# Patient Record
Sex: FEMALE | Race: WHITE | NOT HISPANIC OR LATINO | Employment: OTHER | URBAN - METROPOLITAN AREA
[De-identification: names, ages, dates, MRNs, and addresses within clinical notes are randomized per-mention and may not be internally consistent; named-entity substitution may affect disease eponyms.]

---

## 2017-05-04 ENCOUNTER — HOSPITAL ENCOUNTER (OUTPATIENT)
Dept: MAMMOGRAPHY | Facility: HOSPITAL | Age: 72
Discharge: HOME | End: 2017-05-04

## 2017-05-04 DIAGNOSIS — Z12.31 VISIT FOR SCREENING MAMMOGRAM: ICD-10-CM

## 2018-09-08 ENCOUNTER — HOSPITAL ENCOUNTER (OUTPATIENT)
Dept: MAMMOGRAPHY | Facility: CLINIC | Age: 73
Discharge: HOME | End: 2018-09-08

## 2018-09-08 DIAGNOSIS — Z12.31 VISIT FOR SCREENING MAMMOGRAM: ICD-10-CM

## 2020-02-28 ENCOUNTER — RECORDS - HEALTHEAST (OUTPATIENT)
Dept: ADMINISTRATIVE | Facility: OTHER | Age: 75
End: 2020-02-28

## 2020-02-28 LAB
LAB AP CHARGES (HE HISTORICAL CONVERSION): NORMAL
PATH REPORT.COMMENTS IMP SPEC: NORMAL
PATH REPORT.COMMENTS IMP SPEC: NORMAL
PATH REPORT.FINAL DX SPEC: NORMAL
PATH REPORT.GROSS SPEC: NORMAL
PATH REPORT.MICROSCOPIC SPEC OTHER STN: NORMAL
PATH REPORT.RELEVANT HX SPEC: NORMAL
RESULT FLAG (HE HISTORICAL CONVERSION): NORMAL

## 2021-05-24 ENCOUNTER — RECORDS - HEALTHEAST (OUTPATIENT)
Dept: ADMINISTRATIVE | Facility: CLINIC | Age: 76
End: 2021-05-24

## 2021-05-25 ENCOUNTER — RECORDS - HEALTHEAST (OUTPATIENT)
Dept: ADMINISTRATIVE | Facility: CLINIC | Age: 76
End: 2021-05-25

## 2021-05-27 ENCOUNTER — RECORDS - HEALTHEAST (OUTPATIENT)
Dept: ADMINISTRATIVE | Facility: CLINIC | Age: 76
End: 2021-05-27

## 2021-07-13 ENCOUNTER — RECORDS - HEALTHEAST (OUTPATIENT)
Dept: ADMINISTRATIVE | Facility: CLINIC | Age: 76
End: 2021-07-13

## 2021-07-21 ENCOUNTER — RECORDS - HEALTHEAST (OUTPATIENT)
Dept: ADMINISTRATIVE | Facility: CLINIC | Age: 76
End: 2021-07-21

## 2022-03-23 ENCOUNTER — TELEPHONE (OUTPATIENT)
Dept: OTOLARYNGOLOGY | Facility: CLINIC | Age: 77
End: 2022-03-23
Payer: COMMERCIAL

## 2022-03-23 NOTE — TELEPHONE ENCOUNTER
Left vm message for patient in regards to scheduling appointment. Writers call back number provided on vm.

## 2022-03-25 ENCOUNTER — TELEPHONE (OUTPATIENT)
Dept: OTOLARYNGOLOGY | Facility: CLINIC | Age: 77
End: 2022-03-25
Payer: COMMERCIAL

## 2022-03-25 NOTE — TELEPHONE ENCOUNTER
2nd attempt at calling patient. Left vm message in regards to scheduling appointment. Writers call back number provided on vm.

## 2022-03-30 ENCOUNTER — TELEPHONE (OUTPATIENT)
Dept: OTOLARYNGOLOGY | Facility: CLINIC | Age: 77
End: 2022-03-30
Payer: COMMERCIAL

## 2022-03-30 NOTE — TELEPHONE ENCOUNTER
3rd attempt at calling patient to schedule appt. Writers call back number and call center number were provided on vm for pt to call and schedule.

## 2022-04-04 NOTE — TELEPHONE ENCOUNTER
FUTURE VISIT INFORMATION      FUTURE VISIT INFORMATION:    Date:   5/9/22    Time:   4PM    Location: [facility] [place]  REFERRAL INFORMATION:    Referring provider: Rush Patricia MD      Referring providers clinic:  ENT  [facility]     Reason for visit/diagnosis  ref:.dx:dysphonia,leukoplakia,squamous papilloma    RECORDS REQUESTED FROM:       Clinic name Comments Records Status Imaging Status   ENT  [facility]    3/18/22 note from Rush Patricia MD      8/30/21 Micro Left Direct laryngoscopy with KTP laser    1/13/21 Direct microlaryngoscopy with stripping of vocal cord Direct microlaryngoscopy with microflap excision    11/9/2020 Microlaryngoscopy with exision of vocal cord lesion with KTP laser    9/29/15 DIRECT MICRO LARYNGOSCOPY WITH BIOPSY Care everywhere    [facility] - [address], [place] [address]  8/30/22 Mass, Right posterior glottis    (Case: [id number]) Care everywhere    [facility]  3/22/22 XR CHEST   5/13/21 XR Chest   11/8/16 MR Head Brain IAC  10/6/16 US Head Neck   10/14/14 MR Head Brain  Care everywhere req 4/4/22 - PACS   [facility]  7/12/21 note from Eliceo Franco MD  Care everywhere                  4/4/22 10:20AM sent a fax to [facility] for images - [facility]   4/11/22 9:50AM images received in PACS - [facility]

## 2022-05-06 ENCOUNTER — TELEPHONE (OUTPATIENT)
Dept: OTOLARYNGOLOGY | Facility: CLINIC | Age: 77
End: 2022-05-06
Payer: COMMERCIAL

## 2022-05-06 NOTE — TELEPHONE ENCOUNTER
Writer attempted to contact patient to confirm appointment with Dr. Davis 5/9/22 1:00 PM. Writer left a detailed voicemail for the patient.    Beranrd Kennedy, EMT

## 2022-05-09 ENCOUNTER — VIRTUAL VISIT (OUTPATIENT)
Dept: OTOLARYNGOLOGY | Facility: CLINIC | Age: 77
End: 2022-05-09
Payer: COMMERCIAL

## 2022-05-09 ENCOUNTER — OFFICE VISIT (OUTPATIENT)
Dept: OTOLARYNGOLOGY | Facility: CLINIC | Age: 77
End: 2022-05-09

## 2022-05-09 VITALS
HEIGHT: 66 IN | WEIGHT: 122 LBS | OXYGEN SATURATION: 99 % | HEART RATE: 64 BPM | BODY MASS INDEX: 19.61 KG/M2 | TEMPERATURE: 98 F | DIASTOLIC BLOOD PRESSURE: 78 MMHG | SYSTOLIC BLOOD PRESSURE: 121 MMHG

## 2022-05-09 DIAGNOSIS — R06.02 SHORTNESS OF BREATH: ICD-10-CM

## 2022-05-09 DIAGNOSIS — R49.0 DYSPHONIA: ICD-10-CM

## 2022-05-09 DIAGNOSIS — R49.0 DYSPHONIA: Primary | ICD-10-CM

## 2022-05-09 DIAGNOSIS — B49 FUNGAL INFECTION: ICD-10-CM

## 2022-05-09 DIAGNOSIS — J38.3 VOCAL CORD LEUKOPLAKIA: ICD-10-CM

## 2022-05-09 DIAGNOSIS — J44.9 CHRONIC OBSTRUCTIVE PULMONARY DISEASE, UNSPECIFIED COPD TYPE (H): ICD-10-CM

## 2022-05-09 DIAGNOSIS — J38.7 LARYNGEAL MASS: Primary | ICD-10-CM

## 2022-05-09 PROCEDURE — 31622 DX BRONCHOSCOPE/WASH: CPT | Performed by: OTOLARYNGOLOGY

## 2022-05-09 PROCEDURE — 99205 OFFICE O/P NEW HI 60 MIN: CPT | Mod: 25 | Performed by: OTOLARYNGOLOGY

## 2022-05-09 PROCEDURE — 92524 BEHAVRAL QUALIT ANALYS VOICE: CPT | Mod: GN | Performed by: SPEECH-LANGUAGE PATHOLOGIST

## 2022-05-09 PROCEDURE — 31579 LARYNGOSCOPY TELESCOPIC: CPT | Mod: 51 | Performed by: OTOLARYNGOLOGY

## 2022-05-09 RX ORDER — ALBUTEROL SULFATE 0.83 MG/ML
2.5 SOLUTION RESPIRATORY (INHALATION) EVERY 4 HOURS PRN
COMMUNITY
Start: 2021-04-12 | End: 2024-01-24

## 2022-05-09 RX ORDER — FUROSEMIDE 20 MG
20 TABLET ORAL
Status: ON HOLD | COMMUNITY
Start: 2020-12-17 | End: 2022-05-26

## 2022-05-09 RX ORDER — ALBUTEROL SULFATE 90 UG/1
2 AEROSOL, METERED RESPIRATORY (INHALATION) PRN
COMMUNITY
Start: 2021-07-12

## 2022-05-09 RX ORDER — BUDESONIDE AND FORMOTEROL FUMARATE DIHYDRATE 160; 4.5 UG/1; UG/1
2 AEROSOL RESPIRATORY (INHALATION)
COMMUNITY
Start: 2021-07-12

## 2022-05-09 RX ORDER — ATORVASTATIN CALCIUM 20 MG/1
20 TABLET, FILM COATED ORAL EVERY EVENING
COMMUNITY
Start: 2021-04-21

## 2022-05-09 RX ORDER — LEVOTHYROXINE SODIUM 88 UG/1
88 TABLET ORAL DAILY
COMMUNITY
Start: 2022-04-29

## 2022-05-09 RX ORDER — FEXOFENADINE HCL 180 MG/1
180 TABLET ORAL DAILY
COMMUNITY
Start: 2021-03-17 | End: 2023-09-20

## 2022-05-09 RX ORDER — MONTELUKAST SODIUM 10 MG/1
10 TABLET ORAL DAILY
COMMUNITY
Start: 2021-07-12 | End: 2023-09-20

## 2022-05-09 RX ORDER — SERTRALINE HYDROCHLORIDE 100 MG/1
100 TABLET, FILM COATED ORAL DAILY
COMMUNITY
Start: 2022-04-29

## 2022-05-09 RX ORDER — DILTIAZEM HYDROCHLORIDE 360 MG/1
360 CAPSULE, EXTENDED RELEASE ORAL DAILY
Status: ON HOLD | COMMUNITY
Start: 2021-10-21 | End: 2023-04-04

## 2022-05-09 RX ORDER — OLOPATADINE HYDROCHLORIDE 1 MG/ML
1 SOLUTION/ DROPS OPHTHALMIC
Status: ON HOLD | COMMUNITY
Start: 2021-01-29 | End: 2022-05-26

## 2022-05-09 RX ORDER — NYSTATIN 100000/ML
500000 SUSPENSION, ORAL (FINAL DOSE FORM) ORAL 4 TIMES DAILY
Qty: 140 ML | Refills: 0 | Status: SHIPPED | OUTPATIENT
Start: 2022-05-09 | End: 2022-05-16

## 2022-05-09 RX ORDER — POTASSIUM CHLORIDE 750 MG/1
CAPSULE, EXTENDED RELEASE ORAL
Status: ON HOLD | COMMUNITY
Start: 2020-12-17 | End: 2022-05-26

## 2022-05-09 ASSESSMENT — PAIN SCALES - GENERAL: PAINLEVEL: NO PAIN (0)

## 2022-05-09 NOTE — Clinical Note
"5/9/2022       RE: Asya Coffman  [address]  [facility] [address]     Dear Colleague,    Thank you for referring your patient, Asya Coffman, to the [facility] EAR NOSE AND THROAT [facility]INIC [place] at [facility]. Please see a copy of my visit note below.    Dear Dr. Murray ref. provider found:    I had the pleasure of meeting Asya Coffman in consultation at the [facility] Voice [facility] of the [facility] Otolaryngology Clinic {REFERRAL:[id number]}, for evaluation of {RFV:[id number]}. The note from our visit follows. Speech recognition software may have been used in the documentation below; input is reviewed before signature to the best of my ability. I appreciate the opportunity to participate in the care of this pleasant*** patient.    Please feel free to contact me with any questions.    Sincerely yours,    Nikole Davis M.D., M.P.H.  , Laryngology  Director, [facility]  Otolaryngology- Head & Neck Surgery  [phone]          =====    HISTORY OF PRESENT ILLNESS: ***  Asya Coffman is a pleasant 77 year old female with PMH including CREST syndrome*** who presents with a *** history of {throatsx:541754::\"throat concerns\"}. Symptoms include {[facility] ENT VOICESX MISONO:036161}.    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  Gets things down the wrong tube a few times per month; no recent pneumonia    She {DOES/DOES NOT:878308::\"does not\"} experience increased swallowing effort with {TEXTURES:[id number]}.    Cough/Throat-clearing  ***    Breathing  Some difficulty breathing in  She associates this with her afib  Also has asthma    Throat discomfort  ***    Reflux-type symptoms  She experiences heartburn/indigestion { ENT FREQUENCY:[id number]}. She {is/isnot/issometimes:270477::\"is\"} taking reflux medications.  ***priorGIeval      ***  Prior Epic/ outside*** records were reviewed for this visit, including:  Dr Patricia 3/22/22      MEDICATIONS:     Current Outpatient Medications   Medication Sig Dispense Refill     albuterol (PROAIR HFA/PROVENTIL HFA/VENTOLIN HFA) 108 (90 Base) MCG/ACT inhaler Inhale 2 puffs into the lungs       albuterol (PROVENTIL) (2.5 MG/3ML) 0.083% neb solution Inhale 2.5 mg into the lungs       apixaban ANTICOAGULANT (ELIQUIS) 5 MG tablet Take 5 mg by mouth       atorvastatin (LIPITOR) 20 MG tablet Take 20 mg by mouth       budesonide-formoterol (SYMBICORT) 160-4.5 MCG/ACT Inhaler Inhale 2 puffs into the lungs       diltiazem ER (TIAZAC) 360 MG 24 hr ER beaded capsule Take 360 mg by mouth       fexofenadine (ALLEGRA) 180 MG tablet Take 180 mg by mouth       levothyroxine (SYNTHROID/LEVOTHROID) 88 MCG tablet Take 88 mcg by mouth       montelukast (SINGULAIR) 10 MG tablet Take 10 mg by mouth daily       " sertraline (ZOLOFT) 100 MG tablet Take 100 mg by mouth       furosemide (LASIX) 20 MG tablet Take 20 mg by mouth (Patient not taking: Reported on 5/9/2022)       olopatadine (PATANOL) 0.1 % ophthalmic solution Apply 1 drop to eye (Patient not taking: Reported on 5/9/2022)       potassium chloride ER (MICRO-K) 10 MEQ CR capsule Take 1 capsule when you take a furosemide pill for weight gain of 3 lbs in a day OR 5 lbs in a week (Patient not taking: Reported on 5/9/2022)         ALLERGIES:    Allergies   Allergen Reactions     1-Methyl 2-Pyrrolidone Anaphylaxis     Escitalopram Other (See Comments)     Asthma -a time of exacerbation and may not have been cause may retry     Mirtazapine Other (See Comments)     Asthma a time of exacerbation and may not have been cause may retry     Venlafaxine Other (See Comments)     Adhesive Tape Rash     With holter monitor. Ok with bandaids.       PAST MEDICAL HISTORY: No past medical history on file.   Past Medical History:   . Date     Allergic rhinitis due to pollen     Asthma   mild persistent     Cervical radiculopathy at C5 2/27/2013   ? facette joint?? . C4-5 facette hypertrophy causing mod-severe foraminal stenosis     Chronic GERD     CKD (chronic kidney disease) stage 3, GFR 30-59 ml/min (HC)     COPD (chronic obstructive pulmonary disease) (HC)     COPD exacerbation (HC)     CREST syndrome (HC)     Depression   Major depressive disorder, recurrent episode, in full remission     Diplopia 5/21/2009 1997. White matter changes suspicious for Multiple sclerosis. CSF neg oligoclonal bands. 1.23 No other neuro changes by 5/24/2010     Diplopia 1995   single episode     Essential tremor   sees neurologist-watching     Hoarseness of voice     Hypothyroid     Incomplete tear of right rotator cuff     Labial fusion     Lesion of vocal cord 2020     Major depressive disorder, recurrent episode, in full remission (HC)     New onset atrial fibrillation (HC) 2020     Osteoporosis      Papillary carcinoma of thyroid (HC) 5/21/2009   Thyroidectomy 1965; LN x 4 surgeries all neg [place] told not need further testing after several negative.     Post-surgical hypothyroidism for papillary thyroid cancer     Raynaud's syndrome     Rhinitis     Thyroid cancer (HC)         PAST SURGICAL HISTORY: No past surgical history on file.   Past Surgical History:   . Laterality Date     (IA) IR CHOLANGIOGRAM TRANSHEPATIC 1972     (IA) [place] THYROIDECTOMY [facility] NECK SURG 1966,1967   nodes removed from lt side of the neck     APPENDECTOMY 1972   Appendectomy     CERVICAL POLYPECTOMY     CHOLECYSTECTOMY 1975     COLONOSCOPY     Direct microlaryngoscopy with stripping of vocal cord Direct microlaryngoscopy with microflap excision 01/13/2021   Dr Patricia     ECHOCARDIOGRAM 1996     Micro direct laryngoscopy with biopsy 10/13/2020     Micro Left Direct laryngoscopy with KTP laser Left 08/30/2021   Dr. Patricia     Microlaryngoscopy with excision of vocal cord lesion 11/09/2020   Dr Patricia     THYROIDECTOMY 1965   thyroid ca     VAGINAL REPAIR, LABIAPLASTY, PERINEALPLASTY 1/29/2015           HABITS/SOCIAL HISTORY:    Social History     Tobacco Use     Smoking status: Not on file     Smokeless tobacco: Not on file   Substance Use Topics     Alcohol use: Not on file         FAMILY HISTORY:    Family History   Problem Relation Age of Onset     Breast Cancer Mother 75.00     Hereditary Breast and Ovarian Cancer Syndrome No family hx of      Cancer No family hx of      Colon Cancer No family hx of      Endometrial Cancer No family hx of      Ovarian Cancer No family hx of     ***Noncontributory.    REVIEW OF SYSTEMS:  Comprehensive 11 point review of systems was reviewed. Positives are as noted below; pertinent findings are as noted in the HPI.     Patient Supplied Answers to Review of Systems            PHYSICAL EXAMINATION:  ***  Intake scores  Total Score for Last Patient-Answered VHI Questionnaire  No flowsheet data found.  Total  Score for Last Patient-Answered EAT Questionnaire  No flowsheet data found.  Total Score for Last Patient-Answered CSI Questionnaire  No flowsheet data found.          PROCEDURE: ***                      IMAGING/RESULTS reviewed, with pertinent report excerpts below:  ***      IMPRESSION AND PLAN: ***  Asya BAÑUELOS Augustus presents with ***         -quite dysphonic  -difficult to tell whether the mass is adherent to the right vocal fold, so difficult to tell whether this is related to prior resections or the mass itself    -discussed first resecting as much as possible of the mass, under general anesthesia  -[_] pulmonology note to assess whether jet ventilation is an option Dr Salvador Hunt    -pt statse she has previously been allowed to hold anticoagulation for surgery, so we would want to do this again    -then could incorporate procedures under local anesthesia      -periop speech therapy      -fungal pharyngitis  nystatin (MYCOSTATIN) 100,000 unit/mL suspension    Indications: Thrush Swish and spit 5 mL (500,000 units) by mouth 4 times daily for 7 days.   Tolerated in March          I asked the patient to return *** or sooner as needed. ***if symptoms persist despite the steps above.   I appreciate the opportunity to participate in the care of this patient.       ***Today's visit required additional screening time, PPE, and cleaning measures to allow for a safe in-person visit, due to the public health emergency.  I spent a total of *** minutes on ***5/9/2022 in chart review, review of tests, patient visit, documentation, care coordination, and/or discussion with other providers about the issues documented above, separate from any documented procedure(s).    Research Note: patient is ***not open to being contacted for any applicable research through this clinic.      Dear Dr. Patricia:    I had the pleasure of meeting Asya Coffman in consultation at the [facility] Voice Clinic of the [facility]  Otolaryngology Clinic at your request, for evaluation of dysphonia and laryngeal lesions. The note from our visit follows. Speech recognition software may have been used in the documentation below; input is reviewed before signature to the best of my ability. I appreciate the opportunity to participate in the care of this pleasant patient.    Please feel free to contact me with any questions.    Sincerely yours,    Nikole Davis M.D., M.P.H.  , Laryngology  Director, 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     Voice  Voice trouble began in 2015, gradual onset, no obvious inciting event.  Was found to have a left true vocal fold lesion which was biopsied and found to be benign. (9/29/15 DIRECT MICRO LARYNGOSCOPY WITH BIOPSY)    Summer 2020 again developed gradual onset dysphonia.  Again had true vocal fold abnormalities, biopsy suggestive of verrucous carcinoma (10/13/2020 MicroDirect laryngoscopy and biopsy).  Then had staged bilateral stripping of the vocal folds (1/13/21 Direct microlaryngoscopy with stripping of vocal cord Direct microlaryngoscopy with microflap excision, 11/9/2020 Microlaryngoscopy with exision of vocal cord lesion with KTP laser).  Further resection was read as high grade dysplasia.  Voice recovered to about 90% after surgery.  No voice therapy.    July 2021 was seen again with now a papillomatous lesion in post cricoid area .  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  Underwent  Micro Left Direct laryngoscopy with KTP laser 8/30/21.    Jan 2022 had some worsening of hoarseness. When seen  in March 2022 was noted to have return of squamous papilloma, and was referred here. Voice is squeaky and effortful. Has improved slightly recently as her cough has been improving.    Has a remote history of thyroidectomy at age 20 for papillary thyroid carcinoma, had done fine vocally until more recently.      Swallowing  No concerns.  Gets things down the wrong tube a few times per month; no recent pneumonia.  Wonders if CREST esophageal changes are contributing.    Cough/Throat-clearing  Since Jan 2022 has had a tickle in her throat that leads to coughing; this has been gradually improving.    Breathing  Some difficulty breathing in.  She associates this with her afib.  States she also has asthma. Chart indicates she also has COPD.    Throat discomfort  As above.    Reflux-type symptoms  She experiences heartburn/indigestion rarely. She is not taking reflux medications.      Prior Epic/ outside records were reviewed for this visit, including:  Dr Patricia 3/22/22  Dr Franco March 2022      MEDICATIONS:     Current Outpatient Medications   Medication Sig Dispense Refill     albuterol (PROAIR HFA/PROVENTIL HFA/VENTOLIN HFA) 108 (90 Base) MCG/ACT inhaler Inhale 2 puffs into the lungs       albuterol (PROVENTIL) (2.5 MG/3ML) 0.083% neb solution Inhale 2.5 mg into the lungs       apixaban ANTICOAGULANT (ELIQUIS) 5 MG tablet Take 5 mg by mouth       atorvastatin (LIPITOR) 20 MG tablet Take 20 mg by mouth       budesonide-formoterol (SYMBICORT) 160-4.5 MCG/ACT Inhaler Inhale 2 puffs into the lungs       diltiazem ER (TIAZAC) 360 MG 24 hr ER beaded capsule Take 360 mg by mouth       fexofenadine (ALLEGRA) 180 MG tablet Take 180 mg by mouth       levothyroxine (SYNTHROID/LEVOTHROID) 88 MCG tablet Take 88 mcg by mouth       montelukast (SINGULAIR) 10 MG tablet Take 10 mg by mouth daily       sertraline (ZOLOFT) 100 MG tablet Take 100 mg by mouth       furosemide (LASIX) 20 MG tablet Take 20 mg by mouth (Patient not taking:  Reported on 5/9/2022)       olopatadine (PATANOL) 0.1 % ophthalmic solution Apply 1 drop to eye (Patient not taking: Reported on 5/9/2022)       potassium chloride ER (MICRO-K) 10 MEQ CR capsule Take 1 capsule when you take a furosemide pill for weight gain of 3 lbs in a day OR 5 lbs in a week (Patient not taking: Reported on 5/9/2022)         ALLERGIES:    Allergies   Allergen Reactions     1-Methyl 2-Pyrrolidone Anaphylaxis     Escitalopram Other (See Comments)     Asthma -a time of exacerbation and may not have been cause may retry     Mirtazapine Other (See Comments)     Asthma a time of exacerbation and may not have been cause may retry     Venlafaxine Other (See Comments)     Adhesive Tape Rash     With holter monitor. Ok with bandaids.       PAST MEDICAL HISTORY: No past medical history on file.   Past Medical History:   . Date     Allergic rhinitis due to pollen     Asthma   mild persistent     Cervical radiculopathy at C5 2/27/2013   ? facette joint?? . C4-5 facette hypertrophy causing mod-severe foraminal stenosis     Chronic GERD     CKD (chronic kidney disease) stage 3, GFR 30-59 ml/min (HC)     COPD (chronic obstructive pulmonary disease) (HC)     COPD exacerbation (HC)     CREST syndrome (HC)     Depression   Major depressive disorder, recurrent episode, in full remission     Diplopia 5/21/2009 1997. White matter changes suspicious for Multiple sclerosis. CSF neg oligoclonal bands. 1.23 No other neuro changes by 5/24/2010     Diplopia 1995   single episode     Essential tremor   sees neurologist-watching     Hoarseness of voice     Hypothyroid     Incomplete tear of right rotator cuff     Labial fusion     Lesion of vocal cord 2020     Major depressive disorder, recurrent episode, in full remission (HC)     New onset atrial fibrillation (HC) 2020     Osteoporosis     Papillary carcinoma of thyroid (HC) 5/21/2009   Thyroidectomy 1965; LN x 4 surgeries all neg [place] told not need further testing after  several negative.     Post-surgical hypothyroidism for papillary thyroid cancer     Raynaud's syndrome     Rhinitis     Thyroid cancer (HC)         PAST SURGICAL HISTORY: No past surgical history on file.   Past Surgical History:   . Laterality Date     (IA) IR CHOLANGIOGRAM TRANSHEPATIC 1972     (IA) [place] THYROIDECTOMY [facility] NECK SURG 1966,1967   nodes removed from lt side of the neck     APPENDECTOMY 1972   Appendectomy     CERVICAL POLYPECTOMY     CHOLECYSTECTOMY 1975     COLONOSCOPY     Direct microlaryngoscopy with stripping of vocal cord Direct microlaryngoscopy with microflap excision 01/13/2021   Dr Patricia     ECHOCARDIOGRAM 1996     Micro direct laryngoscopy with biopsy 10/13/2020     Micro Left Direct laryngoscopy with KTP laser Left 08/30/2021   Dr. Patricia     Microlaryngoscopy with excision of vocal cord lesion 11/09/2020   Dr Patricia     THYROIDECTOMY 1965   thyroid ca     VAGINAL REPAIR, LABIAPLASTY, PERINEALPLASTY 1/29/2015       HABITS/SOCIAL HISTORY:    Social History     Tobacco Use     Smoking status: Not on file     Smokeless tobacco: Not on file   Substance Use Topics     Alcohol use: Not on file       FAMILY HISTORY:    Family History   Problem Relation Age of Onset     Breast Cancer Mother 75.00     Hereditary Breast and Ovarian Cancer Syndrome No family hx of      Cancer No family hx of      Colon Cancer No family hx of      Endometrial Cancer No family hx of      Ovarian Cancer No family hx of        REVIEW OF SYSTEMS:  Comprehensive 11 point review of systems was reviewed. Positives are as noted below; pertinent findings are as noted in the HPI.     Patient Supplied Answers to Review of Systems         PHYSICAL EXAMINATION:  General: The patient was alert and conversant, and in no acute distress.    Eyes: PERRL, conjunctiva and lids normal, sclera nonicteric.  Nose: Anterior rhinoscopy: no gross abnormalities. no  bleeding; no  mucopurulence; septum grossly normal, mild mucoid drainage and/or  crusting.  Oral cavity/oropharynx: No masses or lesions. Dentition in fair condition. Floor of mouth and oral tongue soft to palpation. Tongue mobility and palate elevation intact and symmetric.  Ears: Normal auricles, external auditory canals bilaterally. Visualized portions of tympanic membranes normal bilaterally with some monomeric portions bilaterally.   Neck: No palpable cervical lymphadenopathy. There was mild tenderness to palpation of the thyrohyoid space, which was narrow. Well-healed thyroidectomy incision. Landmarks palpable.  Resp: Breathing comfortably, no stridor or stertor. Faintly audible noise on rapid inhalation.  Neuro: Symmetric facial function. Other cranial nerves as documented above.  Psych: Normal affect, pleasant and cooperative.  Voice/speech: Severe dysphonia characterized by breathiness, roughness, strain and high Fo.  Extremities: No cyanosis, clubbing, or edema of the upper extremities.    Intake scores  Total Score for Last Patient-Answered VHI Questionnaire  No flowsheet data found.  Total Score for Last Patient-Answered EAT Questionnaire  No flowsheet data found.  Total Score for Last Patient-Answered CSI Questionnaire  No flowsheet data found.      PROCEDURE:     Flexible fiberoptic laryngoscopy and laryngovideostroboscopy  Indications: This procedure was warranted to evaluate the patient's laryngeal anatomy and function. Risks, benefits, and alternatives were discussed.  Description: After written informed consent was obtained, a time-out was performed to confirm patient identity, procedure, and procedure site. Topical 3% lidocaine with 0.25% phenylephrine was applied to the nasal cavities. I performed the endoscopy and no complications were apparent. Continuous and stroboscopic light were utilized to assess routine phonation and variable frequency phonation.  SLP: Oren Presley, PhD, CCC-SLP   Findings: Normal nasopharynx. Normal base of tongue, valleculae, and epiglottis.  The right true vocal fold demonstrated normal mobility. The left true vocal fold demonstrated normal mobility. The medial edges of the vocal folds appeared smooth and straight on the left. Visualization of the right was partially prevented by the presence of a large posterior laryngeal mass; anterior portion of the right true vocal fold appeared mildly leukoplakic.    Glissade produced appropriate elongation. There was moderate supraglottic recruitment with connected speech. Mucosa of the false vocal folds, aryepiglottic folds, piriform sinuses, and posterior glottis notable for large multilobed exophytic mass along posterior larynx, with component that appears papillomatous. Difficult to definitively assess attachment point(s). Fungal laryngopharyngitis. Airway was patent. Similar findings on NBI.    Stroboscopy was added to facilitate evaluation of the mucosal wave. This proved to be difficult to assess bilaterally due to the presence of the mass as well as supraglottic recruitment. Fleeting glimpses appeared to suggest some possible vibratory function, but degree of stiffness was difficult to assess. Closure plane: at glottic level. Phase distribution: appeared open-phase predominant.                  Flexible Bronchoscopy (05672)  Indications: This procedure was warranted to evaluate the patient's airway anatomy. Risks, benefits, and alternatives were discussed.  Description: After informed consent was obtained, a time-out was performed to confirm patient identity, procedure, and procedure site. Topical 3% lidocaine with 0.25% phenylephrine was applied to the nasal cavities. I performed the endoscopy and no complications were apparent.  Performed by: Nikole Davis MD MPH  Findings: Glottis patent. Subglottis patent. Trachea clear. Dorita sharp. Unremarkable takeoffs of mainstem bronchi.          IMAGING/RESULTS reports reviewed, with pertinent report excerpts below:  XR CHEST 2 VIEWS PA AND LATERAL    DATE/TIME: 3/22/2022   IMPRESSION: Hyperinflation with flattening the hemidiaphragms and increased AP chest diameter. Lungs appear clear. Heart size and pulmonary vascularity normal. No change.   CONCLUSION: Hyperinflation. No new infiltrate.      IMPRESSION AND PLAN:   Asya Coffman presents with a complex PMH including CREST syndrome, COPD, CKD, atrial fibrillation and a complex laryngeal history including a prior benign lesion, severe dysplasia, and new papillomatous lesion.     Today she is quite dysphonic and presents with a large posterior laryngeal lesion that appears at least partially papillomatous. It is difficult to tell whether the mass is adherent to the right true vocal fold in addition to the posterior larynx. It is also difficult to tell whether the dysphonia is accounted for by the lesion being adherent to the true vocal fold vs interfering with vocal fold closure vs potential fibrosis from multiple prior disease processes and resections.    Although she was referred for consideration of adjuvant treatments for papilloma, given her current appearance it would be helpful to first resect as much as possible of the mass under general anesthesia. This would provide updated pathology results and also potentially reduce the degree of future treatment needed under local anesthesia. We discussed that depending on the extent of the attachment of the mass, as well as its pathology results, multiple procedures may be needed.     With respect to airway management, we discussed consideration of jet ventilation, as a routine intubation may be challenging given the location and size of the mass. I will reach out to her pulmonologist Dr Franco for his opinion regarding her ability to tolerate jet ventilation. It may also be possible to place a small endotracheal tube if the mass is partially mobile. We did discuss that in an emergent situation, tracheostomy is also an option given that her lower airway is  patent, but of course we would prefer to avoid this if possible.    Other risks of surgery, including injury to lips/ teeth/ tongue/ jaw/ throat/ airway, risks of general anesthesia, and temporary/permanent hoarseness were discussed. This included temporary or permanent dysgeusia, hypesthesia, or motion abnormalities of the tongue. We also discussed a potential need for additional procedures in the future, as well as the risk of equivocal results from any pathology specimens that would be sent. Voice/speech therapy will be an appropriate part of post-surgical rehabilitation.      Summary of plan:  -MicroDL with biopsies, possible ablation, possible excision of laryngeal lesions, possible CO2 laser. Anticipate outpatient procedure.  -The patient states she has previously been allowed to hold anticoagulation for surgery; she will confirm whether this is again acceptable.   -Speech pathology for voice rehabilitation  -Incidentally noted fungal laryngopharyngitis--will treat with nystatin which she has previously received. If this persists when seen in the OR, will consider fluconazole.    I appreciate the opportunity to participate in the care of this patient.     Today's visit required additional screening time, PPE, and cleaning measures to allow for a safe in-person visit, due to the public health emergency.  I spent a total of 73 minutes on 5/9/2022 in chart review, review of tests, patient visit, documentation, care coordination, and/or discussion with other providers about the issues documented above, separate from any documented procedure(s).        Relevant Diagnosis: surgery  Teaching Topic: pre-op teaching  Person(s) involved in teaching:  Patient     Teaching Concerns Addressed:  Pre op teaching included the need for an H&P, NPO status pre op, hospital routines, expected recovery, activity  restrictions, antimicrobial scrub, s/s of infection, pain control methods and the importance of follow up appointments.   The patient voiced an understanding of all instructions and will call with questions.     Motivation Level:  Asks Questions:   Yes  Eager to Learn:   Yes  Cooperative:   Yes  Receptive (willing/able to accept information):   Yes     Patient  demonstrates understanding of the following:  Reason for the appointment, diagnosis and treatment plan:   Yes  Knowledge of proper use of medications and conditions for which they are ordered (with special attention to potential side effects or drug interactions):   Yes  Which situations necessitate calling provider and whom to contact:   Yes        Proper use and care of  (medical equip, care aids, etc.):   NA  Nutritional needs and diet plan:   Yes  Pain management techniques:   Yes  Patient instructed on hand hygiene:  Yes  How and/when to access community resources:   NA     Infection Prevention:  Patient   demonstrates understanding of the following:  Surgical procedure site care taught   Signs and symptoms of infection taught Yes  Wound care taught Yes     Instructional Materials Used/Given: AVS instructions, surgery packet, soap        Again, thank you for allowing me to participate in the care of your patient.      Sincerely,    Nikole Davis MD

## 2022-05-09 NOTE — PATIENT INSTRUCTIONS
1.  You were seen in the ENT Clinic today by . If you have any questions or concerns after your appointment, please call [phone]. Press option #1 for scheduling related needs. Press option #3 for Nurse advice.    2.   has recommended the following:   - surgery    3.  Our surgery scheduler, Alyssa, will reach out to your regarding a surgery date. Once you confirm a surgery date, then you should schedule your pre-op appointment. It will likely be Thursday, May 19th or Thursday, May 26th. WE WILL CALL YOU TO CONFIRM    We require a negative covid test within 4 days of surgery. A member of our covid team will reach out to you about 1-2 weeks before your procedure to help schedule a covid test at one of our [facility] testing locations in [facility]. If you are OK with a covid test at our testing locations - you only need to wait for the phone call to set up this appointment.    Blanca Hyman LPN  [phone]  [facility] Otolaryngology         Surgery Teaching Surgery Teaching      1.You must have a physical exam (called  history and physical ) within 30 days of surgery. You can do this at the PAC clinic or your family clinic. Bring the Pre-Op Surgery Form (found in the surgery packet that you received in the mail) with you to your primary doctor if you chose to have them complete this. If your doctor is in the [facility], they do not need this form.     2. Ask your doctor what medicines are safe before surgery.          * If you are on any blood-thinners, we will need to make a plan with your INR clinic or prescribing doctor of when you need to stop these medications before surgery    3. NO MOTRIN, IBUPROFEN, ASPIRIN, ALEVE, GARLIC SUPPLEMENTS or FISH OIL x 7 days prior to surgery ( to prevent excess bleeding and bruising at time of surgery).    4. For same-day surgery, you must arrange for an adult to take you home from the Center. An adult must stay with you for the first 24 hours  after surgery. You cannot drive for 24 hours, or longer if you are still taking narcotic medications.      5. Stop drinking alcohol at least 24 hours before surgery.      6. Stop or at least cut down on smoking 24 hours before surgery.     7. Take a bath or shower the night before and the morning of surgery (refer to surgery packet for extra information). You should use the soap that we mailed to you or gave in clinic (2 small bottles). Use the entire bottle of soap for each shower, washing from the neck down, then rinsing off. Sleep in clean clothing and use clean bedding sheets. If your doctor does not give you special soap, buy Hibiclens or Radha-Stat at the drug store or ask the pharmacist to suggest a brand. Do not put on lotion, powder, perfume, deodorant or make-up after bathing.     8. You can eat a normal meal the night before surgery. Do not eat any solid foods or drink any milk products for 8 hours before surgery. A pre-op nurse will call you the week before surgery to confirm your eating cut-off time, but please listen to this 8-hour rule if you miss their call.     9. You may drink clear liquids until 2 hours before surgery. Clear liquids include water, Gatorade, apple juice, or other liquids you can read through.

## 2022-05-09 NOTE — PROGRESS NOTES
Dear Dr. Patricia:    I had the pleasure of meeting Asya Coffman in consultation at the [facility] of the [facility] Otolaryngology Clinic at your request, for evaluation of dysphonia and laryngeal lesions. The note from our visit follows. Speech recognition software may have been used in the documentation below; input is reviewed before signature to the best of my ability. I appreciate the opportunity to participate in the care of this pleasant patient.    Please feel free to contact me with any questions.    Sincerely yours,    Nikole Davis M.D., M.P.H.  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     Voice  Voice trouble began in 2015, gradual onset, no obvious inciting event.  Was found to have a left true vocal fold lesion which was biopsied and found to be benign. (9/29/15 DIRECT MICRO LARYNGOSCOPY WITH BIOPSY)    Summer 2020 again developed gradual onset dysphonia.  Again had true vocal fold abnormalities, biopsy suggestive of verrucous carcinoma (10/13/2020 MicroDirect laryngoscopy and biopsy).  Then had staged bilateral stripping of the vocal folds (1/13/21 Direct microlaryngoscopy with stripping of vocal cord Direct microlaryngoscopy with microflap excision, 11/9/2020 Microlaryngoscopy with exision of vocal cord lesion with KTP laser).  Further resection was read as high grade dysplasia.  Voice recovered to about 90% after surgery.  No voice therapy.    July 2021 was seen again with now a papillomatous lesion in post cricoid area .  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  Underwent  Micro Left Direct laryngoscopy with KTP laser 8/30/21.    Jan 2022 had some worsening of hoarseness. When seen in March 2022 was noted to have return of squamous papilloma, and was referred here. Voice is squeaky and effortful. Has improved slightly recently as her cough has been improving.    Has a remote history of thyroidectomy at age 20 for papillary thyroid carcinoma, had done fine vocally until more recently.      Swallowing  No concerns.  Gets things down the wrong tube a few times per month; no recent pneumonia.  Wonders if CREST esophageal changes are contributing.    Cough/Throat-clearing  Since Jan 2022 has had a tickle in her throat that leads to coughing; this has been gradually improving.    Breathing  Some difficulty breathing in.  She associates this with her afib.  States she also has asthma. Chart indicates she also has COPD.    Throat discomfort  As above.    Reflux-type symptoms  She experiences heartburn/indigestion rarely. She is not taking reflux medications.      Prior Epic/ outside records were reviewed for this visit, including:  Dr Patricia 3/22/22  Dr Franco March 2022      MEDICATIONS:     Current Outpatient Medications   Medication Sig Dispense Refill     albuterol (PROAIR HFA/PROVENTIL HFA/VENTOLIN HFA) 108 (90 Base) MCG/ACT inhaler Inhale 2 puffs into the lungs       albuterol (PROVENTIL) (2.5 MG/3ML) 0.083% neb solution Inhale 2.5 mg into the lungs       apixaban ANTICOAGULANT (ELIQUIS) 5 MG tablet Take 5 mg by mouth       atorvastatin (LIPITOR) 20 MG tablet Take 20 mg by mouth       budesonide-formoterol (SYMBICORT) 160-4.5 MCG/ACT Inhaler Inhale 2 puffs into the lungs       diltiazem ER (TIAZAC) 360 MG 24 hr ER beaded capsule Take 360 mg by mouth       fexofenadine (ALLEGRA) 180 MG tablet Take 180 mg by mouth       levothyroxine (SYNTHROID/LEVOTHROID) 88 MCG tablet Take 88 mcg by mouth       montelukast (SINGULAIR) 10 MG tablet Take 10 mg by mouth daily        sertraline (ZOLOFT) 100 MG tablet Take 100 mg by mouth       furosemide (LASIX) 20 MG tablet Take 20 mg by mouth (Patient not taking: Reported on 5/9/2022)       olopatadine (PATANOL) 0.1 % ophthalmic solution Apply 1 drop to eye (Patient not taking: Reported on 5/9/2022)       potassium chloride ER (MICRO-K) 10 MEQ CR capsule Take 1 capsule when you take a furosemide pill for weight gain of 3 lbs in a day OR 5 lbs in a week (Patient not taking: Reported on 5/9/2022)         ALLERGIES:    Allergies   Allergen Reactions     1-Methyl 2-Pyrrolidone Anaphylaxis     Escitalopram Other (See Comments)     Asthma -a time of exacerbation and may not have been cause may retry     Mirtazapine Other (See Comments)     Asthma a time of exacerbation and may not have been cause may retry     Venlafaxine Other (See Comments)     Adhesive Tape Rash     With holter monitor. Ok with bandaids.       PAST MEDICAL HISTORY: No past medical history on file.   Past Medical History:   . Date     Allergic rhinitis due to pollen     Asthma   mild persistent     Cervical radiculopathy at C5 2/27/2013   ? facette joint?? . C4-5 facette hypertrophy causing mod-severe foraminal stenosis     Chronic GERD     CKD (chronic kidney disease) stage 3, GFR 30-59 ml/min (HC)     COPD (chronic obstructive pulmonary disease) (HC)     COPD exacerbation (HC)     CREST syndrome (HC)     Depression   Major depressive disorder, recurrent episode, in full remission     Diplopia 5/21/2009 1997. White matter changes suspicious for Multiple sclerosis. CSF neg oligoclonal bands. 1.23 No other neuro changes by 5/24/2010     Diplopia 1995   single episode     Essential tremor   sees neurologist-watching     Hoarseness of voice     Hypothyroid     Incomplete tear of right rotator cuff     Labial fusion     Lesion of vocal cord 2020     Major depressive disorder, recurrent episode, in full remission (HC)     New onset atrial fibrillation (HC) 2020     Osteoporosis      Papillary carcinoma of thyroid (HC) 5/21/2009   Thyroidectomy 1965; LN x 4 surgeries all neg Christensen told not need further testing after several negative.     Post-surgical hypothyroidism for papillary thyroid cancer     Raynaud's syndrome     Rhinitis     Thyroid cancer (HC)         PAST SURGICAL HISTORY: No past surgical history on file.   Past Surgical History:   . Laterality Date     (IA) IR CHOLANGIOGRAM TRANSHEPATIC 1972     (IA) RI THYROIDECTOMY MALIG LTD NECK SURG 1966,1967   nodes removed from lt side of the neck     APPENDECTOMY 1972   Appendectomy     CERVICAL POLYPECTOMY     CHOLECYSTECTOMY 1975     COLONOSCOPY     Direct microlaryngoscopy with stripping of vocal cord Direct microlaryngoscopy with microflap excision 01/13/2021   Dr Patricia     ECHOCARDIOGRAM 1996     Micro direct laryngoscopy with biopsy 10/13/2020     Micro Left Direct laryngoscopy with KTP laser Left 08/30/2021   Dr. Patricia     Microlaryngoscopy with excision of vocal cord lesion 11/09/2020   Dr Patricia     THYROIDECTOMY 1965   thyroid ca     VAGINAL REPAIR, LABIAPLASTY, PERINEALPLASTY 1/29/2015       HABITS/SOCIAL HISTORY:    Social History     Tobacco Use     Smoking status: Not on file     Smokeless tobacco: Not on file   Substance Use Topics     Alcohol use: Not on file       FAMILY HISTORY:    Family History   Problem Relation Age of Onset     Breast Cancer Mother 75.00     Hereditary Breast and Ovarian Cancer Syndrome No family hx of      Cancer No family hx of      Colon Cancer No family hx of      Endometrial Cancer No family hx of      Ovarian Cancer No family hx of        REVIEW OF SYSTEMS:  Comprehensive 11 point review of systems was reviewed. Positives are as noted below; pertinent findings are as noted in the HPI.     Patient Supplied Answers to Review of Systems         PHYSICAL EXAMINATION:  General: The patient was alert and conversant, and in no acute distress.    Eyes: PERRL, conjunctiva and lids normal, sclera  nonicteric.  Nose: Anterior rhinoscopy: no gross abnormalities. no  bleeding; no  mucopurulence; septum grossly normal, mild mucoid drainage and/or crusting.  Oral cavity/oropharynx: No masses or lesions. Dentition in fair condition. Floor of mouth and oral tongue soft to palpation. Tongue mobility and palate elevation intact and symmetric.  Ears: Normal auricles, external auditory canals bilaterally. Visualized portions of tympanic membranes normal bilaterally with some monomeric portions bilaterally.   Neck: No palpable cervical lymphadenopathy. There was mild tenderness to palpation of the thyrohyoid space, which was narrow. Well-healed thyroidectomy incision. Landmarks palpable.  Resp: Breathing comfortably, no stridor or stertor. Faintly audible noise on rapid inhalation.  Neuro: Symmetric facial function. Other cranial nerves as documented above.  Psych: Normal affect, pleasant and cooperative.  Voice/speech: Severe dysphonia characterized by breathiness, roughness, strain and high Fo.  Extremities: No cyanosis, clubbing, or edema of the upper extremities.    Intake scores  Total Score for Last Patient-Answered VHI Questionnaire  No flowsheet data found.  Total Score for Last Patient-Answered EAT Questionnaire  No flowsheet data found.  Total Score for Last Patient-Answered CSI Questionnaire  No flowsheet data found.      PROCEDURE:     Flexible fiberoptic laryngoscopy and laryngovideostroboscopy  Indications: This procedure was warranted to evaluate the patient's laryngeal anatomy and function. Risks, benefits, and alternatives were discussed.  Description: After written informed consent was obtained, a time-out was performed to confirm patient identity, procedure, and procedure site. Topical 3% lidocaine with 0.25% phenylephrine was applied to the nasal cavities. I performed the endoscopy and no complications were apparent. Continuous and stroboscopic light were utilized to assess routine phonation and  variable frequency phonation.  SLP: Oren Presley, PhD, CCC-SLP   Findings: Normal nasopharynx. Normal base of tongue, valleculae, and epiglottis. The right true vocal fold demonstrated normal mobility. The left true vocal fold demonstrated normal mobility. The medial edges of the vocal folds appeared smooth and straight on the left. Visualization of the right was partially prevented by the presence of a large posterior laryngeal mass; anterior portion of the right true vocal fold appeared mildly leukoplakic.    Glissade produced appropriate elongation. There was moderate supraglottic recruitment with connected speech. Mucosa of the false vocal folds, aryepiglottic folds, piriform sinuses, and posterior glottis notable for large multilobed exophytic mass along posterior larynx, with component that appears papillomatous. Difficult to definitively assess attachment point(s). Fungal laryngopharyngitis. Airway was patent. Similar findings on NBI.    Stroboscopy was added to facilitate evaluation of the mucosal wave. This proved to be difficult to assess bilaterally due to the presence of the mass as well as supraglottic recruitment. Fleeting glimpses appeared to suggest some possible vibratory function, but degree of stiffness was difficult to assess. Closure plane: at glottic level. Phase distribution: appeared open-phase predominant.                  Flexible Bronchoscopy (36464)  Indications: This procedure was warranted to evaluate the patient's airway anatomy. Risks, benefits, and alternatives were discussed.  Description: After informed consent was obtained, a time-out was performed to confirm patient identity, procedure, and procedure site. Topical 3% lidocaine with 0.25% phenylephrine was applied to the nasal cavities. I performed the endoscopy and no complications were apparent.  Performed by: Nikole Davis MD MPH  Findings: Glottis patent. Subglottis patent. Trachea clear. Dorita sharp. Unremarkable  takeoffs of mainstem bronchi.          IMAGING/RESULTS reports reviewed, with pertinent report excerpts below:  XR CHEST 2 VIEWS PA AND LATERAL   DATE/TIME: 3/22/2022   IMPRESSION: Hyperinflation with flattening the hemidiaphragms and increased AP chest diameter. Lungs appear clear. Heart size and pulmonary vascularity normal. No change.   CONCLUSION: Hyperinflation. No new infiltrate.      IMPRESSION AND PLAN:   Asya Coffman presents with a complex PMH including CREST syndrome, COPD, CKD, atrial fibrillation and a complex laryngeal history including a prior benign lesion, severe dysplasia, and new papillomatous lesion.     Today she is quite dysphonic and presents with a large posterior laryngeal lesion that appears at least partially papillomatous. It is difficult to tell whether the mass is adherent to the right true vocal fold in addition to the posterior larynx. It is also difficult to tell whether the dysphonia is accounted for by the lesion being adherent to the true vocal fold vs interfering with vocal fold closure vs potential fibrosis from multiple prior disease processes and resections.    Although she was referred for consideration of adjuvant treatments for papilloma, given her current appearance it would be helpful to first resect as much as possible of the mass under general anesthesia. This would provide updated pathology results and also potentially reduce the degree of future treatment needed under local anesthesia. We discussed that depending on the extent of the attachment of the mass, as well as its pathology results, multiple procedures may be needed.     With respect to airway management, we discussed consideration of jet ventilation, as a routine intubation may be challenging given the location and size of the mass. I will reach out to her pulmonologist Dr Franco for his opinion regarding her ability to tolerate jet ventilation. It may also be possible to place a small endotracheal tube  if the mass is partially mobile. We did discuss that in an emergent situation, tracheostomy is also an option given that her lower airway is patent, but of course we would prefer to avoid this if possible.    Other risks of surgery, including injury to lips/ teeth/ tongue/ jaw/ throat/ airway, risks of general anesthesia, and temporary/permanent hoarseness were discussed. This included temporary or permanent dysgeusia, hypesthesia, or motion abnormalities of the tongue. We also discussed a potential need for additional procedures in the future, as well as the risk of equivocal results from any pathology specimens that would be sent. Voice/speech therapy will be an appropriate part of post-surgical rehabilitation.      Summary of plan:  -MicroDL with biopsies, possible ablation, possible excision of laryngeal lesions, possible CO2 laser. Anticipate outpatient procedure.  -The patient states she has previously been allowed to hold anticoagulation for surgery; she will confirm whether this is again acceptable.   -Speech pathology for voice rehabilitation  -Incidentally noted fungal laryngopharyngitis--will treat with nystatin which she has previously received. If this persists when seen in the OR, will consider fluconazole.    I appreciate the opportunity to participate in the care of this patient.     Today's visit required additional screening time, PPE, and cleaning measures to allow for a safe in-person visit, due to the public health emergency.  I spent a total of 73 minutes on 5/9/2022 in chart review, review of tests, patient visit, documentation, care coordination, and/or discussion with other providers about the issues documented above, separate from any documented procedure(s).

## 2022-05-09 NOTE — LETTER
"5/9/2022       RE: Asya Coffman  [address]  [place] [facility] [place] [address]     Dear Colleague,    Thank you for referring your patient, Asya Coffman, to the [facility] VOICE [facility]IN[facility] [place] at [facility]. Please see a copy of my visit note below.    [facility]  CLINICAL EVALUATION REPORT    Patient: Asya Coffman  Date of Service: 5/9/2022  Seen in conjunction with: Dr. Nikole Davis  Referring physician: Dr. Patricia    HISTORY  PATIENT INFORMATION  Hannah Coffman is a 77 year old female presenting today for initial evaluation of voice and resonance. Salient details of her symptom history are as follows:    Patient has a long history of  dysphonia, for which she has been followed at outside otolaryngology clinic for several years.  In 2015, she had a 1 month history of dysphonia that began gradually without an obvious inciting events, leading to referral to otolaryngology.  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  A left-sided true vocal fold lesion was observed and biopsied, with benign pathology reported.  Voice quality was essentially normal after this, until summer 2020, when she again experienced gradual onset of dysphonia.    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    She subsequently underwent staged bilateral vocal cord \"stripping\" on 1/13/21 to address lesions.  She reports that her voice quality improved to about 90% of normal after surgery.     At follow-up in July 2021, a papillomatous lesion of the posterior larynx was observed, and the patient returned to surgery on 8/30/21 for excision and biopsy.  This showed squamous papilloma and low-grade dysplasia.  At follow-up in March 2022, the patient's voice quality has continued to decline, and further pathology was observed on laryngeal exam.  She was referred to the [facility] " clinic at the [facility] for further management.    Of note, she did not have any perioperative voice therapy around any of her original surgeries.    The patient denies swallowing difficulties.  She reports 1-2 times per month having symptoms of aspiration with thin liquids, but has had no pneumonias in the past 6 months, and has had no difficulties gaining or maintaining weight.    She denies symptoms of reflux.     She does experience breathing difficulties, particularly with inhalation, which she feels may be related to atrial fibrillation.  She also has asthma, which she feels is relatively well managed.  She denies inspiratory stridor.    In addition to the above symptoms, the patient has a frequent cough.  Precursor sensation is a tickle in her throat, which often leads to coughing fits.  This began in January 2022, and has improved somewhat over the last few months.    OBJECTIVE FINDINGS  PATIENT REPORTED MEASURES  Patient Supplied Answers To [facility] Intake Voice Questionnaire  No flowsheet data found.     Patient Supplied Answers To VHI Questionnaire  No flowsheet data found.     Patient Supplied Answers To CSI Questionnaire  No flowsheet data found.     Patient Supplied Answers To EAT Questionnaire  No flowsheet data found.        Self-Ratings as discussed with patient:  No flowsheet data found.     PERCEPTUAL EVALUATION (35681)  VOICE/ SPEECH/ NON-COMMUNICATIVE LARYNGEAL BEHAVIORS EVALUATION    Palpation of the laryngeal area shows:    firm musculature    Breathing pattern:     inspirations are inadequate in volume and frequency    Cough/ Throat clear:    cough is primary and non productive     Hannah states today is a typical voice day, with clinician observing voice quality characterized by:    Roughness: Severe Consistent    Breathiness: Moderate Consistent    Strain: Severe Consistent    Habitual pitch is perceptually severely elevated for age and gender    Loudness is severely reduced for  the setting    GRADE, ROUGHNESS, BREATHINESS, ASTHENIA, STRAIN  (GRBAS) scale:  G(3)R(3)B(3)A(0)S(3)    LARYNGEAL EXAMINATION  Procedure: Flexible endoscopy with chip-tip technology with stroboscopy, right nostril; topical anesthesia with 3% Lidocaine and 0.25% phenylephrine was applied.   Performed by: Dr. Nikole Davis  Verbal consent was obtained and witnessed prior to this procedure.   A time-out was performed, verifying patient, procedure, and site.   This exam shows:    Velar Function: Not assessed    Secretions: Within normal limits    Laryngeal Mucosa: Large, bilobular round mass in the posterior commissure, appearing to extend from the right posterior commissure versus right vocal process.  Right-sided aspect of mass appears to be more red/pink in color than left-sided aspect of mass    Vocal fold mucosa:    o RTVF -posterior half is obscured by mass.  Visible portion is smooth and straight, with a raised, whitish area on the superior surface  o LTVF -vibratory margins are smooth and straight with diffuse hypervascularity    Vocal fold function:   o Adduction and abduction are present bilaterally, however range of motion is not fully visualizable due to presence of mass    Narrow Band Imaging (NBI) demonstrated: Emphasized color differences in lateral versus medial aspects of posterior commissure mass    Airway is somewhat limited by posterior commissure mass    Elongation of the vocal folds for pitch increase is present, but could not be fully assessed due to posterior commissure mass  o The addition of stroboscopy provided the following information: Vibratory information could not be obtained due to presence of posterior commissure mass and compensatory supraglottic hyperfunction     ASSESSMENT / PLAN  IMPRESSIONS: Hannah Coffman is a 77 year old female with severe Dysphonia (R49.0) characterized by roughness, breathiness, and strain in the setting of 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  Laryngeal exam demonstrates a large posterior commissure mass, as well as suspected severe vocal fold mucosal stiffness due to multiple previous surgical resections.  Dr. Davis has recommended that they proceed to the operating room to remove as much of the mass as possible and to further understand the process of this mass    A course of speech therapy is recommended to optimize surgical results.    She demonstrates a Guarded prognosis for improvement given adherence to therapeutic recommendations.     Positive indicators: high level of comittment good awareness of patterns of use    Negative indicators: None    DURATION / FREQUENCY: 1 preoperative therapy session, 1 to 2 weeks before surgery, and to postoperative therapy sessions, to be scheduled following postsurgical follow-up.    Goals:  Patient goal:    To understand the problem and fix it as much as possible     Short-term goal(s): Within the first 4 sessions, Hannah will:  -- utilize vocal hygiene strategies in order to minimize laryngeal irritation and facilitate improved therapeutic outcomes with 90% accy.  -- accurately self-identify target vs. habitual voice quality in >80% of utterances, with demonstrated ability to volitionally alter voice quality with 90% accy when prompted.  -- coordinate appropriate air flow levels with forward resonance during phonation in order to minimize laryngeal compensation and effort with 90% accy.    Long-term goal(s): In 3 months, Hannah will:  -- report a week of typical vocal activities, in which dysphonia does not exceed a level of  3 out of 10, 90% of the time.    This treatment plan was developed with the patient who agreed with the recommendations.    TOTAL SERVICE TIME: 90 minutes  EVALUATION OF VOICE AND RESONANCE (89321)  NO CHARGE FACILITY FEE (16014)    Speech recognition software may have been used in this documentation; input is reviewed before signature to the best of my ability.     Oren  Sakina, Ph.D., [facility] [place]-SLP  Speech-Language Pathologist-[facility]  [phone]  he/him/his

## 2022-05-09 NOTE — PROGRESS NOTES
"[facility] VOICE CLINIC  CLINICAL EVALUATION REPORT    Patient: Asya Coffman  Date of Service: 5/9/2022  Seen in conjunction with: Dr. Nikole Davis  Referring physician: Dr. Patricia    HISTORY  PATIENT INFORMATION  Hannah Coffman is a 77 year old female presenting today for initial evaluation of voice and resonance. Salient details of her symptom history are as follows:    Patient has a long history of  dysphonia, for which she has been followed at outside otolaryngology clinic for several years.  In 2015, she had a 1 month history of dysphonia that began gradually without an obvious inciting events, leading to referral to otolaryngology.  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  A left-sided true vocal fold lesion was observed and biopsied, with benign pathology reported.  Voice quality was essentially normal after this, until summer 2020, when she again experienced gradual onset of dysphonia.    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    She subsequently underwent staged bilateral vocal cord \"stripping\" on 1/13/21 to address lesions.  She reports that her voice quality improved to about 90% of normal after surgery.     At follow-up in July 2021, a papillomatous lesion of the posterior larynx was observed, and the patient returned to surgery on 8/30/21 for excision and biopsy.  This showed squamous papilloma and low-grade dysplasia.  At follow-up in March 2022, the patient's voice quality has continued to decline, and further pathology was observed on laryngeal exam.  She was referred to the voice clinic at the [facility] for further management.    Of note, she did not have any perioperative voice therapy around any of her original surgeries.    The patient denies swallowing difficulties.  She reports 1-2 times per month having symptoms of aspiration with thin liquids, but has had no pneumonias in " the past 6 months, and has had no difficulties gaining or maintaining weight.    She denies symptoms of reflux.     She does experience breathing difficulties, particularly with inhalation, which she feels may be related to atrial fibrillation.  She also has asthma, which she feels is relatively well managed.  She denies inspiratory stridor.    In addition to the above symptoms, the patient has a frequent cough.  Precursor sensation is a tickle in her throat, which often leads to coughing fits.  This began in January 2022, and has improved somewhat over the last few months.    OBJECTIVE FINDINGS  PATIENT REPORTED MEASURES  Patient Supplied Answers To [facility] Intake Voice Questionnaire  No flowsheet data found.     Patient Supplied Answers To VHI Questionnaire  No flowsheet data found.     Patient Supplied Answers To CSI Questionnaire  No flowsheet data found.     Patient Supplied Answers To EAT Questionnaire  No flowsheet data found.        Self-Ratings as discussed with patient:  No flowsheet data found.     PERCEPTUAL EVALUATION (07790)  VOICE/ SPEECH/ NON-COMMUNICATIVE LARYNGEAL BEHAVIORS EVALUATION    Palpation of the laryngeal area shows:    firm musculature    Breathing pattern:     inspirations are inadequate in volume and frequency    Cough/ Throat clear:    cough is primary and non productive     Hannah states today is a typical voice day, with clinician observing voice quality characterized by:    Roughness: Severe Consistent    Breathiness: Moderate Consistent    Strain: Severe Consistent    Habitual pitch is perceptually severely elevated for age and gender    Loudness is severely reduced for the setting    GRADE, ROUGHNESS, BREATHINESS, ASTHENIA, STRAIN  (GRBAS) scale:  G(3)R(3)B(3)A(0)S(3)    LARYNGEAL EXAMINATION  Procedure: Flexible endoscopy with chip-tip technology with stroboscopy, right nostril; topical anesthesia with 3% Lidocaine and 0.25% phenylephrine was applied.   Performed by: Dr. Agustin  Ryan  Verbal consent was obtained and witnessed prior to this procedure.   A time-out was performed, verifying patient, procedure, and site.   This exam shows:    Velar Function: Not assessed    Secretions: Within normal limits    Laryngeal Mucosa: Large, bilobular round mass in the posterior commissure, appearing to extend from the right posterior commissure versus right vocal process.  Right-sided aspect of mass appears to be more red/pink in color than left-sided aspect of mass    Vocal fold mucosa:    o RTVF -posterior half is obscured by mass.  Visible portion is smooth and straight, with a raised, whitish area on the superior surface  o LTVF -vibratory margins are smooth and straight with diffuse hypervascularity    Vocal fold function:   o Adduction and abduction are present bilaterally, however range of motion is not fully visualizable due to presence of mass    Narrow Band Imaging (NBI) demonstrated: Emphasized color differences in lateral versus medial aspects of posterior commissure mass    Airway is somewhat limited by posterior commissure mass    Elongation of the vocal folds for pitch increase is present, but could not be fully assessed due to posterior commissure mass  o The addition of stroboscopy provided the following information: Vibratory information could not be obtained due to presence of posterior commissure mass and compensatory supraglottic hyperfunction     ASSESSMENT / PLAN  IMPRESSIONS: Hannah Coffman is a 77 year old female with severe Dysphonia (R49.0) characterized by roughness, breathiness, and strain in the setting of 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  Laryngeal exam demonstrates a large posterior commissure mass, as well as suspected severe vocal fold mucosal stiffness due to multiple previous surgical resections.  Dr. Davis has recommended that they proceed to the operating room to remove as much of the mass as possible and to further understand the  process of this mass    A course of speech therapy is recommended to optimize surgical results.    She demonstrates a Guarded prognosis for improvement given adherence to therapeutic recommendations.     Positive indicators: high level of comittment good awareness of patterns of use    Negative indicators: None    DURATION / FREQUENCY: 1 preoperative therapy session, 1 to 2 weeks before surgery, and to postoperative therapy sessions, to be scheduled following postsurgical follow-up.    Goals:  Patient goal:    To understand the problem and fix it as much as possible     Short-term goal(s): Within the first 4 sessions, Hannah will:  -- utilize vocal hygiene strategies in order to minimize laryngeal irritation and facilitate improved therapeutic outcomes with 90% accy.  -- accurately self-identify target vs. habitual voice quality in >80% of utterances, with demonstrated ability to volitionally alter voice quality with 90% accy when prompted.  -- coordinate appropriate air flow levels with forward resonance during phonation in order to minimize laryngeal compensation and effort with 90% accy.    Long-term goal(s): In 3 months, Hannah will:  -- report a week of typical vocal activities, in which dysphonia does not exceed a level of  3 out of 10, 90% of the time.    This treatment plan was developed with the patient who agreed with the recommendations.    TOTAL SERVICE TIME: 90 minutes  EVALUATION OF VOICE AND RESONANCE (72608)  NO CHARGE FACILITY FEE (11435)    Speech recognition software may have been used in this documentation; input is reviewed before signature to the best of my ability.     Oren Presley, Ph.D., [facility]-SLP  Speech-Language Pathologist-[facility]  [phone]  he/him/his

## 2022-05-09 NOTE — LETTER
5/9/2022      RE: Asya Coffman  [address]  [facility] [address]       Dear Dr. Patricia:    I had the pleasure of meeting Asya Coffman in consultation at the [facility] of the [facility] at your request, for evaluation of dysphonia and laryngeal lesions. The note from our visit follows. Speech recognition software may have been used in the documentation below; input is reviewed before signature to the best of my ability. I appreciate the opportunity to participate in the care of this pleasant patient.    Please feel free to contact me with any questions.    Sincerely yours,    Nikole Davis M.D., M.P.H.  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     Voice  Voice trouble began in 2015, gradual onset, no obvious inciting event.  Was found to have a left true vocal fold lesion which was biopsied and found to be benign. (9/29/15 DIRECT MICRO LARYNGOSCOPY WITH BIOPSY)    Summer 2020 again developed gradual onset dysphonia.  Again had true vocal fold abnormalities, biopsy suggestive of verrucous carcinoma (10/13/2020 MicroDirect laryngoscopy and biopsy).  Then had staged bilateral stripping of the vocal folds (1/13/21 Direct microlaryngoscopy with stripping of vocal cord Direct microlaryngoscopy with microflap excision, 11/9/2020 Microlaryngoscopy with exision of vocal cord lesion with KTP laser).  Further resection was read as high grade dysplasia.  Voice recovered to about 90% after surgery.  No voice therapy.    July 2021 was seen again with now a  papillomatous lesion in post cricoid area .  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  Underwent  Micro Left Direct laryngoscopy with KTP laser 8/30/21.    Jan 2022 had some worsening of hoarseness. When seen in March 2022 was noted to have return of squamous papilloma, and was referred here. Voice is squeaky and effortful. Has improved slightly recently as her cough has been improving.    Has a remote history of thyroidectomy at age 20 for papillary thyroid carcinoma, had done fine vocally until more recently.      Swallowing  No concerns.  Gets things down the wrong tube a few times per month; no recent pneumonia.  Wonders if CREST esophageal changes are contributing.    Cough/Throat-clearing  Since Jan 2022 has had a tickle in her throat that leads to coughing; this has been gradually improving.    Breathing  Some difficulty breathing in.  She associates this with her afib.  States she also has asthma. Chart indicates she also has COPD.    Throat discomfort  As above.    Reflux-type symptoms  She experiences heartburn/indigestion rarely. She is not taking reflux medications.      Prior Epic/ outside records were reviewed for this visit, including:  Dr Patricia 3/22/22  Dr Franco March 2022      MEDICATIONS:     Current Outpatient Medications   Medication Sig Dispense Refill     albuterol (PROAIR HFA/PROVENTIL HFA/VENTOLIN HFA) 108 (90 Base) MCG/ACT inhaler Inhale 2 puffs into the lungs       albuterol (PROVENTIL) (2.5 MG/3ML) 0.083% neb solution Inhale 2.5 mg into the lungs       apixaban ANTICOAGULANT (ELIQUIS) 5 MG tablet Take 5 mg by mouth       atorvastatin (LIPITOR) 20 MG tablet Take 20 mg by mouth       budesonide-formoterol (SYMBICORT) 160-4.5 MCG/ACT Inhaler Inhale 2 puffs into the lungs       diltiazem ER (TIAZAC) 360 MG 24 hr ER beaded capsule Take 360 mg by mouth       fexofenadine (ALLEGRA) 180 MG tablet Take 180 mg by mouth       levothyroxine (SYNTHROID/LEVOTHROID) 88 MCG tablet  Take 88 mcg by mouth       montelukast (SINGULAIR) 10 MG tablet Take 10 mg by mouth daily       sertraline (ZOLOFT) 100 MG tablet Take 100 mg by mouth       furosemide (LASIX) 20 MG tablet Take 20 mg by mouth (Patient not taking: Reported on 5/9/2022)       olopatadine (PATANOL) 0.1 % ophthalmic solution Apply 1 drop to eye (Patient not taking: Reported on 5/9/2022)       potassium chloride ER (MICRO-K) 10 MEQ CR capsule Take 1 capsule when you take a furosemide pill for weight gain of 3 lbs in a day OR 5 lbs in a week (Patient not taking: Reported on 5/9/2022)         ALLERGIES:    Allergies   Allergen Reactions     1-Methyl 2-Pyrrolidone Anaphylaxis     Escitalopram Other (See Comments)     Asthma -a time of exacerbation and may not have been cause may retry     Mirtazapine Other (See Comments)     Asthma a time of exacerbation and may not have been cause may retry     Venlafaxine Other (See Comments)     Adhesive Tape Rash     With holter monitor. Ok with bandaids.       PAST MEDICAL HISTORY: No past medical history on file.   Past Medical History:   . Date     Allergic rhinitis due to pollen     Asthma   mild persistent     Cervical radiculopathy at C5 2/27/2013   ? facette joint?? . C4-5 facette hypertrophy causing mod-severe foraminal stenosis     Chronic GERD     CKD (chronic kidney disease) stage 3, GFR 30-59 ml/min (HC)     COPD (chronic obstructive pulmonary disease) (HC)     COPD exacerbation (HC)     CREST syndrome (HC)     Depression   Major depressive disorder, recurrent episode, in full remission     Diplopia 5/21/2009 1997. White matter changes suspicious for Multiple sclerosis. CSF neg oligoclonal bands. 1.23 No other neuro changes by 5/24/2010     Diplopia 1995   single episode     Essential tremor   sees neurologist-watching     Hoarseness of voice     Hypothyroid     Incomplete tear of right rotator cuff     Labial fusion     Lesion of vocal cord 2020     Major depressive disorder, recurrent  episode, in full remission (HC)     New onset atrial fibrillation (HC) 2020     Osteoporosis     Papillary carcinoma of thyroid (HC) 5/21/2009   Thyroidectomy 1965; LN x 4 surgeries all neg Christensen told not need further testing after several negative.     Post-surgical hypothyroidism for papillary thyroid cancer     Raynaud's syndrome     Rhinitis     Thyroid cancer (HC)         PAST SURGICAL HISTORY: No past surgical history on file.   Past Surgical History:   . Laterality Date     (IA) IR CHOLANGIOGRAM TRANSHEPATIC 1972     (IA) [place] THYROIDECTOMY MALIG [facility] NECK SURG 1966,1967   nodes removed from lt side of the neck     APPENDECTOMY 1972   Appendectomy     CERVICAL POLYPECTOMY     CHOLECYSTECTOMY 1975     COLONOSCOPY     Direct microlaryngoscopy with stripping of vocal cord Direct microlaryngoscopy with microflap excision 01/13/2021   Dr Patricia     ECHOCARDIOGRAM 1996     Micro direct laryngoscopy with biopsy 10/13/2020     Micro Left Direct laryngoscopy with KTP laser Left 08/30/2021   Dr. Patricia     Microlaryngoscopy with excision of vocal cord lesion 11/09/2020   Dr Patricia     THYROIDECTOMY 1965   thyroid ca     VAGINAL REPAIR, LABIAPLASTY, PERINEALPLASTY 1/29/2015       HABITS/SOCIAL HISTORY:    Social History     Tobacco Use     Smoking status: Not on file     Smokeless tobacco: Not on file   Substance Use Topics     Alcohol use: Not on file       FAMILY HISTORY:    Family History   Problem Relation Age of Onset     Breast Cancer Mother 75.00     Hereditary Breast and Ovarian Cancer Syndrome No family hx of      Cancer No family hx of      Colon Cancer No family hx of      Endometrial Cancer No family hx of      Ovarian Cancer No family hx of        REVIEW OF SYSTEMS:  Comprehensive 11 point review of systems was reviewed. Positives are as noted below; pertinent findings are as noted in the HPI.     Patient Supplied Answers to Review of Systems         PHYSICAL EXAMINATION:  General: The patient was alert and  conversant, and in no acute distress.    Eyes: PERRL, conjunctiva and lids normal, sclera nonicteric.  Nose: Anterior rhinoscopy: no gross abnormalities. no  bleeding; no  mucopurulence; septum grossly normal, mild mucoid drainage and/or crusting.  Oral cavity/oropharynx: No masses or lesions. Dentition in fair condition. Floor of mouth and oral tongue soft to palpation. Tongue mobility and palate elevation intact and symmetric.  Ears: Normal auricles, external auditory canals bilaterally. Visualized portions of tympanic membranes normal bilaterally with some monomeric portions bilaterally.   Neck: No palpable cervical lymphadenopathy. There was mild tenderness to palpation of the thyrohyoid space, which was narrow. Well-healed thyroidectomy incision. Landmarks palpable.  Resp: Breathing comfortably, no stridor or stertor. Faintly audible noise on rapid inhalation.  Neuro: Symmetric facial function. Other cranial nerves as documented above.  Psych: Normal affect, pleasant and cooperative.  Voice/speech: Severe dysphonia characterized by breathiness, roughness, strain and high Fo.  Extremities: No cyanosis, clubbing, or edema of the upper extremities.    Intake scores  Total Score for Last Patient-Answered VHI Questionnaire  No flowsheet data found.  Total Score for Last Patient-Answered EAT Questionnaire  No flowsheet data found.  Total Score for Last Patient-Answered CSI Questionnaire  No flowsheet data found.      PROCEDURE:     Flexible fiberoptic laryngoscopy and laryngovideostroboscopy  Indications: This procedure was warranted to evaluate the patient's laryngeal anatomy and function. Risks, benefits, and alternatives were discussed.  Description: After written informed consent was obtained, a time-out was performed to confirm patient identity, procedure, and procedure site. Topical 3% lidocaine with 0.25% phenylephrine was applied to the nasal cavities. I performed the endoscopy and no complications were  apparent. Continuous and stroboscopic light were utilized to assess routine phonation and variable frequency phonation.  SLP: Oren Presley, PhD, CCC-SLP   Findings: Normal nasopharynx. Normal base of tongue, valleculae, and epiglottis. The right true vocal fold demonstrated normal mobility. The left true vocal fold demonstrated normal mobility. The medial edges of the vocal folds appeared smooth and straight on the left. Visualization of the right was partially prevented by the presence of a large posterior laryngeal mass; anterior portion of the right true vocal fold appeared mildly leukoplakic.    Glissade produced appropriate elongation. There was moderate supraglottic recruitment with connected speech. Mucosa of the false vocal folds, aryepiglottic folds, piriform sinuses, and posterior glottis notable for large multilobed exophytic mass along posterior larynx, with component that appears papillomatous. Difficult to definitively assess attachment point(s). Fungal laryngopharyngitis. Airway was patent. Similar findings on NBI.    Stroboscopy was added to facilitate evaluation of the mucosal wave. This proved to be difficult to assess bilaterally due to the presence of the mass as well as supraglottic recruitment. Fleeting glimpses appeared to suggest some possible vibratory function, but degree of stiffness was difficult to assess. Closure plane: at glottic level. Phase distribution: appeared open-phase predominant.                  Flexible Bronchoscopy (26601)  Indications: This procedure was warranted to evaluate the patient's airway anatomy. Risks, benefits, and alternatives were discussed.  Description: After informed consent was obtained, a time-out was performed to confirm patient identity, procedure, and procedure site. Topical 3% lidocaine with 0.25% phenylephrine was applied to the nasal cavities. I performed the endoscopy and no complications were apparent.  Performed by: Nikole Davis MD  MPH  Findings: Glottis patent. Subglottis patent. Trachea clear. Dorita sharp. Unremarkable takeoffs of mainstem bronchi.          IMAGING/RESULTS reports reviewed, with pertinent report excerpts below:  XR CHEST 2 VIEWS PA AND LATERAL   DATE/TIME: 3/22/2022   IMPRESSION: Hyperinflation with flattening the hemidiaphragms and increased AP chest diameter. Lungs appear clear. Heart size and pulmonary vascularity normal. No change.   CONCLUSION: Hyperinflation. No new infiltrate.      IMPRESSION AND PLAN:   Asya Coffman presents with a complex PMH including CREST syndrome, COPD, CKD, atrial fibrillation and a complex laryngeal history including a prior benign lesion, severe dysplasia, and new papillomatous lesion.     Today she is quite dysphonic and presents with a large posterior laryngeal lesion that appears at least partially papillomatous. It is difficult to tell whether the mass is adherent to the right true vocal fold in addition to the posterior larynx. It is also difficult to tell whether the dysphonia is accounted for by the lesion being adherent to the true vocal fold vs interfering with vocal fold closure vs potential fibrosis from multiple prior disease processes and resections.    Although she was referred for consideration of adjuvant treatments for papilloma, given her current appearance it would be helpful to first resect as much as possible of the mass under general anesthesia. This would provide updated pathology results and also potentially reduce the degree of future treatment needed under local anesthesia. We discussed that depending on the extent of the attachment of the mass, as well as its pathology results, multiple procedures may be needed.     With respect to airway management, we discussed consideration of jet ventilation, as a routine intubation may be challenging given the location and size of the mass. I will reach out to her pulmonologist Dr Franco for his opinion regarding her  ability to tolerate jet ventilation. It may also be possible to place a small endotracheal tube if the mass is partially mobile. We did discuss that in an emergent situation, tracheostomy is also an option given that her lower airway is patent, but of course we would prefer to avoid this if possible.    Other risks of surgery, including injury to lips/ teeth/ tongue/ jaw/ throat/ airway, risks of general anesthesia, and temporary/permanent hoarseness were discussed. This included temporary or permanent dysgeusia, hypesthesia, or motion abnormalities of the tongue. We also discussed a potential need for additional procedures in the future, as well as the risk of equivocal results from any pathology specimens that would be sent. Voice/speech therapy will be an appropriate part of post-surgical rehabilitation.      Summary of plan:  -MicroDL with biopsies, possible ablation, possible excision of laryngeal lesions, possible CO2 laser. Anticipate outpatient procedure.  -The patient states she has previously been allowed to hold anticoagulation for surgery; she will confirm whether this is again acceptable.   -Speech pathology for voice rehabilitation  -Incidentally noted fungal laryngopharyngitis--will treat with nystatin which she has previously received. If this persists when seen in the OR, will consider fluconazole.    I appreciate the opportunity to participate in the care of this patient.     Today's visit required additional screening time, PPE, and cleaning measures to allow for a safe in-person visit, due to the public health emergency.  I spent a total of 73 minutes on 5/9/2022 in chart review, review of tests, patient visit, documentation, care coordination, and/or discussion with other providers about the issues documented above, separate from any documented procedure(s).        Relevant Diagnosis: surgery  Teaching Topic: pre-op teaching  Person(s) involved in teaching:  Patient     Teaching Concerns  Addressed:  Pre op teaching included the need for an H&P, NPO status pre op, hospital routines, expected recovery, activity  restrictions, antimicrobial scrub, s/s of infection, pain control methods and the importance of follow up appointments.  The patient voiced an understanding of all instructions and will call with questions.     Motivation Level:  Asks Questions:   Yes  Eager to Learn:   Yes  Cooperative:   Yes  Receptive (willing/able to accept information):   Yes     Patient  demonstrates understanding of the following:  Reason for the appointment, diagnosis and treatment plan:   Yes  Knowledge of proper use of medications and conditions for which they are ordered (with special attention to potential side effects or drug interactions):   Yes  Which situations necessitate calling provider and whom to contact:   Yes        Proper use and care of  (medical equip, care aids, etc.):   NA  Nutritional needs and diet plan:   Yes  Pain management techniques:   Yes  Patient instructed on hand hygiene:  Yes  How and/when to access community resources:   NA     Infection Prevention:  Patient   demonstrates understanding of the following:  Surgical procedure site care taught   Signs and symptoms of infection taught Yes  Wound care taught Yes     Instructional Materials Used/Given: AVS instructions, surgery packet, soap

## 2022-05-09 NOTE — PROGRESS NOTES
Medicare Certification  Physician Attestation for Therapy   I agree with the information in this note.    Nikole Davis MD

## 2022-05-09 NOTE — PROGRESS NOTES
Relevant Diagnosis: surgery  Teaching Topic: pre-op teaching  Person(s) involved in teaching:  Patient     Teaching Concerns Addressed:  Pre op teaching included the need for an H&P, NPO status pre op, hospital routines, expected recovery, activity  restrictions, antimicrobial scrub, s/s of infection, pain control methods and the importance of follow up appointments.  The patient voiced an understanding of all instructions and will call with questions.     Motivation Level:  Asks Questions:   Yes  Eager to Learn:   Yes  Cooperative:   Yes  Receptive (willing/able to accept information):   Yes     Patient  demonstrates understanding of the following:  Reason for the appointment, diagnosis and treatment plan:   Yes  Knowledge of proper use of medications and conditions for which they are ordered (with special attention to potential side effects or drug interactions):   Yes  Which situations necessitate calling provider and whom to contact:   Yes        Proper use and care of  (medical equip, care aids, etc.):   NA  Nutritional needs and diet plan:   Yes  Pain management techniques:   Yes  Patient instructed on hand hygiene:  Yes  How and/when to access community resources:   NA     Infection Prevention:  Patient   demonstrates understanding of the following:  Surgical procedure site care taught   Signs and symptoms of infection taught Yes  Wound care taught Yes     Instructional Materials Used/Given: AVS instructions, surgery packet, soap

## 2022-05-11 DIAGNOSIS — J38.3 VOCAL CORD LEUKOPLAKIA: ICD-10-CM

## 2022-05-11 DIAGNOSIS — R06.02 SHORTNESS OF BREATH: ICD-10-CM

## 2022-05-11 DIAGNOSIS — J44.9 CHRONIC OBSTRUCTIVE PULMONARY DISEASE, UNSPECIFIED COPD TYPE (H): ICD-10-CM

## 2022-05-11 DIAGNOSIS — J38.7 LARYNGEAL MASS: Primary | ICD-10-CM

## 2022-05-11 DIAGNOSIS — R49.0 DYSPHONIA: ICD-10-CM

## 2022-05-11 NOTE — PROGRESS NOTES
Spoke with Dr Walters (colleague of Dr Franco) re: airway management plan; no specific contraindication to jet ventilation.

## 2022-05-12 ENCOUNTER — PATIENT OUTREACH (OUTPATIENT)
Dept: OTOLARYNGOLOGY | Facility: CLINIC | Age: 77
End: 2022-05-12
Payer: COMMERCIAL

## 2022-05-12 DIAGNOSIS — Z11.59 ENCOUNTER FOR SCREENING FOR OTHER VIRAL DISEASES: Primary | ICD-10-CM

## 2022-05-12 NOTE — TELEPHONE ENCOUNTER
LM for pt that Dr. Davis checked with pulmonary team, and they are OK proceeding with surgery. Instructed pt that if she develops any new changes or worsening to her breathing, she needs to go to the emergency department. Gave surgery date of 5/26 at hospital, and RN will call a few days before to let her know the arrival time. Requested callback to confirm she knows her surgery date. CB number provided.

## 2022-05-25 ENCOUNTER — PATIENT OUTREACH (OUTPATIENT)
Dept: OTOLARYNGOLOGY | Facility: CLINIC | Age: 77
End: 2022-05-25
Payer: COMMERCIAL

## 2022-05-25 ENCOUNTER — TELEPHONE (OUTPATIENT)
Dept: OTOLARYNGOLOGY | Facility: CLINIC | Age: 77
End: 2022-05-25
Payer: COMMERCIAL

## 2022-05-25 ENCOUNTER — ANESTHESIA EVENT (OUTPATIENT)
Dept: SURGERY | Facility: CLINIC | Age: 77
End: 2022-05-25
Payer: COMMERCIAL

## 2022-05-25 ASSESSMENT — ENCOUNTER SYMPTOMS: DYSRHYTHMIAS: 1

## 2022-05-25 NOTE — ANESTHESIA PREPROCEDURE EVALUATION
Anesthesia Pre-Procedure Evaluation    Patient: Asya Coffman   MRN: [id number] : 1945        Procedure : Procedure(s):  Micro direct laryngoscopy with excision/ablation of laryngeal lesions, possible biopsies, possible CO2 laser          No past medical history on file.   No past surgical history on file.   Allergies   Allergen Reactions     1-Methyl 2-Pyrrolidone Anaphylaxis     Escitalopram Other (See Comments)     Asthma -a time of exacerbation and may not have been cause may retry     Mirtazapine Other (See Comments)     Asthma a time of exacerbation and may not have been cause may retry     Venlafaxine Other (See Comments)     Adhesive Tape Rash     With holter monitor. Ok with bandaids.      Social History     Tobacco Use     Smoking status: Not on file     Smokeless tobacco: Not on file   Substance Use Topics     Alcohol use: Not on file      Wt Readings from Last 1 Encounters:   22 55.3 kg (122 lb)        Anesthesia Evaluation   Pt has had prior anesthetic. Type: General.    No history of anesthetic complications       ROS/MED HX  ENT/Pulmonary: Comment: CT Neck Soft Tissue 2022    Mass within the posterior aspect of the larynx which is adherent to the right mastoid cartilage which measures 1.3 x 1.3 cm. Correlate with direct visualization.   2.  No evidence of cervical lymphadenopathy by size or morphologic criteria      (+) asthma     Neurologic: Comment: Essential tremor      Cardiovascular:     (+) -----Taking blood thinners dysrhythmias, a-fib,     METS/Exercise Tolerance: 1 - Eating, dressing    Hematologic:       Musculoskeletal:       GI/Hepatic:     (+) GERD, Asymptomatic on medication,     Renal/Genitourinary:       Endo:     (+) thyroid problem, hypothyroidism,     Psychiatric/Substance Use:     (+) psychiatric history depression     Infectious Disease: Comment: COVID + 22      Malignancy:   (+) Malignancy, History of Other.Other CA Thyroid status post.    Other: Comment:  CREST syndrome           Physical Exam    Airway        Mallampati: I   TM distance: > 3 FB   Neck ROM: full   Mouth opening: > 3 cm    Respiratory Devices and Support         Dental  no notable dental history         Cardiovascular             Pulmonary                   OUTSIDE LABS:  CBC: No results found for: WBC, HGB, HCT, PLT  BMP: No results found for: NA, POTASSIUM, CHLORIDE, CO2, BUN, CR, GLC  COAGS: No results found for: PTT, INR, FIBR  POC: No results found for: BGM, HCG, HCGS  HEPATIC: No results found for: ALBUMIN, PROTTOTAL, ALT, AST, GGT, ALKPHOS, BILITOTAL, BILIDIRECT, REE  OTHER: No results found for: PH, LACT, A1C, ESSIE, PHOS, MAG, LIPASE, AMYLASE, TSH, T4, T3, CRP, SED    Anesthesia Plan    ASA Status:  3      Anesthesia Type: General.     - Airway: ETT   Induction: Intravenous.   Maintenance: TIVA.   Techniques and Equipment:     - Airway: Jet ventilation (ETT, Jet, monsoon available)     - Lines/Monitors: 2nd IV     - Drips/Meds: Remifentanil     Consents    Anesthesia Plan(s) and associated risks, benefits, and realistic alternatives discussed. Questions answered and patient/representative(s) expressed understanding.    - Discussed:     - Discussed with:  Patient      - Extended Intubation/Ventilatory Support Discussed: Yes.         Postoperative Care    Pain management: IV analgesics.   PONV prophylaxis: Ondansetron (or other 5HT-3), Dexamethasone or Solumedrol     Comments:    Other Comments: Patient reports worsening FELIZ and SOB with significant dysphonia worsening over the last several months. She has seen a pulmonologist who obtained a CT Chest which revealed some ground glass opacities but no discrete pneumonia. Her pre-operative COVID test was positive however she does not report any symptoms such as cough or runny nose since January. We discussed that her FELIZ and SOB are concerning and it is difficult to be certain of the cause of these symptoms given her COPD, recent positive COVID  test, and expanding laryngeal mass. I strongly suspect the expanding laryngeal mass is causing these symptoms given her difficulty phonating and stridor. Therefore, delaying the procedure for further workup puts the patient at high risk of acute airway obstruction. We discussed the risks of extended intubation and ventilatory support and the patient voiced understanding of these risks and is agreeable to proceed with planned anesthetic.            Kristian Lebron MD

## 2022-05-25 NOTE — TELEPHONE ENCOUNTER
5/25/22  Unable to view any records for further evaluation/testing after preop visit with Dr Calderón 5/18/22    Re concern for GA s/p COVID dx 5/12/22      12:35 pm - left message for PCP Dr Calderón, requested flag for high priority for callback given timeline  Discussion on callback:  -concerns including new tachycardia/a-flutter which responded to medical management, presumed COPD exacerbation which was treated with abx steroids but did not help her return to breathing baseline. Difficult to know if this is due to COPD, COVID, or mass, or some combination thereof.  -patient has been more depressed than confused, generally, but it is unclear whether she had followed up on recent recs for further workup/evaluation  -if pulmonology is comfortable, Dr Calderón would support proceeding given airway impingement  -cardiology input is less acutely needed since HR improved to 60's with medical management, but follow up will be needed for monitoring in case of recurrent issues  -no additional recs re post-op management  -plan post-hospital visit Wed 3pm, I will ask our RN to make sure patient is aware of this appt as well.    ===  12:45 pm - left message requesting callback from Dr Franco, [facility]    Discussion on callback:  -discussed risks/benefits given recent COVID in the context of comorbidities, as well as airway mass  -we agreed it is a difficult situation  -we discussed that specifically it is difficult to know to what extent her recent COVID as well as the airway mass and her underlying chest pathology might be contributing to breathing difficulty  -discussed whether we could obtain any other workup to help assess pulm status preoperatively; PFTs may be difficult to interpret given airway mass, but Dr Franco noted that non-contrast chest CT may be reassuring if favorable. He is able to help expedite this for her, and we plan to talk again after this is completed.  Addendum: also requested  to add neck CT for potential additional info re: airway.  ===      4:20 pm  Dr Franco kindly contacted me back  -Neck CT shows posterior laryngeal mass, Chest CT is mostly clear with minor focus of minimal  ground glass, ~ 1 cm, left upper lobe, suggesting no significant acute pulmonary compromise  -tania-op recs: dexamethasone, nebulizers, O2 supplementation as needed  -plan RTC with Dr Franco July 2022.    We agreed that given these findings, it is likely a reasonable risk-benefit balance to proceed with procedure tomorrow. Will notify patient and [facility] Anesthesiology team as well.

## 2022-05-25 NOTE — TELEPHONE ENCOUNTER
Updated patient. Reviewed potential risks/benefits of proceeding tomorrow given recent COVID dx as well as presence of laryngeal mass. Discussed that it is difficult to predict for sure, but given that her lungs look good on the scan and she has an obvious laryngeal mass with obvious stridor, the risk benefit ratio may be more favorable for surgical intervention than it would be for postponing the procedure since she is symptomatic with her breathing. She was reassured to know that Dr. Franco has also weighed in and seen her scans, and is comfortable proceeding tomorrow.    I let her know that I will plan a flexible laryngoscopy in Preop tomorrow to assess for any exam changes, and she was comfortable with that rationale and plan as well.

## 2022-05-25 NOTE — TELEPHONE ENCOUNTER
Records Requested  05/25/22    Facility  [facility]    [address] [place] [address]    [id number]  MRN: [id number]   Outcome 5/25/22 CT Neck report in care everywhere, called for images. Images will be in PACS   *CT Chest report pending at this time

## 2022-05-25 NOTE — TELEPHONE ENCOUNTER
Spoke with pt about her breathing sx. Pt unable to provide details how if/when her airway was worsening. She states her oxygen level was 95-96% yesterday, but has not checked it today. She states she is noticing difficulty/increased symptoms when doing light activities such as laundry or going up the stairs. Pt states she is concerned that her cardiologist and pulmonologist have not given their input on her surgery tomorrow.     Writer instructed pt to avoid strenuous activities the rest of the day, and to check her oxygen level when she gets to her daughters [place] (where her pulse ox is located and will check within the next hours). Pt states she will callback RN with this oxygen value. Writer review that if she is having an increased WOB or 92% or below she should call 911, pt states understanding.     Plan made for writer to talk to Dr. Davis about plan, and will check in with pulm and mainor NGUYEN per Dr. Davis's direction. CB number provided.

## 2022-05-25 NOTE — TELEPHONE ENCOUNTER
Left a message with call center staff with urgent request for MD or RN to callback and clarify pt's H&P. Current note states that pt should hold off on surgery due to covid, however Dr. Davis is requesting further clarification on this. CB number provided.

## 2022-05-26 ENCOUNTER — ANESTHESIA (OUTPATIENT)
Dept: SURGERY | Facility: CLINIC | Age: 77
End: 2022-05-26
Payer: COMMERCIAL

## 2022-05-26 ENCOUNTER — HOSPITAL ENCOUNTER (OUTPATIENT)
Facility: CLINIC | Age: 77
Discharge: HOME | End: 2022-05-26
Attending: OTOLARYNGOLOGY | Admitting: OTOLARYNGOLOGY
Payer: COMMERCIAL

## 2022-05-26 VITALS
OXYGEN SATURATION: 96 % | TEMPERATURE: 98.2 F | RESPIRATION RATE: 20 BRPM | SYSTOLIC BLOOD PRESSURE: 120 MMHG | HEIGHT: 66 IN | HEART RATE: 76 BPM | DIASTOLIC BLOOD PRESSURE: 71 MMHG | WEIGHT: 123.24 LBS | BODY MASS INDEX: 19.81 KG/M2

## 2022-05-26 DIAGNOSIS — M34.1 CREST SYNDROME (H): ICD-10-CM

## 2022-05-26 DIAGNOSIS — J44.9 CHRONIC OBSTRUCTIVE PULMONARY DISEASE, UNSPECIFIED COPD TYPE (H): ICD-10-CM

## 2022-05-26 DIAGNOSIS — R06.02 SHORTNESS OF BREATH: ICD-10-CM

## 2022-05-26 DIAGNOSIS — R49.0 DYSPHONIA: ICD-10-CM

## 2022-05-26 DIAGNOSIS — Z86.16 HISTORY OF COVID-19: ICD-10-CM

## 2022-05-26 DIAGNOSIS — J38.7 LARYNGEAL MASS: Primary | ICD-10-CM

## 2022-05-26 LAB
GLUCOSE BLDC GLUCOMTR-MCNC: 124 MG/DL (ref 70–99)
LAB DIRECTOR COMMENTS: NORMAL
LAB DIRECTOR DISCLAIMER: NORMAL
LAB DIRECTOR INTERPRETATION: NORMAL
LAB DIRECTOR METHODOLOGY: NORMAL
LAB DIRECTOR RESULTS: NORMAL
SPECIMEN DESCRIPTION: NORMAL

## 2022-05-26 PROCEDURE — 88305 TISSUE EXAM BY PATHOLOGIST: CPT | Mod: 26 | Performed by: PATHOLOGY

## 2022-05-26 PROCEDURE — 82962 GLUCOSE BLOOD TEST: CPT

## 2022-05-26 PROCEDURE — 250N000013 HC RX MED GY IP 250 OP 250 PS 637: Performed by: STUDENT IN AN ORGANIZED HEALTH CARE EDUCATION/TRAINING PROGRAM

## 2022-05-26 PROCEDURE — 360N000077 HC SURGERY LEVEL 4, PER MIN: Performed by: OTOLARYNGOLOGY

## 2022-05-26 PROCEDURE — 710N000012 HC RECOVERY PHASE 2, PER MINUTE: Performed by: OTOLARYNGOLOGY

## 2022-05-26 PROCEDURE — 250N000009 HC RX 250: Performed by: ANESTHESIOLOGY

## 2022-05-26 PROCEDURE — 250N000009 HC RX 250: Performed by: STUDENT IN AN ORGANIZED HEALTH CARE EDUCATION/TRAINING PROGRAM

## 2022-05-26 PROCEDURE — 258N000003 HC RX IP 258 OP 636: Performed by: STUDENT IN AN ORGANIZED HEALTH CARE EDUCATION/TRAINING PROGRAM

## 2022-05-26 PROCEDURE — 88305 TISSUE EXAM BY PATHOLOGIST: CPT | Mod: TC | Performed by: OTOLARYNGOLOGY

## 2022-05-26 PROCEDURE — G0452 MOLECULAR PATHOLOGY INTERPR: HCPCS | Mod: 26 | Performed by: STUDENT IN AN ORGANIZED HEALTH CARE EDUCATION/TRAINING PROGRAM

## 2022-05-26 PROCEDURE — 272N000001 HC OR GENERAL SUPPLY STERILE: Performed by: OTOLARYNGOLOGY

## 2022-05-26 PROCEDURE — 31541 LARYNSCOP W/TUMR EXC + SCOPE: CPT | Performed by: OTOLARYNGOLOGY

## 2022-05-26 PROCEDURE — 272N000002 HC OR SUPPLY OTHER OPNP: Performed by: OTOLARYNGOLOGY

## 2022-05-26 PROCEDURE — 250N000011 HC RX IP 250 OP 636: Performed by: OTOLARYNGOLOGY

## 2022-05-26 PROCEDURE — 370N000017 HC ANESTHESIA TECHNICAL FEE, PER MIN: Performed by: OTOLARYNGOLOGY

## 2022-05-26 PROCEDURE — 710N000010 HC RECOVERY PHASE 1, LEVEL 2, PER MIN: Performed by: OTOLARYNGOLOGY

## 2022-05-26 PROCEDURE — 250N000025 HC SEVOFLURANE, PER MIN: Performed by: OTOLARYNGOLOGY

## 2022-05-26 PROCEDURE — 87624 HPV HI-RISK TYP POOLED RSLT: CPT | Performed by: OTOLARYNGOLOGY

## 2022-05-26 PROCEDURE — 999N000141 HC STATISTIC PRE-PROCEDURE NURSING ASSESSMENT: Performed by: OTOLARYNGOLOGY

## 2022-05-26 PROCEDURE — 250N000011 HC RX IP 250 OP 636: Performed by: STUDENT IN AN ORGANIZED HEALTH CARE EDUCATION/TRAINING PROGRAM

## 2022-05-26 RX ORDER — PROPOFOL 10 MG/ML
INJECTION, EMULSION INTRAVENOUS PRN
Status: DISCONTINUED | OUTPATIENT
Start: 2022-05-26 | End: 2022-05-26

## 2022-05-26 RX ORDER — ALBUTEROL SULFATE 90 UG/1
AEROSOL, METERED RESPIRATORY (INHALATION) PRN
Status: DISCONTINUED | OUTPATIENT
Start: 2022-05-26 | End: 2022-05-26

## 2022-05-26 RX ORDER — SODIUM CHLORIDE, SODIUM LACTATE, POTASSIUM CHLORIDE, CALCIUM CHLORIDE 600; 310; 30; 20 MG/100ML; MG/100ML; MG/100ML; MG/100ML
INJECTION, SOLUTION INTRAVENOUS CONTINUOUS PRN
Status: DISCONTINUED | OUTPATIENT
Start: 2022-05-26 | End: 2022-05-26

## 2022-05-26 RX ORDER — AMPICILLIN AND SULBACTAM 1; .5 G/1; G/1
1.5 INJECTION, POWDER, FOR SOLUTION INTRAMUSCULAR; INTRAVENOUS SEE ADMIN INSTRUCTIONS
Status: DISCONTINUED | OUTPATIENT
Start: 2022-05-26 | End: 2022-05-26 | Stop reason: HOSPADM

## 2022-05-26 RX ORDER — FENTANYL CITRATE 50 UG/ML
INJECTION, SOLUTION INTRAMUSCULAR; INTRAVENOUS PRN
Status: DISCONTINUED | OUTPATIENT
Start: 2022-05-26 | End: 2022-05-26

## 2022-05-26 RX ORDER — DEXAMETHASONE SODIUM PHOSPHATE 10 MG/ML
10 INJECTION, SOLUTION INTRAMUSCULAR; INTRAVENOUS ONCE
Status: DISCONTINUED | OUTPATIENT
Start: 2022-05-26 | End: 2022-05-26 | Stop reason: HOSPADM

## 2022-05-26 RX ORDER — EPINEPHRINE IN SOD CHLOR,ISO 1 MG/10 ML
SYRINGE (ML) INTRAVENOUS PRN
Status: DISCONTINUED | OUTPATIENT
Start: 2022-05-26 | End: 2022-05-26 | Stop reason: HOSPADM

## 2022-05-26 RX ORDER — LIDOCAINE 40 MG/G
CREAM TOPICAL
Status: DISCONTINUED | OUTPATIENT
Start: 2022-05-26 | End: 2022-05-26 | Stop reason: HOSPADM

## 2022-05-26 RX ORDER — IPRATROPIUM BROMIDE AND ALBUTEROL SULFATE 2.5; .5 MG/3ML; MG/3ML
3 SOLUTION RESPIRATORY (INHALATION)
Status: DISCONTINUED | OUTPATIENT
Start: 2022-05-26 | End: 2022-05-26 | Stop reason: HOSPADM

## 2022-05-26 RX ORDER — ESMOLOL HYDROCHLORIDE 10 MG/ML
INJECTION INTRAVENOUS PRN
Status: DISCONTINUED | OUTPATIENT
Start: 2022-05-26 | End: 2022-05-26

## 2022-05-26 RX ORDER — ONDANSETRON 2 MG/ML
4 INJECTION INTRAMUSCULAR; INTRAVENOUS EVERY 30 MIN PRN
Status: DISCONTINUED | OUTPATIENT
Start: 2022-05-26 | End: 2022-05-26 | Stop reason: HOSPADM

## 2022-05-26 RX ORDER — ONDANSETRON 4 MG/1
4 TABLET, ORALLY DISINTEGRATING ORAL EVERY 30 MIN PRN
Status: DISCONTINUED | OUTPATIENT
Start: 2022-05-26 | End: 2022-05-26 | Stop reason: HOSPADM

## 2022-05-26 RX ORDER — FLUOROURACIL 50 MG/G
CREAM TOPICAL
COMMUNITY
Start: 2021-09-15 | End: 2023-09-20

## 2022-05-26 RX ORDER — LIDOCAINE HYDROCHLORIDE 40 MG/ML
SOLUTION TOPICAL PRN
Status: DISCONTINUED | OUTPATIENT
Start: 2022-05-26 | End: 2022-05-26 | Stop reason: HOSPADM

## 2022-05-26 RX ORDER — HYDROMORPHONE HCL IN WATER/PF 6 MG/30 ML
0.2 PATIENT CONTROLLED ANALGESIA SYRINGE INTRAVENOUS EVERY 5 MIN PRN
Status: DISCONTINUED | OUTPATIENT
Start: 2022-05-26 | End: 2022-05-26 | Stop reason: HOSPADM

## 2022-05-26 RX ORDER — SODIUM CHLORIDE, SODIUM LACTATE, POTASSIUM CHLORIDE, CALCIUM CHLORIDE 600; 310; 30; 20 MG/100ML; MG/100ML; MG/100ML; MG/100ML
INJECTION, SOLUTION INTRAVENOUS CONTINUOUS
Status: DISCONTINUED | OUTPATIENT
Start: 2022-05-26 | End: 2022-05-26 | Stop reason: HOSPADM

## 2022-05-26 RX ORDER — OXYCODONE HYDROCHLORIDE 5 MG/1
5 TABLET ORAL EVERY 4 HOURS PRN
Status: DISCONTINUED | OUTPATIENT
Start: 2022-05-26 | End: 2022-05-26 | Stop reason: HOSPADM

## 2022-05-26 RX ORDER — DEXAMETHASONE SODIUM PHOSPHATE 4 MG/ML
INJECTION, SOLUTION INTRA-ARTICULAR; INTRALESIONAL; INTRAMUSCULAR; INTRAVENOUS; SOFT TISSUE PRN
Status: DISCONTINUED | OUTPATIENT
Start: 2022-05-26 | End: 2022-05-26

## 2022-05-26 RX ORDER — ONDANSETRON 2 MG/ML
INJECTION INTRAMUSCULAR; INTRAVENOUS PRN
Status: DISCONTINUED | OUTPATIENT
Start: 2022-05-26 | End: 2022-05-26

## 2022-05-26 RX ORDER — PROPOFOL 10 MG/ML
INJECTION, EMULSION INTRAVENOUS CONTINUOUS PRN
Status: DISCONTINUED | OUTPATIENT
Start: 2022-05-26 | End: 2022-05-26

## 2022-05-26 RX ORDER — FENTANYL CITRATE 50 UG/ML
25 INJECTION, SOLUTION INTRAMUSCULAR; INTRAVENOUS EVERY 5 MIN PRN
Status: DISCONTINUED | OUTPATIENT
Start: 2022-05-26 | End: 2022-05-26 | Stop reason: HOSPADM

## 2022-05-26 RX ORDER — AMPICILLIN AND SULBACTAM 2; 1 G/1; G/1
3 INJECTION, POWDER, FOR SOLUTION INTRAMUSCULAR; INTRAVENOUS
Status: COMPLETED | OUTPATIENT
Start: 2022-05-26 | End: 2022-05-26

## 2022-05-26 RX ADMIN — Medication 50 MG: at 08:21

## 2022-05-26 RX ADMIN — FENTANYL CITRATE 100 MCG: 50 INJECTION, SOLUTION INTRAMUSCULAR; INTRAVENOUS at 08:23

## 2022-05-26 RX ADMIN — ALBUTEROL SULFATE 2 PUFF: 108 INHALANT RESPIRATORY (INHALATION) at 07:20

## 2022-05-26 RX ADMIN — REMIFENTANIL HYDROCHLORIDE 0.12 MCG/KG/MIN: 1 INJECTION, POWDER, LYOPHILIZED, FOR SOLUTION INTRAVENOUS at 08:23

## 2022-05-26 RX ADMIN — AMPICILLIN SODIUM AND SULBACTAM SODIUM 3 G: 2; 1 INJECTION, POWDER, FOR SOLUTION INTRAMUSCULAR; INTRAVENOUS at 08:15

## 2022-05-26 RX ADMIN — SUCCINYLCHOLINE CHLORIDE 100 MG: 20 INJECTION, SOLUTION INTRAMUSCULAR; INTRAVENOUS; PARENTERAL at 08:11

## 2022-05-26 RX ADMIN — SUGAMMADEX 200 MG: 100 INJECTION, SOLUTION INTRAVENOUS at 10:10

## 2022-05-26 RX ADMIN — Medication 20 MG: at 09:32

## 2022-05-26 RX ADMIN — PROPOFOL 125 MCG/KG/MIN: 10 INJECTION, EMULSION INTRAVENOUS at 08:15

## 2022-05-26 RX ADMIN — IPRATROPIUM BROMIDE AND ALBUTEROL SULFATE 3 ML: .5; 3 SOLUTION RESPIRATORY (INHALATION) at 12:29

## 2022-05-26 RX ADMIN — SODIUM CHLORIDE, POTASSIUM CHLORIDE, SODIUM LACTATE AND CALCIUM CHLORIDE: 600; 310; 30; 20 INJECTION, SOLUTION INTRAVENOUS at 07:58

## 2022-05-26 RX ADMIN — DEXAMETHASONE SODIUM PHOSPHATE 10 MG: 4 INJECTION, SOLUTION INTRA-ARTICULAR; INTRALESIONAL; INTRAMUSCULAR; INTRAVENOUS; SOFT TISSUE at 09:50

## 2022-05-26 RX ADMIN — ESMOLOL HYDROCHLORIDE 20 MG: 10 INJECTION, SOLUTION INTRAVENOUS at 08:20

## 2022-05-26 RX ADMIN — Medication 10 MG: at 09:12

## 2022-05-26 RX ADMIN — REMIFENTANIL HYDROCHLORIDE 0.12 MCG/KG/MIN: 1 INJECTION, POWDER, LYOPHILIZED, FOR SOLUTION INTRAVENOUS at 08:15

## 2022-05-26 RX ADMIN — PROPOFOL 50 MG: 10 INJECTION, EMULSION INTRAVENOUS at 08:10

## 2022-05-26 RX ADMIN — ONDANSETRON 4 MG: 2 INJECTION INTRAMUSCULAR; INTRAVENOUS at 09:50

## 2022-05-26 NOTE — OP NOTE
PROCEDURE(S):  Micro direct laryngoscopy with excision/ablation of laryngeal lesions,  biopsies, CO2 laser    PRE-OPERATIVE DIAGNOSIS:   Laryngeal mass [J38.7]  Shortness of breath [R06.02]  Dysphonia [R49.0]  Chronic obstructive pulmonary disease, unspecified   History of COVID-19 positive test    POST-OPERATIVE DIAGNOSIS:   As above    SURGEON: Nikole Davis MD MPH    ANAESTHESIA: High frequency jet    INDICATIONS FOR PROCEDURE:   Asya Coffman is a 77 year old year old female with a history of dysphonia, and dyspnea as well as history of laryngeal papilloma who was noted to have a large mass in the posterior larynx that was partially obstructing her airway. Pre-operative considerations were complicated by a recent COVID-19 positive test result as well as her medical comorbidities. The patient wished to proceed with surgery and presented for the procedure(s) listed above. We reviewed perioperative risks, including bleeding/infection/pain, injury to surrounding structures, potential changes to tongue/voice/swallow function, risk of needing additional procedures (e.g., due to persistence or recurrence), and risks of anesthesia (including heart attack, stroke, death). She elected to proceed and written informed consent was performed.    DESCRIPTION OF PROCEDURE:   The patient was brought to the operating room and placed supine. A time-out was called to verify patient identity, operative site, and planned procedure. The operative site was prepped in the usual clean fashion. Moist gauze eye pads were placed and secured. A head wrap was placed, and custom molded thermoplastic tooth guards were placed. Direct laryngoscopy was performed with a Dedo laryngoscope, which was placed in suspension. The laser jet catheter was then placed through the laryngoscope and jet ventilation was successfully established.    The majority of the mass was debulked with cup biopsy forceps under telescopic visualization, with care  taken to avoid any demucosalization of the true vocal folds. These specimens were submitted to pathology. Cottonoids soaked in 1:10,000 epinephrine were sparingly used to maintain hemostasis throughout the case.     Next the jet catheter was removed and jet ventilation was continued through the side port of the laryngoscope for supraglottic ventilation.     Laser safety precautions were utilized at all times, including eye protection for the patient and staff, use of wet towels, and appropriately minimized FiO2. As needed, moistened cottonoids or laser-safe backstops were used to protect the endotracheal tube from the laser. The baseclick CO2 Accublade laser was attached to the microscope and used at a depth setting of 1 and 8 Cardoza. Jet ventilation was held during laser use. The laser was used in a [place] setting under microscopic visualization to ablate the aspects of the mass that involved the medial portion of the bilateral true vocal folds; there was greater disease burden on the right but both true vocal folds had heavy involvement along the middle and posterior aspects. A small strip of papilloma along the center of the posterior larynx and infraglottis was left untreated to reduce the risk of posterior laryngeal stenosis. The caliber of the airway was much improved.    As needed during the procedure and at the conclusion of the procedure, the operating microscope was moved out of position and the 0 and 70 degree rigid endoscopes were again used to examine the vocal folds and confirm completion of the stated objectives of the procedure. After cottonoid and sponge counts were confirmed correct, 2 cc of 4% lidocaine were applied transglottically for laryngotracheal anesthesia. A 5-O endotracheal tube was placed through the laryngoscope, which was subsequently removed followed by the tooth guards. The oropharynx was suctioned clean. The lips, teeth, and tongue were examined and no injuries were noted.  Care of the  patient was returned to Anesthesia for extubation and recovery.    FINDINGS:   Large papillomatous posterior laryngeal mass involving both mid-posterior true vocal folds and extending infraglottically.    SPECIMEN(S):   Posterior laryngeal mass    DRAINS: None    ESTIMATED BLOOD LOSS: Minimal    COMPLICATIONS: None    DISPOSITION: Stable to PACU    ATTENDING PRESENCE STATEMENT:  I was present and participating for the entire procedure from beginning to completion.

## 2022-05-26 NOTE — OR NURSING
Blood sugar was never done this a.m. I informed anesthesia when I discovered that fact. Anesthesia stated they would check it in the OR.

## 2022-05-26 NOTE — BRIEF OP NOTE
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  Debulked and ablated, leaving small strip of untreated papilloma along the midline to reduce the risk of posterior laryngeal stenosis.

## 2022-05-26 NOTE — ANESTHESIA PROCEDURE NOTES
Airway       Patient location during procedure: OR       Procedure Start/Stop Times: 5/26/2022 8:20 AM  Staff -        Anesthesiologist:  Rebecca Perez MD       Resident/Fellow: Kristian Lebron MD       Performed By: other anesthesia staff  Consent for Airway        Urgency: elective  Indications and Patient Condition       Indications for airway management: tania-procedural         Mask difficulty assessment: 1 - vent by mask    Final Airway Details       Final airway type: endotracheal airway       Successful airway: ETT - single  Endotracheal Airway Details        ETT size (mm): 5.0       Cuffed: yes       Intubation method: DL by ENT.       Adjucts: stylet       Position: Left       Measured from: lips       Secured at (cm): 22       Bite block used: Soft    Post intubation assessment        Number of attempts at approach: 1       Secured with: pink tape       Ease of procedure: easy       Dentition: Intact and Unchanged    Medication(s) Administered   Medication Administration Time: 5/26/2022 8:20 AM    Additional Comments       5.0 ETT placed by ENT surgeon Dr. Davis under direct rigid larygngoscopy following tumor debulking

## 2022-05-26 NOTE — DISCHARGE INSTRUCTIONS
[facility] // Same-Day Surgery // Adult Discharge Orders & Instructions     Take it easy when you get home.  Remember, same day surgery DOES NOT MEAN SAME DAY RECOVERY! Healing is a gradual process.   You will need some time to recover- you may be more tired than you realize at first.  Rest and relax for at least the first 24 hours at home.  You'll feel better and heal faster if you take good care of yourself.     ANESTHESIA RECOVERY -   For 24 hours after surgery    Get plenty of rest.  A responsible adult must stay with you for at least 24 hours after you leave the hospital.   Do not drive or use heavy equipment.  If you have weakness or tingling, don't drive or use heavy equipment until this feeling goes away.  Do not drink alcohol.  Avoid strenuous or risky activities.  Ask for help when climbing stairs.   You may feel lightheaded.  IF so, sit for a few minutes before standing.  Have someone help you get up.   If you have nausea (feel sick to your stomach): Drink only clear liquids such as apple juice, ginger ale, broth or 7-Up.  Rest may also help.  Be sure to drink enough fluids.  Move to a regular diet as you feel able.  You may have a slight fever. Call the doctor if your fever is over 100 F (37.7 C) (taken under the tongue) or lasts longer than 24 hours.  You may have a dry mouth, a sore throat, muscle aches or trouble sleeping.  These should go away after 24 hours.  Do not make important or legal decisions.     Call your doctor for any of the followin.  Signs of infection (fever, growing tenderness at the surgery site, a large amount of drainage or bleeding, severe pain, foul-smelling drainage, redness, swelling).  2. It has been over 8 to 10 hours since surgery and you are still not able to urinate (pass water).  3.  Headache for over 24 hours.    To contact a doctor, call:  Dr Davis's office at [phone] and ask for the resident on call for  Ear, Nose, Throat or ENT (answered 24 hours a day)  Emergency Department: [facility]: [phone] (TTY for hearing impaired: [phone])

## 2022-05-26 NOTE — OR NURSING
"Pt is wearing a yellow metal ring that does not come off.  aware. The ring is taped in place.    Pt's ride was verified by phone with \"Jonnathan\". He stated that he and his girlfriend (pt's grandaughter) are about 20 minutes away,and are aware that pt needs transportation and overnight care.  "

## 2022-05-26 NOTE — ANESTHESIA CARE TRANSFER NOTE
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    Anesthesia Type:   General     Note:    Oropharynx: oropharynx clear of all foreign objects  Level of Consciousness: awake  Oxygen Supplementation: face mask  Level of Supplemental Oxygen (L/min / FiO2): 4  Independent Airway: airway patency satisfactory and stable  Dentition: dentition unchanged  Vital Signs Stable: post-procedure vital signs reviewed and stable  Report to RN Given: handoff report given  Patient transferred to: PACU    Handoff Report: Identifed the Patient, Identified the Reponsible Provider, Reviewed the pertinent medical history, Discussed the surgical course, Reviewed Intra-OP anesthesia mangement and issues during anesthesia, Set expectations for post-procedure period and Allowed opportunity for questions and acknowledgement of understanding      Vitals:  Vitals Value Taken Time   /99 05/26/22 1019   Temp     Pulse 80 05/26/22 1028   Resp 28 05/26/22 1028   SpO2 89 % 05/26/22 1028   Vitals shown include unvalidated device data.    Electronically Signed By: Kristian Lebron MD  May 26, 2022  10:30 AM

## 2022-05-26 NOTE — PROGRESS NOTES
On arrival to  pt's O2 sat 89-94% on RA. Dr Perez notified and ordered a duo-neb. Post Duo-neb pt's O2 sat 94-97% on RA. Per Dr Perez ok send pt home with IS and have pt check O2 sats periodically. Pt and daughter in agreement with plan.

## 2022-05-26 NOTE — ANESTHESIA POSTPROCEDURE EVALUATION
Patient: Asya Coffman    Procedure: Procedure(s):  Micro direct laryngoscopy with excision/ablation of laryngeal lesions, possible biopsies, possible CO2 laser       Anesthesia Type:  General    Note:  Disposition: Outpatient   Postop Pain Control: Uneventful            Sign Out: Well controlled pain   PONV: No   Neuro/Psych: Uneventful            Sign Out: Acceptable/Baseline neuro status   Airway/Respiratory: Uneventful            Sign Out: Acceptable/Baseline resp. status   CV/Hemodynamics: Uneventful            Sign Out: Acceptable CV status; No obvious hypovolemia; No obvious fluid overload   Other NRE: NONE   DID A NON-ROUTINE EVENT OCCUR? No           Last vitals:  Vitals Value Taken Time   /68 05/26/22 1115   Temp 37  C (98.6  F) 05/26/22 1019   Pulse 69 05/26/22 1117   Resp 19 05/26/22 1117   SpO2 91 % 05/26/22 1117   Vitals shown include unvalidated device data.    Electronically Signed By: Tolu De La Fuente MD  May 26, 2022  11:19 AM

## 2022-05-31 LAB
PATH REPORT.COMMENTS IMP SPEC: NORMAL
PATH REPORT.FINAL DX SPEC: NORMAL
PATH REPORT.GROSS SPEC: NORMAL
PATH REPORT.MICROSCOPIC SPEC OTHER STN: NORMAL
PATH REPORT.RELEVANT HX SPEC: NORMAL
PHOTO IMAGE: NORMAL

## 2022-06-13 ENCOUNTER — OFFICE VISIT (OUTPATIENT)
Dept: OTOLARYNGOLOGY | Facility: CLINIC | Age: 77
End: 2022-06-13
Payer: COMMERCIAL

## 2022-06-13 ENCOUNTER — PRE VISIT (OUTPATIENT)
Dept: OTOLARYNGOLOGY | Facility: CLINIC | Age: 77
End: 2022-06-13

## 2022-06-13 ENCOUNTER — OFFICE VISIT (OUTPATIENT)
Dept: OTOLARYNGOLOGY | Facility: CLINIC | Age: 77
End: 2022-06-13

## 2022-06-13 VITALS
HEIGHT: 66 IN | DIASTOLIC BLOOD PRESSURE: 67 MMHG | HEART RATE: 52 BPM | TEMPERATURE: 98.2 F | BODY MASS INDEX: 19.77 KG/M2 | SYSTOLIC BLOOD PRESSURE: 122 MMHG | WEIGHT: 123 LBS | OXYGEN SATURATION: 95 %

## 2022-06-13 DIAGNOSIS — Z78.9 RECURRENT RESPIRATORY PAPILLOMATOSIS: Primary | ICD-10-CM

## 2022-06-13 DIAGNOSIS — R49.0 DYSPHONIA: Primary | ICD-10-CM

## 2022-06-13 DIAGNOSIS — R49.0 DYSPHONIA: ICD-10-CM

## 2022-06-13 DIAGNOSIS — J38.7 LARYNGEAL MASS: ICD-10-CM

## 2022-06-13 DIAGNOSIS — B49 FUNGAL INFECTION: ICD-10-CM

## 2022-06-13 PROCEDURE — 99215 OFFICE O/P EST HI 40 MIN: CPT | Mod: 25 | Performed by: OTOLARYNGOLOGY

## 2022-06-13 PROCEDURE — 99207 PR NO CHARGE LOS: CPT | Performed by: SPEECH-LANGUAGE PATHOLOGIST

## 2022-06-13 PROCEDURE — 31579 LARYNGOSCOPY TELESCOPIC: CPT | Performed by: OTOLARYNGOLOGY

## 2022-06-13 RX ORDER — FLUCONAZOLE 100 MG/1
TABLET ORAL
Qty: 15 TABLET | Refills: 0 | Status: SHIPPED | OUTPATIENT
Start: 2022-06-13 | End: 2022-10-27

## 2022-06-13 ASSESSMENT — PAIN SCALES - GENERAL: PAINLEVEL: NO PAIN (0)

## 2022-06-13 NOTE — Clinical Note
6/13/2022       RE: Asya Coffman  [address]  [place] [address]     Dear Colleague,    Thank you for referring your patient, Asya Coffman, to the [facility] EAR NOSE AND THROAT CLINIC [place] at [facility]. Please see a copy of my visit note below.    Dear Colleague:  Asya Coffamn recently returned for follow-up at the [facility] Voice [facility]. My clinic note from our visit is enclosed below.  Speech recognition software may have been used in the documentation below; input is reviewed before signature to the best of my ability.     I appreciate the ongoing opportunity to participate in this patient's care.    Please feel free to contact me with any questions.      Sincerely yours,  Nikole Davis M.D., M.P.H.  , Laryngology  Director, [facility]  Otolaryngology- Head & Neck Surgery  [phone]            =====  HISTORY OF PRESENT ILLNESS:  Asya Coffman is a pleasant 77 year old female with *** who returns in follow up today. She reports that her symptoms are {VOICE SYMPTOMS:[id number]}. ***      Compared to 12 months ago, her voice problem is: {BETTER/WORSE/SAME:246812:o::1}.          MEDICATIONS:     Current Outpatient Medications   Medication Sig Dispense Refill     albuterol (PROAIR HFA/PROVENTIL HFA/VENTOLIN HFA) 108 (90 Base) MCG/ACT inhaler Inhale 2 puffs into the lungs       albuterol (PROVENTIL) (2.5 MG/3ML) 0.083% neb solution Inhale 2.5 mg into the lungs       apixaban ANTICOAGULANT (ELIQUIS) 5 MG tablet Take 5 mg by mouth       atorvastatin (LIPITOR) 20 MG tablet Take 20 mg by mouth       budesonide-formoterol (SYMBICORT) 160-4.5 MCG/ACT Inhaler Inhale 2 puffs into the lungs       diltiazem ER (TIAZAC) 360 MG 24 hr ER beaded capsule Take 360 mg by mouth       fexofenadine (ALLEGRA) 180 MG tablet Take 180 mg by mouth       fluorouracil (EFUDEX) 5 % external cream APPLY  TOPICALLY TO LEFT LATERAL EYEBROW TWICE DAILY FOR 4 WEEKS       levothyroxine (SYNTHROID/LEVOTHROID) 88 MCG tablet Take 88 mcg by mouth       montelukast (SINGULAIR) 10 MG tablet Take 10 mg by mouth daily       Respiratory Therapy Supplies (NEBULIZER) JUWAN Portable nebulizer, disposable neb kit x 4, reuseable neb kit x 1, mask x 1, filters x 1.   Frequency of use: daily;  Medication: albuterol  Length of need: 99 months       sertraline (ZOLOFT) 100 MG tablet Take 100 mg by mouth         ALLERGIES:    Allergies   Allergen Reactions     1-Methyl 2-Pyrrolidone Anaphylaxis     Escitalopram Other (See Comments)     Asthma -a time of exacerbation and may not have been cause may retry     Mirtazapine Other (See Comments)     Asthma a time of exacerbation and may not have been cause may retry     Venlafaxine Other (See Comments)     Adhesive Tape Rash     With holter monitor. Ok with bandaids.       NEW PMH/PSH: None***    REVIEW OF SYSTEMS:  The patient completed a comprehensive 11 point review of systems (below), which was reviewed. Positives are as noted below.  Patient Supplied Answers to Review of Systems            PHYSICAL EXAM:  ***    Intake scores  Last 2 Scores for Patient-Answered VHI Questionnaire  No flowsheet data found.   Last 2 Scores for Patient-Answered EAT Questionnaire  No flowsheet data found.     Last 2 Scores for Patient-Answered CSI Questionnaire  No flowsheet data found.        Procedure:   ***    IMPRESSION AND PLAN:   Asya Coffman returns with ***      Fungal laryngitis - fluconazole empirically; then consider also drinking water to reduce any deposition of symbicort        ***Today's visit required additional screening time, PPE, and cleaning measures to allow for a safe in-person visit, due to the public health emergency. I spent a total of *** minutes on ***6/13/2022 in chart review, review of tests, patient visit, documentation, care coordination, and/or discussion with other providers  about the issues documented above, separate from any documented procedure(s).        Again, thank you for allowing me to participate in the care of your patient.      Sincerely,    Nikole Davis MD

## 2022-06-13 NOTE — LETTER
6/13/2022       RE: Asya Coffman  [address]  [place] [address]     Dear Colleague,    Thank you for referring your patient, Asya Coffman, to the [facility] VOICE CLINIC [place] at [facility]. Please see a copy of my visit note below.    [facility] VOICE [facility]  VOICE EVALUATION/LARYNGEAL EXAMINATION REPORT    Patient: Asya Coffman  Date of Service: 6/13/2022    HISTORY  PATIENT INFORMATION  Asya Coffman was seen for brief consultation in conjunction with a visit to Dr. Davis today.  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  She states that her voice returned to normal almost immediately after surgery, and she is back to her baseline level of breathing.  She denies swallowing difficulties.  No further voice SLP intervention is required at this time.    No charge for today s session  NO CHARGE FACILITY FEE (77644)    PRIMARY ICD-10 code:  R49.0 (Dysphonia)     Oren Presley, Ph.D., [facility]-SLP  Speech-Language Pathologist-[facility]  [phone]  he/him/his

## 2022-06-13 NOTE — PROGRESS NOTES
[facility] VOICE [facility]  VOICE EVALUATION/LARYNGEAL EXAMINATION REPORT    Patient: Asya Coffman  Date of Service: 6/13/2022    HISTORY  PATIENT INFORMATION  Asya Coffman was seen for brief consultation in conjunction with a visit to Dr. Davis today.  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  She states that her voice returned to normal almost immediately after surgery, and she is back to her baseline level of breathing.  She denies swallowing difficulties.  No further voice SLP intervention is required at this time.    No charge for today s session  NO CHARGE FACILITY FEE (11092)    PRIMARY ICD-10 code:  R49.0 (Dysphonia)     Oren Presley, Ph.D., [facility]-SLP  Speech-Language Pathologist-[facility]  [phone]  he/him/his

## 2022-06-13 NOTE — NURSING NOTE
"Chief Complaint   Patient presents with     RECHECK     2 weeks post op follow up    Blood pressure 122/67, pulse 52, temperature 98.2  F (36.8  C), temperature source Temporal, height 1.676 m (5' 6\"), weight 55.8 kg (123 lb), SpO2 95 %. Cristina Cunningham, EMT  "

## 2022-06-13 NOTE — LETTER
6/13/2022      RE: Asya Coffman  [address]  [facility] [address]       Dear Colleague:  Asya Coffman recently returned for follow-up at the [facility]. My clinic note from our visit is enclosed below.  Speech recognition software may have been used in the documentation below; input is reviewed before signature to the best of my ability.     I appreciate the ongoing opportunity to participate in this patient's care.    Please feel free to contact me with any questions.      Sincerely yours,  Nikole Davis M.D., M.P.H.  , Laryngology  Director, [facility]  Otolaryngology- Head & Neck Surgery  [phone]            =====  HISTORY OF PRESENT ILLNESS:  Asya Coffman is a pleasant 77 year old female with PMH including CREST syndrome, COPD, CKD, atrial fibrillation and a complex laryngeal history including a prior benign lesion, severe dysplasia, and laryngeal papilloma.    She is now s/p MDL with debulking and CO2 laser treatment of laryngeal papilloma on 5/26/22. She is very happy with her improved breathing, which feels unrestricted now, and feels her voice is back to her more recent baseline. No swallow concerns. Her pathology showed papilloma; HPV typing was negative for virus.      MEDICATIONS:     Current Outpatient Medications   Medication Sig Dispense Refill     albuterol (PROAIR HFA/PROVENTIL HFA/VENTOLIN HFA) 108 (90 Base) MCG/ACT inhaler Inhale 2 puffs into the lungs       albuterol (PROVENTIL) (2.5 MG/3ML) 0.083% neb solution Inhale 2.5 mg into the lungs       apixaban ANTICOAGULANT (ELIQUIS) 5 MG tablet Take 5 mg by mouth       atorvastatin (LIPITOR) 20 MG tablet Take 20 mg by mouth       budesonide-formoterol (SYMBICORT) 160-4.5 MCG/ACT Inhaler Inhale 2 puffs into the lungs       diltiazem ER (TIAZAC) 360 MG 24 hr ER beaded capsule Take 360 mg by mouth       fexofenadine (ALLEGRA) 180 MG tablet Take 180 mg by mouth        fluorouracil (EFUDEX) 5 % external cream APPLY TOPICALLY TO LEFT LATERAL EYEBROW TWICE DAILY FOR 4 WEEKS       levothyroxine (SYNTHROID/LEVOTHROID) 88 MCG tablet Take 88 mcg by mouth       montelukast (SINGULAIR) 10 MG tablet Take 10 mg by mouth daily       Respiratory Therapy Supplies (NEBULIZER) JUWAN Portable nebulizer, disposable neb kit x 4, reuseable neb kit x 1, mask x 1, filters x 1.   Frequency of use: daily;  Medication: albuterol  Length of need: 99 months       sertraline (ZOLOFT) 100 MG tablet Take 100 mg by mouth         ALLERGIES:    Allergies   Allergen Reactions     1-Methyl 2-Pyrrolidone Anaphylaxis     Escitalopram Other (See Comments)     Asthma -a time of exacerbation and may not have been cause may retry     Mirtazapine Other (See Comments)     Asthma a time of exacerbation and may not have been cause may retry     Venlafaxine Other (See Comments)     Adhesive Tape Rash     With holter monitor. Ok with bandaids.       NEW PMH/PSH: None    REVIEW OF SYSTEMS:  The patient completed a comprehensive 11 point review of systems (below), which was reviewed. Positives are as noted below.  Patient Supplied Answers to Review of Systems   Noncontributory       PHYSICAL EXAM:  General: The patient was alert and conversant, and in no acute distress.    Neck: No palpable cervical lymphadenopathy, no significant tenderness to palpation of the thyrohyoid space, which was narrow. No obvious thyroid abnormality.  Resp: Breathing comfortably, no stridor or stertor.  Neuro: Symmetric facial function. Other cranial nerve function as documented above.  Psych: Normal affect, pleasant and cooperative.  Voice/speech: Mild to moderate dysphonia characterized by breathiness, roughness and strain.      Intake scores  Last 2 Scores for Patient-Answered VHI Questionnaire  No flowsheet data found.   Last 2 Scores for Patient-Answered EAT Questionnaire  No flowsheet data found.   Last 2 Scores for Patient-Answered CSI  Questionnaire  No flowsheet data found.      Procedure:   Flexible fiberoptic laryngoscopy and laryngovideostroboscopy  Indications: This procedure was warranted to evaluate the patient's laryngeal anatomy and function. Risks, benefits, and alternatives were discussed.  Description: After written informed consent was obtained, a time-out was performed to confirm patient identity, procedure, and procedure site. Topical 3% lidocaine with 0.25% phenylephrine was applied to the nasal cavities. I performed the endoscopy and no complications were apparent. Continuous and stroboscopic light were utilized to assess routine phonation and variable frequency phonation.  Performed by: Nikole Davis MD MPH  SLP: Oren Presley, PhD, CCC-SLP   Findings: Normal nasopharynx. Normal base of tongue, valleculae, and epiglottis. Vocal fold mobility: right: normal; left: normal. Medial edges of the vocal folds: smooth and straight overall, some fibrotic changes bilaterally.   Glissade produced appropriate elongation. There was mild to moderate supraglottic recruitment with connected speech. Mucosa of false vocal folds, aryepiglottic folds, piriform sinuses, and posterior glottis unremarkable overall; small amount of remnant papilloma and post-surgical changes visualized along posterior glottis; fungal laryngitis noted over arytenoids. Airway was patent. Similar findings on NBI; mild right true vocal fold leukoplakia more readily visualized.    The addition of stroboscopy allowed evaluation of the mucosal wave.   Amplitude: right: moderately decreased; left: moderately decreased. Symmetry: intermittent symmetry. Closure pattern: complete and slightly irregular. Closure plane: at glottic level. Phase distribution: normal.              IMPRESSION AND PLAN:   Asya Coffman returns with persistent fungal laryngitis and expected post-operative changes. Her breathing and voice have improved substantially and she is very happy to  have completed the procedure.    Plan:  1) Regarding fungal laryngitis, likely secondary to Symbicort - discussed fluconazole empirically as well as drinking water to reduce any deposition, since she is already gargling regularly. She is comfortable with fluconazole and a prescription was sent to her preferred pharmacy.  2) We also discussed that there is now some evidence in the literature supporting the utility of HPV vaccination in adults who already have RRP. The data are preliminary and the included number of patients is small, but the vaccine appears to be well tolerated and may be associated with longer intervals between procedures, and occasionally with not needing procedures at all during the follow-up period. She will check with her insurance as to whether a letter of medical support would be useful given that she is out of the typical age range for HPV vaccination. She will also check with her primary care provider as to whether her office might be able to help facilitate access. Ideally she would receive the 9-valent vaccine if possible.  3) Regarding the [facility] RRP vaccine trial (Michele Hampton and colleagues), I noted that the trial is completely separate from her clinical care here, but she is welcome to consider participation in that as well and their team would be a source of more information about that.  4) We discussed consideration of awake treatment to reduce the likelihood of needing to return to the OR under general anesthesia. She is very interested in this. Plan awake procedure with KTP laser and possible biopsy in 6-8 weeks. I placed a case request today. We discussed that sometimes we are able to get an HPV strain upon repeat biopsy so we can consider that as well.  5) The patient is interested in having her daughter Lana have access to her chart and records, so I will ask our team to help facilitate that as well.    Today's visit required additional screening time, PPE, and cleaning measures  to allow for a safe in-person visit, due to the public health emergency. I spent a total of 42 minutes on 6/13/2022 in chart review, review of tests, patient visit, documentation, care coordination, and/or discussion with other providers about the issues documented above, separate from any documented procedure(s).        Nikole Davis MD

## 2022-06-13 NOTE — PROGRESS NOTES
Dear Colleague:  Asya Coffman recently returned for follow-up at the [facility]. My clinic note from our visit is enclosed below.  Speech recognition software may have been used in the documentation below; input is reviewed before signature to the best of my ability.     I appreciate the ongoing opportunity to participate in this patient's care.    Please feel free to contact me with any questions.      Sincerely yours,  Nikole Davis M.D., M.P.H.  , Laryngology  Director, [facility]  Otolaryngology- Head & Neck Surgery  [phone]            =====  HISTORY OF PRESENT ILLNESS:  Asya Coffman is a pleasant 77 year old female with PMH including CREST syndrome, COPD, CKD, atrial fibrillation and a complex laryngeal history including a prior benign lesion, severe dysplasia, and laryngeal papilloma.    She is now s/p MDL with debulking and CO2 laser treatment of laryngeal papilloma on 5/26/22. She is very happy with her improved breathing, which feels unrestricted now, and feels her voice is back to her more recent baseline. No swallow concerns. Her pathology showed papilloma; HPV typing was negative for virus.      MEDICATIONS:     Current Outpatient Medications   Medication Sig Dispense Refill     albuterol (PROAIR HFA/PROVENTIL HFA/VENTOLIN HFA) 108 (90 Base) MCG/ACT inhaler Inhale 2 puffs into the lungs       albuterol (PROVENTIL) (2.5 MG/3ML) 0.083% neb solution Inhale 2.5 mg into the lungs       apixaban ANTICOAGULANT (ELIQUIS) 5 MG tablet Take 5 mg by mouth       atorvastatin (LIPITOR) 20 MG tablet Take 20 mg by mouth       budesonide-formoterol (SYMBICORT) 160-4.5 MCG/ACT Inhaler Inhale 2 puffs into the lungs       diltiazem ER (TIAZAC) 360 MG 24 hr ER beaded capsule Take 360 mg by mouth       fexofenadine (ALLEGRA) 180 MG tablet Take 180 mg by mouth       fluorouracil (EFUDEX) 5 % external cream APPLY TOPICALLY TO LEFT LATERAL EYEBROW  TWICE DAILY FOR 4 WEEKS       levothyroxine (SYNTHROID/LEVOTHROID) 88 MCG tablet Take 88 mcg by mouth       montelukast (SINGULAIR) 10 MG tablet Take 10 mg by mouth daily       Respiratory Therapy Supplies (NEBULIZER) JUWAN Portable nebulizer, disposable neb kit x 4, reuseable neb kit x 1, mask x 1, filters x 1.   Frequency of use: daily;  Medication: albuterol  Length of need: 99 months       sertraline (ZOLOFT) 100 MG tablet Take 100 mg by mouth         ALLERGIES:    Allergies   Allergen Reactions     1-Methyl 2-Pyrrolidone Anaphylaxis     Escitalopram Other (See Comments)     Asthma -a time of exacerbation and may not have been cause may retry     Mirtazapine Other (See Comments)     Asthma a time of exacerbation and may not have been cause may retry     Venlafaxine Other (See Comments)     Adhesive Tape Rash     With holter monitor. Ok with bandaids.       NEW PMH/PSH: None    REVIEW OF SYSTEMS:  The patient completed a comprehensive 11 point review of systems (below), which was reviewed. Positives are as noted below.  Patient Supplied Answers to Review of Systems   Noncontributory       PHYSICAL EXAM:  General: The patient was alert and conversant, and in no acute distress.    Neck: No palpable cervical lymphadenopathy, no significant tenderness to palpation of the thyrohyoid space, which was narrow. No obvious thyroid abnormality.  Resp: Breathing comfortably, no stridor or stertor.  Neuro: Symmetric facial function. Other cranial nerve function as documented above.  Psych: Normal affect, pleasant and cooperative.  Voice/speech: Mild to moderate dysphonia characterized by breathiness, roughness and strain.      Intake scores  Last 2 Scores for Patient-Answered VHI Questionnaire  No flowsheet data found.   Last 2 Scores for Patient-Answered EAT Questionnaire  No flowsheet data found.   Last 2 Scores for Patient-Answered CSI Questionnaire  No flowsheet data found.      Procedure:   Flexible fiberoptic  laryngoscopy and laryngovideostroboscopy  Indications: This procedure was warranted to evaluate the patient's laryngeal anatomy and function. Risks, benefits, and alternatives were discussed.  Description: After written informed consent was obtained, a time-out was performed to confirm patient identity, procedure, and procedure site. Topical 3% lidocaine with 0.25% phenylephrine was applied to the nasal cavities. I performed the endoscopy and no complications were apparent. Continuous and stroboscopic light were utilized to assess routine phonation and variable frequency phonation.  Performed by: Nikole Davis MD MPH  SLP: Oren Presley, PhD, CCC-SLP   Findings: Normal nasopharynx. Normal base of tongue, valleculae, and epiglottis. Vocal fold mobility: right: normal; left: normal. Medial edges of the vocal folds: smooth and straight overall, some fibrotic changes bilaterally.   Glissade produced appropriate elongation. There was mild to moderate supraglottic recruitment with connected speech. Mucosa of false vocal folds, aryepiglottic folds, piriform sinuses, and posterior glottis unremarkable overall; small amount of remnant papilloma and post-surgical changes visualized along posterior glottis; fungal laryngitis noted over arytenoids. Airway was patent. Similar findings on NBI; mild right true vocal fold leukoplakia more readily visualized.    The addition of stroboscopy allowed evaluation of the mucosal wave.   Amplitude: right: moderately decreased; left: moderately decreased. Symmetry: intermittent symmetry. Closure pattern: complete and slightly irregular. Closure plane: at glottic level. Phase distribution: normal.              IMPRESSION AND PLAN:   Asya Coffman returns with persistent fungal laryngitis and expected post-operative changes. Her breathing and voice have improved substantially and she is very happy to have completed the procedure.    Plan:  1) Regarding fungal laryngitis, likely  secondary to Symbicort - discussed fluconazole empirically as well as drinking water to reduce any deposition, since she is already gargling regularly. She is comfortable with fluconazole and a prescription was sent to her preferred pharmacy.  2) We also discussed that there is now some evidence in the literature supporting the utility of HPV vaccination in adults who already have RRP. The data are preliminary and the included number of patients is small, but the vaccine appears to be well tolerated and may be associated with longer intervals between procedures, and occasionally with not needing procedures at all during the follow-up period. She will check with her insurance as to whether a letter of medical support would be useful given that she is out of the typical age range for HPV vaccination. She will also check with her primary care provider as to whether her office might be able to help facilitate access. Ideally she would receive the 9-valent vaccine if possible.  3) Regarding the [facility] RRP vaccine trial (Michele Hampton and colleagues), I noted that the trial is completely separate from her clinical care here, but she is welcome to consider participation in that as well and their team would be a source of more information about that.  4) We discussed consideration of awake treatment to reduce the likelihood of needing to return to the OR under general anesthesia. She is very interested in this. Plan awake procedure with KTP laser and possible biopsy in 6-8 weeks. I placed a case request today. We discussed that sometimes we are able to get an HPV strain upon repeat biopsy so we can consider that as well.  5) The patient is interested in having her daughter Lana have access to her chart and records, so I will ask our team to help facilitate that as well.    Today's visit required additional screening time, PPE, and cleaning measures to allow for a safe in-person visit, due to the public health emergency. I spent  a total of 42 minutes on 6/13/2022 in chart review, review of tests, patient visit, documentation, care coordination, and/or discussion with other providers about the issues documented above, separate from any documented procedure(s).

## 2022-06-15 ENCOUNTER — TELEPHONE (OUTPATIENT)
Dept: OTOLARYNGOLOGY | Facility: CLINIC | Age: 77
End: 2022-06-15
Payer: COMMERCIAL

## 2022-06-15 NOTE — TELEPHONE ENCOUNTER
Left message regarding scheduling surgery/procedure with Dr. Davis.   Writer left call back number on the patients voicemail.    Alyssa Loaiza on 6/15/2022 at 1:00 PM   P: [phone]

## 2022-06-26 ENCOUNTER — HEALTH MAINTENANCE LETTER (OUTPATIENT)
Age: 77
End: 2022-06-26

## 2022-08-02 PROBLEM — B49 FUNGAL INFECTION: Status: ACTIVE | Noted: 2022-08-02

## 2022-08-18 ENCOUNTER — HOSPITAL ENCOUNTER (OUTPATIENT)
Facility: AMBULATORY SURGERY CENTER | Age: 77
Discharge: HOME | End: 2022-08-18
Attending: OTOLARYNGOLOGY | Admitting: OTOLARYNGOLOGY
Payer: COMMERCIAL

## 2022-08-18 VITALS
RESPIRATION RATE: 16 BRPM | SYSTOLIC BLOOD PRESSURE: 148 MMHG | OXYGEN SATURATION: 95 % | DIASTOLIC BLOOD PRESSURE: 87 MMHG | HEART RATE: 75 BPM | TEMPERATURE: 97.5 F

## 2022-08-18 DIAGNOSIS — J38.7 LARYNGEAL MASS: ICD-10-CM

## 2022-08-18 DIAGNOSIS — Z78.9 RECURRENT RESPIRATORY PAPILLOMATOSIS: ICD-10-CM

## 2022-08-18 DIAGNOSIS — B49 FUNGAL INFECTION: ICD-10-CM

## 2022-08-18 PROCEDURE — 31572 LARGSC W/LASER DSTRJ LES: CPT | Mod: 50

## 2022-08-18 PROCEDURE — 88305 TISSUE EXAM BY PATHOLOGIST: CPT | Mod: 26 | Performed by: PATHOLOGY

## 2022-08-18 PROCEDURE — 31572 LARGSC W/LASER DSTRJ LES: CPT | Mod: 50 | Performed by: OTOLARYNGOLOGY

## 2022-08-18 PROCEDURE — 88305 TISSUE EXAM BY PATHOLOGIST: CPT | Mod: TC | Performed by: OTOLARYNGOLOGY

## 2022-08-18 RX ORDER — LIDOCAINE HYDROCHLORIDE 20 MG/ML
INJECTION, SOLUTION INFILTRATION; PERINEURAL PRN
Status: DISCONTINUED | OUTPATIENT
Start: 2022-08-18 | End: 2022-08-18 | Stop reason: HOSPADM

## 2022-08-18 NOTE — H&P
Abbreviated H&P for ASC Procedure under Local Anesthesia    1. Chief complaint and/or reason for procedure  recurrent respiratory papillomatosis (RRP)     2. Medical history describing significant medical conditions and previous surgeries  PAST MEDICAL HISTORY: No past medical history on file.         PAST SURGICAL HISTORY:   Past Surgical History:   Procedure Laterality Date     LASER CO2 LARYNGOSCOPY, COMPLEX N/A 5/26/2022    Procedure: Micro direct laryngoscopy with excision/ablation of laryngeal lesions, possible biopsies, possible CO2 laser;  Surgeon: Nikole Davis MD;  Location:  OR       Health history changes since last seen? NA    3. Current medications and allergies  MEDICATIONS:     Current Outpatient Medications   Medication Sig Dispense Refill     albuterol (PROAIR HFA/PROVENTIL HFA/VENTOLIN HFA) 108 (90 Base) MCG/ACT inhaler Inhale 2 puffs into the lungs       albuterol (PROVENTIL) (2.5 MG/3ML) 0.083% neb solution Inhale 2.5 mg into the lungs       apixaban ANTICOAGULANT (ELIQUIS) 5 MG tablet Take 5 mg by mouth       atorvastatin (LIPITOR) 20 MG tablet Take 20 mg by mouth       budesonide-formoterol (SYMBICORT) 160-4.5 MCG/ACT Inhaler Inhale 2 puffs into the lungs       diltiazem ER (TIAZAC) 360 MG 24 hr ER beaded capsule Take 360 mg by mouth       fexofenadine (ALLEGRA) 180 MG tablet Take 180 mg by mouth       fluconazole (DIFLUCAN) 100 MG tablet 2 tabs PO qday x 1 day, then 1 tab PO qday x 13 days for a total of 14 day course. 15 tablet 0     fluorouracil (EFUDEX) 5 % external cream APPLY TOPICALLY TO LEFT LATERAL EYEBROW TWICE DAILY FOR 4 WEEKS       levothyroxine (SYNTHROID/LEVOTHROID) 88 MCG tablet Take 88 mcg by mouth       montelukast (SINGULAIR) 10 MG tablet Take 10 mg by mouth daily       Respiratory Therapy Supplies (NEBULIZER) JUWAN Portable nebulizer, disposable neb kit x 4, reuseable neb kit x 1, mask x 1, filters x 1.   Frequency of use: daily;  Medication: albuterol  Length  of need: 99 months       sertraline (ZOLOFT) 100 MG tablet Take 100 mg by mouth         ALLERGIES:    Allergies   Allergen Reactions     1-Methyl 2-Pyrrolidone Anaphylaxis     Escitalopram Other (See Comments)     Asthma -a time of exacerbation and may not have been cause may retry     Mirtazapine Other (See Comments)     Asthma a time of exacerbation and may not have been cause may retry     Venlafaxine Other (See Comments)     Adhesive Tape Rash     With holter monitor. Ok with bandaids.       4. Review of systems  Noncontributory    5. Physical examination  Vital signs stable.   Awake, alert, conversant.  Breathing comfortably, no stridor/stertor.  Voice with moderate dysphonia, characterized by roughness, breathiness, strain.    6. ASA classification  ASA II    Plan to proceed as scheduled.

## 2022-08-18 NOTE — OP NOTE
PROCEDURE(S):  Transnasal flexible laryngoscopy with biopsies  KTP laser treatment of recurrent respiratory papillomatosis (RRP) under flexible laryngoscopic visualization      PRE-OPERATIVE DIAGNOSIS:   Recurrent respiratory papillomatosis (RRP)   Dysphonia    POST-OPERATIVE DIAGNOSIS:   As above    ANAESTHESIA: Topical local anesthesia    INDICATIONS FOR PROCEDURE:   The patient is a 77 year old female with recurrent respiratory papillomatosis (RRP).  Risks, benefits, and alternatives of the procedure were discussed, including the risk of epistaxis, temporary/permanent hoarseness, scarring, injury to surrounding structures, and need for additional procedures. The patient signed written informed consent.    DESCRIPTION OF PROCEDURE:   After written informed consent was obtained, a time-out was performed to confirm patient identity, procedure, and procedure site. Topical aerosolized 3% lidocaine with 0.25% phenylephrine was applied to both nasal cavities. Flexible fiberoptic laryngoscopy was performed with good visualization of the larynx. A total of 15 cc of 2% lidocaine was topically applied to the vocal folds through the channel of the endoscope sleeve, distributed across the whole procedure. The patient was asked to gargle and cough. The endoscope was removed. When adequate topical anesthesia had been obtained, biopsies were taken with the endoscopic biopsy forceps, as below. Next, the 0.4 mm laser fiber was placed in the channel and the endoscope was then replaced. The fiber was advanced so 2-3 mm was visible beyond the tip of the endoscope. The patient was asked to breathe quietly and the laser was used to photocoagulate the lesions. The procedure was concluded when maximum patient tolerance was reached. The patient tolerated the procedure well.    Laser settings:   nm laser, 86 W, spot size 400um, 15 ms pulses, 2 pulses/second. 256 total Joules.  All in the room, including staff and patient, wore  appropriate eye protection during the procedure.    FINDINGS:   Normal nasopharynx. Normal base of tongue, valleculae, and epiglottis. The right true vocal fold demonstrated normal mobility. The left true vocal fold demonstrated normal mobility.   Large clusters of papilloma, posterior right and left supraglottis. Airway was patent.    SPECIMEN(S):   Right posterior larynx  Left posterior larynx    DRAINS:   None    ESTIMATED BLOOD LOSS:   Minimal    COMPLICATIONS:   None    DISPOSITION: Stable to home    ATTENDING PRESENCE STATEMENT:  I performed this procedure.    PLAN: Gentle voice use. Follow up in ~3 weeks. Patient also had questions about HPV vaccine and RRP trials, which we briefly touched on but agreed to discuss more in depth in clinic.

## 2022-08-18 NOTE — DISCHARGE INSTRUCTIONS
"[facility]  Home Care Following Your Procedure  Call a doctor if you have signs of infection (fever, growing tenderness at the surgery site, a large amount of drainage or bleeding, severe pain, foul-smelling drainage, redness, swelling).  Today you received a Marcaine or bupivacaine block to numb the nerves near your surgery site.  This is a block using local anesthetic or \"numbing\" medication injected around the nerves to anesthetize or \"numb\" the area supplied by those nerves.  This block is injected into the muscle layer near your surgical site.  The medication may numb the location where you had surgery for 6-18 hours, but may last up to 24 hours.  If your surgical site is an arm or leg you should be careful with your affected limb, since it is possible to injure your limb without being aware of it due to the numbing.  Until full feeling returns, you should guard against bumping or hitting your limb, and avoid extreme hot or cold temperatures on the skin.  As the block wears off, the feeling will return as a tingling or prickly sensation near your surgical site.  You will experience more discomfort from your incision as the feeling returns.  You may want to take a pain pill (a narcotic or Tylenol if this was prescribed by your surgeon) when you start to experience mild pain before the pain becomes more severe.  If your pain medications do not control your pain you should notifiy your surgeon.    Tylenol/Acetaminophen Consumption  To help encourage the safe use of acetaminophen, the makers of TYLENOL  have lowered the maximum daily dose for single-ingredient Extra Strength TYLENOL  (acetaminophen) products sold in the [place] from 8 pills per day (4,000 mg) to 6 pills per day (3,000 mg). The dosing interval has also changed from 2 pills every 4-6 hours to 2 pills every 6 hours.  If you feel your pain relief is insufficient, you may take Tylenol/Acetaminophen in addition to your " narcotic pain medication.   Be careful not to exceed 3,000 mg of Tylenol/Acetaminophen in a 24 hour period from all sources.  If you are taking extra strength Tylenol/acetaminophen (500 mg), the maximum dose is 6 tablets in 24 hours.  If you are taking regular strength acetaminophen (325 mg), the maximum dose is 9 tablets in 24 hours.    Your doctor is:       Dr. Nikole Davis, ENT Otolaryngology: [phone]           Or dial [phone] and ask for the resident on call for:  ENT Otolaryngology  For emergency care, call the:  [place]:  [phone] (TTY for hearing impaired: [phone])

## 2022-08-23 LAB
PATH REPORT.COMMENTS IMP SPEC: ABNORMAL
PATH REPORT.COMMENTS IMP SPEC: ABNORMAL
PATH REPORT.COMMENTS IMP SPEC: YES
PATH REPORT.FINAL DX SPEC: ABNORMAL
PATH REPORT.GROSS SPEC: ABNORMAL
PATH REPORT.MICROSCOPIC SPEC OTHER STN: ABNORMAL
PATH REPORT.RELEVANT HX SPEC: ABNORMAL
PHOTO IMAGE: ABNORMAL

## 2022-08-24 ENCOUNTER — TELEPHONE (OUTPATIENT)
Dept: OTOLARYNGOLOGY | Facility: CLINIC | Age: 77
End: 2022-08-24

## 2022-08-24 DIAGNOSIS — R49.0 DYSPHONIA: ICD-10-CM

## 2022-08-24 DIAGNOSIS — Q31.9 DYSPLASIA OF LARYNX: Primary | ICD-10-CM

## 2022-08-24 DIAGNOSIS — Z78.9 RECURRENT RESPIRATORY PAPILLOMATOSIS: ICD-10-CM

## 2022-08-24 DIAGNOSIS — J38.7 LARYNGEAL MASS: ICD-10-CM

## 2022-08-24 DIAGNOSIS — J44.9 CHRONIC OBSTRUCTIVE PULMONARY DISEASE, UNSPECIFIED COPD TYPE (H): ICD-10-CM

## 2022-08-24 NOTE — TELEPHONE ENCOUNTER
Reviewed pathology results with patient by phone.    Discussed that with these small specimens, sometimes the results can be inconsistent with what we find with larger resections. However, sometimes they are an indication of early detection of a real problem.    Therefore my recommendation would be to repeat a microDL under general anesthesia to obtain larger specimens, which would give us more definitive pathology information. She is comfortable with this plan.    We also noted that she will need an H&P again, and I asked her to make sure she is clear on any additional testing/evaluation that might be requested for her, so that things can go smoothly. I offered to talk with her daughter as well but she stated she is comfortable updating Lana. I invited her to contact us by [employer] or phone with any questions or concerns.

## 2022-09-13 ENCOUNTER — PREP FOR PROCEDURE (OUTPATIENT)
Dept: OTOLARYNGOLOGY | Facility: CLINIC | Age: 77
End: 2022-09-13

## 2022-09-15 ENCOUNTER — HOSPITAL ENCOUNTER (OUTPATIENT)
Facility: CLINIC | Age: 77
Discharge: HOME | End: 2022-09-15
Attending: OTOLARYNGOLOGY | Admitting: OTOLARYNGOLOGY
Payer: COMMERCIAL

## 2022-09-15 ENCOUNTER — ANESTHESIA EVENT (OUTPATIENT)
Dept: SURGERY | Facility: CLINIC | Age: 77
End: 2022-09-15
Payer: COMMERCIAL

## 2022-09-15 ENCOUNTER — ANESTHESIA (OUTPATIENT)
Dept: SURGERY | Facility: CLINIC | Age: 77
End: 2022-09-15
Payer: COMMERCIAL

## 2022-09-15 VITALS
RESPIRATION RATE: 20 BRPM | BODY MASS INDEX: 20.39 KG/M2 | HEIGHT: 65 IN | WEIGHT: 122.36 LBS | SYSTOLIC BLOOD PRESSURE: 112 MMHG | HEART RATE: 80 BPM | DIASTOLIC BLOOD PRESSURE: 85 MMHG | OXYGEN SATURATION: 92 % | TEMPERATURE: 98.4 F

## 2022-09-15 DIAGNOSIS — J38.7 LARYNGEAL MASS: ICD-10-CM

## 2022-09-15 DIAGNOSIS — R06.00 DYSPNEA, UNSPECIFIED TYPE: ICD-10-CM

## 2022-09-15 DIAGNOSIS — Z78.9 RECURRENT RESPIRATORY PAPILLOMATOSIS: Primary | ICD-10-CM

## 2022-09-15 LAB
CREAT SERPL-MCNC: 1.17 MG/DL (ref 0.51–0.95)
GFR SERPL CREATININE-BSD FRML MDRD: 48 ML/MIN/1.73M2
GLUCOSE BLDC GLUCOMTR-MCNC: 88 MG/DL (ref 70–99)
POTASSIUM SERPL-SCNC: 3.9 MMOL/L (ref 3.4–5.3)

## 2022-09-15 PROCEDURE — 82962 GLUCOSE BLOOD TEST: CPT

## 2022-09-15 PROCEDURE — 370N000017 HC ANESTHESIA TECHNICAL FEE, PER MIN: Performed by: OTOLARYNGOLOGY

## 2022-09-15 PROCEDURE — 82565 ASSAY OF CREATININE: CPT | Performed by: ANESTHESIOLOGY

## 2022-09-15 PROCEDURE — 36415 COLL VENOUS BLD VENIPUNCTURE: CPT | Performed by: ANESTHESIOLOGY

## 2022-09-15 PROCEDURE — 999N000141 HC STATISTIC PRE-PROCEDURE NURSING ASSESSMENT: Performed by: OTOLARYNGOLOGY

## 2022-09-15 PROCEDURE — 258N000003 HC RX IP 258 OP 636: Performed by: ANESTHESIOLOGY

## 2022-09-15 PROCEDURE — 88305 TISSUE EXAM BY PATHOLOGIST: CPT | Mod: TC | Performed by: OTOLARYNGOLOGY

## 2022-09-15 PROCEDURE — 360N000077 HC SURGERY LEVEL 4, PER MIN: Performed by: OTOLARYNGOLOGY

## 2022-09-15 PROCEDURE — 88307 TISSUE EXAM BY PATHOLOGIST: CPT | Mod: 26 | Performed by: PATHOLOGY

## 2022-09-15 PROCEDURE — 250N000009 HC RX 250: Performed by: ANESTHESIOLOGY

## 2022-09-15 PROCEDURE — 710N000012 HC RECOVERY PHASE 2, PER MINUTE: Performed by: OTOLARYNGOLOGY

## 2022-09-15 PROCEDURE — 31541 LARYNSCOP W/TUMR EXC + SCOPE: CPT | Performed by: OTOLARYNGOLOGY

## 2022-09-15 PROCEDURE — 250N000011 HC RX IP 250 OP 636: Performed by: OTOLARYNGOLOGY

## 2022-09-15 PROCEDURE — 84132 ASSAY OF SERUM POTASSIUM: CPT | Performed by: ANESTHESIOLOGY

## 2022-09-15 PROCEDURE — 88305 TISSUE EXAM BY PATHOLOGIST: CPT | Mod: 26 | Performed by: PATHOLOGY

## 2022-09-15 PROCEDURE — 272N000001 HC OR GENERAL SUPPLY STERILE: Performed by: OTOLARYNGOLOGY

## 2022-09-15 PROCEDURE — 250N000011 HC RX IP 250 OP 636: Performed by: ANESTHESIOLOGY

## 2022-09-15 PROCEDURE — 710N000010 HC RECOVERY PHASE 1, LEVEL 2, PER MIN: Performed by: OTOLARYNGOLOGY

## 2022-09-15 RX ORDER — AMPICILLIN AND SULBACTAM 2; 1 G/1; G/1
3 INJECTION, POWDER, FOR SOLUTION INTRAMUSCULAR; INTRAVENOUS
Status: COMPLETED | OUTPATIENT
Start: 2022-09-15 | End: 2022-09-15

## 2022-09-15 RX ORDER — PROPOFOL 10 MG/ML
INJECTION, EMULSION INTRAVENOUS CONTINUOUS PRN
Status: DISCONTINUED | OUTPATIENT
Start: 2022-09-15 | End: 2022-09-15

## 2022-09-15 RX ORDER — LIDOCAINE HYDROCHLORIDE 20 MG/ML
INJECTION, SOLUTION INFILTRATION; PERINEURAL PRN
Status: DISCONTINUED | OUTPATIENT
Start: 2022-09-15 | End: 2022-09-15

## 2022-09-15 RX ORDER — ONDANSETRON 2 MG/ML
4 INJECTION INTRAMUSCULAR; INTRAVENOUS EVERY 30 MIN PRN
Status: DISCONTINUED | OUTPATIENT
Start: 2022-09-15 | End: 2022-09-15 | Stop reason: HOSPADM

## 2022-09-15 RX ORDER — SODIUM CHLORIDE, SODIUM LACTATE, POTASSIUM CHLORIDE, CALCIUM CHLORIDE 600; 310; 30; 20 MG/100ML; MG/100ML; MG/100ML; MG/100ML
INJECTION, SOLUTION INTRAVENOUS CONTINUOUS
Status: DISCONTINUED | OUTPATIENT
Start: 2022-09-15 | End: 2022-09-15 | Stop reason: HOSPADM

## 2022-09-15 RX ORDER — EPINEPHRINE IN SOD CHLOR,ISO 1 MG/10 ML
SYRINGE (ML) INTRAVENOUS PRN
Status: DISCONTINUED | OUTPATIENT
Start: 2022-09-15 | End: 2022-09-15 | Stop reason: HOSPADM

## 2022-09-15 RX ORDER — LIDOCAINE 40 MG/G
CREAM TOPICAL
Status: DISCONTINUED | OUTPATIENT
Start: 2022-09-15 | End: 2022-09-15 | Stop reason: HOSPADM

## 2022-09-15 RX ORDER — FENTANYL CITRATE 50 UG/ML
25 INJECTION, SOLUTION INTRAMUSCULAR; INTRAVENOUS
Status: DISCONTINUED | OUTPATIENT
Start: 2022-09-15 | End: 2022-09-15 | Stop reason: HOSPADM

## 2022-09-15 RX ORDER — AMPICILLIN AND SULBACTAM 1; .5 G/1; G/1
1.5 INJECTION, POWDER, FOR SOLUTION INTRAMUSCULAR; INTRAVENOUS SEE ADMIN INSTRUCTIONS
Status: DISCONTINUED | OUTPATIENT
Start: 2022-09-15 | End: 2022-09-15 | Stop reason: HOSPADM

## 2022-09-15 RX ORDER — FENTANYL CITRATE 50 UG/ML
INJECTION, SOLUTION INTRAMUSCULAR; INTRAVENOUS PRN
Status: DISCONTINUED | OUTPATIENT
Start: 2022-09-15 | End: 2022-09-15

## 2022-09-15 RX ORDER — DEXAMETHASONE SODIUM PHOSPHATE 4 MG/ML
INJECTION, SOLUTION INTRA-ARTICULAR; INTRALESIONAL; INTRAMUSCULAR; INTRAVENOUS; SOFT TISSUE PRN
Status: DISCONTINUED | OUTPATIENT
Start: 2022-09-15 | End: 2022-09-15

## 2022-09-15 RX ORDER — ESMOLOL HYDROCHLORIDE 10 MG/ML
INJECTION INTRAVENOUS PRN
Status: DISCONTINUED | OUTPATIENT
Start: 2022-09-15 | End: 2022-09-15

## 2022-09-15 RX ORDER — SODIUM CHLORIDE, SODIUM LACTATE, POTASSIUM CHLORIDE, CALCIUM CHLORIDE 600; 310; 30; 20 MG/100ML; MG/100ML; MG/100ML; MG/100ML
INJECTION, SOLUTION INTRAVENOUS CONTINUOUS PRN
Status: DISCONTINUED | OUTPATIENT
Start: 2022-09-15 | End: 2022-09-15

## 2022-09-15 RX ORDER — PROPOFOL 10 MG/ML
INJECTION, EMULSION INTRAVENOUS PRN
Status: DISCONTINUED | OUTPATIENT
Start: 2022-09-15 | End: 2022-09-15

## 2022-09-15 RX ORDER — DEXAMETHASONE SODIUM PHOSPHATE 10 MG/ML
10 INJECTION, SOLUTION INTRAMUSCULAR; INTRAVENOUS ONCE
Status: DISCONTINUED | OUTPATIENT
Start: 2022-09-15 | End: 2022-09-15 | Stop reason: HOSPADM

## 2022-09-15 RX ORDER — ONDANSETRON 4 MG/1
4 TABLET, ORALLY DISINTEGRATING ORAL EVERY 30 MIN PRN
Status: DISCONTINUED | OUTPATIENT
Start: 2022-09-15 | End: 2022-09-15 | Stop reason: HOSPADM

## 2022-09-15 RX ORDER — FENTANYL CITRATE 50 UG/ML
25 INJECTION, SOLUTION INTRAMUSCULAR; INTRAVENOUS EVERY 5 MIN PRN
Status: DISCONTINUED | OUTPATIENT
Start: 2022-09-15 | End: 2022-09-15 | Stop reason: HOSPADM

## 2022-09-15 RX ORDER — OXYCODONE HYDROCHLORIDE 5 MG/1
5 TABLET ORAL EVERY 4 HOURS PRN
Status: DISCONTINUED | OUTPATIENT
Start: 2022-09-15 | End: 2022-09-15 | Stop reason: HOSPADM

## 2022-09-15 RX ORDER — HYDROMORPHONE HCL IN WATER/PF 6 MG/30 ML
0.2 PATIENT CONTROLLED ANALGESIA SYRINGE INTRAVENOUS EVERY 5 MIN PRN
Status: DISCONTINUED | OUTPATIENT
Start: 2022-09-15 | End: 2022-09-15 | Stop reason: HOSPADM

## 2022-09-15 RX ORDER — ONDANSETRON 2 MG/ML
INJECTION INTRAMUSCULAR; INTRAVENOUS PRN
Status: DISCONTINUED | OUTPATIENT
Start: 2022-09-15 | End: 2022-09-15

## 2022-09-15 RX ORDER — MEPERIDINE HYDROCHLORIDE 25 MG/ML
12.5 INJECTION INTRAMUSCULAR; INTRAVENOUS; SUBCUTANEOUS
Status: DISCONTINUED | OUTPATIENT
Start: 2022-09-15 | End: 2022-09-15 | Stop reason: HOSPADM

## 2022-09-15 RX ADMIN — AMPICILLIN SODIUM AND SULBACTAM SODIUM 3 G: 2; 1 INJECTION, POWDER, FOR SOLUTION INTRAMUSCULAR; INTRAVENOUS at 10:00

## 2022-09-15 RX ADMIN — SODIUM CHLORIDE, POTASSIUM CHLORIDE, SODIUM LACTATE AND CALCIUM CHLORIDE: 600; 310; 30; 20 INJECTION, SOLUTION INTRAVENOUS at 11:30

## 2022-09-15 RX ADMIN — DEXAMETHASONE SODIUM PHOSPHATE 10 MG: 4 INJECTION, SOLUTION INTRA-ARTICULAR; INTRALESIONAL; INTRAMUSCULAR; INTRAVENOUS; SOFT TISSUE at 10:20

## 2022-09-15 RX ADMIN — ESMOLOL HYDROCHLORIDE 30 MG: 10 INJECTION, SOLUTION INTRAVENOUS at 11:11

## 2022-09-15 RX ADMIN — SODIUM CHLORIDE, POTASSIUM CHLORIDE, SODIUM LACTATE AND CALCIUM CHLORIDE: 600; 310; 30; 20 INJECTION, SOLUTION INTRAVENOUS at 09:55

## 2022-09-15 RX ADMIN — Medication 50 MG: at 10:11

## 2022-09-15 RX ADMIN — FENTANYL CITRATE 100 MCG: 50 INJECTION, SOLUTION INTRAMUSCULAR; INTRAVENOUS at 10:09

## 2022-09-15 RX ADMIN — PROPOFOL 100 MG: 10 INJECTION, EMULSION INTRAVENOUS at 10:09

## 2022-09-15 RX ADMIN — ESMOLOL HYDROCHLORIDE 30 MG: 10 INJECTION, SOLUTION INTRAVENOUS at 10:20

## 2022-09-15 RX ADMIN — ESMOLOL HYDROCHLORIDE 20 MG: 10 INJECTION, SOLUTION INTRAVENOUS at 10:42

## 2022-09-15 RX ADMIN — PROPOFOL 50 MG: 10 INJECTION, EMULSION INTRAVENOUS at 10:17

## 2022-09-15 RX ADMIN — ONDANSETRON 4 MG: 2 INJECTION INTRAMUSCULAR; INTRAVENOUS at 10:20

## 2022-09-15 RX ADMIN — Medication 20 MG: at 10:53

## 2022-09-15 RX ADMIN — LIDOCAINE HYDROCHLORIDE 60 MG: 20 INJECTION, SOLUTION INFILTRATION; PERINEURAL at 10:09

## 2022-09-15 RX ADMIN — PROPOFOL 150 MCG/KG/MIN: 10 INJECTION, EMULSION INTRAVENOUS at 10:09

## 2022-09-15 RX ADMIN — SUGAMMADEX 200 MG: 100 INJECTION, SOLUTION INTRAVENOUS at 11:20

## 2022-09-15 ASSESSMENT — COPD QUESTIONNAIRES
COPD: 1
CAT_SEVERITY: MODERATE

## 2022-09-15 ASSESSMENT — ACTIVITIES OF DAILY LIVING (ADL)
ADLS_ACUITY_SCORE: 35

## 2022-09-15 ASSESSMENT — ENCOUNTER SYMPTOMS: DYSRHYTHMIAS: 1

## 2022-09-15 NOTE — OP NOTE
PROCEDURE(S):  Micro direct laryngoscopy with biopsies, ablation of laryngeal lesions, CO2 laser    PRE-OPERATIVE DIAGNOSIS:   Laryngeal mass [J38.7]  Shortness of breath [R06.02]  Dysphonia [R49.0]  Chronic obstructive pulmonary disease, unspecified     POST-OPERATIVE DIAGNOSIS:   As above    SURGEON: Nikole Davis MD MPH    ANAESTHESIA: High frequency jet    INDICATIONS FOR PROCEDURE:   Asya Coffman is a 77 year old year old female with a history of dysphonia and dyspnea as well as history of laryngeal papilloma who was noted to have a large mass in the posterior larynx that was partially obstructing her airway. Recent biopsies were concerning for dysplasia/CIS and therefore more definitive biopsies were recommended. The patient wished to proceed with surgery and presented for the procedure(s) listed above. Perioperative risks, including bleeding/infection/pain, injury to surrounding structures, potential changes to tongue/voice/swallow function, risk of needing additional procedures (e.g., due to persistence or recurrence), and risks of anesthesia (including heart attack, stroke, death) were reviewed. She elected to proceed and written informed consent was performed.    DESCRIPTION OF PROCEDURE:   The patient was brought to the operating room and placed supine. A time-out was called to verify patient identity, operative site, and planned procedure. The operative site was prepped in the usual clean fashion. Moist gauze eye pads were placed and secured. A head wrap was placed, and custom molded thermoplastic tooth guards were placed. Direct laryngoscopy was performed with a Dedo laryngoscope, which was placed in suspension.    The majority of the mass was debulked with cup biopsy forceps under telescopic visualization, with care taken to avoid any demucosalization of the true vocal folds. Initially the exposure was limited to visualization of the supraglottic mass, with only a sliver of the true vocal  folds visible beyond. After the mass was debulked substantially, the laser jet catheter was then placed through the laryngoscope and high frequency jet ventilation was successfully established. Further biopsies were then taken and submitted to pathology as listed.    Next the jet catheter was removed and jet ventilation was continued through the side port of the laryngoscope for supraglottic ventilation. Laser safety precautions were utilized at all times, including eye protection for the patient and staff, use of wet towels, and holding jet ventilation when firing the laser. As needed, moistened cottonoids or laser-safe backstops were used to protect the endotracheal tube from the laser. The [employer] CO2 Accublade laser was attached to the microscope and used at a depth setting of 1 and 8 Cardoza. Jet ventilation was held during laser use. The laser was used in a [place] setting under microscopic visualization to ablate the aspects of the mass that involved the medial portion of the bilateral true vocal folds; there was greater disease burden today on the left but both true vocal folds had heavy involvement along the middle and posterior aspects. On the left there was extension of the disease infraglottically as well. A small strip of papilloma along the center of the posterior larynx and infraglottis was left untreated to reduce the risk of posterior laryngeal stenosis. The caliber of the airway was much improved.    As needed during the procedure and at the conclusion of the procedure, the operating microscope was moved out of position and the 0 and 70 degree rigid endoscopes were again used to examine the vocal folds and confirm completion of the stated objectives of the procedure. Cottonoids soaked in 1:10,000 epinephrine were sparingly used to maintain hemostasis throughout the case.     After cottonoid and sponge counts were confirmed correct, 2 cc of 4% lidocaine were applied transglottically for laryngotracheal  anesthesia. A 5-O endotracheal tube was placed through the laryngoscope, which was subsequently removed followed by the tooth guards. The oropharynx was suctioned clean. The lips, teeth, and tongue were examined and no injuries were noted.  Care of the patient was returned to Anesthesia for extubation and recovery.    FINDINGS:   Large papillomatous posterior laryngeal mass involving both mid-posterior true vocal folds and extending infraglottically.    SPECIMEN(S):   1) Posterior laryngeal mass - submitted also for HPV typing  2) Posterior left supraglottis  3) Posterior right supraglottis    DRAINS: None    ESTIMATED BLOOD LOSS: Minimal    COMPLICATIONS: None    DISPOSITION: Stable to PACU    ATTENDING PRESENCE STATEMENT:  I was present and participating for the entire procedure from beginning to completion.

## 2022-09-15 NOTE — BRIEF OP NOTE
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 Good exposure with Dedo.  Large papillomatous posterior laryngeal mass, most bulky in supraglottis, but involving posterior commissure at the level of the true vocal folds, bilateral posterior true vocal folds, and infraglottic mid and left larynx as well.  Mass debulked and sent for pathology and HPV typing. Small strip of papillomatous changes left untreated at midline at glottic level to reduce risk of posterior commissure stenosis.

## 2022-09-15 NOTE — DISCHARGE INSTRUCTIONS
PLEASE RESUME BLOOD THINNERS AND IBUPROFEN ON 2022        [facility], [place]  Same-Day Surgery   Adult Discharge Orders & Instructions     For 24 hours after surgery    Get plenty of rest.  A responsible adult must stay with you for at least 24 hours after you leave the hospital.   Do not drive or use heavy equipment.  If you have weakness or tingling, don't drive or use heavy equipment until this feeling goes away.  Do not drink alcohol.  Avoid strenuous or risky activities.  Ask for help when climbing stairs.   You may feel lightheaded.  IF so, sit for a few minutes before standing.  Have someone help you get up.   If you have nausea (feel sick to your stomach): Drink only clear liquids such as apple juice, ginger ale, broth or 7-Up.  Rest may also help.  Be sure to drink enough fluids.  Move to a regular diet as you feel able.  You may have a slight fever. Call the doctor if your fever is over 100 F (37.7 C) (taken under the tongue) or lasts longer than 24 hours.  You may have a dry mouth, a sore throat, muscle aches or trouble sleeping.  These should go away after 24 hours.  Do not make important or legal decisions.   Call your doctor for any of the followin.  Signs of infection (fever, growing tenderness at the surgery site, a large amount of drainage or bleeding, severe pain, foul-smelling drainage, redness, swelling).    2. It has been over 8 to 10 hours since surgery and you are still not able to urinate (pass water).    3.  Headache for over 24 hours.    4.  Numbness, tingling or weakness the day after surgery (if you had spinal anesthesia).  To contact a doctor, call Dr Davis at  the ENT- Otolaryngology Clinic at [phone] or:    '   [phone] and ask for the resident on call for ENT (answered 24 hours a day)  '   Emergency Department:    [facility]: [phone]       (TTY for hearing impaired: [phone])    [facility]:  [phone]       (TTY for hearing impaired: [phone])

## 2022-09-15 NOTE — ANESTHESIA POSTPROCEDURE EVALUATION
Patient: Asya Coffman    Procedure: Procedure(s):  Microdirect laryngoscopy with ablation of laryngeal lesions,biopsies,CO2 laser       Anesthesia Type:  General    Note:  Disposition: Outpatient   Postop Pain Control: Uneventful            Sign Out: Well controlled pain   PONV: No   Neuro/Psych: Uneventful            Sign Out: Acceptable/Baseline neuro status   Airway/Respiratory: Uneventful            Sign Out: Acceptable/Baseline resp. status   CV/Hemodynamics: Uneventful            Sign Out: Acceptable CV status; No obvious hypovolemia; No obvious fluid overload   Other NRE: NONE   DID A NON-ROUTINE EVENT OCCUR? No           Last vitals:  Vitals Value Taken Time   /71 09/15/22 1310   Temp 36.8  C (98.2  F) 09/15/22 1230   Pulse 85 09/15/22 1310   Resp 18 09/15/22 1306   SpO2 93 % 09/15/22 1313   Vitals shown include unvalidated device data.    Electronically Signed By: Carla Moreno MD  September 15, 2022  2:19 PM

## 2022-09-15 NOTE — OR NURSING
Dr. Davis at bedside. Instructed patient to restart anticoagulation meds on Sunday 9/18. Also instructed patient to not take any aspirin medications or NSAIDs until Sunday 9/18.

## 2022-09-15 NOTE — OR NURSING
Dr. Moreno notified of patient 02 sat 91 -92% while using IS and after using IS sat is 92-96%. MDA okay with discharge.    This is normal for patient. She has a pulse ox and IS that she uses regularly. Patients daughter and granddaughter were instructed on importance of using IS a 02 monitoring.

## 2022-09-15 NOTE — ANESTHESIA POSTPROCEDURE EVALUATION
Patient: Asya Coffman    Procedure: Procedure(s):  Microdirect laryngoscopy with ablation of laryngeal lesions,biopsies,CO2 laser       Anesthesia Type:  General    Note:  Disposition: Outpatient   Postop Pain Control: Uneventful            Sign Out: Well controlled pain   PONV: No   Neuro/Psych: Uneventful            Sign Out: Acceptable/Baseline neuro status   Airway/Respiratory: Uneventful            Sign Out: Acceptable/Baseline resp. status   CV/Hemodynamics: Uneventful            Sign Out: Acceptable CV status; No obvious hypovolemia; No obvious fluid overload   Other NRE: NONE   DID A NON-ROUTINE EVENT OCCUR? No           Last vitals:  Vitals Value Taken Time   /58 09/15/22 1230   Temp 36.6  C (97.9  F) 09/15/22 1140   Pulse 59 09/15/22 1231   Resp 25 09/15/22 1231   SpO2 94 % 09/15/22 1231   Vitals shown include unvalidated device data.    Electronically Signed By: Jesse Carl MD  September 15, 2022  12:32 PM

## 2022-09-15 NOTE — ANESTHESIA POSTPROCEDURE EVALUATION
Patient: Asya Coffman    Procedure: Procedure(s):  Microdirect laryngoscopy with ablation of laryngeal lesions,biopsies,CO2 laser       Anesthesia Type:  General    Note:  Disposition: Outpatient   Postop Pain Control: Uneventful            Sign Out: Well controlled pain   PONV: No   Neuro/Psych: Uneventful            Sign Out: Acceptable/Baseline neuro status   Airway/Respiratory: Uneventful            Sign Out: Acceptable/Baseline resp. status   CV/Hemodynamics: Uneventful            Sign Out: Acceptable CV status; No obvious hypovolemia; No obvious fluid overload   Other NRE: NONE   DID A NON-ROUTINE EVENT OCCUR? No           Last vitals:  Vitals Value Taken Time   BP 90/68 09/15/22 1240   Temp 36.8  C (98.2  F) 09/15/22 1230   Pulse 75 09/15/22 1245   Resp 16 09/15/22 1245   SpO2 94 % 09/15/22 1245   Vitals shown include unvalidated device data.    Electronically Signed By: Carla Moreno MD  September 15, 2022  12:46 PM

## 2022-09-15 NOTE — ANESTHESIA CARE TRANSFER NOTE
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    Anesthesia Type:   General     Note:    Oropharynx: oral airway in place and spontaneously breathing  Level of Consciousness: drowsy  Oxygen Supplementation: face mask  Level of Supplemental Oxygen (L/min / FiO2): 6  Independent Airway: airway patency satisfactory and stable  Dentition: dentition unchanged  Vital Signs Stable: post-procedure vital signs reviewed and stable  Report to RN Given: handoff report given  Patient transferred to: PACU    Handoff Report: Identifed the Patient, Identified the Reponsible Provider, Reviewed the pertinent medical history, Discussed the surgical course, Reviewed Intra-OP anesthesia mangement and issues during anesthesia, Set expectations for post-procedure period and Allowed opportunity for questions and acknowledgement of understanding      Vitals:  Vitals Value Taken Time   /89 09/15/22 1140   Temp 36.6  C (97.9  F) 09/15/22 1140   Pulse 79 09/15/22 1149   Resp 41 09/15/22 1149   SpO2 100 % 09/15/22 1149   Vitals shown include unvalidated device data.    Electronically Signed By: VERONICA Vazquez CRNA  September 15, 2022  11:50 AM

## 2022-09-15 NOTE — PROGRESS NOTES
Flexible fiberoptic laryngoscopy    Indications: This procedure was warranted to evaluate the patient's laryngeal anatomy and function given her report of worsening breathing, for the purpose of updating the airway management plan as needed.   Description: After written informed consent was obtained, a time-out was performed to confirm patient identity, procedure, and procedure site. Water-based lubricant was used to facilitate scope passage. I performed the endoscopy and no complications were apparent.  Performed by: Nikole Davis MD MPH  Findings: Normal base of tongue, valleculae, and epiglottis. The right true vocal fold demonstrated normal mobility. The left true vocal fold demonstrated normal mobility. The medial edges of the vocal folds appeared smooth and straight. Large posterior supraglottic mass, increased in size since prior exam, obstructing visualization of the posterior true vocal folds. Mucosa of the false vocal folds, aryepiglottic folds, piriform sinuses, and posterior glottis otherwise unremarkable. Airway was reduced due to the mass, but patent.      On the basis of these findings, discussed airway management plan with Anesthesia colleague Brain DOE. We agreed upon a plan to start the case with jet ventilation due to concern that an endotracheal tube might not pass beyond the mass.

## 2022-09-15 NOTE — ANESTHESIA PREPROCEDURE EVALUATION
Anesthesia Pre-Procedure Evaluation    Patient: Asya Coffman   MRN: [id number] : 1945        Procedure : Procedure(s):  Microdirect laryngoscopy with excision/ablation of laryngeal lesions, possible biopsies, possible CO2 laser          Past Medical History:   Diagnosis Date     Antiplatelet or antithrombotic long-term use       Past Surgical History:   Procedure Laterality Date     LASER CO2 LARYNGOSCOPY, COMPLEX N/A 2022    Procedure: Micro direct laryngoscopy with excision/ablation of laryngeal lesions, possible biopsies, possible CO2 laser;  Surgeon: Nikole Davis MD;  Location: [facility] OR     LASER KTP LARYNGOSCOPY FLEXIBLE N/A 2022    Procedure: Transnasal flexible laryngoscopy with KTP laser and biopsies;  Surgeon: Nikole Davis MD;  Location: [facility] OR      Allergies   Allergen Reactions     1-Methyl 2-Pyrrolidone Anaphylaxis     Escitalopram Other (See Comments)     Asthma -a time of exacerbation and may not have been cause may retry     Mirtazapine Other (See Comments)     Asthma a time of exacerbation and may not have been cause may retry     Venlafaxine Other (See Comments)     Adhesive Tape Rash     With holter monitor. Ok with bandaids.      Social History     Tobacco Use     Smoking status: Not on file     Smokeless tobacco: Not on file   Substance Use Topics     Alcohol use: Not on file      Wt Readings from Last 1 Encounters:   09/15/22 55.5 kg (122 lb 5.7 oz)        Anesthesia Evaluation   Pt has had prior anesthetic. Type: General.        ROS/MED HX  ENT/Pulmonary: Comment: Recurrent Papillomatosis    (+) moderate,  COPD,     Neurologic:  - neg neurologic ROS     Cardiovascular:     (+) -----Taking blood thinners Pt has received instructions: Instructions Given to patient: Larry last dose 22 pm. dysrhythmias, a-fib,     METS/Exercise Tolerance: 4 - Raking leaves, gardening    Hematologic:  - neg hematologic  ROS     Musculoskeletal:       GI/Hepatic:      (+) GERD,     Renal/Genitourinary: Comment: CKD      Endo:  - neg endo ROS     Psychiatric/Substance Use:  - neg psychiatric ROS     Infectious Disease:  - neg infectious disease ROS     Malignancy:  - neg malignancy ROS     Other: Comment: CREST Syndrome           Physical Exam    Airway        Mallampati: II   TM distance: > 3 FB   Neck ROM: full   Mouth opening: > 3 cm    Respiratory Devices and Support         Dental     Comment: Missing teeth        Cardiovascular          Rhythm and rate: irregular     Pulmonary   pulmonary exam normal                OUTSIDE LABS:  CBC: No results found for: WBC, HGB, HCT, PLT  BMP:   Lab Results   Component Value Date    GLC 88 09/15/2022     (H) 05/26/2022     COAGS: No results found for: PTT, INR, FIBR  POC: No results found for: BGM, HCG, HCGS  HEPATIC: No results found for: ALBUMIN, PROTTOTAL, ALT, AST, GGT, ALKPHOS, BILITOTAL, BILIDIRECT, REE  OTHER: No results found for: PH, LACT, A1C, ESSIE, PHOS, MAG, LIPASE, AMYLASE, TSH, T4, T3, CRP, SED    Anesthesia Plan    ASA Status:  3      Anesthesia Type: General.   Induction: Intravenous.   Maintenance: Balanced.        Consents    Anesthesia Plan(s) and associated risks, benefits, and realistic alternatives discussed. Questions answered and patient/representative(s) expressed understanding.    - Discussed:     - Discussed with:  Patient      - Extended Intubation/Ventilatory Support Discussed: No.      - Patient is DNR/DNI Status: No    Use of blood products discussed: No .     Postoperative Care    Pain management: Multi-modal analgesia.   PONV prophylaxis: Ondansetron (or other 5HT-3), Dexamethasone or Solumedrol     Comments:              PAC Discussion and Assessment    ASA Classification: 3    Anesthetic techniques and relevant risks discussed: GA  Invasive monitoring and risk discussed: No    Possibility and Risk of blood transfusion discussed: No  NPO instructions given: No solids 6 hours before surgery,  stop clear liquids 2 hours before surgery    Needs early admission to pre-op area: Terri Moreno MD

## 2022-09-20 ENCOUNTER — TELEPHONE (OUTPATIENT)
Dept: OTOLARYNGOLOGY | Facility: CLINIC | Age: 77
End: 2022-09-20

## 2022-09-20 DIAGNOSIS — R49.0 DYSPHONIA: ICD-10-CM

## 2022-09-20 DIAGNOSIS — Z78.9 RECURRENT RESPIRATORY PAPILLOMATOSIS: Primary | ICD-10-CM

## 2022-09-20 DIAGNOSIS — R06.02 SHORTNESS OF BREATH: ICD-10-CM

## 2022-09-20 DIAGNOSIS — J38.7 LARYNGEAL MASS: ICD-10-CM

## 2022-09-20 LAB
PATH REPORT.COMMENTS IMP SPEC: NORMAL
PATH REPORT.COMMENTS IMP SPEC: NORMAL
PATH REPORT.FINAL DX SPEC: NORMAL
PATH REPORT.GROSS SPEC: NORMAL
PATH REPORT.MICROSCOPIC SPEC OTHER STN: NORMAL
PATH REPORT.RELEVANT HX SPEC: NORMAL
PHOTO IMAGE: NORMAL

## 2022-09-20 NOTE — TELEPHONE ENCOUNTER
Called patient to review pathology results from major debulking, which are much more favorable than from the prior limited sampling.  She was pleased to hear this.  Her voice is also much improved.    Briefly reviewed recommendations from June 2022 visit for consideration of HPV vaccination given how quickly the papilloma is recurring. She states she will revisit this and let us know if her insurance company is open to a letter of medical support (and if so, where to send it). The hope is that the vaccination can slow the rate of recurrence so the frequency of needing procedures under general anesthesia is reduced. Otherwise I am concerned that she will continue to require microdirect laryngoscopies multiple times per year.    I also encouraged her to bring a family member along to her next visit with me if possible, as her papilloma is recurring rapidly and this is a lot to track manage. She states she is planning to bring her daughter to the next appointment.    She expressed appreciation for the call.  Will plan to see her as scheduled, or sooner as needed.

## 2022-10-07 ENCOUNTER — OFFICE VISIT (OUTPATIENT)
Dept: OTOLARYNGOLOGY | Facility: CLINIC | Age: 77
End: 2022-10-07

## 2022-10-07 VITALS
BODY MASS INDEX: 20.86 KG/M2 | WEIGHT: 125.2 LBS | OXYGEN SATURATION: 98 % | SYSTOLIC BLOOD PRESSURE: 127 MMHG | HEIGHT: 65 IN | DIASTOLIC BLOOD PRESSURE: 80 MMHG | HEART RATE: 82 BPM

## 2022-10-07 DIAGNOSIS — Z78.9 RECURRENT RESPIRATORY PAPILLOMATOSIS: Primary | ICD-10-CM

## 2022-10-07 DIAGNOSIS — J38.3 VOCAL CORD LEUKOPLAKIA: ICD-10-CM

## 2022-10-07 DIAGNOSIS — J38.3 OTHER DISEASES OF VOCAL CORDS: ICD-10-CM

## 2022-10-07 PROCEDURE — 99215 OFFICE O/P EST HI 40 MIN: CPT | Mod: 25 | Performed by: OTOLARYNGOLOGY

## 2022-10-07 PROCEDURE — 31579 LARYNGOSCOPY TELESCOPIC: CPT | Performed by: OTOLARYNGOLOGY

## 2022-10-07 ASSESSMENT — PAIN SCALES - GENERAL: PAINLEVEL: NO PAIN (0)

## 2022-10-07 NOTE — Clinical Note
10/7/2022       RE: Asya Coffman  [address]  [facility] [address]     Dear Colleague,    Thank you for referring your patient, Asya Coffman, to the [facility] [place] [facility]. Please see a copy of my visit note below.    Dear Colleague:    Asya Coffman recently returned for follow-up at the [facility]. My clinic note from our visit is enclosed below.  Speech recognition software may have been used in the documentation below; input is reviewed before signature to the best of my ability.     I appreciate the ongoing opportunity to participate in this patient's care.    Please feel free to contact me with any questions.    Sincerely yours,      Nikole Davis M.D., M.P.H.  , Laryngology  Director, [facility]  Otolaryngology- Head & Neck Surgery  [phone]            =====  HISTORY OF PRESENT ILLNESS:  Asya Coffman is a pleasant 77-year-old female with a past medical history including CREST syndrome, COPD, chronic kidney disease, atrial fibrillation and a complex laryngeal history including a prior benign lesion, severe dysplasia, and laryngeal papilloma.    Her voice trouble began in 2015, with a gradual onset, with no obvious inciting event.    9/29/15 Direct micro laryngoscopy with biopsy; pathology benign.    Summer 2020, she again developed gradual onset dysphonia.    10/13/2020 MicroDirect laryngoscopy and biopsy with Dr Siegel; pathology: biopsy with atypical verrucous squamous proliferation but inadequate submucosa to determine deeper aspect.    11/9/2020 Microlaryngoscopy with excision of vocal cord lesion with KTP laser with Dr Patricia; pathology: low grade dysplasia of the left posterior true vocal fold.    1/13/2021 Direct microlaryngoscopy with stripping of vocal cord and microflap excision with Dr Patricia; pathology: low  grade dysplasia and papilloma of the posterior glottis; right true vocal fold with squamous papilloma.    July 2021, she was seen again with now a papillomatous lesion in post cricoid area.    8/30/21 Micro Left Direct laryngoscopy with KTP laser with Dr Patricia; pathology: squamous papilloma and low grade dysplasia.    January 2022 had some worsening of hoarseness. When seen in March 2022, she was noted to have return of squamous papilloma, and was referred here.     Under my care:  5/26/22 MDL with debulking and CO2 laser treatment of laryngeal papilloma; pathology: squamous papilloma  Rapid regrowth.    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     9/15/22 MDL with debulking and CO2 laser treatment of laryngeal papilloma; pathology: papilloma, moderate dysplasia and inflammation.      She presents today for post-op check. She got her first dose of HPV vaccination. She has not yet scheduled other doses. Voice, breathing, swallowing fine right now. She is adjusting to the realization that this is a chronic problem that requires attention.      MEDICATIONS:     Current Outpatient Medications   Medication Sig Dispense Refill     albuterol (PROAIR HFA/PROVENTIL HFA/VENTOLIN HFA) 108 (90 Base) MCG/ACT inhaler Inhale 2 puffs into the lungs as needed       albuterol (PROVENTIL) (2.5 MG/3ML) 0.083% neb solution Inhale 2.5 mg into the lungs       apixaban ANTICOAGULANT (ELIQUIS) 5 MG tablet Take 5 mg by mouth daily       atorvastatin (LIPITOR) 20 MG tablet Take 20 mg by mouth daily       budesonide-formoterol (SYMBICORT) 160-4.5 MCG/ACT Inhaler Inhale 2 puffs into the lungs daily       diltiazem ER (TIAZAC) 360 MG 24 hr ER beaded capsule Take 360 mg by mouth daily       fexofenadine (ALLEGRA) 180 MG tablet Take 180 mg by mouth daily       fluorouracil (EFUDEX) 5 % external cream APPLY TOPICALLY TO LEFT LATERAL EYEBROW TWICE DAILY FOR 4 WEEKS       levothyroxine  (SYNTHROID/LEVOTHROID) 88 MCG tablet Take 88 mcg by mouth daily       montelukast (SINGULAIR) 10 MG tablet Take 10 mg by mouth daily       Respiratory Therapy Supplies (NEBULIZER) JUWAN Portable nebulizer, disposable neb kit x 4, reuseable neb kit x 1, mask x 1, filters x 1.   Frequency of use: daily;  Medication: albuterol  Length of need: 99 months       sertraline (ZOLOFT) 100 MG tablet Take 100 mg by mouth daily       fluconazole (DIFLUCAN) 100 MG tablet 2 tabs PO qday x 1 day, then 1 tab PO qday x 13 days for a total of 14 day course. (Patient not taking: Reported on 10/7/2022) 15 tablet 0       ALLERGIES:    Allergies   Allergen Reactions     1-Methyl 2-Pyrrolidone Anaphylaxis     Escitalopram Other (See Comments)     Asthma -a time of exacerbation and may not have been cause may retry     Mirtazapine Other (See Comments)     Asthma a time of exacerbation and may not have been cause may retry     Venlafaxine Other (See Comments)     Adhesive Tape Rash     With holter monitor. Ok with bandaids.       NEW PMH/PSH: None    REVIEW OF SYSTEMS:  The patient completed a comprehensive 11 point review of systems (below), which was reviewed. Positives are as noted below.  Patient Supplied Answers to Review of Systems   Noncontributory         PHYSICAL EXAM:  General: The patient was alert and conversant, and in no acute distress. Patient accompanied by her daughter.  Neck: No palpable cervical lymphadenopathy, no significant tenderness to palpation of the thyrohyoid space, which was narrow. No obvious thyroid abnormality.  Resp: Breathing comfortably, no stridor or stertor.  Neuro: Symmetric facial function. Other cranial nerve function as documented above.  Psych: Normal affect, pleasant and cooperative.  Voice/speech: Mild dysphonia characterized by breathiness, roughness and strain.            Procedure:   Flexible fiberoptic laryngoscopy and laryngovideostroboscopy  Indications: This procedure was warranted to  evaluate the patient's laryngeal anatomy and function. Risks, benefits, and alternatives were discussed.  Description: After written informed consent was obtained, a time-out was performed to confirm patient identity, procedure, and procedure site. Topical 3% lidocaine with 0.25% phenylephrine was applied to the nasal cavities. I performed the endoscopy and no complications were apparent. Continuous and stroboscopic light were utilized to assess routine phonation and variable frequency phonation.  Performed by: Nikole Davis MD MPH  Findings: Normal nasopharynx. Normal base of tongue, valleculae, and epiglottis. Vocal fold mobility: right: normal; left: normal. Medial edges of the vocal folds: smooth and straight. Some clusters of papilloma visible along posterior glottis; right true vocal fold with superficial smooth leukoplakia. Glissade produced appropriate elongation. There was mild to moderate supraglottic recruitment with connected speech. Mucosa of false vocal folds, aryepiglottic folds, piriform sinuses, and posterior glottis unremarkable otherwise. Very mild scattered white plaques consistent with inhaler use. Airway was patent.   Similar findings on NBI.    The addition of stroboscopy allowed evaluation of the mucosal wave.   Amplitude: right: mildly decreased; left: mildly decreased. Symmetry: intermittent symmetry. Closure pattern: slightly spindle-shaped. Closure plane: at glottic level. Phase distribution: normal.                          IMPRESSION AND PLAN:   Asya Coffman returns with good post-operative healing. Fortunately, pathology from the larger biopsies showed papilloma with moderate dysplasia rather than more severe changes, but our concern is that she has had rapid regrowth of the papilloma. There were also some challenges with getting her back in within a good time window last time and we discussed the importance of timing and contacting us with concerns.    Plan:  1) We discussed  options including repeating awake procedure versus MicroDirect laryngoscopy. We discussed advantages/disadvantages of each. She would like to start with repeating an awake procedure. Case Request was placed. I asked her to contact us with any changes or concerns in the meantime. We also discussed that it is appropriate to let her family help support her as she navigates this process.  2) We will continue HPV vaccination series.  3)  She is also interested in [employer] trial, she will reach out to them.    I appreciate the opportunity to participate in the care of this pleasant patient.     Today's visit required additional screening time, PPE, and cleaning measures to allow for a safe in-person visit, due to the public health emergency. I spent a total of *** minutes on ***10/7/2022 in chart review, review of tests, patient visit, documentation, care coordination, and/or discussion with other providers about the issues documented above, separate from any documented procedure(s).      Dear Colleague:    Asya Coffman recently returned for follow-up at the [facility]. My clinic note from our visit is enclosed below.  Speech recognition software may have been used in the documentation below; input is reviewed before signature to the best of my ability.     I appreciate the ongoing opportunity to participate in this patient's care.    Please feel free to contact me with any questions.    Sincerely yours,      Nikole Davis M.D., M.P.H.  , Laryngology  Director, [facility]  Otolaryngology- Head & Neck Surgery  [phone]            =====  HISTORY OF PRESENT ILLNESS:  Asya Coffman is a pleasant 77-year-old female with a past medical history including CREST syndrome, COPD, chronic kidney disease, atrial fibrillation and a complex laryngeal history including a prior benign lesion, severe dysplasia, and laryngeal papilloma.    Her voice trouble began in  2015, with a gradual onset, with no obvious inciting event.    9/29/15 Direct micro laryngoscopy with biopsy; pathology benign.    Summer 2020, she again developed gradual onset dysphonia.    10/13/2020 MicroDirect laryngoscopy and biopsy with Dr Siegel; pathology: biopsy with atypical verrucous squamous proliferation but inadequate submucosa to determine deeper aspect.    11/9/2020 Microlaryngoscopy with excision of vocal cord lesion with KTP laser with Dr Patricia; pathology: low grade dysplasia of the left posterior true vocal fold.    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    July 2021, she was seen again with now a papillomatous lesion in post cricoid area.    8/30/21 Micro Left Direct laryngoscopy with KTP laser with Dr Patricia; pathology: squamous papilloma and low grade dysplasia.    January 2022 had some worsening of hoarseness. When seen in March 2022, she was noted to have return of squamous papilloma, and was referred here.     Under my care:  5/26/22 MDL with debulking and CO2 laser treatment of laryngeal papilloma; pathology: squamous papilloma  Rapid regrowth.    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     9/15/22 MDL with debulking and CO2 laser treatment of laryngeal papilloma; pathology: papilloma, moderate dysplasia and inflammation.      She presents today for post-op check. She got her first dose of HPV vaccination. She has not yet scheduled other doses. Voice, breathing, swallowing fine right now. She is adjusting to the realization that this is a chronic problem that requires attention.      MEDICATIONS:     Current Outpatient Medications   Medication Sig Dispense Refill     albuterol (PROAIR HFA/PROVENTIL HFA/VENTOLIN HFA) 108 (90 Base) MCG/ACT inhaler Inhale  2 puffs into the lungs as needed       albuterol (PROVENTIL) (2.5 MG/3ML) 0.083% neb solution Inhale 2.5 mg into the lungs       apixaban ANTICOAGULANT (ELIQUIS) 5 MG tablet Take 5 mg by mouth daily       atorvastatin (LIPITOR) 20 MG tablet Take 20 mg by mouth daily       budesonide-formoterol (SYMBICORT) 160-4.5 MCG/ACT Inhaler Inhale 2 puffs into the lungs daily       diltiazem ER (TIAZAC) 360 MG 24 hr ER beaded capsule Take 360 mg by mouth daily       fexofenadine (ALLEGRA) 180 MG tablet Take 180 mg by mouth daily       fluorouracil (EFUDEX) 5 % external cream APPLY TOPICALLY TO LEFT LATERAL EYEBROW TWICE DAILY FOR 4 WEEKS       levothyroxine (SYNTHROID/LEVOTHROID) 88 MCG tablet Take 88 mcg by mouth daily       montelukast (SINGULAIR) 10 MG tablet Take 10 mg by mouth daily       Respiratory Therapy Supplies (NEBULIZER) JUWAN Portable nebulizer, disposable neb kit x 4, reuseable neb kit x 1, mask x 1, filters x 1.   Frequency of use: daily;  Medication: albuterol  Length of need: 99 months       sertraline (ZOLOFT) 100 MG tablet Take 100 mg by mouth daily       fluconazole (DIFLUCAN) 100 MG tablet 2 tabs PO qday x 1 day, then 1 tab PO qday x 13 days for a total of 14 day course. (Patient not taking: Reported on 10/7/2022) 15 tablet 0       ALLERGIES:    Allergies   Allergen Reactions     1-Methyl 2-Pyrrolidone Anaphylaxis     Escitalopram Other (See Comments)     Asthma -a time of exacerbation and may not have been cause may retry     Mirtazapine Other (See Comments)     Asthma a time of exacerbation and may not have been cause may retry     Venlafaxine Other (See Comments)     Adhesive Tape Rash     With holter monitor. Ok with bandaids.       NEW PMH/PSH: None    REVIEW OF SYSTEMS:  The patient completed a comprehensive 11 point review of systems (below), which was reviewed. Positives are as noted below.  Patient Supplied Answers to Review of Systems   Noncontributory         PHYSICAL EXAM:  General: The patient  was alert and conversant, and in no acute distress. Patient accompanied by her daughter.  Neck: No palpable cervical lymphadenopathy, no significant tenderness to palpation of the thyrohyoid space, which was narrow. No obvious thyroid abnormality.  Resp: Breathing comfortably, no stridor or stertor.  Neuro: Symmetric facial function. Other cranial nerve function as documented above.  Psych: Normal affect, pleasant and cooperative.  Voice/speech: Mild dysphonia characterized by breathiness, roughness and strain.      Procedure:   Flexible fiberoptic laryngoscopy and laryngovideostroboscopy  Indications: This procedure was warranted to evaluate the patient's laryngeal anatomy and function. Risks, benefits, and alternatives were discussed.  Description: After written informed consent was obtained, a time-out was performed to confirm patient identity, procedure, and procedure site. Topical 3% lidocaine with 0.25% phenylephrine was applied to the nasal cavities. I performed the endoscopy and no complications were apparent. Continuous and stroboscopic light were utilized to assess routine phonation and variable frequency phonation.  Performed by: Nikole Davis MD MPH  Findings: Normal nasopharynx. Normal base of tongue, valleculae, and epiglottis. Vocal fold mobility: right: normal; left: normal. Medial edges of the vocal folds: smooth and straight. Some clusters of papilloma visible along posterior glottis; right true vocal fold with superficial smooth leukoplakia. Glissade produced appropriate elongation. There was mild to moderate supraglottic recruitment with connected speech. Mucosa of false vocal folds, aryepiglottic folds, piriform sinuses, and posterior glottis unremarkable otherwise. Very mild scattered white plaques consistent with inhaler use. Airway was patent.   Similar findings on NBI.    The addition of stroboscopy allowed evaluation of the mucosal wave.   Amplitude: right: mildly decreased; left:  mildly decreased. Symmetry: intermittent symmetry. Closure pattern: slightly spindle-shaped. Closure plane: at glottic level. Phase distribution: normal.                          IMPRESSION AND PLAN:   Asya Coffman returns with good post-operative healing. Fortunately, pathology from the larger biopsies showed papilloma with moderate dysplasia rather than more severe changes, but our concern is that she has had rapid regrowth of the papilloma. There were also some challenges with getting her back in within a good time window last time and we discussed the importance of timing and contacting us with concerns.    Plan:  1) We discussed options including repeating awake procedure versus MicroDirect laryngoscopy. We discussed advantages/disadvantages of each. She would like to start with repeating an awake procedure. Case Request was placed. I asked her to contact us with any changes or concerns in the meantime. We also discussed that it is appropriate to let her family help support her as she navigates this process.  2) Recommended that she continue HPV vaccination series.  3) She is also interested in [employer] trial; she will reach out to them.    I appreciate the opportunity to participate in the care of this pleasant patient.     Today's visit required additional screening time, PPE, and cleaning measures to allow for a safe in-person visit, due to the public health emergency. I spent a total of 42 minutes on 10/7/2022 in chart review, review of tests, patient visit, documentation, care coordination, and/or discussion with other providers about the issues documented above, separate from any documented procedure(s).        Again, thank you for allowing me to participate in the care of your patient.      Sincerely,    Nikole Davis MD

## 2022-10-07 NOTE — LETTER
10/7/2022      RE: Asya Coffman  [address]  [facility] [address]       Dear Colleague:    Asya Coffman recently returned for follow-up at the [facility]. My clinic note from our visit is enclosed below.  Speech recognition software may have been used in the documentation below; input is reviewed before signature to the best of my ability.     I appreciate the ongoing opportunity to participate in this patient's care.    Please feel free to contact me with any questions.    Sincerely yours,      Nikole Davis M.D., M.P.H.  , Laryngology  Director, [facility]  Otolaryngology- Head & Neck Surgery  [phone]            =====  HISTORY OF PRESENT ILLNESS:  Asya Coffman is a pleasant 77-year-old female with a past medical history including CREST syndrome, COPD, chronic kidney disease, atrial fibrillation and a complex laryngeal history including a prior benign lesion, severe dysplasia, and laryngeal papilloma.    Her voice trouble began in 2015, with a gradual onset, with no obvious inciting event.    9/29/15 Direct micro laryngoscopy with biopsy; pathology benign.    Summer 2020, she again developed gradual onset dysphonia.    10/13/2020 MicroDirect laryngoscopy and biopsy with Dr Siegel; pathology: biopsy with atypical verrucous squamous proliferation but inadequate submucosa to determine deeper aspect.    11/9/2020 Microlaryngoscopy with excision of vocal cord lesion with KTP laser with Dr Patricia; pathology: low grade dysplasia of the left posterior true vocal fold.    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    July 2021, she was seen again with now a papillomatous lesion in post cricoid area.    8/30/21 Micro Left Direct laryngoscopy with KTP laser  with Dr Patricia; pathology: squamous papilloma and low grade dysplasia.    January 2022 had some worsening of hoarseness. When seen in March 2022, she was noted to have return of squamous papilloma, and was referred here.     Under my care:  5/26/22 MDL with debulking and CO2 laser treatment of laryngeal papilloma; pathology: squamous papilloma  Rapid regrowth.    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     9/15/22 MDL with debulking and CO2 laser treatment of laryngeal papilloma; pathology: papilloma, moderate dysplasia and inflammation.      She presents today for post-op check. She got her first dose of HPV vaccination. She has not yet scheduled other doses. Voice, breathing, swallowing fine right now. She is adjusting to the realization that this is a chronic problem that requires attention.      MEDICATIONS:     Current Outpatient Medications   Medication Sig Dispense Refill     albuterol (PROAIR HFA/PROVENTIL HFA/VENTOLIN HFA) 108 (90 Base) MCG/ACT inhaler Inhale 2 puffs into the lungs as needed       albuterol (PROVENTIL) (2.5 MG/3ML) 0.083% neb solution Inhale 2.5 mg into the lungs       apixaban ANTICOAGULANT (ELIQUIS) 5 MG tablet Take 5 mg by mouth daily       atorvastatin (LIPITOR) 20 MG tablet Take 20 mg by mouth daily       budesonide-formoterol (SYMBICORT) 160-4.5 MCG/ACT Inhaler Inhale 2 puffs into the lungs daily       diltiazem ER (TIAZAC) 360 MG 24 hr ER beaded capsule Take 360 mg by mouth daily       fexofenadine (ALLEGRA) 180 MG tablet Take 180 mg by mouth daily       fluorouracil (EFUDEX) 5 % external cream APPLY TOPICALLY TO LEFT LATERAL EYEBROW TWICE DAILY FOR 4 WEEKS       levothyroxine (SYNTHROID/LEVOTHROID) 88 MCG tablet Take 88 mcg by mouth daily       montelukast (SINGULAIR) 10 MG tablet Take 10 mg by mouth daily       Respiratory Therapy Supplies (NEBULIZER) JUWAN Portable nebulizer, disposable neb kit x 4, reuseable neb kit x 1,  mask x 1, filters x 1.   Frequency of use: daily;  Medication: albuterol  Length of need: 99 months       sertraline (ZOLOFT) 100 MG tablet Take 100 mg by mouth daily       fluconazole (DIFLUCAN) 100 MG tablet 2 tabs PO qday x 1 day, then 1 tab PO qday x 13 days for a total of 14 day course. (Patient not taking: Reported on 10/7/2022) 15 tablet 0       ALLERGIES:    Allergies   Allergen Reactions     1-Methyl 2-Pyrrolidone Anaphylaxis     Escitalopram Other (See Comments)     Asthma -a time of exacerbation and may not have been cause may retry     Mirtazapine Other (See Comments)     Asthma a time of exacerbation and may not have been cause may retry     Venlafaxine Other (See Comments)     Adhesive Tape Rash     With holter monitor. Ok with bandaids.       NEW PMH/PSH: None    REVIEW OF SYSTEMS:  The patient completed a comprehensive 11 point review of systems (below), which was reviewed. Positives are as noted below.  Patient Supplied Answers to Review of Systems   Noncontributory         PHYSICAL EXAM:  General: The patient was alert and conversant, and in no acute distress. Patient accompanied by her daughter.  Neck: No palpable cervical lymphadenopathy, no significant tenderness to palpation of the thyrohyoid space, which was narrow. No obvious thyroid abnormality.  Resp: Breathing comfortably, no stridor or stertor.  Neuro: Symmetric facial function. Other cranial nerve function as documented above.  Psych: Normal affect, pleasant and cooperative.  Voice/speech: Mild dysphonia characterized by breathiness, roughness and strain.      Procedure:   Flexible fiberoptic laryngoscopy and laryngovideostroboscopy  Indications: This procedure was warranted to evaluate the patient's laryngeal anatomy and function. Risks, benefits, and alternatives were discussed.  Description: After written informed consent was obtained, a time-out was performed to confirm patient identity, procedure, and procedure site. Topical 3%  lidocaine with 0.25% phenylephrine was applied to the nasal cavities. I performed the endoscopy and no complications were apparent. Continuous and stroboscopic light were utilized to assess routine phonation and variable frequency phonation.  Performed by: Nikole Davis MD MPH  Findings: Normal nasopharynx. Normal base of tongue, valleculae, and epiglottis. Vocal fold mobility: right: normal; left: normal. Medial edges of the vocal folds: smooth and straight. Some clusters of papilloma visible along posterior glottis; right true vocal fold with superficial smooth leukoplakia. Glissade produced appropriate elongation. There was mild to moderate supraglottic recruitment with connected speech. Mucosa of false vocal folds, aryepiglottic folds, piriform sinuses, and posterior glottis unremarkable otherwise. Very mild scattered white plaques consistent with inhaler use. Airway was patent.   Similar findings on NBI.    The addition of stroboscopy allowed evaluation of the mucosal wave.   Amplitude: right: mildly decreased; left: mildly decreased. Symmetry: intermittent symmetry. Closure pattern: slightly spindle-shaped. Closure plane: at glottic level. Phase distribution: normal.                          IMPRESSION AND PLAN:   Asya Coffman returns with good post-operative healing. Fortunately, pathology from the larger biopsies showed papilloma with moderate dysplasia rather than more severe changes, but our concern is that she has had rapid regrowth of the papilloma. There were also some challenges with getting her back in within a good time window last time and we discussed the importance of timing and contacting us with concerns.    Plan:  1) We discussed options including repeating awake procedure versus MicroDirect laryngoscopy. We discussed advantages/disadvantages of each. She would like to start with repeating an awake procedure. Case Request was placed. I asked her to contact us with any changes or  concerns in the meantime. We also discussed that it is appropriate to let her family help support her as she navigates this process.  2) Recommended that she continue HPV vaccination series.  3) She is also interested in NIH trial; she will reach out to them.    I appreciate the opportunity to participate in the care of this pleasant patient.     Today's visit required additional screening time, PPE, and cleaning measures to allow for a safe in-person visit, due to the public health emergency. I spent a total of 42 minutes on 10/7/2022 in chart review, review of tests, patient visit, documentation, care coordination, and/or discussion with other providers about the issues documented above, separate from any documented procedure(s).        Nikole Davis MD

## 2022-10-07 NOTE — PROGRESS NOTES
Dear Colleague:    Asya Coffman recently returned for follow-up at the [facility]. My clinic note from our visit is enclosed below.  Speech recognition software may have been used in the documentation below; input is reviewed before signature to the best of my ability.     I appreciate the ongoing opportunity to participate in this patient's care.    Please feel free to contact me with any questions.    Sincerely yours,      Nikole Davis M.D., M.P.H.  , Laryngology  Director, [facility]  Otolaryngology- Head & Neck Surgery  [phone]            =====  HISTORY OF PRESENT ILLNESS:  Asya Coffman is a pleasant 77-year-old female with a past medical history including CREST syndrome, COPD, chronic kidney disease, atrial fibrillation and a complex laryngeal history including a prior benign lesion, severe dysplasia, and laryngeal papilloma.    Her voice trouble began in 2015, with a gradual onset, with no obvious inciting event.    9/29/15 Direct micro laryngoscopy with biopsy; pathology benign.    Summer 2020, she again developed gradual onset dysphonia.    10/13/2020 MicroDirect laryngoscopy and biopsy with Dr Siegel; pathology: biopsy with atypical verrucous squamous proliferation but inadequate submucosa to determine deeper aspect.    11/9/2020 Microlaryngoscopy with excision of vocal cord lesion with KTP laser with Dr Patricia; pathology: low grade dysplasia of the left posterior true vocal fold.    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    July 2021, she was seen again with now a papillomatous lesion in post cricoid area.    8/30/21 Micro Left Direct laryngoscopy with KTP laser with Dr Patricia; pathology: squamous papilloma and low grade dysplasia.    January 2022 had  some worsening of hoarseness. When seen in March 2022, she was noted to have return of squamous papilloma, and was referred here.     Under my care:  5/26/22 MDL with debulking and CO2 laser treatment of laryngeal papilloma; pathology: squamous papilloma  Rapid regrowth.    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     9/15/22 MDL with debulking and CO2 laser treatment of laryngeal papilloma; pathology: papilloma, moderate dysplasia and inflammation.      She presents today for post-op check. She got her first dose of HPV vaccination. She has not yet scheduled other doses. Voice, breathing, swallowing fine right now. She is adjusting to the realization that this is a chronic problem that requires attention.      MEDICATIONS:     Current Outpatient Medications   Medication Sig Dispense Refill     albuterol (PROAIR HFA/PROVENTIL HFA/VENTOLIN HFA) 108 (90 Base) MCG/ACT inhaler Inhale 2 puffs into the lungs as needed       albuterol (PROVENTIL) (2.5 MG/3ML) 0.083% neb solution Inhale 2.5 mg into the lungs       apixaban ANTICOAGULANT (ELIQUIS) 5 MG tablet Take 5 mg by mouth daily       atorvastatin (LIPITOR) 20 MG tablet Take 20 mg by mouth daily       budesonide-formoterol (SYMBICORT) 160-4.5 MCG/ACT Inhaler Inhale 2 puffs into the lungs daily       diltiazem ER (TIAZAC) 360 MG 24 hr ER beaded capsule Take 360 mg by mouth daily       fexofenadine (ALLEGRA) 180 MG tablet Take 180 mg by mouth daily       fluorouracil (EFUDEX) 5 % external cream APPLY TOPICALLY TO LEFT LATERAL EYEBROW TWICE DAILY FOR 4 WEEKS       levothyroxine (SYNTHROID/LEVOTHROID) 88 MCG tablet Take 88 mcg by mouth daily       montelukast (SINGULAIR) 10 MG tablet Take 10 mg by mouth daily       Respiratory Therapy Supplies (NEBULIZER) JUWAN Portable nebulizer, disposable neb kit x 4, reuseable neb kit x 1, mask x 1, filters x 1.   Frequency of use: daily;  Medication: albuterol  Length of need:  99 months       sertraline (ZOLOFT) 100 MG tablet Take 100 mg by mouth daily       fluconazole (DIFLUCAN) 100 MG tablet 2 tabs PO qday x 1 day, then 1 tab PO qday x 13 days for a total of 14 day course. (Patient not taking: Reported on 10/7/2022) 15 tablet 0       ALLERGIES:    Allergies   Allergen Reactions     1-Methyl 2-Pyrrolidone Anaphylaxis     Escitalopram Other (See Comments)     Asthma -a time of exacerbation and may not have been cause may retry     Mirtazapine Other (See Comments)     Asthma a time of exacerbation and may not have been cause may retry     Venlafaxine Other (See Comments)     Adhesive Tape Rash     With holter monitor. Ok with bandaids.       NEW PMH/PSH: None    REVIEW OF SYSTEMS:  The patient completed a comprehensive 11 point review of systems (below), which was reviewed. Positives are as noted below.  Patient Supplied Answers to Review of Systems   Noncontributory         PHYSICAL EXAM:  General: The patient was alert and conversant, and in no acute distress. Patient accompanied by her daughter.  Neck: No palpable cervical lymphadenopathy, no significant tenderness to palpation of the thyrohyoid space, which was narrow. No obvious thyroid abnormality.  Resp: Breathing comfortably, no stridor or stertor.  Neuro: Symmetric facial function. Other cranial nerve function as documented above.  Psych: Normal affect, pleasant and cooperative.  Voice/speech: Mild dysphonia characterized by breathiness, roughness and strain.      Procedure:   Flexible fiberoptic laryngoscopy and laryngovideostroboscopy  Indications: This procedure was warranted to evaluate the patient's laryngeal anatomy and function. Risks, benefits, and alternatives were discussed.  Description: After written informed consent was obtained, a time-out was performed to confirm patient identity, procedure, and procedure site. Topical 3% lidocaine with 0.25% phenylephrine was applied to the nasal cavities. I performed the  endoscopy and no complications were apparent. Continuous and stroboscopic light were utilized to assess routine phonation and variable frequency phonation.  Performed by: Nikole Davis MD MPH  Findings: Normal nasopharynx. Normal base of tongue, valleculae, and epiglottis. Vocal fold mobility: right: normal; left: normal. Medial edges of the vocal folds: smooth and straight. Some clusters of papilloma visible along posterior glottis; right true vocal fold with superficial smooth leukoplakia. Glissade produced appropriate elongation. There was mild to moderate supraglottic recruitment with connected speech. Mucosa of false vocal folds, aryepiglottic folds, piriform sinuses, and posterior glottis unremarkable otherwise. Very mild scattered white plaques consistent with inhaler use. Airway was patent.   Similar findings on NBI.    The addition of stroboscopy allowed evaluation of the mucosal wave.   Amplitude: right: mildly decreased; left: mildly decreased. Symmetry: intermittent symmetry. Closure pattern: slightly spindle-shaped. Closure plane: at glottic level. Phase distribution: normal.                          IMPRESSION AND PLAN:   Asya Coffman returns with good post-operative healing. Fortunately, pathology from the larger biopsies showed papilloma with moderate dysplasia rather than more severe changes, but our concern is that she has had rapid regrowth of the papilloma. There were also some challenges with getting her back in within a good time window last time and we discussed the importance of timing and contacting us with concerns.    Plan:  1) We discussed options including repeating awake procedure versus MicroDirect laryngoscopy. We discussed advantages/disadvantages of each. She would like to start with repeating an awake procedure. Case Request was placed. I asked her to contact us with any changes or concerns in the meantime. We also discussed that it is appropriate to let her family help  support her as she navigates this process.  2) Recommended that she continue HPV vaccination series.  3) She is also interested in NIH trial; she will reach out to them.    I appreciate the opportunity to participate in the care of this pleasant patient.     Today's visit required additional screening time, PPE, and cleaning measures to allow for a safe in-person visit, due to the public health emergency. I spent a total of 42 minutes on 10/7/2022 in chart review, review of tests, patient visit, documentation, care coordination, and/or discussion with other providers about the issues documented above, separate from any documented procedure(s).

## 2022-10-10 LAB
LAB DIRECTOR COMMENTS: NORMAL
LAB DIRECTOR DISCLAIMER: NORMAL
LAB DIRECTOR INTERPRETATION: NORMAL
LAB DIRECTOR METHODOLOGY: NORMAL
LAB DIRECTOR RESULTS: NORMAL
SPECIMEN DESCRIPTION: NORMAL

## 2022-10-10 PROCEDURE — 87624 HPV HI-RISK TYP POOLED RSLT: CPT | Performed by: OTOLARYNGOLOGY

## 2022-10-10 PROCEDURE — G0452 MOLECULAR PATHOLOGY INTERPR: HCPCS | Mod: 26 | Performed by: PATHOLOGY

## 2022-10-14 ENCOUNTER — TELEPHONE (OUTPATIENT)
Dept: OTOLARYNGOLOGY | Facility: CLINIC | Age: 77
End: 2022-10-14

## 2022-10-14 NOTE — TELEPHONE ENCOUNTER
[facility] Call Center    Phone Message    May a detailed message be left on voicemail: yes     Reason for Call: Other: Pt stated that she was supposed to get a call back within 7 days of her last appt. For a follow-up procedure on Recurrent respiratory papillomatosis, please advice.  Thank you!     Action Taken: Message routed to:  [facility] ([facility] ENT    Travel Screening: Not Applicable

## 2022-10-18 PROBLEM — J38.3 VOCAL CORD LEUKOPLAKIA: Status: ACTIVE | Noted: 2022-10-18

## 2022-10-18 NOTE — TELEPHONE ENCOUNTER
Called patient to schedule procedure with Dr. Davis.   Date of Surgery: 10/27/2022  Approximate arrival time given:  Yes -   AROUND 11:00 A.M. given   Location of surgery: [facility]  Pre-Op H&P: Surgeon   Post-Op Appt Date: approximate ~ 3 wk    Imaging needed:  No  Discussed COVID-19 Testing: Yes     Patient aware that pre-op RN will call 2-3 days prior to surgery with arrival time and instructions Yes      Alyssa Loaiza on 10/18/2022 at 2:36 PM

## 2022-10-27 ENCOUNTER — HOSPITAL ENCOUNTER (OUTPATIENT)
Facility: AMBULATORY SURGERY CENTER | Age: 77
Discharge: HOME | End: 2022-10-27
Attending: OTOLARYNGOLOGY | Admitting: OTOLARYNGOLOGY
Payer: COMMERCIAL

## 2022-10-27 VITALS
BODY MASS INDEX: 19.61 KG/M2 | WEIGHT: 122 LBS | HEIGHT: 66 IN | HEART RATE: 88 BPM | RESPIRATION RATE: 18 BRPM | TEMPERATURE: 96.9 F | DIASTOLIC BLOOD PRESSURE: 78 MMHG | OXYGEN SATURATION: 97 % | SYSTOLIC BLOOD PRESSURE: 126 MMHG

## 2022-10-27 DIAGNOSIS — J38.3 VOCAL CORD LEUKOPLAKIA: ICD-10-CM

## 2022-10-27 DIAGNOSIS — Z78.9 RECURRENT RESPIRATORY PAPILLOMATOSIS: Primary | ICD-10-CM

## 2022-10-27 DIAGNOSIS — R49.0 DYSPHONIA: ICD-10-CM

## 2022-10-27 PROCEDURE — 31572 LARGSC W/LASER DSTRJ LES: CPT

## 2022-10-27 PROCEDURE — 31572 LARGSC W/LASER DSTRJ LES: CPT | Performed by: OTOLARYNGOLOGY

## 2022-10-27 RX ORDER — OMEGA-3 FATTY ACIDS/FISH OIL 300-1000MG
400 CAPSULE ORAL EVERY 6 HOURS PRN
COMMUNITY

## 2022-10-27 RX ORDER — LIDOCAINE HYDROCHLORIDE 20 MG/ML
INJECTION, SOLUTION INFILTRATION; PERINEURAL PRN
Status: DISCONTINUED | OUTPATIENT
Start: 2022-10-27 | End: 2022-10-27 | Stop reason: HOSPADM

## 2022-10-27 NOTE — PROGRESS NOTES
Telephone Note    Spoke with patient's daughter Lana as per my promise to the patient earlier today. Relayed how the procedure went, noted that we were able to treat one area but not others. Discussed that options moving forward include speech therapy for irritable/reactive larynx control vs. planning exclusively to treat in the OR under general anesthesia. Also discussed that there was obvious progression of papilloma since our clinic visit, so I anticipate the patient will need another operative intervention by Dec/January. Lana felt it would be helpful to have a date scheduled given the patient's prior history of rapid regrowth. I placed a case request. We also agreed that the patient and/or her daughter would reach out to us with updates about her symptoms as well as any developments with the NIH RRP trial. Next follow up is scheduled in ~3 weeks.

## 2022-10-27 NOTE — DISCHARGE INSTRUCTIONS
[facility]  Home Care Following Your Procedure  Call a doctor if you have signs of infection (fever, growing tenderness at the surgery site, a large amount of drainage or bleeding, severe pain, foul-smelling drainage, redness, swelling).         Tylenol/Acetaminophen Consumption  To help encourage the safe use of acetaminophen, the makers of TYLENOL  have lowered the maximum daily dose for single-ingredient Extra Strength TYLENOL  (acetaminophen) products sold in the [place] from 8 pills per day (4,000 mg) to 6 pills per day (3,000 mg). The dosing interval has also changed from 2 pills every 4-6 hours to 2 pills every 6 hours.  If you feel your pain relief is insufficient, you may take Tylenol/Acetaminophen in addition to your narcotic pain medication.   Be careful not to exceed 3,000 mg of Tylenol/Acetaminophen in a 24 hour period from all sources.  If you are taking extra strength Tylenol/acetaminophen (500 mg), the maximum dose is 6 tablets in 24 hours.  If you are taking regular strength acetaminophen (325 mg), the maximum dose is 9 tablets in 24 hours.    Your doctor is:       Dr. Nikole Davis, ENT Otolaryngology: [phone]             Or dial [phone] and ask for the resident on call for:  ENT Otolaryngology  For emergency care, call the:  [place]:  [phone] (TTY for hearing impaired: [phone])

## 2022-10-27 NOTE — H&P
Abbreviated H&P for ASC Procedure under Local Anesthesia    1. Chief complaint and/or reason for procedure  Recurrent respiratory papilloma    2. Medical history describing significant medical conditions and previous surgeries  PAST MEDICAL HISTORY:   Past Medical History:   Diagnosis Date     A-fib (H)      Antiplatelet or antithrombotic long-term use      COPD (chronic obstructive pulmonary disease) (H)         PAST SURGICAL HISTORY:   Past Surgical History:   Procedure Laterality Date     LASER CO2 LARYNGOSCOPY, COMPLEX N/A 5/26/2022    Procedure: Micro direct laryngoscopy with excision/ablation of laryngeal lesions, possible biopsies, possible CO2 laser;  Surgeon: Nikole Davis MD;  Location: [facility] OR     LASER CO2 LARYNGOSCOPY, COMPLEX N/A 9/15/2022    Procedure: Microdirect laryngoscopy with ablation of laryngeal lesions,biopsies,CO2 laser;  Surgeon: Nikole Davis MD;  Location: [facility] OR     LASER KTP LARYNGOSCOPY FLEXIBLE N/A 8/18/2022    Procedure: Transnasal flexible laryngoscopy with KTP laser and biopsies;  Surgeon: Nikole Davis MD;  Location: [facility] OR       Health history changes since last seen? NA    3. Current medications and allergies  MEDICATIONS:     Current Outpatient Medications   Medication Sig Dispense Refill     albuterol (PROAIR HFA/PROVENTIL HFA/VENTOLIN HFA) 108 (90 Base) MCG/ACT inhaler Inhale 2 puffs into the lungs as needed       albuterol (PROVENTIL) (2.5 MG/3ML) 0.083% neb solution Inhale 2.5 mg into the lungs       apixaban ANTICOAGULANT (ELIQUIS) 5 MG tablet Take 5 mg by mouth daily       atorvastatin (LIPITOR) 20 MG tablet Take 20 mg by mouth daily       budesonide-formoterol (SYMBICORT) 160-4.5 MCG/ACT Inhaler Inhale 2 puffs into the lungs daily       diltiazem ER (TIAZAC) 360 MG 24 hr ER beaded capsule Take 360 mg by mouth daily       ibuprofen (ADVIL/MOTRIN) 200 MG capsule Take 400 mg by mouth every 6 hours as needed for fever       levothyroxine  (SYNTHROID/LEVOTHROID) 88 MCG tablet Take 88 mcg by mouth daily       montelukast (SINGULAIR) 10 MG tablet Take 10 mg by mouth daily       sertraline (ZOLOFT) 100 MG tablet Take 100 mg by mouth daily       fexofenadine (ALLEGRA) 180 MG tablet Take 180 mg by mouth daily       fluconazole (DIFLUCAN) 100 MG tablet 2 tabs PO qday x 1 day, then 1 tab PO qday x 13 days for a total of 14 day course. (Patient not taking: Reported on 10/7/2022) 15 tablet 0     fluorouracil (EFUDEX) 5 % external cream APPLY TOPICALLY TO LEFT LATERAL EYEBROW TWICE DAILY FOR 4 WEEKS       Respiratory Therapy Supplies (NEBULIZER) JUWAN Portable nebulizer, disposable neb kit x 4, reuseable neb kit x 1, mask x 1, filters x 1.   Frequency of use: daily;  Medication: albuterol  Length of need: 99 months         ALLERGIES:    Allergies   Allergen Reactions     1-Methyl 2-Pyrrolidone Anaphylaxis     Escitalopram Other (See Comments)     Asthma -a time of exacerbation and may not have been cause may retry     Mirtazapine Other (See Comments)     Asthma a time of exacerbation and may not have been cause may retry     Venlafaxine Other (See Comments)     Adhesive Tape Rash     With holter monitor. Ok with bandaids.       4. Review of systems  Noncontributory      5. Physical examination  Vital signs stable.   Awake, alert, conversant.  Breathing comfortably, no stridor/stertor.  Voice mildly rough/breathy.    6. ASA classification  ASA II mild systemic disease     Plan to proceed as scheduled.

## 2022-10-27 NOTE — OP NOTE
PROCEDURE(S):  Transnasal flexible laryngoscopy  KTP laser treatment of papilloma under flexible laryngoscopic visualization  Attempted biopsy    PRE-OPERATIVE DIAGNOSIS:   Recurrent respiratory papillomatosis (RRP)     POST-OPERATIVE DIAGNOSIS:   Recurrent respiratory papillomatosis (RRP)     SURGEON: Nikole Davis MD MPH    ANAESTHESIA: Topical local anesthesia    INDICATIONS FOR PROCEDURE:   The patient is a 77 year old female with recurrent respiratory papillomatosis (RRP). Risks, benefits, and alternatives of the procedure were discussed, including the risk of epistaxis, temporary/permanent hoarseness, scarring, injury to surrounding structures, and need for additional procedures. The patient signed written informed consent.    DESCRIPTION OF PROCEDURE:   After written informed consent was obtained, a time-out was performed to confirm patient identity, procedure, and procedure site. Topical aerosolized 3% lidocaine with 0.25% phenylephrine was applied to both nasal cavities. Flexible fiberoptic laryngoscopy was performed with good visualization of the larynx. 8 cc of 2% lidocaine was then topically applied to the vocal folds through the channel of the endoscope sleeve. The patient was asked to gargle and cough. A biopsy forcep was placed through the endoscope but the patient did not tolerate biopsy despite multiple attempts and passes. The 0.4 mm laser fiber was then placed in the channel. The fiber was advanced so 2-3 mm was visible beyond the tip of the endoscope. The patient was asked to breathe quietly and the laser was used to photocoagulate the lesion. The procedure was concluded when maximal patient tolerance was reached. The patient tolerated the procedure well.    Laser settings:   nm laser, 24 W, spot size 400um, 15 ms pulses, 2 pulses/second. 48 total Joules.  All in the room, including staff and patient, wore appropriate eye protection during the procedure.    FINDINGS:   Normal  nasopharynx. Normal base of tongue, valleculae, and epiglottis. The right true vocal fold demonstrated normal mobility. The left true vocal fold demonstrated normal mobility. Bilateral posterior supraglottic papilloma, left > right. Posterior commissure also with smaller burden of papilloma. Airway was patent.  Left supraglottic papilloma was well treated. Right and mid posterior commissure not well treated.      SPECIMEN(S):   None    DRAINS:   None    ESTIMATED BLOOD LOSS:   Minimal    COMPLICATIONS:   None    DISPOSITION: Stable to home    ATTENDING PRESENCE STATEMENT:  I performed this procedure.    PLAN:  Follow up in 3-4 weeks.

## 2022-10-31 ENCOUNTER — TELEPHONE (OUTPATIENT)
Dept: OTOLARYNGOLOGY | Facility: CLINIC | Age: 77
End: 2022-10-31

## 2022-10-31 NOTE — TELEPHONE ENCOUNTER
Pt called regarding scheduling procedure with Dr. Davis. Patient states that daughter is working on getting her into a clinical trial in [place]. Pt states she would like to avoid surgery twice. She states that they are possibly looking at the trial at the end of Dec. Willl discuss with Dr. Davis and get back with her.     Alyssa Loaiza on 10/31/2022 at 3:43 PM

## 2022-11-01 NOTE — TELEPHONE ENCOUNTER
Left message with Lana. Direction from Dr. Davis to contact patients daughter. Left direct number for call back.     Alyssa Loaiza on 11/1/2022 at 9:12 AM

## 2022-11-01 NOTE — TELEPHONE ENCOUNTER
Patients daughter is calling back to schedule surgery with Dr. Davis. Pt daughter said they are still in the process of getting accepted to the study, but discussed with patient that we should get something scheduled and then cancel if needed. Lana is in agreement with this. If patient is accepted, then we would cancel procedure. Discussed information with Lana.     Date of Surgery: 12/15/2022  Approximate arrival time given:  Yes  Location of surgery: [facility]  Pre-Op H&P: PCP   Post-Op Appt Date: ~3 weeks    Imaging needed:  No  Discussed COVID-19 Testing: Yes     Daughter aware that pre-op RN will call 2-3 days prior to surgery with arrival time and instructions Yes       Additional Comments:   She will be in contact. Knows to call with any changes to plan.    Alyssa Loaiza on 11/1/2022 at 9:17 AM

## 2022-11-21 ENCOUNTER — OFFICE VISIT (OUTPATIENT)
Dept: OTOLARYNGOLOGY | Facility: CLINIC | Age: 77
End: 2022-11-21
Payer: COMMERCIAL

## 2022-11-21 ENCOUNTER — HEALTH MAINTENANCE LETTER (OUTPATIENT)
Age: 77
End: 2022-11-21

## 2022-11-21 VITALS
DIASTOLIC BLOOD PRESSURE: 87 MMHG | HEIGHT: 66 IN | SYSTOLIC BLOOD PRESSURE: 135 MMHG | HEART RATE: 72 BPM | BODY MASS INDEX: 19.61 KG/M2 | WEIGHT: 122 LBS | OXYGEN SATURATION: 95 %

## 2022-11-21 DIAGNOSIS — R49.0 DYSPHONIA: Primary | ICD-10-CM

## 2022-11-21 PROCEDURE — 31579 LARYNGOSCOPY TELESCOPIC: CPT | Performed by: OTOLARYNGOLOGY

## 2022-11-21 PROCEDURE — 99214 OFFICE O/P EST MOD 30 MIN: CPT | Mod: 25 | Performed by: OTOLARYNGOLOGY

## 2022-11-21 ASSESSMENT — PAIN SCALES - GENERAL: PAINLEVEL: NO PAIN (0)

## 2022-11-21 NOTE — LETTER
11/21/2022      RE: Asya Coffman  [address]  [facility] [address]       Dear Colleague:  Asya Coffman recently returned for follow-up at the [facility]. My clinic note from our visit is enclosed below.  Speech recognition software may have been used in the documentation below; input is reviewed before signature to the best of my ability.     I appreciate the ongoing opportunity to participate in this patient's care.    Please feel free to contact me with any questions.      Sincerely yours,  Nikole Davis M.D., M.P.H.  , Laryngology  Director, [facility]  Otolaryngology- Head & Neck Surgery  [phone]      =====  HISTORY OF PRESENT ILLNESS:  Asya Coffman is a pleasant 77 year old female with a past medical history including CREST syndrome, COPD, chronic kidney disease, atrial fibrillation and a complex laryngeal history including a prior benign lesion, severe dysplasia, and laryngeal papilloma.    Her voice trouble began in 2015, with a gradual onset, with no obvious inciting event.    9/29/15 Direct micro laryngoscopy with biopsy; pathology benign.    In summer 2020, she again developed gradual onset dysphonia.    10/13/2020 MicroDirect laryngoscopy and biopsy with Dr Siegel; pathology: biopsy with atypical verrucous squamous proliferation but inadequate submucosa to determine deeper aspect.    11/9/2020 Microlaryngoscopy with excision of vocal cord lesion with KTP laser with Dr Patricia; pathology: low grade dysplasia of the left posterior true vocal fold.    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    In July 2021, she was seen again with now a papillomatous lesion in the post cricoid area.    8/30/21 Micro Left Direct laryngoscopy with KTP laser  with Dr Patricia; pathology: squamous papilloma and low grade dysplasia.    January 2022 had some worsening of hoarseness. When seen in March 2022, she was noted to have return of squamous papilloma, and was referred here.     Under my care:  5/26/22 MDL with debulking and CO2 laser treatment of laryngeal papilloma; pathology: squamous papilloma    Rapid regrowth.    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     9/15/22 MDL with debulking and CO2 laser treatment of laryngeal papilloma; pathology: papilloma, moderate dysplasia and inflammation.      Today's updates:  HPV vax next dose has not been scheduled yet. She is concerned about cost.  Breathing is still okay, perhaps slightly worse, but voice is more noticeably worsening.  She is scheduled for OR with me in December.  She reports she was not accepted to the [facility] trial due to kidney function limitations.      MEDICATIONS:     Current Outpatient Medications   Medication Sig Dispense Refill     albuterol (PROAIR HFA/PROVENTIL HFA/VENTOLIN HFA) 108 (90 Base) MCG/ACT inhaler Inhale 2 puffs into the lungs as needed       albuterol (PROVENTIL) (2.5 MG/3ML) 0.083% neb solution Inhale 2.5 mg into the lungs       apixaban ANTICOAGULANT (ELIQUIS) 5 MG tablet Take 5 mg by mouth daily       atorvastatin (LIPITOR) 20 MG tablet Take 20 mg by mouth daily       budesonide-formoterol (SYMBICORT) 160-4.5 MCG/ACT Inhaler Inhale 2 puffs into the lungs daily       diltiazem ER (TIAZAC) 360 MG 24 hr ER beaded capsule Take 360 mg by mouth daily       fexofenadine (ALLEGRA) 180 MG tablet Take 180 mg by mouth daily       fluorouracil (EFUDEX) 5 % external cream APPLY TOPICALLY TO LEFT LATERAL EYEBROW TWICE DAILY FOR 4 WEEKS       ibuprofen (ADVIL/MOTRIN) 200 MG capsule Take 400 mg by mouth every 6 hours as needed for fever       levothyroxine (SYNTHROID/LEVOTHROID) 88 MCG tablet Take 88 mcg by mouth daily       montelukast  (SINGULAIR) 10 MG tablet Take 10 mg by mouth daily       Respiratory Therapy Supplies (NEBULIZER) JUWAN Portable nebulizer, disposable neb kit x 4, reuseable neb kit x 1, mask x 1, filters x 1.   Frequency of use: daily;  Medication: albuterol  Length of need: 99 months       sertraline (ZOLOFT) 100 MG tablet Take 100 mg by mouth daily         ALLERGIES:    Allergies   Allergen Reactions     1-Methyl 2-Pyrrolidone Anaphylaxis     Escitalopram Other (See Comments)     Asthma -a time of exacerbation and may not have been cause may retry     Mirtazapine Other (See Comments)     Asthma a time of exacerbation and may not have been cause may retry     Venlafaxine Other (See Comments)     Adhesive Tape Rash     With holter monitor. Ok with bandaids.       NEW PMH/PSH: None    REVIEW OF SYSTEMS:  The patient completed a comprehensive 11 point review of systems (below), which was reviewed. Positives are as noted below.  Patient Supplied Answers to Review of Systems         PHYSICAL EXAM:  General: The patient was alert and conversant, and in no acute distress.    Neck: No palpable cervical lymphadenopathy, mild tenderness to palpation of the thyrohyoid space, which was narrow. No obvious thyroid abnormality.  Resp: Breathing comfortably, no stridor or stertor. Faintly audible noise with rapid inhalation.  Neuro: Symmetric facial function. Other cranial nerve function as documented above.  Psych: Normal affect, pleasant and cooperative.  Voice/speech: Moderate to severe dysphonia characterized by breathiness, roughness and strain.      Procedure:   Flexible fiberoptic laryngoscopy and laryngovideostroboscopy  Indications: This procedure was warranted to evaluate the patient's laryngeal anatomy and function. Risks, benefits, and alternatives were discussed.  Description: After written informed consent was obtained, a time-out was performed to confirm patient identity, procedure, and procedure site. Topical 3% lidocaine with  0.25% phenylephrine was applied to the nasal cavities. I performed the endoscopy and no complications were apparent. Continuous and stroboscopic light were utilized to assess routine phonation and variable frequency phonation.  Performed by: Nikole Davis MD MPH  Findings: Normal nasopharynx. Normal base of tongue, valleculae, and epiglottis. Vocal fold mobility: right: normal; left: normal. Medial edges of the vocal folds: smooth and straight anteriorly. Right anterior vocal fold with broad thin leukoplakia vs fibrosis. Posterior larynx with multiple clusters of papilloma as well as some crusting. Mucosa of false vocal folds, aryepiglottic folds, piriform sinuses unremarkable. Scattered whitish plaques consistent with fungal laryngitis noted in postcricoid region. Airway was patent.   Similar findings on NBI.    The addition of stroboscopy allowed evaluation of the mucosal wave.   Amplitude: right: mildly decreased; left: moderately decreased. Symmetry: associated asymmetry. Closure pattern: incomplete due to mass effect of papilloma. Closure plane: at glottic level. Phase distribution: normal.                      IMPRESSION AND PLAN:   Asya Coffman returns with progression of her papilloma, as expected. She does have a case booked with me in the OR next month. Given rapid regrowth I will inquire about the possibility of a sooner OR date, to reduce the risk of respiratory compromise. She has not tolerated interventions under local anesthesia, unfortunately.    Plan:  -OR for MDL with CO2 laser, hopefully sooner than currently scheduled  -the patient states her daughter will tell me more about the [facility] trial via [employer]  -she is going to schedule her next HPV vaccination shot. She had been hesitant due to cost but understands that it may lead to slower regrowth, which would be very helpful. 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    Today's visit required additional screening time, PPE, and cleaning measures to allow for a safe in-person visit, due to the public health emergency. I spent a total of 32 minutes on 11/21/2022 in chart review, review of tests, patient visit, documentation, care coordination, and/or discussion with other providers about the issues documented above, separate from any documented procedure(s).      Nikole Davis MD

## 2022-11-21 NOTE — PROGRESS NOTES
Dear Colleague:  Asya Coffman recently returned for follow-up at the [facility]. My clinic note from our visit is enclosed below.  Speech recognition software may have been used in the documentation below; input is reviewed before signature to the best of my ability.     I appreciate the ongoing opportunity to participate in this patient's care.    Please feel free to contact me with any questions.      Sincerely yours,  Nikole Davis M.D., M.P.H.  , Laryngology  Director, [facility]  Otolaryngology- Head & Neck Surgery  [phone]            =====  HISTORY OF PRESENT ILLNESS:  Asya Coffman is a pleasant 77 year old female with a past medical history including CREST syndrome, COPD, chronic kidney disease, atrial fibrillation and a complex laryngeal history including a prior benign lesion, severe dysplasia, and laryngeal papilloma.    Her voice trouble began in 2015, with a gradual onset, with no obvious inciting event.    9/29/15 Direct micro laryngoscopy with biopsy; pathology benign.    In summer 2020, she again developed gradual onset dysphonia.    10/13/2020 MicroDirect laryngoscopy and biopsy with Dr Siegel; pathology: biopsy with atypical verrucous squamous proliferation but inadequate submucosa to determine deeper aspect.    11/9/2020 Microlaryngoscopy with excision of vocal cord lesion with KTP laser with Dr Patricia; pathology: low grade dysplasia of the left posterior true vocal fold.    1/13/2021 Direct microlaryngoscopy with stripping of vocal cord and microflap excision with Dr Patrciia; pathology: low grade dysplasia and papilloma of the posterior glottis; right true vocal fold with squamous papilloma.    In July 2021, she was seen again with now a papillomatous lesion in the post cricoid area.    8/30/21 Micro Left Direct laryngoscopy with KTP laser with Dr Patricia; pathology: squamous papilloma and low grade dysplasia.    January  2022 had some worsening of hoarseness. When seen in March 2022, she was noted to have return of squamous papilloma, and was referred here.     Under my care:  5/26/22 MDL with debulking and CO2 laser treatment of laryngeal papilloma; pathology: squamous papilloma    Rapid regrowth.    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     9/15/22 MDL with debulking and CO2 laser treatment of laryngeal papilloma; pathology: papilloma, moderate dysplasia and inflammation.      Today's updates:  HPV vax next dose has not been scheduled yet. She is concerned about cost.  Breathing is still okay, perhaps slightly worse, but voice is more noticeably worsening.  She is scheduled for OR with me in December.  She reports she was not accepted to the [facility] trial due to kidney function limitations.      MEDICATIONS:     Current Outpatient Medications   Medication Sig Dispense Refill     albuterol (PROAIR HFA/PROVENTIL HFA/VENTOLIN HFA) 108 (90 Base) MCG/ACT inhaler Inhale 2 puffs into the lungs as needed       albuterol (PROVENTIL) (2.5 MG/3ML) 0.083% neb solution Inhale 2.5 mg into the lungs       apixaban ANTICOAGULANT (ELIQUIS) 5 MG tablet Take 5 mg by mouth daily       atorvastatin (LIPITOR) 20 MG tablet Take 20 mg by mouth daily       budesonide-formoterol (SYMBICORT) 160-4.5 MCG/ACT Inhaler Inhale 2 puffs into the lungs daily       diltiazem ER (TIAZAC) 360 MG 24 hr ER beaded capsule Take 360 mg by mouth daily       fexofenadine (ALLEGRA) 180 MG tablet Take 180 mg by mouth daily       fluorouracil (EFUDEX) 5 % external cream APPLY TOPICALLY TO LEFT LATERAL EYEBROW TWICE DAILY FOR 4 WEEKS       ibuprofen (ADVIL/MOTRIN) 200 MG capsule Take 400 mg by mouth every 6 hours as needed for fever       levothyroxine (SYNTHROID/LEVOTHROID) 88 MCG tablet Take 88 mcg by mouth daily       montelukast (SINGULAIR) 10 MG tablet Take 10 mg by mouth daily       Respiratory Therapy Supplies  (NEBULIZER) JUWAN Portable nebulizer, disposable neb kit x 4, reuseable neb kit x 1, mask x 1, filters x 1.   Frequency of use: daily;  Medication: albuterol  Length of need: 99 months       sertraline (ZOLOFT) 100 MG tablet Take 100 mg by mouth daily         ALLERGIES:    Allergies   Allergen Reactions     1-Methyl 2-Pyrrolidone Anaphylaxis     Escitalopram Other (See Comments)     Asthma -a time of exacerbation and may not have been cause may retry     Mirtazapine Other (See Comments)     Asthma a time of exacerbation and may not have been cause may retry     Venlafaxine Other (See Comments)     Adhesive Tape Rash     With holter monitor. Ok with bandaids.       NEW PMH/PSH: None    REVIEW OF SYSTEMS:  The patient completed a comprehensive 11 point review of systems (below), which was reviewed. Positives are as noted below.  Patient Supplied Answers to Review of Systems         PHYSICAL EXAM:  General: The patient was alert and conversant, and in no acute distress.    Neck: No palpable cervical lymphadenopathy, mild tenderness to palpation of the thyrohyoid space, which was narrow. No obvious thyroid abnormality.  Resp: Breathing comfortably, no stridor or stertor. Faintly audible noise with rapid inhalation.  Neuro: Symmetric facial function. Other cranial nerve function as documented above.  Psych: Normal affect, pleasant and cooperative.  Voice/speech: Moderate to severe dysphonia characterized by breathiness, roughness and strain.      Procedure:   Flexible fiberoptic laryngoscopy and laryngovideostroboscopy  Indications: This procedure was warranted to evaluate the patient's laryngeal anatomy and function. Risks, benefits, and alternatives were discussed.  Description: After written informed consent was obtained, a time-out was performed to confirm patient identity, procedure, and procedure site. Topical 3% lidocaine with 0.25% phenylephrine was applied to the nasal cavities. I performed the endoscopy and no  complications were apparent. Continuous and stroboscopic light were utilized to assess routine phonation and variable frequency phonation.  Performed by: Nikole Davis MD MPH  Findings: Normal nasopharynx. Normal base of tongue, valleculae, and epiglottis. Vocal fold mobility: right: normal; left: normal. Medial edges of the vocal folds: smooth and straight anteriorly. Right anterior vocal fold with broad thin leukoplakia vs fibrosis. Posterior larynx with multiple clusters of papilloma as well as some crusting. Mucosa of false vocal folds, aryepiglottic folds, piriform sinuses unremarkable. Scattered whitish plaques consistent with fungal laryngitis noted in postcricoid region. Airway was patent.   Similar findings on NBI.    The addition of stroboscopy allowed evaluation of the mucosal wave.   Amplitude: right: mildly decreased; left: moderately decreased. Symmetry: associated asymmetry. Closure pattern: incomplete due to mass effect of papilloma. Closure plane: at glottic level. Phase distribution: normal.                      IMPRESSION AND PLAN:   Asya Coffman returns with progression of her papilloma, as expected. She does have a case booked with me in the OR next month. Given rapid regrowth I will inquire about the possibility of a sooner OR date, to reduce the risk of respiratory compromise. She has not tolerated interventions under local anesthesia, unfortunately.    Plan:  -OR for MDL with CO2 laser, hopefully sooner than currently scheduled  -the patient states her daughter will tell me more about the [facility] trial via [employer]  -she is going to schedule her next HPV vaccination shot. She had been hesitant due to cost but understands that it may lead to slower regrowth, which would be very helpful. 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    Today's visit required additional  screening time, PPE, and cleaning measures to allow for a safe in-person visit, due to the public health emergency. I spent a total of 32 minutes on 11/21/2022 in chart review, review of tests, patient visit, documentation, care coordination, and/or discussion with other providers about the issues documented above, separate from any documented procedure(s).

## 2022-11-21 NOTE — Clinical Note
11/21/2022       RE: Asya Coffman  [address]  [facility] [address]     Dear Colleague,    Thank you for referring your patient, Asya Coffman, to the [facility] [place] at [facility]. Please see a copy of my visit note below.    Dear Colleague:  Asya Coffman recently returned for follow-up at the [facility]. My clinic note from our visit is enclosed below.  Speech recognition software may have been used in the documentation below; input is reviewed before signature to the best of my ability.     I appreciate the ongoing opportunity to participate in this patient's care.    Please feel free to contact me with any questions.      Sincerely yours,  Nikole Davis M.D., M.P.H.  , Laryngology  Director, [facility]  Otolaryngology- Head & Neck Surgery  [phone]            =====  HISTORY OF PRESENT ILLNESS:  Asya Coffman is a pleasant 77 year old female with a past medical history including CREST syndrome, COPD, chronic kidney disease, atrial fibrillation and a complex laryngeal history including a prior benign lesion, severe dysplasia, and laryngeal papilloma.    Her voice trouble began in 2015, with a gradual onset, with no obvious inciting event.    9/29/15 Direct micro laryngoscopy with biopsy; pathology benign.    Summer 2020, she again developed gradual onset dysphonia.    10/13/2020 MicroDirect laryngoscopy and biopsy with Dr Siegel; pathology: biopsy with atypical verrucous squamous proliferation but inadequate submucosa to determine deeper aspect.    11/9/2020 Microlaryngoscopy with excision of vocal cord lesion with KTP laser with Dr Patricia; pathology: low grade dysplasia of the left posterior true vocal fold.    1/13/2021 Direct microlaryngoscopy with stripping of vocal cord and microflap excision with Dr Patricia; pathology: low grade  dysplasia and papilloma of the posterior glottis; right true vocal fold with squamous papilloma.    July 2021, she was seen again with now a papillomatous lesion in post cricoid area.    8/30/21 Micro Left Direct laryngoscopy with KTP laser with Dr Patricia; pathology: squamous papilloma and low grade dysplasia.    January 2022 had some worsening of hoarseness. When seen in March 2022, she was noted to have return of squamous papilloma, and was referred here.     Under my care:  5/26/22 MDL with debulking and CO2 laser treatment of laryngeal papilloma; pathology: squamous papilloma  Rapid regrowth.    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     9/15/22 MDL with debulking and CO2 laser treatment of laryngeal papilloma; pathology: papilloma, moderate dysplasia and inflammation.      Today's updates:  HPV vax next dose has not been scheduled yet. She is concerned about cost.  Breathing is still okay, perhaps slightly worse, but voice is more noticeably worsening.  She is scheduled for OR with me in December.  She reports she was not accepted to the [facility] trial due to kidney function limitations.      MEDICATIONS:     Current Outpatient Medications   Medication Sig Dispense Refill     albuterol (PROAIR HFA/PROVENTIL HFA/VENTOLIN HFA) 108 (90 Base) MCG/ACT inhaler Inhale 2 puffs into the lungs as needed       albuterol (PROVENTIL) (2.5 MG/3ML) 0.083% neb solution Inhale 2.5 mg into the lungs       apixaban ANTICOAGULANT (ELIQUIS) 5 MG tablet Take 5 mg by mouth daily       atorvastatin (LIPITOR) 20 MG tablet Take 20 mg by mouth daily       budesonide-formoterol (SYMBICORT) 160-4.5 MCG/ACT Inhaler Inhale 2 puffs into the lungs daily       diltiazem ER (TIAZAC) 360 MG 24 hr ER beaded capsule Take 360 mg by mouth daily       fexofenadine (ALLEGRA) 180 MG tablet Take 180 mg by mouth daily       fluorouracil (EFUDEX) 5 % external cream APPLY TOPICALLY TO LEFT LATERAL  EYEBROW TWICE DAILY FOR 4 WEEKS       ibuprofen (ADVIL/MOTRIN) 200 MG capsule Take 400 mg by mouth every 6 hours as needed for fever       levothyroxine (SYNTHROID/LEVOTHROID) 88 MCG tablet Take 88 mcg by mouth daily       montelukast (SINGULAIR) 10 MG tablet Take 10 mg by mouth daily       Respiratory Therapy Supplies (NEBULIZER) JUWAN Portable nebulizer, disposable neb kit x 4, reuseable neb kit x 1, mask x 1, filters x 1.   Frequency of use: daily;  Medication: albuterol  Length of need: 99 months       sertraline (ZOLOFT) 100 MG tablet Take 100 mg by mouth daily         ALLERGIES:    Allergies   Allergen Reactions     1-Methyl 2-Pyrrolidone Anaphylaxis     Escitalopram Other (See Comments)     Asthma -a time of exacerbation and may not have been cause may retry     Mirtazapine Other (See Comments)     Asthma a time of exacerbation and may not have been cause may retry     Venlafaxine Other (See Comments)     Adhesive Tape Rash     With holter monitor. Ok with bandaids.       NEW PMH/PSH: None    REVIEW OF SYSTEMS:  The patient completed a comprehensive 11 point review of systems (below), which was reviewed. Positives are as noted below.  Patient Supplied Answers to Review of Systems            PHYSICAL EXAM:  General: The patient was alert and conversant, and in no acute distress.    Neck: No palpable cervical lymphadenopathy, mild tenderness to palpation of the thyrohyoid space, which was narrow. No obvious thyroid abnormality.  Resp: Breathing comfortably, no stridor or stertor. Faintly audible noise with rapid inhalation.  Neuro: Symmetric facial function. Other cranial nerve function as documented above.  Psych: Normal affect, pleasant and cooperative.  Voice/speech: Moderate to severe dysphonia characterized by breathiness, roughness and strain.      Procedure:   Flexible fiberoptic laryngoscopy and laryngovideostroboscopy  Indications: This procedure was warranted to evaluate the patient's laryngeal anatomy  "and function. Risks, benefits, and alternatives were discussed.  Description: After written informed consent was obtained, a time-out was performed to confirm patient identity, procedure, and procedure site. Topical 3% lidocaine with 0.25% phenylephrine was applied to the nasal cavities. I performed the endoscopy and no complications were apparent. Continuous and stroboscopic light were utilized to assess routine phonation and variable frequency phonation.  Performed by: Nikole Davis MD MPH  SLP: ***  Findings: Normal nasopharynx. Normal base of tongue, valleculae, and epiglottis. Vocal fold mobility: right: {MOBILITY:449846}; left: {MOBILITY:779407}. Medial edges of the vocal folds: {SHAPE:194090}. { ENT MUCOSAL LESIONS:268996511} Glissade { ENT GLISSADE:019214291::\"produced appropriate elongation\"}. There was {SEVERITY (TF):823457} supraglottic recruitment with connected speech. Mucosa of false vocal folds, aryepiglottic folds, piriform sinuses, and posterior glottis unremarkable. Airway was ***patent. { ENT THERAPY PROBES:018925665} { ENT NBI:007353430}    The addition of stroboscopy allowed evaluation of the mucosal wave.   Amplitude: right: {AMPLITUDE:513973}; left: {AMPLITUDE:485848}. Symmetry: {SYMMETRY:932127375}. Closure pattern: {CLOSURE PATTERN:734231}. Closure plane: { ENT CLOSURE PLANE:439195440}. Phase distribution: {PHASE DISTRIBUTION:215209}.                            IMPRESSION AND PLAN:   Asya Coffman returns with ***      -her daughter will tell me more about the [facility] trial [facility]  -inquire about sooner OR dates  -she is going to schedule her next HPV vaccination shot    ***Today's visit required additional screening time, PPE, and cleaning measures to allow for a safe in-person visit, due to the public health emergency. I spent a total of *** minutes on ***11/21/2022 in chart review, review of tests, patient visit, documentation, care coordination, and/or discussion with " other providers about the issues documented above, separate from any documented procedure(s).      Dear Colleague:  Asya Coffman recently returned for follow-up at the [facility]. My clinic note from our visit is enclosed below.  Speech recognition software may have been used in the documentation below; input is reviewed before signature to the best of my ability.     I appreciate the ongoing opportunity to participate in this patient's care.    Please feel free to contact me with any questions.      Sincerely yours,  Nikole Davis M.D., M.P.H.  , Laryngology  Director, [facility]  Otolaryngology- Head & Neck Surgery  [phone]            =====  HISTORY OF PRESENT ILLNESS:  Asya Coffman is a pleasant 77 year old female with a past medical history including CREST syndrome, COPD, chronic kidney disease, atrial fibrillation and a complex laryngeal history including a prior benign lesion, severe dysplasia, and laryngeal papilloma.    Her voice trouble began in 2015, with a gradual onset, with no obvious inciting event.    9/29/15 Direct micro laryngoscopy with biopsy; pathology benign.    In summer 2020, she again developed gradual onset dysphonia.    10/13/2020 MicroDirect laryngoscopy and biopsy with Dr Siegel; pathology: biopsy with atypical verrucous squamous proliferation but inadequate submucosa to determine deeper aspect.    11/9/2020 Microlaryngoscopy with excision of vocal cord lesion with KTP laser with Dr Patricia; pathology: low grade dysplasia of the left posterior true vocal fold.    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    In July 2021, she was seen again with now a papillomatous lesion in the post cricoid area.    8/30/21 Micro Left Direct  laryngoscopy with KTP laser with Dr Patricia; pathology: squamous papilloma and low grade dysplasia.    January 2022 had some worsening of hoarseness. When seen in March 2022, she was noted to have return of squamous papilloma, and was referred here.     Under my care:  5/26/22 MDL with debulking and CO2 laser treatment of laryngeal papilloma; pathology: squamous papilloma    Rapid regrowth.    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     9/15/22 MDL with debulking and CO2 laser treatment of laryngeal papilloma; pathology: papilloma, moderate dysplasia and inflammation.      Today's updates:  HPV vax next dose has not been scheduled yet. She is concerned about cost.  Breathing is still okay, perhaps slightly worse, but voice is more noticeably worsening.  She is scheduled for OR with me in December.  She reports she was not accepted to the NIH trial due to kidney function limitations.      MEDICATIONS:     Current Outpatient Medications   Medication Sig Dispense Refill     albuterol (PROAIR HFA/PROVENTIL HFA/VENTOLIN HFA) 108 (90 Base) MCG/ACT inhaler Inhale 2 puffs into the lungs as needed       albuterol (PROVENTIL) (2.5 MG/3ML) 0.083% neb solution Inhale 2.5 mg into the lungs       apixaban ANTICOAGULANT (ELIQUIS) 5 MG tablet Take 5 mg by mouth daily       atorvastatin (LIPITOR) 20 MG tablet Take 20 mg by mouth daily       budesonide-formoterol (SYMBICORT) 160-4.5 MCG/ACT Inhaler Inhale 2 puffs into the lungs daily       diltiazem ER (TIAZAC) 360 MG 24 hr ER beaded capsule Take 360 mg by mouth daily       fexofenadine (ALLEGRA) 180 MG tablet Take 180 mg by mouth daily       fluorouracil (EFUDEX) 5 % external cream APPLY TOPICALLY TO LEFT LATERAL EYEBROW TWICE DAILY FOR 4 WEEKS       ibuprofen (ADVIL/MOTRIN) 200 MG capsule Take 400 mg by mouth every 6 hours as needed for fever       levothyroxine (SYNTHROID/LEVOTHROID) 88 MCG tablet Take 88 mcg by mouth daily        montelukast (SINGULAIR) 10 MG tablet Take 10 mg by mouth daily       Respiratory Therapy Supplies (NEBULIZER) JUWAN Portable nebulizer, disposable neb kit x 4, reuseable neb kit x 1, mask x 1, filters x 1.   Frequency of use: daily;  Medication: albuterol  Length of need: 99 months       sertraline (ZOLOFT) 100 MG tablet Take 100 mg by mouth daily         ALLERGIES:    Allergies   Allergen Reactions     1-Methyl 2-Pyrrolidone Anaphylaxis     Escitalopram Other (See Comments)     Asthma -a time of exacerbation and may not have been cause may retry     Mirtazapine Other (See Comments)     Asthma a time of exacerbation and may not have been cause may retry     Venlafaxine Other (See Comments)     Adhesive Tape Rash     With holter monitor. Ok with bandaids.       NEW PMH/PSH: None    REVIEW OF SYSTEMS:  The patient completed a comprehensive 11 point review of systems (below), which was reviewed. Positives are as noted below.  Patient Supplied Answers to Review of Systems         PHYSICAL EXAM:  General: The patient was alert and conversant, and in no acute distress.    Neck: No palpable cervical lymphadenopathy, mild tenderness to palpation of the thyrohyoid space, which was narrow. No obvious thyroid abnormality.  Resp: Breathing comfortably, no stridor or stertor. Faintly audible noise with rapid inhalation.  Neuro: Symmetric facial function. Other cranial nerve function as documented above.  Psych: Normal affect, pleasant and cooperative.  Voice/speech: Moderate to severe dysphonia characterized by breathiness, roughness and strain.      Procedure:   Flexible fiberoptic laryngoscopy and laryngovideostroboscopy  Indications: This procedure was warranted to evaluate the patient's laryngeal anatomy and function. Risks, benefits, and alternatives were discussed.  Description: After written informed consent was obtained, a time-out was performed to confirm patient identity, procedure, and procedure site. Topical 3%  lidocaine with 0.25% phenylephrine was applied to the nasal cavities. I performed the endoscopy and no complications were apparent. Continuous and stroboscopic light were utilized to assess routine phonation and variable frequency phonation.  Performed by: Nikole Davis MD MPH  Findings: Normal nasopharynx. Normal base of tongue, valleculae, and epiglottis. Vocal fold mobility: right: normal; left: normal. Medial edges of the vocal folds: smooth and straight anteriorly. Right anterior vocal fold with broad thin leukoplakia vs fibrosis. Posterior larynx with multiple clusters of papilloma as well as some crusting. Mucosa of false vocal folds, aryepiglottic folds, piriform sinuses unremarkable. Scattered whitish plaques consistent with fungal laryngitis noted in postcricoid region. Airway was patent.   Similar findings on NBI.    The addition of stroboscopy allowed evaluation of the mucosal wave.   Amplitude: right: mildly decreased; left: moderately decreased. Symmetry: associated asymmetry. Closure pattern: incomplete due to mass effect of papilloma. Closure plane: at glottic level. Phase distribution: normal.                      IMPRESSION AND PLAN:   Asya Coffman returns with progression of her papilloma, as expected. She does have a case booked with me in the OR next month. Given rapid regrowth I will inquire about the possibility of a sooner OR date, to reduce the risk of respiratory compromise.    Plan:  -OR for MDL with CO2 laser, hopefully sooner than currently scheduled  -the patient states her daughter will tell me more about the NIH trial via [employer]  -she is going to schedule her next HPV vaccination shot. She had been hesitant due to cost but understands that it may lead to slower regrowth, which would be very helpful. 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    Today's  visit required additional screening time, PPE, and cleaning measures to allow for a safe in-person visit, due to the public health emergency. I spent a total of 32 minutes on 11/21/2022 in chart review, review of tests, patient visit, documentation, care coordination, and/or discussion with other providers about the issues documented above, separate from any documented procedure(s).        Again, thank you for allowing me to participate in the care of your patient.      Sincerely,    Nikole Davis MD

## 2022-11-21 NOTE — PATIENT INSTRUCTIONS
1.  You were seen in the ENT Clinic today by . If you have any questions or concerns after your appointment, please call [phone]. Press option #1 for scheduling related needs. Press option #3 for Nurse advice.    2.   has recommended the following:   -     3.  Plan is to return to clinic       Blanca Hyman LPN  [phone]  [facility] - Otolaryngology

## 2022-11-25 ENCOUNTER — TELEPHONE (OUTPATIENT)
Dept: SURGERY | Facility: CLINIC | Age: 77
End: 2022-11-25

## 2022-11-26 NOTE — TELEPHONE ENCOUNTER
Brief ENT Progress Note  11/25/22    HPI: Hannah is a 77-year-old female with a complex laryngeal history recurrent papillomas who saw Dr. Davis just 4 days ago in clinic. She was noted to have worsening of her dysphonia and slight worsening of her respiratory symptoms with regrowth of her laryngeal lesions, but no acute concerns at that time. She is scheduled for surgery December 15th however Dr. Davis discussed moving the surgery up given the rapid worsening of her laryngeal lesions.    Subjective/interval: Patient's daughter called into call center this evening to inform us that she feels her mother's respiratory status has worsened. She feels that her breathing is a little louder and her voice is worsening and they were worried about her after seeing her over the holiday this week. I called the patient as well to discuss her symptoms and she agrees that her voice and breathing are feeling a little worse, however she does feel that she is at her baseline for activity tolerance (walking up stairs and through the house). She sounds dysphonic on the phone, but is speaking in full sentences and does not sound out of breath.    A&P: Hannah is a 77-year-old female with a complex laryngeal history with rapid regrowth of her laryngeal lesions recently noted at her clinic appointment this past Monday. She calls in today with slight worsening of her respiratory status which I believe warrants continued efforts to move her surgery up. She and her daughter do not feel that she needs attention urgently or emergently, however I discussed with both of them that if they do become worried, it would be reasonable to present to the emergency room for airway evaluation. They also are aware that if she has an emergency and is in respiratory distress, she should present to the closest emergency room for evaluation. I will also send an Epic message to Dr. Davis to update her on the patient's status.    Patient discussed with staff on  callDr. Summers.    Nelly Beasley MD   Otolaryngology-Head & Neck Surgery PGY2  Please contact ENT on call with questions

## 2022-11-28 ENCOUNTER — TELEPHONE (OUTPATIENT)
Dept: OTOLARYNGOLOGY | Facility: CLINIC | Age: 77
End: 2022-11-28

## 2022-11-28 NOTE — TELEPHONE ENCOUNTER
Patient left a voicemail requesting a callback to reschedule surgery    Krys Brower, Surgery Scheduler 11/28/2022 at 11:00 AM

## 2022-11-28 NOTE — TELEPHONE ENCOUNTER
Called patients phone daughter answered.   Informed daughter and patient that we are trying to get her added for Thursday... 12/1/2022. Patients daughter reports, patient believes that she will be okay until Thursday, 12/1/2022.     RN spoke with patient regarding breathing concerns.     Alyssa Loaiza on 11/28/2022 at 4:51 PM

## 2022-11-28 NOTE — TELEPHONE ENCOUNTER
[facility] Call Center    Phone Message    May a detailed message be left on voicemail: yes     Reason for Call: Symptoms or Concerns     If patient has red-flag symptoms, warm transfer to triage line    Pt's daughter is calling to get surgery scheduled ASAP, pt continued to have difficulty breathing over the weekend.  Did not want to be transferred to Triage again (      Action Taken: Message routed to:  [facility] ENT    Travel Screening: Not Applicable    Caller reporting the following red-flag symptom(s): difficulty breathing    Per the system red-flag symptom policy, patient was instructed to:  speak with a Registered Nurse    Action:  Refuses to speak with a Registered Nurse and wants a message sent to their provider

## 2022-11-28 NOTE — TELEPHONE ENCOUNTER
Left message for patients daughter letting her know that we are working on getting case scheduled for Thursday, 12/1/2022. Phone goes straight to ... will attempt patients phone.     Alyssa Loaiza on 11/28/2022 at 4:46 PM   P: [phone]

## 2022-11-29 NOTE — TELEPHONE ENCOUNTER
Contacted patient regarding scheduled surgery for Thursday. Patient no longer needs to present to ED for surgery on Thursday with Dr. Davis.    She is at home today and not with her daughter. She will update her daughter that planned arrival time is 10:00 a.m. but will check on if daughter or grand-daughter will be bringing her/home. She will arrive as usual on the 3rd floor pre-op check-in. She was pleased that she has a scheduled time.    Pt has appointment at the [facility] today for her pre-op and she will plan to get COVID tested at the same time.     Patient aware that pre-op RN will call 2-3 days prior to surgery with arrival time and instructions Yes     All patients questions were answered and was instructed to call back with any questions or concerns.     Alyssa Loaiza on 11/29/2022 at 10:58 AM

## 2022-11-30 ENCOUNTER — ANESTHESIA EVENT (OUTPATIENT)
Dept: SURGERY | Facility: CLINIC | Age: 77
End: 2022-11-30
Payer: COMMERCIAL

## 2022-12-01 ENCOUNTER — ANESTHESIA (OUTPATIENT)
Dept: SURGERY | Facility: CLINIC | Age: 77
End: 2022-12-01
Payer: COMMERCIAL

## 2022-12-01 ENCOUNTER — HOSPITAL ENCOUNTER (OUTPATIENT)
Facility: CLINIC | Age: 77
Discharge: HOME | End: 2022-12-01
Attending: OTOLARYNGOLOGY | Admitting: OTOLARYNGOLOGY
Payer: COMMERCIAL

## 2022-12-01 VITALS
HEIGHT: 66 IN | WEIGHT: 123.24 LBS | OXYGEN SATURATION: 97 % | DIASTOLIC BLOOD PRESSURE: 82 MMHG | RESPIRATION RATE: 16 BRPM | BODY MASS INDEX: 19.81 KG/M2 | SYSTOLIC BLOOD PRESSURE: 116 MMHG | TEMPERATURE: 98.6 F | HEART RATE: 70 BPM

## 2022-12-01 DIAGNOSIS — J44.9 CHRONIC OBSTRUCTIVE PULMONARY DISEASE, UNSPECIFIED COPD TYPE (H): ICD-10-CM

## 2022-12-01 DIAGNOSIS — Z78.9 RECURRENT RESPIRATORY PAPILLOMATOSIS: Primary | ICD-10-CM

## 2022-12-01 DIAGNOSIS — R49.0 DYSPHONIA: ICD-10-CM

## 2022-12-01 DIAGNOSIS — R06.02 SHORTNESS OF BREATH: ICD-10-CM

## 2022-12-01 LAB
GLUCOSE BLDC GLUCOMTR-MCNC: 98 MG/DL (ref 70–99)
HGB BLD-MCNC: 14.7 G/DL (ref 11.7–15.7)
PLATELET # BLD AUTO: 232 10E3/UL (ref 150–450)

## 2022-12-01 PROCEDURE — 250N000009 HC RX 250

## 2022-12-01 PROCEDURE — 999N000054 HC STATISTIC EKG NON-CHARGEABLE

## 2022-12-01 PROCEDURE — 31541 LARYNSCOP W/TUMR EXC + SCOPE: CPT | Mod: GC | Performed by: OTOLARYNGOLOGY

## 2022-12-01 PROCEDURE — 36415 COLL VENOUS BLD VENIPUNCTURE: CPT | Performed by: ANESTHESIOLOGY

## 2022-12-01 PROCEDURE — 710N000012 HC RECOVERY PHASE 2, PER MINUTE: Performed by: OTOLARYNGOLOGY

## 2022-12-01 PROCEDURE — 85049 AUTOMATED PLATELET COUNT: CPT | Performed by: ANESTHESIOLOGY

## 2022-12-01 PROCEDURE — 272N000001 HC OR GENERAL SUPPLY STERILE: Performed by: OTOLARYNGOLOGY

## 2022-12-01 PROCEDURE — 710N000010 HC RECOVERY PHASE 1, LEVEL 2, PER MIN: Performed by: OTOLARYNGOLOGY

## 2022-12-01 PROCEDURE — 250N000025 HC SEVOFLURANE, PER MIN: Performed by: OTOLARYNGOLOGY

## 2022-12-01 PROCEDURE — 370N000017 HC ANESTHESIA TECHNICAL FEE, PER MIN: Performed by: OTOLARYNGOLOGY

## 2022-12-01 PROCEDURE — 88305 TISSUE EXAM BY PATHOLOGIST: CPT | Mod: TC | Performed by: OTOLARYNGOLOGY

## 2022-12-01 PROCEDURE — 82962 GLUCOSE BLOOD TEST: CPT

## 2022-12-01 PROCEDURE — 93005 ELECTROCARDIOGRAM TRACING: CPT

## 2022-12-01 PROCEDURE — 88305 TISSUE EXAM BY PATHOLOGIST: CPT | Mod: 26 | Performed by: PATHOLOGY

## 2022-12-01 PROCEDURE — 258N000003 HC RX IP 258 OP 636

## 2022-12-01 PROCEDURE — 93010 ELECTROCARDIOGRAM REPORT: CPT | Mod: 59 | Performed by: INTERNAL MEDICINE

## 2022-12-01 PROCEDURE — 250N000011 HC RX IP 250 OP 636: Performed by: OTOLARYNGOLOGY

## 2022-12-01 PROCEDURE — 999N000141 HC STATISTIC PRE-PROCEDURE NURSING ASSESSMENT: Performed by: OTOLARYNGOLOGY

## 2022-12-01 PROCEDURE — 360N000077 HC SURGERY LEVEL 4, PER MIN: Performed by: OTOLARYNGOLOGY

## 2022-12-01 PROCEDURE — 85018 HEMOGLOBIN: CPT | Performed by: ANESTHESIOLOGY

## 2022-12-01 PROCEDURE — 250N000011 HC RX IP 250 OP 636

## 2022-12-01 RX ORDER — ONDANSETRON 4 MG/1
4 TABLET, ORALLY DISINTEGRATING ORAL EVERY 30 MIN PRN
Status: DISCONTINUED | OUTPATIENT
Start: 2022-12-01 | End: 2022-12-01 | Stop reason: HOSPADM

## 2022-12-01 RX ORDER — FENTANYL CITRATE 50 UG/ML
50 INJECTION, SOLUTION INTRAMUSCULAR; INTRAVENOUS EVERY 5 MIN PRN
Status: DISCONTINUED | OUTPATIENT
Start: 2022-12-01 | End: 2022-12-01 | Stop reason: HOSPADM

## 2022-12-01 RX ORDER — DEXAMETHASONE SODIUM PHOSPHATE 4 MG/ML
INJECTION, SOLUTION INTRA-ARTICULAR; INTRALESIONAL; INTRAMUSCULAR; INTRAVENOUS; SOFT TISSUE PRN
Status: DISCONTINUED | OUTPATIENT
Start: 2022-12-01 | End: 2022-12-01

## 2022-12-01 RX ORDER — SODIUM CHLORIDE, SODIUM LACTATE, POTASSIUM CHLORIDE, CALCIUM CHLORIDE 600; 310; 30; 20 MG/100ML; MG/100ML; MG/100ML; MG/100ML
INJECTION, SOLUTION INTRAVENOUS CONTINUOUS
Status: DISCONTINUED | OUTPATIENT
Start: 2022-12-01 | End: 2022-12-01 | Stop reason: HOSPADM

## 2022-12-01 RX ORDER — ONDANSETRON 2 MG/ML
INJECTION INTRAMUSCULAR; INTRAVENOUS PRN
Status: DISCONTINUED | OUTPATIENT
Start: 2022-12-01 | End: 2022-12-01

## 2022-12-01 RX ORDER — LIDOCAINE HYDROCHLORIDE 20 MG/ML
INJECTION, SOLUTION INFILTRATION; PERINEURAL PRN
Status: DISCONTINUED | OUTPATIENT
Start: 2022-12-01 | End: 2022-12-01

## 2022-12-01 RX ORDER — LIDOCAINE 40 MG/G
CREAM TOPICAL
Status: DISCONTINUED | OUTPATIENT
Start: 2022-12-01 | End: 2022-12-01 | Stop reason: HOSPADM

## 2022-12-01 RX ORDER — AMPICILLIN AND SULBACTAM 1; .5 G/1; G/1
1.5 INJECTION, POWDER, FOR SOLUTION INTRAMUSCULAR; INTRAVENOUS SEE ADMIN INSTRUCTIONS
Status: DISCONTINUED | OUTPATIENT
Start: 2022-12-01 | End: 2022-12-01 | Stop reason: HOSPADM

## 2022-12-01 RX ORDER — ONDANSETRON 2 MG/ML
4 INJECTION INTRAMUSCULAR; INTRAVENOUS EVERY 30 MIN PRN
Status: DISCONTINUED | OUTPATIENT
Start: 2022-12-01 | End: 2022-12-01 | Stop reason: HOSPADM

## 2022-12-01 RX ORDER — FENTANYL CITRATE 50 UG/ML
INJECTION, SOLUTION INTRAMUSCULAR; INTRAVENOUS PRN
Status: DISCONTINUED | OUTPATIENT
Start: 2022-12-01 | End: 2022-12-01

## 2022-12-01 RX ORDER — FENTANYL CITRATE 50 UG/ML
25 INJECTION, SOLUTION INTRAMUSCULAR; INTRAVENOUS EVERY 5 MIN PRN
Status: DISCONTINUED | OUTPATIENT
Start: 2022-12-01 | End: 2022-12-01 | Stop reason: HOSPADM

## 2022-12-01 RX ORDER — HYDROMORPHONE HYDROCHLORIDE 1 MG/ML
0.4 INJECTION, SOLUTION INTRAMUSCULAR; INTRAVENOUS; SUBCUTANEOUS EVERY 5 MIN PRN
Status: DISCONTINUED | OUTPATIENT
Start: 2022-12-01 | End: 2022-12-01 | Stop reason: HOSPADM

## 2022-12-01 RX ORDER — ESMOLOL HYDROCHLORIDE 10 MG/ML
INJECTION INTRAVENOUS PRN
Status: DISCONTINUED | OUTPATIENT
Start: 2022-12-01 | End: 2022-12-01

## 2022-12-01 RX ORDER — HYDROMORPHONE HYDROCHLORIDE 1 MG/ML
0.2 INJECTION, SOLUTION INTRAMUSCULAR; INTRAVENOUS; SUBCUTANEOUS EVERY 5 MIN PRN
Status: DISCONTINUED | OUTPATIENT
Start: 2022-12-01 | End: 2022-12-01 | Stop reason: HOSPADM

## 2022-12-01 RX ORDER — MEPERIDINE HYDROCHLORIDE 25 MG/ML
12.5 INJECTION INTRAMUSCULAR; INTRAVENOUS; SUBCUTANEOUS
Status: DISCONTINUED | OUTPATIENT
Start: 2022-12-01 | End: 2022-12-01 | Stop reason: HOSPADM

## 2022-12-01 RX ORDER — AMPICILLIN AND SULBACTAM 2; 1 G/1; G/1
3 INJECTION, POWDER, FOR SOLUTION INTRAMUSCULAR; INTRAVENOUS
Status: COMPLETED | OUTPATIENT
Start: 2022-12-01 | End: 2022-12-01

## 2022-12-01 RX ORDER — FENTANYL CITRATE 50 UG/ML
25 INJECTION, SOLUTION INTRAMUSCULAR; INTRAVENOUS
Status: DISCONTINUED | OUTPATIENT
Start: 2022-12-01 | End: 2022-12-01 | Stop reason: HOSPADM

## 2022-12-01 RX ORDER — PROPOFOL 10 MG/ML
INJECTION, EMULSION INTRAVENOUS PRN
Status: DISCONTINUED | OUTPATIENT
Start: 2022-12-01 | End: 2022-12-01

## 2022-12-01 RX ORDER — SODIUM CHLORIDE, SODIUM LACTATE, POTASSIUM CHLORIDE, CALCIUM CHLORIDE 600; 310; 30; 20 MG/100ML; MG/100ML; MG/100ML; MG/100ML
INJECTION, SOLUTION INTRAVENOUS CONTINUOUS PRN
Status: DISCONTINUED | OUTPATIENT
Start: 2022-12-01 | End: 2022-12-01

## 2022-12-01 RX ADMIN — SUGAMMADEX 400 MG: 100 INJECTION, SOLUTION INTRAVENOUS at 14:16

## 2022-12-01 RX ADMIN — Medication 20 MG: at 13:18

## 2022-12-01 RX ADMIN — DEXAMETHASONE SODIUM PHOSPHATE 8 MG: 4 INJECTION, SOLUTION INTRA-ARTICULAR; INTRALESIONAL; INTRAMUSCULAR; INTRAVENOUS; SOFT TISSUE at 12:51

## 2022-12-01 RX ADMIN — SODIUM CHLORIDE, POTASSIUM CHLORIDE, SODIUM LACTATE AND CALCIUM CHLORIDE: 600; 310; 30; 20 INJECTION, SOLUTION INTRAVENOUS at 13:39

## 2022-12-01 RX ADMIN — PROPOFOL 150 MCG/KG/MIN: 10 INJECTION, EMULSION INTRAVENOUS at 12:34

## 2022-12-01 RX ADMIN — AMPICILLIN SODIUM AND SULBACTAM SODIUM 3 G: 1; .5 INJECTION, POWDER, FOR SOLUTION INTRAMUSCULAR; INTRAVENOUS at 12:07

## 2022-12-01 RX ADMIN — FENTANYL CITRATE 50 MCG: 50 INJECTION, SOLUTION INTRAMUSCULAR; INTRAVENOUS at 13:49

## 2022-12-01 RX ADMIN — Medication 50 MG: at 12:35

## 2022-12-01 RX ADMIN — ESMOLOL HYDROCHLORIDE 20 MG: 10 INJECTION, SOLUTION INTRAVENOUS at 12:59

## 2022-12-01 RX ADMIN — PROPOFOL 50 MG: 10 INJECTION, EMULSION INTRAVENOUS at 12:37

## 2022-12-01 RX ADMIN — Medication 10 MG: at 13:50

## 2022-12-01 RX ADMIN — LIDOCAINE HYDROCHLORIDE 100 MG: 20 INJECTION, SOLUTION INFILTRATION; PERINEURAL at 12:32

## 2022-12-01 RX ADMIN — SODIUM CHLORIDE, POTASSIUM CHLORIDE, SODIUM LACTATE AND CALCIUM CHLORIDE: 600; 310; 30; 20 INJECTION, SOLUTION INTRAVENOUS at 12:07

## 2022-12-01 RX ADMIN — ONDANSETRON 4 MG: 2 INJECTION INTRAMUSCULAR; INTRAVENOUS at 14:16

## 2022-12-01 RX ADMIN — ESMOLOL HYDROCHLORIDE 30 MG: 10 INJECTION, SOLUTION INTRAVENOUS at 12:41

## 2022-12-01 RX ADMIN — FENTANYL CITRATE 100 MCG: 50 INJECTION, SOLUTION INTRAMUSCULAR; INTRAVENOUS at 12:39

## 2022-12-01 RX ADMIN — AMPICILLIN SODIUM AND SULBACTAM SODIUM 1.5 G: 1; .5 INJECTION, POWDER, FOR SOLUTION INTRAMUSCULAR; INTRAVENOUS at 14:06

## 2022-12-01 RX ADMIN — REMIFENTANIL HYDROCHLORIDE 0.1 MCG/KG/MIN: 1 INJECTION, POWDER, LYOPHILIZED, FOR SOLUTION INTRAVENOUS at 12:34

## 2022-12-01 RX ADMIN — AMPICILLIN SODIUM AND SULBACTAM SODIUM 3 G: 2; 1 INJECTION, POWDER, FOR SOLUTION INTRAMUSCULAR; INTRAVENOUS at 11:45

## 2022-12-01 RX ADMIN — PROPOFOL 100 MG: 10 INJECTION, EMULSION INTRAVENOUS at 12:35

## 2022-12-01 ASSESSMENT — COPD QUESTIONNAIRES
CAT_SEVERITY: SEVERE
COPD: 1

## 2022-12-01 ASSESSMENT — ENCOUNTER SYMPTOMS: DYSRHYTHMIAS: 1

## 2022-12-01 ASSESSMENT — ACTIVITIES OF DAILY LIVING (ADL)
ADLS_ACUITY_SCORE: 22
ADLS_ACUITY_SCORE: 22
ADLS_ACUITY_SCORE: 35
ADLS_ACUITY_SCORE: 22

## 2022-12-01 NOTE — BRIEF OP NOTE
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]  Dysphonia [R49.0]  Post-operative diagnosis Same as pre-operative diagnosis    Procedure: Procedure(s):  Microdirect laryngoscopy with excision/ablation of laryngeal lesions, biopsies,   CO2 laser  Surgeon: Surgeon(s) and Role:     * Nikole Davis MD - Primary   Resident: Lena Diego MD PGY-1  Anesthesia: General   Estimated Blood Loss: Minimal    Drains: None  Specimens:   ID Type Source Tests Collected by Time Destination   1 : posterier Larynx Tissue Other SURGICAL PATHOLOGY EXAM Nikole Davis MD 12/1/2022 12:55 PM      Findings:   Large papillomatous lesions in the posterior supraglottis and glottis. Airway widely patent after resection.  Complications: None.  Implants: * No implants in log *

## 2022-12-01 NOTE — ANESTHESIA CARE TRANSFER NOTE
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    Anesthesia Type:   General     Note:    Oropharynx: oropharynx clear of all foreign objects and spontaneously breathing  Level of Consciousness: drowsy  Oxygen Supplementation: face mask  Level of Supplemental Oxygen (L/min / FiO2): 8  Independent Airway: airway patency satisfactory and stable  Dentition: dentition unchanged  Vital Signs Stable: post-procedure vital signs reviewed and stable  Report to RN Given: handoff report given  Patient transferred to: PACU    Handoff Report: Identifed the Patient, Identified the Reponsible Provider, Reviewed the pertinent medical history, Discussed the surgical course, Reviewed Intra-OP anesthesia mangement and issues during anesthesia, Set expectations for post-procedure period and Allowed opportunity for questions and acknowledgement of understanding      Vitals:  Vitals Value Taken Time   /84    Temp     Pulse 112 12/01/22 1445   Resp 26 12/01/22 1445   SpO2 97 % 12/01/22 1445   Vitals shown include unvalidated device data.    Electronically Signed By: VERONICA Montero CRNA  December 1, 2022  2:46 PM

## 2022-12-01 NOTE — ANESTHESIA PREPROCEDURE EVALUATION
Anesthesia Pre-Procedure Evaluation    Patient: Asya Coffman   MRN: [id number] : 1945        Procedure : Procedure(s):  Microdirect laryngoscopy with excision/ablation of laryngeal lesions, possible biopsies, possible CO2 laser          Past Medical History:   Diagnosis Date     A-fib (H)      Antiplatelet or antithrombotic long-term use      Arrhythmia      COPD (chronic obstructive pulmonary disease) (H)       Past Surgical History:   Procedure Laterality Date     LASER CO2 LARYNGOSCOPY, COMPLEX N/A 2022    Procedure: Micro direct laryngoscopy with excision/ablation of laryngeal lesions, possible biopsies, possible CO2 laser;  Surgeon: Nikole Davis MD;  Location: [facility] OR     LASER CO2 LARYNGOSCOPY, COMPLEX N/A 9/15/2022    Procedure: Microdirect laryngoscopy with ablation of laryngeal lesions,biopsies,CO2 laser;  Surgeon: Nikole Davis MD;  Location: [facility] OR     LASER KTP LARYNGOSCOPY FLEXIBLE N/A 2022    Procedure: Transnasal flexible laryngoscopy with KTP laser and biopsies;  Surgeon: Nikole Davis MD;  Location: [facility] OR     LASER KTP LARYNGOSCOPY FLEXIBLE N/A 10/27/2022    Procedure: Transnasal flexible laryngoscopy with possible KTP laser;  Surgeon: Nikole Davis MD;  Location: [facility] OR      Allergies   Allergen Reactions     1-Methyl 2-Pyrrolidone Anaphylaxis     Escitalopram Other (See Comments)     Asthma -a time of exacerbation and may not have been cause may retry     Mirtazapine Other (See Comments)     Asthma a time of exacerbation and may not have been cause may retry     Venlafaxine Other (See Comments)     Adhesive Tape Rash     With holter monitor. Ok with bandaids.      Social History     Tobacco Use     Smoking status: Never     Smokeless tobacco: Not on file   Substance Use Topics     Alcohol use: Never      Wt Readings from Last 1 Encounters:   22 55.9 kg (123 lb 3.8 oz)        Anesthesia Evaluation   Pt has had prior  anesthetic. Type: General.        ROS/MED HX  ENT/Pulmonary:     (+) Moderate Persistent, asthma severe,  COPD,     Neurologic:       Cardiovascular:     (+) -----Taking blood thinners dysrhythmias, a-fib,     METS/Exercise Tolerance:     Hematologic:       Musculoskeletal:       GI/Hepatic:       Renal/Genitourinary:       Endo:     (+) thyroid problem, hypothyroidism,     Psychiatric/Substance Use:       Infectious Disease:       Malignancy:       Other:            Physical Exam    Airway        Mallampati: III    Neck ROM: full     Respiratory Devices and Support         Dental       (+) missing      Cardiovascular          Rhythm and rate: irregular     Pulmonary           (+) decreased breath sounds           OUTSIDE LABS:  CBC:   Lab Results   Component Value Date    HGB 14.7 12/01/2022     12/01/2022     BMP:   Lab Results   Component Value Date    POTASSIUM 3.9 09/15/2022    CR 1.17 (H) 09/15/2022    GLC 98 12/01/2022    GLC 88 09/15/2022     COAGS: No results found for: PTT, INR, FIBR  POC: No results found for: BGM, HCG, HCGS  HEPATIC: No results found for: ALBUMIN, PROTTOTAL, ALT, AST, GGT, ALKPHOS, BILITOTAL, BILIDIRECT, REE  OTHER: No results found for: PH, LACT, A1C, ESSIE, PHOS, MAG, LIPASE, AMYLASE, TSH, T4, T3, CRP, SED    Anesthesia Plan    ASA Status:  3   NPO Status:  NPO Appropriate    Anesthesia Type: General.     - Airway: Native airway   Induction: Intravenous.   Maintenance: TIVA.   Techniques and Equipment:     - Airway: Jet ventilation, Video-Laryngoscope     - Lines/Monitors: 2nd IV     Consents    Anesthesia Plan(s) and associated risks, benefits, and realistic alternatives discussed. Questions answered and patient/representative(s) expressed understanding.     - Discussed: Risks, Benefits and Alternatives for BOTH SEDATION and the PROCEDURE were discussed     - Discussed with:  Patient    Use of blood products discussed: Yes.     - Discussed with: Patient.     Postoperative  Care    Pain management: IV analgesics.   PONV prophylaxis: Ondansetron (or other 5HT-3), Dexamethasone or Solumedrol     Comments:                Eliceo Cardoso MD

## 2022-12-01 NOTE — PROGRESS NOTES
Patient's discharge instructions were reviewed over the phone with her daughter Lana.  All questions answered.  Daughter will be picking the patient up, and confirmed that she will be staying with her overnight.  Sent hard copy of discharge instructions home with the patient.

## 2022-12-01 NOTE — DISCHARGE INSTRUCTIONS
[facility], [place] // Same-Day Surgery // Adult Discharge Orders & Instructions     Take it easy when you get home.  Remember, same day surgery DOES NOT MEAN SAME DAY RECOVERY! Healing is a gradual process.   You will need some time to recover- you may be more tired than you realize at first.  Rest and relax for at least the first 24 hours at home.  You'll feel better and heal faster if you take good care of yourself.     ANESTHESIA RECOVERY -   For 24 hours after surgery    Get plenty of rest.  A responsible adult must stay with you for at least 24 hours after you leave the hospital.   Do not drive or use heavy equipment.  If you have weakness or tingling, don't drive or use heavy equipment until this feeling goes away.  Do not drink alcohol.  Avoid strenuous or risky activities.  Ask for help when climbing stairs.   You may feel lightheaded.  IF so, sit for a few minutes before standing.  Have someone help you get up.   If you have nausea (feel sick to your stomach): Drink only clear liquids such as apple juice, ginger ale, broth or 7-Up.  Rest may also help.  Be sure to drink enough fluids.  Move to a regular diet as you feel able.  You may have a slight fever. Call the doctor if your fever is over 100 F (37.7 C) (taken under the tongue) or lasts longer than 24 hours.  You may have a dry mouth, a sore throat, muscle aches or trouble sleeping.  These should go away after 24 hours.  Do not make important or legal decisions.     Call your doctor for any of the followin.  Signs of infection (fever, growing tenderness at the surgery site, a large amount of drainage or bleeding, severe pain, foul-smelling drainage, redness, swelling).  2. It has been over 8 to 10 hours since surgery and you are still not able to urinate (pass water).  3.  Headache for over 24 hours.    To contact a doctor, call:  Dr Davis at  the ENT- Otolaryngology Clinic at [phone] and ask for  the resident on call for Otolaryngology (answered 24 hours a day)  Emergency Department: [facility]: [phone] (TTY for hearing impaired: [phone])

## 2022-12-01 NOTE — PROVIDER NOTIFICATION
Dr. Adan at bedside, okay to transition to phase 2. Continue to work on IS and O2 goal to discharge is >92% on room air.

## 2022-12-01 NOTE — OP NOTE
PROCEDURE(S):  Micro direct laryngoscopy with biopsies, ablation of laryngeal lesions, CO2 laser    PRE-OPERATIVE DIAGNOSIS:   Laryngeal mass [J38.7]  Shortness of breath [R06.02]  Dysphonia [R49.0]  Chronic obstructive pulmonary disease, unspecified     POST-OPERATIVE DIAGNOSIS:   As above    SURGEON: Nikole Davis MD MPH    ASSISTANT: Lena Diego MD    ANAESTHESIA: High frequency jet    INDICATIONS FOR PROCEDURE:   Asya Coffman is a 77 year old year old female with a history of dysphonia and dyspnea as well as history of laryngeal papilloma who was noted to have recurrent disease that was partially obstructing her airway.  The patient wished to proceed with surgery and presented for the procedure(s) listed above. Perioperative risks, including bleeding/infection/pain, injury to surrounding structures, potential changes to tongue/voice/swallow function, risk of needing additional procedures (e.g., due to persistence or recurrence), and risks of anesthesia (including heart attack, stroke, death) were reviewed. She elected to proceed and written informed consent was performed.    DESCRIPTION OF PROCEDURE:   The patient was brought to the operating room and placed supine. A time-out was called to verify patient identity, operative site, and planned procedure. The operative site was prepped in the usual clean fashion. Moist gauze eye pads were placed and secured. A head wrap was placed, and custom molded thermoplastic tooth guards were placed. Direct laryngoscopy was performed with a Dedo laryngoscope, which was placed in suspension.    The majority of the mass was debulked with cup biopsy forceps under telescopic visualization, with care taken to avoid unintended demucosalization of the true vocal folds. Initially the exposure was limited to visualization of the supraglottic disease, with only a sliver of the true vocal folds visible beyond. Fortunately she tolerated apnea well, which allowed significant  debulking. After the mass was debulked substantially, the laser jet catheter was placed through the laryngoscope and high frequency jet ventilation was successfully established. Further biopsies were taken and submitted to pathology as listed. As needed for access, the laser jet catheter was removed and portions of the biopsies were done under apnea.    A laser safety timeout was called. Laser safety precautions were utilized at all times, including eye protection for the patient and staff, use of wet towels, and holding jet ventilation when firing the laser. The [employer] CO2 Accublade laser was attached to the microscope and used at a depth setting of 1 and 8 Cardoza. Jet ventilation was held and the laser jet catheter removed during laser use. The laser was used in a [place] setting under microscopic visualization to ablate the aspects of the mass that involved the medial portion of the bilateral true vocal folds; there was greater disease burden today on the right but both true vocal folds had heavy involvement along the middle and posterior aspects. There was bilateral extension of the disease infraglottically as well. A small strip of papilloma along the center of the posterior larynx and infraglottis was left untreated to reduce the risk of posterior airway stenosis. The caliber of the airway was much improved.    As needed during the procedure and at the conclusion of the procedure, the operating microscope was moved out of position and the 0 degree rigid endoscopes were again used to examine the vocal folds and confirm completion of the stated objectives of the procedure. Cottonoids soaked in 1:10,000 epinephrine were sparingly used to maintain hemostasis throughout the case.     After cottonoid and sponge counts were confirmed correct, 3 cc of 4% lidocaine were applied transglottically for laryngotracheal anesthesia. The oropharynx and trachea were suctioned clean. The lips, teeth, and tongue were examined and  no injuries were noted.  Care of the patient was returned to Anesthesia for emergence and recovery.    FINDINGS:   Large papillomatous posterior laryngeal masses involving posterior supraglottis and both mid-posterior true vocal folds and extending infraglottically.    SPECIMEN(S):   Posterior laryngeal mass    DRAINS: None    ESTIMATED BLOOD LOSS: Minimal    COMPLICATIONS: None    DISPOSITION: Stable to PACU    ATTENDING PRESENCE STATEMENT:  I was present and participating for the entire procedure from beginning to completion.

## 2022-12-01 NOTE — PROGRESS NOTES
Telephone Note  Spoke with daughter Lana. We agreed upon plan for repeat OR in 2 months given that this last time, she required surgery somewhat urgently at 2.5 months.    Also discussed:  -they will check with her PCP as to whether there are any modifiable factors affecting her kidney function, which excluded her from the current NIH RRP trial.  -nebulizers are fine, and staying hydrated will be helpful with managing secretions. Mucinex is fine if she finds it helpful.  -one option could be to seek [facility] consultation for possible systemic Avastin. Discussed that I do not know if she would be felt to be a good candidate, it is unknown whether insurance would cover it, and and ongoing monitoring would be needed, but it may slow the disease somewhat.  -continue with HPV vaccination series.    Lana expressed appreciation for our care and will stay in contact with us.

## 2022-12-01 NOTE — ANESTHESIA POSTPROCEDURE EVALUATION
Patient: Asya Coffman    Procedure: Procedure(s):  Microdirect laryngoscopy with excision/ablation of laryngeal lesions, biopsies,   CO2 laser       Anesthesia Type:  General    Note:  Disposition: Outpatient   Postop Pain Control: Uneventful            Sign Out: Well controlled pain   PONV: No   Neuro/Psych: Uneventful            Sign Out: Acceptable/Baseline neuro status   Airway/Respiratory: Uneventful            Sign Out: Acceptable/Baseline resp. status   CV/Hemodynamics: Uneventful            Sign Out: Acceptable CV status; No obvious hypovolemia; No obvious fluid overload   Other NRE: NONE   DID A NON-ROUTINE EVENT OCCUR? No           Last vitals:  Vitals Value Taken Time   BP 99/67 12/01/22 1500   Temp 36.4  C (97.6  F) 12/01/22 1445   Pulse 79 12/01/22 1501   Resp 23 12/01/22 1501   SpO2 97 % 12/01/22 1501   Vitals shown include unvalidated device data.    Electronically Signed By: Eliceo Cardoso MD  December 1, 2022  3:01 PM

## 2022-12-02 NOTE — PLAN OF CARE
Spoke with Dr. CLAUDIO Diego concerning re-starting Eliquis. Patient can re-start home Eliquis medication dosing on Sunday December 4th, 2023. This information was relayed to daughter, Pinky, via phone.

## 2022-12-07 LAB
ATRIAL RATE - MUSE: 300 BPM
DIASTOLIC BLOOD PRESSURE - MUSE: NORMAL MMHG
INTERPRETATION ECG - MUSE: NORMAL
P AXIS - MUSE: NORMAL DEGREES
PR INTERVAL - MUSE: NORMAL MS
QRS DURATION - MUSE: 82 MS
QT - MUSE: 322 MS
QTC - MUSE: 415 MS
R AXIS - MUSE: 262 DEGREES
SYSTOLIC BLOOD PRESSURE - MUSE: NORMAL MMHG
T AXIS - MUSE: 15 DEGREES
VENTRICULAR RATE- MUSE: 100 BPM

## 2022-12-12 ENCOUNTER — HOSPITAL ENCOUNTER (OUTPATIENT)
Facility: CLINIC | Age: 77
End: 2022-12-12
Attending: OTOLARYNGOLOGY | Admitting: OTOLARYNGOLOGY
Payer: COMMERCIAL

## 2022-12-12 ENCOUNTER — TELEPHONE (OUTPATIENT)
Dept: OTOLARYNGOLOGY | Facility: CLINIC | Age: 77
End: 2022-12-12

## 2022-12-12 NOTE — TELEPHONE ENCOUNTER
Called patient to schedule surgery with Dr. Davis    Date of Surgery: 2/16    Location of surgery: [facility]    Pre-Op H&P: PCP Janna Calderón    Pre/Post Imaging:  Not Applicable    Discussed COVID-19 Testing: Yes home test    Post-Op Appt Date: 3 weeks    Surgery Packet Mailed: 12/13      Additional comments: ARIE Brower on 12/12/2022 at 9:54 AM

## 2022-12-19 ENCOUNTER — OFFICE VISIT (OUTPATIENT)
Dept: OTOLARYNGOLOGY | Facility: CLINIC | Age: 77
End: 2022-12-19
Payer: COMMERCIAL

## 2022-12-19 VITALS
HEART RATE: 81 BPM | WEIGHT: 121.4 LBS | BODY MASS INDEX: 19.51 KG/M2 | HEIGHT: 66 IN | DIASTOLIC BLOOD PRESSURE: 66 MMHG | SYSTOLIC BLOOD PRESSURE: 148 MMHG | OXYGEN SATURATION: 97 %

## 2022-12-19 DIAGNOSIS — Z78.9 RECURRENT RESPIRATORY PAPILLOMATOSIS: Primary | ICD-10-CM

## 2022-12-19 DIAGNOSIS — R49.0 DYSPHONIA: ICD-10-CM

## 2022-12-19 DIAGNOSIS — N18.31 STAGE 3A CHRONIC KIDNEY DISEASE (H): ICD-10-CM

## 2022-12-19 DIAGNOSIS — R06.02 SHORTNESS OF BREATH: ICD-10-CM

## 2022-12-19 PROCEDURE — 99215 OFFICE O/P EST HI 40 MIN: CPT | Mod: 25 | Performed by: OTOLARYNGOLOGY

## 2022-12-19 PROCEDURE — 31579 LARYNGOSCOPY TELESCOPIC: CPT | Performed by: OTOLARYNGOLOGY

## 2022-12-19 ASSESSMENT — PAIN SCALES - GENERAL: PAINLEVEL: NO PAIN (0)

## 2022-12-19 NOTE — PROGRESS NOTES
Dear Colleague:    Asya Coffman recently returned for follow-up at the [facility]. My clinic note from our visit is enclosed below.  Speech recognition software may have been used in the documentation below; input is reviewed before signature to the best of my ability.     I appreciate the ongoing opportunity to participate in this patient's care.    Please feel free to contact me with any questions.    Sincerely yours,      Nikole Davis M.D., M.P.H.  , Laryngology  Director, [facility]  Otolaryngology- Head & Neck Surgery  [phone]          =====  HISTORY OF PRESENT ILLNESS:  Asya Coffman is a pleasant 77-year-old female with a past medical history including CREST syndrome, COPD, chronic kidney disease, atrial fibrillation and a complex laryngeal history including a prior benign lesion, severe dysplasia, and laryngeal papilloma.    Her voice trouble began in 2015, with a gradual onset, with no obvious inciting event.    9/29/15 Direct micro laryngoscopy with biopsy; pathology benign.    In summer 2020, she again developed gradual onset dysphonia.    10/13/2020 MicroDirect laryngoscopy and biopsy with Dr Siegel; pathology: biopsy with atypical verrucous squamous proliferation but inadequate submucosa to determine deeper aspect.    11/9/2020 Microlaryngoscopy with excision of vocal cord lesion with KTP laser with Dr Patricia; pathology: low grade dysplasia of the left posterior true vocal fold.    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    In July 2021, she was seen again with now a papillomatous lesion in the post cricoid area.    8/30/21 Micro Left Direct laryngoscopy with KTP laser with Dr Patricia; pathology: squamous papilloma and low grade dysplasia.    January  2022 had some worsening of hoarseness. When seen in March 2022, she was noted to have return of squamous papilloma, and was referred here.     Under my care:  5/26/22 MDL with debulking and CO2 laser treatment of laryngeal papilloma; pathology: squamous papilloma    Rapid regrowth.    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     9/15/22 MDL with debulking and CO2 laser treatment of laryngeal papilloma; pathology: papilloma, moderate dysplasia and inflammation.    Inquired about NIH RRP trial, but was not eligible because of elevated creatinine.    12/1/22 Micro direct laryngoscopy with biopsies, ablation of laryngeal lesions, CO2 laser; mild to moderate dysplasia, and focal areas of severe squamous dysplasia      Today's updates:  1) Breathing and voice are better since surgery.  2) No new past medical history/past surgical history.   3) HPV vaccine third shot is coming up in April.  4) She had multiple questions related to anesthesia at last case, especially IV placement which led to numerous sticks.  5) She has concerns about parking.      MEDICATIONS:     Current Outpatient Medications   Medication Sig Dispense Refill     albuterol (PROAIR HFA/PROVENTIL HFA/VENTOLIN HFA) 108 (90 Base) MCG/ACT inhaler Inhale 2 puffs into the lungs as needed       albuterol (PROVENTIL) (2.5 MG/3ML) 0.083% neb solution Inhale 2.5 mg into the lungs       apixaban ANTICOAGULANT (ELIQUIS) 5 MG tablet Take 5 mg by mouth daily       atorvastatin (LIPITOR) 20 MG tablet Take 20 mg by mouth daily       budesonide-formoterol (SYMBICORT) 160-4.5 MCG/ACT Inhaler Inhale 2 puffs into the lungs daily       diltiazem ER (TIAZAC) 360 MG 24 hr ER beaded capsule Take 360 mg by mouth daily       fexofenadine (ALLEGRA) 180 MG tablet Take 180 mg by mouth daily       fluorouracil (EFUDEX) 5 % external cream APPLY TOPICALLY TO LEFT LATERAL EYEBROW TWICE DAILY FOR 4 WEEKS       ibuprofen  (ADVIL/MOTRIN) 200 MG capsule Take 400 mg by mouth every 6 hours as needed for fever       levothyroxine (SYNTHROID/LEVOTHROID) 88 MCG tablet Take 88 mcg by mouth daily       montelukast (SINGULAIR) 10 MG tablet Take 10 mg by mouth daily       Respiratory Therapy Supplies (NEBULIZER) JUWAN Portable nebulizer, disposable neb kit x 4, reuseable neb kit x 1, mask x 1, filters x 1.   Frequency of use: daily;  Medication: albuterol  Length of need: 99 months       sertraline (ZOLOFT) 100 MG tablet Take 100 mg by mouth daily         ALLERGIES:    Allergies   Allergen Reactions     1-Methyl 2-Pyrrolidone Anaphylaxis     Escitalopram Other (See Comments)     Asthma -a time of exacerbation and may not have been cause may retry     Mirtazapine Other (See Comments)     Asthma a time of exacerbation and may not have been cause may retry     Venlafaxine Other (See Comments)     Adhesive Tape Rash     With holter monitor. Ok with bandaids.       NEW PMH/PSH: None    REVIEW OF SYSTEMS:  The patient completed a comprehensive 11 point review of systems (below), which was reviewed. Positives are as noted below.  Patient Supplied Answers to Review of Systems   ENT ROS 12/19/2022   Cardiopulmonary: Breathing problems       PHYSICAL EXAM:  General: The patient was alert and conversant, and in no acute distress.    Oral cavity/oropharynx: No masses or lesions. Dentition unchanged since prior. Tongue mobility and palate elevation intact and symmetric.  Neck: No palpable cervical lymphadenopathy, no significant tenderness to palpation of the thyrohyoid space, which was narrow. No obvious thyroid abnormality.  Resp: Breathing comfortably, no stridor or stertor.  Neuro: Symmetric facial function. Other cranial nerve function as documented above.  Psych: Normal affect, pleasant and cooperative.  Voice/speech: Mild dysphonia characterized by breathiness, roughness and strain.      Procedure:   Flexible fiberoptic laryngoscopy and  laryngovideostroboscopy  Indications: This procedure was warranted to evaluate the patient's laryngeal anatomy and function. Risks, benefits, and alternatives were discussed.  Description: After written informed consent was obtained, a time-out was performed to confirm patient identity, procedure, and procedure site. Topical 3% lidocaine with 0.25% phenylephrine was applied to the nasal cavities. I performed the endoscopy and no complications were apparent. Continuous and stroboscopic light were utilized to assess routine phonation and variable frequency phonation.  Performed by: Nikole Davis MD MPH  Findings: Normal nasopharynx. Normal base of tongue, valleculae, and epiglottis. Vocal fold mobility: right: normal; left: normal. Medial edges of the vocal folds: smooth and straight. No focal mucosal lesions were observed on the true vocal folds. Glissade produced appropriate elongation. Mucosa of false vocal folds, aryepiglottic folds, piriform sinuses, and posterior glottis notable for posterior laryngeal papilloma along the right posterior commissure; mild webbing of the posterior glottis that is now visible with left posterior laryngeal papilloma burden substantially reduced. This does not restrict vocal fold movement. Airway was patent.   NBI highlights right true vocal fold leukoplakia vs post-operative changes. Scattered posterior laryngeal leukoplakia consistent with prior history of fungal laryngitis in the context of steroid inhaler use.    The addition of stroboscopy allowed evaluation of the mucosal wave.   Amplitude: right: mildly decreased; left: normal. Symmetry: intermittent symmetry. Closure pattern: complete. Closure plane: at glottic level. Phase distribution: normal.                                IMPRESSION AND PLAN:   Asya Coffman returns with appropriate postoperative changes. Currently her papilloma burden is low, fortunately.    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 There is literature supporting intralesional/sublesional Avastin to potentially reduce the rate of recurrence, and it is reportedly well-tolerated. She is interested in this. I submitted a Case Modification to reflect this update.    Regarding her anesthesia concerns (including IV start, mask) from the prior procedure, I asked her to discuss this with the Anesthesia team next time to avoid similar challenges. She may need ultrasound-guided IV placement. I will also try to help but sometimes the timing can be unpredictable.    2) She will contact us if she notes any change in her breathing prior to the scheduled OR date. If that occurs, we will need to adjust her OR date sooner.    3) She will work with her primary care provider regarding her kidney function if anything can be further optimized.    I appreciate the opportunity to participate in the care of this pleasant patient.     Today's visit required additional screening time, PPE, and cleaning measures to allow for a safe in-person visit, due to the public health emergency. I spent a total of 45 minutes on 12/19/2022 in chart review, review of tests, patient visit, documentation, care coordination, and/or discussion with other providers about the issues documented above, separate from any documented procedure(s).

## 2022-12-19 NOTE — PATIENT INSTRUCTIONS
1.  You were seen in the ENT Clinic today by . If you have any questions or concerns after your appointment, please call [phone]. Press option #1 for scheduling related needs. Press option #3 for Nurse advice.    2.   has recommended the following:   - surgery. Our surgery scheduler will contact you with date and time options    3.  Plan is to return to clinic for post operative follow up. MOOSE Hyman LPN  [phone]  [facility] - Otolaryngology

## 2022-12-19 NOTE — Clinical Note
12/19/2022       RE: Asya Coffman  [address]  [facility] [address]     Dear Colleague,    Thank you for referring your patient, Asya Coffman, to the [facility] [place] [facility]. Please see a copy of my visit note below.    Dear Colleague:    Asya Coffman recently returned for follow-up at the [facility]. My clinic note from our visit is enclosed below.  Speech recognition software may have been used in the documentation below; input is reviewed before signature to the best of my ability.     I appreciate the ongoing opportunity to participate in this patient's care.    Please feel free to contact me with any questions.    Sincerely yours,      Nikole Davis M.D., M.P.H.  , Laryngology  Director, [facility]  Otolaryngology- Head & Neck Surgery  [phone]          =====  HISTORY OF PRESENT ILLNESS:  Asya Coffman is a pleasant 77-year-old female with a past medical history including CREST syndrome, COPD, chronic kidney disease, atrial fibrillation and a complex laryngeal history including a prior benign lesion, severe dysplasia, and laryngeal papilloma.    Her voice trouble began in 2015, with a gradual onset, with no obvious inciting event.    9/29/15 Direct micro laryngoscopy with biopsy; pathology benign.    In summer 2020, she again developed gradual onset dysphonia.    10/13/2020 MicroDirect laryngoscopy and biopsy with Dr Siegel; pathology: biopsy with atypical verrucous squamous proliferation but inadequate submucosa to determine deeper aspect.    11/9/2020 Microlaryngoscopy with excision of vocal cord lesion with KTP laser with Dr Patricia; pathology: low grade dysplasia of the left posterior true vocal fold.    1/13/2021 Direct microlaryngoscopy with stripping of vocal cord and microflap excision with Dr Patricia; pathology: low  grade dysplasia and papilloma of the posterior glottis; right true vocal fold with squamous papilloma.    In July 2021, she was seen again with now a papillomatous lesion in the post cricoid area.    8/30/21 Micro Left Direct laryngoscopy with KTP laser with Dr Patricia; pathology: squamous papilloma and low grade dysplasia.    January 2022 had some worsening of hoarseness. When seen in March 2022, she was noted to have return of squamous papilloma, and was referred here.     Under my care:  5/26/22 MDL with debulking and CO2 laser treatment of laryngeal papilloma; pathology: squamous papilloma    Rapid regrowth.    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     9/15/22 MDL with debulking and CO2 laser treatment of laryngeal papilloma; pathology: papilloma, moderate dysplasia and inflammation.    Inquired about NIH RRP trial, but was not eligible because of elevated creatinine.    12/1/22 Micro direct laryngoscopy with biopsies, ablation of laryngeal lesions, CO2 laser; mild to moderate dysplasia, and focal areas of severe squamous dysplasia      Today's updates:  1) Breathing and voice are better since surgery.  2) No new past medical history/past surgical history.   3) HPV vaccine third shot is coming up in April.  4) She had multiple questions related to anesthesia at last case, especially IV placement which led to numerous sticks.  5) She has concerns about parking.      MEDICATIONS:     Current Outpatient Medications   Medication Sig Dispense Refill     albuterol (PROAIR HFA/PROVENTIL HFA/VENTOLIN HFA) 108 (90 Base) MCG/ACT inhaler Inhale 2 puffs into the lungs as needed       albuterol (PROVENTIL) (2.5 MG/3ML) 0.083% neb solution Inhale 2.5 mg into the lungs       apixaban ANTICOAGULANT (ELIQUIS) 5 MG tablet Take 5 mg by mouth daily       atorvastatin (LIPITOR) 20 MG tablet Take 20 mg by mouth daily       budesonide-formoterol (SYMBICORT) 160-4.5 MCG/ACT  Inhaler Inhale 2 puffs into the lungs daily       diltiazem ER (TIAZAC) 360 MG 24 hr ER beaded capsule Take 360 mg by mouth daily       fexofenadine (ALLEGRA) 180 MG tablet Take 180 mg by mouth daily       fluorouracil (EFUDEX) 5 % external cream APPLY TOPICALLY TO LEFT LATERAL EYEBROW TWICE DAILY FOR 4 WEEKS       ibuprofen (ADVIL/MOTRIN) 200 MG capsule Take 400 mg by mouth every 6 hours as needed for fever       levothyroxine (SYNTHROID/LEVOTHROID) 88 MCG tablet Take 88 mcg by mouth daily       montelukast (SINGULAIR) 10 MG tablet Take 10 mg by mouth daily       Respiratory Therapy Supplies (NEBULIZER) JUWAN Portable nebulizer, disposable neb kit x 4, reuseable neb kit x 1, mask x 1, filters x 1.   Frequency of use: daily;  Medication: albuterol  Length of need: 99 months       sertraline (ZOLOFT) 100 MG tablet Take 100 mg by mouth daily         ALLERGIES:    Allergies   Allergen Reactions     1-Methyl 2-Pyrrolidone Anaphylaxis     Escitalopram Other (See Comments)     Asthma -a time of exacerbation and may not have been cause may retry     Mirtazapine Other (See Comments)     Asthma a time of exacerbation and may not have been cause may retry     Venlafaxine Other (See Comments)     Adhesive Tape Rash     With holter monitor. Ok with bandaids.       NEW PMH/PSH: None    REVIEW OF SYSTEMS:  The patient completed a comprehensive 11 point review of systems (below), which was reviewed. Positives are as noted below.  Patient Supplied Answers to Review of Systems   ENT ROS 12/19/2022   Cardiopulmonary: Breathing problems       PHYSICAL EXAM:  General: The patient was alert and conversant, and in no acute distress.    Oral cavity/oropharynx: No masses or lesions. Dentition unchanged since prior. Tongue mobility and palate elevation intact and symmetric.  Neck: No palpable cervical lymphadenopathy, no significant tenderness to palpation of the thyrohyoid space, which was narrow. No obvious thyroid abnormality.  Resp:  Breathing comfortably, no stridor or stertor.  Neuro: Symmetric facial function. Other cranial nerve function as documented above.  Psych: Normal affect, pleasant and cooperative.  Voice/speech: Mild dysphonia characterized by breathiness, roughness and strain.      Procedure:   Flexible fiberoptic laryngoscopy and laryngovideostroboscopy  Indications: This procedure was warranted to evaluate the patient's laryngeal anatomy and function. Risks, benefits, and alternatives were discussed.  Description: After written informed consent was obtained, a time-out was performed to confirm patient identity, procedure, and procedure site. Topical 3% lidocaine with 0.25% phenylephrine was applied to the nasal cavities. I performed the endoscopy and no complications were apparent. Continuous and stroboscopic light were utilized to assess routine phonation and variable frequency phonation.  Performed by: Nikole Davis MD MPH  Findings: Normal nasopharynx. Normal base of tongue, valleculae, and epiglottis. Vocal fold mobility: right: normal; left: normal. Medial edges of the vocal folds: smooth and straight. No focal mucosal lesions were observed on the true vocal folds. Glissade produced appropriate elongation. Mucosa of false vocal folds, aryepiglottic folds, piriform sinuses, and posterior glottis notable for posterior laryngeal papilloma along the right posterior commissure; mild webbing of the posterior glottis that is now visible with left posterior laryngeal papilloma burden substantially reduced. This does not restrict vocal fold movement. Airway was patent.   NBI highlights right true vocal fold leukoplakia vs post-operative changes. Scattered posterior laryngeal leukoplakia consistent with prior history of fungal laryngitis in the context of steroid inhaler use.    The addition of stroboscopy allowed evaluation of the mucosal wave.   Amplitude: right: mildly decreased; left: normal. Symmetry: intermittent symmetry.  Closure pattern: complete. Closure plane: at glottic level. Phase distribution: normal.                                IMPRESSION AND PLAN:   Asya Coffman returns with appropriate postoperative changes. Currently her papilloma burden is low, fortunately.    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 There is literature supporting intralesional/sublesional Avastin to potentially reduce the rate of recurrence, and literature indicates that it is well-tolerated. She is interested in this. I submitted a Case Modification to reflect this update.    Regarding her anesthesia concerns (including IV start, mask) at the prior procedure, I asked her to discuss this with the Anesthesia team next time. She may need ultrasound-guided IV placement.    2) She will contact us if she notes any change in her breathing prior to the scheduled OR date. If that occurs, we will need to adjust her OR date sooner.    3) She will work with her primary care provider regarding her kidney function if anything can be further optimized.    I appreciate the opportunity to participate in the care of this pleasant patient.     Today's visit required additional screening time, PPE, and cleaning measures to allow for a safe in-person visit, due to the public health emergency. I spent a total of 45 minutes on 12/19/2022 in chart review, review of tests, patient visit, documentation, care coordination, and/or discussion with other providers about the issues documented above, separate from any documented procedure(s).      Dear Colleague:    Asya Coffman recently returned for follow-up at the [facility]. My clinic note from our visit is enclosed below.  Speech recognition software may have been used in the documentation below;  input is reviewed before signature to the best of my ability.     I appreciate the ongoing opportunity to participate in this patient's care.    Please feel free to contact me with any questions.    Sincerely yours,      Nikole Davis M.D., M.P.H.  , Laryngology  Director, [facility]  [facility]  Otolaryngology- Head & Neck Surgery  [phone]          =====  HISTORY OF PRESENT ILLNESS:  Asya Coffman is a pleasant 77-year-old female with a past medical history including CREST syndrome, COPD, chronic kidney disease, atrial fibrillation and a complex laryngeal history including a prior benign lesion, severe dysplasia, and laryngeal papilloma.    Her voice trouble began in 2015, with a gradual onset, with no obvious inciting event.    9/29/15 Direct micro laryngoscopy with biopsy; pathology benign.    In summer 2020, she again developed gradual onset dysphonia.    10/13/2020 MicroDirect laryngoscopy and biopsy with Dr Siegel; pathology: biopsy with atypical verrucous squamous proliferation but inadequate submucosa to determine deeper aspect.    11/9/2020 Microlaryngoscopy with excision of vocal cord lesion with KTP laser with Dr Patricia; pathology: low grade dysplasia of the left posterior true vocal fold.    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    In July 2021, she was seen again with now a papillomatous lesion in the post cricoid area.    8/30/21 Micro Left Direct laryngoscopy with KTP laser with Dr Patricia; pathology: squamous papilloma and low grade dysplasia.    January 2022 had some worsening of hoarseness. When seen in March 2022, she was noted to have return of squamous papilloma, and was referred here.     Under my care:  5/26/22 MDL with debulking and CO2 laser treatment of  laryngeal papilloma; pathology: squamous papilloma    Rapid regrowth.    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     9/15/22 MDL with debulking and CO2 laser treatment of laryngeal papilloma; pathology: papilloma, moderate dysplasia and inflammation.    Inquired about NIH RRP trial, but was not eligible because of elevated creatinine.    12/1/22 Micro direct laryngoscopy with biopsies, ablation of laryngeal lesions, CO2 laser; mild to moderate dysplasia, and focal areas of severe squamous dysplasia      Today's updates:  1) Breathing and voice are better since surgery.  2) No new past medical history/past surgical history.   3) HPV vaccine third shot is coming up in April.  4) She had multiple questions related to anesthesia at last case, especially IV placement which led to numerous sticks.  5) She has concerns about parking.      MEDICATIONS:     Current Outpatient Medications   Medication Sig Dispense Refill     albuterol (PROAIR HFA/PROVENTIL HFA/VENTOLIN HFA) 108 (90 Base) MCG/ACT inhaler Inhale 2 puffs into the lungs as needed       albuterol (PROVENTIL) (2.5 MG/3ML) 0.083% neb solution Inhale 2.5 mg into the lungs       apixaban ANTICOAGULANT (ELIQUIS) 5 MG tablet Take 5 mg by mouth daily       atorvastatin (LIPITOR) 20 MG tablet Take 20 mg by mouth daily       budesonide-formoterol (SYMBICORT) 160-4.5 MCG/ACT Inhaler Inhale 2 puffs into the lungs daily       diltiazem ER (TIAZAC) 360 MG 24 hr ER beaded capsule Take 360 mg by mouth daily       fexofenadine (ALLEGRA) 180 MG tablet Take 180 mg by mouth daily       fluorouracil (EFUDEX) 5 % external cream APPLY TOPICALLY TO LEFT LATERAL EYEBROW TWICE DAILY FOR 4 WEEKS       ibuprofen (ADVIL/MOTRIN) 200 MG capsule Take 400 mg by mouth every 6 hours as needed for fever       levothyroxine (SYNTHROID/LEVOTHROID) 88 MCG tablet Take 88 mcg by mouth daily       montelukast (SINGULAIR) 10 MG tablet Take 10 mg  by mouth daily       Respiratory Therapy Supplies (NEBULIZER) JUWAN Portable nebulizer, disposable neb kit x 4, reuseable neb kit x 1, mask x 1, filters x 1.   Frequency of use: daily;  Medication: albuterol  Length of need: 99 months       sertraline (ZOLOFT) 100 MG tablet Take 100 mg by mouth daily         ALLERGIES:    Allergies   Allergen Reactions     1-Methyl 2-Pyrrolidone Anaphylaxis     Escitalopram Other (See Comments)     Asthma -a time of exacerbation and may not have been cause may retry     Mirtazapine Other (See Comments)     Asthma a time of exacerbation and may not have been cause may retry     Venlafaxine Other (See Comments)     Adhesive Tape Rash     With holter monitor. Ok with bandaids.       NEW PMH/PSH: None    REVIEW OF SYSTEMS:  The patient completed a comprehensive 11 point review of systems (below), which was reviewed. Positives are as noted below.  Patient Supplied Answers to Review of Systems   ENT ROS 12/19/2022   Cardiopulmonary: Breathing problems       PHYSICAL EXAM:  General: The patient was alert and conversant, and in no acute distress.    Oral cavity/oropharynx: No masses or lesions. Dentition unchanged since prior. Tongue mobility and palate elevation intact and symmetric.  Neck: No palpable cervical lymphadenopathy, no significant tenderness to palpation of the thyrohyoid space, which was narrow. No obvious thyroid abnormality.  Resp: Breathing comfortably, no stridor or stertor.  Neuro: Symmetric facial function. Other cranial nerve function as documented above.  Psych: Normal affect, pleasant and cooperative.  Voice/speech: Mild dysphonia characterized by breathiness, roughness and strain.      Procedure:   Flexible fiberoptic laryngoscopy and laryngovideostroboscopy  Indications: This procedure was warranted to evaluate the patient's laryngeal anatomy and function. Risks, benefits, and alternatives were discussed.  Description: After written informed consent was obtained, a  time-out was performed to confirm patient identity, procedure, and procedure site. Topical 3% lidocaine with 0.25% phenylephrine was applied to the nasal cavities. I performed the endoscopy and no complications were apparent. Continuous and stroboscopic light were utilized to assess routine phonation and variable frequency phonation.  Performed by: Nikole Davis MD MPH  Findings: Normal nasopharynx. Normal base of tongue, valleculae, and epiglottis. Vocal fold mobility: right: normal; left: normal. Medial edges of the vocal folds: smooth and straight. No focal mucosal lesions were observed on the true vocal folds. Glissade produced appropriate elongation. Mucosa of false vocal folds, aryepiglottic folds, piriform sinuses, and posterior glottis notable for posterior laryngeal papilloma along the right posterior commissure; mild webbing of the posterior glottis that is now visible with left posterior laryngeal papilloma burden substantially reduced. This does not restrict vocal fold movement. Airway was patent.   NBI highlights right true vocal fold leukoplakia vs post-operative changes. Scattered posterior laryngeal leukoplakia consistent with prior history of fungal laryngitis in the context of steroid inhaler use.    The addition of stroboscopy allowed evaluation of the mucosal wave.   Amplitude: right: mildly decreased; left: normal. Symmetry: intermittent symmetry. Closure pattern: complete. Closure plane: at glottic level. Phase distribution: normal.                                IMPRESSION AND PLAN:   Asya Coffman returns with appropriate postoperative changes. Currently her papilloma burden is low, fortunately.    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 There is literature supporting  intralesional/sublesional Avastin to potentially reduce the rate of recurrence, and it is reportedly well-tolerated. She is interested in this. I submitted a Case Modification to reflect this update.    Regarding her anesthesia concerns (including IV start, mask) from the prior procedure, I asked her to discuss this with the Anesthesia team next time to avoid similar challenges. She may need ultrasound-guided IV placement. I will also try to help but sometimes the timing can be unpredictable.    2) She will contact us if she notes any change in her breathing prior to the scheduled OR date. If that occurs, we will need to adjust her OR date sooner.    3) She will work with her primary care provider regarding her kidney function if anything can be further optimized.    I appreciate the opportunity to participate in the care of this pleasant patient.     Today's visit required additional screening time, PPE, and cleaning measures to allow for a safe in-person visit, due to the public health emergency. I spent a total of 45 minutes on 12/19/2022 in chart review, review of tests, patient visit, documentation, care coordination, and/or discussion with other providers about the issues documented above, separate from any documented procedure(s).        Again, thank you for allowing me to participate in the care of your patient.      Sincerely,    Nikole Davis MD

## 2022-12-19 NOTE — LETTER
12/19/2022      RE: Asya Coffman  [address]  [facility] [address]       Dear Colleague:    Asya Coffman recently returned for follow-up at the [facility]. My clinic note from our visit is enclosed below.  Speech recognition software may have been used in the documentation below; input is reviewed before signature to the best of my ability.     I appreciate the ongoing opportunity to participate in this patient's care.    Please feel free to contact me with any questions.    Sincerely yours,      Nikole Davis M.D., M.P.H.  , Laryngology  Director, [facility]  Otolaryngology- Head & Neck Surgery  [phone]          =====  HISTORY OF PRESENT ILLNESS:  Asya Coffman is a pleasant 77-year-old female with a past medical history including CREST syndrome, COPD, chronic kidney disease, atrial fibrillation and a complex laryngeal history including a prior benign lesion, severe dysplasia, and laryngeal papilloma.    Her voice trouble began in 2015, with a gradual onset, with no obvious inciting event.    9/29/15 Direct micro laryngoscopy with biopsy; pathology benign.    In summer 2020, she again developed gradual onset dysphonia.    10/13/2020 MicroDirect laryngoscopy and biopsy with Dr Siegel; pathology: biopsy with atypical verrucous squamous proliferation but inadequate submucosa to determine deeper aspect.    11/9/2020 Microlaryngoscopy with excision of vocal cord lesion with KTP laser with Dr Patricia; pathology: low grade dysplasia of the left posterior true vocal fold.    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    In July 2021, she was seen again with now a papillomatous lesion in the post cricoid area.    8/30/21 Micro Left Direct laryngoscopy with KTP  laser with Dr Patricia; pathology: squamous papilloma and low grade dysplasia.    January 2022 had some worsening of hoarseness. When seen in March 2022, she was noted to have return of squamous papilloma, and was referred here.     Under my care:  5/26/22 MDL with debulking and CO2 laser treatment of laryngeal papilloma; pathology: squamous papilloma    Rapid regrowth.    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     9/15/22 MDL with debulking and CO2 laser treatment of laryngeal papilloma; pathology: papilloma, moderate dysplasia and inflammation.    Inquired about NIH RRP trial, but was not eligible because of elevated creatinine.    12/1/22 Micro direct laryngoscopy with biopsies, ablation of laryngeal lesions, CO2 laser; mild to moderate dysplasia, and focal areas of severe squamous dysplasia      Today's updates:  1) Breathing and voice are better since surgery.  2) No new past medical history/past surgical history.   3) HPV vaccine third shot is coming up in April.  4) She had multiple questions related to anesthesia at last case, especially IV placement which led to numerous sticks.  5) She has concerns about parking.      MEDICATIONS:     Current Outpatient Medications   Medication Sig Dispense Refill     albuterol (PROAIR HFA/PROVENTIL HFA/VENTOLIN HFA) 108 (90 Base) MCG/ACT inhaler Inhale 2 puffs into the lungs as needed       albuterol (PROVENTIL) (2.5 MG/3ML) 0.083% neb solution Inhale 2.5 mg into the lungs       apixaban ANTICOAGULANT (ELIQUIS) 5 MG tablet Take 5 mg by mouth daily       atorvastatin (LIPITOR) 20 MG tablet Take 20 mg by mouth daily       budesonide-formoterol (SYMBICORT) 160-4.5 MCG/ACT Inhaler Inhale 2 puffs into the lungs daily       diltiazem ER (TIAZAC) 360 MG 24 hr ER beaded capsule Take 360 mg by mouth daily       fexofenadine (ALLEGRA) 180 MG tablet Take 180 mg by mouth daily       fluorouracil (EFUDEX) 5 % external cream  APPLY TOPICALLY TO LEFT LATERAL EYEBROW TWICE DAILY FOR 4 WEEKS       ibuprofen (ADVIL/MOTRIN) 200 MG capsule Take 400 mg by mouth every 6 hours as needed for fever       levothyroxine (SYNTHROID/LEVOTHROID) 88 MCG tablet Take 88 mcg by mouth daily       montelukast (SINGULAIR) 10 MG tablet Take 10 mg by mouth daily       Respiratory Therapy Supplies (NEBULIZER) JUWAN Portable nebulizer, disposable neb kit x 4, reuseable neb kit x 1, mask x 1, filters x 1.   Frequency of use: daily;  Medication: albuterol  Length of need: 99 months       sertraline (ZOLOFT) 100 MG tablet Take 100 mg by mouth daily         ALLERGIES:    Allergies   Allergen Reactions     1-Methyl 2-Pyrrolidone Anaphylaxis     Escitalopram Other (See Comments)     Asthma -a time of exacerbation and may not have been cause may retry     Mirtazapine Other (See Comments)     Asthma a time of exacerbation and may not have been cause may retry     Venlafaxine Other (See Comments)     Adhesive Tape Rash     With holter monitor. Ok with bandaids.       NEW PMH/PSH: None    REVIEW OF SYSTEMS:  The patient completed a comprehensive 11 point review of systems (below), which was reviewed. Positives are as noted below.  Patient Supplied Answers to Review of Systems   ENT ROS 12/19/2022   Cardiopulmonary: Breathing problems       PHYSICAL EXAM:  General: The patient was alert and conversant, and in no acute distress.    Oral cavity/oropharynx: No masses or lesions. Dentition unchanged since prior. Tongue mobility and palate elevation intact and symmetric.  Neck: No palpable cervical lymphadenopathy, no significant tenderness to palpation of the thyrohyoid space, which was narrow. No obvious thyroid abnormality.  Resp: Breathing comfortably, no stridor or stertor.  Neuro: Symmetric facial function. Other cranial nerve function as documented above.  Psych: Normal affect, pleasant and cooperative.  Voice/speech: Mild dysphonia characterized by breathiness, roughness  and strain.      Procedure:   Flexible fiberoptic laryngoscopy and laryngovideostroboscopy  Indications: This procedure was warranted to evaluate the patient's laryngeal anatomy and function. Risks, benefits, and alternatives were discussed.  Description: After written informed consent was obtained, a time-out was performed to confirm patient identity, procedure, and procedure site. Topical 3% lidocaine with 0.25% phenylephrine was applied to the nasal cavities. I performed the endoscopy and no complications were apparent. Continuous and stroboscopic light were utilized to assess routine phonation and variable frequency phonation.  Performed by: Nikole Davis MD MPH  Findings: Normal nasopharynx. Normal base of tongue, valleculae, and epiglottis. Vocal fold mobility: right: normal; left: normal. Medial edges of the vocal folds: smooth and straight. No focal mucosal lesions were observed on the true vocal folds. Glissade produced appropriate elongation. Mucosa of false vocal folds, aryepiglottic folds, piriform sinuses, and posterior glottis notable for posterior laryngeal papilloma along the right posterior commissure; mild webbing of the posterior glottis that is now visible with left posterior laryngeal papilloma burden substantially reduced. This does not restrict vocal fold movement. Airway was patent.   NBI highlights right true vocal fold leukoplakia vs post-operative changes. Scattered posterior laryngeal leukoplakia consistent with prior history of fungal laryngitis in the context of steroid inhaler use.    The addition of stroboscopy allowed evaluation of the mucosal wave.   Amplitude: right: mildly decreased; left: normal. Symmetry: intermittent symmetry. Closure pattern: complete. Closure plane: at glottic level. Phase distribution: normal.                                IMPRESSION AND PLAN:   Asya Coffman returns with appropriate postoperative changes. Currently her papilloma burden is low,  fortunately.    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 There is literature supporting intralesional/sublesional Avastin to potentially reduce the rate of recurrence, and it is reportedly well-tolerated. She is interested in this. I submitted a Case Modification to reflect this update.    Regarding her anesthesia concerns (including IV start, mask) from the prior procedure, I asked her to discuss this with the Anesthesia team next time to avoid similar challenges. She may need ultrasound-guided IV placement. I will also try to help but sometimes the timing can be unpredictable.    2) She will contact us if she notes any change in her breathing prior to the scheduled OR date. If that occurs, we will need to adjust her OR date sooner.    3) She will work with her primary care provider regarding her kidney function if anything can be further optimized.    I appreciate the opportunity to participate in the care of this pleasant patient.     Today's visit required additional screening time, PPE, and cleaning measures to allow for a safe in-person visit, due to the public health emergency. I spent a total of 45 minutes on 12/19/2022 in chart review, review of tests, patient visit, documentation, care coordination, and/or discussion with other providers about the issues documented above, separate from any documented procedure(s).        Nikole Davis MD

## 2022-12-26 PROBLEM — M34.1 CREST SYNDROME (H): Status: ACTIVE | Noted: 2022-12-26

## 2022-12-26 PROBLEM — I48.91 ATRIAL FIBRILLATION, UNSPECIFIED TYPE (H): Status: ACTIVE | Noted: 2022-12-26

## 2022-12-26 PROBLEM — C73 PAPILLARY CARCINOMA OF THYROID (H): Status: ACTIVE | Noted: 2022-12-26

## 2022-12-26 PROBLEM — N18.31 STAGE 3A CHRONIC KIDNEY DISEASE (H): Status: ACTIVE | Noted: 2022-12-26

## 2023-01-10 ENCOUNTER — TELEPHONE (OUTPATIENT)
Dept: OTOLARYNGOLOGY | Facility: CLINIC | Age: 78
End: 2023-01-10

## 2023-01-10 ENCOUNTER — MYC MEDICAL ADVICE (OUTPATIENT)
Dept: OTOLARYNGOLOGY | Facility: CLINIC | Age: 78
End: 2023-01-10
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
## 2023-01-11 ENCOUNTER — TELEPHONE (OUTPATIENT)
Dept: OTOLARYNGOLOGY | Facility: CLINIC | Age: 78
End: 2023-01-11

## 2023-01-11 NOTE — TELEPHONE ENCOUNTER
Left vm message for patient in regards to scheduling a virtual visit with provider to discuss plan of care moving forward due to circumstances. Writers call back number provider on vm.

## 2023-01-16 ENCOUNTER — VIRTUAL VISIT (OUTPATIENT)
Dept: OTOLARYNGOLOGY | Facility: CLINIC | Age: 78
End: 2023-01-16
Payer: COMMERCIAL

## 2023-01-16 ENCOUNTER — TELEPHONE (OUTPATIENT)
Dept: OTOLARYNGOLOGY | Facility: CLINIC | Age: 78
End: 2023-01-16

## 2023-01-16 VITALS — WEIGHT: 121 LBS | HEIGHT: 66 IN | BODY MASS INDEX: 19.44 KG/M2

## 2023-01-16 DIAGNOSIS — S12.601S CLOSED NONDISPLACED FRACTURE OF SEVENTH CERVICAL VERTEBRA, UNSPECIFIED FRACTURE MORPHOLOGY, SEQUELA: ICD-10-CM

## 2023-01-16 DIAGNOSIS — Z78.9 RECURRENT RESPIRATORY PAPILLOMATOSIS: Primary | ICD-10-CM

## 2023-01-16 PROCEDURE — 99214 OFFICE O/P EST MOD 30 MIN: CPT | Mod: 95 | Performed by: OTOLARYNGOLOGY

## 2023-01-16 ASSESSMENT — PAIN SCALES - GENERAL: PAINLEVEL: NO PAIN (1)

## 2023-01-16 NOTE — PATIENT INSTRUCTIONS
1.  You were seen in the ENT Clinic today by . If you have any questions or concerns after your appointment, please call [phone]. Press option #1 for scheduling related needs. Press option #3 for Nurse advice.    2.   has recommended the following:   - surgery scheduling    3.  Plan is to return to clinic for post op follow up. MOOSE Hyman LPN  [phone]  [facility] - Otolaryngology
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
## 2023-01-17 NOTE — TELEPHONE ENCOUNTER
Spoke with patients daughter Lana regarding scheduling awake procedure with Dr. Davis. Offered date of 1/26/2023. Approximately around 12:00 pm. 11:00 a.m. arrival time. Lana's niece will plan on bringing patient to and from procedure.     Local anesthesia so no pre-op H&P is needed, but will need at home COVID test 1-2 days prior. Lana states she has a few at home and will have patient test. Only needs to update us if it is positive.     Lana knows arrival of 11:00 am, 1200pm procedure 60-75 mins for your procedure and leaving shortly after.     Lana has no further questions or concerns.     Alyssa Loaiza on 1/16/2023 at 7:27 PM

## 2023-01-26 ENCOUNTER — HOSPITAL ENCOUNTER (OUTPATIENT)
Facility: AMBULATORY SURGERY CENTER | Age: 78
Discharge: HOME | End: 2023-01-26
Attending: OTOLARYNGOLOGY | Admitting: OTOLARYNGOLOGY
Payer: COMMERCIAL

## 2023-01-26 VITALS
SYSTOLIC BLOOD PRESSURE: 121 MMHG | TEMPERATURE: 97.8 F | OXYGEN SATURATION: 95 % | HEIGHT: 66 IN | BODY MASS INDEX: 19.44 KG/M2 | HEART RATE: 82 BPM | WEIGHT: 121 LBS | DIASTOLIC BLOOD PRESSURE: 80 MMHG | RESPIRATION RATE: 20 BRPM

## 2023-01-26 DIAGNOSIS — S12.601S CLOSED NONDISPLACED FRACTURE OF SEVENTH CERVICAL VERTEBRA, UNSPECIFIED FRACTURE MORPHOLOGY, SEQUELA: ICD-10-CM

## 2023-01-26 PROCEDURE — 31572 LARGSC W/LASER DSTRJ LES: CPT | Mod: 51 | Performed by: OTOLARYNGOLOGY

## 2023-01-26 PROCEDURE — 31572 LARGSC W/LASER DSTRJ LES: CPT | Mod: RT

## 2023-01-26 PROCEDURE — 31570 LARYNGOSCOPE W/VC INJ: CPT

## 2023-01-26 PROCEDURE — 31570 LARYNGOSCOPE W/VC INJ: CPT | Performed by: OTOLARYNGOLOGY

## 2023-01-26 RX ORDER — LIDOCAINE HYDROCHLORIDE 20 MG/ML
INJECTION, SOLUTION INFILTRATION; PERINEURAL PRN
Status: DISCONTINUED | OUTPATIENT
Start: 2023-01-26 | End: 2023-01-26 | Stop reason: HOSPADM

## 2023-01-26 RX ORDER — LIDOCAINE HYDROCHLORIDE AND EPINEPHRINE 10; 10 MG/ML; UG/ML
INJECTION, SOLUTION INFILTRATION; PERINEURAL PRN
Status: DISCONTINUED | OUTPATIENT
Start: 2023-01-26 | End: 2023-01-26 | Stop reason: HOSPADM
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
## 2023-01-26 NOTE — DISCHARGE INSTRUCTIONS
NPO (nothing by mouth) for 1 hour after procedure; hold off for longer if throat still feels numb. Begin with a few small sips of water, if that goes well, can advance diet as tolerated.    [facility]  Home Care Following Your Procedure  Call a doctor if you have signs of infection (fever, growing tenderness at the surgery site, a large amount of drainage or bleeding, severe pain, foul-smelling drainage, redness, swelling).         Tylenol/Acetaminophen Consumption  To help encourage the safe use of acetaminophen, the makers of TYLENOL  have lowered the maximum daily dose for single-ingredient Extra Strength TYLENOL  (acetaminophen) products sold in the [place] from 8 pills per day (4,000 mg) to 6 pills per day (3,000 mg). The dosing interval has also changed from 2 pills every 4-6 hours to 2 pills every 6 hours.  If you feel your pain relief is insufficient, you may take Tylenol/Acetaminophen in addition to your narcotic pain medication.   Be careful not to exceed 3,000 mg of Tylenol/Acetaminophen in a 24 hour period from all sources.  If you are taking extra strength Tylenol/acetaminophen (500 mg), the maximum dose is 6 tablets in 24 hours.  If you are taking regular strength acetaminophen (325 mg), the maximum dose is 9 tablets in 24 hours.    Your doctor is:       Dr. Nikole Davis, ENT Otolaryngology: [phone]             Or dial [phone] and ask for the resident on call for:  ENT Otolaryngology  For emergency care, call the:  [place]:  [phone] (TTY for hearing impaired: [phone])

## 2023-02-13 ENCOUNTER — APPOINTMENT (OUTPATIENT)
Dept: RADIOLOGY | Facility: HOSPITAL | Age: 78
End: 2023-02-13
Attending: EMERGENCY MEDICINE
Payer: COMMERCIAL

## 2023-02-13 ENCOUNTER — APPOINTMENT (OUTPATIENT)
Dept: CT IMAGING | Facility: HOSPITAL | Age: 78
End: 2023-02-13
Attending: EMERGENCY MEDICINE
Payer: COMMERCIAL

## 2023-02-13 ENCOUNTER — HOSPITAL ENCOUNTER (OUTPATIENT)
Facility: HOSPITAL | Age: 78
Setting detail: OBSERVATION
Discharge: HOME | End: 2023-02-13
Attending: EMERGENCY MEDICINE | Admitting: EMERGENCY MEDICINE
Payer: COMMERCIAL

## 2023-02-13 VITALS
DIASTOLIC BLOOD PRESSURE: 72 MMHG | WEIGHT: 120 LBS | OXYGEN SATURATION: 94 % | RESPIRATION RATE: 22 BRPM | BODY MASS INDEX: 19.99 KG/M2 | HEART RATE: 95 BPM | HEIGHT: 65 IN | SYSTOLIC BLOOD PRESSURE: 122 MMHG | TEMPERATURE: 97.5 F

## 2023-02-13 DIAGNOSIS — J38.7 LARYNGEAL MASS: ICD-10-CM

## 2023-02-13 DIAGNOSIS — Z78.9 RECURRENT RESPIRATORY PAPILLOMATOSIS: Primary | ICD-10-CM

## 2023-02-13 DIAGNOSIS — R06.1 STRIDOR: ICD-10-CM

## 2023-02-13 LAB
ANION GAP SERPL CALCULATED.3IONS-SCNC: 15 MMOL/L (ref 7–15)
BASE EXCESS BLDV CALC-SCNC: 0.6 MMOL/L
BUN SERPL-MCNC: 18.5 MG/DL (ref 8–23)
CALCIUM SERPL-MCNC: 9.7 MG/DL (ref 8.8–10.2)
CHLORIDE SERPL-SCNC: 102 MMOL/L (ref 98–107)
CREAT SERPL-MCNC: 1.02 MG/DL (ref 0.51–0.95)
DEPRECATED HCO3 PLAS-SCNC: 23 MMOL/L (ref 22–29)
ERYTHROCYTE [DISTWIDTH] IN BLOOD BY AUTOMATED COUNT: 14.7 % (ref 10–15)
GFR SERPL CREATININE-BSD FRML MDRD: 56 ML/MIN/1.73M2
GLUCOSE SERPL-MCNC: 124 MG/DL (ref 70–99)
HCO3 BLDV-SCNC: 26 MMOL/L (ref 24–30)
HCT VFR BLD AUTO: 48.3 % (ref 35–47)
HGB BLD-MCNC: 15.6 G/DL (ref 11.7–15.7)
HOLD SPECIMEN: NORMAL
HOLD SPECIMEN: NORMAL
MCH RBC QN AUTO: 29.1 PG (ref 26.5–33)
MCHC RBC AUTO-ENTMCNC: 32.3 G/DL (ref 31.5–36.5)
MCV RBC AUTO: 90 FL (ref 78–100)
NT-PROBNP SERPL-MCNC: 1080 PG/ML (ref 0–1800)
OXYHGB MFR BLDV: 84.1 % (ref 70–75)
PCO2 BLDV: 48 MM HG (ref 35–50)
PH BLDV: 7.35 [PH] (ref 7.35–7.45)
PLATELET # BLD AUTO: 257 10E3/UL (ref 150–450)
PO2 BLDV: 52 MM HG (ref 25–47)
POTASSIUM SERPL-SCNC: 3.9 MMOL/L (ref 3.4–5.3)
RBC # BLD AUTO: 5.36 10E6/UL (ref 3.8–5.2)
SAO2 % BLDV: 85.2 % (ref 70–75)
SODIUM SERPL-SCNC: 140 MMOL/L (ref 136–145)
TROPONIN T SERPL HS-MCNC: 13 NG/L
WBC # BLD AUTO: 9.8 10E3/UL (ref 4–11)

## 2023-02-13 PROCEDURE — 70491 CT SOFT TISSUE NECK W/DYE: CPT

## 2023-02-13 PROCEDURE — 83880 ASSAY OF NATRIURETIC PEPTIDE: CPT | Performed by: EMERGENCY MEDICINE

## 2023-02-13 PROCEDURE — 96374 THER/PROPH/DIAG INJ IV PUSH: CPT | Mod: 59

## 2023-02-13 PROCEDURE — 96375 TX/PRO/DX INJ NEW DRUG ADDON: CPT | Mod: 59

## 2023-02-13 PROCEDURE — G0378 HOSPITAL OBSERVATION PER HR: HCPCS

## 2023-02-13 PROCEDURE — 250N000009 HC RX 250: Performed by: EMERGENCY MEDICINE

## 2023-02-13 PROCEDURE — 94640 AIRWAY INHALATION TREATMENT: CPT

## 2023-02-13 PROCEDURE — 999N000157 HC STATISTIC RCP TIME EA 10 MIN

## 2023-02-13 PROCEDURE — 250N000013 HC RX MED GY IP 250 OP 250 PS 637: Performed by: EMERGENCY MEDICINE

## 2023-02-13 PROCEDURE — 70360 X-RAY EXAM OF NECK: CPT

## 2023-02-13 PROCEDURE — 84484 ASSAY OF TROPONIN QUANT: CPT | Performed by: EMERGENCY MEDICINE

## 2023-02-13 PROCEDURE — 93005 ELECTROCARDIOGRAM TRACING: CPT | Performed by: EMERGENCY MEDICINE

## 2023-02-13 PROCEDURE — 82805 BLOOD GASES W/O2 SATURATION: CPT | Performed by: EMERGENCY MEDICINE

## 2023-02-13 PROCEDURE — 94640 AIRWAY INHALATION TREATMENT: CPT | Mod: 76

## 2023-02-13 PROCEDURE — 99285 EMERGENCY DEPT VISIT HI MDM: CPT | Mod: 25

## 2023-02-13 PROCEDURE — 85027 COMPLETE CBC AUTOMATED: CPT | Performed by: EMERGENCY MEDICINE

## 2023-02-13 PROCEDURE — 250N000011 HC RX IP 250 OP 636: Performed by: EMERGENCY MEDICINE

## 2023-02-13 PROCEDURE — 71045 X-RAY EXAM CHEST 1 VIEW: CPT

## 2023-02-13 PROCEDURE — 80048 BASIC METABOLIC PNL TOTAL CA: CPT | Performed by: EMERGENCY MEDICINE

## 2023-02-13 PROCEDURE — 250N000009 HC RX 250: Performed by: STUDENT IN AN ORGANIZED HEALTH CARE EDUCATION/TRAINING PROGRAM

## 2023-02-13 PROCEDURE — 71275 CT ANGIOGRAPHY CHEST: CPT

## 2023-02-13 PROCEDURE — 36415 COLL VENOUS BLD VENIPUNCTURE: CPT | Performed by: EMERGENCY MEDICINE

## 2023-02-13 RX ORDER — IPRATROPIUM BROMIDE AND ALBUTEROL SULFATE 2.5; .5 MG/3ML; MG/3ML
1 SOLUTION RESPIRATORY (INHALATION) EVERY 6 HOURS PRN
Qty: 90 ML | Refills: 0 | Status: SHIPPED | OUTPATIENT
Start: 2023-02-13

## 2023-02-13 RX ORDER — IPRATROPIUM BROMIDE AND ALBUTEROL SULFATE 2.5; .5 MG/3ML; MG/3ML
3 SOLUTION RESPIRATORY (INHALATION) ONCE
Status: DISCONTINUED | OUTPATIENT
Start: 2023-02-13 | End: 2023-02-13

## 2023-02-13 RX ORDER — LIDOCAINE HYDROCHLORIDE 40 MG/ML
10 SOLUTION TOPICAL ONCE
Status: COMPLETED | OUTPATIENT
Start: 2023-02-13 | End: 2023-02-13

## 2023-02-13 RX ORDER — DEXAMETHASONE SODIUM PHOSPHATE 10 MG/ML
10 INJECTION, SOLUTION INTRAMUSCULAR; INTRAVENOUS ONCE
Status: COMPLETED | OUTPATIENT
Start: 2023-02-13 | End: 2023-02-13

## 2023-02-13 RX ORDER — IPRATROPIUM BROMIDE AND ALBUTEROL SULFATE 2.5; .5 MG/3ML; MG/3ML
3 SOLUTION RESPIRATORY (INHALATION) ONCE
Status: COMPLETED | OUTPATIENT
Start: 2023-02-13 | End: 2023-02-13

## 2023-02-13 RX ORDER — OXYMETAZOLINE HYDROCHLORIDE 0.05 G/100ML
2 SPRAY NASAL ONCE
Status: COMPLETED | OUTPATIENT
Start: 2023-02-13 | End: 2023-02-13

## 2023-02-13 RX ORDER — IPRATROPIUM BROMIDE AND ALBUTEROL SULFATE 2.5; .5 MG/3ML; MG/3ML
1 SOLUTION RESPIRATORY (INHALATION) EVERY 6 HOURS PRN
Qty: 90 ML | Refills: 0 | Status: SHIPPED | OUTPATIENT
Start: 2023-02-13 | End: 2023-02-13

## 2023-02-13 RX ORDER — METHYLPREDNISOLONE SODIUM SUCCINATE 125 MG/2ML
125 INJECTION, POWDER, LYOPHILIZED, FOR SOLUTION INTRAMUSCULAR; INTRAVENOUS ONCE
Status: COMPLETED | OUTPATIENT
Start: 2023-02-13 | End: 2023-02-13

## 2023-02-13 RX ORDER — IOPAMIDOL 755 MG/ML
117 INJECTION, SOLUTION INTRAVASCULAR ONCE
Status: COMPLETED | OUTPATIENT
Start: 2023-02-13 | End: 2023-02-13

## 2023-02-13 RX ADMIN — OXYMETAZOLINE HYDROCHLORIDE 2 SPRAY: 0.05 SPRAY NASAL at 21:05

## 2023-02-13 RX ADMIN — IOPAMIDOL 117 ML: 755 INJECTION, SOLUTION INTRAVENOUS at 18:50

## 2023-02-13 RX ADMIN — IPRATROPIUM BROMIDE AND ALBUTEROL SULFATE 3 ML: 2.5; .5 SOLUTION RESPIRATORY (INHALATION) at 16:34

## 2023-02-13 RX ADMIN — LIDOCAINE HYDROCHLORIDE 10 ML: 40 SOLUTION TOPICAL at 21:05

## 2023-02-13 RX ADMIN — DEXAMETHASONE SODIUM PHOSPHATE 10 MG: 10 INJECTION INTRAMUSCULAR; INTRAVENOUS at 21:05

## 2023-02-13 RX ADMIN — METHYLPREDNISOLONE SODIUM SUCCINATE 125 MG: 125 INJECTION, POWDER, FOR SOLUTION INTRAMUSCULAR; INTRAVENOUS at 16:29

## 2023-02-13 RX ADMIN — RACEPINEPHRINE HYDROCHLORIDE 0.5 ML: 11.25 SOLUTION RESPIRATORY (INHALATION) at 16:13

## 2023-02-13 ASSESSMENT — ACTIVITIES OF DAILY LIVING (ADL)
ADLS_ACUITY_SCORE: 35

## 2023-02-13 ASSESSMENT — ENCOUNTER SYMPTOMS
TROUBLE SWALLOWING: 0
COUGH: 0
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
## 2023-02-13 NOTE — PROGRESS NOTES
RESPIRATORY CARE NOTE   Patient is on room air, BS upper airway stridor, gave Recemic epi, Duoneb treatment x1, BS post treatment increased aeration, patient perceives significant improvement, patient tolerated well.     Mikal Oh, RT
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
## 2023-02-14 ENCOUNTER — TELEPHONE (OUTPATIENT)
Dept: OTOLARYNGOLOGY | Facility: CLINIC | Age: 78
End: 2023-02-14
Payer: COMMERCIAL

## 2023-02-14 ENCOUNTER — PATIENT OUTREACH (OUTPATIENT)
Dept: OTOLARYNGOLOGY | Facility: CLINIC | Age: 78
End: 2023-02-14
Payer: COMMERCIAL

## 2023-02-14 NOTE — TELEPHONE ENCOUNTER
[facility] Call Center    Phone Message    May a detailed message be left on voicemail: yes     Reason for Call: Other: The pt's daughter Lana called about getting an awake laser procedure for the pt asap. She was in the ED 2.13.23 for Stridor, Laryngeal mass. Lana states the pt needs this asap. Please call Lana today to discuss and schedule the procedure. Thanks.     Action Taken: Message routed to:  [facility]): ent    Travel Screening: Not Applicable

## 2023-02-14 NOTE — PROGRESS NOTES
Called daughter to try to schedule urgent procedure for tomorrow related to airway. No answer or voicemail. Will try calling again. Inocencia Fan RN on 2/14/2023 at 10:47 AM

## 2023-02-14 NOTE — PROGRESS NOTES
Called patient and spoke with her. She agreed to do the procedure tomorrow. I spoke with her about the ER yesterday. I told her if she experiences any emergent symptoms to go to the ER. Inocencia Fan RN on 2/14/2023 at 10:53 AM

## 2023-02-14 NOTE — DISCHARGE INSTRUCTIONS
As we discussed recommend the DuoNeb every 6 hours as needed.  Call your ENT doctor tomorrow to arrange laser of of your mass.  If symptoms worsen please call 911 but be aware our ENT doctor here states they do not have the equipment here to take care of you.
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
## 2023-02-15 ENCOUNTER — HOSPITAL ENCOUNTER (OUTPATIENT)
Facility: AMBULATORY SURGERY CENTER | Age: 78
Discharge: HOME | End: 2023-02-15
Attending: OTOLARYNGOLOGY | Admitting: OTOLARYNGOLOGY
Payer: COMMERCIAL

## 2023-02-15 ENCOUNTER — TELEPHONE (OUTPATIENT)
Dept: OTOLARYNGOLOGY | Facility: CLINIC | Age: 78
End: 2023-02-15
Payer: COMMERCIAL

## 2023-02-15 ENCOUNTER — PATIENT OUTREACH (OUTPATIENT)
Dept: CARE COORDINATION | Facility: CLINIC | Age: 78
End: 2023-02-15
Payer: COMMERCIAL

## 2023-02-15 VITALS
HEART RATE: 68 BPM | OXYGEN SATURATION: 97 % | RESPIRATION RATE: 18 BRPM | DIASTOLIC BLOOD PRESSURE: 66 MMHG | HEIGHT: 66 IN | TEMPERATURE: 99.1 F | SYSTOLIC BLOOD PRESSURE: 128 MMHG | BODY MASS INDEX: 19.29 KG/M2 | WEIGHT: 120 LBS

## 2023-02-15 DIAGNOSIS — Z78.9 RECURRENT RESPIRATORY PAPILLOMATOSIS: Primary | ICD-10-CM

## 2023-02-15 DIAGNOSIS — R06.02 SHORTNESS OF BREATH: ICD-10-CM

## 2023-02-15 DIAGNOSIS — S12.601S CLOSED NONDISPLACED FRACTURE OF SEVENTH CERVICAL VERTEBRA, UNSPECIFIED FRACTURE MORPHOLOGY, SEQUELA: ICD-10-CM

## 2023-02-15 PROCEDURE — 31572 LARGSC W/LASER DSTRJ LES: CPT | Mod: RT | Performed by: OTOLARYNGOLOGY

## 2023-02-15 PROCEDURE — 31572 LARGSC W/LASER DSTRJ LES: CPT | Mod: RT

## 2023-02-15 RX ORDER — LIDOCAINE HYDROCHLORIDE AND EPINEPHRINE 10; 10 MG/ML; UG/ML
INJECTION, SOLUTION INFILTRATION; PERINEURAL PRN
Status: DISCONTINUED | OUTPATIENT
Start: 2023-02-15 | End: 2023-02-15 | Stop reason: HOSPADM

## 2023-02-15 RX ORDER — LIDOCAINE HYDROCHLORIDE 20 MG/ML
INJECTION, SOLUTION INFILTRATION; PERINEURAL PRN
Status: DISCONTINUED | OUTPATIENT
Start: 2023-02-15 | End: 2023-02-15 | Stop reason: HOSPADM

## 2023-02-15 NOTE — H&P
Abbreviated H&P for ASC Procedure under Local Anesthesia    1. Chief complaint and/or reason for procedure  Recurrent respiratory papillomatosis (RRP)  Cervical spine fracture    2. Medical history describing significant medical conditions and previous surgeries  PAST MEDICAL HISTORY:   Past Medical History:   Diagnosis Date     A-fib (H)      Antiplatelet or antithrombotic long-term use      Arrhythmia      COPD (chronic obstructive pulmonary disease) (H)         PAST SURGICAL HISTORY:   Past Surgical History:   Procedure Laterality Date     LASER CO2 LARYNGOSCOPY, COMPLEX N/A 5/26/2022    Procedure: Micro direct laryngoscopy with excision/ablation of laryngeal lesions, possible biopsies, possible CO2 laser;  Surgeon: Nikole Davis MD;  Location: [facility] OR     LASER CO2 LARYNGOSCOPY, COMPLEX N/A 9/15/2022    Procedure: Microdirect laryngoscopy with ablation of laryngeal lesions,biopsies,CO2 laser;  Surgeon: Nikole Davis MD;  Location: [facility] OR     LASER CO2 LARYNGOSCOPY, COMPLEX N/A 12/1/2022    Procedure: Microdirect laryngoscopy with excision/ablation of laryngeal lesions, biopsies,   CO2 laser;  Surgeon: Nikole Davis MD;  Location: [facility] OR     LASER KTP LARYNGOSCOPY FLEXIBLE N/A 8/18/2022    Procedure: Transnasal flexible laryngoscopy with KTP laser and biopsies;  Surgeon: Nikole Davis MD;  Location: [facility] OR     LASER KTP LARYNGOSCOPY FLEXIBLE N/A 10/27/2022    Procedure: Transnasal flexible laryngoscopy with possible KTP laser;  Surgeon: Nikole Davis MD;  Location: [facility] OR     LASER KTP LARYNGOSCOPY FLEXIBLE N/A 1/26/2023    Procedure: Transnasal flexible laryngoscopy with KTP laser,  biopsies, superior laryngeal nerve blocks, Avastin injection;  Surgeon: Nikole Davis MD;  Location: [facility] OR       Health history changes since last seen? Was seen in ER at [facility] recently for shortness of breath and stridor. Found the nebulized tx (DuoNeb)  helpful. In retrospect, feels that her breathing started to get worse about 1.5-2 weeks ago. Voice started to change around the same time.    3. Current medications and allergies  MEDICATIONS:     Current Outpatient Medications   Medication Sig Dispense Refill     albuterol (PROAIR HFA/PROVENTIL HFA/VENTOLIN HFA) 108 (90 Base) MCG/ACT inhaler Inhale 2 puffs into the lungs as needed       albuterol (PROVENTIL) (2.5 MG/3ML) 0.083% neb solution Inhale 2.5 mg into the lungs       apixaban ANTICOAGULANT (ELIQUIS) 5 MG tablet Take 5 mg by mouth daily       atorvastatin (LIPITOR) 20 MG tablet Take 20 mg by mouth daily       budesonide-formoterol (SYMBICORT) 160-4.5 MCG/ACT Inhaler Inhale 2 puffs into the lungs daily       diltiazem ER (TIAZAC) 360 MG 24 hr ER beaded capsule Take 360 mg by mouth daily       fexofenadine (ALLEGRA) 180 MG tablet Take 180 mg by mouth daily       fluorouracil (EFUDEX) 5 % external cream APPLY TOPICALLY TO LEFT LATERAL EYEBROW TWICE DAILY FOR 4 WEEKS       ibuprofen (ADVIL/MOTRIN) 200 MG capsule Take 400 mg by mouth every 6 hours as needed for fever       ipratropium - albuterol 0.5 mg/2.5 mg/3 mL (DUONEB) 0.5-2.5 (3) MG/3ML neb solution Take 1 vial (3 mLs) by nebulization every 6 hours as needed for shortness of breath, wheezing or cough 90 mL 0     levothyroxine (SYNTHROID/LEVOTHROID) 88 MCG tablet Take 88 mcg by mouth daily       montelukast (SINGULAIR) 10 MG tablet Take 10 mg by mouth daily       Respiratory Therapy Supplies (NEBULIZER) JUWAN Portable nebulizer, disposable neb kit x 4, reuseable neb kit x 1, mask x 1, filters x 1.   Frequency of use: daily;  Medication: albuterol  Length of need: 99 months       sertraline (ZOLOFT) 100 MG tablet Take 100 mg by mouth daily         ALLERGIES:    Allergies   Allergen Reactions     1-Methyl 2-Pyrrolidone Anaphylaxis     Escitalopram Other (See Comments)     Asthma -a time of exacerbation and may not have been cause may retry     Mirtazapine Other  (See Comments)     Asthma a time of exacerbation and may not have been cause may retry     Venlafaxine Other (See Comments)     Adhesive Tape Rash     With holter monitor. Ok with bandaids.       4. Review of systems  As above      5. Physical examination  Vital signs stable.   Awake, alert, conversant.  Breathing comfortably, no stridor/stertor at rest, but with rapid inhalation, noisy breathing obvious.   Voice with moderate dysphonia characterized by roughness, breathiness, strain.    6. ASA classification  ASA III severe systemic disease      Plan to proceed as scheduled. Reinforced importance of letting us know when symptoms start to change. Will likely schedule multiple upcoming procedures as a precaution.
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
## 2023-02-15 NOTE — BRIEF OP NOTE
[facility]    Brief Operative Note    Pre-operative diagnosis: Recurrent respiratory papillomatosis [Z78.9]  Post-operative diagnosis Same as pre-operative diagnosis    Procedure: Procedure(s):  Transnasal flexible laryngoscopy with KTP laser, superior laryngeal nerve blocks, biopsies, avastin injection  Surgeon: Surgeon(s) and Role:     * Nikole Davis MD - Primary     * Armando Davila MD - Resident - Assisting  Anesthesia: Local   Estimated Blood Loss: None    Drains: None  Specimens: * No specimens in log *  Findings:   Respiratory papillomatosis.  Complications: None.  Implants: * No implants in log *

## 2023-02-15 NOTE — DISCHARGE INSTRUCTIONS
[facility]  Home Care Following          Tips for taking pain medications  To get the best pain relief possible, remember these points:  Take pain medications as directed, before pain becomes severe.  Pain medication can upset your stomach: taking it with food may help.  Constipation is a common side effect of pain medication. Drink plenty of  fluids.  Eat foods high in fiber. Take a stool softener if recommended by your doctor or pharmacist.  Do not drink alcohol, drive or operate machinery while taking pain medications.  Ask about other ways to control pain, such as with heat, ice or relaxation.    Tylenol/Acetaminophen Consumption  To help encourage the safe use of acetaminophen, the makers of TYLENOL  have lowered the maximum daily dose for single-ingredient Extra Strength TYLENOL  (acetaminophen) products sold in the [place] from 8 pills per day (4,000 mg) to 6 pills per day (3,000 mg). The dosing interval has also changed from 2 pills every 4-6 hours to 2 pills every 6 hours.  If you feel your pain relief is insufficient, you may take Tylenol/Acetaminophen in addition to your narcotic pain medication.   Be careful not to exceed 3,000 mg of Tylenol/Acetaminophen in a 24 hour period from all sources.  If you are taking extra strength Tylenol/acetaminophen (500 mg), the maximum dose is 6 tablets in 24 hours.  If you are taking regular strength acetaminophen (325 mg), the maximum dose is 9 tablets in 24 hours.    Call a doctor for any of the following:  Signs of infection (fever, growing tenderness at the surgery site, a large amount of drainage or bleeding, severe pain, foul-smelling drainage, redness, swelling).  Signs of Covid-19 infection (temperature over 100 degrees, shortness of breath, cough, loss of taste/smell, generalized body aches, persistent headache, chills, sore throat, nausea/vomiting/diarrhea)  Your doctor is:       Dr. Nikole Davis, ENT Otolaryngology:  [phone]               Or dial [phone] and ask for the resident on call for:  ENT Otolaryngology  For emergency care, call the:  [facility]:  [phone] (TTY for hearing impaired: [phone])

## 2023-02-15 NOTE — PROGRESS NOTES
[facility] Contact  RUST/Voicemail     Clinical Data: Transitional Care Management Outreach     Outreach attempted x 2.  Left message on patient's voicemail, providing [facility]'s 24/7 scheduling and nurse triage phone number [phone] ([phone]) for questions/concerns and/or to schedule an appt with an [facility] provider, if they do not have a PCP.      Plan:  [facility] will do no further outreaches at this time.       Esperanza Johnson  Community Health Worker  [facility], [facility]  [phone]      *Connected Care Resource Team does NOT follow patient ongoing. Referrals are identified based on internal discharge reports and the outreach is to ensure patient has an understanding of their discharge instructions.

## 2023-02-15 NOTE — TELEPHONE ENCOUNTER
Telephone Note    Spoke with daughter Lana to update on today's procedure; patient gave me permission to call.    Discussed that there is substantial disease again, and it seems clear that self-monitoring of symptoms and notifying us in a timely fashion is not realistic for the patient at this time despite multiple discussions about it. We did accomplish substantial lasering today, but there is still substantial residual disease due to the large amount of disease at presentation.    Discussed that although her airway is somewhat improved with today's procedure, there is still a heavy disease burden. Given that the laser needed to treat the papilloma is not available over the weekend, I discussed consideration of repeating a procedure on Friday to try to further improve the airway. Discussed that while it is possible that not doing so might be okay, the risk of not doing so is that she could need additional airway help over the weekend, which might necessitate tracheostomy or other invasive measures given that spine manipulation is not possible right now. We also discussed consideration of revisiting the question of minimal spine manipulation, but also acknowledged that there is then a risk of spine re-injury. We also acknowledged that the local anesthesia procedure is quite stressful for her, and there is possibly a low risk of causing an unexpected complication due to the emotional and physiologic stresses of this. However, having airway compromise is clearly also a stressor, and understandably she and her family would strongly prefer not to have a tracheostomy as they are already feeling maxed out from her current care needs.    The patient's daughter felt that the best course of action would be to proceed with a repeat local anesthesia procedure on Friday if we are able to get time.     ===  Also spoke with the patient. She is recovering from the procedure. Feels she might be breathing a bit better. It is hard  to tell for sure. Voice quality is fluctuating.  Discussed all of the same issues above with the patient. She is not enthusiastic about any procedures, but indicated that of the options, she would prefer to repeat a local anesthesia procedure, and would very much like to avoid needing a trach, so she would like to pursue this on Friday if possible.   She stated that she is going to reach out to her spine team to ask if there is any possibility of the brace being able to come off sooner. Given that it is unlikely that she would be permitted to come out of the brace in the next few days however, I will move forward with a case request.

## 2023-02-17 ENCOUNTER — HOSPITAL ENCOUNTER (OUTPATIENT)
Facility: AMBULATORY SURGERY CENTER | Age: 78
Discharge: HOME | End: 2023-02-17
Attending: OTOLARYNGOLOGY | Admitting: OTOLARYNGOLOGY
Payer: COMMERCIAL

## 2023-02-17 VITALS
RESPIRATION RATE: 18 BRPM | OXYGEN SATURATION: 95 % | WEIGHT: 120 LBS | BODY MASS INDEX: 19.29 KG/M2 | HEIGHT: 66 IN | SYSTOLIC BLOOD PRESSURE: 140 MMHG | DIASTOLIC BLOOD PRESSURE: 72 MMHG | TEMPERATURE: 98.8 F | HEART RATE: 63 BPM

## 2023-02-17 DIAGNOSIS — R06.02 SHORTNESS OF BREATH: ICD-10-CM

## 2023-02-17 DIAGNOSIS — S12.601S CLOSED NONDISPLACED FRACTURE OF SEVENTH CERVICAL VERTEBRA, UNSPECIFIED FRACTURE MORPHOLOGY, SEQUELA: ICD-10-CM

## 2023-02-17 DIAGNOSIS — Z78.9 RECURRENT RESPIRATORY PAPILLOMATOSIS: Primary | ICD-10-CM

## 2023-02-17 PROCEDURE — 31572 LARGSC W/LASER DSTRJ LES: CPT | Mod: 50 | Performed by: OTOLARYNGOLOGY

## 2023-02-17 PROCEDURE — 31572 LARGSC W/LASER DSTRJ LES: CPT

## 2023-02-17 RX ORDER — LIDOCAINE HYDROCHLORIDE 20 MG/ML
INJECTION, SOLUTION INFILTRATION; PERINEURAL PRN
Status: DISCONTINUED | OUTPATIENT
Start: 2023-02-17 | End: 2023-02-17 | Stop reason: HOSPADM

## 2023-02-17 RX ORDER — LIDOCAINE HYDROCHLORIDE AND EPINEPHRINE 10; 10 MG/ML; UG/ML
INJECTION, SOLUTION INFILTRATION; PERINEURAL PRN
Status: DISCONTINUED | OUTPATIENT
Start: 2023-02-17 | End: 2023-02-17 | Stop reason: HOSPADM
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
## 2023-02-17 NOTE — DISCHARGE INSTRUCTIONS
[facility]  Home Care Following Your Procedure  Call a doctor if you have signs of infection (fever, growing tenderness at the surgery site, a large amount of drainage or bleeding, severe pain, foul-smelling drainage, redness, swelling).         Tylenol/Acetaminophen Consumption  To help encourage the safe use of acetaminophen, the makers of TYLENOL  have lowered the maximum daily dose for single-ingredient Extra Strength TYLENOL  (acetaminophen) products sold in the [place] from 8 pills per day (4,000 mg) to 6 pills per day (3,000 mg). The dosing interval has also changed from 2 pills every 4-6 hours to 2 pills every 6 hours.  If you feel your pain relief is insufficient, you may take Tylenol/Acetaminophen in addition to your narcotic pain medication.   Be careful not to exceed 3,000 mg of Tylenol/Acetaminophen in a 24 hour period from all sources.  If you are taking extra strength Tylenol/acetaminophen (500 mg), the maximum dose is 6 tablets in 24 hours.  If you are taking regular strength acetaminophen (325 mg), the maximum dose is 9 tablets in 24 hours.    Your doctor is:       Dr. Nikole Davis, ENT Otolaryngology: [phone]             Or dial [phone] and ask for the resident on call for:  ENT Otolaryngology  For emergency care, call the:  [place]:  [phone] (TTY for hearing impaired: [phone])

## 2023-02-17 NOTE — H&P
Abbreviated H&P for ASC Procedure under Local Anesthesia    1. Chief complaint and/or reason for procedure  Recurrent respiratory papillomatosis (RRP), rapidly recurrent.  Dyspnea    2. Medical history describing significant medical conditions and previous surgeries  PAST MEDICAL HISTORY:   Past Medical History:   Diagnosis Date     A-fib (H)      Antiplatelet or antithrombotic long-term use      Arrhythmia      COPD (chronic obstructive pulmonary disease) (H)         PAST SURGICAL HISTORY:   Past Surgical History:   Procedure Laterality Date     LASER CO2 LARYNGOSCOPY, COMPLEX N/A 5/26/2022    Procedure: Micro direct laryngoscopy with excision/ablation of laryngeal lesions, possible biopsies, possible CO2 laser;  Surgeon: Nikole Davis MD;  Location: [facility] OR     LASER CO2 LARYNGOSCOPY, COMPLEX N/A 9/15/2022    Procedure: Microdirect laryngoscopy with ablation of laryngeal lesions,biopsies,CO2 laser;  Surgeon: Nikole Davis MD;  Location: [facility] OR     LASER CO2 LARYNGOSCOPY, COMPLEX N/A 12/1/2022    Procedure: Microdirect laryngoscopy with excision/ablation of laryngeal lesions, biopsies,   CO2 laser;  Surgeon: Nikole Davis MD;  Location: [facility] OR     LASER KTP LARYNGOSCOPY FLEXIBLE N/A 8/18/2022    Procedure: Transnasal flexible laryngoscopy with KTP laser and biopsies;  Surgeon: Nikole Davis MD;  Location: [facility] OR     LASER KTP LARYNGOSCOPY FLEXIBLE N/A 10/27/2022    Procedure: Transnasal flexible laryngoscopy with possible KTP laser;  Surgeon: Nikole Davis MD;  Location: [facility] OR     LASER KTP LARYNGOSCOPY FLEXIBLE N/A 1/26/2023    Procedure: Transnasal flexible laryngoscopy with KTP laser,  biopsies, superior laryngeal nerve blocks, Avastin injection;  Surgeon: Nikole Davis MD;  Location: [facility] OR     LASER KTP LARYNGOSCOPY FLEXIBLE N/A 2/15/2023    Procedure: Transnasal flexible laryngoscopy with KTP laser, superior laryngeal nerve blocks,  biopsies, avastin injection;  Surgeon: Nikole Davis MD;  Location: [facility] OR       Health history changes since last seen? Breathing is slightly easier overall, but is having some nasal congestion.    3. Current medications and allergies  MEDICATIONS:     Current Outpatient Medications   Medication Sig Dispense Refill     albuterol (PROAIR HFA/PROVENTIL HFA/VENTOLIN HFA) 108 (90 Base) MCG/ACT inhaler Inhale 2 puffs into the lungs as needed       albuterol (PROVENTIL) (2.5 MG/3ML) 0.083% neb solution Inhale 2.5 mg into the lungs       apixaban ANTICOAGULANT (ELIQUIS) 5 MG tablet Take 5 mg by mouth daily       atorvastatin (LIPITOR) 20 MG tablet Take 20 mg by mouth daily       budesonide-formoterol (SYMBICORT) 160-4.5 MCG/ACT Inhaler Inhale 2 puffs into the lungs daily       diltiazem ER (TIAZAC) 360 MG 24 hr ER beaded capsule Take 360 mg by mouth daily       fexofenadine (ALLEGRA) 180 MG tablet Take 180 mg by mouth daily       fluorouracil (EFUDEX) 5 % external cream APPLY TOPICALLY TO LEFT LATERAL EYEBROW TWICE DAILY FOR 4 WEEKS       ibuprofen (ADVIL/MOTRIN) 200 MG capsule Take 400 mg by mouth every 6 hours as needed for fever       ipratropium - albuterol 0.5 mg/2.5 mg/3 mL (DUONEB) 0.5-2.5 (3) MG/3ML neb solution Take 1 vial (3 mLs) by nebulization every 6 hours as needed for shortness of breath, wheezing or cough 90 mL 0     levothyroxine (SYNTHROID/LEVOTHROID) 88 MCG tablet Take 88 mcg by mouth daily       montelukast (SINGULAIR) 10 MG tablet Take 10 mg by mouth daily       sertraline (ZOLOFT) 100 MG tablet Take 100 mg by mouth daily       Respiratory Therapy Supplies (NEBULIZER) JUWAN Portable nebulizer, disposable neb kit x 4, reuseable neb kit x 1, mask x 1, filters x 1.   Frequency of use: daily;  Medication: albuterol  Length of need: 99 months         ALLERGIES:    Allergies   Allergen Reactions     1-Methyl 2-Pyrrolidone Anaphylaxis     Nuts Anaphylaxis     Black walnut     Escitalopram Other  (See Comments)     Asthma -a time of exacerbation and may not have been cause may retry     Mirtazapine Other (See Comments)     Asthma a time of exacerbation and may not have been cause may retry     Venlafaxine Other (See Comments)     Adhesive Tape Rash     With holter monitor. Ok with bandaids.       4. Review of systems  Noncontributory.      5. Physical examination  Vital signs stable.  Awake, alert, conversant.  Breathing comfortably, no stridor/stertor at rest, but audible stridor with rapid inhalation.  Voice with moderate to severe dysphonia characterized by roughness, breathiness, strain.    6. ASA classification  ASA III      Plan to proceed as scheduled.

## 2023-02-18 ENCOUNTER — TELEPHONE (OUTPATIENT)
Dept: OTOLARYNGOLOGY | Facility: CLINIC | Age: 78
End: 2023-02-18
Payer: COMMERCIAL

## 2023-02-18 NOTE — TELEPHONE ENCOUNTER
"Telephone note    Called patient to touch base as she was feeling down about needing yesterday's procedure, and to check on her status.    She was in better spirits today. Stated she thinks her breathing is doing okay--\"much better\" than on Monday. She rated it 2/10 on a scale ranging from 0 = no breathing effort whatsoever to 10 = severe breathing effort. Voice was high pitched but clearer than yesterday prior to procedure. Mildly audible noise with rapid inhale, but breathing was not audible at baseline.    I also let the patient know that I reached out yesterday to her spine clinic to ask for any additional information/guidance that might influence our procedural planning in the future; I needed to leave a message and have not heard back yet.    Reinforced with the patient that if she notes any worsening of her breathing, even if it is subtle or mild, she should contact us. With sudden or significant worsening, she should call 911. She stated she understood and was comfortable at present.    =====    Also called daughter Lana as I got her voicemail yesterday. Reviewed current status and confirmed that Lana is also checking in with the patient, and knows to update our team with any concerns. She understands that our desire is to use less invasive measures as much as possible, but if the patient's breathing deteriorates despite our recent efforts, we may need to make a new plan. She assured me that she will be checking in and is comfortable reaching out to us as needed. If the patient is still doing well, will likely plan another laser treatment under local anesthesia in the coming week to try to continue to reduce her disease burden and maintain airway patency.  "

## 2023-02-20 ENCOUNTER — PATIENT OUTREACH (OUTPATIENT)
Dept: OTOLARYNGOLOGY | Facility: CLINIC | Age: 78
End: 2023-02-20
Payer: COMMERCIAL

## 2023-02-20 NOTE — PROGRESS NOTES
Spoke with patient about her breathing and appointment on Thursday 2/23. I asked her to rate her breathing 0 being the best, and 10 being the worst and she rated her breathing at a 0 being the best its been. I told the patient that we needed her to come to the appointment on Thursday for Dr. Davis in order to keep her breathing on a good track. I also told her to reach out with any concerns or questions. Patient verbalized understanding of the situation. Inocencia Fan RN on 2/20/2023 at 9:04 AM

## 2023-02-20 NOTE — PROGRESS NOTES
Called daughter about her request to cancel her mother's appointment on Thursday. I spoke with Dr. Davis and because this is an airway problem we cannot cancel the surgery. I explained to the daughter that we need her to come in order to make sure that her mother's breathing continues to be treated as if the surgery is not completed it could impair her mother's breathing. I also advised the daughter if her mother's breathing declines to go to the ER and let us know so we can help develop a plan or care. Daughter verbalized understanding. Inocencia Fan, RN on 2/20/2023 at 8:56 AM
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
## 2023-02-21 ENCOUNTER — PATIENT OUTREACH (OUTPATIENT)
Dept: OTOLARYNGOLOGY | Facility: CLINIC | Age: 78
End: 2023-02-21
Payer: COMMERCIAL
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
## 2023-02-22 ENCOUNTER — PATIENT OUTREACH (OUTPATIENT)
Dept: OTOLARYNGOLOGY | Facility: CLINIC | Age: 78
End: 2023-02-22
Payer: COMMERCIAL

## 2023-02-22 ENCOUNTER — TELEPHONE (OUTPATIENT)
Dept: OTOLARYNGOLOGY | Facility: CLINIC | Age: 78
End: 2023-02-22
Payer: COMMERCIAL

## 2023-02-22 NOTE — PROGRESS NOTES
Multiple attempts to reach patient to ensure she plans to proceed with procedure tomorrow. Left direct line for return call to discuss.     Lexus Messer RN, BSN

## 2023-02-22 NOTE — TELEPHONE ENCOUNTER
Contacted patient regarding procedure with Dr. Davis.   Confirmed that she will be coming tomorrow at 1:00 p.m., she will be coming with her grand-daughter as her . B arb states Lana will not becoming.   Spoke to Hannah about the need to add her on for a procedure next Thursday, 3/1/2023. Informed her Dr. Davis will assess again tomorrow at her procedure, but good possibility that she will need to come back weekly. Reassured patient and knows we will see her tomorrow in the surgery center.     Alyssa Loaiza on 2/22/2023 at 5:07 PM

## 2023-02-23 ENCOUNTER — HOSPITAL ENCOUNTER (OUTPATIENT)
Facility: AMBULATORY SURGERY CENTER | Age: 78
Discharge: HOME | End: 2023-02-23
Attending: OTOLARYNGOLOGY | Admitting: OTOLARYNGOLOGY
Payer: COMMERCIAL

## 2023-02-23 VITALS
HEART RATE: 87 BPM | WEIGHT: 120 LBS | SYSTOLIC BLOOD PRESSURE: 134 MMHG | BODY MASS INDEX: 19.29 KG/M2 | TEMPERATURE: 98.2 F | DIASTOLIC BLOOD PRESSURE: 73 MMHG | OXYGEN SATURATION: 94 % | RESPIRATION RATE: 16 BRPM | HEIGHT: 66 IN

## 2023-02-23 PROCEDURE — 31572 LARGSC W/LASER DSTRJ LES: CPT | Mod: RT | Performed by: OTOLARYNGOLOGY

## 2023-02-23 RX ORDER — LIDOCAINE HYDROCHLORIDE 20 MG/ML
INJECTION, SOLUTION INFILTRATION; PERINEURAL PRN
Status: DISCONTINUED | OUTPATIENT
Start: 2023-02-23 | End: 2023-02-23 | Stop reason: HOSPADM

## 2023-02-23 RX ORDER — LIDOCAINE HYDROCHLORIDE AND EPINEPHRINE 10; 10 MG/ML; UG/ML
INJECTION, SOLUTION INFILTRATION; PERINEURAL PRN
Status: DISCONTINUED | OUTPATIENT
Start: 2023-02-23 | End: 2023-02-23 | Stop reason: HOSPADM

## 2023-02-23 NOTE — H&P
Abbreviated H&P for ASC Procedure under Local Anesthesia    1. Chief complaint and/or reason for procedure  Recurrent respiratory papillomatosis (RRP)       2. Medical history describing significant medical conditions and previous surgeries  PAST MEDICAL HISTORY:   Past Medical History:   Diagnosis Date     A-fib (H)      Antiplatelet or antithrombotic long-term use      Arrhythmia      COPD (chronic obstructive pulmonary disease) (H)         PAST SURGICAL HISTORY:   Past Surgical History:   Procedure Laterality Date     LASER CO2 LARYNGOSCOPY, COMPLEX N/A 5/26/2022    Procedure: Micro direct laryngoscopy with excision/ablation of laryngeal lesions, possible biopsies, possible CO2 laser;  Surgeon: Nikole Davis MD;  Location: [facility] OR     LASER CO2 LARYNGOSCOPY, COMPLEX N/A 9/15/2022    Procedure: Microdirect laryngoscopy with ablation of laryngeal lesions,biopsies,CO2 laser;  Surgeon: Nikole Davis MD;  Location: [facility]     LASER CO2 LARYNGOSCOPY, COMPLEX N/A 12/1/2022    Procedure: Microdirect laryngoscopy with excision/ablation of laryngeal lesions, biopsies,   CO2 laser;  Surgeon: Nikole Davis MD;  Location: [facility]     LASER KTP LARYNGOSCOPY FLEXIBLE N/A 8/18/2022    Procedure: Transnasal flexible laryngoscopy with KTP laser and biopsies;  Surgeon: Nikole Davis MD;  Location: [facility] OR     LASER KTP LARYNGOSCOPY FLEXIBLE N/A 10/27/2022    Procedure: Transnasal flexible laryngoscopy with possible KTP laser;  Surgeon: Nikole Davis MD;  Location: [facility] OR     LASER KTP LARYNGOSCOPY FLEXIBLE N/A 1/26/2023    Procedure: Transnasal flexible laryngoscopy with KTP laser,  biopsies, superior laryngeal nerve blocks, Avastin injection;  Surgeon: Nikole Davis MD;  Location: [facility] OR     LASER KTP LARYNGOSCOPY FLEXIBLE N/A 2/15/2023    Procedure: Transnasal flexible laryngoscopy with KTP laser, superior laryngeal nerve blocks, biopsies, avastin injection;   Surgeon: Nikole Davis MD;  Location: [facility] OR     LASER KTP LARYNGOSCOPY FLEXIBLE N/A 2/17/2023    Procedure: Transnasal flexible laryngoscopy with KTP laser, biopsies, superior laryngeal nerve blocks;  Surgeon: Nikole Davis MD;  [place] [facility]       Health history changes since last seen? Breathing a little easier    3. Current medications and allergies  MEDICATIONS:     Current Outpatient Medications   Medication Sig Dispense Refill     albuterol (PROAIR HFA/PROVENTIL HFA/VENTOLIN HFA) 108 (90 Base) MCG/ACT inhaler Inhale 2 puffs into the lungs as needed       albuterol (PROVENTIL) (2.5 MG/3ML) 0.083% neb solution Inhale 2.5 mg into the lungs       apixaban ANTICOAGULANT (ELIQUIS) 5 MG tablet Take 5 mg by mouth daily       atorvastatin (LIPITOR) 20 MG tablet Take 20 mg by mouth daily       budesonide-formoterol (SYMBICORT) 160-4.5 MCG/ACT Inhaler Inhale 2 puffs into the lungs daily       diltiazem ER (TIAZAC) 360 MG 24 hr ER beaded capsule Take 360 mg by mouth daily       fexofenadine (ALLEGRA) 180 MG tablet Take 180 mg by mouth daily       fluorouracil (EFUDEX) 5 % external cream APPLY TOPICALLY TO LEFT LATERAL EYEBROW TWICE DAILY FOR 4 WEEKS       ibuprofen (ADVIL/MOTRIN) 200 MG capsule Take 400 mg by mouth every 6 hours as needed for fever       ipratropium - albuterol 0.5 mg/2.5 mg/3 mL (DUONEB) 0.5-2.5 (3) MG/3ML neb solution Take 1 vial (3 mLs) by nebulization every 6 hours as needed for shortness of breath, wheezing or cough 90 mL 0     levothyroxine (SYNTHROID/LEVOTHROID) 88 MCG tablet Take 88 mcg by mouth daily       montelukast (SINGULAIR) 10 MG tablet Take 10 mg by mouth daily       Respiratory Therapy Supplies (NEBULIZER) JUWAN Portable nebulizer, disposable neb kit x 4, reuseable neb kit x 1, mask x 1, filters x 1.   Frequency of use: daily;  Medication: albuterol  Length of need: 99 months       sertraline (ZOLOFT) 100 MG tablet Take 100 mg by mouth daily         ALLERGIES:     Allergies   Allergen Reactions     1-Methyl 2-Pyrrolidone Anaphylaxis     Nuts Anaphylaxis     Black walnut     Escitalopram Other (See Comments)     Asthma -a time of exacerbation and may not have been cause may retry     Mirtazapine Other (See Comments)     Asthma a time of exacerbation and may not have been cause may retry     Venlafaxine Other (See Comments)     Adhesive Tape Rash     With holter monitor. Ok with bandaids.       4. Review of systems  Noncontributory    5. Physical examination  Vital signs stable.   Awake, alert, conversant.  Breathing comfortably, no stridor/stertor. Mildly audible noise with rapid inhalation; some interval improvement since prior, but not resolved.  Voice with moderate to severe strain.    6. ASA classification  ASA III    Plan to proceed as scheduled.
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
## 2023-02-23 NOTE — DISCHARGE INSTRUCTIONS
"[facility]  Home Care Following Your Procedure  Call a doctor if you have signs of infection (fever, growing tenderness at the surgery site, a large amount of drainage or bleeding, severe pain, foul-smelling drainage, redness, swelling).  Today you received a Marcaine or bupivacaine block to numb the nerves near your surgery site.  This is a block using local anesthetic or \"numbing\" medication injected around the nerves to anesthetize or \"numb\" the area supplied by those nerves.  This block is injected into the muscle layer near your surgical site.  The medication may numb the location where you had surgery for 6-18 hours, but may last up to 24 hours.  If your surgical site is an arm or leg you should be careful with your affected limb, since it is possible to injure your limb without being aware of it due to the numbing.  Until full feeling returns, you should guard against bumping or hitting your limb, and avoid extreme hot or cold temperatures on the skin.  As the block wears off, the feeling will return as a tingling or prickly sensation near your surgical site.  You will experience more discomfort from your incision as the feeling returns.  You may want to take a pain pill (a narcotic or Tylenol if this was prescribed by your surgeon) when you start to experience mild pain before the pain becomes more severe.  If your pain medications do not control your pain you should notifiy your surgeon.    Tylenol/Acetaminophen Consumption  To help encourage the safe use of acetaminophen, the makers of TYLENOL  have lowered the maximum daily dose for single-ingredient Extra Strength TYLENOL  (acetaminophen) products sold in the [place] from 8 pills per day (4,000 mg) to 6 pills per day (3,000 mg). The dosing interval has also changed from 2 pills every 4-6 hours to 2 pills every 6 hours.  If you feel your pain relief is insufficient, you may take Tylenol/Acetaminophen in addition to your " narcotic pain medication.   Be careful not to exceed 3,000 mg of Tylenol/Acetaminophen in a 24 hour period from all sources.  If you are taking extra strength Tylenol/acetaminophen (500 mg), the maximum dose is 6 tablets in 24 hours.  If you are taking regular strength acetaminophen (325 mg), the maximum dose is 9 tablets in 24 hours.    Your doctor is:       Dr. Nikole Davis, ENT Otolaryngology: [phone]           Or dial [phone] and ask for the resident on call for:  ENT Otolaryngology  For emergency care, call the:  [place]:  [phone] (TTY for hearing impaired: [phone])

## 2023-02-27 DIAGNOSIS — S12.601S CLOSED NONDISPLACED FRACTURE OF SEVENTH CERVICAL VERTEBRA, UNSPECIFIED FRACTURE MORPHOLOGY, SEQUELA: ICD-10-CM

## 2023-02-27 DIAGNOSIS — R49.0 DYSPHONIA: ICD-10-CM

## 2023-02-27 DIAGNOSIS — R06.02 SHORTNESS OF BREATH: ICD-10-CM

## 2023-02-27 DIAGNOSIS — Z78.9 RECURRENT RESPIRATORY PAPILLOMATOSIS: Primary | ICD-10-CM

## 2023-03-02 ENCOUNTER — HOSPITAL ENCOUNTER (OUTPATIENT)
Facility: AMBULATORY SURGERY CENTER | Age: 78
Discharge: HOME | End: 2023-03-02
Attending: OTOLARYNGOLOGY | Admitting: OTOLARYNGOLOGY
Payer: COMMERCIAL

## 2023-03-02 VITALS
WEIGHT: 120 LBS | HEART RATE: 84 BPM | SYSTOLIC BLOOD PRESSURE: 137 MMHG | TEMPERATURE: 98.6 F | HEIGHT: 66 IN | BODY MASS INDEX: 19.29 KG/M2 | OXYGEN SATURATION: 96 % | RESPIRATION RATE: 16 BRPM | DIASTOLIC BLOOD PRESSURE: 88 MMHG

## 2023-03-02 DIAGNOSIS — R49.0 DYSPHONIA: ICD-10-CM

## 2023-03-02 PROCEDURE — 31572 LARGSC W/LASER DSTRJ LES: CPT | Mod: 50 | Performed by: OTOLARYNGOLOGY

## 2023-03-02 PROCEDURE — 31572 LARGSC W/LASER DSTRJ LES: CPT | Mod: 50

## 2023-03-02 RX ORDER — LIDOCAINE HYDROCHLORIDE AND EPINEPHRINE 10; 10 MG/ML; UG/ML
INJECTION, SOLUTION INFILTRATION; PERINEURAL PRN
Status: DISCONTINUED | OUTPATIENT
Start: 2023-03-02 | End: 2023-03-02 | Stop reason: HOSPADM

## 2023-03-02 RX ORDER — ONDANSETRON 4 MG/1
4 TABLET, ORALLY DISINTEGRATING ORAL EVERY 30 MIN PRN
Status: DISCONTINUED | OUTPATIENT
Start: 2023-03-02 | End: 2023-03-03 | Stop reason: HOSPADM

## 2023-03-02 RX ORDER — HYDROMORPHONE HYDROCHLORIDE 1 MG/ML
0.2 INJECTION, SOLUTION INTRAMUSCULAR; INTRAVENOUS; SUBCUTANEOUS EVERY 5 MIN PRN
Status: DISCONTINUED | OUTPATIENT
Start: 2023-03-02 | End: 2023-03-03 | Stop reason: HOSPADM

## 2023-03-02 RX ORDER — LABETALOL HYDROCHLORIDE 5 MG/ML
10 INJECTION, SOLUTION INTRAVENOUS
Status: DISCONTINUED | OUTPATIENT
Start: 2023-03-02 | End: 2023-03-03 | Stop reason: HOSPADM

## 2023-03-02 RX ORDER — SODIUM CHLORIDE, SODIUM LACTATE, POTASSIUM CHLORIDE, CALCIUM CHLORIDE 600; 310; 30; 20 MG/100ML; MG/100ML; MG/100ML; MG/100ML
INJECTION, SOLUTION INTRAVENOUS CONTINUOUS
Status: DISCONTINUED | OUTPATIENT
Start: 2023-03-02 | End: 2023-03-03 | Stop reason: HOSPADM

## 2023-03-02 RX ORDER — FENTANYL CITRATE 50 UG/ML
50 INJECTION, SOLUTION INTRAMUSCULAR; INTRAVENOUS EVERY 5 MIN PRN
Status: DISCONTINUED | OUTPATIENT
Start: 2023-03-02 | End: 2023-03-03 | Stop reason: HOSPADM

## 2023-03-02 RX ORDER — HYDROMORPHONE HYDROCHLORIDE 1 MG/ML
0.4 INJECTION, SOLUTION INTRAMUSCULAR; INTRAVENOUS; SUBCUTANEOUS EVERY 5 MIN PRN
Status: DISCONTINUED | OUTPATIENT
Start: 2023-03-02 | End: 2023-03-03 | Stop reason: HOSPADM

## 2023-03-02 RX ORDER — ACETAMINOPHEN 325 MG/1
975 TABLET ORAL ONCE
Status: COMPLETED | OUTPATIENT
Start: 2023-03-02 | End: 2023-03-02

## 2023-03-02 RX ORDER — FENTANYL CITRATE 50 UG/ML
25 INJECTION, SOLUTION INTRAMUSCULAR; INTRAVENOUS EVERY 5 MIN PRN
Status: DISCONTINUED | OUTPATIENT
Start: 2023-03-02 | End: 2023-03-03 | Stop reason: HOSPADM

## 2023-03-02 RX ORDER — LIDOCAINE 40 MG/G
CREAM TOPICAL
Status: DISCONTINUED | OUTPATIENT
Start: 2023-03-02 | End: 2023-03-03 | Stop reason: HOSPADM

## 2023-03-02 RX ORDER — ONDANSETRON 2 MG/ML
4 INJECTION INTRAMUSCULAR; INTRAVENOUS EVERY 30 MIN PRN
Status: DISCONTINUED | OUTPATIENT
Start: 2023-03-02 | End: 2023-03-03 | Stop reason: HOSPADM
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
## 2023-03-02 NOTE — DISCHARGE INSTRUCTIONS
[facility]  Home Care Following Your Procedure  Call a doctor if you have signs of infection (fever, growing tenderness at the surgery site, a large amount of drainage or bleeding, severe pain, foul-smelling drainage, redness, swelling).         Tylenol/Acetaminophen Consumption  To help encourage the safe use of acetaminophen, the makers of TYLENOL  have lowered the maximum daily dose for single-ingredient Extra Strength TYLENOL  (acetaminophen) products sold in the [place] from 8 pills per day (4,000 mg) to 6 pills per day (3,000 mg). The dosing interval has also changed from 2 pills every 4-6 hours to 2 pills every 6 hours.  If you feel your pain relief is insufficient, you may take Tylenol/Acetaminophen in addition to your narcotic pain medication.   Be careful not to exceed 3,000 mg of Tylenol/Acetaminophen in a 24 hour period from all sources.  If you are taking extra strength Tylenol/acetaminophen (500 mg), the maximum dose is 6 tablets in 24 hours.  If you are taking regular strength acetaminophen (325 mg), the maximum dose is 9 tablets in 24 hours.    Your doctor is:       Dr. Nikole Davis, ENT Otolaryngology: [phone]             Or dial [phone] and ask for the resident on call for:  ENT Otolaryngology  For emergency care, call the:  [place]:  [phone] (TTY for hearing impaired: [phone])

## 2023-03-02 NOTE — H&P
Abbreviated H&P for ASC Procedure under Local Anesthesia    1. Chief complaint and/or reason for procedure  Recurrent respiratory papillomatosis (RRP)  Cervical spine fractures    2. Medical history describing significant medical conditions and previous surgeries  PAST MEDICAL HISTORY:   Past Medical History:   Diagnosis Date     A-fib (H)      Antiplatelet or antithrombotic long-term use      Arrhythmia      COPD (chronic obstructive pulmonary disease) (H)         PAST SURGICAL HISTORY:   Past Surgical History:   Procedure Laterality Date     LASER CO2 LARYNGOSCOPY, COMPLEX N/A 5/26/2022    Procedure: Micro direct laryngoscopy with excision/ablation of laryngeal lesions, possible biopsies, possible CO2 laser;  Surgeon: Nikole Davis MD;  Location: [facility] OR     LASER CO2 LARYNGOSCOPY, COMPLEX N/A 9/15/2022    Procedure: Microdirect laryngoscopy with ablation of laryngeal lesions,biopsies,CO2 laser;  Surgeon: Nikole Davis MD;  Location: [facility] OR     LASER CO2 LARYNGOSCOPY, COMPLEX N/A 12/1/2022    Procedure: Microdirect laryngoscopy with excision/ablation of laryngeal lesions, biopsies,   CO2 laser;  Surgeon: Nikole Davis MD;  Location: [facility] OR     LASER KTP LARYNGOSCOPY FLEXIBLE N/A 8/18/2022    Procedure: Transnasal flexible laryngoscopy with KTP laser and biopsies;  Surgeon: Nikole Davis MD;  Location: [facility] OR     LASER KTP LARYNGOSCOPY FLEXIBLE N/A 10/27/2022    Procedure: Transnasal flexible laryngoscopy with possible KTP laser;  Surgeon: Nikole Davis MD;  Location: [facility] OR     LASER KTP LARYNGOSCOPY FLEXIBLE N/A 1/26/2023    Procedure: Transnasal flexible laryngoscopy with KTP laser,  biopsies, superior laryngeal nerve blocks, Avastin injection;  Surgeon: Nikole Davis MD;  Location: [facility] OR     LASER KTP LARYNGOSCOPY FLEXIBLE N/A 2/15/2023    Procedure: Transnasal flexible laryngoscopy with KTP laser, superior laryngeal nerve blocks, biopsies,  avastin injection;  Surgeon: Nikole Davis MD;  Location: [facility] OR     LASER KTP LARYNGOSCOPY FLEXIBLE N/A 2/17/2023    Procedure: Transnasal flexible laryngoscopy with KTP laser, biopsies, superior laryngeal nerve blocks;  Surgeon: Nikole Davis MD;  Location: [facility] OR     LASER KTP LARYNGOSCOPY FLEXIBLE N/A 2/23/2023    Procedure: Transnasal flexible laryngoscopy with KTP laser, superior laryngeal nerve blocks;  Surgeon: Nikole Davis MD;  [place] [facility] [place]       Health history changes since last seen? NA    3. Current medications and allergies  MEDICATIONS:     Current Outpatient Medications   Medication Sig Dispense Refill     albuterol (PROAIR HFA/PROVENTIL HFA/VENTOLIN HFA) 108 (90 Base) MCG/ACT inhaler Inhale 2 puffs into the lungs as needed       apixaban ANTICOAGULANT (ELIQUIS) 5 MG tablet Take 5 mg by mouth daily       atorvastatin (LIPITOR) 20 MG tablet Take 20 mg by mouth daily       budesonide-formoterol (SYMBICORT) 160-4.5 MCG/ACT Inhaler Inhale 2 puffs into the lungs daily       diltiazem ER (TIAZAC) 360 MG 24 hr ER beaded capsule Take 360 mg by mouth daily       fexofenadine (ALLEGRA) 180 MG tablet Take 180 mg by mouth daily       ibuprofen (ADVIL/MOTRIN) 200 MG capsule Take 400 mg by mouth every 6 hours as needed for fever       ipratropium - albuterol 0.5 mg/2.5 mg/3 mL (DUONEB) 0.5-2.5 (3) MG/3ML neb solution Take 1 vial (3 mLs) by nebulization every 6 hours as needed for shortness of breath, wheezing or cough 90 mL 0     levothyroxine (SYNTHROID/LEVOTHROID) 88 MCG tablet Take 88 mcg by mouth daily       montelukast (SINGULAIR) 10 MG tablet Take 10 mg by mouth daily       sertraline (ZOLOFT) 100 MG tablet Take 100 mg by mouth daily       albuterol (PROVENTIL) (2.5 MG/3ML) 0.083% neb solution Inhale 2.5 mg into the lungs       fluorouracil (EFUDEX) 5 % external cream APPLY TOPICALLY TO LEFT LATERAL EYEBROW TWICE DAILY FOR 4 WEEKS       Respiratory Therapy Supplies  (NEBULIZER) JUWAN Portable nebulizer, disposable neb kit x 4, reuseable neb kit x 1, mask x 1, filters x 1.   Frequency of use: daily;  Medication: albuterol  Length of need: 99 months         ALLERGIES:    Allergies   Allergen Reactions     1-Methyl 2-Pyrrolidone Anaphylaxis     Nuts Anaphylaxis     Black walnut     Escitalopram Other (See Comments)     Asthma -a time of exacerbation and may not have been cause may retry     Mirtazapine Other (See Comments)     Asthma a time of exacerbation and may not have been cause may retry     Venlafaxine Other (See Comments)     Adhesive Tape Rash     With holter monitor. Ok with bandaids.       4. Review of systems  Noncontributory      5. Physical examination  Vital signs stable.   Awake, alert, conversant.  Breathing comfortably, no stridor/stertor. Quiet noise audible on faster inhalation.  Voice with moderate to severe dysphonia characterized by strain, roughness, breathiness.    6. ASA classification  ASA III    Plan to proceed as scheduled.

## 2023-03-09 ENCOUNTER — HOSPITAL ENCOUNTER (OUTPATIENT)
Facility: AMBULATORY SURGERY CENTER | Age: 78
Discharge: HOME | End: 2023-03-09
Attending: OTOLARYNGOLOGY | Admitting: OTOLARYNGOLOGY
Payer: COMMERCIAL

## 2023-03-09 VITALS
HEART RATE: 90 BPM | HEIGHT: 66 IN | TEMPERATURE: 97.6 F | RESPIRATION RATE: 24 BRPM | SYSTOLIC BLOOD PRESSURE: 145 MMHG | OXYGEN SATURATION: 96 % | WEIGHT: 120 LBS | DIASTOLIC BLOOD PRESSURE: 72 MMHG | BODY MASS INDEX: 19.29 KG/M2

## 2023-03-09 PROCEDURE — 88305 TISSUE EXAM BY PATHOLOGIST: CPT | Mod: TC | Performed by: OTOLARYNGOLOGY

## 2023-03-09 PROCEDURE — 31572 LARGSC W/LASER DSTRJ LES: CPT | Mod: 50 | Performed by: OTOLARYNGOLOGY

## 2023-03-09 PROCEDURE — 88305 TISSUE EXAM BY PATHOLOGIST: CPT | Mod: 26 | Performed by: PATHOLOGY

## 2023-03-09 RX ORDER — LIDOCAINE HYDROCHLORIDE 20 MG/ML
INJECTION, SOLUTION INFILTRATION; PERINEURAL PRN
Status: DISCONTINUED | OUTPATIENT
Start: 2023-03-09 | End: 2023-03-09 | Stop reason: HOSPADM

## 2023-03-09 RX ORDER — LIDOCAINE HYDROCHLORIDE AND EPINEPHRINE 10; 10 MG/ML; UG/ML
INJECTION, SOLUTION INFILTRATION; PERINEURAL PRN
Status: DISCONTINUED | OUTPATIENT
Start: 2023-03-09 | End: 2023-03-09 | Stop reason: HOSPADM

## 2023-03-09 NOTE — H&P
Abbreviated H&P for ASC Procedure under Local Anesthesia    1. Chief complaint and/or reason for procedure  Recurrent respiratory papillomatosis (RRP)       2. Medical history describing significant medical conditions and previous surgeries  PAST MEDICAL HISTORY:   Past Medical History:   Diagnosis Date     A-fib (H)      Antiplatelet or antithrombotic long-term use      Arrhythmia      COPD (chronic obstructive pulmonary disease) (H)         PAST SURGICAL HISTORY:   Past Surgical History:   Procedure Laterality Date     LASER CO2 LARYNGOSCOPY, COMPLEX N/A 5/26/2022    Procedure: Micro direct laryngoscopy with excision/ablation of laryngeal lesions, possible biopsies, possible CO2 laser;  Surgeon: Nikole Davis MD;  Location: [facility] OR     LASER CO2 LARYNGOSCOPY, COMPLEX N/A 9/15/2022    Procedure: Microdirect laryngoscopy with ablation of laryngeal lesions,biopsies,CO2 laser;  Surgeon: Nikole Davis MD;  Location: [facility] OR     LASER CO2 LARYNGOSCOPY, COMPLEX N/A 12/1/2022    Procedure: Microdirect laryngoscopy with excision/ablation of laryngeal lesions, biopsies,   CO2 laser;  Surgeon: Nikole Davis MD;  Location: [facility] OR     LASER KTP LARYNGOSCOPY FLEXIBLE N/A 8/18/2022    Procedure: Transnasal flexible laryngoscopy with KTP laser and biopsies;  Surgeon: Nikole Davis MD;  Location: [facility] OR     LASER KTP LARYNGOSCOPY FLEXIBLE N/A 10/27/2022    Procedure: Transnasal flexible laryngoscopy with possible KTP laser;  Surgeon: Nikole Davis MD;  Location: [facility] OR     LASER KTP LARYNGOSCOPY FLEXIBLE N/A 1/26/2023    Procedure: Transnasal flexible laryngoscopy with KTP laser,  biopsies, superior laryngeal nerve blocks, Avastin injection;  Surgeon: Nikole Davis MD;  Location: [facility] OR     LASER KTP LARYNGOSCOPY FLEXIBLE N/A 2/15/2023    Procedure: Transnasal flexible laryngoscopy with KTP laser, superior laryngeal nerve blocks, biopsies, avastin injection;   Surgeon: Nikole Davis MD;  Location: [facility] OR     LASER KTP LARYNGOSCOPY FLEXIBLE N/A 2/17/2023    Procedure: Transnasal flexible laryngoscopy with KTP laser, biopsies, superior laryngeal nerve blocks;  Surgeon: Nikole Davis MD;  Location: [facility] OR     LASER KTP LARYNGOSCOPY FLEXIBLE N/A 2/23/2023    Procedure: Transnasal flexible laryngoscopy with KTP laser, superior laryngeal nerve blocks;  Surgeon: Nikole Davis MD;  Location: [facility] OR     LASER KTP LARYNGOSCOPY FLEXIBLE N/A 3/2/2023    Procedure: Transnasal flexible laryngoscopy with KTP laser, superior laryngeal nerve block Bilateral;  Surgeon: Nikole Davis MD;  Location: [facility] OR       Health history changes since last seen? Had an issue this morning with sertraline pill feeling like it got stuck. This led to a burning sensation. This has not occurred before and she has not noticed any changes in her eating and drinking otherwise.      3. Current medications and allergies  MEDICATIONS:     Current Outpatient Medications   Medication Sig Dispense Refill     albuterol (PROVENTIL) (2.5 MG/3ML) 0.083% neb solution Inhale 2.5 mg into the lungs       apixaban ANTICOAGULANT (ELIQUIS) 5 MG tablet Take 5 mg by mouth daily       budesonide-formoterol (SYMBICORT) 160-4.5 MCG/ACT Inhaler Inhale 2 puffs into the lungs daily       diltiazem ER (TIAZAC) 360 MG 24 hr ER beaded capsule Take 360 mg by mouth daily       fexofenadine (ALLEGRA) 180 MG tablet Take 180 mg by mouth daily       ipratropium - albuterol 0.5 mg/2.5 mg/3 mL (DUONEB) 0.5-2.5 (3) MG/3ML neb solution Take 1 vial (3 mLs) by nebulization every 6 hours as needed for shortness of breath, wheezing or cough 90 mL 0     levothyroxine (SYNTHROID/LEVOTHROID) 88 MCG tablet Take 88 mcg by mouth daily       montelukast (SINGULAIR) 10 MG tablet Take 10 mg by mouth daily       sertraline (ZOLOFT) 100 MG tablet Take 100 mg by mouth daily       albuterol (PROAIR HFA/PROVENTIL  HFA/VENTOLIN HFA) 108 (90 Base) MCG/ACT inhaler Inhale 2 puffs into the lungs as needed       atorvastatin (LIPITOR) 20 MG tablet Take 20 mg by mouth daily       fluorouracil (EFUDEX) 5 % external cream APPLY TOPICALLY TO LEFT LATERAL EYEBROW TWICE DAILY FOR 4 WEEKS       ibuprofen (ADVIL/MOTRIN) 200 MG capsule Take 400 mg by mouth every 6 hours as needed for fever       Respiratory Therapy Supplies (NEBULIZER) JUWAN Portable nebulizer, disposable neb kit x 4, reuseable neb kit x 1, mask x 1, filters x 1.   Frequency of use: daily;  Medication: albuterol  Length of need: 99 months         ALLERGIES:    Allergies   Allergen Reactions     1-Methyl 2-Pyrrolidone Anaphylaxis     Nuts Anaphylaxis     Black walnut     Escitalopram Other (See Comments)     Asthma -a time of exacerbation and may not have been cause may retry     Mirtazapine Other (See Comments)     Asthma a time of exacerbation and may not have been cause may retry     Venlafaxine Other (See Comments)     Adhesive Tape Rash     With holter monitor. Ok with bandaids.       4. Review of systems  Noncontributory      5. Physical examination  Vital signs stable.  Awake, alert, conversant.  Breathing comfortably, no stridor/stertor at rest. Mild to moderately noisy breathing on inhalation.  Voice with moderate strain, breathiness, raspiness.    6. ASA classification  ASA III    Plan to proceed as planned.    Regarding sertraline pill: Patient indicates this was an isolated occurrence. If pill dysphagia recurs or persists, would plan VFSS and esophagram. Could also consider referral to GI for EGD although sedation is tricky currently given airway situation. If it seems specific to sertraline, patient will need to discuss with prescriber. These recommendations were relayed to the patient.

## 2023-03-09 NOTE — DISCHARGE INSTRUCTIONS
[facility]  Home Care Following Your Procedure  Call a doctor if you have signs of infection (fever, growing tenderness at the surgery site, a large amount of drainage or bleeding, severe pain, foul-smelling drainage, redness, swelling).         Tylenol/Acetaminophen Consumption  To help encourage the safe use of acetaminophen, the makers of TYLENOL  have lowered the maximum daily dose for single-ingredient Extra Strength TYLENOL  (acetaminophen) products sold in the [place] from 8 pills per day (4,000 mg) to 6 pills per day (3,000 mg). The dosing interval has also changed from 2 pills every 4-6 hours to 2 pills every 6 hours.  If you feel your pain relief is insufficient, you may take Tylenol/Acetaminophen in addition to your narcotic pain medication.   Be careful not to exceed 3,000 mg of Tylenol/Acetaminophen in a 24 hour period from all sources.  If you are taking extra strength Tylenol/acetaminophen (500 mg), the maximum dose is 6 tablets in 24 hours.  If you are taking regular strength acetaminophen (325 mg), the maximum dose is 9 tablets in 24 hours.    Your doctor is:       Dr. Nikole Davis, ENT Otolaryngology: [phone]             Or dial [phone] and ask for the resident on call for:  ENT Otolaryngology  For emergency care, call the:  [place]:  [phone] (TTY for hearing impaired: [phone])
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
## 2023-03-13 LAB
PATH REPORT.COMMENTS IMP SPEC: ABNORMAL
PATH REPORT.COMMENTS IMP SPEC: YES
PATH REPORT.FINAL DX SPEC: ABNORMAL
PATH REPORT.GROSS SPEC: ABNORMAL
PATH REPORT.MICROSCOPIC SPEC OTHER STN: ABNORMAL
PATH REPORT.RELEVANT HX SPEC: ABNORMAL
PHOTO IMAGE: ABNORMAL

## 2023-03-14 ENCOUNTER — PATIENT OUTREACH (OUTPATIENT)
Dept: OTOLARYNGOLOGY | Facility: CLINIC | Age: 78
End: 2023-03-14
Payer: COMMERCIAL

## 2023-03-14 NOTE — PROGRESS NOTES
Spoke to patient and daughter about the biopsy result, and the plan to go to the main OR once Hannah gets out of her brace. As we spoke I grew concerned about her breathing as she was wheezing I asked her to rate her breathing and she rated it as a 1/10 no problem. I made sure to tell both the daughter and patient that if Hannah's breathing grew worse they needed to bring her to the ER and not wait for Dr. Davis to get back. Patient and daughter verbalized understanding. Inocencia Fan RN on 3/14/2023 at 8:27 AM

## 2023-03-15 DIAGNOSIS — J38.7 LARYNGEAL MASS: ICD-10-CM

## 2023-03-15 DIAGNOSIS — Z78.9 RECURRENT RESPIRATORY PAPILLOMATOSIS: Primary | ICD-10-CM

## 2023-03-15 DIAGNOSIS — S12.601S CLOSED NONDISPLACED FRACTURE OF SEVENTH CERVICAL VERTEBRA, UNSPECIFIED FRACTURE MORPHOLOGY, SEQUELA: ICD-10-CM

## 2023-03-15 DIAGNOSIS — R06.02 SHORTNESS OF BREATH: ICD-10-CM

## 2023-03-15 DIAGNOSIS — R49.0 DYSPHONIA: ICD-10-CM

## 2023-03-16 ENCOUNTER — PATIENT OUTREACH (OUTPATIENT)
Dept: OTOLARYNGOLOGY | Facility: CLINIC | Age: 78
End: 2023-03-16
Payer: COMMERCIAL

## 2023-03-16 NOTE — PROGRESS NOTES
Called patient to tell her that given her coughing, and breathing getting worse between surgeries that Dr. Davis wanted to add her to the OR schedule next Friday with Dr. Woodard. Patient agreed and will be at the procedure next week.     I LVM for her roxana Lana, and told her about the plan and to call me with any concerns. Inocencia Fan, RN on 3/16/2023 at 9:13 AM

## 2023-03-17 ENCOUNTER — HOSPITAL ENCOUNTER (OUTPATIENT)
Facility: AMBULATORY SURGERY CENTER | Age: 78
Discharge: HOME | End: 2023-03-17
Attending: OTOLARYNGOLOGY | Admitting: OTOLARYNGOLOGY
Payer: COMMERCIAL

## 2023-03-17 VITALS
WEIGHT: 116 LBS | DIASTOLIC BLOOD PRESSURE: 70 MMHG | SYSTOLIC BLOOD PRESSURE: 147 MMHG | BODY MASS INDEX: 19.33 KG/M2 | TEMPERATURE: 98.5 F | HEART RATE: 102 BPM | OXYGEN SATURATION: 93 % | HEIGHT: 65 IN | RESPIRATION RATE: 18 BRPM

## 2023-03-17 PROCEDURE — 31570 LARYNGOSCOPE W/VC INJ: CPT | Performed by: OTOLARYNGOLOGY

## 2023-03-17 PROCEDURE — 31572 LARGSC W/LASER DSTRJ LES: CPT | Mod: 50 | Performed by: OTOLARYNGOLOGY

## 2023-03-17 RX ORDER — LIDOCAINE HYDROCHLORIDE AND EPINEPHRINE 10; 10 MG/ML; UG/ML
INJECTION, SOLUTION INFILTRATION; PERINEURAL PRN
Status: DISCONTINUED | OUTPATIENT
Start: 2023-03-17 | End: 2023-03-17 | Stop reason: HOSPADM

## 2023-03-17 RX ORDER — LIDOCAINE HYDROCHLORIDE 20 MG/ML
INJECTION, SOLUTION INFILTRATION; PERINEURAL PRN
Status: DISCONTINUED | OUTPATIENT
Start: 2023-03-17 | End: 2023-03-17 | Stop reason: HOSPADM

## 2023-03-17 NOTE — H&P
Abbreviated H&P for ASC Procedure under Local Anesthesia    1. Chief complaint and/or reason for procedure  Recurrent respiratory papillomatosis (RRP)     2. Medical history describing significant medical conditions and previous surgeries  PAST MEDICAL HISTORY:   Past Medical History:   Diagnosis Date     A-fib (H)      Antiplatelet or antithrombotic long-term use      Arrhythmia      COPD (chronic obstructive pulmonary disease) (H)         PAST SURGICAL HISTORY:   Past Surgical History:   Procedure Laterality Date     LASER CO2 LARYNGOSCOPY, COMPLEX N/A 5/26/2022    Procedure: Micro direct laryngoscopy with excision/ablation of laryngeal lesions, possible biopsies, possible CO2 laser;  Surgeon: Nikole Davis MD;  Location: [facility] OR     LASER CO2 LARYNGOSCOPY, COMPLEX N/A 9/15/2022    Procedure: Microdirect laryngoscopy with ablation of laryngeal lesions,biopsies,CO2 laser;  Surgeon: Nikole Davis MD;  Location: [facility] OR     LASER CO2 LARYNGOSCOPY, COMPLEX N/A 12/1/2022    Procedure: Microdirect laryngoscopy with excision/ablation of laryngeal lesions, biopsies,   CO2 laser;  Surgeon: Nikole Davis MD;  Location: [facility] OR     LASER KTP LARYNGOSCOPY FLEXIBLE N/A 8/18/2022    Procedure: Transnasal flexible laryngoscopy with KTP laser and biopsies;  Surgeon: Nikole Davis MD;  Location: [facility] OR     LASER KTP LARYNGOSCOPY FLEXIBLE N/A 10/27/2022    Procedure: Transnasal flexible laryngoscopy with possible KTP laser;  Surgeon: Nikole Davis MD;  Location: [facility] OR     LASER KTP LARYNGOSCOPY FLEXIBLE N/A 1/26/2023    Procedure: Transnasal flexible laryngoscopy with KTP laser,  biopsies, superior laryngeal nerve blocks, Avastin injection;  Surgeon: Nikole Davis MD;  Location: [facility] OR     LASER KTP LARYNGOSCOPY FLEXIBLE N/A 2/15/2023    Procedure: Transnasal flexible laryngoscopy with KTP laser, superior laryngeal nerve blocks, biopsies, avastin injection;   Surgeon: Nikole Davis MD;  Location: [facility] OR     LASER KTP LARYNGOSCOPY FLEXIBLE N/A 2/17/2023    Procedure: Transnasal flexible laryngoscopy with KTP laser, biopsies, superior laryngeal nerve blocks;  Surgeon: Nikole Davis MD;  Location: [facility] OR     LASER KTP LARYNGOSCOPY FLEXIBLE N/A 2/23/2023    Procedure: Transnasal flexible laryngoscopy with KTP laser, superior laryngeal nerve blocks;  Surgeon: Nikole Davis MD;  Location: [facility] OR     LASER KTP LARYNGOSCOPY FLEXIBLE N/A 3/2/2023    Procedure: Transnasal flexible laryngoscopy with KTP laser, superior laryngeal nerve block Bilateral;  Surgeon: Nikole Davis MD;  Location: [facility] OR     LASER KTP LARYNGOSCOPY FLEXIBLE N/A 3/9/2023    Procedure: Transnasal flexible laryngoscopy with KTP laser, biopsies, superior laryngeal nerve blocks;  Surgeon: Nikole Davis MD;  Location: [facility] OR       Health history changes since last seen? NA    3. Current medications and allergies  MEDICATIONS:     Current Outpatient Medications   Medication Sig Dispense Refill     albuterol (PROAIR HFA/PROVENTIL HFA/VENTOLIN HFA) 108 (90 Base) MCG/ACT inhaler Inhale 2 puffs into the lungs as needed       albuterol (PROVENTIL) (2.5 MG/3ML) 0.083% neb solution Inhale 2.5 mg into the lungs       apixaban ANTICOAGULANT (ELIQUIS) 5 MG tablet Take 5 mg by mouth daily       atorvastatin (LIPITOR) 20 MG tablet Take 20 mg by mouth daily       budesonide-formoterol (SYMBICORT) 160-4.5 MCG/ACT Inhaler Inhale 2 puffs into the lungs daily       diltiazem ER (TIAZAC) 360 MG 24 hr ER beaded capsule Take 360 mg by mouth daily       fexofenadine (ALLEGRA) 180 MG tablet Take 180 mg by mouth daily       fluorouracil (EFUDEX) 5 % external cream APPLY TOPICALLY TO LEFT LATERAL EYEBROW TWICE DAILY FOR 4 WEEKS       ibuprofen (ADVIL/MOTRIN) 200 MG capsule Take 400 mg by mouth every 6 hours as needed for fever       ipratropium - albuterol 0.5 mg/2.5 mg/3  mL (DUONEB) 0.5-2.5 (3) MG/3ML neb solution Take 1 vial (3 mLs) by nebulization every 6 hours as needed for shortness of breath, wheezing or cough 90 mL 0     levothyroxine (SYNTHROID/LEVOTHROID) 88 MCG tablet Take 88 mcg by mouth daily       montelukast (SINGULAIR) 10 MG tablet Take 10 mg by mouth daily       Respiratory Therapy Supplies (NEBULIZER) JUWAN Portable nebulizer, disposable neb kit x 4, reuseable neb kit x 1, mask x 1, filters x 1.   Frequency of use: daily;  Medication: albuterol  Length of need: 99 months       sertraline (ZOLOFT) 100 MG tablet Take 100 mg by mouth daily         ALLERGIES:    Allergies   Allergen Reactions     1-Methyl 2-Pyrrolidone Anaphylaxis     Nuts Anaphylaxis     Black walnut     Escitalopram Other (See Comments)     Asthma -a time of exacerbation and may not have been cause may retry     Mirtazapine Other (See Comments)     Asthma a time of exacerbation and may not have been cause may retry     Venlafaxine Other (See Comments)     Adhesive Tape Rash     With holter monitor. Ok with bandaids.       4. Review of systems  Noncontributory      5. Physical examination  Vital signs stable.  Awake, alert, conversant.  Breathing comfortably, no stridor/stertor.  Voice with moderate strain characterized by roughness/breathiness.    6. ASA classification  ASA III    Plan to proceed as scheduled.
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
## 2023-03-28 ENCOUNTER — HOSPITAL ENCOUNTER (OUTPATIENT)
Facility: AMBULATORY SURGERY CENTER | Age: 78
Discharge: HOME | End: 2023-03-28
Attending: OTOLARYNGOLOGY | Admitting: OTOLARYNGOLOGY
Payer: COMMERCIAL

## 2023-03-28 VITALS
RESPIRATION RATE: 16 BRPM | TEMPERATURE: 98.2 F | HEART RATE: 92 BPM | HEIGHT: 66 IN | BODY MASS INDEX: 18.32 KG/M2 | DIASTOLIC BLOOD PRESSURE: 60 MMHG | WEIGHT: 114 LBS | SYSTOLIC BLOOD PRESSURE: 134 MMHG | OXYGEN SATURATION: 94 %

## 2023-03-28 PROCEDURE — 31572 LARGSC W/LASER DSTRJ LES: CPT | Mod: 50 | Performed by: OTOLARYNGOLOGY

## 2023-03-28 RX ORDER — SODIUM CHLORIDE, SODIUM LACTATE, POTASSIUM CHLORIDE, CALCIUM CHLORIDE 600; 310; 30; 20 MG/100ML; MG/100ML; MG/100ML; MG/100ML
INJECTION, SOLUTION INTRAVENOUS CONTINUOUS
Status: DISCONTINUED | OUTPATIENT
Start: 2023-03-28 | End: 2023-03-28 | Stop reason: HOSPADM

## 2023-03-28 RX ORDER — ONDANSETRON 4 MG/1
4 TABLET, ORALLY DISINTEGRATING ORAL EVERY 30 MIN PRN
Status: DISCONTINUED | OUTPATIENT
Start: 2023-03-28 | End: 2023-03-28 | Stop reason: HOSPADM

## 2023-03-28 RX ORDER — LIDOCAINE HYDROCHLORIDE 20 MG/ML
INJECTION, SOLUTION INFILTRATION; PERINEURAL PRN
Status: DISCONTINUED | OUTPATIENT
Start: 2023-03-28 | End: 2023-03-28 | Stop reason: HOSPADM

## 2023-03-28 RX ORDER — FENTANYL CITRATE 50 UG/ML
50 INJECTION, SOLUTION INTRAMUSCULAR; INTRAVENOUS EVERY 5 MIN PRN
Status: DISCONTINUED | OUTPATIENT
Start: 2023-03-28 | End: 2023-03-28 | Stop reason: HOSPADM

## 2023-03-28 RX ORDER — ACETAMINOPHEN 325 MG/1
975 TABLET ORAL ONCE
Status: DISCONTINUED | OUTPATIENT
Start: 2023-03-28 | End: 2023-03-28 | Stop reason: HOSPADM

## 2023-03-28 RX ORDER — FENTANYL CITRATE 50 UG/ML
25 INJECTION, SOLUTION INTRAMUSCULAR; INTRAVENOUS EVERY 5 MIN PRN
Status: DISCONTINUED | OUTPATIENT
Start: 2023-03-28 | End: 2023-03-28 | Stop reason: HOSPADM

## 2023-03-28 RX ORDER — LIDOCAINE HYDROCHLORIDE AND EPINEPHRINE 10; 10 MG/ML; UG/ML
INJECTION, SOLUTION INFILTRATION; PERINEURAL PRN
Status: DISCONTINUED | OUTPATIENT
Start: 2023-03-28 | End: 2023-03-28 | Stop reason: HOSPADM

## 2023-03-28 RX ORDER — HYDROMORPHONE HYDROCHLORIDE 1 MG/ML
0.4 INJECTION, SOLUTION INTRAMUSCULAR; INTRAVENOUS; SUBCUTANEOUS EVERY 5 MIN PRN
Status: DISCONTINUED | OUTPATIENT
Start: 2023-03-28 | End: 2023-03-28 | Stop reason: HOSPADM

## 2023-03-28 RX ORDER — HYDROMORPHONE HYDROCHLORIDE 1 MG/ML
0.2 INJECTION, SOLUTION INTRAMUSCULAR; INTRAVENOUS; SUBCUTANEOUS EVERY 5 MIN PRN
Status: DISCONTINUED | OUTPATIENT
Start: 2023-03-28 | End: 2023-03-28 | Stop reason: HOSPADM

## 2023-03-28 RX ORDER — LIDOCAINE 40 MG/G
CREAM TOPICAL
Status: DISCONTINUED | OUTPATIENT
Start: 2023-03-28 | End: 2023-03-28 | Stop reason: HOSPADM

## 2023-03-28 RX ORDER — ONDANSETRON 2 MG/ML
4 INJECTION INTRAMUSCULAR; INTRAVENOUS EVERY 30 MIN PRN
Status: DISCONTINUED | OUTPATIENT
Start: 2023-03-28 | End: 2023-03-28 | Stop reason: HOSPADM

## 2023-03-28 NOTE — DISCHARGE INSTRUCTIONS
[facility]  Home Care Following Your Procedure  Call a doctor if you have signs of infection (fever, growing tenderness at the surgery site, a large amount of drainage or bleeding, severe pain, foul-smelling drainage, redness, swelling).         Tylenol/Acetaminophen Consumption  To help encourage the safe use of acetaminophen, the makers of TYLENOL  have lowered the maximum daily dose for single-ingredient Extra Strength TYLENOL  (acetaminophen) products sold in the [place] from 8 pills per day (4,000 mg) to 6 pills per day (3,000 mg). The dosing interval has also changed from 2 pills every 4-6 hours to 2 pills every 6 hours.  If you feel your pain relief is insufficient, you may take Tylenol/Acetaminophen in addition to your narcotic pain medication.   Be careful not to exceed 3,000 mg of Tylenol/Acetaminophen in a 24 hour period from all sources.  If you are taking extra strength Tylenol/acetaminophen (500 mg), the maximum dose is 6 tablets in 24 hours.  If you are taking regular strength acetaminophen (325 mg), the maximum dose is 9 tablets in 24 hours.    Your doctor is:  Dr. Pankaj Woodard, ENT Otolaryngology: [phone]                  Or dial [phone] and ask for the resident on call for:  ENT Otolaryngology  For emergency care, call the:  [place]:  [phone] (TTY for hearing impaired: [phone])

## 2023-03-28 NOTE — BRIEF OP NOTE
[facility]    Brief Operative Note    Pre-operative diagnosis: Recurrent respiratory papillomatosis [Z78.9]  Shortness of breath [R06.02]  Closed nondisplaced fracture of seventh cervical vertebra, unspecified fracture morphology, sequela [S12.601S]  Dysphonia [R49.0]  Laryngeal mass [J38.7]  Post-operative diagnosis Same as pre-operative diagnosis    Procedure: Procedure(s):  Transnasal flexible laryngoscopy with KTP laser, superior laryngeal nerve block(s)  Surgeon: Surgeon(s) and Role:     * Pankaj Woodard MD - Primary  Anesthesia: Local   Estimated Blood Loss: None    Drains: None  Specimens: * No specimens in log *  Findings:   Respiratory papillomatosis.  Complications: None.  Implants: * No implants in log *
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
## 2023-04-02 ENCOUNTER — APPOINTMENT (OUTPATIENT)
Dept: CT IMAGING | Facility: CLINIC | Age: 78
DRG: 143 | End: 2023-04-02
Payer: COMMERCIAL

## 2023-04-02 ENCOUNTER — APPOINTMENT (OUTPATIENT)
Dept: GENERAL RADIOLOGY | Facility: CLINIC | Age: 78
DRG: 143 | End: 2023-04-02
Payer: COMMERCIAL

## 2023-04-02 ENCOUNTER — APPOINTMENT (OUTPATIENT)
Dept: GENERAL RADIOLOGY | Facility: CLINIC | Age: 78
DRG: 143 | End: 2023-04-02
Attending: EMERGENCY MEDICINE
Payer: COMMERCIAL

## 2023-04-02 ENCOUNTER — HOSPITAL ENCOUNTER (INPATIENT)
Facility: CLINIC | Age: 78
LOS: 2 days | Discharge: HOME | DRG: 143 | End: 2023-04-04
Attending: EMERGENCY MEDICINE | Admitting: STUDENT IN AN ORGANIZED HEALTH CARE EDUCATION/TRAINING PROGRAM
Payer: COMMERCIAL

## 2023-04-02 DIAGNOSIS — I48.91 ATRIAL FIBRILLATION WITH RVR (H): ICD-10-CM

## 2023-04-02 DIAGNOSIS — R09.02 HYPOXEMIA: ICD-10-CM

## 2023-04-02 DIAGNOSIS — I48.91 ATRIAL FIBRILLATION, UNSPECIFIED TYPE (H): ICD-10-CM

## 2023-04-02 DIAGNOSIS — R06.02 SHORTNESS OF BREATH: ICD-10-CM

## 2023-04-02 DIAGNOSIS — S12.601S CLOSED NONDISPLACED FRACTURE OF SEVENTH CERVICAL VERTEBRA, UNSPECIFIED FRACTURE MORPHOLOGY, SEQUELA: Primary | ICD-10-CM

## 2023-04-02 DIAGNOSIS — J38.7 MASS OF LARYNX: ICD-10-CM

## 2023-04-02 DIAGNOSIS — J38.7 LARYNGEAL MASS: ICD-10-CM

## 2023-04-02 DIAGNOSIS — R06.03 ACUTE RESPIRATORY DISTRESS: ICD-10-CM

## 2023-04-02 DIAGNOSIS — Z78.9 RECURRENT RESPIRATORY PAPILLOMATOSIS: ICD-10-CM

## 2023-04-02 LAB
ALBUMIN SERPL BCG-MCNC: 4.5 G/DL (ref 3.5–5.2)
ALP SERPL-CCNC: 88 U/L (ref 35–104)
ALT SERPL W P-5'-P-CCNC: 12 U/L (ref 10–35)
ANION GAP SERPL CALCULATED.3IONS-SCNC: 14 MMOL/L (ref 7–15)
ANION GAP SERPL CALCULATED.3IONS-SCNC: 14 MMOL/L (ref 7–15)
APTT PPP: 37 SECONDS (ref 22–38)
AST SERPL W P-5'-P-CCNC: 18 U/L (ref 10–35)
BASOPHILS # BLD AUTO: 0 10E3/UL (ref 0–0.2)
BASOPHILS NFR BLD AUTO: 0 %
BILIRUB SERPL-MCNC: 0.5 MG/DL
BUN SERPL-MCNC: 12.4 MG/DL (ref 8–23)
BUN SERPL-MCNC: 12.4 MG/DL (ref 8–23)
CA-I BLD-MCNC: 3.7 MG/DL (ref 4.4–5.2)
CALCIUM SERPL-MCNC: 9.6 MG/DL (ref 8.8–10.2)
CALCIUM SERPL-MCNC: 9.6 MG/DL (ref 8.8–10.2)
CHLORIDE SERPL-SCNC: 104 MMOL/L (ref 98–107)
CHLORIDE SERPL-SCNC: 104 MMOL/L (ref 98–107)
CPB POCT: NO
CREAT SERPL-MCNC: 1.08 MG/DL (ref 0.51–0.95)
CREAT SERPL-MCNC: 1.08 MG/DL (ref 0.51–0.95)
DEPRECATED HCO3 PLAS-SCNC: 26 MMOL/L (ref 22–29)
DEPRECATED HCO3 PLAS-SCNC: 26 MMOL/L (ref 22–29)
EOSINOPHIL # BLD AUTO: 0.1 10E3/UL (ref 0–0.7)
EOSINOPHIL NFR BLD AUTO: 1 %
ERYTHROCYTE [DISTWIDTH] IN BLOOD BY AUTOMATED COUNT: 14.6 % (ref 10–15)
GFR SERPL CREATININE-BSD FRML MDRD: 53 ML/MIN/1.73M2
GFR SERPL CREATININE-BSD FRML MDRD: 53 ML/MIN/1.73M2
GLUCOSE BLD-MCNC: 97 MG/DL (ref 70–99)
GLUCOSE SERPL-MCNC: 99 MG/DL (ref 70–99)
GLUCOSE SERPL-MCNC: 99 MG/DL (ref 70–99)
HCO3 BLDV-SCNC: 27 MMOL/L (ref 21–28)
HCO3 BLDV-SCNC: 28 MMOL/L (ref 21–28)
HCT VFR BLD AUTO: 52.4 % (ref 35–47)
HCT VFR BLD CALC: 47 % (ref 35–47)
HGB BLD-MCNC: 16 G/DL (ref 11.7–15.7)
HGB BLD-MCNC: 16.1 G/DL (ref 11.7–15.7)
HOLD SPECIMEN: NORMAL
IMM GRANULOCYTES # BLD: 0 10E3/UL
IMM GRANULOCYTES NFR BLD: 0 %
INR PPP: 1.16 (ref 0.85–1.15)
LACTATE BLD-SCNC: 0.9 MMOL/L
LYMPHOCYTES # BLD AUTO: 1.2 10E3/UL (ref 0.8–5.3)
LYMPHOCYTES NFR BLD AUTO: 16 %
MAGNESIUM SERPL-MCNC: 1.9 MG/DL (ref 1.7–2.3)
MCH RBC QN AUTO: 28.3 PG (ref 26.5–33)
MCHC RBC AUTO-ENTMCNC: 30.7 G/DL (ref 31.5–36.5)
MCV RBC AUTO: 92 FL (ref 78–100)
MONOCYTES # BLD AUTO: 0.6 10E3/UL (ref 0–1.3)
MONOCYTES NFR BLD AUTO: 9 %
NEUTROPHILS # BLD AUTO: 5.4 10E3/UL (ref 1.6–8.3)
NEUTROPHILS NFR BLD AUTO: 74 %
NRBC # BLD AUTO: 0 10E3/UL
NRBC BLD AUTO-RTO: 0 /100
NT-PROBNP SERPL-MCNC: 2165 PG/ML (ref 0–1800)
PCO2 BLDV: 47 MM HG (ref 40–50)
PCO2 BLDV: 50 MM HG (ref 40–50)
PH BLDV: 7.36 [PH] (ref 7.32–7.43)
PH BLDV: 7.37 [PH] (ref 7.32–7.43)
PLATELET # BLD AUTO: 228 10E3/UL (ref 150–450)
PO2 BLDV: 28 MM HG (ref 25–47)
PO2 BLDV: 33 MM HG (ref 25–47)
POTASSIUM BLD-SCNC: 3.7 MMOL/L (ref 3.4–5.3)
POTASSIUM SERPL-SCNC: 4.1 MMOL/L (ref 3.4–5.3)
POTASSIUM SERPL-SCNC: 4.1 MMOL/L (ref 3.4–5.3)
PROT SERPL-MCNC: 7.3 G/DL (ref 6.4–8.3)
RBC # BLD AUTO: 5.68 10E6/UL (ref 3.8–5.2)
SAO2 % BLDV: 50 % (ref 94–100)
SAO2 % BLDV: 59 % (ref 94–100)
SARS-COV-2 RNA RESP QL NAA+PROBE: NEGATIVE
SODIUM BLD-SCNC: 142 MMOL/L (ref 133–144)
SODIUM SERPL-SCNC: 144 MMOL/L (ref 136–145)
SODIUM SERPL-SCNC: 144 MMOL/L (ref 136–145)
TROPONIN T SERPL HS-MCNC: 18 NG/L
TROPONIN T SERPL HS-MCNC: 20 NG/L
WBC # BLD AUTO: 7.3 10E3/UL (ref 4–11)

## 2023-04-02 PROCEDURE — 250N000011 HC RX IP 250 OP 636: Performed by: EMERGENCY MEDICINE

## 2023-04-02 PROCEDURE — 83880 ASSAY OF NATRIURETIC PEPTIDE: CPT | Performed by: EMERGENCY MEDICINE

## 2023-04-02 PROCEDURE — 999N000157 HC STATISTIC RCP TIME EA 10 MIN

## 2023-04-02 PROCEDURE — 258N000003 HC RX IP 258 OP 636: Performed by: STUDENT IN AN ORGANIZED HEALTH CARE EDUCATION/TRAINING PROGRAM

## 2023-04-02 PROCEDURE — 250N000012 HC RX MED GY IP 250 OP 636 PS 637: Performed by: STUDENT IN AN ORGANIZED HEALTH CARE EDUCATION/TRAINING PROGRAM

## 2023-04-02 PROCEDURE — 96376 TX/PRO/DX INJ SAME DRUG ADON: CPT | Performed by: EMERGENCY MEDICINE

## 2023-04-02 PROCEDURE — 71045 X-RAY EXAM CHEST 1 VIEW: CPT

## 2023-04-02 PROCEDURE — 36415 COLL VENOUS BLD VENIPUNCTURE: CPT | Performed by: EMERGENCY MEDICINE

## 2023-04-02 PROCEDURE — 82310 ASSAY OF CALCIUM: CPT | Performed by: EMERGENCY MEDICINE

## 2023-04-02 PROCEDURE — 250N000013 HC RX MED GY IP 250 OP 250 PS 637: Performed by: STUDENT IN AN ORGANIZED HEALTH CARE EDUCATION/TRAINING PROGRAM

## 2023-04-02 PROCEDURE — 85025 COMPLETE CBC W/AUTO DIFF WBC: CPT | Performed by: EMERGENCY MEDICINE

## 2023-04-02 PROCEDURE — 82803 BLOOD GASES ANY COMBINATION: CPT

## 2023-04-02 PROCEDURE — 72125 CT NECK SPINE W/O DYE: CPT

## 2023-04-02 PROCEDURE — 93010 ELECTROCARDIOGRAM REPORT: CPT | Performed by: EMERGENCY MEDICINE

## 2023-04-02 PROCEDURE — 96375 TX/PRO/DX INJ NEW DRUG ADDON: CPT | Performed by: EMERGENCY MEDICINE

## 2023-04-02 PROCEDURE — 120N000002 HC R&B MED SURG/OB UMMC

## 2023-04-02 PROCEDURE — 99285 EMERGENCY DEPT VISIT HI MDM: CPT | Mod: 25 | Performed by: EMERGENCY MEDICINE

## 2023-04-02 PROCEDURE — 82330 ASSAY OF CALCIUM: CPT

## 2023-04-02 PROCEDURE — 99291 CRITICAL CARE FIRST HOUR: CPT | Mod: 25 | Performed by: EMERGENCY MEDICINE

## 2023-04-02 PROCEDURE — 72125 CT NECK SPINE W/O DYE: CPT | Mod: 26 | Performed by: RADIOLOGY

## 2023-04-02 PROCEDURE — 258N000003 HC RX IP 258 OP 636: Performed by: EMERGENCY MEDICINE

## 2023-04-02 PROCEDURE — 72040 X-RAY EXAM NECK SPINE 2-3 VW: CPT

## 2023-04-02 PROCEDURE — 72040 X-RAY EXAM NECK SPINE 2-3 VW: CPT | Mod: 26 | Performed by: RADIOLOGY

## 2023-04-02 PROCEDURE — 85730 THROMBOPLASTIN TIME PARTIAL: CPT | Performed by: EMERGENCY MEDICINE

## 2023-04-02 PROCEDURE — 82947 ASSAY GLUCOSE BLOOD QUANT: CPT

## 2023-04-02 PROCEDURE — 250N000009 HC RX 250: Performed by: EMERGENCY MEDICINE

## 2023-04-02 PROCEDURE — 93005 ELECTROCARDIOGRAM TRACING: CPT | Performed by: EMERGENCY MEDICINE

## 2023-04-02 PROCEDURE — 85610 PROTHROMBIN TIME: CPT | Performed by: EMERGENCY MEDICINE

## 2023-04-02 PROCEDURE — 99222 1ST HOSP IP/OBS MODERATE 55: CPT | Mod: AI | Performed by: STUDENT IN AN ORGANIZED HEALTH CARE EDUCATION/TRAINING PROGRAM

## 2023-04-02 PROCEDURE — 250N000011 HC RX IP 250 OP 636: Performed by: STUDENT IN AN ORGANIZED HEALTH CARE EDUCATION/TRAINING PROGRAM

## 2023-04-02 PROCEDURE — 96374 THER/PROPH/DIAG INJ IV PUSH: CPT | Performed by: EMERGENCY MEDICINE

## 2023-04-02 PROCEDURE — 99222 1ST HOSP IP/OBS MODERATE 55: CPT | Performed by: OTOLARYNGOLOGY

## 2023-04-02 PROCEDURE — 09JK8ZZ INSPECTION OF NASAL MUCOSA AND SOFT TISSUE, VIA NATURAL OR ARTIFICIAL OPENING ENDOSCOPIC: ICD-10-PCS | Performed by: EMERGENCY MEDICINE

## 2023-04-02 PROCEDURE — U0005 INFEC AGEN DETEC AMPLI PROBE: HCPCS | Performed by: STUDENT IN AN ORGANIZED HEALTH CARE EDUCATION/TRAINING PROGRAM

## 2023-04-02 PROCEDURE — 84484 ASSAY OF TROPONIN QUANT: CPT | Performed by: EMERGENCY MEDICINE

## 2023-04-02 PROCEDURE — 83605 ASSAY OF LACTIC ACID: CPT

## 2023-04-02 PROCEDURE — 82947 ASSAY GLUCOSE BLOOD QUANT: CPT | Performed by: EMERGENCY MEDICINE

## 2023-04-02 PROCEDURE — 83735 ASSAY OF MAGNESIUM: CPT | Performed by: EMERGENCY MEDICINE

## 2023-04-02 PROCEDURE — 71045 X-RAY EXAM CHEST 1 VIEW: CPT | Mod: 26 | Performed by: RADIOLOGY

## 2023-04-02 RX ORDER — POLYETHYLENE GLYCOL 3350 17 G/17G
17 POWDER, FOR SOLUTION ORAL DAILY PRN
Status: DISCONTINUED | OUTPATIENT
Start: 2023-04-02 | End: 2023-04-04 | Stop reason: HOSPADM

## 2023-04-02 RX ORDER — ONDANSETRON 4 MG/1
4 TABLET, ORALLY DISINTEGRATING ORAL EVERY 6 HOURS PRN
Status: DISCONTINUED | OUTPATIENT
Start: 2023-04-02 | End: 2023-04-04 | Stop reason: HOSPADM

## 2023-04-02 RX ORDER — FEXOFENADINE HCL 180 MG/1
180 TABLET ORAL DAILY
Status: DISCONTINUED | OUTPATIENT
Start: 2023-04-03 | End: 2023-04-04 | Stop reason: HOSPADM

## 2023-04-02 RX ORDER — BISACODYL 10 MG
10 SUPPOSITORY, RECTAL RECTAL DAILY PRN
Status: DISCONTINUED | OUTPATIENT
Start: 2023-04-02 | End: 2023-04-04 | Stop reason: HOSPADM

## 2023-04-02 RX ORDER — ATORVASTATIN CALCIUM 20 MG/1
20 TABLET, FILM COATED ORAL DAILY
Status: DISCONTINUED | OUTPATIENT
Start: 2023-04-03 | End: 2023-04-04 | Stop reason: HOSPADM

## 2023-04-02 RX ORDER — MENTHOL
LOZENGE MUCOUS MEMBRANE DAILY PRN
Status: DISCONTINUED | OUTPATIENT
Start: 2023-04-02 | End: 2023-04-04 | Stop reason: HOSPADM

## 2023-04-02 RX ORDER — PROCHLORPERAZINE MALEATE 5 MG
5 TABLET ORAL EVERY 6 HOURS PRN
Status: DISCONTINUED | OUTPATIENT
Start: 2023-04-02 | End: 2023-04-04 | Stop reason: HOSPADM

## 2023-04-02 RX ORDER — AMOXICILLIN 250 MG
2 CAPSULE ORAL 2 TIMES DAILY PRN
Status: DISCONTINUED | OUTPATIENT
Start: 2023-04-02 | End: 2023-04-04 | Stop reason: HOSPADM

## 2023-04-02 RX ORDER — LIDOCAINE 40 MG/G
CREAM TOPICAL
Status: DISCONTINUED | OUTPATIENT
Start: 2023-04-02 | End: 2023-04-04 | Stop reason: HOSPADM

## 2023-04-02 RX ORDER — MONTELUKAST SODIUM 10 MG/1
10 TABLET ORAL DAILY
Status: DISCONTINUED | OUTPATIENT
Start: 2023-04-03 | End: 2023-04-04 | Stop reason: HOSPADM

## 2023-04-02 RX ORDER — ACETAMINOPHEN 325 MG/1
975 TABLET ORAL ONCE
Status: COMPLETED | OUTPATIENT
Start: 2023-04-02 | End: 2023-04-02

## 2023-04-02 RX ORDER — PROCHLORPERAZINE 25 MG
12.5 SUPPOSITORY, RECTAL RECTAL EVERY 12 HOURS PRN
Status: DISCONTINUED | OUTPATIENT
Start: 2023-04-02 | End: 2023-04-04 | Stop reason: HOSPADM

## 2023-04-02 RX ORDER — DILTIAZEM HYDROCHLORIDE 5 MG/ML
10 INJECTION INTRAVENOUS ONCE
Status: COMPLETED | OUTPATIENT
Start: 2023-04-02 | End: 2023-04-02

## 2023-04-02 RX ORDER — MAGNESIUM SULFATE HEPTAHYDRATE 40 MG/ML
2 INJECTION, SOLUTION INTRAVENOUS ONCE
Status: COMPLETED | OUTPATIENT
Start: 2023-04-02 | End: 2023-04-02

## 2023-04-02 RX ORDER — DEXAMETHASONE SODIUM PHOSPHATE 10 MG/ML
10 INJECTION, SOLUTION INTRAMUSCULAR; INTRAVENOUS ONCE
Status: DISCONTINUED | OUTPATIENT
Start: 2023-04-02 | End: 2023-04-03 | Stop reason: HOSPADM

## 2023-04-02 RX ORDER — CALCIUM GLUCONATE 20 MG/ML
1 INJECTION, SOLUTION INTRAVENOUS ONCE
Status: COMPLETED | OUTPATIENT
Start: 2023-04-02 | End: 2023-04-02

## 2023-04-02 RX ORDER — AMOXICILLIN 250 MG
1 CAPSULE ORAL 2 TIMES DAILY PRN
Status: DISCONTINUED | OUTPATIENT
Start: 2023-04-02 | End: 2023-04-04 | Stop reason: HOSPADM

## 2023-04-02 RX ORDER — LEVOTHYROXINE SODIUM 88 UG/1
88 TABLET ORAL DAILY
Status: DISCONTINUED | OUTPATIENT
Start: 2023-04-03 | End: 2023-04-04 | Stop reason: HOSPADM

## 2023-04-02 RX ORDER — FLUTICASONE FUROATE AND VILANTEROL 200; 25 UG/1; UG/1
1 POWDER RESPIRATORY (INHALATION) DAILY
Status: DISCONTINUED | OUTPATIENT
Start: 2023-04-02 | End: 2023-04-04 | Stop reason: HOSPADM

## 2023-04-02 RX ORDER — DEXAMETHASONE SODIUM PHOSPHATE 10 MG/ML
10 INJECTION, SOLUTION INTRAMUSCULAR; INTRAVENOUS ONCE
Status: COMPLETED | OUTPATIENT
Start: 2023-04-02 | End: 2023-04-02

## 2023-04-02 RX ORDER — ONDANSETRON 2 MG/ML
4 INJECTION INTRAMUSCULAR; INTRAVENOUS EVERY 6 HOURS PRN
Status: DISCONTINUED | OUTPATIENT
Start: 2023-04-02 | End: 2023-04-04 | Stop reason: HOSPADM

## 2023-04-02 RX ORDER — ACETAMINOPHEN 325 MG/1
975 TABLET ORAL EVERY 8 HOURS
Status: DISCONTINUED | OUTPATIENT
Start: 2023-04-02 | End: 2023-04-04 | Stop reason: HOSPADM

## 2023-04-02 RX ORDER — IPRATROPIUM BROMIDE AND ALBUTEROL SULFATE 2.5; .5 MG/3ML; MG/3ML
1 SOLUTION RESPIRATORY (INHALATION) EVERY 6 HOURS PRN
Status: DISCONTINUED | OUTPATIENT
Start: 2023-04-02 | End: 2023-04-04 | Stop reason: HOSPADM

## 2023-04-02 RX ORDER — DEXAMETHASONE 2 MG/1
8 TABLET ORAL EVERY 8 HOURS
Status: DISPENSED | OUTPATIENT
Start: 2023-04-02 | End: 2023-04-03

## 2023-04-02 RX ADMIN — ACETAMINOPHEN 975 MG: 325 TABLET, FILM COATED ORAL at 16:09

## 2023-04-02 RX ADMIN — DILTIAZEM HYDROCHLORIDE 10 MG: 5 INJECTION INTRAVENOUS at 11:00

## 2023-04-02 RX ADMIN — SODIUM CHLORIDE, POTASSIUM CHLORIDE, SODIUM LACTATE AND CALCIUM CHLORIDE 500 ML: 600; 310; 30; 20 INJECTION, SOLUTION INTRAVENOUS at 16:11

## 2023-04-02 RX ADMIN — Medication: at 23:28

## 2023-04-02 RX ADMIN — DEXAMETHASONE SODIUM PHOSPHATE 10 MG: 10 INJECTION, SOLUTION INTRAMUSCULAR; INTRAVENOUS at 10:25

## 2023-04-02 RX ADMIN — MAGNESIUM SULFATE IN WATER 2 G: 40 INJECTION, SOLUTION INTRAVENOUS at 16:11

## 2023-04-02 RX ADMIN — DEXAMETHASONE 8 MG: 2 TABLET ORAL at 20:18

## 2023-04-02 RX ADMIN — DILTIAZEM HYDROCHLORIDE 15 MG/HR: 5 INJECTION INTRAVENOUS at 21:40

## 2023-04-02 RX ADMIN — ACETAMINOPHEN 975 MG: 325 TABLET, FILM COATED ORAL at 22:14

## 2023-04-02 RX ADMIN — CALCIUM GLUCONATE 1 G: 20 INJECTION, SOLUTION INTRAVENOUS at 17:27

## 2023-04-02 RX ADMIN — DILTIAZEM HYDROCHLORIDE 10 MG: 5 INJECTION INTRAVENOUS at 10:39

## 2023-04-02 RX ADMIN — FLUTICASONE FUROATE AND VILANTEROL TRIFENATATE 1 PUFF: 200; 25 POWDER RESPIRATORY (INHALATION) at 21:43

## 2023-04-02 RX ADMIN — DILTIAZEM HYDROCHLORIDE 5 MG/HR: 5 INJECTION INTRAVENOUS at 13:15

## 2023-04-02 ASSESSMENT — ACTIVITIES OF DAILY LIVING (ADL)
ADLS_ACUITY_SCORE: 37
ADLS_ACUITY_SCORE: 39
ADLS_ACUITY_SCORE: 35
ADLS_ACUITY_SCORE: 37
ADLS_ACUITY_SCORE: 39
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
## 2023-04-02 NOTE — PROGRESS NOTES
RT called to assess pt with prior hx of COPD, CREST syndrome, afib, and complex laryngeal history. Upon entering room the provider requested to set pt up on heliox until ENT was able to come scope pt. When RT arrived with heliox ENT was bedside and scoped pt. The scope revealed that laryngeal papilloma around vocal cords that were causing issues. Due to pt sating upper 90s both provider and ENT were okay with just leaving heliox on standby.    RT will continue to monitor and follow.    Vern Parisi,RRT.
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
## 2023-04-02 NOTE — PROGRESS NOTES
Brief Cardiology Note.    Was called by allen team in on a hannah a 78-year-old patient who Has a history of respiratory papillomatosis, longstanding persistent A-fib, on Eliquis and diltiazem at home, COPD, crest syndrome, who is being admitted today in the setting of context of enlarging laryngeal mass.    Upon chart review it appears that Hannah has been in longstanding persistent A-fib based on Holter monitor and EKG since at least 2021.  Her last known sinus rhythm was in 2020.  Per her primary service it appears that she has been diligently taking her Eliquis daily during this time.  She had a structurally normal echocardiogram in 2022 with a preserved ejection fraction.  At this time it appears that her dyspnea and respiratory papillomatosis are driving her atrial fibrillation rates.  Her lab studies show a normal potassium and a slightly reduced calcium and a magnesium of 1.9.  Her heart rates are in the 160s to 170s with blood pressure in the low 100s.  She is not septic and does not have any chest pain per the primary service.  Her EKG has no significant ST-T wave changes and her troponins are flat at 18 and 20.    Preliminary recommendations based on chart review include optimization of volume status, okay to give 500 cc - 1 L if she appears clinically dry.  Would give IV calcium and IV magnesium to replace these to appropriate levels.  Would continue diltiazem.  However if heart rates remain in the 160s to 170s would plan on either amiodarone IV infusion or IV digoxin.    If despite volume optimization, electrolyte replacement and diltiazem IV.  Could transition her from diltiazem to IV amiodarone.  She is in longstanding persistent A-fib and is unlikely to spontaneously convert given her duration.  Although would have a discussion with her that there is a small risk for conversion and stroke if she is to discontinue Eliquis for her planned procedure.  Another option would be a loading dose of IV digoxin.   If amiodarone is chosen would use the amiodarone order set and use the loading dosage with 24-hour infusion, would not use amiodarone and dilt together and would discontinue dilt drip if amiodarone is chosen.  Would not start maintenance amiodarone following this 24 hour infusion for now pending further clinical course.  During this would transition her off her IV diltiazem drip.  Could also consider trialing IV digoxin 500 mcg, once to see if this improves her rate (instead of amiodarone) however this most likely will not be sufficient . Ultimately if it is her dyspnea leading to her RVR best treatment would be correction of her underlying issue leading to hypoxia and dyspnea. Would discuss with ENT regarding OR plan, AC plan and her elevated HR. Okay to hold evening and morning dosage of Eliquis.  Will be staffed and seen formally tomorrow.       #Longstanding persistant atrial fibrillaion on AC for elevated CHADS VASC 4  #Afib RVR  #myocardial injury troponin pattern  #HTN  #Hypoxia 2/2 respiratory papillomatosis    Discussed with Dr. Clifton Barahona.    Kristian Laguna MD  Cardiology Fellow PGY 6
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
## 2023-04-02 NOTE — PROGRESS NOTES
Xray and CT completed  Maroon called back and updated about dilt gtt. Remains on 15 mcg/hr of dilt. Will re evaluate in one hour. Pt still Afib -170s. Pt Denies pain.

## 2023-04-02 NOTE — ED NOTES
Patient in afib with RVR . Chronic hx. Reports sob has improved some since she arrived. Titrating oxygen down. 02 sats remains 100 percent. Currently on two liters. Second dose of Cardizem administered.

## 2023-04-02 NOTE — PROGRESS NOTES
Pt currently on 15 mcg/hr, no rhythm changes and no decrease in HR. Still 160s AFIB RVR. Thang Sampson MD paged about per orders.

## 2023-04-03 ENCOUNTER — HOSPITAL ENCOUNTER (OUTPATIENT)
Facility: AMBULATORY SURGERY CENTER | Age: 78
Discharge: HOME | End: 2023-04-03
Attending: OTOLARYNGOLOGY
Payer: COMMERCIAL

## 2023-04-03 ENCOUNTER — ANESTHESIA (OUTPATIENT)
Dept: SURGERY | Facility: CLINIC | Age: 78
DRG: 143 | End: 2023-04-03
Payer: COMMERCIAL

## 2023-04-03 ENCOUNTER — ANESTHESIA EVENT (OUTPATIENT)
Dept: SURGERY | Facility: CLINIC | Age: 78
DRG: 143 | End: 2023-04-03
Payer: COMMERCIAL

## 2023-04-03 LAB
ANION GAP SERPL CALCULATED.3IONS-SCNC: 16 MMOL/L (ref 7–15)
ATRIAL RATE - MUSE: 52 BPM
ATRIAL RATE - MUSE: 60 BPM
BUN SERPL-MCNC: 21.7 MG/DL (ref 8–23)
CALCIUM SERPL-MCNC: 9.7 MG/DL (ref 8.8–10.2)
CHLORIDE SERPL-SCNC: 102 MMOL/L (ref 98–107)
CREAT SERPL-MCNC: 0.99 MG/DL (ref 0.51–0.95)
DEPRECATED HCO3 PLAS-SCNC: 21 MMOL/L (ref 22–29)
DIASTOLIC BLOOD PRESSURE - MUSE: NORMAL MMHG
DIASTOLIC BLOOD PRESSURE - MUSE: NORMAL MMHG
ERYTHROCYTE [DISTWIDTH] IN BLOOD BY AUTOMATED COUNT: 14.5 % (ref 10–15)
GFR SERPL CREATININE-BSD FRML MDRD: 58 ML/MIN/1.73M2
GLUCOSE BLDC GLUCOMTR-MCNC: 115 MG/DL (ref 70–99)
GLUCOSE SERPL-MCNC: 121 MG/DL (ref 70–99)
HCT VFR BLD AUTO: 53.1 % (ref 35–47)
HGB BLD-MCNC: 16.5 G/DL (ref 11.7–15.7)
INTERPRETATION ECG - MUSE: NORMAL
INTERPRETATION ECG - MUSE: NORMAL
MCH RBC QN AUTO: 28.5 PG (ref 26.5–33)
MCHC RBC AUTO-ENTMCNC: 31.1 G/DL (ref 31.5–36.5)
MCV RBC AUTO: 92 FL (ref 78–100)
P AXIS - MUSE: NORMAL DEGREES
P AXIS - MUSE: NORMAL DEGREES
PLATELET # BLD AUTO: 231 10E3/UL (ref 150–450)
POTASSIUM SERPL-SCNC: 4.6 MMOL/L (ref 3.4–5.3)
PR INTERVAL - MUSE: NORMAL MS
PR INTERVAL - MUSE: NORMAL MS
QRS DURATION - MUSE: 74 MS
QRS DURATION - MUSE: 80 MS
QT - MUSE: 296 MS
QT - MUSE: 300 MS
QTC - MUSE: 455 MS
QTC - MUSE: 474 MS
R AXIS - MUSE: 244 DEGREES
R AXIS - MUSE: 265 DEGREES
RBC # BLD AUTO: 5.78 10E6/UL (ref 3.8–5.2)
SODIUM SERPL-SCNC: 139 MMOL/L (ref 136–145)
SYSTOLIC BLOOD PRESSURE - MUSE: NORMAL MMHG
SYSTOLIC BLOOD PRESSURE - MUSE: NORMAL MMHG
T AXIS - MUSE: 27 DEGREES
T AXIS - MUSE: 44 DEGREES
VENTRICULAR RATE- MUSE: 142 BPM
VENTRICULAR RATE- MUSE: 150 BPM
WBC # BLD AUTO: 5.5 10E3/UL (ref 4–11)

## 2023-04-03 PROCEDURE — 250N000011 HC RX IP 250 OP 636: Performed by: ANESTHESIOLOGY

## 2023-04-03 PROCEDURE — 360N000077 HC SURGERY LEVEL 4, PER MIN: Performed by: OTOLARYNGOLOGY

## 2023-04-03 PROCEDURE — 36415 COLL VENOUS BLD VENIPUNCTURE: CPT | Performed by: STUDENT IN AN ORGANIZED HEALTH CARE EDUCATION/TRAINING PROGRAM

## 2023-04-03 PROCEDURE — 272N000001 HC OR GENERAL SUPPLY STERILE: Performed by: OTOLARYNGOLOGY

## 2023-04-03 PROCEDURE — 31571 LARYNGOSCOP W/VC INJ + SCOPE: CPT | Mod: GC | Performed by: OTOLARYNGOLOGY

## 2023-04-03 PROCEDURE — 250N000009 HC RX 250: Performed by: ANESTHESIOLOGY

## 2023-04-03 PROCEDURE — 0C5S8ZZ DESTRUCTION OF LARYNX, VIA NATURAL OR ARTIFICIAL OPENING ENDOSCOPIC: ICD-10-PCS | Performed by: OTOLARYNGOLOGY

## 2023-04-03 PROCEDURE — 250N000009 HC RX 250: Performed by: OTOLARYNGOLOGY

## 2023-04-03 PROCEDURE — 250N000009 HC RX 250: Performed by: STUDENT IN AN ORGANIZED HEALTH CARE EDUCATION/TRAINING PROGRAM

## 2023-04-03 PROCEDURE — 31541 LARYNSCOP W/TUMR EXC + SCOPE: CPT | Mod: GC | Performed by: OTOLARYNGOLOGY

## 2023-04-03 PROCEDURE — 82962 GLUCOSE BLOOD TEST: CPT

## 2023-04-03 PROCEDURE — 88305 TISSUE EXAM BY PATHOLOGIST: CPT | Mod: TC | Performed by: OTOLARYNGOLOGY

## 2023-04-03 PROCEDURE — 250N000009 HC RX 250: Performed by: EMERGENCY MEDICINE

## 2023-04-03 PROCEDURE — 258N000003 HC RX IP 258 OP 636: Performed by: STUDENT IN AN ORGANIZED HEALTH CARE EDUCATION/TRAINING PROGRAM

## 2023-04-03 PROCEDURE — 85027 COMPLETE CBC AUTOMATED: CPT | Performed by: STUDENT IN AN ORGANIZED HEALTH CARE EDUCATION/TRAINING PROGRAM

## 2023-04-03 PROCEDURE — 250N000025 HC SEVOFLURANE, PER MIN: Performed by: OTOLARYNGOLOGY

## 2023-04-03 PROCEDURE — 999N000141 HC STATISTIC PRE-PROCEDURE NURSING ASSESSMENT: Performed by: OTOLARYNGOLOGY

## 2023-04-03 PROCEDURE — 93005 ELECTROCARDIOGRAM TRACING: CPT

## 2023-04-03 PROCEDURE — 99231 SBSQ HOSP IP/OBS SF/LOW 25: CPT | Performed by: NURSE PRACTITIONER

## 2023-04-03 PROCEDURE — 250N000013 HC RX MED GY IP 250 OP 250 PS 637: Performed by: ANESTHESIOLOGY

## 2023-04-03 PROCEDURE — 258N000003 HC RX IP 258 OP 636: Performed by: ANESTHESIOLOGY

## 2023-04-03 PROCEDURE — 93010 ELECTROCARDIOGRAM REPORT: CPT | Performed by: INTERNAL MEDICINE

## 2023-04-03 PROCEDURE — 370N000017 HC ANESTHESIA TECHNICAL FEE, PER MIN: Performed by: OTOLARYNGOLOGY

## 2023-04-03 PROCEDURE — 0CBS8ZX EXCISION OF LARYNX, VIA NATURAL OR ARTIFICIAL OPENING ENDOSCOPIC, DIAGNOSTIC: ICD-10-PCS | Performed by: OTOLARYNGOLOGY

## 2023-04-03 PROCEDURE — 710N000010 HC RECOVERY PHASE 1, LEVEL 2, PER MIN: Performed by: OTOLARYNGOLOGY

## 2023-04-03 PROCEDURE — 250N000013 HC RX MED GY IP 250 OP 250 PS 637: Performed by: STUDENT IN AN ORGANIZED HEALTH CARE EDUCATION/TRAINING PROGRAM

## 2023-04-03 PROCEDURE — 3E0F305 INTRODUCTION OF OTHER ANTINEOPLASTIC INTO RESPIRATORY TRACT, PERCUTANEOUS APPROACH: ICD-10-PCS | Performed by: OTOLARYNGOLOGY

## 2023-04-03 PROCEDURE — 258N000003 HC RX IP 258 OP 636: Performed by: EMERGENCY MEDICINE

## 2023-04-03 PROCEDURE — 250N000011 HC RX IP 250 OP 636: Performed by: OTOLARYNGOLOGY

## 2023-04-03 PROCEDURE — 120N000002 HC R&B MED SURG/OB UMMC

## 2023-04-03 PROCEDURE — 88305 TISSUE EXAM BY PATHOLOGIST: CPT | Mod: 26 | Performed by: PATHOLOGY

## 2023-04-03 PROCEDURE — 80048 BASIC METABOLIC PNL TOTAL CA: CPT | Performed by: STUDENT IN AN ORGANIZED HEALTH CARE EDUCATION/TRAINING PROGRAM

## 2023-04-03 PROCEDURE — 99232 SBSQ HOSP IP/OBS MODERATE 35: CPT | Mod: GC | Performed by: STUDENT IN AN ORGANIZED HEALTH CARE EDUCATION/TRAINING PROGRAM

## 2023-04-03 PROCEDURE — 250N000012 HC RX MED GY IP 250 OP 636 PS 637: Performed by: STUDENT IN AN ORGANIZED HEALTH CARE EDUCATION/TRAINING PROGRAM

## 2023-04-03 RX ORDER — FENTANYL CITRATE 50 UG/ML
25 INJECTION, SOLUTION INTRAMUSCULAR; INTRAVENOUS EVERY 5 MIN PRN
Status: DISCONTINUED | OUTPATIENT
Start: 2023-04-03 | End: 2023-04-03 | Stop reason: HOSPADM

## 2023-04-03 RX ORDER — HYDROMORPHONE HCL IN WATER/PF 6 MG/30 ML
0.2 PATIENT CONTROLLED ANALGESIA SYRINGE INTRAVENOUS EVERY 5 MIN PRN
Status: DISCONTINUED | OUTPATIENT
Start: 2023-04-03 | End: 2023-04-03 | Stop reason: HOSPADM

## 2023-04-03 RX ORDER — DILTIAZEM HYDROCHLORIDE 360 MG/1
360 CAPSULE, EXTENDED RELEASE ORAL DAILY
Status: DISCONTINUED | OUTPATIENT
Start: 2023-04-04 | End: 2023-04-03

## 2023-04-03 RX ORDER — SODIUM CHLORIDE, SODIUM LACTATE, POTASSIUM CHLORIDE, CALCIUM CHLORIDE 600; 310; 30; 20 MG/100ML; MG/100ML; MG/100ML; MG/100ML
INJECTION, SOLUTION INTRAVENOUS CONTINUOUS
Status: DISCONTINUED | OUTPATIENT
Start: 2023-04-03 | End: 2023-04-03 | Stop reason: HOSPADM

## 2023-04-03 RX ORDER — LIDOCAINE HYDROCHLORIDE 20 MG/ML
INJECTION, SOLUTION INFILTRATION; PERINEURAL PRN
Status: DISCONTINUED | OUTPATIENT
Start: 2023-04-03 | End: 2023-04-03

## 2023-04-03 RX ORDER — DILTIAZEM HYDROCHLORIDE 90 MG/1
180 CAPSULE, EXTENDED RELEASE ORAL ONCE
Status: COMPLETED | OUTPATIENT
Start: 2023-04-03 | End: 2023-04-03

## 2023-04-03 RX ORDER — ALBUTEROL SULFATE 90 UG/1
AEROSOL, METERED RESPIRATORY (INHALATION) PRN
Status: DISCONTINUED | OUTPATIENT
Start: 2023-04-03 | End: 2023-04-03

## 2023-04-03 RX ORDER — DILTIAZEM HYDROCHLORIDE 360 MG/1
360 CAPSULE, EXTENDED RELEASE ORAL DAILY
Status: DISCONTINUED | OUTPATIENT
Start: 2023-04-04 | End: 2023-04-04 | Stop reason: HOSPADM

## 2023-04-03 RX ORDER — ONDANSETRON 4 MG/1
4 TABLET, ORALLY DISINTEGRATING ORAL EVERY 30 MIN PRN
Status: DISCONTINUED | OUTPATIENT
Start: 2023-04-03 | End: 2023-04-03 | Stop reason: HOSPADM

## 2023-04-03 RX ORDER — FENTANYL CITRATE 50 UG/ML
50 INJECTION, SOLUTION INTRAMUSCULAR; INTRAVENOUS EVERY 5 MIN PRN
Status: DISCONTINUED | OUTPATIENT
Start: 2023-04-03 | End: 2023-04-03 | Stop reason: HOSPADM

## 2023-04-03 RX ORDER — PROPOFOL 10 MG/ML
INJECTION, EMULSION INTRAVENOUS CONTINUOUS PRN
Status: DISCONTINUED | OUTPATIENT
Start: 2023-04-03 | End: 2023-04-03

## 2023-04-03 RX ORDER — ONDANSETRON 2 MG/ML
INJECTION INTRAMUSCULAR; INTRAVENOUS PRN
Status: DISCONTINUED | OUTPATIENT
Start: 2023-04-03 | End: 2023-04-03

## 2023-04-03 RX ORDER — LIDOCAINE HYDROCHLORIDE 40 MG/ML
INJECTION, SOLUTION RETROBULBAR PRN
Status: DISCONTINUED | OUTPATIENT
Start: 2023-04-03 | End: 2023-04-03 | Stop reason: HOSPADM

## 2023-04-03 RX ORDER — HYDROMORPHONE HCL IN WATER/PF 6 MG/30 ML
0.4 PATIENT CONTROLLED ANALGESIA SYRINGE INTRAVENOUS EVERY 5 MIN PRN
Status: DISCONTINUED | OUTPATIENT
Start: 2023-04-03 | End: 2023-04-03 | Stop reason: HOSPADM

## 2023-04-03 RX ORDER — ONDANSETRON 2 MG/ML
4 INJECTION INTRAMUSCULAR; INTRAVENOUS EVERY 30 MIN PRN
Status: DISCONTINUED | OUTPATIENT
Start: 2023-04-03 | End: 2023-04-03 | Stop reason: HOSPADM

## 2023-04-03 RX ORDER — ESMOLOL HYDROCHLORIDE 10 MG/ML
INJECTION INTRAVENOUS PRN
Status: DISCONTINUED | OUTPATIENT
Start: 2023-04-03 | End: 2023-04-03

## 2023-04-03 RX ORDER — EPINEPHRINE IN SOD CHLOR,ISO 1 MG/10 ML
SYRINGE (ML) INTRAVENOUS PRN
Status: DISCONTINUED | OUTPATIENT
Start: 2023-04-03 | End: 2023-04-03 | Stop reason: HOSPADM

## 2023-04-03 RX ORDER — DEXAMETHASONE SODIUM PHOSPHATE 4 MG/ML
INJECTION, SOLUTION INTRA-ARTICULAR; INTRALESIONAL; INTRAMUSCULAR; INTRAVENOUS; SOFT TISSUE PRN
Status: DISCONTINUED | OUTPATIENT
Start: 2023-04-03 | End: 2023-04-03

## 2023-04-03 RX ORDER — MAGNESIUM HYDROXIDE 1200 MG/15ML
LIQUID ORAL PRN
Status: DISCONTINUED | OUTPATIENT
Start: 2023-04-03 | End: 2023-04-03 | Stop reason: HOSPADM

## 2023-04-03 RX ORDER — SODIUM CHLORIDE, SODIUM LACTATE, POTASSIUM CHLORIDE, CALCIUM CHLORIDE 600; 310; 30; 20 MG/100ML; MG/100ML; MG/100ML; MG/100ML
INJECTION, SOLUTION INTRAVENOUS CONTINUOUS PRN
Status: DISCONTINUED | OUTPATIENT
Start: 2023-04-03 | End: 2023-04-03

## 2023-04-03 RX ORDER — PROPOFOL 10 MG/ML
INJECTION, EMULSION INTRAVENOUS PRN
Status: DISCONTINUED | OUTPATIENT
Start: 2023-04-03 | End: 2023-04-03

## 2023-04-03 RX ORDER — FENTANYL CITRATE 50 UG/ML
INJECTION, SOLUTION INTRAMUSCULAR; INTRAVENOUS PRN
Status: DISCONTINUED | OUTPATIENT
Start: 2023-04-03 | End: 2023-04-03

## 2023-04-03 RX ORDER — DILTIAZEM HYDROCHLORIDE 5 MG/ML
10 INJECTION INTRAVENOUS
Status: DISCONTINUED | OUTPATIENT
Start: 2023-04-03 | End: 2023-04-04 | Stop reason: HOSPADM

## 2023-04-03 RX ADMIN — ESMOLOL HYDROCHLORIDE 20 MG: 10 INJECTION, SOLUTION INTRAVENOUS at 09:54

## 2023-04-03 RX ADMIN — APIXABAN 5 MG: 5 TABLET, FILM COATED ORAL at 15:39

## 2023-04-03 RX ADMIN — DEXMEDETOMIDINE HYDROCHLORIDE 12 MCG: 100 INJECTION, SOLUTION INTRAVENOUS at 10:41

## 2023-04-03 RX ADMIN — FENTANYL CITRATE 50 MCG: 50 INJECTION, SOLUTION INTRAMUSCULAR; INTRAVENOUS at 10:37

## 2023-04-03 RX ADMIN — FLUTICASONE FUROATE AND VILANTEROL TRIFENATATE 1 PUFF: 200; 25 POWDER RESPIRATORY (INHALATION) at 20:49

## 2023-04-03 RX ADMIN — LIDOCAINE HYDROCHLORIDE 60 MG: 20 INJECTION, SOLUTION INFILTRATION; PERINEURAL at 08:17

## 2023-04-03 RX ADMIN — DILTIAZEM HYDROCHLORIDE 15 MG/HR: 5 INJECTION INTRAVENOUS at 02:49

## 2023-04-03 RX ADMIN — SODIUM CHLORIDE, POTASSIUM CHLORIDE, SODIUM LACTATE AND CALCIUM CHLORIDE: 600; 310; 30; 20 INJECTION, SOLUTION INTRAVENOUS at 08:02

## 2023-04-03 RX ADMIN — IPRATROPIUM BROMIDE AND ALBUTEROL SULFATE 3 ML: .5; 3 SOLUTION RESPIRATORY (INHALATION) at 04:41

## 2023-04-03 RX ADMIN — ESMOLOL HYDROCHLORIDE 20 MG: 10 INJECTION, SOLUTION INTRAVENOUS at 10:15

## 2023-04-03 RX ADMIN — DEXMEDETOMIDINE HYDROCHLORIDE 8 MCG: 100 INJECTION, SOLUTION INTRAVENOUS at 10:50

## 2023-04-03 RX ADMIN — PHENYLEPHRINE HYDROCHLORIDE 400 MCG: 10 INJECTION INTRAVENOUS at 08:33

## 2023-04-03 RX ADMIN — ONDANSETRON 4 MG: 2 INJECTION INTRAMUSCULAR; INTRAVENOUS at 10:31

## 2023-04-03 RX ADMIN — ALBUTEROL SULFATE 2 PUFF: 108 INHALANT RESPIRATORY (INHALATION) at 08:12

## 2023-04-03 RX ADMIN — ESMOLOL HYDROCHLORIDE 30 MG: 10 INJECTION, SOLUTION INTRAVENOUS at 08:54

## 2023-04-03 RX ADMIN — ACETAMINOPHEN 975 MG: 325 TABLET, FILM COATED ORAL at 06:03

## 2023-04-03 RX ADMIN — DEXAMETHASONE SODIUM PHOSPHATE 10 MG: 4 INJECTION, SOLUTION INTRA-ARTICULAR; INTRALESIONAL; INTRAMUSCULAR; INTRAVENOUS; SOFT TISSUE at 08:39

## 2023-04-03 RX ADMIN — ESMOLOL HYDROCHLORIDE 10 MG: 10 INJECTION, SOLUTION INTRAVENOUS at 10:39

## 2023-04-03 RX ADMIN — LIDOCAINE HYDROCHLORIDE 20 MG: 20 INJECTION, SOLUTION INFILTRATION; PERINEURAL at 08:18

## 2023-04-03 RX ADMIN — Medication 20 MG: at 08:49

## 2023-04-03 RX ADMIN — PROPOFOL 20 MG: 10 INJECTION, EMULSION INTRAVENOUS at 10:34

## 2023-04-03 RX ADMIN — APIXABAN 5 MG: 5 TABLET, FILM COATED ORAL at 20:50

## 2023-04-03 RX ADMIN — Medication 20 MG: at 09:58

## 2023-04-03 RX ADMIN — ESMOLOL HYDROCHLORIDE 10 MG: 10 INJECTION, SOLUTION INTRAVENOUS at 10:53

## 2023-04-03 RX ADMIN — Medication 20 MG: at 09:19

## 2023-04-03 RX ADMIN — DILTIAZEM HYDROCHLORIDE 180 MG: 90 CAPSULE, EXTENDED RELEASE ORAL at 15:36

## 2023-04-03 RX ADMIN — PROPOFOL 150 MCG/KG/MIN: 10 INJECTION, EMULSION INTRAVENOUS at 08:20

## 2023-04-03 RX ADMIN — DEXAMETHASONE 8 MG: 2 TABLET ORAL at 04:25

## 2023-04-03 RX ADMIN — REMIFENTANIL HYDROCHLORIDE 0.05 MCG/KG/MIN: 1 INJECTION, POWDER, LYOPHILIZED, FOR SOLUTION INTRAVENOUS at 08:44

## 2023-04-03 RX ADMIN — SUGAMMADEX 200 MG: 100 INJECTION, SOLUTION INTRAVENOUS at 11:06

## 2023-04-03 RX ADMIN — SUCCINYLCHOLINE CHLORIDE 60 MG: 20 INJECTION, SOLUTION INTRAMUSCULAR; INTRAVENOUS; PARENTERAL at 08:19

## 2023-04-03 RX ADMIN — FENTANYL CITRATE 50 MCG: 50 INJECTION, SOLUTION INTRAMUSCULAR; INTRAVENOUS at 08:17

## 2023-04-03 RX ADMIN — ACETAMINOPHEN 975 MG: 325 TABLET, FILM COATED ORAL at 21:35

## 2023-04-03 RX ADMIN — SODIUM CHLORIDE, POTASSIUM CHLORIDE, SODIUM LACTATE AND CALCIUM CHLORIDE: 600; 310; 30; 20 INJECTION, SOLUTION INTRAVENOUS at 11:16

## 2023-04-03 RX ADMIN — Medication 30 MG: at 08:37

## 2023-04-03 RX ADMIN — ESMOLOL HYDROCHLORIDE 20 MG: 10 INJECTION, SOLUTION INTRAVENOUS at 08:18

## 2023-04-03 ASSESSMENT — ACTIVITIES OF DAILY LIVING (ADL)
ADLS_ACUITY_SCORE: 39
ADLS_ACUITY_SCORE: 40
CHANGE_IN_FUNCTIONAL_STATUS_SINCE_ONSET_OF_CURRENT_ILLNESS/INJURY: NO
DOING_ERRANDS_INDEPENDENTLY_DIFFICULTY: YES
BATHING: 1-->ASSISTANCE NEEDED
DRESS: 1-->ASSISTANCE (EQUIPMENT/PERSON) NEEDED
ADLS_ACUITY_SCORE: 39
DRESS: 1-->ASSISTANCE (EQUIPMENT/PERSON) NEEDED (NOT DEVELOPMENTALLY APPROPRIATE)
DRESSING/BATHING_MANAGEMENT: FAMILY HELP
ADLS_ACUITY_SCORE: 39
TOILETING_ISSUES: NO
ADLS_ACUITY_SCORE: 39
ADLS_ACUITY_SCORE: 39
WALKING_OR_CLIMBING_STAIRS_DIFFICULTY: NO
ADLS_ACUITY_SCORE: 39
EQUIPMENT_CURRENTLY_USED_AT_HOME: OTHER (SEE COMMENTS)
ADLS_ACUITY_SCORE: 39
CONCENTRATING,_REMEMBERING_OR_MAKING_DECISIONS_DIFFICULTY: NO
FALL_HISTORY_WITHIN_LAST_SIX_MONTHS: NO
DRESSING/BATHING: BATHING DIFFICULTY, ASSISTANCE 1 PERSON
DRESSING/BATHING_DIFFICULTY: YES
DIFFICULTY_EATING/SWALLOWING: NO
WEAR_GLASSES_OR_BLIND: NO

## 2023-04-03 ASSESSMENT — COPD QUESTIONNAIRES: COPD: 1

## 2023-04-03 ASSESSMENT — ENCOUNTER SYMPTOMS: DYSRHYTHMIAS: 1

## 2023-04-03 NOTE — ANESTHESIA POSTPROCEDURE EVALUATION
Patient: Asya Coffman    Procedure: Procedure(s):  Microdirect laryngoscopy with biopsies, excision/ablation of lesions, C02 laser,  Avastin injection       Anesthesia Type:  No value filed.    Note:  Disposition: Inpatient   Postop Pain Control: Uneventful            Sign Out: Well controlled pain   PONV: No   Neuro/Psych: Uneventful            Sign Out: Acceptable/Baseline neuro status   Airway/Respiratory: Uneventful            Sign Out: Acceptable/Baseline resp. status   CV/Hemodynamics: Uneventful            Sign Out: Acceptable CV status; No obvious hypovolemia; No obvious fluid overload   Other NRE: NONE   DID A NON-ROUTINE EVENT OCCUR?            Last vitals:  Vitals Value Taken Time   /87 04/03/23 1300   Temp 36.9  C (98.4  F) 04/03/23 1117   Pulse 96 04/03/23 1304   Resp 20 04/03/23 1304   SpO2 94 % 04/03/23 1304   Vitals shown include unvalidated device data.    Electronically Signed By: Joe Wick Junior, MD  April 3, 2023  1:06 PM

## 2023-04-03 NOTE — BRIEF OP NOTE
[facility]    Brief Operative Note    Pre-operative diagnosis: Shortness of breath [R06.02]  Post-operative diagnosis Laryngeal papilloma     Procedure: Procedure(s):  microdirect laringoscopy, Laser C02 laryngoscopy  Surgeon: Surgeon(s) and Role:     * Nikole Davis MD - Primary     * Leonarda Cerna MD - Resident - Assisting     * Patti Alexander MD - Resident - Assisting  Anesthesia: General   Estimated Blood Loss: 5mL  Drains: None    Specimens:   ID Type Source Tests Collected by Time Destination   1 : Subglottic Lesion Tissue Other SURGICAL PATHOLOGY EXAM Nikole Davis MD 4/3/2023  9:32 AM    2 : Right Posterior Larynx Tissue Other SURGICAL PATHOLOGY EXAM Nikole Davis MD 4/3/2023  9:50 AM    3 : Left Posterior Larynx Tissue Other SURGICAL PATHOLOGY EXAM Nikole Davis MD 4/3/2023 10:54 AM      Findings:   Heavy papilloma disease in subglottis and extending into proximal trachea which wre along mostly the latral and posterior walls. Posterior glottis with papilloma and papillomatous changes .  Complications: None.  Implants: * No implants in log *    ENT Discharge Recommendations:  - Resume all home meds  - Cleared from an airway standpoint to discharge    Leonarda Cerna MD

## 2023-04-03 NOTE — PROGRESS NOTES
NURSING PROGRESS NOTE  Shift Summary      Neuro/Musculoskeletal:  A&Ox4.  Tremors with activity  Cardiac: A fib Rate controlled with Cardizem at 15mg/hr. Blood pressure normotensive   Respiratory:  Sating in the 90s on RA, dyspnea on exertion with some upper airway noises  GI/: Voiding without difficulty. LBM:  4/2  Diet/Appetite:   Patient is NPO for possible excision by ENT team  Activity:  Up with SBA to bedside commode   Pain:  Denies pain  Skin:  No new deficits noted.  CTLO brace in place  LDAs + Drips/IVF:   Right and left PIV- Diltiazem gtt  Protocols/Labs: Pending morning labs, monitor electrolytes    Pertinent Shift Updates:  None      Plan:   NPO from MN, Plan for excision  By ENT if approved by Neurocrit team    /81   Pulse 71   Temp 97.8  F (36.6  C) (Oral)   Resp 18   SpO2 97%

## 2023-04-03 NOTE — PROGRESS NOTES
Brief Neurosurgery Note    Cervical spine precautions maintained intraoperatively, Aspen  fitted after procedure was completed.   Continue  brace on at all times until cleared as outpatient by Dr. Velázquez    Neurosurgery will follow peripherally.    Patti Childs MD  Neurosurgery PGY3    Please contact neurosurgery resident on call with questions.    Dial * * [phone], enter [phone] when prompted.

## 2023-04-03 NOTE — PLAN OF CARE
Called PACU about patients belongings. They will send an NST up with they become available to bring them.

## 2023-04-03 NOTE — OP NOTE
PROCEDURE(S):  Microdirect laryngoscopy with biopsies, excision/ablation of lesions, CO2 laser,  Avastin injection  Flexible CO2 laser on standby    PRE-OPERATIVE DIAGNOSIS:   Shortness of breath [R06.02]  Recurrent respiratory papillomatosis  Cervical spine fractures    POST-OPERATIVE DIAGNOSIS:   As above    SURGEON: Nikole Davis MD MPH    ASSISTANT(S):   Leonarda Cerna MD - Resident - Otolaryngology  Patti Alexander MD - Resident - Neurosurgery    ANAESTHESIA: General endotracheal    INDICATIONS FOR PROCEDURE:   Asya Coffman is a 78 year old year old female with a history of recurrent respiratory papillomatosis (RRP) and cervical spine fractures who was noted to have progressive dyspnea with presumed papilloma in the subglottis. The patient wished to proceed with surgery and presented for the procedure(s) listed above. We reviewed perioperative risks, including bleeding/infection/pain, injury to surrounding structures, potential changes to tongue/voice/swallow function, risk of needing additional procedures (e.g., due to persistence or recurrence), and risks of anesthesia (including heart attack, stroke, death). She elected to proceed and written informed consent was performed.    DESCRIPTION OF PROCEDURE:   The patient was brought to the operating room and placed supine. A time-out was called to verify patient identity, operative site, and planned procedure. The operative site was prepped in the usual clean fashion. Moist gauze eye pads were placed and secured. A head wrap was placed, and custom molded thermoplastic tooth guards were placed. Our neurosurgery colleague kindly removed the anterior portion of the cervical brace and positioned the patient's head with molded cushioning and sandbags as well as tape to keep the head and neck in a safe position given her history of recent cervical spine fractures. Direct laryngoscopy was then performed with an Ossoff-Pilling laryngoscope, which  was placed in limited suspension with care to avoid any neck extension. The endotracheal tube was then removed to allow optimal access to the posterior airway.    Under telescopic visualization, laryngeal biopsy forceps were used to debulk the posterior subglottis and tracheal wall papilloma. This took numerous passes but ultimately led to substantial improvement of airway caliber. Similarly, some debulking was performed along the right and left posterior larynx.    Next the operating microscope was then brought into position. Laser safety precautions were utilized at all times, including eye protection for the patient and staff, use of wet towels, and appropriately minimized FiO2 via intermittent apnea. As needed, moistened cottonoids or laser-safe backstops were used to protect the endotracheal tube from the laser. The iWarda CO2 Accublade laser was attached to the microscope and used in a [place] shape at a depth setting of 1 and 8 Cardoza. Remaining clusters of papilloma along the posterior larynx were ablated, with care to leave some untreated mucosa along the posterior commissure to reduce the risk of synechiae or stenosis.    At the site of subglottic papilloma removal, some obvious oozing was noted; this persisted despite topical epinephrine so very limited cautery was performed to achieve hemostasis.    Next, Avastin was injected under telescopic visualization along the posterior tracheal and subglottic walls with the goal to slow papilloma regrowth.    As needed during the procedure and at the conclusion of the procedure, the operating microscope was moved out of position and the 0 degree rigid endoscope was again used to examine the vocal folds and confirm completion of the stated objectives of the procedure. After cottonoid and sponge counts were confirmed correct, 2 cc of 4% lidocaine were applied transglottically for laryngotracheal anesthesia. The laryngoscope and tooth guards were removed, keeping the  endotracheal tube in place. The lips, teeth, and tongue were examined and no injuries were noted. Our neurosurgery colleague returned to remove the sandbags and replace the anterior cervical brace. Care of the patient was returned to Anesthesia.      FINDINGS:   Heavy papilloma burden in subglottis and extending into proximal trachea, mostly along lateral and posterior walls. Posterior larynx with papilloma both along bilateral posterior true vocal folds and supraglottis, as well as along posterior commissure.    SPECIMEN(S):   1 : Subglottic Lesion   2 : Right Posterior Larynx   3 : Left Posterior Larynx     DRAINS: None    ESTIMATED BLOOD LOSS: Less than 10 ml    COMPLICATIONS: None    DISPOSITION: Stable to PACU    ATTENDING PRESENCE STATEMENT:  I was present and participating for the entire procedure from beginning to completion.

## 2023-04-03 NOTE — PROGRESS NOTES
Report given to Patricia OR Nurse, Patient taken to OR by Transport and float Nurse. Belongings SENT WITH PATIENT. PATIENT'S DAUGHTER UPDATED.

## 2023-04-03 NOTE — PROGRESS NOTES
"S: Pt seen at PACU at [facility] with nurse present for optimization of her ASPEN [place]  that was delivered to her at [facility] in conjunction with [employer]. O: I see the EPIC order to \"Optimize\" her existing  that seemed to be ill fitting. A: When I arrived the  was a little loose at the velcro on the cervical portion but seemed fine everywhere else. I snugged up the RT side velcro on the cervical portion and the patient reported it feels fine. The nurse had questions regarding any restrictions for movement but I referred  her to the attending provider Dr Dhaliwal since that is not in my scope of practice. I also educated patient on donning and doffing but the patient seemed reluctant to take on the responsibility of the brace herself. She seemed to understand at the time of my talk but can imagine she may not retain the info due to her not wanting to take responsibility of the brace herself. P: JULIETTE PRN.   Electronically signed Joshua Aquino , LPO.  "

## 2023-04-03 NOTE — ANESTHESIA PROCEDURE NOTES
Airway       Patient location during procedure: OR       Procedure Start/Stop Times: 4/3/2023 8:20 AM  Staff -        CRNA: Blaire Whitlock APRN CRNA       Performed By: CRNA  Consent for Airway        Urgency: elective  Indications and Patient Condition       Indications for airway management: tania-procedural       Induction type:RSI       Mask difficulty assessment: 0 - not attempted    Final Airway Details       Final airway type: endotracheal airway       Successful airway: ETT - single  Endotracheal Airway Details        ETT size (mm): 5.0 (TEETEE)       Cuffed: yes       Successful intubation technique: video laryngoscopy       VL Blade Size: Glidescope 3       Grade View of Cords: 1       Adjucts: stylet       Position: Right       Measured from: lips       Secured at (cm): 25       Bite block used: None    Post intubation assessment        Placement verified by: capnometry, equal breath sounds and chest rise        Number of attempts at approach: 1       Number of other approaches attempted: 0       Secured with: pink tape       Ease of procedure: easy       Dentition: Intact and Unchanged       Dental guard used and removed.    Medication(s) Administered   Medication Administration Time: 4/3/2023 8:20 AM

## 2023-04-03 NOTE — PROGRESS NOTES
Patient arrived to  room 6512 at 1810.    Patient A&Ox4. VSS except for elevated HR in the 130-150's not sustained in the 150's.    Patient denies pain.    Lungs are clear bowel sounds present.    Patient has c collar in place.    Patient oriented to room and call light in reach.

## 2023-04-03 NOTE — ANESTHESIA PREPROCEDURE EVALUATION
Anesthesia Pre-Procedure Evaluation    Patient: Asya Coffman   MRN: [id number] : 1945        Procedure : Procedure(s):  microdirect laringoscopy, Laser KTP laryngoscopy          Past Medical History:   Diagnosis Date     A-fib (H)      Antiplatelet or antithrombotic long-term use      Arrhythmia      COPD (chronic obstructive pulmonary disease) (H)       Past Surgical History:   Procedure Laterality Date     LASER CO2 LARYNGOSCOPY, COMPLEX N/A 2022    Procedure: Micro direct laryngoscopy with excision/ablation of laryngeal lesions, possible biopsies, possible CO2 laser;  Surgeon: Nikole Davis MD;  Location: [facility] OR     LASER CO2 LARYNGOSCOPY, COMPLEX N/A 9/15/2022    Procedure: Microdirect laryngoscopy with ablation of laryngeal lesions,biopsies,CO2 laser;  Surgeon: Nikole Davis MD;  Location: [facility] OR     LASER CO2 LARYNGOSCOPY, COMPLEX N/A 2022    Procedure: Microdirect laryngoscopy with excision/ablation of laryngeal lesions, biopsies,   CO2 laser;  Surgeon: Nikole Davis MD;  Location: [facility] OR     LASER KTP LARYNGOSCOPY FLEXIBLE N/A 2022    Procedure: Transnasal flexible laryngoscopy with KTP laser and biopsies;  Surgeon: Nikole Davis MD;  Location: [facility] OR     LASER KTP LARYNGOSCOPY FLEXIBLE N/A 10/27/2022    Procedure: Transnasal flexible laryngoscopy with possible KTP laser;  Surgeon: Nikole Davis MD;  Location: [facility] OR     LASER KTP LARYNGOSCOPY FLEXIBLE N/A 2023    Procedure: Transnasal flexible laryngoscopy with KTP laser,  biopsies, superior laryngeal nerve blocks, Avastin injection;  Surgeon: Nikole Davis MD;  Location: [facility] OR     LASER KTP LARYNGOSCOPY FLEXIBLE N/A 2/15/2023    Procedure: Transnasal flexible laryngoscopy with KTP laser, superior laryngeal nerve blocks, biopsies, avastin injection;  Surgeon: Nikole Davis MD;  Location: [facility] OR     LASER KTP LARYNGOSCOPY FLEXIBLE N/A  2/17/2023    Procedure: Transnasal flexible laryngoscopy with KTP laser, biopsies, superior laryngeal nerve blocks;  Surgeon: Nikole Davis MD;  Location: [facility] OR     LASER KTP LARYNGOSCOPY FLEXIBLE N/A 2/23/2023    Procedure: Transnasal flexible laryngoscopy with KTP laser, superior laryngeal nerve blocks;  Surgeon: Nikole Davis MD;  Location: [facility] OR     LASER KTP LARYNGOSCOPY FLEXIBLE N/A 3/2/2023    Procedure: Transnasal flexible laryngoscopy with KTP laser, superior laryngeal nerve block Bilateral;  Surgeon: Nikole Davis MD;  Location: [facility] OR     LASER KTP LARYNGOSCOPY FLEXIBLE N/A 3/9/2023    Procedure: Transnasal flexible laryngoscopy with KTP laser, biopsies, superior laryngeal nerve blocks;  Surgeon: Nikole Davis MD;  Location: [facility] OR     LASER KTP LARYNGOSCOPY FLEXIBLE N/A 3/17/2023    Procedure: Transnasal flexible laryngoscopy with KTP laser, superior laryngeal nerve block(s), Avastin injection;  Surgeon: Nikole Davis MD;  Location: [facility] OR     LASER KTP LARYNGOSCOPY FLEXIBLE N/A 3/28/2023    Procedure: Transnasal flexible laryngoscopy with KTP laser, superior laryngeal nerve block(s);  Surgeon: Pankaj Woodard MD;  Location: [facility] OR      Allergies   Allergen Reactions     1-Methyl 2-Pyrrolidone Anaphylaxis     Nuts Anaphylaxis     Black walnut     Escitalopram Other (See Comments)     Asthma -a time of exacerbation and may not have been cause may retry     Mirtazapine Other (See Comments)     Asthma a time of exacerbation and may not have been cause may retry     Venlafaxine Other (See Comments)     Adhesive Tape Rash     With holter monitor. Ok with bandaids.      Social History     Tobacco Use     Smoking status: Never     Smokeless tobacco: Not on file   Vaping Use     Vaping status: Not on file   Substance Use Topics     Alcohol use: Never      Wt Readings from Last 1 Encounters:   03/28/23 51.7 kg (114 lb)         Anesthesia Evaluation   Pt has had prior anesthetic. Type: General.    No history of anesthetic complications       ROS/MED HX  ENT/Pulmonary: Comment: Airway papillomas with intermittent airway obstruction    (+) COPD,     Neurologic: Comment: Recent fall, in C-collar, unstable c-spine      Cardiovascular:     (+) -----Taking blood thinners dysrhythmias, a-fib,     METS/Exercise Tolerance:     Hematologic:       Musculoskeletal:       GI/Hepatic:       Renal/Genitourinary:     (+) renal disease, type: CRI, Pt does not require dialysis,     Endo:     (+) thyroid problem,     Psychiatric/Substance Use:       Infectious Disease:       Malignancy:       Other:            Physical Exam    Airway        Mallampati: II   TM distance: > 3 FB   Neck ROM: limited   Mouth opening: > 3 cm    Respiratory Devices and Support         Dental       (+) Minor Abnormalities - some fillings, tiny chips      Cardiovascular             Pulmonary       Comment: Audible inspiratory stridor            OUTSIDE LABS:  CBC:   Lab Results   Component Value Date    WBC 5.5 04/03/2023    WBC 7.3 04/02/2023    HGB 16.5 (H) 04/03/2023    HGB 16.0 (H) 04/02/2023    HCT 53.1 (H) 04/03/2023    HCT 47 04/02/2023     04/03/2023     04/02/2023     BMP:   Lab Results   Component Value Date     04/03/2023     04/02/2023    POTASSIUM 4.6 04/03/2023    POTASSIUM 3.7 04/02/2023    CHLORIDE 102 04/03/2023    CHLORIDE 104 04/02/2023    CHLORIDE 104 04/02/2023    CO2 21 (L) 04/03/2023    CO2 26 04/02/2023    CO2 26 04/02/2023    BUN 21.7 04/03/2023    BUN 12.4 04/02/2023    BUN 12.4 04/02/2023    CR 0.99 (H) 04/03/2023    CR 1.08 (H) 04/02/2023    CR 1.08 (H) 04/02/2023     (H) 04/03/2023     (H) 04/03/2023     COAGS:   Lab Results   Component Value Date    PTT 37 04/02/2023    INR 1.16 (H) 04/02/2023     POC: No results found for: BGM, HCG, HCGS  HEPATIC:   Lab Results   Component Value Date    ALBUMIN 4.5 04/02/2023     PROTTOTAL 7.3 04/02/2023    ALT 12 04/02/2023    AST 18 04/02/2023    ALKPHOS 88 04/02/2023    BILITOTAL 0.5 04/02/2023     OTHER:   Lab Results   Component Value Date    PH 7.37 04/02/2023    LACT 0.9 04/02/2023    ESSIE 9.7 04/03/2023    MAG 1.9 04/02/2023       Anesthesia Plan    ASA Status:  4, emergent    NPO Status:  NPO Appropriate    Anesthesia Type: General.     - Airway: ETT   Induction: Inhalation.   Maintenance: Balanced.   Techniques and Equipment:     - Airway: Video-Laryngoscope     - Lines/Monitors: 2nd IV     Consents    Anesthesia Plan(s) and associated risks, benefits, and realistic alternatives discussed. Questions answered and patient/representative(s) expressed understanding.    - Discussed:     - Discussed with:  Patient      - Extended Intubation/Ventilatory Support Discussed: No.      - Patient is DNR/DNI Status: No    Use of blood products discussed: No .     Postoperative Care    Pain management: Multi-modal analgesia.   PONV prophylaxis: Ondansetron (or other 5HT-3), Dexamethasone or Solumedrol, Background Propofol Infusion     Comments:    Other Comments: Ms. Coffman presents from the ED with inspiratory stridor which is audible on exam and is in chronic AF with new RVR since her admission to the ED. She has a history of significant airway papillomatous disease. She has an additional C-spine injury requiring C-collar and stabilization, per neurosurgery she is unstable and high risk for nerve injury if her neck is extended. In discussion with Dr. Davis, plan for inhalational induction with trach as backup plan if unable to intubate. Plan for video laryngoscope to avoid any neck movement, neurosurgery to be at bedside for induction and positioning to assist with C-spine stabilization. I discussed the anesthetic plan with the patient and reviewed the risks of airway loss, damage to C-spine and nerve injury, and hemodynamic compromise in the setting of her AF with RVR. She verbalized  understanding of these risks and is agreeable to proceed with planned anesthetic.            Rebecca Perez MD

## 2023-04-03 NOTE — PROGRESS NOTES
Otolaryngology Progress Note    Subjective/Intvl events: Saturating appropriately with titration to RA. Remained in afib w/ RVR despite diltiazem x2. OR today for KTP laser.     O: /86   Pulse 71   Temp 98.1  F (36.7  C) (Oral)   Resp 17   SpO2 94%    General: Alert and oriented x 3, No acute distress   HEENT: Normocephalic, atraumatic.  HB 1/6. EOMI. C collar in place, neck thin and soft.    Pulmonary: Breathing mildly labored on RA. Mild inspiratory stridor.     Neuro: CNII-XII grossly intact    Intake/Output Summary (Last 24 hours) at 4/3/2023 0910  Last data filed at 4/3/2023 0600  Gross per 24 hour   Intake 243.75 ml   Output 450 ml   Net -206.25 ml         LABS:    BMP  Recent Labs   Lab 04/03/23  0706 04/03/23  0157 04/02/23  1035 04/02/23  1021     --  142 144  144   POTASSIUM 4.6  --  3.7 4.1  4.1   CHLORIDE 102  --   --  104  104   ESSIE 9.7  --   --  9.6  9.6   CO2 21*  --   --  26  26   BUN 21.7  --   --  12.4  12.4   CR 0.99*  --   --  1.08*  1.08*   * 115* 97 99  99     CBC  Recent Labs   Lab 04/03/23  0706 04/02/23  1035 04/02/23  1021   WBC 5.5  --  7.3   RBC 5.78*  --  5.68*   HGB 16.5* 16.0* 16.1*   HCT 53.1* 47 52.4*   MCV 92  --  92   MCH 28.5  --  28.3   MCHC 31.1*  --  30.7*   RDW 14.5  --  14.6     --  228     INR  Recent Labs   Lab 04/02/23  1021   INR 1.16*       A/P: Asya Coffman is a 78 year old female with a past medical history of CREST syndrome, COPD, CKD, afib, and laryngeal papilloma s/p multiple prior procedures (most recently KTP laser 3/28/23) presenting with progressive shortness of breath over the past two days. Flexible laryngoscopy on initial examination demonstrating posterior papillomatous changes with ball-valving of soft tissue mass on inspiration with partial airway obstruction, patent on exhalation. Patient found to be in afib w/ RVR refractory to diltiazem. SOB improved with decadron. OR today for KTP laser.       - Medicine  primary   - OR today for  KTP laser  - Continuous pulse ox  - Decadron 8mg q8h x 3 doses  - Should her oxygen demand continue to increase, recommend escalation of oxygen supplementation, recommend keeping heliox available and at pt bedside on standby  - Patient could be intubated with a smaller 5.0 ETT using a glidescope. Would favor avoiding intubation given mobility of soft tissue mass, if any airway concerns or changes please call ENT on call.  - Please contact ENTon call with any questions or concerns      Patient and above plan discussed with  Dr Ryan Jansen MD  Otolaryngology-Head & Neck Surgery, PGY1  Please contact ENT with questions by dialing [phone] and entering job code [phone] when prompted.
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
## 2023-04-03 NOTE — ANESTHESIA CARE TRANSFER NOTE
Patient: Asya Coffman    Procedure: Procedure(s):  Microdirect laryngoscopy with biopsies, excision/ablation of lesions, C02 laser,  Avastin injection       Diagnosis: Shortness of breath [R06.02]  Diagnosis Additional Information: No value filed.    Anesthesia Type:   No value filed.     Note:    Oropharynx: oropharynx clear of all foreign objects and spontaneously breathing  Level of Consciousness: drowsy  Oxygen Supplementation: face mask  Level of Supplemental Oxygen (L/min / FiO2): 4  Independent Airway: airway patency satisfactory and stable  Dentition: dentition unchanged  Vital Signs Stable: post-procedure vital signs reviewed and stable  Report to RN Given: handoff report given  Patient transferred to: PACU    Handoff Report: Identifed the Patient, Identified the Reponsible Provider, Reviewed the pertinent medical history, Discussed the surgical course, Reviewed Intra-OP anesthesia mangement and issues during anesthesia, Set expectations for post-procedure period and Allowed opportunity for questions and acknowledgement of understanding      Vitals:  Vitals Value Taken Time   BP 90/60 04/03/23 1120   Temp     Pulse 93 04/03/23 1125   Resp 16 04/03/23 1125   SpO2 100 % 04/03/23 1125   Vitals shown include unvalidated device data.    Electronically Signed By: VERONICA Gallo CRNA  April 3, 2023  11:25 AM

## 2023-04-04 ENCOUNTER — APPOINTMENT (OUTPATIENT)
Dept: CARDIOLOGY | Facility: CLINIC | Age: 78
DRG: 143 | End: 2023-04-04
Attending: STUDENT IN AN ORGANIZED HEALTH CARE EDUCATION/TRAINING PROGRAM
Payer: COMMERCIAL

## 2023-04-04 VITALS
SYSTOLIC BLOOD PRESSURE: 106 MMHG | TEMPERATURE: 98.3 F | DIASTOLIC BLOOD PRESSURE: 65 MMHG | RESPIRATION RATE: 20 BRPM | HEART RATE: 80 BPM | OXYGEN SATURATION: 90 %

## 2023-04-04 LAB
ANION GAP SERPL CALCULATED.3IONS-SCNC: 11 MMOL/L (ref 7–15)
BUN SERPL-MCNC: 29.1 MG/DL (ref 8–23)
CALCIUM SERPL-MCNC: 8.3 MG/DL (ref 8.8–10.2)
CHLORIDE SERPL-SCNC: 104 MMOL/L (ref 98–107)
CREAT SERPL-MCNC: 1.19 MG/DL (ref 0.51–0.95)
DEPRECATED HCO3 PLAS-SCNC: 25 MMOL/L (ref 22–29)
ERYTHROCYTE [DISTWIDTH] IN BLOOD BY AUTOMATED COUNT: 14.5 % (ref 10–15)
GFR SERPL CREATININE-BSD FRML MDRD: 47 ML/MIN/1.73M2
GLUCOSE SERPL-MCNC: 113 MG/DL (ref 70–99)
HCT VFR BLD AUTO: 42.2 % (ref 35–47)
HGB BLD-MCNC: 13.1 G/DL (ref 11.7–15.7)
LVEF ECHO: NORMAL
MCH RBC QN AUTO: 28.5 PG (ref 26.5–33)
MCHC RBC AUTO-ENTMCNC: 31 G/DL (ref 31.5–36.5)
MCV RBC AUTO: 92 FL (ref 78–100)
PLATELET # BLD AUTO: 173 10E3/UL (ref 150–450)
POTASSIUM SERPL-SCNC: 4.7 MMOL/L (ref 3.4–5.3)
RBC # BLD AUTO: 4.6 10E6/UL (ref 3.8–5.2)
SODIUM SERPL-SCNC: 140 MMOL/L (ref 136–145)
WBC # BLD AUTO: 12.2 10E3/UL (ref 4–11)

## 2023-04-04 PROCEDURE — 36415 COLL VENOUS BLD VENIPUNCTURE: CPT | Performed by: STUDENT IN AN ORGANIZED HEALTH CARE EDUCATION/TRAINING PROGRAM

## 2023-04-04 PROCEDURE — 80048 BASIC METABOLIC PNL TOTAL CA: CPT | Performed by: STUDENT IN AN ORGANIZED HEALTH CARE EDUCATION/TRAINING PROGRAM

## 2023-04-04 PROCEDURE — 93306 TTE W/DOPPLER COMPLETE: CPT

## 2023-04-04 PROCEDURE — 85027 COMPLETE CBC AUTOMATED: CPT | Performed by: STUDENT IN AN ORGANIZED HEALTH CARE EDUCATION/TRAINING PROGRAM

## 2023-04-04 PROCEDURE — 93306 TTE W/DOPPLER COMPLETE: CPT | Mod: 26 | Performed by: STUDENT IN AN ORGANIZED HEALTH CARE EDUCATION/TRAINING PROGRAM

## 2023-04-04 PROCEDURE — 99239 HOSP IP/OBS DSCHRG MGMT >30: CPT | Mod: GC | Performed by: STUDENT IN AN ORGANIZED HEALTH CARE EDUCATION/TRAINING PROGRAM

## 2023-04-04 PROCEDURE — 250N000013 HC RX MED GY IP 250 OP 250 PS 637: Performed by: STUDENT IN AN ORGANIZED HEALTH CARE EDUCATION/TRAINING PROGRAM

## 2023-04-04 RX ORDER — DILTIAZEM HYDROCHLORIDE 120 MG/1
240 CAPSULE, EXTENDED RELEASE ORAL 2 TIMES DAILY
Qty: 60 CAPSULE | Refills: 3 | Status: SHIPPED | OUTPATIENT
Start: 2023-04-04 | End: 2023-09-20

## 2023-04-04 RX ORDER — CALCIUM CARBONATE 500 MG/1
1000 TABLET, CHEWABLE ORAL ONCE
Status: COMPLETED | OUTPATIENT
Start: 2023-04-04 | End: 2023-04-04

## 2023-04-04 RX ORDER — CALCIUM CARBONATE 500 MG/1
500 TABLET, CHEWABLE ORAL DAILY PRN
Status: DISCONTINUED | OUTPATIENT
Start: 2023-04-04 | End: 2023-04-04 | Stop reason: HOSPADM

## 2023-04-04 RX ADMIN — ACETAMINOPHEN 975 MG: 325 TABLET, FILM COATED ORAL at 13:23

## 2023-04-04 RX ADMIN — SERTRALINE HYDROCHLORIDE 100 MG: 50 TABLET ORAL at 07:58

## 2023-04-04 RX ADMIN — LEVOTHYROXINE SODIUM 88 MCG: 88 TABLET ORAL at 07:58

## 2023-04-04 RX ADMIN — CALCIUM CARBONATE (ANTACID) CHEW TAB 500 MG 1000 MG: 500 CHEW TAB at 09:46

## 2023-04-04 RX ADMIN — DILTIAZEM HYDROCHLORIDE 360 MG: 360 CAPSULE, EXTENDED RELEASE ORAL at 06:08

## 2023-04-04 RX ADMIN — APIXABAN 5 MG: 5 TABLET, FILM COATED ORAL at 07:57

## 2023-04-04 ASSESSMENT — ACTIVITIES OF DAILY LIVING (ADL)
ADLS_ACUITY_SCORE: 40

## 2023-04-04 NOTE — PROGRESS NOTES
Otolaryngology Progress Note    Subjective/Intvl events: No acute events overnight. Breathing comfortably overnight with no noted desats.     O: /83   Pulse 103   Temp 97.6  F (36.4  C) (Oral)   Resp 15   SpO2 90%    General: Alert and oriented x 3, No acute distress   HEENT: Collar in place, neck is soft and flat   Pulmonary: Breathing non-labored, no stridor, no accessory muscle use.      Intake/Output Summary (Last 24 hours) at 4/4/2023 0640  Last data filed at 4/3/2023 2222  Gross per 24 hour   Intake 1200 ml   Output --   Net 1200 ml     LABS:    BMP  Recent Labs   Lab 04/03/23  0706 04/03/23  0157 04/02/23  1035 04/02/23  1021     --  142 144  144   POTASSIUM 4.6  --  3.7 4.1  4.1   CHLORIDE 102  --   --  104  104   ESSIE 9.7  --   --  9.6  9.6   CO2 21*  --   --  26  26   BUN 21.7  --   --  12.4  12.4   CR 0.99*  --   --  1.08*  1.08*   * 115* 97 99  99     CBC  Recent Labs   Lab 04/03/23  0706 04/02/23  1035 04/02/23  1021   WBC 5.5  --  7.3   RBC 5.78*  --  5.68*   HGB 16.5* 16.0* 16.1*   HCT 53.1* 47 52.4*   MCV 92  --  92   MCH 28.5  --  28.3   MCHC 31.1*  --  30.7*   RDW 14.5  --  14.6     --  228     INR  Recent Labs   Lab 04/02/23  1021   INR 1.16*       A/P: Asya Coffman is a 78 year old female with a past medical history of CREST, COPD, Afib, and CKD who underwent microdirect laryngoscopy with excision of laryngeal papillomas on 4/3.    - OK to discharge from ENT perspective this AM  - Voice rest for 3 days  - Can start Eliquis immediately post-operatively  - Follow-up with ENT in 1 week (scheduled)  - Rest of care per primary team    Mamta Sampson  Otolaryngology-Head & Neck Surgery   Please contact ENT with questions by dialing [phone] and entering job code [phone] when prompted.

## 2023-04-04 NOTE — PLAN OF CARE
Patients ordered diltiazem for discharge is not the same as her home pill bottle. Paged team to discuss.

## 2023-04-04 NOTE — PLAN OF CARE
Call received from Eliceo Malave MD stating that the pt does not need a home care order placed for discharge as she is following up as an outpatient.

## 2023-04-04 NOTE — PLAN OF CARE
Goal Outcome Evaluation:      Plan of Care Reviewed With: patient, child    Overall Patient Progress: improvingOverall Patient Progress: improving      No acute changes overnight, Patient is a/o *4 up to bedside commode with SBA, VSS on RA. Patient denies sob and trouble breathing, CTLO collar in place,   Afib  rate controlled   Plan for possible discharge in the morning.

## 2023-04-04 NOTE — PLAN OF CARE
"Goal Outcome Evaluation:      Plan of Care Reviewed With: patient    Overall Patient Progress: improvingOverall Patient Progress: improving    Outcome Evaluation: Criteria met for discharge home today.      Pt is having a stable shift with stable vital signs noted.  She is able to eat and drink without increased difficulty.  She does report a sore throat after having her procedure, but swallows well.  She is able to ambulate at her baseline.  Aspen collar and DILLON brace remain in place per her home use.  Discharge instructions are provided and pt is discharging home at 1600 via wheelchair with PCT taking pt to the front entrance.  Pt being picked up by her granddaughter there to be taken home.          Problem: Plan of Care - These are the overarching goals to be used throughout the patient stay.    Goal: Plan of Care Review  Description: The Plan of Care Review/Shift note should be completed every shift.  The Outcome Evaluation is a brief statement about your assessment that the patient is improving, declining, or no change.  This information will be displayed automatically on your shift note.  Outcome: Met  Flowsheets (Taken 4/4/2023 1600)  Outcome Evaluation: Criteria met for discharge home today.  Plan of Care Reviewed With: patient  Overall Patient Progress: improving  Goal: Patient-Specific Goal (Individualized)  Description: You can add care plan individualizations to a care plan. Examples of Individualization might be:  \"Parent requests to be called daily at 9am for status\", \"I have a hard time hearing out of my right ear\", or \"Do not touch me to wake me up as it startles me\".  Outcome: Met  Flowsheets (Taken 4/4/2023 1600)  Individualized Care Needs: None  Anxieties, Fears or Concerns: Questions and concerns addressed and pt happy with answers and verbalized understanding and wanting to go home.  Patient/Family-Specific Goals (Include Timeframe): Questions and concerns addressed and pt happy with answers and " verbalized understanding and wanting to go home.  Goal: Absence of Hospital-Acquired Illness or Injury  Outcome: Met  Intervention: Identify and Manage Fall Risk  Recent Flowsheet Documentation  Taken 4/4/2023 1200 by Vaishali Stubbs RN  Safety Promotion/Fall Prevention:   activity supervised   assistive device/personal items within reach   clutter free environment maintained   increased rounding and observation   increase visualization of patient   lighting adjusted   nonskid shoes/slippers when out of bed   patient and family education   room door open   patient video monitoring   room organization consistent   safety round/check completed   supervised activity  Taken 4/4/2023 0800 by Vaishali Stubbs RN  Safety Promotion/Fall Prevention:   activity supervised   assistive device/personal items within reach   clutter free environment maintained   increased rounding and observation   increase visualization of patient   lighting adjusted   nonskid shoes/slippers when out of bed   patient and family education   room door open   patient video monitoring   room organization consistent   safety round/check completed   supervised activity  Intervention: Prevent Skin Injury  Recent Flowsheet Documentation  Taken 4/4/2023 1200 by Vaishali Stubbs RN  Body Position:   position changed independently   weight shifting  Taken 4/4/2023 0800 by Vaishali Stubbs RN  Body Position:   position changed independently   weight shifting  Intervention: Prevent and Manage VTE (Venous Thromboembolism) Risk  Recent Flowsheet Documentation  Taken 4/4/2023 1200 by Vaishali Stubbs RN  VTE Prevention/Management: SCDs (sequential compression devices) off  Taken 4/4/2023 0800 by Vaishali Stubbs RN  VTE Prevention/Management: SCDs (sequential compression devices) off  Goal: Optimal Comfort and Wellbeing  Outcome: Met  Intervention: Monitor Pain and Promote Comfort  Flowsheets (Taken 4/4/2023 1600)  Pain Management Interventions: (Pt not having pain after ambulation)    ambulation/increased activity   other (see comments)  Intervention: Provide Person-Centered Care  Flowsheets  Taken 4/4/2023 1600  Trust Relationship/Rapport:   care explained   choices provided   emotional support provided   empathic listening provided   questions answered   questions encouraged   reassurance provided   thoughts/feelings acknowledged  Taken 4/4/2023 1200  Trust Relationship/Rapport:   care explained   choices provided   emotional support provided   empathic listening provided   questions answered   questions encouraged   reassurance provided   thoughts/feelings acknowledged  Taken 4/4/2023 0800  Trust Relationship/Rapport:   care explained   choices provided   emotional support provided   empathic listening provided   questions answered   questions encouraged   reassurance provided   thoughts/feelings acknowledged  Goal: Readiness for Transition of Care  Outcome: Met  Flowsheets (Taken 4/4/2023 1600)  Anticipated Changes Related to Illness: none  Transportation Anticipated: family or friend will provide  Concerns to be Addressed: all concerns addressed in this encounter  Barriers to Discharge: None, pt discharging home.  Intervention: Mutually Develop Transition Plan  Flowsheets (Taken 4/4/2023 1600)  Anticipated Changes Related to Illness: none  Readmission Within the Last 30 Days: no previous admission in last 30 days  Transportation Anticipated: family or friend will provide  Transportation Concerns: none  Concerns to be Addressed: all concerns addressed in this encounter  Patient/Family Anticipated Services at Transition: none  Patient/Family Anticipates Transition to: home  Offered/Gave Vendor List: no  Equipment Needed After Discharge: (Continued use of previous braces noted above for spinal fractures.) other (see comments)  Equipment Currently Used at Home: (Aspen collar and  brace) other (see comments)     Problem: Fall Injury Risk  Goal: Absence of Fall and Fall-Related Injury  Outcome:  Met  Intervention: Identify and Manage Contributors  Recent Flowsheet Documentation  Taken 4/4/2023 1200 by Vaishali Stubbs RN  Medication Review/Management: medications reviewed  Taken 4/4/2023 0800 by Vaishali Stubbs RN  Medication Review/Management: medications reviewed  Intervention: Promote Injury-Free Environment  Recent Flowsheet Documentation  Taken 4/4/2023 1200 by Vaishali Stubbs RN  Safety Promotion/Fall Prevention:   activity supervised   assistive device/personal items within reach   clutter free environment maintained   increased rounding and observation   increase visualization of patient   lighting adjusted   nonskid shoes/slippers when out of bed   patient and family education   room door open   patient video monitoring   room organization consistent   safety round/check completed   supervised activity  Taken 4/4/2023 0800 by Vaishali Stubbs RN  Safety Promotion/Fall Prevention:   activity supervised   assistive device/personal items within reach   clutter free environment maintained   increased rounding and observation   increase visualization of patient   lighting adjusted   nonskid shoes/slippers when out of bed   patient and family education   room door open   patient video monitoring   room organization consistent   safety round/check completed   supervised activity     Problem: Oral Intake Inadequate  Goal: Improved Oral Intake  Outcome: Met

## 2023-04-04 NOTE — PROGRESS NOTES
Care Management Discharge Note    Discharge Date: 04/04/2023       Discharge Disposition:  home    Discharge Services:  None new    Discharge DME:  none    Discharge Transportation:  Per family    Private pay costs discussed: Not applicable    PAS Confirmation Code:  n/a  Patient/family educated on Medicare website which has current facility and service quality ratings:  no    Education Provided on the Discharge Plan:  yes  Persons Notified of Discharge Plans: patient  Patient/Family in Agreement with the Plan:  yes    Handoff Referral Completed: Yes    Additional Information:  RNCC called patient to go over IMM, went over with patient by phone, verbal approval for signature received and faxed to [facility] ([phone]). RNCC voiced ability to help with anything further needed.    MAEVE PerryN, BA, RN, CMSRN, RNCC  Covering Units 6D/OBS  Pager: [phone]  Phone: [phone]  6th floor Weekend/Holiday Pager: [phone]  Observation weekend/after hours: [phone]
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
## 2023-04-05 ENCOUNTER — PATIENT OUTREACH (OUTPATIENT)
Dept: CARE COORDINATION | Facility: CLINIC | Age: 78
End: 2023-04-05
Payer: COMMERCIAL

## 2023-04-05 LAB
ATRIAL RATE - MUSE: NORMAL BPM
DIASTOLIC BLOOD PRESSURE - MUSE: NORMAL MMHG
INTERPRETATION ECG - MUSE: NORMAL
P AXIS - MUSE: NORMAL DEGREES
PATH REPORT.COMMENTS IMP SPEC: ABNORMAL
PATH REPORT.COMMENTS IMP SPEC: ABNORMAL
PATH REPORT.COMMENTS IMP SPEC: YES
PATH REPORT.FINAL DX SPEC: ABNORMAL
PATH REPORT.GROSS SPEC: ABNORMAL
PATH REPORT.MICROSCOPIC SPEC OTHER STN: ABNORMAL
PATH REPORT.RELEVANT HX SPEC: ABNORMAL
PHOTO IMAGE: ABNORMAL
PR INTERVAL - MUSE: NORMAL MS
QRS DURATION - MUSE: 78 MS
QT - MUSE: 308 MS
QTC - MUSE: 479 MS
R AXIS - MUSE: 205 DEGREES
SYSTOLIC BLOOD PRESSURE - MUSE: NORMAL MMHG
T AXIS - MUSE: 41 DEGREES
VENTRICULAR RATE- MUSE: 146 BPM
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
## 2023-04-10 ENCOUNTER — OFFICE VISIT (OUTPATIENT)
Dept: OTOLARYNGOLOGY | Facility: CLINIC | Age: 78
End: 2023-04-10
Payer: COMMERCIAL

## 2023-04-10 DIAGNOSIS — Z78.9 RECURRENT RESPIRATORY PAPILLOMATOSIS: Primary | ICD-10-CM

## 2023-04-10 DIAGNOSIS — S12.601S CLOSED NONDISPLACED FRACTURE OF SEVENTH CERVICAL VERTEBRA, UNSPECIFIED FRACTURE MORPHOLOGY, SEQUELA: ICD-10-CM

## 2023-04-10 DIAGNOSIS — R49.0 DYSPHONIA: ICD-10-CM

## 2023-04-10 DIAGNOSIS — J38.3 VOCAL CORD LEUKOPLAKIA: ICD-10-CM

## 2023-04-10 PROCEDURE — 31622 DX BRONCHOSCOPE/WASH: CPT | Performed by: OTOLARYNGOLOGY

## 2023-04-10 PROCEDURE — 99214 OFFICE O/P EST MOD 30 MIN: CPT | Mod: 25 | Performed by: OTOLARYNGOLOGY

## 2023-04-10 NOTE — PROGRESS NOTES
Chief Complaint   Patient presents with     RECHECK     There were no vitals taken for this visit.
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
## 2023-04-24 ENCOUNTER — OFFICE VISIT (OUTPATIENT)
Dept: OTOLARYNGOLOGY | Facility: CLINIC | Age: 78
End: 2023-04-24
Payer: COMMERCIAL

## 2023-04-24 VITALS
DIASTOLIC BLOOD PRESSURE: 76 MMHG | WEIGHT: 117.3 LBS | BODY MASS INDEX: 18.85 KG/M2 | HEIGHT: 66 IN | OXYGEN SATURATION: 94 % | SYSTOLIC BLOOD PRESSURE: 132 MMHG | HEART RATE: 85 BPM

## 2023-04-24 DIAGNOSIS — S12.601S CLOSED NONDISPLACED FRACTURE OF SEVENTH CERVICAL VERTEBRA, UNSPECIFIED FRACTURE MORPHOLOGY, SEQUELA: ICD-10-CM

## 2023-04-24 DIAGNOSIS — Z78.9 RECURRENT RESPIRATORY PAPILLOMATOSIS: Primary | ICD-10-CM

## 2023-04-24 DIAGNOSIS — J38.3 VOCAL CORD LEUKOPLAKIA: ICD-10-CM

## 2023-04-24 PROCEDURE — 31622 DX BRONCHOSCOPE/WASH: CPT | Performed by: OTOLARYNGOLOGY

## 2023-04-24 PROCEDURE — 31579 LARYNGOSCOPY TELESCOPIC: CPT | Mod: 51 | Performed by: OTOLARYNGOLOGY

## 2023-04-24 PROCEDURE — 99213 OFFICE O/P EST LOW 20 MIN: CPT | Mod: 25 | Performed by: OTOLARYNGOLOGY

## 2023-04-24 ASSESSMENT — PAIN SCALES - GENERAL: PAINLEVEL: NO PAIN (0)
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
## 2023-04-24 NOTE — PATIENT INSTRUCTIONS
1.  You were seen in the ENT Clinic today by Dr. Davis. If you have any questions or concerns after your appointment, please call [phone]. Press option #1 for scheduling related needs. Press option #3 for Nurse advice.    2. Plan is to return to clinic 5/15 at 4:30 PM please call clinic if breathing worsens and need to come in sooner      Inocencia Fan RN  [phone]  [facility] - Otolaryngology
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
## 2023-05-12 ENCOUNTER — TELEPHONE (OUTPATIENT)
Dept: OTOLARYNGOLOGY | Facility: CLINIC | Age: 78
End: 2023-05-12
Payer: COMMERCIAL

## 2023-05-12 NOTE — TELEPHONE ENCOUNTER
Left vm message for patient regarding switching appt time to earlier. Writers call back number provided on vm.

## 2023-05-15 ENCOUNTER — OFFICE VISIT (OUTPATIENT)
Dept: OTOLARYNGOLOGY | Facility: CLINIC | Age: 78
End: 2023-05-15
Payer: COMMERCIAL

## 2023-05-15 DIAGNOSIS — Q31.9 DYSPLASIA OF LARYNX: ICD-10-CM

## 2023-05-15 DIAGNOSIS — J38.3 VOCAL CORD LEUKOPLAKIA: ICD-10-CM

## 2023-05-15 DIAGNOSIS — R49.0 DYSPHONIA: ICD-10-CM

## 2023-05-15 DIAGNOSIS — J38.7 LARYNGEAL MASS: ICD-10-CM

## 2023-05-15 DIAGNOSIS — N18.31 STAGE 3A CHRONIC KIDNEY DISEASE (H): ICD-10-CM

## 2023-05-15 DIAGNOSIS — B49 FUNGAL INFECTION: ICD-10-CM

## 2023-05-15 DIAGNOSIS — S12.601S CLOSED NONDISPLACED FRACTURE OF SEVENTH CERVICAL VERTEBRA, UNSPECIFIED FRACTURE MORPHOLOGY, SEQUELA: ICD-10-CM

## 2023-05-15 DIAGNOSIS — Z78.9 RECURRENT RESPIRATORY PAPILLOMATOSIS: Primary | ICD-10-CM

## 2023-05-15 PROCEDURE — 31575 DIAGNOSTIC LARYNGOSCOPY: CPT | Performed by: OTOLARYNGOLOGY

## 2023-05-15 PROCEDURE — 99215 OFFICE O/P EST HI 40 MIN: CPT | Mod: 25 | Performed by: OTOLARYNGOLOGY

## 2023-05-15 RX ORDER — FLUCONAZOLE 100 MG/1
TABLET ORAL
Qty: 8 TABLET | Refills: 0 | Status: SHIPPED | OUTPATIENT
Start: 2023-05-15 | End: 2023-09-20
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
## 2023-05-30 ENCOUNTER — PATIENT OUTREACH (OUTPATIENT)
Dept: OTOLARYNGOLOGY | Facility: CLINIC | Age: 78
End: 2023-05-30
Payer: COMMERCIAL

## 2023-05-30 NOTE — PROGRESS NOTES
Called both the patient and patient's daughter. Unfortunately did not get an answer from either. LVM to call back as we want to check in on the patient and make sure she is doing okay. Will follow up with patient and the daughter.  Inocencia Fan RN on 5/30/2023 at 1:21 PM

## 2023-05-31 ENCOUNTER — PATIENT OUTREACH (OUTPATIENT)
Dept: OTOLARYNGOLOGY | Facility: CLINIC | Age: 78
End: 2023-05-31
Payer: COMMERCIAL

## 2023-05-31 NOTE — PROGRESS NOTES
Called patient to check in to see how they were doing since last appointment. Second call, LVM with direct number for patient to call back. Inocencia Fan RN on 5/31/2023 at 8:12 AM

## 2023-06-01 ENCOUNTER — PATIENT OUTREACH (OUTPATIENT)
Dept: OTOLARYNGOLOGY | Facility: CLINIC | Age: 78
End: 2023-06-01
Payer: COMMERCIAL

## 2023-06-01 NOTE — PROGRESS NOTES
Called patient to let her know about holding her blood thinner 7 days before the surgery and confirmed patient will have pre-op on 6/8. Patient was agreeable and verbalized understanding of the situation. Inocencia Fan RN on 6/1/2023 at 1:50 PM

## 2023-06-09 NOTE — PROGRESS NOTES
Patient called in and requested to reschedule her post op for surgery next week. Advised her that I did not see any availiblity and will have Dr. Christian nurse call her to reschedule it. Patient understood and had no further questions. Sent message to Inocencia LAW and Blanca VALDES for assistance.    Krys Brower, Perioperative Coordinator, ENT 6/9/2023 at 1:08 PM

## 2023-06-12 ENCOUNTER — PATIENT OUTREACH (OUTPATIENT)
Dept: OTOLARYNGOLOGY | Facility: CLINIC | Age: 78
End: 2023-06-12
Payer: COMMERCIAL

## 2023-06-12 NOTE — PROGRESS NOTES
Called and confirmed new post-op date with patient per their request. Patient was agreeable and verbalized understanding of the situation. Inocencia Fan RN on 6/12/2023 at 8:18 AM

## 2023-06-14 ENCOUNTER — ANESTHESIA EVENT (OUTPATIENT)
Dept: SURGERY | Facility: CLINIC | Age: 78
End: 2023-06-14
Payer: COMMERCIAL

## 2023-06-15 ENCOUNTER — ANESTHESIA (OUTPATIENT)
Dept: SURGERY | Facility: CLINIC | Age: 78
End: 2023-06-15
Payer: COMMERCIAL

## 2023-06-15 ENCOUNTER — HOSPITAL ENCOUNTER (OUTPATIENT)
Facility: CLINIC | Age: 78
Discharge: HOME | End: 2023-06-15
Attending: OTOLARYNGOLOGY | Admitting: OTOLARYNGOLOGY
Payer: COMMERCIAL

## 2023-06-15 VITALS
DIASTOLIC BLOOD PRESSURE: 67 MMHG | HEART RATE: 85 BPM | SYSTOLIC BLOOD PRESSURE: 108 MMHG | WEIGHT: 114.42 LBS | BODY MASS INDEX: 19.06 KG/M2 | OXYGEN SATURATION: 96 % | TEMPERATURE: 97.8 F | HEIGHT: 65 IN | RESPIRATION RATE: 16 BRPM

## 2023-06-15 PROCEDURE — 370N000017 HC ANESTHESIA TECHNICAL FEE, PER MIN: Performed by: OTOLARYNGOLOGY

## 2023-06-15 PROCEDURE — 272N000001 HC OR GENERAL SUPPLY STERILE: Performed by: OTOLARYNGOLOGY

## 2023-06-15 PROCEDURE — 31571 LARYNGOSCOP W/VC INJ + SCOPE: CPT | Mod: 51 | Performed by: OTOLARYNGOLOGY

## 2023-06-15 PROCEDURE — 87624 HPV HI-RISK TYP POOLED RSLT: CPT | Performed by: OTOLARYNGOLOGY

## 2023-06-15 PROCEDURE — 710N000012 HC RECOVERY PHASE 2, PER MINUTE: Performed by: OTOLARYNGOLOGY

## 2023-06-15 PROCEDURE — G0452 MOLECULAR PATHOLOGY INTERPR: HCPCS | Mod: 26 | Performed by: PATHOLOGY

## 2023-06-15 PROCEDURE — 250N000025 HC SEVOFLURANE, PER MIN: Performed by: OTOLARYNGOLOGY

## 2023-06-15 PROCEDURE — 88305 TISSUE EXAM BY PATHOLOGIST: CPT | Mod: 26 | Performed by: STUDENT IN AN ORGANIZED HEALTH CARE EDUCATION/TRAINING PROGRAM

## 2023-06-15 PROCEDURE — 999N000141 HC STATISTIC PRE-PROCEDURE NURSING ASSESSMENT: Performed by: OTOLARYNGOLOGY

## 2023-06-15 PROCEDURE — 250N000013 HC RX MED GY IP 250 OP 250 PS 637: Performed by: ANESTHESIOLOGY

## 2023-06-15 PROCEDURE — 250N000011 HC RX IP 250 OP 636: Performed by: OTOLARYNGOLOGY

## 2023-06-15 PROCEDURE — 250N000009 HC RX 250: Performed by: ANESTHESIOLOGY

## 2023-06-15 PROCEDURE — 360N000077 HC SURGERY LEVEL 4, PER MIN: Performed by: OTOLARYNGOLOGY

## 2023-06-15 PROCEDURE — 31536 LARYNGOSCOPY W/BX & OP SCOPE: CPT | Mod: 51 | Performed by: OTOLARYNGOLOGY

## 2023-06-15 PROCEDURE — 258N000003 HC RX IP 258 OP 636: Performed by: ANESTHESIOLOGY

## 2023-06-15 PROCEDURE — 31541 LARYNSCOP W/TUMR EXC + SCOPE: CPT | Mod: GC | Performed by: OTOLARYNGOLOGY

## 2023-06-15 PROCEDURE — 250N000011 HC RX IP 250 OP 636: Performed by: ANESTHESIOLOGY

## 2023-06-15 PROCEDURE — 710N000010 HC RECOVERY PHASE 1, LEVEL 2, PER MIN: Performed by: OTOLARYNGOLOGY

## 2023-06-15 PROCEDURE — 88305 TISSUE EXAM BY PATHOLOGIST: CPT | Mod: TC | Performed by: OTOLARYNGOLOGY

## 2023-06-15 RX ORDER — LIDOCAINE HYDROCHLORIDE 20 MG/ML
INJECTION, SOLUTION INFILTRATION; PERINEURAL PRN
Status: DISCONTINUED | OUTPATIENT
Start: 2023-06-15 | End: 2023-06-15

## 2023-06-15 RX ORDER — PROPOFOL 10 MG/ML
INJECTION, EMULSION INTRAVENOUS PRN
Status: DISCONTINUED | OUTPATIENT
Start: 2023-06-15 | End: 2023-06-15

## 2023-06-15 RX ORDER — PROPOFOL 10 MG/ML
INJECTION, EMULSION INTRAVENOUS CONTINUOUS PRN
Status: DISCONTINUED | OUTPATIENT
Start: 2023-06-15 | End: 2023-06-15

## 2023-06-15 RX ORDER — ACETAMINOPHEN 325 MG/1
650 TABLET ORAL
Status: DISCONTINUED | OUTPATIENT
Start: 2023-06-15 | End: 2023-06-15 | Stop reason: HOSPADM

## 2023-06-15 RX ORDER — ONDANSETRON 2 MG/ML
INJECTION INTRAMUSCULAR; INTRAVENOUS PRN
Status: DISCONTINUED | OUTPATIENT
Start: 2023-06-15 | End: 2023-06-15

## 2023-06-15 RX ORDER — DEXAMETHASONE SODIUM PHOSPHATE 10 MG/ML
10 INJECTION, SOLUTION INTRAMUSCULAR; INTRAVENOUS ONCE
Status: DISCONTINUED | OUTPATIENT
Start: 2023-06-15 | End: 2023-06-15 | Stop reason: HOSPADM

## 2023-06-15 RX ORDER — OXYCODONE HYDROCHLORIDE 5 MG/1
5 TABLET ORAL
Status: DISCONTINUED | OUTPATIENT
Start: 2023-06-15 | End: 2023-06-15 | Stop reason: HOSPADM

## 2023-06-15 RX ORDER — AMPICILLIN AND SULBACTAM 2; 1 G/1; G/1
3 INJECTION, POWDER, FOR SOLUTION INTRAMUSCULAR; INTRAVENOUS
Status: COMPLETED | OUTPATIENT
Start: 2023-06-15 | End: 2023-06-15

## 2023-06-15 RX ORDER — FENTANYL CITRATE 50 UG/ML
INJECTION, SOLUTION INTRAMUSCULAR; INTRAVENOUS PRN
Status: DISCONTINUED | OUTPATIENT
Start: 2023-06-15 | End: 2023-06-15

## 2023-06-15 RX ORDER — EPINEPHRINE 0.1 MG/ML
INJECTION INTRAVENOUS PRN
Status: DISCONTINUED | OUTPATIENT
Start: 2023-06-15 | End: 2023-06-15 | Stop reason: HOSPADM

## 2023-06-15 RX ORDER — SODIUM CHLORIDE, SODIUM LACTATE, POTASSIUM CHLORIDE, CALCIUM CHLORIDE 600; 310; 30; 20 MG/100ML; MG/100ML; MG/100ML; MG/100ML
INJECTION, SOLUTION INTRAVENOUS CONTINUOUS PRN
Status: DISCONTINUED | OUTPATIENT
Start: 2023-06-15 | End: 2023-06-15

## 2023-06-15 RX ORDER — AMPICILLIN AND SULBACTAM 1; .5 G/1; G/1
1.5 INJECTION, POWDER, FOR SOLUTION INTRAMUSCULAR; INTRAVENOUS SEE ADMIN INSTRUCTIONS
Status: DISCONTINUED | OUTPATIENT
Start: 2023-06-15 | End: 2023-06-15 | Stop reason: HOSPADM

## 2023-06-15 RX ORDER — ALBUTEROL SULFATE 90 UG/1
AEROSOL, METERED RESPIRATORY (INHALATION) PRN
Status: DISCONTINUED | OUTPATIENT
Start: 2023-06-15 | End: 2023-06-15

## 2023-06-15 RX ORDER — DEXAMETHASONE SODIUM PHOSPHATE 4 MG/ML
INJECTION, SOLUTION INTRA-ARTICULAR; INTRALESIONAL; INTRAMUSCULAR; INTRAVENOUS; SOFT TISSUE PRN
Status: DISCONTINUED | OUTPATIENT
Start: 2023-06-15 | End: 2023-06-15

## 2023-06-15 RX ORDER — ESMOLOL HYDROCHLORIDE 10 MG/ML
INJECTION INTRAVENOUS PRN
Status: DISCONTINUED | OUTPATIENT
Start: 2023-06-15 | End: 2023-06-15

## 2023-06-15 RX ORDER — LIDOCAINE HYDROCHLORIDE 40 MG/ML
SOLUTION TOPICAL PRN
Status: DISCONTINUED | OUTPATIENT
Start: 2023-06-15 | End: 2023-06-15 | Stop reason: HOSPADM

## 2023-06-15 RX ADMIN — AMPICILLIN SODIUM AND SULBACTAM SODIUM 3 G: 2; 1 INJECTION, POWDER, FOR SOLUTION INTRAMUSCULAR; INTRAVENOUS at 12:18

## 2023-06-15 RX ADMIN — LIDOCAINE HYDROCHLORIDE 40 MG: 20 INJECTION, SOLUTION INFILTRATION; PERINEURAL at 13:55

## 2023-06-15 RX ADMIN — ESMOLOL HYDROCHLORIDE 10 MG: 10 INJECTION, SOLUTION INTRAVENOUS at 13:50

## 2023-06-15 RX ADMIN — PROPOFOL 150 MCG/KG/MIN: 10 INJECTION, EMULSION INTRAVENOUS at 12:15

## 2023-06-15 RX ADMIN — PHENYLEPHRINE HYDROCHLORIDE 150 MCG: 10 INJECTION INTRAVENOUS at 12:29

## 2023-06-15 RX ADMIN — ESMOLOL HYDROCHLORIDE 40 MG: 10 INJECTION, SOLUTION INTRAVENOUS at 12:28

## 2023-06-15 RX ADMIN — ALBUTEROL SULFATE 44 PUFF: 108 INHALANT RESPIRATORY (INHALATION) at 14:01

## 2023-06-15 RX ADMIN — ESMOLOL HYDROCHLORIDE 20 MG: 10 INJECTION, SOLUTION INTRAVENOUS at 12:55

## 2023-06-15 RX ADMIN — PHENYLEPHRINE HYDROCHLORIDE 100 MCG: 10 INJECTION INTRAVENOUS at 13:13

## 2023-06-15 RX ADMIN — SUGAMMADEX 200 MG: 100 INJECTION, SOLUTION INTRAVENOUS at 13:55

## 2023-06-15 RX ADMIN — DEXAMETHASONE SODIUM PHOSPHATE 10 MG: 4 INJECTION, SOLUTION INTRA-ARTICULAR; INTRALESIONAL; INTRAMUSCULAR; INTRAVENOUS; SOFT TISSUE at 12:15

## 2023-06-15 RX ADMIN — SODIUM CHLORIDE, POTASSIUM CHLORIDE, SODIUM LACTATE AND CALCIUM CHLORIDE: 600; 310; 30; 20 INJECTION, SOLUTION INTRAVENOUS at 11:59

## 2023-06-15 RX ADMIN — PHENYLEPHRINE HYDROCHLORIDE 100 MCG: 10 INJECTION INTRAVENOUS at 13:59

## 2023-06-15 RX ADMIN — DEXMEDETOMIDINE HYDROCHLORIDE 8 MCG: 100 INJECTION, SOLUTION INTRAVENOUS at 13:34

## 2023-06-15 RX ADMIN — FENTANYL CITRATE 50 MCG: 50 INJECTION, SOLUTION INTRAMUSCULAR; INTRAVENOUS at 12:27

## 2023-06-15 RX ADMIN — Medication 50 MG: at 12:12

## 2023-06-15 RX ADMIN — Medication 10 MG: at 13:24

## 2023-06-15 RX ADMIN — LIDOCAINE HYDROCHLORIDE 60 MG: 20 INJECTION, SOLUTION INFILTRATION; PERINEURAL at 12:10

## 2023-06-15 RX ADMIN — Medication 10 MG: at 12:42

## 2023-06-15 RX ADMIN — ESMOLOL HYDROCHLORIDE 10 MG: 10 INJECTION, SOLUTION INTRAVENOUS at 13:25

## 2023-06-15 RX ADMIN — DEXMEDETOMIDINE HYDROCHLORIDE 8 MCG: 100 INJECTION, SOLUTION INTRAVENOUS at 13:40

## 2023-06-15 RX ADMIN — FENTANYL CITRATE 50 MCG: 50 INJECTION, SOLUTION INTRAMUSCULAR; INTRAVENOUS at 12:10

## 2023-06-15 RX ADMIN — ESMOLOL HYDROCHLORIDE 20 MG: 10 INJECTION, SOLUTION INTRAVENOUS at 12:33

## 2023-06-15 RX ADMIN — PROPOFOL 200 MG: 10 INJECTION, EMULSION INTRAVENOUS at 12:11

## 2023-06-15 RX ADMIN — ONDANSETRON 4 MG: 2 INJECTION INTRAMUSCULAR; INTRAVENOUS at 13:48

## 2023-06-15 RX ADMIN — PHENYLEPHRINE HYDROCHLORIDE 100 MCG: 10 INJECTION INTRAVENOUS at 12:15

## 2023-06-15 ASSESSMENT — ACTIVITIES OF DAILY LIVING (ADL)
ADLS_ACUITY_SCORE: 35

## 2023-06-15 ASSESSMENT — COPD QUESTIONNAIRES: COPD: 0

## 2023-06-15 ASSESSMENT — ENCOUNTER SYMPTOMS: DYSRHYTHMIAS: 1

## 2023-06-15 NOTE — OR NURSING
Dr Loya from anesthesia came by and said she would put in a phase 2 discharge order and signout.   Patient has thin clear mucus she coughed up but was stuck in her throat. We were able to suction it up and her cough is getting stronger and she is able to swallow or bring up the mucus on her own now.

## 2023-06-15 NOTE — BRIEF OP NOTE
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]  Cervical spine fractures   Post-operative diagnosis As above   Procedure: Microdirect laryngoscopy with ablation of laryngeal lesions, biopsies, CO2 laser  Flexible laser (CO2 or KTP) standby  Avastin injection   Surgeon(s): Surgeon(s) and Role:     * Nikole Davis MD - Primary     * Leonarda Cerna MD - Resident - Assisting   Estimated blood loss: Minimal   Specimens: ID Type Source Tests Collected by Time Destination   1 : left posterior larynx Tissue Other SURGICAL PATHOLOGY EXAM Nikole Davis MD 6/15/2023 12:37 PM    A : left posterior larynx Tissue Other HPV SCR W REF TO STEPHANIE ANAL PAP OR TISSUE Nikole Davis MD 6/15/2023 12:42 PM       Findings: Bilateral posterior glottis and supraglottis with papilloma; right anterior commissure papilloma. Right posterior true vocal fold papilloma with extension to infraglottic aspect.  Easy mask, good laryngeal exposure with Ossoff laryngoscope.

## 2023-06-15 NOTE — ANESTHESIA PREPROCEDURE EVALUATION
Anesthesia Pre-Procedure Evaluation    Patient: Asya Coffman   MRN: [id number] : 1945        Procedure : Procedure(s):  Microdirect laryngoscopy with excision/ablation of laryngeal lesions, biopsies, possible CO2 laser  possible flexible laser (CO2 or KTP),  Avastin injection          Past Medical History:   Diagnosis Date     A-fib (H)      Antiplatelet or antithrombotic long-term use      Arrhythmia      COPD (chronic obstructive pulmonary disease) (H)       Past Surgical History:   Procedure Laterality Date     LASER CO2 LARYNGOSCOPY, COMPLEX N/A 2022    Procedure: Micro direct laryngoscopy with excision/ablation of laryngeal lesions, possible biopsies, possible CO2 laser;  Surgeon: Nikole Davis MD;  Location: [facility] OR     LASER CO2 LARYNGOSCOPY, COMPLEX N/A 9/15/2022    Procedure: Microdirect laryngoscopy with ablation of laryngeal lesions,biopsies,CO2 laser;  Surgeon: Nikole Davis MD;  Location: [facility] OR     LASER CO2 LARYNGOSCOPY, COMPLEX N/A 2022    Procedure: Microdirect laryngoscopy with excision/ablation of laryngeal lesions, biopsies,   CO2 laser;  Surgeon: Nikole Davis MD;  Location: [facility] OR     LASER KTP LARYNGOSCOPY N/A 4/3/2023    Procedure: Microdirect laryngoscopy with biopsies, excision/ablation of lesions, C02 laser,  Avastin injection;  Surgeon: Nikole Davis MD;  Location: [facility] OR     LASER KTP LARYNGOSCOPY FLEXIBLE N/A 2022    Procedure: Transnasal flexible laryngoscopy with KTP laser and biopsies;  Surgeon: Nikole Davis MD;  Location: [facility] OR     LASER KTP LARYNGOSCOPY FLEXIBLE N/A 10/27/2022    Procedure: Transnasal flexible laryngoscopy with possible KTP laser;  Surgeon: Nikole Davis MD;  Location: [facility] OR     LASER KTP LARYNGOSCOPY FLEXIBLE N/A 2023    Procedure: Transnasal flexible laryngoscopy with KTP laser,  biopsies, superior laryngeal nerve blocks, Avastin injection;  Surgeon: Ryan  Nikole Alberts MD;  Location: [facility] OR     LASER KTP LARYNGOSCOPY FLEXIBLE N/A 2/15/2023    Procedure: Transnasal flexible laryngoscopy with KTP laser, superior laryngeal nerve blocks, biopsies, avastin injection;  Surgeon: Nikole Davis MD;  Location: [facility] OR     LASER KTP LARYNGOSCOPY FLEXIBLE N/A 2/17/2023    Procedure: Transnasal flexible laryngoscopy with KTP laser, biopsies, superior laryngeal nerve blocks;  Surgeon: Nikole Davis MD;  Location: [facility] OR     LASER KTP LARYNGOSCOPY FLEXIBLE N/A 2/23/2023    Procedure: Transnasal flexible laryngoscopy with KTP laser, superior laryngeal nerve blocks;  Surgeon: Nikole Davis MD;  Location: [facility] OR     LASER KTP LARYNGOSCOPY FLEXIBLE N/A 3/2/2023    Procedure: Transnasal flexible laryngoscopy with KTP laser, superior laryngeal nerve block Bilateral;  Surgeon: Nikole Davis MD;  Location: [facility] OR     LASER KTP LARYNGOSCOPY FLEXIBLE N/A 3/9/2023    Procedure: Transnasal flexible laryngoscopy with KTP laser, biopsies, superior laryngeal nerve blocks;  Surgeon: Nikole Davis MD;  Location: [facility] OR     LASER KTP LARYNGOSCOPY FLEXIBLE N/A 3/17/2023    Procedure: Transnasal flexible laryngoscopy with KTP laser, superior laryngeal nerve block(s), Avastin injection;  Surgeon: Nikole Davis MD;  Location: [facility] OR     LASER KTP LARYNGOSCOPY FLEXIBLE N/A 3/28/2023    Procedure: Transnasal flexible laryngoscopy with KTP laser, superior laryngeal nerve block(s);  Surgeon: Pankaj Woodard MD;  Location: [facility] OR      Allergies   Allergen Reactions     Methylpyrrolidone Anaphylaxis     Nuts Anaphylaxis     Black walnut     Escitalopram Other (See Comments)     Asthma -a time of exacerbation and may not have been cause may retry     Mirtazapine Other (See Comments)     Asthma a time of exacerbation and may not have been cause may retry     Venlafaxine Other (See Comments)     Adhesive Tape Rash      With holter monitor. Ok with bandaids.     No Clinical Screening - See Comments Rash     Adhesive tape      Social History     Tobacco Use     Smoking status: Never     Smokeless tobacco: Not on file   Vaping Use     Vaping status: Not on file   Substance Use Topics     Alcohol use: Never      Wt Readings from Last 1 Encounters:   04/24/23 53.2 kg (117 lb 4.8 oz)        Anesthesia Evaluation   Pt has had prior anesthetic. Type: General.    No history of anesthetic complications       ROS/MED HX  ENT/Pulmonary: Comment: Airway papillomas with intermittent airway obstruction   (-) COPD   Neurologic: Comment: Recent fall, in C-collar, unstable c-spine      Cardiovascular:     (+) -----Taking blood thinners dysrhythmias, a-fib,     METS/Exercise Tolerance:     Hematologic:       Musculoskeletal: Comment: Partially healed C7 fracture. Neuro on the case. Pt is not wearing C collar as advised.   (+) cervical spine instability. C-spine cleared:Yes,    GI/Hepatic:       Renal/Genitourinary:     (+) renal disease, type: CRI, Pt does not require dialysis,     Endo:     (+) thyroid problem, hypothyroidism,     Psychiatric/Substance Use:       Infectious Disease:       Malignancy:       Other:            Physical Exam    Airway        Mallampati: II   TM distance: > 3 FB   Neck ROM: full   Mouth opening: > 3 cm    Respiratory Devices and Support         Dental         B=Bridge, C=Chipped, L=Loose, M=Missing    Cardiovascular          Rhythm and rate: regular and normal     Pulmonary           breath sounds clear to auscultation           OUTSIDE LABS:  CBC:   Lab Results   Component Value Date    WBC 12.2 (H) 04/04/2023    WBC 5.5 04/03/2023    HGB 13.1 04/04/2023    HGB 16.5 (H) 04/03/2023    HCT 42.2 04/04/2023    HCT 53.1 (H) 04/03/2023     04/04/2023     04/03/2023     BMP:   Lab Results   Component Value Date     04/04/2023     04/03/2023    POTASSIUM 4.7 04/04/2023    POTASSIUM 4.6 04/03/2023     CHLORIDE 104 04/04/2023    CHLORIDE 102 04/03/2023    CO2 25 04/04/2023    CO2 21 (L) 04/03/2023    BUN 29.1 (H) 04/04/2023    BUN 21.7 04/03/2023    CR 1.19 (H) 04/04/2023    CR 0.99 (H) 04/03/2023     (H) 04/04/2023     (H) 04/03/2023     COAGS:   Lab Results   Component Value Date    PTT 37 04/02/2023    INR 1.16 (H) 04/02/2023     POC: No results found for: BGM, HCG, HCGS  HEPATIC:   Lab Results   Component Value Date    ALBUMIN 4.5 04/02/2023    PROTTOTAL 7.3 04/02/2023    ALT 12 04/02/2023    AST 18 04/02/2023    ALKPHOS 88 04/02/2023    BILITOTAL 0.5 04/02/2023     OTHER:   Lab Results   Component Value Date    PH 7.37 04/02/2023    LACT 0.9 04/02/2023    ESSIE 8.3 (L) 04/04/2023    MAG 1.9 04/02/2023       Anesthesia Plan    ASA Status:  3      Anesthesia Type: General.     - Airway: ETT   Induction: Intravenous.   Maintenance: TIVA.   Techniques and Equipment:     - Airway: Video-Laryngoscope         Consents    Anesthesia Plan(s) and associated risks, benefits, and realistic alternatives discussed. Questions answered and patient/representative(s) expressed understanding.    - Discussed:     - Discussed with:  Patient      - Extended Intubation/Ventilatory Support Discussed: No.      - Patient is DNR/DNI Status: No    Use of blood products discussed: No .     Postoperative Care    Pain management: IV analgesics, Oral pain medications.   PONV prophylaxis: Ondansetron (or other 5HT-3), Dexamethasone or Solumedrol     Comments:           H&P reviewed: Unable to attach H&P to encounter due to EHR limitations. H&P Update: appropriate H&P reviewed, patient examined. No interval changes since H&P (within 30 days).         Hafsa Benavides MD

## 2023-06-15 NOTE — ANESTHESIA CARE TRANSFER NOTE
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    Anesthesia Type:   General     Note:    Oropharynx: oropharynx clear of all foreign objects and spontaneously breathing  Level of Consciousness: drowsy  Oxygen Supplementation: face mask  Level of Supplemental Oxygen (L/min / FiO2): 4  Independent Airway: airway patency satisfactory and stable  Dentition: dentition unchanged  Vital Signs Stable: post-procedure vital signs reviewed and stable  Report to RN Given: handoff report given  Patient transferred to: PACU    Handoff Report: Identifed the Patient, Identified the Reponsible Provider, Reviewed the pertinent medical history, Discussed the surgical course, Reviewed Intra-OP anesthesia mangement and issues during anesthesia, Set expectations for post-procedure period and Allowed opportunity for questions and acknowledgement of understanding      Vitals:  Vitals Value Taken Time   /67 06/15/23 1415   Temp     Pulse 76 06/15/23 1417   Resp 16 06/15/23 1417   SpO2 100 % 06/15/23 1417   Vitals shown include unvalidated device data.    Electronically Signed By: VERONICA Gallo CRNA  Beatriz 15, 2023  2:18 PM

## 2023-06-15 NOTE — ANESTHESIA POSTPROCEDURE EVALUATION
Patient: Asya Coffman    Procedure: Procedure(s):  Microdirect laryngoscopy with ablation of laryngeal lesions, biopsies, CO2 laser  flexible laser (CO2 or KTP) standby  Avastin injection       Anesthesia Type:  General    Note:  Disposition: Outpatient   Postop Pain Control: Uneventful            Sign Out: Well controlled pain   PONV: No   Neuro/Psych: Uneventful            Sign Out: Acceptable/Baseline neuro status   Airway/Respiratory: Uneventful            Sign Out: Acceptable/Baseline resp. status   CV/Hemodynamics: Uneventful            Sign Out: Acceptable CV status; No obvious hypovolemia; No obvious fluid overload   Other NRE: NONE   DID A NON-ROUTINE EVENT OCCUR? No           Last vitals:  Vitals Value Taken Time   /67 06/15/23 1415   Temp     Pulse 72 06/15/23 1421   Resp 56 06/15/23 1421   SpO2 100 % 06/15/23 1421   Vitals shown include unvalidated device data.    Electronically Signed By: Hafsa Benavides MD  Beatriz 15, 2023  2:22 PM

## 2023-06-15 NOTE — ANESTHESIA PROCEDURE NOTES
Airway       Patient location during procedure: OR       Procedure Start/Stop Times: 6/15/2023 12:14 PM  Staff -        CRNA: Blaire Whitlock APRN CRNA       Performed By: CRNA  Consent for Airway        Urgency: elective  Indications and Patient Condition       Indications for airway management: tania-procedural       Induction type:intravenous       Mask difficulty assessment: 2 - vent by mask + OA or adjuvant +/- NMBA    Final Airway Details       Final airway type: endotracheal airway       Successful airway: ETT - single (laser tube)  Endotracheal Airway Details        ETT size (mm): 5.0       Cuffed: yes       Successful intubation technique: direct laryngoscopy and video laryngoscopy       VL Blade Size: Glidescope 3       Grade View of Cords: 1       Adjucts: stylet       Position: Right       Measured from: lips       Secured at (cm): 20       Bite block used: None    Post intubation assessment        Placement verified by: capnometry, equal breath sounds and chest rise        Number of attempts at approach: 1       Number of other approaches attempted: 0       Secured with: silk tape       Ease of procedure: easy       Dentition: Intact and Unchanged       Dental guard used and removed (per ENT).    Medication(s) Administered   Medication Administration Time: 6/15/2023 12:14 PM

## 2023-06-15 NOTE — DISCHARGE INSTRUCTIONS
[facility]  Same-Day Surgery   Adult Discharge Orders & Instructions     For 24 hours after surgery    Get plenty of rest.  A responsible adult must stay with you for at least 24 hours after you leave the hospital.   Do not drive or use heavy equipment.  If you have weakness or tingling, don't drive or use heavy equipment until this feeling goes away.  Do not drink alcohol.  Avoid strenuous or risky activities.  Ask for help when climbing stairs.   You may feel lightheaded.  IF so, sit for a few minutes before standing.  Have someone help you get up.   If you have nausea (feel sick to your stomach): Drink only clear liquids such as apple juice, ginger ale, broth or 7-Up.  Rest may also help.  Be sure to drink enough fluids.  Move to a regular diet as you feel able.  You may have a slight fever. Call the doctor if your fever is over 100 F (37.7 C) (taken under the tongue) or lasts longer than 24 hours.  You may have a dry mouth, a sore throat, muscle aches or trouble sleeping.  These should go away after 24 hours.  Do not make important or legal decisions.   Call your doctor for any of the followin.  Signs of infection (fever, growing tenderness at the surgery site, a large amount of drainage or bleeding, severe pain, foul-smelling drainage, redness, swelling).    2. It has been over 8 to 10 hours since surgery and you are still not able to urinate (pass water).    3.  Headache for over 24 hours.    4.  Numbness, tingling or weakness the day after surgery (if you had spinal anesthesia).  To contact a doctor, call Dr Davis at  the [facility] at [phone]  or:    '   [phone] and ask for the ENT resident on call  (answered 24 hours a day)  '   Emergency Department:    [facility]: [phone]       (TTY for hearing impaired: [phone])               Voice rest 4 days then gradual resumption of voice  Acid reflux precautions   Restart  apixaban tomorrow 6/16

## 2023-06-16 NOTE — PROGRESS NOTES
Flexible fiberoptic laryngoscopy  Indications: This procedure was warranted to evaluate the patient's laryngeal anatomy prior to inducing general anesthesia, given her history of rapid papilloma regrowth and airway obstruction with papilloma. Verbal consent was obtained and my anesthesia staff colleague was present.  Performed by: Nikole Davis MD MPH  Findings: Normal nasopharynx. Normal base of tongue, valleculae, and epiglottis. The right true vocal fold demonstrated normal mobility. The left true vocal fold demonstrated normal mobility. The medial edges of the true vocal folds appeared smooth and straight anteriorly, with posterior true vocal fold involvement with papilloma bilaterally. Greater papilloma burden was observed along the right posterior larynx than the right. In addition, the supraglottis was noted to have broad papillomatous involvement, with some possible fibrosis as well. The airway was patent.  Based on these findings, we agreed that the patient could safely undergo standard intubation.

## 2023-06-16 NOTE — OP NOTE
PROCEDURE(S):  Microdirect laryngoscopy with ablation of laryngeal lesions, biopsies, CO2 laser.  Flexible laser (CO2 or KTP) standby  Laryngeal Avastin injection under telescopic visualization.    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 The patient wished to proceed with surgery and presented for the procedure(s) listed above. We reviewed perioperative risks, including bleeding/infection/pain, injury to surrounding structures, potential changes to tongue/voice/swallow function, risk of needing additional procedures (e.g., due to persistence or recurrence), and risks of anesthesia (including heart attack, stroke, death). She elected to proceed and written informed consent was performed. A flexible laryngoscopy was performed in pre-op holding to confirm the airway management plan with Anesthesia given her prior history of rapid disease growth.    DESCRIPTION OF PROCEDURE:   The patient was brought to the operating room and placed supine. Intubation was completed via GlideScope with care to avoid neck manipulation. As requested by the patient's outside spine provider, a member of our in-house neurosurgery team positioned her head and secured it in a neutral position to avoid any avoidable further spine injuries. A time-out was called to verify patient identity, operative site, and planned procedure. The operative site was prepped in the usual clean fashion. Moist gauze eye pads were placed and secured. A head wrap was placed, and custom  molded thermoplastic tooth guards were placed. Direct laryngoscopy was performed with an Ossoff-Pilling laryngoscope, which was placed in suspension. The vocal folds were examined with 0 and 70 degree telescopes. A biopsy was taken and submitted for pathology.    The operating microscope was then brought into position. Laser safety precautions were utilized at all times, including eye protection for the patient and staff, use of wet towels, and appropriately minimized FiO2. As needed, moistened cottonoids or laser-safe backstops were used to protect the endotracheal tube from the laser. The CipherCloud CO2 Accublade laser was attached to the microscope and used in a [place] configuration at a depth setting of 1 and 8 Cardoza. Ablation of papilloma was performed along the left supraglottis and posterior true vocal fold, with some extension infraglottically. Similarly, right posterior commissure, true vocal fold, and infraglottic papilloma was ablated; there was greater infraglottic extension on the right. A separate cluster along the mid posterior commissure was also ablated. For the posterior laryngeal ablations, the laryngoscope was repositioned to hold the endotracheal tube up and allow direct visualization and access. Care was used to avoid continuous demucosalization along the posterior larynx to reduce the risk of stenosis. Limited ablation of additional papilloma along the right posterior supraglottis was also performed. A small cluster of anterior right anterior commissure papilloma was ablated as well.    Next the endotracheal tube cuffs were deflated and the telescope was used to examine the airway beyond the glottis. No disease was noted in the trachea or takeoffs of the mainstem bronchi.    Avastin injections were then performed under telescopic visualization into the various sites where papilloma was ablated, with particular attention to the right and left supraglottis and glottis.    Cottonoids soaked in  1:10,000 epinephrine were sparingly used to maintain hemostasis. As needed during the procedure and at the conclusion of the procedure, the operating microscope was moved out of position and the 0 and 70 degree rigid endoscopes were again used to examine the larynx and confirm completion of the stated objectives of the procedure. After cottonoid and sponge counts were confirmed correct, 2 cc of 4% lidocaine were applied transglottically for laryngotracheal anesthesia. The laryngoscope and tooth guards were removed. The lips, teeth, and tongue were examined and no injuries were noted. Care of the patient was returned to Anesthesia.    FINDINGS:   Bilateral posterior glottis and supraglottis with papilloma; right anterior commissure papilloma. Right posterior true vocal fold papilloma with extension to infraglottic aspect.  Easy mask, good laryngeal exposure with Ossoff laryngoscope.    SPECIMEN(S):   Left posterior larynx    DRAINS: None    ESTIMATED BLOOD LOSS: Minimal    COMPLICATIONS: None    DISPOSITION: Stable to PACU    ATTENDING PRESENCE STATEMENT:  I performed the entire procedure from beginning to completion.

## 2023-07-17 ENCOUNTER — OFFICE VISIT (OUTPATIENT)
Dept: OTOLARYNGOLOGY | Facility: CLINIC | Age: 78
End: 2023-07-17
Payer: COMMERCIAL

## 2023-07-17 VITALS — SYSTOLIC BLOOD PRESSURE: 109 MMHG | HEART RATE: 61 BPM | DIASTOLIC BLOOD PRESSURE: 61 MMHG

## 2023-07-17 DIAGNOSIS — J44.9 CHRONIC OBSTRUCTIVE PULMONARY DISEASE, UNSPECIFIED COPD TYPE (H): ICD-10-CM

## 2023-07-17 DIAGNOSIS — R49.0 DYSPHONIA: Primary | ICD-10-CM

## 2023-07-17 DIAGNOSIS — Z87.81 H/O CERVICAL FRACTURE: ICD-10-CM

## 2023-07-17 DIAGNOSIS — Z78.9 RECURRENT RESPIRATORY PAPILLOMATOSIS: ICD-10-CM

## 2023-07-17 DIAGNOSIS — N18.31 STAGE 3A CHRONIC KIDNEY DISEASE (H): ICD-10-CM

## 2023-07-17 DIAGNOSIS — Q31.9 DYSPLASIA OF LARYNX: ICD-10-CM

## 2023-07-17 PROCEDURE — 99214 OFFICE O/P EST MOD 30 MIN: CPT | Mod: 25 | Performed by: OTOLARYNGOLOGY

## 2023-07-17 PROCEDURE — 31579 LARYNGOSCOPY TELESCOPIC: CPT | Performed by: OTOLARYNGOLOGY

## 2023-07-17 ASSESSMENT — PAIN SCALES - GENERAL: PAINLEVEL: NO PAIN (0)
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
## 2023-07-17 NOTE — PATIENT INSTRUCTIONS
1.  You were seen in the ENT Clinic today by . If you have any questions or concerns after your appointment, please call [phone]. Press option #1 for scheduling related needs. Press option #3 for Nurse advice.    2.   has recommended the following:   - repeat surgical procedure. Surgery scheduler will contact you for scheduling    3.  Plan is to return to clinic a few weeks prior to surgery date.      Blanca Hyman LPN  [phone]  [facility] - Otolaryngology

## 2023-07-27 ENCOUNTER — TELEPHONE (OUTPATIENT)
Dept: OTOLARYNGOLOGY | Facility: CLINIC | Age: 78
End: 2023-07-27
Payer: COMMERCIAL

## 2023-07-27 NOTE — TELEPHONE ENCOUNTER
Patient was contacted & scheduled surgery with Dr. Davis on 9/7    Surgery is located at [place] [facility]    Patient will be seen for their H&P by:    PCP Ml Calderón within 30 days of surgery    Patient confirmed their PCP on file is up to date    Anesthesia type: General      Requested Imaging required for surgery: NA    Patient is scheduled for their 3 week post op on 10/9 at 330pm    Additional comments: scheduled patient an appointment with Dr. Davis on 9/1 per Inocencia Reddy on 7/27/2023 at 12:25 PM

## 2023-09-01 ENCOUNTER — OFFICE VISIT (OUTPATIENT)
Dept: OTOLARYNGOLOGY | Facility: CLINIC | Age: 78
End: 2023-09-01
Payer: COMMERCIAL

## 2023-09-01 VITALS — HEART RATE: 62 BPM | SYSTOLIC BLOOD PRESSURE: 139 MMHG | DIASTOLIC BLOOD PRESSURE: 72 MMHG

## 2023-09-01 DIAGNOSIS — J38.7 LARYNGEAL MASS: ICD-10-CM

## 2023-09-01 DIAGNOSIS — Z78.9 RECURRENT RESPIRATORY PAPILLOMATOSIS: ICD-10-CM

## 2023-09-01 DIAGNOSIS — J44.9 CHRONIC OBSTRUCTIVE PULMONARY DISEASE, UNSPECIFIED COPD TYPE (H): ICD-10-CM

## 2023-09-01 DIAGNOSIS — R49.0 DYSPHONIA: Primary | ICD-10-CM

## 2023-09-01 DIAGNOSIS — Z87.81 H/O CERVICAL FRACTURE: ICD-10-CM

## 2023-09-01 PROCEDURE — 99213 OFFICE O/P EST LOW 20 MIN: CPT | Mod: 25 | Performed by: OTOLARYNGOLOGY

## 2023-09-01 PROCEDURE — 31579 LARYNGOSCOPY TELESCOPIC: CPT | Performed by: OTOLARYNGOLOGY

## 2023-09-01 ASSESSMENT — PAIN SCALES - GENERAL: PAINLEVEL: NO PAIN (0)

## 2023-09-01 NOTE — PATIENT INSTRUCTIONS
1.  You were seen in the ENT Clinic today by . If you have any questions or concerns after your appointment, please call [phone]. Press option #1 for scheduling related needs. Press option #3 for Nurse advice.    2.   has recommended the following:   - procedure as scheduled on 9/7/23    3.  Plan is to return to clinic 10/9/23 as scheduled      Blanca Hyman LPN  [phone]  [facility] - Otolaryngology
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
## 2023-09-06 ENCOUNTER — ANESTHESIA EVENT (OUTPATIENT)
Dept: SURGERY | Facility: CLINIC | Age: 78
End: 2023-09-06
Payer: COMMERCIAL

## 2023-09-07 ENCOUNTER — HOSPITAL ENCOUNTER (OUTPATIENT)
Facility: CLINIC | Age: 78
Discharge: HOME | End: 2023-09-07
Attending: OTOLARYNGOLOGY | Admitting: OTOLARYNGOLOGY
Payer: COMMERCIAL

## 2023-09-07 ENCOUNTER — ANESTHESIA (OUTPATIENT)
Dept: SURGERY | Facility: CLINIC | Age: 78
End: 2023-09-07
Payer: COMMERCIAL

## 2023-09-07 VITALS
TEMPERATURE: 97.5 F | WEIGHT: 117.06 LBS | OXYGEN SATURATION: 93 % | RESPIRATION RATE: 14 BRPM | HEIGHT: 65 IN | DIASTOLIC BLOOD PRESSURE: 67 MMHG | SYSTOLIC BLOOD PRESSURE: 118 MMHG | HEART RATE: 70 BPM | BODY MASS INDEX: 19.5 KG/M2

## 2023-09-07 PROCEDURE — 370N000017 HC ANESTHESIA TECHNICAL FEE, PER MIN: Performed by: OTOLARYNGOLOGY

## 2023-09-07 PROCEDURE — 710N000012 HC RECOVERY PHASE 2, PER MINUTE: Performed by: OTOLARYNGOLOGY

## 2023-09-07 PROCEDURE — 31541 LARYNSCOP W/TUMR EXC + SCOPE: CPT | Performed by: OTOLARYNGOLOGY

## 2023-09-07 PROCEDURE — 250N000011 HC RX IP 250 OP 636: Performed by: OTOLARYNGOLOGY

## 2023-09-07 PROCEDURE — 258N000003 HC RX IP 258 OP 636: Performed by: ANESTHESIOLOGY

## 2023-09-07 PROCEDURE — 710N000009 HC RECOVERY PHASE 1, LEVEL 1, PER MIN: Performed by: OTOLARYNGOLOGY

## 2023-09-07 PROCEDURE — 360N000077 HC SURGERY LEVEL 4, PER MIN: Performed by: OTOLARYNGOLOGY

## 2023-09-07 PROCEDURE — 250N000009 HC RX 250: Performed by: ANESTHESIOLOGY

## 2023-09-07 PROCEDURE — 999N000141 HC STATISTIC PRE-PROCEDURE NURSING ASSESSMENT: Performed by: OTOLARYNGOLOGY

## 2023-09-07 PROCEDURE — 250N000025 HC SEVOFLURANE, PER MIN: Performed by: OTOLARYNGOLOGY

## 2023-09-07 PROCEDURE — 250N000011 HC RX IP 250 OP 636: Mod: JZ | Performed by: ANESTHESIOLOGY

## 2023-09-07 PROCEDURE — 272N000001 HC OR GENERAL SUPPLY STERILE: Performed by: OTOLARYNGOLOGY

## 2023-09-07 PROCEDURE — 88305 TISSUE EXAM BY PATHOLOGIST: CPT | Mod: TC | Performed by: OTOLARYNGOLOGY

## 2023-09-07 PROCEDURE — 31571 LARYNGOSCOP W/VC INJ + SCOPE: CPT | Mod: 51 | Performed by: OTOLARYNGOLOGY

## 2023-09-07 RX ORDER — OXYCODONE HYDROCHLORIDE 10 MG/1
10 TABLET ORAL
Status: CANCELLED | OUTPATIENT
Start: 2023-09-07

## 2023-09-07 RX ORDER — ONDANSETRON 2 MG/ML
INJECTION INTRAMUSCULAR; INTRAVENOUS PRN
Status: DISCONTINUED | OUTPATIENT
Start: 2023-09-07 | End: 2023-09-07

## 2023-09-07 RX ORDER — ONDANSETRON 2 MG/ML
4 INJECTION INTRAMUSCULAR; INTRAVENOUS EVERY 30 MIN PRN
Status: DISCONTINUED | OUTPATIENT
Start: 2023-09-07 | End: 2023-09-07 | Stop reason: HOSPADM

## 2023-09-07 RX ORDER — LIDOCAINE HYDROCHLORIDE 20 MG/ML
INJECTION, SOLUTION INFILTRATION; PERINEURAL PRN
Status: DISCONTINUED | OUTPATIENT
Start: 2023-09-07 | End: 2023-09-07

## 2023-09-07 RX ORDER — SODIUM CHLORIDE, SODIUM LACTATE, POTASSIUM CHLORIDE, CALCIUM CHLORIDE 600; 310; 30; 20 MG/100ML; MG/100ML; MG/100ML; MG/100ML
INJECTION, SOLUTION INTRAVENOUS CONTINUOUS PRN
Status: DISCONTINUED | OUTPATIENT
Start: 2023-09-07 | End: 2023-09-07

## 2023-09-07 RX ORDER — HYDROXYZINE HYDROCHLORIDE 10 MG/1
10 TABLET, FILM COATED ORAL EVERY 6 HOURS PRN
Status: DISCONTINUED | OUTPATIENT
Start: 2023-09-07 | End: 2023-09-07 | Stop reason: HOSPADM

## 2023-09-07 RX ORDER — FENTANYL CITRATE 50 UG/ML
INJECTION, SOLUTION INTRAMUSCULAR; INTRAVENOUS PRN
Status: DISCONTINUED | OUTPATIENT
Start: 2023-09-07 | End: 2023-09-07

## 2023-09-07 RX ORDER — EPINEPHRINE 0.1 MG/ML
INJECTION INTRAVENOUS PRN
Status: DISCONTINUED | OUTPATIENT
Start: 2023-09-07 | End: 2023-09-07 | Stop reason: HOSPADM

## 2023-09-07 RX ORDER — SODIUM CHLORIDE, SODIUM LACTATE, POTASSIUM CHLORIDE, CALCIUM CHLORIDE 600; 310; 30; 20 MG/100ML; MG/100ML; MG/100ML; MG/100ML
INJECTION, SOLUTION INTRAVENOUS CONTINUOUS
Status: DISCONTINUED | OUTPATIENT
Start: 2023-09-07 | End: 2023-09-07 | Stop reason: HOSPADM

## 2023-09-07 RX ORDER — DEXAMETHASONE SODIUM PHOSPHATE 4 MG/ML
INJECTION, SOLUTION INTRA-ARTICULAR; INTRALESIONAL; INTRAMUSCULAR; INTRAVENOUS; SOFT TISSUE PRN
Status: DISCONTINUED | OUTPATIENT
Start: 2023-09-07 | End: 2023-09-07

## 2023-09-07 RX ORDER — AMPICILLIN AND SULBACTAM 1; .5 G/1; G/1
1.5 INJECTION, POWDER, FOR SOLUTION INTRAMUSCULAR; INTRAVENOUS SEE ADMIN INSTRUCTIONS
Status: DISCONTINUED | OUTPATIENT
Start: 2023-09-07 | End: 2023-09-07 | Stop reason: HOSPADM

## 2023-09-07 RX ORDER — ESMOLOL HYDROCHLORIDE 10 MG/ML
INJECTION INTRAVENOUS PRN
Status: DISCONTINUED | OUTPATIENT
Start: 2023-09-07 | End: 2023-09-07

## 2023-09-07 RX ORDER — OXYCODONE HYDROCHLORIDE 5 MG/1
5 TABLET ORAL
Status: CANCELLED | OUTPATIENT
Start: 2023-09-07

## 2023-09-07 RX ORDER — LIDOCAINE HYDROCHLORIDE 40 MG/ML
SOLUTION TOPICAL PRN
Status: DISCONTINUED | OUTPATIENT
Start: 2023-09-07 | End: 2023-09-07 | Stop reason: HOSPADM

## 2023-09-07 RX ORDER — AMPICILLIN AND SULBACTAM 2; 1 G/1; G/1
3 INJECTION, POWDER, FOR SOLUTION INTRAMUSCULAR; INTRAVENOUS
Status: COMPLETED | OUTPATIENT
Start: 2023-09-07 | End: 2023-09-07

## 2023-09-07 RX ORDER — FENTANYL CITRATE 50 UG/ML
25 INJECTION, SOLUTION INTRAMUSCULAR; INTRAVENOUS EVERY 5 MIN PRN
Status: DISCONTINUED | OUTPATIENT
Start: 2023-09-07 | End: 2023-09-07 | Stop reason: HOSPADM

## 2023-09-07 RX ORDER — ACETAMINOPHEN 325 MG/1
975 TABLET ORAL ONCE
Status: CANCELLED | OUTPATIENT
Start: 2023-09-07 | End: 2023-09-07

## 2023-09-07 RX ORDER — ONDANSETRON 4 MG/1
4 TABLET, ORALLY DISINTEGRATING ORAL EVERY 30 MIN PRN
Status: DISCONTINUED | OUTPATIENT
Start: 2023-09-07 | End: 2023-09-07 | Stop reason: HOSPADM

## 2023-09-07 RX ORDER — HYDROMORPHONE HCL IN WATER/PF 6 MG/30 ML
0.4 PATIENT CONTROLLED ANALGESIA SYRINGE INTRAVENOUS EVERY 5 MIN PRN
Status: DISCONTINUED | OUTPATIENT
Start: 2023-09-07 | End: 2023-09-07 | Stop reason: HOSPADM

## 2023-09-07 RX ORDER — ONDANSETRON 2 MG/ML
4 INJECTION INTRAMUSCULAR; INTRAVENOUS EVERY 30 MIN PRN
Status: CANCELLED | OUTPATIENT
Start: 2023-09-07

## 2023-09-07 RX ORDER — PROPOFOL 10 MG/ML
INJECTION, EMULSION INTRAVENOUS PRN
Status: DISCONTINUED | OUTPATIENT
Start: 2023-09-07 | End: 2023-09-07

## 2023-09-07 RX ORDER — ONDANSETRON 4 MG/1
4 TABLET, ORALLY DISINTEGRATING ORAL EVERY 30 MIN PRN
Status: CANCELLED | OUTPATIENT
Start: 2023-09-07

## 2023-09-07 RX ORDER — FENTANYL CITRATE 50 UG/ML
50 INJECTION, SOLUTION INTRAMUSCULAR; INTRAVENOUS EVERY 5 MIN PRN
Status: DISCONTINUED | OUTPATIENT
Start: 2023-09-07 | End: 2023-09-07 | Stop reason: HOSPADM

## 2023-09-07 RX ORDER — HYDROMORPHONE HCL IN WATER/PF 6 MG/30 ML
0.2 PATIENT CONTROLLED ANALGESIA SYRINGE INTRAVENOUS EVERY 5 MIN PRN
Status: DISCONTINUED | OUTPATIENT
Start: 2023-09-07 | End: 2023-09-07 | Stop reason: HOSPADM

## 2023-09-07 RX ORDER — DEXAMETHASONE SODIUM PHOSPHATE 10 MG/ML
10 INJECTION, SOLUTION INTRAMUSCULAR; INTRAVENOUS ONCE
Status: DISCONTINUED | OUTPATIENT
Start: 2023-09-07 | End: 2023-09-07 | Stop reason: HOSPADM

## 2023-09-07 RX ADMIN — ONDANSETRON 4 MG: 2 INJECTION INTRAMUSCULAR; INTRAVENOUS at 09:49

## 2023-09-07 RX ADMIN — SUCCINYLCHOLINE CHLORIDE 80 MG: 20 INJECTION, SOLUTION INTRAMUSCULAR; INTRAVENOUS; PARENTERAL at 07:50

## 2023-09-07 RX ADMIN — FENTANYL CITRATE 50 MCG: 50 INJECTION, SOLUTION INTRAMUSCULAR; INTRAVENOUS at 08:02

## 2023-09-07 RX ADMIN — ESMOLOL HYDROCHLORIDE 20 MG: 10 INJECTION, SOLUTION INTRAVENOUS at 08:20

## 2023-09-07 RX ADMIN — SODIUM CHLORIDE, POTASSIUM CHLORIDE, SODIUM LACTATE AND CALCIUM CHLORIDE: 600; 310; 30; 20 INJECTION, SOLUTION INTRAVENOUS at 07:37

## 2023-09-07 RX ADMIN — FENTANYL CITRATE 50 MCG: 50 INJECTION, SOLUTION INTRAMUSCULAR; INTRAVENOUS at 08:48

## 2023-09-07 RX ADMIN — PROPOFOL 100 MG: 10 INJECTION, EMULSION INTRAVENOUS at 07:50

## 2023-09-07 RX ADMIN — Medication 20 MG: at 09:15

## 2023-09-07 RX ADMIN — DEXAMETHASONE SODIUM PHOSPHATE 10 MG: 4 INJECTION, SOLUTION INTRA-ARTICULAR; INTRALESIONAL; INTRAMUSCULAR; INTRAVENOUS; SOFT TISSUE at 07:50

## 2023-09-07 RX ADMIN — LIDOCAINE HYDROCHLORIDE 100 MG: 20 INJECTION, SOLUTION INFILTRATION; PERINEURAL at 07:50

## 2023-09-07 RX ADMIN — ESMOLOL HYDROCHLORIDE 20 MG: 10 INJECTION, SOLUTION INTRAVENOUS at 08:08

## 2023-09-07 RX ADMIN — Medication 20 MG: at 08:01

## 2023-09-07 RX ADMIN — SUGAMMADEX 200 MG: 100 INJECTION, SOLUTION INTRAVENOUS at 09:53

## 2023-09-07 RX ADMIN — Medication 30 MG: at 08:13

## 2023-09-07 RX ADMIN — AMPICILLIN SODIUM AND SULBACTAM SODIUM 3 G: 2; 1 INJECTION, POWDER, FOR SOLUTION INTRAMUSCULAR; INTRAVENOUS at 07:58

## 2023-09-07 RX ADMIN — PROPOFOL 40 MG: 10 INJECTION, EMULSION INTRAVENOUS at 08:19

## 2023-09-07 ASSESSMENT — COPD QUESTIONNAIRES: COPD: 0

## 2023-09-07 ASSESSMENT — ACTIVITIES OF DAILY LIVING (ADL)
ADLS_ACUITY_SCORE: 35

## 2023-09-07 ASSESSMENT — ENCOUNTER SYMPTOMS: DYSRHYTHMIAS: 1

## 2023-09-07 NOTE — ANESTHESIA PREPROCEDURE EVALUATION
Anesthesia Pre-Procedure Evaluation    Patient: Asya Coffman   MRN: [id number] : 1945        Procedure : Procedure(s):  Microdirect laryngoscopy with excision/ablation of laryngeal lesions, biopsies, possible CO2 laser  Avastin injection          Past Medical History:   Diagnosis Date    A-fib (H)     Antiplatelet or antithrombotic long-term use     Arrhythmia     COPD (chronic obstructive pulmonary disease) (H)       Past Surgical History:   Procedure Laterality Date    INJECT STEROID (LOCATION) N/A 6/15/2023    Procedure: Avastin injection;  Surgeon: Nikole Davis MD;  Location: [facility] OR    LASER CO2 LARYNGOSCOPY, COMPLEX N/A 2022    Procedure: Micro direct laryngoscopy with excision/ablation of laryngeal lesions, possible biopsies, possible CO2 laser;  Surgeon: Nikole Davis MD;  Location: [facility] OR    LASER CO2 LARYNGOSCOPY, COMPLEX N/A 9/15/2022    Procedure: Microdirect laryngoscopy with ablation of laryngeal lesions,biopsies,CO2 laser;  Surgeon: Nikole Davis MD;  Location: [facility] OR    LASER CO2 LARYNGOSCOPY, COMPLEX N/A 2022    Procedure: Microdirect laryngoscopy with excision/ablation of laryngeal lesions, biopsies,   CO2 laser;  Surgeon: Nikole Davis MD;  Location: [facility] OR    LASER CO2 LARYNGOSCOPY, COMPLEX N/A 6/15/2023    Procedure: Microdirect laryngoscopy with ablation of laryngeal lesions, biopsies, CO2 laser;  Surgeon: Nikole Davis MD;  Location: [facility] OR    LASER KTP LARYNGOSCOPY N/A 4/3/2023    Procedure: Microdirect laryngoscopy with biopsies, excision/ablation of lesions, C02 laser,  Avastin injection;  Surgeon: Nikole Davis MD;  Location: [facility]    LASER KTP LARYNGOSCOPY N/A 6/15/2023    Procedure: flexible laser (CO2 or KTP) standby;  Surgeon: Nikole Davis MD;  Location: [facility] OR    LASER KTP LARYNGOSCOPY FLEXIBLE N/A 2022    Procedure: Transnasal flexible laryngoscopy with KTP laser and biopsies;   Surgeon: Nikole Davis MD;  Location: [facility] OR    LASER KTP LARYNGOSCOPY FLEXIBLE N/A 10/27/2022    Procedure: Transnasal flexible laryngoscopy with possible KTP laser;  Surgeon: Nikole Davis MD;  Location: [facility] OR    LASER KTP LARYNGOSCOPY FLEXIBLE N/A 1/26/2023    Procedure: Transnasal flexible laryngoscopy with KTP laser,  biopsies, superior laryngeal nerve blocks, Avastin injection;  Surgeon: Nikole Davis MD;  Location: [facility] OR    LASER KTP LARYNGOSCOPY FLEXIBLE N/A 2/15/2023    Procedure: Transnasal flexible laryngoscopy with KTP laser, superior laryngeal nerve blocks, biopsies, avastin injection;  Surgeon: Nikole Davis MD;  Location: [facility] OR    LASER KTP LARYNGOSCOPY FLEXIBLE N/A 2/17/2023    Procedure: Transnasal flexible laryngoscopy with KTP laser, biopsies, superior laryngeal nerve blocks;  Surgeon: Nikole Davis MD;  Location: [facility] OR    LASER KTP LARYNGOSCOPY FLEXIBLE N/A 2/23/2023    Procedure: Transnasal flexible laryngoscopy with KTP laser, superior laryngeal nerve blocks;  Surgeon: Nikole Davis MD;  Location: [facility] OR    LASER KTP LARYNGOSCOPY FLEXIBLE N/A 3/2/2023    Procedure: Transnasal flexible laryngoscopy with KTP laser, superior laryngeal nerve block Bilateral;  Surgeon: Nikole Davis MD;  Location: [facility] OR    LASER KTP LARYNGOSCOPY FLEXIBLE N/A 3/9/2023    Procedure: Transnasal flexible laryngoscopy with KTP laser, biopsies, superior laryngeal nerve blocks;  Surgeon: Nikole Davis MD;  Location: [facility] OR    LASER KTP LARYNGOSCOPY FLEXIBLE N/A 3/17/2023    Procedure: Transnasal flexible laryngoscopy with KTP laser, superior laryngeal nerve block(s), Avastin injection;  Surgeon: Nikole Davis MD;  Location: [facility] OR    LASER KTP LARYNGOSCOPY FLEXIBLE N/A 3/28/2023    Procedure: Transnasal flexible laryngoscopy with KTP laser, superior laryngeal nerve block(s);  Surgeon: Pankaj Woodard  MD Nitza;  Location: [facility] OR      Allergies   Allergen Reactions    Methylpyrrolidone Anaphylaxis    Nuts Anaphylaxis     Black walnut    Escitalopram Other (See Comments)     Asthma -a time of exacerbation and may not have been cause may retry    Mirtazapine Other (See Comments)     Asthma a time of exacerbation and may not have been cause may retry    Venlafaxine Other (See Comments)    Adhesive Tape Rash     With holter monitor. Ok with bandaids.    No Clinical Screening - See Comments Rash     Adhesive tape      Social History     Tobacco Use    Smoking status: Never    Smokeless tobacco: Not on file   Substance Use Topics    Alcohol use: Never      Wt Readings from Last 1 Encounters:   09/07/23 53.1 kg (117 lb 1 oz)        Anesthesia Evaluation   Pt has had prior anesthetic. Type: General.    No history of anesthetic complications       ROS/MED HX  ENT/Pulmonary: Comment: Airway papillomas with intermittent airway obstruction   (-) COPD   Neurologic: Comment: Recent fall, in C-collar, unstable c-spine      Cardiovascular:     (+)  - -   -  - -   Taking blood thinners                     dysrhythmias, a-fib,             METS/Exercise Tolerance:     Hematologic:       Musculoskeletal: Comment: Partially healed C7 fracture. Neuro on the case. Pt is not wearing C collar as advised.   (+)          cervical spine instability. C-spine cleared:Yes,    GI/Hepatic:       Renal/Genitourinary:     (+) renal disease, type: CRI, Pt does not require dialysis,           Endo:     (+)          thyroid problem, hypothyroidism,           Psychiatric/Substance Use:       Infectious Disease:       Malignancy:       Other:            Physical Exam    Airway        Mallampati: II   TM distance: > 3 FB   Neck ROM: full   Mouth opening: > 3 cm    Respiratory Devices and Support         Dental       (+) Modest Abnormalities - crowns, retainers, 1 or 2 missing teeth    B=Bridge, C=Chipped, L=Loose, M=Missing    Cardiovascular           Rhythm and rate: irregular and normal     Pulmonary   pulmonary exam normal                OUTSIDE LABS:  CBC:   Lab Results   Component Value Date    WBC 12.2 (H) 04/04/2023    WBC 5.5 04/03/2023    HGB 13.1 04/04/2023    HGB 16.5 (H) 04/03/2023    HCT 42.2 04/04/2023    HCT 53.1 (H) 04/03/2023     04/04/2023     04/03/2023     BMP:   Lab Results   Component Value Date     04/04/2023     04/03/2023    POTASSIUM 4.7 04/04/2023    POTASSIUM 4.6 04/03/2023    CHLORIDE 104 04/04/2023    CHLORIDE 102 04/03/2023    CO2 25 04/04/2023    CO2 21 (L) 04/03/2023    BUN 29.1 (H) 04/04/2023    BUN 21.7 04/03/2023    CR 1.19 (H) 04/04/2023    CR 0.99 (H) 04/03/2023     (H) 04/04/2023     (H) 04/03/2023     COAGS:   Lab Results   Component Value Date    PTT 37 04/02/2023    INR 1.16 (H) 04/02/2023     POC: No results found for: BGM, HCG, HCGS  HEPATIC:   Lab Results   Component Value Date    ALBUMIN 4.5 04/02/2023    PROTTOTAL 7.3 04/02/2023    ALT 12 04/02/2023    AST 18 04/02/2023    ALKPHOS 88 04/02/2023    BILITOTAL 0.5 04/02/2023     OTHER:   Lab Results   Component Value Date    PH 7.37 04/02/2023    LACT 0.9 04/02/2023    ESSIE 8.3 (L) 04/04/2023    MAG 1.9 04/02/2023       Anesthesia Plan    ASA Status:  3    NPO Status:  NPO Appropriate    Anesthesia Type: General.     - Airway: ETT   Induction: Intravenous, Propofol.   Maintenance: Balanced.        Consents    Anesthesia Plan(s) and associated risks, benefits, and realistic alternatives discussed. Questions answered and patient/representative(s) expressed understanding.     - Discussed:     - Discussed with:  Patient      - Extended Intubation/Ventilatory Support Discussed: No.      - Patient is DNR/DNI Status: No     Use of blood products discussed: No .     Postoperative Care    Pain management: IV analgesics.   PONV prophylaxis: Ondansetron (or other 5HT-3), Dexamethasone or Solumedrol     Comments:                Cliff  MD Jorge

## 2023-09-07 NOTE — DISCHARGE INSTRUCTIONS
[facility]  Same-Day Surgery   Adult Discharge Orders & Instructions     For 24 hours after surgery    Get plenty of rest.  A responsible adult must stay with you for at least 24 hours after you leave the hospital.   Do not drive or use heavy equipment.  If you have weakness or tingling, don't drive or use heavy equipment until this feeling goes away.  Do not drink alcohol.  Avoid strenuous or risky activities.  Ask for help when climbing stairs.   You may feel lightheaded.  IF so, sit for a few minutes before standing.  Have someone help you get up.   If you have nausea (feel sick to your stomach): Drink only clear liquids such as apple juice, ginger ale, broth or 7-Up.  Rest may also help.  Be sure to drink enough fluids.  Move to a regular diet as you feel able.  You may have a slight fever. Call the doctor if your fever is over 100 F (37.7 C) (taken under the tongue) or lasts longer than 24 hours.  You may have a dry mouth, a sore throat, muscle aches or trouble sleeping.  These should go away after 24 hours.  Do not make important or legal decisions.   Call your doctor for any of the followin.  Signs of infection (fever, growing tenderness at the surgery site, a large amount of drainage or bleeding, severe pain, foul-smelling drainage, redness, swelling).    2. It has been over 8 to 10 hours since surgery and you are still not able to urinate (pass water).    3.  Headache for over 24 hours.    To contact a doctor, call Dr. Davis [phone] [CLINIC]   or:    '   [phone] and ask for the resident on call for   Otolaryngology  (answered 24 hours a day)  '   Emergency Department:    [facility]: [phone]       (TTY for hearing impaired: [phone])    [facility]: [phone]       (TTY for hearing impaired: [phone])

## 2023-09-07 NOTE — BRIEF OP NOTE
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 Good exposure with Ossoff-Pilling laryngoscope. Heavy burden of papilloma L>R posterior larynx including bilateral true vocal folds, supraglottis, infraglottis. Trachea clear.

## 2023-09-07 NOTE — ANESTHESIA CARE TRANSFER NOTE
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    Anesthesia Type:   General     Note:    Oropharynx: oropharynx clear of all foreign objects and spontaneously breathing  Level of Consciousness: awake  Oxygen Supplementation: nasal cannula  Level of Supplemental Oxygen (L/min / FiO2): 3 LPM  Independent Airway: airway patency satisfactory and stable  Dentition: dentition unchanged  Vital Signs Stable: post-procedure vital signs reviewed and stable  Report to RN Given: handoff report given  Patient transferred to: PACU    Handoff Report: Identifed the Patient, Identified the Reponsible Provider, Reviewed the pertinent medical history, Discussed the surgical course, Reviewed Intra-OP anesthesia mangement and issues during anesthesia, Set expectations for post-procedure period and Allowed opportunity for questions and acknowledgement of understanding      Vitals:  Vitals Value Taken Time   /98 09/07/23 1005   Temp     Pulse 108 09/07/23 1008   Resp 25 09/07/23 1008   SpO2 100%    Vitals shown include unvalidated device data.    Electronically Signed By: VERONICA Tam CRNA  September 7, 2023  10:09 AM

## 2023-09-07 NOTE — ANESTHESIA PROCEDURE NOTES
Airway       Patient location during procedure: OR       Procedure Start/Stop Times: 9/7/2023 7:55 AM  Staff -        Other Anesthesia Staff: Jesse Dominguez RN       Performed By: CRNA  Consent for Airway        Urgency: elective  Indications and Patient Condition       Indications for airway management: tania-procedural       Induction type:intravenous       Mask difficulty assessment: 2 - vent by mask + OA or adjuvant +/- NMBA    Final Airway Details       Final airway type: endotracheal airway       Successful airway: ETT - single and Laser  Endotracheal Airway Details        ETT size (mm): 5.0       Cuffed: yes       Cuff volume (mL): 10       Successful intubation technique: video laryngoscopy       VL Blade Size: Glidescope 3       Grade View of Cords: 1       Adjucts: stylet       Measured from: lips       Secured at (cm): 22       Bite block used: None    Post intubation assessment        Placement verified by: capnometry, equal breath sounds and chest rise        Number of attempts at approach: 1       Secured with: silk tape       Ease of procedure: easy       Dentition: Intact and Unchanged    Medication(s) Administered   Medication Administration Time: 9/7/2023 7:55 AM

## 2023-09-07 NOTE — ANESTHESIA POSTPROCEDURE EVALUATION
Patient: Asya Coffman    Procedure: Procedure(s):  Microdirect laryngoscopy with excision/ablation of laryngeal lesions, biopsies, CO2 laser  Avastin injection       Anesthesia Type:  General    Note:  Disposition: Outpatient   Postop Pain Control: Uneventful            Sign Out: Well controlled pain   PONV: No   Neuro/Psych: Uneventful            Sign Out: Acceptable/Baseline neuro status   Airway/Respiratory: Uneventful            Sign Out: Acceptable/Baseline resp. status   CV/Hemodynamics: Uneventful            Sign Out: Acceptable CV status; No obvious hypovolemia; No obvious fluid overload   Other NRE: NONE   DID A NON-ROUTINE EVENT OCCUR? No           Last vitals:  Vitals Value Taken Time   BP 85/72 09/07/23 1100   Temp 36.6  C (97.8  F) 09/07/23 1005   Pulse 84 09/07/23 1100   Resp 16 09/07/23 1100   SpO2 91 % 09/07/23 1100   Vitals shown include unvalidated device data.    Electronically Signed By: Cliff Patel MD  September 7, 2023  11:01 AM

## 2023-09-07 NOTE — OP NOTE
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    INDICATIONS FOR PROCEDURE:   Asya Coffman is a 78 year old year old female with a history of recurrent respiratory papillomatosis (RRP) and laryngeal dysplasia who was noted to have recurrent symptoms associated with recurrent disease. The patient wished to proceed with surgery and presented for the procedure(s) listed above. We reviewed perioperative risks, including bleeding/infection/pain, injury to surrounding structures, potential changes to tongue/voice/swallow function, risk of needing additional procedures (e.g., due to persistence or recurrence), and risks of anesthesia (including heart attack, stroke, death). She elected to proceed and written informed consent was performed.    DESCRIPTION OF PROCEDURE:   The patient was brought to the operating room and placed supine. A time-out was called to verify patient identity, operative site, and planned procedure. The operative site was prepped in the usual clean fashion. Moist gauze eye pads were placed and secured. A head wrap was placed, and custom molded thermoplastic tooth guards were placed. Direct laryngoscopy was performed with a Dedo and then an Ossoff-Pilling laryngoscope, which was placed in suspension. The vocal folds were examined with 0 and 70 degree telescopes. Biopsies were taken under telescopic visualization as listed below. The papilloma clusters were debulked in the  process of taking the biopsies.    The operating microscope was then brought into position. Laser safety precautions were utilized at all times, including eye protection for the patient and staff, use of wet towels, and appropriately minimized FiO2. As needed, moistened cottonoids or laser-safe backstops were used to protect the endotracheal tube from the laser. The Lumenis CO2 Accublade laser was attached to the microscope and used at a depth setting of 1 and 8 Cardoza. In a [place] setting, the remaining papilloma was systematically ablated under microscopic visualization. The laryngoscope was frequently repositioned to allow adequate access to the posterior glottis and infraglottis. A portion of the posterior glottis mucosa was left untreated to reduce the risk of posterior glottic web formation. Avastin injection was then performed under telescopic visualization with particular attention to the areas of heaviest papilloma growth.    Cottonoids soaked in 1:10,000 epinephrine were sparingly used to maintain hemostasis. As needed during the procedure and at the conclusion of the procedure, the operating microscope was moved out of position and the 0 and 70 degree rigid endoscopes were again used to examine the vocal folds and confirm completion of the stated objectives of the procedure. After cottonoid and sponge counts were confirmed correct, 2 cc of 4% lidocaine were applied transglottically for laryngotracheal anesthesia. The laryngoscope and tooth guards were removed. The lips, teeth, and tongue were examined and no injuries were noted. Care of the patient was returned to Anesthesia.      FINDINGS:   Easy mask. Good exposure with Ossoff-Pilling laryngoscope. Heavy burden of papilloma L>R posterior larynx including bilateral true vocal folds, supraglottis, infraglottis. Trachea clear.     SPECIMEN(S):   1 : Left supraglottic papilloma   2 : Left posterior true vocal fold   3 : Left posterior larynx   4 : Right  posterior larynx       DRAINS: None    ESTIMATED BLOOD LOSS: Minimal    COMPLICATIONS: None    DISPOSITION: Stable to PACU    ATTENDING PRESENCE STATEMENT:  I was present and participating for the entire procedure from beginning to completion.

## 2023-09-07 NOTE — PROGRESS NOTES
SPIRITUAL HEALTH SERVICES  [facility] 3C   PRE-SURGERY VISIT    Had pre-surgery visit with pt.  Provided spiritual support, prayer.     Rev. Francia Gavin MDiv, [facility]  Staff    Pager [phone]

## 2023-09-11 ENCOUNTER — TELEPHONE (OUTPATIENT)
Dept: OTOLARYNGOLOGY | Facility: CLINIC | Age: 78
End: 2023-09-11
Payer: COMMERCIAL

## 2023-09-11 DIAGNOSIS — J38.7 LARYNGEAL MASS: ICD-10-CM

## 2023-09-11 DIAGNOSIS — R49.0 DYSPHONIA: ICD-10-CM

## 2023-09-11 DIAGNOSIS — Z78.9 RECURRENT RESPIRATORY PAPILLOMATOSIS: Primary | ICD-10-CM

## 2023-09-12 PROCEDURE — 88305 TISSUE EXAM BY PATHOLOGIST: CPT | Mod: 26 | Performed by: STUDENT IN AN ORGANIZED HEALTH CARE EDUCATION/TRAINING PROGRAM

## 2023-09-12 NOTE — TELEPHONE ENCOUNTER
FUTURE VISIT INFORMATION      SURGERY INFORMATION:  Date: 23  Location: uu or  Surgeon:  Nikole Davis MD   Anesthesia Type:  general  Procedure: Microdirect laryngoscopy with excision/ablation of laryngeal lesions, biopsies, possible CO2 laser Avastin injection     RECORDS REQUESTED FROM:       Primary Care Provider: Janna Calderón    Pertinent Medical History: Atrial fibrillation    Most recent EKG+ Tracin23    Most recent ECHO: 23

## 2023-09-16 ENCOUNTER — HEALTH MAINTENANCE LETTER (OUTPATIENT)
Age: 78
End: 2023-09-16

## 2023-09-18 ENCOUNTER — PATIENT OUTREACH (OUTPATIENT)
Dept: OTOLARYNGOLOGY | Facility: CLINIC | Age: 78
End: 2023-09-18

## 2023-09-18 ENCOUNTER — PRE VISIT (OUTPATIENT)
Dept: SURGERY | Facility: CLINIC | Age: 78
End: 2023-09-18

## 2023-09-18 NOTE — TELEPHONE ENCOUNTER
Patient called and rescheduled pre op to 815am as she has a hair appointment at shea Reddy, Perioperative Coordinator 9/18/2023 at 1:58 PM    
Scheduled surgery with Dr. Davis on 11/2    Spoke with: Patient    Surgery is located at [facility] [facility]    Patient will be seen for their H&P by:    PAC on 10/19 at 1015am - Provided address & floor number. Patient is aware that they will receive their start time for surgery at this appointment    Anesthesia type: General      Requested Imaging required for surgery: NA    Patient is scheduled for their 3 week post op on 11/27 at 7am      rKys Reddy on 9/11/2023 at 2:37 PM      
Statement Selected

## 2023-09-18 NOTE — CONFIDENTIAL NOTE
Called patient to discuss Pathology    Attempted 9/14 and again 9/18/23.    Reviewed current pathology and concern for invasive disease. 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 Given this fluctuation, we discussed the option of returning to the OR sooner than usual to get updated biopsies; if these are positive, they will help with disease mapping, and if they are negative, we will be able to hold off on more aggressive intervention for the time being.    She is comfortable with this plan.  I will work with the OR team to move up her pending procedure to allow for sooner re-biopsy.

## 2023-09-18 NOTE — TELEPHONE ENCOUNTER
FUTURE VISIT INFORMATION        SURGERY INFORMATION:  Date: 10/5/23  Location: uu or  Surgeon:  Nikole Davis MD   Anesthesia Type:  general  Procedure: Microdirect laryngoscopy with excision/ablation of laryngeal lesions, biopsies, possible CO2 laser Avastin injection      RECORDS REQUESTED FROM:         Primary Care Provider: Janna Calderón     Pertinent Medical History: Atrial fibrillation     Most recent EKG+ Tracin23     Most recent ECHO: 23

## 2023-09-20 RX ORDER — VITAMIN B COMPLEX
1000 TABLET ORAL DAILY
COMMUNITY
End: 2024-01-24

## 2023-09-20 RX ORDER — DILTIAZEM HYDROCHLORIDE 240 MG/1
240 CAPSULE, EXTENDED RELEASE ORAL 2 TIMES DAILY
COMMUNITY

## 2023-09-20 NOTE — PROGRESS NOTES
Anticoagulation Note - Preoperative Assessment Center (PAC) Pharmacist     Patient was interviewed on September 20, 2023 prior to scheduled PAC clinic appointment. The purpose of this note is to document the perioperative anticoagulation plan outlined by the providers caring for Asya Coffman.     Current Regimen  Anticoagulation Regimen as of September 20, 2023: apixaban (ELIQUIS) 5 mg by mouth twice daily   Indication: afib (CHADSVASC = 2)  Prescriber:  Diandra Hall NP  Expected Duration of therapy: indefinite   Current medications that may interact with this include: ibuprofen, diltiazem  Note: held 3-4 days pre op for last laryngoscopy w/o issue.     Creatinine   Date Value Ref Range Status   04/04/2023 1.19 (H) 0.51 - 0.95 mg/dL Final   CrCl ~33 mL/min     Perioperative plan  Asya Coffman is scheduled for Microdirect laryngoscopy with excision/ablation of laryngeal lesions, biopsies, possible CO2 laser; avastin injection on 10/5/23 with Dr. Davis and the perioperative anticoagulation plan outlined by PAC staff is to hold Eliquis x3 days pre op. Last dose to be on 10/1/23 PM.     Resumption of anticoagulation after procedure will be based on surgery team assessment of bleeding risks and complications.  This plan may require re-assessment and modification by her primary team in the perioperative setting depending on patients clinical situation.        Dima Galvez RPH  September 20, 2023  10:32 AM

## 2023-09-20 NOTE — PROGRESS NOTES
Preoperative Assessment Center Medication History Note  Medication history completed on September 20, 2023 by this writer prior to patient's PAC appointment. See Epic admission navigator for prior to admission medications. Operating room staff will still need to confirm medications and last dose information on day of surgery.     Medication history interview sources  Patient and CareEverywhere/SureScripts via phone    Changes made to PTA medication list  Added: vit D.   Deleted: fexofenadine, fluconazole (completed months ago), fluorouracil (completed), montelukast   Changed: diltiazem sig/dosage form, albuterol dose/sig, symbicort dose/sig,     Allergies reviewed with patient and updates made in EHR: yes    -- No recent (within 30 days) course of antibiotics  -- No recent (within 30 days) course of systemic steroids  -- Reports being on blood thinning medications - see other note.    -- Declines being on any other prescription or over-the-counter medications    Prior to Admission medications    Medication Sig Last Dose Taking? Auth Provider Long Term End Date   albuterol (PROAIR HFA/PROVENTIL HFA/VENTOLIN HFA) 108 (90 Base) MCG/ACT inhaler Inhale 2 puffs into the lungs as needed Taking Yes Reported, Patient Yes    albuterol (PROVENTIL) (2.5 MG/3ML) 0.083% neb solution Inhale 2.5 mg into the lungs every 4 hours as needed for shortness of breath Taking Yes Reported, Patient Yes    apixaban ANTICOAGULANT (ELIQUIS) 5 MG tablet Take 1 tablet (5 mg) by mouth 2 times daily Taking Yes Eliceo Malave MD PhD No    atorvastatin (LIPITOR) 20 MG tablet Take 20 mg by mouth every evening Taking Yes Reported, Patient Yes    budesonide-formoterol (SYMBICORT) 160-4.5 MCG/ACT Inhaler Inhale 2 puffs into the lungs two times daily Taking Yes Reported, Patient Yes    CHOLECALCIFEROL PO Take 1,000 Units by mouth daily Taking Yes Unknown, Entered By History     diltiazem ER (TIAZAC) 240 MG 24 hr ER beaded capsule Take 240 mg by mouth 2  times daily Taking Yes Unknown, Entered By History No    ibuprofen (ADVIL/MOTRIN) 200 MG capsule Take 400 mg by mouth every 6 hours as needed for fever Taking Yes Reported, Patient     ipratropium - albuterol 0.5 mg/2.5 mg/3 mL (DUONEB) 0.5-2.5 (3) MG/3ML neb solution Take 1 vial (3 mLs) by nebulization every 6 hours as needed for shortness of breath, wheezing or cough Taking Yes Nilson Hendrickson MD Yes    levothyroxine (SYNTHROID/LEVOTHROID) 88 MCG tablet Take 88 mcg by mouth daily Taking Yes Reported, Patient Yes    sertraline (ZOLOFT) 100 MG tablet Take 100 mg by mouth daily Taking Yes Reported, Patient Yes    Respiratory Therapy Supplies (NEBULIZER) JUWAN Portable nebulizer, disposable neb kit x 4, reuseable neb kit x 1, mask x 1, filters x 1.   Frequency of use: daily;  Medication: albuterol  Length of need: 99 months   Reported, Patient          Medication History Completed By: Dima Galvez RPH 9/20/2023 10:32 AM

## 2023-09-21 ENCOUNTER — OFFICE VISIT (OUTPATIENT)
Dept: SURGERY | Facility: CLINIC | Age: 78
End: 2023-09-21
Payer: COMMERCIAL

## 2023-09-21 ENCOUNTER — LAB (OUTPATIENT)
Dept: LAB | Facility: CLINIC | Age: 78
End: 2023-09-21
Payer: COMMERCIAL

## 2023-09-21 ENCOUNTER — ANESTHESIA EVENT (OUTPATIENT)
Dept: SURGERY | Facility: CLINIC | Age: 78
End: 2023-09-21
Payer: COMMERCIAL

## 2023-09-21 VITALS
WEIGHT: 118.1 LBS | SYSTOLIC BLOOD PRESSURE: 135 MMHG | DIASTOLIC BLOOD PRESSURE: 76 MMHG | HEIGHT: 65 IN | OXYGEN SATURATION: 93 % | BODY MASS INDEX: 19.68 KG/M2 | TEMPERATURE: 97.8 F | RESPIRATION RATE: 16 BRPM | HEART RATE: 67 BPM

## 2023-09-21 DIAGNOSIS — Z01.818 PRE-OP EVALUATION: Primary | ICD-10-CM

## 2023-09-21 DIAGNOSIS — Z01.818 PRE-OP EVALUATION: ICD-10-CM

## 2023-09-21 DIAGNOSIS — Z78.9 RECURRENT RESPIRATORY PAPILLOMATOSIS: ICD-10-CM

## 2023-09-21 LAB
CREAT SERPL-MCNC: 1.16 MG/DL (ref 0.51–0.95)
EGFRCR SERPLBLD CKD-EPI 2021: 48 ML/MIN/1.73M2
ERYTHROCYTE [DISTWIDTH] IN BLOOD BY AUTOMATED COUNT: 15.8 % (ref 10–15)
HCT VFR BLD AUTO: 43.9 % (ref 35–47)
HGB BLD-MCNC: 14.1 G/DL (ref 11.7–15.7)
MCH RBC QN AUTO: 27.9 PG (ref 26.5–33)
MCHC RBC AUTO-ENTMCNC: 32.1 G/DL (ref 31.5–36.5)
MCV RBC AUTO: 87 FL (ref 78–100)
PLATELET # BLD AUTO: 282 10E3/UL (ref 150–450)
POTASSIUM SERPL-SCNC: 4.7 MMOL/L (ref 3.4–5.3)
RBC # BLD AUTO: 5.05 10E6/UL (ref 3.8–5.2)
WBC # BLD AUTO: 8.2 10E3/UL (ref 4–11)

## 2023-09-21 PROCEDURE — 85027 COMPLETE CBC AUTOMATED: CPT | Performed by: PATHOLOGY

## 2023-09-21 PROCEDURE — 36415 COLL VENOUS BLD VENIPUNCTURE: CPT | Performed by: PATHOLOGY

## 2023-09-21 PROCEDURE — 99203 OFFICE O/P NEW LOW 30 MIN: CPT | Performed by: PHYSICIAN ASSISTANT

## 2023-09-21 PROCEDURE — 84132 ASSAY OF SERUM POTASSIUM: CPT | Performed by: PATHOLOGY

## 2023-09-21 PROCEDURE — 82565 ASSAY OF CREATININE: CPT | Performed by: PATHOLOGY

## 2023-09-21 ASSESSMENT — LIFESTYLE VARIABLES: TOBACCO_USE: 0

## 2023-09-21 ASSESSMENT — ENCOUNTER SYMPTOMS
SEIZURES: 0
DYSRHYTHMIAS: 1

## 2023-09-21 ASSESSMENT — COPD QUESTIONNAIRES: COPD: 1

## 2023-09-21 ASSESSMENT — PAIN SCALES - GENERAL: PAINLEVEL: NO PAIN (0)

## 2023-09-21 NOTE — H&P
Pre-Operative H & P     CC:  Preoperative exam to assess for increased cardiopulmonary risk while undergoing surgery and anesthesia.    Date of Encounter: 9/21/2023  Primary Care Physician:  Janna Calderón     Reason for visit:   Encounter Diagnosis   Name Primary?    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 Coffman has a complex laryngeal history, followed by ENT. She has a history of a benign laryngeal lesion, severe dysplasia and laryngeal papilloma. She was seen by Dr. Davis 9/1/23, followed by mirco direct laryngoscopy with excision/ ablation of laryngeal lesions, biopsies and CO2 laser. She is now scheduled for a similar procedure as above.     History is obtained from the patient and chart review    Hx of abnormal bleeding or anti-platelet use: eliquis    Menstrual history: No LMP recorded. Patient is postmenopausal.     Past Medical History  Past Medical History:   Diagnosis Date    A-fib (H)     Antiplatelet or antithrombotic long-term use     Arrhythmia     COPD (chronic obstructive pulmonary disease) (H)        Past Surgical History  Past Surgical History:   Procedure Laterality Date    INJECT STEROID (LOCATION) N/A 6/15/2023    Procedure:  Avastin injection;  Surgeon: Nikole Davis MD;  [facility]    INJECT STEROID (LOCATION) N/A 9/7/2023    Procedure: Avastin injection;  Surgeon: Nikole Davis MD;  [facility]    LASER CO2 LARYNGOSCOPY N/A 9/7/2023    Procedure: Microdirect laryngoscopy with excision/ablation of laryngeal lesions, biopsies, CO2 laser;  Surgeon: Nikole Davis MD;  [facility]    LASER CO2 LARYNGOSCOPY, COMPLEX N/A 5/26/2022    Procedure: Micro direct laryngoscopy with excision/ablation of laryngeal lesions, possible biopsies, possible CO2 laser;  Surgeon: Nikole Davis MD;  [facility]    LASER CO2 LARYNGOSCOPY, COMPLEX N/A 9/15/2022    Procedure: Microdirect laryngoscopy with ablation of laryngeal lesions,biopsies,CO2 laser;  Surgeon: Nikole Davis MD;  [facility]    LASER CO2 LARYNGOSCOPY, COMPLEX N/A 12/1/2022    Procedure: Microdirect laryngoscopy with excision/ablation of laryngeal lesions, biopsies,   CO2 laser;  Surgeon: Nikole Davis MD;  [facility]    LASER CO2 LARYNGOSCOPY, COMPLEX N/A 6/15/2023    Procedure: Microdirect laryngoscopy with ablation of laryngeal lesions, biopsies, CO2 laser;  Surgeon: Nikole Davis MD;  [facility]    LASER KTP LARYNGOSCOPY N/A 4/3/2023    Procedure: Microdirect laryngoscopy with biopsies, excision/ablation of lesions, C02 laser,  Avastin injection;  Surgeon: Nikole Davis MD;  [facility]    LASER KTP LARYNGOSCOPY N/A 6/15/2023    Procedure: flexible laser (CO2 or KTP) standby;  Surgeon: Nikole Davis MD;  [facility]    LASER KTP LARYNGOSCOPY FLEXIBLE N/A 8/18/2022    Procedure: Transnasal flexible laryngoscopy with KTP laser and biopsies;  Surgeon: Nikole Davis MD;  Location: [facility]    LASER KTP LARYNGOSCOPY FLEXIBLE N/A 10/27/2022    Procedure: Transnasal flexible laryngoscopy with possible KTP laser;  Surgeon: Nikole Davis  MD [place];  Location: [facility] OR    LASER KTP LARYNGOSCOPY FLEXIBLE N/A 1/26/2023    Procedure: Transnasal flexible laryngoscopy with KTP laser,  biopsies, superior laryngeal nerve blocks, Avastin injection;  Surgeon: Nikole Davis MD;  Location: [facility] OR    LASER KTP LARYNGOSCOPY FLEXIBLE N/A 2/15/2023    Procedure: Transnasal flexible laryngoscopy with KTP laser, superior laryngeal nerve blocks, biopsies, avastin injection;  Surgeon: Nikole Davis MD;  Location: [facility]    LASER KTP LARYNGOSCOPY FLEXIBLE N/A 2/17/2023    Procedure: Transnasal flexible laryngoscopy with KTP laser, biopsies, superior laryngeal nerve blocks;  Surgeon: Nikole Davis MD;  Location: [facility] OR    LASER KTP LARYNGOSCOPY FLEXIBLE N/A 2/23/2023    Procedure: Transnasal flexible laryngoscopy with KTP laser, superior laryngeal nerve blocks;  Surgeon: Nikole Davis MD;  Location: [facility]    LASER KTP LARYNGOSCOPY FLEXIBLE N/A 3/2/2023    Procedure: Transnasal flexible laryngoscopy with KTP laser, superior laryngeal nerve block Bilateral;  Surgeon: Nikole Davis MD;  Location: [facility]    LASER KTP LARYNGOSCOPY FLEXIBLE N/A 3/9/2023    Procedure: Transnasal flexible laryngoscopy with KTP laser, biopsies, superior laryngeal nerve blocks;  Surgeon: Nikole Davis MD;  Location: [facility] OR    LASER KTP LARYNGOSCOPY FLEXIBLE N/A 3/17/2023    Procedure: Transnasal flexible laryngoscopy with KTP laser, superior laryngeal nerve block(s), Avastin injection;  Surgeon: Nikole Davis MD;  Location: [facility] OR    LASER KTP LARYNGOSCOPY FLEXIBLE N/A 3/28/2023    Procedure: Transnasal flexible laryngoscopy with KTP laser, superior laryngeal nerve block(s);  Surgeon: Pankaj Woodard MD;  Location: [facility] OR       Prior to Admission Medications  Current Outpatient Medications   Medication Sig Dispense Refill    albuterol (PROAIR HFA/PROVENTIL HFA/VENTOLIN HFA) 108 (90 Base) MCG/ACT  inhaler Inhale 2 puffs into the lungs as needed      albuterol (PROVENTIL) (2.5 MG/3ML) 0.083% neb solution Inhale 2.5 mg into the lungs every 4 hours as needed for shortness of breath      apixaban ANTICOAGULANT (ELIQUIS) 5 MG tablet Take 1 tablet (5 mg) by mouth 2 times daily 60 tablet 1    atorvastatin (LIPITOR) 20 MG tablet Take 20 mg by mouth every evening      budesonide-formoterol (SYMBICORT) 160-4.5 MCG/ACT Inhaler Inhale 2 puffs into the lungs two times daily      CHOLECALCIFEROL PO Take 1,000 Units by mouth daily      diltiazem ER (TIAZAC) 240 MG 24 hr ER beaded capsule Take 240 mg by mouth 2 times daily      ibuprofen (ADVIL/MOTRIN) 200 MG capsule Take 400 mg by mouth every 6 hours as needed for fever      ipratropium - albuterol 0.5 mg/2.5 mg/3 mL (DUONEB) 0.5-2.5 (3) MG/3ML neb solution Take 1 vial (3 mLs) by nebulization every 6 hours as needed for shortness of breath, wheezing or cough 90 mL 0    levothyroxine (SYNTHROID/LEVOTHROID) 88 MCG tablet Take 88 mcg by mouth daily      sertraline (ZOLOFT) 100 MG tablet Take 100 mg by mouth daily      Respiratory Therapy Supplies (NEBULIZER) JUWAN Portable nebulizer, disposable neb kit x 4, reuseable neb kit x 1, mask x 1, filters x 1.   Frequency of use: daily;  Medication: albuterol  Length of need: 99 months         Allergies  Allergies   Allergen Reactions    Methylpyrrolidone Anaphylaxis    Nuts Anaphylaxis     Black walnut    Escitalopram Other (See Comments)     Asthma -a time of exacerbation and may not have been cause may retry    Mirtazapine Other (See Comments)     Asthma a time of exacerbation and may not have been cause may retry    Venlafaxine Other (See Comments)    Adhesive Tape Rash     With holter monitor. Ok with bandaids.    No Clinical Screening - See Comments Rash     Adhesive tape       Social History  Social History     Socioeconomic History    Marital status:      Spouse name: Not on file    Number of children: Not on file     Years of education: Not on file    Highest education level: Not on file   Occupational History    Not on file   Tobacco Use    Smoking status: Never    Smokeless tobacco: Never   Substance and Sexual Activity    Alcohol use: Yes     Comment: Occasionally    Drug use: Never    Sexual activity: Not on file   Other Topics Concern    Not on file   Social History Narrative    Not on file     Social Determinants of Health     Financial Resource Strain: Not on file   Food Insecurity: Not on file   Transportation Needs: Not on file   Physical Activity: Not on file   Stress: Not on file   Social Connections: Not on file   Interpersonal Safety: Not on file   Housing Stability: Not on file       Family History  Family History   Problem Relation Age of Onset    Breast Cancer Mother 75.00    Hereditary Breast and Ovarian Cancer Syndrome No family hx of     Cancer No family hx of     Colon Cancer No family hx of     Endometrial Cancer No family hx of     Ovarian Cancer No family hx of        Review of Systems  The complete review of systems is negative other than noted in the HPI or here.   Anesthesia Evaluation   Pt has had prior anesthetic.     No history of anesthetic complications       ROS/MED HX  ENT/Pulmonary: Comment: Current respiratory papillomatosis     (+)                         COPD,           (-) tobacco use   Neurologic:  - neg neurologic ROS  (-) no seizures and no CVA   Cardiovascular:     (+)  - -   -  - -   Taking blood thinners                     dysrhythmias, a-fib,        Previous cardiac testing   Echo: Date: 4/4/23 Results:  Interpretation Summary  Technically difficult study. Poor acoustic windows.  Global and regional left ventricular function is normal with an EF of 55-60%.  Global right ventricular function is borderline reduced. The right ventricle  is normal size.  No significant valvular abnormalities.  The estimated PA systolic pressure is 43 mmHg.  IVC diameter >2.1 cm collapsing <50% with sniff  "suggests a high RA pressure  estimated at 15 mmHg or greater.  There is no prior study for direct comparison.    Stress Test:  Date: Results:    ECG Reviewed:  Date: 4/3/23 Results:  A fib with RVR  Cath:  Date: Results:      METS/Exercise Tolerance: 4 - Raking leaves, gardening Comment: Some FELIZ with ascending stairs. Can walk a few blocks without exertional symptoms    Hematologic:  - neg hematologic  ROS  (-) history of blood clots and history of blood transfusion   Musculoskeletal:  - neg musculoskeletal ROS     GI/Hepatic:  - neg GI/hepatic ROS  (-) GERD   Renal/Genitourinary:     (+) renal disease, type: CRI, Pt does not require dialysis,           Endo:     (+)          thyroid problem, hypothyroidism,        (-) chronic steroid usage   Psychiatric/Substance Use:     (+) psychiatric history anxiety and depression       Infectious Disease:  - neg infectious disease ROS     Malignancy:       Other:            /76 (BP Location: Right arm, Patient Position: Sitting, Cuff Size: Adult Regular)   Pulse 67   Temp 97.8  F (36.6  C) (Oral)   Resp 16   Ht 1.651 m (5' 5\")   Wt 53.6 kg (118 lb 1.6 oz)   SpO2 93%   BMI 19.65 kg/m      Physical Exam   Constitutional: Awake, alert, cooperative, no apparent distress, and appears stated age.  Eyes: Pupils equal, round and reactive to light, extra ocular muscles intact, sclera clear, conjunctiva normal.  HENT: Normocephalic, oral pharynx with moist mucus membranes, missing multiple posterior teeth  Respiratory: Clear to auscultation bilaterally, no crackles or wheezing.  Cardiovascular: Regular rate and irregular rhythm, normal S1 and S2, and no murmur noted.  Carotids no bruits. No edema. Palpable pulses to radial arteries.   GI: Normal bowel sounds, soft, non-distended, non-tender  Genitourinary:  deferred  Skin: Warm and dry.    Musculoskeletal: Full ROM of neck. There is no redness, warmth, or swelling of the exposed joints. Gross motor strength is normal.  "   Neurologic: Awake, alert, oriented to name, place and time. Cranial nerves II-XII are grossly intact.    Neuropsychiatric: Calm, cooperative. Normal affect.     Prior Labs/Diagnostic Studies   All labs and imaging personally reviewed     EKG/ stress test - if available please see in ROS above   Echo result w/o MOPS: Interpretation SummaryTechnically difficult study. Poor acoustic windows.Global and regional left ventricular function is normal with an EF of 55-60%.Global right ventricular function is borderline reduced. The right ventricleis normal size.No significant valvular abnormalities.The estimated PA systolic pressure is 43 mmHg.IVC diameter >2.1 cm collapsing <50% with sniff suggests a high RA pressureestimated at 15 mmHg or greater.There is no prior study for direct comparison.           No data to display                  The patient's records and results personally reviewed by this provider.     Outside records reviewed from: Care Everywhere    LAB/DIAGNOSTIC STUDIES TODAY:  hgb, Cr, K+    Assessment    Asya Coffman is a 78 year old female seen as a PAC referral for risk assessment and optimization for anesthesia.    Plan/Recommendations  Pt will be optimized for the proposed procedure.  See below for details on the assessment, risk, and preoperative recommendations    NEUROLOGY  - No history of TIA, CVA or seizure  -Post Op delirium risk factors:  No risk identified    ENT  -recurrent respiratory papillomatosis. Recent biopsies with pathology concerning for invasive disease. Above procedure planned   - No current airway concerns.  Will need to be reassessed day of surgery.  Mallampati: II  TM: > 3    CARDIAC  - No history of CAD and Hypertension  -a fib using eliquis  -follows with cardiology through [facility]. She reports she is scheduled to see her cardiology team prior to surgery for routine visit with updated Echo. I will watch for those results.   -she reports she can walk a few blocks without  "exertional symptoms. Does get some FELIZ with ascending stairs- this is longstanding. No change in symptoms since last cardiology visit 8/2022 and last echo 4/2023  - METS (Metabolic Equivalents)  Patient CANNOT perform 4 METS exercise without symptoms            Total Score: 1    Functional Capacity: Unable to complete 4 METS      RCRI-Very low risk: Class 1 0.4% complication rate            Total Score: 0        PULMONARY  JENNIFER Low Risk            Total Score: 1    JENNIFER: Over 50 ys old      - COPD  Well controlled. Using symbicort BID. Denies recent need for rescue inhaler. Lungs clear on exam.   - Tobacco History    History   Smoking Status    Never   Smokeless Tobacco    Never       GI  PONV High Risk  Total Score: 3           1 AN PONV: Pt is Female    1 AN PONV: Patient is not a current smoker    1 AN PONV: Patient has history of PONV        /RENAL  - Baseline Creatinine 1.19. will update today    ENDOCRINE    - BMI: Estimated body mass index is 19.65 kg/m  as calculated from the following:    Height as of this encounter: 1.651 m (5' 5\").    Weight as of this encounter: 53.6 kg (118 lb 1.6 oz).  Healthy Weight (BMI 18.5-24.9)  - No history of Diabetes Mellitus  -hypothyroid, will continue levothyroxine     HEME  VTE Low Risk 0.26%            Total Score: 1    VTE: Greater than 59 yrs old      - Coagulopathy second to Apixaban (Eliquis). Plan for last dose prior to procedure 10/1/23    PSYCH  - anxiety and depression. Stable     Different anesthesia methods/types have been discussed with the patient, but they are aware that the final plan will be decided by the assigned anesthesia provider on the date of service.    The patient is optimized for their procedure. AVS with information on surgery time/arrival time, meds and NPO status given by nursing staff. No further diagnostic testing indicated.      On the day of service:     Prep time: 11 minutes  Visit time: 13 minutes  Documentation time: 9 " minutes  ------------------------------------------  Total time: 33 minutes      Jenifer Red PA-C  Preoperative Assessment Center  [facility]  Clinic and Surgery Center  Phone: [phone]  Fax: [phone]

## 2023-09-21 NOTE — PATIENT INSTRUCTIONS
Preparing for Your Surgery      Name:  Asya Coffman   MRN:  [id number]   :  1945   Today's Date:  2023       Arriving for surgery:  Surgery date:  10/5/23  Arrival time:  10.40AM    Please come to:     Please come to:      [facility] [address]  [address] [place]  [address]      [facility] Patient /Visitor Ramp is located at [address]. Patients and visitors who self-park will receive the reduced hospital parking rate. If the Patient /Visitor Ramp is full, please follow the signs to the  parking located at the main hospital entrance.     parking is available ( 24 hours/ 7 days a week)    Discounted parking pass options are available for patients and visitors. They can be purchased at the [employer] desk at the main hospital entrance.    -    Stop at the security desk and they will direct surgery patients to the 3rd floor Surgery Waiting Room. [phone]     -  If you are in need of directions, wheelchair or escort please stop at the Information/security desk in the lobby.       What can I eat or drink?  -  You may eat and drink normally up to 8 hours prior to arrival time. (Until 2.40AM)  -  You may have clear liquids until 2 hours prior to arrival time. (Until 8.40AM)    Examples of clear liquids:  Water  Clear broth  Juices (apple, white grape, white cranberry  and cider) without pulp  Noncarbonated, powder based beverages  (lemonade and Humphrey-Aid)  Sodas (Sprite, 7-Up, ginger ale and seltzer)  Coffee or tea (without milk or cream)  Gatorade    -  No Alcohol or cannabis products for at least 24 hours before surgery.     Which medicines can I take?    Hold Aspirin for 7 days before surgery.   Hold Multivitamins for 7 days before surgery. (Vitamin D  Hold Supplements for 7 days before surgery.  Hold Ibuprofen (Advil, Motrin) for 1 day(s) before surgery--unless otherwise directed by  surgeon.  Hold Naproxen (Aleve) for 4 days before surgery.    Hold Eliquis for 3 days before surgery. Your last dose is on 10/1/23 PM per pharmacist.  Take your evening/bedtime medications per usual except for vitamins and supplements.    -  DO NOT take these medications the day of surgery:  None.    -  PLEASE TAKE these medications the day of surgery:  Take your AM medications per usual except for vitamins and supplements.  You may use your inhalers and bring them with you on the day of surgery.    How do I prepare myself?  - Please take 2 showers (one the night prior to surgery and one the morning of surgery) using Scrubcare or Hibiclens soap.    Use this soap only from the neck to your toes.     Leave the soap on your skin for one minute--then rinse thoroughly.      You may use your own shampoo and conditioner. No other hair products.   - Please remove all jewelry and body piercings.  - No lotions, deodorants or fragrance.  - No makeup or fingernail polish.   - Bring your ID and insurance card.    -If you have a Deep Brain Stimulator, Spinal Cord Stimulator, or any Neuro Stimulator device---you must bring the remote control to the hospital.      ALL PATIENTS GOING HOME THE SAME DAY OF SURGERY ARE REQUIRED TO HAVE A RESPONSIBLE ADULT TO DRIVE AND BE IN ATTENDANCE WITH THEM FOR 24 HOURS FOLLOWING SURGERY.    Covid testing policy as of 12/06/2022  Your surgeon will notify and schedule you for a COVID test if one is needed before surgery--please direct any questions or COVID symptoms to your surgeon      Questions or Concerns:    - For any questions regarding the day of surgery or your hospital stay, please contact the Pre Admission Nursing Office at [phone].       - If you have health changes between today and your surgery, please call your surgeon.       - For questions after surgery, please call your surgeons office.           Current Visitor Guidelines    You may have 2 visitors in the pre op  area.    Visiting hours: 8 a.m. to 8:30 p.m.    You may have four visitors during your inpatient hospital stay.    Patients confirmed or suspected to have symptoms of COVID 19 or flu:     No visitors allowed for adult patients.   Children (under age 18) can have 1 named visitor.     People who are sick or showing symptoms of COVID 19 or flu:    Are not allowed to visit patients--we can only make exceptions in special situations.       Please follow these guidelines for your visit:          Please maintain social distance          Masking is optional--however at times you may be asked to wear a mask for the safety of yourself and others     Clean your hands with alcohol hand . Do this when you arrive at and leave the building and patient room,    And again after you touch your mask or anything in the room.     Go directly to and from the room you are visiting.     Stay in the patient s room during your visit. Limit going to other places in the hospital as much as possible     Leave bags and jackets at home or in the car.     For everyone s health, please don t come and go during your visit. That includes for smoking   during your visit.

## 2023-10-05 ENCOUNTER — HOSPITAL ENCOUNTER (OUTPATIENT)
Facility: CLINIC | Age: 78
Discharge: HOME | End: 2023-10-05
Attending: OTOLARYNGOLOGY | Admitting: OTOLARYNGOLOGY
Payer: COMMERCIAL

## 2023-10-05 ENCOUNTER — ANESTHESIA (OUTPATIENT)
Dept: SURGERY | Facility: CLINIC | Age: 78
End: 2023-10-05
Payer: COMMERCIAL

## 2023-10-05 VITALS
SYSTOLIC BLOOD PRESSURE: 117 MMHG | DIASTOLIC BLOOD PRESSURE: 75 MMHG | RESPIRATION RATE: 16 BRPM | BODY MASS INDEX: 19.58 KG/M2 | TEMPERATURE: 98.3 F | OXYGEN SATURATION: 96 % | WEIGHT: 117.5 LBS | HEIGHT: 65 IN | HEART RATE: 66 BPM

## 2023-10-05 PROCEDURE — 370N000017 HC ANESTHESIA TECHNICAL FEE, PER MIN: Performed by: OTOLARYNGOLOGY

## 2023-10-05 PROCEDURE — 250N000011 HC RX IP 250 OP 636: Mod: JZ

## 2023-10-05 PROCEDURE — 250N000011 HC RX IP 250 OP 636: Performed by: OTOLARYNGOLOGY

## 2023-10-05 PROCEDURE — 31536 LARYNGOSCOPY W/BX & OP SCOPE: CPT | Mod: 51 | Performed by: OTOLARYNGOLOGY

## 2023-10-05 PROCEDURE — 88331 PATH CONSLTJ SURG 1 BLK 1SPC: CPT | Mod: TC | Performed by: OTOLARYNGOLOGY

## 2023-10-05 PROCEDURE — 88305 TISSUE EXAM BY PATHOLOGIST: CPT | Mod: 26 | Performed by: STUDENT IN AN ORGANIZED HEALTH CARE EDUCATION/TRAINING PROGRAM

## 2023-10-05 PROCEDURE — 710N000010 HC RECOVERY PHASE 1, LEVEL 2, PER MIN: Performed by: OTOLARYNGOLOGY

## 2023-10-05 PROCEDURE — 31541 LARYNSCOP W/TUMR EXC + SCOPE: CPT | Performed by: OTOLARYNGOLOGY

## 2023-10-05 PROCEDURE — 272N000001 HC OR GENERAL SUPPLY STERILE: Performed by: OTOLARYNGOLOGY

## 2023-10-05 PROCEDURE — 272N000002 HC OR SUPPLY OTHER OPNP: Performed by: OTOLARYNGOLOGY

## 2023-10-05 PROCEDURE — 88312 SPECIAL STAINS GROUP 1: CPT | Mod: 26 | Performed by: STUDENT IN AN ORGANIZED HEALTH CARE EDUCATION/TRAINING PROGRAM

## 2023-10-05 PROCEDURE — 250N000009 HC RX 250

## 2023-10-05 PROCEDURE — 710N000012 HC RECOVERY PHASE 2, PER MINUTE: Performed by: OTOLARYNGOLOGY

## 2023-10-05 PROCEDURE — 88312 SPECIAL STAINS GROUP 1: CPT | Mod: TC | Performed by: OTOLARYNGOLOGY

## 2023-10-05 PROCEDURE — 258N000003 HC RX IP 258 OP 636

## 2023-10-05 PROCEDURE — 88331 PATH CONSLTJ SURG 1 BLK 1SPC: CPT | Mod: 26 | Performed by: STUDENT IN AN ORGANIZED HEALTH CARE EDUCATION/TRAINING PROGRAM

## 2023-10-05 PROCEDURE — 999N000141 HC STATISTIC PRE-PROCEDURE NURSING ASSESSMENT: Performed by: OTOLARYNGOLOGY

## 2023-10-05 PROCEDURE — 360N000077 HC SURGERY LEVEL 4, PER MIN: Performed by: OTOLARYNGOLOGY

## 2023-10-05 RX ORDER — FENTANYL CITRATE 50 UG/ML
INJECTION, SOLUTION INTRAMUSCULAR; INTRAVENOUS PRN
Status: DISCONTINUED | OUTPATIENT
Start: 2023-10-05 | End: 2023-10-05

## 2023-10-05 RX ORDER — AMPICILLIN AND SULBACTAM 2; 1 G/1; G/1
3 INJECTION, POWDER, FOR SOLUTION INTRAMUSCULAR; INTRAVENOUS
Status: COMPLETED | OUTPATIENT
Start: 2023-10-05 | End: 2023-10-05

## 2023-10-05 RX ORDER — PROPOFOL 10 MG/ML
INJECTION, EMULSION INTRAVENOUS PRN
Status: DISCONTINUED | OUTPATIENT
Start: 2023-10-05 | End: 2023-10-05

## 2023-10-05 RX ORDER — ONDANSETRON 2 MG/ML
4 INJECTION INTRAMUSCULAR; INTRAVENOUS EVERY 30 MIN PRN
Status: DISCONTINUED | OUTPATIENT
Start: 2023-10-05 | End: 2023-10-05 | Stop reason: HOSPADM

## 2023-10-05 RX ORDER — HYDROMORPHONE HCL IN WATER/PF 6 MG/30 ML
0.2 PATIENT CONTROLLED ANALGESIA SYRINGE INTRAVENOUS EVERY 5 MIN PRN
Status: DISCONTINUED | OUTPATIENT
Start: 2023-10-05 | End: 2023-10-05 | Stop reason: HOSPADM

## 2023-10-05 RX ORDER — OXYCODONE HYDROCHLORIDE 5 MG/1
5 TABLET ORAL
Status: DISCONTINUED | OUTPATIENT
Start: 2023-10-05 | End: 2023-10-05 | Stop reason: HOSPADM

## 2023-10-05 RX ORDER — FENTANYL CITRATE 50 UG/ML
25 INJECTION, SOLUTION INTRAMUSCULAR; INTRAVENOUS EVERY 5 MIN PRN
Status: DISCONTINUED | OUTPATIENT
Start: 2023-10-05 | End: 2023-10-05 | Stop reason: HOSPADM

## 2023-10-05 RX ORDER — SODIUM CHLORIDE, SODIUM LACTATE, POTASSIUM CHLORIDE, CALCIUM CHLORIDE 600; 310; 30; 20 MG/100ML; MG/100ML; MG/100ML; MG/100ML
INJECTION, SOLUTION INTRAVENOUS CONTINUOUS
Status: DISCONTINUED | OUTPATIENT
Start: 2023-10-05 | End: 2023-10-05 | Stop reason: HOSPADM

## 2023-10-05 RX ORDER — LIDOCAINE HYDROCHLORIDE 20 MG/ML
INJECTION, SOLUTION INFILTRATION; PERINEURAL PRN
Status: DISCONTINUED | OUTPATIENT
Start: 2023-10-05 | End: 2023-10-05

## 2023-10-05 RX ORDER — ONDANSETRON 2 MG/ML
INJECTION INTRAMUSCULAR; INTRAVENOUS PRN
Status: DISCONTINUED | OUTPATIENT
Start: 2023-10-05 | End: 2023-10-05

## 2023-10-05 RX ORDER — LIDOCAINE HYDROCHLORIDE 40 MG/ML
SOLUTION TOPICAL PRN
Status: DISCONTINUED | OUTPATIENT
Start: 2023-10-05 | End: 2023-10-05 | Stop reason: HOSPADM

## 2023-10-05 RX ORDER — ONDANSETRON 4 MG/1
4 TABLET, ORALLY DISINTEGRATING ORAL EVERY 30 MIN PRN
Status: DISCONTINUED | OUTPATIENT
Start: 2023-10-05 | End: 2023-10-05 | Stop reason: HOSPADM

## 2023-10-05 RX ORDER — LABETALOL HYDROCHLORIDE 5 MG/ML
10 INJECTION, SOLUTION INTRAVENOUS
Status: DISCONTINUED | OUTPATIENT
Start: 2023-10-05 | End: 2023-10-05 | Stop reason: HOSPADM

## 2023-10-05 RX ORDER — DIPHENHYDRAMINE HYDROCHLORIDE 50 MG/ML
12.5 INJECTION INTRAMUSCULAR; INTRAVENOUS EVERY 6 HOURS PRN
Status: DISCONTINUED | OUTPATIENT
Start: 2023-10-05 | End: 2023-10-05 | Stop reason: HOSPADM

## 2023-10-05 RX ORDER — METOPROLOL TARTRATE 1 MG/ML
INJECTION, SOLUTION INTRAVENOUS PRN
Status: DISCONTINUED | OUTPATIENT
Start: 2023-10-05 | End: 2023-10-05

## 2023-10-05 RX ORDER — DIPHENHYDRAMINE HCL 12.5MG/5ML
12.5 LIQUID (ML) ORAL EVERY 6 HOURS PRN
Status: DISCONTINUED | OUTPATIENT
Start: 2023-10-05 | End: 2023-10-05 | Stop reason: HOSPADM

## 2023-10-05 RX ORDER — AMPICILLIN AND SULBACTAM 1; .5 G/1; G/1
1.5 INJECTION, POWDER, FOR SOLUTION INTRAMUSCULAR; INTRAVENOUS SEE ADMIN INSTRUCTIONS
Status: DISCONTINUED | OUTPATIENT
Start: 2023-10-05 | End: 2023-10-05 | Stop reason: HOSPADM

## 2023-10-05 RX ORDER — HYDROMORPHONE HCL IN WATER/PF 6 MG/30 ML
0.4 PATIENT CONTROLLED ANALGESIA SYRINGE INTRAVENOUS EVERY 5 MIN PRN
Status: DISCONTINUED | OUTPATIENT
Start: 2023-10-05 | End: 2023-10-05 | Stop reason: HOSPADM

## 2023-10-05 RX ORDER — DEXAMETHASONE SODIUM PHOSPHATE 4 MG/ML
INJECTION, SOLUTION INTRA-ARTICULAR; INTRALESIONAL; INTRAMUSCULAR; INTRAVENOUS; SOFT TISSUE PRN
Status: DISCONTINUED | OUTPATIENT
Start: 2023-10-05 | End: 2023-10-05

## 2023-10-05 RX ORDER — FENTANYL CITRATE 50 UG/ML
50 INJECTION, SOLUTION INTRAMUSCULAR; INTRAVENOUS EVERY 5 MIN PRN
Status: DISCONTINUED | OUTPATIENT
Start: 2023-10-05 | End: 2023-10-05 | Stop reason: HOSPADM

## 2023-10-05 RX ORDER — SODIUM CHLORIDE, SODIUM LACTATE, POTASSIUM CHLORIDE, CALCIUM CHLORIDE 600; 310; 30; 20 MG/100ML; MG/100ML; MG/100ML; MG/100ML
INJECTION, SOLUTION INTRAVENOUS CONTINUOUS PRN
Status: DISCONTINUED | OUTPATIENT
Start: 2023-10-05 | End: 2023-10-05

## 2023-10-05 RX ORDER — HYDRALAZINE HYDROCHLORIDE 20 MG/ML
2.5-5 INJECTION INTRAMUSCULAR; INTRAVENOUS EVERY 10 MIN PRN
Status: DISCONTINUED | OUTPATIENT
Start: 2023-10-05 | End: 2023-10-05 | Stop reason: HOSPADM

## 2023-10-05 RX ORDER — OXYCODONE HYDROCHLORIDE 10 MG/1
10 TABLET ORAL
Status: DISCONTINUED | OUTPATIENT
Start: 2023-10-05 | End: 2023-10-05 | Stop reason: HOSPADM

## 2023-10-05 RX ORDER — HALOPERIDOL 5 MG/ML
1 INJECTION INTRAMUSCULAR
Status: DISCONTINUED | OUTPATIENT
Start: 2023-10-05 | End: 2023-10-05 | Stop reason: HOSPADM

## 2023-10-05 RX ORDER — ESMOLOL HYDROCHLORIDE 10 MG/ML
INJECTION INTRAVENOUS PRN
Status: DISCONTINUED | OUTPATIENT
Start: 2023-10-05 | End: 2023-10-05

## 2023-10-05 RX ORDER — EPINEPHRINE 0.1 MG/ML
INJECTION INTRAVENOUS PRN
Status: DISCONTINUED | OUTPATIENT
Start: 2023-10-05 | End: 2023-10-05 | Stop reason: HOSPADM

## 2023-10-05 RX ADMIN — LIDOCAINE HYDROCHLORIDE 60 MG: 20 INJECTION, SOLUTION INFILTRATION; PERINEURAL at 11:22

## 2023-10-05 RX ADMIN — METOPROLOL TARTRATE 2 MG: 5 INJECTION INTRAVENOUS at 12:11

## 2023-10-05 RX ADMIN — ONDANSETRON 4 MG: 2 INJECTION INTRAMUSCULAR; INTRAVENOUS at 12:56

## 2023-10-05 RX ADMIN — AMPICILLIN SODIUM AND SULBACTAM SODIUM 3 G: 2; 1 INJECTION, POWDER, FOR SOLUTION INTRAMUSCULAR; INTRAVENOUS at 10:34

## 2023-10-05 RX ADMIN — AMPICILLIN AND SULBACTAM 1.5 G: 1; .5 INJECTION, POWDER, FOR SOLUTION INTRAMUSCULAR; INTRAVENOUS at 12:34

## 2023-10-05 RX ADMIN — PROPOFOL 150 MCG/KG/MIN: 10 INJECTION, EMULSION INTRAVENOUS at 11:30

## 2023-10-05 RX ADMIN — PROPOFOL 100 MG: 10 INJECTION, EMULSION INTRAVENOUS at 11:25

## 2023-10-05 RX ADMIN — FENTANYL CITRATE 50 MCG: 50 INJECTION INTRAMUSCULAR; INTRAVENOUS at 11:53

## 2023-10-05 RX ADMIN — DEXAMETHASONE SODIUM PHOSPHATE 10 MG: 4 INJECTION, SOLUTION INTRA-ARTICULAR; INTRALESIONAL; INTRAMUSCULAR; INTRAVENOUS; SOFT TISSUE at 11:38

## 2023-10-05 RX ADMIN — PHENYLEPHRINE HYDROCHLORIDE 100 MCG: 10 INJECTION INTRAVENOUS at 11:46

## 2023-10-05 RX ADMIN — PHENYLEPHRINE HYDROCHLORIDE 100 MCG: 10 INJECTION INTRAVENOUS at 11:51

## 2023-10-05 RX ADMIN — SODIUM CHLORIDE, POTASSIUM CHLORIDE, SODIUM LACTATE AND CALCIUM CHLORIDE: 600; 310; 30; 20 INJECTION, SOLUTION INTRAVENOUS at 11:16

## 2023-10-05 RX ADMIN — PROPOFOL 50 MG: 10 INJECTION, EMULSION INTRAVENOUS at 11:54

## 2023-10-05 RX ADMIN — SUGAMMADEX 400 MG: 100 INJECTION, SOLUTION INTRAVENOUS at 12:58

## 2023-10-05 RX ADMIN — FENTANYL CITRATE 50 MCG: 50 INJECTION INTRAMUSCULAR; INTRAVENOUS at 11:22

## 2023-10-05 RX ADMIN — Medication 50 MG: at 11:26

## 2023-10-05 RX ADMIN — Medication 10 MG: at 12:41

## 2023-10-05 RX ADMIN — ESMOLOL HYDROCHLORIDE 20 MG: 10 INJECTION, SOLUTION INTRAVENOUS at 11:56

## 2023-10-05 RX ADMIN — Medication 20 MG: at 12:08

## 2023-10-05 ASSESSMENT — ACTIVITIES OF DAILY LIVING (ADL)
ADLS_ACUITY_SCORE: 35
ADLS_ACUITY_SCORE: 33
ADLS_ACUITY_SCORE: 35
ADLS_ACUITY_SCORE: 35

## 2023-10-05 ASSESSMENT — COPD QUESTIONNAIRES
CAT_SEVERITY: MODERATE
COPD: 1

## 2023-10-05 ASSESSMENT — ENCOUNTER SYMPTOMS: DYSRHYTHMIAS: 1

## 2023-10-05 NOTE — ANESTHESIA POSTPROCEDURE EVALUATION
Patient: Asya Coffman    Procedure: Procedure(s):  Microdirect laryngoscopy with excision/ablation of laryngeal lesions, biopsies, CO2 laser       Anesthesia Type:  General    Note:  Disposition: Outpatient   Postop Pain Control: Uneventful            Sign Out: Well controlled pain   PONV: No   Neuro/Psych: Uneventful            Sign Out: Acceptable/Baseline neuro status   Airway/Respiratory: Uneventful            Sign Out: Acceptable/Baseline resp. status   CV/Hemodynamics: Uneventful            Sign Out: Acceptable CV status; No obvious hypovolemia; No obvious fluid overload   Other NRE: NONE   DID A NON-ROUTINE EVENT OCCUR? No    Event details/Postop Comments:  No complications.           Last vitals:  Vitals Value Taken Time   /67 10/05/23 1400   Temp 36.7  C (98  F) 10/05/23 1345   Pulse 65 10/05/23 1408   Resp 66 10/05/23 1408   SpO2 94 % 10/05/23 1412   Vitals shown include unvalidated device data.    Electronically Signed By: Brent Johnston MD  October 5, 2023  2:13 PM

## 2023-10-05 NOTE — OP NOTE
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 We therefore discussed consideration of repeating biopsies to determine how consistent these findings might be. The patient wished to proceed with surgery and presented for the procedure(s) listed above. We reviewed perioperative risks, including bleeding/infection/pain, injury to surrounding structures, potential changes to tongue/voice/swallow function, risk of needing additional procedures (e.g., due to persistence or recurrence), and risks of anesthesia (including heart attack, stroke, death). She elected to proceed and written informed consent was performed.    DESCRIPTION OF PROCEDURE:   The patient was brought to the operating room and placed supine. A time-out was called to verify patient identity, operative site, and planned procedure. The operative site was prepped in the usual clean fashion. Moist gauze eye pads were placed and secured. A head wrap was placed, and custom molded thermoplastic tooth guards were placed. Direct laryngoscopy was performed with an Ossoff-Pilling laryngoscope, which was placed in suspension. The vocal folds were examined with 0 and 70 degree telescopes. A variety of biopsies were taken under telescopic visualization, as listed below. The laryngoscope was repositioned as needed to allow access to various portions of the larynx. Frozen section analyses were reported as negative, showing  only inflammation.    The operating microscope was then brought into position. Laser safety precautions were utilized at all times, including eye protection for the patient and staff, use of wet towels, and appropriately minimized FiO2. As needed, moistened cottonoids or laser-safe backstops were used to protect the endotracheal tube from the laser. The Lumenis CO2 Accublade laser was attached to the microscope and used at a depth setting of 1 and 5 Cardoza. In a [place] setting, the area of each of the biopsy sites was gently ablated and debrided with the laser, with care to avoid contiguous demucosalization along the posterior larynx to minimize the risk of blunting and/or stenosis.    Cottonoids soaked in 1:10,000 epinephrine were sparingly used to maintain hemostasis.     As needed during the procedure and at the conclusion of the procedure, the operating microscope was moved out of position and the 0 and 70 degree rigid endoscopes were again used to examine the vocal folds and confirm completion of the stated objectives of the procedure. After cottonoid and sponge counts were confirmed correct, 2 cc of 4% lidocaine were applied transglottically for laryngotracheal anesthesia. The laryngoscope and tooth guards were removed. The lips, teeth, and tongue were examined and no injuries were noted. Care of the patient was returned to Anesthesia.      FINDINGS:   Easy mask, smooth intubation, excellent laryngeal exposure with Ossoff-Pilling laryngoscope. Left posterior supraglottis and true vocal fold with papilloma; left posterior ventricle with papilloma. Posterior larynx, including supraglottis, infraglottis, also involved with papilloma. Posterior infraglottis with fibrinous changes as well as papilloma. Stable right true vocal fold epithelial thickening.     SPECIMEN(S):   1 : Left posterior Supraglottis   2 : Left posterior Supraglottis   3 : Posterior Larynx   4 : Posterior Larynx   5 : Right posterior infraglottis    6 : Left Posterior Glottis   7 : Posterior Supraglottis (superior)       DRAINS: None    ESTIMATED BLOOD LOSS: Minimal    COMPLICATIONS: None    DISPOSITION: Stable to PACU    ATTENDING PRESENCE STATEMENT:  I was present and participating for the entire procedure from beginning to completion.

## 2023-10-05 NOTE — DISCHARGE INSTRUCTIONS
[facility]  Same-Day Surgery   Adult Discharge Orders & Instructions   For 24 hours after surgery  Get plenty of rest.  A responsible adult must stay with you for at least 24 hours after you leave the hospital.   Do not drive or use heavy equipment.  If you have weakness or tingling, don't drive or use heavy equipment until this feeling goes away.  Do not drink alcohol.  Avoid strenuous or risky activities.  Ask for help when climbing stairs.   You may feel lightheaded.  IF so, sit for a few minutes before standing.  Have someone help you get up.   If you have nausea (feel sick to your stomach): Drink only clear liquids such as apple juice, ginger ale, broth or 7-Up.  Rest may also help.  Be sure to drink enough fluids.  Move to a regular diet as you feel able.  You may have a slight fever. Call the doctor if your fever is over 100 F (37.7 C) (taken under the tongue) or lasts longer than 24 hours.  You may have a dry mouth, a sore throat, muscle aches or trouble sleeping.  These should go away after 24 hours.  Do not make important or legal decisions.   Call your doctor for any of the followin.  Signs of infection (fever, growing tenderness at the surgery site, a large amount of drainage or bleeding, severe pain, foul-smelling drainage, redness, swelling).    2. It has been over 8 to 10 hours since surgery and you are still not able to urinate (pass water).    3.  Headache for over 24 hours.    To contact a doctor, call: Dr Davis at  the ENT- Otolaryngology Clinic at [phone] (Monday to Friday 8:00 AM to 5:00 PM) or:  '   [phone] and ask for the resident on call for Otolaryngology (answered 24 hours a day)  '   Emergency Department:  [facility]: [phone]       (TTY for hearing impaired: [phone])

## 2023-10-05 NOTE — ANESTHESIA PROCEDURE NOTES
Airway       Patient location during procedure: OR       Procedure Start/Stop Times: 10/5/2023 11:28 AM  Staff -        CRNA: Isabella Bell APRN CRNA       Performed By: CRNA  Consent for Airway        Urgency: elective  Indications and Patient Condition       Indications for airway management: tania-procedural       Induction type:intravenous       Mask difficulty assessment: 1 - vent by mask    Final Airway Details       Final airway type: endotracheal airway       Successful airway: ETT - single, Laser and Oral  Endotracheal Airway Details        ETT size (mm): 5.0       Cuffed: yes       Cuff volume (mL): 10       Successful intubation technique: video laryngoscopy       VL Blade Size: MAC 3       Grade View of Cords: 1       Adjucts: stylet       Position: Left       Measured from: lips       Secured at (cm): 24       Bite block used: None    Post intubation assessment        Placement verified by: capnometry, equal breath sounds and chest rise        Number of attempts at approach: 1       Secured with: paper tape       Ease of procedure: easy       Dentition: Intact and Unchanged    Medication(s) Administered   Medication Administration Time: 10/5/2023 11:28 AM

## 2023-10-05 NOTE — BRIEF OP NOTE
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 Left posterior supraglottis and true vocal fold with papilloma; left posterior ventricle with papilloma. Posterior larynx, including supraglottis, infraglottis, also involved with papilloma. Posterior infraglottis with fibrinous changes as well as papilloma. Stable right true vocal fold epithelial thickening.

## 2023-10-05 NOTE — OR NURSING
Dr. Johnston at bedside, notified that patient has a history of COPD and her Oxygen has ranged from 92-95%. MD okay with her Oxygen being at that level. MD to put in sign out.

## 2023-10-05 NOTE — ANESTHESIA CARE TRANSFER NOTE
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    Anesthesia Type:   General     Note:    Oropharynx: oropharynx clear of all foreign objects and spontaneously breathing  Level of Consciousness: drowsy  Oxygen Supplementation: face mask  Level of Supplemental Oxygen (L/min / FiO2): 8 LPM  Independent Airway: airway patency satisfactory and stable  Dentition: dentition unchanged  Vital Signs Stable: post-procedure vital signs reviewed and stable  Report to RN Given: handoff report given  Patient transferred to: PACU    Handoff Report: Identifed the Patient, Identified the Reponsible Provider, Reviewed the pertinent medical history, Discussed the surgical course, Reviewed Intra-OP anesthesia mangement and issues during anesthesia, Set expectations for post-procedure period and Allowed opportunity for questions and acknowledgement of understanding      Vitals:  Vitals Value Taken Time   /78 10/05/23 1316   Temp     Pulse 72 10/05/23 1317   Resp 124 10/05/23 1317   SpO2 100 % 10/05/23 1317   Vitals shown include unvalidated device data.    Electronically Signed By: VERONICA Tam CRNA  October 5, 2023  1:18 PM

## 2023-10-05 NOTE — ANESTHESIA PREPROCEDURE EVALUATION
Anesthesia Pre-Procedure Evaluation    Patient: Asya Coffman   MRN: [id number] : 1945        Procedure : Procedure(s):  Microdirect laryngoscopy with excision/ablation of laryngeal lesions, biopsies, possible CO2 laser  Avastin injection          Past Medical History:   Diagnosis Date    A-fib (H)     Antiplatelet or antithrombotic long-term use     Arrhythmia     COPD (chronic obstructive pulmonary disease) (H)       Past Surgical History:   Procedure Laterality Date    INJECT STEROID (LOCATION) N/A 6/15/2023    Procedure: Avastin injection;  Surgeon: Nikole Davis MD;  Location: [facility] OR    INJECT STEROID (LOCATION) N/A 2023    Procedure: Avastin injection;  Surgeon: Nikole Davis MD;  Location: [facility] OR    LASER CO2 LARYNGOSCOPY N/A 2023    Procedure: Microdirect laryngoscopy with excision/ablation of laryngeal lesions, biopsies, CO2 laser;  Surgeon: Nikole Davis MD;  Location: [facility] OR    LASER CO2 LARYNGOSCOPY, COMPLEX N/A 2022    Procedure: Micro direct laryngoscopy with excision/ablation of laryngeal lesions, possible biopsies, possible CO2 laser;  Surgeon: Nikole Davis MD;  Location: [facility] OR    LASER CO2 LARYNGOSCOPY, COMPLEX N/A 9/15/2022    Procedure: Microdirect laryngoscopy with ablation of laryngeal lesions,biopsies,CO2 laser;  Surgeon: Nikole Davis MD;  Location: [facility]    LASER CO2 LARYNGOSCOPY, COMPLEX N/A 2022    Procedure: Microdirect laryngoscopy with excision/ablation of laryngeal lesions, biopsies,   CO2 laser;  Surgeon: Nikole Davis MD;  Location: [facility]    LASER CO2 LARYNGOSCOPY, COMPLEX N/A 6/15/2023    Procedure: Microdirect laryngoscopy with ablation of laryngeal lesions, biopsies, CO2 laser;  Surgeon: Nikole Davis MD;  Location: [facility]    LASER KTP LARYNGOSCOPY N/A 4/3/2023    Procedure: Microdirect laryngoscopy with biopsies, excision/ablation of lesions, C02 laser,  Avastin  injection;  Surgeon: Nikole Davis MD;  Location: [facility]    LASER KTP LARYNGOSCOPY N/A 6/15/2023    Procedure: flexible laser (CO2 or KTP) standby;  Surgeon: Nikole Davis MD;  Location: [facility]    LASER KTP LARYNGOSCOPY FLEXIBLE N/A 8/18/2022    Procedure: Transnasal flexible laryngoscopy with KTP laser and biopsies;  Surgeon: Nikole Davis MD;  Location: [facility] OR    LASER KTP LARYNGOSCOPY FLEXIBLE N/A 10/27/2022    Procedure: Transnasal flexible laryngoscopy with possible KTP laser;  Surgeon: Nikole Davis MD;  Location: [facility] OR    LASER KTP LARYNGOSCOPY FLEXIBLE N/A 1/26/2023    Procedure: Transnasal flexible laryngoscopy with KTP laser,  biopsies, superior laryngeal nerve blocks, Avastin injection;  Surgeon: Nikole Davis MD;  Location: [facility] OR    LASER KTP LARYNGOSCOPY FLEXIBLE N/A 2/15/2023    Procedure: Transnasal flexible laryngoscopy with KTP laser, superior laryngeal nerve blocks, biopsies, avastin injection;  Surgeon: Nikole Davis MD;  Location: [facility] OR    LASER KTP LARYNGOSCOPY FLEXIBLE N/A 2/17/2023    Procedure: Transnasal flexible laryngoscopy with KTP laser, biopsies, superior laryngeal nerve blocks;  Surgeon: Nikole Davis MD;  Location: [facility] OR    LASER KTP LARYNGOSCOPY FLEXIBLE N/A 2/23/2023    Procedure: Transnasal flexible laryngoscopy with KTP laser, superior laryngeal nerve blocks;  Surgeon: Nikole Davis MD;  Location: [facility] OR    LASER KTP LARYNGOSCOPY FLEXIBLE N/A 3/2/2023    Procedure: Transnasal flexible laryngoscopy with KTP laser, superior laryngeal nerve block Bilateral;  Surgeon: Nikole aDvis MD;  Location: [facility] OR    LASER KTP LARYNGOSCOPY FLEXIBLE N/A 3/9/2023    Procedure: Transnasal flexible laryngoscopy with KTP laser, biopsies, superior laryngeal nerve blocks;  Surgeon: Nikole Davis MD;  Location: [facility] OR    LASER KTP LARYNGOSCOPY FLEXIBLE N/A 3/17/2023     Procedure: Transnasal flexible laryngoscopy with KTP laser, superior laryngeal nerve block(s), Avastin injection;  Surgeon: Nikole Davis MD;  Location: [facility] OR    LASER KTP LARYNGOSCOPY FLEXIBLE N/A 3/28/2023    Procedure: Transnasal flexible laryngoscopy with KTP laser, superior laryngeal nerve block(s);  Surgeon: Pankaj Woodard MD;  Location: [facility] OR      Allergies   Allergen Reactions    Methylpyrrolidone Anaphylaxis    Nuts Anaphylaxis     Black walnut    Escitalopram Other (See Comments)     Asthma -a time of exacerbation and may not have been cause may retry    Mirtazapine Other (See Comments)     Asthma a time of exacerbation and may not have been cause may retry    Venlafaxine Other (See Comments)    Adhesive Tape Rash     With holter monitor. Ok with bandaids.    No Clinical Screening - See Comments Rash     Adhesive tape      Social History     Tobacco Use    Smoking status: Never    Smokeless tobacco: Never   Substance Use Topics    Alcohol use: Yes     Comment: Occasionally      Wt Readings from Last 1 Encounters:   09/21/23 53.6 kg (118 lb 1.6 oz)        Anesthesia Evaluation   Pt has had prior anesthetic. Type: General.    No history of anesthetic complications       ROS/MED HX  ENT/Pulmonary: Comment: Prev airway 9/7/23  Final airway type: endotracheal airway       Successful airway: ETT - single and Laser  Endotracheal Airway Details        ETT size (mm): 5.0       Cuffed: yes       Cuff volume (mL): 10       Successful intubation technique: video laryngoscopy       VL Blade Size: Glidescope 3       Grade View of Cords: 1       Adjucts: stylet       Measured from: lips       Secured at (cm): 22       Bite block used: None      (+)                        moderate,  COPD,              Neurologic:       Cardiovascular:     (+)  - -   -  - -   Taking blood thinners  Instructions Given to patient: last dose on 10/2/23.                   dysrhythmias, a-fib, Irregular  Heartbeat/Palpitations,            METS/Exercise Tolerance:     Hematologic:  - neg hematologic  ROS     Musculoskeletal: Comment: Hx of C7 fracture in June 2023  (+)  arthritis,             GI/Hepatic:       Renal/Genitourinary:     (+) renal disease (3a CKD), type: CRI,            Endo: Comment: CREST syndrome    (+)          thyroid problem, papillary carcinoma of the thyroid,           Psychiatric/Substance Use:  - neg psychiatric ROS     Infectious Disease:       Malignancy:  - neg malignancy ROS     Other:            Physical Exam    Airway        Mallampati: I   TM distance: > 3 FB   Neck ROM: full   Mouth opening: > 3 cm    Respiratory Devices and Support         Dental       (+) Modest Abnormalities - crowns, retainers, 1 or 2 missing teeth    B=Bridge, C=Chipped, L=Loose, M=Missing    Cardiovascular       Comment: Afib, rate  in pre-op   Rhythm and rate: irregular and tachycardia     Pulmonary   pulmonary exam normal        breath sounds clear to auscultation           OUTSIDE LABS:  CBC:   Lab Results   Component Value Date    WBC 8.2 09/21/2023    WBC 12.2 (H) 04/04/2023    HGB 14.1 09/21/2023    HGB 13.1 04/04/2023    HCT 43.9 09/21/2023    HCT 42.2 04/04/2023     09/21/2023     04/04/2023     BMP:   Lab Results   Component Value Date     04/04/2023     04/03/2023    POTASSIUM 4.7 09/21/2023    POTASSIUM 4.7 04/04/2023    CHLORIDE 104 04/04/2023    CHLORIDE 102 04/03/2023    CO2 25 04/04/2023    CO2 21 (L) 04/03/2023    BUN 29.1 (H) 04/04/2023    BUN 21.7 04/03/2023    CR 1.16 (H) 09/21/2023    CR 1.19 (H) 04/04/2023     (H) 04/04/2023     (H) 04/03/2023     COAGS:   Lab Results   Component Value Date    PTT 37 04/02/2023    INR 1.16 (H) 04/02/2023     POC: No results found for: BGM, HCG, HCGS  HEPATIC:   Lab Results   Component Value Date    ALBUMIN 4.5 04/02/2023    PROTTOTAL 7.3 04/02/2023    ALT 12 04/02/2023    AST 18 04/02/2023    ALKPHOS 88 04/02/2023     BILITOTAL 0.5 04/02/2023     OTHER:   Lab Results   Component Value Date    PH 7.37 04/02/2023    LACT 0.9 04/02/2023    ESSIE 8.3 (L) 04/04/2023    MAG 1.9 04/02/2023       Anesthesia Plan    ASA Status:  3    NPO Status:  NPO Appropriate    Anesthesia Type: General.     - Airway: ETT   Induction: Intravenous, Propofol.   Maintenance: TIVA.   Techniques and Equipment:     - Airway: Video-Laryngoscope     - Lines/Monitors: BIS     Consents    Anesthesia Plan(s) and associated risks, benefits, and realistic alternatives discussed. Questions answered and patient/representative(s) expressed understanding.     - Discussed: Risks, Benefits and Alternatives for BOTH SEDATION and the PROCEDURE were discussed     - Discussed with:  Patient       - Patient is DNR/DNI Status: No     Use of blood products discussed: No .     Postoperative Care    Pain management: IV analgesics, Oral pain medications, Multi-modal analgesia.   PONV prophylaxis: Ondansetron (or other 5HT-3), Dexamethasone or Solumedrol     Comments:    Other Comments: 77 yo female PMHx papillomatosis, Afib w/ hx of RVR, CREST syndrome, vocal cord leukoplakia, stage 3 CKD presents for microdirect laryngoscopy with CO2 laser. NPO appropriate, plan GETA, TIVA and standard ASA monitors.             Melly Mcghee MD

## 2023-10-11 DIAGNOSIS — J44.9 CHRONIC OBSTRUCTIVE PULMONARY DISEASE, UNSPECIFIED COPD TYPE (H): ICD-10-CM

## 2023-10-11 DIAGNOSIS — Q31.9 DYSPLASIA OF LARYNX: ICD-10-CM

## 2023-10-11 DIAGNOSIS — J38.7 LARYNGEAL MASS: ICD-10-CM

## 2023-10-11 DIAGNOSIS — Z78.9 RECURRENT RESPIRATORY PAPILLOMATOSIS: Primary | ICD-10-CM

## 2023-10-11 DIAGNOSIS — R49.0 DYSPHONIA: ICD-10-CM

## 2023-10-11 LAB
PATH REPORT.COMMENTS IMP SPEC: NORMAL
PATH REPORT.FINAL DX SPEC: NORMAL
PATH REPORT.GROSS SPEC: NORMAL
PATH REPORT.INTRAOP OBS SPEC DOC: NORMAL
PATH REPORT.MICROSCOPIC SPEC OTHER STN: NORMAL
PATH REPORT.RELEVANT HX SPEC: NORMAL
PHOTO IMAGE: NORMAL

## 2023-10-18 ENCOUNTER — TELEPHONE (OUTPATIENT)
Dept: OTOLARYNGOLOGY | Facility: CLINIC | Age: 78
End: 2023-10-18
Payer: COMMERCIAL

## 2023-10-18 NOTE — TELEPHONE ENCOUNTER
Scheduled surgery with Dr. Davis on 12/21    Spoke with: patient    Surgery is located at [facility]    Patient will be seen for their H&P by:    PCP Janna Calderón within 30 days of surgery - Confirmed PCP on file is up to date - patient requested to see her PCP instead of PAC    Anesthesia type: General      Requested Imaging required for surgery: NA    Patient is scheduled for their 3 week post op on 1/5/24 at 8am    Patient will receive their packet via [facility] per their preference    Additional comments: ARIE Reddy on 10/18/2023 at 10:12 AM

## 2023-10-19 ENCOUNTER — PRE VISIT (OUTPATIENT)
Dept: SURGERY | Facility: CLINIC | Age: 78
End: 2023-10-19

## 2023-10-19 ENCOUNTER — PATIENT OUTREACH (OUTPATIENT)
Dept: OTOLARYNGOLOGY | Facility: CLINIC | Age: 78
End: 2023-10-19

## 2023-10-19 NOTE — PROGRESS NOTES
Called patient and let her know tat we needed to move her appointment from 11/3 to 11/6 at 4:30. Patient was agreeable to this and verbalized understanding of the situation. Inocencia Fan RN on 10/19/2023 at 9:15 AM

## 2023-11-06 ENCOUNTER — OFFICE VISIT (OUTPATIENT)
Dept: OTOLARYNGOLOGY | Facility: CLINIC | Age: 78
End: 2023-11-06
Payer: COMMERCIAL

## 2023-11-06 VITALS — SYSTOLIC BLOOD PRESSURE: 137 MMHG | DIASTOLIC BLOOD PRESSURE: 73 MMHG | HEART RATE: 85 BPM

## 2023-11-06 DIAGNOSIS — R49.0 DYSPHONIA: ICD-10-CM

## 2023-11-06 DIAGNOSIS — B49 FUNGAL INFECTION: Primary | ICD-10-CM

## 2023-11-06 PROCEDURE — 31575 DIAGNOSTIC LARYNGOSCOPY: CPT | Performed by: OTOLARYNGOLOGY

## 2023-11-06 PROCEDURE — 99213 OFFICE O/P EST LOW 20 MIN: CPT | Mod: 25 | Performed by: OTOLARYNGOLOGY

## 2023-11-06 RX ORDER — FLUCONAZOLE 100 MG/1
TABLET ORAL
Qty: 15 TABLET | Refills: 0 | Status: ON HOLD | OUTPATIENT
Start: 2023-11-06 | End: 2023-12-15

## 2023-11-06 NOTE — PATIENT INSTRUCTIONS
1.  You were seen in the ENT Clinic today by . If you have any questions or concerns after your appointment, please call [phone]. Press option #1 for scheduling related needs. Press option #3 for Nurse advice.    2.   has recommended the following:   - start and take fluconazole as directed. Prescription sent to your pharmacy   - surgery as scheduled. Will request to move slightly earlier date.    3.  Plan is to return to clinic for post operative follow up as scheduled.      Blanca Hyman LPN  [phone]  [facility] - Otolaryngology
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
## 2023-11-07 NOTE — TELEPHONE ENCOUNTER
Patient called back and rescheduled for 12/14. Patient is aware that their H&P must be within 30 days of their new surgery date. Patient understood and had no further questions.    Krys Reddy on 11/7/2023 at 9:58 AM

## 2023-11-07 NOTE — TELEPHONE ENCOUNTER
Left message with patient requesting to move surgery up to 12/14 per Dr. Christian preference    Krys Reddy, Perioperative Coordinator 11/7/2023 at 9:09 AM

## 2023-11-29 ENCOUNTER — PATIENT OUTREACH (OUTPATIENT)
Dept: OTOLARYNGOLOGY | Facility: CLINIC | Age: 78
End: 2023-11-29
Payer: COMMERCIAL

## 2023-11-29 ENCOUNTER — NURSE TRIAGE (OUTPATIENT)
Dept: NURSING | Facility: CLINIC | Age: 78
End: 2023-11-29
Payer: COMMERCIAL

## 2023-11-29 NOTE — PROGRESS NOTES
Got a hold of patient. Patient notes that she does not feel short of breath or have trouble swallowing. Patient did not sound short of breath or have stridor breathing like she has in the past when the papilloma was impeding her breathing. Patient will come to clinic Friday to be evaluated. Instructed patient to not eat or drink anything prior to coming to her appointment as surgery may be indicated that day. Patient was agreeable to this plan and verbalized understanding of the situation. Inocencia Fan RN on 11/29/2023 at 11:13 AM

## 2023-11-29 NOTE — PROGRESS NOTES
Called patient daughter to follow up triage call about increased difficulty of breathing. Per patient daughter, patient is having increased shortness of breath,  difficulty swallowing, raspy voice quality, and mirroring symptoms of papilloma regrowth. Patient denied all symptoms and believes it is related to a medication from her pulmonologist. Writer contacted provider and will update the patient and daughter once plan of care is established. Inocencia Fan RN on 11/29/2023 at 10:02 AM

## 2023-11-29 NOTE — TELEPHONE ENCOUNTER
"Patients daughter Beatris called. Breathing condition worsening and she is wondering if surgery scheduled 12/14 with Dr Davis should be moved up. Please call daughter - [phone]. See triage note.    Call received from patient's Daughter Beatris. States patient is have recurring symptoms of previous condition. Had a cold recently and breathing had worsened while recovering. Now having difficulty swallowing to point of \"choking\", dyspnea on exertion, fatigue, and increasing raspy voice. Per daugther, pt not convinced of her symptoms, believes it is a lung issue. 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 Told daughter message will be sent to [facility] and she will receive a call back from them today with further instructions. Daughter instructed to call back if symptoms worsen.      Reason for Disposition   [1] Hyperventilation attack AND [2] diagnosed in the past    Answer Assessment - Initial Assessment Questions  1. RESPIRATORY STATUS: \"Describe your breathing?\" (e.g., wheezing, shortness of breath, unable to speak, severe coughing)       \"Not to point of when sitting you can hear it, but very breathless with activity\"    2. ONSET: \"When did this breathing problem begin?\"       Has had cold, trouble breathing has started \"mostly during the recovery\"    3. PATTERN \"Does the difficult breathing come and go, or has it been constant since it started?\"   Consistently worsening    4. SEVERITY: \"How bad is your breathing?\" (e.g., mild, moderate, severe)     Between moderate and severe    5. RECURRENT SYMPTOM: \"Have you had difficulty breathing before?\" If Yes, ask: \"When was the last time?\" and \"What happened that time?\"        Prior to sept 14 surgery - was the last episode of this - had procedure to clear airway    6. CARDIAC HISTORY: \"Do you " "have any history of heart disease?\" (e.g., heart attack, angina, bypass surgery, angioplasty)   Hx of CREST, atrial fibrillation    7. LUNG HISTORY: \"Do you have any history of lung disease?\"  (e.g., pulmonary embolus, asthma, emphysema)   She was diagnosed with asthma in the 1960's    8. CAUSE: \"What do you think is causing the breathing problem?\"        Per patients Daughter Beatris \"I think its the HPV based on the way its manifesting. Has been starting to choke on food which is a big sign\"    9. OTHER SYMPTOMS: \"Do you have any other symptoms? (e.g., dizziness, runny nose, cough, chest pain, fever)      Difficulty swallowing to point of sometimes choking, beginning to self-restrict d/t this, fatigue, FELIZ. Raspy voice. Complaints about breathing by patient.    10. O2 SATURATION MONITOR:  \"Do you use an oxygen saturation monitor (pulse oximeter) at home?\" If Yes, ask: \"What is your reading (oxygen level) today?\" \"What is your usual oxygen saturation reading?\" (e.g., 95%)        Does not have current reading, pt does have one at home though.    11. PREGNANCY: \"Is there any chance you are pregnant?\" \"When was your last menstrual period?\"        No    12. TRAVEL: \"Have you traveled out of the country in the last month?\" (e.g., travel history, exposures)        Not asked.    Patient has Pulmonology appt tomorrow and pre-op on Fri for scheduled 12/14 surgery with Dr Davis.    Protocols used: Breathing Difficulty (Respiratory Distress)-P-AH, Breathing Difficulty-A-OH        "

## 2023-11-29 NOTE — PROGRESS NOTES
Called yinat times three as we are concerned after triage message. Writer a lot contacted daughter and granddaughter. Daughter will attempt to get into contact with her mother as provider would like to see patient in clinic. Inocencia Fan RN on 11/29/2023 at 11:10 AM

## 2023-12-01 ENCOUNTER — OFFICE VISIT (OUTPATIENT)
Dept: OTOLARYNGOLOGY | Facility: CLINIC | Age: 78
End: 2023-12-01
Payer: COMMERCIAL

## 2023-12-01 VITALS
HEIGHT: 65 IN | SYSTOLIC BLOOD PRESSURE: 130 MMHG | DIASTOLIC BLOOD PRESSURE: 69 MMHG | WEIGHT: 117 LBS | HEART RATE: 87 BPM | OXYGEN SATURATION: 89 % | BODY MASS INDEX: 19.49 KG/M2

## 2023-12-01 DIAGNOSIS — Z78.9 RECURRENT RESPIRATORY PAPILLOMATOSIS: Primary | ICD-10-CM

## 2023-12-01 DIAGNOSIS — Q31.9 DYSPLASIA OF LARYNX: ICD-10-CM

## 2023-12-01 DIAGNOSIS — R49.0 DYSPHONIA: ICD-10-CM

## 2023-12-01 DIAGNOSIS — J44.9 CHRONIC OBSTRUCTIVE PULMONARY DISEASE, UNSPECIFIED COPD TYPE (H): ICD-10-CM

## 2023-12-01 PROCEDURE — 31579 LARYNGOSCOPY TELESCOPIC: CPT | Performed by: OTOLARYNGOLOGY

## 2023-12-01 PROCEDURE — 99213 OFFICE O/P EST LOW 20 MIN: CPT | Mod: 25 | Performed by: OTOLARYNGOLOGY

## 2023-12-01 ASSESSMENT — PAIN SCALES - GENERAL: PAINLEVEL: NO PAIN (0)
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
## 2023-12-04 ENCOUNTER — TRANSCRIBE ORDERS (OUTPATIENT)
Dept: OTHER | Age: 78
End: 2023-12-04

## 2023-12-04 DIAGNOSIS — R25.1 TREMOR: Primary | ICD-10-CM

## 2023-12-11 ENCOUNTER — PATIENT OUTREACH (OUTPATIENT)
Dept: OTOLARYNGOLOGY | Facility: CLINIC | Age: 78
End: 2023-12-11
Payer: COMMERCIAL

## 2023-12-11 NOTE — PROGRESS NOTES
Patient called to ask for her time for surgery. Writer let patient know that we do not know the time yet but someone from [facility] would be reaching out probably on Wednesday to give her a time for the surgery. Patient was agreeable and understanding of the current plan of care. Inocencia Fan RN on 12/11/2023 at 1:30 PM

## 2023-12-13 ENCOUNTER — ANESTHESIA EVENT (OUTPATIENT)
Dept: SURGERY | Facility: CLINIC | Age: 78
End: 2023-12-13
Payer: COMMERCIAL

## 2023-12-14 ENCOUNTER — APPOINTMENT (OUTPATIENT)
Dept: CT IMAGING | Facility: CLINIC | Age: 78
End: 2023-12-14
Attending: OTOLARYNGOLOGY
Payer: COMMERCIAL

## 2023-12-14 ENCOUNTER — ANESTHESIA (OUTPATIENT)
Dept: SURGERY | Facility: CLINIC | Age: 78
End: 2023-12-14
Payer: COMMERCIAL

## 2023-12-14 ENCOUNTER — HOSPITAL ENCOUNTER (OUTPATIENT)
Facility: CLINIC | Age: 78
Discharge: HOME | End: 2023-12-15
Attending: OTOLARYNGOLOGY | Admitting: OTOLARYNGOLOGY
Payer: COMMERCIAL

## 2023-12-14 DIAGNOSIS — Z98.890 POSTOPERATIVE STATE: ICD-10-CM

## 2023-12-14 DIAGNOSIS — Z78.9 RECURRENT RESPIRATORY PAPILLOMATOSIS: Primary | ICD-10-CM
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
## 2023-12-14 PROCEDURE — 70450 CT HEAD/BRAIN W/O DYE: CPT | Mod: 26 | Performed by: RADIOLOGY

## 2023-12-14 PROCEDURE — 258N000003 HC RX IP 258 OP 636: Performed by: NURSE ANESTHETIST, CERTIFIED REGISTERED

## 2023-12-14 PROCEDURE — 250N000009 HC RX 250

## 2023-12-14 PROCEDURE — 250N000009 HC RX 250: Performed by: OTOLARYNGOLOGY

## 2023-12-14 PROCEDURE — 88305 TISSUE EXAM BY PATHOLOGIST: CPT | Mod: 26 | Performed by: PATHOLOGY

## 2023-12-14 PROCEDURE — 250N000011 HC RX IP 250 OP 636: Performed by: NURSE ANESTHETIST, CERTIFIED REGISTERED

## 2023-12-14 PROCEDURE — 360N000077 HC SURGERY LEVEL 4, PER MIN: Performed by: OTOLARYNGOLOGY

## 2023-12-14 PROCEDURE — 710N000011 HC RECOVERY PHASE 1, LEVEL 3, PER MIN: Performed by: OTOLARYNGOLOGY

## 2023-12-14 PROCEDURE — 370N000017 HC ANESTHESIA TECHNICAL FEE, PER MIN: Performed by: OTOLARYNGOLOGY

## 2023-12-14 PROCEDURE — 72125 CT NECK SPINE W/O DYE: CPT

## 2023-12-14 PROCEDURE — 88305 TISSUE EXAM BY PATHOLOGIST: CPT | Mod: TC | Performed by: OTOLARYNGOLOGY

## 2023-12-14 PROCEDURE — 250N000013 HC RX MED GY IP 250 OP 250 PS 637

## 2023-12-14 PROCEDURE — 250N000011 HC RX IP 250 OP 636: Performed by: OTOLARYNGOLOGY

## 2023-12-14 PROCEDURE — 999N000141 HC STATISTIC PRE-PROCEDURE NURSING ASSESSMENT: Performed by: OTOLARYNGOLOGY

## 2023-12-14 PROCEDURE — 72125 CT NECK SPINE W/O DYE: CPT | Mod: 26 | Performed by: RADIOLOGY

## 2023-12-14 PROCEDURE — 70450 CT HEAD/BRAIN W/O DYE: CPT

## 2023-12-14 PROCEDURE — 250N000009 HC RX 250: Performed by: NURSE ANESTHETIST, CERTIFIED REGISTERED

## 2023-12-14 PROCEDURE — 272N000001 HC OR GENERAL SUPPLY STERILE: Performed by: OTOLARYNGOLOGY

## 2023-12-14 PROCEDURE — 710N000012 HC RECOVERY PHASE 2, PER MINUTE: Performed by: OTOLARYNGOLOGY

## 2023-12-14 PROCEDURE — 250N000011 HC RX IP 250 OP 636: Mod: JZ | Performed by: OTOLARYNGOLOGY

## 2023-12-14 RX ORDER — ESMOLOL HYDROCHLORIDE 10 MG/ML
INJECTION INTRAVENOUS PRN
Status: DISCONTINUED | OUTPATIENT
Start: 2023-12-14 | End: 2023-12-14

## 2023-12-14 RX ORDER — ONDANSETRON 2 MG/ML
4 INJECTION INTRAMUSCULAR; INTRAVENOUS EVERY 6 HOURS PRN
Status: DISCONTINUED | OUTPATIENT
Start: 2023-12-14 | End: 2023-12-15 | Stop reason: HOSPADM

## 2023-12-14 RX ORDER — LIDOCAINE HYDROCHLORIDE 40 MG/ML
SOLUTION TOPICAL PRN
Status: DISCONTINUED | OUTPATIENT
Start: 2023-12-14 | End: 2023-12-14 | Stop reason: HOSPADM

## 2023-12-14 RX ORDER — AMOXICILLIN 250 MG
1 CAPSULE ORAL 2 TIMES DAILY
Status: DISCONTINUED | OUTPATIENT
Start: 2023-12-14 | End: 2023-12-15 | Stop reason: HOSPADM

## 2023-12-14 RX ORDER — DILTIAZEM HYDROCHLORIDE 240 MG/1
240 CAPSULE, COATED, EXTENDED RELEASE ORAL 2 TIMES DAILY
Status: DISCONTINUED | OUTPATIENT
Start: 2023-12-14 | End: 2023-12-15 | Stop reason: HOSPADM

## 2023-12-14 RX ORDER — FLUTICASONE FUROATE AND VILANTEROL 100; 25 UG/1; UG/1
1 POWDER RESPIRATORY (INHALATION) DAILY
Status: DISCONTINUED | OUTPATIENT
Start: 2023-12-15 | End: 2023-12-15 | Stop reason: HOSPADM

## 2023-12-14 RX ORDER — POLYETHYLENE GLYCOL 3350 17 G/17G
17 POWDER, FOR SOLUTION ORAL DAILY
Status: DISCONTINUED | OUTPATIENT
Start: 2023-12-15 | End: 2023-12-15 | Stop reason: HOSPADM

## 2023-12-14 RX ORDER — AMPICILLIN AND SULBACTAM 1; .5 G/1; G/1
1.5 INJECTION, POWDER, FOR SOLUTION INTRAMUSCULAR; INTRAVENOUS SEE ADMIN INSTRUCTIONS
Status: DISCONTINUED | OUTPATIENT
Start: 2023-12-14 | End: 2023-12-14 | Stop reason: HOSPADM

## 2023-12-14 RX ORDER — ONDANSETRON 2 MG/ML
4 INJECTION INTRAMUSCULAR; INTRAVENOUS EVERY 30 MIN PRN
Status: DISCONTINUED | OUTPATIENT
Start: 2023-12-14 | End: 2023-12-14 | Stop reason: HOSPADM

## 2023-12-14 RX ORDER — BISACODYL 10 MG
10 SUPPOSITORY, RECTAL RECTAL DAILY PRN
Status: DISCONTINUED | OUTPATIENT
Start: 2023-12-14 | End: 2023-12-15 | Stop reason: HOSPADM

## 2023-12-14 RX ORDER — ONDANSETRON 4 MG/1
4 TABLET, ORALLY DISINTEGRATING ORAL EVERY 30 MIN PRN
Status: DISCONTINUED | OUTPATIENT
Start: 2023-12-14 | End: 2023-12-14

## 2023-12-14 RX ORDER — OXYCODONE HYDROCHLORIDE 5 MG/1
5 TABLET ORAL
Status: DISCONTINUED | OUTPATIENT
Start: 2023-12-14 | End: 2023-12-14

## 2023-12-14 RX ORDER — OXYCODONE HYDROCHLORIDE 10 MG/1
10 TABLET ORAL
Status: DISCONTINUED | OUTPATIENT
Start: 2023-12-14 | End: 2023-12-14

## 2023-12-14 RX ORDER — HYDROMORPHONE HCL IN WATER/PF 6 MG/30 ML
0.2 PATIENT CONTROLLED ANALGESIA SYRINGE INTRAVENOUS EVERY 5 MIN PRN
Status: DISCONTINUED | OUTPATIENT
Start: 2023-12-14 | End: 2023-12-14 | Stop reason: HOSPADM

## 2023-12-14 RX ORDER — SODIUM CHLORIDE, SODIUM LACTATE, POTASSIUM CHLORIDE, CALCIUM CHLORIDE 600; 310; 30; 20 MG/100ML; MG/100ML; MG/100ML; MG/100ML
INJECTION, SOLUTION INTRAVENOUS CONTINUOUS PRN
Status: DISCONTINUED | OUTPATIENT
Start: 2023-12-14 | End: 2023-12-14

## 2023-12-14 RX ORDER — ONDANSETRON 2 MG/ML
4 INJECTION INTRAMUSCULAR; INTRAVENOUS EVERY 30 MIN PRN
Status: DISCONTINUED | OUTPATIENT
Start: 2023-12-14 | End: 2023-12-14

## 2023-12-14 RX ORDER — DEXAMETHASONE SODIUM PHOSPHATE 4 MG/ML
10 INJECTION, SOLUTION INTRA-ARTICULAR; INTRALESIONAL; INTRAMUSCULAR; INTRAVENOUS; SOFT TISSUE ONCE
Status: COMPLETED | OUTPATIENT
Start: 2023-12-14 | End: 2023-12-14

## 2023-12-14 RX ORDER — LIDOCAINE 40 MG/G
CREAM TOPICAL
Status: DISCONTINUED | OUTPATIENT
Start: 2023-12-14 | End: 2023-12-14 | Stop reason: HOSPADM

## 2023-12-14 RX ORDER — ACETAMINOPHEN 325 MG/1
650 TABLET ORAL EVERY 4 HOURS PRN
Status: DISCONTINUED | OUTPATIENT
Start: 2023-12-17 | End: 2023-12-15 | Stop reason: HOSPADM

## 2023-12-14 RX ORDER — ONDANSETRON 4 MG/1
4 TABLET, ORALLY DISINTEGRATING ORAL EVERY 30 MIN PRN
Status: DISCONTINUED | OUTPATIENT
Start: 2023-12-14 | End: 2023-12-14 | Stop reason: HOSPADM

## 2023-12-14 RX ORDER — LIDOCAINE 40 MG/G
CREAM TOPICAL
Status: DISCONTINUED | OUTPATIENT
Start: 2023-12-14 | End: 2023-12-15 | Stop reason: HOSPADM

## 2023-12-14 RX ORDER — EPINEPHRINE 0.1 MG/ML
INJECTION INTRAVENOUS PRN
Status: DISCONTINUED | OUTPATIENT
Start: 2023-12-14 | End: 2023-12-14 | Stop reason: HOSPADM

## 2023-12-14 RX ORDER — LIDOCAINE HYDROCHLORIDE 20 MG/ML
INJECTION, SOLUTION INFILTRATION; PERINEURAL PRN
Status: DISCONTINUED | OUTPATIENT
Start: 2023-12-14 | End: 2023-12-14

## 2023-12-14 RX ORDER — HYDROMORPHONE HCL IN WATER/PF 6 MG/30 ML
0.4 PATIENT CONTROLLED ANALGESIA SYRINGE INTRAVENOUS EVERY 5 MIN PRN
Status: DISCONTINUED | OUTPATIENT
Start: 2023-12-14 | End: 2023-12-14 | Stop reason: HOSPADM

## 2023-12-14 RX ORDER — SODIUM CHLORIDE, SODIUM LACTATE, POTASSIUM CHLORIDE, CALCIUM CHLORIDE 600; 310; 30; 20 MG/100ML; MG/100ML; MG/100ML; MG/100ML
INJECTION, SOLUTION INTRAVENOUS CONTINUOUS
Status: DISCONTINUED | OUTPATIENT
Start: 2023-12-14 | End: 2023-12-14 | Stop reason: HOSPADM

## 2023-12-14 RX ORDER — LABETALOL HYDROCHLORIDE 5 MG/ML
INJECTION, SOLUTION INTRAVENOUS PRN
Status: DISCONTINUED | OUTPATIENT
Start: 2023-12-14 | End: 2023-12-14

## 2023-12-14 RX ORDER — FENTANYL CITRATE 50 UG/ML
25 INJECTION, SOLUTION INTRAMUSCULAR; INTRAVENOUS EVERY 5 MIN PRN
Status: DISCONTINUED | OUTPATIENT
Start: 2023-12-14 | End: 2023-12-14 | Stop reason: HOSPADM

## 2023-12-14 RX ORDER — ALBUTEROL SULFATE 90 UG/1
2 AEROSOL, METERED RESPIRATORY (INHALATION)
Status: DISCONTINUED | OUTPATIENT
Start: 2023-12-14 | End: 2023-12-15 | Stop reason: HOSPADM

## 2023-12-14 RX ORDER — ACETAMINOPHEN 325 MG/1
975 TABLET ORAL EVERY 8 HOURS
Qty: 27 TABLET | Refills: 0 | Status: DISCONTINUED | OUTPATIENT
Start: 2023-12-14 | End: 2023-12-15 | Stop reason: HOSPADM

## 2023-12-14 RX ORDER — PROPOFOL 10 MG/ML
INJECTION, EMULSION INTRAVENOUS PRN
Status: DISCONTINUED | OUTPATIENT
Start: 2023-12-14 | End: 2023-12-14

## 2023-12-14 RX ORDER — ONDANSETRON 2 MG/ML
INJECTION INTRAMUSCULAR; INTRAVENOUS PRN
Status: DISCONTINUED | OUTPATIENT
Start: 2023-12-14 | End: 2023-12-14

## 2023-12-14 RX ORDER — AMPICILLIN AND SULBACTAM 2; 1 G/1; G/1
3 INJECTION, POWDER, FOR SOLUTION INTRAMUSCULAR; INTRAVENOUS
Status: COMPLETED | OUTPATIENT
Start: 2023-12-14 | End: 2023-12-14

## 2023-12-14 RX ORDER — ONDANSETRON 4 MG/1
4 TABLET, ORALLY DISINTEGRATING ORAL EVERY 6 HOURS PRN
Status: DISCONTINUED | OUTPATIENT
Start: 2023-12-14 | End: 2023-12-15 | Stop reason: HOSPADM

## 2023-12-14 RX ORDER — ATORVASTATIN CALCIUM 20 MG/1
20 TABLET, FILM COATED ORAL EVERY EVENING
Status: DISCONTINUED | OUTPATIENT
Start: 2023-12-14 | End: 2023-12-15 | Stop reason: HOSPADM

## 2023-12-14 RX ORDER — FENTANYL CITRATE 50 UG/ML
50 INJECTION, SOLUTION INTRAMUSCULAR; INTRAVENOUS EVERY 5 MIN PRN
Status: DISCONTINUED | OUTPATIENT
Start: 2023-12-14 | End: 2023-12-14 | Stop reason: HOSPADM

## 2023-12-14 RX ORDER — PROPOFOL 10 MG/ML
INJECTION, EMULSION INTRAVENOUS CONTINUOUS PRN
Status: DISCONTINUED | OUTPATIENT
Start: 2023-12-14 | End: 2023-12-14

## 2023-12-14 RX ORDER — FENTANYL CITRATE 50 UG/ML
INJECTION, SOLUTION INTRAMUSCULAR; INTRAVENOUS PRN
Status: DISCONTINUED | OUTPATIENT
Start: 2023-12-14 | End: 2023-12-14

## 2023-12-14 RX ORDER — IPRATROPIUM BROMIDE AND ALBUTEROL SULFATE 2.5; .5 MG/3ML; MG/3ML
1 SOLUTION RESPIRATORY (INHALATION) EVERY 6 HOURS PRN
Status: DISCONTINUED | OUTPATIENT
Start: 2023-12-14 | End: 2023-12-15 | Stop reason: HOSPADM

## 2023-12-14 RX ORDER — IPRATROPIUM BROMIDE AND ALBUTEROL SULFATE 2.5; .5 MG/3ML; MG/3ML
3 SOLUTION RESPIRATORY (INHALATION) ONCE
Status: COMPLETED | OUTPATIENT
Start: 2023-12-14 | End: 2023-12-14

## 2023-12-14 RX ORDER — PROCHLORPERAZINE MALEATE 5 MG
5 TABLET ORAL EVERY 6 HOURS PRN
Status: DISCONTINUED | OUTPATIENT
Start: 2023-12-14 | End: 2023-12-15 | Stop reason: HOSPADM

## 2023-12-14 RX ADMIN — AMPICILLIN SODIUM AND SULBACTAM SODIUM 1.5 G: 2; 1 INJECTION, POWDER, FOR SOLUTION INTRAMUSCULAR; INTRAVENOUS at 09:34

## 2023-12-14 RX ADMIN — AMPICILLIN SODIUM AND SULBACTAM SODIUM 3 G: 2; 1 INJECTION, POWDER, FOR SOLUTION INTRAMUSCULAR; INTRAVENOUS at 07:40

## 2023-12-14 RX ADMIN — FENTANYL CITRATE 50 MCG: 50 INJECTION INTRAMUSCULAR; INTRAVENOUS at 08:27

## 2023-12-14 RX ADMIN — DILTIAZEM HYDROCHLORIDE 240 MG: 240 CAPSULE, EXTENDED RELEASE ORAL at 20:03

## 2023-12-14 RX ADMIN — FENTANYL CITRATE 100 MCG: 50 INJECTION INTRAMUSCULAR; INTRAVENOUS at 08:10

## 2023-12-14 RX ADMIN — Medication 20 MG: at 09:25

## 2023-12-14 RX ADMIN — PROPOFOL 100 MG: 10 INJECTION, EMULSION INTRAVENOUS at 07:44

## 2023-12-14 RX ADMIN — ONDANSETRON 4 MG: 2 INJECTION INTRAMUSCULAR; INTRAVENOUS at 07:44

## 2023-12-14 RX ADMIN — SENNOSIDES AND DOCUSATE SODIUM 1 TABLET: 8.6; 5 TABLET ORAL at 20:03

## 2023-12-14 RX ADMIN — IPRATROPIUM BROMIDE AND ALBUTEROL SULFATE 3 ML: .5; 3 SOLUTION RESPIRATORY (INHALATION) at 10:59

## 2023-12-14 RX ADMIN — Medication 50 MG: at 07:44

## 2023-12-14 RX ADMIN — LABETALOL HYDROCHLORIDE 5 MG: 5 INJECTION INTRAVENOUS at 08:30

## 2023-12-14 RX ADMIN — PHENYLEPHRINE HYDROCHLORIDE 200 MCG: 10 INJECTION INTRAVENOUS at 07:55

## 2023-12-14 RX ADMIN — ATORVASTATIN CALCIUM 20 MG: 20 TABLET, FILM COATED ORAL at 20:03

## 2023-12-14 RX ADMIN — ACETAMINOPHEN 975 MG: 325 TABLET, FILM COATED ORAL at 16:51

## 2023-12-14 RX ADMIN — ESMOLOL HYDROCHLORIDE 30 MG: 10 INJECTION, SOLUTION INTRAVENOUS at 08:12

## 2023-12-14 RX ADMIN — Medication 20 MG: at 08:19

## 2023-12-14 RX ADMIN — DEXAMETHASONE SODIUM PHOSPHATE 10 MG: 4 INJECTION, SOLUTION INTRAMUSCULAR; INTRAVENOUS at 07:44

## 2023-12-14 RX ADMIN — SODIUM CHLORIDE, POTASSIUM CHLORIDE, SODIUM LACTATE AND CALCIUM CHLORIDE: 600; 310; 30; 20 INJECTION, SOLUTION INTRAVENOUS at 07:44

## 2023-12-14 RX ADMIN — Medication 20 MG: at 08:50

## 2023-12-14 RX ADMIN — SUGAMMADEX 200 MG: 100 INJECTION, SOLUTION INTRAVENOUS at 10:03

## 2023-12-14 RX ADMIN — LIDOCAINE HYDROCHLORIDE 100 MG: 20 INJECTION, SOLUTION INFILTRATION; PERINEURAL at 07:44

## 2023-12-14 RX ADMIN — FENTANYL CITRATE 100 MCG: 50 INJECTION INTRAMUSCULAR; INTRAVENOUS at 07:44

## 2023-12-14 RX ADMIN — PROPOFOL 150 MCG/KG/MIN: 10 INJECTION, EMULSION INTRAVENOUS at 07:44

## 2023-12-14 ASSESSMENT — ACTIVITIES OF DAILY LIVING (ADL)
ADLS_ACUITY_SCORE: 37
ADLS_ACUITY_SCORE: 37
ADLS_ACUITY_SCORE: 22
ADLS_ACUITY_SCORE: 35
ADLS_ACUITY_SCORE: 37
ADLS_ACUITY_SCORE: 37
ADLS_ACUITY_SCORE: 35
ADLS_ACUITY_SCORE: 22
ADLS_ACUITY_SCORE: 35

## 2023-12-14 ASSESSMENT — ENCOUNTER SYMPTOMS: DYSRHYTHMIAS: 1

## 2023-12-14 ASSESSMENT — COPD QUESTIONNAIRES
COPD: 1
CAT_SEVERITY: MODERATE

## 2023-12-14 NOTE — OP NOTE
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 The patient wished to proceed with surgery and presented for the procedure(s) listed above. We reviewed perioperative risks, including bleeding/infection/pain, injury to surrounding structures, potential changes to tongue/voice/swallow function, risk of needing additional procedures (e.g., due to persistence or recurrence), and risks of anesthesia (including heart attack, stroke, death). She elected to proceed and written informed consent was performed.    DESCRIPTION OF PROCEDURE:   The patient was brought to the operating room and placed supine. A time-out was called to verify patient identity, operative site, and planned procedure. The operative site was prepped in the usual clean fashion. Moist gauze eye pads were placed and secured. A head wrap was placed, and custom molded thermoplastic tooth guards were placed. Direct laryngoscopy was performed with an Ossoff-Pilling laryngoscope, which was placed in suspension. The vocal folds were examined with 0 and 70 degree telescopes. Multiple biopsies were taken under telescopic visualization, as listed below.    The operating microscope was then brought into position. Laser safety precautions were utilized at all times, including eye protection for the patient and staff, use of  wet towels, and appropriately minimized FiO2. As needed, moistened cottonoids or laser-safe backstops were used to protect the endotracheal tube from the laser. The Lumenis CO2 Accublade laser was attached to the microscope and used at a depth setting of 1 and 8 Cardoza. The laser was set to a Pokagon shape and used to ablate numerous clusters of papilloma along the posterior endolarynx, with care to avoid completely contiguous demucosalization due to the risk of posterior glottic stenosis. The laryngoscope was repositioned multiple times to allow access to the posterior larynx and subglottis as well. A limited cluster of papilloma along the left anterior commissure was ablated; some of the thick leukoplakic tissue along the right superior posterior true vocal fold was also ablated. Cottonoids soaked in 1:10,000 epinephrine were sparingly used to maintain hemostasis.     After laser ablation was completed, Avastin was injected under telescopic visualization into all of the affected regions; a total of ~1.2 ml was injected.    As needed during the procedure and at the conclusion of the procedure, the operating microscope was moved out of position and the 0 and 70 degree rigid endoscopes were used to examine the vocal folds and confirm completion of the stated objectives of the procedure. After cottonoid and sponge counts were confirmed correct, 2 cc of 4% lidocaine were applied transglottically for laryngotracheal anesthesia. The laryngoscope and tooth guards were removed. The lips, teeth, and tongue were examined and no injuries were noted. Care of the patient was returned to Anesthesia.    FINDINGS:   Easy mask, uneventful intubation; good laryngeal exposure with Ossoff Pilling laryngoscope. Moderate to heavy burden of papilloma across posterior larynx, including right and left infraglottis, glottis, and supraglottis. Small area of papilloma left untreated along midline posteriorly to minimize risk of posterior  laryngeal stenosis. A limited cluster of papilloma along the left anterior commissure was ablated; some of the thick leukoplakic tissue along the right superior posterior true vocal fold was also ablated.    SPECIMEN(S):   1 : left posterior supraglottis   2 : left posterior infraglottis   3 : right posterior supraglottis   4 : right posterior infraglottis     DRAINS: None    ESTIMATED BLOOD LOSS: Minimal    COMPLICATIONS: None    DISPOSITION: Stable to PACU    ATTENDING PRESENCE STATEMENT:  I was present and participating for the entire procedure from beginning to completion.

## 2023-12-14 NOTE — PROGRESS NOTES
OBS GOALS:   Ambulating: met  Mentating at baseline: met  Pain controlled: met  Respiratory status at baseline: met

## 2023-12-14 NOTE — OR NURSING
Updated daughter Lana regarding admission plan, she expressed concern for another neck fracture due to fall today, as patient had a fracture in her neck of January 5th 2023 and needed a neck brace until the end of June 2023. Pt denies pain in her neck or bilateral arms, strength equal in all extremities. Paged ENT resident who discussed with daughter her concerns, neck CT ordered to rule out any fracture.     ENT resident at bedside and placed 1 suture in forehead laceration. Updated floor RN and family.

## 2023-12-14 NOTE — BRIEF OP NOTE
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]  Chronic obstructive pulmonary disease, unspecified COPD type (H) [J44.9]   Post-operative diagnosis As above   Procedure: Procedure(s):  Microdirect laryngoscopy with excision/ablation of laryngeal lesions, biopsies, CO2 laser  Avastin injection under telescopic visualization   Surgeon(s): Nikole Davis MD - Primary   Estimated blood loss: Minimal    Specimens: ID   1 : left posterior supraglottis   2 : left posterior infraglottis   3 : right posterior supraglottis   4 : right posterior infraglottis      Findings: Easy mask, uneventful intubation; good laryngeal exposure with Ossoff Pilling laryngoscope. Moderate to heavy burden of papilloma across posterior larynx, including right and left infraglottis, glottis, and supraglottis. Small area of papilloma left untreated along midline to minimize risk of posterior laryngeal stenosis.

## 2023-12-14 NOTE — SIGNIFICANT EVENT
Patient arrived to phase II and was getting dressed with NST. Was crossing legs to put on socks on the edge of the bed and lost her balance and fell forward off the bed and hit her head directly to the floor. RN notified immediately. Patient never lost consciousness. Assisted back to the bed vitals remain stable. No pain, dizziness, or change in vision. Forehead bleeding from cut/laceration above left eyebrow. Bruise developing as well. Bandage and ice applied.    Anesthesia notified. Patient on eliquis at home, has been on hold for 2 days prior to procedure. CT head ordered. Recommending patient be in observation overnight.    Surgery team notified and at bedside to evaluate/recommend plan.    Family updated on plan for observation overnight.

## 2023-12-14 NOTE — PROGRESS NOTES
Postop check    Was notified that patient had a mechanical fall and injury to forehead while changing.   Patient currently reporting no significant pain, no visual changes, feeling fine other than feeling embarrassed.     On exam, patient, awake, alert, oriented, conversant. Ecchymosis over left forehead, no periorbital abnormalities. Breathing comfortably. Voice with mild to moderate dysphonia, characterized by roughness and  strain.    CT was negative for acute changes.     Plan to observe overnight given ongoing use of anticoagulation for her atrial fibrillation. Otherwise, routine post-op care.

## 2023-12-14 NOTE — DISCHARGE INSTRUCTIONS
Contacting your Doctor -   To contact a doctor, call Dr Davis at  the ENT- Otolaryngology Clinic at [phone] or:  [phone] and ask for the resident on call for ENT-Otolaryngology (answered 24 hours a day)   Emergency Department:  [facility]: [phone]  [facility]: [phone] 911 if you are in need of immediate or emergent help

## 2023-12-14 NOTE — ANESTHESIA PREPROCEDURE EVALUATION
Anesthesia Pre-Procedure Evaluation    Patient: Asya Coffman   MRN: [id number] : 1945        Procedure : Procedure(s):  Microdirect laryngoscopy with excision/ablation of laryngeal lesions, possible biopsies, possible CO2 laser  possible Avastin injection          Past Medical History:   Diagnosis Date    A-fib (H)     Antiplatelet or antithrombotic long-term use     Arrhythmia     COPD (chronic obstructive pulmonary disease) (H)       Past Surgical History:   Procedure Laterality Date    INJECT STEROID (LOCATION) N/A 6/15/2023    Procedure: Avastin injection;  Surgeon: Nikole Davis MD;  Location: [facility]    INJECT STEROID (LOCATION) N/A 2023    Procedure: Avastin injection;  Surgeon: Nikole Davis MD;  Location: [facility]    LASER CO2 LARYNGOSCOPY N/A 2023    Procedure: Microdirect laryngoscopy with excision/ablation of laryngeal lesions, biopsies, CO2 laser;  Surgeon: Nikole Davis MD;  Location: [facility]    LASER CO2 LARYNGOSCOPY, COMPLEX N/A 2022    Procedure: Micro direct laryngoscopy with excision/ablation of laryngeal lesions, possible biopsies, possible CO2 laser;  Surgeon: Nikole Davis MD;  Location: [facility]    LASER CO2 LARYNGOSCOPY, COMPLEX N/A 9/15/2022    Procedure: Microdirect laryngoscopy with ablation of laryngeal lesions,biopsies,CO2 laser;  Surgeon: Nikole Davis MD;  Location: [facility]    LASER CO2 LARYNGOSCOPY, COMPLEX N/A 2022    Procedure: Microdirect laryngoscopy with excision/ablation of laryngeal lesions, biopsies,   CO2 laser;  Surgeon: Nikole Dvais MD;  Location: [facility]    LASER CO2 LARYNGOSCOPY, COMPLEX N/A 6/15/2023    Procedure: Microdirect laryngoscopy with ablation of laryngeal lesions, biopsies, CO2 laser;  Surgeon: Nikole Davis MD;  Location: [facility]    LASER CO2 LARYNGOSCOPY, COMPLEX N/A 10/5/2023    Procedure: Microdirect laryngoscopy with excision/ablation of laryngeal lesions,  biopsies, CO2 laser;  Surgeon: Nikole Davis MD;  Location: [facility]    LASER KTP LARYNGOSCOPY N/A 4/3/2023    Procedure: Microdirect laryngoscopy with biopsies, excision/ablation of lesions, C02 laser,  Avastin injection;  Surgeon: Nikole Davis MD;  Location: [facility]    LASER KTP LARYNGOSCOPY N/A 6/15/2023    Procedure: flexible laser (CO2 or KTP) standby;  Surgeon: Nikole Davis MD;  Location: [facility]    LASER KTP LARYNGOSCOPY FLEXIBLE N/A 8/18/2022    Procedure: Transnasal flexible laryngoscopy with KTP laser and biopsies;  Surgeon: Nikole Davis MD;  Location: [facility]    LASER KTP LARYNGOSCOPY FLEXIBLE N/A 10/27/2022    Procedure: Transnasal flexible laryngoscopy with possible KTP laser;  Surgeon: Nikole Davis MD;  Location: [facility]    LASER KTP LARYNGOSCOPY FLEXIBLE N/A 1/26/2023    Procedure: Transnasal flexible laryngoscopy with KTP laser,  biopsies, superior laryngeal nerve blocks, Avastin injection;  Surgeon: Nikole Davis MD;  Location: [facility]    LASER KTP LARYNGOSCOPY FLEXIBLE N/A 2/15/2023    Procedure: Transnasal flexible laryngoscopy with KTP laser, superior laryngeal nerve blocks, biopsies, avastin injection;  Surgeon: Nikole Davis MD;  Location: [facility]    LASER KTP LARYNGOSCOPY FLEXIBLE N/A 2/17/2023    Procedure: Transnasal flexible laryngoscopy with KTP laser, biopsies, superior laryngeal nerve blocks;  Surgeon: Nikole Davis MD;  Location: [facility]    LASER KTP LARYNGOSCOPY FLEXIBLE N/A 2/23/2023    Procedure: Transnasal flexible laryngoscopy with KTP laser, superior laryngeal nerve blocks;  Surgeon: Nikole Davis MD;  Location: [facility]    LASER KTP LARYNGOSCOPY FLEXIBLE N/A 3/2/2023    Procedure: Transnasal flexible laryngoscopy with KTP laser, superior laryngeal nerve block Bilateral;  Surgeon: Nikole Davis MD;  Location: [facility]    LASER KTP LARYNGOSCOPY FLEXIBLE N/A 3/9/2023     Procedure: Transnasal flexible laryngoscopy with KTP laser, biopsies, superior laryngeal nerve blocks;  Surgeon: Nikole Davis MD;  Location: [facility]    LASER KTP LARYNGOSCOPY FLEXIBLE N/A 3/17/2023    Procedure: Transnasal flexible laryngoscopy with KTP laser, superior laryngeal nerve block(s), Avastin injection;  Surgeon: Nikole Davis MD;  Location: [facility]    LASER KTP LARYNGOSCOPY FLEXIBLE N/A 3/28/2023    Procedure: Transnasal flexible laryngoscopy with KTP laser, superior laryngeal nerve block(s);  Surgeon: Pankaj Woodard MD;  Location: [facility] OR      Allergies   Allergen Reactions    Methylpyrrolidone Anaphylaxis    Nuts Anaphylaxis     Black walnut    Escitalopram Other (See Comments)     Asthma -a time of exacerbation and may not have been cause may retry    Mirtazapine Other (See Comments)     Asthma a time of exacerbation and may not have been cause may retry    Venlafaxine Other (See Comments)    Adhesive Tape Rash     With holter monitor. Ok with bandaids.    No Clinical Screening - See Comments Rash     Adhesive tape      Social History     Tobacco Use    Smoking status: Never    Smokeless tobacco: Never   Substance Use Topics    Alcohol use: Yes     Comment: Occasionally      Wt Readings from Last 1 Encounters:   12/14/23 51.9 kg (114 lb 6.7 oz)        Anesthesia Evaluation   Pt has had prior anesthetic.         ROS/MED HX  ENT/Pulmonary:     (+)                        moderate,  COPD,              Neurologic:       Cardiovascular:     (+)  hypertension- -   -  - -   Taking blood thinners                     dysrhythmias, a-fib, Irregular Heartbeat/Palpitations,            METS/Exercise Tolerance:     Hematologic:       Musculoskeletal:       GI/Hepatic:       Renal/Genitourinary:     (+) renal disease, type: CRI, Pt does not require dialysis,           Endo:     (+)          thyroid problem, hypothyroidism,           Psychiatric/Substance Use:       Infectious  "Disease:       Malignancy:       Other:            Physical Exam    Airway        Mallampati: II    Neck ROM: full     Respiratory Devices and Support         Dental           Cardiovascular          Rhythm and rate: irregular     Pulmonary           (+) decreased breath sounds           OUTSIDE LABS:  CBC:   Lab Results   Component Value Date    WBC 8.2 09/21/2023    WBC 12.2 (H) 04/04/2023    HGB 14.1 09/21/2023    HGB 13.1 04/04/2023    HCT 43.9 09/21/2023    HCT 42.2 04/04/2023     09/21/2023     04/04/2023     BMP:   Lab Results   Component Value Date     04/04/2023     04/03/2023    POTASSIUM 4.7 09/21/2023    POTASSIUM 4.7 04/04/2023    CHLORIDE 104 04/04/2023    CHLORIDE 102 04/03/2023    CO2 25 04/04/2023    CO2 21 (L) 04/03/2023    BUN 29.1 (H) 04/04/2023    BUN 21.7 04/03/2023    CR 1.16 (H) 09/21/2023    CR 1.19 (H) 04/04/2023     (H) 04/04/2023     (H) 04/03/2023     COAGS:   Lab Results   Component Value Date    PTT 37 04/02/2023    INR 1.16 (H) 04/02/2023     POC: No results found for: \"BGM\", \"HCG\", \"HCGS\"  HEPATIC:   Lab Results   Component Value Date    ALBUMIN 4.5 04/02/2023    PROTTOTAL 7.3 04/02/2023    ALT 12 04/02/2023    AST 18 04/02/2023    ALKPHOS 88 04/02/2023    BILITOTAL 0.5 04/02/2023     OTHER:   Lab Results   Component Value Date    PH 7.37 04/02/2023    LACT 0.9 04/02/2023    ESSIE 8.3 (L) 04/04/2023    MAG 1.9 04/02/2023       Anesthesia Plan    ASA Status:  3    NPO Status:  NPO Appropriate    Anesthesia Type: General.     - Airway: ETT   Induction: Intravenous.   Maintenance: Balanced.   Techniques and Equipment:     Airway: 5 laser tube.       Consents    Anesthesia Plan(s) and associated risks, benefits, and realistic alternatives discussed. Questions answered and patient/representative(s) expressed understanding.     - Discussed: Risks, Benefits and Alternatives for BOTH SEDATION and the PROCEDURE were discussed     - Discussed with:  " Patient       Use of blood products discussed: Yes.     - Discussed with: Patient.     Postoperative Care    Pain management: IV analgesics.   PONV prophylaxis: Ondansetron (or other 5HT-3)     Comments:               Eliceo Cardoso MD    I have reviewed the pertinent notes and labs in the chart from the past 30 days and (re)examined the patient.  Any updates or changes from those notes are reflected in this note.            # Drug Induced Coagulation Defect: home medication list includes an anticoagulant medication

## 2023-12-14 NOTE — ANESTHESIA POSTPROCEDURE EVALUATION
Patient: Asya Coffman    Procedure: Procedure(s):  Microdirect laryngoscopy with excision/ablation of laryngeal lesions, biopsies, CO2 laser  Avastin injection       Anesthesia Type:  General    Note:  Disposition: Outpatient   Postop Pain Control: Uneventful            Sign Out: Well controlled pain   PONV: No   Neuro/Psych: Uneventful            Sign Out: Acceptable/Baseline neuro status   Airway/Respiratory: Uneventful            Sign Out: Acceptable/Baseline resp. status   CV/Hemodynamics: Uneventful            Sign Out: Acceptable CV status; No obvious hypovolemia; No obvious fluid overload   Other NRE: NONE   DID A NON-ROUTINE EVENT OCCUR? No           Last vitals:  Vitals Value Taken Time   /84 12/14/23 1115   Temp 36.5  C (97.7  F) 12/14/23 1018   Pulse 67 12/14/23 1120   Resp 13 12/14/23 1120   SpO2 97 % 12/14/23 1120   Vitals shown include unfiled device data.    Electronically Signed By: Kristian Alexandre  December 14, 2023  11:22 AM

## 2023-12-14 NOTE — PROGRESS NOTES
BRIEF ENT PROGRESS NOTE    Pt had a fall earlier. No neck pain but pts daughter is concerned about neck d/t prior cervical fractures. Discussed and suggested a ct cervical spine to r/o fracture. She and pt were in agreement.    Pt had a small left brow lac that was closed with a single simple interrupted 5-0 fast suture.    PLAN  - f/up CT c spine  - overnight obs    Ata Schmitt MD  ENT resident

## 2023-12-14 NOTE — PROGRESS NOTES
Pt in phase two post op changing clothes to go home and fell forward and hit her head on a chair. No LOC, no focal symptoms. Pt had been off eliquis pre op for surgery. Recommend head ct and pt be observed 23 hr ops.  Primary service notified.

## 2023-12-14 NOTE — ANESTHESIA CARE TRANSFER NOTE
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    Anesthesia Type:   General     Note:    Oropharynx: spontaneously breathing  Level of Consciousness: awake  Oxygen Supplementation: face mask    Independent Airway: airway patency satisfactory and stable  Dentition: dentition unchanged  Vital Signs Stable: post-procedure vital signs reviewed and stable  Report to RN Given: handoff report given  Patient transferred to: PACU    Handoff Report: Identifed the Patient, Identified the Reponsible Provider, Reviewed the pertinent medical history, Discussed the surgical course, Reviewed Intra-OP anesthesia mangement and issues during anesthesia, Set expectations for post-procedure period and Allowed opportunity for questions and acknowledgement of understanding      Vitals:  Vitals Value Taken Time   BP     Temp     Pulse 67 12/14/23 1020   Resp     SpO2 100 % 12/14/23 1018   Vitals shown include unfiled device data.    Electronically Signed By: VERONICA Gaitan CRNA  December 14, 2023  10:20 AM

## 2023-12-14 NOTE — PLAN OF CARE
Arrived from: 3C @ 1630   Belongings/meds: clothing, cell phone  2 RN Skin Assessment Completed by: Estefani RYAN and Tomas LAW    Non-intact findings documented (yes/no/NA): L laceration above eyebrow approx 1/2 inch w/ 1 stitch in place, erythema and bruising, RONNA; otherwise intact    Status: pt POD 0 laryngoscopy w/ excision/ablation of laryngeal lesions, biopsies; admitted to floor s/p fall to floor @ time of attempted discharge in which pt hit face on floor; PMHX: COPD, afib on eliquis, raynauds syndrome  Vitals: afib @ baseline, tachy  unsustained; O2 sats 88-97; OVSS on RA  Neuros: hoarse voice, BUE and BLE tremors @ baseline; 4/5 t/o, generalized weakness  IV: PIV SL  Labs/Electrolytes: WNL; head and c spine Xrays competed, negative;   Resp/trach: LSC  Diet: regular  Bowel status: BS+, LBM PTA 12/13  : void spont  Skin: see above  Pain: R hand pain managed w/ PRN tylenol  Activity: SBA GB w/in arms reach  Plan: observe overnight and discharge home in AM  Updates this shift: per ENT pt to minimize voice usage, to nod yes/no      OBS GOALS:   Ambulating: met  Mentating at baseline: met  Pain controlled: met  Respiratory status at baseline: met

## 2023-12-14 NOTE — ANESTHESIA PROCEDURE NOTES
Airway       Patient location during procedure: OR       Procedure Start/Stop Times: 12/14/2023 7:49 AM  Staff -        CRNA: Nona Maloney APRN CRNA       Performed By: CRNA  Consent for Airway        Urgency: elective  Indications and Patient Condition       Indications for airway management: tania-procedural       Induction type:intravenous       Mask difficulty assessment: 1 - vent by mask    Final Airway Details       Final airway type: endotracheal airway       Successful airway: ETT - single  Endotracheal Airway Details        ETT size (mm): 5.0       Cuffed: yes       Successful intubation technique: video laryngoscopy       VL Blade Size: MAC 4       Grade View of Cords: 1       Adjucts: stylet       Position: Left       Measured from: lips       Secured at (cm): 18       Bite block used: None    Post intubation assessment        Placement verified by: capnometry, equal breath sounds and chest rise        Number of attempts at approach: 1       Secured with: tape       Ease of procedure: easy       Dentition: Intact and Unchanged    Medication(s) Administered   Medication Administration Time: 12/14/2023 7:49 AM

## 2023-12-15 VITALS
OXYGEN SATURATION: 95 % | TEMPERATURE: 98.2 F | BODY MASS INDEX: 19.25 KG/M2 | HEART RATE: 85 BPM | WEIGHT: 115.52 LBS | SYSTOLIC BLOOD PRESSURE: 138 MMHG | RESPIRATION RATE: 21 BRPM | HEIGHT: 65 IN | DIASTOLIC BLOOD PRESSURE: 115 MMHG

## 2023-12-15 LAB
ALBUMIN SERPL BCG-MCNC: 3.5 G/DL (ref 3.5–5.2)
ALP SERPL-CCNC: 100 U/L (ref 40–150)
ALT SERPL W P-5'-P-CCNC: 45 U/L (ref 0–50)
ANION GAP SERPL CALCULATED.3IONS-SCNC: 14 MMOL/L (ref 7–15)
ANION GAP SERPL CALCULATED.3IONS-SCNC: 14 MMOL/L (ref 7–15)
AST SERPL W P-5'-P-CCNC: 28 U/L (ref 0–45)
ATRIAL RATE - MUSE: 34 BPM
BASOPHILS # BLD AUTO: 0 10E3/UL (ref 0–0.2)
BASOPHILS NFR BLD AUTO: 0 %
BILIRUB SERPL-MCNC: 0.3 MG/DL
BUN SERPL-MCNC: 24.8 MG/DL (ref 8–23)
BUN SERPL-MCNC: 24.8 MG/DL (ref 8–23)
CALCIUM SERPL-MCNC: 8.8 MG/DL (ref 8.8–10.2)
CALCIUM SERPL-MCNC: 8.8 MG/DL (ref 8.8–10.2)
CHLORIDE SERPL-SCNC: 105 MMOL/L (ref 98–107)
CHLORIDE SERPL-SCNC: 105 MMOL/L (ref 98–107)
CREAT SERPL-MCNC: 1.15 MG/DL (ref 0.51–0.95)
CREAT SERPL-MCNC: 1.15 MG/DL (ref 0.51–0.95)
DEPRECATED HCO3 PLAS-SCNC: 21 MMOL/L (ref 22–29)
DEPRECATED HCO3 PLAS-SCNC: 21 MMOL/L (ref 22–29)
DIASTOLIC BLOOD PRESSURE - MUSE: NORMAL MMHG
EGFRCR SERPLBLD CKD-EPI 2021: 49 ML/MIN/1.73M2
EGFRCR SERPLBLD CKD-EPI 2021: 49 ML/MIN/1.73M2
EOSINOPHIL # BLD AUTO: 0 10E3/UL (ref 0–0.7)
EOSINOPHIL NFR BLD AUTO: 0 %
ERYTHROCYTE [DISTWIDTH] IN BLOOD BY AUTOMATED COUNT: 15.2 % (ref 10–15)
GLUCOSE BLDC GLUCOMTR-MCNC: 102 MG/DL (ref 70–99)
GLUCOSE SERPL-MCNC: 142 MG/DL (ref 70–99)
GLUCOSE SERPL-MCNC: 142 MG/DL (ref 70–99)
HCT VFR BLD AUTO: 40.1 % (ref 35–47)
HGB BLD-MCNC: 12.3 G/DL (ref 11.7–15.7)
IMM GRANULOCYTES # BLD: 0.1 10E3/UL
IMM GRANULOCYTES NFR BLD: 1 %
INTERPRETATION ECG - MUSE: NORMAL
LACTATE SERPL-SCNC: 1.8 MMOL/L (ref 0.7–2)
LACTATE SERPL-SCNC: 3.5 MMOL/L (ref 0.7–2)
LYMPHOCYTES # BLD AUTO: 0.8 10E3/UL (ref 0.8–5.3)
LYMPHOCYTES NFR BLD AUTO: 5 %
MAGNESIUM SERPL-MCNC: 2.2 MG/DL (ref 1.7–2.3)
MAGNESIUM SERPL-MCNC: 2.3 MG/DL (ref 1.7–2.3)
MCH RBC QN AUTO: 27.4 PG (ref 26.5–33)
MCHC RBC AUTO-ENTMCNC: 30.7 G/DL (ref 31.5–36.5)
MCV RBC AUTO: 89 FL (ref 78–100)
MONOCYTES # BLD AUTO: 0.8 10E3/UL (ref 0–1.3)
MONOCYTES NFR BLD AUTO: 5 %
NEUTROPHILS # BLD AUTO: 14.6 10E3/UL (ref 1.6–8.3)
NEUTROPHILS NFR BLD AUTO: 89 %
NRBC # BLD AUTO: 0 10E3/UL
NRBC BLD AUTO-RTO: 0 /100
NT-PROBNP SERPL-MCNC: 4184 PG/ML (ref 0–1800)
P AXIS - MUSE: NORMAL DEGREES
PATH REPORT.COMMENTS IMP SPEC: NORMAL
PATH REPORT.COMMENTS IMP SPEC: NORMAL
PATH REPORT.FINAL DX SPEC: NORMAL
PATH REPORT.GROSS SPEC: NORMAL
PATH REPORT.MICROSCOPIC SPEC OTHER STN: NORMAL
PATH REPORT.RELEVANT HX SPEC: NORMAL
PHOTO IMAGE: NORMAL
PLATELET # BLD AUTO: 255 10E3/UL (ref 150–450)
POTASSIUM SERPL-SCNC: 4.5 MMOL/L (ref 3.4–5.3)
POTASSIUM SERPL-SCNC: 4.5 MMOL/L (ref 3.4–5.3)
PR INTERVAL - MUSE: NORMAL MS
PROT SERPL-MCNC: 6.2 G/DL (ref 6.4–8.3)
QRS DURATION - MUSE: 78 MS
QT - MUSE: 320 MS
QTC - MUSE: 422 MS
R AXIS - MUSE: -60 DEGREES
RBC # BLD AUTO: 4.49 10E6/UL (ref 3.8–5.2)
SODIUM SERPL-SCNC: 140 MMOL/L (ref 135–145)
SODIUM SERPL-SCNC: 140 MMOL/L (ref 135–145)
SYSTOLIC BLOOD PRESSURE - MUSE: NORMAL MMHG
T AXIS - MUSE: 46 DEGREES
TROPONIN T SERPL HS-MCNC: 22 NG/L
TSH SERPL DL<=0.005 MIU/L-ACNC: 0.39 UIU/ML (ref 0.3–4.2)
VENTRICULAR RATE- MUSE: 105 BPM
WBC # BLD AUTO: 16.3 10E3/UL (ref 4–11)

## 2023-12-15 PROCEDURE — 84443 ASSAY THYROID STIM HORMONE: CPT | Performed by: PHYSICIAN ASSISTANT

## 2023-12-15 PROCEDURE — 258N000003 HC RX IP 258 OP 636: Performed by: PHYSICIAN ASSISTANT

## 2023-12-15 PROCEDURE — 250N000013 HC RX MED GY IP 250 OP 250 PS 637

## 2023-12-15 PROCEDURE — 93010 ELECTROCARDIOGRAM REPORT: CPT | Performed by: INTERNAL MEDICINE

## 2023-12-15 PROCEDURE — 93005 ELECTROCARDIOGRAM TRACING: CPT

## 2023-12-15 PROCEDURE — 80053 COMPREHEN METABOLIC PANEL: CPT | Performed by: PHYSICIAN ASSISTANT

## 2023-12-15 PROCEDURE — 36415 COLL VENOUS BLD VENIPUNCTURE: CPT | Performed by: PHYSICIAN ASSISTANT

## 2023-12-15 PROCEDURE — 83605 ASSAY OF LACTIC ACID: CPT | Performed by: PHYSICIAN ASSISTANT

## 2023-12-15 PROCEDURE — 85004 AUTOMATED DIFF WBC COUNT: CPT | Performed by: PHYSICIAN ASSISTANT

## 2023-12-15 PROCEDURE — 83605 ASSAY OF LACTIC ACID: CPT | Mod: 91 | Performed by: PHYSICIAN ASSISTANT

## 2023-12-15 PROCEDURE — 99207 PR APP CREDIT; MD BILLING SHARED VISIT: CPT | Performed by: INTERNAL MEDICINE

## 2023-12-15 PROCEDURE — 83735 ASSAY OF MAGNESIUM: CPT | Mod: 91 | Performed by: INTERNAL MEDICINE

## 2023-12-15 PROCEDURE — 99205 OFFICE O/P NEW HI 60 MIN: CPT | Mod: FS | Performed by: PHYSICIAN ASSISTANT

## 2023-12-15 PROCEDURE — 999N000128 HC STATISTIC PERIPHERAL IV START W/O US GUIDANCE

## 2023-12-15 PROCEDURE — 83880 ASSAY OF NATRIURETIC PEPTIDE: CPT | Performed by: PHYSICIAN ASSISTANT

## 2023-12-15 PROCEDURE — 83735 ASSAY OF MAGNESIUM: CPT | Performed by: PHYSICIAN ASSISTANT

## 2023-12-15 PROCEDURE — 84484 ASSAY OF TROPONIN QUANT: CPT | Performed by: PHYSICIAN ASSISTANT

## 2023-12-15 PROCEDURE — 82962 GLUCOSE BLOOD TEST: CPT

## 2023-12-15 RX ORDER — AMOXICILLIN 250 MG
1 CAPSULE ORAL 2 TIMES DAILY
Qty: 30 TABLET | Refills: 0 | Status: SHIPPED | OUTPATIENT
Start: 2023-12-15 | End: 2024-01-24

## 2023-12-15 RX ORDER — ACETAMINOPHEN 325 MG/1
650 TABLET ORAL EVERY 4 HOURS PRN
Qty: 50 TABLET | Refills: 0 | Status: SHIPPED | OUTPATIENT
Start: 2023-12-17

## 2023-12-15 RX ADMIN — POLYETHYLENE GLYCOL 3350 17 G: 17 POWDER, FOR SOLUTION ORAL at 07:53

## 2023-12-15 RX ADMIN — ACETAMINOPHEN 975 MG: 325 TABLET, FILM COATED ORAL at 07:53

## 2023-12-15 RX ADMIN — SODIUM CHLORIDE, POTASSIUM CHLORIDE, SODIUM LACTATE AND CALCIUM CHLORIDE 500 ML: 600; 310; 30; 20 INJECTION, SOLUTION INTRAVENOUS at 13:59

## 2023-12-15 RX ADMIN — FLUTICASONE FUROATE AND VILANTEROL TRIFENATATE 1 PUFF: 100; 25 POWDER RESPIRATORY (INHALATION) at 07:53

## 2023-12-15 RX ADMIN — DILTIAZEM HYDROCHLORIDE 240 MG: 240 CAPSULE, EXTENDED RELEASE ORAL at 07:53

## 2023-12-15 RX ADMIN — ACETAMINOPHEN 975 MG: 325 TABLET, FILM COATED ORAL at 00:40

## 2023-12-15 ASSESSMENT — ACTIVITIES OF DAILY LIVING (ADL)
ADLS_ACUITY_SCORE: 22

## 2023-12-15 NOTE — CONSULTS
[facility]  Consult Note - Hospitalist Service, GOLD TEAM   Date of Admission:  12/14/2023  Consult Requested by: ENT  Reason for Consult: Aflutter and abnormal EKG    Assessment & Plan   Asya Coffman is a 78 year old female w/ PMH CREST, chronic afib/ aflutter (on diltiazem, and DOAC), GERD, RRP, asthma, CKD3 admitted on 12/14/2023  s/p laryngoscopy and ablation laryngeal lesions w/ mechanical fall in PACU and afib/afluttler w/ RVR.    POD#1 s/p largyoscopy with excision/ablation of laryngeal lesions, biopsies, CO2 laser  Recurrent laryngeal mass-non-malignant  Papillary thyroid cancer s/p thyroidectomy in 1965   Surgery w/ Dr. Davis.  EBL minimal.  Patient had an uneventful intubation with moderate to heavy burden of papilloma across her posterior larynx requiring some areas of ablation.  GA   - appreciate ENT cares, mng per their recs   - pain mng: APAP    Atrial flutter with rapid ventricular response  Concern for wide-complex tachycardia  Severe biatrial enlargement  Grade 2 left ventricular diastolic dysfunction consistent with moderately increased left ventricular filling pressure.   Please see also note per Dr. Adrian who reviewed her telemetry and EKG patient's VR has been variable .  CCB since 10/2023. TTE 2023 w/ nml EF, afib, severe biatrial enlargement, elevated PA pressure of 43mmHg.   -Continue PTA diltiazem 240 mg twice daily  - Patient is not currently on anticoagulation-resume PTA Eliquis when safely able to from surgical perspective  - orthostatic VS  - O2 to maintain sats >88%  -TSH with reflex  -Troponin  -BMP. Mg    Mechanical fall in PACU   Occurred when patient was dressing to discharge from PACU.  Primary team obtained CT head and C-spine non-contrast showing no acute pathologies.   - follow up with neurology outpatient as planned for tremors  - no e/o injuries     Lactic acidosis  Lactate this morning is 3.5 in the setting of  postoperative state and A-fib with RVR.  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  CPFT's in 3/2021 showed severe obstruction, no albuterol response, air trapping (%), and a low normal DLCOc of 74%. Exam normal today and AVSS, sating well on RA. Mentation intact.   -Continue PTA Symbicort  -Continue PTA DuoNebs  - Continue PTA albuterol  - pulmonary toilet    Hx C7 and T1 fractures (remote)   Cervical osteoarthritis, DJD - supportive mng    Intension tremor- follow up outpatient w/ neurology as planned    Hypothyroidism-TSH nml -continue PTA levothyroxine    Osteoperosis - follow with PCP    Hyperlipidemia-continue PTA statin    Avastin injection under telescopic visualization   The patient's care was discussed with the Patient and Primary team.    Clinically Significant Risk Factors Present on Admission               # Drug Induced Coagulation Defect: home medication list includes an anticoagulant medication                    Elizabeth Underwood PA-C  Hospitalist Service, GOLD TEAM   Securely message with [employer] (more info)  Text page via [facility] Paging/Directory   See signed in provider for up to date coverage information  ______________________________________________________________________    Chief Complaint   POD# 1 s/p laryngoscopy and ablation laryngeal lesions    Medicine consulted for afib/ aflutter w/ RVR    History is obtained from the patient    History of Present Illness   Asya Coffman is a 78 year old female w/ PMH CREST, chronic afib/ aflutter (on diltiazem, and DOAC), GERD, RRP, asthma, CKD3 admitted on 12/14/2023  s/p laryngoscopy and ablation laryngeal lesions w/ mechanical fall in PACU and afib/afluttler w/ RVR.    Pt is feeling generally well and denies any new pain or  "injuries following fall in PACU while getting dressed. She notes she suspects it was d/t anesthesia and endorses feeling somewhat lightheaded at the time.     She notes chronic issues with tremors for which she is planning to see neurology in January.  She follows closely with cardiology outpatient and notes that she had a Zio patch during the time that she had a \"GI bug\" and that during that time Zio patch showed some abnormalities.  She had a repeat Zio patch with her cardiologist after this GI illness was resolved which she notes was reported as being much more normal.    She notes compliance with her anticoagulant and diltiazem.  She notes her thyroid medication has been stable for many years and does not endorse having any changes to weight, appetite, cold or heat intolerance she notes her energy level seems to be decreasing though she is not certain whether this is secondary to other health issues.  She is able to tolerate long distances walking on flat ground but notes that she can ambulate up about 7-8 stairs before she needs to stop to catch her breath.    Currently she has no pain, N/V, F/C, chest pain, sob, palpitations, dizziness, headaches, vision changes, loss of appetite, abdominal pain, rashes, lumps, lesions, urinary complaints (including dysuria), changes to bowels (including diarrhea, constipation), bleeding (including epistaxis, bleeding gums, hematuria, melena, hematochezia) or other complaints.         Past Medical History    Past Medical History:   Diagnosis Date    A-fib (H)     Antiplatelet or antithrombotic long-term use     Arrhythmia     COPD (chronic obstructive pulmonary disease) (H)        Past Surgical History   Past Surgical History:   Procedure Laterality Date    INJECT STEROID (LOCATION) N/A 6/15/2023    Procedure: Avastin injection;  Surgeon: Nikole Davis MD;  Location: [facility]    INJECT STEROID (LOCATION) N/A 9/7/2023    Procedure: Avastin injection;  Surgeon: Ryan, " Nikole Alberts MD;  Location: [facility]    INJECT STEROID (LOCATION) N/A 12/14/2023    Procedure: Avastin injection;  Surgeon: Nikole Davis MD;  Location: [facility]    LASER CO2 LARYNGOSCOPY N/A 9/7/2023    Procedure: Microdirect laryngoscopy with excision/ablation of laryngeal lesions, biopsies, CO2 laser;  Surgeon: Nikole Davis MD;  Location: [facility]    LASER CO2 LARYNGOSCOPY, COMPLEX N/A 5/26/2022    Procedure: Micro direct laryngoscopy with excision/ablation of laryngeal lesions, possible biopsies, possible CO2 laser;  Surgeon: Nikole Davis MD;  Location: [facility]    LASER CO2 LARYNGOSCOPY, COMPLEX N/A 9/15/2022    Procedure: Microdirect laryngoscopy with ablation of laryngeal lesions,biopsies,CO2 laser;  Surgeon: Nikole Davis MD;  [facility]: [facility]    LASER CO2 LARYNGOSCOPY, COMPLEX N/A 12/1/2022    Procedure: Microdirect laryngoscopy with excision/ablation of laryngeal lesions, biopsies,   CO2 laser;  Surgeon: Nkiole Davis MD;  Location: [facility]    LASER CO2 LARYNGOSCOPY, COMPLEX N/A 6/15/2023    Procedure: Microdirect laryngoscopy with ablation of laryngeal lesions, biopsies, CO2 laser;  Surgeon: Nikole Davis MD;  [facility]    LASER CO2 LARYNGOSCOPY, COMPLEX N/A 10/5/2023    Procedure: Microdirect laryngoscopy with excision/ablation of laryngeal lesions, biopsies, CO2 laser;  Surgeon: Nikole Davis MD;  [facility]    LASER CO2 LARYNGOSCOPY, COMPLEX N/A 12/14/2023    Procedure: Microdirect laryngoscopy with excision/ablation of laryngeal lesions, biopsies, CO2 laser;  Surgeon: Nikole Davis MD;  [facility]    LASER KTP LARYNGOSCOPY N/A 4/3/2023    Procedure: Microdirect laryngoscopy with biopsies, excision/ablation of lesions, C02 laser,  Avastin injection;  Surgeon: Nikole Davis MD;  [facility]    LASER KTP LARYNGOSCOPY N/A 6/15/2023    Procedure: flexible laser (CO2 or KTP) standby;   Surgeon: Nikole Davis MD;  [facility]    LASER KTP LARYNGOSCOPY FLEXIBLE N/A 8/18/2022    Procedure: Transnasal flexible laryngoscopy with KTP laser and biopsies;  Surgeon: Nikole Davis MD;  Location: [facility]    LASER KTP LARYNGOSCOPY FLEXIBLE N/A 10/27/2022    Procedure: Transnasal flexible laryngoscopy with possible KTP laser;  Surgeon: Nikole Davis MD;  Location: [facility]    LASER KTP LARYNGOSCOPY FLEXIBLE N/A 1/26/2023    Procedure: Transnasal flexible laryngoscopy with KTP laser,  biopsies, superior laryngeal nerve blocks, Avastin injection;  Surgeon: Nikole Davis MD;  Location: [facility]    LASER KTP LARYNGOSCOPY FLEXIBLE N/A 2/15/2023    Procedure: Transnasal flexible laryngoscopy with KTP laser, superior laryngeal nerve blocks, biopsies, avastin injection;  Surgeon: Nikole Davis MD;  Location: [facility]    LASER KTP LARYNGOSCOPY FLEXIBLE N/A 2/17/2023    Procedure: Transnasal flexible laryngoscopy with KTP laser, biopsies, superior laryngeal nerve blocks;  Surgeon: Nikole Davis MD;  Location: [facility]    LASER KTP LARYNGOSCOPY FLEXIBLE N/A 2/23/2023    Procedure: Transnasal flexible laryngoscopy with KTP laser, superior laryngeal nerve blocks;  Surgeon: Nikole Davis MD;  Location: [facility]    LASER KTP LARYNGOSCOPY FLEXIBLE N/A 3/2/2023    Procedure: Transnasal flexible laryngoscopy with KTP laser, superior laryngeal nerve block Bilateral;  Surgeon: Nikole Davis MD;  Location: [facility]    LASER KTP LARYNGOSCOPY FLEXIBLE N/A 3/9/2023    Procedure: Transnasal flexible laryngoscopy with KTP laser, biopsies, superior laryngeal nerve blocks;  Surgeon: Nikole Davis MD;  Location: [facility]    LASER KTP LARYNGOSCOPY FLEXIBLE N/A 3/17/2023    Procedure: Transnasal flexible laryngoscopy with KTP laser, superior laryngeal nerve block(s), Avastin injection;  Surgeon: Nikole Davis MD;  Location:  [facility] OR    LASER KTP LARYNGOSCOPY FLEXIBLE N/A 3/28/2023    Procedure: Transnasal flexible laryngoscopy with KTP laser, superior laryngeal nerve block(s);  Surgeon: Pankaj Woodard MD;  Location: [facility] OR       Medications   I have reviewed this patient's current medications       Review of Systems    The 10 point Review of Systems is negative other than noted in the HPI or here.      Physical Exam   Vital Signs: Temp: 98.2  F (36.8  C) Temp src: Oral BP: (!) 122/93 Pulse: 95   Resp: 15 SpO2: 95 % O2 Device: None (Room air) Oxygen Delivery: 2 LPM  Weight: 115 lbs 8.34 oz  GENERAL: Alert and oriented x 3. NAD.  Able to sit upright with minimal assistance. Cooperative.   HEENT: Anicteric sclera.  Neck supple.  Throat clear.  No tonsillar exudates.  Uvula midline.  Mucous membranes moist. NC. AT. EOMI. PERRLA  CV: Irregularly irregular rate and rhythm. No murmurs appreciated.   RESPIRATORY: Effort normal on RA. Lungs CTAB with no wheezing, rales, rhonchi.   GI: Abdomen soft, NT, ND, NABS  NEUROLOGICAL: + Intention tremor in all extremities.  No focal deficits. Moves all extremities.  CN 2-12 grossly intact.  No clonus.  No pronator drift.  EXTREMITIES: No peripheral edema. Intact bilateral pedal pulses.   SKIN: No jaundice. No rashes on exposed skin.  BACK: no CVA tenderness, no spinous tenderness      Medical Decision Making       75 MINUTES SPENT BY ME on the date of service doing chart review, history, exam, documentation & further activities per the note.      Data     I have personally reviewed the following data over the past 24 hrs:    16.3 (H)  \   12.3   / 255     140; 140 105; 105 24.8 (H); 24.8 (H) /  142 (H); 142 (H)   4.5; 4.5 21 (L); 21 (L) 1.15 (H); 1.15 (H) \     ALT: 45 AST: 28 AP: 100 TBILI: 0.3   ALB: 3.5 TOT PROTEIN: 6.2 (L) LIPASE: N/A     Trop: 22 (H) BNP: 4,184 (H)     TSH: 0.39 T4: N/A A1C: N/A     Procal: N/A CRP: N/A Lactic Acid: 3.5 (H)

## 2023-12-15 NOTE — PROGRESS NOTES
Observational Goals:     Ambulating: met  Mentating at baseline: met  Pain controlled: met  Respiratory status at baseline: met

## 2023-12-15 NOTE — PROGRESS NOTES
Medicine triage note  Medicine was consulted to ensure there is no occult nonmechanical etiology for the fall that happened in PACU after excision of her recurrent laryngeal mass.  There was concerns for wide-complex tachycardia and cardiac monitoring therefore EKG was obtained.  I personally reviewed the EKG and prior cardiology note showing evidence of A-fib/a flutter with rapid ventricular rate with heart rate ranging from 60s up to 205 a ventricular rate.  The patient last uptitration for Calcitrel blocker dated October 2023, concern for an occult hypotension secondary to calcium channel blocker versus an occult infection causing orthostatic hypotension.  If these 2 possibilities have been ruled out [by thorough review of systems, orthostatic blood pressure evaluation, lactic acid, and BMP] then there is no sinister etiology and at least based on my theatrical chart review there would be no indication to keep the patient in the hospital.  I discussed this with the referring service from ENT and with my colleagues of inpatient medicine.  The patient will be seen by my colleague , attending hospitalist physician and physician assistant Elizabeth Underwood who will officially evaluate the patient and draft a consult note.  Thank you for consulting internal medicine.

## 2023-12-15 NOTE — PLAN OF CARE
Discharge time/date: 12/15 1515  Walked or Wheelchair: Walked  PIV removed: Yes  Reviewed AVS with patient: Yes  Medication due times added to AVS in EPIC: N/A  Verbalized understanding of discharge with teachback: Yes  Medications retrieved from pharmacy: N/A, declined  Supplies sent home: N/A  Belongings from security with patient: N/A

## 2023-12-15 NOTE — PLAN OF CARE
Status: pt POD 1 laryngoscopy w/ excision/ablation of laryngeal lesions, biopsies; admitted to floor s/p fall to floor @ time of attempted discharge in which pt hit face on floor; PMHX: COPD, afib on eliquis, raynauds syndrome   Vitals: VSS on RA, tachy, up to 130 bpm, A-fib at baseline  Neuros: A&O 4. Hoarse voice. Denies n/t. Tremors to extremities at baseline. 4/5 strengths throughout.   IV: PIV SL  Labs/Electrolytes:   Resp/trach: on RA, denies SOB  Diet: Regular  Bowel status: LBM 12/13, passing gas  : Voids spontaneously to BR  Skin: L laceration above eyebrow approx 1/2 inch w/ 1 stitch in place, erythema and bruising, RONNA  Pain: Denies, scheduled tylenol given  Activity: SBA/GB,   Plan: Potential discharge today. Continue with POC      Goal Outcome Evaluation:      Plan of Care Reviewed With: patient    Overall Patient Progress: improvingOverall Patient Progress: improving    Outcome Evaluation: Obs goals met

## 2023-12-15 NOTE — PROGRESS NOTES
"Otolaryngology Progress Note  December 15, 2023    S: No acute events overnight. Was intermittently tachy to 120s.     O: BP (!) 122/93 (BP Location: Right arm)   Pulse 95   Temp 98.2  F (36.8  C) (Oral)   Resp 15   Ht 1.651 m (5' 5\")   Wt 52.4 kg (115 lb 8.3 oz)   SpO2 95%   BMI 19.22 kg/m     General: Alert and oriented x 3, No acute distress   HEENT: EOMI. HB 1/6. Facial lac appears appropriate    Pulmonary: Breathing non-labored, no stridor, no accessory muscle use.    CT C-spine:   RESIDENT PRELIMINARY INTERPRETATION  IMPRESSION:  1. No acute fracture or traumatic subluxation.  2. Stable T1 vertebral body compression deformity.   This result has not been signed. Information might be incomplete.       A/P: Asya Coffman is a 78 year old female with a past medical history of CREST, afib, GERD, RRP, asthma, CKD3. She did have a mechanical fall on 12/14 post-op from [facility] with excision of laryngeal lesions and was admitted for observation. She appears well overnight with no neuro changes. Some intermittent tachycardia.     Plan:  - EKG this AM, if normal plan to discharge  -     -- Patient and above plan discussed with Dr. Davis.     Mode Carney MD  Otolaryngology - Head and Neck Surgery PGY-3      "

## 2023-12-18 DIAGNOSIS — Z78.9 RECURRENT RESPIRATORY PAPILLOMATOSIS: Primary | ICD-10-CM

## 2023-12-18 DIAGNOSIS — J38.7 LARYNGEAL MASS: ICD-10-CM

## 2023-12-18 DIAGNOSIS — R49.0 DYSPHONIA: ICD-10-CM

## 2023-12-19 ENCOUNTER — TELEPHONE (OUTPATIENT)
Dept: OTOLARYNGOLOGY | Facility: CLINIC | Age: 78
End: 2023-12-19
Payer: COMMERCIAL

## 2023-12-19 NOTE — TELEPHONE ENCOUNTER
Scheduled surgery with Dr. Davis on 2/15/2024    Spoke with: Patient    Surgery is located at [facility]    Patient will be seen for their H&P by:    PCP Janna Calderón within 30 days of surgery - Confirmed PCP on file is up to date    Anesthesia type: General      Requested Imaging required for surgery: NA    Patient needs scheduled for their 3 week post op    Patient will receive their packet via [employer] per their preference    Patient is aware they need a  to & from surgery    Additional comments: Patient will call back if they have any questions or concerns.    Krys Reddy on 12/19/2023 at 11:32 AM

## 2023-12-19 NOTE — TELEPHONE ENCOUNTER
Called patient to schedule surgery with Dr. Davis. No answer, callback number [phone] left on  for patient.     Jolly Neves on 12/19/2023 at 11:27 AM

## 2023-12-20 NOTE — PROGRESS NOTES
ASSESSMENT & PLAN    Hannah was seen today for pain.    Diagnoses and all orders for this visit:    Closed displaced fracture of shaft of third metacarpal bone of right hand, initial encounter  -     XR Hand Right G/E 3 Views; Future      This issue is acute and NA. Hannah presents to clinic today with right hand pain and swelling after fall.  Radiographs taken in clinic today show displaced fracture of the shaft of the third metacarpal bone on the right hand.  On exam, she has no obvious deformity, and the distal finger is neurovascularly intact.  We discussed the findings and the possible treatment options including reduction and casting versus surgical intervention.  I anticipate that this fracture will likely be able to be managed nonoperatively, however I am unable to perform an adequate reduction in our clinic today.  Because of this, we will refer the patient on 2 our hand surgery team for proper reduction and casting versus possible surgical intervention.  We determined the following plan:  -Patient placed in radial gutter splint  -Will refer her on to the orthopedic hand surgery team  -She can follow-up in our clinic as needed, or if the hand surgery team wants to refer her back to us for nonoperative management after reduction, we will see her back in follow-up      Tolu Blanco DO  [facility]    -----  Chief Complaint   Patient presents with    Right Hand - Pain       SUBJECTIVE  Asya Coffman is a/an 78 year old female who is seen as a self referral for evaluation of right hand after she got light headed after surgery when she stood up and she fell,she does not recall how she fell     The patient is seen by themselves.  The patient is Right handed    Onset: 2 day(s) ago. Patient describes injury as getting dressed and the patient fell.   Location of Pain: right hand is only painful due to swelling  Worsened by: gripping,   Better with: nothing  "  Treatments tried: no treatment tried to date  Associated symptoms: swelling    Orthopedic/Surgical history: NO  Social History/Occupation: retired    Cast/splint application    Date/Time: 12/22/2023 2:59 PM    Performed by: Tomas Mccray ATC  Authorized by: Tolu Blanco DO    Consent:     Consent obtained:  Verbal    Consent given by:  Patient    Alternatives discussed:  No treatment  Pre-procedure details:     Sensation:  Normal  Procedure details:     Laterality:  Right    Location:  Hand    Hand:  R hand    Splint type:  Radial gutter    Supplies:  Fiberglass  Post-procedure details:     Pain:  Unchanged    Sensation:  Normal    Patient tolerance of procedure:  Tolerated well, no immediate complications    Patient provided with cast or splint care instructions: No        REVIEW OF SYSTEMS:  Review of systems negative unless mentioned in HPI     OBJECTIVE:  Ht 1.651 m (5' 5\")   Wt 52.2 kg (115 lb)   BMI 19.14 kg/m     General: healthy, alert and in no distress  Skin: no suspicious lesions or rash.  CV: distal perfusion intact   Resp: normal respiratory effort without conversational dyspnea   Psych: normal mood and affect  Gait: NORMAL  Neuro: Normal light sensory exam of RU extremity     Brief Hand Exam:  Generalized swelling throughout the right hand.  No obvious rotational deformity of the third finger  Tenderness palpation over the shaft of the third metacarpal  Distal extremity cap refill less than 2 seconds, intact sensation to light touch  Full flexion and extension at all MCP joints    RADIOLOGY:  Final results and radiologist's interpretation, available in the [facility] health record.  Images were reviewed with the patient in the office today.  My personal interpretation of the performed imaging: There is an acute, dorsally displaced oblique fracture of the third metacarpal shaft  "

## 2023-12-22 ENCOUNTER — TELEPHONE (OUTPATIENT)
Dept: ORTHOPEDICS | Facility: CLINIC | Age: 78
End: 2023-12-22

## 2023-12-22 ENCOUNTER — OFFICE VISIT (OUTPATIENT)
Dept: ORTHOPEDICS | Facility: CLINIC | Age: 78
End: 2023-12-22
Payer: COMMERCIAL

## 2023-12-22 ENCOUNTER — ANCILLARY PROCEDURE (OUTPATIENT)
Dept: GENERAL RADIOLOGY | Facility: CLINIC | Age: 78
End: 2023-12-22
Attending: STUDENT IN AN ORGANIZED HEALTH CARE EDUCATION/TRAINING PROGRAM
Payer: COMMERCIAL

## 2023-12-22 VITALS — HEIGHT: 65 IN | BODY MASS INDEX: 19.16 KG/M2 | WEIGHT: 115 LBS

## 2023-12-22 DIAGNOSIS — S62.322A CLOSED DISPLACED FRACTURE OF SHAFT OF THIRD METACARPAL BONE OF RIGHT HAND, INITIAL ENCOUNTER: Primary | ICD-10-CM

## 2023-12-22 DIAGNOSIS — M79.641 RIGHT HAND PAIN: ICD-10-CM

## 2023-12-22 PROCEDURE — 99214 OFFICE O/P EST MOD 30 MIN: CPT | Mod: 25 | Performed by: STUDENT IN AN ORGANIZED HEALTH CARE EDUCATION/TRAINING PROGRAM

## 2023-12-22 PROCEDURE — 73130 X-RAY EXAM OF HAND: CPT | Mod: TC | Performed by: RADIOLOGY

## 2023-12-22 PROCEDURE — 29125 APPL SHORT ARM SPLINT STATIC: CPT | Mod: RT | Performed by: STUDENT IN AN ORGANIZED HEALTH CARE EDUCATION/TRAINING PROGRAM

## 2023-12-22 NOTE — TELEPHONE ENCOUNTER
Patient is unable to be seen in [place] system before the end of the year due to no availability. Writer called patient and informed patient to reach out to [place] or [facility] to be casted. Patient understood no further questions at this time. Writer apologized to patient about availability. They will call back if they have any further questions.    Tomas Mccray ATC

## 2023-12-22 NOTE — TELEPHONE ENCOUNTER
Patient has third mediocarpal fracture.Patient is unable to be seen in [place]/ [place] as  Dr. Hallman is out of office for the next two weeks. Do you have any openings for this patient to be seen? Our sportsmed does not feel comfortable reducing this, patient is currently in radial gutter splint.     Tomas Mccray ATC

## 2023-12-22 NOTE — LETTER
12/22/2023         RE: Asya Coffman  [address]  [facility] [address]        Dear Colleague,    Thank you for referring your patient, Asya Coffman, to the [facility]. Please see a copy of my visit note below.    ASSESSMENT & PLAN    Hannah was seen today for pain.    Diagnoses and all orders for this visit:    Closed displaced fracture of shaft of third metacarpal bone of right hand, initial encounter  -     XR Hand Right G/E 3 Views; Future      This issue is acute and NA. Hannah presents to clinic today with right hand pain and swelling after fall.  Radiographs taken in clinic today show displaced fracture of the shaft of the third metacarpal bone on the right hand.  On exam, she has no obvious deformity, and the distal finger is neurovascularly intact.  We discussed the findings and the possible treatment options including reduction and casting versus surgical intervention.  I anticipate that this fracture will likely be able to be managed nonoperatively, however I am unable to perform an adequate reduction in our clinic today.  Because of this, we will refer the patient on 2 our hand surgery team for proper reduction and casting versus possible surgical intervention.  We determined the following plan:  -Patient placed in radial gutter splint  -Will refer her on to the orthopedic hand surgery team  -She can follow-up in our clinic as needed, or if the hand surgery team wants to refer her back to us for nonoperative management after reduction, we will see her back in follow-up      Tolu Blanco,   [facility]    -----  Chief Complaint   Patient presents with     Right Hand - Pain       SUBJECTIVE  Asya Coffman is a/an 78 year old female who is seen as a self referral for evaluation of right hand after she got light headed after surgery when she stood up and she fell,she does not recall how  "she fell     The patient is seen by themselves.  The patient is Right handed    Onset: 2 day(s) ago. Patient describes injury as getting dressed and the patient fell.   Location of Pain: right hand is only painful due to swelling  Worsened by: gripping,   Better with: nothing   Treatments tried: no treatment tried to date  Associated symptoms: swelling    Orthopedic/Surgical history: NO  Social History/Occupation: retired      REVIEW OF SYSTEMS:  Review of systems negative unless mentioned in HPI     OBJECTIVE:  Ht 1.651 m (5' 5\")   Wt 52.2 kg (115 lb)   BMI 19.14 kg/m     General: healthy, alert and in no distress  Skin: no suspicious lesions or rash.  CV: distal perfusion intact   Resp: normal respiratory effort without conversational dyspnea   Psych: normal mood and affect  Gait: NORMAL  Neuro: Normal light sensory exam of RU extremity     Brief Hand Exam:  Generalized swelling throughout the right hand.  No obvious rotational deformity of the third finger  Tenderness palpation over the shaft of the third metacarpal  Distal extremity cap refill less than 2 seconds, intact sensation to light touch  Full flexion and extension at all MCP joints    RADIOLOGY:  Final results and radiologist's interpretation, available in the [facility] health record.  Images were reviewed with the patient in the office today.  My personal interpretation of the performed imaging: There is an acute, dorsally displaced oblique fracture of the third metacarpal shaft      Again, thank you for allowing me to participate in the care of your patient.        Sincerely,        Tolu Blanco, DO  "

## 2024-01-05 ENCOUNTER — OFFICE VISIT (OUTPATIENT)
Dept: OTOLARYNGOLOGY | Facility: CLINIC | Age: 79
End: 2024-01-05
Payer: COMMERCIAL

## 2024-01-05 VITALS — BODY MASS INDEX: 19.66 KG/M2 | HEIGHT: 65 IN | WEIGHT: 118 LBS

## 2024-01-05 DIAGNOSIS — Z78.9 RECURRENT RESPIRATORY PAPILLOMATOSIS: Primary | ICD-10-CM

## 2024-01-05 DIAGNOSIS — J38.7 LARYNGEAL MASS: ICD-10-CM

## 2024-01-05 DIAGNOSIS — R49.0 DYSPHONIA: ICD-10-CM

## 2024-01-05 DIAGNOSIS — B49 FUNGAL INFECTION: ICD-10-CM

## 2024-01-05 PROCEDURE — 99213 OFFICE O/P EST LOW 20 MIN: CPT | Mod: 25 | Performed by: OTOLARYNGOLOGY

## 2024-01-05 PROCEDURE — 31579 LARYNGOSCOPY TELESCOPIC: CPT | Performed by: OTOLARYNGOLOGY

## 2024-01-05 NOTE — PATIENT INSTRUCTIONS
1.  You were seen in the ENT Clinic today by . If you have any questions or concerns after your appointment, please call [phone]. Press option #1 for scheduling related needs. Press option #3 for Nurse advice.    2.   has recommended the following:   - continue steroid inhaler and gargles after using inhaler   - proceed with scheduled procedure in Feb.    3.  Plan is to return to clinic 3/4/24      Blanca Hyman LPN  [phone]  [facility] Otolaryngology
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
## 2024-01-24 ENCOUNTER — LAB (OUTPATIENT)
Dept: LAB | Facility: CLINIC | Age: 79
End: 2024-01-24
Payer: COMMERCIAL

## 2024-01-24 ENCOUNTER — OFFICE VISIT (OUTPATIENT)
Dept: NEUROLOGY | Facility: CLINIC | Age: 79
End: 2024-01-24
Attending: PHYSICIAN ASSISTANT
Payer: COMMERCIAL

## 2024-01-24 ENCOUNTER — TELEPHONE (OUTPATIENT)
Dept: NEUROLOGY | Facility: CLINIC | Age: 79
End: 2024-01-24

## 2024-01-24 VITALS — SYSTOLIC BLOOD PRESSURE: 150 MMHG | DIASTOLIC BLOOD PRESSURE: 77 MMHG | HEART RATE: 79 BPM

## 2024-01-24 DIAGNOSIS — R79.9 ABNORMAL FINDING OF BLOOD CHEMISTRY, UNSPECIFIED: ICD-10-CM

## 2024-01-24 DIAGNOSIS — R25.1 TREMOR: ICD-10-CM

## 2024-01-24 DIAGNOSIS — G25.0 ESSENTIAL TREMOR: ICD-10-CM

## 2024-01-24 DIAGNOSIS — R26.89 BALANCE PROBLEM: ICD-10-CM

## 2024-01-24 DIAGNOSIS — G25.0 ESSENTIAL TREMOR: Primary | ICD-10-CM

## 2024-01-24 LAB — HBA1C MFR BLD: 6 %

## 2024-01-24 PROCEDURE — G2211 COMPLEX E/M VISIT ADD ON: HCPCS | Performed by: PSYCHIATRY & NEUROLOGY

## 2024-01-24 PROCEDURE — 83036 HEMOGLOBIN GLYCOSYLATED A1C: CPT

## 2024-01-24 PROCEDURE — 99205 OFFICE O/P NEW HI 60 MIN: CPT | Performed by: PSYCHIATRY & NEUROLOGY

## 2024-01-24 PROCEDURE — 36415 COLL VENOUS BLD VENIPUNCTURE: CPT

## 2024-01-24 ASSESSMENT — ACTIVITIES OF DAILY LIVING (ADL)
FEEDING_WITH_A_SPOON: (1) SLIGHTLY ABNORMAL. TREMOR IS PRESENT BUT DOES NOT INTERFERE WITH FEEDING WITH A SPOON.
CARRYING_FOOD_TRAYS_PLATES_OR_SIMILAR_ITEMS: (1) SLIGHTLY ABNORMAL. TREMOR IS PRESENT BUT DOES NOT INTERFERE WITH CARRYING FOOD TRAYS, PLATES OR SIMILAR ITEMS.
USING_KEYS: (1) SLIGHTLY ABNORMAL. TREMOR IS PRESENT BUT CAN INSERT KEY WITH ONE HAND WITHOUT DIFFICULTY.
WORKING: (1) SLIGHTLY ABNORMAL. TREMOR IS PRESENT BUT DOES NOT AFFECT PERFORMANCE AT WORK OR AT HOME.
OVERALL_DISABILITY_WITH_THE_MOST_AFFECTED_TASK: EATING OR DRINKING
OVERALL_DISABILITY_WITH_THE_MOST_AFFECTED_TASK: (1) SLIGHTLY ABNORMAL. TREMOR PRESENT BUT DOES NOT AFFECT TASK.
DRESS: (1) SLIGHTLY ABNORMAL. TREMOR IS PRESENT BUT DOES NOT INTERFERE WITH DRESSING.
WRITING: (1) SLIGHTLY ABNORMAL. TREMOR PRESENT BUT DOES NOT INTERFERE WITH WRITING.
SPEAKING: (1) SLIGHT VOICE TREMULOUSNESS, ONLY WHEN "NERVOUS".
HYGIENE: (1) SLIGHTLY ABNORMAL. TREMOR IS PRESENT BUT DOES NOT INTERFERE WITH HYGIENE.
SOCIAL_IMPACT: (1) AWARE OF TREMOR, BUT IT DOES NOT AFFECT LIFESTYLE OR PROFESSIONAL LIFE.
DRINKING_FROM_A_GLASS: (2) MILDLY ABNORMAL. SPILLS A LITTLE.
TOTAL_SCORE: 13
POURING: (1) SLIGHTLY ABNORMAL. TREMOR IS PRESENT BUT DOES NOT INTERFERE WITH POURING.

## 2024-01-24 NOTE — Clinical Note
Team, will you please help me coordinate this patient's care? I am tryig to get a hold of Diandra Vigil NP, (patient's cardiology team) regarding starting propranolol to treat Essential Tremor. I would like to know if she would be okay with me starting propranolol or does she want to start this so she can continue to monitor BP and Afib. Her clinic number is [phone]. I have tried calling in between patients but couldn't get a hold of her and didn't have the option to leave a VM.  Thank you!  Martha

## 2024-01-24 NOTE — TELEPHONE ENCOUNTER
Message  Received: Today 1/24/2023  Martha Pizano, DO  P [facility] Clinical Care Team    Team, will you please help me coordinate this patient's care? I am tryig to get a hold of Diandra Vigil NP, (patient's cardiology team) regarding starting propranolol to treat Essential Tremor. I would like to know if she would be okay with me starting propranolol or does she want to start this so she can continue to monitor BP and Afib. Her clinic number is [phone]. I have tried calling in between patients but couldn't get a hold of her and didn't have the option to leave a VM.    Thank you!    Martha        Called [facility].      Spoke to Rosalinda and transferred me to nurse Rowan who is the nurse for Dr. Iyer (provider who had placed pt's pacemaker).    I left a detailed message for Rowan to give us a call back in regards to Dr. Pizano's message regarding starting propranolol to treat Essential Tremor and that she would like to know if the cardiologist would be okay with Dr. Pizano starting propranolol or does she want to start this so she can continue to monitor BP and Afib.     Will wait for call back from Cardiologist clinic.      NAKIA Velasquez on 1/24/2024 at 3:15 PM

## 2024-01-24 NOTE — PATIENT INSTRUCTIONS
We discussed your tremor is an action tremor consistent with Essential Tremor. There are several different factors that exacerbate tremor including uncontrolled emotional situations, poorly controlled mood issues (stress, anxiety, depression), poor sleep, metabolic/electrolyte abnormalities, medication side effects, poorly controlled pain, injury, acute infection, etc. We discussed several therapies for treatment of tremor including medications (albuterol, Sertraline 5% to 11%), therapies (OT for assistive devices), wearable devices (Raudel kIQ).    Plan:   - Labs: A1c  OT referral for assistive devices, such as weighted utensils, weighted wrist bands, etc.   - Will discuss with Diandra Vigil NP, regarding starting propranolol to treat Essential Tremor. Roel discuss is she would like to start this medication so she may continue to monitor BP and Afib. [phone]   - Places to find out more information about essential tremor and treatments are:  [email or web address]  And  You can go to [employer] and look for patient information about tremor under the guidelines section.  - Call the clinic for questions or concerns.   - Physical therapy for evaluation and treatment of gait and balance issues    Patient to return in 6 months, for in-person visit, 30 minutes.

## 2024-01-24 NOTE — NURSING NOTE
Chief Complaint   Patient presents with    Tremors         NAKIA Velasquez on 1/24/2024 at 9:05 AM
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
## 2024-01-25 NOTE — TELEPHONE ENCOUNTER
Returned call to the [facility] and was transferred to [facility]. Per Rowan, provider Diandra is out of the clinic today and will return tomorrow.     Rowan will give our clinic a call back tomorrow after she talks to provider Diandra in regards to the Propranolol.      NAKIA Velasquez on 1/25/2024 at 2:02 PM

## 2024-01-26 NOTE — TELEPHONE ENCOUNTER
[facility] Call Center    Phone Message    May a detailed message be left on voicemail: yes     Reason for Call: Other: Kristy with Heart clinic to confirm Provider Diandra gave the okay for the   Propranolol. Recommends starting pt on a low dose to avoid Hypotension and BRDY.    Please reach out with further questions at  # [phone]    Action Taken: Other: Neurology    Travel Screening: Not Applicable

## 2024-01-29 NOTE — TELEPHONE ENCOUNTER
Dr. Pizano,    Please see message below from [facility]. Thank you.    NAKIA Velasquez on 1/29/2024 at 9:39 AM

## 2024-01-31 RX ORDER — PROPRANOLOL HYDROCHLORIDE 10 MG/1
TABLET ORAL
Qty: 120 TABLET | Refills: 11 | Status: SHIPPED | OUTPATIENT
Start: 2024-01-31

## 2024-02-01 ENCOUNTER — THERAPY VISIT (OUTPATIENT)
Dept: PHYSICAL THERAPY | Facility: REHABILITATION | Age: 79
End: 2024-02-01
Attending: PSYCHIATRY & NEUROLOGY
Payer: COMMERCIAL

## 2024-02-01 DIAGNOSIS — R26.89 BALANCE PROBLEM: Primary | ICD-10-CM

## 2024-02-01 DIAGNOSIS — R53.1 DECREASED STRENGTH: ICD-10-CM

## 2024-02-01 PROCEDURE — 97161 PT EVAL LOW COMPLEX 20 MIN: CPT | Mod: GP | Performed by: PHYSICAL THERAPIST

## 2024-02-01 PROCEDURE — 97110 THERAPEUTIC EXERCISES: CPT | Mod: GP | Performed by: PHYSICAL THERAPIST
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
## 2024-02-01 NOTE — TELEPHONE ENCOUNTER
Per Dr. Pizano's message:    Thank you, Sujatha!!!    I have sent the propranolol to patient's pharmacy. Will you please call and let her know?    Thank you,    Martha       Called and left a detailed message for the pt letting her know that Dr. Pizano sent a Rx for Propranolol to   [id number] - [place] [id number] AT [facility] & [address] on 2/1/2024 at 11:48 AM

## 2024-02-02 ENCOUNTER — PREP FOR PROCEDURE (OUTPATIENT)
Dept: OTOLARYNGOLOGY | Facility: CLINIC | Age: 79
End: 2024-02-02

## 2024-02-08 ENCOUNTER — THERAPY VISIT (OUTPATIENT)
Dept: PHYSICAL THERAPY | Facility: REHABILITATION | Age: 79
End: 2024-02-08
Payer: COMMERCIAL

## 2024-02-08 DIAGNOSIS — R26.89 BALANCE PROBLEM: Primary | ICD-10-CM

## 2024-02-08 DIAGNOSIS — R53.1 DECREASED STRENGTH: ICD-10-CM

## 2024-02-08 PROCEDURE — 97110 THERAPEUTIC EXERCISES: CPT | Mod: GP | Performed by: PHYSICAL THERAPIST

## 2024-02-08 PROCEDURE — 97112 NEUROMUSCULAR REEDUCATION: CPT | Mod: GP | Performed by: PHYSICAL THERAPIST

## 2024-02-12 ENCOUNTER — ANESTHESIA EVENT (OUTPATIENT)
Dept: SURGERY | Facility: CLINIC | Age: 79
End: 2024-02-12
Payer: COMMERCIAL

## 2024-02-13 ASSESSMENT — COPD QUESTIONNAIRES: COPD: 1

## 2024-02-13 ASSESSMENT — ENCOUNTER SYMPTOMS: DYSRHYTHMIAS: 1

## 2024-02-13 NOTE — OR NURSING
"FALLS RISK (recent fall at [facility] after procedure 12/23 while getting dressed) -     Pt has tremors and will need assistance with dressing after procedure \"even if I say I don't need help\" per pt.   "

## 2024-02-13 NOTE — ANESTHESIA PREPROCEDURE EVALUATION
Anesthesia Pre-Procedure Evaluation    Patient: Asya Coffman   MRN: [id number] : 1945        Procedure : Procedure(s):  Microdirect laryngoscopy with excision/ablation of laryngeal lesions, possible biopsies, possible CO2 laser  possible Avastin injection          Past Medical History:   Diagnosis Date     A-fib (H)      Antiplatelet or antithrombotic long-term use      Arrhythmia      COPD (chronic obstructive pulmonary disease) (H)       Past Surgical History:   Procedure Laterality Date     INJECT STEROID (LOCATION) N/A 6/15/2023    Procedure: Avastin injection;  Surgeon: Nikole Davis MD;  Location: [facility] OR     INJECT STEROID (LOCATION) N/A 2023    Procedure: Avastin injection;  Surgeon: Nikole Davis MD;  [facility]: [facility]     INJECT STEROID (LOCATION) N/A 2023    Procedure: Avastin injection;  Surgeon: Nikole Davis MD;  Location: [facility]     LASER CO2 LARYNGOSCOPY N/A 2023    Procedure: Microdirect laryngoscopy with excision/ablation of laryngeal lesions, biopsies, CO2 laser;  Surgeon: Nikole Davis MD;  Location: [facility] OR     LASER CO2 LARYNGOSCOPY, COMPLEX N/A 2022    Procedure: Micro direct laryngoscopy with excision/ablation of laryngeal lesions, possible biopsies, possible CO2 laser;  Surgeon: Nikole Davis MD;  Location: [facility] OR     LASER CO2 LARYNGOSCOPY, COMPLEX N/A 9/15/2022    Procedure: Microdirect laryngoscopy with ablation of laryngeal lesions,biopsies,CO2 laser;  Surgeon: Nikole Davis MD;  Location: [facility]     LASER CO2 LARYNGOSCOPY, COMPLEX N/A 2022    Procedure: Microdirect laryngoscopy with excision/ablation of laryngeal lesions, biopsies,   CO2 laser;  Surgeon: Nikole Davis MD;  Location: [facility]     LASER CO2 LARYNGOSCOPY, COMPLEX N/A 6/15/2023    Procedure: Microdirect laryngoscopy with ablation of laryngeal lesions, biopsies, CO2 laser;  Surgeon: Nikole Davis MD;   [facility]: [facility]     LASER CO2 LARYNGOSCOPY, COMPLEX N/A 10/5/2023    Procedure: Microdirect laryngoscopy with excision/ablation of laryngeal lesions, biopsies, CO2 laser;  Surgeon: Nikole Davis MD;  [facility]: [facility]     LASER CO2 LARYNGOSCOPY, COMPLEX N/A 12/14/2023    Procedure: Microdirect laryngoscopy with excision/ablation of laryngeal lesions, biopsies, CO2 laser;  Surgeon: Nikole Davis MD;  [facility]     LASER KTP LARYNGOSCOPY N/A 4/3/2023    Procedure: Microdirect laryngoscopy with biopsies, excision/ablation of lesions, C02 laser,  Avastin injection;  Surgeon: Nikole Davis MD;  [facility]     LASER KTP LARYNGOSCOPY N/A 6/15/2023    Procedure: flexible laser (CO2 or KTP) standby;  Surgeon: Nikole Davis MD;  [facility]     LASER KTP LARYNGOSCOPY FLEXIBLE N/A 8/18/2022    Procedure: Transnasal flexible laryngoscopy with KTP laser and biopsies;  Surgeon: Nikole Davis MD;  Location: [facility] OR     LASER KTP LARYNGOSCOPY FLEXIBLE N/A 10/27/2022    Procedure: Transnasal flexible laryngoscopy with possible KTP laser;  Surgeon: Nikole Davis MD;  Location: [facility] OR     LASER KTP LARYNGOSCOPY FLEXIBLE N/A 1/26/2023    Procedure: Transnasal flexible laryngoscopy with KTP laser,  biopsies, superior laryngeal nerve blocks, Avastin injection;  Surgeon: Nikole Davis MD;  Location: [facility] OR     LASER KTP LARYNGOSCOPY FLEXIBLE N/A 2/15/2023    Procedure: Transnasal flexible laryngoscopy with KTP laser, superior laryngeal nerve blocks, biopsies, avastin injection;  Surgeon: Nikole Davis MD;  Location: [facility] OR     LASER KTP LARYNGOSCOPY FLEXIBLE N/A 2/17/2023    Procedure: Transnasal flexible laryngoscopy with KTP laser, biopsies, superior laryngeal nerve blocks;  Surgeon: Nikole Davis MD;  Location: [facility] OR     LASER KTP LARYNGOSCOPY FLEXIBLE N/A 2/23/2023    Procedure: Transnasal flexible laryngoscopy  with KTP laser, superior laryngeal nerve blocks;  Surgeon: Nikole Davis MD;  Location: [facility] OR     LASER KTP LARYNGOSCOPY FLEXIBLE N/A 3/2/2023    Procedure: Transnasal flexible laryngoscopy with KTP laser, superior laryngeal nerve block Bilateral;  Surgeon: Nikole Davis MD;  Location: [facility] OR     LASER KTP LARYNGOSCOPY FLEXIBLE N/A 3/9/2023    Procedure: Transnasal flexible laryngoscopy with KTP laser, biopsies, superior laryngeal nerve blocks;  Surgeon: Nikole Davis MD;  Location: [facility] OR     LASER KTP LARYNGOSCOPY FLEXIBLE N/A 3/17/2023    Procedure: Transnasal flexible laryngoscopy with KTP laser, superior laryngeal nerve block(s), Avastin injection;  Surgeon: Nikole Davis MD;  Location: [facility] OR     LASER KTP LARYNGOSCOPY FLEXIBLE N/A 3/28/2023    Procedure: Transnasal flexible laryngoscopy with KTP laser, superior laryngeal nerve block(s);  Surgeon: Pankaj Woodard MD;  Location: [facility] OR      Allergies   Allergen Reactions     Methylpyrrolidone Anaphylaxis     Nuts Anaphylaxis     Black walnut     Escitalopram Other (See Comments)     Asthma -a time of exacerbation and may not have been cause may retry     Mirtazapine Other (See Comments)     Asthma a time of exacerbation and may not have been cause may retry     Venlafaxine Other (See Comments)     Adhesive Tape Rash     With holter monitor. Ok with bandaids.     No Clinical Screening - See Comments Rash     Adhesive tape      Social History     Tobacco Use     Smoking status: Never     Smokeless tobacco: Never   Substance Use Topics     Alcohol use: Yes     Comment: Occasionally      Wt Readings from Last 1 Encounters:   01/05/24 53.5 kg (118 lb)        Anesthesia Evaluation   Pt has had prior anesthetic. Type: General.    No history of anesthetic complications       ROS/MED HX  ENT/Pulmonary:     (+)                         moderate,  COPD,              Neurologic:       Cardiovascular:     (+)   hypertension- -   -  - -   Taking blood thinners                     dysrhythmias, a-fib, Irregular Heartbeat/Palpitations,       Previous cardiac testing   Echo: Date: 4/2023 Results:  Interpretation Summary  Technically difficult study. Poor acoustic windows.  Global and regional left ventricular function is normal with an EF of 55-60%.  Global right ventricular function is borderline reduced. The right ventricle  is normal size.  No significant valvular abnormalities.  The estimated PA systolic pressure is 43 mmHg.  IVC diameter >2.1 cm collapsing <50% with sniff suggests a high RA pressure  estimated at 15 mmHg or greater.  There is no prior study for direct comparison.    Stress Test:  Date: Results:    ECG Reviewed:  Date: Results:    Cath:  Date: Results:      METS/Exercise Tolerance: 3 - Able to walk 1-2 blocks without stopping    Hematologic:       Musculoskeletal:       GI/Hepatic:     (+) GERD, Asymptomatic on medication,                  Renal/Genitourinary:     (+) renal disease, type: CRI, Pt does not require dialysis,           Endo:     (+)          thyroid problem, hypothyroidism,           Psychiatric/Substance Use:       Infectious Disease:       Malignancy:       Other:            Physical Exam    Airway        Mallampati: III   TM distance: > 3 FB   Neck ROM: full   Mouth opening: > 3 cm    Respiratory Devices and Support         Dental       (+) Modest Abnormalities - crowns, retainers, 1 or 2 missing teeth      Cardiovascular             Pulmonary               OUTSIDE LABS:  CBC:   Lab Results   Component Value Date    WBC 16.3 (H) 12/15/2023    WBC 8.2 09/21/2023    HGB 12.3 12/15/2023    HGB 14.1 09/21/2023    HCT 40.1 12/15/2023    HCT 43.9 09/21/2023     12/15/2023     09/21/2023     BMP:   Lab Results   Component Value Date     12/15/2023     12/15/2023    POTASSIUM 4.5 12/15/2023    POTASSIUM 4.5 12/15/2023    CHLORIDE 105 12/15/2023    CHLORIDE 105 12/15/2023  "   CO2 21 (L) 12/15/2023    CO2 21 (L) 12/15/2023    BUN 24.8 (H) 12/15/2023    BUN 24.8 (H) 12/15/2023    CR 1.15 (H) 12/15/2023    CR 1.15 (H) 12/15/2023     (H) 12/15/2023     (H) 12/15/2023     COAGS:   Lab Results   Component Value Date    PTT 37 04/02/2023    INR 1.16 (H) 04/02/2023     POC: No results found for: \"BGM\", \"HCG\", \"HCGS\"  HEPATIC:   Lab Results   Component Value Date    ALBUMIN 3.5 12/15/2023    PROTTOTAL 6.2 (L) 12/15/2023    ALT 45 12/15/2023    AST 28 12/15/2023    ALKPHOS 100 12/15/2023    BILITOTAL 0.3 12/15/2023     OTHER:   Lab Results   Component Value Date    PH 7.37 04/02/2023    LACT 1.8 12/15/2023    A1C 6.0 (H) 01/24/2024    ESSIE 8.8 12/15/2023    ESSIE 8.8 12/15/2023    MAG 2.3 12/15/2023    TSH 0.39 12/15/2023       Anesthesia Plan    ASA Status:  3    NPO Status:  NPO Appropriate    Anesthesia Type: General.     - Airway: ETT   Induction: Intravenous.   Maintenance: TIVA.        Consents    Anesthesia Plan(s) and associated risks, benefits, and realistic alternatives discussed. Questions answered and patient/representative(s) expressed understanding.     - Discussed: Risks, Benefits and Alternatives for BOTH SEDATION and the PROCEDURE were discussed     - Discussed with:  Patient      - Extended Intubation/Ventilatory Support Discussed: No.      - Patient is DNR/DNI Status: No     Use of blood products discussed: No .     Postoperative Care    Pain management: Multi-modal analgesia.   PONV prophylaxis: Ondansetron (or other 5HT-3), Dexamethasone or Solumedrol, Background Propofol Infusion     Comments:               Rebecca Perez MD    I have reviewed the pertinent notes and labs in the chart from the past 30 days and (re)examined the patient.  Any updates or changes from those notes are reflected in this note.                  "

## 2024-02-15 ENCOUNTER — ANESTHESIA (OUTPATIENT)
Dept: SURGERY | Facility: CLINIC | Age: 79
End: 2024-02-15
Payer: COMMERCIAL

## 2024-02-15 ENCOUNTER — HOSPITAL ENCOUNTER (OUTPATIENT)
Facility: CLINIC | Age: 79
Discharge: HOME | End: 2024-02-16
Attending: OTOLARYNGOLOGY | Admitting: OTOLARYNGOLOGY
Payer: COMMERCIAL

## 2024-02-15 DIAGNOSIS — I48.91 ATRIAL FIBRILLATION WITH RVR (H): ICD-10-CM

## 2024-02-15 LAB
ANION GAP SERPL CALCULATED.3IONS-SCNC: 12 MMOL/L (ref 7–15)
BUN SERPL-MCNC: 21.3 MG/DL (ref 8–23)
CALCIUM SERPL-MCNC: 9.2 MG/DL (ref 8.8–10.2)
CHLORIDE SERPL-SCNC: 104 MMOL/L (ref 98–107)
CREAT SERPL-MCNC: 1.15 MG/DL (ref 0.51–0.95)
DEPRECATED HCO3 PLAS-SCNC: 24 MMOL/L (ref 22–29)
EGFRCR SERPLBLD CKD-EPI 2021: 49 ML/MIN/1.73M2
GLUCOSE BLDC GLUCOMTR-MCNC: 84 MG/DL (ref 70–99)
GLUCOSE SERPL-MCNC: 91 MG/DL (ref 70–99)
POTASSIUM SERPL-SCNC: 4.4 MMOL/L (ref 3.4–5.3)
SODIUM SERPL-SCNC: 140 MMOL/L (ref 135–145)

## 2024-02-15 PROCEDURE — 36415 COLL VENOUS BLD VENIPUNCTURE: CPT | Performed by: ANESTHESIOLOGY

## 2024-02-15 PROCEDURE — 250N000011 HC RX IP 250 OP 636: Performed by: OTOLARYNGOLOGY

## 2024-02-15 PROCEDURE — 88305 TISSUE EXAM BY PATHOLOGIST: CPT | Mod: TC | Performed by: OTOLARYNGOLOGY

## 2024-02-15 PROCEDURE — 710N000010 HC RECOVERY PHASE 1, LEVEL 2, PER MIN: Performed by: OTOLARYNGOLOGY

## 2024-02-15 PROCEDURE — 999N000141 HC STATISTIC PRE-PROCEDURE NURSING ASSESSMENT: Performed by: OTOLARYNGOLOGY

## 2024-02-15 PROCEDURE — 82962 GLUCOSE BLOOD TEST: CPT

## 2024-02-15 PROCEDURE — 258N000003 HC RX IP 258 OP 636: Performed by: STUDENT IN AN ORGANIZED HEALTH CARE EDUCATION/TRAINING PROGRAM

## 2024-02-15 PROCEDURE — 258N000003 HC RX IP 258 OP 636: Performed by: ANESTHESIOLOGY

## 2024-02-15 PROCEDURE — 250N000011 HC RX IP 250 OP 636: Performed by: ANESTHESIOLOGY

## 2024-02-15 PROCEDURE — 710N000012 HC RECOVERY PHASE 2, PER MINUTE: Performed by: OTOLARYNGOLOGY

## 2024-02-15 PROCEDURE — 250N000009 HC RX 250: Performed by: ANESTHESIOLOGY

## 2024-02-15 PROCEDURE — 80048 BASIC METABOLIC PNL TOTAL CA: CPT | Performed by: ANESTHESIOLOGY

## 2024-02-15 PROCEDURE — 360N000077 HC SURGERY LEVEL 4, PER MIN: Performed by: OTOLARYNGOLOGY

## 2024-02-15 PROCEDURE — 250N000011 HC RX IP 250 OP 636: Mod: JZ | Performed by: OTOLARYNGOLOGY

## 2024-02-15 PROCEDURE — 370N000017 HC ANESTHESIA TECHNICAL FEE, PER MIN: Performed by: OTOLARYNGOLOGY

## 2024-02-15 PROCEDURE — 31541 LARYNSCOP W/TUMR EXC + SCOPE: CPT | Mod: GC | Performed by: OTOLARYNGOLOGY

## 2024-02-15 PROCEDURE — 31571 LARYNGOSCOP W/VC INJ + SCOPE: CPT | Mod: 51 | Performed by: OTOLARYNGOLOGY

## 2024-02-15 PROCEDURE — 88305 TISSUE EXAM BY PATHOLOGIST: CPT | Mod: 26 | Performed by: STUDENT IN AN ORGANIZED HEALTH CARE EDUCATION/TRAINING PROGRAM

## 2024-02-15 PROCEDURE — 272N000001 HC OR GENERAL SUPPLY STERILE: Performed by: OTOLARYNGOLOGY

## 2024-02-15 RX ORDER — POLYETHYLENE GLYCOL 3350 17 G/17G
17 POWDER, FOR SOLUTION ORAL DAILY
Status: DISCONTINUED | OUTPATIENT
Start: 2024-02-16 | End: 2024-02-16 | Stop reason: HOSPADM

## 2024-02-15 RX ORDER — AMPICILLIN AND SULBACTAM 2; 1 G/1; G/1
3 INJECTION, POWDER, FOR SOLUTION INTRAMUSCULAR; INTRAVENOUS
Status: COMPLETED | OUTPATIENT
Start: 2024-02-15 | End: 2024-02-15

## 2024-02-15 RX ORDER — ONDANSETRON 2 MG/ML
INJECTION INTRAMUSCULAR; INTRAVENOUS PRN
Status: DISCONTINUED | OUTPATIENT
Start: 2024-02-15 | End: 2024-02-15

## 2024-02-15 RX ORDER — FENTANYL CITRATE 50 UG/ML
INJECTION, SOLUTION INTRAMUSCULAR; INTRAVENOUS PRN
Status: DISCONTINUED | OUTPATIENT
Start: 2024-02-15 | End: 2024-02-15

## 2024-02-15 RX ORDER — ONDANSETRON 4 MG/1
4 TABLET, ORALLY DISINTEGRATING ORAL EVERY 30 MIN PRN
Status: DISCONTINUED | OUTPATIENT
Start: 2024-02-15 | End: 2024-02-16

## 2024-02-15 RX ORDER — ACETAMINOPHEN 325 MG/1
650 TABLET ORAL EVERY 4 HOURS PRN
Status: DISCONTINUED | OUTPATIENT
Start: 2024-02-18 | End: 2024-02-16 | Stop reason: HOSPADM

## 2024-02-15 RX ORDER — FENTANYL CITRATE 50 UG/ML
25 INJECTION, SOLUTION INTRAMUSCULAR; INTRAVENOUS EVERY 5 MIN PRN
Status: DISCONTINUED | OUTPATIENT
Start: 2024-02-15 | End: 2024-02-16

## 2024-02-15 RX ORDER — ALBUTEROL SULFATE 90 UG/1
2 AEROSOL, METERED RESPIRATORY (INHALATION) 2 TIMES DAILY
Status: DISCONTINUED | OUTPATIENT
Start: 2024-02-16 | End: 2024-02-16

## 2024-02-15 RX ORDER — SODIUM CHLORIDE, SODIUM LACTATE, POTASSIUM CHLORIDE, CALCIUM CHLORIDE 600; 310; 30; 20 MG/100ML; MG/100ML; MG/100ML; MG/100ML
INJECTION, SOLUTION INTRAVENOUS CONTINUOUS
Status: DISCONTINUED | OUTPATIENT
Start: 2024-02-15 | End: 2024-02-16

## 2024-02-15 RX ORDER — LIDOCAINE HYDROCHLORIDE 40 MG/ML
SOLUTION TOPICAL PRN
Status: DISCONTINUED | OUTPATIENT
Start: 2024-02-15 | End: 2024-02-15 | Stop reason: HOSPADM

## 2024-02-15 RX ORDER — EPINEPHRINE 0.1 MG/ML
INJECTION INTRAVENOUS PRN
Status: DISCONTINUED | OUTPATIENT
Start: 2024-02-15 | End: 2024-02-15 | Stop reason: HOSPADM

## 2024-02-15 RX ORDER — SERTRALINE HYDROCHLORIDE 100 MG/1
100 TABLET, FILM COATED ORAL DAILY
Status: DISCONTINUED | OUTPATIENT
Start: 2024-02-16 | End: 2024-02-16 | Stop reason: HOSPADM

## 2024-02-15 RX ORDER — ONDANSETRON 2 MG/ML
4 INJECTION INTRAMUSCULAR; INTRAVENOUS EVERY 30 MIN PRN
Status: DISCONTINUED | OUTPATIENT
Start: 2024-02-15 | End: 2024-02-16

## 2024-02-15 RX ORDER — PROCHLORPERAZINE MALEATE 5 MG
5 TABLET ORAL EVERY 6 HOURS PRN
Status: DISCONTINUED | OUTPATIENT
Start: 2024-02-15 | End: 2024-02-16 | Stop reason: HOSPADM

## 2024-02-15 RX ORDER — ATORVASTATIN CALCIUM 20 MG/1
20 TABLET, FILM COATED ORAL EVERY EVENING
Status: DISCONTINUED | OUTPATIENT
Start: 2024-02-16 | End: 2024-02-16 | Stop reason: HOSPADM

## 2024-02-15 RX ORDER — ESMOLOL HYDROCHLORIDE 10 MG/ML
INJECTION INTRAVENOUS PRN
Status: DISCONTINUED | OUTPATIENT
Start: 2024-02-15 | End: 2024-02-15

## 2024-02-15 RX ORDER — ONDANSETRON 4 MG/1
4 TABLET, ORALLY DISINTEGRATING ORAL EVERY 6 HOURS PRN
Status: DISCONTINUED | OUTPATIENT
Start: 2024-02-15 | End: 2024-02-16 | Stop reason: HOSPADM

## 2024-02-15 RX ORDER — HYDROMORPHONE HCL IN WATER/PF 6 MG/30 ML
0.4 PATIENT CONTROLLED ANALGESIA SYRINGE INTRAVENOUS EVERY 5 MIN PRN
Status: DISCONTINUED | OUTPATIENT
Start: 2024-02-15 | End: 2024-02-16

## 2024-02-15 RX ORDER — PROPOFOL 10 MG/ML
INJECTION, EMULSION INTRAVENOUS CONTINUOUS PRN
Status: DISCONTINUED | OUTPATIENT
Start: 2024-02-15 | End: 2024-02-15

## 2024-02-15 RX ORDER — HYDROMORPHONE HCL IN WATER/PF 6 MG/30 ML
0.2 PATIENT CONTROLLED ANALGESIA SYRINGE INTRAVENOUS EVERY 5 MIN PRN
Status: DISCONTINUED | OUTPATIENT
Start: 2024-02-15 | End: 2024-02-16

## 2024-02-15 RX ORDER — BISACODYL 10 MG
10 SUPPOSITORY, RECTAL RECTAL DAILY PRN
Status: DISCONTINUED | OUTPATIENT
Start: 2024-02-15 | End: 2024-02-16 | Stop reason: HOSPADM

## 2024-02-15 RX ORDER — DEXAMETHASONE SODIUM PHOSPHATE 4 MG/ML
10 INJECTION, SOLUTION INTRA-ARTICULAR; INTRALESIONAL; INTRAMUSCULAR; INTRAVENOUS; SOFT TISSUE ONCE
Status: COMPLETED | OUTPATIENT
Start: 2024-02-15 | End: 2024-02-15

## 2024-02-15 RX ORDER — DILTIAZEM HYDROCHLORIDE 240 MG/1
240 CAPSULE, COATED, EXTENDED RELEASE ORAL 2 TIMES DAILY
Status: DISCONTINUED | OUTPATIENT
Start: 2024-02-16 | End: 2024-02-16 | Stop reason: HOSPADM

## 2024-02-15 RX ORDER — ACETAMINOPHEN 325 MG/1
975 TABLET ORAL EVERY 8 HOURS
Status: DISCONTINUED | OUTPATIENT
Start: 2024-02-16 | End: 2024-02-16 | Stop reason: HOSPADM

## 2024-02-15 RX ORDER — LEVOTHYROXINE SODIUM 88 UG/1
88 TABLET ORAL DAILY
Status: DISCONTINUED | OUTPATIENT
Start: 2024-02-16 | End: 2024-02-16 | Stop reason: HOSPADM

## 2024-02-15 RX ORDER — OXYCODONE HYDROCHLORIDE 10 MG/1
10 TABLET ORAL
Status: DISCONTINUED | OUTPATIENT
Start: 2024-02-15 | End: 2024-02-16

## 2024-02-15 RX ORDER — IPRATROPIUM BROMIDE AND ALBUTEROL SULFATE 2.5; .5 MG/3ML; MG/3ML
1 SOLUTION RESPIRATORY (INHALATION) EVERY 6 HOURS PRN
Status: DISCONTINUED | OUTPATIENT
Start: 2024-02-15 | End: 2024-02-16 | Stop reason: HOSPADM

## 2024-02-15 RX ORDER — FENTANYL CITRATE 50 UG/ML
50 INJECTION, SOLUTION INTRAMUSCULAR; INTRAVENOUS EVERY 5 MIN PRN
Status: DISCONTINUED | OUTPATIENT
Start: 2024-02-15 | End: 2024-02-16

## 2024-02-15 RX ORDER — LIDOCAINE 40 MG/G
CREAM TOPICAL
Status: DISCONTINUED | OUTPATIENT
Start: 2024-02-15 | End: 2024-02-16 | Stop reason: HOSPADM

## 2024-02-15 RX ORDER — AMPICILLIN AND SULBACTAM 1; .5 G/1; G/1
1.5 INJECTION, POWDER, FOR SOLUTION INTRAMUSCULAR; INTRAVENOUS SEE ADMIN INSTRUCTIONS
Status: DISCONTINUED | OUTPATIENT
Start: 2024-02-15 | End: 2024-02-15 | Stop reason: HOSPADM

## 2024-02-15 RX ORDER — LIDOCAINE HYDROCHLORIDE 20 MG/ML
INJECTION, SOLUTION INFILTRATION; PERINEURAL PRN
Status: DISCONTINUED | OUTPATIENT
Start: 2024-02-15 | End: 2024-02-15

## 2024-02-15 RX ORDER — OXYCODONE HYDROCHLORIDE 5 MG/1
5 TABLET ORAL
Status: DISCONTINUED | OUTPATIENT
Start: 2024-02-15 | End: 2024-02-16

## 2024-02-15 RX ORDER — PROPOFOL 10 MG/ML
INJECTION, EMULSION INTRAVENOUS PRN
Status: DISCONTINUED | OUTPATIENT
Start: 2024-02-15 | End: 2024-02-15

## 2024-02-15 RX ORDER — AMOXICILLIN 250 MG
1 CAPSULE ORAL 2 TIMES DAILY
Status: DISCONTINUED | OUTPATIENT
Start: 2024-02-16 | End: 2024-02-16 | Stop reason: HOSPADM

## 2024-02-15 RX ORDER — ONDANSETRON 2 MG/ML
4 INJECTION INTRAMUSCULAR; INTRAVENOUS EVERY 6 HOURS PRN
Status: DISCONTINUED | OUTPATIENT
Start: 2024-02-15 | End: 2024-02-16 | Stop reason: HOSPADM

## 2024-02-15 RX ORDER — METOPROLOL TARTRATE 1 MG/ML
INJECTION, SOLUTION INTRAVENOUS PRN
Status: DISCONTINUED | OUTPATIENT
Start: 2024-02-15 | End: 2024-02-15

## 2024-02-15 RX ORDER — SODIUM CHLORIDE, SODIUM LACTATE, POTASSIUM CHLORIDE, CALCIUM CHLORIDE 600; 310; 30; 20 MG/100ML; MG/100ML; MG/100ML; MG/100ML
INJECTION, SOLUTION INTRAVENOUS CONTINUOUS PRN
Status: DISCONTINUED | OUTPATIENT
Start: 2024-02-15 | End: 2024-02-15

## 2024-02-15 RX ADMIN — LIDOCAINE HYDROCHLORIDE 100 MG: 20 INJECTION, SOLUTION INFILTRATION; PERINEURAL at 19:29

## 2024-02-15 RX ADMIN — METOPROLOL TARTRATE 2.5 MG: 5 INJECTION INTRAVENOUS at 20:00

## 2024-02-15 RX ADMIN — AMPICILLIN AND SULBACTAM 3 G: 1; 2 INJECTION, POWDER, FOR SOLUTION INTRAMUSCULAR; INTRAVENOUS at 12:33

## 2024-02-15 RX ADMIN — ESMOLOL HYDROCHLORIDE 20 MG: 10 INJECTION, SOLUTION INTRAVENOUS at 19:42

## 2024-02-15 RX ADMIN — Medication 20 MG: at 20:03

## 2024-02-15 RX ADMIN — SODIUM CHLORIDE, POTASSIUM CHLORIDE, SODIUM LACTATE AND CALCIUM CHLORIDE 500 ML: 600; 310; 30; 20 INJECTION, SOLUTION INTRAVENOUS at 16:52

## 2024-02-15 RX ADMIN — FENTANYL CITRATE 50 MCG: 50 INJECTION INTRAMUSCULAR; INTRAVENOUS at 19:58

## 2024-02-15 RX ADMIN — DEXAMETHASONE SODIUM PHOSPHATE 10 MG: 4 INJECTION, SOLUTION INTRAMUSCULAR; INTRAVENOUS at 19:35

## 2024-02-15 RX ADMIN — PROPOFOL 150 MCG/KG/MIN: 10 INJECTION, EMULSION INTRAVENOUS at 19:35

## 2024-02-15 RX ADMIN — ONDANSETRON 4 MG: 2 INJECTION INTRAMUSCULAR; INTRAVENOUS at 19:35

## 2024-02-15 RX ADMIN — Medication 50 MG: at 19:30

## 2024-02-15 RX ADMIN — SODIUM CHLORIDE, POTASSIUM CHLORIDE, SODIUM LACTATE AND CALCIUM CHLORIDE: 600; 310; 30; 20 INJECTION, SOLUTION INTRAVENOUS at 22:00

## 2024-02-15 RX ADMIN — FENTANYL CITRATE 50 MCG: 50 INJECTION INTRAMUSCULAR; INTRAVENOUS at 19:29

## 2024-02-15 RX ADMIN — METOPROLOL TARTRATE 2.5 MG: 5 INJECTION INTRAVENOUS at 20:10

## 2024-02-15 RX ADMIN — ESMOLOL HYDROCHLORIDE 20 MG: 10 INJECTION, SOLUTION INTRAVENOUS at 19:49

## 2024-02-15 RX ADMIN — SUGAMMADEX 200 MG: 100 INJECTION, SOLUTION INTRAVENOUS at 21:11

## 2024-02-15 RX ADMIN — SODIUM CHLORIDE, POTASSIUM CHLORIDE, SODIUM LACTATE AND CALCIUM CHLORIDE: 600; 310; 30; 20 INJECTION, SOLUTION INTRAVENOUS at 19:22

## 2024-02-15 RX ADMIN — PHENYLEPHRINE HYDROCHLORIDE 200 MCG: 10 INJECTION INTRAVENOUS at 19:43

## 2024-02-15 RX ADMIN — AMPICILLIN AND SULBACTAM 1.5 G: 1; 2 INJECTION, POWDER, FOR SOLUTION INTRAMUSCULAR; INTRAVENOUS at 19:29

## 2024-02-15 RX ADMIN — PROPOFOL 160 MG: 10 INJECTION, EMULSION INTRAVENOUS at 19:29

## 2024-02-15 RX ADMIN — METOPROLOL TARTRATE 5 MG: 5 INJECTION INTRAVENOUS at 20:27

## 2024-02-15 ASSESSMENT — ACTIVITIES OF DAILY LIVING (ADL)
ADLS_ACUITY_SCORE: 34
ADLS_ACUITY_SCORE: 32
ADLS_ACUITY_SCORE: 34

## 2024-02-15 ASSESSMENT — COPD QUESTIONNAIRES: CAT_SEVERITY: MODERATE

## 2024-02-16 VITALS
TEMPERATURE: 97.6 F | OXYGEN SATURATION: 96 % | DIASTOLIC BLOOD PRESSURE: 100 MMHG | SYSTOLIC BLOOD PRESSURE: 123 MMHG | RESPIRATION RATE: 20 BRPM | HEIGHT: 65 IN | HEART RATE: 97 BPM | BODY MASS INDEX: 19.94 KG/M2 | WEIGHT: 119.71 LBS

## 2024-02-16 LAB — GLUCOSE BLDC GLUCOMTR-MCNC: 146 MG/DL (ref 70–99)

## 2024-02-16 PROCEDURE — 250N000013 HC RX MED GY IP 250 OP 250 PS 637

## 2024-02-16 PROCEDURE — 82962 GLUCOSE BLOOD TEST: CPT

## 2024-02-16 RX ORDER — ALBUTEROL SULFATE 90 UG/1
2 AEROSOL, METERED RESPIRATORY (INHALATION) 2 TIMES DAILY
Status: DISCONTINUED | OUTPATIENT
Start: 2024-02-16 | End: 2024-02-16 | Stop reason: HOSPADM

## 2024-02-16 RX ADMIN — LEVOTHYROXINE SODIUM 88 MCG: 88 TABLET ORAL at 08:17

## 2024-02-16 RX ADMIN — SERTRALINE HYDROCHLORIDE 100 MG: 100 TABLET ORAL at 08:18

## 2024-02-16 RX ADMIN — DILTIAZEM HYDROCHLORIDE 240 MG: 240 CAPSULE, EXTENDED RELEASE ORAL at 03:05

## 2024-02-16 RX ADMIN — ACETAMINOPHEN 975 MG: 325 TABLET, FILM COATED ORAL at 03:04

## 2024-02-16 RX ADMIN — DOCUSATE SODIUM 50 MG AND SENNOSIDES 8.6 MG 1 TABLET: 8.6; 5 TABLET, FILM COATED ORAL at 08:07

## 2024-02-16 RX ADMIN — DOCUSATE SODIUM 50 MG AND SENNOSIDES 8.6 MG 1 TABLET: 8.6; 5 TABLET, FILM COATED ORAL at 03:05

## 2024-02-16 RX ADMIN — POLYETHYLENE GLYCOL 3350 17 G: 17 POWDER, FOR SOLUTION ORAL at 08:04

## 2024-02-16 RX ADMIN — ATORVASTATIN CALCIUM 20 MG: 20 TABLET, FILM COATED ORAL at 03:06

## 2024-02-16 RX ADMIN — ALBUTEROL SULFATE 2 PUFF: 90 AEROSOL, METERED RESPIRATORY (INHALATION) at 08:04

## 2024-02-16 ASSESSMENT — ACTIVITIES OF DAILY LIVING (ADL)
ADLS_ACUITY_SCORE: 34

## 2024-02-16 NOTE — OP NOTE
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 The patient wished to proceed with surgery and presented for the procedure(s) listed above. We reviewed perioperative risks, including bleeding/infection/pain, injury to surrounding structures, potential changes to tongue/voice/swallow function, risk of needing additional procedures (e.g., due to persistence or recurrence), and risks of anesthesia (including heart attack, stroke, death). She elected to proceed and written informed consent was performed.    DESCRIPTION OF PROCEDURE:   The patient was brought to the operating room and placed supine. A time-out was called to verify patient identity, operative site, and planned procedure. The operative site was prepped in the usual clean fashion. Moist gauze eye pads were placed and secured. A head wrap was placed, and custom molded thermoplastic tooth guards were placed. Direct laryngoscopy was performed with an Ossoff-Pilling laryngoscope, which was placed in suspension. The vocal folds were examined with a 0 degree telescope. Cup biopsy forceps were used to debulk the right and left posterior supraglottis.    The operating microscope was then brought into position. Laser safety precautions were utilized at all times, including eye protection for the patient and staff, use of wet towels, and appropriately minimized FiO2. As needed, moistened cottonoids or laser-safe  backstops were used to protect the endotracheal tube from the laser. The Lumenis CO2 Accublade laser was attached to the microscope and used at a depth setting of 1 and 8 Cardoza.     The laryngoscope was repositioned as needed for access to the right and left posterior commissure. The laser was used to ablate leukoplakia of the right superior true vocal fold and papilloma of the right medial anterior commissure, left and right posterior supraglottis, and left and right posterior commissure. Clusters of papilloma were kept across multiple areas at each level to reduce the risk of posterior glottic stenosis.    Along the posterior subglottis and upper cervical tracheal wall, papilloma were removed under telescopic visualization, with some scattered sessile papilloma clusters still intact.    Cottonoids soaked in 1:10,000 epinephrine were sparingly used to maintain hemostasis. As possible, suspension was released to minimize injury to the tongue.    Avastin was then injected under telescopic visualization with particular attention to the posterior commissure and subglottis.    As needed during the procedure and at the conclusion of the procedure, the operating microscope was moved out of position and the 0 degree rigid endoscope was used to examine the vocal folds and confirm completion of the stated objectives of the procedure. After cottonoid and sponge counts were confirmed correct, 2 cc of 4% lidocaine were applied transglottically for laryngotracheal anesthesia. The laryngoscope and tooth guards were removed. The lips, teeth, and tongue were examined and no injuries were noted. Care of the patient was returned to Anesthesia.      FINDINGS:   Easy mask. Easy exposure with Ossoff-Pilling laryngoscope.   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  Heavy burden of papilloma bilateral supraglottis, and posterior commissure, L>R. Heavy burden of papilloma along  posterior subglottis and upper cervical trachea. Remainder of trachea clear.    SPECIMEN(S):   Right and left posterior supraglottic papilloma    DRAINS: None    ESTIMATED BLOOD LOSS: Minimal    COMPLICATIONS: None    DISPOSITION: Stable to PACU    ATTENDING PRESENCE STATEMENT:  I was present and participating for the entire procedure from beginning to completion.

## 2024-02-16 NOTE — OR NURSING
Pt has been stable and has been able to sleep over the last 3 hours. Pt transferred to a regular hospital bed earlier in the night. Pt reports having a mild sore throat and voice a little horse. Pt on room air.

## 2024-02-16 NOTE — ANESTHESIA CARE TRANSFER NOTE
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    Anesthesia Type:   General     Note:    Oropharynx: oropharynx clear of all foreign objects and spontaneously breathing  Level of Consciousness: awake  Oxygen Supplementation: face mask  Level of Supplemental Oxygen (L/min / FiO2): 8  Independent Airway: airway patency satisfactory and stable  Dentition: dentition unchanged  Vital Signs Stable: post-procedure vital signs reviewed and stable  Report to RN Given: handoff report given  Patient transferred to: PACU    Handoff Report: Identifed the Patient, Identified the Reponsible Provider, Reviewed the pertinent medical history, Discussed the surgical course, Reviewed Intra-OP anesthesia mangement and issues during anesthesia, Set expectations for post-procedure period and Allowed opportunity for questions and acknowledgement of understanding      Vitals:  Vitals Value Taken Time   /114 02/15/24 2125   Temp 36.8    Pulse 85 02/15/24 2129   Resp 14    SpO2 98 % 02/15/24 2129   Vitals shown include unfiled device data.    Electronically Signed By: VERONICA LUGO CRNA  February 15, 2024  9:30 PM

## 2024-02-16 NOTE — DISCHARGE INSTRUCTIONS
After Anesthesia (Sleep Medicine)    What Should I Do After Anesthesia?    --You should rest and relax for the next 24 hours.     Avoid risky or difficult (strenuous) activity.     A responsible adult should stay with you overnight.    --Don't drive or use any heavy equipment for 24 hours.      Even if you feel normal, your reactions may be affected by the sleep medicine you were given.    --Don't drink alcohol or make any important decisions for 24 hours.    --Slowly get back to your regular diet, as you feel able.      How Should I Expect to Feel?    --It's normal to feel dizzy, light-headed, or faint for up to a full day after anesthesia     or while taking pain medicine.        If This Happens:    --Sit down for a few minutes before standing.    --Have someone help you when you get up to walk or use the bathroom.    --If you have nausea (feel sick to your stomach) or vomit (throw up):    --Drink clear liquids (such as apple juice, ginger ale, broth or 7UP) until you feel better.    --If you feel sick to your stomach, or you keep vomiting for 24 hours, please CALL the DOCTOR.    What Else Should I Know?    --You might have a dry mouth, sore throat, muscle aches, or trouble sleeping. These should go away after 24 hours.    --Please contact your doctor if you have any other symptoms that concern you, such as fever, pain,      Bleeding, fluid drainage, swelling, or headache, or if it has been over 8 to 10 hours, and you have not been able to urinate (or pee).    --If you have a history of SLEEP APNEA, it is very important to use your CPAP machine for the next 24 hours when you doze, nap, or sleep.    Contacting your Doctor -   To contact a doctor, call Dr Davis at  the ENT- Otolaryngology Clinic at [phone] or:  [phone] and ask for the resident on call for ENT-Otolaryngology (answered 24 hours a day)   Emergency Department:  [facility]: [phone] if you are in need of immediate or emergent  help

## 2024-02-16 NOTE — OR NURSING
Staff received a call from Teddy (Granddaughter) informing staff that she was not able to get patient to take her home d/t the late hour. Will contact doctor to see if admission would be needed.

## 2024-02-16 NOTE — DISCHARGE SUMMARY
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 It was recommended that she undergo operative intervention and the patient consented to the above procedure after detailed explanation of the risks and benefits of said procedure.    Hospital Course: The patient was admitted to the hospital and underwent the above mentioned procedure. She tolerated the procedure without any intra- or tania-operative complications. Please see the operative report for full details of the procedure. The patient was admitted as she did not have a ride scheduled to pick her up. Her postoperative course was uneventful. At discharge, the patient's pain was well controlled, the patient was voiding on her own, and she was ambulating and tolerating a regular diet.     Discharge Exam:  Vitals:    02/16/24 0400 02/16/24 0500 02/16/24 0600 02/16/24 0700   BP: 108/75  (!) 123/100    BP Location: Right arm      Pulse: 73  97    Resp: 20   20   Temp:    97.6  F (36.4  C)   TempSrc:    Oral   SpO2: 93% 94% 96%    Weight:       Height:           General: A&O x 3, No acute distress  HEENT: PERRL, EOMI without spontaneous or gaze evoked nystagmus.  Respiratory: Breathing non-labored on room air, no stridor, no accessory muscle use.     Discharge Medications:     Medication List      There are no discharge medications for this visit.         Discharge Procedure Orders   Diet  "Instructions   Order Comments: Resume pre procedure diet     Notify Physician    Order Comments: Please notify your doctor if you experience coughing up significant blood which makes it hard to breath. If you feel it is acute, or experience sudden changes in breathing, chest pain, or excessive sleepiness/somnolence please return to the emergency department or call 911. If you have questions or concerns during the day please call ENT clinic and [phone]. If at night you can call [facility] at [phone] and ask for the \"ENT resident on call\".     Reason for your hospital stay   Order Comments: Post-operative care     Activity   Order Comments: Your activity upon discharge: No heavy lifting greater than 10 lbs and no strenuous exercise for 2 weeks or until follow up appointment. No driving while taking narcotic pain medications.     Order Specific Question Answer Comments   Is discharge order? Yes      Adult [facility] Follow-up and recommended labs and tests   Order Comments: Follow up in ENT clinic with Dr. Davis on 3/4/24 at 4pm. Please call the clinic with questions/concerns: [phone].    Otolaryngology/ENT Clinic:  [facility]  [address] [place] [address]      Appointments on [place] and/or [facility] (with [facility] or [facility] provider or service). Call [phone] if you haven't heard regarding these appointments within 7 days of discharge.     Diet   Order Comments: Follow this diet upon discharge: regular diet     Order Specific Question Answer Comments   Is discharge order? Yes        Dispo: To home in good condition. All of the patient's questions/concerns have been addressed at this time.     Adolfo Olivera MD PGY-1   Department of Otolaryngology - Head and Neck Surgery  Please page ENT with any questions    [facility] Staff  The patient discharged before I was able to see her today, but I agree with the plan. Will plan to " see her in followup as scheduled.

## 2024-02-16 NOTE — PROGRESS NOTES
Brief ENT Note    Patient does not have ride home, will plan for overnight admission to observation.       79 year old F with PMHx Afib with RVR, CREST syndrome, protein-calorie malnutrition, recurrent respiratory papillomatosis, chronic bronchitis, CKD 3a , now s/p debridement of bilateral supraglottic papillomas.     Plan:     Neuro: Pain control with Tylenol  - HEENT PMHx of RRP, follow up path, post-op treatment with budesonide inhaler  - Resume PTA Zoloft  Cardiovascular:   - History of Afib, on PTA apixiban, hold until POD1  - Resume PTA diltaizem  Respiratory:   - Supplemental O2  FEN/GI:   - Regular diet  Heme/ID:   - Hold PTA apixiban  :   - JOHN PAUL  Endocrine:   - PTA synthroid  MSK:  - JOHN PAUL  Cons:   -NONe  Ppx:   - KURTs    Mode Carney MD  Otolaryngology - Head and Neck Surgery PGY-3

## 2024-02-16 NOTE — OR NURSING
Received report from Jermaine Martínez (Pacu RN). Pt stable and awake. Vitals stable with a-fib abd elevated BP. Pt coughing up clear phlegm, able to clear airway.

## 2024-02-16 NOTE — ANESTHESIA PROCEDURE NOTES
Airway       Patient location during procedure: OR       Procedure Start/Stop Times: 2/15/2024 7:35 PM  Staff -        CRNA: Lanie Gayle APRN CRNA       Performed By: CRNA  Consent for Airway        Urgency: elective  Indications and Patient Condition       Indications for airway management: tania-procedural       Induction type:intravenous       Mask difficulty assessment: 1 - vent by mask    Final Airway Details       Final airway type: endotracheal airway       Successful airway: Oral and ETT - single  Endotracheal Airway Details        ETT size (mm): 5.0       Cuffed: yes       Successful intubation technique: direct laryngoscopy       DL Blade Type: Walters 2       Grade View of Cords: 1       Adjucts: stylet       Position: Left       Measured from: gums/teeth       Secured at (cm): 20       Bite block used: None    Post intubation assessment        Placement verified by: capnometry, equal breath sounds and chest rise        Number of attempts at approach: 1       Secured with: tape       Ease of procedure: easy       Dentition: Intact and Unchanged    Medication(s) Administered   Medication Administration Time: 2/15/2024 7:35 PM

## 2024-02-16 NOTE — OR NURSING
Spoke with daughter Lana to touch base as to no available ride home tonight. Also spoke with Dr. Davis (Surgeon) about no ride tonight. She was agreeable to patient remaining the night. Lana informed of this also.

## 2024-02-18 NOTE — ANESTHESIA POSTPROCEDURE EVALUATION
Patient: Asya Coffman    Procedure: Procedure(s):  Microdirect laryngoscopy with excision/ablation of laryngeal lesions, biopsies, CO2 laser  Avastin injection       Anesthesia Type:  General    Note:  Disposition: Admission   Postop Pain Control: Uneventful            Sign Out: Well controlled pain   PONV: No   Neuro/Psych: Uneventful            Sign Out: Acceptable/Baseline neuro status   Airway/Respiratory: Uneventful            Sign Out: Acceptable/Baseline resp. status   CV/Hemodynamics: Uneventful            Sign Out: Acceptable CV status; No obvious hypovolemia; No obvious fluid overload   Other NRE: NONE   DID A NON-ROUTINE EVENT OCCUR? No    Event details/Postop Comments:  Patient didn't have a ride home so she stayed for observation       Last vitals:  Vitals Value Taken Time   /100 02/16/24 0600   Temp 36.4  C (97.6  F) 02/16/24 0700   Pulse 97 02/16/24 0600   Resp 20 02/16/24 0700   SpO2 95 % 02/16/24 0648   Vitals shown include unfiled device data.    Electronically Signed By: Yolanda Tafoya MD  February 18, 2024  5:32 PM

## 2024-02-19 DIAGNOSIS — Z78.9 RECURRENT RESPIRATORY PAPILLOMATOSIS: Primary | ICD-10-CM

## 2024-02-19 DIAGNOSIS — J38.7 LARYNGEAL MASS: ICD-10-CM

## 2024-02-19 DIAGNOSIS — R49.0 DYSPHONIA: ICD-10-CM

## 2024-02-20 ENCOUNTER — TELEPHONE (OUTPATIENT)
Dept: OTOLARYNGOLOGY | Facility: CLINIC | Age: 79
End: 2024-02-20
Payer: COMMERCIAL

## 2024-02-20 NOTE — TELEPHONE ENCOUNTER
Scheduled surgery with Dr. Davis on 4/18/2024    Spoke with: Patient    Surgery is located at [place] [facility]    Patient will be seen for their H&P by their PCP Ml Calderón within 30 days of surgery - Confirmed PCP on file is up to date     Anesthesia type: General    Requested Imaging required for surgery: NA    Patient needs scheduled for their 5 week post op    Patient will receive their surgery packet via [employer] per their preference    Patient is aware they need a  to & from surgery    Additional comments: Patient will call back if they have any questions or concerns.    Krys Reddy on 2/20/2024 at 4:00 PM

## 2024-02-21 NOTE — TELEPHONE ENCOUNTER
Scheduled post op on 5/20 at 12:30PM    Krys Reddy, Perioperative Coordinator 2/21/2024 at 8:33 AM

## 2024-03-04 ENCOUNTER — OFFICE VISIT (OUTPATIENT)
Dept: OTOLARYNGOLOGY | Facility: CLINIC | Age: 79
End: 2024-03-04
Payer: COMMERCIAL

## 2024-03-04 VITALS — WEIGHT: 119 LBS | HEIGHT: 65 IN | BODY MASS INDEX: 19.83 KG/M2

## 2024-03-04 DIAGNOSIS — M34.1 CREST SYNDROME (H): ICD-10-CM

## 2024-03-04 DIAGNOSIS — R49.0 DYSPHONIA: ICD-10-CM

## 2024-03-04 DIAGNOSIS — Z78.9 RECURRENT RESPIRATORY PAPILLOMATOSIS: Primary | ICD-10-CM

## 2024-03-04 DIAGNOSIS — B49 FUNGAL INFECTION: ICD-10-CM

## 2024-03-04 DIAGNOSIS — J38.3 VOCAL CORD LEUKOPLAKIA: ICD-10-CM

## 2024-03-04 PROCEDURE — 31579 LARYNGOSCOPY TELESCOPIC: CPT | Performed by: OTOLARYNGOLOGY

## 2024-03-04 PROCEDURE — 99212 OFFICE O/P EST SF 10 MIN: CPT | Mod: 25 | Performed by: OTOLARYNGOLOGY

## 2024-03-04 NOTE — PATIENT INSTRUCTIONS
1.  You were seen in the ENT Clinic today by . If you have any questions or concerns after your appointment, please call [phone]. Press option #1 for scheduling related needs. Press option #3 for Nurse advice.    2.   has recommended the following:   - proceed with surgery as scheduled 4/18/24    3.  Plan is to return to clinic 5/20/24 for post operative follow up      Blanca Hyman LPN  [phone]  [facility] - Otolaryngology
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
## 2024-04-08 ENCOUNTER — HOSPITAL ENCOUNTER (EMERGENCY)
Facility: CLINIC | Age: 79
Discharge: HOME | End: 2024-04-08
Attending: EMERGENCY MEDICINE | Admitting: EMERGENCY MEDICINE
Payer: COMMERCIAL

## 2024-04-08 ENCOUNTER — PATIENT OUTREACH (OUTPATIENT)
Dept: OTOLARYNGOLOGY | Facility: CLINIC | Age: 79
End: 2024-04-08
Payer: COMMERCIAL

## 2024-04-08 ENCOUNTER — APPOINTMENT (OUTPATIENT)
Dept: GENERAL RADIOLOGY | Facility: CLINIC | Age: 79
End: 2024-04-08
Attending: EMERGENCY MEDICINE
Payer: COMMERCIAL

## 2024-04-08 VITALS
TEMPERATURE: 97.4 F | SYSTOLIC BLOOD PRESSURE: 166 MMHG | OXYGEN SATURATION: 96 % | HEART RATE: 82 BPM | RESPIRATION RATE: 22 BRPM | DIASTOLIC BLOOD PRESSURE: 97 MMHG

## 2024-04-08 DIAGNOSIS — J44.1 COPD EXACERBATION (H): ICD-10-CM

## 2024-04-08 DIAGNOSIS — Q31.8 VOCAL CORD ANOMALY: ICD-10-CM

## 2024-04-08 LAB
ALBUMIN SERPL BCG-MCNC: 4.3 G/DL (ref 3.5–5.2)
ALP SERPL-CCNC: 95 U/L (ref 40–150)
ALT SERPL W P-5'-P-CCNC: 15 U/L (ref 0–50)
ANION GAP SERPL CALCULATED.3IONS-SCNC: 12 MMOL/L (ref 7–15)
AST SERPL W P-5'-P-CCNC: 18 U/L (ref 0–45)
ATRIAL RATE - MUSE: 110 BPM
BASOPHILS # BLD AUTO: 0 10E3/UL (ref 0–0.2)
BASOPHILS NFR BLD AUTO: 0 %
BILIRUB SERPL-MCNC: 0.4 MG/DL
BUN SERPL-MCNC: 17.3 MG/DL (ref 8–23)
CALCIUM SERPL-MCNC: 9.1 MG/DL (ref 8.8–10.2)
CHLORIDE SERPL-SCNC: 104 MMOL/L (ref 98–107)
CREAT SERPL-MCNC: 1.19 MG/DL (ref 0.51–0.95)
DEPRECATED HCO3 PLAS-SCNC: 25 MMOL/L (ref 22–29)
DIASTOLIC BLOOD PRESSURE - MUSE: NORMAL MMHG
EGFRCR SERPLBLD CKD-EPI 2021: 47 ML/MIN/1.73M2
EOSINOPHIL # BLD AUTO: 0.1 10E3/UL (ref 0–0.7)
EOSINOPHIL NFR BLD AUTO: 1 %
ERYTHROCYTE [DISTWIDTH] IN BLOOD BY AUTOMATED COUNT: 15.4 % (ref 10–15)
FLUAV RNA SPEC QL NAA+PROBE: NEGATIVE
FLUBV RNA RESP QL NAA+PROBE: NEGATIVE
GLUCOSE SERPL-MCNC: 94 MG/DL (ref 70–99)
HCT VFR BLD AUTO: 48.3 % (ref 35–47)
HGB BLD-MCNC: 15.1 G/DL (ref 11.7–15.7)
HOLD SPECIMEN: NORMAL
HOLD SPECIMEN: NORMAL
IMM GRANULOCYTES # BLD: 0 10E3/UL
IMM GRANULOCYTES NFR BLD: 0 %
INTERPRETATION ECG - MUSE: NORMAL
LYMPHOCYTES # BLD AUTO: 1.4 10E3/UL (ref 0.8–5.3)
LYMPHOCYTES NFR BLD AUTO: 16 %
MAGNESIUM SERPL-MCNC: 2.1 MG/DL (ref 1.7–2.3)
MCH RBC QN AUTO: 26.6 PG (ref 26.5–33)
MCHC RBC AUTO-ENTMCNC: 31.3 G/DL (ref 31.5–36.5)
MCV RBC AUTO: 85 FL (ref 78–100)
MONOCYTES # BLD AUTO: 0.8 10E3/UL (ref 0–1.3)
MONOCYTES NFR BLD AUTO: 9 %
NEUTROPHILS # BLD AUTO: 6.4 10E3/UL (ref 1.6–8.3)
NEUTROPHILS NFR BLD AUTO: 74 %
NRBC # BLD AUTO: 0 10E3/UL
NRBC BLD AUTO-RTO: 0 /100
NT-PROBNP SERPL-MCNC: 1920 PG/ML (ref 0–1800)
P AXIS - MUSE: NORMAL DEGREES
PLATELET # BLD AUTO: 285 10E3/UL (ref 150–450)
POTASSIUM SERPL-SCNC: 3.8 MMOL/L (ref 3.4–5.3)
PR INTERVAL - MUSE: NORMAL MS
PROT SERPL-MCNC: 7.2 G/DL (ref 6.4–8.3)
QRS DURATION - MUSE: 78 MS
QT - MUSE: 292 MS
QTC - MUSE: 409 MS
R AXIS - MUSE: 159 DEGREES
RBC # BLD AUTO: 5.68 10E6/UL (ref 3.8–5.2)
RSV RNA SPEC NAA+PROBE: NEGATIVE
SARS-COV-2 RNA RESP QL NAA+PROBE: NEGATIVE
SODIUM SERPL-SCNC: 141 MMOL/L (ref 135–145)
SYSTOLIC BLOOD PRESSURE - MUSE: NORMAL MMHG
T AXIS - MUSE: 22 DEGREES
TROPONIN T SERPL HS-MCNC: 19 NG/L
TROPONIN T SERPL HS-MCNC: 22 NG/L
TSH SERPL DL<=0.005 MIU/L-ACNC: 1.48 UIU/ML (ref 0.3–4.2)
VENTRICULAR RATE- MUSE: 118 BPM
WBC # BLD AUTO: 8.7 10E3/UL (ref 4–11)

## 2024-04-08 PROCEDURE — 93005 ELECTROCARDIOGRAM TRACING: CPT | Performed by: EMERGENCY MEDICINE

## 2024-04-08 PROCEDURE — 99285 EMERGENCY DEPT VISIT HI MDM: CPT | Mod: 25 | Performed by: EMERGENCY MEDICINE

## 2024-04-08 PROCEDURE — 96374 THER/PROPH/DIAG INJ IV PUSH: CPT | Performed by: EMERGENCY MEDICINE

## 2024-04-08 PROCEDURE — 93010 ELECTROCARDIOGRAM REPORT: CPT | Performed by: EMERGENCY MEDICINE

## 2024-04-08 PROCEDURE — 250N000011 HC RX IP 250 OP 636: Performed by: EMERGENCY MEDICINE

## 2024-04-08 PROCEDURE — 36415 COLL VENOUS BLD VENIPUNCTURE: CPT | Performed by: EMERGENCY MEDICINE

## 2024-04-08 PROCEDURE — 83880 ASSAY OF NATRIURETIC PEPTIDE: CPT | Performed by: EMERGENCY MEDICINE

## 2024-04-08 PROCEDURE — 84484 ASSAY OF TROPONIN QUANT: CPT | Performed by: EMERGENCY MEDICINE

## 2024-04-08 PROCEDURE — 83735 ASSAY OF MAGNESIUM: CPT | Performed by: EMERGENCY MEDICINE

## 2024-04-08 PROCEDURE — 94640 AIRWAY INHALATION TREATMENT: CPT | Performed by: EMERGENCY MEDICINE

## 2024-04-08 PROCEDURE — 87637 SARSCOV2&INF A&B&RSV AMP PRB: CPT | Performed by: EMERGENCY MEDICINE

## 2024-04-08 PROCEDURE — 250N000009 HC RX 250: Performed by: EMERGENCY MEDICINE

## 2024-04-08 PROCEDURE — 71046 X-RAY EXAM CHEST 2 VIEWS: CPT

## 2024-04-08 PROCEDURE — 999N000248 HC STATISTIC IV INSERT WITH US BY RN

## 2024-04-08 PROCEDURE — 71046 X-RAY EXAM CHEST 2 VIEWS: CPT | Mod: 26 | Performed by: RADIOLOGY

## 2024-04-08 PROCEDURE — 85048 AUTOMATED LEUKOCYTE COUNT: CPT | Performed by: EMERGENCY MEDICINE

## 2024-04-08 PROCEDURE — 82247 BILIRUBIN TOTAL: CPT | Performed by: EMERGENCY MEDICINE

## 2024-04-08 PROCEDURE — 84443 ASSAY THYROID STIM HORMONE: CPT | Performed by: EMERGENCY MEDICINE

## 2024-04-08 RX ORDER — AZITHROMYCIN 250 MG/1
TABLET, FILM COATED ORAL
Qty: 6 TABLET | Refills: 0 | Status: SHIPPED | OUTPATIENT
Start: 2024-04-08 | End: 2024-04-13

## 2024-04-08 RX ORDER — PREDNISONE 20 MG/1
TABLET ORAL
Qty: 10 TABLET | Refills: 0 | Status: SHIPPED | OUTPATIENT
Start: 2024-04-08

## 2024-04-08 RX ORDER — IPRATROPIUM BROMIDE AND ALBUTEROL SULFATE 2.5; .5 MG/3ML; MG/3ML
3 SOLUTION RESPIRATORY (INHALATION) ONCE
Status: COMPLETED | OUTPATIENT
Start: 2024-04-08 | End: 2024-04-08

## 2024-04-08 RX ORDER — ALBUTEROL SULFATE 0.83 MG/ML
2.5 SOLUTION RESPIRATORY (INHALATION) ONCE
Status: COMPLETED | OUTPATIENT
Start: 2024-04-08 | End: 2024-04-08

## 2024-04-08 RX ORDER — SODIUM CHLORIDE FOR INHALATION 3 %
3 VIAL, NEBULIZER (ML) INHALATION ONCE
Status: COMPLETED | OUTPATIENT
Start: 2024-04-08 | End: 2024-04-08

## 2024-04-08 RX ORDER — METHYLPREDNISOLONE SODIUM SUCCINATE 125 MG/2ML
125 INJECTION, POWDER, LYOPHILIZED, FOR SOLUTION INTRAMUSCULAR; INTRAVENOUS ONCE
Status: COMPLETED | OUTPATIENT
Start: 2024-04-08 | End: 2024-04-08

## 2024-04-08 RX ADMIN — IPRATROPIUM BROMIDE AND ALBUTEROL SULFATE 3 ML: .5; 3 SOLUTION RESPIRATORY (INHALATION) at 13:31

## 2024-04-08 RX ADMIN — SODIUM CHLORIDE SOLN NEBU 3% 3 ML: 3 NEBU SOLN at 16:07

## 2024-04-08 RX ADMIN — METHYLPREDNISOLONE SODIUM SUCCINATE 125 MG: 125 INJECTION, POWDER, FOR SOLUTION INTRAMUSCULAR; INTRAVENOUS at 14:00

## 2024-04-08 RX ADMIN — ALBUTEROL SULFATE 2.5 MG: 2.5 SOLUTION RESPIRATORY (INHALATION) at 16:07

## 2024-04-08 ASSESSMENT — ACTIVITIES OF DAILY LIVING (ADL)
ADLS_ACUITY_SCORE: 38

## 2024-04-08 ASSESSMENT — COLUMBIA-SUICIDE SEVERITY RATING SCALE - C-SSRS
6. HAVE YOU EVER DONE ANYTHING, STARTED TO DO ANYTHING, OR PREPARED TO DO ANYTHING TO END YOUR LIFE?: NO
1. IN THE PAST MONTH, HAVE YOU WISHED YOU WERE DEAD OR WISHED YOU COULD GO TO SLEEP AND NOT WAKE UP?: NO
2. HAVE YOU ACTUALLY HAD ANY THOUGHTS OF KILLING YOURSELF IN THE PAST MONTH?: NO

## 2024-04-08 NOTE — PROGRESS NOTES
Messaged appropriate care providers to let them know patient would be coming to the ER. Inocencia Fan RN on 4/8/2024 at 8:58 AM

## 2024-04-08 NOTE — CONSULTS
Otolaryngology Consult Note  April 8, 2024      CC: Dyspnea, voice changes    HPI: Asya Coffman is a pleasant 80 yo F w/ PMH including CREST syndrome, COPD, chronic kidney disease, atrial fibrillation and a complex laryngeal history including a prior benign lesion, severe dysplasia, and laryngeal papilloma s/p multiple prior steroid injections and micro DL with excision/ablation of laryngeal lesions (x20 between May 2022-Feb 2024) who presents with progressive dyspnea and voice changes. Both voice changes and shortness of breath began approximately 2 weeks ago and has been progressive since. Worse with activity, improves with rest. She undergoes excision/ablation of her laryngeal lesions every 2 months and is scheduled to undergo repeat procedure next week on 4/18/24. She reports she does not usually get this out of breath prior to her scheduled excisions and was advised to present to ED due to the shortness of breath.    Denies dysphagia, odynophagia, unintentional weight loss, neck pain, new lumps/bumps.    ROS: 12 point review of systems is negative unless noted in HPI.    PMH:  Afib (on chronic anticoagulation)  COPD  CKD  Papillary thyroid carcinoma  CREST syndrome    PSH:  Multiple steroid injections (x4 between June 2023-Feb 2024), micro DL with excision/ablation of laryngeal lesions x 20 between May 2022-Feb 2024    Meds:  Albuterol  Apixaban  Atorvastatin  Symbicort  Diltiazem  Duoneb  Levothyroxine  Propranolol  Sertraline     Allergies   Allergen Reactions    Methylpyrrolidone Anaphylaxis    Nuts Anaphylaxis     Black walnut    Escitalopram Other (See Comments)     Asthma -a time of exacerbation and may not have been cause may retry    Mirtazapine Other (See Comments)     Asthma a time of exacerbation and may not have been cause may retry    Venlafaxine Other (See Comments)    Adhesive Tape Rash     With holter monitor. Ok with bandaids.    No Clinical Screening - See Comments Rash     Adhesive tape      Social hx:  Never smoker  Occasional alcohol use    Family History   Problem Relation Age of Onset    Breast Cancer Mother 75.00    Hereditary Breast and Ovarian Cancer Syndrome No family hx of     Cancer No family hx of     Colon Cancer No family hx of     Endometrial Cancer No family hx of     Ovarian Cancer No family hx of        PHYSICAL EXAM:  General: laying in bed, no acute distress. Voice is weak and breathy, mild inspiratory stridor with prolonged vocalization  BP (!) 166/97   Pulse 82   Temp 97.4  F (36.3  C) (Oral)   Resp 22   SpO2 96%   HEAD: normocephalic, atraumatic  Face: symmetrical, CN VII intact bilaterally (HB 1)  Eyes: EOMI, PERRL, clear sclera  Ears: no obvious external abnormalities  Nose: no anterior drainage, intact and midline septum. Mild crusting and drainage bilaterally  Mouth: mucous membranes moist, tongue midline and symmetric  Oropharynx: tonsils within normal limits, uvula midline, no oropharyngeal erythema  Neck: no LAD, trachea midline  Neuro: cranial nerves 2-12 grossly intact  Respiratory: breathing non-labored on RA, no stridor  Skin: no rashes or skin lesions of the face/neck  Psych: pleasant affect  Cardio: extremities warm and well perfused     FIBEROPTIC ENDOSCOPY:  Due to dyspnea and voice changes, fiberoptic laryngoscopy was indicated. After obtaining verbal consent, the nose was topically decongested and anesthetized. The fiberoptic laryngoscope was passed under endoscopic vision through the right nasal passage. The nasal cavity and nasopharynx were clear. The eustachian tubes were clear. The soft palate appeared normal with good mobility. The epiglottis was sharp, and the visualized portion of the vallecula was clear. The larynx was clear with mobile cords. There is polypoid tissue along the the right vestibule with thick mucoid secretions overlaying the posterior commissure and within the bilateral piriform recesses. The arytenoids were clear, and there was no  pooling in the hypopharynx. Patient tolerated the procedure well.    LABS    Influenza labs pending    CMP - Cr 1.19, electrolytes wnl  Troponin 22 (stable compared to prior labs, last 3 months ago)  TSH 1.48   BNP 1920 (4184 on 12/15/23)    CBC  Recent Labs   Lab 04/08/24  1358   WBC 8.7   RBC 5.68*   HGB 15.1   HCT 48.3*   MCV 85   MCH 26.6   MCHC 31.3*   RDW 15.4*        Imaging:  None from 4/8 ED visit     Assessment and Plan  80 yo F w/ PMH including CREST syndrome, COPD, chronic kidney disease, atrial fibrillation and a complex laryngeal history including a prior benign lesion, severe dysplasia, and laryngeal papilloma s/p multiple prior steroid injections and micro DL with excision/ablation of laryngeal lesions (x20 between May 2022-Feb 2024) who presents with progressive dyspnea and voice changes. Satting appropriately on room air in no acute respiratory distress. On flex laryngoscopic exam there is evidence of recurrent polypoid tissue along the the right vestibule with thick mucoid secretions overlaying the posterior commissure, however airway appears patent with no supraglottic edema/inflammation, masses, or lesions to explain acute progression in dyspnea. Dyspnea likely multifactorial and primarily related to acute exacerbation of underlying COPD.     -Defer to ED for management of acute COPD exacerbation  -If patient respiratory status improves while in ED with above management, recommend sending patient home with Medrol dosepak to reduce inflammation  -Dr. Davis aware of patient and working to move up OR date      Clinically Significant Risk Factors Present on Admission               # Drug Induced Coagulation Defect: home medication list includes an anticoagulant medication                   -- Patient and above plan discussed with Dilia David PA-C and Staff Attending Dr. Jose Johansen.    Ally Tesfaye MD  ENT PGY-1

## 2024-04-08 NOTE — DISCHARGE INSTRUCTIONS
TODAY'S VISIT:  You were seen today for difficulty breathing   -   - If you had any labs or imaging/radiology tests performed today, you should also discuss these tests with your usual provider.     FOLLOW-UP:  Please make an appointment to follow up with:  - Your Primary Care Provider. If you do not have a PCP, please call the Primary Care Center (phone: [phone] for an appointment    - Have your provider review the results from today's visit with you again to make sure no further follow-up or additional testing is needed based on those results.     RETURN TO THE EMERGENCY DEPARTMENT  Return to the Emergency Department at any time for any new or worsening symptoms or any concerns.

## 2024-04-08 NOTE — ED TRIAGE NOTES
Arrives ambulatory with SOB. Per patient SOB started two weeks ago. Has a scheduled surgery next week laryngoscopy.     Triage Assessment (Adult)       Row Name 04/08/24 1240          Triage Assessment    Airway WDL WDL        Respiratory WDL    Respiratory WDL X;rhythm/pattern     Rhythm/Pattern, Respiratory shortness of breath        Skin Circulation/Temperature WDL    Skin Circulation/Temperature WDL WDL        Cardiac WDL    Cardiac WDL WDL        Peripheral/Neurovascular WDL    Peripheral Neurovascular WDL WDL        Cognitive/Neuro/Behavioral WDL    Cognitive/Neuro/Behavioral WDL WDL

## 2024-04-08 NOTE — ED PROVIDER NOTES
History     Chief Complaint   Patient presents with    Shortness of Breath     HPI  Asya Coffman is a 79 year old female with a past medical history of atrial fibrillation (on chronic anticoagulation), COPD, laryngeal mass, vocal cord leukoplakia, crest syndrome, papillary carcinoma of the thyroid, CKD who presents to the emergency department with a chief complaint of shortness of breath.  Started 2 weeks ago.  The patient has surgery scheduled next week for laryngoscopy.  However, she was brought in today as her breathing has worsened.  SpO2 90% on room air on arrival to the emergency department.    I have reviewed the Medications, Allergies, Past Medical and Surgical History, and Social History in the [facility] system.    Past Medical History:   Diagnosis Date    A-fib (H)     Antiplatelet or antithrombotic long-term use     Arrhythmia     COPD (chronic obstructive pulmonary disease) (H)      Past Surgical History:   Procedure Laterality Date    INJECT STEROID (LOCATION) N/A 6/15/2023    Procedure: Avastin injection;  Surgeon: Nikole Davis MD;  Location: [facility]    INJECT STEROID (LOCATION) N/A 9/7/2023    Procedure: Avastin injection;  Surgeon: Nikole Davis MD;  [facility]    INJECT STEROID (LOCATION) N/A 12/14/2023    Procedure: Avastin injection;  Surgeon: Nikole Davis MD;  [facility]: [facility]    INJECT STEROID (LOCATION) N/A 2/15/2024    Procedure: Avastin injection;  Surgeon: Nikole Davis MD;  Location: [facility]    LASER CO2 LARYNGOSCOPY N/A 9/7/2023    Procedure: Microdirect laryngoscopy with excision/ablation of laryngeal lesions, biopsies, CO2 laser;  Surgeon: Nikole Davis MD;  [facility]    LASER CO2 LARYNGOSCOPY, COMPLEX N/A 5/26/2022    Procedure: Micro direct laryngoscopy with excision/ablation of laryngeal lesions, possible biopsies, possible CO2 laser;  Surgeon: Nikole Davis MD;  [facility]    LASER CO2  LARYNGOSCOPY, COMPLEX N/A 9/15/2022    Procedure: Microdirect laryngoscopy with ablation of laryngeal lesions,biopsies,CO2 laser;  Surgeon: Nikole Davis MD;  [facility]    LASER CO2 LARYNGOSCOPY, COMPLEX N/A 12/1/2022    Procedure: Microdirect laryngoscopy with excision/ablation of laryngeal lesions, biopsies,   CO2 laser;  Surgeon: Nikole Davis MD;  [facility]    LASER CO2 LARYNGOSCOPY, COMPLEX N/A 6/15/2023    Procedure: Microdirect laryngoscopy with ablation of laryngeal lesions, biopsies, CO2 laser;  Surgeon: Nikole Davis MD;  [facility]    LASER CO2 LARYNGOSCOPY, COMPLEX N/A 10/5/2023    Procedure: Microdirect laryngoscopy with excision/ablation of laryngeal lesions, biopsies, CO2 laser;  Surgeon: Nikole Davis MD;  [facility]    LASER CO2 LARYNGOSCOPY, COMPLEX N/A 12/14/2023    Procedure: Microdirect laryngoscopy with excision/ablation of laryngeal lesions, biopsies, CO2 laser;  Surgeon: Nikole Davis MD;  [facility]    LASER CO2 LARYNGOSCOPY, COMPLEX N/A 2/15/2024    Procedure: Microdirect laryngoscopy with excision/ablation of laryngeal lesions, biopsies, CO2 laser;  Surgeon: Nikole Davis MD;  [facility]    LASER KTP LARYNGOSCOPY N/A 4/3/2023    Procedure: Microdirect laryngoscopy with biopsies, excision/ablation of lesions, C02 laser,  Avastin injection;  Surgeon: Nikole Davis MD;  [facility]    LASER KTP LARYNGOSCOPY N/A 6/15/2023    Procedure: flexible laser (CO2 or KTP) standby;  Surgeon: Nikole Davis MD;  [facility]    LASER KTP LARYNGOSCOPY FLEXIBLE N/A 8/18/2022    Procedure: Transnasal flexible laryngoscopy with KTP laser and biopsies;  Surgeon: Nikole Davis MD;  Location: [facility]    LASER KTP LARYNGOSCOPY FLEXIBLE N/A 10/27/2022    Procedure: Transnasal flexible laryngoscopy with possible KTP laser;  Surgeon: Nikole Davis MD;   Location: [facility]    LASER KTP LARYNGOSCOPY FLEXIBLE N/A 1/26/2023    Procedure: Transnasal flexible laryngoscopy with KTP laser,  biopsies, superior laryngeal nerve blocks, Avastin injection;  Surgeon: Nikole Davis MD;  Location: [facility] OR    LASER KTP LARYNGOSCOPY FLEXIBLE N/A 2/15/2023    Procedure: Transnasal flexible laryngoscopy with KTP laser, superior laryngeal nerve blocks, biopsies, avastin injection;  Surgeon: Nikole Davis MD;  Location: [facility]    LASER KTP LARYNGOSCOPY FLEXIBLE N/A 2/17/2023    Procedure: Transnasal flexible laryngoscopy with KTP laser, biopsies, superior laryngeal nerve blocks;  Surgeon: Nikole Davis MD;  Location: [facility]    LASER KTP LARYNGOSCOPY FLEXIBLE N/A 2/23/2023    Procedure: Transnasal flexible laryngoscopy with KTP laser, superior laryngeal nerve blocks;  Surgeon: Nikole Davis MD;  Location: [facility]    LASER KTP LARYNGOSCOPY FLEXIBLE N/A 3/2/2023    Procedure: Transnasal flexible laryngoscopy with KTP laser, superior laryngeal nerve block Bilateral;  Surgeon: Nikole Davis MD;  Location: [facility]    LASER KTP LARYNGOSCOPY FLEXIBLE N/A 3/9/2023    Procedure: Transnasal flexible laryngoscopy with KTP laser, biopsies, superior laryngeal nerve blocks;  Surgeon: Nikole Davis MD;  Location: [facility]    LASER KTP LARYNGOSCOPY FLEXIBLE N/A 3/17/2023    Procedure: Transnasal flexible laryngoscopy with KTP laser, superior laryngeal nerve block(s), Avastin injection;  Surgeon: Nikole Davis MD;  Location: [facility]    LASER KTP LARYNGOSCOPY FLEXIBLE N/A 3/28/2023    Procedure: Transnasal flexible laryngoscopy with KTP laser, superior laryngeal nerve block(s);  Surgeon: Pankaj Woodard MD;  Location: [facility] OR     No current facility-administered medications for this encounter.     Current Outpatient Medications   Medication Sig Dispense Refill    acetaminophen (TYLENOL) 325 MG tablet Take 2  tablets (650 mg) by mouth every 4 hours as needed for pain 50 tablet 0    albuterol (PROAIR HFA/PROVENTIL HFA/VENTOLIN HFA) 108 (90 Base) MCG/ACT inhaler Inhale 2 puffs into the lungs as needed      apixaban ANTICOAGULANT (ELIQUIS) 5 MG tablet Take 1 tablet (5 mg) by mouth 2 times daily 60 tablet 1    atorvastatin (LIPITOR) 20 MG tablet Take 20 mg by mouth every evening      budesonide-formoterol (SYMBICORT) 160-4.5 MCG/ACT Inhaler Inhale 2 puffs into the lungs two times daily      diltiazem ER (TIAZAC) 240 MG 24 hr ER beaded capsule Take 240 mg by mouth 2 times daily      ibuprofen (ADVIL/MOTRIN) 200 MG capsule Take 400 mg by mouth every 6 hours as needed for fever      ipratropium - albuterol 0.5 mg/2.5 mg/3 mL (DUONEB) 0.5-2.5 (3) MG/3ML neb solution Take 1 vial (3 mLs) by nebulization every 6 hours as needed for shortness of breath, wheezing or cough 90 mL 0    levothyroxine (SYNTHROID/LEVOTHROID) 88 MCG tablet Take 88 mcg by mouth daily      propranolol (INDERAL) 10 MG tablet Start propanolol 10 mg 1 tab daily and increase by 1 tab weekly until 20 mg twice daily. May stop at lowest effective dose. If experiencing hypotension or bradycardia, return to previous dose on titration schedule. 120 tablet 11    Respiratory Therapy Supplies (NEBULIZER) JUWAN Portable nebulizer, disposable neb kit x 4, reuseable neb kit x 1, mask x 1, filters x 1.   Frequency of use: daily;  Medication: albuterol  Length of need: 99 months      sertraline (ZOLOFT) 100 MG tablet Take 100 mg by mouth daily       Allergies   Allergen Reactions    Methylpyrrolidone Anaphylaxis    Nuts Anaphylaxis     Black walnut    Escitalopram Other (See Comments)     Asthma -a time of exacerbation and may not have been cause may retry    Mirtazapine Other (See Comments)     Asthma a time of exacerbation and may not have been cause may retry    Venlafaxine Other (See Comments)    Adhesive Tape Rash     With holter monitor. Ok with bandaids.    No Clinical  Screening - See Comments Rash     Adhesive tape     Past medical history, past surgical history, medications, and allergies were reviewed with the patient. Additional pertinent items: None    Social History     Socioeconomic History    Marital status:      Spouse name: Not on file    Number of children: Not on file    Years of education: Not on file    Highest education level: Not on file   Occupational History    Not on file   Tobacco Use    Smoking status: Never    Smokeless tobacco: Never   Substance and Sexual Activity    Alcohol use: Yes     Comment: Occasionally    Drug use: Never    Sexual activity: Not on file   Other Topics Concern    Not on file   Social History Narrative    Not on file     Social Determinants of Health     Financial Resource Strain: Not on file   Food Insecurity: Not on file   Transportation Needs: Not on file   Physical Activity: Not on file   Stress: Not on file   Social Connections: Not on file   Interpersonal Safety: Not on file   Housing Stability: Not on file     Social history was reviewed with the patient. Additional pertinent items: None    Review of Systems  A medically appropriate review of systems was performed with pertinent positives and negatives noted in the HPI, and all other systems negative.    Physical Exam   BP: (!) 166/97  Pulse: 82  Temp: 97.4  F (36.3  C)  Resp: 22  SpO2: 90 %      General: Well nourished, well developed, NAD  HEENT: EOMI, anicteric. NCAT, MMM  Neck: no jugular venous distension, supple, nl ROM  Cardiac: Regular rate and rhythm. No murmurs, rubs, or gallops. Normal S1, S2.  Intact peripheral pulses  Pulm: No stridor, bilateral wheezing (right greater than left)  Abd: Soft, nontender, nondistended.  No masses palpated.    Skin: Warm and dry to the touch.  No rash  Extremities: No LE edema, no cyanosis, w/w/p  Neuro: A&Ox3, no gross focal deficits    ED Course        Procedures                     EKG Interpretation:      Interpreted by  Massiel Dash MD  Time reviewed: 1256  Symptoms at time of EKG: SOB   Rhythm: Atrial fibrillation with RVR  Rate: Tachycardic, 118 bpm  Axis: Right  Ectopy: none  Conduction: normal  ST Segments/ T Waves: No ST-T wave changes  Q Waves: Septal  Comparison to prior: Paired to previous EKG, dated December 15, 2023, no significant changes, atrial fibrillation with RVR was previously seen.  Septal Q waves are also unchanged.    Clinical Impression: abnormal EKG            Labs Ordered and Resulted from Time of ED Arrival to Time of ED Departure   COMPREHENSIVE METABOLIC PANEL - Abnormal       Result Value    Sodium 141      Potassium 3.8      Carbon Dioxide (CO2) 25      Anion Gap 12      Urea Nitrogen 17.3      Creatinine 1.19 (*)     GFR Estimate 47 (*)     Calcium 9.1      Chloride 104      Glucose 94      Alkaline Phosphatase 95      AST 18      ALT 15      Protein Total 7.2      Albumin 4.3      Bilirubin Total 0.4     TROPONIN T, HIGH SENSITIVITY - Abnormal    Troponin T, High Sensitivity 22 (*)    NT PROBNP INPATIENT - Abnormal    N terminal Pro BNP Inpatient 1,920 (*)    CBC WITH PLATELETS AND DIFFERENTIAL - Abnormal    WBC Count 8.7      RBC Count 5.68 (*)     Hemoglobin 15.1      Hematocrit 48.3 (*)     MCV 85      MCH 26.6      MCHC 31.3 (*)     RDW 15.4 (*)     Platelet Count 285      % Neutrophils 74      % Lymphocytes 16      % Monocytes 9      % Eosinophils 1      % Basophils 0      % Immature Granulocytes 0      NRBCs per 100 WBC 0      Absolute Neutrophils 6.4      Absolute Lymphocytes 1.4      Absolute Monocytes 0.8      Absolute Eosinophils 0.1      Absolute Basophils 0.0      Absolute Immature Granulocytes 0.0      Absolute NRBCs 0.0     TROPONIN T, HIGH SENSITIVITY - Abnormal    Troponin T, High Sensitivity 19 (*)    MAGNESIUM - Normal    Magnesium 2.1     TSH WITH FREE T4 REFLEX - Normal    TSH 1.48     INFLUENZA A/B, RSV, & SARS-COV2 PCR - Normal    Influenza A PCR Negative      Influenza B  PCR Negative      RSV PCR Negative      SARS CoV2 PCR Negative              Results for orders placed or performed during the hospital encounter of 04/08/24 (from the past 24 hour(s))   EKG 12 lead   Result Value Ref Range    Systolic Blood Pressure  mmHg    Diastolic Blood Pressure  mmHg    Ventricular Rate 118 BPM    Atrial Rate 110 BPM    LA Interval  ms    QRS Duration 78 ms     ms    QTc 409 ms    P Axis  degrees    R AXIS 159 degrees    T Axis 22 degrees    Interpretation ECG       Atrial fibrillation with rapid ventricular response  Right axis deviation  Low voltage QRS  Septal infarct , age undetermined  Abnormal ECG  Unconfirmed report - interpretation of this ECG is computer generated - see medical record for final interpretation  Confirmed by - EMERGENCY ROOM, PHYSICIAN ([id number]),  CYNDY AVALOS ([id number]) on 4/8/2024 1:11:59 PM     CBC with platelets differential    Narrative    The following orders were created for panel order CBC with platelets differential.  Procedure                               Abnormality         Status                     ---------                               -----------         ------                     CBC with platelets and d...[[id number]]  Abnormal            Final result                 Please view results for these tests on the individual orders.   Comprehensive metabolic panel   Result Value Ref Range    Sodium 141 135 - 145 mmol/L    Potassium 3.8 3.4 - 5.3 mmol/L    Carbon Dioxide (CO2) 25 22 - 29 mmol/L    Anion Gap 12 7 - 15 mmol/L    Urea Nitrogen 17.3 8.0 - 23.0 mg/dL    Creatinine 1.19 (H) 0.51 - 0.95 mg/dL    GFR Estimate 47 (L) >60 mL/min/1.73m2    Calcium 9.1 8.8 - 10.2 mg/dL    Chloride 104 98 - 107 mmol/L    Glucose 94 70 - 99 mg/dL    Alkaline Phosphatase 95 40 - 150 U/L    AST 18 0 - 45 U/L    ALT 15 0 - 50 U/L    Protein Total 7.2 6.4 - 8.3 g/dL    Albumin 4.3 3.5 - 5.2 g/dL    Bilirubin Total 0.4 <=1.2 mg/dL   Troponin T, High Sensitivity    Result Value Ref Range    Troponin T, High Sensitivity 22 (H) <=14 ng/L   Magnesium   Result Value Ref Range    Magnesium 2.1 1.7 - 2.3 mg/dL   TSH with free T4 reflex   Result Value Ref Range    TSH 1.48 0.30 - 4.20 uIU/mL   Nt probnp inpatient (BNP)   Result Value Ref Range    N terminal Pro BNP Inpatient 1,920 (H) 0 - 1,800 pg/mL   CBC with platelets and differential   Result Value Ref Range    WBC Count 8.7 4.0 - 11.0 10e3/uL    RBC Count 5.68 (H) 3.80 - 5.20 10e6/uL    Hemoglobin 15.1 11.7 - 15.7 g/dL    Hematocrit 48.3 (H) 35.0 - 47.0 %    MCV 85 78 - 100 fL    MCH 26.6 26.5 - 33.0 pg    MCHC 31.3 (L) 31.5 - 36.5 g/dL    RDW 15.4 (H) 10.0 - 15.0 %    Platelet Count 285 150 - 450 10e3/uL    % Neutrophils 74 %    % Lymphocytes 16 %    % Monocytes 9 %    % Eosinophils 1 %    % Basophils 0 %    % Immature Granulocytes 0 %    NRBCs per 100 WBC 0 <1 /100    Absolute Neutrophils 6.4 1.6 - 8.3 10e3/uL    Absolute Lymphocytes 1.4 0.8 - 5.3 10e3/uL    Absolute Monocytes 0.8 0.0 - 1.3 10e3/uL    Absolute Eosinophils 0.1 0.0 - 0.7 10e3/uL    Absolute Basophils 0.0 0.0 - 0.2 10e3/uL    Absolute Immature Granulocytes 0.0 <=0.4 10e3/uL    Absolute NRBCs 0.0 10e3/uL   [place] Draw    Narrative    The following orders were created for panel order [place] Draw.  Procedure                               Abnormality         Status                     ---------                               -----------         ------                     Extra Blue Top Tube[[id number]]                              Final result               Extra Red Top Tube[[id number]]                               Final result                 Please view results for these tests on the individual orders.   Extra Blue Top Tube   Result Value Ref Range    Hold Specimen JIC    Extra Red Top Tube   Result Value Ref Range    Hold Specimen JIC    XR Chest 2 Views    Narrative    Exam: XR CHEST 2 VIEWS, 4/8/2024 2:13 PM    Indication: SOB    Comparison:  4/2/2023    Findings:   2 views of the chest. Heart size within normal limits. Lungs are  hyperinflated. No pneumothorax, consolidation, pleural effusion.      Impression    Impression: Hyperinflated lungs. No consolidation.    I have personally reviewed the examination and initial interpretation  and I agree with the findings.    RICARDO TORRE MD         SYSTEM ID:  [id number]   Symptomatic Influenza A/B, RSV, & SARS-CoV2 PCR (COVID-19) Nose    Specimen: Nose; Swab   Result Value Ref Range    Influenza A PCR Negative Negative    Influenza B PCR Negative Negative    RSV PCR Negative Negative    SARS CoV2 PCR Negative Negative    Narrative    Testing was performed using the Xpert Xpress CoV2/Flu/RSV Assay on the [employer] GeneXpert Instrument. This test should be ordered for the detection of SARS-CoV-2, influenza, and RSV viruses in individuals who meet clinical and/or epidemiological criteria. Test performance is unknown in asymptomatic patients. This test is for in vitro diagnostic use under the FDA EUA for laboratories certified under CLIA to perform high or moderate complexity testing. This test has not been FDA cleared or approved. A negative result does not rule out the presence of PCR inhibitors in the specimen or target RNA in concentration below the limit of detection for the assay. If only one viral target is positive but coinfection with multiple targets is suspected, the sample should be re-tested with another FDA cleared, approved, or authorized test, if coinfection would change clinical management. This test was validated by the [facility]. These laboratories are certified under the Clinical Laboratory Improvement Amendments of 1988 (CLIA-88) as qualified to perform high complexity laboratory testing.   Troponin T, High Sensitivity   Result Value Ref Range    Troponin T, High Sensitivity 19 (H) <=14 ng/L       Labs, vital signs, and imaging studies were reviewed by  me.    Medications   methylPREDNISolone sodium succinate (solu-MEDROL) injection 125 mg (125 mg Intravenous $Given 4/8/24 1400)   ipratropium - albuterol 0.5 mg/2.5 mg/3 mL (DUONEB) neb solution 3 mL (3 mLs Nebulization $Given 4/8/24 1331)   albuterol (PROVENTIL) neb solution 2.5 mg (2.5 mg Nebulization $Given 4/8/24 1607)   sodium chloride (NEBUSAL) 3 % neb solution 3 mL (3 mLs Nebulization $Given 4/8/24 1607)       Assessments & Plan (with Medical Decision Making)   Asya Coffman is a 79 year old female who presents to the emergency department with SOB.  Patient does have known laryngeal abnormality. Differential diagnosis includes upper airway obstruction, COPD exacerbation, pneumonia, bronchitis, viral syndrome, anxiety, ACS, cardiac arrhythmia, CHF.  No stridor on my exam.  Patient does have wheezing (right greater than left), concerning for COPD exacerbation.  Solu-Medrol and DuoNeb given for symptomatic relief in the emergency department.    No acute changes compared to prior.    Laboratory workup is remarkable for troponin slightly elevated at 22 (appears stable compared to patient's baseline), stable on 2-hour recheck at 19.  Influenza/RSV/COVID testing are negative.  Patient's white blood cell count is within normal limits at 8.7, hemoglobin is within normal limits at 15.1.  Magnesium and TSH are also within normal limits.  BNP is slightly elevated at 1920.  Creatinine is stable at 1.19.  Electrolytes are unremarkable.    Chest x-ray shows hyperinflated lungs, no focal consolidation.    Critical care was not performed.     Medical Decision Making  The patient's presentation was of high complexity (a chronic illness severe exacerbation, progression, or side effect of treatment).    The patient's evaluation involved:  review of 3+ test result(s) ordered prior to this encounter (previous labs for comparison)  ordering and/or review of 3+ test(s) in this encounter (see separate area of note for  details)  independent interpretation of testing performed by another health professional (chest x-ray)  discussion of management or test interpretation with another health professional (ENT)    The patient's management necessitated moderate risk (prescription drug management including medications given in the ED) and high risk (a decision regarding emergency major procedure (endoscopy by ENT)).    ENT saw pt in ER and did scope, pt does have some lesions that will need to be removed, but nothing emergent. They did note significant secretions and would recommend steroids and nebs. They may try to expedite outpatient surgery. If pt feels improved after medicaitons, may be able o be discharge dhome    Chest x-ray images were personally reviewed by me, I agree with the radiology reads.    Patient's symptoms did improve after medications were given in the emergency department.  Oxygen saturations 96% on RA.    I have reviewed the nursing notes.    I have reviewed the findings, diagnosis, plan and need for follow up with the patient.    Patient to be discharged home. Advised to follow up with PCP within 1 week. To return to ER immediately with any new/worsening symptoms. Plan of care discussed with patient who expresses understanding and agrees with plan of care.  Will treat patient with steroids and azithromycin for COPD exacerbation as well as her upper airway symptoms.    Discharge Medication List as of 4/8/2024  4:52 PM        START taking these medications    Details   azithromycin (ZITHROMAX) 250 MG tablet Take 2 tablets (500 mg) by mouth daily for 1 day, THEN 1 tablet (250 mg) daily for 4 days., Disp-6 tablet, R-0, E-Prescribe      predniSONE (DELTASONE) 20 MG tablet Take two tablets (= 40mg) each day for 5 (five) days, Disp-10 tablet, R-0, E-Prescribe             Final diagnoses:   Vocal cord anomaly   COPD exacerbation (H)       ANAID FUNES MD  4/8/2024   [facility] EMERGENCY DEPARTMENT        Massiel Dash MD  04/08/24 1747       Massiel Dash MD  04/08/24 1744

## 2024-04-08 NOTE — PROGRESS NOTES
Patient ambulatory on discharge. All discharge reviewed with the patient including follow up with primary care and if needed prescription refill with primary care team. Patient departed alert and responsive. PIV removed. Patient verbalized understanding. Escorted to pharmacy to fill scripts.

## 2024-04-09 ENCOUNTER — PATIENT OUTREACH (OUTPATIENT)
Dept: OTOLARYNGOLOGY | Facility: CLINIC | Age: 79
End: 2024-04-09
Payer: COMMERCIAL

## 2024-04-09 NOTE — PROGRESS NOTES
Called patient to go over plan to move up surgery. Patient was agreeable, writer went over what to do if her symptoms worsened and that she would need to let the care team know ASAP or go the ER if her breathing got worse. Patient was agreeable and verbalized understanding of the situation. Writer also went over plan to hold her blood thinner per her H&P visit. Writer educated patient on how to each care team with any questions or concerns.  Inocencia Fan RN on 4/9/2024 at 1:03 PM

## 2024-04-09 NOTE — TELEPHONE ENCOUNTER
Sent patient mychart confirming new surgery date    Krys Reddy, Perioperative Coordinator 4/9/2024 at 1:11 PM

## 2024-04-09 NOTE — TELEPHONE ENCOUNTER
Inocencia Fan RN [place], Nikole Alberts MD; Krys Reddy  Cc: Alyssa Loaiza all!    I got Hannah on the phone she is doing okay, and can wait until Thursday. I told her if anything changes she needs to let us know as soon as possible.  We also went over the holding plan for her blood thinner as well per her H&P as well!    Let me know if there is anything else I can help with!  Inocencia

## 2024-04-09 NOTE — TELEPHONE ENCOUNTER
Left patient a voicemail requesting to move surgery to 4/11 due to her airway concerns.     Krys Reddy, Perioperative Coordinator 4/9/2024 at 8:35 AM

## 2024-04-10 RX ORDER — TIZANIDINE 2 MG/1
2 TABLET ORAL 3 TIMES DAILY
COMMUNITY

## 2024-04-10 RX ORDER — FEXOFENADINE HCL 180 MG/1
180 TABLET ORAL DAILY
COMMUNITY

## 2024-04-11 ENCOUNTER — HOSPITAL ENCOUNTER (OUTPATIENT)
Facility: CLINIC | Age: 79
Discharge: HOME | End: 2024-04-11
Attending: OTOLARYNGOLOGY | Admitting: OTOLARYNGOLOGY
Payer: COMMERCIAL

## 2024-04-11 ENCOUNTER — ANESTHESIA (OUTPATIENT)
Dept: SURGERY | Facility: CLINIC | Age: 79
End: 2024-04-11
Payer: COMMERCIAL

## 2024-04-11 ENCOUNTER — ANESTHESIA EVENT (OUTPATIENT)
Dept: SURGERY | Facility: CLINIC | Age: 79
End: 2024-04-11
Payer: COMMERCIAL

## 2024-04-11 VITALS
HEART RATE: 90 BPM | SYSTOLIC BLOOD PRESSURE: 114 MMHG | BODY MASS INDEX: 20.29 KG/M2 | DIASTOLIC BLOOD PRESSURE: 99 MMHG | TEMPERATURE: 97.4 F | RESPIRATION RATE: 21 BRPM | WEIGHT: 121.91 LBS | OXYGEN SATURATION: 92 %

## 2024-04-11 PROCEDURE — 250N000025 HC SEVOFLURANE, PER MIN: Performed by: OTOLARYNGOLOGY

## 2024-04-11 PROCEDURE — 710N000010 HC RECOVERY PHASE 1, LEVEL 2, PER MIN: Performed by: OTOLARYNGOLOGY

## 2024-04-11 PROCEDURE — 258N000003 HC RX IP 258 OP 636: Performed by: NURSE ANESTHETIST, CERTIFIED REGISTERED

## 2024-04-11 PROCEDURE — 31572 LARGSC W/LASER DSTRJ LES: CPT | Performed by: ANESTHESIOLOGY

## 2024-04-11 PROCEDURE — 272N000001 HC OR GENERAL SUPPLY STERILE: Performed by: OTOLARYNGOLOGY

## 2024-04-11 PROCEDURE — 250N000011 HC RX IP 250 OP 636: Mod: JZ | Performed by: OTOLARYNGOLOGY

## 2024-04-11 PROCEDURE — 360N000077 HC SURGERY LEVEL 4, PER MIN: Performed by: OTOLARYNGOLOGY

## 2024-04-11 PROCEDURE — 99100 ANES PT EXTEME AGE<1 YR&>70: CPT | Performed by: NURSE ANESTHETIST, CERTIFIED REGISTERED

## 2024-04-11 PROCEDURE — 999N000141 HC STATISTIC PRE-PROCEDURE NURSING ASSESSMENT: Performed by: OTOLARYNGOLOGY

## 2024-04-11 PROCEDURE — 250N000009 HC RX 250: Performed by: NURSE ANESTHETIST, CERTIFIED REGISTERED

## 2024-04-11 PROCEDURE — 88305 TISSUE EXAM BY PATHOLOGIST: CPT | Mod: 26 | Performed by: STUDENT IN AN ORGANIZED HEALTH CARE EDUCATION/TRAINING PROGRAM

## 2024-04-11 PROCEDURE — 250N000011 HC RX IP 250 OP 636: Performed by: OTOLARYNGOLOGY

## 2024-04-11 PROCEDURE — 99100 ANES PT EXTEME AGE<1 YR&>70: CPT | Performed by: ANESTHESIOLOGY

## 2024-04-11 PROCEDURE — 710N000012 HC RECOVERY PHASE 2, PER MINUTE: Performed by: OTOLARYNGOLOGY

## 2024-04-11 PROCEDURE — 88360 TUMOR IMMUNOHISTOCHEM/MANUAL: CPT | Mod: TC | Performed by: OTOLARYNGOLOGY

## 2024-04-11 PROCEDURE — 250N000011 HC RX IP 250 OP 636: Performed by: NURSE ANESTHETIST, CERTIFIED REGISTERED

## 2024-04-11 PROCEDURE — 370N000017 HC ANESTHESIA TECHNICAL FEE, PER MIN: Performed by: OTOLARYNGOLOGY

## 2024-04-11 PROCEDURE — 88360 TUMOR IMMUNOHISTOCHEM/MANUAL: CPT | Mod: 26 | Performed by: STUDENT IN AN ORGANIZED HEALTH CARE EDUCATION/TRAINING PROGRAM

## 2024-04-11 PROCEDURE — 31572 LARGSC W/LASER DSTRJ LES: CPT | Performed by: NURSE ANESTHETIST, CERTIFIED REGISTERED

## 2024-04-11 RX ORDER — AMPICILLIN AND SULBACTAM 2; 1 G/1; G/1
3 INJECTION, POWDER, FOR SOLUTION INTRAMUSCULAR; INTRAVENOUS
Status: COMPLETED | OUTPATIENT
Start: 2024-04-11 | End: 2024-04-11

## 2024-04-11 RX ORDER — LIDOCAINE HYDROCHLORIDE 20 MG/ML
INJECTION, SOLUTION INFILTRATION; PERINEURAL PRN
Status: DISCONTINUED | OUTPATIENT
Start: 2024-04-11 | End: 2024-04-11

## 2024-04-11 RX ORDER — ESMOLOL HYDROCHLORIDE 10 MG/ML
INJECTION INTRAVENOUS PRN
Status: DISCONTINUED | OUTPATIENT
Start: 2024-04-11 | End: 2024-04-11

## 2024-04-11 RX ORDER — OXYCODONE HYDROCHLORIDE 5 MG/1
5 TABLET ORAL
Status: DISCONTINUED | OUTPATIENT
Start: 2024-04-11 | End: 2024-04-11 | Stop reason: HOSPADM

## 2024-04-11 RX ORDER — SODIUM CHLORIDE, SODIUM LACTATE, POTASSIUM CHLORIDE, CALCIUM CHLORIDE 600; 310; 30; 20 MG/100ML; MG/100ML; MG/100ML; MG/100ML
INJECTION, SOLUTION INTRAVENOUS CONTINUOUS PRN
Status: DISCONTINUED | OUTPATIENT
Start: 2024-04-11 | End: 2024-04-11

## 2024-04-11 RX ORDER — FENTANYL CITRATE 50 UG/ML
50 INJECTION, SOLUTION INTRAMUSCULAR; INTRAVENOUS EVERY 5 MIN PRN
Status: DISCONTINUED | OUTPATIENT
Start: 2024-04-11 | End: 2024-04-11 | Stop reason: HOSPADM

## 2024-04-11 RX ORDER — ACETAMINOPHEN 325 MG/1
650 TABLET ORAL
Status: DISCONTINUED | OUTPATIENT
Start: 2024-04-11 | End: 2024-04-11 | Stop reason: HOSPADM

## 2024-04-11 RX ORDER — SODIUM CHLORIDE, SODIUM LACTATE, POTASSIUM CHLORIDE, CALCIUM CHLORIDE 600; 310; 30; 20 MG/100ML; MG/100ML; MG/100ML; MG/100ML
INJECTION, SOLUTION INTRAVENOUS CONTINUOUS
Status: DISCONTINUED | OUTPATIENT
Start: 2024-04-11 | End: 2024-04-11 | Stop reason: HOSPADM

## 2024-04-11 RX ORDER — FENTANYL CITRATE 50 UG/ML
25 INJECTION, SOLUTION INTRAMUSCULAR; INTRAVENOUS EVERY 5 MIN PRN
Status: DISCONTINUED | OUTPATIENT
Start: 2024-04-11 | End: 2024-04-11 | Stop reason: HOSPADM

## 2024-04-11 RX ORDER — PROPOFOL 10 MG/ML
INJECTION, EMULSION INTRAVENOUS CONTINUOUS PRN
Status: DISCONTINUED | OUTPATIENT
Start: 2024-04-11 | End: 2024-04-11

## 2024-04-11 RX ORDER — HYDROMORPHONE HYDROCHLORIDE 1 MG/ML
0.4 INJECTION, SOLUTION INTRAMUSCULAR; INTRAVENOUS; SUBCUTANEOUS EVERY 5 MIN PRN
Status: DISCONTINUED | OUTPATIENT
Start: 2024-04-11 | End: 2024-04-11 | Stop reason: HOSPADM

## 2024-04-11 RX ORDER — HYDROMORPHONE HYDROCHLORIDE 1 MG/ML
0.2 INJECTION, SOLUTION INTRAMUSCULAR; INTRAVENOUS; SUBCUTANEOUS EVERY 5 MIN PRN
Status: DISCONTINUED | OUTPATIENT
Start: 2024-04-11 | End: 2024-04-11 | Stop reason: HOSPADM

## 2024-04-11 RX ORDER — DEXAMETHASONE SODIUM PHOSPHATE 4 MG/ML
10 INJECTION, SOLUTION INTRA-ARTICULAR; INTRALESIONAL; INTRAMUSCULAR; INTRAVENOUS; SOFT TISSUE ONCE
Status: COMPLETED | OUTPATIENT
Start: 2024-04-11 | End: 2024-04-11

## 2024-04-11 RX ORDER — ONDANSETRON 2 MG/ML
4 INJECTION INTRAMUSCULAR; INTRAVENOUS EVERY 30 MIN PRN
Status: DISCONTINUED | OUTPATIENT
Start: 2024-04-11 | End: 2024-04-11 | Stop reason: HOSPADM

## 2024-04-11 RX ORDER — METOPROLOL TARTRATE 1 MG/ML
INJECTION, SOLUTION INTRAVENOUS PRN
Status: DISCONTINUED | OUTPATIENT
Start: 2024-04-11 | End: 2024-04-11

## 2024-04-11 RX ORDER — FENTANYL CITRATE 50 UG/ML
INJECTION, SOLUTION INTRAMUSCULAR; INTRAVENOUS PRN
Status: DISCONTINUED | OUTPATIENT
Start: 2024-04-11 | End: 2024-04-11

## 2024-04-11 RX ORDER — AMPICILLIN AND SULBACTAM 1; .5 G/1; G/1
1.5 INJECTION, POWDER, FOR SOLUTION INTRAMUSCULAR; INTRAVENOUS SEE ADMIN INSTRUCTIONS
Status: DISCONTINUED | OUTPATIENT
Start: 2024-04-11 | End: 2024-04-11 | Stop reason: HOSPADM

## 2024-04-11 RX ORDER — ONDANSETRON 4 MG/1
4 TABLET, ORALLY DISINTEGRATING ORAL EVERY 30 MIN PRN
Status: DISCONTINUED | OUTPATIENT
Start: 2024-04-11 | End: 2024-04-11 | Stop reason: HOSPADM

## 2024-04-11 RX ORDER — NALOXONE HYDROCHLORIDE 0.4 MG/ML
0.1 INJECTION, SOLUTION INTRAMUSCULAR; INTRAVENOUS; SUBCUTANEOUS
Status: DISCONTINUED | OUTPATIENT
Start: 2024-04-11 | End: 2024-04-11 | Stop reason: HOSPADM

## 2024-04-11 RX ORDER — OXYCODONE HYDROCHLORIDE 10 MG/1
10 TABLET ORAL
Status: DISCONTINUED | OUTPATIENT
Start: 2024-04-11 | End: 2024-04-11 | Stop reason: HOSPADM

## 2024-04-11 RX ORDER — ONDANSETRON 2 MG/ML
INJECTION INTRAMUSCULAR; INTRAVENOUS PRN
Status: DISCONTINUED | OUTPATIENT
Start: 2024-04-11 | End: 2024-04-11

## 2024-04-11 RX ORDER — PROPOFOL 10 MG/ML
INJECTION, EMULSION INTRAVENOUS PRN
Status: DISCONTINUED | OUTPATIENT
Start: 2024-04-11 | End: 2024-04-11

## 2024-04-11 RX ADMIN — FENTANYL CITRATE 100 MCG: 50 INJECTION INTRAMUSCULAR; INTRAVENOUS at 16:49

## 2024-04-11 RX ADMIN — METOPROLOL TARTRATE 1 MG: 5 INJECTION INTRAVENOUS at 17:12

## 2024-04-11 RX ADMIN — LIDOCAINE HYDROCHLORIDE 80 MG: 20 INJECTION, SOLUTION INFILTRATION; PERINEURAL at 16:49

## 2024-04-11 RX ADMIN — METOPROLOL TARTRATE 1 MG: 5 INJECTION INTRAVENOUS at 18:07

## 2024-04-11 RX ADMIN — FENTANYL CITRATE 25 MCG: 50 INJECTION INTRAMUSCULAR; INTRAVENOUS at 17:26

## 2024-04-11 RX ADMIN — PROPOFOL 150 MG: 10 INJECTION, EMULSION INTRAVENOUS at 16:49

## 2024-04-11 RX ADMIN — Medication 50 MG: at 16:49

## 2024-04-11 RX ADMIN — SODIUM CHLORIDE, POTASSIUM CHLORIDE, SODIUM LACTATE AND CALCIUM CHLORIDE: 600; 310; 30; 20 INJECTION, SOLUTION INTRAVENOUS at 16:30

## 2024-04-11 RX ADMIN — ESMOLOL HYDROCHLORIDE 30 MG: 10 INJECTION, SOLUTION INTRAVENOUS at 17:01

## 2024-04-11 RX ADMIN — SUGAMMADEX 200 MG: 100 INJECTION, SOLUTION INTRAVENOUS at 18:48

## 2024-04-11 RX ADMIN — Medication 10 MG: at 17:38

## 2024-04-11 RX ADMIN — ESMOLOL HYDROCHLORIDE 30 MG: 10 INJECTION, SOLUTION INTRAVENOUS at 17:04

## 2024-04-11 RX ADMIN — ONDANSETRON 4 MG: 2 INJECTION INTRAMUSCULAR; INTRAVENOUS at 18:47

## 2024-04-11 RX ADMIN — METOPROLOL TARTRATE 1 MG: 5 INJECTION INTRAVENOUS at 18:28

## 2024-04-11 RX ADMIN — ESMOLOL HYDROCHLORIDE 20 MG: 10 INJECTION, SOLUTION INTRAVENOUS at 18:16

## 2024-04-11 RX ADMIN — PROPOFOL 150 MCG/KG/MIN: 10 INJECTION, EMULSION INTRAVENOUS at 16:49

## 2024-04-11 RX ADMIN — METOPROLOL TARTRATE 1 MG: 5 INJECTION INTRAVENOUS at 17:10

## 2024-04-11 RX ADMIN — METOPROLOL TARTRATE 1 MG: 5 INJECTION INTRAVENOUS at 17:20

## 2024-04-11 RX ADMIN — DEXAMETHASONE SODIUM PHOSPHATE 10 MG: 4 INJECTION, SOLUTION INTRAMUSCULAR; INTRAVENOUS at 17:01

## 2024-04-11 RX ADMIN — Medication 10 MG: at 17:29

## 2024-04-11 RX ADMIN — Medication 5 MG: at 18:30

## 2024-04-11 RX ADMIN — Medication 10 MG: at 17:47

## 2024-04-11 RX ADMIN — AMPICILLIN SODIUM AND SULBACTAM SODIUM 3 G: 2; 1 INJECTION, POWDER, FOR SOLUTION INTRAMUSCULAR; INTRAVENOUS at 17:00

## 2024-04-11 RX ADMIN — FENTANYL CITRATE 25 MCG: 50 INJECTION INTRAMUSCULAR; INTRAVENOUS at 17:22

## 2024-04-11 RX ADMIN — FENTANYL CITRATE 50 MCG: 50 INJECTION INTRAMUSCULAR; INTRAVENOUS at 17:58

## 2024-04-11 ASSESSMENT — ACTIVITIES OF DAILY LIVING (ADL)
ADLS_ACUITY_SCORE: 23
ADLS_ACUITY_SCORE: 21
ADLS_ACUITY_SCORE: 23

## 2024-04-11 ASSESSMENT — ENCOUNTER SYMPTOMS: DYSRHYTHMIAS: 1

## 2024-04-11 ASSESSMENT — COPD QUESTIONNAIRES
COPD: 1
CAT_SEVERITY: MODERATE

## 2024-04-11 NOTE — ANESTHESIA PROCEDURE NOTES
Airway       Patient location during procedure: OR       Procedure Start/Stop Times: 4/11/2024 4:57 PM  Staff -        CRNA: Liana Nunez APRN CRNA       Performed By: CRNA  Consent for Airway        Urgency: elective  Indications and Patient Condition       Indications for airway management: tania-procedural        Final Airway Details       Final airway type: endotracheal airway       Successful airway: ETT - single, Laser and Oral  Endotracheal Airway Details        ETT size (mm): 5.0       Cuffed: yes       Successful intubation technique: video laryngoscopy (tried DL - patient has large epiglottis and difficult to see small airway opening.  Elected to use glidescope)       VL Blade Size: Glidescope 3       Grade View of Cords: 1 (on video)       Adjucts: stylet       Position: Left       Measured from: gums/teeth       Secured at (cm): 22       Bite block used: None    Post intubation assessment        Placement verified by: capnometry, equal breath sounds and chest rise        Number of attempts at approach: 1       Number of other approaches attempted: 0       Secured with: tape       Ease of procedure: easy       Dentition: Intact and Unchanged    Medication(s) Administered   Medication Administration Time: 4/11/2024 4:57 PM

## 2024-04-11 NOTE — ANESTHESIA PREPROCEDURE EVALUATION
Anesthesia Pre-Procedure Evaluation    Patient: Asya Coffman   MRN: [id number] : 1945        Procedure : Procedure(s):  Microdirect laryngoscopy with excision/ablation of laryngeal lesions, biopsies, CO2 laser, Avastin injections          Past Medical History:   Diagnosis Date    A-fib (H)     Antiplatelet or antithrombotic long-term use     Arrhythmia     COPD (chronic obstructive pulmonary disease) (H)       Past Surgical History:   Procedure Laterality Date    INJECT STEROID (LOCATION) N/A 6/15/2023    Procedure: Avastin injection;  Surgeon: Nikole Davis MD;  Location: [facility] OR    INJECT STEROID (LOCATION) N/A 2023    Procedure: Avastin injection;  Surgeon: iNkole Davis MD;  Location: [facility]    INJECT STEROID (LOCATION) N/A 2023    Procedure: Avastin injection;  Surgeon: Nikole Davis MD;  Location: [facility]    INJECT STEROID (LOCATION) N/A 2/15/2024    Procedure: Avastin injection;  Surgeon: Nikole Davis MD;  Location: [facility]    LASER CO2 LARYNGOSCOPY N/A 2023    Procedure: Microdirect laryngoscopy with excision/ablation of laryngeal lesions, biopsies, CO2 laser;  Surgeon: Nikole Davis MD;  Location: [facility]    LASER CO2 LARYNGOSCOPY, COMPLEX N/A 2022    Procedure: Micro direct laryngoscopy with excision/ablation of laryngeal lesions, possible biopsies, possible CO2 laser;  Surgeon: Nikole Davis MD;  Location: [facility]    LASER CO2 LARYNGOSCOPY, COMPLEX N/A 9/15/2022    Procedure: Microdirect laryngoscopy with ablation of laryngeal lesions,biopsies,CO2 laser;  Surgeon: Nikole Davis MD;  [facility]: [facility]    LASER CO2 LARYNGOSCOPY, COMPLEX N/A 2022    Procedure: Microdirect laryngoscopy with excision/ablation of laryngeal lesions, biopsies,   CO2 laser;  Surgeon: Nikole Davis MD;  [facility]: [facility]    LASER CO2 LARYNGOSCOPY, COMPLEX N/A 6/15/2023    Procedure: Microdirect laryngoscopy with  ablation of laryngeal lesions, biopsies, CO2 laser;  Surgeon: Nikole Davis MD;  [facility]: [facility]    LASER CO2 LARYNGOSCOPY, COMPLEX N/A 10/5/2023    Procedure: Microdirect laryngoscopy with excision/ablation of laryngeal lesions, biopsies, CO2 laser;  Surgeon: Nikole Davis MD;  [facility]    LASER CO2 LARYNGOSCOPY, COMPLEX N/A 12/14/2023    Procedure: Microdirect laryngoscopy with excision/ablation of laryngeal lesions, biopsies, CO2 laser;  Surgeon: Nikole Davis MD;  [facility]    LASER CO2 LARYNGOSCOPY, COMPLEX N/A 2/15/2024    Procedure: Microdirect laryngoscopy with excision/ablation of laryngeal lesions, biopsies, CO2 laser;  Surgeon: Nikole Davis MD;  [facility]    LASER KTP LARYNGOSCOPY N/A 4/3/2023    Procedure: Microdirect laryngoscopy with biopsies, excision/ablation of lesions, C02 laser,  Avastin injection;  Surgeon: Nikole Davis MD;  [facility]    LASER KTP LARYNGOSCOPY N/A 6/15/2023    Procedure: flexible laser (CO2 or KTP) standby;  Surgeon: Nikole Davis MD;  Location: [facility]    LASER KTP LARYNGOSCOPY FLEXIBLE N/A 8/18/2022    Procedure: Transnasal flexible laryngoscopy with KTP laser and biopsies;  Surgeon: Nikole Davis MD;  Location: [facility] OR    LASER KTP LARYNGOSCOPY FLEXIBLE N/A 10/27/2022    Procedure: Transnasal flexible laryngoscopy with possible KTP laser;  Surgeon: Nikole Davis MD;  Location: [facility] OR    LASER KTP LARYNGOSCOPY FLEXIBLE N/A 1/26/2023    Procedure: Transnasal flexible laryngoscopy with KTP laser,  biopsies, superior laryngeal nerve blocks, Avastin injection;  Surgeon: Nikole Davis MD;  Location: [facility] OR    LASER KTP LARYNGOSCOPY FLEXIBLE N/A 2/15/2023    Procedure: Transnasal flexible laryngoscopy with KTP laser, superior laryngeal nerve blocks, biopsies, avastin injection;  Surgeon: Nikole Davis MD;  Location: [facility] OR    LASER KTP  LARYNGOSCOPY FLEXIBLE N/A 2/17/2023    Procedure: Transnasal flexible laryngoscopy with KTP laser, biopsies, superior laryngeal nerve blocks;  Surgeon: Nikole Davis MD;  Location: [facility] OR    LASER KTP LARYNGOSCOPY FLEXIBLE N/A 2/23/2023    Procedure: Transnasal flexible laryngoscopy with KTP laser, superior laryngeal nerve blocks;  Surgeon: Nikole Davis MD;  Location: [facility] OR    LASER KTP LARYNGOSCOPY FLEXIBLE N/A 3/2/2023    Procedure: Transnasal flexible laryngoscopy with KTP laser, superior laryngeal nerve block Bilateral;  Surgeon: Nikole Davis MD;  Location: [facility] OR    LASER KTP LARYNGOSCOPY FLEXIBLE N/A 3/9/2023    Procedure: Transnasal flexible laryngoscopy with KTP laser, biopsies, superior laryngeal nerve blocks;  Surgeon: Nikole Davis MD;  Location: [facility] OR    LASER KTP LARYNGOSCOPY FLEXIBLE N/A 3/17/2023    Procedure: Transnasal flexible laryngoscopy with KTP laser, superior laryngeal nerve block(s), Avastin injection;  Surgeon: Nikole Davis MD;  Location: [facility] OR    LASER KTP LARYNGOSCOPY FLEXIBLE N/A 3/28/2023    Procedure: Transnasal flexible laryngoscopy with KTP laser, superior laryngeal nerve block(s);  Surgeon: Pankaj Woodard MD;  Location: [facility] OR      Allergies   Allergen Reactions    Methylpyrrolidone Anaphylaxis    Nuts Anaphylaxis     Black walnut    Escitalopram Other (See Comments)     Asthma -a time of exacerbation and may not have been cause may retry    Mirtazapine Other (See Comments)     Asthma a time of exacerbation and may not have been cause may retry    Venlafaxine Other (See Comments)    Adhesive Tape Rash     With holter monitor. Ok with bandaids.    No Clinical Screening - See Comments Rash     Adhesive tape      Social History     Tobacco Use    Smoking status: Never    Smokeless tobacco: Never   Substance Use Topics    Alcohol use: Yes     Comment: Occasionally      Wt Readings from Last 1 Encounters:    04/11/24 55.3 kg (121 lb 14.6 oz)        Anesthesia Evaluation   Pt has had prior anesthetic. Type: General.    No history of anesthetic complications       ROS/MED HX  ENT/Pulmonary:     (+)                         moderate,  COPD,              Neurologic:       Cardiovascular:     (+)  hypertension- -   -  - -   Taking blood thinners                     dysrhythmias, a-fib, Irregular Heartbeat/Palpitations,       Previous cardiac testing   Echo: Date: 4/2023 Results:  Interpretation Summary  Technically difficult study. Poor acoustic windows.  Global and regional left ventricular function is normal with an EF of 55-60%.  Global right ventricular function is borderline reduced. The right ventricle  is normal size.  No significant valvular abnormalities.  The estimated PA systolic pressure is 43 mmHg.  IVC diameter >2.1 cm collapsing <50% with sniff suggests a high RA pressure  estimated at 15 mmHg or greater.  There is no prior study for direct comparison.    Stress Test:  Date: Results:    ECG Reviewed:  Date: Results:    Cath:  Date: Results:      METS/Exercise Tolerance: 3 - Able to walk 1-2 blocks without stopping    Hematologic:       Musculoskeletal:       GI/Hepatic:     (+) GERD, Asymptomatic on medication,                  Renal/Genitourinary:     (+) renal disease, type: CRI, Pt does not require dialysis,           Endo:     (+)          thyroid problem, hypothyroidism,           Psychiatric/Substance Use:       Infectious Disease:       Malignancy:       Other:            Physical Exam    Airway        Mallampati: III   TM distance: > 3 FB   Neck ROM: limited   Mouth opening: > 3 cm    Respiratory Devices and Support         Dental       (+) Modest Abnormalities - crowns, retainers, 1 or 2 missing teeth      Cardiovascular   cardiovascular exam normal          Pulmonary   pulmonary exam normal                OUTSIDE LABS:  CBC:   Lab Results   Component Value Date    WBC 8.7 04/08/2024    WBC 16.3 (H)  "12/15/2023    HGB 15.1 04/08/2024    HGB 12.3 12/15/2023    HCT 48.3 (H) 04/08/2024    HCT 40.1 12/15/2023     04/08/2024     12/15/2023     BMP:   Lab Results   Component Value Date     04/08/2024     02/15/2024    POTASSIUM 3.8 04/08/2024    POTASSIUM 4.4 02/15/2024    CHLORIDE 104 04/08/2024    CHLORIDE 104 02/15/2024    CO2 25 04/08/2024    CO2 24 02/15/2024    BUN 17.3 04/08/2024    BUN 21.3 02/15/2024    CR 1.19 (H) 04/08/2024    CR 1.15 (H) 02/15/2024    GLC 94 04/08/2024     (H) 02/16/2024     COAGS:   Lab Results   Component Value Date    PTT 37 04/02/2023    INR 1.16 (H) 04/02/2023     POC: No results found for: \"BGM\", \"HCG\", \"HCGS\"  HEPATIC:   Lab Results   Component Value Date    ALBUMIN 4.3 04/08/2024    PROTTOTAL 7.2 04/08/2024    ALT 15 04/08/2024    AST 18 04/08/2024    ALKPHOS 95 04/08/2024    BILITOTAL 0.4 04/08/2024     OTHER:   Lab Results   Component Value Date    PH 7.37 04/02/2023    LACT 1.8 12/15/2023    A1C 6.0 (H) 01/24/2024    ESSIE 9.1 04/08/2024    MAG 2.1 04/08/2024    TSH 1.48 04/08/2024       Anesthesia Plan    ASA Status:  3    NPO Status:  NPO Appropriate    Anesthesia Type: General.     - Airway: ETT   Induction: Intravenous.   Maintenance: TIVA.        Consents    Anesthesia Plan(s) and associated risks, benefits, and realistic alternatives discussed. Questions answered and patient/representative(s) expressed understanding.     - Discussed: Risks, Benefits and Alternatives for BOTH SEDATION and the PROCEDURE were discussed     - Discussed with:  Patient      - Extended Intubation/Ventilatory Support Discussed: No.      - Patient is DNR/DNI Status: No     Use of blood products discussed: No .     Postoperative Care    Pain management: Multi-modal analgesia.   PONV prophylaxis: Ondansetron (or other 5HT-3), Dexamethasone or Solumedrol, Background Propofol Infusion     Comments:               CAREN BANUELOS MD    I have reviewed the pertinent notes and " labs in the chart from the past 30 days and (re)examined the patient.  Any updates or changes from those notes are reflected in this note.            # Drug Induced Coagulation Defect: home medication list includes an anticoagulant medication

## 2024-04-12 NOTE — ANESTHESIA POSTPROCEDURE EVALUATION
Patient: Asya Coffman    Procedure: Procedure(s):  Microdirect laryngoscopy with excision/ablation of laryngeal lesions, biopsies, CO2 laser, Avastin injections       Anesthesia Type:  General    Note:  Disposition: Outpatient   Postop Pain Control: Uneventful            Sign Out: Well controlled pain   PONV: No   Neuro/Psych: Uneventful            Sign Out: Acceptable/Baseline neuro status   Airway/Respiratory: Uneventful            Sign Out: Acceptable/Baseline resp. status   CV/Hemodynamics: Uneventful            Sign Out: Acceptable CV status; No obvious hypovolemia; No obvious fluid overload   Other NRE: NONE   DID A NON-ROUTINE EVENT OCCUR? No    Event details/Postop Comments:   /99           Last vitals:  Vitals Value Taken Time   /105 04/11/24 1945   Temp 36.3  C (97.4  F) 04/11/24 1945   Pulse 90 04/11/24 1945   Resp 21 04/11/24 1945   SpO2 92 % 04/11/24 1945       Electronically Signed By: MARS STACK MD  April 11, 2024  8:31 PM

## 2024-04-12 NOTE — ANESTHESIA CARE TRANSFER NOTE
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    Anesthesia Type:   General     Note:    Oropharynx: oropharynx clear of all foreign objects and spontaneously breathing  Level of Consciousness: drowsy  Oxygen Supplementation: face mask  Level of Supplemental Oxygen (L/min / FiO2): 6  Independent Airway: airway patency satisfactory and stable  Dentition: dentition unchanged  Vital Signs Stable: post-procedure vital signs reviewed and stable  Report to RN Given: handoff report given  Patient transferred to: PACU    Handoff Report: Identifed the Patient, Identified the Reponsible Provider, Reviewed the pertinent medical history, Discussed the surgical course, Reviewed Intra-OP anesthesia mangement and issues during anesthesia, Set expectations for post-procedure period and Allowed opportunity for questions and acknowledgement of understanding      Vitals:  Vitals Value Taken Time   /102 04/11/24 1915   Temp     Pulse 93 04/11/24 1916   Resp 22 04/11/24 1916   SpO2 91 % 04/11/24 1912   Vitals shown include unfiled device data.    Electronically Signed By: VERONICA Shannon CRNA  April 11, 2024  7:17 PM

## 2024-04-12 NOTE — OP NOTE
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 The patient wished to proceed with surgery and presented for the procedure(s) listed above. We reviewed perioperative risks, including bleeding/infection/pain, injury to surrounding structures, potential changes to tongue/voice/swallow function, risk of needing additional procedures (e.g., due to persistence or recurrence), and risks of anesthesia (including heart attack, stroke, death). She elected to proceed and written informed consent was performed.    DESCRIPTION OF PROCEDURE:   The patient was brought to the operating room and placed supine. A time-out was called to verify patient identity, operative site, and planned procedure. The operative site was prepped in the usual clean fashion. Moist gauze eye pads were placed and secured. A head wrap was placed, and custom molded thermoplastic tooth guards were placed. Direct laryngoscopy was performed with an Ossoff-Pilling laryngoscope, which was placed in suspension. The vocal folds were examined with a 0 degree telescope. Cup biopsy forceps were used to debulk the right and left posterior supraglottis.    The operating microscope was then brought into position. Laser safety precautions were utilized at all times, including eye protection for the patient and staff, use of wet towels, and appropriately minimized FiO2. As needed, moistened cottonoids or  laser-safe backstops were used to protect the endotracheal tube from the laser. The Lumenis CO2 Accublade laser was attached to the microscope and used at a depth setting of 1 and 8 Cardoza.     The laryngoscope was repositioned as needed for access to the right and left posterior commissure. The laser was used to ablate papilloma of the left and right posterior supraglottis, and left and right posterior commissure. Small clusters of papilloma were kept across multiple areas at each level to reduce the risk of posterior airway stenosis.    Along the posterior subglottis and upper cervical tracheal wall, papilloma were removed under telescopic visualization, with some scattered sessile papilloma clusters still intact. The endotracheal tube cuffs were deflated to allow tracheoscopy with the 0 degree telescope, which did not identify any other lesions of the distal trachea or takeoffs of the bilateral mainstem bronchi.    Cottonoids soaked in 1:10,000 epinephrine were sparingly used to maintain hemostasis.     Avastin was then injected under telescopic visualization with particular attention to the posterior commissure and subglottis.    As needed during the procedure and at the conclusion of the procedure, the operating microscope was moved out of position and the 0 degree rigid endoscope was used to examine the vocal folds and confirm completion of the stated objectives of the procedure. After cottonoid and sponge counts were confirmed correct, 2 cc of 4% lidocaine were applied transglottically for laryngotracheal anesthesia. The laryngoscope and tooth guards were removed. The lips, teeth, and tongue were examined and no injuries were noted. Care of the patient was returned to Anesthesia.      FINDINGS:   Easy mask. Easy exposure with Ossoff-Pilling laryngoscope.   Heavy burden of papilloma bilateral supraglottis, and posterior commissure, L>R. Heavy burden of papilloma along posterior subglottis and upper cervical  trachea. Remainder of trachea and takeoffs of mainstem bronchi clear.    SPECIMEN(S):   Right and left posterior supraglottis  Right and left posterior glottis (and upper subglottis)    DRAINS: None    ESTIMATED BLOOD LOSS: Minimal    COMPLICATIONS: None    DISPOSITION: Stable to PACU    ATTENDING PRESENCE STATEMENT:  I was present and participating for the entire procedure from beginning to completion.

## 2024-04-12 NOTE — BRIEF OP NOTE
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]  Dysphonia [R49.0]  Laryngeal mass [J38.7]  Post-operative diagnosis Same as pre-operative diagnosis    Procedure: Microdirect laryngoscopy with excision/ablation of laryngeal lesions, biopsies, CO2 laser, Avastin injections, N/A - Throat    Surgeon: Surgeons and Role:     * Nikole Davis MD - Primary  Anesthesia: General   Estimated Blood Loss: Less than 10 ml    Drains: None  Specimens:   ID Type Source Tests Collected by Time Destination   1 : Left posterior supra glottis Tissue Throat SURGICAL PATHOLOGY EXAM Nikole Davis MD 4/11/2024  5:12 PM    2 : Right supra glottis Tissue Throat SURGICAL PATHOLOGY EXAM Nikole Davis MD 4/11/2024  5:15 PM    3 : Left posterior Glottis Tissue Throat SURGICAL PATHOLOGY EXAM Nikole Davis MD 4/11/2024  5:32 PM    4 : Right Posterior Glottis Tissue Throat SURGICAL PATHOLOGY EXAM Nikole Davis MD 4/11/2024  6:15 PM      Findings: Significant recurrence of papillomas along superior and inferior aspects of bilateral true vocal cords and posterior commissure    Complications: None.  Implants: * No implants in log *      Ally Tesfaye MD  ENT PGY-1

## 2024-04-12 NOTE — DISCHARGE INSTRUCTIONS
Post-op instructions - Microdirect laryngoscopy with biopsy and ablation of lesions    Meds  -Use tylenol as needed for pain   -You may resume taking Apixaban tomorrow (4/12) for your atrial fibrillation    Activity  -Recommend gentle voice use for 4 days. Start by gently using your voice for 5 minutes an hour for the first day, 10 minutes an hour for the second day, 15 minutes an hour for the third day, etc. until you are back to regular voice use. During this time you should use your voice at conversational volumes, but avoid raising your voice or whispering. You should also minimize coughing or throat clearing.  -If your voice worsens during this time, you or someone you designate should call the clinic [phone] and ask to speak with Dr Davis's nurse Deedee Raymundo RN. You can also refer to the instructions from your speech pathologist, if they gave you guidance on voice rest.  -Avoid heavy lifting (>10 pounds) + straining until you have your follow up visit with Dr. Davis    Other  -Maintain high humidity at home.    Follow up  -Patient to follow up with Dr. Davis in 3-4 weeks (clinic will call you to schedule the appointment)    Ally Tesfaye MD  Otolaryngology - Head & Neck Surgery     Contacting your Doctor -   To contact a doctor, call Dr Davis at  the ENT- Otolaryngology Clinic at [phone]  or:  [phone] and ask for the resident on call for ENT-Otolaryngology (answered 24 hours a day)   Emergency Department:  [facility]: [phone]  [facility]: [phone] 911 if you are in need of immediate or emergent help

## 2024-04-17 DIAGNOSIS — I48.91 ATRIAL FIBRILLATION, UNSPECIFIED TYPE (H): ICD-10-CM

## 2024-04-17 DIAGNOSIS — Q31.9 DYSPLASIA OF LARYNX: ICD-10-CM

## 2024-04-17 DIAGNOSIS — J44.9 CHRONIC OBSTRUCTIVE PULMONARY DISEASE, UNSPECIFIED COPD TYPE (H): ICD-10-CM

## 2024-04-17 DIAGNOSIS — R06.02 SHORTNESS OF BREATH: ICD-10-CM

## 2024-04-17 DIAGNOSIS — R49.0 DYSPHONIA: ICD-10-CM

## 2024-04-17 DIAGNOSIS — M34.1 CREST SYNDROME (H): ICD-10-CM

## 2024-04-17 DIAGNOSIS — Z78.9 RECURRENT RESPIRATORY PAPILLOMATOSIS: Primary | ICD-10-CM

## 2024-04-18 ENCOUNTER — HOSPITAL ENCOUNTER (OUTPATIENT)
Facility: CLINIC | Age: 79
End: 2024-04-18
Attending: OTOLARYNGOLOGY | Admitting: OTOLARYNGOLOGY
Payer: COMMERCIAL

## 2024-04-18 ENCOUNTER — TELEPHONE (OUTPATIENT)
Dept: OTOLARYNGOLOGY | Facility: CLINIC | Age: 79
End: 2024-04-18
Payer: COMMERCIAL

## 2024-04-18 NOTE — TELEPHONE ENCOUNTER
Patients daughter left a  asking for a call back to schedule patients surgeries.     Patient is scheduled for surgery with Dr. Davis.     Spoke with: Patients daughter, Lana.     Date of Surgery: 6/06/24 and 7/11/24.     Location: [facility] OR for both dates.     Pre op with Provider: ARIE     H&P: Patient will schedule pre op for both surgeries with Janna Calderón. Informed patients daughter pre op will need to be completed within 30 days of both dates.     Additional imaging/appointments: Patient needs a 3-4 week post op following surgery on 6/06. Patient does prefer a late morning time as she struggles to get up early morning. Please advise best date/time to schedule patient.     Patient is scheduled for a 5 week post op following 7/11 on 8/12 at 2:00 PM.     Surgery packet: Will mail packet, confirmed with patients daughter address in chart works best. Patients daughter made aware arrival time for surgery will not be listed within packet.      Additional comments: Informed patients daughter a pre op nurse will call 2-5 days prior to surgery to go over further details/give arrival time.         Jolly Neves on 4/18/2024 at 4:39 PM

## 2024-04-18 NOTE — TELEPHONE ENCOUNTER
Patients daughter left a  asking for a call back to schedule patients surgery's with Dr. Davis.     Called patients daughter back to schedule. Writer offered patients daughter 6/06 and 7/25 for surgery with Dr. Davis. Patients daughter said she would prefer second surgery be scheduled week earlier if possible due to worries regarding recovery period for patient. Patients daughter said last surgery it took patient over a week to fully recover and being as they will be flying out last week of July, they would prefer patient to have a longer recovery period. Writer informed patients daughter a message would be sent to Dr. Davis's nurse to confirm preference on scheduling and will call back once it has been confirmed. Patients daughter understood and thanked writer.     Jolly Neves on 4/18/2024 at 9:55 AM

## 2024-04-18 NOTE — TELEPHONE ENCOUNTER
Called patients daughter back to offer additional dates for patients surgery with Dr. Davis. No answer, callback number [phone] left on vm for patient.     Jolly Neves on 4/18/2024 at 12:09 PM

## 2024-04-19 ENCOUNTER — TUMOR CONFERENCE (OUTPATIENT)
Dept: ONCOLOGY | Facility: CLINIC | Age: 79
End: 2024-04-19
Payer: COMMERCIAL

## 2024-04-19 ENCOUNTER — PATIENT OUTREACH (OUTPATIENT)
Dept: ONCOLOGY | Facility: CLINIC | Age: 79
End: 2024-04-19

## 2024-04-19 ENCOUNTER — PATIENT OUTREACH (OUTPATIENT)
Dept: OTOLARYNGOLOGY | Facility: CLINIC | Age: 79
End: 2024-04-19

## 2024-04-19 DIAGNOSIS — Z78.9 RECURRENT RESPIRATORY PAPILLOMATOSIS: Primary | ICD-10-CM

## 2024-04-19 NOTE — PROGRESS NOTES
Called daughter back to discuss tumor board call and recommendation of an informational meeting with rad onc. Patient daughter was agreeable and verbalized understanding. Inocencia Fan RN on 4/19/2024 at 12:22 PM

## 2024-04-19 NOTE — PROGRESS NOTES
Attempted to call patient daughter to update her on the plan of care for her mom. LVM with direct number for call back. Inocencia Fan RN on 4/19/2024 at 9:50 AM

## 2024-04-19 NOTE — PROGRESS NOTES
Called patient to let her know that her case was discussed on the ENT conference and given the pathology discussed with Dr. Davis the recommendation was that the patient meet with Rad Onc for a consultation. Let patient know this is more of an informational meeting as we want her to know all of her options and be well informed in her care. Patient was agreeable to this and will schedule once called. Writer asked if patient would be okay if care team updated her daughter and patient verbalized agreement. Writer educated patient on how to reach out to clinic with any questions or concerns in the meantime. Patient was agreeable and verbalized understanding. Inocencia Fan RN on 4/19/2024 at 9:49 AM

## 2024-04-19 NOTE — TUMOR CONFERENCE
Head & Neck Tumor Conference Note   4/19/2024    Status: New  Staff: Dr. Davis    Tumor Site: Larynx (glottis)  Tumor Pathology: invasive squamous cell carcinoma  Tumor Stage: cT1Nx  Tumor Treatment:   - Biopsy with microdirect laryngoscopy, 4/11/24    Reason for Review: Review imaging, path, and POC    Brief History: Asya Coffman is a pleasant 79 year old female with a past medical history including CREST syndrome, COPD, chronic kidney disease, atrial fibrillation and a complex laryngeal history including a prior benign lesion, severe dysplasia, and laryngeal papilloma. She first underwent laryngoscopy and biopsy of a benign lesion in 2015, but in 2021 was found to have papilloma and is now requiring MDL every two months for her disease. She last underwent this on 4/11/24, with final pathology showing a focus of superficially invasive squamous cell carcinoma.     Pertinent PMH:   Past Medical History:   Diagnosis Date    A-fib (H)     Antiplatelet or antithrombotic long-term use     Arrhythmia     COPD (chronic obstructive pulmonary disease) (H)         Social Hx:   Social History     Tobacco Use    Smoking status: Never    Smokeless tobacco: Never   Substance Use Topics    Alcohol use: Yes     Comment: Occasionally    Drug use: Never       Imaging: Not discussed today    Pathology: Surgical pathology, 4/11/24    Final Diagnosis   A. LEFT POSTERIOR SUPRA GLOTTIS:  - Ulcerated squamous papilloma with reactive changes  - Negative for high grade dysplasia/carcinoma     B. RIGHT SUPRA GLOTTIS:  - Fragments of squamous papilloma  - Negative for high grade dysplasia/carcinoma     C. LEFT POSTERIOR GLOTTIS:  - SQUAMOUS PAPILLOMA WITH SEVERE DYSPLASIA  - No invasive carcinoma is identified     D. 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 Pathology was reviewed today. Current situation is challenging given the frequency of treatments she needs, and quality of life adjustments with traveling for her surgery. Radiation therapy is a reasonable option, although she may decline.    Plan: Referral to Radiation Oncology to discuss radiation therapy.     Beverly Henry MD, PGY-5  Otolaryngology- Head and Neck Surgery    Documentation / Disclaimer Cancer Tumor Board Note  Cancer tumor board recommendations do not override what is determined to be reasonable care and treatment, which is dependent on the circumstances of a patient's case; the patient's medical, social, and personal concerns; and the clinical judgment of the oncologist [physician].

## 2024-04-19 NOTE — PROGRESS NOTES
"New Patient Oncology Nurse Navigator Note     [facility]: [facility]     Referred to (specialty): Radiation Oncology    Requested provider (if applicable): [facility] [place]: [phone]. Liat Yates     Date Referral Received: 2024     Evaluation for: Recurrent respiratory papillomatosis    Clinical History (per Nurse review of records provided):    **BOOK MARKED**   NOTES:  2024:  Tumor Conference    IMAGIN2024:  CXR  2023:  CT Cervical spine  2023:  CT Head W/O    PATHOLOGY:  2024:  Final Diagnosis  A. LEFT POSTERIOR SUPRA GLOTTIS:  - Ulcerated squamous papilloma with reactive changes  - Negative for high grade dysplasia/carcinoma     B. RIGHT SUPRA GLOTTIS:  - Fragments of squamous papilloma  - Negative for high grade dysplasia/carcinoma     C. LEFT POSTERIOR GLOTTIS:  - SQUAMOUS PAPILLOMA WITH SEVERE DYSPLASIA  - No invasive carcinoma is identified     D. RIGHT POSTERIOR GLOTTIS:  - SQUAMOUS CELL CARCINOMA WITH FOCAL SUPERFICIAL INVASION     Electronically signed by Beatris Avery MD on 2024 at 12:24 PM    Clinical Assessment / Barriers to Care (Per Nurse): none noted       Records Location (Care Everywhere, Media, etc.): [facility]     Records Needed: these will not be available--almost 60 years ago....  Any Radiation previously?  Per dtr \"yes, some type of radiation at [facility] for thyroid cancer in about 1965\"  Additional testing needed prior to consult: none    "

## 2024-04-19 NOTE — PROGRESS NOTES
I called and spoke with dtr, Lana.  I explained my role and purpose of my call.  I answered several questions that Lana had.    She wanted to be sure Dr. Yates know that her mother had thyroid cancer about 1965 and had some type of radiation at the [facility].  These records will not be available but hoping the patient can give Dr. Yates some more detail on TU when she meets her for consult.

## 2024-04-23 ENCOUNTER — PRE VISIT (OUTPATIENT)
Dept: RADIATION ONCOLOGY | Facility: CLINIC | Age: 79
End: 2024-04-23
Payer: COMMERCIAL

## 2024-04-23 ENCOUNTER — OFFICE VISIT (OUTPATIENT)
Dept: RADIATION ONCOLOGY | Facility: CLINIC | Age: 79
End: 2024-04-23
Attending: OTOLARYNGOLOGY
Payer: COMMERCIAL

## 2024-04-23 VITALS
WEIGHT: 118 LBS | BODY MASS INDEX: 19.64 KG/M2 | HEART RATE: 76 BPM | DIASTOLIC BLOOD PRESSURE: 73 MMHG | SYSTOLIC BLOOD PRESSURE: 120 MMHG

## 2024-04-23 DIAGNOSIS — C32.9 LARYNGEAL SQUAMOUS CELL CARCINOMA (H): Primary | ICD-10-CM

## 2024-04-23 PROCEDURE — G0463 HOSPITAL OUTPT CLINIC VISIT: HCPCS | Performed by: RADIOLOGY

## 2024-04-23 PROCEDURE — 99417 PROLNG OP E/M EACH 15 MIN: CPT | Performed by: RADIOLOGY

## 2024-04-23 PROCEDURE — 99205 OFFICE O/P NEW HI 60 MIN: CPT | Mod: GC | Performed by: RADIOLOGY

## 2024-04-23 ASSESSMENT — ENCOUNTER SYMPTOMS
GASTROINTESTINAL NEGATIVE: 1
NEUROLOGICAL NEGATIVE: 1
SHORTNESS OF BREATH: 1
MUSCULOSKELETAL NEGATIVE: 1
CARDIOVASCULAR NEGATIVE: 1
DEPRESSION: 1
EYES NEGATIVE: 1

## 2024-04-23 NOTE — PROGRESS NOTES
HPI    INITIAL PATIENT ASSESSMENT    Diagnosis: Cancer    Prior radiation therapy: None    Prior chemotherapy: None    Prior hormonal therapy:No    Pain Eval:  Denies    Psychosocial  Living arrangements: Lives with granddaughter  Fall Risk: independent   referral needs: Not needed    Advanced Directive: No  Implantable Cardiac Device? No    Review of Systems   Constitutional:  Positive for malaise/fatigue.   HENT: Negative.          Hearing loss   Eyes: Negative.    Respiratory:  Positive for shortness of breath.    Cardiovascular: Negative.         A-fib   Gastrointestinal: Negative.    Genitourinary: Negative.    Musculoskeletal: Negative.    Skin: Negative.    Neurological: Negative.    Endo/Heme/Allergies: Negative.    Psychiatric/Behavioral:  Positive for depression.

## 2024-04-23 NOTE — LETTER
4/23/2024         RE: Asya Coffman  [address]  [facility] [address]        Dear Colleague,    Thank you for referring your patient, Asya Coffman, to the [facility] RADIATION ONCOLOGY. Please see a copy of my visit note below.    RADIATION ONCOLOGY CONSULTATION  DATE OF VISIT: Apr 23, 2024    Asya Coffman  MRN: [id number]    PROBLEM:     was seen for initial consultation in the Department of Radiation Oncology on 4/23/2024 at the request of Dr. Davis for consideration of definitive radiation for squamous cell carcinoma of the glottis in the setting of laryngeal papillomatosis.    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    Patient initially presenting in 2015 with a benign laryngeal lesion. Progressed to severe dysplasia and laryngeal papilloma by 2021, necessitating bi-monthly MicroDirect laryngoscopy's for disease management. 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 Given the frequent treatments and quality of life concerns, particularly travel for surgery, radiation therapy has been recommended. Current plan includes referral to Radiation Oncology to explore radiation therapy options, discussed in recent tumor board.    During the interview, the patient states that she is generally feeling okay, but experiences occasional difficulty breathing. She has a history of atrial fibrillation, chronic obstructive pulmonary disease, asthma, and papillomatosis with obstructive airway. She believes her breathing difficulties may be multifactorial, but notes that she is currently breathing comfortably on room air. She is able to swallow food without issues, has no dysphagia,  "no significant throat discomfort at this time, no mucositis, no active infections, no fevers or chills, and no nausea. She also has CREST syndrome, experiences stiffening of her hands, reports mild gastroesophageal reflux disease and occasional muscle stiffness. She is here to discuss the logistics and feasibility of definitive radiation for her laryngeal squamous cell carcinoma. She expresses hesitancy about radiation therapy and wants to know 'how bad it would be.\"    PAST MEDICAL HISTORY:   Past Medical History:   Diagnosis Date    A-fib (H)     Antiplatelet or antithrombotic long-term use     Arrhythmia     COPD (chronic obstructive pulmonary disease) (H)    CREST syndrome  Raynaud's syndrome  Postsurgical hypothyroidism  Osteoporosis    PAST SURGICAL HISTORY:   Past Surgical History:   Procedure Laterality Date    INJECT STEROID (LOCATION) N/A 6/15/2023    Procedure: Avastin injection;  Surgeon: Nikole Davis MD;  Location: [facility]    INJECT STEROID (LOCATION) N/A 9/7/2023    Procedure: Avastin injection;  Surgeon: Nikole Davis MD;  [facility]    INJECT STEROID (LOCATION) N/A 12/14/2023    Procedure: Avastin injection;  Surgeon: Nikole Davis MD;  [facility]    INJECT STEROID (LOCATION) N/A 2/15/2024    Procedure: Avastin injection;  Surgeon: Nikole Davis MD;  [facility]    LASER CO2 LARYNGOSCOPY N/A 9/7/2023    Procedure: Microdirect laryngoscopy with excision/ablation of laryngeal lesions, biopsies, CO2 laser;  Surgeon: Nikole Davis MD;  [facility]    LASER CO2 LARYNGOSCOPY, COMPLEX N/A 5/26/2022    Procedure: Micro direct laryngoscopy with excision/ablation of laryngeal lesions, possible biopsies, possible CO2 laser;  Surgeon: Nikole Davis MD;  [facility]    LASER CO2 LARYNGOSCOPY, COMPLEX N/A 9/15/2022    Procedure: Microdirect laryngoscopy with ablation of laryngeal lesions,biopsies,CO2 laser;  Surgeon: " Nikole Davis MD;  [facility]    LASER CO2 LARYNGOSCOPY, COMPLEX N/A 12/1/2022    Procedure: Microdirect laryngoscopy with excision/ablation of laryngeal lesions, biopsies,   CO2 laser;  Surgeon: Nikole Davis MD;  [facility]    LASER CO2 LARYNGOSCOPY, COMPLEX N/A 6/15/2023    Procedure: Microdirect laryngoscopy with ablation of laryngeal lesions, biopsies, CO2 laser;  Surgeon: Nikole Davis MD;  [facility]    LASER CO2 LARYNGOSCOPY, COMPLEX N/A 10/5/2023    Procedure: Microdirect laryngoscopy with excision/ablation of laryngeal lesions, biopsies, CO2 laser;  Surgeon: Nikole Davis MD;  [facility]    LASER CO2 LARYNGOSCOPY, COMPLEX N/A 12/14/2023    Procedure: Microdirect laryngoscopy with excision/ablation of laryngeal lesions, biopsies, CO2 laser;  Surgeon: Nikole Davis MD;  [facility]    LASER CO2 LARYNGOSCOPY, COMPLEX N/A 2/15/2024    Procedure: Microdirect laryngoscopy with excision/ablation of laryngeal lesions, biopsies, CO2 laser;  Surgeon: Nikole Davis MD;  [facility]    LASER CO2 LARYNGOSCOPY, COMPLEX N/A 4/11/2024    Procedure: Microdirect laryngoscopy with excision/ablation of laryngeal lesions, biopsies, CO2 laser, Avastin injections;  Surgeon: Nikole Davis MD;  [facility]    LASER KTP LARYNGOSCOPY N/A 4/3/2023    Procedure: Microdirect laryngoscopy with biopsies, excision/ablation of lesions, C02 laser,  Avastin injection;  Surgeon: Nikole Davis MD;  [facility]    LASER KTP LARYNGOSCOPY N/A 6/15/2023    Procedure: flexible laser (CO2 or KTP) standby;  Surgeon: Nikole Davis MD;  [facility]    LASER KTP LARYNGOSCOPY FLEXIBLE N/A 8/18/2022    Procedure: Transnasal flexible laryngoscopy with KTP laser and biopsies;  Surgeon: Nikole Davis MD;  Location: [facility] OR    LASER KTP LARYNGOSCOPY FLEXIBLE N/A 10/27/2022    Procedure: Transnasal  flexible laryngoscopy with possible KTP laser;  Surgeon: Nikole Davis MD;  Location: [facility] OR    LASER KTP LARYNGOSCOPY FLEXIBLE N/A 1/26/2023    Procedure: Transnasal flexible laryngoscopy with KTP laser,  biopsies, superior laryngeal nerve blocks, Avastin injection;  Surgeon: Nikole Davis MD;  Location: [facility] OR    LASER KTP LARYNGOSCOPY FLEXIBLE N/A 2/15/2023    Procedure: Transnasal flexible laryngoscopy with KTP laser, superior laryngeal nerve blocks, biopsies, avastin injection;  Surgeon: Nikole Davis MD;  Location: [facility] OR    LASER KTP LARYNGOSCOPY FLEXIBLE N/A 2/17/2023    Procedure: Transnasal flexible laryngoscopy with KTP laser, biopsies, superior laryngeal nerve blocks;  Surgeon: Nikole Davis MD;  Location: [facility] OR    LASER KTP LARYNGOSCOPY FLEXIBLE N/A 2/23/2023    Procedure: Transnasal flexible laryngoscopy with KTP laser, superior laryngeal nerve blocks;  Surgeon: Nikole Davis MD;  Location: [facility] OR    LASER KTP LARYNGOSCOPY FLEXIBLE N/A 3/2/2023    Procedure: Transnasal flexible laryngoscopy with KTP laser, superior laryngeal nerve block Bilateral;  Surgeon: Nikole Davis MD;  Location: [facility] OR    LASER KTP LARYNGOSCOPY FLEXIBLE N/A 3/9/2023    Procedure: Transnasal flexible laryngoscopy with KTP laser, biopsies, superior laryngeal nerve blocks;  Surgeon: Nikole Davis MD;  Location: [facility] OR    LASER KTP LARYNGOSCOPY FLEXIBLE N/A 3/17/2023    Procedure: Transnasal flexible laryngoscopy with KTP laser, superior laryngeal nerve block(s), Avastin injection;  Surgeon: Nikole Davis MD;  Location: [facility] OR    LASER KTP LARYNGOSCOPY FLEXIBLE N/A 3/28/2023    Procedure: Transnasal flexible laryngoscopy with KTP laser, superior laryngeal nerve block(s);  Surgeon: Pankaj Woodard MD;  Location: [facility] OR   Status post appendectomy 1972  Papillary thyroid carcinoma status post thyroidectomy 1965 at [facility] and neck dissection 1966 with negative nodes      CHEMOTHERAPY HISTORY: No    PAST RADIATION THERAPY HISTORY: No    IMPLANTABLE CARDIAC DEVICE: No    MEDICATIONS:   Current Outpatient Medications   Medication Sig Dispense Refill    acetaminophen (TYLENOL) 325 MG tablet Take 2 tablets (650 mg) by mouth every 4 hours as needed for pain 50 tablet 0    albuterol (PROAIR HFA/PROVENTIL HFA/VENTOLIN HFA) 108 (90 Base) MCG/ACT inhaler Inhale 2 puffs into the lungs as needed      apixaban ANTICOAGULANT (ELIQUIS) 5 MG tablet Take 1 tablet (5 mg) by mouth 2 times daily 60 tablet 1    atorvastatin (LIPITOR) 20 MG tablet Take 20 mg by mouth every evening      budesonide-formoterol (SYMBICORT) 160-4.5 MCG/ACT Inhaler Inhale 2 puffs into the lungs two times daily      Calcium Carbonate-Vitamin D 600-10 MG-MCG TABS       diltiazem ER (TIAZAC) 240 MG 24 hr ER beaded capsule Take 240 mg by mouth 2 times daily      fexofenadine (ALLEGRA) 180 MG tablet Take 180 mg by mouth daily      ibuprofen (ADVIL/MOTRIN) 200 MG capsule Take 400 mg by mouth every 6 hours as needed for fever      ipratropium - albuterol 0.5 mg/2.5 mg/3 mL (DUONEB) 0.5-2.5 (3) MG/3ML neb solution Take 1 vial (3 mLs) by nebulization every 6 hours as needed for shortness of breath, wheezing or cough 90 mL 0    levothyroxine (SYNTHROID/LEVOTHROID) 88 MCG tablet Take 88 mcg by mouth daily      predniSONE (DELTASONE) 20 MG tablet Take two tablets (= 40mg) each day for 5 (five) days 10 tablet 0    propranolol (INDERAL) 10 MG tablet Start propanolol 10 mg 1 tab daily and increase by 1 tab weekly until 20 mg twice daily. May stop at lowest effective dose. If experiencing hypotension or bradycardia, return to previous dose on titration schedule. 120 tablet 11    Respiratory Therapy Supplies (NEBULIZER) JUWAN Portable nebulizer, disposable neb kit x 4, reuseable neb kit x 1, mask x 1, filters x 1.   Frequency of use: daily;  Medication: albuterol  Length of need: 99  months      sertraline (ZOLOFT) 100 MG tablet Take 100 mg by mouth daily      tiZANidine (ZANAFLEX) 2 MG tablet Take 2 mg by mouth 3 times daily          ALLERGIES:   Allergies as of 04/23/2024 - Reviewed 04/11/2024   Allergen Reaction Noted    Methylpyrrolidone Anaphylaxis 07/18/2007    Nuts Anaphylaxis 07/18/2007    Escitalopram Other (See Comments) 05/21/2009    Mirtazapine Other (See Comments) 05/21/2009    Venlafaxine Other (See Comments) 05/21/2009    Adhesive tape Rash 12/31/2020    No clinical screening - see comments Rash 01/24/2023       SOCIAL HISTORY:      Social History     Socioeconomic History    Marital status:      Spouse name: Not on file    Number of children: Not on file    Years of education: Not on file    Highest education level: Not on file   Occupational History    Not on file   Tobacco Use    Smoking status: Never    Smokeless tobacco: Never   Substance and Sexual Activity    Alcohol use: Yes     Comment: Occasionally    Drug use: Never    Sexual activity: Not on file   Other Topics Concern    Not on file   Social History Narrative    Not on file     Social Determinants of Health     Financial Resource Strain: Low Risk  (12/1/2023)    [facility], [facility] [id number]    Financial Resource Strain     Difficulty of Paying Living Expenses: 3     Difficulty of Paying Living Expenses: Not on file   Food Insecurity: No Food Insecurity (12/1/2023)    Received from [facility], [facility] [employer] [facility]    Food Insecurity     Worried About Running Out of Food in the Last Year: 1   Transportation Needs: No Transportation Needs (12/1/2023)    Received from [employer], [facility] [employer] [facility]    Transportation Needs     Lack of Transportation (Medical): 1   Physical Activity: Not on file   Stress: Not on file   Social  Connections: Socially Integrated (2023)    Received from [employer] [facility], [email or web address] [facility]    Social Connections     Frequency of Communication with Friends and Family: 0   Interpersonal Safety: Not on file   Housing Stability: Low Risk  (2023)    [facility] [employer] [facility], [facility]    Housing Stability     Unable to Pay for Housing in the Last Year: 1       FAMILY HISTORY:   Family History   Problem Relation Age of Onset    Breast Cancer Mother 75.00    Hereditary Breast and Ovarian Cancer Syndrome No family hx of     Cancer No family hx of     Colon Cancer No family hx of     Endometrial Cancer No family hx of     Ovarian Cancer No family hx of    Thyroid cancer: Patient's mother had papillary thyroid cancer    REVIEW OF SYMPTOMS:  A full 12-point review of systems was performed. All pertinent positives and negatives are noted in HPI.    FUNCTIONAL STATUS:  ECO    PHYSICAL EXAMINATION:    /73   Pulse 76   Wt 53.5 kg (118 lb)   BMI 19.64 kg/m    Exam:  Constitutional: Alert, oriented, in no acute distress, hoarse voice  Head: Normocephalic, no oral or oropharyngeal mucosal lesions  Neck: Neck stiff. No adenopathy.  Status post thyroidectomy, mildly hoarse voice  Cardiovascular: Warm and well-perfused  Respiratory: Normal work of breathing, no wheezes  Skin: no suspicious lesions or rashes  Neurologic: Gait normal. Reflexes normal and symmetric. Sensation grossly WNL.  Psychiatric: mentation appears normal and affect normal/bright    IMAGING/PATH:     Pathology:       Component  Ref Range & Units  Resulting Agency   Case Report   Surgical Pathology Report                         Case: [id number]                                   Authorizing Provider:  Nikole Davis,  Collected:           2024 05:12 PM                                  MD                                                                            [facility]ing [facility]                 Received:            04/12/2024 08:08 AM           Pathologist:           Beatris Avery MD                                                                           Specimens:   A) - Throat, Left posterior supra glottis                                                            B) - Throat, Right supra glottis                                                                    C) - Throat, Left posterior Glottis                                                                  D) - Throat, Right Posterior Glottis                                                      Final Diagnosis   A. LEFT POSTERIOR SUPRA GLOTTIS:  - Ulcerated squamous papilloma with reactive changes  - Negative for high grade dysplasia/carcinoma     B. RIGHT SUPRA GLOTTIS:  - Fragments of squamous papilloma  - Negative for high grade dysplasia/carcinoma     C. LEFT POSTERIOR GLOTTIS:  - SQUAMOUS PAPILLOMA WITH SEVERE DYSPLASIA  - No invasive carcinoma is identified     D. RIGHT POSTERIOR GLOTTIS:  - SQUAMOUS CELL CARCINOMA WITH FOCAL SUPERFICIAL INVASION     Case Report   Surgical Pathology Report                         Case: [id number]                                   Authorizing Provider:  Nikole Davis,  Collected:           02/15/2024 07:49 PM                                  MD                                                                           Ordering Location:     [facility] OR                 Received:            02/16/2024 08:25 AM           Pathologist:           Beatris Avery MD                                                                           Specimens:   A) - Other, left posterior supraglottis                                                               B) - Other, right posterior supraglottis                                                  Final Diagnosis   A. LARYNX, LEFT POSTERIOR SUPRAGLOTTIS, BIOPSY:  - Fragments of ulcerated squamous papilloma with focal severe dysplasia  - No evidence of invasive carcinoma     B. LARYNX, RIGHT POSTERIOR SUPRAGLOTTIS,, BIOPSY:  - Fragments of ulcerated squamous papilloma with focal moderate dysplasia  - Negative for invasive carcinoma     Imaging:  XR Chest 4/8/2023  Findings:   2 views of the chest. Heart size within normal limits. Lungs are  hyperinflated. No pneumothorax, consolidation, pleural effusion.                                                                      Impression: Hyperinflated lungs. No consolidation.    CT C Spine 12/14/2023  FINDINGS:  Normal vertebral body alignment. No acute fracture or traumatic  subluxation. Ankylosis at C6/7. Chronic compression deformity of the  T1 vertebral body. No prevertebral edema. Atlantodental degenerative  changes. No high-grade spinal canal or neural foraminal stenosis.  There is unchanged multilevel facet arthropathy most pronounced from  C4 to C6.     No abnormality of the paraspinous soft tissues. Interval resolution of  soft tissue density at the level of the glottis.                                                                      IMPRESSION:  1. No acute fracture or traumatic subluxation.  2. Stable T1 vertebral body compression deformity.    CT Head 12/14/2023  Findings:    No intracranial hemorrhage. No mass effect. No midline shift. No  extra-axial fluid collection. The gray to white matter differentiation  of the cerebral hemispheres is preserved. Ventricles are proportionate  to the sulci.. No sulcal effacement..  The basal cisterns are patent.  Moderate periventricular white matter hypoattenuation, nonspecific but  favored to represent chronic small vessel ischemic disease given the  patient's age.     The visualized paranasal sinuses  are clear. The mastoid air cells are  clear. Bilateral cataract surgeries. No acute fracture..                                       Impression:   1.  No acute intracranial pathology.  2.  Moderate leukoaraiosis.    Laboratory:  Lab Results   Component Value Date    WBC 8.7 04/08/2024    HGB 15.1 04/08/2024    HCT 48.3 (H) 04/08/2024    MCV 85 04/08/2024     04/08/2024     Lab Results   Component Value Date    CR 1.19 (H) 04/08/2024     Lab Results   Component Value Date     04/08/2024    POTASSIUM 3.8 04/08/2024    CHLORIDE 104 04/08/2024    CO2 25 04/08/2024    GLC 94 04/08/2024     Lab Results   Component Value Date    AST 18 04/08/2024    ALT 15 04/08/2024    ALKPHOS 95 04/08/2024    BILITOTAL 0.4 04/08/2024     Magnesium   Date Value Ref Range Status   04/08/2024 2.1 1.7 - 2.3 mg/dL Final     Assessment:    79F PMHx includes CREST syndrome, COPD, CKD, AFib, 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    Plan:   We discussed the natural history of her disease and the options for management. The appearance is inconclusive for invasion through the thyroid cartilage given history of papillomas, and there is no recent CT chest/neck with contrast.  We explained that primary treatment for laryngeal cancer includes options of laryngectomy/postop radiation, definitive chemoradiation, radiation alone.  Given that she has early stage disease, we discussed life preservation approach with definitive radiation maintains a reasonable chance of a functional larynx status post therapy.  Given her comorbid conditions, surgery is not recommended at this time per tumor board.  Therefore, we discussed radiotherapy in detail, including logistics, simulation, and discuss timing of completing planned 5 weeks of radiation in between debulking surgeries for the patient's papillomatosis.    The importance of maintaining her  nutrition was emphasized, and a referral to a dietitian, as well as speech pathology for a speech and swallow evaluation, was discussed.      The risks, benefits, alternatives, and logistics to radiation therapy were discussed in detail. Side effects of radiation therapy could include fatigue, skin changes and blistering, dysphagia which may require a feeding tube that can be permanent in some cases, temporary changes in taste sensation, increased risk of dental complications such as caries and osteoradionecrosis, neck fibrosis and stiffness, changes to the voice including hoarseness, infection, and bleeding. The patient is aware that the side effects of radiation therapy may be severe and permanent, although the risks are considered low and are outweighed by the benefits of treatment. She was given the opportunity to ask questions, which were answered.  Informed consent was obtained    Per Trinidad et al IJROBP 2006 showed fraction size ?2.25Gy improved LC, we aim to treat this patient with 56.25 Gray in 25 fractions using 3D CRT opposed laterals/oblique photon external beam .  We anticipate that given her papillomas, we may have a larger than usual field of treatment, therefore we will opt for the lower 56.25 Perez dose as opposed to the 63 Perez dose.  Additionally, despite distorted anatomy, this likely represents an very early stage laryngeal carcinoma.  We anticipate reduced toxicity profile with this lower dose of treatment.    We will plan to simulate this patient the week of 5/20/2024, and treat this patient after her next debulking surgery with a plan to start radiation of 6/10/2024.  We will send a message to her ENT specialist regarding her plan for radiation.    Jag Busby MD MS PGY2  Discussed with Liat Yates MD    Physician Attestation  Ms. Coffman was seen and examined by me. I agree with the findings and plan of care as documented in the note. Note above by Dr. Busby was reviewed and  edited by me and reflects our mutual findings and plan of care.    I reviewed patient's chart, medical records, imaging studies (including actual images), labs and pathology reports.  I interviewed and counseled the patient face to face.  I additionally ordered tests and discussed the case with patient's referring care team.       95 minutes were spent on the date of the encounter doing chart review, history and exam, documentation and further activities as noted above     Liat Yates MD  Department of Radiation Oncology  [facility]    CC  Patient Care Team:  Janna Calderón as PCP - General (Internal Medicine)  Ryan, Nikole Alberts MD as Assigned Surgical Provider            HPI    INITIAL PATIENT ASSESSMENT    Diagnosis: Cancer    Prior radiation therapy: None    Prior chemotherapy: None    Prior hormonal therapy:No    Pain Eval:  Denies    Psychosocial  Living arrangements: Lives with granddaughter  Fall Risk: independent   referral needs: Not needed    Advanced Directive: No  Implantable Cardiac Device? No    Review of Systems   Constitutional:  Positive for malaise/fatigue.   HENT: Negative.          Hearing loss   Eyes: Negative.    Respiratory:  Positive for shortness of breath.    Cardiovascular: Negative.         A-fib   Gastrointestinal: Negative.    Genitourinary: Negative.    Musculoskeletal: Negative.    Skin: Negative.    Neurological: Negative.    Endo/Heme/Allergies: Negative.    Psychiatric/Behavioral:  Positive for depression.                  Again, thank you for allowing me to participate in the care of your patient.        Sincerely,        Liat Yates MD

## 2024-04-23 NOTE — TELEPHONE ENCOUNTER
MEDICAL RECORDS REQUEST   Radiation Oncology  [address] [place] [address]  Fax: [phone]          FUTURE VISIT INFORMATION                                                   Asya Coffman, : 1945 scheduled for future visit at [facility] Radiation Oncology    RECORDS REQUESTED FOR VISIT                                                     HEAD & NECK     OFFICE NOTE from PCP [facility] 23: Dr. Janna Calderón   OFFICE NOTE from ENT [facility] 24: Dr. Nikole Davis   FIBEROPTIC ENDOSCOPY REPORT     OPERATIVE/BIOPSY REPORTS [facility] 24, 02/15/24, 23, 10/05/23: Microdirect laryngoscopy with excision/ablation of laryngeal lesions, biopsies, CO2 laser, Avastin injections (more in Epic)   MEDICATION LIST [facility]    LABS     PATHOLOGY REPORTS Reports in Epic 24: [id number]  02/15/24: [id number]   ANYTHING RELATED TO DIAGNOSIS Epic    IMAGING (NEED IMAGES & REPORT)     CT SCANS PACS 23: CT C-Spine  23: CT Head   XRAYS PACS 24: XR Chest   PREVIOUS RADIATION  Records not available   WHAT HEALTHCARE FACILITY  [place]   RADIATION ONCOLOGIST NOTES  1965

## 2024-04-23 NOTE — PROGRESS NOTES
RADIATION ONCOLOGY CONSULTATION  DATE OF VISIT: Apr 23, 2024    Asya Coffman  MRN: [id number]    PROBLEM:     was seen for initial consultation in the Department of Radiation Oncology on 4/23/2024 at the request of Dr. Davis for consideration of definitive radiation for squamous cell carcinoma of the glottis in the setting of laryngeal papillomatosis.    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    Patient initially presenting in 2015 with a benign laryngeal lesion. Progressed to severe dysplasia and laryngeal papilloma by 2021, necessitating bi-monthly MicroDirect laryngoscopy's for disease management. 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 Given the frequent treatments and quality of life concerns, particularly travel for surgery, radiation therapy has been recommended. Current plan includes referral to Radiation Oncology to explore radiation therapy options, discussed in recent tumor board.    During the interview, the patient states that she is generally feeling okay, but experiences occasional difficulty breathing. She has a history of atrial fibrillation, chronic obstructive pulmonary disease, asthma, and papillomatosis with obstructive airway. She believes her breathing difficulties may be multifactorial, but notes that she is currently breathing comfortably on room air. She is able to swallow food without issues, has no dysphagia, no significant throat discomfort at this time, no mucositis, no active infections, no fevers or chills, and no nausea. She also has CREST syndrome, experiences stiffening of her hands, reports mild gastroesophageal reflux disease and occasional muscle stiffness. She  "is here to discuss the logistics and feasibility of definitive radiation for her laryngeal squamous cell carcinoma. She expresses hesitancy about radiation therapy and wants to know 'how bad it would be.\"    PAST MEDICAL HISTORY:   Past Medical History:   Diagnosis Date    A-fib (H)     Antiplatelet or antithrombotic long-term use     Arrhythmia     COPD (chronic obstructive pulmonary disease) (H)    CREST syndrome  Raynaud's syndrome  Postsurgical hypothyroidism  Osteoporosis    PAST SURGICAL HISTORY:   Past Surgical History:   Procedure Laterality Date    INJECT STEROID (LOCATION) N/A 6/15/2023    Procedure: Avastin injection;  Surgeon: Nikole Davis MD;  Location: [facility]    INJECT STEROID (LOCATION) N/A 9/7/2023    Procedure: Avastin injection;  Surgeon: Nikole Davis MD;  Location: [facility]    INJECT STEROID (LOCATION) N/A 12/14/2023    Procedure: Avastin injection;  Surgeon: Nikole Davis MD;  Location[facility]    INJECT STEROID (LOCATION) N/A 2/15/2024    Procedure: Avastin injection;  Surgeon: Nikole Davis MD;  Location: [facility]    LASER CO2 LARYNGOSCOPY N/A 9/7/2023    Procedure: Microdirect laryngoscopy with excision/ablation of laryngeal lesions, biopsies, CO2 laser;  Surgeon: Nikole Davis MD;  Location: [facility]    LASER CO2 LARYNGOSCOPY, COMPLEX N/A 5/26/2022    Procedure: Micro direct laryngoscopy with excision/ablation of laryngeal lesions, possible biopsies, possible CO2 laser;  Surgeon: Nikole Davis MD;  [facility]    LASER CO2 LARYNGOSCOPY, COMPLEX N/A 9/15/2022    Procedure: Microdirect laryngoscopy with ablation of laryngeal lesions,biopsies,CO2 laser;  Surgeon: Nikole Davis MD;  [facility]    LASER CO2 LARYNGOSCOPY, COMPLEX N/A 12/1/2022    Procedure: Microdirect laryngoscopy with excision/ablation of laryngeal lesions, biopsies,   CO2 laser;  Surgeon: Nikole Davis MD;  [facility]    LASER CO2 " LARYNGOSCOPY, COMPLEX N/A 6/15/2023    Procedure: Microdirect laryngoscopy with ablation of laryngeal lesions, biopsies, CO2 laser;  Surgeon: Nikole Davis MD;  [facility]    LASER CO2 LARYNGOSCOPY, COMPLEX N/A 10/5/2023    Procedure: Microdirect laryngoscopy with excision/ablation of laryngeal lesions, biopsies, CO2 laser;  Surgeon: Nikole Davis MD;  [facility]    LASER CO2 LARYNGOSCOPY, COMPLEX N/A 12/14/2023    Procedure: Microdirect laryngoscopy with excision/ablation of laryngeal lesions, biopsies, CO2 laser;  Surgeon: Nikole Davis MD;  [facility]    LASER CO2 LARYNGOSCOPY, COMPLEX N/A 2/15/2024    Procedure: Microdirect laryngoscopy with excision/ablation of laryngeal lesions, biopsies, CO2 laser;  Surgeon: Nikole Davis MD;  [facility]    LASER CO2 LARYNGOSCOPY, COMPLEX N/A 4/11/2024    Procedure: Microdirect laryngoscopy with excision/ablation of laryngeal lesions, biopsies, CO2 laser, Avastin injections;  Surgeon: Nikole Davis MD;  [facility]    LASER KTP LARYNGOSCOPY N/A 4/3/2023    Procedure: Microdirect laryngoscopy with biopsies, excision/ablation of lesions, C02 laser,  Avastin injection;  Surgeon: Nikole Davis MD;  [facility]    LASER KTP LARYNGOSCOPY N/A 6/15/2023    Procedure: flexible laser (CO2 or KTP) standby;  Surgeon: Nikole Davis MD;  [facility]    LASER KTP LARYNGOSCOPY FLEXIBLE N/A 8/18/2022    Procedure: Transnasal flexible laryngoscopy with KTP laser and biopsies;  Surgeon: Nikole Davis MD;  Location: [facility] OR    LASER KTP LARYNGOSCOPY FLEXIBLE N/A 10/27/2022    Procedure: Transnasal flexible laryngoscopy with possible KTP laser;  Surgeon: Nikole Davis MD;  Location: [facility] OR    LASER KTP LARYNGOSCOPY FLEXIBLE N/A 1/26/2023    Procedure: Transnasal flexible laryngoscopy with KTP laser,  biopsies, superior laryngeal nerve blocks, Avastin  injection;  Surgeon: Nikole Davis MD;  Location: [facility] OR    LASER KTP LARYNGOSCOPY FLEXIBLE N/A 2/15/2023    Procedure: Transnasal flexible laryngoscopy with KTP laser, superior laryngeal nerve blocks, biopsies, avastin injection;  Surgeon: Nikole Davis MD;  Location: [facility] OR    LASER KTP LARYNGOSCOPY FLEXIBLE N/A 2/17/2023    Procedure: Transnasal flexible laryngoscopy with KTP laser, biopsies, superior laryngeal nerve blocks;  Surgeon: Nikole Davis MD;  Location: [facility] OR    LASER KTP LARYNGOSCOPY FLEXIBLE N/A 2/23/2023    Procedure: Transnasal flexible laryngoscopy with KTP laser, superior laryngeal nerve blocks;  Surgeon: Nikole Davis MD;  Location: [facility] OR    LASER KTP LARYNGOSCOPY FLEXIBLE N/A 3/2/2023    Procedure: Transnasal flexible laryngoscopy with KTP laser, superior laryngeal nerve block Bilateral;  Surgeon: Nikole Davis MD;  Location: [facility] OR    LASER KTP LARYNGOSCOPY FLEXIBLE N/A 3/9/2023    Procedure: Transnasal flexible laryngoscopy with KTP laser, biopsies, superior laryngeal nerve blocks;  Surgeon: Nikole Davis MD;  Location: [facility] OR    LASER KTP LARYNGOSCOPY FLEXIBLE N/A 3/17/2023    Procedure: Transnasal flexible laryngoscopy with KTP laser, superior laryngeal nerve block(s), Avastin injection;  Surgeon: Nikole Davis MD;  Location: [facility] OR    LASER KTP LARYNGOSCOPY FLEXIBLE N/A 3/28/2023    Procedure: Transnasal flexible laryngoscopy with KTP laser, superior laryngeal nerve block(s);  Surgeon: Pankaj Woodard MD;  Location: [facility] OR   Status post appendectomy 1972  Papillary thyroid carcinoma status post thyroidectomy [facility] at [facility] and neck dissection 1966 with negative nodes      CHEMOTHERAPY HISTORY: No    PAST RADIATION THERAPY HISTORY: No    IMPLANTABLE CARDIAC DEVICE: No    MEDICATIONS:   Current Outpatient Medications   Medication Sig Dispense Refill    acetaminophen (TYLENOL) 325 MG  tablet Take 2 tablets (650 mg) by mouth every 4 hours as needed for pain 50 tablet 0    albuterol (PROAIR HFA/PROVENTIL HFA/VENTOLIN HFA) 108 (90 Base) MCG/ACT inhaler Inhale 2 puffs into the lungs as needed      apixaban ANTICOAGULANT (ELIQUIS) 5 MG tablet Take 1 tablet (5 mg) by mouth 2 times daily 60 tablet 1    atorvastatin (LIPITOR) 20 MG tablet Take 20 mg by mouth every evening      budesonide-formoterol (SYMBICORT) 160-4.5 MCG/ACT Inhaler Inhale 2 puffs into the lungs two times daily      Calcium Carbonate-Vitamin D 600-10 MG-MCG TABS       diltiazem ER (TIAZAC) 240 MG 24 hr ER beaded capsule Take 240 mg by mouth 2 times daily      fexofenadine (ALLEGRA) 180 MG tablet Take 180 mg by mouth daily      ibuprofen (ADVIL/MOTRIN) 200 MG capsule Take 400 mg by mouth every 6 hours as needed for fever      ipratropium - albuterol 0.5 mg/2.5 mg/3 mL (DUONEB) 0.5-2.5 (3) MG/3ML neb solution Take 1 vial (3 mLs) by nebulization every 6 hours as needed for shortness of breath, wheezing or cough 90 mL 0    levothyroxine (SYNTHROID/LEVOTHROID) 88 MCG tablet Take 88 mcg by mouth daily      predniSONE (DELTASONE) 20 MG tablet Take two tablets (= 40mg) each day for 5 (five) days 10 tablet 0    propranolol (INDERAL) 10 MG tablet Start propanolol 10 mg 1 tab daily and increase by 1 tab weekly until 20 mg twice daily. May stop at lowest effective dose. If experiencing hypotension or bradycardia, return to previous dose on titration schedule. 120 tablet 11    Respiratory Therapy Supplies (NEBULIZER) JUWAN Portable nebulizer, disposable neb kit x 4, reuseable neb kit x 1, mask x 1, filters x 1.   Frequency of use: daily;  Medication: albuterol  Length of need: 99 months      sertraline (ZOLOFT) 100 MG tablet Take 100 mg by mouth daily      tiZANidine (ZANAFLEX) 2 MG tablet Take 2 mg by mouth 3 times daily          ALLERGIES:   Allergies as of 04/23/2024 - Reviewed 04/11/2024   Allergen Reaction Noted    Methylpyrrolidone Anaphylaxis  07/18/2007    Nuts Anaphylaxis 07/18/2007    Escitalopram Other (See Comments) 05/21/2009    Mirtazapine Other (See Comments) 05/21/2009    Venlafaxine Other (See Comments) 05/21/2009    Adhesive tape Rash 12/31/2020    No clinical screening - see comments Rash 01/24/2023       SOCIAL HISTORY:      Social History     Socioeconomic History    Marital status:      Spouse name: Not on file    Number of children: Not on file    Years of education: Not on file    Highest education level: Not on file   Occupational History    Not on file   Tobacco Use    Smoking status: Never    Smokeless tobacco: Never   Substance and Sexual Activity    Alcohol use: Yes     Comment: Occasionally    Drug use: Never    Sexual activity: Not on file   Other Topics Concern    Not on file   Social History Narrative    Not on file     Social Determinants of Health     Financial Resource Strain: Low Risk  (12/1/2023)    [facility], [facility]    Financial Resource Strain     Difficulty of Paying Living Expenses: 3     Difficulty of Paying Living Expenses: Not on file   Food Insecurity: No Food Insecurity (12/1/2023)    [facility], [facility]    Food Insecurity     Worried About Running Out of Food in the Last Year: 1   Transportation Needs: No Transportation Needs (12/1/2023)    Received from [employer] [facility], [facility]    Transportation Needs     Lack of Transportation (Medical): 1   Physical Activity: Not on file   Stress: Not on file   Social Connections: Socially Integrated (12/1/2023)    [facility], [facility] [employer] [facility] [employer] [facility]    Social Connections     Frequency of Communication with Friends and Family: 0   Interpersonal Safety: Not on file    Housing Stability: Low Risk  (2023)    Received from [employer] [facility], [employer] [facility]    Housing Stability     Unable to Pay for Housing in the Last Year: 1       FAMILY HISTORY:   Family History   Problem Relation Age of Onset    Breast Cancer Mother 75.00    Hereditary Breast and Ovarian Cancer Syndrome No family hx of     Cancer No family hx of     Colon Cancer No family hx of     Endometrial Cancer No family hx of     Ovarian Cancer No family hx of    Thyroid cancer: Patient's mother had papillary thyroid cancer    REVIEW OF SYMPTOMS:  A full 12-point review of systems was performed. All pertinent positives and negatives are noted in HPI.    FUNCTIONAL STATUS:  ECO    PHYSICAL EXAMINATION:    /73   Pulse 76   Wt 53.5 kg (118 lb)   BMI 19.64 kg/m    Exam:  Constitutional: Alert, oriented, in no acute distress, hoarse voice  Head: Normocephalic, no oral or oropharyngeal mucosal lesions  Neck: Neck stiff. No adenopathy.  Status post thyroidectomy, mildly hoarse voice  Cardiovascular: Warm and well-perfused  Respiratory: Normal work of breathing, no wheezes  Skin: no suspicious lesions or rashes  Neurologic: Gait normal. Reflexes normal and symmetric. Sensation grossly WNL.  Psychiatric: mentation appears normal and affect normal/bright    IMAGING/PATH:     Pathology:       Component  Ref Range & Units  Resulting Agency   Case Report   Surgical Pathology Report                         Case: [id number]                                   Authorizing Provider:  Nikole Davis,  Collected:           2024 05:12 PM                                  MD                                                                           Ordering Location:     [facility] OR                 Received:            2024 08:08 AM           Pathologist:           Beatris Avery,                                                                             MD                                                                           Specimens:   A) - Throat, Left posterior supra glottis                                                            B) - Throat, Right supra glottis                                                                    C) - Throat, Left posterior Glottis                                                                  D) - Throat, Right Posterior Glottis                                                      Final Diagnosis   A. LEFT POSTERIOR SUPRA GLOTTIS:  - Ulcerated squamous papilloma with reactive changes  - Negative for high grade dysplasia/carcinoma     B. RIGHT SUPRA GLOTTIS:  - Fragments of squamous papilloma  - Negative for high grade dysplasia/carcinoma     C. LEFT POSTERIOR GLOTTIS:  - SQUAMOUS PAPILLOMA WITH SEVERE DYSPLASIA  - No invasive carcinoma is identified     D. RIGHT POSTERIOR GLOTTIS:  - SQUAMOUS CELL CARCINOMA WITH FOCAL SUPERFICIAL INVASION     Case Report   Surgical Pathology Report                         Case: [id number]                                   Authorizing Provider:  Nikole Davis,  Collected:           02/15/2024 07:49 PM                                  MD                                                                           Ordering Location:     [facility] OR                 Received:            02/16/2024 08:25 AM           Pathologist:           Beatris Avery MD                                                                           Specimens:   A) - Other, left posterior supraglottis                                                              B) - Other, right posterior supraglottis                                                  Final Diagnosis   A. LARYNX, LEFT POSTERIOR SUPRAGLOTTIS, BIOPSY:  - Fragments of ulcerated squamous papilloma with focal severe dysplasia  - No evidence of  invasive carcinoma     B. LARYNX, RIGHT POSTERIOR SUPRAGLOTTIS,, BIOPSY:  - Fragments of ulcerated squamous papilloma with focal moderate dysplasia  - Negative for invasive carcinoma     Imaging:  XR Chest 4/8/2023  Findings:   2 views of the chest. Heart size within normal limits. Lungs are  hyperinflated. No pneumothorax, consolidation, pleural effusion.                                                                      Impression: Hyperinflated lungs. No consolidation.    CT C Spine 12/14/2023  FINDINGS:  Normal vertebral body alignment. No acute fracture or traumatic  subluxation. Ankylosis at C6/7. Chronic compression deformity of the  T1 vertebral body. No prevertebral edema. Atlantodental degenerative  changes. No high-grade spinal canal or neural foraminal stenosis.  There is unchanged multilevel facet arthropathy most pronounced from  C4 to C6.     No abnormality of the paraspinous soft tissues. Interval resolution of  soft tissue density at the level of the glottis.                                                                      IMPRESSION:  1. No acute fracture or traumatic subluxation.  2. Stable T1 vertebral body compression deformity.    CT Head 12/14/2023  Findings:    No intracranial hemorrhage. No mass effect. No midline shift. No  extra-axial fluid collection. The gray to white matter differentiation  of the cerebral hemispheres is preserved. Ventricles are proportionate  to the sulci.. No sulcal effacement..  The basal cisterns are patent.  Moderate periventricular white matter hypoattenuation, nonspecific but  favored to represent chronic small vessel ischemic disease given the  patient's age.     The visualized paranasal sinuses are clear. The mastoid air cells are  clear. Bilateral cataract surgeries. No acute fracture..                                       Impression:   1.  No acute intracranial pathology.  2.  Moderate leukoaraiosis.    Laboratory:  Lab Results   Component Value Date     WBC 8.7 04/08/2024    HGB 15.1 04/08/2024    HCT 48.3 (H) 04/08/2024    MCV 85 04/08/2024     04/08/2024     Lab Results   Component Value Date    CR 1.19 (H) 04/08/2024     Lab Results   Component Value Date     04/08/2024    POTASSIUM 3.8 04/08/2024    CHLORIDE 104 04/08/2024    CO2 25 04/08/2024    GLC 94 04/08/2024     Lab Results   Component Value Date    AST 18 04/08/2024    ALT 15 04/08/2024    ALKPHOS 95 04/08/2024    BILITOTAL 0.4 04/08/2024     Magnesium   Date Value Ref Range Status   04/08/2024 2.1 1.7 - 2.3 mg/dL Final     Assessment:    79F PMHx includes CREST syndrome, COPD, CKD, AFib, 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    Plan:   We discussed the natural history of her disease and the options for management. The appearance is inconclusive for invasion through the thyroid cartilage given history of papillomas, and there is no recent CT chest/neck with contrast.  We explained that primary treatment for laryngeal cancer includes options of laryngectomy/postop radiation, definitive chemoradiation, radiation alone.  Given that she has early stage disease, we discussed life preservation approach with definitive radiation maintains a reasonable chance of a functional larynx status post therapy.  Given her comorbid conditions, surgery is not recommended at this time per tumor board.  Therefore, we discussed radiotherapy in detail, including logistics, simulation, and discuss timing of completing planned 5 weeks of radiation in between debulking surgeries for the patient's papillomatosis.    The importance of maintaining her nutrition was emphasized, and a referral to a dietitian, as well as speech pathology for a speech and swallow evaluation, was discussed.      The risks, benefits, alternatives, and logistics to radiation therapy were discussed in detail. Side effects of radiation  therapy could include fatigue, skin changes and blistering, dysphagia which may require a feeding tube that can be permanent in some cases, temporary changes in taste sensation, increased risk of dental complications such as caries and osteoradionecrosis, neck fibrosis and stiffness, changes to the voice including hoarseness, infection, and bleeding. The patient is aware that the side effects of radiation therapy may be severe and permanent, although the risks are considered low and are outweighed by the benefits of treatment. She was given the opportunity to ask questions, which were answered.  Informed consent was obtained    Per Trinidad et al IJROBP 2006 showed fraction size ?2.25Gy improved LC, we aim to treat this patient with 56.25 Gray in 25 fractions using 3D CRT opposed laterals/oblique photon external beam .  We anticipate that given her papillomas, we may have a larger than usual field of treatment, therefore we will opt for the lower 56.25 Perez dose as opposed to the 63 Perez dose.  Additionally, despite distorted anatomy, this likely represents an very early stage laryngeal carcinoma.  We anticipate reduced toxicity profile with this lower dose of treatment.    We will plan to simulate this patient the week of 5/20/2024, and treat this patient after her next debulking surgery with a plan to start radiation of 6/10/2024.  We will send a message to her ENT specialist regarding her plan for radiation.    Jag Busby MD MS PGY2  Discussed with Liat Yates MD    Physician Attestation  Ms. Coffman was seen and examined by me. I agree with the findings and plan of care as documented in the note. Note above by Dr. Busby was reviewed and edited by me and reflects our mutual findings and plan of care.    I reviewed patient's chart, medical records, imaging studies (including actual images), labs and pathology reports.  I interviewed and counseled the patient face to face.  I additionally ordered tests  and discussed the case with patient's referring care team.       95 minutes were spent on the date of the encounter doing chart review, history and exam, documentation and further activities as noted above     Liat Yates MD  Department of Radiation Oncology  [facility]    CC  Patient Care Team:  Janan Calderón as PCP - General (Internal Medicine)  Estela Davis MD as Assigned Surgical Provider  Janna Calderón (Internal Medicine)  Martha Pizano DO as Physician (Neurology)  Tolu Blanco DO as Assigned Musculoskeletal Provider  Martha Pizano DO as Assigned Neuroscience Provider  Liat Yates MD as MD (Radiation Oncology)  ESTELA DAVIS

## 2024-04-24 ENCOUNTER — TELEPHONE (OUTPATIENT)
Dept: SPEECH THERAPY | Facility: CLINIC | Age: 79
End: 2024-04-24
Payer: COMMERCIAL

## 2024-04-24 DIAGNOSIS — R13.10 DYSPHAGIA: ICD-10-CM

## 2024-04-24 DIAGNOSIS — M34.1 CREST (CALCINOSIS, RAYNAUD'S PHENOMENON, ESOPHAGEAL DYSFUNCTION, SCLERODACTYLY, TELANGIECTASIA) (H): ICD-10-CM

## 2024-04-24 DIAGNOSIS — M34.1 CREST (CALCINOSIS, RAYNAUD'S PHENOMENON, ESOPHAGEAL DYSFUNCTION, SCLERODACTYLY, TELANGIECTASIA) (H): Primary | ICD-10-CM

## 2024-04-24 DIAGNOSIS — R13.10 DYSPHAGIA, UNSPECIFIED TYPE: Primary | ICD-10-CM

## 2024-04-24 NOTE — TELEPHONE ENCOUNTER
Patient confirmed scheduled appointment:  Date: 5/2  Time: 9  Provider: Sully  Location: [facility]   Testing/imaging: Xray  Additional notes: .

## 2024-05-01 NOTE — PROGRESS NOTES
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 Pt reports planning to undergo radiation therapy after upcoming surgery in June    Dates & types of surgery: many surgeries with laryngology for RRP    Prior diagnostic imaging/testing results: no previous dysphagia evaluations      Prior therapy history for the same diagnosis, illness or injury:  no      Patient goals for therapy: to maintain swallow function during/after radiation    Pain assessment: Pain denied     Objective     SWALLOW EVALUTION  Dysphagia history: Patient reports swallowing is going fairly well. States occasionally she feels like \"something closes\" up with liquids and it takes a minute to \"open back up.\" She is unsure if this is similar to a \"down the wrong pipe sensation\". Endorses occasional feeling of stasis with solids. Sometimes her pills get stuck, so she frequently takes them in applesauce. Reports chinese food, typically rice, gives her heartburn so she occasionally avoids these items.     Current Diet/Method of Nutritional Intake: thin liquids (level 0), regular diet        CLINICAL SWALLOW EVALUATION  Oral Motor Function:   Dentition: adequate dentition  Secretion management: WFL  Mucosal quality: adequate  Mandibular function: intact  Oral labial function: WFL  Lingual function: WFL  Velar function: intact   Buccal function: intact  Laryngeal function: cough, throat clear, volitional swallow, voicing WFL, impaired vocal quality: roughness/breathiness      Level of assist required for feeding: no assistance needed   Textures Trialed:   Clinical Swallow Eval: Thin Liquids  Mode of presentation: cup, self-fed   Volume presented: " 3oz  Preparatory Phase: WFL  Oral Phase: WFL  Pharyngeal phase of swallow: intact   Diagnostic statement: No clinical s/sx of penetration/aspiration.      ADDITIONAL EVAL COMPLETED TODAY : yes; rationale: to further assess pharyngeal phase    VIDEOFLUOROSCOPIC SWALLOW STUDY  Radiologist: Resident  Views Taken: left lateral, A/P   Physical location of procedure: [facility]  Patient sitting upright on chair/stool     VFSS textures trialed:   VFSS Eval: Thin Liquids  Mode of Presentation: cup, straw, self-fed   Order of Presentation: Series 1, 2, 7, 9, 12AP  Preparatory Phase: WFL  Oral Phase: WFL  Bolus Location When Swallow Initiated: posterior angle of ramus  Pharyngeal Phase: pharyngeal wall coating, residue in valleculae  Rosenbeck's Penetration Aspiration Scale: 2 - contrast enters airway, remains above the vocal cords, no residue remains (penetration)  Strategies and Compensations: not applicable  Diagnostic Statement: Intermittent penetration with no residuals remaining in laryngeal vestibule. No aspiration. Coating of residuals along inferior posterior pharyngeal wall and in valleculae. AP reveals symmetric bolus flow through oropharynx.    VFSS Eval: Mildly Thick Liquids  Mode of Presentation: cup, self-fed   Order of Presentation: Series 3  Preparatory Phase: WFL  Oral Phase: WFL  Bolus Location When Swallow Initiated: posterior angle of ramus  Pharyngeal Phase: residue in valleculae  Rosenbeck's Penetration Aspiration Scale: 1 - no aspiration, contrast does not enter airway  Strategies and Compensations: not applicable  Diagnostic Statement: No penetration/aspiration. Coating of residuals in valleculae.    VFSS Eval: Purees  Mode of Presentation: spoon, self-fed   Order of Presentation: Series 4, 5, 11AP  Preparatory Phase: WFL  Oral Phase: WFL  Bolus Location When Swallow Initiated: posterior angle of ramus  Pharyngeal Phase: WFL  Rosenbeck s Penetration Aspiration Scale: 1 - no aspiration, contrast  does not enter airway  Strategies and Compensations: not applicable  Diagnostic Statement: No penetration/aspiration or significant residuals. AP reveals symmetric bolus flow through oropharynx. Esophageal sweep performed in AP; please see Radiologist's report for details.    VFSS Eval: Solids  Mode of Presentation: self-fed   Order of Presentation: Series 6, 8  Preparatory Phase: WFL  Oral Phase: WFL  Bolus Location When Swallow Initiated: posterior angle of ramus  Pharyngeal Phase: impaired tongue base retraction, residue in valleculae   Rosenbeck s Penetration Aspiration Scale: 1 - no aspiration, contrast does not enter airway  Strategies and Compensations: liquid wash  Diagnostic Statement: No penetration/aspiration. Moderate vallecular residuals cleared with liquid wash.     VFSS Eval: Barium Tablet  Mode of Presentation:  whole tablet taken in applesauce    Order of Presentation: Series 10  Preparatory Phase: WFL  Oral Phase: WFL  Bolus Location When Swallow Initiated: posterior angle of ramus  Pharyngeal Phase: WFL  Rosenbeck s Penetration Aspiration Scale: 1 - no aspiration, contrast does not enter airway  Strategies and Compensations: not applicable  Diagnostic Statement: Barium tablet passed through oropharynx without difficulty in applesauce.     ESOPHAGEAL PHASE OF SWALLOW  patient reports symptoms of esophageal dysphagia  patient presents with symptoms of esophageal dysphagia  esophageal sweep performed during today's videofluoroscopic exam  please refer to radiologist's report for details     SWALLOW ASSESSMENT CLINICAL IMPRESSIONS AND RATIONALE  Diet Consistency Recommendations: thin liquids (level 0), regular diet    Recommended Feeding/Eating Techniques: small bolus size, slow rate of intake, alternate food and liquid intake, discontinue eating activities if fatigue is present, monitor for cough or change in vocal quality with intake, maintain upright sitting position for eating, maintain upright  posture during/after eating for 30 minutes   Medication Administration Recommendations: whole with thin liquid or in puree per pt preference  Instrumental Assessment Recommendations: VFSS (videofluoroscopic swallowing study), after radiation if dysphagia symptoms persist      Assessment & Plan   CLINICAL IMPRESSIONS   Medical Diagnosis: CREST (calcinosis, Raynaud's phenomenon, esophageal dysfunction, sclerodactyly, telangiectasia) (H)  Dysphagia    Treatment Diagnosis: Mild oropharyngeal dysphagia expected to worsen to moderately severe during upcoming radiation   Impression/Assessment: Pt is a 79 year old female presenting for baseline video swallow study prior to starting head and neck radiation. The following significant findings have been identified: impaired swallowing, characterized by reduced base of tongue retraction resulting in valleculae residuals. Identified deficits interfere with their ability to consume an oral diet and maintain nutrition as compared to previous level of function and are expected to worsen during radiation. Recommend course of speech therapy addressing dysphagia to maximize swallow function and guide diet modification during upcoming radiation.    PLAN OF CARE  Treatment Interventions: Swallowing dysfunction and/or oral function for feeding    Prognosis to achieve stated therapy goals is good   Rehab potential is impacted by: current level of function, prior level of function, social support    Long Term Goals:   SLP Goal 1  Goal Identifier: PO  Goal Description: 1. Pt will tolerate regular, moist textures and thin liquids with no overt clinical s/sx of penetration/aspiration in 100% of PO trials.  Rationale: To maximize safety, ease and/or independence of oral intake  Target Date: 07/30/24  SLP Goal 2  Goal Identifier: Exercise  Goal Description: 2. Pt will maximize swallow function by completing 10 reps of 5/5 oropharyngeal and jaw strengthening/ROM exercises daily with 100%  accuracy.  Rationale: To maximize safety, ease and/or independence of oral intake  Target Date: 07/30/24      Frequency of Treatment: 4x/month during radiation, then 1x/month for 6 months  Duration of Treatment: 12 months     Recommended Referrals to Other Professionals:  [facility] Dietic[facility]    Education Assessment:   Learner/Method: Patient;Listening;Pictures/Video;No Barriers to Learning    Risks and benefits of evaluation/treatment have been explained.   Patient/Family/caregiver agrees with Plan of Care.     Evaluation Time:    SLP Eval: oral/pharyngeal swallow function, clinical minutes (75563): 15  SLP Eval: VideoFluoroscopic Swallow function Minutes (01612): 25    Signing Clinician: MELECIO Yu    [facility]                                                                                   OUTPATIENT SPEECH LANGUAGE PATHOLOGY      PLAN OF TREATMENT FOR OUTPATIENT REHABILITATION   Patient's Last Name, First Name, Asya Youssef YOB: 1945   Provider's Name   [facility]   Medical Record No.  [id number]     Onset Date: 05/02/24 Start of Care Date: 05/02/24     Medical Diagnosis:  CREST (calcinosis, Raynaud's phenomenon, esophageal dysfunction, sclerodactyly, telangiectasia) (H)  Dysphagia      SLP Treatment Diagnosis: Mild oropharyngeal dysphagia expected to worsen to moderately severe during upcoming radiation  Plan of Treatment  Frequency/Duration: 4x/month during radiation, then 1x/month for 6 months  / 12 months     Certification date from 05/02/24   To 07/30/24          See note for plan of treatment details and functional goals     Yue Bahena SLP                         I CERTIFY THE NEED FOR THESE SERVICES FURNISHED UNDER        THIS PLAN OF TREATMENT AND WHILE UNDER MY CARE     (Physician attestation of this document indicates review and certification of the therapy plan).              Referring  Provider:  Nikole Davis    Initial Assessment  See Epic Evaluation- 05/02/24

## 2024-05-02 ENCOUNTER — ANCILLARY PROCEDURE (OUTPATIENT)
Dept: GENERAL RADIOLOGY | Facility: CLINIC | Age: 79
End: 2024-05-02
Attending: OTOLARYNGOLOGY
Payer: COMMERCIAL

## 2024-05-02 ENCOUNTER — THERAPY VISIT (OUTPATIENT)
Dept: SPEECH THERAPY | Facility: CLINIC | Age: 79
End: 2024-05-02
Attending: OTOLARYNGOLOGY
Payer: COMMERCIAL

## 2024-05-02 DIAGNOSIS — R13.10 DYSPHAGIA, UNSPECIFIED TYPE: ICD-10-CM

## 2024-05-02 DIAGNOSIS — Z78.9 RECURRENT RESPIRATORY PAPILLOMATOSIS: ICD-10-CM

## 2024-05-02 DIAGNOSIS — M34.1 CREST (CALCINOSIS, RAYNAUD'S PHENOMENON, ESOPHAGEAL DYSFUNCTION, SCLERODACTYLY, TELANGIECTASIA) (H): ICD-10-CM

## 2024-05-02 DIAGNOSIS — R13.12 OROPHARYNGEAL DYSPHAGIA: Primary | ICD-10-CM

## 2024-05-02 DIAGNOSIS — J38.7 LARYNGEAL MASS: ICD-10-CM

## 2024-05-02 LAB
PATH REPORT.ADDENDUM SPEC: ABNORMAL
PATH REPORT.COMMENTS IMP SPEC: ABNORMAL
PATH REPORT.COMMENTS IMP SPEC: ABNORMAL
PATH REPORT.COMMENTS IMP SPEC: YES
PATH REPORT.FINAL DX SPEC: ABNORMAL
PATH REPORT.GROSS SPEC: ABNORMAL
PATH REPORT.MICROSCOPIC SPEC OTHER STN: ABNORMAL
PATH REPORT.RELEVANT HX SPEC: ABNORMAL
PHOTO IMAGE: ABNORMAL

## 2024-05-02 PROCEDURE — 92611 MOTION FLUOROSCOPY/SWALLOW: CPT | Mod: GN | Performed by: SPEECH-LANGUAGE PATHOLOGIST

## 2024-05-02 PROCEDURE — 74230 X-RAY XM SWLNG FUNCJ C+: CPT | Mod: GC | Performed by: STUDENT IN AN ORGANIZED HEALTH CARE EDUCATION/TRAINING PROGRAM

## 2024-05-02 PROCEDURE — 92610 EVALUATE SWALLOWING FUNCTION: CPT | Mod: GN | Performed by: SPEECH-LANGUAGE PATHOLOGIST

## 2024-05-02 RX ORDER — BARIUM SULFATE 400 MG/ML
SUSPENSION ORAL ONCE
Status: COMPLETED | OUTPATIENT
Start: 2024-05-02 | End: 2024-05-02

## 2024-05-02 RX ADMIN — BARIUM SULFATE 5 ML: 400 SUSPENSION ORAL at 09:02

## 2024-05-03 ENCOUNTER — PATIENT OUTREACH (OUTPATIENT)
Dept: OTOLARYNGOLOGY | Facility: CLINIC | Age: 79
End: 2024-05-03
Payer: COMMERCIAL

## 2024-05-03 NOTE — PROGRESS NOTES
Called patient to go over lab work, patient just wanted to clarif that she would still have radation. Writer confirmed that was the plan because the immuno marker came back negative so immunotherapy was not advised at this time.  Patient was agreeable to this and verbalized understanding of the situation. Writer educated patient on how to reach out to clinic as needed in the future with questions or concerns. Inocencia Fan RN on 5/3/2024 at 9:18 AM

## 2024-05-08 ENCOUNTER — PATIENT OUTREACH (OUTPATIENT)
Dept: OTOLARYNGOLOGY | Facility: CLINIC | Age: 79
End: 2024-05-08
Payer: COMMERCIAL

## 2024-05-08 NOTE — PROGRESS NOTES
Called patient to offer earlier appointment. Patient was agreeable to this and verbalized understanding of the new time. Inocencia Fan RN on 5/8/2024 at 1:38 PM

## 2024-05-13 ENCOUNTER — TELEPHONE (OUTPATIENT)
Dept: OTOLARYNGOLOGY | Facility: CLINIC | Age: 79
End: 2024-05-13

## 2024-05-13 ENCOUNTER — OFFICE VISIT (OUTPATIENT)
Dept: OTOLARYNGOLOGY | Facility: CLINIC | Age: 79
End: 2024-05-13
Payer: COMMERCIAL

## 2024-05-13 VITALS
SYSTOLIC BLOOD PRESSURE: 150 MMHG | DIASTOLIC BLOOD PRESSURE: 82 MMHG | WEIGHT: 117 LBS | HEART RATE: 76 BPM | BODY MASS INDEX: 19.49 KG/M2 | HEIGHT: 65 IN

## 2024-05-13 DIAGNOSIS — R49.0 DYSPHONIA: Primary | ICD-10-CM

## 2024-05-13 PROCEDURE — 99214 OFFICE O/P EST MOD 30 MIN: CPT | Mod: 25 | Performed by: OTOLARYNGOLOGY

## 2024-05-13 PROCEDURE — 31579 LARYNGOSCOPY TELESCOPIC: CPT | Performed by: OTOLARYNGOLOGY
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
## 2024-05-13 NOTE — TELEPHONE ENCOUNTER
Left patient a voicemail requesting to move her surgery from 6/6 to 5/30 per Dr. Christian request    Krys Reddy, Perioperative Coordinator 5/13/2024 at 12:42 PM
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
## 2024-05-13 NOTE — PATIENT INSTRUCTIONS
1.  You were seen in the ENT Clinic today by Dr. Davis. If you have any questions or concerns after your appointment, please call [phone]. Press option #1 for scheduling related needs. Press option #3 for Nurse advice.    2.  Dr. Davis has recommended the following:   - Consider moving surgery 1 week earlier depending on OR time       3.  Plan is to return to clinic 1 month      How to Contact Us:  Send a [employer] message to your provider. Our team will respond to you via [employer]. Occasionally, we will need to call you to get further information.  For urgent matters (Monday-Friday, 8:00 AM-3:30 PM), call the ENT Clinic: [phone] and speak with a call center team member - they will route your call appropriately.   If you'd like to speak directly with a nurse, please call [phone]. We do our best to check voicemail frequently throughout the day, and will work to call you back within 1-2 days. For urgent matters, please use the general clinic phone numbers listed above.      Inocencia Fan, RN  [phone]  [facility] - Otolaryngology

## 2024-05-13 NOTE — TELEPHONE ENCOUNTER
Left Voicemail (1st Attempt) for the patient to call back and schedule the following:    Appointment type: RTN throat  Provider: Ryan  Return date: 6/17  Specialty phone number: [phone]  Additional appointment(s) needed:   Additional Notes: Gave date and time of 1 month follow up. This is the only option

## 2024-05-14 NOTE — TELEPHONE ENCOUNTER
Rescheduled surgery with Dr. Davis from 6/6/2024 to 5/30/2024    Spoke with: Patient    Reason for reschedule: Per Dr. Christian request    Surgery is located at [facility], [place] [address]    Patient is aware their H&P will need to be within 30 days of their new surgery date     Is there imaging that needs to be rescheduled for new surgery date? No    Patients post op does not need to be rescheduled as it still falls within the correct time frame    Additional comments: Patient will call back if they have any questions or concerns.    Krys Reddy on 5/14/2024 at 9:16 AM

## 2024-05-20 ENCOUNTER — OFFICE VISIT (OUTPATIENT)
Dept: RADIATION ONCOLOGY | Facility: CLINIC | Age: 79
End: 2024-05-20
Attending: OTOLARYNGOLOGY
Payer: COMMERCIAL

## 2024-05-20 ENCOUNTER — HOSPITAL ENCOUNTER (OUTPATIENT)
Dept: CT IMAGING | Facility: CLINIC | Age: 79
Discharge: HOME | End: 2024-05-20
Attending: RADIOLOGY | Admitting: RADIOLOGY
Payer: COMMERCIAL

## 2024-05-20 DIAGNOSIS — C32.9 MALIGNANT NEOPLASM OF LARYNX (H): ICD-10-CM

## 2024-05-20 DIAGNOSIS — C32.9 LARYNGEAL SQUAMOUS CELL CARCINOMA (H): ICD-10-CM

## 2024-05-20 DIAGNOSIS — C73 PAPILLARY CARCINOMA OF THYROID (H): Primary | ICD-10-CM

## 2024-05-20 PROCEDURE — 77334 RADIATION TREATMENT AID(S): CPT | Mod: 26 | Performed by: RADIOLOGY

## 2024-05-20 PROCEDURE — 250N000011 HC RX IP 250 OP 636: Performed by: RADIOLOGY

## 2024-05-20 PROCEDURE — 77334 RADIATION TREATMENT AID(S): CPT | Performed by: RADIOLOGY

## 2024-05-20 PROCEDURE — 77014 CT THERAPY CORRELATE: CPT | Mod: TC

## 2024-05-20 RX ORDER — IOPAMIDOL 755 MG/ML
90 INJECTION, SOLUTION INTRAVASCULAR ONCE
Status: COMPLETED | OUTPATIENT
Start: 2024-05-20 | End: 2024-05-20

## 2024-05-20 RX ORDER — GABAPENTIN 300 MG/1
600 CAPSULE ORAL 3 TIMES DAILY
Qty: 180 CAPSULE | Refills: 5 | Status: SHIPPED | OUTPATIENT
Start: 2024-05-20 | End: 2024-06-10 | Stop reason: DRUGHIGH

## 2024-05-20 RX ADMIN — IOPAMIDOL 90 ML: 755 INJECTION, SOLUTION INTRAVENOUS at 15:21

## 2024-05-20 NOTE — PROGRESS NOTES
A radiation therapy treatment planning simulation was performed.  Please see the [facility] record for documentation.    Liat Yates MD  Radiation Oncology

## 2024-05-20 NOTE — LETTER
5/20/2024         RE: Asya Coffman  [address]  [facility] [address]        Dear Colleague,    Thank you for referring your patient, Asya Coffman, to the [facility] RADIATION ONCOLOGY. Please see a copy of my visit note below.    Radiation Therapy Patient Education    Person involved with teaching: Patient    Patient educational needs for self management of treatment-related side effects assessment completed.  EPIC Patient Ed tab contains Patient Learning Assessment    Education Materials Given  Radiation Therapy for Head and Neck    Educational Topics Discussed  Side effects expected, Pain management, Skin care, Nutrition and weight loss, and When to call MD/RN    Response To Teaching  Verbalizes understanding    GYN Only  Vaginal Dilator-given and educated: N/A    Referrals sent: Dental, Speech and Swallowing, and Nutrition    Chemotherapy?  No       A radiation therapy treatment planning simulation was performed.  Please see the [facility] record for documentation.    Liat Yates MD  Radiation Oncology       Again, thank you for allowing me to participate in the care of your patient.        Sincerely,        Liat Yates MD

## 2024-05-20 NOTE — PROGRESS NOTES
Radiation Therapy Patient Education    Person involved with teaching: Patient    Patient educational needs for self management of treatment-related side effects assessment completed.  [facility] Patient Ed tab contains Patient Learning Assessment    Education Materials Given  Radiation Therapy for Head and Neck    Educational Topics Discussed  Side effects expected, Pain management, Skin care, Nutrition and weight loss, and When to call MD/RN    Response To Teaching  Verbalizes understanding    GYN Only  Vaginal Dilator-given and educated: N/A    Referrals sent: Dental, Speech and Swallowing, and Nutrition    Chemotherapy?  No

## 2024-05-24 ENCOUNTER — TELEPHONE (OUTPATIENT)
Dept: SPEECH THERAPY | Facility: CLINIC | Age: 79
End: 2024-05-24
Payer: COMMERCIAL

## 2024-05-28 ENCOUNTER — ANESTHESIA EVENT (OUTPATIENT)
Dept: SURGERY | Facility: CLINIC | Age: 79
End: 2024-05-28
Payer: COMMERCIAL

## 2024-05-28 RX ORDER — LANOLIN ALCOHOL/MO/W.PET/CERES
1 CREAM (GRAM) TOPICAL
COMMUNITY
Start: 2024-05-17

## 2024-05-28 RX ORDER — CLOTRIMAZOLE 10 MG/1
LOZENGE ORAL
COMMUNITY
Start: 2024-04-01

## 2024-05-29 ENCOUNTER — VIRTUAL VISIT (OUTPATIENT)
Dept: ONCOLOGY | Facility: CLINIC | Age: 79
End: 2024-05-29
Attending: DIETITIAN, REGISTERED
Payer: COMMERCIAL

## 2024-05-29 DIAGNOSIS — C73 PAPILLARY CARCINOMA OF THYROID (H): ICD-10-CM

## 2024-05-29 PROCEDURE — 97802 MEDICAL NUTRITION INDIV IN: CPT | Mod: TEL | Performed by: DIETITIAN, REGISTERED

## 2024-05-29 NOTE — LETTER
"    5/29/2024         RE: Asya Coffman  [address] [facility] [address]        Dear Colleague,    Thank you for referring your patient, Asya Coffman, to the [facility]. Please see a copy of my visit note below.    The patient has been notified of the following:      \"We have found that certain health care needs can be provided without the need for a face to face visit.  This service lets us provide the care you need with a phone conversation.       I will have full access to your [place] medical record during this entire phone call.   I will be taking notes for your medical record.      Since this is like an office visit, we will bill your insurance company for this service.       There are potential benefits and risks of telephone visits (e.g. limits to patient confidentiality) that differ from in-person visits.?  Confidentiality still applies for telephone services, and nobody will record the visit.  It is important to be in a quiet, private space that is free of distractions (including cell phone or other devices) during the visit.??      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  She generally eats 3 meals a day.    She doesn't eat a lot of meat, mostly chicken or ground beef w/ tacos.  She admits " to drinking little water and will try to increase this for treatment.     Diet Recall  Breakfast Waffle, toast or cereal w/ 1% milk   Lunch [place] w/ pnb or canned chicken/tuna OR take out foods   Dinner Pasta, corn, green beans or salad,    Snacks Crackers, 2 cookies   Beverages 1 cup coffee, 1 - 8 oz can soda, 16 oz water     Patient Medical History  Past Medical History:   Diagnosis Date    A-fib (H)     Antiplatelet or antithrombotic long-term use     Arrhythmia     COPD (chronic obstructive pulmonary disease) (H)        Current Medications    Current Outpatient Medications:     acetaminophen (TYLENOL) 325 MG tablet, Take 2 tablets (650 mg) by mouth every 4 hours as needed for pain, Disp: 50 tablet, Rfl: 0    albuterol (PROAIR HFA/PROVENTIL HFA/VENTOLIN HFA) 108 (90 Base) MCG/ACT inhaler, Inhale 2 puffs into the lungs as needed, Disp: , Rfl:     apixaban ANTICOAGULANT (ELIQUIS) 5 MG tablet, Take 1 tablet (5 mg) by mouth 2 times daily, Disp: 60 tablet, Rfl: 1    atorvastatin (LIPITOR) 20 MG tablet, Take 20 mg by mouth every evening, Disp: , Rfl:     budesonide-formoterol (SYMBICORT) 160-4.5 MCG/ACT Inhaler, Inhale 2 puffs into the lungs two times daily, Disp: , Rfl:     Calcium Carbonate-Vitamin D 600-10 MG-MCG TABS, , Disp: , Rfl:     calcium citrate-vitamin D (CITRACAL) 315-5 MG-MCG TABS per tablet, Take 1 tablet by mouth, Disp: , Rfl:     clotrimazole (MYCELEX) 10 MG lozenge, , Disp: , Rfl:     diltiazem ER (TIAZAC) 240 MG 24 hr ER beaded capsule, Take 240 mg by mouth 2 times daily, Disp: , Rfl:     fexofenadine (ALLEGRA) 180 MG tablet, Take 180 mg by mouth daily, Disp: , Rfl:     gabapentin (NEURONTIN) 300 MG capsule, Take 2 capsules (600 mg) by mouth 3 times daily Taper dose up to 600 mg po tid as instructed by Radiation Oncology, Disp: 180 capsule, Rfl: 5    ibuprofen (ADVIL/MOTRIN) 200 MG capsule, Take 400 mg by mouth every 6 hours as needed for fever, Disp: , Rfl:     ipratropium - albuterol 0.5 mg/2.5  mg/3 mL (DUONEB) 0.5-2.5 (3) MG/3ML neb solution, Take 1 vial (3 mLs) by nebulization every 6 hours as needed for shortness of breath, wheezing or cough, Disp: 90 mL, Rfl: 0    levothyroxine (SYNTHROID/LEVOTHROID) 88 MCG tablet, Take 88 mcg by mouth daily, Disp: , Rfl:     predniSONE (DELTASONE) 20 MG tablet, Take two tablets (= 40mg) each day for 5 (five) days, Disp: 10 tablet, Rfl: 0    propranolol (INDERAL) 10 MG tablet, Start propanolol 10 mg 1 tab daily and increase by 1 tab weekly until 20 mg twice daily. May stop at lowest effective dose. If experiencing hypotension or bradycardia, return to previous dose on titration schedule., Disp: 120 tablet, Rfl: 11    Respiratory Therapy Supplies (NEBULIZER) JUWAN, Portable nebulizer, disposable neb kit x 4, reuseable neb kit x 1, mask x 1, filters x 1.   Frequency of use: daily;  Medication: albuterol  Length of need: 99 months, Disp: , Rfl:     sertraline (ZOLOFT) 100 MG tablet, Take 100 mg by mouth daily, Disp: , Rfl:     tiZANidine (ZANAFLEX) 2 MG tablet, Take 2 mg by mouth 3 times daily, Disp: , Rfl:     Medications have been reviewed.    Pertinent lab results:  Labs:  Electrolytes  Potassium (mmol/L)   Date Value   04/08/2024 3.8   02/15/2024 4.4   12/15/2023 4.5   12/15/2023 4.5     Potassium POCT (mmol/L)   Date Value   04/02/2023 3.7    Blood Glucose  Glucose (mg/dL)   Date Value   04/08/2024 94   02/15/2024 91   12/15/2023 142 (H)   12/15/2023 142 (H)   04/04/2023 113 (H)     GLUCOSE BY METER POCT (mg/dL)   Date Value   02/16/2024 146 (H)   02/15/2024 84   12/15/2023 102 (H)   04/03/2023 115 (H)   12/01/2022 98     Glucose Whole Blood POCT (mg/dL)   Date Value   04/02/2023 97     Hemoglobin A1C (%)   Date Value   01/24/2024 6.0 (H)    Inflammatory Markers  WBC Count (10e3/uL)   Date Value   04/08/2024 8.7   12/15/2023 16.3 (H)   09/21/2023 8.2     Albumin (g/dL)   Date Value   04/08/2024 4.3   12/15/2023 3.5   04/02/2023 4.5      Magnesium (mg/dL)   Date Value  "  04/08/2024 2.1   12/15/2023 2.3   12/15/2023 2.2     Sodium (mmol/L)   Date Value   04/08/2024 141   02/15/2024 140   12/15/2023 140   12/15/2023 140    Renal  Urea Nitrogen (mg/dL)   Date Value   04/08/2024 17.3   02/15/2024 21.3   12/15/2023 24.8 (H)   12/15/2023 24.8 (H)     Creatinine (mg/dL)   Date Value   04/08/2024 1.19 (H)   02/15/2024 1.15 (H)   12/15/2023 1.15 (H)   12/15/2023 1.15 (H)     Additional  No results found for: \"TRIG\", \"URINEKETONE\"       Treatment Plan:  Oncology History    No history exists.   Treatment plan has been reviewed.    ANTHROPOMETRICS  Height: 65\"  Weight: 117 lbs/53kg  BMI: 19  Weight Status:  Normal BMI  IBW: 125 lbs (93%)  Weight History:   Wt Readings from Last 10 Encounters:   05/13/24 53.1 kg (117 lb)   04/23/24 53.5 kg (118 lb)   04/11/24 55.3 kg (121 lb 14.6 oz)   03/04/24 54 kg (119 lb)   02/15/24 54.3 kg (119 lb 11.4 oz)   01/05/24 53.5 kg (118 lb)   12/22/23 52.2 kg (115 lb)   12/14/23 52.4 kg (115 lb 8.3 oz)   12/01/23 53.1 kg (117 lb)   10/05/23 53.3 kg (117 lb 8.1 oz)       Dosing Weight: 52kg    Medications/vitamins/minerals/herbals:   Reviewed    Labs:   Labs reviewed    NUTRITION FOCUSED PHYSICAL ASSESSMENT FOR DIAGNOSING MALNUTRITION:  Consult for education only      ASSESSED NUTRITION NEEDS:  Estimated Energy Needs: 1410-2553 kcals (30-35 Kcal/Kg)  Justification: repletion and increased needs with radiation  Estimated Protein Needs: 65-80 grams protein (1.2-1.5 g pro/Kg)  Justification: increased needs with radiation  Estimated Fluid Needs: 1500  mL   Justification: maintenance    MALNUTRITION:  % Weight Loss:  Weight loss does not meet criteria for malnutrition   % Intake:  No decreased intake noted  Subcutaneous Fat Loss:  None observed  Muscle Loss:  None observed  Fluid Retention:  None noted    Malnutrition Diagnosis: Patient does not meet two of the above criteria necessary for diagnosing malnutrition    NUTRITION DIAGNOSIS:  Predicted inadequate nutrient " intake related to cancer treatment to neck region     INTERVENTIONS  Provided written & verbal education:     - Discussed the role of nutrition during cancer treatment.  Discussed principles of weight maintenance and oral intake recommendations for patients undergoing cancer treatment.  Reviewed the importance of maintaining current weight (within 5%, not more than 2 lb weight loss each week) during course of treatment  - Discussed nutrition and hydration needs. Encouraged pt to aim for at least 1600kcal and 65g protein, 6 cups non-caffeine containing beverages (water/electrolytes) daily.    - Discussed strategies to help fortify meals and snacks. Encouraged to focus on small, frequent meals.    - Discussed sources of protein. Encouraged to have a protein source with each meal and snack.    - Reviewed common barriers to eating with radiation treatment.   - Discussed Nutrition shake options:   [employer] Milk: regular whole milk 150 ellitot, 13g protein (red jug)  [employer] Nutrition Plan shakes: 150 elliott, 30g protein  Boost Very High Calorie (VHC): 8 oz 530 calories, 22g protein  Boost Plus:  8 oz 350 calories, 15g protein  Ensure Plus: 8 oz 350 calories, 15g protein  Ensure Complete: 11 oz 350 calories, 30g protein  Naked Mass: 24 oz (4 scoops of powder with 3 cups water or milk) 1250 calories, 50g protein  Encouraged utilizing these ONS in home made shakes/smoothies to prevent flavor fatigue.      Provided pt with corresponding education materials/handouts on:  Academy [employer] High elliott/High protein recipes, Tips to increase calories in your diet, Sources of Protein, Nutrition Considerations for patients with Head/neck cancer treatment,    Pt verbalize understanding of materials provided during consult.   Patient Understanding: good  Expected patient engagement: good     Goals  1.  Aim for 5-6 small frequent meals  2.  Aim for 1600kcal and 65g protein, 6 cups water/day  3. Weight maintenance      Follow-Up Plans: Pt has RD contact information for questions.      MONITORING AND EVALUATION: follow up scheduled for 6/11 in person  -Food/beverage intake  -Weight trends    Marzena Salter RD, , LD  [facility]  [phone]

## 2024-05-29 NOTE — PROGRESS NOTES
"The patient has been notified of the following:      \"We have found that certain health care needs can be provided without the need for a face to face visit.  This service lets us provide the care you need with a phone conversation.       I will have full access to your [place] medical record during this entire phone call.   I will be taking notes for your medical record.      Since this is like an office visit, we will bill your insurance company for this service.       There are potential benefits and risks of telephone visits (e.g. limits to patient confidentiality) that differ from in-person visits.?  Confidentiality still applies for telephone services, and nobody will record the visit.  It is important to be in a quiet, private space that is free of distractions (including cell phone or other devices) during the visit.??      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  She generally eats 3 meals a day.    She doesn't eat a lot of meat, mostly chicken or ground beef w/ tacos.  She admits to drinking little water and will try to increase this for treatment.     Diet Recall  Breakfast Waffle, toast or cereal w/ 1% milk   Lunch [place] w/ pnb or canned chicken/tuna OR take out foods   Dinner Pasta, corn, green beans or salad,    Snacks Crackers, 2 " cookies   Beverages 1 cup coffee, 1 - 8 oz can soda, 16 oz water     Patient Medical History  Past Medical History:   Diagnosis Date    A-fib (H)     Antiplatelet or antithrombotic long-term use     Arrhythmia     COPD (chronic obstructive pulmonary disease) (H)        Current Medications    Current Outpatient Medications:     acetaminophen (TYLENOL) 325 MG tablet, Take 2 tablets (650 mg) by mouth every 4 hours as needed for pain, Disp: 50 tablet, Rfl: 0    albuterol (PROAIR HFA/PROVENTIL HFA/VENTOLIN HFA) 108 (90 Base) MCG/ACT inhaler, Inhale 2 puffs into the lungs as needed, Disp: , Rfl:     apixaban ANTICOAGULANT (ELIQUIS) 5 MG tablet, Take 1 tablet (5 mg) by mouth 2 times daily, Disp: 60 tablet, Rfl: 1    atorvastatin (LIPITOR) 20 MG tablet, Take 20 mg by mouth every evening, Disp: , Rfl:     budesonide-formoterol (SYMBICORT) 160-4.5 MCG/ACT Inhaler, Inhale 2 puffs into the lungs two times daily, Disp: , Rfl:     Calcium Carbonate-Vitamin D 600-10 MG-MCG TABS, , Disp: , Rfl:     calcium citrate-vitamin D (CITRACAL) 315-5 MG-MCG TABS per tablet, Take 1 tablet by mouth, Disp: , Rfl:     clotrimazole (MYCELEX) 10 MG lozenge, , Disp: , Rfl:     diltiazem ER (TIAZAC) 240 MG 24 hr ER beaded capsule, Take 240 mg by mouth 2 times daily, Disp: , Rfl:     fexofenadine (ALLEGRA) 180 MG tablet, Take 180 mg by mouth daily, Disp: , Rfl:     gabapentin (NEURONTIN) 300 MG capsule, Take 2 capsules (600 mg) by mouth 3 times daily Taper dose up to 600 mg po tid as instructed by Radiation Oncology, Disp: 180 capsule, Rfl: 5    ibuprofen (ADVIL/MOTRIN) 200 MG capsule, Take 400 mg by mouth every 6 hours as needed for fever, Disp: , Rfl:     ipratropium - albuterol 0.5 mg/2.5 mg/3 mL (DUONEB) 0.5-2.5 (3) MG/3ML neb solution, Take 1 vial (3 mLs) by nebulization every 6 hours as needed for shortness of breath, wheezing or cough, Disp: 90 mL, Rfl: 0    levothyroxine (SYNTHROID/LEVOTHROID) 88 MCG tablet, Take 88 mcg by mouth daily, Disp:  , Rfl:     predniSONE (DELTASONE) 20 MG tablet, Take two tablets (= 40mg) each day for 5 (five) days, Disp: 10 tablet, Rfl: 0    propranolol (INDERAL) 10 MG tablet, Start propanolol 10 mg 1 tab daily and increase by 1 tab weekly until 20 mg twice daily. May stop at lowest effective dose. If experiencing hypotension or bradycardia, return to previous dose on titration schedule., Disp: 120 tablet, Rfl: 11    Respiratory Therapy Supplies (NEBULIZER) JUWAN, Portable nebulizer, disposable neb kit x 4, reuseable neb kit x 1, mask x 1, filters x 1.   Frequency of use: daily;  Medication: albuterol  Length of need: 99 months, Disp: , Rfl:     sertraline (ZOLOFT) 100 MG tablet, Take 100 mg by mouth daily, Disp: , Rfl:     tiZANidine (ZANAFLEX) 2 MG tablet, Take 2 mg by mouth 3 times daily, Disp: , Rfl:     Medications have been reviewed.    Pertinent lab results:  Labs:  Electrolytes  Potassium (mmol/L)   Date Value   04/08/2024 3.8   02/15/2024 4.4   12/15/2023 4.5   12/15/2023 4.5     Potassium POCT (mmol/L)   Date Value   04/02/2023 3.7    Blood Glucose  Glucose (mg/dL)   Date Value   04/08/2024 94   02/15/2024 91   12/15/2023 142 (H)   12/15/2023 142 (H)   04/04/2023 113 (H)     GLUCOSE BY METER POCT (mg/dL)   Date Value   02/16/2024 146 (H)   02/15/2024 84   12/15/2023 102 (H)   04/03/2023 115 (H)   12/01/2022 98     Glucose Whole Blood POCT (mg/dL)   Date Value   04/02/2023 97     Hemoglobin A1C (%)   Date Value   01/24/2024 6.0 (H)    Inflammatory Markers  WBC Count (10e3/uL)   Date Value   04/08/2024 8.7   12/15/2023 16.3 (H)   09/21/2023 8.2     Albumin (g/dL)   Date Value   04/08/2024 4.3   12/15/2023 3.5   04/02/2023 4.5      Magnesium (mg/dL)   Date Value   04/08/2024 2.1   12/15/2023 2.3   12/15/2023 2.2     Sodium (mmol/L)   Date Value   04/08/2024 141   02/15/2024 140   12/15/2023 140   12/15/2023 140    Renal  Urea Nitrogen (mg/dL)   Date Value   04/08/2024 17.3   02/15/2024 21.3   12/15/2023 24.8 (H)  "  12/15/2023 24.8 (H)     Creatinine (mg/dL)   Date Value   04/08/2024 1.19 (H)   02/15/2024 1.15 (H)   12/15/2023 1.15 (H)   12/15/2023 1.15 (H)     Additional  No results found for: \"TRIG\", \"URINEKETONE\"       Treatment Plan:  Oncology History    No history exists.   Treatment plan has been reviewed.    ANTHROPOMETRICS  Height: 65\"  Weight: 117 lbs/53kg  BMI: 19  Weight Status:  Normal BMI  IBW: 125 lbs (93%)  Weight History:   Wt Readings from Last 10 Encounters:   05/13/24 53.1 kg (117 lb)   04/23/24 53.5 kg (118 lb)   04/11/24 55.3 kg (121 lb 14.6 oz)   03/04/24 54 kg (119 lb)   02/15/24 54.3 kg (119 lb 11.4 oz)   01/05/24 53.5 kg (118 lb)   12/22/23 52.2 kg (115 lb)   12/14/23 52.4 kg (115 lb 8.3 oz)   12/01/23 53.1 kg (117 lb)   10/05/23 53.3 kg (117 lb 8.1 oz)       Dosing Weight: 52kg    Medications/vitamins/minerals/herbals:   Reviewed    Labs:   Labs reviewed    NUTRITION FOCUSED PHYSICAL ASSESSMENT FOR DIAGNOSING MALNUTRITION:  Consult for education only      ASSESSED NUTRITION NEEDS:  Estimated Energy Needs: 1481-9240 kcals (30-35 Kcal/Kg)  Justification: repletion and increased needs with radiation  Estimated Protein Needs: 65-80 grams protein (1.2-1.5 g pro/Kg)  Justification: increased needs with radiation  Estimated Fluid Needs: 1500  mL   Justification: maintenance    MALNUTRITION:  % Weight Loss:  Weight loss does not meet criteria for malnutrition   % Intake:  No decreased intake noted  Subcutaneous Fat Loss:  None observed  Muscle Loss:  None observed  Fluid Retention:  None noted    Malnutrition Diagnosis: Patient does not meet two of the above criteria necessary for diagnosing malnutrition    NUTRITION DIAGNOSIS:  Predicted inadequate nutrient intake related to cancer treatment to neck region     INTERVENTIONS  Provided written & verbal education:     - Discussed the role of nutrition during cancer treatment.  Discussed principles of weight maintenance and oral intake recommendations for " patients undergoing cancer treatment.  Reviewed the importance of maintaining current weight (within 5%, not more than 2 lb weight loss each week) during course of treatment  - Discussed nutrition and hydration needs. Encouraged pt to aim for at least 1600kcal and 65g protein, 6 cups non-caffeine containing beverages (water/electrolytes) daily.    - Discussed strategies to help fortify meals and snacks. Encouraged to focus on small, frequent meals.    - Discussed sources of protein. Encouraged to have a protein source with each meal and snack.    - Reviewed common barriers to eating with radiation treatment.   - Discussed Nutrition shake options:   [employer] Milk: regular whole milk 150 elliott, 13g protein (red jug)  [employer] Nutrition Plan shakes: 150 elliott, 30g protein  Boost Very High Calorie (VHC): 8 oz 530 calories, 22g protein  Boost Plus:  8 oz 350 calories, 15g protein  Ensure Plus: 8 oz 350 calories, 15g protein  Ensure Complete: 11 oz 350 calories, 30g protein  Naked Mass: 24 oz (4 scoops of powder with 3 cups water or milk) 1250 calories, 50g protein  Encouraged utilizing these ONS in home made shakes/smoothies to prevent flavor fatigue.      Provided pt with corresponding education materials/handouts on:  Academy of [employer] and Dietetics High elliott/High protein recipes, Tips to increase calories in your diet, Sources of Protein, Nutrition Considerations for patients with Head/neck cancer treatment,    Pt verbalize understanding of materials provided during consult.   Patient Understanding: good  Expected patient engagement: good     Goals  1.  Aim for 5-6 small frequent meals  2.  Aim for 1600kcal and 65g protein, 6 cups water/day  3. Weight maintenance     Follow-Up Plans: Pt has RD contact information for questions.      MONITORING AND EVALUATION: follow up scheduled for 6/11 in person  -Food/beverage intake  -Weight trends    Marzena Salter RD, , LD  [facility]  [phone]

## 2024-05-29 NOTE — PATIENT INSTRUCTIONS
Andrea Young,     I have attached the resources that I referred to during our conversation (see your email):   --Sources of protein  --High calorie/high protein recipes  --Tips to increase calories in your diet  --Additional high calorie/high protein recipes  --High calorie/high protein beverages    Here are your nutrition goals:  --Calories: 9498-2394/day  --Protein: 65-80 grams/day  --Fluids: 48 oz non-caffiene fluids/day    Please reach out to me with any questions or concerns! I will meet you again after radiation on 6/11.     Be well,     Marzena Salter RD, , LD  Board Certified Specialist in Oncology Nutrition  [facility]  Oncology Services  [email or web address]   Office: [phone]  Fax: [phone]

## 2024-05-30 ENCOUNTER — HOSPITAL ENCOUNTER (OUTPATIENT)
Facility: CLINIC | Age: 79
Discharge: HOME | End: 2024-05-30
Attending: OTOLARYNGOLOGY | Admitting: OTOLARYNGOLOGY
Payer: COMMERCIAL

## 2024-05-30 ENCOUNTER — ANESTHESIA (OUTPATIENT)
Dept: SURGERY | Facility: CLINIC | Age: 79
End: 2024-05-30
Payer: COMMERCIAL

## 2024-05-30 VITALS
WEIGHT: 118.83 LBS | RESPIRATION RATE: 20 BRPM | BODY MASS INDEX: 19.8 KG/M2 | HEIGHT: 65 IN | SYSTOLIC BLOOD PRESSURE: 123 MMHG | TEMPERATURE: 97.8 F | DIASTOLIC BLOOD PRESSURE: 81 MMHG | OXYGEN SATURATION: 93 % | HEART RATE: 62 BPM

## 2024-05-30 DIAGNOSIS — Z78.9 RECURRENT RESPIRATORY PAPILLOMATOSIS: Primary | ICD-10-CM

## 2024-05-30 DIAGNOSIS — R49.0 DYSPHONIA: ICD-10-CM

## 2024-05-30 DIAGNOSIS — J38.3 VOCAL CORD LEUKOPLAKIA: ICD-10-CM

## 2024-05-30 DIAGNOSIS — Q31.9 DYSPLASIA OF LARYNX: ICD-10-CM

## 2024-05-30 DIAGNOSIS — J38.7 LARYNGEAL MASS: ICD-10-CM

## 2024-05-30 DIAGNOSIS — C32.9 LARYNGEAL CARCINOMA (H): ICD-10-CM

## 2024-05-30 DIAGNOSIS — R06.00 DYSPNEA, UNSPECIFIED TYPE: ICD-10-CM

## 2024-05-30 PROCEDURE — 88305 TISSUE EXAM BY PATHOLOGIST: CPT | Mod: 26 | Performed by: STUDENT IN AN ORGANIZED HEALTH CARE EDUCATION/TRAINING PROGRAM

## 2024-05-30 PROCEDURE — 250N000009 HC RX 250: Performed by: OTOLARYNGOLOGY

## 2024-05-30 PROCEDURE — 99100 ANES PT EXTEME AGE<1 YR&>70: CPT | Performed by: NURSE ANESTHETIST, CERTIFIED REGISTERED

## 2024-05-30 PROCEDURE — 370N000017 HC ANESTHESIA TECHNICAL FEE, PER MIN: Performed by: OTOLARYNGOLOGY

## 2024-05-30 PROCEDURE — 710N000012 HC RECOVERY PHASE 2, PER MINUTE: Performed by: OTOLARYNGOLOGY

## 2024-05-30 PROCEDURE — 710N000010 HC RECOVERY PHASE 1, LEVEL 2, PER MIN: Performed by: OTOLARYNGOLOGY

## 2024-05-30 PROCEDURE — 258N000003 HC RX IP 258 OP 636: Performed by: ANESTHESIOLOGY

## 2024-05-30 PROCEDURE — 31541 LARYNSCOP W/TUMR EXC + SCOPE: CPT | Performed by: NURSE ANESTHETIST, CERTIFIED REGISTERED

## 2024-05-30 PROCEDURE — 31541 LARYNSCOP W/TUMR EXC + SCOPE: CPT | Performed by: ANESTHESIOLOGY

## 2024-05-30 PROCEDURE — 250N000011 HC RX IP 250 OP 636: Performed by: OTOLARYNGOLOGY

## 2024-05-30 PROCEDURE — 88305 TISSUE EXAM BY PATHOLOGIST: CPT | Mod: TC | Performed by: OTOLARYNGOLOGY

## 2024-05-30 PROCEDURE — 250N000025 HC SEVOFLURANE, PER MIN: Performed by: OTOLARYNGOLOGY

## 2024-05-30 PROCEDURE — 250N000011 HC RX IP 250 OP 636: Performed by: ANESTHESIOLOGY

## 2024-05-30 PROCEDURE — 360N000077 HC SURGERY LEVEL 4, PER MIN: Performed by: OTOLARYNGOLOGY

## 2024-05-30 PROCEDURE — 272N000001 HC OR GENERAL SUPPLY STERILE: Performed by: OTOLARYNGOLOGY

## 2024-05-30 PROCEDURE — 999N000141 HC STATISTIC PRE-PROCEDURE NURSING ASSESSMENT: Performed by: OTOLARYNGOLOGY

## 2024-05-30 PROCEDURE — 250N000009 HC RX 250: Performed by: ANESTHESIOLOGY

## 2024-05-30 PROCEDURE — 99100 ANES PT EXTEME AGE<1 YR&>70: CPT | Performed by: ANESTHESIOLOGY

## 2024-05-30 RX ORDER — DEXAMETHASONE SODIUM PHOSPHATE 4 MG/ML
INJECTION, SOLUTION INTRA-ARTICULAR; INTRALESIONAL; INTRAMUSCULAR; INTRAVENOUS; SOFT TISSUE PRN
Status: DISCONTINUED | OUTPATIENT
Start: 2024-05-30 | End: 2024-05-30

## 2024-05-30 RX ORDER — AMPICILLIN AND SULBACTAM 1; .5 G/1; G/1
1.5 INJECTION, POWDER, FOR SOLUTION INTRAMUSCULAR; INTRAVENOUS SEE ADMIN INSTRUCTIONS
Status: DISCONTINUED | OUTPATIENT
Start: 2024-05-30 | End: 2024-05-30 | Stop reason: HOSPADM

## 2024-05-30 RX ORDER — FENTANYL CITRATE 50 UG/ML
INJECTION, SOLUTION INTRAMUSCULAR; INTRAVENOUS PRN
Status: DISCONTINUED | OUTPATIENT
Start: 2024-05-30 | End: 2024-05-30

## 2024-05-30 RX ORDER — LIDOCAINE 40 MG/G
CREAM TOPICAL
Status: DISCONTINUED | OUTPATIENT
Start: 2024-05-30 | End: 2024-05-30 | Stop reason: HOSPADM

## 2024-05-30 RX ORDER — SODIUM CHLORIDE, SODIUM LACTATE, POTASSIUM CHLORIDE, CALCIUM CHLORIDE 600; 310; 30; 20 MG/100ML; MG/100ML; MG/100ML; MG/100ML
INJECTION, SOLUTION INTRAVENOUS CONTINUOUS
Status: DISCONTINUED | OUTPATIENT
Start: 2024-05-30 | End: 2024-05-30 | Stop reason: HOSPADM

## 2024-05-30 RX ORDER — OXYCODONE HYDROCHLORIDE 10 MG/1
10 TABLET ORAL
Status: DISCONTINUED | OUTPATIENT
Start: 2024-05-30 | End: 2024-05-30 | Stop reason: HOSPADM

## 2024-05-30 RX ORDER — LIDOCAINE HYDROCHLORIDE 40 MG/ML
SOLUTION TOPICAL PRN
Status: DISCONTINUED | OUTPATIENT
Start: 2024-05-30 | End: 2024-05-30 | Stop reason: HOSPADM

## 2024-05-30 RX ORDER — HYDROMORPHONE HCL IN WATER/PF 6 MG/30 ML
0.4 PATIENT CONTROLLED ANALGESIA SYRINGE INTRAVENOUS EVERY 5 MIN PRN
Status: DISCONTINUED | OUTPATIENT
Start: 2024-05-30 | End: 2024-05-30 | Stop reason: HOSPADM

## 2024-05-30 RX ORDER — ONDANSETRON 2 MG/ML
4 INJECTION INTRAMUSCULAR; INTRAVENOUS EVERY 30 MIN PRN
Status: DISCONTINUED | OUTPATIENT
Start: 2024-05-30 | End: 2024-05-30 | Stop reason: HOSPADM

## 2024-05-30 RX ORDER — OXYCODONE HYDROCHLORIDE 5 MG/1
5 TABLET ORAL
Status: DISCONTINUED | OUTPATIENT
Start: 2024-05-30 | End: 2024-05-30 | Stop reason: HOSPADM

## 2024-05-30 RX ORDER — SODIUM CHLORIDE, SODIUM LACTATE, POTASSIUM CHLORIDE, CALCIUM CHLORIDE 600; 310; 30; 20 MG/100ML; MG/100ML; MG/100ML; MG/100ML
INJECTION, SOLUTION INTRAVENOUS CONTINUOUS PRN
Status: DISCONTINUED | OUTPATIENT
Start: 2024-05-30 | End: 2024-05-30

## 2024-05-30 RX ORDER — HYDROMORPHONE HCL IN WATER/PF 6 MG/30 ML
0.2 PATIENT CONTROLLED ANALGESIA SYRINGE INTRAVENOUS EVERY 5 MIN PRN
Status: DISCONTINUED | OUTPATIENT
Start: 2024-05-30 | End: 2024-05-30 | Stop reason: HOSPADM

## 2024-05-30 RX ORDER — DEXAMETHASONE SODIUM PHOSPHATE 4 MG/ML
4 INJECTION, SOLUTION INTRA-ARTICULAR; INTRALESIONAL; INTRAMUSCULAR; INTRAVENOUS; SOFT TISSUE
Status: DISCONTINUED | OUTPATIENT
Start: 2024-05-30 | End: 2024-05-30 | Stop reason: HOSPADM

## 2024-05-30 RX ORDER — FENTANYL CITRATE 50 UG/ML
50 INJECTION, SOLUTION INTRAMUSCULAR; INTRAVENOUS EVERY 5 MIN PRN
Status: DISCONTINUED | OUTPATIENT
Start: 2024-05-30 | End: 2024-05-30 | Stop reason: HOSPADM

## 2024-05-30 RX ORDER — LIDOCAINE HYDROCHLORIDE 20 MG/ML
INJECTION, SOLUTION INFILTRATION; PERINEURAL PRN
Status: DISCONTINUED | OUTPATIENT
Start: 2024-05-30 | End: 2024-05-30

## 2024-05-30 RX ORDER — ONDANSETRON 4 MG/1
4 TABLET, ORALLY DISINTEGRATING ORAL EVERY 30 MIN PRN
Status: DISCONTINUED | OUTPATIENT
Start: 2024-05-30 | End: 2024-05-30 | Stop reason: HOSPADM

## 2024-05-30 RX ORDER — NALOXONE HYDROCHLORIDE 0.4 MG/ML
0.1 INJECTION, SOLUTION INTRAMUSCULAR; INTRAVENOUS; SUBCUTANEOUS
Status: DISCONTINUED | OUTPATIENT
Start: 2024-05-30 | End: 2024-05-30 | Stop reason: HOSPADM

## 2024-05-30 RX ORDER — AMPICILLIN AND SULBACTAM 2; 1 G/1; G/1
3 INJECTION, POWDER, FOR SOLUTION INTRAMUSCULAR; INTRAVENOUS
Status: COMPLETED | OUTPATIENT
Start: 2024-05-30 | End: 2024-05-30

## 2024-05-30 RX ORDER — ONDANSETRON 2 MG/ML
INJECTION INTRAMUSCULAR; INTRAVENOUS PRN
Status: DISCONTINUED | OUTPATIENT
Start: 2024-05-30 | End: 2024-05-30

## 2024-05-30 RX ORDER — DEXAMETHASONE SODIUM PHOSPHATE 10 MG/ML
10 INJECTION, SOLUTION INTRAMUSCULAR; INTRAVENOUS ONCE
Status: DISCONTINUED | OUTPATIENT
Start: 2024-05-30 | End: 2024-05-30 | Stop reason: HOSPADM

## 2024-05-30 RX ORDER — FENTANYL CITRATE 50 UG/ML
25 INJECTION, SOLUTION INTRAMUSCULAR; INTRAVENOUS EVERY 5 MIN PRN
Status: DISCONTINUED | OUTPATIENT
Start: 2024-05-30 | End: 2024-05-30 | Stop reason: HOSPADM

## 2024-05-30 RX ORDER — PROPOFOL 10 MG/ML
INJECTION, EMULSION INTRAVENOUS PRN
Status: DISCONTINUED | OUTPATIENT
Start: 2024-05-30 | End: 2024-05-30

## 2024-05-30 RX ADMIN — SUGAMMADEX 100 MG: 100 INJECTION, SOLUTION INTRAVENOUS at 09:58

## 2024-05-30 RX ADMIN — FENTANYL CITRATE 50 MCG: 50 INJECTION INTRAMUSCULAR; INTRAVENOUS at 08:05

## 2024-05-30 RX ADMIN — SODIUM CHLORIDE, POTASSIUM CHLORIDE, SODIUM LACTATE AND CALCIUM CHLORIDE: 600; 310; 30; 20 INJECTION, SOLUTION INTRAVENOUS at 07:33

## 2024-05-30 RX ADMIN — Medication 10 MG: at 08:44

## 2024-05-30 RX ADMIN — SUGAMMADEX 100 MG: 100 INJECTION, SOLUTION INTRAVENOUS at 09:53

## 2024-05-30 RX ADMIN — Medication 10 MG: at 08:18

## 2024-05-30 RX ADMIN — FENTANYL CITRATE 50 MCG: 50 INJECTION INTRAMUSCULAR; INTRAVENOUS at 08:45

## 2024-05-30 RX ADMIN — DEXAMETHASONE SODIUM PHOSPHATE 10 MG: 4 INJECTION, SOLUTION INTRA-ARTICULAR; INTRALESIONAL; INTRAMUSCULAR; INTRAVENOUS; SOFT TISSUE at 07:53

## 2024-05-30 RX ADMIN — Medication 10 MG: at 08:01

## 2024-05-30 RX ADMIN — PROPOFOL 120 MG: 10 INJECTION, EMULSION INTRAVENOUS at 07:45

## 2024-05-30 RX ADMIN — FENTANYL CITRATE 50 MCG: 50 INJECTION INTRAMUSCULAR; INTRAVENOUS at 08:07

## 2024-05-30 RX ADMIN — Medication 30 MG: at 07:45

## 2024-05-30 RX ADMIN — PROPOFOL 150 MCG/KG/MIN: 10 INJECTION, EMULSION INTRAVENOUS at 07:55

## 2024-05-30 RX ADMIN — LIDOCAINE HYDROCHLORIDE 60 MG: 20 INJECTION, SOLUTION INFILTRATION; PERINEURAL at 07:45

## 2024-05-30 RX ADMIN — PHENYLEPHRINE HYDROCHLORIDE 100 MCG: 10 INJECTION INTRAVENOUS at 08:01

## 2024-05-30 RX ADMIN — ONDANSETRON 4 MG: 2 INJECTION INTRAMUSCULAR; INTRAVENOUS at 09:41

## 2024-05-30 RX ADMIN — AMPICILLIN SODIUM AND SULBACTAM SODIUM 3 G: 2; 1 INJECTION, POWDER, FOR SOLUTION INTRAMUSCULAR; INTRAVENOUS at 07:49

## 2024-05-30 RX ADMIN — SUGAMMADEX 200 MG: 100 INJECTION, SOLUTION INTRAVENOUS at 09:41

## 2024-05-30 RX ADMIN — SODIUM CHLORIDE, POTASSIUM CHLORIDE, SODIUM LACTATE AND CALCIUM CHLORIDE: 600; 310; 30; 20 INJECTION, SOLUTION INTRAVENOUS at 09:39

## 2024-05-30 ASSESSMENT — ACTIVITIES OF DAILY LIVING (ADL)
ADLS_ACUITY_SCORE: 23
ADLS_ACUITY_SCORE: 25
ADLS_ACUITY_SCORE: 23
ADLS_ACUITY_SCORE: 23

## 2024-05-30 ASSESSMENT — COPD QUESTIONNAIRES
CAT_SEVERITY: MODERATE
COPD: 1

## 2024-05-30 ASSESSMENT — ENCOUNTER SYMPTOMS: DYSRHYTHMIAS: 1

## 2024-05-30 NOTE — BRIEF OP NOTE
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]  Atrial fibrillation, unspecified type (H) [I48.91]  Shortness of breath [R06.02]   Post-operative diagnosis As above   Procedure: Procedure(s):  Microdirect laryngoscopy with excision/ablation of laryngeal lesions, biopsies, CO2 laser   Surgeon(s): Nikole Davis MD - Primary   Estimated blood loss: 3 ml    Specimens: ID   1 : Left posterior supraglottis   2 : Right posterior supraglottis   3 : posterior superior supraglottis   4 : posterior infraglottis   5 : posterior glottis   6 : right posterior glottis   7 : right posterior superior supraglottis - extending to glottic level as well      Findings: Easy mask, good laryngeal exposure with Ossoff Pilling laryngoscope.  Heavy burden of papilloma supraglottically today, in regions as listed in Specimens. Relatively limited mid posterior glottic and infraglottic papilloma.

## 2024-05-30 NOTE — OP NOTE
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 The patient wished to proceed with surgery and presented for the procedure(s) listed above. We reviewed perioperative risks, including bleeding/infection/pain, injury to surrounding structures, potential changes to tongue/voice/swallow function, risk of needing additional procedures (e.g., due to persistence or recurrence), and risks of anesthesia (including heart attack, stroke, death). She elected to proceed and written informed consent was performed.    DESCRIPTION OF PROCEDURE:   The patient was brought to the operating room and placed supine. A time-out was called to verify patient identity, operative site, and planned procedure. The operative site was prepped in the usual clean fashion. Moist gauze eye pads were placed and secured. A head wrap was placed, and custom molded thermoplastic tooth guards were placed. Direct laryngoscopy was performed with an Ossoff-Pilling laryngoscope, which was placed in suspension. The vocal folds were examined with a 0 degree telescope. Cup biopsy forceps were used to debulk the right and left posterior supraglottis. Biopsies were taken as listed in Specimens.    The operating microscope was then brought into position. Laser safety precautions were utilized at all times, including eye protection for the patient and staff, use of wet towels, and appropriately minimized FiO2. As needed, moistened  cottonoids or laser-safe backstops were used to protect the endotracheal tube from the laser. The Lumenis CO2 Accublade laser was attached to the microscope and used at a depth setting of 1-2 (2 for supraglottis, 1 for true vocal folds) and 8 Cardoza.     The laryngoscope was repositioned as needed for access to the right and left posterior commissure. The laser was used to ablate papilloma of the supraglottis, and left and right posterior commissure. Small clusters of papilloma were kept across multiple areas at each level to reduce the risk of posterior airway stenosis.    Along the posterior subglottis and upper cervical tracheal wall, papilloma were removed under telescopic visualization, with some minimal sessile papilloma clusters still intact. The endotracheal tube cuffs were deflated to allow tracheoscopy with the 0 degree telescope, which did not identify any other lesions of the distal trachea or takeoffs of the bilateral mainstem bronchi.    Cottonoids soaked in 1:10,000 epinephrine were sparingly used to maintain hemostasis.     As needed during the procedure and at the conclusion of the procedure, the operating microscope was moved out of position and the 0 degree rigid endoscope was used to examine the larynx and confirm completion of the stated objectives of the procedure. After cottonoid and sponge counts were confirmed correct, 2 cc of 4% lidocaine were applied transglottically for laryngotracheal anesthesia. The laryngoscope and tooth guards were removed. The lips, teeth, and tongue were examined and no injuries were noted. Care of the patient was returned to Anesthesia.      FINDINGS:   Easy mask, good laryngeal exposure with Ossoff Pilling laryngoscope.  Heavy burden of papilloma supraglottically today, in regions as listed in Specimens. Relatively limited mid posterior glottic and infraglottic papilloma today. Minimal papilloma left anterior medial true vocal fold. Remainder of trachea and  takeoffs of mainstem bronchi clear.    SPECIMEN(S):   1 : Left posterior supraglottis   2 : Right posterior supraglottis   3 : posterior superior supraglottis   4 : posterior infraglottis   5 : posterior glottis   6 : right posterior glottis   7 : right posterior superior supraglottis - extending to glottic level as well       DRAINS: None    ESTIMATED BLOOD LOSS: Minimal    COMPLICATIONS: None    DISPOSITION: Stable to PACU

## 2024-05-30 NOTE — ANESTHESIA PREPROCEDURE EVALUATION
Anesthesia Pre-Procedure Evaluation    Patient: Asya Coffman   MRN: [id number] : 1945        Procedure : Procedure(s):  Microdirect laryngoscopy with excision/ablation of laryngeal lesions, possible biopsies, Avastin injection, CO2 laser          Past Medical History:   Diagnosis Date    A-fib (H)     Antiplatelet or antithrombotic long-term use     Arrhythmia     COPD (chronic obstructive pulmonary disease) (H)       Past Surgical History:   Procedure Laterality Date    INJECT STEROID (LOCATION) N/A 6/15/2023    Procedure: Avastin injection;  Surgeon: Nikole Davis MD;  Location: [facility]    INJECT STEROID (LOCATION) N/A 2023    Procedure: Avastin injection;  Surgeon: Nikole Davis MD;  [facility]    INJECT STEROID (LOCATION) N/A 2023    Procedure: Avastin injection;  Surgeon: Nikole Davis MD;  [facility]    INJECT STEROID (LOCATION) N/A 2/15/2024    Procedure: Avastin injection;  Surgeon: Nikole Davis MD;  Location: [facility]    LASER CO2 LARYNGOSCOPY N/A 2023    Procedure: Microdirect laryngoscopy with excision/ablation of laryngeal lesions, biopsies, CO2 laser;  Surgeon: Nikole Davis MD;  [facility]    LASER CO2 LARYNGOSCOPY, COMPLEX N/A 2022    Procedure: Micro direct laryngoscopy with excision/ablation of laryngeal lesions, possible biopsies, possible CO2 laser;  Surgeon: Nikole Davis MD;  [facility]: [facility]    LASER CO2 LARYNGOSCOPY, COMPLEX N/A 9/15/2022    Procedure: Microdirect laryngoscopy with ablation of laryngeal lesions,biopsies,CO2 laser;  Surgeon: Nikole Davis MD;  [facility]    LASER CO2 LARYNGOSCOPY, COMPLEX N/A 2022    Procedure: Microdirect laryngoscopy with excision/ablation of laryngeal lesions, biopsies,   CO2 laser;  Surgeon: Nikole Davis MD;  [facility]    LASER CO2 LARYNGOSCOPY, COMPLEX N/A 6/15/2023    Procedure: Microdirect laryngoscopy  with ablation of laryngeal lesions, biopsies, CO2 laser;  Surgeon: Nikole Davis MD;  [facility]    LASER CO2 LARYNGOSCOPY, COMPLEX N/A 10/5/2023    Procedure: Microdirect laryngoscopy with excision/ablation of laryngeal lesions, biopsies, CO2 laser;  Surgeon: Nikole Davis MD;  [facility]    LASER CO2 LARYNGOSCOPY, COMPLEX N/A 12/14/2023    Procedure: Microdirect laryngoscopy with excision/ablation of laryngeal lesions, biopsies, CO2 laser;  Surgeon: Nikole Davis MD;  [facility]    LASER CO2 LARYNGOSCOPY, COMPLEX N/A 2/15/2024    Procedure: Microdirect laryngoscopy with excision/ablation of laryngeal lesions, biopsies, CO2 laser;  Surgeon: Nikole Davis MD;  [facility]    LASER CO2 LARYNGOSCOPY, COMPLEX N/A 4/11/2024    Procedure: Microdirect laryngoscopy with excision/ablation of laryngeal lesions, biopsies, CO2 laser, Avastin injections;  Surgeon: Nikole Davis MD;  [facility]    LASER KTP LARYNGOSCOPY N/A 4/3/2023    Procedure: Microdirect laryngoscopy with biopsies, excision/ablation of lesions, C02 laser,  Avastin injection;  Surgeon: Nikole Davis MD;  [facility]    LASER KTP LARYNGOSCOPY N/A 6/15/2023    Procedure: flexible laser (CO2 or KTP) standby;  Surgeon: Nikole Davis MD;  [facility]: [facility]    LASER KTP LARYNGOSCOPY FLEXIBLE N/A 8/18/2022    Procedure: Transnasal flexible laryngoscopy with KTP laser and biopsies;  Surgeon: Nikole Davis MD;  Location: [facility]    LASER KTP LARYNGOSCOPY FLEXIBLE N/A 10/27/2022    Procedure: Transnasal flexible laryngoscopy with possible KTP laser;  Surgeon: Nikole Davis MD;  Location: [facility]    LASER KTP LARYNGOSCOPY FLEXIBLE N/A 1/26/2023    Procedure: Transnasal flexible laryngoscopy with KTP laser,  biopsies, superior laryngeal nerve blocks, Avastin injection;  Surgeon: Nikole Davis MD;  Location: [facility]    LASER KTP  LARYNGOSCOPY FLEXIBLE N/A 2/15/2023    Procedure: Transnasal flexible laryngoscopy with KTP laser, superior laryngeal nerve blocks, biopsies, avastin injection;  Surgeon: Nikole Davis MD;  Location: [facility]    LASER KTP LARYNGOSCOPY FLEXIBLE N/A 2/17/2023    Procedure: Transnasal flexible laryngoscopy with KTP laser, biopsies, superior laryngeal nerve blocks;  Surgeon: Nikole Davis MD;  Location: [facility]    LASER KTP LARYNGOSCOPY FLEXIBLE N/A 2/23/2023    Procedure: Transnasal flexible laryngoscopy with KTP laser, superior laryngeal nerve blocks;  Surgeon: Nikole Davis MD;  Location: [facility]    LASER KTP LARYNGOSCOPY FLEXIBLE N/A 3/2/2023    Procedure: Transnasal flexible laryngoscopy with KTP laser, superior laryngeal nerve block Bilateral;  Surgeon: Nikole Davis MD;  Location: [facility]    LASER KTP LARYNGOSCOPY FLEXIBLE N/A 3/9/2023    Procedure: Transnasal flexible laryngoscopy with KTP laser, biopsies, superior laryngeal nerve blocks;  Surgeon: Nikole Davis MD;  Location: [facility]    LASER KTP LARYNGOSCOPY FLEXIBLE N/A 3/17/2023    Procedure: Transnasal flexible laryngoscopy with KTP laser, superior laryngeal nerve block(s), Avastin injection;  Surgeon: Nikole Davis MD;  Location: [facility]    LASER KTP LARYNGOSCOPY FLEXIBLE N/A 3/28/2023    Procedure: Transnasal flexible laryngoscopy with KTP laser, superior laryngeal nerve block(s);  Surgeon: Pankaj Woodard MD;  Location: [facility] OR      Allergies   Allergen Reactions    Methylpyrrolidone Anaphylaxis    Nuts Anaphylaxis     Black walnut    Escitalopram Other (See Comments)     Asthma -a time of exacerbation and may not have been cause may retry    Mirtazapine Other (See Comments)     Asthma a time of exacerbation and may not have been cause may retry    Venlafaxine Other (See Comments)    Adhesive Tape Rash     With holter monitor. Ok with bandaids.    No Clinical Screening -  See Comments Rash     Adhesive tape      Social History     Tobacco Use    Smoking status: Never    Smokeless tobacco: Never   Substance Use Topics    Alcohol use: Yes     Comment: Occasionally      Wt Readings from Last 1 Encounters:   05/30/24 53.9 kg (118 lb 13.3 oz)        Anesthesia Evaluation   Pt has had prior anesthetic. Type: General.    No history of anesthetic complications       ROS/MED HX  ENT/Pulmonary:     (+)                         moderate,  COPD,              Neurologic:       Cardiovascular:     (+)  hypertension- -   -  - -   Taking blood thinners                     dysrhythmias, a-fib, Irregular Heartbeat/Palpitations,       Previous cardiac testing   Echo: Date: 4/2023 Results:  Interpretation Summary  Technically difficult study. Poor acoustic windows.  Global and regional left ventricular function is normal with an EF of 55-60%.  Global right ventricular function is borderline reduced. The right ventricle  is normal size.  No significant valvular abnormalities.  The estimated PA systolic pressure is 43 mmHg.  IVC diameter >2.1 cm collapsing <50% with sniff suggests a high RA pressure  estimated at 15 mmHg or greater.  There is no prior study for direct comparison.    Stress Test:  Date: Results:    ECG Reviewed:  Date: Results:    Cath:  Date: Results:      METS/Exercise Tolerance: 3 - Able to walk 1-2 blocks without stopping    Hematologic:       Musculoskeletal:       GI/Hepatic:     (+) GERD, Asymptomatic on medication,                  Renal/Genitourinary:     (+) renal disease, type: CRI, Pt does not require dialysis,           Endo:     (+)          thyroid problem, hypothyroidism,           Psychiatric/Substance Use:       Infectious Disease:       Malignancy:       Other:            Physical Exam    Airway        Mallampati: II   TM distance: > 3 FB   Neck ROM: full   Mouth opening: > 3 cm    Respiratory Devices and Support         Dental       (+) Completely normal  "teeth      Cardiovascular          Rhythm and rate: regular and normal     Pulmonary           breath sounds clear to auscultation           OUTSIDE LABS:  CBC:   Lab Results   Component Value Date    WBC 8.7 04/08/2024    WBC 16.3 (H) 12/15/2023    HGB 15.1 04/08/2024    HGB 12.3 12/15/2023    HCT 48.3 (H) 04/08/2024    HCT 40.1 12/15/2023     04/08/2024     12/15/2023     BMP:   Lab Results   Component Value Date     04/08/2024     02/15/2024    POTASSIUM 3.8 04/08/2024    POTASSIUM 4.4 02/15/2024    CHLORIDE 104 04/08/2024    CHLORIDE 104 02/15/2024    CO2 25 04/08/2024    CO2 24 02/15/2024    BUN 17.3 04/08/2024    BUN 21.3 02/15/2024    CR 1.19 (H) 04/08/2024    CR 1.15 (H) 02/15/2024    GLC 94 04/08/2024     (H) 02/16/2024     COAGS:   Lab Results   Component Value Date    PTT 37 04/02/2023    INR 1.16 (H) 04/02/2023     POC: No results found for: \"BGM\", \"HCG\", \"HCGS\"  HEPATIC:   Lab Results   Component Value Date    ALBUMIN 4.3 04/08/2024    PROTTOTAL 7.2 04/08/2024    ALT 15 04/08/2024    AST 18 04/08/2024    ALKPHOS 95 04/08/2024    BILITOTAL 0.4 04/08/2024     OTHER:   Lab Results   Component Value Date    PH 7.37 04/02/2023    LACT 1.8 12/15/2023    A1C 6.0 (H) 01/24/2024    ESSIE 9.1 04/08/2024    MAG 2.1 04/08/2024    TSH 1.48 04/08/2024       Anesthesia Plan    ASA Status:  3    NPO Status:  NPO Appropriate    Anesthesia Type: General.     - Airway: ETT   Induction: Intravenous.   Maintenance: Balanced.        Consents    Anesthesia Plan(s) and associated risks, benefits, and realistic alternatives discussed. Questions answered and patient/representative(s) expressed understanding.     - Discussed: Risks, Benefits and Alternatives for the PROCEDURE were discussed     - Discussed with:  Patient      - Extended Intubation/Ventilatory Support Discussed: No.      - Patient is DNR/DNI Status: No     Use of blood products discussed: No .     Postoperative Care    Pain " management: IV analgesics.   PONV prophylaxis: Ondansetron (or other 5HT-3), Dexamethasone or Solumedrol     Comments:    Other Comments: Laser tube, 5 or 5.5 ETT as needed           Ilia Chen DO    I have reviewed the pertinent notes and labs in the chart from the past 30 days and (re)examined the patient.  Any updates or changes from those notes are reflected in this note.            # Drug Induced Coagulation Defect: home medication list includes an anticoagulant medication

## 2024-05-30 NOTE — DISCHARGE INSTRUCTIONS
Contacting your Doctor -   To contact a doctor, call Dr Davis at  the [facility] at [phone] or [phone] and ask for the resident on call for otolaryngology (answered 24 hours a day)   Emergency Department:  [facility]: [phone]  [facility]: [phone] 911 if you are in need of immediate or emergent help

## 2024-05-30 NOTE — ANESTHESIA CARE TRANSFER NOTE
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    Anesthesia Type:   General     Note:    Oropharynx: oropharynx clear of all foreign objects and spontaneously breathing  Level of Consciousness: drowsy  Oxygen Supplementation: nasal cannula  Level of Supplemental Oxygen (L/min / FiO2): 3  Independent Airway: airway patency satisfactory and stable  Dentition: dentition unchanged  Vital Signs Stable: post-procedure vital signs reviewed and stable  Report to RN Given: handoff report given  Patient transferred to: PACU    Handoff Report: Identifed the Patient, Identified the Reponsible Provider, Reviewed the pertinent medical history, Discussed the surgical course, Reviewed Intra-OP anesthesia mangement and issues during anesthesia, Set expectations for post-procedure period and Allowed opportunity for questions and acknowledgement of understanding      Vitals:  Vitals Value Taken Time   BP     Temp     Pulse     Resp     SpO2         Electronically Signed By: Madhavi Hemphill  May 30, 2024  10:05 AM

## 2024-05-30 NOTE — ANESTHESIA POSTPROCEDURE EVALUATION
Patient: Asya Coffman    Procedure: Procedure(s):  Microdirect laryngoscopy with excision/ablation of laryngeal lesions, biopsies, CO2 laser       Anesthesia Type:  General    Note:  Disposition: Outpatient   Postop Pain Control: Uneventful            Sign Out: Well controlled pain   PONV: No   Neuro/Psych: Uneventful            Sign Out: Acceptable/Baseline neuro status   Airway/Respiratory: Uneventful            Sign Out: Acceptable/Baseline resp. status   CV/Hemodynamics: Uneventful            Sign Out: Acceptable CV status; No obvious hypovolemia; No obvious fluid overload   Other NRE: NONE   DID A NON-ROUTINE EVENT OCCUR? No           Last vitals:  Vitals Value Taken Time   /71 05/30/24 1200   Temp 36.8  C (98.3  F) 05/30/24 1215   Pulse 82 05/30/24 1215   Resp 19 05/30/24 1215   SpO2 98 % 05/30/24 1214   Vitals shown include unfiled device data.    Electronically Signed By: Alfredo Triana MD  May 30, 2024  12:15 PM

## 2024-06-10 ENCOUNTER — OFFICE VISIT (OUTPATIENT)
Dept: RADIATION ONCOLOGY | Facility: CLINIC | Age: 79
End: 2024-06-10
Attending: RADIOLOGY
Payer: COMMERCIAL

## 2024-06-10 VITALS
BODY MASS INDEX: 19.64 KG/M2 | DIASTOLIC BLOOD PRESSURE: 74 MMHG | HEART RATE: 80 BPM | SYSTOLIC BLOOD PRESSURE: 142 MMHG | WEIGHT: 118 LBS

## 2024-06-10 DIAGNOSIS — C32.9 MALIGNANT NEOPLASM OF LARYNX (H): Primary | ICD-10-CM

## 2024-06-10 PROCEDURE — 77333 RADIATION TREATMENT AID(S): CPT | Performed by: RADIOLOGY

## 2024-06-10 PROCEDURE — 77333 RADIATION TREATMENT AID(S): CPT | Mod: 26 | Performed by: RADIOLOGY

## 2024-06-10 PROCEDURE — 77386 HC IMRT TREATMENT DELIVERY, COMPLEX: CPT | Performed by: RADIOLOGY

## 2024-06-10 RX ORDER — GABAPENTIN 300 MG/1
CAPSULE ORAL
Qty: 120 CAPSULE | Refills: 5 | Status: SHIPPED | OUTPATIENT
Start: 2024-06-10

## 2024-06-10 NOTE — PROGRESS NOTES
RADIATION ONCOLOGY WEEKLY ON TREATMENT VISIT   Encounter Date: Cesar 10, 2024    Patient Name: Asya Coffman  MRN: [id number]  : 1945     Disease and Stage: Squamous cell carcinoma of the larynx, clinical T1 N0 M0  Treatment Site: Larynx  Current Dose/Planned Total Dose: [225] cGy / [5625] cGy    Concurrent Chemotherapy: No  Drug and Frequency: Not applicable      ENT Surgeon: Nikole Davis MD    Subjective: Ms. Coffman presents to clinic today for her weekly on-treatment visit.  She is tolerating the start of radiation well.    Nursing ROS:   Nutrition Alteration  Diet Type: Patient's Preference  Skin  Skin Reaction: 0 - No changes  Skin Progress: Aquaphor     ENT and Mouth Exam  Mucositis - Current: 0 - None   Cardiovascular  Respiratory effort: 1 - Normal - without distress  Gastrointestinal  Nausea: 0 - None     Psychosocial  Mood - Anxiety: 0 - Normal  Pain Assessment  0-10 Pain Scale: 0  Pain Treatment: Gabapentin 1200 mg total dose    PEG Tube: No  Electronic Cardiac Implant: No (    Objective:   BP (!) 142/74   Pulse 80   Wt 53.5 kg (118 lb)   BMI 19.64 kg/m    Gen: Appears well, NAD  HEENT: No mucositis  Skin: No erythema    Laboratory:  Lab Results   Component Value Date    WBC 8.7 2024    HGB 15.1 2024    HCT 48.3 (H) 2024    MCV 85 2024     2024     Lab Results   Component Value Date     2024    POTASSIUM 3.8 2024    CHLORIDE 104 2024    CO2 25 2024    GLC 94 2024     Magnesium   Date Value Ref Range Status   2024 2.1 1.7 - 2.3 mg/dL Final       Treatment-related toxicities (CTCAE v5.0):  Anorexia: Grade 0: No toxicity  Fatigue: Grade 0: No toxicity  Nausea: Grade 0: No toxicity  Pain: Grade 0: No toxicity  Dysphagia: Grade 0: No toxicity  Mucositis: Grade 0: No toxicity  Dermatitis: Grade 0: No toxicity    ED visits/Hospitalizations: None    Missed Treatments: None    Mosaiq chart and setup information  reviewed  IGRT images reviewed    Medication Review  Med list reviewed with patient?: Yes  Med list printed and given: Offered and declined    Assessment:    Ms. Coffman is a 79 year old female with clinical stage T1 N0 M0 squamous cell carcinoma of the larynx receiving definitive radiation.    Plan:   1.  Continue radiation as planned  2.  Will prescribe gabapentin, total 1200 mg per 24-hour.    Liat Yates  Department of Radiation Oncology  [facility]

## 2024-06-10 NOTE — LETTER
6/10/2024      Asya Coffman  [address]  [place] [place] [facility] [address]      Dear Colleague,    Thank you for referring your patient, Asya Coffman, to the [facility] RADIATION ONCOLOGY. Please see a copy of my visit note below.    RADIATION ONCOLOGY WEEKLY ON TREATMENT VISIT   Encounter Date: Cesar 10, 2024    Patient Name: Asya Coffman  MRN: [id number]  : 1945     Disease and Stage: Squamous cell carcinoma of the larynx, clinical T1 N0 M0  Treatment Site: Larynx  Current Dose/Planned Total Dose: [225] cGy / [5625] cGy    Concurrent Chemotherapy: No  Drug and Frequency: Not applicable      ENT Surgeon: Nikole Davis MD    Subjective: Ms. Coffman presents to clinic today for her weekly on-treatment visit.  She is tolerating the start of radiation well.    Nursing ROS:   Nutrition Alteration  Diet Type: Patient's Preference  Skin  Skin Reaction: 0 - No changes  Skin Progress: Aquaphor     ENT and Mouth Exam  Mucositis - Current: 0 - None   Cardiovascular  Respiratory effort: 1 - Normal - without distress  Gastrointestinal  Nausea: 0 - None     Psychosocial  Mood - Anxiety: 0 - Normal  Pain Assessment  0-10 Pain Scale: 0  Pain Treatment: Gabapentin 1200 mg total dose    PEG Tube: No  Electronic Cardiac Implant: No (    Objective:   BP (!) 142/74   Pulse 80   Wt 53.5 kg (118 lb)   BMI 19.64 kg/m    Gen: Appears well, NAD  HEENT: No mucositis  Skin: No erythema    Laboratory:  Lab Results   Component Value Date    WBC 8.7 2024    HGB 15.1 2024    HCT 48.3 (H) 2024    MCV 85 2024     2024     Lab Results   Component Value Date     2024    POTASSIUM 3.8 2024    CHLORIDE 104 2024    CO2 25 2024    GLC 94 2024     Magnesium   Date Value Ref Range Status   2024 2.1 1.7 - 2.3 mg/dL Final       Treatment-related toxicities (CTCAE v5.0):  Anorexia: Grade 0: No toxicity  Fatigue: Grade 0: No toxicity  Nausea:  Grade 0: No toxicity  Pain: Grade 0: No toxicity  Dysphagia: Grade 0: No toxicity  Mucositis: Grade 0: No toxicity  Dermatitis: Grade 0: No toxicity    ED visits/Hospitalizations: None    Missed Treatments: None    Mosaiq chart and setup information reviewed  IGRT images reviewed    Medication Review  Med list reviewed with patient?: Yes  Med list printed and given: Offered and declined    Assessment:    Ms. Coffman is a 79 year old female with clinical stage T1 N0 M0 squamous cell carcinoma of the larynx receiving definitive radiation.    Plan:   1.  Continue radiation as planned  2.  Will prescribe gabapentin, total 1200 mg per 24-hour.    Liat Yates  Department of Radiation Oncology  [facility]                 Again, thank you for allowing me to participate in the care of your patient.        Sincerely,        Liat Yates MD

## 2024-06-10 NOTE — LETTER
6/10/2024      Asya Coffman  [address]  [place] [facility] [address]      Dear Colleague,    Thank you for referring your patient, Asya Coffman, to the [facility] RADIATION ONCOLOGY. Please see a copy of my visit note below.    Treatment set up verification.      Again, thank you for allowing me to participate in the care of your patient.        Sincerely,        Liat Yates MD

## 2024-06-11 ENCOUNTER — APPOINTMENT (OUTPATIENT)
Dept: RADIATION ONCOLOGY | Facility: CLINIC | Age: 79
End: 2024-06-11
Attending: RADIOLOGY
Payer: COMMERCIAL

## 2024-06-11 DIAGNOSIS — C32.9 MALIGNANT NEOPLASM OF LARYNX (H): Primary | ICD-10-CM

## 2024-06-11 PROCEDURE — 77014 PR CT GUIDE FOR PLACEMENT RADIATION THERAPY FIELDS: CPT | Mod: 26 | Performed by: RADIOLOGY

## 2024-06-11 PROCEDURE — 77386 HC IMRT TREATMENT DELIVERY, COMPLEX: CPT | Performed by: RADIOLOGY

## 2024-06-11 PROCEDURE — 97803 MED NUTRITION INDIV SUBSEQ: CPT | Mod: XU | Performed by: DIETITIAN, REGISTERED

## 2024-06-11 NOTE — PROGRESS NOTES
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  She continues to eat 3 meals a day.    She doesn't eat a lot of meat, mostly chicken or ground beef w/ tacos.  She admits to drinking little water and will try to increase this for treatment.   Breakfast Waffle, toast or cereal w/ 1% milk   Lunch [place] w/ pnb or canned chicken/tuna OR take out foods   Dinner Pasta, corn, green beans or salad,    Snacks Crackers, 2 cookies   Beverages 1 cup coffee, 1 - 8 oz can soda, 16 oz water   -Liquid meal replacement or supplement - Protein shakes - reviewed various kinds of shakes and gainers.   -Weight trends - stable   Wt Readings from Last 10 Encounters:   06/10/24 53.5 kg (118 lb)   05/30/24 53.9 kg (118 lb 13.3 oz)   05/13/24 53.1 kg (117 lb)   04/23/24 53.5 kg (118 lb)   04/11/24 55.3 kg (121 lb 14.6 oz)   03/04/24 54 kg (119 lb)   02/15/24 54.3 kg (119 lb 11.4 oz)   01/05/24 53.5 kg (118 lb)   12/22/23 52.2 kg (115 lb)   12/14/23 52.4 kg (115 lb 8.3 oz)     Previous Goals:   1.  Aim for 5-6 small frequent meals  2.  Aim for 1600kcal and 65g protein, 6 cups water/day  3. Weight maintenance   Evaluation: Met   Previous Nutrition Diagnosis:   Predicted inadequate nutrient intake related to cancer treatment to neck region   Evaluation: No change   NEW FINDINGS:   No new findings   CURRENT NUTRITION DIAGNOSIS   Predicted inadequate nutrient intake related to cancer treatment to neck region   INTERVENTIONS   Composition of Meals and Snacks and General/healthful diet - reviewed nutrition shakes to try:  [employer] Milk: regular whole milk 150 elliott,  13g protein (red jug)  [facility] Nutrition Plan shakes: 150 elliott, 30g protein  Boost Very High Calorie (VHC): 8 oz 530 calories, 22g protein  Boost Plus:  8 oz 350 calories, 15g protein  Ensure Plus: 8 oz 350 calories, 15g protein  Ensure Complete: 11 oz 350 calories, 30g protein  Pro Performance Bulk 1340 and Naked Mass: 24 oz (4 scoops of powder with 3 cups water or milk) 1350 calories, 50g protein  Reviewed soft moist, calorie dense and high protein foods. Discussed importance of food alterations as eating becomes more challenging in the next several weeks.   Goals   1.  Aim for 5-6 small frequent meals  2.  Aim for 1600kcal and 65g protein, 6 cups water/day  3. Weight maintenance   Follow up/Monitoring: two week follow up scheduled  Food/beverage intake and Weight    Marzena Salter RD, , LD  [facility]  [phone]

## 2024-06-12 ENCOUNTER — APPOINTMENT (OUTPATIENT)
Dept: RADIATION ONCOLOGY | Facility: CLINIC | Age: 79
End: 2024-06-12
Attending: RADIOLOGY
Payer: COMMERCIAL

## 2024-06-12 PROCEDURE — 77386 HC IMRT TREATMENT DELIVERY, COMPLEX: CPT | Performed by: RADIOLOGY

## 2024-06-12 PROCEDURE — 77014 PR CT GUIDE FOR PLACEMENT RADIATION THERAPY FIELDS: CPT | Mod: 26 | Performed by: RADIOLOGY

## 2024-06-13 ENCOUNTER — TELEPHONE (OUTPATIENT)
Dept: OTOLARYNGOLOGY | Facility: CLINIC | Age: 79
End: 2024-06-13

## 2024-06-13 ENCOUNTER — APPOINTMENT (OUTPATIENT)
Dept: RADIATION ONCOLOGY | Facility: CLINIC | Age: 79
End: 2024-06-13
Attending: RADIOLOGY
Payer: COMMERCIAL

## 2024-06-13 ENCOUNTER — THERAPY VISIT (OUTPATIENT)
Dept: SPEECH THERAPY | Facility: CLINIC | Age: 79
End: 2024-06-13
Payer: COMMERCIAL

## 2024-06-13 DIAGNOSIS — R13.12 OROPHARYNGEAL DYSPHAGIA: Primary | ICD-10-CM

## 2024-06-13 DIAGNOSIS — J38.7 LARYNGEAL MASS: ICD-10-CM

## 2024-06-13 DIAGNOSIS — Z78.9 RECURRENT RESPIRATORY PAPILLOMATOSIS: ICD-10-CM

## 2024-06-13 PROCEDURE — 77014 PR CT GUIDE FOR PLACEMENT RADIATION THERAPY FIELDS: CPT | Mod: 26 | Performed by: RADIOLOGY

## 2024-06-13 PROCEDURE — 77386 HC IMRT TREATMENT DELIVERY, COMPLEX: CPT | Performed by: RADIOLOGY

## 2024-06-13 PROCEDURE — 92526 ORAL FUNCTION THERAPY: CPT | Mod: GN | Performed by: SPEECH-LANGUAGE PATHOLOGIST

## 2024-06-13 NOTE — TELEPHONE ENCOUNTER
Spoke to patient to check in as per provider and if well cancel pts appointment on 6/17/24. Pt reports doing well. No voice concerns or breathing issues. Pt sounds a little hoarse but not out of breath at all. Appt on Monday cancelled. Writer did advise patient that if anything changes she should reach out to clinic immediately. Pt verbalized understanding of information given and appreciative of call.

## 2024-06-14 ENCOUNTER — APPOINTMENT (OUTPATIENT)
Dept: RADIATION ONCOLOGY | Facility: CLINIC | Age: 79
End: 2024-06-14
Attending: RADIOLOGY
Payer: COMMERCIAL

## 2024-06-14 PROCEDURE — 77336 RADIATION PHYSICS CONSULT: CPT | Performed by: RADIOLOGY

## 2024-06-14 PROCEDURE — 77014 PR CT GUIDE FOR PLACEMENT RADIATION THERAPY FIELDS: CPT | Mod: 26 | Performed by: RADIOLOGY

## 2024-06-14 PROCEDURE — 77386 HC IMRT TREATMENT DELIVERY, COMPLEX: CPT | Performed by: RADIOLOGY

## 2024-06-14 PROCEDURE — 77427 RADIATION TX MANAGEMENT X5: CPT | Performed by: RADIOLOGY

## 2024-06-17 ENCOUNTER — OFFICE VISIT (OUTPATIENT)
Dept: RADIATION ONCOLOGY | Facility: CLINIC | Age: 79
End: 2024-06-17
Attending: RADIOLOGY
Payer: COMMERCIAL

## 2024-06-17 VITALS
WEIGHT: 118 LBS | HEART RATE: 86 BPM | BODY MASS INDEX: 19.64 KG/M2 | DIASTOLIC BLOOD PRESSURE: 63 MMHG | SYSTOLIC BLOOD PRESSURE: 120 MMHG

## 2024-06-17 DIAGNOSIS — C32.9 MALIGNANT NEOPLASM OF LARYNX (H): Primary | ICD-10-CM

## 2024-06-17 PROCEDURE — 77386 HC IMRT TREATMENT DELIVERY, COMPLEX: CPT | Performed by: RADIOLOGY

## 2024-06-17 NOTE — PROGRESS NOTES
RADIATION ONCOLOGY WEEKLY ON TREATMENT VISIT   Encounter Date: 2024    Patient Name: Asya Coffman  MRN: [id number]  : 1945     Disease and Stage: Squamous cell carcinoma of the larynx, clinical T1 N0 M0  Treatment Site: Larynx  Current Dose/Planned Total Dose: [1350] cGy / [5625] cGy    Concurrent Chemotherapy: No  Drug and Frequency: Not applicable      ENT Surgeon: Nikole Davis MD    Subjective: Ms. Coffman presents to clinic today for her weekly on-treatment visit.  She is tolerating radiation well.  She is taking 1200 mg gabapentin/24-hour period.  She continues to eat a regular diet.    Nursing ROS:   Nutrition Alteration  Diet Type: Patient's Preference  Skin  Skin Reaction: 0 - No changes  Skin Progress: Aquaphor     ENT and Mouth Exam  Mucositis - Current: 0 - None   Cardiovascular  Respiratory effort: 1 - Normal - without distress  Gastrointestinal  Nausea: 0 - None     Psychosocial  Mood - Anxiety: 0 - Normal  Pain Assessment  0-10 Pain Scale: 0  Pain Treatment: Gabapentin 1200 mg total dose    PEG Tube: No  Electronic Cardiac Implant: No     Objective:   /63   Pulse 86   Wt 53.5 kg (118 lb)   BMI 19.64 kg/m    Gen: Appears well, NAD  HEENT: No mucositis  Skin: No erythema    Laboratory:  Lab Results   Component Value Date    WBC 8.7 2024    HGB 15.1 2024    HCT 48.3 (H) 2024    MCV 85 2024     2024     Lab Results   Component Value Date     2024    POTASSIUM 3.8 2024    CHLORIDE 104 2024    CO2 25 2024    GLC 94 2024     Magnesium   Date Value Ref Range Status   2024 2.1 1.7 - 2.3 mg/dL Final       Treatment-related toxicities (CTCAE v5.0):  Anorexia: Grade 0: No toxicity  Fatigue: Grade 0: No toxicity  Nausea: Grade 0: No toxicity  Pain: Grade 0: No toxicity  Dysphagia: Grade 0: No toxicity  Mucositis: Grade 0: No toxicity  Dermatitis: Grade 0: No toxicity    ED visits/Hospitalizations:  None    Missed Treatments: None    Mosaiq chart and setup information reviewed  IGRT images reviewed    Medication Review  Med list reviewed with patient?: Yes  Med list printed and given: Offered and declined    Assessment:    Ms. Coffman is a 79 year old female with clinical stage T1 N0 M0 squamous cell carcinoma of the larynx receiving definitive radiation.    Plan:   1.  Continue radiation as planned  2.  Continue gabapentin, total 1200 mg per 24-hour.    Liat Yates  Department of Radiation Oncology  [facility]

## 2024-06-17 NOTE — LETTER
2024      Asya Coffman  [address]  [facility] [facility] [address]      Dear Colleague,    Thank you for referring your patient, Asya Coffman, to the [facility] RADIATION ONCOLOGY. Please see a copy of my visit note below.    RADIATION ONCOLOGY WEEKLY ON TREATMENT VISIT   Encounter Date: 2024    Patient Name: Asya Coffman  MRN: [id number]  : 1945     Disease and Stage: Squamous cell carcinoma of the larynx, clinical T1 N0 M0  Treatment Site: Larynx  Current Dose/Planned Total Dose: [1350] cGy / [5625] cGy    Concurrent Chemotherapy: No  Drug and Frequency: Not applicable      ENT Surgeon: Nikole Davis MD    Subjective: Ms. Coffman presents to clinic today for her weekly on-treatment visit.  She is tolerating radiation well.  She is taking 1200 mg gabapentin/24-hour period.  She continues to eat a regular diet.    Nursing ROS:   Nutrition Alteration  Diet Type: Patient's Preference  Skin  Skin Reaction: 0 - No changes  Skin Progress: Aquaphor     ENT and Mouth Exam  Mucositis - Current: 0 - None   Cardiovascular  Respiratory effort: 1 - Normal - without distress  Gastrointestinal  Nausea: 0 - None     Psychosocial  Mood - Anxiety: 0 - Normal  Pain Assessment  0-10 Pain Scale: 0  Pain Treatment: Gabapentin 1200 mg total dose    PEG Tube: No  Electronic Cardiac Implant: No     Objective:   /63   Pulse 86   Wt 53.5 kg (118 lb)   BMI 19.64 kg/m    Gen: Appears well, NAD  HEENT: No mucositis  Skin: No erythema    Laboratory:  Lab Results   Component Value Date    WBC 8.7 2024    HGB 15.1 2024    HCT 48.3 (H) 2024    MCV 85 2024     2024     Lab Results   Component Value Date     2024    POTASSIUM 3.8 2024    CHLORIDE 104 2024    CO2 25 2024    GLC 94 2024     Magnesium   Date Value Ref Range Status   2024 2.1 1.7 - 2.3 mg/dL Final       Treatment-related toxicities (CTCAE  v5.0):  Anorexia: Grade 0: No toxicity  Fatigue: Grade 0: No toxicity  Nausea: Grade 0: No toxicity  Pain: Grade 0: No toxicity  Dysphagia: Grade 0: No toxicity  Mucositis: Grade 0: No toxicity  Dermatitis: Grade 0: No toxicity    ED visits/Hospitalizations: None    Missed Treatments: None    Mosaiq chart and setup information reviewed  IGRT images reviewed    Medication Review  Med list reviewed with patient?: Yes  Med list printed and given: Offered and declined    Assessment:    Ms. Coffman is a 79 year old female with clinical stage T1 N0 M0 squamous cell carcinoma of the larynx receiving definitive radiation.    Plan:   1.  Continue radiation as planned  2.  Continue gabapentin, total 1200 mg per 24-hour.    Liat Yates  Department of Radiation Oncology  [facility]                 Again, thank you for allowing me to participate in the care of your patient.        Sincerely,        Liat Yates MD

## 2024-06-18 ENCOUNTER — APPOINTMENT (OUTPATIENT)
Dept: RADIATION ONCOLOGY | Facility: CLINIC | Age: 79
End: 2024-06-18
Attending: RADIOLOGY
Payer: COMMERCIAL

## 2024-06-18 PROCEDURE — 77014 PR CT GUIDE FOR PLACEMENT RADIATION THERAPY FIELDS: CPT | Mod: 26 | Performed by: RADIOLOGY

## 2024-06-18 PROCEDURE — 77386 HC IMRT TREATMENT DELIVERY, COMPLEX: CPT | Performed by: RADIOLOGY

## 2024-06-19 ENCOUNTER — APPOINTMENT (OUTPATIENT)
Dept: RADIATION ONCOLOGY | Facility: CLINIC | Age: 79
End: 2024-06-19
Attending: RADIOLOGY
Payer: COMMERCIAL

## 2024-06-19 PROCEDURE — 77014 PR CT GUIDE FOR PLACEMENT RADIATION THERAPY FIELDS: CPT | Mod: 26 | Performed by: RADIOLOGY

## 2024-06-19 PROCEDURE — 77386 HC IMRT TREATMENT DELIVERY, COMPLEX: CPT | Performed by: RADIOLOGY

## 2024-06-20 ENCOUNTER — APPOINTMENT (OUTPATIENT)
Dept: RADIATION ONCOLOGY | Facility: CLINIC | Age: 79
End: 2024-06-20
Attending: RADIOLOGY
Payer: COMMERCIAL

## 2024-06-20 ENCOUNTER — THERAPY VISIT (OUTPATIENT)
Dept: SPEECH THERAPY | Facility: CLINIC | Age: 79
End: 2024-06-20
Payer: COMMERCIAL

## 2024-06-20 DIAGNOSIS — R13.12 OROPHARYNGEAL DYSPHAGIA: Primary | ICD-10-CM

## 2024-06-20 DIAGNOSIS — J38.7 LARYNGEAL MASS: ICD-10-CM

## 2024-06-20 DIAGNOSIS — Z78.9 RECURRENT RESPIRATORY PAPILLOMATOSIS: ICD-10-CM

## 2024-06-20 PROCEDURE — 92526 ORAL FUNCTION THERAPY: CPT | Mod: GN | Performed by: SPEECH-LANGUAGE PATHOLOGIST

## 2024-06-20 PROCEDURE — 77386 HC IMRT TREATMENT DELIVERY, COMPLEX: CPT | Performed by: RADIOLOGY

## 2024-06-20 PROCEDURE — 77014 PR CT GUIDE FOR PLACEMENT RADIATION THERAPY FIELDS: CPT | Mod: 26 | Performed by: RADIOLOGY

## 2024-06-21 ENCOUNTER — APPOINTMENT (OUTPATIENT)
Dept: RADIATION ONCOLOGY | Facility: CLINIC | Age: 79
End: 2024-06-21
Attending: RADIOLOGY
Payer: COMMERCIAL

## 2024-06-21 PROCEDURE — 77386 HC IMRT TREATMENT DELIVERY, COMPLEX: CPT | Performed by: RADIOLOGY

## 2024-06-21 PROCEDURE — 77014 PR CT GUIDE FOR PLACEMENT RADIATION THERAPY FIELDS: CPT | Mod: 26 | Performed by: RADIOLOGY

## 2024-06-21 PROCEDURE — 77336 RADIATION PHYSICS CONSULT: CPT | Performed by: RADIOLOGY

## 2024-06-21 PROCEDURE — 77427 RADIATION TX MANAGEMENT X5: CPT | Performed by: RADIOLOGY

## 2024-06-24 ENCOUNTER — APPOINTMENT (OUTPATIENT)
Dept: RADIATION ONCOLOGY | Facility: CLINIC | Age: 79
End: 2024-06-24
Attending: RADIOLOGY
Payer: COMMERCIAL

## 2024-06-24 VITALS
BODY MASS INDEX: 19.8 KG/M2 | HEART RATE: 82 BPM | DIASTOLIC BLOOD PRESSURE: 76 MMHG | SYSTOLIC BLOOD PRESSURE: 131 MMHG | WEIGHT: 119 LBS

## 2024-06-24 DIAGNOSIS — C32.9 MALIGNANT NEOPLASM OF LARYNX (H): Primary | ICD-10-CM

## 2024-06-24 PROCEDURE — 77386 HC IMRT TREATMENT DELIVERY, COMPLEX: CPT | Performed by: RADIOLOGY

## 2024-06-24 NOTE — LETTER
2024      Asya Coffman  [address]  [place] [place] [address]      Dear Colleague,    Thank you for referring your patient, Asya Coffman, to the [facility] RADIATION ONCOLOGY. Please see a copy of my visit note below.    RADIATION ONCOLOGY WEEKLY ON TREATMENT VISIT   Encounter Date: 2024    Patient Name: Asya Coffman  MRN: [id number]  : 1945     Disease and Stage: Squamous cell carcinoma of the larynx, clinical T1 N0 M0  Treatment Site: Larynx  Current Dose/Planned Total Dose: [2475] cGy / [5625] cGy    Concurrent Chemotherapy: No  Drug and Frequency: Not applicable      ENT Surgeon: Nikole Davis MD    Subjective: Ms. Coffman presents to clinic today for her weekly on-treatment visit.  She is tolerating radiation well.  She is taking 1200 mg gabapentin/24-hour period and she finds that the nighttime dose has helped her restless legs tremendously.  She continues to eat a regular diet although has switched to softer foods.    Nursing ROS:   Nutrition Alteration  Diet Type: Patient's Preference  Skin  Skin Reaction: 0 - No changes  Skin Progress: Aquaphor     ENT and Mouth Exam  Mucositis - Current: 0 - None   Cardiovascular  Respiratory effort: 1 - Normal - without distress  Gastrointestinal  Nausea: 0 - None     Psychosocial  Mood - Anxiety: 0 - Normal  Pain Assessment  0-10 Pain Scale: 0  Pain Treatment: Gabapentin 1200 mg total dose     PEG Tube: No  Electronic Cardiac Implant: No     Objective:   /76   Pulse 82   Wt 54 kg (119 lb)   BMI 19.80 kg/m    Gen: Appears well, NAD  HEENT: No mucositis  Skin: No erythema    Laboratory:  Lab Results   Component Value Date    WBC 8.7 2024    HGB 15.1 2024    HCT 48.3 (H) 2024    MCV 85 2024     2024     Lab Results   Component Value Date     2024    POTASSIUM 3.8 2024    CHLORIDE 104 2024    CO2 25 2024    GLC 94 2024     Magnesium   Date  Value Ref Range Status   04/08/2024 2.1 1.7 - 2.3 mg/dL Final       Treatment-related toxicities (CTCAE v5.0):  Anorexia: Grade 0: No toxicity  Fatigue: Grade 0: No toxicity  Nausea: Grade 0: No toxicity  Pain: Grade 0: No toxicity  Dysphagia: Grade 0: No toxicity  Mucositis: Grade 0: No toxicity  Dermatitis: Grade 0: No toxicity    ED visits/Hospitalizations: None    Missed Treatments: None    Mosaiq chart and setup information reviewed  IGRT images reviewed    Medication Review  Med list reviewed with patient?: Yes  Med list printed and given: Offered and declined    Assessment:    Ms. Coffman is a 79 year old female with clinical stage T1 N0 M0 squamous cell carcinoma of the larynx receiving definitive radiation.    Plan:   1.  Continue radiation as planned  2.  Continue gabapentin, total 1200 mg per 24-hour.  3.  If pain with swallowing worsens, consider acetaminophen and Magic mouthwash.    Liat Yates  Department of Radiation Oncology  [facility]                 Again, thank you for allowing me to participate in the care of your patient.        Sincerely,        Liat Yates MD

## 2024-06-24 NOTE — PROGRESS NOTES
RADIATION ONCOLOGY WEEKLY ON TREATMENT VISIT   Encounter Date: 2024    Patient Name: Asya Coffman  MRN: [id number]  : 1945     Disease and Stage: Squamous cell carcinoma of the larynx, clinical T1 N0 M0  Treatment Site: Larynx  Current Dose/Planned Total Dose: [2475] cGy / [5625] cGy    Concurrent Chemotherapy: No  Drug and Frequency: Not applicable      ENT Surgeon: Nikole Davis MD    Subjective: Ms. Coffman presents to clinic today for her weekly on-treatment visit.  She is tolerating radiation well.  She is taking 1200 mg gabapentin/24-hour period and she finds that the nighttime dose has helped her restless legs tremendously.  She continues to eat a regular diet although has switched to softer foods.    Nursing ROS:   Nutrition Alteration  Diet Type: Patient's Preference  Skin  Skin Reaction: 0 - No changes  Skin Progress: Aquaphor     ENT and Mouth Exam  Mucositis - Current: 0 - None   Cardiovascular  Respiratory effort: 1 - Normal - without distress  Gastrointestinal  Nausea: 0 - None     Psychosocial  Mood - Anxiety: 0 - Normal  Pain Assessment  0-10 Pain Scale: 0  Pain Treatment: Gabapentin 1200 mg total dose     PEG Tube: No  Electronic Cardiac Implant: No     Objective:   /76   Pulse 82   Wt 54 kg (119 lb)   BMI 19.80 kg/m    Gen: Appears well, NAD  HEENT: No mucositis  Skin: No erythema    Laboratory:  Lab Results   Component Value Date    WBC 8.7 2024    HGB 15.1 2024    HCT 48.3 (H) 2024    MCV 85 2024     2024     Lab Results   Component Value Date     2024    POTASSIUM 3.8 2024    CHLORIDE 104 2024    CO2 25 2024    GLC 94 2024     Magnesium   Date Value Ref Range Status   2024 2.1 1.7 - 2.3 mg/dL Final       Treatment-related toxicities (CTCAE v5.0):  Anorexia: Grade 0: No toxicity  Fatigue: Grade 0: No toxicity  Nausea: Grade 0: No toxicity  Pain: Grade 0: No toxicity  Dysphagia: Grade  0: No toxicity  Mucositis: Grade 0: No toxicity  Dermatitis: Grade 0: No toxicity    ED visits/Hospitalizations: None    Missed Treatments: None    Mosaiq chart and setup information reviewed  IGRT images reviewed    Medication Review  Med list reviewed with patient?: Yes  Med list printed and given: Offered and declined    Assessment:    Ms. Coffman is a 79 year old female with clinical stage T1 N0 M0 squamous cell carcinoma of the larynx receiving definitive radiation.    Plan:   1.  Continue radiation as planned  2.  Continue gabapentin, total 1200 mg per 24-hour.  3.  If pain with swallowing worsens, consider acetaminophen and Magic mouthwash.    Liat Yates  Department of Radiation Oncology  [facility]

## 2024-06-25 ENCOUNTER — ALLIED HEALTH/NURSE VISIT (OUTPATIENT)
Dept: RADIATION ONCOLOGY | Facility: CLINIC | Age: 79
End: 2024-06-25
Attending: RADIOLOGY
Payer: COMMERCIAL

## 2024-06-25 DIAGNOSIS — C32.9 MALIGNANT NEOPLASM OF LARYNX (H): Primary | ICD-10-CM

## 2024-06-25 PROCEDURE — 77014 PR CT GUIDE FOR PLACEMENT RADIATION THERAPY FIELDS: CPT | Mod: 26 | Performed by: RADIOLOGY

## 2024-06-25 PROCEDURE — 77386 HC IMRT TREATMENT DELIVERY, COMPLEX: CPT | Performed by: RADIOLOGY

## 2024-06-25 NOTE — PROGRESS NOTES
Nutrition Services:   Patient not seen by dietitian today as she was not brought back to clinic.   Per RNCC, she has been doing well this week.   Weight trends: stable   Wt Readings from Last 10 Encounters:   06/24/24 54 kg (119 lb)   06/17/24 53.5 kg (118 lb)   06/10/24 53.5 kg (118 lb)   05/30/24 53.9 kg (118 lb 13.3 oz)   05/13/24 53.1 kg (117 lb)   04/23/24 53.5 kg (118 lb)   04/11/24 55.3 kg (121 lb 14.6 oz)   03/04/24 54 kg (119 lb)   02/15/24 54.3 kg (119 lb 11.4 oz)   01/05/24 53.5 kg (118 lb)     Writer will see patient in one week.  Marzena Salter RD, , LD  [facility]  [phone]

## 2024-06-26 ENCOUNTER — APPOINTMENT (OUTPATIENT)
Dept: RADIATION ONCOLOGY | Facility: CLINIC | Age: 79
End: 2024-06-26
Attending: RADIOLOGY
Payer: COMMERCIAL

## 2024-06-26 PROCEDURE — 77386 HC IMRT TREATMENT DELIVERY, COMPLEX: CPT | Performed by: RADIOLOGY

## 2024-06-26 PROCEDURE — 77014 PR CT GUIDE FOR PLACEMENT RADIATION THERAPY FIELDS: CPT | Mod: 26 | Performed by: RADIOLOGY

## 2024-06-27 ENCOUNTER — APPOINTMENT (OUTPATIENT)
Dept: RADIATION ONCOLOGY | Facility: CLINIC | Age: 79
End: 2024-06-27
Attending: RADIOLOGY
Payer: COMMERCIAL

## 2024-06-27 ENCOUNTER — THERAPY VISIT (OUTPATIENT)
Dept: SPEECH THERAPY | Facility: CLINIC | Age: 79
End: 2024-06-27
Payer: COMMERCIAL

## 2024-06-27 DIAGNOSIS — J38.7 LARYNGEAL MASS: ICD-10-CM

## 2024-06-27 DIAGNOSIS — R13.12 OROPHARYNGEAL DYSPHAGIA: Primary | ICD-10-CM

## 2024-06-27 PROCEDURE — 77386 HC IMRT TREATMENT DELIVERY, COMPLEX: CPT | Performed by: RADIOLOGY

## 2024-06-27 PROCEDURE — 77014 PR CT GUIDE FOR PLACEMENT RADIATION THERAPY FIELDS: CPT | Mod: 26 | Performed by: RADIOLOGY

## 2024-06-27 PROCEDURE — 92526 ORAL FUNCTION THERAPY: CPT | Mod: GN | Performed by: SPEECH-LANGUAGE PATHOLOGIST

## 2024-06-28 ENCOUNTER — APPOINTMENT (OUTPATIENT)
Dept: RADIATION ONCOLOGY | Facility: CLINIC | Age: 79
End: 2024-06-28
Attending: RADIOLOGY
Payer: COMMERCIAL

## 2024-06-28 DIAGNOSIS — C32.9 LARYNGEAL SQUAMOUS CELL CARCINOMA (H): Primary | ICD-10-CM

## 2024-06-28 PROCEDURE — 77336 RADIATION PHYSICS CONSULT: CPT | Performed by: RADIOLOGY

## 2024-06-28 PROCEDURE — 77427 RADIATION TX MANAGEMENT X5: CPT | Performed by: RADIOLOGY

## 2024-06-28 PROCEDURE — 77014 PR CT GUIDE FOR PLACEMENT RADIATION THERAPY FIELDS: CPT | Mod: 26 | Performed by: RADIOLOGY

## 2024-06-28 PROCEDURE — 77386 HC IMRT TREATMENT DELIVERY, COMPLEX: CPT | Performed by: RADIOLOGY

## 2024-06-28 RX ORDER — DIPHENHYDRAMINE HYDROCHLORIDE AND LIDOCAINE HYDROCHLORIDE AND ALUMINUM HYDROXIDE AND MAGNESIUM HYDRO
5 KIT EVERY 4 HOURS PRN
Qty: 240 ML | Refills: 5 | Status: SHIPPED | OUTPATIENT
Start: 2024-06-28

## 2024-07-01 ENCOUNTER — OFFICE VISIT (OUTPATIENT)
Dept: RADIATION ONCOLOGY | Facility: CLINIC | Age: 79
End: 2024-07-01
Attending: RADIOLOGY
Payer: COMMERCIAL

## 2024-07-01 VITALS
SYSTOLIC BLOOD PRESSURE: 122 MMHG | HEART RATE: 76 BPM | BODY MASS INDEX: 20.3 KG/M2 | DIASTOLIC BLOOD PRESSURE: 76 MMHG | WEIGHT: 122 LBS

## 2024-07-01 DIAGNOSIS — C32.9 LARYNGEAL SQUAMOUS CELL CARCINOMA (H): Primary | ICD-10-CM

## 2024-07-01 PROCEDURE — 77386 HC IMRT TREATMENT DELIVERY, COMPLEX: CPT | Performed by: RADIOLOGY

## 2024-07-01 NOTE — LETTER
2024      Asya Coffman  [address]  [place] [facility] [place] [facility] [address]      Dear Colleague,    Thank you for referring your patient, Asya Coffman, to the [facility] RADIATION ONCOLOGY. Please see a copy of my visit note below.    RADIATION ONCOLOGY WEEKLY ON TREATMENT VISIT   Encounter Date: 2024    Patient Name: Asya Coffman  MRN: [id number]  : 1945     Disease and Stage: Squamous cell carcinoma of the larynx, clinical T1 N0 M0  Treatment Site: Larynx  Current Dose/Planned Total Dose: [3600 cGy / [5625] cGy    Concurrent Chemotherapy: No  Drug and Frequency: Not applicable      ENT Surgeon: Nikole Davis MD    Subjective: Ms. Coffman presents to clinic today for her weekly on-treatment visit.  She is tolerating radiation well.  She is taking 1200 mg gabapentin/24-hour period and she finds that the nighttime dose has helped her restless legs tremendously.  She continues to eat a regular diet although has switched to softer foods.  She has some mild pain with swallowing.    Nursing ROS:   Nutrition Alteration  Diet Type: Patient's Preference  Skin  Skin Reaction: 1 - Faint erythema or dry desquamation  Skin Progress: Aquaphor     ENT and Mouth Exam  Mucositis - Current: 0 - None  (S&S 15-20)  ENT/Mouth Note: Has Magic mouthwash  Cardiovascular  Respiratory effort: 1 - Normal - without distress  Gastrointestinal  Nausea: 0 - None     Psychosocial  Mood - Anxiety: 0 - Normal  Pain Assessment  0-10 Pain Scale: 0  Pain Treatment: Gabapentin 1200 mg total dose     PEG Tube: No  Electronic Cardiac Implant: No     Objective:   /76   Pulse 76   Wt 55.3 kg (122 lb)   BMI 20.30 kg/m    Gen: Appears well, NAD  HEENT: No mucositis  Skin: No erythema    Laboratory:  Lab Results   Component Value Date    WBC 8.7 2024    HGB 15.1 2024    HCT 48.3 (H) 2024    MCV 85 2024     2024     Lab Results   Component Value Date     2024     POTASSIUM 3.8 04/08/2024    CHLORIDE 104 04/08/2024    CO2 25 04/08/2024    GLC 94 04/08/2024     Magnesium   Date Value Ref Range Status   04/08/2024 2.1 1.7 - 2.3 mg/dL Final       Treatment-related toxicities (CTCAE v5.0):  Anorexia: Grade 1: Loss of appetite without alteration in eating habits  Fatigue: Grade 1: Fatigue relieved by rest  Nausea: Grade 0: No toxicity  Pain: Grade 0: No toxicity  Dysphagia: Grade 0: No toxicity  Mucositis: Grade 0: No toxicity  Dermatitis: Grade 1: Faint erythema or dry desquamation    ED visits/Hospitalizations: None    Missed Treatments: None    Mosaiq chart and setup information reviewed  IGRT images reviewed    Medication Review  Med list reviewed with patient?: Yes  Med list printed and given: Offered and declined    Assessment:    Ms. Coffman is a 79 year old female with clinical stage T1 N0 M0 squamous cell carcinoma of the larynx receiving definitive radiation.    Plan:   1.  Continue radiation as planned  2.  Continue gabapentin, total 1200 mg per 24-hour.  3.  If pain with swallowing worsens, consider acetaminophen and Magic mouthwash.    Liat Yates  Department of Radiation Oncology  [facility]                 Again, thank you for allowing me to participate in the care of your patient.        Sincerely,        Liat Yates MD

## 2024-07-01 NOTE — PROGRESS NOTES
RADIATION ONCOLOGY WEEKLY ON TREATMENT VISIT   Encounter Date: 2024    Patient Name: Asya Coffman  MRN: [id number]  : 1945     Disease and Stage: Squamous cell carcinoma of the larynx, clinical T1 N0 M0  Treatment Site: Larynx  Current Dose/Planned Total Dose: [3600 cGy / [5625] cGy    Concurrent Chemotherapy: No  Drug and Frequency: Not applicable      ENT Surgeon: Nikole Davis MD    Subjective: Ms. Coffman presents to clinic today for her weekly on-treatment visit.  She is tolerating radiation well.  She is taking 1200 mg gabapentin/24-hour period and she finds that the nighttime dose has helped her restless legs tremendously.  She continues to eat a regular diet although has switched to softer foods.  She has some mild pain with swallowing.    Nursing ROS:   Nutrition Alteration  Diet Type: Patient's Preference  Skin  Skin Reaction: 1 - Faint erythema or dry desquamation  Skin Progress: Aquaphor     ENT and Mouth Exam  Mucositis - Current: 0 - None  (S&S 15-20)  ENT/Mouth Note: Has Magic mouthwash  Cardiovascular  Respiratory effort: 1 - Normal - without distress  Gastrointestinal  Nausea: 0 - None     Psychosocial  Mood - Anxiety: 0 - Normal  Pain Assessment  0-10 Pain Scale: 0  Pain Treatment: Gabapentin 1200 mg total dose     PEG Tube: No  Electronic Cardiac Implant: No     Objective:   /76   Pulse 76   Wt 55.3 kg (122 lb)   BMI 20.30 kg/m    Gen: Appears well, NAD  HEENT: No mucositis  Skin: No erythema    Laboratory:  Lab Results   Component Value Date    WBC 8.7 2024    HGB 15.1 2024    HCT 48.3 (H) 2024    MCV 85 2024     2024     Lab Results   Component Value Date     2024    POTASSIUM 3.8 2024    CHLORIDE 104 2024    CO2 25 2024    GLC 94 2024     Magnesium   Date Value Ref Range Status   2024 2.1 1.7 - 2.3 mg/dL Final       Treatment-related toxicities (CTCAE v5.0):  Anorexia: Grade 1: Loss  of appetite without alteration in eating habits  Fatigue: Grade 1: Fatigue relieved by rest  Nausea: Grade 0: No toxicity  Pain: Grade 0: No toxicity  Dysphagia: Grade 0: No toxicity  Mucositis: Grade 0: No toxicity  Dermatitis: Grade 1: Faint erythema or dry desquamation    ED visits/Hospitalizations: None    Missed Treatments: None    Mosaiq chart and setup information reviewed  IGRT images reviewed    Medication Review  Med list reviewed with patient?: Yes  Med list printed and given: Offered and declined    Assessment:    Ms. Coffman is a 79 year old female with clinical stage T1 N0 M0 squamous cell carcinoma of the larynx receiving definitive radiation.    Plan:   1.  Continue radiation as planned  2.  Continue gabapentin, total 1200 mg per 24-hour.  3.  If pain with swallowing worsens, consider acetaminophen and Magic mouthwash.    Liat Yates  Department of Radiation Oncology  [facility]

## 2024-07-02 ENCOUNTER — APPOINTMENT (OUTPATIENT)
Dept: RADIATION ONCOLOGY | Facility: CLINIC | Age: 79
End: 2024-07-02
Attending: RADIOLOGY
Payer: COMMERCIAL

## 2024-07-02 DIAGNOSIS — C32.9 LARYNGEAL SQUAMOUS CELL CARCINOMA (H): Primary | ICD-10-CM

## 2024-07-02 PROCEDURE — 77014 PR CT GUIDE FOR PLACEMENT RADIATION THERAPY FIELDS: CPT | Mod: 26 | Performed by: RADIOLOGY

## 2024-07-02 PROCEDURE — 77386 HC IMRT TREATMENT DELIVERY, COMPLEX: CPT | Performed by: RADIOLOGY

## 2024-07-02 NOTE — PROGRESS NOTES
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 She continues to eat 3 meals a day. Her grand daughter continues to make her home made shakes and smoothies she also has several types of nutrition shakes from her son.    She is trying to increase her fluid intake in addition to focusing on getting adequate nutrition.   Breakfast Waffle, toast or cereal w/ 1% milk   Lunch [place] w/ pnb or canned chicken/tuna OR take out foods   Dinner Pasta, corn, green beans or salad,    Snacks Crackers, 2 cookies, home made shake/smoothie   Beverages 1 cup coffee, 1 - 8 oz can soda, 16 oz water   -Liquid meal replacement or supplement - Protein shakes - reviewed various kinds of shakes and gainers.   -Weight trends - stable   Wt Readings from Last 10 Encounters:   07/01/24 55.3 kg (122 lb)   06/24/24 54 kg (119 lb)   06/17/24 53.5 kg (118 lb)   06/10/24 53.5 kg (118 lb)   05/30/24 53.9 kg (118 lb 13.3 oz)   05/13/24 53.1 kg (117 lb)   04/23/24 53.5 kg (118 lb)   04/11/24 55.3 kg (121 lb 14.6 oz)   03/04/24 54 kg (119 lb)   02/15/24 54.3 kg (119 lb 11.4 oz)     Previous Goals:   1.  Aim for 5-6 small frequent meals  2.  Aim for 1600kcal and 65g protein, 6 cups water/day  3. Weight maintenance   Evaluation: Met   Previous Nutrition Diagnosis:   Predicted inadequate nutrient intake related to cancer treatment to neck region   Evaluation: No change   NEW FINDINGS:   No new findings   CURRENT NUTRITION DIAGNOSIS   Predicted inadequate nutrient intake related to cancer treatment to neck region   INTERVENTIONS   Reviewed  soft moist, calorie dense and high protein foods. Discussed importance of food alterations as eating becomes more challenging in the next several weeks.  Encouraged to continue striving for adequate fluids and nutrition shakes to maintain weight during treatment.   Goals   1.  Aim for 5-6 small frequent meals  2.  Aim for 1600kcal and 65g protein, 6 cups water/day  3. Weight maintenance   Follow up/Monitoring: One week follow up scheduled, 7/9  Food/beverage intake and Weight    Marzena Salter RD, , LD  [facility]  [phone]

## 2024-07-03 ENCOUNTER — APPOINTMENT (OUTPATIENT)
Dept: RADIATION ONCOLOGY | Facility: CLINIC | Age: 79
End: 2024-07-03
Attending: RADIOLOGY
Payer: COMMERCIAL

## 2024-07-03 PROCEDURE — 77014 PR CT GUIDE FOR PLACEMENT RADIATION THERAPY FIELDS: CPT | Mod: 26 | Performed by: RADIOLOGY

## 2024-07-03 PROCEDURE — 77386 HC IMRT TREATMENT DELIVERY, COMPLEX: CPT | Performed by: RADIOLOGY

## 2024-07-05 ENCOUNTER — OFFICE VISIT (OUTPATIENT)
Dept: OTOLARYNGOLOGY | Facility: CLINIC | Age: 79
End: 2024-07-05
Payer: COMMERCIAL

## 2024-07-05 ENCOUNTER — THERAPY VISIT (OUTPATIENT)
Dept: SPEECH THERAPY | Facility: CLINIC | Age: 79
End: 2024-07-05
Payer: COMMERCIAL

## 2024-07-05 ENCOUNTER — APPOINTMENT (OUTPATIENT)
Dept: RADIATION ONCOLOGY | Facility: CLINIC | Age: 79
End: 2024-07-05
Attending: RADIOLOGY
Payer: COMMERCIAL

## 2024-07-05 VITALS
HEART RATE: 91 BPM | BODY MASS INDEX: 19.99 KG/M2 | WEIGHT: 120 LBS | SYSTOLIC BLOOD PRESSURE: 125 MMHG | HEIGHT: 65 IN | DIASTOLIC BLOOD PRESSURE: 81 MMHG

## 2024-07-05 DIAGNOSIS — M34.1 CREST (CALCINOSIS, RAYNAUD'S PHENOMENON, ESOPHAGEAL DYSFUNCTION, SCLERODACTYLY, TELANGIECTASIA) (H): ICD-10-CM

## 2024-07-05 DIAGNOSIS — R13.12 OROPHARYNGEAL DYSPHAGIA: Primary | ICD-10-CM

## 2024-07-05 DIAGNOSIS — C32.9 LARYNGEAL CARCINOMA (H): Primary | ICD-10-CM

## 2024-07-05 DIAGNOSIS — I48.91 ATRIAL FIBRILLATION, UNSPECIFIED TYPE (H): ICD-10-CM

## 2024-07-05 DIAGNOSIS — M34.1 CREST SYNDROME (H): ICD-10-CM

## 2024-07-05 DIAGNOSIS — J38.3 VOCAL CORD LEUKOPLAKIA: ICD-10-CM

## 2024-07-05 DIAGNOSIS — R49.0 DYSPHONIA: ICD-10-CM

## 2024-07-05 DIAGNOSIS — Z78.9 RECURRENT RESPIRATORY PAPILLOMATOSIS: ICD-10-CM

## 2024-07-05 DIAGNOSIS — J38.7 LARYNGEAL MASS: ICD-10-CM

## 2024-07-05 PROCEDURE — 77386 HC IMRT TREATMENT DELIVERY, COMPLEX: CPT | Performed by: RADIOLOGY

## 2024-07-05 PROCEDURE — 99213 OFFICE O/P EST LOW 20 MIN: CPT | Mod: 25 | Performed by: OTOLARYNGOLOGY

## 2024-07-05 PROCEDURE — 31579 LARYNGOSCOPY TELESCOPIC: CPT | Performed by: OTOLARYNGOLOGY

## 2024-07-05 PROCEDURE — 92526 ORAL FUNCTION THERAPY: CPT | Mod: GN | Performed by: SPEECH-LANGUAGE PATHOLOGIST

## 2024-07-05 RX ORDER — LIDOCAINE HYDROCHLORIDE 20 MG/ML
SOLUTION OROPHARYNGEAL
COMMUNITY
Start: 2024-06-28
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
## 2024-07-08 ENCOUNTER — PATIENT OUTREACH (OUTPATIENT)
Dept: OTOLARYNGOLOGY | Facility: CLINIC | Age: 79
End: 2024-07-08
Payer: COMMERCIAL

## 2024-07-08 ENCOUNTER — OFFICE VISIT (OUTPATIENT)
Dept: RADIATION ONCOLOGY | Facility: CLINIC | Age: 79
End: 2024-07-08
Attending: RADIOLOGY
Payer: COMMERCIAL

## 2024-07-08 VITALS
BODY MASS INDEX: 20.14 KG/M2 | DIASTOLIC BLOOD PRESSURE: 89 MMHG | SYSTOLIC BLOOD PRESSURE: 130 MMHG | HEART RATE: 88 BPM | WEIGHT: 121 LBS

## 2024-07-08 DIAGNOSIS — C32.9 LARYNGEAL SQUAMOUS CELL CARCINOMA (H): Primary | ICD-10-CM

## 2024-07-08 PROCEDURE — 77336 RADIATION PHYSICS CONSULT: CPT | Performed by: RADIOLOGY

## 2024-07-08 PROCEDURE — 77386 HC IMRT TREATMENT DELIVERY, COMPLEX: CPT | Performed by: RADIOLOGY

## 2024-07-08 PROCEDURE — 77427 RADIATION TX MANAGEMENT X5: CPT | Performed by: RADIOLOGY

## 2024-07-08 NOTE — PATIENT INSTRUCTIONS
1.  You were seen in the ENT Clinic today by . If you have any questions or concerns after your appointment, please call [phone]. Press option #1 for scheduling related needs. Press option #3 for Nurse advice.    2.   has recommended the following:   - continue swallow exercises    - voice therapy    3.  Plan is to return to clinic 8/12/24      Blanca Hyman LPN  [phone]  [facility] - Otolaryngology

## 2024-07-08 NOTE — LETTER
2024      Asya Coffman  [address]  [facility] [address]      Dear Colleague,    Thank you for referring your patient, Asya Coffman, to the [facility] RADIATION ONCOLOGY. Please see a copy of my visit note below.    RADIATION ONCOLOGY WEEKLY ON TREATMENT VISIT   Encounter Date: 2024    Patient Name: Asya Coffman  MRN: [id number]  : 1945     Disease and Stage: Squamous cell carcinoma of the larynx, clinical T1 N0 M0  Treatment Site: Larynx  Current Dose/Planned Total Dose: [4500 cGy / [5625] cGy    Concurrent Chemotherapy: No  Drug and Frequency: Not applicable      ENT Surgeon: Nikole Davis MD    Subjective: Ms. Coffman presents to clinic today for her weekly on-treatment visit.  She is tolerating radiation well.  She is taking 1200 mg gabapentin/24-hour period and she finds that the nighttime dose has helped her restless legs tremendously.  She continues to eat a regular diet although has switched to softer foods.  She has some mild pain with swallowing.  She saw Dr. Harmon on 2024 at which time there were no papillomas visible.  She has become progressively more hoarse.  She notes pain with swallowing.    Nursing ROS:   Nutrition Alteration  Diet Type: Patient's Preference  Skin  Skin Reaction: 1 - Faint erythema or dry desquamation  Skin Progress: Aquaphor     ENT and Mouth Exam  Mucositis - Current: 0 - None  (S&S 15-20)  ENT/Mouth Note: Has Magic mouthwash  Cardiovascular  Respiratory effort: 1 - Normal - without distress  Gastrointestinal  Nausea: 0 - None     Psychosocial  Mood - Anxiety: 0 - Normal  Pain Assessment  0-10 Pain Scale: 2  Pain Treatment: Gabapentin 1200 mg total dose     PEG Tube: No  Electronic Cardiac Implant: No     Objective:   /89   Pulse 88   Wt 54.9 kg (121 lb)   BMI 20.14 kg/m    Gen: Appears well, NAD  HEENT: No mucositis  Skin: Mild erythema, no desquamation    Laboratory:  Lab Results   Component Value Date     WBC 8.7 04/08/2024    HGB 15.1 04/08/2024    HCT 48.3 (H) 04/08/2024    MCV 85 04/08/2024     04/08/2024     Lab Results   Component Value Date     04/08/2024    POTASSIUM 3.8 04/08/2024    CHLORIDE 104 04/08/2024    CO2 25 04/08/2024    GLC 94 04/08/2024     Magnesium   Date Value Ref Range Status   04/08/2024 2.1 1.7 - 2.3 mg/dL Final       Treatment-related toxicities (CTCAE v5.0):  Anorexia: Grade 1: Loss of appetite without alteration in eating habits  Fatigue: Grade 1: Fatigue relieved by rest  Nausea: Grade 0: No toxicity  Pain: Grade 1: Mild pain  Dysphagia: Grade 2: Symptomatic and altered eating/swallowing  Mucositis: Grade 0: No toxicity  Dermatitis: Grade 1: Faint erythema or dry desquamation    ED visits/Hospitalizations: None    Missed Treatments: None    Mosaiq chart and setup information reviewed  IGRT images reviewed    Medication Review  Med list reviewed with patient?: Yes  Med list printed and given: Offered and declined    Assessment:    Ms. Coffman is a 79 year old female with clinical stage T1 N0 M0 squamous cell carcinoma of the larynx receiving definitive radiation.    Plan:   1.  Continue radiation as planned  2.  Continue gabapentin, total 1200 mg per 24-hour.  Discussed tapering slowly off of gabapentin.  Described withdrawal symptoms which may appear as anxiety or depression.  She was instructed to contact us should any of these symptoms arise.  3.  If pain with swallowing worsens, consider acetaminophen and Magic mouthwash.      Liat Yates  Department of Radiation Oncology  [facility]                 Again, thank you for allowing me to participate in the care of your patient.        Sincerely,        Liat Yates MD

## 2024-07-08 NOTE — PROGRESS NOTES
Called patient to offer sooner pre-op appointment, per patient she is out of town so will need to keep other appointment. Inocencia Fan RN on 7/8/2024 at 8:26 AM

## 2024-07-08 NOTE — PROGRESS NOTES
RADIATION ONCOLOGY WEEKLY ON TREATMENT VISIT   Encounter Date: 2024    Patient Name: Asya Coffman  MRN: [id number]  : 1945     Disease and Stage: Squamous cell carcinoma of the larynx, clinical T1 N0 M0  Treatment Site: Larynx  Current Dose/Planned Total Dose: [4500 cGy / [5625] cGy    Concurrent Chemotherapy: No  Drug and Frequency: Not applicable      ENT Surgeon: Nikole Davis MD    Subjective: Ms. Coffman presents to clinic today for her weekly on-treatment visit.  She is tolerating radiation well.  She is taking 1200 mg gabapentin/24-hour period and she finds that the nighttime dose has helped her restless legs tremendously.  She continues to eat a regular diet although has switched to softer foods.  She has some mild pain with swallowing.  She saw Dr. Harmon on 2024 at which time there were no papillomas visible.  She has become progressively more hoarse.  She notes pain with swallowing.    Nursing ROS:   Nutrition Alteration  Diet Type: Patient's Preference  Skin  Skin Reaction: 1 - Faint erythema or dry desquamation  Skin Progress: Aquaphor     ENT and Mouth Exam  Mucositis - Current: 0 - None  (S&S 15-20)  ENT/Mouth Note: Has Magic mouthwash  Cardiovascular  Respiratory effort: 1 - Normal - without distress  Gastrointestinal  Nausea: 0 - None     Psychosocial  Mood - Anxiety: 0 - Normal  Pain Assessment  0-10 Pain Scale: 2  Pain Treatment: Gabapentin 1200 mg total dose     PEG Tube: No  Electronic Cardiac Implant: No     Objective:   /89   Pulse 88   Wt 54.9 kg (121 lb)   BMI 20.14 kg/m    Gen: Appears well, NAD  HEENT: No mucositis  Skin: Mild erythema, no desquamation    Laboratory:  Lab Results   Component Value Date    WBC 8.7 2024    HGB 15.1 2024    HCT 48.3 (H) 2024    MCV 85 2024     2024     Lab Results   Component Value Date     2024    POTASSIUM 3.8 2024    CHLORIDE 104 2024    CO2 25 2024     GLC 94 04/08/2024     Magnesium   Date Value Ref Range Status   04/08/2024 2.1 1.7 - 2.3 mg/dL Final       Treatment-related toxicities (CTCAE v5.0):  Anorexia: Grade 1: Loss of appetite without alteration in eating habits  Fatigue: Grade 1: Fatigue relieved by rest  Nausea: Grade 0: No toxicity  Pain: Grade 1: Mild pain  Dysphagia: Grade 2: Symptomatic and altered eating/swallowing  Mucositis: Grade 0: No toxicity  Dermatitis: Grade 1: Faint erythema or dry desquamation    ED visits/Hospitalizations: None    Missed Treatments: None    Mosaiq chart and setup information reviewed  IGRT images reviewed    Medication Review  Med list reviewed with patient?: Yes  Med list printed and given: Offered and declined    Assessment:    Ms. Coffman is a 79 year old female with clinical stage T1 N0 M0 squamous cell carcinoma of the larynx receiving definitive radiation.    Plan:   1.  Continue radiation as planned  2.  Continue gabapentin, total 1200 mg per 24-hour.  Discussed tapering slowly off of gabapentin.  Described withdrawal symptoms which may appear as anxiety or depression.  She was instructed to contact us should any of these symptoms arise.  3.  If pain with swallowing worsens, consider acetaminophen and Magic mouthwash.      Liat Yates  Department of Radiation Oncology  [facility]

## 2024-07-09 ENCOUNTER — APPOINTMENT (OUTPATIENT)
Dept: RADIATION ONCOLOGY | Facility: CLINIC | Age: 79
End: 2024-07-09
Attending: RADIOLOGY
Payer: COMMERCIAL

## 2024-07-09 ENCOUNTER — TELEPHONE (OUTPATIENT)
Dept: OTOLARYNGOLOGY | Facility: CLINIC | Age: 79
End: 2024-07-09
Payer: COMMERCIAL

## 2024-07-09 DIAGNOSIS — C32.9 LARYNGEAL SQUAMOUS CELL CARCINOMA (H): Primary | ICD-10-CM

## 2024-07-09 PROCEDURE — 77386 HC IMRT TREATMENT DELIVERY, COMPLEX: CPT | Performed by: RADIOLOGY

## 2024-07-09 PROCEDURE — 77014 PR CT GUIDE FOR PLACEMENT RADIATION THERAPY FIELDS: CPT | Mod: 26 | Performed by: RADIOLOGY

## 2024-07-09 PROCEDURE — 97803 MED NUTRITION INDIV SUBSEQ: CPT | Mod: XU | Performed by: DIETITIAN, REGISTERED

## 2024-07-09 NOTE — TELEPHONE ENCOUNTER
Called patient to let her know that we will keep her post op as is since she will be traveling on proposed date. Patient was not able to answer phone at the time of call, will follow up on [facility].. Inocencia Fan RN on 7/9/2024 at 1:19 PM

## 2024-07-09 NOTE — TELEPHONE ENCOUNTER
Rescheduled surgery with Dr. Davis from 7/11/2024 to 8/15/2024    Spoke with: Patient    Reason for reschedule: per Dr. Davis patient is okay enough to wait until august    Surgery is located at [facility], [place] [id number]    Patient is aware their H&P will need to be within 30 days of their new surgery date     Is there imaging that needs to be rescheduled for new surgery date? No    Patient needs scheduled for post op    Additional comments: Patient wants to confirm whether or not she will need to move her 8/12 appointment    Patient will call back if they have any questions or concerns.    Krys Reddy on 7/9/2024 at 9:14 AM

## 2024-07-09 NOTE — PROGRESS NOTES
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 She is having increased xerostomia and thick saliva. She continues to eat 3 meals a day. Her grand daughter continues to make her home made shakes and smoothies she also has several types of nutrition shakes from her son.  She is interested in trying CIB as she does not like regular shakes such as Ensure and Boost.     Breakfast Waffle, toast or cereal w/ 1% milk   Lunch [place] w/ pnb or canned chicken/tuna OR take out foods   Dinner Pasta, casseroles, hamburger   Snacks Crackers, 2 cookies, home made shake/smoothie   Beverages 1 cup coffee, 1 - 8 oz can soda, 16 oz water     -Weight trends - stable   Wt Readings from Last 10 Encounters:   07/08/24 54.9 kg (121 lb)   07/05/24 54.4 kg (120 lb)   07/01/24 55.3 kg (122 lb)   06/24/24 54 kg (119 lb)   06/17/24 53.5 kg (118 lb)   06/10/24 53.5 kg (118 lb)   05/30/24 53.9 kg (118 lb 13.3 oz)   05/13/24 53.1 kg (117 lb)   04/23/24 53.5 kg (118 lb)   04/11/24 55.3 kg (121 lb 14.6 oz)     Previous Goals:   1.  Aim for 5-6 small frequent meals  2.  Aim for 1600kcal and 65g protein, 6 cups water/day  3. Weight maintenance   Evaluation: Met   Previous Nutrition Diagnosis:   Predicted inadequate nutrient intake related to cancer treatment to neck region   Evaluation: No change   NEW FINDINGS:   No new findings   CURRENT NUTRITION DIAGNOSIS   Predicted inadequate nutrient intake related to cancer treatment to neck region   INTERVENTIONS   Encouraged to continue striving for adequate fluids and nutrition  shakes to maintain weight during treatment.  Suggested trying carnation instant breakfast.   Reviewed hydration options with protein such as [id number] and Gatorade protein. Encouraged to increase fluids towards 6-8 cups per day to help with xerostomia and thick saliva  Encouraged to maintain optimal oral nutrition and hydration for 4-6 weeks post cancer treatment for healing.  Goals   1.  Aim for 1600kcal and 65g protein, 6 cups water/day  2. Weight maintenance   Follow up/Monitoring: patient has RD contact information for future questions and/or concerns.   Marzena Salter RD, , LD  [facility]  [phone]

## 2024-07-10 ENCOUNTER — APPOINTMENT (OUTPATIENT)
Dept: RADIATION ONCOLOGY | Facility: CLINIC | Age: 79
End: 2024-07-10
Attending: RADIOLOGY
Payer: COMMERCIAL

## 2024-07-10 PROCEDURE — 77386 HC IMRT TREATMENT DELIVERY, COMPLEX: CPT | Performed by: RADIOLOGY

## 2024-07-10 PROCEDURE — 77014 PR CT GUIDE FOR PLACEMENT RADIATION THERAPY FIELDS: CPT | Mod: 26 | Performed by: RADIOLOGY

## 2024-07-11 ENCOUNTER — THERAPY VISIT (OUTPATIENT)
Dept: SPEECH THERAPY | Facility: CLINIC | Age: 79
End: 2024-07-11
Payer: COMMERCIAL

## 2024-07-11 ENCOUNTER — APPOINTMENT (OUTPATIENT)
Dept: RADIATION ONCOLOGY | Facility: CLINIC | Age: 79
End: 2024-07-11
Attending: RADIOLOGY
Payer: COMMERCIAL

## 2024-07-11 DIAGNOSIS — Z78.9 RECURRENT RESPIRATORY PAPILLOMATOSIS: ICD-10-CM

## 2024-07-11 DIAGNOSIS — M34.1 CREST SYNDROME (H): ICD-10-CM

## 2024-07-11 DIAGNOSIS — R13.12 OROPHARYNGEAL DYSPHAGIA: Primary | ICD-10-CM

## 2024-07-11 DIAGNOSIS — J38.7 LARYNGEAL MASS: ICD-10-CM

## 2024-07-11 PROCEDURE — 92526 ORAL FUNCTION THERAPY: CPT | Mod: GN | Performed by: SPEECH-LANGUAGE PATHOLOGIST

## 2024-07-11 PROCEDURE — 77386 HC IMRT TREATMENT DELIVERY, COMPLEX: CPT | Performed by: RADIOLOGY

## 2024-07-11 PROCEDURE — 77014 PR CT GUIDE FOR PLACEMENT RADIATION THERAPY FIELDS: CPT | Mod: 26 | Performed by: STUDENT IN AN ORGANIZED HEALTH CARE EDUCATION/TRAINING PROGRAM

## 2024-07-12 ENCOUNTER — APPOINTMENT (OUTPATIENT)
Dept: RADIATION ONCOLOGY | Facility: CLINIC | Age: 79
End: 2024-07-12
Attending: RADIOLOGY
Payer: COMMERCIAL

## 2024-07-12 VITALS
HEART RATE: 76 BPM | BODY MASS INDEX: 20.19 KG/M2 | SYSTOLIC BLOOD PRESSURE: 118 MMHG | OXYGEN SATURATION: 96 % | WEIGHT: 121.3 LBS | RESPIRATION RATE: 18 BRPM | DIASTOLIC BLOOD PRESSURE: 72 MMHG

## 2024-07-12 DIAGNOSIS — C32.9 LARYNGEAL SQUAMOUS CELL CARCINOMA (H): Primary | ICD-10-CM

## 2024-07-12 PROCEDURE — 77386 HC IMRT TREATMENT DELIVERY, COMPLEX: CPT | Performed by: RADIOLOGY

## 2024-07-12 ASSESSMENT — PAIN SCALES - GENERAL: PAINLEVEL: NO PAIN (0)

## 2024-07-12 NOTE — PROGRESS NOTES
[facility]  SPECIALIZING IN BREAKTHROUGHS  Radiation Oncology    On Treatment Visit Note      Asya Coffman      Date: 2024   MRN: [id number]   : 1945  Diagnosis: Thyroid Cancer      Reason for Visit:  On Radiation Treatment Visit     Treatment Summary to Date  Treatment Site: Glottis Current Dose: 5400/5625 cGy Fractions: /25      Chemotherapy  Chemo concurrent with radx?: No    ED Visit/Hospital Admission: none    Treatment Breaks: none    Subjective:     Hannah continues to do well with her radiation treatments.  Swallowing pain is well-controlled with Neurontin.  Her weight is stable.    Nursing ROS:   Nutrition Alteration  Diet Type: Patient's Preference  Nutrition Note: states sometimes 1 shake a day  Skin  Skin Reaction: 1 - Faint erythema or dry desquamation  Skin Progress: Aquaphor  Skin Note: feels sunburned     ENT and Mouth Exam  Mucositis - Current: 0 - None   ENT/Mouth Note: Has Magic mouthwash (has prescription mouthwash, hasnt needed yet)  Cardiovascular  Respiratory effort: 1 - Normal - without distress  Sputum: 0 - None (clear)  Cardio/Resp Note: spits up clear sputum at times  Gastrointestinal  Nausea: 0 - None  Vomitin - No vomiting (ons)  Diarrhea: 0 - None  Genitourinary  Urinary Status: 0 - Normal  Psychosocial  Mood - Anxiety: 0 - Normal  Pain Assessment  0-10 Pain Scale: 0  Pain Treatment: Gabapentin 1200 mg total dose  Pain Note: Ppain goes up to 4-5 when swallow    Objective:   /72 (BP Location: Left arm, Patient Position: Sitting, Cuff Size: Adult Small)   Pulse 76   Resp 18   Wt 55 kg (121 lb 4.8 oz)   SpO2 96%   BMI 20.19 kg/m    Gen: Appears well, in no acute distress  Skin: Mild diffuse erythema over treatment field    Labs:  CBC RESULTS:   Recent Labs   Lab Test 24  1358   WBC 8.7   RBC 5.68*   HGB 15.1   HCT 48.3*   MCV 85   MCH 26.6   MCHC 31.3*   RDW 15.4*        ELECTROLYTES:  Recent Labs   Lab Test  04/08/24  1358      POTASSIUM 3.8   CHLORIDE 104   ESSIE 9.1   CO2 25   BUN 17.3   CR 1.19*   GLC 94       Assessment:    Tolerating radiation therapy well.  All questions and concerns addressed.    Toxicities:  Anorexia: Grade 1: Loss of appetite without alteration in eating habits  Fatigue: Grade 1: Fatigue relieved by rest  Nausea: Grade 0: No toxicity  Pain: Grade 1: Mild pain  Dysphagia: Grade 2: Symptomatic and altered eating/swallowing  Mucositis: Grade 0: No toxicity  Dermatitis: Grade 1: Faint erythema or dry desquamation    Plan:   Continue radiation treatments as planned.    Ports/IGRT reviewed  She may begin Neurontin taper per Dr. Regalado's instructions after completion of RT when she feels she is ready to do so.          Medication Review  Med list reviewed with patient?: No (reviewed gabapentin and that she isn't on a steroid. declined other changes)  Medication changes: denied    Educational Topic Discussed  Additional Instructions: reviwed skin care  Education Instructions: Reviewed        Raoul Tineo MD  [phone] clinic

## 2024-07-12 NOTE — LETTER
2024      Asya Coffman  [address]  [place] [address]      Dear Colleague,    Thank you for referring your patient, Asya Coffman, to the [facility] RADIATION ONCOLOGY. Please see a copy of my visit note below.    [facility]  SPECIALIZING IN BREAKTHROUGHS  Radiation Oncology    On Treatment Visit Note      Asya Coffman      Date: 2024   MRN: [id number]   : 1945  Diagnosis: Thyroid Cancer      Reason for Visit:  On Radiation Treatment Visit     Treatment Summary to Date  Treatment Site: Glottis Current Dose: 5400/5625 cGy Fractions:       Chemotherapy  Chemo concurrent with radx?: No    ED Visit/Hospital Admission: none    Treatment Breaks: none    Subjective:     Hannah continues to do well with her radiation treatments.  Swallowing pain is well-controlled with Neurontin.  Her weight is stable.    Nursing ROS:   Nutrition Alteration  Diet Type: Patient's Preference  Nutrition Note: states sometimes 1 shake a day  Skin  Skin Reaction: 1 - Faint erythema or dry desquamation  Skin Progress: Aquaphor  Skin Note: feels sunburned     ENT and Mouth Exam  Mucositis - Current: 0 - None   ENT/Mouth Note: Has Magic mouthwash (has prescription mouthwash, hasnt needed yet)  Cardiovascular  Respiratory effort: 1 - Normal - without distress  Sputum: 0 - None (clear)  Cardio/Resp Note: spits up clear sputum at times  Gastrointestinal  Nausea: 0 - None  Vomitin - No vomiting (ons)  Diarrhea: 0 - None  Genitourinary  Urinary Status: 0 - Normal  Psychosocial  Mood - Anxiety: 0 - Normal  Pain Assessment  0-10 Pain Scale: 0  Pain Treatment: Gabapentin 1200 mg total dose  Pain Note: Ppain goes up to 4-5 when swallow    Objective:   /72 (BP Location: Left arm, Patient Position: Sitting, Cuff Size: Adult Small)   Pulse 76   Resp 18   Wt 55 kg (121 lb 4.8 oz)   SpO2 96%   BMI 20.19 kg/m    Gen: Appears well, in no acute distress  Skin: Mild  diffuse erythema over treatment field    Labs:  CBC RESULTS:   Recent Labs   Lab Test 04/08/24  1358   WBC 8.7   RBC 5.68*   HGB 15.1   HCT 48.3*   MCV 85   MCH 26.6   MCHC 31.3*   RDW 15.4*        ELECTROLYTES:  Recent Labs   Lab Test 04/08/24  1358      POTASSIUM 3.8   CHLORIDE 104   ESSIE 9.1   CO2 25   BUN 17.3   CR 1.19*   GLC 94       Assessment:    Tolerating radiation therapy well.  All questions and concerns addressed.    Toxicities:  Anorexia: Grade 1: Loss of appetite without alteration in eating habits  Fatigue: Grade 1: Fatigue relieved by rest  Nausea: Grade 0: No toxicity  Pain: Grade 1: Mild pain  Dysphagia: Grade 2: Symptomatic and altered eating/swallowing  Mucositis: Grade 0: No toxicity  Dermatitis: Grade 1: Faint erythema or dry desquamation    Plan:   Continue radiation treatments as planned.    Ports/IGRT reviewed  She may begin Neurontin taper per Dr. Regalado's instructions after completion of RT when she feels she is ready to do so.          Medication Review  Med list reviewed with patient?: No (reviewed gabapentin and that she isn't on a steroid. declined other changes)  Medication changes: denied    Educational Topic Discussed  Additional Instructions: reviwed skin care  Education Instructions: Reviewed        Raoul Tineo MD  [phone] clinic       Again, thank you for allowing me to participate in the care of your patient.        Sincerely,        Raoul Tineo MD

## 2024-07-15 ENCOUNTER — APPOINTMENT (OUTPATIENT)
Dept: RADIATION ONCOLOGY | Facility: CLINIC | Age: 79
End: 2024-07-15
Attending: RADIOLOGY
Payer: COMMERCIAL

## 2024-07-15 PROCEDURE — 77427 RADIATION TX MANAGEMENT X5: CPT | Performed by: RADIOLOGY

## 2024-07-15 PROCEDURE — 77336 RADIATION PHYSICS CONSULT: CPT | Performed by: RADIOLOGY

## 2024-07-15 PROCEDURE — 77014 PR CT GUIDE FOR PLACEMENT RADIATION THERAPY FIELDS: CPT | Mod: 26 | Performed by: RADIOLOGY

## 2024-07-15 PROCEDURE — 77386 HC IMRT TREATMENT DELIVERY, COMPLEX: CPT | Performed by: RADIOLOGY

## 2024-07-16 ENCOUNTER — TELEPHONE (OUTPATIENT)
Dept: SPEECH THERAPY | Facility: CLINIC | Age: 79
End: 2024-07-16

## 2024-07-24 ENCOUNTER — THERAPY VISIT (OUTPATIENT)
Dept: SPEECH THERAPY | Facility: CLINIC | Age: 79
End: 2024-07-24
Payer: COMMERCIAL

## 2024-07-24 DIAGNOSIS — R13.12 OROPHARYNGEAL DYSPHAGIA: Primary | ICD-10-CM

## 2024-07-24 DIAGNOSIS — M34.1 CREST SYNDROME (H): ICD-10-CM

## 2024-07-24 DIAGNOSIS — Z78.9 RECURRENT RESPIRATORY PAPILLOMATOSIS: ICD-10-CM

## 2024-07-24 DIAGNOSIS — J38.7 LARYNGEAL MASS: ICD-10-CM

## 2024-07-24 PROCEDURE — 92526 ORAL FUNCTION THERAPY: CPT | Mod: GN | Performed by: SPEECH-LANGUAGE PATHOLOGIST

## 2024-08-12 ENCOUNTER — OFFICE VISIT (OUTPATIENT)
Dept: OTOLARYNGOLOGY | Facility: CLINIC | Age: 79
End: 2024-08-12
Payer: COMMERCIAL

## 2024-08-12 ENCOUNTER — THERAPY VISIT (OUTPATIENT)
Dept: SPEECH THERAPY | Facility: CLINIC | Age: 79
End: 2024-08-12
Payer: COMMERCIAL

## 2024-08-12 VITALS
SYSTOLIC BLOOD PRESSURE: 122 MMHG | HEIGHT: 65 IN | WEIGHT: 115 LBS | BODY MASS INDEX: 19.16 KG/M2 | HEART RATE: 65 BPM | DIASTOLIC BLOOD PRESSURE: 51 MMHG

## 2024-08-12 DIAGNOSIS — Z78.9 RECURRENT RESPIRATORY PAPILLOMATOSIS: Primary | ICD-10-CM

## 2024-08-12 DIAGNOSIS — Z92.3 HISTORY OF HEAD AND NECK RADIATION: ICD-10-CM

## 2024-08-12 DIAGNOSIS — Z78.9 RECURRENT RESPIRATORY PAPILLOMATOSIS: ICD-10-CM

## 2024-08-12 DIAGNOSIS — J38.3 OTHER DISEASES OF VOCAL CORDS: ICD-10-CM

## 2024-08-12 DIAGNOSIS — B37.0 THRUSH: ICD-10-CM

## 2024-08-12 DIAGNOSIS — J44.9 CHRONIC OBSTRUCTIVE PULMONARY DISEASE, UNSPECIFIED COPD TYPE (H): ICD-10-CM

## 2024-08-12 DIAGNOSIS — I48.91 ATRIAL FIBRILLATION, UNSPECIFIED TYPE (H): ICD-10-CM

## 2024-08-12 DIAGNOSIS — M34.1 CREST SYNDROME (H): ICD-10-CM

## 2024-08-12 DIAGNOSIS — C32.9 LARYNGEAL CARCINOMA (H): ICD-10-CM

## 2024-08-12 DIAGNOSIS — R13.12 OROPHARYNGEAL DYSPHAGIA: Primary | ICD-10-CM

## 2024-08-12 DIAGNOSIS — J38.7 LARYNGEAL MASS: ICD-10-CM

## 2024-08-12 DIAGNOSIS — M34.1 CREST (CALCINOSIS, RAYNAUD'S PHENOMENON, ESOPHAGEAL DYSFUNCTION, SCLERODACTYLY, TELANGIECTASIA) (H): ICD-10-CM

## 2024-08-12 PROCEDURE — 31579 LARYNGOSCOPY TELESCOPIC: CPT | Performed by: OTOLARYNGOLOGY

## 2024-08-12 PROCEDURE — 92526 ORAL FUNCTION THERAPY: CPT | Mod: GN | Performed by: SPEECH-LANGUAGE PATHOLOGIST

## 2024-08-12 PROCEDURE — 99213 OFFICE O/P EST LOW 20 MIN: CPT | Mod: 25 | Performed by: OTOLARYNGOLOGY

## 2024-08-12 RX ORDER — FLUCONAZOLE 100 MG/1
100 TABLET ORAL DAILY
Qty: 14 TABLET | Refills: 0 | Status: SHIPPED | OUTPATIENT
Start: 2024-08-12 | End: 2024-09-16

## 2024-08-12 NOTE — PROGRESS NOTES
[facility]                                                                                   OUTPATIENT SPEECH LANGUAGE PATHOLOGY    PLAN OF TREATMENT FOR OUTPATIENT REHABILITATION   Patient's Last Name, First Name, Asya Youssef YOB: 1945   Provider's Name   [facility]   Medical Record No.  [id number]     Onset Date: 05/02/24 Start of Care Date: 05/02/24     Medical Diagnosis:  CREST (calcinosis, Raynaud's phenomenon, esophageal dysfunction, sclerodactyly, telangiectasia) (H)  Dysphagia      SLP Treatment Diagnosis: Mild oropharyngeal dysphagia Plan of Treatment  Frequency/Duration: 4x/month during radiation, then 1x/month for 6 months  / 12 months     Certification date from 07/31/24   To 10/28/24          See note for plan of treatment details and functional goals     Blaire Oh, SLP                         I CERTIFY THE NEED FOR THESE SERVICES FURNISHED UNDER        THIS PLAN OF TREATMENT AND WHILE UNDER MY CARE     (Physician attestation of this document indicates review and certification of the therapy plan).              Referring Provider:  Dr. Nikole Davis    Agree with note.  Nikole Davis MD  Signature requested 8/13/2024        Initial Assessment  See Epic Evaluation- 05/02/24            PLAN  Continue therapy per current plan of care.    Beginning/End Dates of Progress Note Reporting Period:  5/2/24   to 08/12/2024    Referring Provider:  No ref. provider found         08/12/24 [id number]   Appointment Info   Treating Provider Blaire Oh MS, [facility]-SLP   Visits Used 8   Medical Diagnosis CREST (calcinosis, Raynaud's phenomenon, esophageal dysfunction, sclerodactyly, telangiectasia) (H)  Dysphagia   SLP Tx Diagnosis Mild oropharyngeal dysphagia    Progress Note/Certification   Start Of Care Date 05/02/24   Onset Of Illness/injury Or Date Of Surgery 05/02/24   Therapy Frequency 4x/month during  "radiation, then 1x/month for 6 months   Predicted Duration 12 months   Certification date from 07/31/24   Certification date to 10/28/24   Subjective Report   Subjective Report Pt was seen today for swallow therapy. 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  No PEG. Reports \"last week was awful.\"   SLP Goals   SLP Goals 1;2   SLP Goal 1   Goal Identifier PO   Goal Description 1. Pt will tolerate regular, moist textures and thin liquids with no overt clinical s/sx of penetration/aspiration in 100% of PO trials.   Rationale To maximize safety, ease and/or independence of oral intake   Goal Progress Goal extended: Pt reports swallowing is going better. She feels there is less pain and that she's recovering but hasn't been recovering as much as she doesn't feel like eating. She took several sips of thin liquid with no overt clinical s/sx of aspiration/penetration. She feels like she is able to taste adequately. She reports she has tried to eat some foods but overall hasn't been taking much. She will try to increase this over the next few weeks.   Target Date 10/28/24   SLP Goal 2   Goal Identifier Exercise   Goal Description 2. Pt will maximize swallow function by completing 10 reps of 5/5 oropharyngeal and jaw strengthening/ROM exercises daily with 100% accuracy.   Rationale To maximize safety, ease and/or independence of oral intake   Goal Progress Goal extended: Pt reports she has been doing her straw exercises. She has not been completing the swallowing exercises. Provided ongoing rationale for daily completion of the pharyngeal exercises to optimize long term pharyngeal function and swallowing. She verbailzed understanding.   Target Date 10/28/24   Treatment Interventions (SLP)   Treatment Interventions Treatment Swallow/Oral dysfunction   Treatment " Swallow/Oral dysfunction   Treatment of Swallowing Dysfunction &/or Oral Function for Feeding Minutes (15149) 16 Minutes   Skilled Intervention Provided written and verbal information on diet modifications.;Educated patient on swallowing strategies.;Demonstrated safe swallow strategies;Assessed oral intake trials;Other  (trained exercises)   Education   Learner/Method Patient;Listening;Pictures/Video;No Barriers to Learning   Plan   Plan for next session f/u in conjunction with ENT clinic visit   Total Session Time   Total Treatment Time (sum of timed and untimed services) 16

## 2024-08-12 NOTE — PATIENT INSTRUCTIONS
1.  You were seen in the ENT Clinic today by . If you have any questions or concerns after your appointment, please call [phone]. Press option #1 for scheduling related needs. Press option #3 for Nurse advice.    2.   has recommended the following:   - start Fluconazole and take as directed. Prescription sent to your pharmacy   - continue swallow SLP recommendations    - resume voice exercises   - follow up with Rad Onc for follow up   - postpone OR by 6-8 weeks    3.  Plan is to return to clinic in 6 weeks, before scheduled surgery and for post operative follow up. TBD.      Blanca Hyman LPN  [phone]  [facility] - Otolaryngology
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
## 2024-08-13 NOTE — TELEPHONE ENCOUNTER
Rescheduled surgery with Dr. Davis to 10/10/2024    Spoke with: Patient    Surgery is located at [facility], [place] [address]    Patient is aware their H&P will need to be within 30 days of their new surgery date     Is there imaging that needs to be rescheduled for new surgery date? No    Patients 5 week post op has been rescheduled to 11/15/2024 at 9am    Additional comments: Patient will call back if they have any questions or concerns.    Krys Reddy on 8/13/2024 at 3:53 PM

## 2024-08-27 ENCOUNTER — MEDICAL CORRESPONDENCE (OUTPATIENT)
Dept: HEALTH INFORMATION MANAGEMENT | Facility: CLINIC | Age: 79
End: 2024-08-27

## 2024-09-04 ENCOUNTER — TELEPHONE (OUTPATIENT)
Dept: OTOLARYNGOLOGY | Facility: CLINIC | Age: 79
End: 2024-09-04
Payer: COMMERCIAL

## 2024-09-04 NOTE — TELEPHONE ENCOUNTER
[facility] Call Center    Phone Message    May a detailed message be left on voicemail: yes     Reason for Call: Other: Pt's Daughter calling to update care team as she recently had a fall, she is now in a back brace and is wondering if Ryan needs to be connecting with the [facility] team, please call with questions, thanks     Action Taken: Other: ENT    Travel Screening: Not Applicable     Date of Service:

## 2024-09-04 NOTE — TELEPHONE ENCOUNTER
Called patient to let her know that patient's larynx was stable a few weeks ago when evaluated so care team did not expect any trouble with intubation. Asked patient daughter to call back using direct number if she had any questions or concerns in the meantime. LVM and provided direct number for call back. Inocencia Fan, RN on 9/4/2024 at 2:56 PM

## 2024-09-05 ENCOUNTER — PATIENT OUTREACH (OUTPATIENT)
Dept: OTOLARYNGOLOGY | Facility: CLINIC | Age: 79
End: 2024-09-05
Payer: COMMERCIAL

## 2024-09-05 NOTE — PROGRESS NOTES
Called patient daughter regarding her questions. Patient daughter was worried the [facility] would not be able to see the notes from our care team. Writer let patient know that [facility] would be able to see the care notes from our care team. Patient daughter was agreeable and verbalized understanding of the situation. Patient/family will reach out with any other questions or concerns in the meantime. Inocencia Fan RN on 9/5/2024 at 8:01 AM

## 2024-09-13 ENCOUNTER — PATIENT OUTREACH (OUTPATIENT)
Dept: OTOLARYNGOLOGY | Facility: CLINIC | Age: 79
End: 2024-09-13
Payer: COMMERCIAL

## 2024-09-13 NOTE — PROGRESS NOTES
Called patient daughter regarding upcoming appoint as patient had fallen and it was unclear if she was going to be able to come to the appointment. LVM and provided direct number for call back. Inocencia Fan RN on 9/13/2024 at 8:16 AM

## 2024-09-16 ENCOUNTER — TELEPHONE (OUTPATIENT)
Dept: OTOLARYNGOLOGY | Facility: CLINIC | Age: 79
End: 2024-09-16

## 2024-09-16 ENCOUNTER — THERAPY VISIT (OUTPATIENT)
Dept: SPEECH THERAPY | Facility: CLINIC | Age: 79
End: 2024-09-16
Payer: COMMERCIAL

## 2024-09-16 ENCOUNTER — OFFICE VISIT (OUTPATIENT)
Dept: OTOLARYNGOLOGY | Facility: CLINIC | Age: 79
End: 2024-09-16
Payer: COMMERCIAL

## 2024-09-16 VITALS — WEIGHT: 115 LBS | HEIGHT: 65 IN | BODY MASS INDEX: 19.16 KG/M2

## 2024-09-16 DIAGNOSIS — M34.1 CREST (CALCINOSIS, RAYNAUD'S PHENOMENON, ESOPHAGEAL DYSFUNCTION, SCLERODACTYLY, TELANGIECTASIA) (H): ICD-10-CM

## 2024-09-16 DIAGNOSIS — B37.0 THRUSH: ICD-10-CM

## 2024-09-16 DIAGNOSIS — Z78.9 RECURRENT RESPIRATORY PAPILLOMATOSIS: ICD-10-CM

## 2024-09-16 DIAGNOSIS — J38.7 LARYNGEAL MASS: ICD-10-CM

## 2024-09-16 DIAGNOSIS — Z92.3 HISTORY OF HEAD AND NECK RADIATION: ICD-10-CM

## 2024-09-16 DIAGNOSIS — S22.009S CLOSED FRACTURE OF THORACIC VERTEBRA, UNSPECIFIED FRACTURE MORPHOLOGY, UNSPECIFIED THORACIC VERTEBRAL LEVEL, SEQUELA: ICD-10-CM

## 2024-09-16 DIAGNOSIS — R13.12 OROPHARYNGEAL DYSPHAGIA: Primary | ICD-10-CM

## 2024-09-16 DIAGNOSIS — J38.3 VOCAL FOLD LEUKOPLAKIA: Primary | ICD-10-CM

## 2024-09-16 PROCEDURE — 99213 OFFICE O/P EST LOW 20 MIN: CPT | Mod: 25 | Performed by: OTOLARYNGOLOGY

## 2024-09-16 PROCEDURE — 99207 PR NO BILLABLE SERVICE THIS VISIT: CPT | Performed by: SPEECH-LANGUAGE PATHOLOGIST

## 2024-09-16 PROCEDURE — 31579 LARYNGOSCOPY TELESCOPIC: CPT | Performed by: OTOLARYNGOLOGY

## 2024-09-16 RX ORDER — AMOXICILLIN 250 MG
2 CAPSULE ORAL
COMMUNITY
Start: 2024-09-10

## 2024-09-16 RX ORDER — DILTIAZEM HCL 90 MG
TABLET ORAL
COMMUNITY
Start: 2024-09-10

## 2024-09-16 RX ORDER — POLYETHYLENE GLYCOL 3350 17 G/17G
17 POWDER, FOR SOLUTION ORAL
COMMUNITY
Start: 2024-09-10

## 2024-09-16 RX ORDER — FLUCONAZOLE 100 MG/1
100 TABLET ORAL DAILY
Qty: 14 TABLET | Refills: 0 | Status: SHIPPED | OUTPATIENT
Start: 2024-09-16 | End: 2024-09-30

## 2024-09-16 RX ORDER — CALCIUM CARBONATE 500 MG/1
1000 TABLET, CHEWABLE ORAL
COMMUNITY
Start: 2024-09-10

## 2024-09-16 RX ORDER — GUAIFENESIN 200 MG/10ML
15 LIQUID ORAL
COMMUNITY
Start: 2024-09-10
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
## 2024-09-16 NOTE — PATIENT INSTRUCTIONS
1.  You were seen in the ENT Clinic today by . If you have any questions or concerns after your appointment, please call [phone]. Press option #1 for scheduling related needs. Press option #3 for Nurse advice.    2.   has recommended the following:   - complete another 2 week course of antibiotic. Refill will be sent to our pharmacy    3.  Plan is to return to clinic 3-4 weeks for recheck      Blanca Hyman, DENISHA  [phone]  [facility] - Otolaryngology

## 2024-09-16 NOTE — TELEPHONE ENCOUNTER
Left patient a voicemail to reschedule surgery for Microdirect laryngoscopy with excision/ablation of laryngeal lesions, possible biopsies, possible CO2 laser (N/A) from 10/10/2024 to 10/31/2024 with Dr. Davis - Left Surgery Scheduling line for callback [phone]    Krys Reddy on 9/16/2024 at 3:30 PM
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
## 2024-09-16 NOTE — PROGRESS NOTES
Pt currently at TCU receiving SLP services for dysphagia following her fall. She reports swallowing is difficult and she's taking thick liquid and pureed foods. SLP to follow up with her once she is discharged from TCU and returns for follow up with Dr. Davis.      Blaire Oh MS, CCC-SLP  Speech-Language Pathology  [facility]  Department of Otolaryngology/D&T - 4th floor  Phone: [phone]  Email: [email or web address]

## 2024-09-18 ENCOUNTER — TRANSCRIBE ORDERS (OUTPATIENT)
Dept: OTHER | Age: 79
End: 2024-09-18

## 2024-09-18 DIAGNOSIS — R13.12 OROPHARYNGEAL DYSPHAGIA: Primary | ICD-10-CM

## 2024-09-18 NOTE — TELEPHONE ENCOUNTER
Rescheduled surgery with Dr. Davis from 10/10/2024 to 10/31/2024    Spoke with: Patient    Reason for reschedule: Per Dr. Christian request    Surgery is located at [facility] [place] [address]    Patient is aware their H&P will need to be within 30 days of their new surgery date     Is there imaging that needs to be rescheduled for new surgery date? No    Patient needs scheduled for their 5 week post op    Additional comments: Patient will call back if they have any questions or concerns.    Krys Reddy on 9/18/2024 at 3:33 PM

## 2024-10-03 ENCOUNTER — LAB REQUISITION (OUTPATIENT)
Dept: LAB | Facility: CLINIC | Age: 79
End: 2024-10-03
Payer: COMMERCIAL

## 2024-10-03 DIAGNOSIS — Z51.81 ENCOUNTER FOR THERAPEUTIC DRUG LEVEL MONITORING: ICD-10-CM

## 2024-10-03 DIAGNOSIS — R23.3 SPONTANEOUS ECCHYMOSES: ICD-10-CM

## 2024-10-04 LAB
ERYTHROCYTE [DISTWIDTH] IN BLOOD BY AUTOMATED COUNT: 19.9 % (ref 10–15)
HCT VFR BLD AUTO: 36.2 % (ref 35–47)
HGB BLD-MCNC: 10.7 G/DL (ref 11.7–15.7)
MCH RBC QN AUTO: 25.1 PG (ref 26.5–33)
MCHC RBC AUTO-ENTMCNC: 29.6 G/DL (ref 31.5–36.5)
MCV RBC AUTO: 85 FL (ref 78–100)
PLATELET # BLD AUTO: 288 10E3/UL (ref 150–450)
RBC # BLD AUTO: 4.26 10E6/UL (ref 3.8–5.2)
WBC # BLD AUTO: 7.7 10E3/UL (ref 4–11)

## 2024-10-04 PROCEDURE — 85027 COMPLETE CBC AUTOMATED: CPT | Mod: ORL | Performed by: NURSE PRACTITIONER

## 2024-10-04 PROCEDURE — P9603 ONE-WAY ALLOW PRORATED MILES: HCPCS | Mod: ORL | Performed by: NURSE PRACTITIONER

## 2024-10-04 PROCEDURE — 36415 COLL VENOUS BLD VENIPUNCTURE: CPT | Mod: ORL | Performed by: NURSE PRACTITIONER

## 2024-10-07 ENCOUNTER — PATIENT OUTREACH (OUTPATIENT)
Dept: OTOLARYNGOLOGY | Facility: CLINIC | Age: 79
End: 2024-10-07

## 2024-10-07 NOTE — PROGRESS NOTES
Called patient daughter as the patient was not able to make her appointment. Per patient daughter she is having a hard time keeping the appointments straight given she has had a lot lately. Writer assisted patient daughter with rescheduling and confirmed new date and time. Patient family was agreeable and verbalized understanding of the situation. Inocencia Fan RN on 10/7/2024 at 2:24 PM

## 2024-10-08 ENCOUNTER — HOSPITAL ENCOUNTER (OUTPATIENT)
Dept: SPEECH THERAPY | Facility: CLINIC | Age: 79
Discharge: HOME | End: 2024-10-08
Attending: HOSPITALIST
Payer: COMMERCIAL

## 2024-10-08 ENCOUNTER — HOSPITAL ENCOUNTER (OUTPATIENT)
Dept: RADIOLOGY | Facility: CLINIC | Age: 79
Discharge: HOME | End: 2024-10-08
Attending: HOSPITALIST
Payer: COMMERCIAL

## 2024-10-08 DIAGNOSIS — R13.12 OROPHARYNGEAL DYSPHAGIA: ICD-10-CM

## 2024-10-08 PROCEDURE — 92610 EVALUATE SWALLOWING FUNCTION: CPT | Mod: GN

## 2024-10-08 PROCEDURE — 92611 MOTION FLUOROSCOPY/SWALLOW: CPT | Mod: GN

## 2024-10-08 PROCEDURE — 74230 X-RAY XM SWLNG FUNCJ C+: CPT

## 2024-10-08 RX ORDER — BARIUM SULFATE 400 MG/ML
SUSPENSION ORAL ONCE
Status: COMPLETED | OUTPATIENT
Start: 2024-10-08 | End: 2024-10-08

## 2024-10-08 RX ADMIN — BARIUM SULFATE 30 ML: 400 SUSPENSION ORAL at 10:29

## 2024-10-08 NOTE — PROGRESS NOTES
"SPEECH LANGUAGE PATHOLOGY EVALUATION    See electronic medical record for Abuse and Falls Screening details.    Subjective      Presenting condition or subjective complaint:  Swallowing difficulty  Date of onset: 09/18/24 (Order date)    Relevant medical history:  Patient with recent admission to [facility] (9/3/24 to 9/10/24). Discharge summary states the following: \"79 y.o. female with PMHx significant for asthma, CAD, Afib (apixaban), laryngeal squamous cell carcinoma/papillomatosis (radiated), dysphagia, CREST syndrome, RLS who presented to [facility] ED on 9/3/24 after a fall. She lost her balance and fell outside on her daughter's front steps. She fell backward, hitting her head on brick. (+) LOC...\"     Prior diagnostic imaging/testing results:  Patient participated in a previous VFSS on 9/6/24 at [facility]. Evaluating SLP's clinical impressions were as follows: \"Asya Coffman was seen by speech therapy in radiology for further evaluation of oral - pharyngeal swallow function under fluoroscopy via a modified barium swallow study (MBSS). She presents with moderate-severe impairments in swallow function characterized by generalized weakness and poor endurance for multiple swallows. This results in poor pharyngeal bolus clearance and reduced airway protection that place her at high risk of aspiration and its associated complications.\"  At that time, recommended diet was Pureed food textures (IDDSI 4) and extremely thick liquids (IDDSI 4).    Prior therapy history for the same diagnosis, illness or injury:  Yes. Patient participated in Speech Therapy for dysphagia during admission to [facility].      General observations: Patient was pleasant and cooperative. She was accompanied to this appointment by her daughter, Lana, who observed study and was present afterwards to receive results and recommendations. Patient is currently residing in the TCU at [facility] [place]. Per Lana's report, she will soon be " moving into the assisted living facility at [facility].    Pain assessment:  No subjective evidence of pain.     Objective     SWALLOW EVALUTION  Dysphagia history: See above  Current Diet/Method of Nutritional Intake:  Pureed food textures (IDDSI 4) and extremely thick liquids (IDDSI 4)      CLINICAL SWALLOW EVALUATION  Clinical swallow evaluation completed prior to videofluoroscopic swallow study (VFSS) via review of previous records, clinical interview, and oral motor examination.    Oral Motor Function: generally intact  Dentition: significant number of missing teeth, several upper dental implants  Secretion management: WFL  Mucosal quality: adequate  Mandibular function: intact  Oral labial function: WFL  Lingual function: WFL  Buccal function: intact  Laryngeal function: voicing WFL     ADDITIONAL EVAL COMPLETED TODAY : yes; rationale: VFSS completed per provider order.    VIDEOFLUOROSCOPIC SWALLOW STUDY  Radiologist: Emelina Powell MD  Views Taken: Left lateral  Physical location of procedure: [facility]  Patient was sitting upright in swallow study chair.    VFSS textures trialed:   VFSS Eval: Thin Liquids  Mode of Presentation: cup, straw, self-fed   Order of Presentation: Trials 7, 8, 12, 13, 14. Trial 14 consisted of consecutive sips taken by straw.  Preparatory Phase: WFL  Oral Phase: WFL  Bolus Location When Swallow Initiated: posterior angle of ramus, valleculae  Pharyngeal Phase: impaired hyolaryngel excursion, impaired tongue base retraction  Rosenbeck's Penetration Aspiration Scale: 3 - contrast remains above the vocal cords, visible residue remains (penetration). Penetration occurred only with consecutive sips taken by straw.  Response to Aspiration:  N/A  Strategies and Compensations:  N/A  Diagnostic Statement: Intermittent laryngeal penetration was observed when patient was instructed to take consecutive sips of thin liquid by straw. While volume of  penetrated liquid was minimal, it was moderately deep and did not spontaneously clear from the laryngeal vestibule. No penetration was observed with single sips of thin liquid taken by cup. No aspiration was observed during this evaluation.    VFSS Eval: Mildly Thick Liquids  Mode of Presentation: cup, spoon, self-fed, fed by clinician   Order of Presentation: Trials 4, 5, 6  Preparatory Phase: WFL  Oral Phase: WFL  Bolus Location When Swallow Initiated: posterior angle of ramus, valleculae  Pharyngeal Phase: impaired hyolaryngel excursion, impaired tongue base retraction  Rosenbeck's Penetration Aspiration Scale: 1 - no aspiration, contrast does not enter airway  Response to Aspiration: absent response  Strategies and Compensations:  N/A  Diagnostic Statement: Patient's oropharyngeal swallow function was WNL with trials of mildly thick liquid. No aspiration or laryngeal penetration observed. No significant oropharyngeal residue noted after swallows.    VFSS Eval: Moderately Thick Liquids  Mode of Presentation: spoon, fed by clinician   Order of Presentation: Trials 1, 2, 3  Preparatory Phase: WFL  Oral Phase: WFL, residue in oral cavity (mild)  Bolus Location When Swallow Initiated: posterior angle of ramus, valleculae  Pharyngeal Phase: impaired hyolaryngel excursion, impaired tongue base retraction, residue in valleculae (mild)  Rosenbeck s Penetration Aspiration Scale: 1 - no aspiration, contrast does not enter airway  Response to Aspiration:  N/A  Strategies and Compensations: double swallow  Diagnostic Statement: No aspiration or laryngeal penetration observed with trials of moderately thick liquid. Mild residue was noted in the oral cavity and in the valleculae following swallows. The majority of this cleared when patient was cued to complete a second (dry) swallow.    VFSS Eval: Purees  Mode of Presentation: spoon, fed by clinician   Order of Presentation: Trials 9, 10, 11  Preparatory Phase: WFL  Oral  Phase: WFL  Bolus Location When Swallow Initiated: valleculae  Pharyngeal Phase: impaired hyolaryngel excursion, impaired tongue base retraction  Rosenbeck s Penetration Aspiration Scale: 1 - no aspiration, contrast does not enter airway  Response to Aspiration:  N/A  Strategies and Compensations:  N/A  Diagnostic Statement: Patient's oropharyngeal swallow function was WNL with trials of puree consistency. No aspiration or laryngeal penetration observed. No significant oropharyngeal residue noted after swallows.     ESOPHAGEAL PHASE OF SWALLOW  No observed or reported concerns related to esophageal function     SWALLOW ASSESSMENT CLINICAL IMPRESSIONS AND RATIONALE  Diet Consistency Recommendations: Continue current diet of pureed textures (IDDSI 4) and extremely thick liquids (IDDSI 4) at least until patient is next seen by Speech Therapy. She appears appropriate for trial feeding(s) of Soft & Bite Sized food textures (IDDSI Level 6) and thin liquids with SLP only. If patient tolerates these well, anticipate that diet can be advanced per primary SLP's discretion.  Recommended Safe or Compensatory Swallowing Strategies: small bolus size, no straws, slow rate of intake, alternate food and liquid intake, discontinue eating activities if fatigue is present, maintain upright sitting position for eating, minimize distractions during oral intake, refrain from talking when eating    Medication Administration Recommendations: Recommend that pills be given singly (whole), mixed in applesauce, pudding, or yogurt. Patient may follow each pill with sip(s) of liquid as needed.  Instrumental Assessment Recommendations: Further instrumental evaluation is not indicated at this time.     Assessment & Plan   CLINICAL IMPRESSIONS   Medical Diagnosis: Oropharyngeal dysphagia    Treatment Diagnosis:  N/A   Impression/Assessment: Videofloroscopic swallow study completed per provider order. Patient's oropharyngeal swallow function is WNL  for her age group. Aspiration did not occur during this evaluation. Trace laryngeal penetration occurred when patient was instructed to take consecutive sips of thin liquid by straw. No penetration was observed with single, small sips taken by cup. Oral phase was characterized by good bolus control with all consistencies trialed. Bolus prep and A-P bolus transit were effective and timely. Tongue base retraction was mildly reduced across all consistencies. Pharyngeal phase was characterized by a timely swallow response with all consistencies. Epiglottic inversion was timely and complete. Hyolaryngeal excursion and hyolaryngeal elevation were mildly reduced, but likely WFL (within functional limits) for patient's age group. Pharyngeal constriction was WNL. Questionable esophageal web noted in the cervical esophagus, however, this did not impede bolus transit through the PES and upper esophagus.      Patient's overall aspiration risk is low. She appears appropriate for trial feeding(s) of Soft & Bite Sized food textures (IDDSI Level 6) and thin liquids with Speech Therapy only. If patient tolerates these well, anticipate that diet can be advanced per primary SLP's discretion.    PLAN OF CARE  Recommended Referrals to Other Professionals:  Patient to continue Speech Therapy for dysphagia at TCU. Frequency and duration to be determined by primary SLP.  Education Assessment:   Learner/Method: Patient;Family;Pictures/Video;Listening    Risks and benefits of evaluation/treatment have been explained.   Patient/Family/caregiver agrees with Plan of Care.     Evaluation Time:    SLP Eval: oral/pharyngeal swallow function, clinical minutes (38783): 15  SLP Eval: VideoFluoroscopic Swallow function Minutes (33717): 30      Signing Clinician: MELECIO Escobar

## 2024-10-11 ENCOUNTER — TELEPHONE (OUTPATIENT)
Dept: OTOLARYNGOLOGY | Facility: CLINIC | Age: 79
End: 2024-10-11
Payer: COMMERCIAL

## 2024-10-11 NOTE — TELEPHONE ENCOUNTER
Called patient to let her know that it would be helpful for her to come to her appointment with care team as they need to examine her throat to see if she does need the surgery as scheduled. Patient was agreeable and will reach out to family to ask for help getting a ride. Writer encouraged patient to reach out to clinic if she needed any help coordinating a ride to the appointment. Patient was agreeable and verbalized understanding of the situation. Inocencia Fan RN on 10/11/2024 at 10:37 AM

## 2024-10-11 NOTE — TELEPHONE ENCOUNTER
[facility] Call Center    Phone Message    May a detailed message be left on voicemail: yes     Reason for Call: Other: Pt is in assisted living due to a fall and is wondering if she needs the appt on 10/14 or if that can be done later, Please call asap as pt will need to schedule transportation from the assisted living facility.  List of [place] [facility] according to the [facility] location, thanks     Action Taken: Other: ENT    Travel Screening: Not Applicable     Date of Service:

## 2024-10-14 ENCOUNTER — OFFICE VISIT (OUTPATIENT)
Dept: OTOLARYNGOLOGY | Facility: CLINIC | Age: 79
End: 2024-10-14
Payer: COMMERCIAL

## 2024-10-14 VITALS
HEART RATE: 54 BPM | HEIGHT: 65 IN | BODY MASS INDEX: 19.16 KG/M2 | SYSTOLIC BLOOD PRESSURE: 137 MMHG | WEIGHT: 115 LBS | DIASTOLIC BLOOD PRESSURE: 61 MMHG

## 2024-10-14 DIAGNOSIS — Z92.3 HISTORY OF HEAD AND NECK RADIATION: ICD-10-CM

## 2024-10-14 DIAGNOSIS — M34.1 CREST (CALCINOSIS, RAYNAUD'S PHENOMENON, ESOPHAGEAL DYSFUNCTION, SCLERODACTYLY, TELANGIECTASIA) (H): ICD-10-CM

## 2024-10-14 DIAGNOSIS — C32.9 LARYNGEAL CARCINOMA (H): ICD-10-CM

## 2024-10-14 DIAGNOSIS — Z78.9 RECURRENT RESPIRATORY PAPILLOMATOSIS: ICD-10-CM

## 2024-10-14 DIAGNOSIS — J38.3 VOCAL FOLD LEUKOPLAKIA: Primary | ICD-10-CM

## 2024-10-14 PROCEDURE — 99213 OFFICE O/P EST LOW 20 MIN: CPT | Mod: 25 | Performed by: OTOLARYNGOLOGY

## 2024-10-14 PROCEDURE — 31622 DX BRONCHOSCOPE/WASH: CPT | Performed by: OTOLARYNGOLOGY

## 2024-10-14 RX ORDER — OXYCODONE HYDROCHLORIDE 5 MG/1
2.5 TABLET ORAL
COMMUNITY
Start: 2024-10-09
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
## 2024-10-14 NOTE — PATIENT INSTRUCTIONS
1.  You were seen in the ENT Clinic today by Dr. Davis. If you have any questions or concerns after your appointment, please call [phone]. Press option #1 for scheduling related needs. Press option #3 for Nurse advice.    2.  Dr. Davis has recommended the following:   - Cancel surgery for 10/31   - See PCP about breathing trouble    3.  Plan is to return to clinic 2-3 months      How to Contact Us:  Send a [employer] message to your provider. Our team will respond to you via [employer]. Occasionally, we will need to call you to get further information.  For urgent matters (Monday-Friday, 8:00 AM-3:30 PM), call the ENT Clinic: [phone] and speak with a call center team member - they will route your call appropriately.   If you'd like to speak directly with a nurse, please call [phone]. We do our best to check voicemail frequently throughout the day, and will work to call you back within 1-2 days. For urgent matters, please use the general clinic phone numbers listed above.      Inocencia Fan RN  [phone]  [facility] - Otolaryngology
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
## 2024-11-19 ENCOUNTER — MEDICAL CORRESPONDENCE (OUTPATIENT)
Dept: HEALTH INFORMATION MANAGEMENT | Facility: CLINIC | Age: 79
End: 2024-11-19
Payer: COMMERCIAL

## 2024-11-21 ENCOUNTER — APPOINTMENT (OUTPATIENT)
Dept: CT IMAGING | Facility: CLINIC | Age: 79
End: 2024-11-21
Attending: EMERGENCY MEDICINE
Payer: COMMERCIAL

## 2024-11-21 ENCOUNTER — HOSPITAL ENCOUNTER (INPATIENT)
Facility: CLINIC | Age: 79
End: 2024-11-21
Attending: EMERGENCY MEDICINE | Admitting: STUDENT IN AN ORGANIZED HEALTH CARE EDUCATION/TRAINING PROGRAM
Payer: COMMERCIAL

## 2024-11-21 VITALS
RESPIRATION RATE: 20 BRPM | SYSTOLIC BLOOD PRESSURE: 159 MMHG | OXYGEN SATURATION: 95 % | HEART RATE: 108 BPM | DIASTOLIC BLOOD PRESSURE: 90 MMHG | TEMPERATURE: 97.8 F

## 2024-11-21 DIAGNOSIS — J96.21 ACUTE ON CHRONIC RESPIRATORY FAILURE WITH HYPOXIA (H): ICD-10-CM

## 2024-11-21 DIAGNOSIS — B49 FUNGAL INFECTION: Primary | ICD-10-CM

## 2024-11-21 LAB
ALBUMIN SERPL BCG-MCNC: 4.3 G/DL (ref 3.5–5.2)
ALP SERPL-CCNC: 79 U/L (ref 40–150)
ALT SERPL W P-5'-P-CCNC: 10 U/L (ref 0–50)
ANION GAP SERPL CALCULATED.3IONS-SCNC: 15 MMOL/L (ref 7–15)
AST SERPL W P-5'-P-CCNC: 22 U/L (ref 0–45)
ATRIAL RATE - MUSE: NORMAL BPM
BASE EXCESS BLDV CALC-SCNC: 1.1 MMOL/L (ref -3–3)
BASE EXCESS BLDV CALC-SCNC: 3 MMOL/L (ref -3–3)
BASOPHILS # BLD AUTO: 0 10E3/UL (ref 0–0.2)
BASOPHILS NFR BLD AUTO: 0 %
BILIRUB SERPL-MCNC: 0.6 MG/DL
BUN SERPL-MCNC: 18.1 MG/DL (ref 8–23)
C PNEUM DNA SPEC QL NAA+PROBE: NOT DETECTED
CALCIUM SERPL-MCNC: 9.7 MG/DL (ref 8.8–10.4)
CHLORIDE SERPL-SCNC: 108 MMOL/L (ref 98–107)
CREAT SERPL-MCNC: 0.98 MG/DL (ref 0.51–0.95)
CRP SERPL-MCNC: <3 MG/L
DIASTOLIC BLOOD PRESSURE - MUSE: NORMAL MMHG
EGFRCR SERPLBLD CKD-EPI 2021: 59 ML/MIN/1.73M2
EOSINOPHIL # BLD AUTO: 0 10E3/UL (ref 0–0.7)
EOSINOPHIL NFR BLD AUTO: 1 %
ERYTHROCYTE [DISTWIDTH] IN BLOOD BY AUTOMATED COUNT: 23.5 % (ref 10–15)
FLUAV H1 2009 PAND RNA SPEC QL NAA+PROBE: NOT DETECTED
FLUAV H1 RNA SPEC QL NAA+PROBE: NOT DETECTED
FLUAV H3 RNA SPEC QL NAA+PROBE: NOT DETECTED
FLUAV RNA SPEC QL NAA+PROBE: NEGATIVE
FLUAV RNA SPEC QL NAA+PROBE: NOT DETECTED
FLUBV RNA RESP QL NAA+PROBE: NEGATIVE
FLUBV RNA SPEC QL NAA+PROBE: NOT DETECTED
GLUCOSE SERPL-MCNC: 91 MG/DL (ref 70–99)
HADV DNA SPEC QL NAA+PROBE: NOT DETECTED
HCO3 BLDV-SCNC: 26 MMOL/L (ref 21–28)
HCO3 BLDV-SCNC: 28 MMOL/L (ref 21–28)
HCO3 SERPL-SCNC: 23 MMOL/L (ref 22–29)
HCOV PNL SPEC NAA+PROBE: NOT DETECTED
HCT VFR BLD AUTO: 39.4 % (ref 35–47)
HGB BLD-MCNC: 11.7 G/DL (ref 11.7–15.7)
HMPV RNA SPEC QL NAA+PROBE: NOT DETECTED
HOLD SPECIMEN: NORMAL
HPIV1 RNA SPEC QL NAA+PROBE: NOT DETECTED
HPIV2 RNA SPEC QL NAA+PROBE: NOT DETECTED
HPIV3 RNA SPEC QL NAA+PROBE: NOT DETECTED
HPIV4 RNA SPEC QL NAA+PROBE: NOT DETECTED
IMM GRANULOCYTES # BLD: 0 10E3/UL
IMM GRANULOCYTES NFR BLD: 1 %
INR PPP: 1.57 (ref 0.85–1.15)
INTERPRETATION ECG - MUSE: NORMAL
LACTATE BLD-SCNC: 1 MMOL/L
LACTATE SERPL-SCNC: 1 MMOL/L (ref 0.7–2)
LYMPHOCYTES # BLD AUTO: 0.7 10E3/UL (ref 0.8–5.3)
LYMPHOCYTES NFR BLD AUTO: 10 %
M PNEUMO DNA SPEC QL NAA+PROBE: NOT DETECTED
MCH RBC QN AUTO: 26 PG (ref 26.5–33)
MCHC RBC AUTO-ENTMCNC: 29.7 G/DL (ref 31.5–36.5)
MCV RBC AUTO: 88 FL (ref 78–100)
MONOCYTES # BLD AUTO: 0.5 10E3/UL (ref 0–1.3)
MONOCYTES NFR BLD AUTO: 8 %
NEUTROPHILS # BLD AUTO: 5.6 10E3/UL (ref 1.6–8.3)
NEUTROPHILS NFR BLD AUTO: 82 %
NRBC # BLD AUTO: 0 10E3/UL
NRBC BLD AUTO-RTO: 0 /100
NT-PROBNP SERPL-MCNC: 4421 PG/ML (ref 0–1800)
O2/TOTAL GAS SETTING VFR VENT: 21 %
OXYHGB MFR BLDV: 77 % (ref 70–75)
P AXIS - MUSE: NORMAL DEGREES
PCO2 BLDV: 40 MM HG (ref 40–50)
PCO2 BLDV: 40 MM HG (ref 40–50)
PH BLDV: 7.41 [PH] (ref 7.32–7.43)
PH BLDV: 7.45 [PH] (ref 7.32–7.43)
PLATELET # BLD AUTO: 315 10E3/UL (ref 150–450)
PO2 BLDV: 32 MM HG (ref 25–47)
PO2 BLDV: 46 MM HG (ref 25–47)
POTASSIUM SERPL-SCNC: 3.7 MMOL/L (ref 3.4–5.3)
PR INTERVAL - MUSE: NORMAL MS
PROT SERPL-MCNC: 6.8 G/DL (ref 6.4–8.3)
QRS DURATION - MUSE: 80 MS
QT - MUSE: 402 MS
QTC - MUSE: 521 MS
R AXIS - MUSE: 259 DEGREES
RADIOLOGIST FLAGS: NORMAL
RBC # BLD AUTO: 4.5 10E6/UL (ref 3.8–5.2)
RSV RNA SPEC NAA+PROBE: NEGATIVE
RSV RNA SPEC QL NAA+PROBE: NOT DETECTED
RSV RNA SPEC QL NAA+PROBE: NOT DETECTED
RV+EV RNA SPEC QL NAA+PROBE: NOT DETECTED
SAO2 % BLDV: 64 % (ref 70–75)
SAO2 % BLDV: 79.4 % (ref 70–75)
SARS-COV-2 RNA RESP QL NAA+PROBE: NEGATIVE
SODIUM SERPL-SCNC: 146 MMOL/L (ref 135–145)
SYSTOLIC BLOOD PRESSURE - MUSE: NORMAL MMHG
T AXIS - MUSE: -18 DEGREES
TROPONIN T SERPL HS-MCNC: 28 NG/L
TROPONIN T SERPL HS-MCNC: 32 NG/L
VENTRICULAR RATE- MUSE: 101 BPM
WBC # BLD AUTO: 6.9 10E3/UL (ref 4–11)

## 2024-11-21 PROCEDURE — 82803 BLOOD GASES ANY COMBINATION: CPT

## 2024-11-21 PROCEDURE — 82247 BILIRUBIN TOTAL: CPT | Performed by: EMERGENCY MEDICINE

## 2024-11-21 PROCEDURE — 99285 EMERGENCY DEPT VISIT HI MDM: CPT | Mod: 25 | Performed by: EMERGENCY MEDICINE

## 2024-11-21 PROCEDURE — 71275 CT ANGIOGRAPHY CHEST: CPT

## 2024-11-21 PROCEDURE — 250N000011 HC RX IP 250 OP 636: Performed by: PHYSICIAN ASSISTANT

## 2024-11-21 PROCEDURE — 250N000013 HC RX MED GY IP 250 OP 250 PS 637: Performed by: PHYSICIAN ASSISTANT

## 2024-11-21 PROCEDURE — 93010 ELECTROCARDIOGRAM REPORT: CPT | Performed by: EMERGENCY MEDICINE

## 2024-11-21 PROCEDURE — 85004 AUTOMATED DIFF WBC COUNT: CPT | Performed by: EMERGENCY MEDICINE

## 2024-11-21 PROCEDURE — 84484 ASSAY OF TROPONIN QUANT: CPT | Performed by: EMERGENCY MEDICINE

## 2024-11-21 PROCEDURE — 85610 PROTHROMBIN TIME: CPT | Performed by: EMERGENCY MEDICINE

## 2024-11-21 PROCEDURE — 82805 BLOOD GASES W/O2 SATURATION: CPT | Performed by: EMERGENCY MEDICINE

## 2024-11-21 PROCEDURE — 82310 ASSAY OF CALCIUM: CPT | Performed by: EMERGENCY MEDICINE

## 2024-11-21 PROCEDURE — 120N000002 HC R&B MED SURG/OB UMMC

## 2024-11-21 PROCEDURE — 87486 CHLMYD PNEUM DNA AMP PROBE: CPT | Performed by: EMERGENCY MEDICINE

## 2024-11-21 PROCEDURE — 250N000011 HC RX IP 250 OP 636: Performed by: EMERGENCY MEDICINE

## 2024-11-21 PROCEDURE — 85014 HEMATOCRIT: CPT | Performed by: EMERGENCY MEDICINE

## 2024-11-21 PROCEDURE — 86140 C-REACTIVE PROTEIN: CPT | Performed by: EMERGENCY MEDICINE

## 2024-11-21 PROCEDURE — 36415 COLL VENOUS BLD VENIPUNCTURE: CPT | Performed by: EMERGENCY MEDICINE

## 2024-11-21 PROCEDURE — 87637 SARSCOV2&INF A&B&RSV AMP PRB: CPT | Performed by: EMERGENCY MEDICINE

## 2024-11-21 PROCEDURE — 83605 ASSAY OF LACTIC ACID: CPT | Performed by: EMERGENCY MEDICINE

## 2024-11-21 PROCEDURE — 250N000009 HC RX 250: Performed by: PHYSICIAN ASSISTANT

## 2024-11-21 PROCEDURE — 99223 1ST HOSP IP/OBS HIGH 75: CPT | Mod: FS | Performed by: STUDENT IN AN ORGANIZED HEALTH CARE EDUCATION/TRAINING PROGRAM

## 2024-11-21 PROCEDURE — 83880 ASSAY OF NATRIURETIC PEPTIDE: CPT | Performed by: EMERGENCY MEDICINE

## 2024-11-21 PROCEDURE — 71275 CT ANGIOGRAPHY CHEST: CPT | Mod: 26 | Performed by: RADIOLOGY

## 2024-11-21 PROCEDURE — 99207 PR APP CREDIT; MD BILLING SHARED VISIT: CPT | Performed by: PHYSICIAN ASSISTANT

## 2024-11-21 PROCEDURE — 99285 EMERGENCY DEPT VISIT HI MDM: CPT | Performed by: EMERGENCY MEDICINE

## 2024-11-21 PROCEDURE — 80053 COMPREHEN METABOLIC PANEL: CPT | Performed by: EMERGENCY MEDICINE

## 2024-11-21 PROCEDURE — 999N000248 HC STATISTIC IV INSERT WITH US BY RN

## 2024-11-21 PROCEDURE — 93005 ELECTROCARDIOGRAM TRACING: CPT | Performed by: EMERGENCY MEDICINE

## 2024-11-21 PROCEDURE — 87581 M.PNEUMON DNA AMP PROBE: CPT | Performed by: EMERGENCY MEDICINE

## 2024-11-21 PROCEDURE — 85048 AUTOMATED LEUKOCYTE COUNT: CPT | Performed by: EMERGENCY MEDICINE

## 2024-11-21 PROCEDURE — 87633 RESP VIRUS 12-25 TARGETS: CPT | Performed by: EMERGENCY MEDICINE

## 2024-11-21 RX ORDER — ACETAMINOPHEN 325 MG/1
650 TABLET ORAL EVERY 4 HOURS PRN
Status: DISCONTINUED | OUTPATIENT
Start: 2024-11-21 | End: 2024-12-30

## 2024-11-21 RX ORDER — AMOXICILLIN 250 MG
2 CAPSULE ORAL 2 TIMES DAILY PRN
Status: DISCONTINUED | OUTPATIENT
Start: 2024-11-21 | End: 2025-01-14 | Stop reason: HOSPADM

## 2024-11-21 RX ORDER — GABAPENTIN 300 MG/1
300 CAPSULE ORAL AT BEDTIME
Status: DISCONTINUED | OUTPATIENT
Start: 2024-11-21 | End: 2024-11-22

## 2024-11-21 RX ORDER — ATORVASTATIN CALCIUM 20 MG/1
20 TABLET, FILM COATED ORAL EVERY EVENING
Status: DISCONTINUED | OUTPATIENT
Start: 2024-11-21 | End: 2024-12-19

## 2024-11-21 RX ORDER — IPRATROPIUM BROMIDE AND ALBUTEROL SULFATE 2.5; .5 MG/3ML; MG/3ML
3 SOLUTION RESPIRATORY (INHALATION) ONCE
Status: DISCONTINUED | OUTPATIENT
Start: 2024-11-21 | End: 2024-11-21

## 2024-11-21 RX ORDER — FLUTICASONE FUROATE AND VILANTEROL 200; 25 UG/1; UG/1
1 POWDER RESPIRATORY (INHALATION) DAILY
Status: DISCONTINUED | OUTPATIENT
Start: 2024-11-22 | End: 2024-12-07

## 2024-11-21 RX ORDER — LIDOCAINE 4 G/G
2 PATCH TOPICAL EVERY 24 HOURS
COMMUNITY

## 2024-11-21 RX ORDER — POLYETHYLENE GLYCOL 3350 17 G/17G
17 POWDER, FOR SOLUTION ORAL DAILY
Status: DISCONTINUED | OUTPATIENT
Start: 2024-11-22 | End: 2024-12-28

## 2024-11-21 RX ORDER — TIZANIDINE 2 MG/1
2 TABLET ORAL 3 TIMES DAILY
Status: DISCONTINUED | OUTPATIENT
Start: 2024-11-21 | End: 2024-11-21

## 2024-11-21 RX ORDER — FUROSEMIDE 10 MG/ML
20 INJECTION INTRAMUSCULAR; INTRAVENOUS ONCE
Status: DISCONTINUED | OUTPATIENT
Start: 2024-11-21 | End: 2024-11-21

## 2024-11-21 RX ORDER — LEVOTHYROXINE SODIUM 88 UG/1
88 TABLET ORAL DAILY
Status: DISCONTINUED | OUTPATIENT
Start: 2024-11-22 | End: 2024-12-20

## 2024-11-21 RX ORDER — FEXOFENADINE HCL 180 MG/1
180 TABLET ORAL DAILY
Status: DISCONTINUED | OUTPATIENT
Start: 2024-11-22 | End: 2024-12-09

## 2024-11-21 RX ORDER — AMOXICILLIN 250 MG
1 CAPSULE ORAL 2 TIMES DAILY PRN
Status: DISCONTINUED | OUTPATIENT
Start: 2024-11-21 | End: 2025-01-14 | Stop reason: HOSPADM

## 2024-11-21 RX ORDER — DILTIAZEM HYDROCHLORIDE 240 MG/1
240 CAPSULE, COATED, EXTENDED RELEASE ORAL 2 TIMES DAILY
Status: DISCONTINUED | OUTPATIENT
Start: 2024-11-21 | End: 2024-12-10

## 2024-11-21 RX ORDER — CALCIUM CARBONATE 500 MG/1
1000 TABLET, CHEWABLE ORAL 4 TIMES DAILY PRN
Status: DISCONTINUED | OUTPATIENT
Start: 2024-11-21 | End: 2025-01-14 | Stop reason: HOSPADM

## 2024-11-21 RX ORDER — FUROSEMIDE 10 MG/ML
40 INJECTION INTRAMUSCULAR; INTRAVENOUS ONCE
Status: COMPLETED | OUTPATIENT
Start: 2024-11-21 | End: 2024-11-21

## 2024-11-21 RX ORDER — ALBUTEROL SULFATE 90 UG/1
2 INHALANT RESPIRATORY (INHALATION) 2 TIMES DAILY PRN
Status: DISCONTINUED | OUTPATIENT
Start: 2024-11-21 | End: 2024-11-21

## 2024-11-21 RX ORDER — FERROUS SULFATE 325(65) MG
325 TABLET, DELAYED RELEASE (ENTERIC COATED) ORAL DAILY
COMMUNITY

## 2024-11-21 RX ORDER — OMEGA-3 FATTY ACIDS/FISH OIL 300-1000MG
400 CAPSULE ORAL EVERY 6 HOURS PRN
Status: DISCONTINUED | OUTPATIENT
Start: 2024-11-21 | End: 2024-11-21

## 2024-11-21 RX ORDER — CALCIUM CARBONATE 500 MG/1
1000 TABLET, CHEWABLE ORAL 2 TIMES DAILY PRN
Status: DISCONTINUED | OUTPATIENT
Start: 2024-11-21 | End: 2024-11-21

## 2024-11-21 RX ORDER — AMOXICILLIN 250 MG
2 CAPSULE ORAL 2 TIMES DAILY PRN
Status: DISCONTINUED | OUTPATIENT
Start: 2024-11-21 | End: 2024-11-21

## 2024-11-21 RX ORDER — IOPAMIDOL 755 MG/ML
51 INJECTION, SOLUTION INTRAVASCULAR ONCE
Status: COMPLETED | OUTPATIENT
Start: 2024-11-21 | End: 2024-11-21

## 2024-11-21 RX ORDER — ALBUTEROL SULFATE 0.83 MG/ML
2.5 SOLUTION RESPIRATORY (INHALATION) 2 TIMES DAILY PRN
Status: DISCONTINUED | OUTPATIENT
Start: 2024-11-21 | End: 2024-11-21

## 2024-11-21 RX ORDER — PROPRANOLOL HYDROCHLORIDE 10 MG/1
10 TABLET ORAL DAILY
Status: DISCONTINUED | OUTPATIENT
Start: 2024-11-22 | End: 2024-11-23

## 2024-11-21 RX ORDER — OXYCODONE HYDROCHLORIDE 5 MG/1
2.5 CAPSULE ORAL EVERY 4 HOURS PRN
COMMUNITY

## 2024-11-21 RX ORDER — GABAPENTIN 300 MG/1
300 CAPSULE ORAL 2 TIMES DAILY
Status: DISCONTINUED | OUTPATIENT
Start: 2024-11-22 | End: 2024-11-21

## 2024-11-21 RX ORDER — IPRATROPIUM BROMIDE AND ALBUTEROL SULFATE 2.5; .5 MG/3ML; MG/3ML
3 SOLUTION RESPIRATORY (INHALATION) EVERY 6 HOURS PRN
Status: DISCONTINUED | OUTPATIENT
Start: 2024-11-21 | End: 2024-11-23

## 2024-11-21 RX ORDER — ALBUTEROL SULFATE 90 UG/1
2 INHALANT RESPIRATORY (INHALATION) EVERY 6 HOURS PRN
Status: DISCONTINUED | OUTPATIENT
Start: 2024-11-21 | End: 2025-01-14 | Stop reason: HOSPADM

## 2024-11-21 RX ORDER — LIDOCAINE 40 MG/G
CREAM TOPICAL
Status: DISCONTINUED | OUTPATIENT
Start: 2024-11-21 | End: 2025-01-14 | Stop reason: HOSPADM

## 2024-11-21 RX ORDER — METOPROLOL TARTRATE 1 MG/ML
5 INJECTION, SOLUTION INTRAVENOUS ONCE
Status: COMPLETED | OUTPATIENT
Start: 2024-11-21 | End: 2024-11-21

## 2024-11-21 RX ADMIN — APIXABAN 5 MG: 5 TABLET, FILM COATED ORAL at 22:35

## 2024-11-21 RX ADMIN — ALBUTEROL SULFATE 2 PUFF: 90 AEROSOL, METERED RESPIRATORY (INHALATION) at 22:54

## 2024-11-21 RX ADMIN — FUROSEMIDE 40 MG: 10 INJECTION, SOLUTION INTRAMUSCULAR; INTRAVENOUS at 22:53

## 2024-11-21 RX ADMIN — METOPROLOL TARTRATE 5 MG: 5 INJECTION INTRAVENOUS at 22:40

## 2024-11-21 RX ADMIN — IOPAMIDOL 51 ML: 755 INJECTION, SOLUTION INTRAVENOUS at 20:00

## 2024-11-21 ASSESSMENT — ACTIVITIES OF DAILY LIVING (ADL)
ADLS_ACUITY_SCORE: 0

## 2024-11-21 ASSESSMENT — COLUMBIA-SUICIDE SEVERITY RATING SCALE - C-SSRS
1. IN THE PAST MONTH, HAVE YOU WISHED YOU WERE DEAD OR WISHED YOU COULD GO TO SLEEP AND NOT WAKE UP?: NO
2. HAVE YOU ACTUALLY HAD ANY THOUGHTS OF KILLING YOURSELF IN THE PAST MONTH?: NO
6. HAVE YOU EVER DONE ANYTHING, STARTED TO DO ANYTHING, OR PREPARED TO DO ANYTHING TO END YOUR LIFE?: NO

## 2024-11-21 NOTE — ED NOTES
[facility]  Expected date:   Expected time:   Means of arrival:   Comments:  SARAI appropriate

## 2024-11-21 NOTE — ED TRIAGE NOTES
Triage Assessment (Adult)       Row Name 11/21/24 [id number]          Triage Assessment    Airway WDL WDL        Respiratory WDL    Respiratory WDL X        Skin Circulation/Temperature WDL    Skin Circulation/Temperature WDL WDL        Cardiac WDL    Cardiac WDL WDL        Peripheral/Neurovascular WDL    Peripheral Neurovascular WDL WDL        Cognitive/Neuro/Behavioral WDL    Cognitive/Neuro/Behavioral WDL WDL

## 2024-11-21 NOTE — ED PROVIDER NOTES
[place] [facility]ERGENC[facility] DEPARTMENT ([facility])    11/21/24       ED PROVIDER NOTE    History     Chief Complaint   Patient presents with    Shortness of Breath     HPI  Asya Coffman is a 79 year old female who has a PMH notable for chronic atrial fibrillation with RVR anticoagulated on Eliquis, CREST syndrome, fungal infection, papillary carcinoma of thyroid, and recurrent respiratory papillomatosis, who presents to the ED for evaluation of shortness of breath.      RECENT HISTORY REVIEW:  Patient has had a complicated fall *** after sustaining multiple falls and broken bones, multiple broken ribs, etc. which is ultimately led her to being transition from hospital care to TCU, now to assisted living/nursing home.  Sounds though she had at least 4 incidence/falls since early September that really caused a broken hand, back, ribs as well as a head wound, though has reportedly been recovering from all of those, now that she is at the skilled nursing facility.    Patient confirms that she is still taking the Eliquis that she has been prescribed for chronic A-fib, uses her prescribed albuterol and DuoNeb treatments at her facility for her steroids.  She did have a progress visit on 10/23/2024 where they talked about her baseline shortness of breath/COPD, did add some additional laboratory studies at that time    I had a video swallow on 10/8/2024 with no aspiration    Labs from 10/23/2024 in the [facility] system available in Care Everywhere for review show  BNP 4490  WBC 9.6  Hemoglobin 10.8  Platelet 41  Total iron 26, iron binding 451, percent saturation 6    CURRENT PRESENTATION:  Patient presents by EMS from her skilled nursing facility in concern for worsening shortness of breath and hypoxia.  Has not been feeling well over the last number of months because of all of her issues, but does have baseline COPD and shortness of breath throughout all of this, perhaps slightly worse over the last month, but then  "notably worst here this past week.  She did feel a little bit improved with her albuterol and DuoNeb at her facility, but not significantly so, and was just not improving despite those treatments.  Because of the worsening shortness of breath, contacted staff and who called EMS to bring her here.  Per EMS, initial SpO2 at the facility was 80 on arrival.  She normally does not require any oxygen and will be in the mid to low 90s.  They placed her on 4 L nasal cannula which increased her saturations up to 100%, and began titrating down as able though she still requires oxygen which is not normal for her.    With this, she has not had any new fevers or chills, at most had a temp of 99 when they did the regular checks at the facility, but nothing recent.  They do note that multiple persons at her facility have COVID currently though she was reportedly checked for such at the facility which was negative.  Unknown exactly when that check was performed.  Additionally over the last week, she feels generally more weak and like it is harder to get up, etc. and that she has a little bit of dizziness but is more lightheadedness and not vertiginous type symptoms, no new numbness, tingling or focal weakness.  She has had no recent falls or traumas.  Currently not having any pain.  No new HEENT symptoms, no chest pain.  Occasionally has a cough, nonproductive, no hemoptysis.  Has chronically a little bit of swelling down to the ankles, but this is not notably changed and no other extremity symptoms.  No new neck or back symptoms.  No abdominal, GI or  symptoms or changes.    They do note that she has a history of laryngeal mass, throat and papillary carcinoma of the thyroid, last radiation was \"sometime in October\".  They also note that she has a history of crest syndrome as well as respiratory papillomatosis but has not had any acute issues such that they are aware of.  No known history of PEs    No other new symptoms or " concerns at this time.  Full ROS completed without any additional findings.      {Past History:880687}          Review of Systems  A complete review of systems was performed with pertinent positives and negatives noted in the HPI, and all other systems negative.  ***  Physical Exam   Temp: 97.8  F (36.6  C)  Resp: 20  SpO2: 100 %      Physical Exam  CONSTITUTIONAL: Awake and alert. Non-toxic appearance. No acute distress.   HENT:   - Head: Normocephalic and atraumatic.   - Ears: External ear grossly normal.   - Nose: Nose normal. No rhinorrhea. No epistaxis.   - Mouth/Throat: MMM  EYES: Conjunctivae and lids are normal. No scleral icterus.   NECK: Normal range of motion and phonation normal. Neck supple.  No tracheal deviation, no stridor. No edema or erythema noted.  CARDIOVASCULAR: Normal rate, regular rhythm and no appreciable abnormal heart sounds. Rate is around 90s on current exam, irregular rhythm.***Irregularly irregular rhythm ***  PULMONARY/CHEST: Normal work of breathing. No accessory muscle usage or stridor. No respiratory distress.  No appreciable abnormal breath sounds. SpO2 now 92% on 2.5 L NC.  Appears to have normal work of breathing at rest.  Does have some crackles at both bases, seems to clear somewhat with deep inspiration.  ABDOMEN: Soft, non-distended. No tenderness. No peritoneal findings, no rigidity, rebound or guarding.  No palpable masses or abnormal pulsatility appreciated.  MUSCULOSKELETAL: Extremities warm and seemingly well perfused. Does have mild pitting edema at both ankles, no calf tenderness.  NEUROLOGIC: Awake, alert. Not disoriented. No seizure activity. GCS 15  SKIN: Skin is warm and dry. No diaphoresis. No pallor. No rash/acute appearing skin changes noted.  PSYCHIATRIC: Normal mood and affect. Speech and behavior normal. Thought processes linear. Cognition and memory are normal. No SI/HI reported.    ED Course     ED Course as of 11/21/24 1847   Thu Nov 21, 2024   2578  "Current SpO2 92% with good pleth on 2 to 2.5 L       Procedures  ----------------------------------------------------------------------------------------------------------------------              EKG Interpretation:      Interpreted by Nona Venegas MD  Time reviewed: 1802  Symptoms at time of EKG: Shortness of breath, hypoxia  Rhythm: A-fib with RVR  Rate: 101 bpm  Axis: Right superior axis deviation  Ectopy: None  Conduction: Prolonged QTc (521 ms),  ST Segments/ T Waves: Somewhat difficult to assess given wandering baseline, some artifact, A-fib but I do not appreciate ermelinda ST elevation though does have some nonspecific abnormalities  Comparison to prior: Compared to EKG from 8 April 2024, heart rate is somewhat decreased, continues to be in A-fib with RVR like previous, had right axis on previous EKG as well,    Clinical Impression: A-fib with RVR, does have some nonspecific ST/T wave abnormalities, but without ermelinda ST elevation, decreased rate from previous, relatively similar morphology, but full assessment limited given wandering baseline, some artifact, etc.          ----------------------------------------------------------------------------------------------------------------------  {ED Sepsis CMS Documentation (Optional):[id number]::\" \"}  ----------------------------------------------------------------------------------------------------------------------  {Critical Care Performed?:813589}    Assessments & Plan (with Medical Decision Making)     IMPRESSION:   79 year old female w/ PMH notable for chronic atrial fibrillation with RVR anticoagulated on Eliquis, CREST syndrome, fungal infection, papillary carcinoma of thyroid, and recurrent respiratory papillomatosis, who presents to the ED for evaluation of shortness of breath.     Clinically, patient appears nontoxic, NAD at rest here now that she is on the supplemental oxygen has normal work of breathing at rest. . Vitals ***.  Heart rates are in the " 90s to low 100s with an irregular really irregular rhythm (appears to be A-fib on monitor). Otherwise on examination, no other notable cardiac findings, does have some crackles with deep inspiration at both bases which seems to improve slightly with repeated breaths, but still present to some degree, no other cardiopulmonary findings, abdominal exam benign, mild pitting edema at both ankles without any other calf tenderness or other acute abnormalities.    Ddx includes, but not limited to,  infection (patient has known exposure to such, could have increased risk of such given her history of rib fractures earlier this fall, amongst others), fluid overload, heart failure, occult cardiac etiology (will not having chest pain, does have the edema at the ankles, though I do not know she is otherwise clinically looks like she has enough edema to be causing significant respiratory symptoms), considered symptoms related to PE (would not be able to use PERC criteria and think dimer would not be helpful given her all of her fractures though that itself may increase her risk of clots), symptoms related to her crest syndrome, respiratory papillomatosis, amongst others despite having incision and cancer is protecting airway, tolerating secretions currently.     PLAN:   - Screening ECG, laboratory studies, chest imaging  - I think the patient will need to be admitted given the significant hypoxia from her baseline exact dispo pending ED course.  -***Considered COPD exacerbation, however patient seems to be moving air well, no prolonged expiratory ways, no notable wheeze component and clinically seems less consistent with such on exam.***  - Risks/benefits of pursuing imaging reviewed and accepted.   - Dispo pending ED Course    RESULTS:  Labs:   - ***   Urine:   - ***  Imaging: Written preliminary reports reviewed:  - CXR: ***  - CT A/P: ***  Results/reports reviewed w/ patient who expresses understanding of findings and F/U  recommendations.    INTERVENTIONS:   - ***  - ***    RE-EVALUATION:  - The patient's symptoms were  ***  - Pt otherwise *** continues to do well here in the ED, no acute issues or apparent concerning changes in vitals or clinical appearance.    DISCUSSIONS:  - w/ ***: ***   - w/ ***: They have seen and evaluated the patient here in the ED. ***   - w/ IM: Reviewed patient/presentation, current state of workup/any pending studies. They will admit for further evaluation/management, F/U pending studies as needed, coordinate w/ consulting services as needed. No additional requests/recommendations for workup/management for in the ED at this time. ***   - w/ Patient: I have reviewed the available findings, plan, need for close follow up, strict return/safety instructions with the patient. ***     DISPOSITION/PLANNING:  IMPRESSION:   -Acute on chronic hypoxic respiratory failure  -Prolonged QTc  DISPOSITION:  - ***  FOLLOW-UP:   - ***  PENDING:   - ***  OTHER RECOMMENDATIONS:   - Conservative symptom management, strict return instructions  - ***  Rx:   - ***      ______________________________________________________________________    IZHANNA am serving as a trained medical scribe to document services personally performed by Nona Venegas MD, based on the provider's statements to me.      I, Nona Venegas MD, was physically present and have reviewed and verified the accuracy of this note documented by ZHANNA BOWDEN.     This part of the medical record was transcribed by ZHANNA BOWDEN, Medical Scribe, from a dictation done by Nona Venegas MD.         Nona Venegas MD  11/21/2024   [facility] EMERGENCY DEPARTMENT     Conjunctivae and lids are normal. No scleral icterus.   NECK: Normal range of motion and phonation normal. Neck supple.  No tracheal deviation, no stridor. No edema or erythema noted.  CARDIOVASCULAR: HR 90's to low 100's, Irregularly irregular rhythmand no appreciable abnormal heart sounds.   PULMONARY/CHEST: Normal work of breathing. No accessory muscle usage or stridor. No respiratory distress. SpO2 now 92% on 2.5 L NC.  Appears to have normal work of breathing at rest.  Does have some crackles at both bases, seems to clear somewhat with deep inspiration/repeat breaths.  ABDOMEN: Soft, non-distended. No tenderness. No peritoneal findings, no rigidity, rebound or guarding.  No palpable masses or abnormal pulsatility appreciated.  MUSCULOSKELETAL: Extremities warm and seemingly well perfused. Does have mild pitting edema at both ankles, no calf tenderness.  NEUROLOGIC: Awake, alert. Not disoriented. No seizure activity. GCS 15  SKIN: Skin is warm and dry. No diaphoresis. No pallor. No rash/acute appearing skin changes noted.  PSYCHIATRIC: Normal mood and affect. Speech and behavior normal. Thought processes linear. Cognition and memory are normal. No SI/HI reported.    ED Course     ED Course as of 11/21/24 1847   Thu Nov 21, 2024   1809 Current SpO2 92% with good pleth on 2 to 2.5 L              EKG Interpretation:      Interpreted by Nona Venegas MD  Time reviewed: 1802  Symptoms at time of EKG: Shortness of breath, hypoxia  Rhythm: A-fib with RVR  Rate: 101 bpm  Axis: Right superior axis deviation  Ectopy: None  Conduction: Prolonged QTc (521 ms),  ST Segments/ T Waves: Somewhat difficult to assess given wandering baseline, some artifact, A-fib but I do not appreciate ermelinda ST elevation though does have some nonspecific abnormalities  Comparison to prior: Compared to EKG from 8 April 2024, heart rate is somewhat decreased, continues to be in A-fib with RVR like previous, had right axis on previous EKG as  well,  Clinical Impression: A-fib with RVR, does have some nonspecific ST/T wave abnormalities, but without ermelinda ST elevation, decreased rate from previous, relatively similar morphology, but full assessment limited given wandering baseline, some artifact, etc.      ----------------------------------------------------------------------------------------------------------------------  Critical care was not performed.     Medical Decision Making  The patient's presentation was of high complexity (an acute health issue posing potential threat to life or bodily function).    The patient's evaluation involved:  review of 3+ test result(s) ordered prior to this encounter (see separate area of note for details)  ordering and/or review of 3+ test(s) in this encounter (see separate area of note for details)  discussion of management or test interpretation with another health professional (see separate area of note for details)    The patient's management necessitated high risk (a decision regarding hospitalization), and further care after sign-out to admitting IM team and overnight EM physician (see their note for further management).    Assessments & Plan (with Medical Decision Making)     IMPRESSION:   79 year old female w/ PMH notable for chronic atrial fibrillation with RVR anticoagulated on Eliquis, CREST syndrome, fungal infection, papillary carcinoma of thyroid, and recurrent respiratory papillomatosis, who presents to the ED for evaluation of shortness of breath.     Clinically, patient appears nontoxic, NAD at rest here now that she is on the supplemental oxygen has normal work of breathing at rest. . Heart rates are in the 90s to low 100s with an irregular really irregular rhythm (appears to be A-fib on monitor). Otherwise on examination, no other notable cardiac findings, does have some crackles with deep inspiration at both bases which seems to improve slightly with repeated breaths, but still present to some  "degree, no other cardiopulmonary findings, abdominal exam benign, mild pitting edema at both ankles without any other calf tenderness or other acute abnormalities.    Ddx includes, but not limited to,  infection (patient has known exposure to such, could have increased risk of such given her history of rib fractures earlier this fall, amongst others), fluid overload, heart failure, occult cardiac etiology (will not having chest pain, does have the edema at the ankles, though I do not know she is otherwise clinically looks like she has enough edema to be causing significant respiratory symptoms), Considered COPD exacerbation, however patient seems to be moving air well, no prolonged expiratory ways, no notable wheeze component and clinically seems less consistent with such on exam, considered symptoms related to PE (would not be able to use PERC criteria and think dimer would not be helpful given her all of her fractures though that itself may increase her risk of clots), symptoms related to her crest syndrome, respiratory papillomatosis, amongst others despite having incision and cancer is protecting airway, tolerating secretions currently.     PLAN:   - Screening ECG, laboratory studies, chest imaging  - I think the patient will need to be admitted given the significant hypoxia from her baseline exact dispo pending ED course.  - Risks/benefits of pursuing imaging reviewed and accepted.   - Dispo pending ED Course    RESULTS:  Labs:   - BNP in the 4000's, Trop 32  - CRP normal, no leukocytosis; lactate normal  - Hgb up from previous  - Na up from previous  Imaging: Written preliminary reports reviewed:  - CT PE:   \"1.  No acute pulmonary embolism.  2.  Trace interstitial edema pattern, with small to moderate right and small left pleural effusions.  3.  Subacute, nondisplaced to minimally displaced fractures of the right fourth through 10th ribs, as well as the right fifth through eighth transverse processes.  4.  " "Subacute, mild superior endplate compression deformity of the T3 vertebral body.  5.  Ingested content within a mildly patulous thoracic esophagus; aspiration precautions recommended.  6.  New 6 mm left lower lobe pulmonary nodule. Follow-up is recommended according to Fleischner criteria, as detailed below.  7.  Mild mediastinal and right hilar lymphadenopathy, stable from 2023 and likely reactive.  8.  Small nonobstructing right renal stones.  9.  Colonic diverticulosis. \"  Results/reports reviewed w/ patient who expresses understanding of findings and F/U recommendations.    INTERVENTIONS:   - Supplemental O2    RE-EVALUATION:  - Pt otherwise continues to do well here in the ED, no acute issues or apparent concerning changes in vitals or clinical appearance.    DISCUSSIONS:  - w/ IM: Reviewed patient/presentation, current state of workup/any pending studies. They will admit for further evaluation/management, F/U pending studies as needed, coordinate w/ consulting services as needed. No additional requests/recommendations for workup/management for in the ED at this time.   - w/ Patient: I have reviewed the available findings, plan with the patient. She expressed understanding and agreement with this plan. All questions answered to the best of our ability at this time.       DISPOSITION/PLANNING:  IMPRESSION:   - Acute on chronic hypoxic respiratory failure (requiring new supplemental O2)  - Prolonged QTc  DISPOSITION:  - Admit to IM for further evaluation/management      ______________________________________________________________________    I, ZHANNA BOWDEN am serving as a trained medical scribe to document services personally performed by Nona Venegas MD, based on the provider's statements to me.      I, Nona Venegas MD, was physically present and have reviewed and verified the accuracy of this note documented by ZHANNA BOWDEN.     This part of the medical record was transcribed by Stephane CELESTE " Scribe, from a dictation done by Nona Venegas MD.         Nona Venegas MD  11/21/2024   [facility] EMERGENCY DEPARTMENT       Nona Venegas MD  11/23/24 [id number]

## 2024-11-21 NOTE — ED TRIAGE NOTES
"Pt biba from assisted living w/ c/o sob. Per EMS, pt sats found in low 80's upon arrival- pt normally on RA placed on 4L with sats 100% upon arrival to ED. Pt states sob ongoing ~1 month, worsening today. Pt denies cp but does endorse \"a little bit\" of dizziness when laying down. No known sick contacts. Covid test done at facility negative. Hx throat cancer per pt- last radiation \"sometime in October.\"        "

## 2024-11-22 ENCOUNTER — APPOINTMENT (OUTPATIENT)
Dept: CARDIOLOGY | Facility: CLINIC | Age: 79
End: 2024-11-22
Attending: PHYSICIAN ASSISTANT
Payer: COMMERCIAL

## 2024-11-22 LAB
ANION GAP SERPL CALCULATED.3IONS-SCNC: 11 MMOL/L (ref 7–15)
BUN SERPL-MCNC: 14.7 MG/DL (ref 8–23)
C PNEUM DNA SPEC QL NAA+PROBE: NOT DETECTED
CALCIUM SERPL-MCNC: 9.6 MG/DL (ref 8.8–10.4)
CHLORIDE SERPL-SCNC: 105 MMOL/L (ref 98–107)
CREAT SERPL-MCNC: 0.96 MG/DL (ref 0.51–0.95)
EGFRCR SERPLBLD CKD-EPI 2021: 60 ML/MIN/1.73M2
ERYTHROCYTE [DISTWIDTH] IN BLOOD BY AUTOMATED COUNT: 23.1 % (ref 10–15)
FLUAV H1 2009 PAND RNA SPEC QL NAA+PROBE: NOT DETECTED
FLUAV H1 RNA SPEC QL NAA+PROBE: NOT DETECTED
FLUAV H3 RNA SPEC QL NAA+PROBE: NOT DETECTED
FLUAV RNA SPEC QL NAA+PROBE: NOT DETECTED
FLUBV RNA SPEC QL NAA+PROBE: NOT DETECTED
GLUCOSE SERPL-MCNC: 92 MG/DL (ref 70–99)
HADV DNA SPEC QL NAA+PROBE: NOT DETECTED
HCO3 SERPL-SCNC: 28 MMOL/L (ref 22–29)
HCOV PNL SPEC NAA+PROBE: NOT DETECTED
HCT VFR BLD AUTO: 38.2 % (ref 35–47)
HGB BLD-MCNC: 11.2 G/DL (ref 11.7–15.7)
HMPV RNA SPEC QL NAA+PROBE: NOT DETECTED
HPIV1 RNA SPEC QL NAA+PROBE: NOT DETECTED
HPIV2 RNA SPEC QL NAA+PROBE: NOT DETECTED
HPIV3 RNA SPEC QL NAA+PROBE: NOT DETECTED
HPIV4 RNA SPEC QL NAA+PROBE: NOT DETECTED
LVEF ECHO: NORMAL
M PNEUMO DNA SPEC QL NAA+PROBE: NOT DETECTED
MAGNESIUM SERPL-MCNC: 1.7 MG/DL (ref 1.7–2.3)
MCH RBC QN AUTO: 25.6 PG (ref 26.5–33)
MCHC RBC AUTO-ENTMCNC: 29.3 G/DL (ref 31.5–36.5)
MCV RBC AUTO: 87 FL (ref 78–100)
PHOSPHATE SERPL-MCNC: 4.8 MG/DL (ref 2.5–4.5)
PLATELET # BLD AUTO: 268 10E3/UL (ref 150–450)
POTASSIUM SERPL-SCNC: 3 MMOL/L (ref 3.4–5.3)
RBC # BLD AUTO: 4.38 10E6/UL (ref 3.8–5.2)
RSV RNA SPEC QL NAA+PROBE: NOT DETECTED
RSV RNA SPEC QL NAA+PROBE: NOT DETECTED
RV+EV RNA SPEC QL NAA+PROBE: NOT DETECTED
SODIUM SERPL-SCNC: 144 MMOL/L (ref 135–145)
WBC # BLD AUTO: 7 10E3/UL (ref 4–11)

## 2024-11-22 PROCEDURE — 85014 HEMATOCRIT: CPT | Performed by: PHYSICIAN ASSISTANT

## 2024-11-22 PROCEDURE — 99233 SBSQ HOSP IP/OBS HIGH 50: CPT | Performed by: STUDENT IN AN ORGANIZED HEALTH CARE EDUCATION/TRAINING PROGRAM

## 2024-11-22 PROCEDURE — 36415 COLL VENOUS BLD VENIPUNCTURE: CPT | Performed by: PHYSICIAN ASSISTANT

## 2024-11-22 PROCEDURE — 250N000009 HC RX 250: Performed by: PHYSICIAN ASSISTANT

## 2024-11-22 PROCEDURE — 120N000002 HC R&B MED SURG/OB UMMC

## 2024-11-22 PROCEDURE — 97530 THERAPEUTIC ACTIVITIES: CPT | Mod: GP

## 2024-11-22 PROCEDURE — 84155 ASSAY OF PROTEIN SERUM: CPT | Performed by: STUDENT IN AN ORGANIZED HEALTH CARE EDUCATION/TRAINING PROGRAM

## 2024-11-22 PROCEDURE — 92610 EVALUATE SWALLOWING FUNCTION: CPT | Mod: GN

## 2024-11-22 PROCEDURE — 93306 TTE W/DOPPLER COMPLETE: CPT | Mod: 26 | Performed by: INTERNAL MEDICINE

## 2024-11-22 PROCEDURE — 80048 BASIC METABOLIC PNL TOTAL CA: CPT | Performed by: PHYSICIAN ASSISTANT

## 2024-11-22 PROCEDURE — 87486 CHLMYD PNEUM DNA AMP PROBE: CPT | Performed by: PHYSICIAN ASSISTANT

## 2024-11-22 PROCEDURE — 97165 OT EVAL LOW COMPLEX 30 MIN: CPT | Mod: GO

## 2024-11-22 PROCEDURE — 83735 ASSAY OF MAGNESIUM: CPT | Performed by: STUDENT IN AN ORGANIZED HEALTH CARE EDUCATION/TRAINING PROGRAM

## 2024-11-22 PROCEDURE — 80053 COMPREHEN METABOLIC PANEL: CPT | Performed by: PHYSICIAN ASSISTANT

## 2024-11-22 PROCEDURE — 87633 RESP VIRUS 12-25 TARGETS: CPT | Performed by: PHYSICIAN ASSISTANT

## 2024-11-22 PROCEDURE — 999N000157 HC STATISTIC RCP TIME EA 10 MIN

## 2024-11-22 PROCEDURE — 92526 ORAL FUNCTION THERAPY: CPT | Mod: GN

## 2024-11-22 PROCEDURE — 250N000011 HC RX IP 250 OP 636: Performed by: STUDENT IN AN ORGANIZED HEALTH CARE EDUCATION/TRAINING PROGRAM

## 2024-11-22 PROCEDURE — 84100 ASSAY OF PHOSPHORUS: CPT | Performed by: STUDENT IN AN ORGANIZED HEALTH CARE EDUCATION/TRAINING PROGRAM

## 2024-11-22 PROCEDURE — 87581 M.PNEUMON DNA AMP PROBE: CPT | Performed by: PHYSICIAN ASSISTANT

## 2024-11-22 PROCEDURE — 250N000013 HC RX MED GY IP 250 OP 250 PS 637: Performed by: PHYSICIAN ASSISTANT

## 2024-11-22 PROCEDURE — 94640 AIRWAY INHALATION TREATMENT: CPT

## 2024-11-22 PROCEDURE — 84450 TRANSFERASE (AST) (SGOT): CPT | Performed by: STUDENT IN AN ORGANIZED HEALTH CARE EDUCATION/TRAINING PROGRAM

## 2024-11-22 PROCEDURE — 250N000013 HC RX MED GY IP 250 OP 250 PS 637: Performed by: STUDENT IN AN ORGANIZED HEALTH CARE EDUCATION/TRAINING PROGRAM

## 2024-11-22 PROCEDURE — 93306 TTE W/DOPPLER COMPLETE: CPT

## 2024-11-22 PROCEDURE — 97161 PT EVAL LOW COMPLEX 20 MIN: CPT | Mod: GP

## 2024-11-22 PROCEDURE — 97530 THERAPEUTIC ACTIVITIES: CPT | Mod: GO

## 2024-11-22 RX ORDER — FUROSEMIDE 10 MG/ML
40 INJECTION INTRAMUSCULAR; INTRAVENOUS ONCE
Status: COMPLETED | OUTPATIENT
Start: 2024-11-22 | End: 2024-11-22

## 2024-11-22 RX ORDER — GABAPENTIN 300 MG/1
600 CAPSULE ORAL AT BEDTIME
Status: DISCONTINUED | OUTPATIENT
Start: 2024-11-22 | End: 2024-12-30

## 2024-11-22 RX ORDER — GABAPENTIN 300 MG/1
600 CAPSULE ORAL AT BEDTIME
Status: DISCONTINUED | OUTPATIENT
Start: 2024-11-22 | End: 2024-11-22

## 2024-11-22 RX ADMIN — DILTIAZEM HYDROCHLORIDE 240 MG: 240 CAPSULE, EXTENDED RELEASE ORAL at 08:21

## 2024-11-22 RX ADMIN — ATORVASTATIN CALCIUM 20 MG: 20 TABLET, FILM COATED ORAL at 01:16

## 2024-11-22 RX ADMIN — GABAPENTIN 600 MG: 300 CAPSULE ORAL at 21:42

## 2024-11-22 RX ADMIN — IPRATROPIUM BROMIDE AND ALBUTEROL SULFATE 3 ML: .5; 3 SOLUTION RESPIRATORY (INHALATION) at 03:17

## 2024-11-22 RX ADMIN — PROPRANOLOL HYDROCHLORIDE 10 MG: 10 TABLET ORAL at 08:21

## 2024-11-22 RX ADMIN — SERTRALINE HYDROCHLORIDE 150 MG: 100 TABLET ORAL at 08:22

## 2024-11-22 RX ADMIN — APIXABAN 5 MG: 5 TABLET, FILM COATED ORAL at 08:21

## 2024-11-22 RX ADMIN — LEVOTHYROXINE SODIUM 88 MCG: 88 TABLET ORAL at 06:19

## 2024-11-22 RX ADMIN — GABAPENTIN 600 MG: 300 CAPSULE ORAL at 01:16

## 2024-11-22 RX ADMIN — ATORVASTATIN CALCIUM 20 MG: 20 TABLET, FILM COATED ORAL at 20:14

## 2024-11-22 RX ADMIN — FEXOFENADINE HCL 180 MG: 180 TABLET ORAL at 08:21

## 2024-11-22 RX ADMIN — DILTIAZEM HYDROCHLORIDE 240 MG: 240 CAPSULE, EXTENDED RELEASE ORAL at 20:15

## 2024-11-22 RX ADMIN — APIXABAN 5 MG: 5 TABLET, FILM COATED ORAL at 20:12

## 2024-11-22 RX ADMIN — DILTIAZEM HYDROCHLORIDE 240 MG: 240 CAPSULE, EXTENDED RELEASE ORAL at 01:16

## 2024-11-22 RX ADMIN — FUROSEMIDE 40 MG: 10 INJECTION, SOLUTION INTRAMUSCULAR; INTRAVENOUS at 17:50

## 2024-11-22 NOTE — PROGRESS NOTES
Temp: 98.3  F (36.8  C) Temp src: Axillary BP: (!) 159/90 Pulse: 99   Resp: 14 SpO2: 92 % O2 Device: Oxymask Oxygen Delivery: 2 LPM     Pt on 2L oxymask w/ SpO2>90%, AOX4, with slight tremors in UE (baseline). Ambulated to commode w/ 1x assist. Denies sob,dyspnea, HA,CP or dizziness. Pure wick replaced and draining. R. PIV SL

## 2024-11-22 NOTE — PROGRESS NOTES
"ED PT Eval     11/22/24 1600   Appointment Info   Signing Clinician's Name / Credentials (PT) Valeriy Duvall, PT, DPT   Living Environment   People in Home facility resident   Current Living Arrangements assisted living   Home Accessibility no concerns   Transportation Anticipated family or friend will provide   Living Environment [facility].   Self-Care   Usual Activity Tolerance moderate   Current Activity Tolerance poor   Equipment Currently Used at Home walker, rolling;shower chair;grab bar, tub/shower;grab bar, toilet;wheelchair, manual   Fall history within last six months yes   Number of times patient has fallen within last six months 1   Activity/Exercise/Self-Care Comment Patient states that she is typically mod I for ambulation with 4WW but has been struggling with balance issues for the past 3 weeks. Patient sometimes gets rides to/from cafeteria in WC from staff at facility. Recently fell at daughter's house.   General Information   Onset of Illness/Injury or Date of Surgery 11/22/24   Referring Physician Jennifer Anne PA-C   Patient/Family Therapy Goals Statement (PT) To be able to walk safely   Pertinent History of Current Problem (include personal factors and/or comorbidities that impact the POC) Per EMR \"Asya Coffman is a 79 year old female with history of A fib, CAD, COPD, asthma, Raynaud's, CKD, laryngeal squamous cell carcinoma/papillomatosis, CREST syndrome, chronic pain, chronic constipation, hypothyroidism, anxiety, and depression who was admitted to [facility] with acute hypoxic respiratory failure\"   Existing Precautions/Restrictions fall   General Observations activity: up with assist   Cognition   Affect/Mental Status (Cognition) WFL   Range of Motion (ROM)   Range of Motion ROM is WFL   Strength (Manual Muscle Testing)   Strength (Manual Muscle Testing) Deficits observed during functional mobility   Strength Comments grossly deconditioned   Bed " Mobility   Bed Mobility sit-supine;supine-sit   Supine-Sit Gem (Bed Mobility) moderate assist (50% patient effort)   Sit-Supine Gem (Bed Mobility) moderate assist (50% patient effort)   Transfers   Transfers sit-stand transfer   Sit-Stand Transfer   Sit-Stand Gem (Transfers) minimum assist (75% patient effort)   Assistive Device (Sit-Stand Transfers) walker, front-wheeled   Balance   Balance Comments severe posterior lean   Clinical Impression   Criteria for Skilled Therapeutic Intervention Yes, treatment indicated   PT Diagnosis (PT) impaired functional mobility   Influenced by the following impairments decreased activity tolerance, posterior lean   Functional limitations due to impairments transfers, gait   Clinical Presentation (PT Evaluation Complexity) stable   Clinical Presentation Rationale clinical judgement   Clinical Decision Making (Complexity) low complexity   Planned Therapy Interventions (PT) balance training;bed mobility training;gait training;ROM (range of motion);strengthening;stretching;stair training;transfer training   Risk & Benefits of therapy have been explained evaluation/treatment results reviewed;care plan/treatment goals reviewed;risks/benefits reviewed;current/potential barriers reviewed;participants voiced agreement with care plan;participants included;patient   PT Total Evaluation Time   PT Eval, Low Complexity Minutes (84924) 10   Physical Therapy Goals   PT Frequency 5x/week   PT Predicted Duration/Target Date for Goal Attainment 12/06/24   PT Goals Transfers;Bed Mobility;Gait   PT: Bed Mobility Supine to/from sit;Modified independent   PT: Transfers Modified independent;Sit to/from stand;Within precautions   PT: Gait Modified independent;Assistive device;Greater than 200 feet   Interventions   Interventions Quick Adds Therapeutic Activity   PT Discharge Planning   PT Plan sitting balance, standing balance   PT Discharge Recommendation (DC Rec) Transitional  Care Facility   PT Rationale for DC Rec Patient with significantly impaired balance and mobility. Will benefit from TCU placement after d/c to maximize mobility. That being said, patient is from [facility] and likely can return to [facility] with 24 hour support and HH PT vs PT at facility if this is an option.   PT Brief overview of current status Ax1 for pivot to commode with GB and FWW   Physical Therapy Time and Intention   Total Session Time (sum of timed and untimed services) 10         Valeriy Duvall, PT, DPT

## 2024-11-22 NOTE — H&P
[facility]    History and Physical - Hospitalist Service       Date of Admission:  11/21/2024    Assessment & Plan      Asya Coffman is a 79 year old female with history of A fib, CAD, COPD, asthma, Raynaud's, CKD, laryngeal squamous cell carcinoma/papillomatosis, CREST syndrome, chronic pain, chronic constipation, hypothyroidism, anxiety, and depression who was admitted to [facility] with acute hypoxic respiratory failure    Acute hypoxic respiratory failure: Presented with 1 month progressive dyspnea. Endorses FELIZ, PND, orthopnea, swelling in the legs. Found to have hypoxic requiring 2L NC. BNP 4,421 (1,920 4/2024). VBG 7.41/40/46/26. LA, WBC normal. Flu, COVID negative. EKG A fib with RVR (rate 101), chronic A fib as below. CT PE 11/21 no PE, trace interstitial edema with small to moderate b/l pleural effusions. Crackles on exam. Etiology c/f heat failure  - 20 mg IV Lasix x1   - Check RVP   - Echo   - 2L fluid restriction  - Daily standing weights, I&Os  - Continuous pulse ox  - Full Code per d/w patient  - Of note, dosing for diltiazem, oxycodone, and propranolol are not known. D/w pharmacy who are working on med rec and will give final recommendations    H/o laryngeal squamous cell carcinoma, papillomatosis with dysphagia: Initially presented 2015 with benign laryngeal lesion. Progressed to papilloma 2021. Dx with invasive squamous cell carcinoma 4/2024. Now s/p radiation completed 7/2024. CT chest 11/21 ingested content within a mildly patulous thoracic esophagus, aspiration cautions recommended   - SLP consult   - Follow up OP 1/2025 as planned    A fib with RVR: PTA on Apixaban, diltiazem. EKG A fib with RVR. Rates 90s-120s while in the room with patient. Asymptomatic   - Continue Apixaban  - 5 mg IV metoprolol x1 now given unknown dilt dose and A fib with RVR  - Will await pharmacy recs for diltiazem dose     Pulmonary nodule: 6 mm LLL pulmonary nodule noted on  CT  - Will need repeat imaging in 6-12 months     Subacute rib fractures: Admitted to [facility] 9/2024 with rib fractures. Subacute fractures noted on CT 11/21. Continue supportive cares     H/o CREST syndrome: 2014. Per notes, did have positive FARIDEH, Raynaud's, Calcinosis, GERD and some telangectasia's. Last saw [facility] rheumatology 2017. No acute concerns     Hypernatremia: Mild. Na 146. Trend     L>R BLE edema: US to rule out DVT      Other Medical Issues:  Troponin elevation, likely increased demand: Trop 28 (32). EKG chronic a fib. Asymptomatic. Monitor   COPD, asthma: No e/o exacerbation. Continue PTA Symbicort, albuterol, DuoNebs   CKD II: BL Cr 0.9-1.1 and stable. Trend   Chronic pain: Continue PTA gabapentin, Tylenol. Discontinue Ibuprofen given CKD  Chronic constipation: Continue bowel regimen   Hypothyroidism: TSH 1.48 4/2024. Continue PTA synthroid   Anxiety, depression: Continue PTA Sertraline, Propranolol   Raynaud's: Continue diltiazem as above    Diet:  2L fluid restriction  DVT Prophylaxis: DOAC  Miranda Catheter: Not present  Lines: None     Cardiac Monitoring: None  Code Status:  Full Code    Clinically Significant Risk Factors Present on Admission         # Hypernatremia: Highest Na = 146 mmol/L in last 2 days, will monitor as appropriate  # Hyperchloremia: Highest Cl = 108 mmol/L in last 2 days, will monitor as appropriate           # Drug Induced Coagulation Defect: home medication list includes an anticoagulant medication           Disposition Plan     Medically Ready for Discharge: Anticipated in 2-4 Days    The patient's care was discussed with the patient, pharmacy, and attending physician    Jennifer Anne PA-C  Hospitalist Service  [facility]  Securely message with [employer] (more info)  Text page via [facility] Paging/Directory     ______________________________________________________________________    Chief Complaint   Dyspnea    History is  obtained from the patient and medical record     History of Present Illness   Asya Coffman is a 79 year old female with history of A fib, CAD, COPD, asthma, Raynaud's, CKD, laryngeal squamous cell carcinoma/papillomatosis, CREST syndrome, chronic pain, chronic constipation, hypothyroidism, anxiety, and depression who was admitted to [facility] with acute hypoxic respiratory failure    Patient reports 1 month of worsening dyspnea. She has associated FELIZ, PND, and occasional orthopnea. She occasionally sleeps with the head of her bed up. Denies chest pain or palpitations. No fever or chills. Not aware of any weight gain. Appetite stable. Mild increase in leg swelling. Denies swelling in the hands. Denies recent falls but did have fall in September requiring admission. Patient states she had to sell her house and move into assisted living where she has some services.       Past Medical History    Past Medical History:   Diagnosis Date    A-fib (H)     Antiplatelet or antithrombotic long-term use     Arrhythmia     COPD (chronic obstructive pulmonary disease) (H)        Past Surgical History   Past Surgical History:   Procedure Laterality Date    INJECT STEROID (LOCATION) N/A 6/15/2023    Procedure: Avastin injection;  Surgeon: Nikole Davis MD;  Location: [facility] OR    INJECT STEROID (LOCATION) N/A 9/7/2023    Procedure: Avastin injection;  Surgeon: Nikole Davis MD;  [facility]: [facility]    INJECT STEROID (LOCATION) N/A 12/14/2023    Procedure: Avastin injection;  Surgeon: Nikole Davis MD;  Location: [facility]    INJECT STEROID (LOCATION) N/A 2/15/2024    Procedure: Avastin injection;  Surgeon: Nikole Davis MD;  [facility]: [facility]    LASER CO2 LARYNGOSCOPY N/A 9/7/2023    Procedure: Microdirect laryngoscopy with excision/ablation of laryngeal lesions, biopsies, CO2 laser;  Surgeon: Nikole Davis MD;  [facility]: [facility]    LASER CO2 LARYNGOSCOPY N/A 5/30/2024    Procedure:  Microdirect laryngoscopy with excision/ablation of laryngeal lesions, biopsies, CO2 laser;  Surgeon: Nikole Davis MD;  [facility]: [facility]    LASER CO2 LARYNGOSCOPY, COMPLEX N/A 5/26/2022    Procedure: Micro direct laryngoscopy with excision/ablation of laryngeal lesions, possible biopsies, possible CO2 laser;  Surgeon: Nikole Davis MD;  [facility]    LASER CO2 LARYNGOSCOPY, COMPLEX N/A 9/15/2022    Procedure: Microdirect laryngoscopy with ablation of laryngeal lesions,biopsies,CO2 laser;  Surgeon: Nikole Davis MD;  [facility]    LASER CO2 LARYNGOSCOPY, COMPLEX N/A 12/1/2022    Procedure: Microdirect laryngoscopy with excision/ablation of laryngeal lesions, biopsies,   CO2 laser;  Surgeon: Nikole Davis MD;  [facility]    LASER CO2 LARYNGOSCOPY, COMPLEX N/A 6/15/2023    Procedure: Microdirect laryngoscopy with ablation of laryngeal lesions, biopsies, CO2 laser;  Surgeon: iNkole Davis MD;  [facility]    LASER CO2 LARYNGOSCOPY, COMPLEX N/A 10/5/2023    Procedure: Microdirect laryngoscopy with excision/ablation of laryngeal lesions, biopsies, CO2 laser;  Surgeon: Nikole Davis MD;  [facility]    LASER CO2 LARYNGOSCOPY, COMPLEX N/A 12/14/2023    Procedure: Microdirect laryngoscopy with excision/ablation of laryngeal lesions, biopsies, CO2 laser;  Surgeon: Nikole Davis MD;  [facility]    LASER CO2 LARYNGOSCOPY, COMPLEX N/A 2/15/2024    Procedure: Microdirect laryngoscopy with excision/ablation of laryngeal lesions, biopsies, CO2 laser;  Surgeon: Nikole Davis MD;  [facility]    LASER CO2 LARYNGOSCOPY, COMPLEX N/A 4/11/2024    Procedure: Microdirect laryngoscopy with excision/ablation of laryngeal lesions, biopsies, CO2 laser, Avastin injections;  Surgeon: Nikole Davis MD;  [facility]: [facility]    LASER KTP LARYNGOSCOPY N/A 4/3/2023    Procedure: Microdirect laryngoscopy with  biopsies, excision/ablation of lesions, C02 laser,  Avastin injection;  Surgeon: Nikole Davis MD;  [facility]    LASER KTP LARYNGOSCOPY N/A 6/15/2023    Procedure: flexible laser (CO2 or KTP) standby;  Surgeon: Nikole Davis MD;  [facility]: [facility]    LASER KTP LARYNGOSCOPY FLEXIBLE N/A 8/18/2022    Procedure: Transnasal flexible laryngoscopy with KTP laser and biopsies;  Surgeon: Nikole Davis MD;  Location: [facility] OR    LASER KTP LARYNGOSCOPY FLEXIBLE N/A 10/27/2022    Procedure: Transnasal flexible laryngoscopy with possible KTP laser;  Surgeon: Nikole Davis MD;  Location: [facility] OR    LASER KTP LARYNGOSCOPY FLEXIBLE N/A 1/26/2023    Procedure: Transnasal flexible laryngoscopy with KTP laser,  biopsies, superior laryngeal nerve blocks, Avastin injection;  Surgeon: Nikole Davis MD;  Location: [facility] OR    LASER KTP LARYNGOSCOPY FLEXIBLE N/A 2/15/2023    Procedure: Transnasal flexible laryngoscopy with KTP laser, superior laryngeal nerve blocks, biopsies, avastin injection;  Surgeon: Nikole Davis MD;  Location: [facility] OR    LASER KTP LARYNGOSCOPY FLEXIBLE N/A 2/17/2023    Procedure: Transnasal flexible laryngoscopy with KTP laser, biopsies, superior laryngeal nerve blocks;  Surgeon: Nikole Davis MD;  Location: [facility] OR    LASER KTP LARYNGOSCOPY FLEXIBLE N/A 2/23/2023    Procedure: Transnasal flexible laryngoscopy with KTP laser, superior laryngeal nerve blocks;  Surgeon: Nikole Davis MD;  Location: [facility] OR    LASER KTP LARYNGOSCOPY FLEXIBLE N/A 3/2/2023    Procedure: Transnasal flexible laryngoscopy with KTP laser, superior laryngeal nerve block Bilateral;  Surgeon: Nikoel Davis MD;  Location: [facility] OR    LASER KTP LARYNGOSCOPY FLEXIBLE N/A 3/9/2023    Procedure: Transnasal flexible laryngoscopy with KTP laser, biopsies, superior laryngeal nerve blocks;  Surgeon: Nikole Davis MD;  Location: [facility]  OR    LASER KTP LARYNGOSCOPY FLEXIBLE N/A 3/17/2023    Procedure: Transnasal flexible laryngoscopy with KTP laser, superior laryngeal nerve block(s), Avastin injection;  Surgeon: Nikole Davis MD;  Location: [facility] OR    LASER KTP LARYNGOSCOPY FLEXIBLE N/A 3/28/2023    Procedure: Transnasal flexible laryngoscopy with KTP laser, superior laryngeal nerve block(s);  Surgeon: Pankaj Woodard MD;  Location: [facility] OR       Prior to Admission Medications   Prior to Admission Medications   Prescriptions Last Dose Informant Patient Reported? Taking?   Calcium Carbonate-Vitamin D 600-10 MG-MCG TABS  Self Yes No   Sig: Take by mouth.   Respiratory Therapy Supplies (NEBULIZER) JUWAN  Self Yes No   Sig: Portable nebulizer, disposable neb kit x 4, reuseable neb kit x 1, mask x 1, filters x 1.   Frequency of use: daily;  Medication: albuterol  Length of need: 99 months   acetaminophen (TYLENOL) 325 MG tablet  Self No No   Sig: Take 2 tablets (650 mg) by mouth every 4 hours as needed for pain   albuterol (PROAIR HFA/PROVENTIL HFA/VENTOLIN HFA) 108 (90 Base) MCG/ACT inhaler  Self Yes No   Sig: Inhale 2 puffs into the lungs as needed   apixaban ANTICOAGULANT (ELIQUIS) 5 MG tablet  Self No No   Sig: Take 1 tablet (5 mg) by mouth 2 times daily   atorvastatin (LIPITOR) 20 MG tablet  Self Yes No   Sig: Take 20 mg by mouth every evening   budesonide-formoterol (SYMBICORT) 160-4.5 MCG/ACT Inhaler  Self Yes No   Sig: Inhale 2 puffs into the lungs two times daily   calcium carbonate (TUMS) 500 MG chewable tablet  Self Yes No   Sig: Take 1,000 mg by mouth.   calcium citrate-vitamin D (CITRACAL) 315-5 MG-MCG TABS per tablet  Self Yes No   Sig: Take 1 tablet by mouth.   clotrimazole (MYCELEX) 10 MG lozenge  Self Yes No   diltiazem (CARDIZEM) 90 MG tablet  Self Yes No   diltiazem ER (TIAZAC) 240 MG 24 hr ER beaded capsule  Self Yes No   Sig: Take 240 mg by mouth 2 times daily   fexofenadine (ALLEGRA) 180 MG tablet  Self Yes No    Sig: Take 180 mg by mouth daily   gabapentin (NEURONTIN) 300 MG capsule  Self No No   Si mg p.o. every morning, 300 mg p.o. q. afternoon and 600 mg p.o. nightly.  Taper dose up per instructions given in Radiation Oncology   guaiFENesin (BUCKLEYS CHEST CONGESTION) 20 mg/mL liquid  Self Yes No   Sig: Take 15 mLs by mouth.   ibuprofen (ADVIL/MOTRIN) 200 MG capsule  Self Yes No   Sig: Take 400 mg by mouth every 6 hours as needed for fever   ipratropium - albuterol 0.5 mg/2.5 mg/3 mL (DUONEB) 0.5-2.5 (3) MG/3ML neb solution  Self No No   Sig: Take 1 vial (3 mLs) by nebulization every 6 hours as needed for shortness of breath, wheezing or cough   levothyroxine (SYNTHROID/LEVOTHROID) 88 MCG tablet  Self Yes No   Sig: Take 88 mcg by mouth daily   lidocaine, viscous, (XYLOCAINE) 2 % solution  Self Yes No   magic mouthwash (ENTER INGREDIENTS IN COMMENTS) suspension  Self No No   Sig: Take 5 mLs by mouth every 4 hours as needed   magic mouthwash suspension, diphenhydrAMINE, lidocaine, aluminum-magnesium & simethicone, (FIRST-MOUTHWASH BLM) compounding kit  Self No No   Sig: Swish and swallow 5 mLs in mouth every 4 hours as needed for mouth sores   oxyCODONE (ROXICODONE) 5 MG tablet  Self Yes No   Sig: Take 2.5 mg by mouth.   polyethylene glycol (MIRALAX) 17 GM/Dose powder  Self Yes No   Sig: Take 17 g by mouth.   predniSONE (DELTASONE) 20 MG tablet  Self No No   Sig: Take two tablets (= 40mg) each day for 5 (five) days   propranolol (INDERAL) 10 MG tablet  Self No No   Sig: Start propanolol 10 mg 1 tab daily and increase by 1 tab weekly until 20 mg twice daily. May stop at lowest effective dose. If experiencing hypotension or bradycardia, return to previous dose on titration schedule.   senna-docusate (SENOKOT-S/PERICOLACE) 8.6-50 MG tablet  Self Yes No   Sig: Take 2 tablets by mouth.   sertraline (ZOLOFT) 100 MG tablet  Self Yes No   Sig: Take 100 mg by mouth daily   tiZANidine (ZANAFLEX) 2 MG tablet  Self Yes No    Sig: Take 2 mg by mouth 3 times daily      Facility-Administered Medications: None        Review of Systems    The 10 point Review of Systems is negative other than noted in the HPI or here.      Physical Exam   Vital Signs: Temp: 97.8  F (36.6  C) Temp src: Oral BP: (!) 150/106 Pulse: (!) 124   Resp: 20 SpO2: 98 % O2 Device: Nasal cannula Oxygen Delivery: 2 LPM  Weight: 0 lbs 0 oz  General Appearance: Alert and oriented x3, in NAD  HEENT: Anicteric sclera, MMM   Respiratory: Breathing comfortably 2L oxy mask. Crackles in the bases. No increased work of breathing  Cardiovascular: Irregular, a fib. Rates 90s-120s. No murmur noted  GI: Abdomen soft, non-tender with active bowel sounds. No guarding or rebound  Lymph/Hematologic: Trace RLE edema, 1+ LLE edema. Distal pulses palpable   Skin: No rash or jaundice   Musculoskeletal: Moves all extremities   Neurologic: No focal deficits, CN II-XII grossly intact      Medical Decision Making       90 MINUTES SPENT BY ME on the date of service doing chart review, history, exam, documentation & further activities per the note.      Data     I have personally reviewed the following data over the past 24 hrs:    6.9  \   11.7   / 315     146 (H) 108 (H) 18.1 /  91   3.7 23 0.98 (H) \     ALT: 10 AST: 22 AP: 79 TBILI: 0.6   ALB: 4.3 TOT PROTEIN: 6.8 LIPASE: N/A     Trop: 28 (H) BNP: 4,421 (H)     Procal: N/A CRP: <3.00 Lactic Acid: 1.0       INR:  1.57 (H) PTT:  N/A   D-dimer:  N/A Fibrinogen:  N/A

## 2024-11-22 NOTE — PROGRESS NOTES
"   Occupational Therapy Evaluation: 11/22/24 0855   Appointment Info   Signing Clinician's Name / Credentials (OT) Mali Camarillo, OTD, OTR/L   Rehab Comments (OT) 3L supplemental O2   Living Environment   People in Home facility resident   Current Living Arrangements assisted living   Home Accessibility no concerns   Transportation Anticipated family or friend will provide   Living Environment Comments Pt reports living in [facility], has laundry in unit that daughter assists with completing. Pt has walk in shower in bathroom. No stairs at [facility].   Self-Care   Usual Activity Tolerance moderate   Current Activity Tolerance poor   Equipment Currently Used at Home walker, rolling;shower chair;grab bar, tub/shower;grab bar, toilet;wheelchair, manual   Fall history within last six months yes   Number of times patient has fallen within last six months 1   Activity/Exercise/Self-Care Comment Pt reports was recieving A with dressing, and bathing. Pt reports was ind-mod I with toileting and g/h tasks. Pt reports either ambulates with 4ww at [facility] or pushed in WC by staff. Pt reports was ind with bed mobility and up in room mod I typically with 4ww.   Instrumental Activities of Daily Living (IADL)   IADL Comments [facility] assists with med management, meals, cleaning. Pt daughter assists with lauundry and finance management. Pt does not drive.   General Information   Onset of Illness/Injury or Date of Surgery 11/21/24   Referring Physician Jennifer Anne PA-C   Patient/Family Therapy Goal Statement (OT) Return to [facility]   Additional Occupational Profile Info/Pertinent History of Current Problem \"Asya Coffman is a 79 year old female with history of A fib, CAD, COPD, asthma, Raynaud's, CKD, laryngeal squamous cell carcinoma/papillomatosis, CREST syndrome, chronic pain, chronic constipation, hypothyroidism, anxiety, and depression who was admitted to [facility] with acute hypoxic respiratory failure.\"   Existing Precautions/Restrictions " fall   Left Upper Extremity (Weight-bearing Status) full weight-bearing (FWB)   Right Upper Extremity (Weight-bearing Status) full weight-bearing (FWB)   Left Lower Extremity (Weight-bearing Status) full weight-bearing (FWB)   Right Lower Extremity (Weight-bearing Status) full weight-bearing (FWB)   General Observations and Info Reason for consult: deconditioning   Cognitive Status Examination   Orientation Status person;place   Cognitive Status Comments Pt accurately stating year/month/day, however unable to state date.   Visual Perception   Visual Impairment/Limitations WFL   Sensory   Sensory Quick Adds sensation intact   Posture   Posture forward head position;protracted shoulders   Range of Motion Comprehensive   General Range of Motion bilateral upper extremity ROM WFL   Strength Comprehensive (MMT)   Comment, General Manual Muscle Testing (MMT) Assessment Generalized weakness noted.   Coordination   Upper Extremity Coordination Left UE impaired;Right UE impaired   Bed Mobility   Bed Mobility supine-sit   Comment (Bed Mobility) Mod A   Transfers   Transfers toilet transfer   Transfer Comments Mod-max A per clinical judgement   Activities of Daily Living   BADL Assessment/Intervention grooming;toileting   Grooming Assessment/Training   [place] (Grooming) moderate assist (50% patient effort)   Comment, (Grooming) Per clinical judgement   Toileting   Comment, (Toileting) Per clinical judgement   [place] Level (Toileting) maximum assist (25% patient effort)   Clinical Impression   Criteria for Skilled Therapeutic Interventions Met (OT) Yes, treatment indicated   OT Diagnosis Decreased ind with toileting, and g/h tasks as well as decreased overall activity tolerance/strength/FMC.   OT Problem List-Impairments impacting ADL problems related to;activity tolerance impaired;strength;mobility;communication   Assessment of Occupational Performance 3-5 Performance Deficits   Identified Performance Deficits  G/h, toileting, decreased strength, decreased activity tolerance, difficulties with FMC   Planned Therapy Interventions (OT) ADL retraining;fine motor coordination training;transfer training;strengthening;home program guidelines;progressive activity/exercise;risk factor education   Clinical Decision Making Complexity (OT) problem focused assessment/low complexity   Risk & Benefits of therapy have been explained evaluation/treatment results reviewed;care plan/treatment goals reviewed;risks/benefits reviewed;current/potential barriers reviewed;participants voiced agreement with care plan;participants included;patient   OT Total Evaluation Time   OT Eval, Low Complexity Minutes (67398) 5   OT Goals   Therapy Frequency (OT) 5 times/week   OT Predicted Duration/Target Date for Goal Attainment 12/20/24   OT Goals Hygiene/Grooming;Transfers;Toilet Transfer/Toileting;OT Goal 1   OT: Hygiene/Grooming supervision/stand-by assist   OT: Transfer Supervision/stand-by assist   OT: Toilet Transfer/Toileting Supervision/stand-by assist   OT: Goal 1 Pt will ind complete UE HEP prior to discharge for increased UE strength.   OT Discharge Planning   OT Plan BSC transfer, standing vs seated EOB ADLS   OT Discharge Recommendation (DC Rec) Transitional Care Facility   OT Rationale for DC Rec Anticipate when pt medically stable, would benefit from additional therapy services at TCU to progress ind with bADLs, strength, and activity tolerance prior to returning to [facility]. Pt reporting ultimaltely would prefer to DC back to [facility] however will talk with daughter. Pt reports if does DC back to [facility] post admission to IP, [facility] would be able to provide increased assist levels.   OT Brief overview of current status A x 1-2

## 2024-11-22 NOTE — MEDICATION SCRIBE - ADMISSION MEDICATION HISTORY
Medication Scribe Admission Medication History    Admission medication history is complete. The information provided in this note is only as accurate as the sources available at the time of the update.    Information Source(s): Patient and Facility (TCU/NH/) medication list/MAR via in-person    Pertinent Information:   Asked by provider to comment on most recent propranolol, oxycodone, and diltiazem dosing:   Propranolol 10 mg po daily. Last received 11/21 AM.  Oxycodone 2.5 mg by mouth.q4h prn on med list from facility. She has not taken any doses of this while at [facility] facility.  Diltiazem 240 ER po bid. Last received 11/21 AM. Of note, this was recently changed from diltiazem 90 mg po qid on 11/19.    Changes made to PTA medication list:  Added: calcium carbonate 600 mg, iron, lidocaine patches, vitamin D  Deleted: calcium carbonate (TUMS) 500 MG chewable tablet, calcium citrate-vitamin D (CITRACAL) 315-5 MG-MCG TABS, clotrimazole (MYCELEX) 10 MG lozenge, diltiazem (CARDIZEM) 90 MG tablet, fexofenadine (ALLEGRA) 180 MG tablet, guaiFENesin, lidocaine, viscous, (XYLOCAINE) 2 % solution, magic mouthwash, predniSONE, senna-docusate, tiZANidine   Changed: sertraline 100 mg qd --> 150 mg qd, gabapentin 300/300/600 --> 600 qhs, propranolol 20 mg qd --> 10 mg qd, duonebs q6h prn--> qid     Allergies reviewed with patient and updates made in EHR: yes    Medication History Completed By: Nicol Norman 11/21/2024 8:42 PM    PTA Med List   Medication Sig Last Dose/Taking    acetaminophen (TYLENOL) 325 MG tablet Take 2 tablets (650 mg) by mouth every 4 hours as needed for pain Unknown    albuterol (PROAIR HFA/PROVENTIL HFA/VENTOLIN HFA) 108 (90 Base) MCG/ACT inhaler Inhale 2 puffs into the lungs as needed 11/21/2024 Morning    apixaban ANTICOAGULANT (ELIQUIS) 5 MG tablet Take 1 tablet (5 mg) by mouth 2 times daily 11/20/2024 Evening    atorvastatin (LIPITOR) 20 MG tablet Take 20 mg by mouth every evening  11/20/2024 Morning    budesonide-formoterol (SYMBICORT) 160-4.5 MCG/ACT Inhaler Inhale 2 puffs into the lungs two times daily 11/21/2024 Morning    diltiazem ER (TIAZAC) 240 MG 24 hr ER beaded capsule Take 240 mg by mouth 2 times daily 11/20/2024 Evening    gabapentin (NEURONTIN) 300 MG capsule 300 mg p.o. every morning, 300 mg p.o. q. afternoon and 600 mg p.o. nightly.  Taper dose up per instructions given in Radiation Oncology (Patient taking differently: Take 300 mg by mouth at bedtime.) 11/20/2024 Evening    ibuprofen (ADVIL/MOTRIN) 200 MG capsule Take 400 mg by mouth every 6 hours as needed for fever Unknown    ipratropium - albuterol 0.5 mg/2.5 mg/3 mL (DUONEB) 0.5-2.5 (3) MG/3ML neb solution Take 1 vial (3 mLs) by nebulization every 6 hours as needed for shortness of breath, wheezing or cough 11/21/2024 Morning    levothyroxine (SYNTHROID/LEVOTHROID) 88 MCG tablet Take 88 mcg by mouth daily 11/20/2024 Morning    oxyCODONE (OXY-IR) 5 MG capsule Take 2.5 mg by mouth every 4 hours as needed for severe pain. Maximum 10 mg per 24 hours Taking As Needed    propranolol (INDERAL) 10 MG tablet Start propanolol 10 mg 1 tab daily and increase by 1 tab weekly until 20 mg twice daily. May stop at lowest effective dose. If experiencing hypotension or bradycardia, return to previous dose on titration schedule. (Patient taking differently: Take 10 mg by mouth daily. Start propanolol 10 mg 1 tab daily and increase by 1 tab weekly until 20 mg twice daily. May stop at lowest effective dose. If experiencing hypotension or bradycardia, return to previous dose on titration schedule.) 11/21/2024 Morning    sertraline (ZOLOFT) 100 MG tablet Take 150 mg by mouth daily. 11/21/2024 Morning     Pharmacist Addendum:   I updated the above medication history with information from the patient's senior living facility.    Kayy Lizama, PharmD, BCEMP, BCPS   11/21/24 11:19 PM

## 2024-11-22 NOTE — PROGRESS NOTES
[facility]    Medicine Progress Note - Hospitalist Service, GOLD TEAM 8    Date of Admission:  11/21/2024    Assessment & Plan   Asya Coffman is a 79 year old female with history of A fib, CAD, COPD, asthma, Raynaud's, CKD, laryngeal squamous cell carcinoma/papillomatosis, CREST syndrome, chronic pain, chronic constipation, hypothyroidism, anxiety, and depression who was admitted to [facility] with acute hypoxic respiratory failure    Changes today:  - TTE with normal EF, unable to measure diastolic function, elevated RVSP and pulmonary HTN, enlarged b/l atria  - Will spot dose with IV lasix  - Clinically on minimal O2 as of now      Acute hypoxic respiratory failure- on 2L NC  Fluid overload  Elevated BNP  Elevated RVSP  Pulmonary hypertension- on TTE 11/22  Presented with 1 month progressive dyspnea. Endorses FELIZ, PND, orthopnea, swelling in the legs. Found to have hypoxic requiring 2L NC. BNP 4,421 (1,920 4/2024). VBG 7.41/40/46/26. LA, WBC normal. Flu, COVID negative. EKG A fib with RVR (rate 101), chronic A fib as below. CT PE 11/21 no PE, trace interstitial edema with small to moderate b/l pleural effusions. TTE with normal EF however unable to measure diastolic function 2/2 a fib.   - Will spot dose with lasix IV and monitor, lasix 20mg IV x 1 in ED, will give 40mg IV today  - 2L fluid restriction  - Daily standing weights, I&Os  - Continuous pulse ox  - Consider cardiology consult for pulm HTN    Deconditioning  - PT/OT    A fib with RVR- not rate controlled  - PTA on Apixaban, diltiazem. EKG A fib with RVR. Rates 90s-120s while in the room with patient. Asymptomatic   - Continue Apixaban  - 5 mg IV metoprolol x1 now given unknown dilt dose and A fib with RVR  -  Diltiazem 240 ER BID, might need dose escalation    H/o laryngeal squamous cell carcinoma, papillomatosis with dysphagia: Initially presented 2015 with benign laryngeal lesion. Progressed to papilloma  2021. Dx with invasive squamous cell carcinoma 4/2024. Now s/p radiation completed 7/2024. CT chest 11/21 ingested content within a mildly patulous thoracic esophagus, aspiration cautions recommended   - SLP consult   - Follow up OP 1/2025 as planned     Chronic/stable medical conditions  Pulmonary nodule:   6 mm LLL pulmonary nodule noted on CT  - Will need repeat imaging in 6-12 months     Subacute rib fractures:   Admitted to [facility] 9/2024 with rib fractures. Subacute fractures noted on CT 11/21. Continue supportive cares     H/o CREST syndrome:   2014. Per notes, did have positive FARIDEH, Raynaud's, Calcinosis, GERD and some telangectasia's. Last saw [facility] rheumatology 2017. No acute concerns     Hypernatremia: Mild. Na 146. Trend     L>R BLE edema: US to rule out DVT    Troponin elevation, likely increased demand: Trop 28 (32). EKG chronic a fib. Asymptomatic. Monitor   COPD, asthma: No e/o exacerbation. Continue PTA Symbicort, albuterol, DuoNebs   CKD II: BL Cr 0.9-1.1 and stable. Trend   Chronic pain: Continue PTA gabapentin, Tylenol. Discontinue Ibuprofen given CKD  Chronic constipation: Continue bowel regimen   Hypothyroidism: TSH 1.48 4/2024. Continue PTA synthroid   Anxiety, depression: Continue PTA Sertraline, Propranolol   Raynaud's: Continue diltiazem as above    Diet:  2L fluid restriction  DVT Prophylaxis: DOAC  Miranda Catheter: Not present  Lines: None     Cardiac Monitoring: None  Code Status:  Full Code        Diet: Fluid restriction 2000 ML FLUID  Low Saturated Fat Na <2400 mg    DVT Prophylaxis: DOAC  Miranda Catheter: Not present  Lines: None     Cardiac Monitoring: None  Code Status: Full Code      Clinically Significant Risk Factors Present on Admission        # Hypokalemia: Lowest K = 3 mmol/L in last 2 days, will replace as needed  # Hypernatremia: Highest Na = 146 mmol/L in last 2 days, will monitor as appropriate  # Hyperchloremia: Highest Cl = 108 mmol/L in last 2 days, will monitor as  appropriate           # Drug Induced Coagulation Defect: home medication list includes an anticoagulant medication                         Social Drivers of Health    Housing Stability: Unknown (9/3/2024)    [facility]    Housing Stability Vital Sign     Unable to Pay for Housing in the Last Year: No     Homeless in the Last Year: No          Disposition Plan     Medically Ready for Discharge: Anticipated in 2-4 Days             Hiram Bee MD  Hospitalist Service, GOLD TEAM 8  [facility]  Securely message with [employer] (more info)  Text page via [employer] Paging/Directory   See signed in provider for up to date coverage information  ______________________________________________________________________    Interval History   No event overnight, this am patient lying on bed, she looks ill and weak and has a hard time participating in the interview, looks tired. Denies dyspnea, CP, HA, dizziness, pain, N/V, provides short answers.     Physical Exam   Vital Signs: Temp: 98.7  F (37.1  C) Temp src: Oral BP: 117/54 Pulse: (!) 131   Resp: 18 SpO2: 97 % O2 Device: Oxymask Oxygen Delivery: 2 LPM  Weight: 0 lbs 0 oz    Gen: A&Ox4, lying comfortably on bed, in no distress, on 2L NC  Lung: clear on ausculation b/l, no wheezing/crackle  Heart: no rub/murmur/gallop  Abd: soft, non-tender, non-distended  Ext: No edema in upper/lower extremities      Medical Decision Making       50 MINUTES SPENT BY ME on the date of service doing chart review, history, exam, documentation & further activities per the note.      Data     I have personally reviewed the following data over the past 24 hrs:    7.0  \   11.2 (L)   / 268     144 105 14.7 /  92   3.0 (L) 28 0.96 (H) \     ALT: 10 AST: 22 AP: 79 TBILI: 0.6   ALB: 4.3 TOT PROTEIN: 6.8 LIPASE: N/A     Trop: 28 (H) BNP: 4,421 (H)     Procal: N/A CRP: <3.00 Lactic Acid: 1.0       INR:  1.57 (H) PTT:  N/A   D-dimer:  N/A  Fibrinogen:  N/A       Imaging results reviewed over the past 24 hrs:   Recent Results (from the past 24 hours)   CT Chest Pulmonary Embolism w Contrast   Result Value    Radiologist flags Lung nodule    Narrative    EXAM: CT CHEST PULMONARY EMBOLISM W CONTRAST  LOCATION: [facility]  DATE: 11/21/2024    INDICATION: Hypoxia; recent falls with fracture; tachycardia.  COMPARISON: CT thoracic spine 9/5/2024; CT chest 2/13/2023.  TECHNIQUE: CT chest pulmonary angiogram during arterial phase injection of IV contrast. Multiplanar reformats and MIP reconstructions were performed. Dose reduction techniques were used.   CONTRAST: 51 mL [id number].    FINDINGS:    ANGIOGRAM CHEST: No acute pulmonary embolism.    Thoracic aorta is normal in course and caliber. Aberrant right subclavian artery, with posterior esophageal course. Mild atheromatous disease.    LUNGS AND PLEURA: Mild diffuse bronchial wall thickening. Mild to moderate dependent atelectatic change, greatest within the right lower lobe. Lung predominant groundglass opacities and septal line thickening, favoring a trace interstitial edema pattern.   Right greater than left biapical pleural/parenchymal scarring.     New 6 mm nodule at the subpleural left lower lobe, series 6 image 151. Other sub-6 mm pulmonary nodules are unchanged from 2/13/2023.    No pneumothorax.     Small to moderate right and small left pleural effusions.     MEDIASTINUM/AXILLAE: Mild mediastinal and right hilar lymphadenopathy, stable from 2023 and likely reactive. No left hilar lymphadenopathy.     Mildly patulous thoracic esophagus, which contains ingested content.      No axillary lymphadenopathy.    Chest wall is unremarkable.    Mild cardiomegaly. No pericardial effusion.    CORONARY ARTERY CALCIFICATION: Mild.    UPPER ABDOMEN: Small nonobstructing right renal stones. Colonic diverticulosis.  MUSCULOSKELETAL: Subacute, nondisplaced to minimally  displaced fractures of the posterolateral right fourth through 10th ribs. Subacute, nondisplaced to minimally displaced fractures of the posterior right fourth through 10th ribs, as well as the right   fifth, sixth, seventh, and eighth transverse processes. Old, healed fracture deformity of the sternum. Old compression deformity of the T1 vertebral body. Subacute, mild superior endplate compression deformity of the T3 vertebral body. Healed fracture   deformity of the posterior left seventh rib.    OTHER: No additionally suspicious abnormality.      Impression    IMPRESSION:  1.  No acute pulmonary embolism.  2.  Trace interstitial edema pattern, with small to moderate right and small left pleural effusions.  3.  Subacute, nondisplaced to minimally displaced fractures of the right fourth through 10th ribs, as well as the right fifth through eighth transverse processes.  4.  Subacute, mild superior endplate compression deformity of the T3 vertebral body.  5.  Ingested content within a mildly patulous thoracic esophagus; aspiration precautions recommended.  6.  New 6 mm left lower lobe pulmonary nodule. Follow-up is recommended according to Fleischner criteria, as detailed below.  7.  Mild mediastinal and right hilar lymphadenopathy, stable from 2023 and likely reactive.  8.  Small nonobstructing right renal stones.  9.  Colonic diverticulosis.        2017 Fleischner Society Recommendations for Solid Pulmonary Nodules    Nodule size greater than 6 mm up to 8 mm:    Single nodule:    Low risk: CT at 6-12 months, then consider CT at 18-24 months  High risk: CT at 6-12 months, then CT at 18-24 months    Multiple nodules:    Low risk: CT at 3-6 months, then consider CT at 18-24 months  High risk: CT at 3-6 months, then CT at 18-24 months        [Recommend Follow Up: Lung nodule]    This report will be copied to the [facility] to ensure a provider acknowledges the finding.      US Upper Extremity Venous Duplex  Left    Narrative    EXAM: US UPPER EXTREMITY VENOUS DUPLEX LEFT  LOCATION: [facility]  DATE: 2024    INDICATION: Edema.  COMPARISON: None available.  TECHNIQUE: Venous Duplex ultrasound of the left upper extremity with (when possible) and without compression, augmentation, and duplex. Color flow and spectral Doppler with waveform analysis performed.    FINDINGS: Ultrasound includes evaluation of the internal jugular vein, innominate vein, subclavian vein, axillary vein, and brachial vein. The superficial cephalic and basilic veins were also evaluated where seen.     LEFT: No deep venous thrombosis. No superficial thrombophlebitis.       Impression    IMPRESSION:     No deep venous thrombosis in the visualized left upper extremity.   Echo Complete   Result Value    LVEF  55-60%    Narrative    [id number]  [id number]  [id number]  [id number]^[email or web address]^HARISH^CELSO     [facility]  Echocardiography Laboratory  [address] [place] [id number]     Name: NADIRA RADER  MRN: [id number]  : 1945  Study Date: 2024 09:13 AM  Age: 79 yrs  Gender: Female  Patient Location: [facility]  Reason For Study: Dyspnea  Ordering Physician: HARISH SHELL  Performed By: Idania Hanson RDCS     BSA: 1.6 m2  Height: 65 in  Weight: 115 lb  HR: 98  BP: 119/78 mmHg  ______________________________________________________________________________  Procedure  Complete Portable Echo Adult. The patient's rhythm is atrial fibrillation.  ______________________________________________________________________________  Interpretation Summary  The patient's rhythm is atrial fibrillation with RVR     Global and regional left ventricular function is normal with an EF of 55-60%.  Left ventricular size is normal.  The right ventricle is normal size.  Global right ventricular function is normal.  Moderate biatrial enlargement is present.  Pulmonary  hypertension is present.  The right ventricular systolic pressure is 41mmHg above the right atrial  pressure.  The inferior vena cava was normal in size with preserved respiratory  variability.     This study was compared with the study from 2023 .  No significant changes noted.  ______________________________________________________________________________  Left Ventricle  Left ventricular size is normal. Global and regional left ventricular function  is normal with an EF of 55-60%. Relative wall thickness is increased  consistent with concentric remodeling. Diastolic function not assessed due to  atrial fibrillation.     Right Ventricle  The right ventricle is normal size. Global right ventricular function is  normal.     Atria  Moderate biatrial enlargement is present.     Mitral Valve  Mild mitral annular calcification is present. Trace mitral insufficiency is  present.     Aortic Valve  Trileaflet aortic sclerosis without stenosis. On Doppler interrogation, there  is no significant stenosis or regurgitation.     Tricuspid Valve  Mild tricuspid insufficiency is present. The right ventricular systolic  pressure is approximated at 40.8 mmHg plus the right atrial pressure.  Pulmonary hypertension is present. The right ventricular systolic pressure is  41mmHg above the right atrial pressure.     Pulmonic Valve  On Doppler interrogation, there is no significant stenosis or regurgitation.     Vessels  The aorta root is normal. The inferior vena cava was normal in size with  preserved respiratory variability.     Pericardium  No pericardial effusion is present.     Compared to Previous Study  This study was compared with the study from 2023 . No significant changes  noted.  ______________________________________________________________________________  MMode/2D Measurements & Calculations     IVSd: 0.84 cm  LVIDd: 3.9 cm  LVIDs: 2.6 cm  LVPWd: 1.0 cm  FS: 33.3 %  LV mass(C)d: 106.9 grams  LV mass(C)dI: 68.4  grams/m2  Ao root diam: 3.0 cm  asc Aorta Diam: 3.0 cm  LVOT diam: 2.0 cm  LVOT area: 3.1 cm2  Ao root diam index Ht(cm/m): 1.8  Ao root diam index BSA (cm/m2): 1.9  Asc Ao diam index BSA (cm/m2): 1.9  Asc Ao diam index Ht(cm/m): 1.8  LA Volume (BP): 66.9 ml     LA Volume Index (BP): 42.9 ml/m2  RV Base: 4.0 cm  RWT: 0.52  TAPSE: 1.4 cm     Doppler Measurements & Calculations  PA acc time: 0.07 sec  TR max franko: 319.4 cm/sec  TR max P.8 mmHg  RV S Franko: 9.9 cm/sec     ______________________________________________________________________________  Report approved by: Wilyl SOLER 2024 10:22 AM

## 2024-11-22 NOTE — PROGRESS NOTES
"   11/22/24 1111   Appointment Info   Signing Clinician's Name / Credentials (SLP) Yvrose Katz MA CCC-SLP   General Information   Onset of Illness/Injury or Date of Surgery 11/21/24   Referring Physician Jennifer Anne PA-C   Patient/Family Therapy Goal Statement (SLP) None stated   Pertinent History of Current Problem Pt is a 79 year old female with history of A fib, CAD, COPD, asthma, Raynaud's, CKD, laryngeal squamous cell carcinoma/papillomatosis, CREST syndrome, chronic pain, chronic constipation, hypothyroidism, anxiety, and depression who was admitted to [facility] with acute hypoxic respiratory failure. Clinical swallow eval completed per MD order given pt's hx of laryngeal cancer and XRT.   General Observations Alert, weak   Type of Evaluation   Type of Evaluation Swallow Evaluation   Oral Motor   Oral Musculature generally intact   Structural Abnormalities none present   Mucosal Quality dry   Dentition (Oral Motor)   Dentition (Oral Motor) some missing teeth   Facial Symmetry (Oral Motor)   Facial Symmetry (Oral Motor) WNL   Lip Function (Oral Motor)   Lip Range of Motion (Oral Motor) WNL   Tongue Function (Oral Motor)   Tongue ROM (Oral Motor) WNL   Jaw Function (Oral Motor)   Jaw Function (Oral Motor) WNL   Comment, Jaw Function (Oral Motor) good oral excursion   Facial Sensation   Facial Sensation WNL   Cough/Swallow/Gag Reflex (Oral Motor)   Comment, Cough/Swallow/Gag Reflex (Oral Motor) adequate   Vocal Quality/Secretion Management (Oral Motor)   Vocal Quality (Oral Motor) hypophonic   Comment, Vocal Quality/Secretion Management (Oral Motor) pt endorses vocal changes the past two weeks - her voice is subjectively weak and slightly breathy   General Swallowing Observations   Past History of Dysphagia Pt seen for VFSS on 10/8/24: \"Patient's oropharyngeal swallow function is WNL for her age group. Aspiration did not occur during this evaluation. Trace laryngeal penetration occurred when " "patient was instructed to take consecutive sips of thin liquid by straw. No penetration was observed with single, small sips taken by cup. Oral phase was characterized by good bolus control with all consistencies trialed. Bolus prep and A-P bolus transit were effective and timely. Tongue base retraction was mildly reduced across all consistencies. Pharyngeal phase was characterized by a timely swallow response with all consistencies. Epiglottic inversion was timely and complete. Hyolaryngeal excursion and hyolaryngeal elevation were mildly reduced, but likely WFL (within functional limits) for patient's age group. Pharyngeal constriction was WNL. Questionable esophageal web noted in the cervical esophagus, however, this did not impede bolus transit through the PES and upper esophagus. Patient's overall aspiration risk is low.\" Pt reports her swallow function has not changed since this VFSS. She has been tolerating water and crackers this AM.   Comment, Secretions/Suctioning WNL   Respiratory Support oxygen mask  (3LPM)   Current Diet/Method of Nutritional Intake (General Swallowing Observations, NIS) NPO   Swallowing Evaluation Clinical swallow evaluation   Clinical Swallow Evaluation   Feeding Assistance no assistance needed   Clinical Swallow Evaluation Textures Trialed thin liquids;solid foods   Clinical Swallow Eval: Thin Liquid Texture Trial   Mode of Presentation, Thin Liquids straw;self-fed   Volume of Liquid or Food Presented 4oz thin water   Oral Phase of Swallow WFL   Pharyngeal Phase of Swallow intact   Diagnostic Statement No overt s/sx of aspiration   Clinical Swallow Evaluation: Solid Food Texture Trial   Mode of Presentation self-fed   Volume Presented 1 cracker with pudding   Oral Phase WFL   Pharyngeal Phase intact   Diagnostic Statement No overt s/sx of aspiration   Swallowing Recommendations   Diet Consistency Recommendations regular diet;thin liquids (level 0)  (pt self selecting softer solids) "   Supervision Level for Intake patient independent   Mode of Delivery Recommendations food moistened;slow rate of intake   Monitoring/Assistance Required (Eating/Swallowing) stop eating activities when fatigue is present;monitor for cough or change in vocal quality with intake   Recommended Feeding/Eating Techniques (Swallow Eval) maintain upright sitting position for eating   Medication Administration Recommendations, Swallowing (SLP) as tolerated   Instrumental Assessment Recommendations instrumental evaluation not recommended at this time   Clinical Impression   Criteria for Skilled Therapeutic Interventions Met (SLP Eval) No problems identified which require skilled intervention;Current level of function same as previous level of function   SLP Diagnosis Swallow function c/w baseline   Risks & Benefits of therapy have been explained evaluation/treatment results reviewed;care plan/treatment goals reviewed;risks/benefits reviewed;current/potential barriers reviewed;participants voiced agreement with care plan;participants included;patient   Clinical Impression Comments   Clinical swallow eval completed per MD order. Pt presents with a functional oropharyngeal swallow mechanism, c/w baseline. Oral mech exam remarkable for some missing teeth. Dysphonia appreciated. Pt assessed with thin liquids and cracker. Oral phase WFL. Pharyngeal phase functional w/o s/sx of aspiration. Recommend regular diet/thin liquids with pt self-selecting softer solids per preference. Ensure the pt is upright and alert for PO. No additional SLP services indicated.     SLP Total Evaluation Time   Eval: oral/pharyngeal swallow function, clinical swallow Minutes ([id number]) 15   SLP Discharge Recommendation home with outpatient therapy services   SLP Rationale for DC Rec Swallow function c/w baseline, pt follows with SLP in ENT clinic   SLP Time and Intention   Total Session Time (sum of timed and untimed services) 27

## 2024-11-23 ENCOUNTER — APPOINTMENT (OUTPATIENT)
Dept: GENERAL RADIOLOGY | Facility: CLINIC | Age: 79
End: 2024-11-23
Attending: STUDENT IN AN ORGANIZED HEALTH CARE EDUCATION/TRAINING PROGRAM
Payer: COMMERCIAL

## 2024-11-23 LAB
ALBUMIN SERPL BCG-MCNC: 3.9 G/DL (ref 3.5–5.2)
ALBUMIN SERPL BCG-MCNC: 3.9 G/DL (ref 3.5–5.2)
ALP SERPL-CCNC: 69 U/L (ref 40–150)
ALP SERPL-CCNC: 79 U/L (ref 40–150)
ALT SERPL W P-5'-P-CCNC: 14 U/L (ref 0–50)
ALT SERPL W P-5'-P-CCNC: 5 U/L (ref 0–50)
ANION GAP SERPL CALCULATED.3IONS-SCNC: 13 MMOL/L (ref 7–15)
ANION GAP SERPL CALCULATED.3IONS-SCNC: 17 MMOL/L (ref 7–15)
AST SERPL W P-5'-P-CCNC: 16 U/L (ref 0–45)
AST SERPL W P-5'-P-CCNC: 20 U/L (ref 0–45)
BILIRUB SERPL-MCNC: 0.5 MG/DL
BILIRUB SERPL-MCNC: 0.5 MG/DL
BUN SERPL-MCNC: 14.9 MG/DL (ref 8–23)
BUN SERPL-MCNC: 16.4 MG/DL (ref 8–23)
CALCIUM SERPL-MCNC: 8.7 MG/DL (ref 8.8–10.4)
CALCIUM SERPL-MCNC: 9.6 MG/DL (ref 8.8–10.4)
CHLORIDE SERPL-SCNC: 100 MMOL/L (ref 98–107)
CHLORIDE SERPL-SCNC: 104 MMOL/L (ref 98–107)
CREAT SERPL-MCNC: 0.93 MG/DL (ref 0.51–0.95)
CREAT SERPL-MCNC: 0.97 MG/DL (ref 0.51–0.95)
EGFRCR SERPLBLD CKD-EPI 2021: 60 ML/MIN/1.73M2
EGFRCR SERPLBLD CKD-EPI 2021: 63 ML/MIN/1.73M2
ELLIPTOCYTES BLD QL SMEAR: SLIGHT
ERYTHROCYTE [DISTWIDTH] IN BLOOD BY AUTOMATED COUNT: 23.1 % (ref 10–15)
GLUCOSE SERPL-MCNC: 80 MG/DL (ref 70–99)
GLUCOSE SERPL-MCNC: 90 MG/DL (ref 70–99)
HCO3 SERPL-SCNC: 24 MMOL/L (ref 22–29)
HCO3 SERPL-SCNC: 31 MMOL/L (ref 22–29)
HCT VFR BLD AUTO: 43.5 % (ref 35–47)
HGB BLD-MCNC: 12.9 G/DL (ref 11.7–15.7)
HOLD SPECIMEN: NORMAL
MAGNESIUM SERPL-MCNC: 1.8 MG/DL (ref 1.7–2.3)
MCH RBC QN AUTO: 26 PG (ref 26.5–33)
MCHC RBC AUTO-ENTMCNC: 29.7 G/DL (ref 31.5–36.5)
MCV RBC AUTO: 88 FL (ref 78–100)
PHOSPHATE SERPL-MCNC: 4.9 MG/DL (ref 2.5–4.5)
PLAT MORPH BLD: ABNORMAL
PLATELET # BLD AUTO: 270 10E3/UL (ref 150–450)
POTASSIUM SERPL-SCNC: 3.2 MMOL/L (ref 3.4–5.3)
POTASSIUM SERPL-SCNC: 3.2 MMOL/L (ref 3.4–5.3)
POTASSIUM SERPL-SCNC: 3.8 MMOL/L (ref 3.4–5.3)
PROT SERPL-MCNC: 6 G/DL (ref 6.4–8.3)
PROT SERPL-MCNC: 6.2 G/DL (ref 6.4–8.3)
RBC # BLD AUTO: 4.96 10E6/UL (ref 3.8–5.2)
RBC MORPH BLD: ABNORMAL
SODIUM SERPL-SCNC: 144 MMOL/L (ref 135–145)
SODIUM SERPL-SCNC: 145 MMOL/L (ref 135–145)
WBC # BLD AUTO: 8.6 10E3/UL (ref 4–11)

## 2024-11-23 PROCEDURE — 84100 ASSAY OF PHOSPHORUS: CPT | Performed by: STUDENT IN AN ORGANIZED HEALTH CARE EDUCATION/TRAINING PROGRAM

## 2024-11-23 PROCEDURE — 80053 COMPREHEN METABOLIC PANEL: CPT | Performed by: STUDENT IN AN ORGANIZED HEALTH CARE EDUCATION/TRAINING PROGRAM

## 2024-11-23 PROCEDURE — 84132 ASSAY OF SERUM POTASSIUM: CPT | Performed by: STUDENT IN AN ORGANIZED HEALTH CARE EDUCATION/TRAINING PROGRAM

## 2024-11-23 PROCEDURE — 71045 X-RAY EXAM CHEST 1 VIEW: CPT | Mod: 26 | Performed by: RADIOLOGY

## 2024-11-23 PROCEDURE — 250N000013 HC RX MED GY IP 250 OP 250 PS 637: Performed by: HOSPITALIST

## 2024-11-23 PROCEDURE — 71045 X-RAY EXAM CHEST 1 VIEW: CPT

## 2024-11-23 PROCEDURE — 36415 COLL VENOUS BLD VENIPUNCTURE: CPT | Performed by: STUDENT IN AN ORGANIZED HEALTH CARE EDUCATION/TRAINING PROGRAM

## 2024-11-23 PROCEDURE — 250N000013 HC RX MED GY IP 250 OP 250 PS 637: Performed by: STUDENT IN AN ORGANIZED HEALTH CARE EDUCATION/TRAINING PROGRAM

## 2024-11-23 PROCEDURE — 250N000013 HC RX MED GY IP 250 OP 250 PS 637: Performed by: PHYSICIAN ASSISTANT

## 2024-11-23 PROCEDURE — 83735 ASSAY OF MAGNESIUM: CPT | Performed by: STUDENT IN AN ORGANIZED HEALTH CARE EDUCATION/TRAINING PROGRAM

## 2024-11-23 PROCEDURE — 85018 HEMOGLOBIN: CPT | Performed by: STUDENT IN AN ORGANIZED HEALTH CARE EDUCATION/TRAINING PROGRAM

## 2024-11-23 PROCEDURE — 99233 SBSQ HOSP IP/OBS HIGH 50: CPT | Performed by: STUDENT IN AN ORGANIZED HEALTH CARE EDUCATION/TRAINING PROGRAM

## 2024-11-23 PROCEDURE — 250N000011 HC RX IP 250 OP 636: Performed by: STUDENT IN AN ORGANIZED HEALTH CARE EDUCATION/TRAINING PROGRAM

## 2024-11-23 PROCEDURE — 84155 ASSAY OF PROTEIN SERUM: CPT | Performed by: STUDENT IN AN ORGANIZED HEALTH CARE EDUCATION/TRAINING PROGRAM

## 2024-11-23 PROCEDURE — 120N000002 HC R&B MED SURG/OB UMMC

## 2024-11-23 PROCEDURE — 85041 AUTOMATED RBC COUNT: CPT | Performed by: STUDENT IN AN ORGANIZED HEALTH CARE EDUCATION/TRAINING PROGRAM

## 2024-11-23 RX ORDER — POTASSIUM CHLORIDE 20MEQ/15ML
20 LIQUID (ML) ORAL ONCE
Status: COMPLETED | OUTPATIENT
Start: 2024-11-23 | End: 2024-11-23

## 2024-11-23 RX ORDER — DILTIAZEM HYDROCHLORIDE 5 MG/ML
10 INJECTION INTRAVENOUS ONCE
Status: COMPLETED | OUTPATIENT
Start: 2024-11-23 | End: 2024-11-23

## 2024-11-23 RX ORDER — MAGNESIUM SULFATE HEPTAHYDRATE 40 MG/ML
2 INJECTION, SOLUTION INTRAVENOUS ONCE
Status: COMPLETED | OUTPATIENT
Start: 2024-11-23 | End: 2024-11-23

## 2024-11-23 RX ORDER — PROPRANOLOL HYDROCHLORIDE 10 MG/1
10 TABLET ORAL DAILY
Status: DISCONTINUED | OUTPATIENT
Start: 2024-11-24 | End: 2024-11-24

## 2024-11-23 RX ORDER — LEVALBUTEROL TARTRATE 45 UG/1
2 AEROSOL, METERED ORAL EVERY 6 HOURS
Status: DISCONTINUED | OUTPATIENT
Start: 2024-11-23 | End: 2024-12-09

## 2024-11-23 RX ORDER — PROPRANOLOL HYDROCHLORIDE 10 MG/1
10 TABLET ORAL 3 TIMES DAILY
Status: DISCONTINUED | OUTPATIENT
Start: 2024-11-23 | End: 2024-11-23

## 2024-11-23 RX ORDER — PROPRANOLOL HCL 20 MG
20 TABLET ORAL DAILY
Status: DISCONTINUED | OUTPATIENT
Start: 2024-11-24 | End: 2024-11-23

## 2024-11-23 RX ADMIN — FLUTICASONE FUROATE AND VILANTEROL TRIFENATATE 1 PUFF: 200; 25 POWDER RESPIRATORY (INHALATION) at 09:06

## 2024-11-23 RX ADMIN — MAGNESIUM SULFATE HEPTAHYDRATE 2 G: 2 INJECTION, SOLUTION INTRAVENOUS at 11:02

## 2024-11-23 RX ADMIN — LEVOTHYROXINE SODIUM 88 MCG: 88 TABLET ORAL at 06:57

## 2024-11-23 RX ADMIN — ATORVASTATIN CALCIUM 20 MG: 20 TABLET, FILM COATED ORAL at 19:39

## 2024-11-23 RX ADMIN — SALINE NASAL SPRAY 1 SPRAY: 1.5 SOLUTION NASAL at 08:59

## 2024-11-23 RX ADMIN — SERTRALINE HYDROCHLORIDE 150 MG: 100 TABLET ORAL at 08:59

## 2024-11-23 RX ADMIN — ALBUTEROL SULFATE 2 PUFF: 90 AEROSOL, METERED RESPIRATORY (INHALATION) at 15:53

## 2024-11-23 RX ADMIN — POLYETHYLENE GLYCOL 3350 17 G: 17 POWDER, FOR SOLUTION ORAL at 08:59

## 2024-11-23 RX ADMIN — APIXABAN 5 MG: 5 TABLET, FILM COATED ORAL at 08:59

## 2024-11-23 RX ADMIN — FEXOFENADINE HCL 180 MG: 180 TABLET ORAL at 08:59

## 2024-11-23 RX ADMIN — IPRATROPIUM BROMIDE 2 PUFF: 17 AEROSOL, METERED RESPIRATORY (INHALATION) at 20:45

## 2024-11-23 RX ADMIN — POTASSIUM CHLORIDE 20 MEQ: 1.5 SOLUTION ORAL at 11:46

## 2024-11-23 RX ADMIN — IPRATROPIUM BROMIDE 2 PUFF: 17 AEROSOL, METERED RESPIRATORY (INHALATION) at 16:27

## 2024-11-23 RX ADMIN — DILTIAZEM HYDROCHLORIDE 240 MG: 240 CAPSULE, EXTENDED RELEASE ORAL at 19:39

## 2024-11-23 RX ADMIN — GABAPENTIN 600 MG: 300 CAPSULE ORAL at 22:22

## 2024-11-23 RX ADMIN — APIXABAN 5 MG: 5 TABLET, FILM COATED ORAL at 19:29

## 2024-11-23 RX ADMIN — LEVALBUTEROL TARTRATE 2 PUFF: 45 AEROSOL, METERED ORAL at 19:40

## 2024-11-23 RX ADMIN — DILTIAZEM HYDROCHLORIDE 10 MG: 5 INJECTION, SOLUTION INTRAVENOUS at 09:41

## 2024-11-23 RX ADMIN — DILTIAZEM HYDROCHLORIDE 240 MG: 240 CAPSULE, EXTENDED RELEASE ORAL at 08:59

## 2024-11-23 RX ADMIN — PROPRANOLOL HYDROCHLORIDE 10 MG: 10 TABLET ORAL at 08:59

## 2024-11-23 RX ADMIN — LEVALBUTEROL TARTRATE 2 PUFF: 45 AEROSOL, METERED ORAL at 16:01

## 2024-11-23 ASSESSMENT — ACTIVITIES OF DAILY LIVING (ADL)
ADLS_ACUITY_SCORE: 0
DEPENDENT_IADLS:: CLEANING;COOKING;LAUNDRY;SHOPPING;MEAL PREPARATION;MEDICATION MANAGEMENT;TRANSPORTATION
ADLS_ACUITY_SCORE: 0

## 2024-11-23 NOTE — PROGRESS NOTES
"Messaged primary team:  \" No CMP ordered for this am, could you please order lab draw? Also in afib up to 160-170, would you like an EKG?\"  "

## 2024-11-23 NOTE — PROGRESS NOTES
Patient and patients family requested that [facility] provider Dr. Haney be made aware of visit to ED and updated with any notes regarding visit.     Provider made aware.

## 2024-11-23 NOTE — PLAN OF CARE
Goal Outcome Evaluation:  /86 (BP Location: Right arm)   Pulse 107   Temp 97.7  F (36.5  C) (Oral)   Resp 16   SpO2 96%          1813-4465  Pt noted to be comfortable on this shift, VSS, cont on 2L via oxi mask, denies having pain and discomfort. Cont on fluids restrictions. Voiding via purewick, no bm noted on this shift, cont POC.

## 2024-11-23 NOTE — PROGRESS NOTES
[facility]    Medicine Progress Note - Hospitalist Service, GOLD TEAM 8    Date of Admission:  11/21/2024    Assessment & Plan   Asya Coffman is a 79 year old female with history of A fib, CAD, COPD, asthma, Raynaud's, CKD, laryngeal squamous cell carcinoma/papillomatosis, CREST syndrome, chronic pain, chronic constipation, hypothyroidism, anxiety, and depression who was admitted to [facility] with acute hypoxic respiratory failure    Changes today:  - HR not controlled, in RVR this am, will give IV Diltiazem 10mg  - Will hold on escalated of HR meds given now slow HR in 60s and soft BPs   - Will keep K~4 and Mg~2  - Talked to daughter over phone, she is asking if patient can go back to retirement with more freq OP PT/OT, will touch base with PT    A fib with RVR- not rate controlled  - PTA on Apixaban, diltiazem. EKG A fib with RVR. Rates 90s-120s while in the room with patient. Asymptomatic   - Continue Apixaban  - 5 mg IV metoprolol x1 in ED  -  Diltiazem 240 ER BID, max dose  - Pt again in RVR this am in 160-170s with stable BP, s/p Cadizem 10mg IV x 1 with HR slowing down  - Will hold on escalation of her propranolol for now given soft BP and slower HR  - Maintain K~4 and Mg ~2     Acute hypoxic respiratory failure- on 2L NC  Hx of COPD/asthma- not in exacerbation  Fluid overload  Elevated BNP  Elevated RVSP  Pulmonary hypertension- on TTE 11/22  Presented with 1 month progressive dyspnea. Endorses FELIZ, PND, orthopnea, swelling in the legs. Found to have hypoxic requiring 2L NC. BNP 4,421 (1,920 4/2024). VBG 7.41/40/46/26. LA, WBC normal. Flu, COVID negative. EKG A fib with RVR (rate 101), chronic A fib as below. CT PE 11/21 no PE, trace interstitial edema with small to moderate b/l pleural effusions. TTE with normal EF however unable to measure diastolic function 2/2 a fib.   - Will spot dose with lasix IV and monitor, lasix 20mg IV x 1 in ED, another 40mg IV 11/22  - 2L  fluid restriction  - Daily standing weights, I&Os  - Continuous pulse ox  - Added scheduled Xopenex/ipratropium q6hr  - Repeat CXR    Significant weight loss  - Per daughter baseline wt around 120-130 lbs, however wt here around 102 lbs, patient states she is eating less and has less appetite, denies post prandial abd pain, N/V. She states she likes the food but just do not have the appetite to eat more. She has hx of recent invasive laryngeal SCC with radiation therapy, also had dysphagia before  - Will consult nutritionist    Deconditioning  - PT/OT, dispo recommendation is TCU  - Sw/CM consulted    H/o laryngeal squamous cell carcinoma, papillomatosis with dysphagia: Initially presented 2015 with benign laryngeal lesion. Progressed to papilloma 2021. Dx with invasive squamous cell carcinoma 4/2024. Now s/p radiation completed 7/2024. CT chest 11/21 ingested content within a mildly patulous thoracic esophagus, aspiration cautions recommended   - SLP consult, recommending reg diet and think liq   - Follow up OP 1/2025 as planned     Chronic/stable medical conditions  Pulmonary nodule:   6 mm LLL pulmonary nodule noted on CT  - Will need repeat imaging in 6-12 months     Subacute rib fractures:   Admitted to [facility] 9/2024 with rib fractures. Subacute fractures noted on CT 11/21. Continue supportive cares     H/o CREST syndrome:   2014. Per notes, did have positive FARIDEH, Raynaud's, Calcinosis, GERD and some telangectasia's. Last saw [facility]ology 2017. No acute concerns     Hypernatremia: Mild. Na 146. Trend     L>R BLE edema: US to rule out DVT    Troponin elevation, likely increased demand: Trop 28 (32). EKG chronic a fib. Asymptomatic. Monitor   COPD, asthma: No e/o exacerbation. Continue PTA Symbicort, albuterol, DuoNebs   CKD II: BL Cr 0.9-1.1 and stable. Trend   Chronic pain: Continue PTA gabapentin, Tylenol. Discontinue Ibuprofen given CKD  Chronic constipation: Continue bowel regimen   Hypothyroidism:  TSH 1.48 4/2024. Continue PTA synthroid   Anxiety, depression: Continue PTA Sertraline, Propranolol   Raynaud's: Continue diltiazem as above    Diet:  2L fluid restriction  DVT Prophylaxis: DOAC  Mirnada Catheter: Not present  Lines: None     Cardiac Monitoring: None  Code Status:  Full Code        Diet: Fluid restriction 2000 ML FLUID  Low Saturated Fat Na <2400 mg    DVT Prophylaxis: DOAC  Miranda Catheter: Not present  Lines: None     Cardiac Monitoring: None  Code Status: Full Code      Clinically Significant Risk Factors        # Hypokalemia: Lowest K = 3 mmol/L in last 2 days, will replace as needed  # Hypernatremia: Highest Na = 146 mmol/L in last 2 days, will monitor as appropriate  # Hyperchloremia: Highest Cl = 108 mmol/L in last 2 days, will monitor as appropriate             # Coagulation Defect: INR = 1.57 (Ref range: 0.85 - 1.15) and/or PTT = N/A, will monitor for bleeding                          Social Drivers of Health    Housing Stability: Unknown (9/3/2024)    Received from [employer] Stability Vital Sign     Unable to Pay for Housing in the Last Year: No     Homeless in the Last Year: No          Disposition Plan     Medically Ready for Discharge: Anticipated in 2-4 Days             Hiram Bee MD  Hospitalist Service, GOLD TEAM 8  [facility]  Securely message with [employer] (more info)  Text page via [employer] Paging/Directory   See signed in provider for up to date coverage information  ______________________________________________________________________    Interval History   No event overnight,    Physical Exam   Vital Signs: Temp: 97.7  F (36.5  C) Temp src: Oral BP: (!) 145/92 Pulse: (!) 170   Resp: 16 SpO2: 95 % O2 Device: Oxymask Oxygen Delivery: 2 LPM  Weight: 0 lbs 0 oz    Gen: A&Ox4, lying comfortably on bed, in no distress, on 2L NC  Lung: clear on ausculation b/l, no wheezing/crackle  Heart: no rub/murmur/gallop  Abd:  soft, non-tender, non-distended  Ext: No edema in upper/lower extremities      Medical Decision Making       50 MINUTES SPENT BY ME on the date of service doing chart review, history, exam, documentation & further activities per the note.      Data     I have personally reviewed the following data over the past 24 hrs:    8.6  \   12.9   / 270     N/A N/A N/A /  N/A   N/A N/A N/A \     ALT: N/A AST: N/A AP: N/A TBILI: N/A   ALB: N/A TOT PROTEIN: N/A LIPASE: N/A       Imaging results reviewed over the past 24 hrs:   Recent Results (from the past 24 hours)   Echo Complete   Result Value    LVEF  55-60%    Narrative    [id number]  [id number]  [id number]  [id number]^SUMAYA^HARISH^CELSO     [facility],[place]  Echocardiography Laboratory  [address] [place] [id number]     Name: NADIRA RDAER  MRN: [id number]  : 1945  Study Date: 2024 09:13 AM  Age: 79 yrs  Gender: Female  Patient Location: [facility]  Reason For Study: Dyspnea  Ordering Physician: HARISH SHELL  Performed By: Idania Hanson RDCS     BSA: 1.6 m2  Height: 65 in  Weight: 115 lb  HR: 98  BP: 119/78 mmHg  ______________________________________________________________________________  Procedure  Complete Portable Echo Adult. The patient's rhythm is atrial fibrillation.  ______________________________________________________________________________  Interpretation Summary  The patient's rhythm is atrial fibrillation with RVR     Global and regional left ventricular function is normal with an EF of 55-60%.  Left ventricular size is normal.  The right ventricle is normal size.  Global right ventricular function is normal.  Moderate biatrial enlargement is present.  Pulmonary hypertension is present.  The right ventricular systolic pressure is 41mmHg above the right atrial  pressure.  The inferior vena cava was normal in size with preserved respiratory  variability.     This study was compared with the  study from 2023 .  No significant changes noted.  ______________________________________________________________________________  Left Ventricle  Left ventricular size is normal. Global and regional left ventricular function  is normal with an EF of 55-60%. Relative wall thickness is increased  consistent with concentric remodeling. Diastolic function not assessed due to  atrial fibrillation.     Right Ventricle  The right ventricle is normal size. Global right ventricular function is  normal.     Atria  Moderate biatrial enlargement is present.     Mitral Valve  Mild mitral annular calcification is present. Trace mitral insufficiency is  present.     Aortic Valve  Trileaflet aortic sclerosis without stenosis. On Doppler interrogation, there  is no significant stenosis or regurgitation.     Tricuspid Valve  Mild tricuspid insufficiency is present. The right ventricular systolic  pressure is approximated at 40.8 mmHg plus the right atrial pressure.  Pulmonary hypertension is present. The right ventricular systolic pressure is  41mmHg above the right atrial pressure.     Pulmonic Valve  On Doppler interrogation, there is no significant stenosis or regurgitation.     Vessels  The aorta root is normal. The inferior vena cava was normal in size with  preserved respiratory variability.     Pericardium  No pericardial effusion is present.     Compared to Previous Study  This study was compared with the study from 2023 . No significant changes  noted.  ______________________________________________________________________________  MMode/2D Measurements & Calculations     IVSd: 0.84 cm  LVIDd: 3.9 cm  LVIDs: 2.6 cm  LVPWd: 1.0 cm  FS: 33.3 %  LV mass(C)d: 106.9 grams  LV mass(C)dI: 68.4 grams/m2  Ao root diam: 3.0 cm  asc Aorta Diam: 3.0 cm  LVOT diam: 2.0 cm  LVOT area: 3.1 cm2  Ao root diam index Ht(cm/m): 1.8  Ao root diam index BSA (cm/m2): 1.9  Asc Ao diam index BSA (cm/m2): 1.9  Asc Ao diam index Ht(cm/m): 1.8  LA  Volume (BP): 66.9 ml     LA Volume Index (BP): 42.9 ml/m2  RV Base: 4.0 cm  RWT: 0.52  TAPSE: 1.4 cm     Doppler Measurements & Calculations  PA acc time: 0.07 sec  TR max franko: 319.4 cm/sec  TR max P.8 mmHg  RV S Franko: 9.9 cm/sec     ______________________________________________________________________________  Report approved by: Willy SOLER 2024 10:22 AM

## 2024-11-23 NOTE — CONSULTS
Care Management Initial Consult    General Information  Assessment completed with: Asya Hernández  Type of CM/SW Visit: Initial Assessment    Primary Care Provider verified and updated as needed: Yes (Janna Caldeórn -[phone]- Klarissa)   Readmission within the last 30 days: no previous admission in last 30 days      Reason for Consult: discharge planning  Advance Care Planning: Advance Care Planning Reviewed: no concerns identified        Communication Assessment  Patient's communication style: spoken language (English or Bilingual)        Cognitive  Cognitive/Neuro/Behavioral: WDL  Level of Consciousness: alert  Arousal Level: opens eyes spontaneously  Orientation: oriented x 4  Mood/Behavior: behavior appropriate to situation  Best Language: 0 - No aphasia  Speech: clear, spontaneous, logical    Living Environment:   People in home: facility resident     Current living Arrangements: assisted living  Name of Facility: [facility]   Able to return to prior arrangements: yes       Family/Social Support:  Care provided by: self, other (see comments) (Facility staff)  Provides care for: no one  Marital Status:   Support system: Children, Facility resident(s)/Staff          Description of Support System: Supportive, Involved    Support Assessment: Adequate family and caregiver support, Adequate social supports    Current Resources:   Patient receiving home care services: No        Community Resources: None  Equipment currently used at home: walker, rolling, shower chair, grab bar, toilet, walker, standard, grab bar, tub/shower  Supplies currently used at home: None    Employment/Financial:  Employment Status: retired        Financial Concerns: none   Referral to Financial Worker: No     Does the patient's insurance plan have a 3 day qualifying hospital stay waiver?  No    Lifestyle & Psychosocial Needs:  Social Drivers of Health     Food Insecurity: No Food Insecurity (9/3/2024)    [facility]    Hunger Vital Sign     Worried About Running Out of Food in the Last Year: Never true     Ran Out of Food in the Last Year: Never true   Depression: Not at risk (5/17/2024)    Received from [facility]    PHQ-2     PHQ-2 TOTAL SCORE: 2   Housing Stability: Unknown (9/3/2024)    Received from [facility]    Housing Stability Vital Sign     Unable to Pay for Housing in the Last Year: No     Number of Times Moved in the Last Year: Not on file     Homeless in the Last Year: No   Tobacco Use: Low Risk  (10/23/2024)    Received from [facility]    Patient History     Smoking Tobacco Use: Never     Smokeless Tobacco Use: Never     Passive Exposure: Not on file   Financial Resource Strain: Low Risk  (12/1/2023)    Received from [facility], [facility]    Financial Resource Strain     Difficulty of Paying Living Expenses: 3     Difficulty of Paying Living Expenses: Not on file   Alcohol Use: Not on file   Transportation Needs: No Transportation Needs (9/3/2024)    Received from [facility]    PRAPARE - Transportation     Lack of Transportation (Medical): No     Lack of Transportation (Non-Medical): No   Physical Activity: Not on file   Interpersonal Safety: Not At Risk (10/3/2024)    Received from [facility]    Humiliation, Afraid, Rape, and Kick questionnaire     Fear of Current or Ex-Partner: No     Emotionally Abused: No     Physically Abused: No     Sexually Abused: No   Stress: Not on file   Social Connections: Socially Integrated (12/1/2023)    Received from [facility]    Social Connections     Do you often feel lonely or isolated from those around you?: 0   Health Literacy: Not on file       Functional Status:  Prior to admission patient needed assistance:   Dependent ADLs:: Ambulation-walker, Dressing, Bathing  Dependent  IADLs:: Cleaning, Cooking, Laundry, Shopping, Meal Preparation, Medication Management, Transportation       Mental Health Status:  Mental Health Status: No Current Concerns       Chemical Dependency Status:  Chemical Dependency Status: No Current Concerns           Values/Beliefs:  Spiritual, Cultural Beliefs, Hinduism Practices, Values that affect care:               Discussed  Partnership in Safe Discharge Planning  document with patient/family: Yes    Additional Information:  SW received consult for discharge planning. SW met with pt at bedside and introduced self, role, and explained purpose of assessment. Pt reported she lives at [facility] and moved there two months ago. Pt shared the address on her face sheet is her house that she sold. Pt shared she owns two walkers, grab bars, shower chair, and the staff at the detention assists with bathing, dressing, and IADLs. Pt reported her daughter, son , and grandchildren are support system and does not have any mental health, chemical dependency, transportation, and financial concerns.     PT and OT are recommending TCU placement, SW messaged pt's attending provider regarding anticipated discharge date. Provider shared that pt will be med ready 1-2 days, NOLBERTO met with pt and provided her Medicare[employer] list. SW discussed TCU recommendations, and safe  discharge planning partnership. Pt has questions about her [facility] and if she has to pay while at TCU, SW informed pt that SW will follow up with [facility] and updated pt with the details .  Pt shared she will look through list and select preferred TCU for referrals to be sent.     Next Steps:   Follow up for discharge planning.  - Follow up with pt's detention regarding TCU placement and bed hold.   -Send TCU placement  referrals.     KAT Singer, JACOB  OBS/ED   Phone: [phone]  Fax: [phone]    Vocera: 1A Obs NOLBERTO

## 2024-11-23 NOTE — PLAN OF CARE
Goal Outcome Evaluation:      Plan of Care Reviewed With: patient          Outcome Evaluation: PT and OT are recommending TCU, Pt will discharge to TCU when med ready.

## 2024-11-23 NOTE — PROGRESS NOTES
"Neuro: A&Ox4. Afebrile this shift. Reports dizziness with standing. Reported feeling \"weird\" mid shift- described as \"foggy\", see previous note to primary.   Cardiac: AFIB. AFIB with RVR this am, rate up to 180's. one time dose of IV diltiazem administered, EKG obtained, rate more controlled 60-80 BPM. Intermittent HTN, since resolved. On continuous cardiac monitoring. Denies chest pain. Afternoon dose of propranolol held per MD due to soft BP  Respiratory: Sating 96% on 2LNC. Shallow breathing, barrel chested, bilateral lower lobes crackly, diminished in all fields. Denies SOB at rest, dyspnea upon exertion and with mastication.   GI/: Adequate urine output via purewick. Denies abdominal discomfort or nausea.   Diet/appetite: Low fat, 2LFR. Poor appetite, needs encouragement. Nutrition consult placed.   Activity:  Assist of 1 with transfer belt and walker. Reporting increased weakness and gait instability.   Pain: At acceptable level on current regimen.   Skin: No new deficits noted.  LDA's: RPIV, saline locked, CDI.     Plan:   -Monitor electrolytes. K replaced PO, redraw scheduled in evening.   -Mag administered via IV  -Encourage PO intake. Weight loss, poor intake per daughter recently. Nutrition consult placed.   -Monitor mental status  -Awaiting inpatient bed      Continue with POC. Notify primary team with changes.  "

## 2024-11-23 NOTE — PROGRESS NOTES
"Messaged primary MD, Cristal.  \"Pt reporting feeling \"weird\" cannot articulate what weird means to her besides feeling foggy. Able to answer orientation questions appropriately. Wondering if a VBG draw would be beneficial. Also reporting twitching/spasms during mag infusion which has since ended but did not report these symptoms to me until after infusion. No twitching or spasms at this time\"    Read, awaiting response from MD. No orders obtained at this time. VSS.   "

## 2024-11-23 NOTE — PLAN OF CARE
Goal Outcome Evaluation:      Plan of Care Reviewed With: patient    Overall Patient Progress: improvingOverall Patient Progress: improving    Outcome Evaluation: Lasix 40 mg IV given as order. Assisted with enternal catheter for tonight.Supp;emental O2 continues at 2 L

## 2024-11-24 ENCOUNTER — APPOINTMENT (OUTPATIENT)
Dept: PHYSICAL THERAPY | Facility: CLINIC | Age: 79
End: 2024-11-24
Payer: COMMERCIAL

## 2024-11-24 LAB
ALBUMIN SERPL BCG-MCNC: 3.7 G/DL (ref 3.5–5.2)
ALP SERPL-CCNC: 78 U/L (ref 40–150)
ALT SERPL W P-5'-P-CCNC: <5 U/L (ref 0–50)
ANION GAP SERPL CALCULATED.3IONS-SCNC: 11 MMOL/L (ref 7–15)
AST SERPL W P-5'-P-CCNC: 15 U/L (ref 0–45)
ATRIAL RATE - MUSE: NORMAL BPM
BILIRUB SERPL-MCNC: 0.5 MG/DL
BUN SERPL-MCNC: 21.4 MG/DL (ref 8–23)
CALCIUM SERPL-MCNC: 8.6 MG/DL (ref 8.8–10.4)
CHLORIDE SERPL-SCNC: 101 MMOL/L (ref 98–107)
CREAT SERPL-MCNC: 0.96 MG/DL (ref 0.51–0.95)
DIASTOLIC BLOOD PRESSURE - MUSE: NORMAL MMHG
EGFRCR SERPLBLD CKD-EPI 2021: 60 ML/MIN/1.73M2
ERYTHROCYTE [DISTWIDTH] IN BLOOD BY AUTOMATED COUNT: 22.5 % (ref 10–15)
GLUCOSE BLDC GLUCOMTR-MCNC: 108 MG/DL (ref 70–99)
GLUCOSE SERPL-MCNC: 93 MG/DL (ref 70–99)
HCO3 SERPL-SCNC: 28 MMOL/L (ref 22–29)
HCT VFR BLD AUTO: 43.4 % (ref 35–47)
HGB BLD-MCNC: 12.6 G/DL (ref 11.7–15.7)
INTERPRETATION ECG - MUSE: NORMAL
MAGNESIUM SERPL-MCNC: 2.4 MG/DL (ref 1.7–2.3)
MCH RBC QN AUTO: 26 PG (ref 26.5–33)
MCHC RBC AUTO-ENTMCNC: 29 G/DL (ref 31.5–36.5)
MCV RBC AUTO: 90 FL (ref 78–100)
P AXIS - MUSE: NORMAL DEGREES
PHOSPHATE SERPL-MCNC: 3.5 MG/DL (ref 2.5–4.5)
PLATELET # BLD AUTO: 272 10E3/UL (ref 150–450)
POTASSIUM SERPL-SCNC: 3.7 MMOL/L (ref 3.4–5.3)
PR INTERVAL - MUSE: NORMAL MS
PROT SERPL-MCNC: 6.1 G/DL (ref 6.4–8.3)
QRS DURATION - MUSE: 76 MS
QT - MUSE: 378 MS
QTC - MUSE: 497 MS
R AXIS - MUSE: 258 DEGREES
RBC # BLD AUTO: 4.85 10E6/UL (ref 3.8–5.2)
SODIUM SERPL-SCNC: 140 MMOL/L (ref 135–145)
SYSTOLIC BLOOD PRESSURE - MUSE: NORMAL MMHG
T AXIS - MUSE: -45 DEGREES
VENTRICULAR RATE- MUSE: 104 BPM
WBC # BLD AUTO: 7.2 10E3/UL (ref 4–11)

## 2024-11-24 PROCEDURE — 97116 GAIT TRAINING THERAPY: CPT | Mod: GP

## 2024-11-24 PROCEDURE — 84100 ASSAY OF PHOSPHORUS: CPT | Performed by: STUDENT IN AN ORGANIZED HEALTH CARE EDUCATION/TRAINING PROGRAM

## 2024-11-24 PROCEDURE — 82310 ASSAY OF CALCIUM: CPT | Performed by: STUDENT IN AN ORGANIZED HEALTH CARE EDUCATION/TRAINING PROGRAM

## 2024-11-24 PROCEDURE — 99232 SBSQ HOSP IP/OBS MODERATE 35: CPT | Performed by: STUDENT IN AN ORGANIZED HEALTH CARE EDUCATION/TRAINING PROGRAM

## 2024-11-24 PROCEDURE — 36415 COLL VENOUS BLD VENIPUNCTURE: CPT | Performed by: STUDENT IN AN ORGANIZED HEALTH CARE EDUCATION/TRAINING PROGRAM

## 2024-11-24 PROCEDURE — 250N000013 HC RX MED GY IP 250 OP 250 PS 637: Performed by: PHYSICIAN ASSISTANT

## 2024-11-24 PROCEDURE — 250N000013 HC RX MED GY IP 250 OP 250 PS 637: Performed by: STUDENT IN AN ORGANIZED HEALTH CARE EDUCATION/TRAINING PROGRAM

## 2024-11-24 PROCEDURE — 83735 ASSAY OF MAGNESIUM: CPT | Performed by: STUDENT IN AN ORGANIZED HEALTH CARE EDUCATION/TRAINING PROGRAM

## 2024-11-24 PROCEDURE — 120N000002 HC R&B MED SURG/OB UMMC

## 2024-11-24 PROCEDURE — 85014 HEMATOCRIT: CPT | Performed by: STUDENT IN AN ORGANIZED HEALTH CARE EDUCATION/TRAINING PROGRAM

## 2024-11-24 PROCEDURE — 82374 ASSAY BLOOD CARBON DIOXIDE: CPT | Performed by: STUDENT IN AN ORGANIZED HEALTH CARE EDUCATION/TRAINING PROGRAM

## 2024-11-24 PROCEDURE — 82947 ASSAY GLUCOSE BLOOD QUANT: CPT | Performed by: STUDENT IN AN ORGANIZED HEALTH CARE EDUCATION/TRAINING PROGRAM

## 2024-11-24 PROCEDURE — 97530 THERAPEUTIC ACTIVITIES: CPT | Mod: GP

## 2024-11-24 PROCEDURE — 85048 AUTOMATED LEUKOCYTE COUNT: CPT | Performed by: STUDENT IN AN ORGANIZED HEALTH CARE EDUCATION/TRAINING PROGRAM

## 2024-11-24 PROCEDURE — 250N000011 HC RX IP 250 OP 636: Performed by: STUDENT IN AN ORGANIZED HEALTH CARE EDUCATION/TRAINING PROGRAM

## 2024-11-24 RX ORDER — FUROSEMIDE 10 MG/ML
40 INJECTION INTRAMUSCULAR; INTRAVENOUS ONCE
Status: COMPLETED | OUTPATIENT
Start: 2024-11-24 | End: 2024-11-24

## 2024-11-24 RX ORDER — PROPRANOLOL HYDROCHLORIDE 10 MG/1
10 TABLET ORAL 2 TIMES DAILY
Status: DISCONTINUED | OUTPATIENT
Start: 2024-11-24 | End: 2024-12-13

## 2024-11-24 RX ADMIN — APIXABAN 5 MG: 5 TABLET, FILM COATED ORAL at 20:12

## 2024-11-24 RX ADMIN — PROPRANOLOL HYDROCHLORIDE 10 MG: 10 TABLET ORAL at 07:56

## 2024-11-24 RX ADMIN — APIXABAN 5 MG: 5 TABLET, FILM COATED ORAL at 07:54

## 2024-11-24 RX ADMIN — GABAPENTIN 600 MG: 300 CAPSULE ORAL at 23:12

## 2024-11-24 RX ADMIN — LEVALBUTEROL TARTRATE 2 PUFF: 45 AEROSOL, METERED ORAL at 02:44

## 2024-11-24 RX ADMIN — PROPRANOLOL HYDROCHLORIDE 10 MG: 10 TABLET ORAL at 20:16

## 2024-11-24 RX ADMIN — DILTIAZEM HYDROCHLORIDE 240 MG: 240 CAPSULE, EXTENDED RELEASE ORAL at 07:56

## 2024-11-24 RX ADMIN — IPRATROPIUM BROMIDE 2 PUFF: 17 AEROSOL, METERED RESPIRATORY (INHALATION) at 12:14

## 2024-11-24 RX ADMIN — IPRATROPIUM BROMIDE 2 PUFF: 17 AEROSOL, METERED RESPIRATORY (INHALATION) at 16:22

## 2024-11-24 RX ADMIN — FUROSEMIDE 40 MG: 10 INJECTION, SOLUTION INTRAMUSCULAR; INTRAVENOUS at 08:15

## 2024-11-24 RX ADMIN — LEVALBUTEROL TARTRATE 2 PUFF: 45 AEROSOL, METERED ORAL at 07:57

## 2024-11-24 RX ADMIN — LEVALBUTEROL TARTRATE 2 PUFF: 45 AEROSOL, METERED ORAL at 20:12

## 2024-11-24 RX ADMIN — FLUTICASONE FUROATE AND VILANTEROL TRIFENATATE 1 PUFF: 200; 25 POWDER RESPIRATORY (INHALATION) at 07:57

## 2024-11-24 RX ADMIN — IPRATROPIUM BROMIDE 2 PUFF: 17 AEROSOL, METERED RESPIRATORY (INHALATION) at 07:57

## 2024-11-24 RX ADMIN — DILTIAZEM HYDROCHLORIDE 240 MG: 240 CAPSULE, EXTENDED RELEASE ORAL at 20:12

## 2024-11-24 RX ADMIN — SERTRALINE HYDROCHLORIDE 150 MG: 100 TABLET ORAL at 07:54

## 2024-11-24 RX ADMIN — ATORVASTATIN CALCIUM 20 MG: 20 TABLET, FILM COATED ORAL at 20:12

## 2024-11-24 RX ADMIN — FEXOFENADINE HCL 180 MG: 180 TABLET ORAL at 07:54

## 2024-11-24 RX ADMIN — LEVOTHYROXINE SODIUM 88 MCG: 88 TABLET ORAL at 05:35

## 2024-11-24 RX ADMIN — IPRATROPIUM BROMIDE 2 PUFF: 17 AEROSOL, METERED RESPIRATORY (INHALATION) at 20:12

## 2024-11-24 RX ADMIN — POLYETHYLENE GLYCOL 3350 17 G: 17 POWDER, FOR SOLUTION ORAL at 07:54

## 2024-11-24 RX ADMIN — LEVALBUTEROL TARTRATE 2 PUFF: 45 AEROSOL, METERED ORAL at 13:39

## 2024-11-24 NOTE — CONSULTS
Social Work/ Care Management Consults acknowledged.  SW consulted for SDOH and Patient needs help with scheduling pulmonary appointment outpatient. SW/Care Management following for discharge planning/TCU. Please see consult from 11/23 by Care Management/Social Work. Care Management will continue to follow for needs and safe discharge.     __________________________     KAT Locke, LGSW  , [facility]  [address] [id number]   Desk Phone: [phone]   Securely message with [email or web address] (Search Name or  Med Surg [phone] )  Text page via [facility] Paging/Directory   See signed in provider for up to date coverage information  Social Work & Care Management Department

## 2024-11-24 NOTE — PLAN OF CARE
"/71 (BP Location: Left arm)   Pulse 86   Temp 97.7  F (36.5  C) (Oral)   Resp 18   Ht 1.651 m (5' 5\")   Wt 46.5 kg (102 lb 8.2 oz)   SpO2 97%   BMI 17.06 kg/m     Time: 1570-3819   Reason for admission: Acute on chronic respiratory failure.   Activity: assist of one person.   Pain: denied pain or discomfort.   Neuro: alert and oriented.   Cardiac: WDL X  Resp: dyspnea on exertion, patient is on 2L of oxygen, lung sounds diminished.   GI/: regular diet, external catheter, low urine output, PVR  23, bowel sounds present x four quadrants.   Lines: PIV, saline locked.   Skin: WDL X  Labs/Imaging: no lab or imaging this shift.  New changes to shift: no change.   Plan: continue with current plan of cares.   "

## 2024-11-24 NOTE — PLAN OF CARE
Admitted/transferred from: ED  2 RN full   skin assessment completed by Vikash Burdick RN and Izzy RN  Skin assessment finding: coccyx,  groin area, and behind R ear redness.   Interventions/actions: Monitor  Will continue to monitor.

## 2024-11-24 NOTE — PLAN OF CARE
"/71 (BP Location: Left arm)   Pulse 86   Temp 97.7  F (36.5  C) (Oral)   Resp 18   Ht 1.651 m (5' 5\")   Wt 46.5 kg (102 lb 8.2 oz)   SpO2 97%   BMI 17.06 kg/m      Plan of care reviewed with: patient   Overall patient progress: no change    Time: 4925-7122    A&Ox4, denied pain. VS stable on 2LNC oxygen supplement. Assist of 1-2 person pivot transfer to the commode with GB and walker. Incontinent of bladder, purewick in place. No bm this shift. Afib rate controlled. On cardiac diet and 2LFR. Nursing report given to Shady VASQUEZ RN in . Bladder scan showed 147 ml max. K+ recheck 3.8. Patient transferred to  at around 2130.   "

## 2024-11-24 NOTE — PROGRESS NOTES
"CLINICAL NUTRITION SERVICES - ASSESSMENT NOTE     Nutrition Prescription    RECOMMENDATIONS FOR MDs/PROVIDERS TO ORDER:  None at this time     Malnutrition Status:    Severe malnutrition in the context of acute illness    Recommendations already ordered by Registered Dietitian (RD):  PRN supplements    Future/Additional Recommendations:  Encourage patient to consume at least 75% of meals TID or the equivalent with snacks/supplements.  If consuming less than this offer oral nutrition supplements, scheduled snacks, and/or consider ordering calorie counts to assess PO intake adequacy.        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  Pt admit with acute hypoxic respiratory failure. SLP is consulted.    Per provider note yesterday, \"Per daughter baseline wt around 120-130 lbs, however wt here around 102 lbs, patient states she is eating less and has less appetite, denies post prandial abd pain, N/V. She states she likes the food but just do not have the appetite to eat more. She has hx of recent invasive laryngeal SCC with radiation therapy, also had dysphagia before\"    Per last OP RD note on 7/9/24, pt at that time reported drinking home made shakes and smoothies to help increase PO intake (reported not liking Boost/Ensure)    CURRENT NUTRITION ORDERS  Diet: Low Saturated Fat/2400 mg Sodium and 2000 mL Fluid Restriction  Intake/Tolerance: fair appetite and eating 25-50% of meals documented in flowsheets since admission.    LABS  Labs reviewed  - K+ and Phos WNL  - Mg++ 2.4 (H)  - Cr 0.96 (H)  - GFR 60 (L)    MEDICATIONS  Medications reviewed  - Lasix (one-time order today)    ANTHROPOMETRICS  Height: 165.1 cm (5' 5\")  Most Recent Weight: 46.5 kg (102 lb " 8.2 oz)    IBW: 56.8 kg   BMI: Underweight BMI <18.5   Weight History: 19 lb wt loss x 4 months (15%), 13 lb of this in the past month (11%)  Wt Readings from Last 10 Encounters:   11/23/24 46.5 kg (102 lb 8.2 oz)   10/14/24 52.2 kg (115 lb)   09/16/24 52.2 kg (115 lb)   08/12/24 52.2 kg (115 lb)   07/12/24 55 kg (121 lb 4.8 oz)   07/08/24 54.9 kg (121 lb)   07/05/24 54.4 kg (120 lb)   07/01/24 55.3 kg (122 lb)   06/24/24 54 kg (119 lb)   06/17/24 53.5 kg (118 lb)      Dosing Weight: 47 kg    ASSESSED NUTRITION NEEDS  Estimated Energy Needs: 3707-4042 kcals/day (30 - 35 kcals/kg)  Justification: Repletion and Underweight  Estimated Protein Needs: 56-71 grams protein/day (1.2 - 1.5 grams of pro/kg)  Justification: Hypercatabolism with acute illness and Repletion, pending ongoing renal function  Estimated Fluid Needs: per provider pending fluid status    PHYSICAL FINDINGS  See malnutrition section below.    MALNUTRITION  % Intake: </= 50% for >/= 5 days (severe)  % Weight Loss: >5% in 1 month (severe) and overall > 7.5% in 3 months (severe)  Subcutaneous Fat Loss: Unable to assess - RD working remotely  Muscle Loss: Unable to assess  - RD working remotely  Fluid Accumulation/Edema: None noted per chart review today  Malnutrition Diagnosis: Severe malnutrition in the context of acute illness    NUTRITION DIAGNOSIS  Inadequate oral intake related to decreased appetite as evidenced by reported poor PO intake PTA and documented 11% wt loss the past month      INTERVENTIONS  Implementation  Nutrition Education: Unable to complete due to unable to reach pt by phone   Medical food supplement therapy     Goals  Patient to consume % of nutritionally adequate meal trays TID, or the equivalent with supplements/snacks.     Monitoring/Evaluation  Progress toward goals will be monitored and evaluated per protocol.   Kelli Maldonado, RD, , LD  Available on Weekend Clinical Dietitian Elizabeth

## 2024-11-24 NOTE — PROGRESS NOTES
[facility]    Medicine Progress Note - Hospitalist Service, GOLD TEAM 8    Date of Admission:  11/21/2024    Assessment & Plan   Asya Coffman is a 79 year old female with history of A fib, CAD, COPD, asthma, Raynaud's, CKD, laryngeal squamous cell carcinoma/papillomatosis, CREST syndrome, chronic pain, chronic constipation, hypothyroidism, anxiety, and depression who was admitted to [facility] with acute hypoxic respiratory failure    Changes today:  - HR stayed under 100, not at goal yet, will increase propranolol to 10 BID  - Cxr with small pleural effusion on R, ordered IV lasix 40 x1  - Will keep K~4 and Mg~2  - Dispo is TCU but daughter prefers jail with OP PT/OT, PT to re-eval and discuss with patient today      A fib with RVR- not rate controlled  - On Diltiazem 240 ER BID, max dose  - Will increase PTA propranolol from 10 daily to 10 BID, target HR 60-70   - c/w PTA on Apixaban,  -Last RVR yesterday, required 10mg IV Cardizem  - Maintain K~4 and Mg ~2   - Oxywalk today     Acute hypoxic respiratory failure- on 2L NC  Hx of asthma- not in exacerbation  Fluid overload  Elevated BNP  Elevated RVSP  Pulmonary hypertension- on TTE 11/22  Presented with 1 month progressive dyspnea. Endorses FELIZ, PND, orthopnea, swelling in the legs. Found to have hypoxic requiring 2L NC. BNP 4,421 (1,920 4/2024). VBG 7.41/40/46/26. LA, WBC normal. Flu, COVID negative. EKG A fib with RVR (rate 101), chronic A fib as below. CT PE 11/21 no PE, trace interstitial edema with small to moderate b/l pleural effusions. TTE with normal EF however unable to measure diastolic function 2/2 a fib.   - Will spot dose with lasix IV and monitor, lasix 20mg IV x 1 in ED, 40mg IV 11/22, 40mg IV 11/24  - Added scheduled Xopenex/ipratropium q6hr  - Repeat CXR 11/23 showing small R pleural effusion and trace L   - Has OP pulmonologist Dr. Mccoy and also cardiologist, NOLBERTO to help her schedule an appt post  discharge  - PTA Fluticasone- Vilanterol  - 2L fluid restriction  - Daily standing weights, I&Os  - Continuous pulse ox      Significant weight loss  - Per daughter baseline wt around 120-130 lbs, however wt here around 102 lbs, patient states she is eating less and has less appetite, denies post prandial abd pain, N/V. She states she likes the food but just do not have the appetite to eat more. She has hx of recent invasive laryngeal SCC with radiation therapy, also had dysphagia before  - Will consult nutritionist    Deconditioning  - PT/OT, dispo recommendation is TCU, daughter prefers [facility] with OP OT/PT  - Sw/CM consulted    H/o laryngeal squamous cell carcinoma, papillomatosis with dysphagia: Initially presented 2015 with benign laryngeal lesion. Progressed to papilloma 2021. Dx with invasive squamous cell carcinoma 4/2024. Now s/p radiation completed 7/2024. CT chest 11/21 ingested content within a mildly patulous thoracic esophagus, aspiration cautions recommended   - SLP consult, recommending reg diet and think liq   - Follow up OP 1/2025 as planned     Chronic/stable medical conditions  Pulmonary nodule:   6 mm LLL pulmonary nodule noted on CT  - Will need repeat imaging in 6-12 months     Subacute rib fractures:   Admitted to [facility] 9/2024 with rib fractures. Subacute fractures noted on CT 11/21. Continue supportive cares     H/o CREST syndrome:   2014. Per notes, did have positive FARIDEH, Raynaud's, Calcinosis, GERD and some telangectasia's. Last saw [facility] rheumatology 2017. No acute concerns     Hypernatremia: Mild. Na 146. Trend     L>R BLE edema: US to rule out DVT    Troponin elevation, likely increased demand: Trop 28 (32). EKG chronic a fib. Asymptomatic. Monitor   COPD, asthma: No e/o exacerbation. Continue PTA Symbicort, albuterol, DuoNebs   CKD II: BL Cr 0.9-1.1 and stable. Trend   Chronic pain: Continue PTA gabapentin, Tylenol. Discontinue Ibuprofen given CKD  Chronic constipation: Continue bowel  "regimen   Hypothyroidism: TSH 1.48 4/2024. Continue PTA synthroid   Anxiety, depression: Continue PTA Sertraline, Propranolol   Raynaud's: Continue diltiazem as above    Diet:  2L fluid restriction  DVT Prophylaxis: DOAC  Miranda Catheter: Not present  Lines: None     Cardiac Monitoring: None  Code Status:  Full Code        Diet: Fluid restriction 2000 ML FLUID  Low Saturated Fat Na <2400 mg    DVT Prophylaxis: DOAC  Miranda Catheter: Not present  Lines: None     Cardiac Monitoring: None  Code Status: Full Code      Clinically Significant Risk Factors        # Hypokalemia: Lowest K = 3.2 mmol/L in last 2 days, will replace as needed    # Hypocalcemia: Lowest Ca = 8.6 mg/dL in last 2 days, will monitor and replace as appropriate                      # Cachexia: Estimated body mass index is 17.06 kg/m  as calculated from the following:    Height as of this encounter: 1.651 m (5' 5\").    Weight as of this encounter: 46.5 kg (102 lb 8.2 oz)., PRESENT ON ADMISSION     # Financial/Environmental Concerns: none         Social Drivers of Health    Housing Stability: High Risk (11/23/2024)    Housing Stability     Do you have housing? : No     Are you worried about losing your housing?: No          Disposition Plan     Medically Ready for Discharge: Anticipated in 2-4 Days             Hiram Bee MD  Hospitalist Service, GOLD TEAM [address] [place] [address]  Securely message with [email or web address] (more info)  Text page via [employer] Paging/Directory   See signed in provider for up to date coverage information  ______________________________________________________________________    Interval History   No event overnight, patient doing well overall, discussed she might be discharged with oxygen, no major complaint.     Physical Exam   Vital Signs: Temp: 97.7  F (36.5  C) Temp src: Oral BP: 128/77 Pulse: 91   Resp: 16 SpO2: 94 % O2 Device: Nasal cannula Oxygen Delivery: 2 LPM  Weight: 102 lbs 8.22 " oz    Gen: A&Ox4, lying comfortably on bed, in no distress, on 2L NC  Lung: clear on ausculation b/l, no wheezing/crackle  Abd: soft, non-tender, non-distended  Ext: No edema in upper/lower extremities      Medical Decision Making       45 MINUTES SPENT BY ME on the date of service doing chart review, history, exam, documentation & further activities per the note.      Data     I have personally reviewed the following data over the past 24 hrs:    7.2  \   12.6   / 272     140 101 21.4 /  93   3.7 28 0.96 (H) \     ALT: <5 AST: 15 AP: 78 TBILI: 0.5   ALB: 3.7 TOT PROTEIN: 6.1 (L) LIPASE: N/A       Imaging results reviewed over the past 24 hrs:   Recent Results (from the past 24 hours)   XR Chest Port 1 View    Narrative    Exam: XR CHEST PORT 1 VIEW, 11/23/2024 2:41 PM    Indication: AHRF    Comparison: 11/21/2024    Findings:   Single AP view of the chest. Small right and trace left pleural  effusions. No pneumothorax. Continued right greater than left basilar  opacities. Stable enlarged cardiac silhouette. Patient is rotated to  the right.      Impression    Impression: Small right and trace left pleural effusions with adjacent  basilar atelectasis versus consolidation.    I have personally reviewed the examination and initial interpretation  and I agree with the findings.    HILDA MAHER DO         SYSTEM ID:  [id number]

## 2024-11-24 NOTE — LACTATION NOTE
Patient is alert and oriented, has purewick on but missed was incontinent of urine, no BM this shift.  No skin issue noted, good appetite, eletrolytes are within normal range and it looks like she, had good urine output, bladder scan for 220 ml, has depend on with purewick in place. Continue to monitor.

## 2024-11-25 LAB
ALBUMIN SERPL BCG-MCNC: 3.5 G/DL (ref 3.5–5.2)
ALP SERPL-CCNC: 78 U/L (ref 40–150)
ALT SERPL W P-5'-P-CCNC: <5 U/L (ref 0–50)
ANION GAP SERPL CALCULATED.3IONS-SCNC: 10 MMOL/L (ref 7–15)
AST SERPL W P-5'-P-CCNC: 13 U/L (ref 0–45)
BILIRUB SERPL-MCNC: 0.5 MG/DL
BUN SERPL-MCNC: 26.8 MG/DL (ref 8–23)
CALCIUM SERPL-MCNC: 8.6 MG/DL (ref 8.8–10.4)
CHLORIDE SERPL-SCNC: 101 MMOL/L (ref 98–107)
CREAT SERPL-MCNC: 1.17 MG/DL (ref 0.51–0.95)
EGFRCR SERPLBLD CKD-EPI 2021: 48 ML/MIN/1.73M2
ERYTHROCYTE [DISTWIDTH] IN BLOOD BY AUTOMATED COUNT: 21.8 % (ref 10–15)
GLUCOSE SERPL-MCNC: 88 MG/DL (ref 70–99)
HCO3 SERPL-SCNC: 30 MMOL/L (ref 22–29)
HCT VFR BLD AUTO: 38.5 % (ref 35–47)
HGB BLD-MCNC: 11.2 G/DL (ref 11.7–15.7)
MAGNESIUM SERPL-MCNC: 2.3 MG/DL (ref 1.7–2.3)
MCH RBC QN AUTO: 25.8 PG (ref 26.5–33)
MCHC RBC AUTO-ENTMCNC: 29.1 G/DL (ref 31.5–36.5)
MCV RBC AUTO: 89 FL (ref 78–100)
PHOSPHATE SERPL-MCNC: 3.5 MG/DL (ref 2.5–4.5)
PLATELET # BLD AUTO: 282 10E3/UL (ref 150–450)
POTASSIUM SERPL-SCNC: 3.8 MMOL/L (ref 3.4–5.3)
PROT SERPL-MCNC: 5.9 G/DL (ref 6.4–8.3)
RBC # BLD AUTO: 4.34 10E6/UL (ref 3.8–5.2)
SODIUM SERPL-SCNC: 141 MMOL/L (ref 135–145)
WBC # BLD AUTO: 9 10E3/UL (ref 4–11)

## 2024-11-25 PROCEDURE — 250N000013 HC RX MED GY IP 250 OP 250 PS 637: Performed by: PHYSICIAN ASSISTANT

## 2024-11-25 PROCEDURE — 97530 THERAPEUTIC ACTIVITIES: CPT | Mod: GP

## 2024-11-25 PROCEDURE — 85014 HEMATOCRIT: CPT | Performed by: STUDENT IN AN ORGANIZED HEALTH CARE EDUCATION/TRAINING PROGRAM

## 2024-11-25 PROCEDURE — 85041 AUTOMATED RBC COUNT: CPT | Performed by: STUDENT IN AN ORGANIZED HEALTH CARE EDUCATION/TRAINING PROGRAM

## 2024-11-25 PROCEDURE — 80053 COMPREHEN METABOLIC PANEL: CPT | Performed by: STUDENT IN AN ORGANIZED HEALTH CARE EDUCATION/TRAINING PROGRAM

## 2024-11-25 PROCEDURE — 250N000013 HC RX MED GY IP 250 OP 250 PS 637: Performed by: STUDENT IN AN ORGANIZED HEALTH CARE EDUCATION/TRAINING PROGRAM

## 2024-11-25 PROCEDURE — 36415 COLL VENOUS BLD VENIPUNCTURE: CPT | Performed by: STUDENT IN AN ORGANIZED HEALTH CARE EDUCATION/TRAINING PROGRAM

## 2024-11-25 PROCEDURE — 97116 GAIT TRAINING THERAPY: CPT | Mod: GP

## 2024-11-25 PROCEDURE — 120N000002 HC R&B MED SURG/OB UMMC

## 2024-11-25 PROCEDURE — 83735 ASSAY OF MAGNESIUM: CPT | Performed by: STUDENT IN AN ORGANIZED HEALTH CARE EDUCATION/TRAINING PROGRAM

## 2024-11-25 PROCEDURE — 82040 ASSAY OF SERUM ALBUMIN: CPT | Performed by: STUDENT IN AN ORGANIZED HEALTH CARE EDUCATION/TRAINING PROGRAM

## 2024-11-25 PROCEDURE — 99223 1ST HOSP IP/OBS HIGH 75: CPT | Mod: GC | Performed by: INTERNAL MEDICINE

## 2024-11-25 PROCEDURE — 97110 THERAPEUTIC EXERCISES: CPT | Mod: GP

## 2024-11-25 PROCEDURE — 99232 SBSQ HOSP IP/OBS MODERATE 35: CPT | Performed by: STUDENT IN AN ORGANIZED HEALTH CARE EDUCATION/TRAINING PROGRAM

## 2024-11-25 PROCEDURE — 84100 ASSAY OF PHOSPHORUS: CPT | Performed by: STUDENT IN AN ORGANIZED HEALTH CARE EDUCATION/TRAINING PROGRAM

## 2024-11-25 RX ORDER — ACYCLOVIR 200 MG/1
3 CAPSULE ORAL ONCE
Status: COMPLETED | OUTPATIENT
Start: 2024-11-25 | End: 2024-11-25

## 2024-11-25 RX ADMIN — DILTIAZEM HYDROCHLORIDE 240 MG: 240 CAPSULE, EXTENDED RELEASE ORAL at 08:27

## 2024-11-25 RX ADMIN — LEVALBUTEROL TARTRATE 2 PUFF: 45 AEROSOL, METERED ORAL at 20:31

## 2024-11-25 RX ADMIN — IPRATROPIUM BROMIDE 2 PUFF: 17 AEROSOL, METERED RESPIRATORY (INHALATION) at 18:56

## 2024-11-25 RX ADMIN — FLUTICASONE FUROATE AND VILANTEROL TRIFENATATE 1 PUFF: 200; 25 POWDER RESPIRATORY (INHALATION) at 08:26

## 2024-11-25 RX ADMIN — IPRATROPIUM BROMIDE 2 PUFF: 17 AEROSOL, METERED RESPIRATORY (INHALATION) at 13:29

## 2024-11-25 RX ADMIN — POLYETHYLENE GLYCOL 3350 17 G: 17 POWDER, FOR SOLUTION ORAL at 08:25

## 2024-11-25 RX ADMIN — LEVALBUTEROL TARTRATE 2 PUFF: 45 AEROSOL, METERED ORAL at 02:44

## 2024-11-25 RX ADMIN — APIXABAN 5 MG: 5 TABLET, FILM COATED ORAL at 20:28

## 2024-11-25 RX ADMIN — SERTRALINE HYDROCHLORIDE 150 MG: 100 TABLET ORAL at 08:27

## 2024-11-25 RX ADMIN — APIXABAN 5 MG: 5 TABLET, FILM COATED ORAL at 08:25

## 2024-11-25 RX ADMIN — IPRATROPIUM BROMIDE 2 PUFF: 17 AEROSOL, METERED RESPIRATORY (INHALATION) at 20:31

## 2024-11-25 RX ADMIN — ATORVASTATIN CALCIUM 20 MG: 20 TABLET, FILM COATED ORAL at 20:30

## 2024-11-25 RX ADMIN — LEVALBUTEROL TARTRATE 2 PUFF: 45 AEROSOL, METERED ORAL at 08:30

## 2024-11-25 RX ADMIN — DILTIAZEM HYDROCHLORIDE 240 MG: 240 CAPSULE, EXTENDED RELEASE ORAL at 20:30

## 2024-11-25 RX ADMIN — FEXOFENADINE HCL 180 MG: 180 TABLET ORAL at 08:27

## 2024-11-25 RX ADMIN — PROPRANOLOL HYDROCHLORIDE 10 MG: 10 TABLET ORAL at 08:25

## 2024-11-25 RX ADMIN — IPRATROPIUM BROMIDE 2 PUFF: 17 AEROSOL, METERED RESPIRATORY (INHALATION) at 08:30

## 2024-11-25 RX ADMIN — PROPRANOLOL HYDROCHLORIDE 10 MG: 10 TABLET ORAL at 20:28

## 2024-11-25 RX ADMIN — LEVOTHYROXINE SODIUM 88 MCG: 88 TABLET ORAL at 07:02

## 2024-11-25 RX ADMIN — LEVALBUTEROL TARTRATE 2 PUFF: 45 AEROSOL, METERED ORAL at 14:21

## 2024-11-25 RX ADMIN — GABAPENTIN 600 MG: 300 CAPSULE ORAL at 21:43

## 2024-11-25 ASSESSMENT — ACTIVITIES OF DAILY LIVING (ADL)
ADLS_ACUITY_SCORE: 67
ADLS_ACUITY_SCORE: 0
ADLS_ACUITY_SCORE: 0
ADLS_ACUITY_SCORE: 67
ADLS_ACUITY_SCORE: 0
ADLS_ACUITY_SCORE: 67
ADLS_ACUITY_SCORE: 67
ADLS_ACUITY_SCORE: 0
ADLS_ACUITY_SCORE: 0
ADLS_ACUITY_SCORE: 67
ADLS_ACUITY_SCORE: 67
ADLS_ACUITY_SCORE: 0
ADLS_ACUITY_SCORE: 67
ADLS_ACUITY_SCORE: 0
ADLS_ACUITY_SCORE: 67
ADLS_ACUITY_SCORE: 0
ADLS_ACUITY_SCORE: 0
ADLS_ACUITY_SCORE: 67
ADLS_ACUITY_SCORE: 67

## 2024-11-25 NOTE — PLAN OF CARE
Shift Hours: 1500 - 1900    Assessment/Interventions:  Body systems that were not at patient's baseline: Respiratory and Musculoskeletal. Focused body system assessments documented in flow sheets.        Activity     Fall Risk Score: 110   Bed alarm on? Yes      Mobility: x2 w GB & walker    Pain: denies    Labs/RN Managed Protocols: Mg++ & K+    Lines/Drains: R PIV SL; purewick    Nutrition: Low fat/Na < 2400mg; 2L FR    Goal Outcome Evaluation/Barriers to Discharge: 2-3L NC at rest. HR 58-80. Attempted home O2 walk test. Although pt was self-motivated to walk, she had difficulty sitting at EoB & required x1-2 assistance to sit upright. Desat to mid-80s on RA at rest. Up to 6L NC while standing at bedside. D/t heavy x2 assist and unsteady gait, pt was assisted back to bed. Pt eager to try again tomorrow. PO intake encouraged.

## 2024-11-25 NOTE — PROGRESS NOTES
Pt has been alert and oriented, VSS, O2 sat maintained in the mid 90s on 3 LPM. Tolerated breakfast well. Pt is aware that she is no 2L FR. Currently sitting in chair with the help of PT.

## 2024-11-25 NOTE — CONSULTS
[facility]   Pulmonary   Consult Note 11/25/2024  Asya Coffman MRN: [id number]    Reason for Consult: Unable to wean O2; wondering about further work up      Assessment & Plan      Asya Coffman is a 79 year old female with history of A fib, CAD, COPD, asthma, Raynaud's, CKD, laryngeal squamous cell carcinoma/papillomatosis, CREST syndrome, chronic pain, chronic constipation, hypothyroidism, anxiety, and depression who was admitted 11/21/24 acute hypoxic respiratory insuffiencey undergoing diuresis. Pulmonary team was consulted for persistent oxygen needs despite ongoing diuresis to evaluate for possible primary pulmonary etiology     Discussion   Pt presented with progressive dyspnea on exertion, LE edema, and hypoxia of unclear etiology and has undergoing daily diuresis (although response to lasix remains unclear). While she does report her respiratory status has returned to baseline, she continues to required oxygen which is new this hospitalization. No evidence for COPD/asthma exacerbation on exam. No evidence of parenchymal disease on CT nor does she have a hx of associated ILD with CREST syndrome. CT PE with small to moderate right and small left pleural effusions as well as mild septal thickening consistent with hypervolemia. Interestingly her TTE does not depict evidence of cardiac dysfunction and IVC wnl; though does have evidence of elevated RVP (mild) and pulmonary HTN. Thus is remains to be seen whether her pulmonary edema is cardiac in nature. Given pleural effusions are b/l suspect transudative. If hypoxia does not improve with diuresis is the next 2-3 days could consider repeat imaging and possible thoracentesis (right) to evaluate for exudative process of effusions, but this remains less likely     Recommendations:    - Consider TTE with bubble to evaluate for shunt   - Continue diuresis   - May consider cardiology consult given cardiac etiology of pulmonary edema remains  nebulous   - If hypoxia does not improve in the next 2-3 days could consider repeat CXR +/- thoracentesis if indicated (would favor right based on size)     Thank you for allowing us to care for this patient.  We will sign off. Please don't hesitate to page or call with any questions or concerns.    Patient seen & discussed w/  Dr. Perlman, who agrees with the above assessment and plan.    Mago Mendosa M.D.  Internal Medicine PGY3   11/25/2024           History of Present Illness:   Asya Coffman is a 79 year old female who presented to [facility] on 11/21/2024 with FELIZ, LE edema, and hypoxia.  On admission patient was diagnosed w/ acute hypoxic respiratory insufficiency.    Presented with 1 week of progressive dyspnea specifically on exertion, orthopnea, and LE edema. Denies fever, chills, night sweats, chest pain, indigestion, cough with sputum, URI symptoms.     In the hospital initially requiring 4L, now improved to 2L. Trial of IV lasix - 20, 40, and 40 daily, added scheduled duonebs Q6H. Response to diuresis unclear     Pulmonary hx: Follows with [facility] with Dr. Franco - typically seems them yearly last seen 11/30/23. Uses symbicort 2 puffs BID     Rheumatology: Follows at [facility], has not been seen since 2017. History of CREST syndrome. Did have positive FARIDEH, Raynaud's, Calcinosis, GERD and some telangectasia's  History of Asthma. Her Raynaud's is being treated with Calcium channel blockers. Seems stable per notes that was well controlled with CCB     Patient is a never smoker. No recent sick contacts. No changes to habits and is retired           Review of Symptoms:   10-point ROS reviewed, & found negative w/ exceptions noted in the HPI.          Past Medical History:     Past Medical History:   Diagnosis Date    A-fib (H)     Antiplatelet or antithrombotic long-term use     Arrhythmia     COPD (chronic obstructive pulmonary disease) (H)        Past Surgical History:   Procedure Laterality Date    INJECT  STEROID (LOCATION) N/A 6/15/2023    Procedure: Avastin injection;  Surgeon: Nikole Davis MD;  Location: [facility]    INJECT STEROID (LOCATION) N/A 9/7/2023    Procedure: Avastin injection;  Surgeon: Nikole Davis MD;  [facility]    INJECT STEROID (LOCATION) N/A 12/14/2023    Procedure: Avastin injection;  Surgeon: Nikole Davis MD;  [facility]    INJECT STEROID (LOCATION) N/A 2/15/2024    Procedure: Avastin injection;  Surgeon: Nikole Davis MD;  [facility]    LASER CO2 LARYNGOSCOPY N/A 9/7/2023    Procedure: Microdirect laryngoscopy with excision/ablation of laryngeal lesions, biopsies, CO2 laser;  Surgeon: Nikole Davis MD;  [facility]    LASER CO2 LARYNGOSCOPY N/A 5/30/2024    Procedure: Microdirect laryngoscopy with excision/ablation of laryngeal lesions, biopsies, CO2 laser;  Surgeon: Nikole Davis MD;  [facility]    LASER CO2 LARYNGOSCOPY, COMPLEX N/A 5/26/2022    Procedure: Micro direct laryngoscopy with excision/ablation of laryngeal lesions, possible biopsies, possible CO2 laser;  Surgeon: Nikole Davis MD;  [facility]    LASER CO2 LARYNGOSCOPY, COMPLEX N/A 9/15/2022    Procedure: Microdirect laryngoscopy with ablation of laryngeal lesions,biopsies,CO2 laser;  Surgeon: Nikole Davis MD;  [facility]    LASER CO2 LARYNGOSCOPY, COMPLEX N/A 12/1/2022    Procedure: Microdirect laryngoscopy with excision/ablation of laryngeal lesions, biopsies,   CO2 laser;  Surgeon: Nikole Davis MD;  [facility]    LASER CO2 LARYNGOSCOPY, COMPLEX N/A 6/15/2023    Procedure: Microdirect laryngoscopy with ablation of laryngeal lesions, biopsies, CO2 laser;  Surgeon: Nikole Davis MD;  [facility]    LASER CO2 LARYNGOSCOPY, COMPLEX N/A 10/5/2023    Procedure: Microdirect laryngoscopy with excision/ablation of laryngeal lesions, biopsies, CO2 laser;  Surgeon: Ryan  Nikole Alberts MD;  [facility]    LASER CO2 LARYNGOSCOPY, COMPLEX N/A 12/14/2023    Procedure: Microdirect laryngoscopy with excision/ablation of laryngeal lesions, biopsies, CO2 laser;  Surgeon: Nikole Davis MD;  [facility]    LASER CO2 LARYNGOSCOPY, COMPLEX N/A 2/15/2024    Procedure: Microdirect laryngoscopy with excision/ablation of laryngeal lesions, biopsies, CO2 laser;  Surgeon: Nikole Davis MD;  [facility]    LASER CO2 LARYNGOSCOPY, COMPLEX N/A 4/11/2024    Procedure: Microdirect laryngoscopy with excision/ablation of laryngeal lesions, biopsies, CO2 laser, Avastin injections;  Surgeon: Nikole Davis MD;  [facility]    LASER KTP LARYNGOSCOPY N/A 4/3/2023    Procedure: Microdirect laryngoscopy with biopsies, excision/ablation of lesions, C02 laser,  Avastin injection;  Surgeon: Nikole Davis MD;  [facility]    LASER KTP LARYNGOSCOPY N/A 6/15/2023    Procedure: flexible laser (CO2 or KTP) standby;  Surgeon: Nikole Davis MD;  [facility]    LASER KTP LARYNGOSCOPY FLEXIBLE N/A 8/18/2022    Procedure: Transnasal flexible laryngoscopy with KTP laser and biopsies;  Surgeon: Nikole Davis MD;  Location: [facility]    LASER KTP LARYNGOSCOPY FLEXIBLE N/A 10/27/2022    Procedure: Transnasal flexible laryngoscopy with possible KTP laser;  Surgeon: Nikole Davis MD;  Location: [facility]    LASER KTP LARYNGOSCOPY FLEXIBLE N/A 1/26/2023    Procedure: Transnasal flexible laryngoscopy with KTP laser,  biopsies, superior laryngeal nerve blocks, Avastin injection;  Surgeon: Nikole Davis MD;  Location: [facility]    LASER KTP LARYNGOSCOPY FLEXIBLE N/A 2/15/2023    Procedure: Transnasal flexible laryngoscopy with KTP laser, superior laryngeal nerve blocks, biopsies, avastin injection;  Surgeon: Nikole Davis MD;  Location: [facility]    LASER KTP LARYNGOSCOPY FLEXIBLE N/A 2/17/2023    Procedure:  Transnasal flexible laryngoscopy with KTP laser, biopsies, superior laryngeal nerve blocks;  Surgeon: Nikole Davis MD;  Location: [facility]    LASER KTP LARYNGOSCOPY FLEXIBLE N/A 2/23/2023    Procedure: Transnasal flexible laryngoscopy with KTP laser, superior laryngeal nerve blocks;  Surgeon: Nikole Davis MD;  Location: [facility]    LASER KTP LARYNGOSCOPY FLEXIBLE N/A 3/2/2023    Procedure: Transnasal flexible laryngoscopy with KTP laser, superior laryngeal nerve block Bilateral;  Surgeon: Nikole Davis MD;  Location: [facility]    LASER KTP LARYNGOSCOPY FLEXIBLE N/A 3/9/2023    Procedure: Transnasal flexible laryngoscopy with KTP laser, biopsies, superior laryngeal nerve blocks;  Surgeon: Nikole Davis MD;  Location: [facility]    LASER KTP LARYNGOSCOPY FLEXIBLE N/A 3/17/2023    Procedure: Transnasal flexible laryngoscopy with KTP laser, superior laryngeal nerve block(s), Avastin injection;  Surgeon: Nikole Davis MD;  Location: [facility]    LASER KTP LARYNGOSCOPY FLEXIBLE N/A 3/28/2023    Procedure: Transnasal flexible laryngoscopy with KTP laser, superior laryngeal nerve block(s);  Surgeon: Pankaj Woodard MD;  Location: [facility] OR            Allergies:     Allergies   Allergen Reactions    Methylpyrrolidone Anaphylaxis    Nuts Anaphylaxis     Black walnut    Escitalopram Other (See Comments)     Asthma -a time of exacerbation and may not have been cause may retry    Mirtazapine Other (See Comments)     Asthma a time of exacerbation and may not have been cause may retry    Venlafaxine Other (See Comments)    Adhesive Tape Rash     With holter monitor. Ok with bandaids.    No Clinical Screening - See Comments Rash     Adhesive tape             Outpatient Medications:     Current Facility-Administered Medications   Medication Dose Route Frequency Provider Last Rate Last Admin    acetaminophen (TYLENOL) tablet 650 mg  650 mg Oral Q4H PRN Jennifre Anne  CANDE Knutson        albuterol (PROVENTIL HFA/VENTOLIN HFA) inhaler  2 puff Inhalation Q6H PRN Jennifer Anne PA-C   2 puff at 11/23/24 1553    apixaban ANTICOAGULANT (ELIQUIS) tablet 5 mg  5 mg Oral BID Jennifer Anne PA-C   5 mg at 11/25/24 0825    atorvastatin (LIPITOR) tablet 20 mg  20 mg Oral QPM Jennifer Anne PA-C   20 mg at 11/24/24 2012    calcium carbonate (TUMS) chewable tablet 1,000 mg  1,000 mg Oral 4x Daily PRN Jennifer Anne PA-C        diltiazem ER COATED BEADS (CARDIZEM CD/CARTIA XT) 24 hr capsule 240 mg  240 mg Oral BID Jennifer Anne PA-C   240 mg at 11/25/24 0827    fexofenadine (ALLEGRA) tablet 180 mg  180 mg Oral Daily Jennifer Anne PA-C   180 mg at 11/25/24 0827    fluticasone-vilanterol (BREO ELLIPTA) 200-25 MCG/ACT inhaler 1 puff  1 puff Inhalation Daily Jennifer Anne PA-C   1 puff at 11/25/24 0826    gabapentin (NEURONTIN) capsule 600 mg  600 mg Oral At Bedtime Juanjose Peres MD   600 mg at 11/24/24 2312    ipratropium (ATROVENT HFA) Inhaler 2 puff  2 puff Inhalation 4x daily Hiram Bee MD   2 puff at 11/25/24 0830    levalbuterol (XOPENEX HFA) 45 MCG/ACT Inhaler 2 puff  2 puff Inhalation Q6H Hiram Bee MD   2 puff at 11/25/24 0830    levothyroxine (SYNTHROID/LEVOTHROID) tablet 88 mcg  88 mcg Oral Daily Jennifer Anne PA-C   88 mcg at 11/25/24 0702    lidocaine (LMX4) cream   Topical Q1H PRN Jennifer Anne PA-C        lidocaine 1 % 0.1-1 mL  0.1-1 mL Other Q1H PRN Jennifer Anne PA-C        Patient is already receiving anticoagulation with heparin, enoxaparin (LOVENOX), warfarin (COUMADIN)  or other anticoagulant medication   Does not apply Continuous PRN Jennifer Anne PA-C        polyethylene glycol (MIRALAX) Packet 17 g  17 g Oral Daily Jennifer Anne PA-C   17 g at 11/25/24 0825    propranolol (INDERAL) tablet 10 mg  10  "mg Oral BID Hiram Bee MD   10 mg at 11/25/24 0825    senna-docusate (SENOKOT-S/PERICOLACE) 8.6-50 MG per tablet 1 tablet  1 tablet Oral BID PRN Jennifer Anne PA-C        Or    senna-docusate (SENOKOT-S/PERICOLACE) 8.6-50 MG per tablet 2 tablet  2 tablet Oral BID PRN Jennifer Anne PA-C        sertraline (ZOLOFT) tablet 150 mg  150 mg Oral Daily Jennifer Anne PA-C   150 mg at 11/25/24 0827    sodium chloride (OCEAN) 0.65 % nasal spray 1 spray  1 spray Both Nostrils Q1H PRN Lynda Farrar MD   1 spray at 11/23/24 0859    sodium chloride (PF) 0.9% PF flush 3 mL  3 mL Intracatheter Q8H Jennifer Anne PA-C   3 mL at 11/25/24 0702    sodium chloride (PF) 0.9% PF flush 3 mL  3 mL Intracatheter q1 min prn Jennifer Anne PA-C   3 mL at 11/23/24 1102             Family History:     Family History   Problem Relation Age of Onset    Breast Cancer Mother 75.00    Hereditary Breast and Ovarian Cancer Syndrome No family hx of     Cancer No family hx of     Colon Cancer No family hx of     Endometrial Cancer No family hx of     Ovarian Cancer No family hx of                Social History:     Social History     Tobacco Use    Smoking status: Never    Smokeless tobacco: Never   Substance Use Topics    Alcohol use: Yes     Comment: Occasionally    Drug use: Never             Physical Exam:   /62 (BP Location: Left arm, Cuff Size: Adult Regular)   Pulse 78   Temp 97.8  F (36.6  C) (Oral)   Resp 16   Ht 1.651 m (5' 5\")   Wt 46.7 kg (103 lb)   SpO2 97%   BMI 17.14 kg/m      General: No acute distress  HENT: external ears without visible abnormalities, no rhinorrhea, no epistaxis  Lungs: Crackles along right lung based without wheeze/ no accessory muscle use  Heart: Irregularly irregular, no murmurs  Abdomen: soft, non-distended, bowel tones present  Extremities: No pitting edema, no clubbing or cyanosis  Skin: no visible rashes, no " mottling  Neurologic: moving all 4 extremities spontaneously, awake and alert  Psych: full affect, appropriate insight, appropriate judgment          Data:   Labs (all laboratory studies reviewed by me): notable labs in HPI above.    Imaging and other diagnostic testing (all imaging studies reviewed by me)    Chest xray (11/23/24): Persistent right sided pleural effusion     CT chest (11/21/24): Trace interstitial edema pattern, with small to moderate right and small left pleural effusions. Ingested content within a mildly patulous thoracic esophagus. New 6 mm left lower lobe pulmonary nodule. Mild mediastinal and right hilar lymphadenopathy, stable from 2023     PFTs (7/2021): Severe obstruction, no albuterol response, air trapping (%), and a low normal DLCOc of 74%     Transthoracic echocardiogram  (11/22/24): EF 55-60% with normal function and size. pHTN present RVP elevated at 41mmHg, and IVC normal in size

## 2024-11-25 NOTE — PLAN OF CARE
Goal Outcome Evaluation:     Pt is alert and oriented x 4, but flat affect and withdrawn. Intermittently bradycardic, otherwise, vitally stable on 2-3L O2 via NC @ rest. Requiring up to 6L with activity. HR goal is 58-80. PIV SL; purewick in place. Tolerating Low fat/Na < 2400mg; on 2L FR   Up to 6L NC while standing at bedside. Up with heavy x2 assist GB and walker and unsteady gait. Continue to monitor and follow plan of care.

## 2024-11-26 ENCOUNTER — DOCUMENTATION ONLY (OUTPATIENT)
Dept: OTHER | Facility: CLINIC | Age: 79
End: 2024-11-26
Payer: COMMERCIAL

## 2024-11-26 ENCOUNTER — APPOINTMENT (OUTPATIENT)
Dept: OCCUPATIONAL THERAPY | Facility: CLINIC | Age: 79
End: 2024-11-26
Payer: COMMERCIAL

## 2024-11-26 LAB
ALBUMIN SERPL BCG-MCNC: 3.4 G/DL (ref 3.5–5.2)
ALP SERPL-CCNC: 75 U/L (ref 40–150)
ALT SERPL W P-5'-P-CCNC: <5 U/L (ref 0–50)
ANION GAP SERPL CALCULATED.3IONS-SCNC: 9 MMOL/L (ref 7–15)
AST SERPL W P-5'-P-CCNC: 14 U/L (ref 0–45)
BILIRUB SERPL-MCNC: 0.4 MG/DL
BUN SERPL-MCNC: 28.7 MG/DL (ref 8–23)
CALCIUM SERPL-MCNC: 8.5 MG/DL (ref 8.8–10.4)
CHLORIDE SERPL-SCNC: 101 MMOL/L (ref 98–107)
CREAT SERPL-MCNC: 0.98 MG/DL (ref 0.51–0.95)
EGFRCR SERPLBLD CKD-EPI 2021: 59 ML/MIN/1.73M2
ERYTHROCYTE [DISTWIDTH] IN BLOOD BY AUTOMATED COUNT: 21.4 % (ref 10–15)
GLUCOSE SERPL-MCNC: 87 MG/DL (ref 70–99)
HCO3 SERPL-SCNC: 29 MMOL/L (ref 22–29)
HCT VFR BLD AUTO: 39.2 % (ref 35–47)
HGB BLD-MCNC: 11.4 G/DL (ref 11.7–15.7)
MAGNESIUM SERPL-MCNC: 2.4 MG/DL (ref 1.7–2.3)
MCH RBC QN AUTO: 26 PG (ref 26.5–33)
MCHC RBC AUTO-ENTMCNC: 29.1 G/DL (ref 31.5–36.5)
MCV RBC AUTO: 90 FL (ref 78–100)
PHOSPHATE SERPL-MCNC: 3 MG/DL (ref 2.5–4.5)
PLATELET # BLD AUTO: 255 10E3/UL (ref 150–450)
POTASSIUM SERPL-SCNC: 3.7 MMOL/L (ref 3.4–5.3)
PROT SERPL-MCNC: 5.7 G/DL (ref 6.4–8.3)
RBC # BLD AUTO: 4.38 10E6/UL (ref 3.8–5.2)
SODIUM SERPL-SCNC: 139 MMOL/L (ref 135–145)
WBC # BLD AUTO: 7.2 10E3/UL (ref 4–11)

## 2024-11-26 PROCEDURE — 120N000002 HC R&B MED SURG/OB UMMC

## 2024-11-26 PROCEDURE — 83735 ASSAY OF MAGNESIUM: CPT | Performed by: STUDENT IN AN ORGANIZED HEALTH CARE EDUCATION/TRAINING PROGRAM

## 2024-11-26 PROCEDURE — 97535 SELF CARE MNGMENT TRAINING: CPT | Mod: GO | Performed by: OCCUPATIONAL THERAPIST

## 2024-11-26 PROCEDURE — 80053 COMPREHEN METABOLIC PANEL: CPT | Performed by: STUDENT IN AN ORGANIZED HEALTH CARE EDUCATION/TRAINING PROGRAM

## 2024-11-26 PROCEDURE — 82040 ASSAY OF SERUM ALBUMIN: CPT | Performed by: STUDENT IN AN ORGANIZED HEALTH CARE EDUCATION/TRAINING PROGRAM

## 2024-11-26 PROCEDURE — 99233 SBSQ HOSP IP/OBS HIGH 50: CPT | Performed by: STUDENT IN AN ORGANIZED HEALTH CARE EDUCATION/TRAINING PROGRAM

## 2024-11-26 PROCEDURE — 36415 COLL VENOUS BLD VENIPUNCTURE: CPT | Performed by: STUDENT IN AN ORGANIZED HEALTH CARE EDUCATION/TRAINING PROGRAM

## 2024-11-26 PROCEDURE — 97530 THERAPEUTIC ACTIVITIES: CPT | Mod: GO | Performed by: OCCUPATIONAL THERAPIST

## 2024-11-26 PROCEDURE — 85018 HEMOGLOBIN: CPT | Performed by: STUDENT IN AN ORGANIZED HEALTH CARE EDUCATION/TRAINING PROGRAM

## 2024-11-26 PROCEDURE — 250N000013 HC RX MED GY IP 250 OP 250 PS 637: Performed by: PHYSICIAN ASSISTANT

## 2024-11-26 PROCEDURE — 250N000013 HC RX MED GY IP 250 OP 250 PS 637: Performed by: STUDENT IN AN ORGANIZED HEALTH CARE EDUCATION/TRAINING PROGRAM

## 2024-11-26 PROCEDURE — 250N000011 HC RX IP 250 OP 636: Performed by: STUDENT IN AN ORGANIZED HEALTH CARE EDUCATION/TRAINING PROGRAM

## 2024-11-26 PROCEDURE — 85041 AUTOMATED RBC COUNT: CPT | Performed by: STUDENT IN AN ORGANIZED HEALTH CARE EDUCATION/TRAINING PROGRAM

## 2024-11-26 PROCEDURE — 84100 ASSAY OF PHOSPHORUS: CPT | Performed by: STUDENT IN AN ORGANIZED HEALTH CARE EDUCATION/TRAINING PROGRAM

## 2024-11-26 RX ORDER — FUROSEMIDE 10 MG/ML
40 INJECTION INTRAMUSCULAR; INTRAVENOUS ONCE
Status: COMPLETED | OUTPATIENT
Start: 2024-11-26 | End: 2024-11-26

## 2024-11-26 RX ADMIN — DILTIAZEM HYDROCHLORIDE 240 MG: 240 CAPSULE, EXTENDED RELEASE ORAL at 19:57

## 2024-11-26 RX ADMIN — FLUTICASONE FUROATE AND VILANTEROL TRIFENATATE 1 PUFF: 200; 25 POWDER RESPIRATORY (INHALATION) at 08:48

## 2024-11-26 RX ADMIN — SERTRALINE HYDROCHLORIDE 150 MG: 100 TABLET ORAL at 08:48

## 2024-11-26 RX ADMIN — IPRATROPIUM BROMIDE 2 PUFF: 17 AEROSOL, METERED RESPIRATORY (INHALATION) at 17:09

## 2024-11-26 RX ADMIN — IPRATROPIUM BROMIDE 2 PUFF: 17 AEROSOL, METERED RESPIRATORY (INHALATION) at 08:48

## 2024-11-26 RX ADMIN — GABAPENTIN 600 MG: 300 CAPSULE ORAL at 22:51

## 2024-11-26 RX ADMIN — LEVALBUTEROL TARTRATE 2 PUFF: 45 AEROSOL, METERED ORAL at 15:22

## 2024-11-26 RX ADMIN — LEVALBUTEROL TARTRATE 2 PUFF: 45 AEROSOL, METERED ORAL at 08:48

## 2024-11-26 RX ADMIN — IPRATROPIUM BROMIDE 2 PUFF: 17 AEROSOL, METERED RESPIRATORY (INHALATION) at 19:59

## 2024-11-26 RX ADMIN — ATORVASTATIN CALCIUM 20 MG: 20 TABLET, FILM COATED ORAL at 19:58

## 2024-11-26 RX ADMIN — APIXABAN 5 MG: 5 TABLET, FILM COATED ORAL at 08:47

## 2024-11-26 RX ADMIN — PROPRANOLOL HYDROCHLORIDE 10 MG: 10 TABLET ORAL at 19:58

## 2024-11-26 RX ADMIN — IPRATROPIUM BROMIDE 2 PUFF: 17 AEROSOL, METERED RESPIRATORY (INHALATION) at 13:36

## 2024-11-26 RX ADMIN — POLYETHYLENE GLYCOL 3350 17 G: 17 POWDER, FOR SOLUTION ORAL at 08:48

## 2024-11-26 RX ADMIN — PROPRANOLOL HYDROCHLORIDE 10 MG: 10 TABLET ORAL at 08:48

## 2024-11-26 RX ADMIN — LEVALBUTEROL TARTRATE 2 PUFF: 45 AEROSOL, METERED ORAL at 01:54

## 2024-11-26 RX ADMIN — APIXABAN 5 MG: 5 TABLET, FILM COATED ORAL at 19:58

## 2024-11-26 RX ADMIN — FEXOFENADINE HCL 180 MG: 180 TABLET ORAL at 08:48

## 2024-11-26 RX ADMIN — DILTIAZEM HYDROCHLORIDE 240 MG: 240 CAPSULE, EXTENDED RELEASE ORAL at 08:48

## 2024-11-26 RX ADMIN — LEVOTHYROXINE SODIUM 88 MCG: 88 TABLET ORAL at 06:39

## 2024-11-26 RX ADMIN — LEVALBUTEROL TARTRATE 2 PUFF: 45 AEROSOL, METERED ORAL at 19:59

## 2024-11-26 RX ADMIN — FUROSEMIDE 40 MG: 10 INJECTION, SOLUTION INTRAMUSCULAR; INTRAVENOUS at 13:03

## 2024-11-26 ASSESSMENT — ACTIVITIES OF DAILY LIVING (ADL)
ADLS_ACUITY_SCORE: 59
ADLS_ACUITY_SCORE: 67
ADLS_ACUITY_SCORE: 59
ADLS_ACUITY_SCORE: 67
ADLS_ACUITY_SCORE: 58
ADLS_ACUITY_SCORE: 58
ADLS_ACUITY_SCORE: 59
ADLS_ACUITY_SCORE: 58
ADLS_ACUITY_SCORE: 67
ADLS_ACUITY_SCORE: 67
ADLS_ACUITY_SCORE: 58
ADLS_ACUITY_SCORE: 67
ADLS_ACUITY_SCORE: 58
ADLS_ACUITY_SCORE: 67
ADLS_ACUITY_SCORE: 58

## 2024-11-26 NOTE — PLAN OF CARE
Goal Outcome Evaluation:         Pt had very poor po intake thru out the day, barely drinking much fluid even when encouraged. Denied any pain, infrequent dry cough, inc of urine, low urine output, no BM this shift, Miralax given this am. VSS on 3 LPM NC. Pt was up sitting in chair for few hrs today. Pt has been stable, alert and oriented, able to make needs known.

## 2024-11-26 NOTE — PROGRESS NOTES
[facility]    Medicine Progress Note - Hospitalist Service, GOLD TEAM 8    Date of Admission:  11/21/2024    Assessment & Plan   Asya Coffman is a 79 year old female with history of A fib, CAD, COPD, asthma, Raynaud's, CKD, laryngeal squamous cell carcinoma/papillomatosis, CREST syndrome, chronic pain, chronic constipation, hypothyroidism, anxiety, and depression who was admitted to [facility] with acute hypoxic respiratory failure, hospitalization prolonged due to inability to wean off O2 despite diuresis.     Changes today:  - able to wean O2 to 1L by AM, now needing 2L but still improvement from prior  - IV lasix x1 today to diurese per pulm recs to see if can cont weaning O2  - repeat CXR tomorrow to eval pleural effusion and pulmonary edema  - Pt has both OP pulmonology and cardiology care and benefits from having close monitoring after discharge   - TTE with bubble study showed small PFO, likely unrelated to current presentation as no e/o PE on admission w/u    Acute hypoxic respiratory failure- on 3L NC  Acute on Chronic Heart Failure with Preserved Systolic Function   Hx of diastolic dysfunction per records  Pulmonary hypertension- on TTE 11/22  Fluid overload  Elevated BNP  Elevated RVSP  Hx of asthma- not in exacerbation  Presented with 1 month progressive dyspnea. Endorses FELIZ, PND, orthopnea, swelling in the legs. Found to have hypoxic requiring 2L NC. BNP 4,421 (1,920 4/2024). VBG 7.41/40/46/26. LA, WBC normal. Flu, COVID negative. EKG A fib with RVR (rate 101), chronic A fib as below. CT PE 11/21 no PE, trace interstitial edema with small to moderate b/l pleural effusions. TTE with normal EF however unable to measure diastolic function 2/2 a fib.   - IP pulmonary consulted for any other IP work up as unable to wean off oxygen, follow up final recs  - Will spot dose with lasix IV and monitor, lasix 20mg IV x 1 in ED, 40mg IV 11/22, 40mg IV 11/24, 40 mg IV  11/26  - Added scheduled Xopenex/ipratropium q6hr  - Repeat CXR 11/23 showing small R pleural effusion and trace L   - Has OP pulmonologist Dr. Mccoy and also cardiologist, NOLBERTO to help her schedule an appt post discharge  - PTA Fluticasone- Vilanterol  - 2L fluid restriction  - Daily standing weights, I&Os  - Continuous pulse ox    A fib with RVR- not rate controlled  - On Diltiazem 240 ER BID, max dose  - C/w propranolol 10mg BID, target HR 60-70   - c/w PTA on Apixaban,  - Last RVR 11/23, required 10mg IV Cardizem  - Maintain K~4 and Mg ~2     Significant weight loss  Severe Protein-Calorie Malnutrition   - Per daughter baseline wt around 120-130 lbs, however wt here around 102 lbs, patient states she is eating less and has less appetite, denies post prandial abd pain, N/V. She states she likes the food but just do not have the appetite to eat more. She has hx of recent invasive laryngeal SCC with radiation therapy, also had dysphagia before  - Will consult nutritionist    Deconditioning  - PT/OT, dispo recommendation is TCU, patient agrees     H/o laryngeal squamous cell carcinoma, papillomatosis with dysphagia: Initially presented 2015 with benign laryngeal lesion. Progressed to papilloma 2021. Dx with invasive squamous cell carcinoma 4/2024. Now s/p radiation completed 7/2024. CT chest 11/21 ingested content within a mildly patulous thoracic esophagus, aspiration cautions recommended   - SLP consult, recommending reg diet and think liq   - Follow up OP 1/2025 as planned     Chronic/stable medical conditions  Pulmonary nodule:   6 mm LLL pulmonary nodule noted on CT  - Will need repeat imaging in 6-12 months     Subacute rib fractures:   Admitted to [facility] 9/2024 with rib fractures. Subacute fractures noted on CT 11/21. Continue supportive cares     H/o CREST syndrome:   2014. Per notes, did have positive FARIDEH, Raynaud's, Calcinosis, GERD and some telangectasia's. Last saw [facility] rheumatology 2017. No acute  "concerns     Hypernatremia: Mild. Na 146. Trend     L>R BLE edema: US to rule out DVT    Troponin elevation, likely increased demand: Trop 28 (32). EKG chronic a fib. Asymptomatic. Monitor   COPD, asthma: No e/o exacerbation. Continue PTA Symbicort, albuterol, DuoNebs   CKD II: BL Cr 0.9-1.1 and stable. Trend   Chronic pain: Continue PTA gabapentin, Tylenol. Discontinue Ibuprofen given CKD  Chronic constipation: Continue bowel regimen   Hypothyroidism: TSH 1.48 4/2024. Continue PTA synthroid   Anxiety, depression: Continue PTA Sertraline, Propranolol   Raynaud's: Continue diltiazem as above    Diet:  2L fluid restriction  DVT Prophylaxis: DOAC  Miranda Catheter: Not present  Lines: None     Cardiac Monitoring: None  Code Status:  Full Code          Diet: Fluid restriction 2000 ML FLUID  Low Saturated Fat Na <2400 mg    DVT Prophylaxis: DOAC  Miranda Catheter: Not present  Lines: None     Cardiac Monitoring: None  Code Status: Full Code      Clinically Significant Risk Factors           # Hypocalcemia: Lowest Ca = 8.5 mg/dL in last 2 days, will monitor and replace as appropriate     # Hypoalbuminemia: Lowest albumin = 3.4 g/dL at 11/26/2024  5:42 AM, will monitor as appropriate                # Cachexia: Estimated body mass index is 15.7 kg/m  as calculated from the following:    Height as of this encounter: 1.651 m (5' 5\").    Weight as of this encounter: 42.8 kg (94 lb 5.7 oz).   # Severe Malnutrition: based on nutrition assessment    # Financial/Environmental Concerns: none         Social Drivers of Health    Housing Stability: High Risk (11/23/2024)    Housing Stability     Do you have housing? : No     Are you worried about losing your housing?: No          Disposition Plan     Medically Ready for Discharge: Anticipated in 2-4 Days             Jesu Lunsford MD  Hospitalist Service, GOLD TEAM 8  [facility]  Securely message with DialedIN (more info)  Text page via [email or web address] " Paging/Directory   See signed in provider for up to date coverage information  ______________________________________________________________________    Interval History   Weaned O2 to 1.5 L overnight    Physical Exam   Vital Signs: Temp: 98.6  F (37  C) Temp src: Oral BP: 94/64 Pulse: 54   Resp: 16 SpO2: 94 % O2 Device: Nasal cannula with humidification Oxygen Delivery: 2.5 LPM  Weight: 94 lbs 5.71 oz    Gen: no acute distress, alert in chair  CV: RRR, no murmurs  Pulm: no increased WOB. No crackles. On 2L NC during my exam  Abd: soft, nl BS  Extremities: no edema      Medical Decision Making       50 MINUTES SPENT BY ME on the date of service doing chart review, history, exam, documentation & further activities per the note.      Data     I have personally reviewed the following data over the past 24 hrs:    7.2  \   11.4 (L)   / 255     139 101 28.7 (H) /  87   3.7 29 0.98 (H) \     ALT: <5 AST: 14 AP: 75 TBILI: 0.4   ALB: 3.4 (L) TOT PROTEIN: 5.7 (L) LIPASE: N/A       Imaging results reviewed over the past 24 hrs:   Recent Results (from the past 24 hours)   Echo Limited with Bubble Study    Narrative    [id number]  [id number]  [id number]  [id number]^CALEB^FARHEEN     [facility]  Echocardiography Laboratory  [address] [place] [id number]     Name: NADIRA RADER  MRN: [id number]  : 1945  Study Date: 2024 02:42 PM  Age: 79 yrs  Gender: Female  Patient Location: [facility]  Reason For Study: FELIZ  Ordering Physician: FARHEEN ELLIS  Performed By: Ligia Anderson     BSA: 1.5 m2  Height: 65 in  Weight: 102 lb  BP: 117/62 mmHg  ______________________________________________________________________________  Procedure  Bubble Limited Echocardiogram with portions of two-dimensional, color and  spectral Doppler performed.  ______________________________________________________________________________  Interpretation Summary  Minimally positive  bubble study suggestive of a small PFO.  ______________________________________________________________________________  [facility]ria  Minimally positive bubble study suggestive of a small PFO.  ______________________________________________________________________________  Doppler Measurements & Calculations  TR max kristina: 306.4 cm/sec  TR max P.6 mmHg     ______________________________________________________________________________  Report approved by: Mel Hernandez 2024 03:07 PM

## 2024-11-26 NOTE — PLAN OF CARE
Goal Outcome Evaluation:         Pt A/Ox4, VSS on on nasal cannula with humidification. O2 weaned from 3L to 1.5L ON. Up w/ A2 +WGB to commode, no BM since 11/23. Incont of urine ON, purewick last changed at 6am. PT/OT rec TCU. Pulm consulted, rec cards consult, TTE (done and suggestive of small PFO), continue diuresis and repeat CXR in 1-2 days if no improvement in resp status. Per report, pt daughter now in agreement that pt needs more than return to [facility].

## 2024-11-26 NOTE — PROGRESS NOTES
Care Management Follow Up    Length of Stay (days): 5    Expected Discharge Date: 12/02/2024     Concerns to be Addressed: all concerns addressed in this encounter     Patient plan of care discussed at interdisciplinary rounds: Yes    Anticipated Discharge Disposition: Skilled Nursing Facility  Anticipated Discharge Services: None  Anticipated Discharge DME: None    Patient/family educated on Medicare website which has current facility and service quality ratings: no  Education Provided on the Discharge Plan:    Patient/Family in Agreement with the Plan:      Referrals Placed by CM/SW:  N/A  Private pay costs discussed: Not applicable    Discussed  Partnership in Safe Discharge Planning  document with patient/family: No     Handoff Completed: No, handoff not indicated or clinically appropriate    Additional Information:  Patient is not yet med ready. Will have repeat cxr tomorrow.  Bedside nurse reported that the patient's daughter wants a referral sent to [facility]    Next Steps: SW will send referral to [facility] when the patient is closer to being med ready. SW will continue to follow for discharge needs and placement.     GUADALUPE Farnsworth  [address]   Office: [phone]  [email or web address]  Securely message with [facility] (Search Name or 7C Med Surg [id number] SW)  Text page via [facility] Paging/Directory   See signed in provider for up to date coverage information  Social Work & Care Management Department

## 2024-11-27 ENCOUNTER — APPOINTMENT (OUTPATIENT)
Dept: GENERAL RADIOLOGY | Facility: CLINIC | Age: 79
End: 2024-11-27
Attending: STUDENT IN AN ORGANIZED HEALTH CARE EDUCATION/TRAINING PROGRAM
Payer: COMMERCIAL

## 2024-11-27 LAB
ALBUMIN SERPL BCG-MCNC: 3.4 G/DL (ref 3.5–5.2)
ALP SERPL-CCNC: 81 U/L (ref 40–150)
ALT SERPL W P-5'-P-CCNC: <5 U/L (ref 0–50)
ANION GAP SERPL CALCULATED.3IONS-SCNC: 10 MMOL/L (ref 7–15)
ANION GAP SERPL CALCULATED.3IONS-SCNC: 9 MMOL/L (ref 7–15)
AST SERPL W P-5'-P-CCNC: 18 U/L (ref 0–45)
BILIRUB SERPL-MCNC: 0.3 MG/DL
BUN SERPL-MCNC: 29 MG/DL (ref 8–23)
BUN SERPL-MCNC: 30.7 MG/DL (ref 8–23)
CA-I BLD-MCNC: 4.3 MG/DL (ref 4.4–5.2)
CALCIUM SERPL-MCNC: 8.6 MG/DL (ref 8.8–10.4)
CALCIUM SERPL-MCNC: 8.7 MG/DL (ref 8.8–10.4)
CHLORIDE SERPL-SCNC: 100 MMOL/L (ref 98–107)
CHLORIDE SERPL-SCNC: 98 MMOL/L (ref 98–107)
CREAT SERPL-MCNC: 1.03 MG/DL (ref 0.51–0.95)
CREAT SERPL-MCNC: 1.14 MG/DL (ref 0.51–0.95)
EGFRCR SERPLBLD CKD-EPI 2021: 49 ML/MIN/1.73M2
EGFRCR SERPLBLD CKD-EPI 2021: 55 ML/MIN/1.73M2
ERYTHROCYTE [DISTWIDTH] IN BLOOD BY AUTOMATED COUNT: 21.2 % (ref 10–15)
GLUCOSE SERPL-MCNC: 111 MG/DL (ref 70–99)
GLUCOSE SERPL-MCNC: 82 MG/DL (ref 70–99)
HCO3 SERPL-SCNC: 28 MMOL/L (ref 22–29)
HCO3 SERPL-SCNC: 31 MMOL/L (ref 22–29)
HCT VFR BLD AUTO: 42.8 % (ref 35–47)
HGB BLD-MCNC: 12.3 G/DL (ref 11.7–15.7)
MAGNESIUM SERPL-MCNC: 2.1 MG/DL (ref 1.7–2.3)
MAGNESIUM SERPL-MCNC: 2.2 MG/DL (ref 1.7–2.3)
MCH RBC QN AUTO: 25.8 PG (ref 26.5–33)
MCHC RBC AUTO-ENTMCNC: 28.7 G/DL (ref 31.5–36.5)
MCV RBC AUTO: 90 FL (ref 78–100)
PHOSPHATE SERPL-MCNC: 3.3 MG/DL (ref 2.5–4.5)
PHOSPHATE SERPL-MCNC: 3.4 MG/DL (ref 2.5–4.5)
PLATELET # BLD AUTO: 275 10E3/UL (ref 150–450)
POTASSIUM SERPL-SCNC: 3.8 MMOL/L (ref 3.4–5.3)
POTASSIUM SERPL-SCNC: 4.2 MMOL/L (ref 3.4–5.3)
PROT SERPL-MCNC: 6.1 G/DL (ref 6.4–8.3)
RBC # BLD AUTO: 4.77 10E6/UL (ref 3.8–5.2)
SODIUM SERPL-SCNC: 138 MMOL/L (ref 135–145)
SODIUM SERPL-SCNC: 138 MMOL/L (ref 135–145)
WBC # BLD AUTO: 6.7 10E3/UL (ref 4–11)

## 2024-11-27 PROCEDURE — 250N000011 HC RX IP 250 OP 636: Performed by: STUDENT IN AN ORGANIZED HEALTH CARE EDUCATION/TRAINING PROGRAM

## 2024-11-27 PROCEDURE — 99233 SBSQ HOSP IP/OBS HIGH 50: CPT | Performed by: STUDENT IN AN ORGANIZED HEALTH CARE EDUCATION/TRAINING PROGRAM

## 2024-11-27 PROCEDURE — 84450 TRANSFERASE (AST) (SGOT): CPT | Performed by: STUDENT IN AN ORGANIZED HEALTH CARE EDUCATION/TRAINING PROGRAM

## 2024-11-27 PROCEDURE — 120N000002 HC R&B MED SURG/OB UMMC

## 2024-11-27 PROCEDURE — 83735 ASSAY OF MAGNESIUM: CPT | Performed by: STUDENT IN AN ORGANIZED HEALTH CARE EDUCATION/TRAINING PROGRAM

## 2024-11-27 PROCEDURE — 82040 ASSAY OF SERUM ALBUMIN: CPT | Performed by: STUDENT IN AN ORGANIZED HEALTH CARE EDUCATION/TRAINING PROGRAM

## 2024-11-27 PROCEDURE — 71045 X-RAY EXAM CHEST 1 VIEW: CPT

## 2024-11-27 PROCEDURE — 84100 ASSAY OF PHOSPHORUS: CPT | Performed by: STUDENT IN AN ORGANIZED HEALTH CARE EDUCATION/TRAINING PROGRAM

## 2024-11-27 PROCEDURE — 85014 HEMATOCRIT: CPT | Performed by: STUDENT IN AN ORGANIZED HEALTH CARE EDUCATION/TRAINING PROGRAM

## 2024-11-27 PROCEDURE — 82330 ASSAY OF CALCIUM: CPT | Performed by: STUDENT IN AN ORGANIZED HEALTH CARE EDUCATION/TRAINING PROGRAM

## 2024-11-27 PROCEDURE — 85048 AUTOMATED LEUKOCYTE COUNT: CPT | Performed by: STUDENT IN AN ORGANIZED HEALTH CARE EDUCATION/TRAINING PROGRAM

## 2024-11-27 PROCEDURE — 36415 COLL VENOUS BLD VENIPUNCTURE: CPT | Performed by: STUDENT IN AN ORGANIZED HEALTH CARE EDUCATION/TRAINING PROGRAM

## 2024-11-27 PROCEDURE — 71045 X-RAY EXAM CHEST 1 VIEW: CPT | Mod: 26 | Performed by: RADIOLOGY

## 2024-11-27 PROCEDURE — 99222 1ST HOSP IP/OBS MODERATE 55: CPT | Mod: 25 | Performed by: INTERNAL MEDICINE

## 2024-11-27 PROCEDURE — 250N000013 HC RX MED GY IP 250 OP 250 PS 637: Performed by: STUDENT IN AN ORGANIZED HEALTH CARE EDUCATION/TRAINING PROGRAM

## 2024-11-27 PROCEDURE — 82947 ASSAY GLUCOSE BLOOD QUANT: CPT | Performed by: STUDENT IN AN ORGANIZED HEALTH CARE EDUCATION/TRAINING PROGRAM

## 2024-11-27 PROCEDURE — 250N000013 HC RX MED GY IP 250 OP 250 PS 637: Performed by: PHYSICIAN ASSISTANT

## 2024-11-27 RX ORDER — FUROSEMIDE 10 MG/ML
40 INJECTION INTRAMUSCULAR; INTRAVENOUS ONCE
Status: COMPLETED | OUTPATIENT
Start: 2024-11-27 | End: 2024-11-27

## 2024-11-27 RX ORDER — FUROSEMIDE 40 MG/1
40 TABLET ORAL DAILY
Status: DISCONTINUED | OUTPATIENT
Start: 2024-11-28 | End: 2024-12-06

## 2024-11-27 RX ADMIN — ATORVASTATIN CALCIUM 20 MG: 20 TABLET, FILM COATED ORAL at 20:44

## 2024-11-27 RX ADMIN — LEVALBUTEROL TARTRATE 2 PUFF: 45 AEROSOL, METERED ORAL at 08:33

## 2024-11-27 RX ADMIN — LEVALBUTEROL TARTRATE 2 PUFF: 45 AEROSOL, METERED ORAL at 02:59

## 2024-11-27 RX ADMIN — IPRATROPIUM BROMIDE 2 PUFF: 17 AEROSOL, METERED RESPIRATORY (INHALATION) at 20:44

## 2024-11-27 RX ADMIN — IPRATROPIUM BROMIDE 2 PUFF: 17 AEROSOL, METERED RESPIRATORY (INHALATION) at 16:10

## 2024-11-27 RX ADMIN — FLUTICASONE FUROATE AND VILANTEROL TRIFENATATE 1 PUFF: 200; 25 POWDER RESPIRATORY (INHALATION) at 08:34

## 2024-11-27 RX ADMIN — FUROSEMIDE 40 MG: 10 INJECTION, SOLUTION INTRAMUSCULAR; INTRAVENOUS at 16:10

## 2024-11-27 RX ADMIN — DILTIAZEM HYDROCHLORIDE 240 MG: 240 CAPSULE, EXTENDED RELEASE ORAL at 20:44

## 2024-11-27 RX ADMIN — SERTRALINE HYDROCHLORIDE 150 MG: 100 TABLET ORAL at 08:34

## 2024-11-27 RX ADMIN — APIXABAN 5 MG: 5 TABLET, FILM COATED ORAL at 08:43

## 2024-11-27 RX ADMIN — PROPRANOLOL HYDROCHLORIDE 10 MG: 10 TABLET ORAL at 08:34

## 2024-11-27 RX ADMIN — APIXABAN 5 MG: 5 TABLET, FILM COATED ORAL at 20:44

## 2024-11-27 RX ADMIN — DILTIAZEM HYDROCHLORIDE 240 MG: 240 CAPSULE, EXTENDED RELEASE ORAL at 08:34

## 2024-11-27 RX ADMIN — GABAPENTIN 600 MG: 300 CAPSULE ORAL at 22:58

## 2024-11-27 RX ADMIN — IPRATROPIUM BROMIDE 2 PUFF: 17 AEROSOL, METERED RESPIRATORY (INHALATION) at 12:25

## 2024-11-27 RX ADMIN — IPRATROPIUM BROMIDE 2 PUFF: 17 AEROSOL, METERED RESPIRATORY (INHALATION) at 08:34

## 2024-11-27 RX ADMIN — FEXOFENADINE HCL 180 MG: 180 TABLET ORAL at 08:34

## 2024-11-27 RX ADMIN — LEVALBUTEROL TARTRATE 2 PUFF: 45 AEROSOL, METERED ORAL at 20:44

## 2024-11-27 RX ADMIN — LEVALBUTEROL TARTRATE 2 PUFF: 45 AEROSOL, METERED ORAL at 14:18

## 2024-11-27 RX ADMIN — PROPRANOLOL HYDROCHLORIDE 10 MG: 10 TABLET ORAL at 20:44

## 2024-11-27 RX ADMIN — LEVOTHYROXINE SODIUM 88 MCG: 88 TABLET ORAL at 06:04

## 2024-11-27 ASSESSMENT — ACTIVITIES OF DAILY LIVING (ADL)
ADLS_ACUITY_SCORE: 63
ADLS_ACUITY_SCORE: 67
ADLS_ACUITY_SCORE: 63
ADLS_ACUITY_SCORE: 67
ADLS_ACUITY_SCORE: 63
ADLS_ACUITY_SCORE: 67
ADLS_ACUITY_SCORE: 63
ADLS_ACUITY_SCORE: 67
ADLS_ACUITY_SCORE: 63

## 2024-11-27 NOTE — PLAN OF CARE
Shift Hours: 0700 - 1900  Plan of care reviewed with: patient, family  Overall patient progress: no change    Assessment/Interventions:  Body systems that were not at patient's baseline: Respiratory, Cardiac, and Musculoskeletal. Focused body system assessments documented in flow sheets.      Activity     Fall Risk Score: 95   Bed alarm on? Yes      Mobility: x2 w GB & walker    Pain: denies    Labs/RN Managed Protocols: Mg++ & K+    Lines/Drains: R PIV SL; purewick    Nutrition: Low fat/Na < 2400mg; 2L FR    Goal Outcome Evaluation/Barriers to Discharge: 1.5-3L NC at rest, up to 4L with activity. HR 40-70s. PO intake, IS, activity encouraged. Updates given via phone call to RNCCs from [place] & from Reginaldo Reyes.

## 2024-11-27 NOTE — PROGRESS NOTES
CLINICAL NUTRITION SERVICES - REASSESSMENT NOTE     Nutrition Prescription    RECOMMENDATIONS FOR MDs/PROVIDERS TO ORDER:  None at present.    Malnutrition Status:    Severe malnutrition in the context of chronic illness.    Recommendations already ordered by Registered Dietitian (RD):  Ordered vanilla Ensure Enlive at 10 am    Future/Additional Recommendations:  Monitor oral intake, weight, supplement preferences.     EVALUATION OF THE PROGRESS TOWARD GOALS   Diet: 2000 mL Fluid Restriction and Low Saturated Fat/2400 mg Sodium  Intake: Pt consuming 25-75% of meals per flowsheet. Pt ordering 3 meals a day per [facility].     NEW FINDINGS   Met with pt. She reports that she's had decreased appetite for awhile. She's been ordering 3 meals a day here and eats at least 75% of her meals. She's agreeable to Ensure vanilla. She's able to get enough to eat on the cardiac diet.    Weight:  Last wt (11/26): 42.8 kg. BMI 15.70 kg/m2 - Underweight  8% wt loss in 1 day, question if related to differences in bed scale wts (down from 46.5 kg on 11/25)  22% wt loss in 4 months (down from 55 kg on 7/12)    Labs:  BUN 29 (H). Cr 1.03 (H). GFR 55 (L).    Medications:  Lasix 1x  Levothyroxine  Miralax  PRN senna    GI:  Stooling 0-1x daily per I/O. LBM today    Skin: no findings    Renal: CKD 2    Respiratory: NC    Cardio: HF    MALNUTRITION  % Intake: Decreased intake does not meet criteria  % Weight Loss: > 7.5% in 3 months (severe)  Subcutaneous Fat Loss: Facial region:  severe, Upper arm:  severer, and Thoracic/intercostal:  severe  Muscle Loss: Temporal:  severe, Thoracic region (clavicle, acromium bone, deltoid, trapezius, pectoral):  severe, Upper arm (bicep, tricep):  severe, Upper leg (quadricep, hamstring):  severe, and Posterior calf:  severe  Fluid Accumulation/Edema: None noted  Malnutrition Diagnosis: Severe malnutrition in the context of chronic illness.    Previous Goals   Patient to consume % of nutritionally  "adequate meal trays TID, or the equivalent with supplements/snacks.  Evaluation: Met    Previous Nutrition Diagnosis  Inadequate oral intake related to decreased appetite as evidenced by reported poor PO intake PTA and documented 11% wt loss the past month    Evaluation: Improving    CURRENT NUTRITION DIAGNOSIS  Inadequate oral intake related to decreased appetite as evidenced by poor PO PTA, increased needs for underweight BMI, 22% wt loss in 4 months.      INTERVENTIONS  Implementation  Medical food supplement therapy    Goals  Patient to consume % of nutritionally adequate meal trays TID, or the equivalent with supplements/snacks.    Monitoring/Evaluation  Progress toward goals will be monitored and evaluated per protocol.    Sukumar Vidal, RD, LD  Available on [employer]  Weekend/Holiday RD Vocmacey - \"Weekend Clinical Dietitian\"  No longer available by paging   "

## 2024-11-27 NOTE — PROGRESS NOTES
[facility]    Medicine Progress Note - Hospitalist Service, GOLD TEAM 8    Date of Admission:  11/21/2024    Assessment & Plan   Asya Coffman is a 79 year old female with history of A fib, CAD, COPD, asthma, Raynaud's, CKD, laryngeal squamous cell carcinoma/papillomatosis, CREST syndrome, chronic pain, chronic constipation, hypothyroidism, anxiety, and depression who was admitted to [facility] with acute hypoxic respiratory failure, hospitalization prolonged due to inability to wean off O2 despite diuresis.     Changes today:  - able to wean O2 down overnight after lasix IV 40 mg x1 yesterday  - CXR with unchanged pleural effusions and e/o volume overload  - cardiology consulted, per their recs patient needs optimization for HFpEF. Giving add'l IV lasix x1 today then starting oral lasix daily and jardiance tomorrow. Will wait to start aldactone another 1-2 days pending how she tolerates tomorrow  - BID BMP for now while diuresing IV  - updated daughter via phone yesterday evening and again today    Acute hypoxic respiratory failure, improving- on 1L NC  Acute on Chronic Heart Failure with Preserved Systolic Function   Pulmonary hypertension with elevated RVSP on TTE 11/22  Fluid overload  Elevated BNP  Hx of asthma- not in exacerbation  Presented with 1 month progressive dyspnea. Endorses FELZI, PND, orthopnea, swelling in the legs. Found to have hypoxic requiring 2L NC. BNP 4,421 (1,920 4/2024). VBG 7.41/40/46/26. LA, WBC normal. Flu, COVID negative. EKG A fib with RVR (rate 101), chronic A fib as below. CT PE 11/21 no PE, trace interstitial edema with small to moderate b/l pleural effusions. TTE with normal EF however unable to measure diastolic function 2/2 a fib.   - IP pulmonary consulted for any other IP work up as unable to wean off oxygen, follow up final recs  - Will spot dose with lasix IV and monitor, lasix 20mg IV x 1 in ED, 40mg IV 11/22, 40mg IV 11/24, 40 mg  IV 11/26  - Added scheduled Xopenex/ipratropium q6hr  - Repeat CXR 11/23 showing small R pleural effusion and trace L   - Has OP pulmonologist Dr. Mccoy and also cardiologist, NOLBERTO to help her schedule an appt post discharge  - PTA Fluticasone- Vilanterol  - 2L fluid restriction  - Daily standing weights, I&Os  - Continuous pulse ox    A fib with RVR- not rate controlled  - On Diltiazem 240 ER BID, max dose  - C/w propranolol 10mg BID, target HR 60-70   - c/w PTA on Apixaban,  - Last RVR 11/23, required 10mg IV Cardizem  - Maintain K~4 and Mg ~2     Significant weight loss  Severe Protein-Calorie Malnutrition   - Per daughter baseline wt around 120-130 lbs, however wt here around 102 lbs, patient states she is eating less and has less appetite, denies post prandial abd pain, N/V. She states she likes the food but just do not have the appetite to eat more. She has hx of recent invasive laryngeal SCC with radiation therapy, also had dysphagia before  - Will consult nutritionist    Deconditioning  - PT/OT, dispo recommendation is TCU, patient agrees     H/o laryngeal squamous cell carcinoma, papillomatosis with dysphagia: Initially presented 2015 with benign laryngeal lesion. Progressed to papilloma 2021. Dx with invasive squamous cell carcinoma 4/2024. Now s/p radiation completed 7/2024. CT chest 11/21 ingested content within a mildly patulous thoracic esophagus, aspiration cautions recommended   - SLP consult, recommending reg diet and think liq   - Follow up OP 1/2025 as planned     Chronic/stable medical conditions  Pulmonary nodule:   6 mm LLL pulmonary nodule noted on CT  - Will need repeat imaging in 6-12 months     Subacute rib fractures:   Admitted to [facility] 9/2024 with rib fractures. Subacute fractures noted on CT 11/21. Continue supportive cares     H/o CREST syndrome:   2014. Per notes, did have positive FARIDEH, Raynaud's, Calcinosis, GERD and some telangectasia's. Last saw [facility] rheumatology 2017. No acute  "concerns     Hypernatremia: Mild. Na 146. Trend     L>R BLE edema: US to rule out DVT    Troponin elevation, likely increased demand: Trop 28 (32). EKG chronic a fib. Asymptomatic. Monitor   COPD, asthma: No e/o exacerbation. Continue PTA Symbicort, albuterol, DuoNebs   CKD II: BL Cr 0.9-1.1 and stable. Trend   Chronic pain: Continue PTA gabapentin, Tylenol. Discontinue Ibuprofen given CKD  Chronic constipation: Continue bowel regimen   Hypothyroidism: TSH 1.48 4/2024. Continue PTA synthroid   Anxiety, depression: Continue PTA Sertraline, Propranolol   Raynaud's: Continue diltiazem as above    Diet:  2L fluid restriction  DVT Prophylaxis: DOAC  Miranda Catheter: Not present  Lines: None     Cardiac Monitoring: None  Code Status:  Full Code          Diet: Fluid restriction 2000 ML FLUID  Low Saturated Fat Na <2400 mg    DVT Prophylaxis: DOAC  Miranda Catheter: Not present  Lines: None     Cardiac Monitoring: None  Code Status: Full Code      Clinically Significant Risk Factors           # Hypocalcemia: Lowest Ca = 8.5 mg/dL in last 2 days, will monitor and replace as appropriate     # Hypoalbuminemia: Lowest albumin = 3.4 g/dL at 11/27/2024  6:15 AM, will monitor as appropriate                # Cachexia: Estimated body mass index is 16.4 kg/m  as calculated from the following:    Height as of this encounter: 1.651 m (5' 5\").    Weight as of this encounter: 44.7 kg (98 lb 8.7 oz).   # Severe Malnutrition: based on nutrition assessment    # Financial/Environmental Concerns: none         Social Drivers of Health    Housing Stability: High Risk (11/23/2024)    Housing Stability     Do you have housing? : No     Are you worried about losing your housing?: No          Disposition Plan     Medically Ready for Discharge: Anticipated in 2-4 Days             Jesu Lunsford MD  Hospitalist Service, GOLD TEAM 8  [facility]  Securely message with [employer] (more info)  Text page via [employer] " Paging/Directory   See signed in provider for up to date coverage information  ______________________________________________________________________    Interval History   As above weaning O2    Physical Exam   Vital Signs: Temp: 97.6  F (36.4  C) Temp src: Oral BP: 122/82 Pulse: 84   Resp: 16 SpO2: 92 % O2 Device: Nasal cannula with humidification Oxygen Delivery: 1.5 LPM  Weight: 98 lbs 8.73 oz    Gen: A&Ox4, lying comfortably on bed, in no distress, on 3 L NC  Lung: clear on ausculation b/l, no wheezing/crackle  Abd: soft, non-tender, non-distended  Ext: No edema in upper/lower extremities    Medical Decision Making       55 MINUTES SPENT BY ME on the date of service doing chart review, history, exam, documentation & further activities per the note.      Data

## 2024-11-27 NOTE — PLAN OF CARE
Goal Outcome Evaluation:     Status: admitted w/ acute respiratory failure, weaning O2.  Vitals: VSS. Continuous O2 monitoring, difficult to get accurate read d/t reynauds.  Neuros: A/ox4, forgetful. Moves x4, strength 4/5 t/o.  IV: PIV SL  Labs/Electrolytes: Electrolytes WNL  Resp/trach: LS CTA, dim, on 1-2L NC humidified.  Diet: Regular  Bowel status: LBM 11/27 this shift.  : Voids to pw/incontinent.  Skin: Blanchable redness to sacrum.  Pain: Denies.  Activity: A1-2 GB walker-only walked short distance from commode to bed about 3 feet. Fatigues easily.  Plan: Continue current POC.

## 2024-11-27 NOTE — PROGRESS NOTES
Care Management Follow Up    Length of Stay (days): 6    Expected Discharge Date: 12/02/2024     Concerns to be Addressed: all concerns addressed in this encounter     Patient plan of care discussed at interdisciplinary rounds: Yes    Anticipated Discharge Disposition: Skilled Nursing Facility  Anticipated Discharge Services: None  Anticipated Discharge DME: None    Patient/family educated on Medicare website which has current facility and service quality ratings: yes  Education Provided on the Discharge Plan:  yes  Patient/Family in Agreement with the Plan:  yes    Referrals Placed by CM/SW:      Pending  [facility]  [address] [address]  P: [phone]  F: [phone]  11/27: Initial referral sent via Epic    Declined  [facility]: Bed not available 11/27    Private pay costs discussed: Not applicable    Discussed  Partnership in Safe Discharge Planning  document with patient/family: Yes     Handoff Completed: No, handoff not indicated or clinically appropriate    Additional Information:  Primary medicine provider Dr. Lunsford reported they anticipate the patient may be med ready Friday 11/29.    SW sent initial referral to [facility] and [facility]    SW met with the patient at bedside and provided an updated list of facilities near the patient's [place] from the Medicare.gov website. NOLBERTO also provided the [facility] From Hospital to Home document and the partnership in safe discharging document. NOLBERTO informed the patient that [facility] does not have any beds available. SW requested The patient look over the list and further discuss with her family and pick a minimum of five additional facilities. SW stated they can follow up with the patient either on Thursday or Friday. Patient stated understanding and denied having any questions or concerns.     Next Steps: SW will obtain additional TCU choices. SW will continue to follow for  discharge needs and placement.     GUADALUPE Farnsworth  [address]   Office: [phone]  [email or web address]  Securely message with [facility] (Search Name or  Med Surg [id number] )  Text page via [facility] Paging/Directory   See signed in provider for up to date coverage information  Social Work & Care Management Department

## 2024-11-27 NOTE — CONSULTS
[facility]    Cardiology Consult Note-Cards 1      Date of Admission:  11/21/2024  Consult Requested by: Jesu Lunsford  Reason for Consult: 78 yo with Afib, diastolic dysfunction, new exertional O2 requirement unable to wean, TTE w bubble showed small PFO and potentially pulm hypertension    Assessment & Plan: S   Asya Coffman is a 79 year old female admitted on 11/21/2024. She history of A fib, CAD, COPD, asthma, Raynaud's, CKD, laryngeal squamous cell carcinoma/papillomatosis, CREST syndrome, chronic pain, chronic constipation, hypothyroidism, anxiety, and depression who was admitted to [facility] with acute hypoxic respiratory failure treated with diuresis. Cardiology was consulted to assist due to inability to wean off O2 despite diuresis.     #Acute hypoxic respiratory failure (improving, currently on 1L NC)  #Acute on Chronic Heart Failure with Preserved Systolic Function   #Pulmonary hypertension with elevated RVSP 41 on TTE 11/22  #Hypervolemia (improving)   #Hx of Crest Syndrome and Raynauds  Pt presented with progressive dyspnea on exertion, orthopnea, LE swelling and hypoxia requiring 2LNC. Elevated BNP 4421. CT PE trace interstitial edema with small to moderate b/l pleural effusions. Has been treated IV diuresis with spot dosing lasix 20mg IV x 1 in ED, 40mg IV 11/22, 40mg IV 11/24, 40 mg IV 11/26. Pulmonology was consulted 11/25 and recommended IV diuresis with TTE for bubble study which was notable for Minimally positive bubble study suggestive of a small PFO. Currently pt reports improved symptoms following diuresis. Remain on 1L NC sating 90-91%. Suspect hypoxia likely from HFpEF exacerbation that is improving with diuresis. Additional possible concerns may be due to inaccurate oximetry reading in the setting Raynaud's syndrome. Possible contribution of pleural effusion as well. Although TTE notable for small PFO, it is less likely contributing to  patient's hypoxia. Mild pHTN with elevated RVSP in expected in the setting of HFpEF. Pt does need optimization of medications for her HFpEF management.     TTE 11/21/2024 - Global and regional left ventricular function is normal with an EF of 55-60%. Left ventricular size is normal. The right ventricle is normal size. Global right ventricular function is normal.  Moderate biatrial enlargement is present. Pulmonary hypertension is present. The right ventricular systolic pressure is 41mmHg above the right atrial pressure. The inferior vena cava was normal in size with preserved respiratory variability.    TTE 11/25/2024 - Minimally positive bubble study suggestive of a small PFO.     - Appreciate pulmonology involvement given hx of CREST and Raynauds  - Continue with Diuresis recommend Lasix 40 mg PO daily, Volume mildly hypervolemic   - Strict Is and Os with daily standing weight  - Consider starting Jardiance 10 mg   - Consider starting Aldactone 12.5 mg, Monitor of hyperkalemia   - Consider adjusting oximeter to avoid any inaccurate reading due to her Raynaud's Phenomenon hx.         #Chronic Afib   Currently rate control. Goal HR 90 -110. [id number] 3 (age++, HF+). Has hx of hypothyroidism s/p thyroidectomy 2/2 cancer  - Continue PTA Diltiazem 240 mg BID   - Continue PTA Eliquis   - K>4 and Mg >2       Thank you for consulting our team. Cardiology will sign off at this time. Please reach out to the team for any further questions or concerns.      The patient's care was discussed with the Attending Physician, Dr. Gonzalez .      Flores Deras MD  PGY-3   Internal Medicine Resident   [facility]  ______________________________________________________________________    Chief Complaint   Hypoxia     History is obtained from the patient    History of Present Illness   Asya Coffman is a 79 year old female who history of A fib, CAD, COPD, asthma, Raynaud's, CKD, laryngeal  squamous cell carcinoma/papillomatosis, CREST syndrome, chronic pain, chronic constipation, hypothyroidism, anxiety, and depression who was admitted to [facility] with acute hypoxic respiratory failure. Cardiology was consulted due to inability to wean off O2 despite diuresis as well as concerns for PFO. Pt initially presented to hospital on 11/21/2024 with concerns of progressive dyspnea. Endorses FELIZ, PND, orthopnea, swelling in the legs. Found to have hypoxic requiring 2L NC. BNP 4,421 (1,920 4/2024). EKG notable for . CT PE 11/21 no PE, trace interstitial edema with small to moderate b/l pleural effusions. TTE with normal EF however unable to measure diastolic function 2/2 a fib. Pt was treated with spot doses of diuretics for concerns of HFpEF exacerbation. Hypoxia improved however has been difficult to wean off at baseline of room air. Pulmonology was consulted and recommended diuresis. Currently pt remain on 1 L NC however states that her Sx have significantly improved. She denies any chest pain,.SOB, FELIZ, Orthopnea or PND at this time. She does notes some LE swelling however states that it is better than previously. Follows with [facility]. Last seen in 2021. No additional concerns reported.       Past Medical History    Past Medical History:   Diagnosis Date    A-fib (H)     Antiplatelet or antithrombotic long-term use     Arrhythmia     COPD (chronic obstructive pulmonary disease) (H)        Past Surgical History   Past Surgical History:   Procedure Laterality Date    INJECT STEROID (LOCATION) N/A 6/15/2023    Procedure: Avastin injection;  Surgeon: Nikole Davis MD;  Location: [facility]    INJECT STEROID (LOCATION) N/A 9/7/2023    Procedure: Avastin injection;  Surgeon: Nikole Davis MD;  [facility]    INJECT STEROID (LOCATION) N/A 12/14/2023    Procedure: Avastin injection;  Surgeon: Nikole Davis MD;  [facility]    INJECT STEROID (LOCATION) N/A  2/15/2024    Procedure: Avastin injection;  Surgeon: Nikole Davis MD;  [facility]    LASER CO2 LARYNGOSCOPY N/A 9/7/2023    Procedure: Microdirect laryngoscopy with excision/ablation of laryngeal lesions, biopsies, CO2 laser;  Surgeon: Nikole Davis MD;  [facility]    LASER CO2 LARYNGOSCOPY N/A 5/30/2024    Procedure: Microdirect laryngoscopy with excision/ablation of laryngeal lesions, biopsies, CO2 laser;  Surgeon: Nikole Davis MD;  [facility]    LASER CO2 LARYNGOSCOPY, COMPLEX N/A 5/26/2022    Procedure: Micro direct laryngoscopy with excision/ablation of laryngeal lesions, possible biopsies, possible CO2 laser;  Surgeon: Nikole Davis MD;  [facility]    LASER CO2 LARYNGOSCOPY, COMPLEX N/A 9/15/2022    Procedure: Microdirect laryngoscopy with ablation of laryngeal lesions,biopsies,CO2 laser;  Surgeon: Nikole Davis MD;  [facility]    LASER CO2 LARYNGOSCOPY, COMPLEX N/A 12/1/2022    Procedure: Microdirect laryngoscopy with excision/ablation of laryngeal lesions, biopsies,   CO2 laser;  Surgeon: Nikole Davis MD;  [facility]    LASER CO2 LARYNGOSCOPY, COMPLEX N/A 6/15/2023    Procedure: Microdirect laryngoscopy with ablation of laryngeal lesions, biopsies, CO2 laser;  Surgeon: Nikole Davis MD;  [facility]    LASER CO2 LARYNGOSCOPY, COMPLEX N/A 10/5/2023    Procedure: Microdirect laryngoscopy with excision/ablation of laryngeal lesions, biopsies, CO2 laser;  Surgeon: Nikole Davis MD;  [facility]    LASER CO2 LARYNGOSCOPY, COMPLEX N/A 12/14/2023    Procedure: Microdirect laryngoscopy with excision/ablation of laryngeal lesions, biopsies, CO2 laser;  Surgeon: Nikole Davis MD;  [facility]    LASER CO2 LARYNGOSCOPY, COMPLEX N/A 2/15/2024    Procedure: Microdirect laryngoscopy with excision/ablation of laryngeal lesions, biopsies, CO2 laser;  Surgeon: Ryan  Nikole Alberts MD;  [facility]    LASER CO2 LARYNGOSCOPY, COMPLEX N/A 4/11/2024    Procedure: Microdirect laryngoscopy with excision/ablation of laryngeal lesions, biopsies, CO2 laser, Avastin injections;  Surgeon: Nikole Davis MD;  [facility]    LASER KTP LARYNGOSCOPY N/A 4/3/2023    Procedure: Microdirect laryngoscopy with biopsies, excision/ablation of lesions, C02 laser,  Avastin injection;  Surgeon: Nikole Davis MD;  [facility]    LASER KTP LARYNGOSCOPY N/A 6/15/2023    Procedure: flexible laser (CO2 or KTP) standby;  Surgeon: Nikole Davis MD;  [facility]    LASER KTP LARYNGOSCOPY FLEXIBLE N/A 8/18/2022    Procedure: Transnasal flexible laryngoscopy with KTP laser and biopsies;  Surgeon: Nikole Davis MD;  Location: [facility]    LASER KTP LARYNGOSCOPY FLEXIBLE N/A 10/27/2022    Procedure: Transnasal flexible laryngoscopy with possible KTP laser;  Surgeon: Nikole Davis MD;  Location: [facility] OR    LASER KTP LARYNGOSCOPY FLEXIBLE N/A 1/26/2023    Procedure: Transnasal flexible laryngoscopy with KTP laser,  biopsies, superior laryngeal nerve blocks, Avastin injection;  Surgeon: Nikole Davis MD;  Location: [facility] OR    LASER KTP LARYNGOSCOPY FLEXIBLE N/A 2/15/2023    Procedure: Transnasal flexible laryngoscopy with KTP laser, superior laryngeal nerve blocks, biopsies, avastin injection;  Surgeon: Nikole Davis MD;  Location: [facility]    LASER KTP LARYNGOSCOPY FLEXIBLE N/A 2/17/2023    Procedure: Transnasal flexible laryngoscopy with KTP laser, biopsies, superior laryngeal nerve blocks;  Surgeon: Nikole Davis MD;  Location: [facility]    LASER KTP LARYNGOSCOPY FLEXIBLE N/A 2/23/2023    Procedure: Transnasal flexible laryngoscopy with KTP laser, superior laryngeal nerve blocks;  Surgeon: Nikole Davis MD;  Location: [facility]    LASER KTP LARYNGOSCOPY FLEXIBLE N/A 3/2/2023    Procedure:  Transnasal flexible laryngoscopy with KTP laser, superior laryngeal nerve block Bilateral;  Surgeon: Nikole Davis MD;  Location: [facility] OR    LASER KTP LARYNGOSCOPY FLEXIBLE N/A 3/9/2023    Procedure: Transnasal flexible laryngoscopy with KTP laser, biopsies, superior laryngeal nerve blocks;  Surgeon: Nikole Davis MD;  Location: [facility] OR    LASER KTP LARYNGOSCOPY FLEXIBLE N/A 3/17/2023    Procedure: Transnasal flexible laryngoscopy with KTP laser, superior laryngeal nerve block(s), Avastin injection;  Surgeon: Nikole Davis MD;  Location: [facility] OR    LASER KTP LARYNGOSCOPY FLEXIBLE N/A 3/28/2023    Procedure: Transnasal flexible laryngoscopy with KTP laser, superior laryngeal nerve block(s);  Surgeon: Pankaj Woodard MD;  Location: [facility] OR       Medications   Current Facility-Administered Medications   Medication Dose Route Frequency Provider Last Rate Last Admin    acetaminophen (TYLENOL) tablet 650 mg  650 mg Oral Q4H PRN Jennifer Anne PA-C        albuterol (PROVENTIL HFA/VENTOLIN HFA) inhaler  2 puff Inhalation Q6H PRN DayanaJennifer alexander PA-C   2 puff at 11/23/24 1553    apixaban ANTICOAGULANT (ELIQUIS) tablet 5 mg  5 mg Oral BID Jennifer Anne PA-C   5 mg at 11/27/24 0843    atorvastatin (LIPITOR) tablet 20 mg  20 mg Oral QPM Jennifer Anne PA-C   20 mg at 11/26/24 1958    calcium carbonate (TUMS) chewable tablet 1,000 mg  1,000 mg Oral 4x Daily PRN Jennifer Anne PA-C        diltiazem ER COATED BEADS (CARDIZEM CD/CARTIA XT) 24 hr capsule 240 mg  240 mg Oral BID Jennifer Anne PA-C   240 mg at 11/27/24 0834    fexofenadine (ALLEGRA) tablet 180 mg  180 mg Oral Daily Jennifer Anne PA-C   180 mg at 11/27/24 0834    fluticasone-vilanterol (BREO ELLIPTA) 200-25 MCG/ACT inhaler 1 puff  1 puff Inhalation Daily Jennifer Anne PA-C   1 puff at 11/27/24 0834    gabapentin (NEURONTIN) capsule  600 mg  600 mg Oral At Bedtime Juanjose Peres MD   600 mg at 11/26/24 2251    ipratropium (ATROVENT HFA) Inhaler 2 puff  2 puff Inhalation 4x daily Hiram Bee MD   2 puff at 11/27/24 1225    levalbuterol (XOPENEX HFA) 45 MCG/ACT Inhaler 2 puff  2 puff Inhalation Q6H Hiram Bee MD   2 puff at 11/27/24 0833    levothyroxine (SYNTHROID/LEVOTHROID) tablet 88 mcg  88 mcg Oral Daily Jennifer Anne PA-C   88 mcg at 11/27/24 0604    lidocaine (LMX4) cream   Topical Q1H PRN Jennifer Anne PA-C        lidocaine 1 % 0.1-1 mL  0.1-1 mL Other Q1H PRN Jennifer Anne PA-C        Patient is already receiving anticoagulation with heparin, enoxaparin (LOVENOX), warfarin (COUMADIN)  or other anticoagulant medication   Does not apply Continuous PRN Jennifer Anne PA-C        polyethylene glycol (MIRALAX) Packet 17 g  17 g Oral Daily Jennifer Anne PA-C   17 g at 11/26/24 0848    propranolol (INDERAL) tablet 10 mg  10 mg Oral BID Hiram Bee MD   10 mg at 11/27/24 0834    senna-docusate (SENOKOT-S/PERICOLACE) 8.6-50 MG per tablet 1 tablet  1 tablet Oral BID PRN Jennifer Anne PA-C        Or    senna-docusate (SENOKOT-S/PERICOLACE) 8.6-50 MG per tablet 2 tablet  2 tablet Oral BID PRN Jennifer Anne PA-C        sertraline (ZOLOFT) tablet 150 mg  150 mg Oral Daily Jennifer Anne PA-C   150 mg at 11/27/24 0834    sodium chloride (OCEAN) 0.65 % nasal spray 1 spray  1 spray Both Nostrils Q1H PRN Lynda Farrar MD   1 spray at 11/23/24 0859    sodium chloride (PF) 0.9% PF flush 3 mL  3 mL Intracatheter Q8H Jennifer Anne PA-C   3 mL at 11/27/24 0604    sodium chloride (PF) 0.9% PF flush 3 mL  3 mL Intracatheter q1 min prn Jennifer Anne PA-C   3 mL at 11/26/24 1029         Review of Systems    The 10 point Review of Systems is negative other than noted in the HPI or here.      Social History   I have reviewed this patient's social history and updated it with pertinent information if needed.  Social History     Tobacco Use    Smoking status: Never    Smokeless tobacco: Never   Substance Use Topics    Alcohol use: Yes     Comment: Occasionally    Drug use: Never         Family History   I have reviewed this patient's family history and updated it with pertinent information if needed.  Family History   Problem Relation Age of Onset    Breast Cancer Mother 75.00    Hereditary Breast and Ovarian Cancer Syndrome No family hx of     Cancer No family hx of     Colon Cancer No family hx of     Endometrial Cancer No family hx of     Ovarian Cancer No family hx of          Allergies   Allergies   Allergen Reactions    Methylpyrrolidone Anaphylaxis    Nuts Anaphylaxis     Black walnut    Escitalopram Other (See Comments)     Asthma -a time of exacerbation and may not have been cause may retry    Mirtazapine Other (See Comments)     Asthma a time of exacerbation and may not have been cause may retry    Venlafaxine Other (See Comments)    Adhesive Tape Rash     With holter monitor. Ok with bandaids.    No Clinical Screening - See Comments Rash     Adhesive tape        Physical Exam   Vital Signs: Temp: 97.6  F (36.4  C) Temp src: Oral BP: 122/82 Pulse: 84   Resp: 16 SpO2: 92 % O2 Device: Nasal cannula with humidification Oxygen Delivery: 1.5 LPM  Weight: 98 lbs 8.73 oz    General Appearance: Appears well and in no acute ditress  Respiratory: decreased basilar breath sounds b/l otherwise CTA   Cardiovascular: Irregular Irregular rythmn, no murmru .   GI: Soft NT ND  Skin: No rash notable   Neuro: Alert and Oriented x3         Data     I have personally reviewed the following data over the past 24 hrs:    6.7  \   12.3   / 275     138 100 29.0 (H) /  82   4.2 28 1.03 (H) \     ALT: <5 AST: 18 AP: 81 TBILI: 0.3   ALB: 3.4 (L) TOT PROTEIN: 6.1 (L) LIPASE: N/A       Imaging results reviewed over the  past 24 hrs:   Recent Results (from the past 24 hours)   XR Chest Port 1 View    Narrative    Exam:  Chest X-ray 11/27/2024 8:30 AM    History: AHRF    Comparison: 11/23/2024    Findings:   Single portable AP view of the chest. Stable small bilateral pleural  effusion with bibasilar opacities. No pneumothorax. Mild cardiomegaly.      Impression    Impression:   1.  Stable small bilateral pleural effusion with adjacent  atelectasis/consolidation.    JAMES ANGELES MD         SYSTEM ID:  [id number]     Attending Attestation: I saw, examined and evaluated the patient and reviewed all relevant data with the resident and fellow. I agree with the findings, and our shared assessment and plan of care documented in the edited note and summarized the horner findings and plan with the patient.

## 2024-11-27 NOTE — PLAN OF CARE
Goal Outcome Evaluation:               Pt A/Ox4. No acute changes during shift. Pt VSS on 1.5-3L at rest, 4-6L w/ activity with intermittent bradycardia. Purewick in place. Strict I/O.

## 2024-11-27 NOTE — PLAN OF CARE
Goal Outcome Evaluation:             1900 - 2300: Pt A/Ox4, VSS on on nasal cannula with humidification. O2 1.5L ON. Up w/ A2 +WGB to commode, no BM since 11/23. Incont of urine ON, purewick in place. Strict I/O, 2L FR and low fat diet. Pt with fair appetite. PT/OT rec TCU. Pulm consulted, rec cards consult, TTE (done and suggestive of small PFO), continue diuresis and repeat CXR tomorrow. SW helping to pursue TCU referral to [facility]. Will continue to monitor pt and follow POC

## 2024-11-28 VITALS
BODY MASS INDEX: 17.01 KG/M2 | TEMPERATURE: 98.2 F | RESPIRATION RATE: 16 BRPM | SYSTOLIC BLOOD PRESSURE: 116 MMHG | OXYGEN SATURATION: 94 % | WEIGHT: 102.07 LBS | DIASTOLIC BLOOD PRESSURE: 70 MMHG | HEIGHT: 65 IN | HEART RATE: 73 BPM

## 2024-11-28 LAB
ALBUMIN SERPL BCG-MCNC: 3.4 G/DL (ref 3.5–5.2)
ALP SERPL-CCNC: 78 U/L (ref 40–150)
ALT SERPL W P-5'-P-CCNC: <5 U/L (ref 0–50)
ANION GAP SERPL CALCULATED.3IONS-SCNC: 12 MMOL/L (ref 7–15)
ANION GAP SERPL CALCULATED.3IONS-SCNC: 13 MMOL/L (ref 7–15)
AST SERPL W P-5'-P-CCNC: 15 U/L (ref 0–45)
BILIRUB SERPL-MCNC: 0.3 MG/DL
BUN SERPL-MCNC: 29.3 MG/DL (ref 8–23)
BUN SERPL-MCNC: 31.6 MG/DL (ref 8–23)
CALCIUM SERPL-MCNC: 8.8 MG/DL (ref 8.8–10.4)
CALCIUM SERPL-MCNC: 9 MG/DL (ref 8.8–10.4)
CHLORIDE SERPL-SCNC: 97 MMOL/L (ref 98–107)
CHLORIDE SERPL-SCNC: 98 MMOL/L (ref 98–107)
CREAT SERPL-MCNC: 1.08 MG/DL (ref 0.51–0.95)
CREAT SERPL-MCNC: 1.35 MG/DL (ref 0.51–0.95)
CYSTATIN C (ROCHE): 1.8 MG/L (ref 0.6–1)
EGFRCR SERPLBLD CKD-EPI 2021: 40 ML/MIN/1.73M2
EGFRCR SERPLBLD CKD-EPI 2021: 52 ML/MIN/1.73M2
ERYTHROCYTE [DISTWIDTH] IN BLOOD BY AUTOMATED COUNT: 21.1 % (ref 10–15)
GFR/BSA.PRED SERPLBLD CYS-BASED-ARV: 31 ML/MIN/1.73M2
GLUCOSE SERPL-MCNC: 123 MG/DL (ref 70–99)
GLUCOSE SERPL-MCNC: 90 MG/DL (ref 70–99)
HCO3 SERPL-SCNC: 27 MMOL/L (ref 22–29)
HCO3 SERPL-SCNC: 30 MMOL/L (ref 22–29)
HCT VFR BLD AUTO: 40 % (ref 35–47)
HGB BLD-MCNC: 11.7 G/DL (ref 11.7–15.7)
MAGNESIUM SERPL-MCNC: 2 MG/DL (ref 1.7–2.3)
MAGNESIUM SERPL-MCNC: 2.1 MG/DL (ref 1.7–2.3)
MCH RBC QN AUTO: 25.8 PG (ref 26.5–33)
MCHC RBC AUTO-ENTMCNC: 29.3 G/DL (ref 31.5–36.5)
MCV RBC AUTO: 88 FL (ref 78–100)
PHOSPHATE SERPL-MCNC: 3.7 MG/DL (ref 2.5–4.5)
PHOSPHATE SERPL-MCNC: 4 MG/DL (ref 2.5–4.5)
PLATELET # BLD AUTO: 284 10E3/UL (ref 150–450)
POTASSIUM SERPL-SCNC: 3.7 MMOL/L (ref 3.4–5.3)
POTASSIUM SERPL-SCNC: 3.9 MMOL/L (ref 3.4–5.3)
PROT SERPL-MCNC: 5.8 G/DL (ref 6.4–8.3)
RBC # BLD AUTO: 4.54 10E6/UL (ref 3.8–5.2)
SODIUM SERPL-SCNC: 137 MMOL/L (ref 135–145)
SODIUM SERPL-SCNC: 140 MMOL/L (ref 135–145)
WBC # BLD AUTO: 6.1 10E3/UL (ref 4–11)

## 2024-11-28 PROCEDURE — 999N000127 HC STATISTIC PERIPHERAL IV START W US GUIDANCE

## 2024-11-28 PROCEDURE — 82374 ASSAY BLOOD CARBON DIOXIDE: CPT | Performed by: STUDENT IN AN ORGANIZED HEALTH CARE EDUCATION/TRAINING PROGRAM

## 2024-11-28 PROCEDURE — 250N000013 HC RX MED GY IP 250 OP 250 PS 637: Performed by: PHYSICIAN ASSISTANT

## 2024-11-28 PROCEDURE — 36415 COLL VENOUS BLD VENIPUNCTURE: CPT | Performed by: STUDENT IN AN ORGANIZED HEALTH CARE EDUCATION/TRAINING PROGRAM

## 2024-11-28 PROCEDURE — 250N000011 HC RX IP 250 OP 636: Performed by: STUDENT IN AN ORGANIZED HEALTH CARE EDUCATION/TRAINING PROGRAM

## 2024-11-28 PROCEDURE — 85014 HEMATOCRIT: CPT | Performed by: STUDENT IN AN ORGANIZED HEALTH CARE EDUCATION/TRAINING PROGRAM

## 2024-11-28 PROCEDURE — 83735 ASSAY OF MAGNESIUM: CPT | Performed by: STUDENT IN AN ORGANIZED HEALTH CARE EDUCATION/TRAINING PROGRAM

## 2024-11-28 PROCEDURE — 84100 ASSAY OF PHOSPHORUS: CPT | Performed by: STUDENT IN AN ORGANIZED HEALTH CARE EDUCATION/TRAINING PROGRAM

## 2024-11-28 PROCEDURE — 82310 ASSAY OF CALCIUM: CPT | Performed by: STUDENT IN AN ORGANIZED HEALTH CARE EDUCATION/TRAINING PROGRAM

## 2024-11-28 PROCEDURE — 250N000013 HC RX MED GY IP 250 OP 250 PS 637: Performed by: STUDENT IN AN ORGANIZED HEALTH CARE EDUCATION/TRAINING PROGRAM

## 2024-11-28 PROCEDURE — 84075 ASSAY ALKALINE PHOSPHATASE: CPT | Performed by: STUDENT IN AN ORGANIZED HEALTH CARE EDUCATION/TRAINING PROGRAM

## 2024-11-28 PROCEDURE — 82610 CYSTATIN C: CPT | Performed by: STUDENT IN AN ORGANIZED HEALTH CARE EDUCATION/TRAINING PROGRAM

## 2024-11-28 PROCEDURE — 120N000002 HC R&B MED SURG/OB UMMC

## 2024-11-28 PROCEDURE — 80053 COMPREHEN METABOLIC PANEL: CPT | Performed by: STUDENT IN AN ORGANIZED HEALTH CARE EDUCATION/TRAINING PROGRAM

## 2024-11-28 PROCEDURE — 99232 SBSQ HOSP IP/OBS MODERATE 35: CPT | Performed by: STUDENT IN AN ORGANIZED HEALTH CARE EDUCATION/TRAINING PROGRAM

## 2024-11-28 RX ORDER — FUROSEMIDE 10 MG/ML
40 INJECTION INTRAMUSCULAR; INTRAVENOUS ONCE
Status: COMPLETED | OUTPATIENT
Start: 2024-11-28 | End: 2024-11-28

## 2024-11-28 RX ORDER — SPIRONOLACTONE 25 MG/1
25 TABLET ORAL DAILY
Status: DISCONTINUED | OUTPATIENT
Start: 2024-11-29 | End: 2025-01-13

## 2024-11-28 RX ADMIN — LEVOTHYROXINE SODIUM 88 MCG: 88 TABLET ORAL at 06:18

## 2024-11-28 RX ADMIN — ATORVASTATIN CALCIUM 20 MG: 20 TABLET, FILM COATED ORAL at 20:15

## 2024-11-28 RX ADMIN — DILTIAZEM HYDROCHLORIDE 240 MG: 240 CAPSULE, EXTENDED RELEASE ORAL at 20:15

## 2024-11-28 RX ADMIN — FEXOFENADINE HCL 180 MG: 180 TABLET ORAL at 08:12

## 2024-11-28 RX ADMIN — PROPRANOLOL HYDROCHLORIDE 10 MG: 10 TABLET ORAL at 08:12

## 2024-11-28 RX ADMIN — EMPAGLIFLOZIN 10 MG: 10 TABLET, FILM COATED ORAL at 08:11

## 2024-11-28 RX ADMIN — DILTIAZEM HYDROCHLORIDE 240 MG: 240 CAPSULE, EXTENDED RELEASE ORAL at 08:11

## 2024-11-28 RX ADMIN — IPRATROPIUM BROMIDE 2 PUFF: 17 AEROSOL, METERED RESPIRATORY (INHALATION) at 12:18

## 2024-11-28 RX ADMIN — IPRATROPIUM BROMIDE 2 PUFF: 17 AEROSOL, METERED RESPIRATORY (INHALATION) at 08:17

## 2024-11-28 RX ADMIN — SERTRALINE HYDROCHLORIDE 150 MG: 100 TABLET ORAL at 08:11

## 2024-11-28 RX ADMIN — APIXABAN 5 MG: 5 TABLET, FILM COATED ORAL at 08:11

## 2024-11-28 RX ADMIN — LEVALBUTEROL TARTRATE 2 PUFF: 45 AEROSOL, METERED ORAL at 02:35

## 2024-11-28 RX ADMIN — FUROSEMIDE 40 MG: 40 TABLET ORAL at 08:12

## 2024-11-28 RX ADMIN — IPRATROPIUM BROMIDE 2 PUFF: 17 AEROSOL, METERED RESPIRATORY (INHALATION) at 20:15

## 2024-11-28 RX ADMIN — PROPRANOLOL HYDROCHLORIDE 10 MG: 10 TABLET ORAL at 20:15

## 2024-11-28 RX ADMIN — LEVALBUTEROL TARTRATE 2 PUFF: 45 AEROSOL, METERED ORAL at 20:15

## 2024-11-28 RX ADMIN — FUROSEMIDE 40 MG: 10 INJECTION, SOLUTION INTRAMUSCULAR; INTRAVENOUS at 14:34

## 2024-11-28 RX ADMIN — GABAPENTIN 600 MG: 300 CAPSULE ORAL at 21:46

## 2024-11-28 RX ADMIN — APIXABAN 5 MG: 5 TABLET, FILM COATED ORAL at 20:15

## 2024-11-28 RX ADMIN — IPRATROPIUM BROMIDE 2 PUFF: 17 AEROSOL, METERED RESPIRATORY (INHALATION) at 16:06

## 2024-11-28 RX ADMIN — LEVALBUTEROL TARTRATE 2 PUFF: 45 AEROSOL, METERED ORAL at 08:17

## 2024-11-28 RX ADMIN — FLUTICASONE FUROATE AND VILANTEROL TRIFENATATE 1 PUFF: 200; 25 POWDER RESPIRATORY (INHALATION) at 08:16

## 2024-11-28 RX ADMIN — LEVALBUTEROL TARTRATE 2 PUFF: 45 AEROSOL, METERED ORAL at 14:35

## 2024-11-28 ASSESSMENT — ACTIVITIES OF DAILY LIVING (ADL)
ADLS_ACUITY_SCORE: 63

## 2024-11-28 NOTE — PLAN OF CARE
Goal Outcome Evaluation:        Assumed cares [id number]      Pt A&Ox4. VSS. Continuous pulse ox in place. Bed alarm on. 1L nasal cannula on. Purewick in place, good output. Denies pain. No BM on shift. One time dose of lasix given, pt had adequate urine output. Continue with plan of care.

## 2024-11-28 NOTE — PLAN OF CARE
Goal Outcome Evaluation:            Patient is A/Ox4, VSS on 1.5L NC. Home O2 test completed today, patient made it to the door of her room and had to sit in a wheelchair because she was feeling dizzy. After walking, patient sat in chair for lunch, good appetite. Back to bed at end of shift, purewick in place. Refused miralax today, has not had a BM. Uses call light appropriately to make needs known.

## 2024-11-28 NOTE — PROGRESS NOTES
[facility]    Medicine Progress Note - Hospitalist Service, GOLD TEAM 8    Date of Admission:  11/21/2024    Assessment & Plan   Asya Coffman is a 79 year old female with history of A fib, CAD, COPD, asthma, Raynaud's, CKD, laryngeal squamous cell carcinoma/papillomatosis, CREST syndrome, chronic pain, chronic constipation, hypothyroidism, anxiety, and depression who was admitted to [facility] with acute hypoxic respiratory failure, hospitalization prolonged due to inability to wean off O2 despite diuresis.     Changes today:  - Tolerated additional lasix IV 40 yesterday, Cr and electrolytes monitored and stable  - started daily oral lasix and jardiance today  - will give additional IV lasix dose this afternoon and check PM BMP  - starting aldactone tomorrow  - cont to wean O2 as tolerated, preparing for discharge tomorrow with O2; dispo to TCU  - updated daughter via phone yesterday evening and again today    Acute hypoxic respiratory failure, improving- on 1L NC  Acute on Chronic Heart Failure with Preserved Systolic Function   Pulmonary hypertension with elevated RVSP on TTE 11/22  Fluid overload  Elevated BNP  Hx of asthma- not in exacerbation  Presented with 1 month progressive dyspnea. Endorses FELIZ, PND, orthopnea, swelling in the legs. Found to have hypoxic requiring 2L NC. BNP 4,421 (1,920 4/2024). VBG 7.41/40/46/26. LA, WBC normal. Flu, COVID negative. EKG A fib with RVR (rate 101), chronic A fib as below. CT PE 11/21 no PE, trace interstitial edema with small to moderate b/l pleural effusions. TTE with normal EF however unable to measure diastolic function 2/2 a fib.   - IP pulmonary consulted for any other IP work up as unable to wean off oxygen, follow up final recs  - Will spot dose with lasix IV and monitor, lasix 20mg IV x 1 in ED, 40mg IV 11/22, 40mg IV 11/24, 40 mg IV 11/26  - Added scheduled Xopenex/ipratropium q6hr  - Repeat CXR 11/23 showing small R  pleural effusion and trace L   - Has OP pulmonologist Dr. Mccoy and also cardiologist, NOLBERTO to help her schedule an appt post discharge  - PTA Fluticasone- Vilanterol  - 2L fluid restriction  - Daily standing weights, I&Os  - Continuous pulse ox    A fib with RVR- not rate controlled  - On Diltiazem 240 ER BID, max dose  - C/w propranolol 10mg BID, target HR 60-70   - c/w PTA on Apixaban,  - Last RVR 11/23, required 10mg IV Cardizem  - Maintain K~4 and Mg ~2     Significant weight loss  Severe Protein-Calorie Malnutrition   - Per daughter baseline wt around 120-130 lbs, however wt here around 102 lbs, patient states she is eating less and has less appetite, denies post prandial abd pain, N/V. She states she likes the food but just do not have the appetite to eat more. She has hx of recent invasive laryngeal SCC with radiation therapy, also had dysphagia before  - Will consult nutritionist    Deconditioning  - PT/OT, dispo recommendation is TCU, patient agrees     H/o laryngeal squamous cell carcinoma, papillomatosis with dysphagia: Initially presented 2015 with benign laryngeal lesion. Progressed to papilloma 2021. Dx with invasive squamous cell carcinoma 4/2024. Now s/p radiation completed 7/2024. CT chest 11/21 ingested content within a mildly patulous thoracic esophagus, aspiration cautions recommended   - SLP consult, recommending reg diet and think liq   - Follow up OP 1/2025 as planned     Chronic/stable medical conditions  Pulmonary nodule:   6 mm LLL pulmonary nodule noted on CT  - Will need repeat imaging in 6-12 months     Subacute rib fractures:   Admitted to [facility] 9/2024 with rib fractures. Subacute fractures noted on CT 11/21. Continue supportive cares     H/o CREST syndrome:   2014. Per notes, did have positive FARIDEH, Raynaud's, Calcinosis, GERD and some telangectasia's. Last saw [facility]ology 2017. No acute concerns     Hypernatremia: Mild. Na 146. Trend     L>R BLE edema: US to rule out  "DVT    Troponin elevation, likely increased demand: Trop 28 (32). EKG chronic a fib. Asymptomatic. Monitor   COPD, asthma: No e/o exacerbation. Continue PTA Symbicort, albuterol, DuoNebs   CKD II: BL Cr 0.9-1.1 and stable. Trend   Chronic pain: Continue PTA gabapentin, Tylenol. Discontinue Ibuprofen given CKD  Chronic constipation: Continue bowel regimen   Hypothyroidism: TSH 1.48 4/2024. Continue PTA synthroid   Anxiety, depression: Continue PTA Sertraline, Propranolol   Raynaud's: Continue diltiazem as above    Diet:  2L fluid restriction  DVT Prophylaxis: DOAC  Miranda Catheter: Not present  Lines: None     Cardiac Monitoring: None  Code Status:  Full Code          Diet: Fluid restriction 2000 ML FLUID  Low Saturated Fat Na <2400 mg  Snacks/Supplements Adult: Ensure Enlive; Between Meals    DVT Prophylaxis: DOAC  Miranda Catheter: Not present  Lines: None     Cardiac Monitoring: None  Code Status: Full Code      Clinically Significant Risk Factors           # Hypocalcemia: Lowest Ca = 8.6 mg/dL in last 2 days, will monitor and replace as appropriate     # Hypoalbuminemia: Lowest albumin = 3.4 g/dL at 11/28/2024  6:08 AM, will monitor as appropriate                # Cachexia: Estimated body mass index is 16.4 kg/m  as calculated from the following:    Height as of this encounter: 1.651 m (5' 5\").    Weight as of this encounter: 44.7 kg (98 lb 8.7 oz).   # Severe Malnutrition: based on nutrition assessment    # Financial/Environmental Concerns: none         Social Drivers of Health    Housing Stability: High Risk (11/23/2024)    Housing Stability     Do you have housing? : No     Are you worried about losing your housing?: No          Disposition Plan     Medically Ready for Discharge: Anticipated Tomorrow             Jesu Lunsford MD  Hospitalist Service, GOLD TEAM 8  [facility]  Securely message with [employer] (more info)  Text page via [employer] Paging/Directory   See signed " "in provider for up to date coverage information  ______________________________________________________________________    Interval History   Reports breathing feels \"easier\" after diuresis.    Physical Exam   Vital Signs: Temp: 98  F (36.7  C) Temp src: Oral BP: 123/86 Pulse: 80   Resp: 16 SpO2: 96 % O2 Device: Nasal cannula with humidification Oxygen Delivery: 1.5 LPM  Weight: 98 lbs 8.73 oz    Gen: A&Ox4, lying comfortably on bed, in no distress, on 1.5 L NC  Lung: clear on ausculation b/l, no wheezing/crackle  Abd: soft, non-tender, non-distended  Ext: No edema in upper/lower extremities    Medical Decision Making       45 MINUTES SPENT BY ME on the date of service doing chart review, history, exam, documentation & further activities per the note.      Data     "

## 2024-11-28 NOTE — PLAN OF CARE
Goal Outcome Evaluation:                        Status: Acute hypoxic respiratory failure, hospitalization prolonged due to inability to wean off O2 despite diuresis.   Activity: Assist x1 walker gait belt  Neuro: AnO x4, afebrile  Cardiac: WDL  Respiratory: On 1.5L humidified air NC  GI/: Voiding spontaneously, passing gas, BS+   Diet: Low fat, low NA, 2L FR  Skin: Intact  Lines/Drains: Purewick in place, x1 PIV  Pain/Nausea: Denies

## 2024-11-28 NOTE — CARE PLAN
11/28/24 1219   Home Oxygen Assessment (RN/RT ONLY)   Does patient have oxygen at home? No   1. SpO2 on room air at rest while awake 89       Oxygen LPM at rest 1.5   3. SpO2 on room air during Activity/with exercise 84   4. SpO2 with oxygen during activity/with exercise 85       Oxygen LPM during activity/with exercise 6  (had to stop, patient felt dizzy)   Does patient qualify for Home O2? Yes

## 2024-11-29 ENCOUNTER — APPOINTMENT (OUTPATIENT)
Dept: OCCUPATIONAL THERAPY | Facility: CLINIC | Age: 79
End: 2024-11-29
Payer: COMMERCIAL

## 2024-11-29 LAB
ALBUMIN SERPL BCG-MCNC: 3.4 G/DL (ref 3.5–5.2)
ALP SERPL-CCNC: 83 U/L (ref 40–150)
ALT SERPL W P-5'-P-CCNC: <5 U/L (ref 0–50)
ANION GAP SERPL CALCULATED.3IONS-SCNC: 10 MMOL/L (ref 7–15)
ANION GAP SERPL CALCULATED.3IONS-SCNC: 13 MMOL/L (ref 7–15)
AST SERPL W P-5'-P-CCNC: 14 U/L (ref 0–45)
BILIRUB SERPL-MCNC: 0.3 MG/DL
BUN SERPL-MCNC: 30.8 MG/DL (ref 8–23)
BUN SERPL-MCNC: 31.5 MG/DL (ref 8–23)
CALCIUM SERPL-MCNC: 8.8 MG/DL (ref 8.8–10.4)
CALCIUM SERPL-MCNC: 9 MG/DL (ref 8.8–10.4)
CHLORIDE SERPL-SCNC: 96 MMOL/L (ref 98–107)
CHLORIDE SERPL-SCNC: 97 MMOL/L (ref 98–107)
CREAT SERPL-MCNC: 1.07 MG/DL (ref 0.51–0.95)
CREAT SERPL-MCNC: 1.17 MG/DL (ref 0.51–0.95)
EGFRCR SERPLBLD CKD-EPI 2021: 48 ML/MIN/1.73M2
EGFRCR SERPLBLD CKD-EPI 2021: 53 ML/MIN/1.73M2
ERYTHROCYTE [DISTWIDTH] IN BLOOD BY AUTOMATED COUNT: 20.6 % (ref 10–15)
GLUCOSE SERPL-MCNC: 140 MG/DL (ref 70–99)
GLUCOSE SERPL-MCNC: 90 MG/DL (ref 70–99)
HCO3 SERPL-SCNC: 24 MMOL/L (ref 22–29)
HCO3 SERPL-SCNC: 31 MMOL/L (ref 22–29)
HCT VFR BLD AUTO: 39.7 % (ref 35–47)
HGB BLD-MCNC: 11.7 G/DL (ref 11.7–15.7)
HOLD SPECIMEN: NORMAL
MAGNESIUM SERPL-MCNC: 1.9 MG/DL (ref 1.7–2.3)
MAGNESIUM SERPL-MCNC: 2 MG/DL (ref 1.7–2.3)
MCH RBC QN AUTO: 25.7 PG (ref 26.5–33)
MCHC RBC AUTO-ENTMCNC: 29.5 G/DL (ref 31.5–36.5)
MCV RBC AUTO: 87 FL (ref 78–100)
PHOSPHATE SERPL-MCNC: 3.9 MG/DL (ref 2.5–4.5)
PHOSPHATE SERPL-MCNC: 4.1 MG/DL (ref 2.5–4.5)
PLATELET # BLD AUTO: 285 10E3/UL (ref 150–450)
POTASSIUM SERPL-SCNC: 3.3 MMOL/L (ref 3.4–5.3)
POTASSIUM SERPL-SCNC: 3.8 MMOL/L (ref 3.4–5.3)
POTASSIUM SERPL-SCNC: 4 MMOL/L (ref 3.4–5.3)
POTASSIUM SERPL-SCNC: 5.5 MMOL/L (ref 3.4–5.3)
PROT SERPL-MCNC: 5.9 G/DL (ref 6.4–8.3)
RBC # BLD AUTO: 4.56 10E6/UL (ref 3.8–5.2)
SODIUM SERPL-SCNC: 134 MMOL/L (ref 135–145)
SODIUM SERPL-SCNC: 137 MMOL/L (ref 135–145)
WBC # BLD AUTO: 5.9 10E3/UL (ref 4–11)

## 2024-11-29 PROCEDURE — 97535 SELF CARE MNGMENT TRAINING: CPT | Mod: GO | Performed by: OCCUPATIONAL THERAPIST

## 2024-11-29 PROCEDURE — 250N000013 HC RX MED GY IP 250 OP 250 PS 637: Performed by: STUDENT IN AN ORGANIZED HEALTH CARE EDUCATION/TRAINING PROGRAM

## 2024-11-29 PROCEDURE — 36415 COLL VENOUS BLD VENIPUNCTURE: CPT | Performed by: STUDENT IN AN ORGANIZED HEALTH CARE EDUCATION/TRAINING PROGRAM

## 2024-11-29 PROCEDURE — 120N000002 HC R&B MED SURG/OB UMMC

## 2024-11-29 PROCEDURE — 250N000013 HC RX MED GY IP 250 OP 250 PS 637: Performed by: PHYSICIAN ASSISTANT

## 2024-11-29 PROCEDURE — 84132 ASSAY OF SERUM POTASSIUM: CPT | Performed by: INTERNAL MEDICINE

## 2024-11-29 PROCEDURE — 84100 ASSAY OF PHOSPHORUS: CPT | Performed by: STUDENT IN AN ORGANIZED HEALTH CARE EDUCATION/TRAINING PROGRAM

## 2024-11-29 PROCEDURE — 36415 COLL VENOUS BLD VENIPUNCTURE: CPT | Performed by: PHYSICIAN ASSISTANT

## 2024-11-29 PROCEDURE — 36415 COLL VENOUS BLD VENIPUNCTURE: CPT | Performed by: INTERNAL MEDICINE

## 2024-11-29 PROCEDURE — 82310 ASSAY OF CALCIUM: CPT | Performed by: STUDENT IN AN ORGANIZED HEALTH CARE EDUCATION/TRAINING PROGRAM

## 2024-11-29 PROCEDURE — 83735 ASSAY OF MAGNESIUM: CPT | Performed by: STUDENT IN AN ORGANIZED HEALTH CARE EDUCATION/TRAINING PROGRAM

## 2024-11-29 PROCEDURE — 250N000013 HC RX MED GY IP 250 OP 250 PS 637: Performed by: INTERNAL MEDICINE

## 2024-11-29 PROCEDURE — 99232 SBSQ HOSP IP/OBS MODERATE 35: CPT | Performed by: STUDENT IN AN ORGANIZED HEALTH CARE EDUCATION/TRAINING PROGRAM

## 2024-11-29 PROCEDURE — 80053 COMPREHEN METABOLIC PANEL: CPT | Performed by: STUDENT IN AN ORGANIZED HEALTH CARE EDUCATION/TRAINING PROGRAM

## 2024-11-29 PROCEDURE — 84132 ASSAY OF SERUM POTASSIUM: CPT | Performed by: PHYSICIAN ASSISTANT

## 2024-11-29 PROCEDURE — 85018 HEMOGLOBIN: CPT | Performed by: STUDENT IN AN ORGANIZED HEALTH CARE EDUCATION/TRAINING PROGRAM

## 2024-11-29 PROCEDURE — 85048 AUTOMATED LEUKOCYTE COUNT: CPT | Performed by: STUDENT IN AN ORGANIZED HEALTH CARE EDUCATION/TRAINING PROGRAM

## 2024-11-29 RX ORDER — ONDANSETRON 2 MG/ML
4 INJECTION INTRAMUSCULAR; INTRAVENOUS EVERY 6 HOURS PRN
Status: DISCONTINUED | OUTPATIENT
Start: 2024-11-29 | End: 2025-01-14 | Stop reason: HOSPADM

## 2024-11-29 RX ORDER — POTASSIUM CHLORIDE 750 MG/1
20 TABLET, EXTENDED RELEASE ORAL ONCE
Status: COMPLETED | OUTPATIENT
Start: 2024-11-29 | End: 2024-11-29

## 2024-11-29 RX ADMIN — SPIRONOLACTONE 25 MG: 25 TABLET, FILM COATED ORAL at 08:41

## 2024-11-29 RX ADMIN — POLYETHYLENE GLYCOL 3350 17 G: 17 POWDER, FOR SOLUTION ORAL at 08:42

## 2024-11-29 RX ADMIN — LEVALBUTEROL TARTRATE 2 PUFF: 45 AEROSOL, METERED ORAL at 14:30

## 2024-11-29 RX ADMIN — APIXABAN 5 MG: 5 TABLET, FILM COATED ORAL at 08:42

## 2024-11-29 RX ADMIN — APIXABAN 5 MG: 5 TABLET, FILM COATED ORAL at 21:19

## 2024-11-29 RX ADMIN — POTASSIUM CHLORIDE 20 MEQ: 750 TABLET, EXTENDED RELEASE ORAL at 08:41

## 2024-11-29 RX ADMIN — LEVOTHYROXINE SODIUM 88 MCG: 88 TABLET ORAL at 06:19

## 2024-11-29 RX ADMIN — IPRATROPIUM BROMIDE 2 PUFF: 17 AEROSOL, METERED RESPIRATORY (INHALATION) at 12:39

## 2024-11-29 RX ADMIN — LEVALBUTEROL TARTRATE 2 PUFF: 45 AEROSOL, METERED ORAL at 08:41

## 2024-11-29 RX ADMIN — EMPAGLIFLOZIN 10 MG: 10 TABLET, FILM COATED ORAL at 08:42

## 2024-11-29 RX ADMIN — ATORVASTATIN CALCIUM 20 MG: 20 TABLET, FILM COATED ORAL at 21:18

## 2024-11-29 RX ADMIN — SERTRALINE HYDROCHLORIDE 150 MG: 100 TABLET ORAL at 08:41

## 2024-11-29 RX ADMIN — FUROSEMIDE 40 MG: 40 TABLET ORAL at 08:41

## 2024-11-29 RX ADMIN — PROPRANOLOL HYDROCHLORIDE 10 MG: 10 TABLET ORAL at 08:42

## 2024-11-29 RX ADMIN — IPRATROPIUM BROMIDE 2 PUFF: 17 AEROSOL, METERED RESPIRATORY (INHALATION) at 08:41

## 2024-11-29 RX ADMIN — FLUTICASONE FUROATE AND VILANTEROL TRIFENATATE 1 PUFF: 200; 25 POWDER RESPIRATORY (INHALATION) at 08:41

## 2024-11-29 RX ADMIN — DILTIAZEM HYDROCHLORIDE 240 MG: 240 CAPSULE, EXTENDED RELEASE ORAL at 08:42

## 2024-11-29 RX ADMIN — DILTIAZEM HYDROCHLORIDE 240 MG: 240 CAPSULE, EXTENDED RELEASE ORAL at 21:18

## 2024-11-29 RX ADMIN — PROPRANOLOL HYDROCHLORIDE 10 MG: 10 TABLET ORAL at 21:19

## 2024-11-29 RX ADMIN — FEXOFENADINE HCL 180 MG: 180 TABLET ORAL at 08:41

## 2024-11-29 RX ADMIN — IPRATROPIUM BROMIDE 2 PUFF: 17 AEROSOL, METERED RESPIRATORY (INHALATION) at 18:13

## 2024-11-29 RX ADMIN — LEVALBUTEROL TARTRATE 2 PUFF: 45 AEROSOL, METERED ORAL at 02:46

## 2024-11-29 RX ADMIN — GABAPENTIN 600 MG: 300 CAPSULE ORAL at 21:18

## 2024-11-29 ASSESSMENT — ACTIVITIES OF DAILY LIVING (ADL)
ADLS_ACUITY_SCORE: 63
ADLS_ACUITY_SCORE: 63
ADLS_ACUITY_SCORE: 59
ADLS_ACUITY_SCORE: 59
ADLS_ACUITY_SCORE: 63
ADLS_ACUITY_SCORE: 59
ADLS_ACUITY_SCORE: 63
ADLS_ACUITY_SCORE: 59
ADLS_ACUITY_SCORE: 63
ADLS_ACUITY_SCORE: 63
ADLS_ACUITY_SCORE: 59
ADLS_ACUITY_SCORE: 63
ADLS_ACUITY_SCORE: 63
ADLS_ACUITY_SCORE: 59
ADLS_ACUITY_SCORE: 59
ADLS_ACUITY_SCORE: 63
ADLS_ACUITY_SCORE: 59
ADLS_ACUITY_SCORE: 59

## 2024-11-29 NOTE — PROGRESS NOTES
[facility]    Medicine Progress Note - Hospitalist Service, GOLD TEAM 8    Date of Admission:  11/21/2024    Assessment & Plan   Asya Coffman is a 79 year old female with history of A fib, CAD, COPD, asthma, Raynaud's, CKD, laryngeal squamous cell carcinoma/papillomatosis, CREST syndrome, chronic pain, chronic constipation, hypothyroidism, anxiety, and depression who was admitted to [facility] with acute hypoxic respiratory failure, hospitalization prolonged due to inability to wean off O2 despite diuresis.     Changes today:  - hypoK, mild contraction alkalosis and Cr slightly uptrending on lasix, jardiance, and aldactone.   - no IV diuresis  - will get PM BMP, K, Mg  - AM labs tomorrow and finalize diuretic regimen with aim to discharge as early as tomorrow  - updated daughter via phone yesterday evening and again today     Acute hypoxic respiratory failure, improving- on 1L NC  Acute on Chronic Heart Failure with Preserved Systolic Function   Pulmonary hypertension with elevated RVSP on TTE 11/22  Fluid overload  Elevated BNP  Hx of asthma- not in exacerbation  Presented with 1 month progressive dyspnea. Endorses FELIZ, PND, orthopnea, swelling in the legs. Found to have hypoxic requiring 2L NC. BNP 4,421 (1,920 4/2024). VBG 7.41/40/46/26. LA, WBC normal. Flu, COVID negative. EKG A fib with RVR (rate 101), chronic A fib as below. CT PE 11/21 no PE, trace interstitial edema with small to moderate b/l pleural effusions. TTE with normal EF however unable to measure diastolic function 2/2 a fib.   - IP pulmonary consulted for any other IP work up as unable to wean off oxygen, follow up final recs  - Will spot dose with lasix IV and monitor, lasix 20mg IV x 1 in ED, 40mg IV 11/22, 40mg IV 11/24, 40 mg IV 11/26  - Added scheduled Xopenex/ipratropium q6hr  - Repeat CXR 11/23 showing small R pleural effusion and trace L   - Has OP pulmonologist Dr. Mccoy and also cardiologist,  NOLBERTO to help her schedule an appt post discharge  - PTA Fluticasone- Vilanterol  - 2L fluid restriction  - Daily standing weights, I&Os  - Continuous pulse ox    A fib with RVR- not rate controlled  - On Diltiazem 240 ER BID, max dose  - C/w propranolol 10mg BID, target HR 60-70   - c/w PTA on Apixaban,  - Last RVR 11/23, required 10mg IV Cardizem  - Maintain K~4 and Mg ~2     Significant weight loss  Severe Protein-Calorie Malnutrition   - Per daughter baseline wt around 120-130 lbs, however wt here around 102 lbs, patient states she is eating less and has less appetite, denies post prandial abd pain, N/V. She states she likes the food but just do not have the appetite to eat more. She has hx of recent invasive laryngeal SCC with radiation therapy, also had dysphagia before  - Will consult nutritionist    Deconditioning  - PT/OT, dispo recommendation is TCU, patient agrees     H/o laryngeal squamous cell carcinoma, papillomatosis with dysphagia: Initially presented 2015 with benign laryngeal lesion. Progressed to papilloma 2021. Dx with invasive squamous cell carcinoma 4/2024. Now s/p radiation completed 7/2024. CT chest 11/21 ingested content within a mildly patulous thoracic esophagus, aspiration cautions recommended   - SLP consult, recommending reg diet and think liq   - Follow up OP 1/2025 as planned     Chronic/stable medical conditions  Pulmonary nodule:   6 mm LLL pulmonary nodule noted on CT  - Will need repeat imaging in 6-12 months     Subacute rib fractures:   Admitted to [facility] 9/2024 with rib fractures. Subacute fractures noted on CT 11/21. Continue supportive cares     H/o CREST syndrome:   2014. Per notes, did have positive FARIDEH, Raynaud's, Calcinosis, GERD and some telangectasia's. Last saw [facility] 2017. No acute concerns     Hypernatremia: Mild. Na 146. Trend     L>R BLE edema: US to rule out DVT    Troponin elevation, likely increased demand: Trop 28 (32). EKG chronic a fib.  "Asymptomatic. Monitor   COPD, asthma: No e/o exacerbation. Continue PTA Symbicort, albuterol, DuoNebs   CKD II: BL Cr 0.9-1.1 and stable. Trend   Chronic pain: Continue PTA gabapentin, Tylenol. Discontinue Ibuprofen given CKD  Chronic constipation: Continue bowel regimen   Hypothyroidism: TSH 1.48 4/2024. Continue PTA synthroid   Anxiety, depression: Continue PTA Sertraline, Propranolol   Raynaud's: Continue diltiazem as above    Diet:  2L fluid restriction  DVT Prophylaxis: DOAC  Miranda Catheter: Not present  Lines: None     Cardiac Monitoring: None  Code Status:  Full Code          Diet: Fluid restriction 2000 ML FLUID  Low Saturated Fat Na <2400 mg  Snacks/Supplements Adult: Ensure Enlive; Between Meals    DVT Prophylaxis: DOAC  Miranda Catheter: Not present  Lines: None     Cardiac Monitoring: None  Code Status: Full Code      Clinically Significant Risk Factors        # Hypokalemia: Lowest K = 3.3 mmol/L in last 2 days, will replace as needed   # Hypochloremia: Lowest Cl = 96 mmol/L in last 2 days, will monitor as appropriate  # Hypocalcemia: Lowest iCa = 4.3 mg/dL in last 2 days, will monitor and replace as appropriate     # Hypoalbuminemia: Lowest albumin = 3.4 g/dL at 11/29/2024  5:32 AM, will monitor as appropriate                # Cachexia: Estimated body mass index is 16.99 kg/m  as calculated from the following:    Height as of this encounter: 1.651 m (5' 5\").    Weight as of this encounter: 46.3 kg (102 lb 1.2 oz).   # Severe Malnutrition: based on nutrition assessment    # Financial/Environmental Concerns: none         Social Drivers of Health    Housing Stability: High Risk (11/23/2024)    Housing Stability     Do you have housing? : No     Are you worried about losing your housing?: No          Disposition Plan     Medically Ready for Discharge: Anticipated Tomorrow             Jesu Lunsford MD  Hospitalist Service, GOLD TEAM 8  [facility]  Securely " message with Elizabeth (more info)  Text page via [employer] Paging/Directory   See signed in provider for up to date coverage information  ______________________________________________________________________    Interval History   Stable O2 req, still does not tolerate much ambulation at all but stable.     Physical Exam   Vital Signs: Temp: 97.7  F (36.5  C) Temp src: Oral BP: 107/80 Pulse: 66   Resp: 16 SpO2: 96 % O2 Device: Nasal cannula Oxygen Delivery: 1.5 LPM  Weight: 102 lbs 1.17 oz    Gen: A&Ox4, lying comfortably on bed, in no distress, on 2 L NC  Lung: clear on ausculation b/l, no wheezing/crackle  Abd: soft, non-tender, non-distended  Ext: No edema in upper/lower extremities    Medical Decision Making             Data

## 2024-11-29 NOTE — PLAN OF CARE
Goal Outcome Evaluation:      Assumed cares [id number]    Pt A&Ox4. VSS. Denies pain. Purewick in place, good output. 1L nasal cannula. Continous pulse ox. Pt stayed in bed during shift. Good appetite. Placed order for new PIV access. IV would leak when flushed and pt c/o pain. Continue to monitor and follow plan of care.

## 2024-11-30 LAB
ALBUMIN SERPL BCG-MCNC: 3.5 G/DL (ref 3.5–5.2)
ALP SERPL-CCNC: 85 U/L (ref 40–150)
ALT SERPL W P-5'-P-CCNC: 6 U/L (ref 0–50)
ANION GAP SERPL CALCULATED.3IONS-SCNC: 9 MMOL/L (ref 7–15)
AST SERPL W P-5'-P-CCNC: 15 U/L (ref 0–45)
BILIRUB SERPL-MCNC: 0.3 MG/DL
BUN SERPL-MCNC: 30.8 MG/DL (ref 8–23)
CALCIUM SERPL-MCNC: 9.1 MG/DL (ref 8.8–10.4)
CHLORIDE SERPL-SCNC: 98 MMOL/L (ref 98–107)
CREAT SERPL-MCNC: 1.18 MG/DL (ref 0.51–0.95)
EGFRCR SERPLBLD CKD-EPI 2021: 47 ML/MIN/1.73M2
ERYTHROCYTE [DISTWIDTH] IN BLOOD BY AUTOMATED COUNT: 20.5 % (ref 10–15)
GLUCOSE SERPL-MCNC: 95 MG/DL (ref 70–99)
HCO3 SERPL-SCNC: 32 MMOL/L (ref 22–29)
HCT VFR BLD AUTO: 39.9 % (ref 35–47)
HGB BLD-MCNC: 11.8 G/DL (ref 11.7–15.7)
MAGNESIUM SERPL-MCNC: 1.9 MG/DL (ref 1.7–2.3)
MCH RBC QN AUTO: 25.9 PG (ref 26.5–33)
MCHC RBC AUTO-ENTMCNC: 29.6 G/DL (ref 31.5–36.5)
MCV RBC AUTO: 88 FL (ref 78–100)
PHOSPHATE SERPL-MCNC: 4 MG/DL (ref 2.5–4.5)
PLATELET # BLD AUTO: 281 10E3/UL (ref 150–450)
POTASSIUM SERPL-SCNC: 3.7 MMOL/L (ref 3.4–5.3)
PROT SERPL-MCNC: 6 G/DL (ref 6.4–8.3)
RBC # BLD AUTO: 4.55 10E6/UL (ref 3.8–5.2)
SODIUM SERPL-SCNC: 139 MMOL/L (ref 135–145)
WBC # BLD AUTO: 7.1 10E3/UL (ref 4–11)

## 2024-11-30 PROCEDURE — 31575 DIAGNOSTIC LARYNGOSCOPY: CPT | Mod: 4UV | Performed by: OTOLARYNGOLOGY

## 2024-11-30 PROCEDURE — 85018 HEMOGLOBIN: CPT | Performed by: STUDENT IN AN ORGANIZED HEALTH CARE EDUCATION/TRAINING PROGRAM

## 2024-11-30 PROCEDURE — 99222 1ST HOSP IP/OBS MODERATE 55: CPT | Mod: 4UV | Performed by: OTOLARYNGOLOGY

## 2024-11-30 PROCEDURE — 82947 ASSAY GLUCOSE BLOOD QUANT: CPT | Performed by: STUDENT IN AN ORGANIZED HEALTH CARE EDUCATION/TRAINING PROGRAM

## 2024-11-30 PROCEDURE — 85041 AUTOMATED RBC COUNT: CPT | Performed by: STUDENT IN AN ORGANIZED HEALTH CARE EDUCATION/TRAINING PROGRAM

## 2024-11-30 PROCEDURE — 120N000002 HC R&B MED SURG/OB UMMC

## 2024-11-30 PROCEDURE — 250N000013 HC RX MED GY IP 250 OP 250 PS 637: Performed by: STUDENT IN AN ORGANIZED HEALTH CARE EDUCATION/TRAINING PROGRAM

## 2024-11-30 PROCEDURE — 84100 ASSAY OF PHOSPHORUS: CPT | Performed by: STUDENT IN AN ORGANIZED HEALTH CARE EDUCATION/TRAINING PROGRAM

## 2024-11-30 PROCEDURE — 999N000248 HC STATISTIC IV INSERT WITH US BY RN

## 2024-11-30 PROCEDURE — 83735 ASSAY OF MAGNESIUM: CPT | Performed by: STUDENT IN AN ORGANIZED HEALTH CARE EDUCATION/TRAINING PROGRAM

## 2024-11-30 PROCEDURE — 250N000013 HC RX MED GY IP 250 OP 250 PS 637: Performed by: PHYSICIAN ASSISTANT

## 2024-11-30 PROCEDURE — 99232 SBSQ HOSP IP/OBS MODERATE 35: CPT | Performed by: STUDENT IN AN ORGANIZED HEALTH CARE EDUCATION/TRAINING PROGRAM

## 2024-11-30 PROCEDURE — 0CJS8ZZ INSPECTION OF LARYNX, VIA NATURAL OR ARTIFICIAL OPENING ENDOSCOPIC: ICD-10-PCS | Performed by: OTOLARYNGOLOGY

## 2024-11-30 PROCEDURE — 97530 THERAPEUTIC ACTIVITIES: CPT | Mod: GP

## 2024-11-30 PROCEDURE — 97116 GAIT TRAINING THERAPY: CPT | Mod: GP

## 2024-11-30 PROCEDURE — 82435 ASSAY OF BLOOD CHLORIDE: CPT | Performed by: STUDENT IN AN ORGANIZED HEALTH CARE EDUCATION/TRAINING PROGRAM

## 2024-11-30 PROCEDURE — 80053 COMPREHEN METABOLIC PANEL: CPT | Performed by: STUDENT IN AN ORGANIZED HEALTH CARE EDUCATION/TRAINING PROGRAM

## 2024-11-30 PROCEDURE — 0CJY8ZZ INSPECTION OF MOUTH AND THROAT, VIA NATURAL OR ARTIFICIAL OPENING ENDOSCOPIC: ICD-10-PCS | Performed by: OTOLARYNGOLOGY

## 2024-11-30 PROCEDURE — 36415 COLL VENOUS BLD VENIPUNCTURE: CPT | Performed by: STUDENT IN AN ORGANIZED HEALTH CARE EDUCATION/TRAINING PROGRAM

## 2024-11-30 PROCEDURE — 82040 ASSAY OF SERUM ALBUMIN: CPT | Performed by: STUDENT IN AN ORGANIZED HEALTH CARE EDUCATION/TRAINING PROGRAM

## 2024-11-30 RX ADMIN — LEVALBUTEROL TARTRATE 2 PUFF: 45 AEROSOL, METERED ORAL at 08:30

## 2024-11-30 RX ADMIN — LEVALBUTEROL TARTRATE 2 PUFF: 45 AEROSOL, METERED ORAL at 02:51

## 2024-11-30 RX ADMIN — LEVALBUTEROL TARTRATE 2 PUFF: 45 AEROSOL, METERED ORAL at 20:30

## 2024-11-30 RX ADMIN — GABAPENTIN 600 MG: 300 CAPSULE ORAL at 21:23

## 2024-11-30 RX ADMIN — IPRATROPIUM BROMIDE 2 PUFF: 17 AEROSOL, METERED RESPIRATORY (INHALATION) at 08:28

## 2024-11-30 RX ADMIN — EMPAGLIFLOZIN 10 MG: 10 TABLET, FILM COATED ORAL at 08:25

## 2024-11-30 RX ADMIN — PROPRANOLOL HYDROCHLORIDE 10 MG: 10 TABLET ORAL at 08:26

## 2024-11-30 RX ADMIN — FEXOFENADINE HCL 180 MG: 180 TABLET ORAL at 08:25

## 2024-11-30 RX ADMIN — ATORVASTATIN CALCIUM 20 MG: 20 TABLET, FILM COATED ORAL at 20:29

## 2024-11-30 RX ADMIN — IPRATROPIUM BROMIDE 2 PUFF: 17 AEROSOL, METERED RESPIRATORY (INHALATION) at 16:36

## 2024-11-30 RX ADMIN — SERTRALINE HYDROCHLORIDE 150 MG: 100 TABLET ORAL at 08:26

## 2024-11-30 RX ADMIN — SPIRONOLACTONE 25 MG: 25 TABLET, FILM COATED ORAL at 08:26

## 2024-11-30 RX ADMIN — DILTIAZEM HYDROCHLORIDE 240 MG: 240 CAPSULE, EXTENDED RELEASE ORAL at 20:29

## 2024-11-30 RX ADMIN — APIXABAN 5 MG: 5 TABLET, FILM COATED ORAL at 20:29

## 2024-11-30 RX ADMIN — PROPRANOLOL HYDROCHLORIDE 10 MG: 10 TABLET ORAL at 20:30

## 2024-11-30 RX ADMIN — LEVOTHYROXINE SODIUM 88 MCG: 88 TABLET ORAL at 06:20

## 2024-11-30 RX ADMIN — APIXABAN 5 MG: 5 TABLET, FILM COATED ORAL at 08:25

## 2024-11-30 RX ADMIN — FUROSEMIDE 40 MG: 40 TABLET ORAL at 08:26

## 2024-11-30 RX ADMIN — FLUTICASONE FUROATE AND VILANTEROL TRIFENATATE 1 PUFF: 200; 25 POWDER RESPIRATORY (INHALATION) at 08:29

## 2024-11-30 RX ADMIN — IPRATROPIUM BROMIDE 2 PUFF: 17 AEROSOL, METERED RESPIRATORY (INHALATION) at 11:51

## 2024-11-30 RX ADMIN — IPRATROPIUM BROMIDE 2 PUFF: 17 AEROSOL, METERED RESPIRATORY (INHALATION) at 20:30

## 2024-11-30 RX ADMIN — LEVALBUTEROL TARTRATE 2 PUFF: 45 AEROSOL, METERED ORAL at 14:44

## 2024-11-30 RX ADMIN — DILTIAZEM HYDROCHLORIDE 240 MG: 240 CAPSULE, EXTENDED RELEASE ORAL at 08:26

## 2024-11-30 ASSESSMENT — ACTIVITIES OF DAILY LIVING (ADL)
ADLS_ACUITY_SCORE: 59
ADLS_ACUITY_SCORE: 63
ADLS_ACUITY_SCORE: 63
ADLS_ACUITY_SCORE: 59
ADLS_ACUITY_SCORE: 63
ADLS_ACUITY_SCORE: 59

## 2024-11-30 NOTE — PROGRESS NOTES
Care Management Follow Up    Length of Stay (days): 9    Expected Discharge Date: 12/03/2024     Concerns to be Addressed: all concerns addressed in this encounter     Patient plan of care discussed at interdisciplinary rounds: Yes    Anticipated Discharge Disposition: Skilled Nursing Facility      Anticipated Discharge Services: None  Anticipated Discharge DME: None    Patient/family educated on Medicare website which has current facility and service quality ratings: yes  Education Provided on the Discharge Plan:  no  Patient/Family in Agreement with the Plan:  yes    Referrals Placed by CM/SW:    Pending:  [facility]  [address] [place]  [address]  P: [phone]  Admissions Sandra: [phone]  F: [phone]  11.30: Will review, if accepted will be $50/day private room fee    [place]  [id number] [place] [address] [phone]   Admissions: [phone] (Anali)  [email or web address] (Anali in admissions email)    [place]  [address] [place] [address]   P: [phone]  F: [phone]    [facility] (SNF)  [address] [place] [address]  Admissions: [phone]  P: [phone]   F: [phone]    [facility]: Bed not available 11/27  Private pay costs discussed: Not applicable    Discussed  Partnership in Safe Discharge Planning  document with patient/family: No     Handoff Completed: No, handoff not indicated or clinically appropriate    Additional Information:    Met with pt at bedside to gather preferences for TCU referrals. Pt had not reviewed and requested SW reach out to their daughter Lana to get preferences for referrals.     LVM for daughter Lana with request and informing of Medicare's website, left number for return call to share our preferences    Addendum:  Met with pt and daughter in room. Daughter wants pt to admit directly to detention. Will follow  up with [facility] during their business hours to determine if they can meet pt's current needs. Sent TCU referrals in the meantime.    Next Steps:     TCU follow up    See if jail can accept back instead of TCU per daughter request.    GUADALUPE Salinas  7C       Social Work and Care Management Department       SEARCHABLE in [facility] - search SOCIAL WORK       [place] (0800 - 1630) Saturday and Sunday     Units: 4A Vocera, 4C Vocera, & 4E Vocera        Units: 5A 5768-3758 Vocera, 5A [phone] Vocera , BMT SW 1 BMT SW 2, BMT SW 3 & BMT SW 4  5C Off Service 5401 - 5416  5C Off Service [phone]     Units: 6A Vocera & 6B Vocera      Units: 6C Vocera     Units: 7A Vocera & 7B Vocera      Units: 7C Med Surg 7401 thru 7418 and 7C Med Surg 7502 thru 7521      Unit: [place] ED Vocera & [place] Obs Vocera     [facility] ([phone]) Saturday and Sunday      Units: 5 Ortho Vocera, 5 Med Surg Vocera & WB ED Vocera     Units: 6 Med Surg Vocera, 8 Med Surg Vocera, & 10 ICU Vocera      After hours Vocera [facility] and After Hours Vocera [place]     Please NOTE changes to times below:    **Saturday & Sunday (1630 - 2030)    **Mon-Fri ([phone])     **[facility] Recognized Holidays  ([id number])    Units: ALL   - see above VOCERA links to units

## 2024-11-30 NOTE — PROGRESS NOTES
[facility]    Medicine Progress Note - Hospitalist Service, GOLD TEAM 8    Date of Admission:  11/21/2024    Assessment & Plan   Asya Coffman is a 79 year old female with history of A fib, CAD, COPD, asthma, Raynaud's, CKD, laryngeal squamous cell carcinoma/papillomatosis, CREST syndrome, chronic pain, chronic constipation, hypothyroidism, anxiety, and depression who was admitted to [facility] with acute hypoxic respiratory failure, hospitalization prolonged due to inability to wean off O2 despite diuresis.     Changes today:  - O2 req slightly increased 2.5 L NC  - ENT consulting today to eval if having recurrent respiratory papillomas that could contribute to current presentation. Low suspicion this is the case, but will do bedside laryngoscopy to evaluate    Acute hypoxic respiratory failure, improving- on 1L NC  Acute on Chronic Heart Failure with Preserved Systolic Function   Pulmonary hypertension with elevated RVSP on TTE 11/22  Fluid overload  Elevated BNP  Hx of asthma- not in exacerbation  Presented with 1 month progressive dyspnea. Endorses FELIZ, PND, orthopnea, swelling in the legs. Found to have hypoxic requiring 2L NC. BNP 4,421 (1,920 4/2024). VBG 7.41/40/46/26. LA, WBC normal. Flu, COVID negative. EKG A fib with RVR (rate 101), chronic A fib as below. CT PE 11/21 no PE, trace interstitial edema with small to moderate b/l pleural effusions. TTE with normal EF however unable to measure diastolic function 2/2 a fib.   - IP pulmonary consulted for any other IP work up as unable to wean off oxygen, follow up final recs  - Will spot dose with lasix IV and monitor, lasix 20mg IV x 1 in ED, 40mg IV 11/22, 40mg IV 11/24, 40 mg IV 11/26  - Added scheduled Xopenex/ipratropium q6hr  - Repeat CXR 11/23 showing small R pleural effusion and trace L   - Has OP pulmonologist Dr. Mccoy and also cardiologist, NOLBERTO to help her schedule an appt post discharge  - PTA  Fluticasone- Vilanterol  - 2L fluid restriction  - Daily standing weights, I&Os  - Continuous pulse ox    A fib with RVR- not rate controlled  - On Diltiazem 240 ER BID, max dose  - C/w propranolol 10mg BID, target HR 60-70   - c/w PTA on Apixaban,  - Last RVR 11/23, required 10mg IV Cardizem  - Maintain K~4 and Mg ~2     Significant weight loss  Severe Protein-Calorie Malnutrition   - Per daughter baseline wt around 120-130 lbs, however wt here around 102 lbs, patient states she is eating less and has less appetite, denies post prandial abd pain, N/V. She states she likes the food but just do not have the appetite to eat more. She has hx of recent invasive laryngeal SCC with radiation therapy, also had dysphagia before  - Will consult nutritionist    Deconditioning  - PT/OT, dispo recommendation is TCU, patient agrees     H/o laryngeal squamous cell carcinoma, papillomatosis with dysphagia: Initially presented 2015 with benign laryngeal lesion. Progressed to papilloma 2021. Dx with invasive squamous cell carcinoma 4/2024. Now s/p radiation completed 7/2024. CT chest 11/21 ingested content within a mildly patulous thoracic esophagus, aspiration cautions recommended   - SLP consult, recommending reg diet and think liq   - Follow up OP 1/2025 as planned     Chronic/stable medical conditions  Pulmonary nodule:   6 mm LLL pulmonary nodule noted on CT  - Will need repeat imaging in 6-12 months     Subacute rib fractures:   Admitted to [facility] 9/2024 with rib fractures. Subacute fractures noted on CT 11/21. Continue supportive cares     H/o CREST syndrome:   2014. Per notes, did have positive FARIDEH, Raynaud's, Calcinosis, GERD and some telangectasia's. Last saw [facility] rheumatology 2017. No acute concerns     Hypernatremia: Mild. Na 146. Trend     L>R BLE edema: US to rule out DVT    Troponin elevation, likely increased demand: Trop 28 (32). EKG chronic a fib. Asymptomatic. Monitor   COPD, asthma: No e/o exacerbation.  "Continue PTA Symbicort, albuterol, DuoNebs   CKD II: BL Cr 0.9-1.1 and stable. Trend   Chronic pain: Continue PTA gabapentin, Tylenol. Discontinue Ibuprofen given CKD  Chronic constipation: Continue bowel regimen   Hypothyroidism: TSH 1.48 4/2024. Continue PTA synthroid   Anxiety, depression: Continue PTA Sertraline, Propranolol   Raynaud's: Continue diltiazem as above    Diet:  2L fluid restriction  DVT Prophylaxis: DOAC  Miranda Catheter: Not present  Lines: None     Cardiac Monitoring: None  Code Status:  Full Code          Diet: Fluid restriction 2000 ML FLUID  Low Saturated Fat Na <2400 mg  Snacks/Supplements Adult: Ensure Enlive; Between Meals    DVT Prophylaxis: DOAC  Miranda Catheter: Not present  Lines: None     Cardiac Monitoring: None  Code Status: Full Code      Clinically Significant Risk Factors        # Hypokalemia: Lowest K = 3.3 mmol/L in last 2 days, will replace as needed  # Hyperkalemia: Highest K = 5.5 mmol/L in last 2 days, will monitor as appropriate  # Hyponatremia: Lowest Na = 134 mmol/L in last 2 days, will monitor as appropriate  # Hypochloremia: Lowest Cl = 96 mmol/L in last 2 days, will monitor as appropriate      # Hypoalbuminemia: Lowest albumin = 3.4 g/dL at 11/29/2024  5:32 AM, will monitor as appropriate                # Cachexia: Estimated body mass index is 17.79 kg/m  as calculated from the following:    Height as of this encounter: 1.651 m (5' 5\").    Weight as of this encounter: 48.5 kg (106 lb 14.8 oz).   # Severe Malnutrition: based on nutrition assessment    # Financial/Environmental Concerns: none         Social Drivers of Health    Housing Stability: High Risk (11/23/2024)    Housing Stability     Do you have housing? : No     Are you worried about losing your housing?: No          Disposition Plan     Medically Ready for Discharge: Anticipated Tomorrow             Jesu Lunsford MD  Hospitalist Service, GOLD TEAM 8  [facility]  Securely message with [employer] (more info)  Text page via [employer] Paging/Directory   See signed in provider for up to date coverage information  ______________________________________________________________________    Interval History   Slight increase in O2 req, still minimally ambulatory due to exertional dyspnea w/o chest pain    Physical Exam   Vital Signs: Temp: 97.5  F (36.4  C) Temp src: Oral BP: 114/83 Pulse: 82   Resp: 18 SpO2: 97 % O2 Device: Nasal cannula Oxygen Delivery: 2.5 LPM  Weight: 106 lbs 14.77 oz    Gen: A&Ox4, lying comfortably on bed, in no distress, on 2.5 L NC  Lung: clear on ausculation b/l, no wheezing/crackle  Abd: soft, non-tender, non-distended  Ext: No edema in upper/lower extremities    Medical Decision Making       35 MINUTES SPENT BY ME on the date of service doing chart review, history, exam, documentation & further activities per the note.      Data

## 2024-11-30 NOTE — PLAN OF CARE
Goal Outcome Evaluation:  Care from 7:30 pm to 11 pm  A&Ox4. AVSS. Sats >92%on 2.5 LPM. Denied pain. Incontinent of bowel and bladder; purewick is in place. Pt takes whole pills with pudding. Up with assist x2 and a GB.   Continue with POC

## 2024-11-30 NOTE — PLAN OF CARE
Goal Outcome Evaluation:       Pt is A&O, V/S stable, assist x 2, on 2.5L O2 via nasal cannula. Pt is incontinent of bladder and bowel, is currently using Purewick. Pt takes pills whole with pudding. Pt denies pain. No major events during the night. Continue plan of care.

## 2024-11-30 NOTE — PLAN OF CARE
"/69 (BP Location: Right arm)   Pulse 64   Temp 97.8  F (36.6  C) (Oral)   Resp 18   Ht 1.651 m (5' 5\")   Wt 48.5 kg (106 lb 14.8 oz)   SpO2 91%   BMI 17.79 kg/m      Goal Outcome Evaluation:  Cares from: 0700 - 1530     VS & Pain: VSS on RA. Denied pain  Neuro: AxO x4  Respiratory: on 2.5L O2 via NC. Denied shortness of breath   Cardiac: denied chest pain   Peripheral neurovascular: wdl  GI/: + bowel sounds. No BM this shift. Purewick in place  Nutrition: low Saturated Fat Na <2400 mg, 2L fluid restriction. Good appetite for breakfast, 25% for lunch with adequate fluid intake   Skin: redness blanchable on sacrum/coccyx- mepilex applied   Musculoskeletal: wdl  Lines: L. PIV- SL   Activity: assist x2 with GB + walker  Labs/Electrolytes:  reviewed, wdl     Plan: continue plan of care      "

## 2024-11-30 NOTE — CONSULTS
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 She last saw Dr. Davis in clinic on 10/14/24, at which time there was no papilloma regrowth to date. She was complaining of breathing trouble at that time, however it was deemed not related to any papilloma in the airway.     Today she continues to have shortness of breath. She is on oxygen laying in bed. She denies any changes in her voice, dysphagia, odynophagia, pain in the throat, pain with talking, hemoptysis, headaches, fevers, chills, voice changes, noisy breathing, stridor.     Past Medical History:   Diagnosis Date    A-fib (H)     Antiplatelet or antithrombotic long-term use     Arrhythmia     COPD (chronic obstructive pulmonary disease) (H)      Past Surgical History:   Procedure Laterality Date    INJECT STEROID (LOCATION) N/A 6/15/2023    Procedure: Avastin injection;  Surgeon: Nikole Davis MD;  [facility]    INJECT STEROID (LOCATION) N/A 9/7/2023    Procedure: Avastin injection;  Surgeon: Nikole Davis MD;  [facility]    INJECT STEROID (LOCATION) N/A 12/14/2023    Procedure: Avastin injection;  Surgeon: Nikole Davis MD;  [facility]    INJECT STEROID (LOCATION) N/A 2/15/2024    Procedure: Avastin injection;  Surgeon: Nikole Davis MD;  [facility]    LASER CO2 LARYNGOSCOPY N/A 9/7/2023    Procedure: Microdirect laryngoscopy with excision/ablation of laryngeal lesions, biopsies, CO2 laser;  Surgeon: Nikole Davis MD;  [facility]    LASER CO2 LARYNGOSCOPY N/A 5/30/2024    Procedure: Microdirect laryngoscopy  with excision/ablation of laryngeal lesions, biopsies, CO2 laser;  Surgeon: Nikole Davis MD;  [facility]    LASER CO2 LARYNGOSCOPY, COMPLEX N/A 5/26/2022    Procedure: Micro direct laryngoscopy with excision/ablation of laryngeal lesions, possible biopsies, possible CO2 laser;  Surgeon: Nikole Davis MD;  [facility]    LASER CO2 LARYNGOSCOPY, COMPLEX N/A 9/15/2022    Procedure: Microdirect laryngoscopy with ablation of laryngeal lesions,biopsies,CO2 laser;  Surgeon: Nikole Davis MD;  [facility]    LASER CO2 LARYNGOSCOPY, COMPLEX N/A 12/1/2022    Procedure: Microdirect laryngoscopy with excision/ablation of laryngeal lesions, biopsies,   CO2 laser;  Surgeon: Nikole Davis MD;  [facility]    LASER CO2 LARYNGOSCOPY, COMPLEX N/A 6/15/2023    Procedure: Microdirect laryngoscopy with ablation of laryngeal lesions, biopsies, CO2 laser;  Surgeon: Nikole Davis MD;  [facility]    LASER CO2 LARYNGOSCOPY, COMPLEX N/A 10/5/2023    Procedure: Microdirect laryngoscopy with excision/ablation of laryngeal lesions, biopsies, CO2 laser;  Surgeon: Nikole Davis MD;  [facility]    LASER CO2 LARYNGOSCOPY, COMPLEX N/A 12/14/2023    Procedure: Microdirect laryngoscopy with excision/ablation of laryngeal lesions, biopsies, CO2 laser;  Surgeon: Nikole Davis MD;  [facility]    LASER CO2 LARYNGOSCOPY, COMPLEX N/A 2/15/2024    Procedure: Microdirect laryngoscopy with excision/ablation of laryngeal lesions, biopsies, CO2 laser;  Surgeon: Nikole Davis MD;  [facility]    LASER CO2 LARYNGOSCOPY, COMPLEX N/A 4/11/2024    Procedure: Microdirect laryngoscopy with excision/ablation of laryngeal lesions, biopsies, CO2 laser, Avastin injections;  Surgeon: Nikole Davis MD;  [facility]    LASER KTP LARYNGOSCOPY N/A 4/3/2023    Procedure: Microdirect laryngoscopy with biopsies, excision/ablation of  lesions, C02 laser,  Avastin injection;  Surgeon: Nikole Davis MD;  [facility]    LASER KTP LARYNGOSCOPY N/A 6/15/2023    Procedure: flexible laser (CO2 or KTP) standby;  Surgeon: Nikole Davis MD;  [facility]    LASER KTP LARYNGOSCOPY FLEXIBLE N/A 8/18/2022    Procedure: Transnasal flexible laryngoscopy with KTP laser and biopsies;  Surgeon: Nikole Davis MD;  Location[facility]    LASER KTP LARYNGOSCOPY FLEXIBLE N/A 10/27/2022    Procedure: Transnasal flexible laryngoscopy with possible KTP laser;  Surgeon: Nikole Davis MD;  Location: [facility]    LASER KTP LARYNGOSCOPY FLEXIBLE N/A 1/26/2023    Procedure: Transnasal flexible laryngoscopy with KTP laser,  biopsies, superior laryngeal nerve blocks, Avastin injection;  Surgeon: Nikole Davis MD;  Location: [facility]    LASER KTP LARYNGOSCOPY FLEXIBLE N/A 2/15/2023    Procedure: Transnasal flexible laryngoscopy with KTP laser, superior laryngeal nerve blocks, biopsies, avastin injection;  Surgeon: Nikole Davis MD;  Location: [facility]    LASER KTP LARYNGOSCOPY FLEXIBLE N/A 2/17/2023    Procedure: Transnasal flexible laryngoscopy with KTP laser, biopsies, superior laryngeal nerve blocks;  Surgeon: Nikole Davis MD;  Location: [facility]    LASER KTP LARYNGOSCOPY FLEXIBLE N/A 2/23/2023    Procedure: Transnasal flexible laryngoscopy with KTP laser, superior laryngeal nerve blocks;  Surgeon: Nikole Davis MD;  Location: [facility]    LASER KTP LARYNGOSCOPY FLEXIBLE N/A 3/2/2023    Procedure: Transnasal flexible laryngoscopy with KTP laser, superior laryngeal nerve block Bilateral;  Surgeon: Nikole Davis MD;  Location: [facility]    LASER KTP LARYNGOSCOPY FLEXIBLE N/A 3/9/2023    Procedure: Transnasal flexible laryngoscopy with KTP laser, biopsies, superior laryngeal nerve blocks;  Surgeon: Nikole Davis MD;  Location: [facility]    LASER KTP LARYNGOSCOPY  FLEXIBLE N/A 3/17/2023    Procedure: Transnasal flexible laryngoscopy with KTP laser, superior laryngeal nerve block(s), Avastin injection;  Surgeon: Nikole Davis MD;  Location: [facility]    LASER KTP LARYNGOSCOPY FLEXIBLE N/A 3/28/2023    Procedure: Transnasal flexible laryngoscopy with KTP laser, superior laryngeal nerve block(s);  Surgeon: Pankaj Woodard MD;  Location: [facility] OR     No current outpatient medications on file.     Allergies   Allergen Reactions    Methylpyrrolidone Anaphylaxis    Nuts Anaphylaxis     Black walnut    Escitalopram Other (See Comments)     Asthma -a time of exacerbation and may not have been cause may retry    Mirtazapine Other (See Comments)     Asthma a time of exacerbation and may not have been cause may retry    Venlafaxine Other (See Comments)    Adhesive Tape Rash     With holter monitor. Ok with bandaids.    No Clinical Screening - See Comments Rash     Adhesive tape     Social History     Socioeconomic History    Marital status:      Spouse name: Not on file    Number of children: Not on file    Years of education: Not on file    Highest education level: Not on file   Occupational History    Not on file   Tobacco Use    Smoking status: Never    Smokeless tobacco: Never   Substance and Sexual Activity    Alcohol use: Yes     Comment: Occasionally    Drug use: Never    Sexual activity: Not on file   Other Topics Concern    Not on file   Social History Narrative    Not on file     Social Drivers of Health     Financial Resource Strain: Low Risk  (11/23/2024)    Financial Resource Strain     Within the past 12 months, have you or your family members you live with been unable to get utilities (heat, electricity) when it was really needed?: No   Food Insecurity: Low Risk  (11/23/2024)    Food Insecurity     Within the past 12 months, did you worry that your food would run out before you got money to buy more?: No     Within the past 12 months, did the  "food you bought just not last and you didn t have money to get more?: No   Transportation Needs: Low Risk  (11/23/2024)    Transportation Needs     Within the past 12 months, has lack of transportation kept you from medical appointments, getting your medicines, non-medical meetings or appointments, work, or from getting things that you need?: No   Physical Activity: Not on file   Stress: Not on file   Social Connections: Socially Integrated (12/1/2023)    [facility]    Social Connections     Do you often feel lonely or isolated from those around you?: 0   Interpersonal Safety: Low Risk  (11/23/2024)    Interpersonal Safety     Do you feel physically and emotionally safe where you currently live?: Yes     Within the past 12 months, have you been hit, slapped, kicked or otherwise physically hurt by someone?: No     Within the past 12 months, have you been humiliated or emotionally abused in other ways by your partner or ex-partner?: No   Housing Stability: High Risk (11/23/2024)    Housing Stability     Do you have housing? : No     Are you worried about losing your housing?: No     Family History   Problem Relation Age of Onset    Breast Cancer Mother 75.00    Hereditary Breast and Ovarian Cancer Syndrome No family hx of     Cancer No family hx of     Colon Cancer No family hx of     Endometrial Cancer No family hx of     Ovarian Cancer No family hx of      ROS: 12 point review of systems is negative unless noted in HPI.    PHYSICAL EXAM:  /83   Pulse 82   Temp 97.5  F (36.4  C) (Oral)   Resp 18   Ht 1.651 m (5' 5\")   Wt 48.5 kg (106 lb 14.8 oz)   SpO2 97%   BMI 17.79 kg/m    General: laying in bed, no acute distress  Head: Normocephalic, atraumatic  Face: Symmetrical, CN VII intact bilaterally (HB 1). Sensation V1-V3 intact and equal bilaterally.   Eyes: EOMI  Nose: No anterior drainage, generally midline  Neck: No LAD, trachea midline  Respiratory: " Breathing non-labored on nasal cannula  Skin: No rashes or skin lesions of the face/neck  Psych: Pleasant affect, cooperative  CV: Extremities warm and well perfused, radial pulses palpable and symmetric    FIBEROPTIC ENDOSCOPY:  Due to airway concerns and history of scc of the larynx, fiberoptic laryngoscopy was indicated. After obtaining verbal consent, the nose was topically decongested and anesthetized on the right side. The fiberoptic laryngoscope was passed under endoscopic vision through the right nasal passage. The turbinates were normal. The inferior and middle meati were clear bilaterally without purulence, masses, or polyps. The nasopharynx was clear. The eustachian tubes were clear. The soft palate appeared normal with good mobility. The epiglottis was sharp, and the visualized portion of the vallecula was clear. The cords were mobile bilaterally. There was a small white lesion on the posterior right cord, and irregular hypopigmented tissue in the subglottis, posterior aspect of the trachea. The arytenoids were clear, with some post radiation edema/changes and there was no pooling in the hypopharynx.            ROUTINE IP LABS (Last four results)  BMP  Recent Labs   Lab 11/30/24 0558 11/29/24 2008 11/29/24  1751 11/29/24  1305 11/29/24  0532 11/28/24  1806     --  134*  --  137 140   POTASSIUM 3.7 4.0 5.5* 3.8 3.3* 3.9   CHLORIDE 98  --  97*  --  96* 97*   ESSIE 9.1  --  9.0  --  8.8 9.0   CO2 32*  --  24  --  31* 30*   BUN 30.8*  --  31.5*  --  30.8* 31.6*   CR 1.18*  --  1.07*  --  1.17* 1.35*   GLC 95  --  140*  --  90 123*     CBC  Recent Labs   Lab 11/30/24  0558 11/29/24  0532 11/28/24  0608 11/27/24  0615   WBC 7.1 5.9 6.1 6.7   RBC 4.55 4.56 4.54 4.77   HGB 11.8 11.7 11.7 12.3   HCT 39.9 39.7 40.0 42.8   MCV 88 87 88 90   MCH 25.9* 25.7* 25.8* 25.8*   MCHC 29.6* 29.5* 29.3* 28.7*   RDW 20.5* 20.6* 21.1* 21.2*    285 284 275       Assessment and Plan  Asya Coffman is a 79 year  old female with a past medical history of CREST syndrome, COPD, chronic kidney disease, atrial fibrillation and T1N0M0 squamous cell carcinoma of the larynx. The airway is open and cords are mobile bilaterally, however there is atypical appearing areas in the larynx and subglottis. These are non-occlusive and likely not contributing to her current shortness of breath.     - Recommend starting a PPI to limit laryngeal irritation  - Agree with inhaled steroids (currently getting fluticasone)   - No acute ENT intervention  - Patient has follow up in January, messaged Dr. Davis to make her aware of the findings    Patient and plan was discussed with chief resident Dr. Dykes and Staff Dr. Jt Garcia MD  Otolaryngology-Head & Neck Surgery PGY-2  Please contact ENT with questions by dialing [phone] and entering job [phone] when prompted.

## 2024-11-30 NOTE — PROGRESS NOTES
Activity: Up with A1 with GB+walker, can turn self in bed  Neuros:  A&O x4.   Cardiac:  WDL.   Respiratory:  LS diminished in RLL/RUL and crackles in LLL/KEELY, on 1.5 LNC. Denies SOB.  GI/: Incontinence B and B, pure wick on, no bm this shift, +BS, passing flatus.   Diet: Low fat, Na<2400, 2L fluid restriction  Skin: Scattered bruising, sacrum/coccyx redness  Lines: PIV SL  Incisions/Drains: Pure wick  Labs: K 5.5 at 1751 up from 3.3 at 0532 today,   Pain/nausea:  Denies.  New changes this shift: Hypokalemia which was corrected with oral potassium  Plan:  continue to monitor

## 2024-12-01 LAB
ALBUMIN SERPL BCG-MCNC: 3.5 G/DL (ref 3.5–5.2)
ALP SERPL-CCNC: 87 U/L (ref 40–150)
ALT SERPL W P-5'-P-CCNC: <5 U/L (ref 0–50)
ANION GAP SERPL CALCULATED.3IONS-SCNC: 13 MMOL/L (ref 7–15)
AST SERPL W P-5'-P-CCNC: 14 U/L (ref 0–45)
BILIRUB SERPL-MCNC: 0.3 MG/DL
BUN SERPL-MCNC: 27.5 MG/DL (ref 8–23)
CALCIUM SERPL-MCNC: 9.4 MG/DL (ref 8.8–10.4)
CHLORIDE SERPL-SCNC: 98 MMOL/L (ref 98–107)
CREAT SERPL-MCNC: 1.04 MG/DL (ref 0.51–0.95)
EGFRCR SERPLBLD CKD-EPI 2021: 55 ML/MIN/1.73M2
ERYTHROCYTE [DISTWIDTH] IN BLOOD BY AUTOMATED COUNT: 20 % (ref 10–15)
GLUCOSE SERPL-MCNC: 89 MG/DL (ref 70–99)
HCO3 SERPL-SCNC: 28 MMOL/L (ref 22–29)
HCT VFR BLD AUTO: 39.2 % (ref 35–47)
HGB BLD-MCNC: 11.7 G/DL (ref 11.7–15.7)
MAGNESIUM SERPL-MCNC: 1.9 MG/DL (ref 1.7–2.3)
MCH RBC QN AUTO: 25.8 PG (ref 26.5–33)
MCHC RBC AUTO-ENTMCNC: 29.8 G/DL (ref 31.5–36.5)
MCV RBC AUTO: 86 FL (ref 78–100)
PHOSPHATE SERPL-MCNC: 3.9 MG/DL (ref 2.5–4.5)
PLATELET # BLD AUTO: 290 10E3/UL (ref 150–450)
POTASSIUM SERPL-SCNC: 3.7 MMOL/L (ref 3.4–5.3)
PROT SERPL-MCNC: 6.1 G/DL (ref 6.4–8.3)
RBC # BLD AUTO: 4.54 10E6/UL (ref 3.8–5.2)
SODIUM SERPL-SCNC: 139 MMOL/L (ref 135–145)
WBC # BLD AUTO: 6.6 10E3/UL (ref 4–11)

## 2024-12-01 PROCEDURE — 84155 ASSAY OF PROTEIN SERUM: CPT | Performed by: STUDENT IN AN ORGANIZED HEALTH CARE EDUCATION/TRAINING PROGRAM

## 2024-12-01 PROCEDURE — 82435 ASSAY OF BLOOD CHLORIDE: CPT | Performed by: STUDENT IN AN ORGANIZED HEALTH CARE EDUCATION/TRAINING PROGRAM

## 2024-12-01 PROCEDURE — 250N000013 HC RX MED GY IP 250 OP 250 PS 637: Performed by: PHYSICIAN ASSISTANT

## 2024-12-01 PROCEDURE — 85014 HEMATOCRIT: CPT | Performed by: STUDENT IN AN ORGANIZED HEALTH CARE EDUCATION/TRAINING PROGRAM

## 2024-12-01 PROCEDURE — 85041 AUTOMATED RBC COUNT: CPT | Performed by: STUDENT IN AN ORGANIZED HEALTH CARE EDUCATION/TRAINING PROGRAM

## 2024-12-01 PROCEDURE — 250N000013 HC RX MED GY IP 250 OP 250 PS 637: Performed by: STUDENT IN AN ORGANIZED HEALTH CARE EDUCATION/TRAINING PROGRAM

## 2024-12-01 PROCEDURE — 36415 COLL VENOUS BLD VENIPUNCTURE: CPT | Performed by: STUDENT IN AN ORGANIZED HEALTH CARE EDUCATION/TRAINING PROGRAM

## 2024-12-01 PROCEDURE — 84450 TRANSFERASE (AST) (SGOT): CPT | Performed by: STUDENT IN AN ORGANIZED HEALTH CARE EDUCATION/TRAINING PROGRAM

## 2024-12-01 PROCEDURE — 84100 ASSAY OF PHOSPHORUS: CPT | Performed by: STUDENT IN AN ORGANIZED HEALTH CARE EDUCATION/TRAINING PROGRAM

## 2024-12-01 PROCEDURE — 120N000002 HC R&B MED SURG/OB UMMC

## 2024-12-01 PROCEDURE — 99232 SBSQ HOSP IP/OBS MODERATE 35: CPT | Performed by: STUDENT IN AN ORGANIZED HEALTH CARE EDUCATION/TRAINING PROGRAM

## 2024-12-01 PROCEDURE — 83735 ASSAY OF MAGNESIUM: CPT | Performed by: STUDENT IN AN ORGANIZED HEALTH CARE EDUCATION/TRAINING PROGRAM

## 2024-12-01 RX ORDER — PANTOPRAZOLE SODIUM 40 MG/1
40 TABLET, DELAYED RELEASE ORAL
Status: DISCONTINUED | OUTPATIENT
Start: 2024-12-01 | End: 2024-12-09

## 2024-12-01 RX ADMIN — SERTRALINE HYDROCHLORIDE 150 MG: 100 TABLET ORAL at 08:51

## 2024-12-01 RX ADMIN — IPRATROPIUM BROMIDE 2 PUFF: 17 AEROSOL, METERED RESPIRATORY (INHALATION) at 09:01

## 2024-12-01 RX ADMIN — DILTIAZEM HYDROCHLORIDE 240 MG: 240 CAPSULE, EXTENDED RELEASE ORAL at 08:52

## 2024-12-01 RX ADMIN — EMPAGLIFLOZIN 10 MG: 10 TABLET, FILM COATED ORAL at 09:00

## 2024-12-01 RX ADMIN — IPRATROPIUM BROMIDE 2 PUFF: 17 AEROSOL, METERED RESPIRATORY (INHALATION) at 20:23

## 2024-12-01 RX ADMIN — LEVOTHYROXINE SODIUM 88 MCG: 88 TABLET ORAL at 06:07

## 2024-12-01 RX ADMIN — GABAPENTIN 600 MG: 300 CAPSULE ORAL at 22:18

## 2024-12-01 RX ADMIN — LEVALBUTEROL TARTRATE 2 PUFF: 45 AEROSOL, METERED ORAL at 14:18

## 2024-12-01 RX ADMIN — FLUTICASONE FUROATE AND VILANTEROL TRIFENATATE 1 PUFF: 200; 25 POWDER RESPIRATORY (INHALATION) at 09:00

## 2024-12-01 RX ADMIN — APIXABAN 5 MG: 5 TABLET, FILM COATED ORAL at 20:28

## 2024-12-01 RX ADMIN — FEXOFENADINE HCL 180 MG: 180 TABLET ORAL at 08:52

## 2024-12-01 RX ADMIN — FUROSEMIDE 40 MG: 40 TABLET ORAL at 08:53

## 2024-12-01 RX ADMIN — PANTOPRAZOLE SODIUM 40 MG: 40 TABLET, DELAYED RELEASE ORAL at 14:17

## 2024-12-01 RX ADMIN — DILTIAZEM HYDROCHLORIDE 240 MG: 240 CAPSULE, EXTENDED RELEASE ORAL at 20:28

## 2024-12-01 RX ADMIN — APIXABAN 5 MG: 5 TABLET, FILM COATED ORAL at 08:52

## 2024-12-01 RX ADMIN — LEVALBUTEROL TARTRATE 2 PUFF: 45 AEROSOL, METERED ORAL at 09:00

## 2024-12-01 RX ADMIN — LEVALBUTEROL TARTRATE 2 PUFF: 45 AEROSOL, METERED ORAL at 20:24

## 2024-12-01 RX ADMIN — LEVALBUTEROL TARTRATE 2 PUFF: 45 AEROSOL, METERED ORAL at 02:29

## 2024-12-01 RX ADMIN — ATORVASTATIN CALCIUM 20 MG: 20 TABLET, FILM COATED ORAL at 20:28

## 2024-12-01 RX ADMIN — SPIRONOLACTONE 25 MG: 25 TABLET, FILM COATED ORAL at 08:53

## 2024-12-01 RX ADMIN — PROPRANOLOL HYDROCHLORIDE 10 MG: 10 TABLET ORAL at 08:53

## 2024-12-01 RX ADMIN — PROPRANOLOL HYDROCHLORIDE 10 MG: 10 TABLET ORAL at 20:27

## 2024-12-01 ASSESSMENT — ACTIVITIES OF DAILY LIVING (ADL)
ADLS_ACUITY_SCORE: 63
ADLS_ACUITY_SCORE: 67
ADLS_ACUITY_SCORE: 63
ADLS_ACUITY_SCORE: 67

## 2024-12-01 NOTE — PROGRESS NOTES
[facility]    Medicine Progress Note - Hospitalist Service, GOLD TEAM 8    Date of Admission:  11/21/2024    Assessment & Plan   Asya Coffman is a 79 year old female with history of A fib, CAD, COPD, asthma, Raynaud's, CKD, laryngeal squamous cell carcinoma/papillomatosis, CREST syndrome, chronic pain, chronic constipation, hypothyroidism, anxiety, and depression who was admitted to [facility] with acute hypoxic respiratory failure, hospitalization prolonged due to inability to wean off O2 despite diuresis.     Changes today:  - Per ENT starting PPI, follow-up outpatient with her ENT  - Dispo planning, discharging on O2 [facility] (if they accept) vs TCU     Acute hypoxic respiratory failure, improving- on 1L NC  Acute on Chronic Heart Failure with Preserved Systolic Function   Pulmonary hypertension with elevated RVSP on TTE 11/22  Fluid overload  Elevated BNP  Hx of asthma- not in exacerbation  Presented with 1 month progressive dyspnea. Endorses FELIZ, PND, orthopnea, swelling in the legs. Found to have hypoxic requiring 2L NC. BNP 4,421 (1,920 4/2024). VBG 7.41/40/46/26. LA, WBC normal. Flu, COVID negative. EKG A fib with RVR (rate 101), chronic A fib as below. CT PE 11/21 no PE, trace interstitial edema with small to moderate b/l pleural effusions. TTE with normal EF however unable to measure diastolic function 2/2 a fib.   - IP pulmonary consulted for any other IP work up as unable to wean off oxygen, follow up final recs  - Will spot dose with lasix IV and monitor, lasix 20mg IV x 1 in ED, 40mg IV 11/22, 40mg IV 11/24, 40 mg IV 11/26  - Added scheduled Xopenex/ipratropium q6hr  - Repeat CXR 11/23 showing small R pleural effusion and trace L   - Has OP pulmonologist Dr. Mccoy and also cardiologist, NOLBERTO to help her schedule an appt post discharge  - PTA Fluticasone- Vilanterol  - 2L fluid restriction  - Daily standing weights, I&Os  - Continuous pulse ox    A fib with RVR-  not rate controlled  - On Diltiazem 240 ER BID, max dose  - C/w propranolol 10mg BID, target HR 60-70   - c/w PTA on Apixaban,  - Last RVR 11/23, required 10mg IV Cardizem  - Maintain K~4 and Mg ~2     Significant weight loss  Severe Protein-Calorie Malnutrition   - Per daughter baseline wt around 120-130 lbs, however wt here around 102 lbs, patient states she is eating less and has less appetite, denies post prandial abd pain, N/V. She states she likes the food but just do not have the appetite to eat more. She has hx of recent invasive laryngeal SCC with radiation therapy, also had dysphagia before  - Will consult nutritionist    Deconditioning  - PT/OT, dispo recommendation is TCU, patient agrees     H/o laryngeal squamous cell carcinoma, papillomatosis with dysphagia: Initially presented 2015 with benign laryngeal lesion. Progressed to papilloma 2021. Dx with invasive squamous cell carcinoma 4/2024. Now s/p radiation completed 7/2024. CT chest 11/21 ingested content within a mildly patulous thoracic esophagus, aspiration cautions recommended   - SLP consult, recommending reg diet and think liq   - Follow up OP 1/2025 as planned     Chronic/stable medical conditions  Pulmonary nodule:   6 mm LLL pulmonary nodule noted on CT  - Will need repeat imaging in 6-12 months     Subacute rib fractures:   Admitted to [facility] 9/2024 with rib fractures. Subacute fractures noted on CT 11/21. Continue supportive cares     H/o CREST syndrome:   2014. Per notes, did have positive FARIDEH, Raynaud's, Calcinosis, GERD and some telangectasia's. Last saw [facility] 2017. No acute concerns     Hypernatremia: Mild. Na 146. Trend     L>R BLE edema: US to rule out DVT    Troponin elevation, likely increased demand: Trop 28 (32). EKG chronic a fib. Asymptomatic. Monitor   COPD, asthma: No e/o exacerbation. Continue PTA Symbicort, albuterol, DuoNebs   CKD II: BL Cr 0.9-1.1 and stable. Trend   Chronic pain: Continue PTA gabapentin,  "Tylenol. Discontinue Ibuprofen given CKD  Chronic constipation: Continue bowel regimen   Hypothyroidism: TSH 1.48 4/2024. Continue PTA synthroid   Anxiety, depression: Continue PTA Sertraline, Propranolol   Raynaud's: Continue diltiazem as above    Diet:  2L fluid restriction  DVT Prophylaxis: DOAC  Miranda Catheter: Not present  Lines: None     Cardiac Monitoring: None  Code Status:  Full Code          Diet: Fluid restriction 2000 ML FLUID  Low Saturated Fat Na <2400 mg  Snacks/Supplements Adult: Ensure Enlive; Between Meals    DVT Prophylaxis: DOAC  Miranda Catheter: Not present  Lines: None     Cardiac Monitoring: None  Code Status: Full Code      Clinically Significant Risk Factors        # Hyperkalemia: Highest K = 5.5 mmol/L in last 2 days, will monitor as appropriate  # Hyponatremia: Lowest Na = 134 mmol/L in last 2 days, will monitor as appropriate  # Hypochloremia: Lowest Cl = 97 mmol/L in last 2 days, will monitor as appropriate      # Hypoalbuminemia: Lowest albumin = 3.4 g/dL at 11/29/2024  5:32 AM, will monitor as appropriate                # Cachexia: Estimated body mass index is 16.77 kg/m  as calculated from the following:    Height as of this encounter: 1.651 m (5' 5\").    Weight as of this encounter: 45.7 kg (100 lb 12 oz).   # Severe Malnutrition: based on nutrition assessment    # Financial/Environmental Concerns: none         Social Drivers of Health    Housing Stability: High Risk (11/23/2024)    Housing Stability     Do you have housing? : No     Are you worried about losing your housing?: No          Disposition Plan     Medically Ready for Discharge: Ready Now             Jesu Lunsford MD  Hospitalist Service, GOLD TEAM [address] [place] [address]  Securely message with [employer]! (more info)  Text page via [employer] Paging/Directory   See signed in provider for up to date coverage " information  ______________________________________________________________________    Interval History   O2 weaned further.     Physical Exam   Vital Signs: Temp: 98.3  F (36.8  C) Temp src: Oral BP: 124/76 Pulse: 71   Resp: 18 SpO2: 93 % O2 Device: Nasal cannula Oxygen Delivery: 1/2 LPM  Weight: 100 lbs 12 oz    Gen: A&Ox4, lying comfortably on bed, in no distress, on 1 L NC  Lung: clear on ausculation b/l, no wheezing/crackle  Abd: soft, non-tender, non-distended  Ext: No edema in upper/lower extremities    Medical Decision Making       35 MINUTES SPENT BY ME on the date of service doing chart review, history, exam, documentation & further activities per the note.      Data

## 2024-12-01 NOTE — PROGRESS NOTES
Care Management Follow Up    Length of Stay (days): 10    Expected Discharge Date: 12/02/2024     Concerns to be Addressed: all concerns addressed in this encounter     Patient plan of care discussed at interdisciplinary rounds: No    Anticipated Discharge Disposition: Skilled Nursing Facility     Anticipated Discharge Services: None  Anticipated Discharge DME: None    Patient/family educated on Medicare website which has current facility and service quality ratings: no  Education Provided on the Discharge Plan: no   Patient/Family in Agreement with the Plan:  yes    Referrals Placed by CM/SW:    Pending:  [facility]  [address] [place]  [address]  P: [phone]  Admissions Sandra: [phone]  F: [phone]  11.30: Will review, if accepted will be $50/day private room fee     [facility]  [address] [facility] [id number] [place] [address] [phone]   Admissions: [phone] (Anali)  [email or web address] (Anali in admissions email)     [facility]  [address] [place] [address]        P: [phone]  F: [phone]     [facility] (SNF)  [address] [place] [address]  Admissions: [phone]  P: [phone]   F: [phone]     [facility]: Bed not available 11/27  Private pay costs discussed: transportation costs    Discussed  Partnership in Safe Discharge Planning  document with patient/family: No     Handoff Completed: No, handoff not indicated or clinically appropriate    Additional Information:    VM from daughter requesting return call about discharge planning. SW called daughter and shared that TCU referrals are still pending. Daughter asked about transportation costs and options. SW informed of transport costs with [facility] wheelchair transport. Also shared about potential private pay costs of oxygen for transport if family wanted to provide transport. Daughter requested  update from provider - request added to treatment team sticky note. Daughter asked for pt to get a shower or bath - messaged nurse with daughter request.    Next Steps:     TCU placement.    GUADALUPE Salinas  7C       Social Work and Care Management Department     SEARCHABLE in [facility] - search SOCIAL WORK     [place] (0800 - 1630) Saturday and Sunday     Units: 4A Vocera, 4C Vocera, & 4E Vocera        Units: 5A [phone] Vocera, 5A [phone] Vocera , BMT SW 1 BMT SW 2, BMT SW 3 & BMT SW 4  5C Off Service 5401 - 5416  5C Off Service [phone]     Units: 6A Vocera & 6B Vocera      Units: 6C Vocera     Units: 7A Vocera & 7B Vocera      Units: 7C Med Surg 7401 thru 7418 and 7C Med Surg 7502 thru 7521      Unit: [place] ED Vocera & [place] Obs Vocera     [facility] (2631-6427) Saturday and Sunday      Units: 5 Ortho Vocera, 5 Med Surg Vocera & WB ED Vocera     Units: 6 Med Surg Vocera, 8 Med Surg Vocera, & 10 ICU Vocera      After hours Vocera [facility] and After Hours Vocera [place]     Please NOTE changes to times below:    **Saturday & Sunday (1630 - 2030)    **Mon-Fri (5058-2123)     **[facility] Recognized Holidays  (4170-9933)    Units: ALL   - see above VOCERA links to units

## 2024-12-01 NOTE — PLAN OF CARE
Goal Outcome Evaluation:       Pt is A&O, V/S stable, assist x 2, on 0.5L O2 via nasal cannula. Discharge planned for this pt once she can be weaned off O2. Pt has Purewick in place. Sacral redness protected by mepilex. Pt prefers to take meds whole with pudding. Low saturated fat diet and 2L fluid restriction. Pt HR intermittently dropped overnight to mid 30's very briefly and bounces back to normal. Pt denies pain. No major events during the night. Continue plan of care.

## 2024-12-02 LAB
ALBUMIN SERPL BCG-MCNC: 3.6 G/DL (ref 3.5–5.2)
ALP SERPL-CCNC: 92 U/L (ref 40–150)
ALT SERPL W P-5'-P-CCNC: <5 U/L (ref 0–50)
ANION GAP SERPL CALCULATED.3IONS-SCNC: 15 MMOL/L (ref 7–15)
AST SERPL W P-5'-P-CCNC: 22 U/L (ref 0–45)
BILIRUB SERPL-MCNC: 0.3 MG/DL
BUN SERPL-MCNC: 29.1 MG/DL (ref 8–23)
CALCIUM SERPL-MCNC: 9.5 MG/DL (ref 8.8–10.4)
CHLORIDE SERPL-SCNC: 97 MMOL/L (ref 98–107)
CREAT SERPL-MCNC: 1.11 MG/DL (ref 0.51–0.95)
EGFRCR SERPLBLD CKD-EPI 2021: 51 ML/MIN/1.73M2
ERYTHROCYTE [DISTWIDTH] IN BLOOD BY AUTOMATED COUNT: 20.1 % (ref 10–15)
GLUCOSE SERPL-MCNC: 89 MG/DL (ref 70–99)
HCO3 SERPL-SCNC: 26 MMOL/L (ref 22–29)
HCT VFR BLD AUTO: 42.1 % (ref 35–47)
HGB BLD-MCNC: 12.4 G/DL (ref 11.7–15.7)
MAGNESIUM SERPL-MCNC: 1.9 MG/DL (ref 1.7–2.3)
MCH RBC QN AUTO: 25.6 PG (ref 26.5–33)
MCHC RBC AUTO-ENTMCNC: 29.5 G/DL (ref 31.5–36.5)
MCV RBC AUTO: 87 FL (ref 78–100)
PHOSPHATE SERPL-MCNC: 4.1 MG/DL (ref 2.5–4.5)
PLATELET # BLD AUTO: 278 10E3/UL (ref 150–450)
POTASSIUM SERPL-SCNC: 4.1 MMOL/L (ref 3.4–5.3)
PROT SERPL-MCNC: 6.5 G/DL (ref 6.4–8.3)
RBC # BLD AUTO: 4.85 10E6/UL (ref 3.8–5.2)
SODIUM SERPL-SCNC: 138 MMOL/L (ref 135–145)
WBC # BLD AUTO: 8 10E3/UL (ref 4–11)

## 2024-12-02 PROCEDURE — 250N000013 HC RX MED GY IP 250 OP 250 PS 637: Performed by: STUDENT IN AN ORGANIZED HEALTH CARE EDUCATION/TRAINING PROGRAM

## 2024-12-02 PROCEDURE — 99232 SBSQ HOSP IP/OBS MODERATE 35: CPT | Performed by: STUDENT IN AN ORGANIZED HEALTH CARE EDUCATION/TRAINING PROGRAM

## 2024-12-02 PROCEDURE — 97530 THERAPEUTIC ACTIVITIES: CPT | Mod: GO | Performed by: OCCUPATIONAL THERAPIST

## 2024-12-02 PROCEDURE — 80053 COMPREHEN METABOLIC PANEL: CPT | Performed by: STUDENT IN AN ORGANIZED HEALTH CARE EDUCATION/TRAINING PROGRAM

## 2024-12-02 PROCEDURE — 83735 ASSAY OF MAGNESIUM: CPT | Performed by: STUDENT IN AN ORGANIZED HEALTH CARE EDUCATION/TRAINING PROGRAM

## 2024-12-02 PROCEDURE — 250N000013 HC RX MED GY IP 250 OP 250 PS 637: Performed by: PHYSICIAN ASSISTANT

## 2024-12-02 PROCEDURE — 84100 ASSAY OF PHOSPHORUS: CPT | Performed by: STUDENT IN AN ORGANIZED HEALTH CARE EDUCATION/TRAINING PROGRAM

## 2024-12-02 PROCEDURE — 97530 THERAPEUTIC ACTIVITIES: CPT | Mod: GP

## 2024-12-02 PROCEDURE — 36415 COLL VENOUS BLD VENIPUNCTURE: CPT | Performed by: STUDENT IN AN ORGANIZED HEALTH CARE EDUCATION/TRAINING PROGRAM

## 2024-12-02 PROCEDURE — 120N000002 HC R&B MED SURG/OB UMMC

## 2024-12-02 PROCEDURE — 85027 COMPLETE CBC AUTOMATED: CPT | Performed by: STUDENT IN AN ORGANIZED HEALTH CARE EDUCATION/TRAINING PROGRAM

## 2024-12-02 PROCEDURE — 97535 SELF CARE MNGMENT TRAINING: CPT | Mod: GO | Performed by: OCCUPATIONAL THERAPIST

## 2024-12-02 PROCEDURE — 97110 THERAPEUTIC EXERCISES: CPT | Mod: GP

## 2024-12-02 RX ADMIN — IPRATROPIUM BROMIDE 2 PUFF: 17 AEROSOL, METERED RESPIRATORY (INHALATION) at 19:57

## 2024-12-02 RX ADMIN — APIXABAN 5 MG: 5 TABLET, FILM COATED ORAL at 19:57

## 2024-12-02 RX ADMIN — LEVALBUTEROL TARTRATE 2 PUFF: 45 AEROSOL, METERED ORAL at 09:00

## 2024-12-02 RX ADMIN — ATORVASTATIN CALCIUM 20 MG: 20 TABLET, FILM COATED ORAL at 19:57

## 2024-12-02 RX ADMIN — SERTRALINE HYDROCHLORIDE 150 MG: 100 TABLET ORAL at 08:43

## 2024-12-02 RX ADMIN — PANTOPRAZOLE SODIUM 40 MG: 40 TABLET, DELAYED RELEASE ORAL at 08:43

## 2024-12-02 RX ADMIN — PROPRANOLOL HYDROCHLORIDE 10 MG: 10 TABLET ORAL at 08:43

## 2024-12-02 RX ADMIN — LEVALBUTEROL TARTRATE 2 PUFF: 45 AEROSOL, METERED ORAL at 19:57

## 2024-12-02 RX ADMIN — IPRATROPIUM BROMIDE 2 PUFF: 17 AEROSOL, METERED RESPIRATORY (INHALATION) at 16:20

## 2024-12-02 RX ADMIN — LEVOTHYROXINE SODIUM 88 MCG: 88 TABLET ORAL at 08:44

## 2024-12-02 RX ADMIN — EMPAGLIFLOZIN 10 MG: 10 TABLET, FILM COATED ORAL at 09:41

## 2024-12-02 RX ADMIN — LEVALBUTEROL TARTRATE 2 PUFF: 45 AEROSOL, METERED ORAL at 13:42

## 2024-12-02 RX ADMIN — DILTIAZEM HYDROCHLORIDE 240 MG: 240 CAPSULE, EXTENDED RELEASE ORAL at 19:57

## 2024-12-02 RX ADMIN — PROPRANOLOL HYDROCHLORIDE 10 MG: 10 TABLET ORAL at 19:57

## 2024-12-02 RX ADMIN — GABAPENTIN 600 MG: 300 CAPSULE ORAL at 21:55

## 2024-12-02 RX ADMIN — DILTIAZEM HYDROCHLORIDE 240 MG: 240 CAPSULE, EXTENDED RELEASE ORAL at 08:43

## 2024-12-02 RX ADMIN — SPIRONOLACTONE 25 MG: 25 TABLET, FILM COATED ORAL at 08:44

## 2024-12-02 RX ADMIN — APIXABAN 5 MG: 5 TABLET, FILM COATED ORAL at 08:44

## 2024-12-02 RX ADMIN — IPRATROPIUM BROMIDE 2 PUFF: 17 AEROSOL, METERED RESPIRATORY (INHALATION) at 09:00

## 2024-12-02 RX ADMIN — IPRATROPIUM BROMIDE 2 PUFF: 17 AEROSOL, METERED RESPIRATORY (INHALATION) at 12:00

## 2024-12-02 RX ADMIN — FUROSEMIDE 40 MG: 40 TABLET ORAL at 08:43

## 2024-12-02 RX ADMIN — FEXOFENADINE HCL 180 MG: 180 TABLET ORAL at 08:44

## 2024-12-02 RX ADMIN — FLUTICASONE FUROATE AND VILANTEROL TRIFENATATE 1 PUFF: 200; 25 POWDER RESPIRATORY (INHALATION) at 09:00

## 2024-12-02 ASSESSMENT — ACTIVITIES OF DAILY LIVING (ADL)
ADLS_ACUITY_SCORE: 63
ADLS_ACUITY_SCORE: 62
ADLS_ACUITY_SCORE: 63
ADLS_ACUITY_SCORE: 62
ADLS_ACUITY_SCORE: 63
ADLS_ACUITY_SCORE: 62
ADLS_ACUITY_SCORE: 63
ADLS_ACUITY_SCORE: 63
ADLS_ACUITY_SCORE: 62
ADLS_ACUITY_SCORE: 62
ADLS_ACUITY_SCORE: 63
ADLS_ACUITY_SCORE: 62
ADLS_ACUITY_SCORE: 62
ADLS_ACUITY_SCORE: 63

## 2024-12-02 NOTE — PROGRESS NOTES
Care Management Follow Up    Length of Stay (days): 11    Expected Discharge Date: 12/02/2024     Concerns to be Addressed: all concerns addressed in this encounter     Patient plan of care discussed at interdisciplinary rounds: Yes    Anticipated Discharge Disposition: Skilled Nursing Facility vs long term   Anticipated Discharge Services: None  Anticipated Discharge DME: None    Patient/family educated on Medicare website which has current facility and service quality ratings: yes  Education Provided on the Discharge Plan:  yes  Patient/Family in Agreement with the Plan:  yes    Referrals Placed by CM/SW:      Pending:  [facility]  [address] [address] [place] [address] [phone]   Admissions: [phone] (Anali)  [email or web address] (Anali in admissions email) - Preference given to [facility] patients   12/2: Messaged via in [employer]     [facility]  [address] [place] [address]        P: [phone]  F: [phone]  12/2: Attempted to follow up on referral. Unable to leave a  at this time. NOLBERTO sent a message via Epic in [employer]     [facility]: Bed not available 11/27 & declined again due to financial concerns 12/2  [facility]: Bed not available, cost of medications  [facility]: Bed not available 12/2  Private pay costs discussed: transportation costs    Discussed  Partnership in Safe Discharge Planning  document with patient/family: No     Handoff Completed: No, handoff not indicated or clinically appropriate    Additional Information:  NOLBERTO called and spoke with Marzena the Don of the long term at [facility] ([phone]). NOLBERTO asked about increased services and the facilities ability to do so. Marzena stated she spoke with the patient's daughter Lana about this. Marzena stated they can assess the patient to increase the services, however Marzena stated she told Lana that the patient going to TCU would provide the  patient with more therapy than what can be rpovided at the [facility] and home therapies and would cost less money than returning to the [facility] with increased services. Marzena stated if the patient and Lana are wanting to return to the [facility] they are more than happy to come to the hospital to re-assess the patient.  NOLBERTO called and spoke with the patient's daughter Lana. She expressed concern with TCU. She stated she is unsure about the patient's ability to participate in therapy 5 days/week. Lana also stated she is unsure of the patient's rehab trajectory and if the patient will improve much more from where she is currently. Lana stated if the patient cannot participate in therapy 5 days/week and may not improve much more then they would rather have the patient discharge back to the [facility] with home therapies where she will be comfortable. NOLBERTO stated understanding. NOLBERTO stated they can further discuss with the therapy team and get their input. Lana also requested a call from the provider to discuss the patient's overall prognosis. NOLBERTO stated that they can plan to further discuss with the therapy team, have the primary provider call her, and SW can call Lana back tomorrow and they can further discuss and come up with an appropriate plan based off the information. Lana stated understanding and agreement. Lana stated she is open to both TCU and returning to the [facility], she is just wanting to explore all possible options and figure out what is in the best interest of the patient.    NOLBERTO further discussed the patient with OT Indy and PT Linda. NOLBERTO reported the above discussion to both. Ultimately, therapy team reported the patient has made limited improvement. Patient has been unable to tolerate working with PT and OT in the same day. They both feel the patient will likely not make significant improvement from where she is at now. Both stated it would be appropriate and reasonable for the patient to return to the [facility] with increased services  and home therapies if the [facility] has capacity to provide increased services.    NOLBERTO reported to the primary provider that Lana is curious about the patient's prognosis. Provider stated they will call Lana later in the day.    Next Steps: NOLBERTO will plan to further discuss the plan moving forward with the patient's daughter Lana tomorrow 12/3. NOLBERTO will update the senior care facility.  NOLBERTO will continue to follow for discharge needs.     GUADALUPE Farnsworth  [address]   Office: [phone]  [email or web address]  Securely message with [employer] (Search Name or 7C Med Surg [phone] NOLBERTO)  Text page via [facility] Paging/Directory   See signed in provider for up to date coverage information  Social Work & Care Management Department

## 2024-12-02 NOTE — PLAN OF CARE
Goal Outcome Evaluation:       Patient is A/Ox4, VSS on 1L NC. Patient was up in chair and up to commode with an assist of 2 and walker/GB. Hair washed and bath wipes done. Daughter called today for update, NOLBERTO and MD said they will call her today to discuss discharge, etc. Uses call light appropriately to make needs known.

## 2024-12-02 NOTE — PLAN OF CARE
Goal Outcome Evaluation:      [id number]  Neuro: Pt. calm & cooperative with cares.    Behavior:  Pt. calm and cooperative with cares.   Activity: Pt. up with 2, GB and walker.    Vital:  AVSS on 1L/NC.  LDAs:  Left PIV SL.  Cardiac:  WNL, denies chest pain.    BG: N/A  Respiratory: LS crackles and diminished.   GI/: Voiding via purewick.  Stools x this shift.    Skin: Pale, intact.  Pain/Nausea:  Pt. denies pain or nausea.   Diet: Low fat, 2 gm Na+, 2L fluid restriction.  Labs/Imaging: Morning labs pending.   Plan: Continue to follow POC and notify team with change in status.

## 2024-12-02 NOTE — PLAN OF CARE
Goal Outcome Evaluation:     VS & Pain: VSS on RA. Denied pain  Neuro: AxO x4  Respiratory: on 1.5L O2 via NC. Denied shortness of breath   Cardiac: denied chest pain   Peripheral neurovascular: WDL  GI/: Continent of medium BM this shift. Purewick in place, leaked and removed. Continent x1 on BSC.   Nutrition: low Saturated Fat Na <2400 mg, 2L fluid restriction. Fair appetite, approx 50% breakfast and lunch. Awaiting dinner.   Skin: redness blanchable on sacrum/coccyx-Mepilex in place. Partial bath completed, linens changed.  Musculoskeletal: Gen weakness  Lines: L. PIV- SL   Activity: assist x2 with GB + walker. Sat in chair this afternoon.  Labs/Electrolytes:  WNL     Plan: continue plan of care, per daughter request, please give shower when able.

## 2024-12-03 ENCOUNTER — APPOINTMENT (OUTPATIENT)
Dept: GENERAL RADIOLOGY | Facility: CLINIC | Age: 79
End: 2024-12-03
Attending: STUDENT IN AN ORGANIZED HEALTH CARE EDUCATION/TRAINING PROGRAM
Payer: COMMERCIAL

## 2024-12-03 LAB
ALBUMIN SERPL BCG-MCNC: 3.6 G/DL (ref 3.5–5.2)
ALBUMIN UR-MCNC: 10 MG/DL
ALP SERPL-CCNC: 91 U/L (ref 40–150)
ALT SERPL W P-5'-P-CCNC: <5 U/L (ref 0–50)
ANION GAP SERPL CALCULATED.3IONS-SCNC: 12 MMOL/L (ref 7–15)
APPEARANCE UR: CLEAR
AST SERPL W P-5'-P-CCNC: 14 U/L (ref 0–45)
BILIRUB SERPL-MCNC: 0.4 MG/DL
BILIRUB UR QL STRIP: NEGATIVE
BUN SERPL-MCNC: 27.8 MG/DL (ref 8–23)
C PNEUM DNA SPEC QL NAA+PROBE: NOT DETECTED
CALCIUM SERPL-MCNC: 9.1 MG/DL (ref 8.8–10.4)
CHLORIDE SERPL-SCNC: 95 MMOL/L (ref 98–107)
COLOR UR AUTO: ABNORMAL
CREAT SERPL-MCNC: 1.13 MG/DL (ref 0.51–0.95)
EGFRCR SERPLBLD CKD-EPI 2021: 50 ML/MIN/1.73M2
ERYTHROCYTE [DISTWIDTH] IN BLOOD BY AUTOMATED COUNT: 19.9 % (ref 10–15)
FLUAV H1 2009 PAND RNA SPEC QL NAA+PROBE: NOT DETECTED
FLUAV H1 RNA SPEC QL NAA+PROBE: NOT DETECTED
FLUAV H3 RNA SPEC QL NAA+PROBE: NOT DETECTED
FLUAV RNA SPEC QL NAA+PROBE: NOT DETECTED
FLUBV RNA SPEC QL NAA+PROBE: NOT DETECTED
GLUCOSE SERPL-MCNC: 111 MG/DL (ref 70–99)
GLUCOSE UR STRIP-MCNC: NEGATIVE MG/DL
HADV DNA SPEC QL NAA+PROBE: NOT DETECTED
HCO3 SERPL-SCNC: 29 MMOL/L (ref 22–29)
HCOV PNL SPEC NAA+PROBE: NOT DETECTED
HCT VFR BLD AUTO: 39.5 % (ref 35–47)
HGB BLD-MCNC: 12 G/DL (ref 11.7–15.7)
HGB UR QL STRIP: NEGATIVE
HMPV RNA SPEC QL NAA+PROBE: NOT DETECTED
HPIV1 RNA SPEC QL NAA+PROBE: NOT DETECTED
HPIV2 RNA SPEC QL NAA+PROBE: NOT DETECTED
HPIV3 RNA SPEC QL NAA+PROBE: NOT DETECTED
HPIV4 RNA SPEC QL NAA+PROBE: NOT DETECTED
HYALINE CASTS: 10 /LPF
KETONES UR STRIP-MCNC: NEGATIVE MG/DL
LEUKOCYTE ESTERASE UR QL STRIP: NEGATIVE
M PNEUMO DNA SPEC QL NAA+PROBE: NOT DETECTED
MAGNESIUM SERPL-MCNC: 1.8 MG/DL (ref 1.7–2.3)
MCH RBC QN AUTO: 25.9 PG (ref 26.5–33)
MCHC RBC AUTO-ENTMCNC: 30.4 G/DL (ref 31.5–36.5)
MCV RBC AUTO: 85 FL (ref 78–100)
MUCOUS THREADS #/AREA URNS LPF: PRESENT /LPF
NITRATE UR QL: NEGATIVE
PH UR STRIP: 5 [PH] (ref 5–7)
PHOSPHATE SERPL-MCNC: 3.7 MG/DL (ref 2.5–4.5)
PLATELET # BLD AUTO: 279 10E3/UL (ref 150–450)
POTASSIUM SERPL-SCNC: 3.8 MMOL/L (ref 3.4–5.3)
PROT SERPL-MCNC: 6.5 G/DL (ref 6.4–8.3)
RBC # BLD AUTO: 4.64 10E6/UL (ref 3.8–5.2)
RBC URINE: 1 /HPF
RSV RNA SPEC QL NAA+PROBE: NOT DETECTED
RSV RNA SPEC QL NAA+PROBE: NOT DETECTED
RV+EV RNA SPEC QL NAA+PROBE: NOT DETECTED
SODIUM SERPL-SCNC: 136 MMOL/L (ref 135–145)
SP GR UR STRIP: 1.02 (ref 1–1.03)
UROBILINOGEN UR STRIP-MCNC: NORMAL MG/DL
WBC # BLD AUTO: 9.2 10E3/UL (ref 4–11)
WBC URINE: 1 /HPF

## 2024-12-03 PROCEDURE — 87070 CULTURE OTHR SPECIMN AEROBIC: CPT | Performed by: STUDENT IN AN ORGANIZED HEALTH CARE EDUCATION/TRAINING PROGRAM

## 2024-12-03 PROCEDURE — 81003 URINALYSIS AUTO W/O SCOPE: CPT | Performed by: STUDENT IN AN ORGANIZED HEALTH CARE EDUCATION/TRAINING PROGRAM

## 2024-12-03 PROCEDURE — 71046 X-RAY EXAM CHEST 2 VIEWS: CPT

## 2024-12-03 PROCEDURE — 87205 SMEAR GRAM STAIN: CPT | Performed by: STUDENT IN AN ORGANIZED HEALTH CARE EDUCATION/TRAINING PROGRAM

## 2024-12-03 PROCEDURE — 84450 TRANSFERASE (AST) (SGOT): CPT | Performed by: STUDENT IN AN ORGANIZED HEALTH CARE EDUCATION/TRAINING PROGRAM

## 2024-12-03 PROCEDURE — 84100 ASSAY OF PHOSPHORUS: CPT | Performed by: STUDENT IN AN ORGANIZED HEALTH CARE EDUCATION/TRAINING PROGRAM

## 2024-12-03 PROCEDURE — 99232 SBSQ HOSP IP/OBS MODERATE 35: CPT | Performed by: STUDENT IN AN ORGANIZED HEALTH CARE EDUCATION/TRAINING PROGRAM

## 2024-12-03 PROCEDURE — 71046 X-RAY EXAM CHEST 2 VIEWS: CPT | Mod: 26 | Performed by: RADIOLOGY

## 2024-12-03 PROCEDURE — 250N000011 HC RX IP 250 OP 636: Performed by: STUDENT IN AN ORGANIZED HEALTH CARE EDUCATION/TRAINING PROGRAM

## 2024-12-03 PROCEDURE — 82040 ASSAY OF SERUM ALBUMIN: CPT | Performed by: STUDENT IN AN ORGANIZED HEALTH CARE EDUCATION/TRAINING PROGRAM

## 2024-12-03 PROCEDURE — 97535 SELF CARE MNGMENT TRAINING: CPT | Mod: GO | Performed by: OCCUPATIONAL THERAPIST

## 2024-12-03 PROCEDURE — 80069 RENAL FUNCTION PANEL: CPT | Performed by: STUDENT IN AN ORGANIZED HEALTH CARE EDUCATION/TRAINING PROGRAM

## 2024-12-03 PROCEDURE — 87633 RESP VIRUS 12-25 TARGETS: CPT | Performed by: STUDENT IN AN ORGANIZED HEALTH CARE EDUCATION/TRAINING PROGRAM

## 2024-12-03 PROCEDURE — 82310 ASSAY OF CALCIUM: CPT | Performed by: STUDENT IN AN ORGANIZED HEALTH CARE EDUCATION/TRAINING PROGRAM

## 2024-12-03 PROCEDURE — 87581 M.PNEUMON DNA AMP PROBE: CPT | Performed by: STUDENT IN AN ORGANIZED HEALTH CARE EDUCATION/TRAINING PROGRAM

## 2024-12-03 PROCEDURE — 250N000013 HC RX MED GY IP 250 OP 250 PS 637: Performed by: PHYSICIAN ASSISTANT

## 2024-12-03 PROCEDURE — 97530 THERAPEUTIC ACTIVITIES: CPT | Mod: GO | Performed by: OCCUPATIONAL THERAPIST

## 2024-12-03 PROCEDURE — 36415 COLL VENOUS BLD VENIPUNCTURE: CPT | Performed by: STUDENT IN AN ORGANIZED HEALTH CARE EDUCATION/TRAINING PROGRAM

## 2024-12-03 PROCEDURE — 120N000002 HC R&B MED SURG/OB UMMC

## 2024-12-03 PROCEDURE — 85041 AUTOMATED RBC COUNT: CPT | Performed by: STUDENT IN AN ORGANIZED HEALTH CARE EDUCATION/TRAINING PROGRAM

## 2024-12-03 PROCEDURE — 250N000013 HC RX MED GY IP 250 OP 250 PS 637: Performed by: STUDENT IN AN ORGANIZED HEALTH CARE EDUCATION/TRAINING PROGRAM

## 2024-12-03 PROCEDURE — 83735 ASSAY OF MAGNESIUM: CPT | Performed by: STUDENT IN AN ORGANIZED HEALTH CARE EDUCATION/TRAINING PROGRAM

## 2024-12-03 PROCEDURE — 85018 HEMOGLOBIN: CPT | Performed by: STUDENT IN AN ORGANIZED HEALTH CARE EDUCATION/TRAINING PROGRAM

## 2024-12-03 PROCEDURE — 87486 CHLMYD PNEUM DNA AMP PROBE: CPT | Performed by: STUDENT IN AN ORGANIZED HEALTH CARE EDUCATION/TRAINING PROGRAM

## 2024-12-03 RX ORDER — AMPICILLIN AND SULBACTAM 1; .5 G/1; G/1
1.5 INJECTION, POWDER, FOR SOLUTION INTRAMUSCULAR; INTRAVENOUS EVERY 6 HOURS
Status: DISCONTINUED | OUTPATIENT
Start: 2024-12-03 | End: 2024-12-05

## 2024-12-03 RX ADMIN — IPRATROPIUM BROMIDE 2 PUFF: 17 AEROSOL, METERED RESPIRATORY (INHALATION) at 21:57

## 2024-12-03 RX ADMIN — FUROSEMIDE 40 MG: 40 TABLET ORAL at 09:43

## 2024-12-03 RX ADMIN — AMPICILLIN SODIUM AND SULBACTAM SODIUM 1.5 G: 1; .5 INJECTION, POWDER, FOR SOLUTION INTRAMUSCULAR; INTRAVENOUS at 23:08

## 2024-12-03 RX ADMIN — DILTIAZEM HYDROCHLORIDE 240 MG: 240 CAPSULE, EXTENDED RELEASE ORAL at 21:01

## 2024-12-03 RX ADMIN — LEVALBUTEROL TARTRATE 2 PUFF: 45 AEROSOL, METERED ORAL at 09:48

## 2024-12-03 RX ADMIN — IPRATROPIUM BROMIDE 2 PUFF: 17 AEROSOL, METERED RESPIRATORY (INHALATION) at 13:20

## 2024-12-03 RX ADMIN — LEVALBUTEROL TARTRATE 2 PUFF: 45 AEROSOL, METERED ORAL at 15:03

## 2024-12-03 RX ADMIN — IPRATROPIUM BROMIDE 2 PUFF: 17 AEROSOL, METERED RESPIRATORY (INHALATION) at 16:55

## 2024-12-03 RX ADMIN — SERTRALINE HYDROCHLORIDE 150 MG: 100 TABLET ORAL at 09:43

## 2024-12-03 RX ADMIN — DILTIAZEM HYDROCHLORIDE 240 MG: 240 CAPSULE, EXTENDED RELEASE ORAL at 09:41

## 2024-12-03 RX ADMIN — FEXOFENADINE HCL 180 MG: 180 TABLET ORAL at 09:41

## 2024-12-03 RX ADMIN — LEVALBUTEROL TARTRATE 2 PUFF: 45 AEROSOL, METERED ORAL at 23:03

## 2024-12-03 RX ADMIN — ONDANSETRON 4 MG: 2 INJECTION INTRAMUSCULAR; INTRAVENOUS at 05:52

## 2024-12-03 RX ADMIN — EMPAGLIFLOZIN 10 MG: 10 TABLET, FILM COATED ORAL at 09:41

## 2024-12-03 RX ADMIN — LEVALBUTEROL TARTRATE 2 PUFF: 45 AEROSOL, METERED ORAL at 02:03

## 2024-12-03 RX ADMIN — AMPICILLIN SODIUM AND SULBACTAM SODIUM 1.5 G: 1; .5 INJECTION, POWDER, FOR SOLUTION INTRAMUSCULAR; INTRAVENOUS at 18:03

## 2024-12-03 RX ADMIN — IPRATROPIUM BROMIDE 2 PUFF: 17 AEROSOL, METERED RESPIRATORY (INHALATION) at 09:36

## 2024-12-03 RX ADMIN — ALBUTEROL SULFATE 2 PUFF: 90 AEROSOL, METERED RESPIRATORY (INHALATION) at 21:55

## 2024-12-03 RX ADMIN — SPIRONOLACTONE 25 MG: 25 TABLET, FILM COATED ORAL at 09:43

## 2024-12-03 RX ADMIN — LEVOTHYROXINE SODIUM 88 MCG: 88 TABLET ORAL at 09:37

## 2024-12-03 RX ADMIN — APIXABAN 5 MG: 5 TABLET, FILM COATED ORAL at 21:00

## 2024-12-03 RX ADMIN — PROPRANOLOL HYDROCHLORIDE 10 MG: 10 TABLET ORAL at 09:42

## 2024-12-03 RX ADMIN — PANTOPRAZOLE SODIUM 40 MG: 40 TABLET, DELAYED RELEASE ORAL at 09:45

## 2024-12-03 RX ADMIN — GABAPENTIN 600 MG: 300 CAPSULE ORAL at 21:04

## 2024-12-03 RX ADMIN — ATORVASTATIN CALCIUM 20 MG: 20 TABLET, FILM COATED ORAL at 21:00

## 2024-12-03 RX ADMIN — FLUTICASONE FUROATE AND VILANTEROL TRIFENATATE 1 PUFF: 200; 25 POWDER RESPIRATORY (INHALATION) at 09:49

## 2024-12-03 RX ADMIN — APIXABAN 5 MG: 5 TABLET, FILM COATED ORAL at 09:37

## 2024-12-03 ASSESSMENT — ACTIVITIES OF DAILY LIVING (ADL)
ADLS_ACUITY_SCORE: 62
ADLS_ACUITY_SCORE: 69
ADLS_ACUITY_SCORE: 62
ADLS_ACUITY_SCORE: 69
ADLS_ACUITY_SCORE: 62
ADLS_ACUITY_SCORE: 62
ADLS_ACUITY_SCORE: 69
ADLS_ACUITY_SCORE: 62
ADLS_ACUITY_SCORE: 69
ADLS_ACUITY_SCORE: 62
ADLS_ACUITY_SCORE: 69
ADLS_ACUITY_SCORE: 62
ADLS_ACUITY_SCORE: 69
ADLS_ACUITY_SCORE: 62
ADLS_ACUITY_SCORE: 69
ADLS_ACUITY_SCORE: 62
ADLS_ACUITY_SCORE: 62
ADLS_ACUITY_SCORE: 69

## 2024-12-03 NOTE — PLAN OF CARE
Goal Outcome Evaluation:      Assumed cares [id number]      Pt A&Ox4. VSS. Continuous pulse ox in place on toe. 1 L nasal cannula. Purewick in place, good output. Denies pain. No BM on shift. Pt took meds with applesauce. Continue to monitor and follow plan of care.

## 2024-12-03 NOTE — PROGRESS NOTES
[facility]    Medicine Progress Note - Hospitalist Service, GOLD TEAM 8    Date of Admission:  11/21/2024    Assessment & Plan   Asya Coffman is a 79 year old female with history of A fib, CAD, COPD, asthma, Raynaud's, CKD, laryngeal squamous cell carcinoma/papillomatosis, CREST syndrome, chronic pain, chronic constipation, hypothyroidism, anxiety, and depression who was admitted to [facility] with acute hypoxic respiratory failure, hospitalization prolonged due to inability to wean off O2 despite diuresis. Now with improvement in O2 req down to 1L.    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 Endorses FELIZ, PND, orthopnea, swelling in the legs. Found to have hypoxic requiring 2L NC. BNP 4,421 (1,920 4/2024). VBG 7.41/40/46/26. LA, WBC normal. Flu, COVID negative. EKG A fib with RVR (rate 101), chronic A fib as below. CT PE 11/21 no PE, trace interstitial edema with small to moderate b/l pleural effusions. TTE with normal EF however unable to measure diastolic function 2/2 a fib. Repeat CXR 11/23 showing small R pleural effusion and trace L. Pulmonary and cardiology were consulted during admission to determine cause of persistent O2 requirement, ultimately determined to be likely a combination of HFpEF and pulmonary hypertension and had never been initiated on optimal medical mgmt of HFpEF.  Also found to have a small PFO on echocardiogram which could lead to shunting in times of increased R sided pressures.   - Has OP pulmonologist   Darius and also cardiologistNOLBERTO to help her schedule an appt post discharge  Meds  - lasix PO 40 daily  - jardiance 10 daily  - spironolactone 25 mg daily  - propranolol 10 BID  - PTA Fluticasone- Vilanterol  - Xopenex/ipratropium q6hr  - 2L fluid restriction  - Daily standing weights, I&Os  - Continuous pulse ox  - CXR today - appears stable with persistent R pleural effusion   - Sputum culture  - Low threshold to start antibiotics for pneumonia   - Will ask SLP to re-evaluate   - May benefit from R sided thoracentesis depending on progression - will need to hold Eliquis   - At discharge: follow-up pulm Dr. Mccoy and with her cardiologist.     A fib with RVR- not rate controlled  - On Diltiazem 240 ER BID, max dose  - C/w propranolol 10mg BID, target HR 60-70   - c/w PTA on Apixaban, may need to hold for procedure- thoracentesis   - Maintain K~4 and Mg ~2     Significant weight loss  Severe Protein-Calorie Malnutrition   - Per daughter baseline wt around 120-130 lbs, however wt here around 102 lbs, patient states she is eating less and has less appetite, denies post prandial abd pain, N/V. She states she likes the food but just do not have the appetite to eat more. She has hx of recent invasive laryngeal SCC with radiation therapy, also had dysphagia before  - consulted nutrition  - SLP assistance appreciated- does she need swallow study to be repeated?    Deconditioning  - PT/OT, dispo recommendation is TCU, patient agrees     H/o laryngeal squamous cell carcinoma, papillomatosis with dysphagia: Initially presented 2015 with benign laryngeal lesion. Progressed to papilloma 2021. Dx with invasive squamous cell carcinoma 4/2024. Now s/p radiation completed 7/2024. CT chest 11/21 ingested content within a mildly patulous thoracic esophagus, aspiration cautions recommended   - SLP consult, recommending reg diet and thin liq   - ENT saw this admission per daughter's request, bedside laryngoscopy done with  confirmation of no papillomas noted. They did see some atypical appearing tissue in the larynx and subglottis, none of which are occlusive or likely having any effect on her dyspnea.  - started PPI per their recs to limit laryngeal irritation  - Follow up OP 1/2025 as planned with Dr. Davis     Chronic/stable medical conditions  Pulmonary nodule:   6 mm LLL pulmonary nodule noted on CT  - Will need repeat imaging in 6-12 months     Subacute rib fractures:   Admitted to [facility] 9/2024 with rib fractures. Subacute fractures noted on CT 11/21. Continue supportive cares     H/o CREST syndrome:   2014. Per notes, did have positive FARIDEH, Raynaud's, Calcinosis, GERD and some telangectasia's. Last saw [facility] rheumatology 2017. No acute concerns     Hypernatremia: Mild. Na 146. Trend     L>R BLE edema: US to rule out DVT    Troponin elevation, likely increased demand: Trop 28 (32). EKG chronic a fib. Asymptomatic. Monitor   COPD, asthma: No e/o exacerbation. Continue PTA Symbicort, albuterol, DuoNebs   CKD II: BL Cr 0.9-1.1 and stable. Trend   Chronic pain: Continue PTA gabapentin, Tylenol. Discontinue Ibuprofen given CKD  Chronic constipation: Continue bowel regimen   Hypothyroidism: TSH 1.48 4/2024. Continue PTA synthroid   Anxiety, depression: Continue PTA Sertraline, Propranolol   Raynaud's: Continue diltiazem as above        Diet: Fluid restriction 2000 ML FLUID  Low Saturated Fat Na <2400 mg  Snacks/Supplements Adult: Ensure Enlive; Between Meals    DVT Prophylaxis: DOAC  Miranda Catheter: Not present  Lines: None     Cardiac Monitoring: None  Code Status: Full Code      Clinically Significant Risk Factors          # Hypochloremia: Lowest Cl = 95 mmol/L in last 2 days, will monitor as appropriate      # Hypoalbuminemia: Lowest albumin = 3.4 g/dL at 11/29/2024  5:32 AM, will monitor as appropriate                # Cachexia: Estimated body mass index is 17.21 kg/m  as calculated from the following:    Height as of this  "encounter: 1.651 m (5' 5\").    Weight as of this encounter: 46.9 kg (103 lb 6.3 oz).   # Severe Malnutrition: based on nutrition assessment      # Financial/Environmental Concerns: none         Social Drivers of Health    Housing Stability: High Risk (11/23/2024)    Housing Stability     Do you have housing? : No     Are you worried about losing your housing?: No          Disposition Plan     Medically Ready for Discharge: Ready Now             Sara Griffin, DO  Hospitalist Service, GOLD TEAM 8  [facility]  Securely message with "[employer] (more info)  Text page via [facility] Paging/Directory   See signed in provider for up to date coverage information  ______________________________________________________________________    Interval History     No acute events but coughing a lot when I evaluated her this morning. Sounds wet with rhonchi, not previously documented as how she felt.     Physical Exam   Vital Signs: Temp: 97.7  F (36.5  C) Temp src: Oral BP: 91/63 Pulse: 101   Resp: 16 SpO2: 92 % O2 Device: Nasal cannula with humidification Oxygen Delivery: 3 LPM  Weight: 103 lbs 6.33 oz    Gen: A&Ox4, lying in bed, wakes up but appears unwell, in no distress, on 1-2 L NC  Lung: Rhonchi noted on posterior R base, anterior upper chest, R axilla, L lung clear to auscultation, has wet sounding cough   Abd: soft, non-tender, non-distended  Ext: No edema in upper/lower extremities    Medical Decision Making       40 MINUTES SPENT BY ME on the date of service doing chart review, history, exam, documentation & further activities per the note.      Data     "

## 2024-12-03 NOTE — PROGRESS NOTES
Care Management Follow Up    Length of Stay (days): 12    Expected Discharge Date: 12/05/2024     Concerns to be Addressed: all concerns addressed in this encounter     Patient plan of care discussed at interdisciplinary rounds: Yes    Anticipated Discharge Disposition: Skilled Nursing Facility vs senior care with increased services  Anticipated Discharge Services: None  Anticipated Discharge DME: None    Patient/family educated on Medicare website which has current facility and service quality ratings: no  Education Provided on the Discharge Plan:    Patient/Family in Agreement with the Plan:      Referrals Placed by CM/SW:      Pending:  [facility]  [address] [address] [place] [address] [phone]   Admissions: [phone] (Anali)  [email or web address] (Anali in admissions email) - Preference given to [facility] patients   12/2: Messaged via in basket and resent facesheet     Declined  [facility]: Bed not available 11/27 & declined again due to financial concerns 12/2  [facility]: Bed not available, cost of medications  [facility]: Bed not available 12/2  [facility]: Cost of medications    Private pay costs discussed: transportation costs    Discussed  Partnership in Safe Discharge Planning  document with patient/family:  No    Handoff Completed: No, handoff not indicated or clinically appropriate    Additional Information:  Patient is med ready, discharge pending safe dispo.    @[id number] NOLBERTO attempted to call the patient's daughter Lana, however there was no answer. NOLBERTO left a VM and requested a call back.     Next Steps: NOLBERTO will plan to follow up with Lana tomorrow 12/4. NOLBERTO will continue to follow for discharge needs.    GUADALUPE Farnsworth  [address]   Office: [phone]  [email or web address]  Securely message with [employer] (Search Name or 7C Med Surg [phone] NOLBERTO)  Text page via [facility] Paging/Directory   See signed in provider  for up to date coverage information  Social Work & Care Management Department

## 2024-12-03 NOTE — PLAN OF CARE
Goal Outcome Evaluation:       A:  patient awake on rounds.  Denies pain, Shortness of breath.  O2 at 1L per nc with o2 sat of 94%.  Occasional cough.  Lung sounds clear  in upper lobes and diminished in bases.    R:  continue to monitor and treat per plan of care.

## 2024-12-03 NOTE — PROGRESS NOTES
[facility]    Medicine Progress Note - Hospitalist Service, GOLD TEAM 8    Date of Admission:  11/21/2024    Assessment & Plan   Asya Coffman is a 79 year old female with history of A fib, CAD, COPD, asthma, Raynaud's, CKD, laryngeal squamous cell carcinoma/papillomatosis, CREST syndrome, chronic pain, chronic constipation, hypothyroidism, anxiety, and depression who was admitted to [facility] with acute hypoxic respiratory failure, hospitalization prolonged due to inability to wean off O2 despite diuresis. Now with improvement in O2 req down to 1L.    Changes today:  - Dispo planning, discharging on O2 to likely TCU   - called daughter to give update in the early evening but no response    Acute hypoxic respiratory failure, improving- on 1L NC  Acute on Chronic Heart Failure with Preserved Systolic Function   Pulmonary hypertension with elevated RVSP on TTE 11/22  Fluid overload  Elevated BNP  Hx of asthma- not in exacerbation  Presented with 1 month progressive dyspnea. Endorses FELIZ, PND, orthopnea, swelling in the legs. Found to have hypoxic requiring 2L NC. BNP 4,421 (1,920 4/2024). VBG 7.41/40/46/26. LA, WBC normal. Flu, COVID negative. EKG A fib with RVR (rate 101), chronic A fib as below. CT PE 11/21 no PE, trace interstitial edema with small to moderate b/l pleural effusions. TTE with normal EF however unable to measure diastolic function 2/2 a fib. Repeat CXR 11/23 showing small R pleural effusion and trace L. Pulmonary and cardiology were consulted during admission to determine cause of persistent O2 requirement, ultimately determined to be likely a combination of HFpEF and pulmonary hypertension and had never been initiated on optimal medical mgmt of HFpEF.    - Has OP pulmonologist Dr. Mccoy and also cardiologist, NOLBERTO to help her schedule an appt post discharge  Meds  - lasix PO 40 daily  - jardiance 10 daily  - spironolactone 25 mg daily  - propranolol 10  BID  - PTA Fluticasone- Vilanterol  - Xopenex/ipratropium q6hr  - 2L fluid restriction  - Daily standing weights, I&Os  - Continuous pulse ox  - At discharge: follow-up pulm Dr. Mccoy and with her cardiologist.     A fib with RVR- not rate controlled  - On Diltiazem 240 ER BID, max dose  - C/w propranolol 10mg BID, target HR 60-70   - c/w PTA on Apixaban  - Maintain K~4 and Mg ~2     Significant weight loss  Severe Protein-Calorie Malnutrition   - Per daughter baseline wt around 120-130 lbs, however wt here around 102 lbs, patient states she is eating less and has less appetite, denies post prandial abd pain, N/V. She states she likes the food but just do not have the appetite to eat more. She has hx of recent invasive laryngeal SCC with radiation therapy, also had dysphagia before  - consulted nutrition    Deconditioning  - PT/OT, dispo recommendation is TCU, patient agrees     H/o laryngeal squamous cell carcinoma, papillomatosis with dysphagia: Initially presented 2015 with benign laryngeal lesion. Progressed to papilloma 2021. Dx with invasive squamous cell carcinoma 4/2024. Now s/p radiation completed 7/2024. CT chest 11/21 ingested content within a mildly patulous thoracic esophagus, aspiration cautions recommended   - SLP consult, recommending reg diet and thin liq   - ENT saw this admission per daughter's request, bedside laryngoscopy done with confirmation of no papillomas noted. They did see some atypical appearing tissue in the larynx and subglottis, none of which are occlusive or likely having any effect on her dyspnea.  - started PPI per their recs to limit laryngeal irritation  - Follow up OP 1/2025 as planned with Dr. Davis     Chronic/stable medical conditions  Pulmonary nodule:   6 mm LLL pulmonary nodule noted on CT  - Will need repeat imaging in 6-12 months     Subacute rib fractures:   Admitted to [facility] 9/2024 with rib fractures. Subacute fractures noted on CT 11/21. Continue supportive  "cares     H/o CREST syndrome:   2014. Per notes, did have positive FARIDEH, Raynaud's, Calcinosis, GERD and some telangectasia's. Last saw [facility] rheumatology 2017. No acute concerns     Hypernatremia: Mild. Na 146. Trend     L>R BLE edema: US to rule out DVT    Troponin elevation, likely increased demand: Trop 28 (32). EKG chronic a fib. Asymptomatic. Monitor   COPD, asthma: No e/o exacerbation. Continue PTA Symbicort, albuterol, DuoNebs   CKD II: BL Cr 0.9-1.1 and stable. Trend   Chronic pain: Continue PTA gabapentin, Tylenol. Discontinue Ibuprofen given CKD  Chronic constipation: Continue bowel regimen   Hypothyroidism: TSH 1.48 4/2024. Continue PTA synthroid   Anxiety, depression: Continue PTA Sertraline, Propranolol   Raynaud's: Continue diltiazem as above        Diet: Fluid restriction 2000 ML FLUID  Low Saturated Fat Na <2400 mg  Snacks/Supplements Adult: Ensure Enlive; Between Meals    DVT Prophylaxis: DOAC  Mirnada Catheter: Not present  Lines: None     Cardiac Monitoring: None  Code Status: Full Code      Clinically Significant Risk Factors          # Hypochloremia: Lowest Cl = 97 mmol/L in last 2 days, will monitor as appropriate      # Hypoalbuminemia: Lowest albumin = 3.4 g/dL at 11/29/2024  5:32 AM, will monitor as appropriate                # Cachexia: Estimated body mass index is 16.87 kg/m  as calculated from the following:    Height as of this encounter: 1.651 m (5' 5\").    Weight as of this encounter: 46 kg (101 lb 6.4 oz).   # Severe Malnutrition: based on nutrition assessment    # Financial/Environmental Concerns: none         Social Drivers of Health    Housing Stability: High Risk (11/23/2024)    Housing Stability     Do you have housing? : No     Are you worried about losing your housing?: No          Disposition Plan     Medically Ready for Discharge: Ready Now             Jesu Lunsford MD  Hospitalist Service, GOLD TEAM [address] [place] [address]  Securely " message with [facility] (more info)  Text page via [employer] Paging/Directory   See signed in provider for up to date coverage information  ______________________________________________________________________    Interval History   NAEO    Physical Exam   Vital Signs: Temp: 97.7  F (36.5  C) Temp src: Oral BP: 128/71 Pulse: 86   Resp: 18 SpO2: 95 % O2 Device: None (Room air) Oxygen Delivery: 1 LPM  Weight: 101 lbs 6.4 oz    Gen: A&Ox4, lying comfortably on bed, in no distress, on 1 L NC  Lung: clear on ausculation b/l, no wheezing/crackle  Abd: soft, non-tender, non-distended  Ext: No edema in upper/lower extremities    Medical Decision Making       35 MINUTES SPENT BY ME on the date of service doing chart review, history, exam, documentation & further activities per the note.      Data

## 2024-12-04 ENCOUNTER — APPOINTMENT (OUTPATIENT)
Dept: PHYSICAL THERAPY | Facility: CLINIC | Age: 79
End: 2024-12-04
Payer: COMMERCIAL

## 2024-12-04 LAB
ALBUMIN SERPL BCG-MCNC: 3.2 G/DL (ref 3.5–5.2)
ALP SERPL-CCNC: 84 U/L (ref 40–150)
ALT SERPL W P-5'-P-CCNC: <5 U/L (ref 0–50)
ANION GAP SERPL CALCULATED.3IONS-SCNC: 13 MMOL/L (ref 7–15)
ANION GAP SERPL CALCULATED.3IONS-SCNC: 16 MMOL/L (ref 7–15)
AST SERPL W P-5'-P-CCNC: 15 U/L (ref 0–45)
BILIRUB SERPL-MCNC: 0.4 MG/DL
BUN SERPL-MCNC: 42.2 MG/DL (ref 8–23)
BUN SERPL-MCNC: 47 MG/DL (ref 8–23)
CALCIUM SERPL-MCNC: 8.8 MG/DL (ref 8.8–10.4)
CALCIUM SERPL-MCNC: 8.8 MG/DL (ref 8.8–10.4)
CHLORIDE SERPL-SCNC: 94 MMOL/L (ref 98–107)
CHLORIDE SERPL-SCNC: 97 MMOL/L (ref 98–107)
CREAT SERPL-MCNC: 1.65 MG/DL (ref 0.51–0.95)
CREAT SERPL-MCNC: 1.69 MG/DL (ref 0.51–0.95)
EGFRCR SERPLBLD CKD-EPI 2021: 31 ML/MIN/1.73M2
EGFRCR SERPLBLD CKD-EPI 2021: 31 ML/MIN/1.73M2
ERYTHROCYTE [DISTWIDTH] IN BLOOD BY AUTOMATED COUNT: 19.8 % (ref 10–15)
GLUCOSE SERPL-MCNC: 107 MG/DL (ref 70–99)
GLUCOSE SERPL-MCNC: 122 MG/DL (ref 70–99)
HCO3 SERPL-SCNC: 27 MMOL/L (ref 22–29)
HCO3 SERPL-SCNC: 28 MMOL/L (ref 22–29)
HCT VFR BLD AUTO: 37.7 % (ref 35–47)
HGB BLD-MCNC: 11.4 G/DL (ref 11.7–15.7)
LACTATE SERPL-SCNC: 1.7 MMOL/L (ref 0.7–2)
LACTATE SERPL-SCNC: 2.1 MMOL/L (ref 0.7–2)
MAGNESIUM SERPL-MCNC: 2 MG/DL (ref 1.7–2.3)
MCH RBC QN AUTO: 25.8 PG (ref 26.5–33)
MCHC RBC AUTO-ENTMCNC: 30.2 G/DL (ref 31.5–36.5)
MCV RBC AUTO: 85 FL (ref 78–100)
NT-PROBNP SERPL-MCNC: 9241 PG/ML (ref 0–1800)
PHOSPHATE SERPL-MCNC: 5.6 MG/DL (ref 2.5–4.5)
PLATELET # BLD AUTO: 249 10E3/UL (ref 150–450)
POTASSIUM SERPL-SCNC: 4.1 MMOL/L (ref 3.4–5.3)
POTASSIUM SERPL-SCNC: 4.4 MMOL/L (ref 3.4–5.3)
PROT SERPL-MCNC: 6.3 G/DL (ref 6.4–8.3)
RBC # BLD AUTO: 4.42 10E6/UL (ref 3.8–5.2)
SODIUM SERPL-SCNC: 137 MMOL/L (ref 135–145)
SODIUM SERPL-SCNC: 138 MMOL/L (ref 135–145)
WBC # BLD AUTO: 12.6 10E3/UL (ref 4–11)

## 2024-12-04 PROCEDURE — 250N000013 HC RX MED GY IP 250 OP 250 PS 637: Performed by: STUDENT IN AN ORGANIZED HEALTH CARE EDUCATION/TRAINING PROGRAM

## 2024-12-04 PROCEDURE — 85048 AUTOMATED LEUKOCYTE COUNT: CPT | Performed by: STUDENT IN AN ORGANIZED HEALTH CARE EDUCATION/TRAINING PROGRAM

## 2024-12-04 PROCEDURE — 250N000013 HC RX MED GY IP 250 OP 250 PS 637: Performed by: PHYSICIAN ASSISTANT

## 2024-12-04 PROCEDURE — 82947 ASSAY GLUCOSE BLOOD QUANT: CPT | Performed by: STUDENT IN AN ORGANIZED HEALTH CARE EDUCATION/TRAINING PROGRAM

## 2024-12-04 PROCEDURE — 97530 THERAPEUTIC ACTIVITIES: CPT | Mod: GP | Performed by: REHABILITATION PRACTITIONER

## 2024-12-04 PROCEDURE — 82374 ASSAY BLOOD CARBON DIOXIDE: CPT | Performed by: STUDENT IN AN ORGANIZED HEALTH CARE EDUCATION/TRAINING PROGRAM

## 2024-12-04 PROCEDURE — 99232 SBSQ HOSP IP/OBS MODERATE 35: CPT | Performed by: STUDENT IN AN ORGANIZED HEALTH CARE EDUCATION/TRAINING PROGRAM

## 2024-12-04 PROCEDURE — 250N000011 HC RX IP 250 OP 636: Performed by: STUDENT IN AN ORGANIZED HEALTH CARE EDUCATION/TRAINING PROGRAM

## 2024-12-04 PROCEDURE — 36415 COLL VENOUS BLD VENIPUNCTURE: CPT | Performed by: STUDENT IN AN ORGANIZED HEALTH CARE EDUCATION/TRAINING PROGRAM

## 2024-12-04 PROCEDURE — 83880 ASSAY OF NATRIURETIC PEPTIDE: CPT | Performed by: STUDENT IN AN ORGANIZED HEALTH CARE EDUCATION/TRAINING PROGRAM

## 2024-12-04 PROCEDURE — 97116 GAIT TRAINING THERAPY: CPT | Mod: GP | Performed by: REHABILITATION PRACTITIONER

## 2024-12-04 PROCEDURE — 80053 COMPREHEN METABOLIC PANEL: CPT | Performed by: STUDENT IN AN ORGANIZED HEALTH CARE EDUCATION/TRAINING PROGRAM

## 2024-12-04 PROCEDURE — 83605 ASSAY OF LACTIC ACID: CPT | Performed by: STUDENT IN AN ORGANIZED HEALTH CARE EDUCATION/TRAINING PROGRAM

## 2024-12-04 PROCEDURE — 120N000002 HC R&B MED SURG/OB UMMC

## 2024-12-04 PROCEDURE — 83735 ASSAY OF MAGNESIUM: CPT | Performed by: STUDENT IN AN ORGANIZED HEALTH CARE EDUCATION/TRAINING PROGRAM

## 2024-12-04 PROCEDURE — 92610 EVALUATE SWALLOWING FUNCTION: CPT | Mod: GN | Performed by: SPEECH-LANGUAGE PATHOLOGIST

## 2024-12-04 PROCEDURE — 85018 HEMOGLOBIN: CPT | Performed by: STUDENT IN AN ORGANIZED HEALTH CARE EDUCATION/TRAINING PROGRAM

## 2024-12-04 PROCEDURE — 84100 ASSAY OF PHOSPHORUS: CPT | Performed by: STUDENT IN AN ORGANIZED HEALTH CARE EDUCATION/TRAINING PROGRAM

## 2024-12-04 PROCEDURE — 92526 ORAL FUNCTION THERAPY: CPT | Mod: GN | Performed by: SPEECH-LANGUAGE PATHOLOGIST

## 2024-12-04 RX ADMIN — LEVOTHYROXINE SODIUM 88 MCG: 88 TABLET ORAL at 06:35

## 2024-12-04 RX ADMIN — FEXOFENADINE HCL 180 MG: 180 TABLET ORAL at 09:52

## 2024-12-04 RX ADMIN — DILTIAZEM HYDROCHLORIDE 240 MG: 240 CAPSULE, EXTENDED RELEASE ORAL at 20:48

## 2024-12-04 RX ADMIN — LEVALBUTEROL TARTRATE 2 PUFF: 45 AEROSOL, METERED ORAL at 03:56

## 2024-12-04 RX ADMIN — AMPICILLIN SODIUM AND SULBACTAM SODIUM 1.5 G: 1; .5 INJECTION, POWDER, FOR SOLUTION INTRAMUSCULAR; INTRAVENOUS at 12:59

## 2024-12-04 RX ADMIN — LEVALBUTEROL TARTRATE 2 PUFF: 45 AEROSOL, METERED ORAL at 14:41

## 2024-12-04 RX ADMIN — LEVALBUTEROL TARTRATE 2 PUFF: 45 AEROSOL, METERED ORAL at 20:47

## 2024-12-04 RX ADMIN — SERTRALINE HYDROCHLORIDE 150 MG: 100 TABLET ORAL at 09:53

## 2024-12-04 RX ADMIN — APIXABAN 5 MG: 5 TABLET, FILM COATED ORAL at 09:54

## 2024-12-04 RX ADMIN — APIXABAN 5 MG: 5 TABLET, FILM COATED ORAL at 20:49

## 2024-12-04 RX ADMIN — ATORVASTATIN CALCIUM 20 MG: 20 TABLET, FILM COATED ORAL at 20:49

## 2024-12-04 RX ADMIN — PANTOPRAZOLE SODIUM 40 MG: 40 TABLET, DELAYED RELEASE ORAL at 06:35

## 2024-12-04 RX ADMIN — AMPICILLIN SODIUM AND SULBACTAM SODIUM 1.5 G: 1; .5 INJECTION, POWDER, FOR SOLUTION INTRAMUSCULAR; INTRAVENOUS at 17:13

## 2024-12-04 RX ADMIN — FLUTICASONE FUROATE AND VILANTEROL TRIFENATATE 1 PUFF: 200; 25 POWDER RESPIRATORY (INHALATION) at 09:54

## 2024-12-04 RX ADMIN — GABAPENTIN 600 MG: 300 CAPSULE ORAL at 20:49

## 2024-12-04 RX ADMIN — PROPRANOLOL HYDROCHLORIDE 10 MG: 10 TABLET ORAL at 20:49

## 2024-12-04 RX ADMIN — IPRATROPIUM BROMIDE 2 PUFF: 17 AEROSOL, METERED RESPIRATORY (INHALATION) at 20:52

## 2024-12-04 RX ADMIN — IPRATROPIUM BROMIDE 2 PUFF: 17 AEROSOL, METERED RESPIRATORY (INHALATION) at 12:57

## 2024-12-04 RX ADMIN — POLYETHYLENE GLYCOL 3350 17 G: 17 POWDER, FOR SOLUTION ORAL at 09:52

## 2024-12-04 RX ADMIN — LEVALBUTEROL TARTRATE 2 PUFF: 45 AEROSOL, METERED ORAL at 09:54

## 2024-12-04 RX ADMIN — IPRATROPIUM BROMIDE 2 PUFF: 17 AEROSOL, METERED RESPIRATORY (INHALATION) at 09:54

## 2024-12-04 RX ADMIN — AMPICILLIN SODIUM AND SULBACTAM SODIUM 1.5 G: 1; .5 INJECTION, POWDER, FOR SOLUTION INTRAMUSCULAR; INTRAVENOUS at 06:26

## 2024-12-04 RX ADMIN — AMPICILLIN SODIUM AND SULBACTAM SODIUM 1.5 G: 1; .5 INJECTION, POWDER, FOR SOLUTION INTRAMUSCULAR; INTRAVENOUS at 23:21

## 2024-12-04 RX ADMIN — EMPAGLIFLOZIN 10 MG: 10 TABLET, FILM COATED ORAL at 09:52

## 2024-12-04 RX ADMIN — PROPRANOLOL HYDROCHLORIDE 10 MG: 10 TABLET ORAL at 09:52

## 2024-12-04 RX ADMIN — DILTIAZEM HYDROCHLORIDE 240 MG: 240 CAPSULE, EXTENDED RELEASE ORAL at 09:52

## 2024-12-04 RX ADMIN — IPRATROPIUM BROMIDE 2 PUFF: 17 AEROSOL, METERED RESPIRATORY (INHALATION) at 17:12

## 2024-12-04 ASSESSMENT — ACTIVITIES OF DAILY LIVING (ADL)
ADLS_ACUITY_SCORE: 62
ADLS_ACUITY_SCORE: 62
ADLS_ACUITY_SCORE: 63
ADLS_ACUITY_SCORE: 62
ADLS_ACUITY_SCORE: 63
ADLS_ACUITY_SCORE: 62

## 2024-12-04 NOTE — PROGRESS NOTES
CLINICAL NUTRITION SERVICES - REASSESSMENT NOTE     Nutrition Prescription    RECOMMENDATIONS FOR MDs/PROVIDERS TO ORDER:  Consider appetite stimulant   Calorie count in progress. Patient with poor po and appetite, candidate for nutrition support.     Malnutrition Status:    Severe malnutrition in the context of chronic illness.     Recommendations already ordered by Registered Dietitian (RD):  Changed to strawberry flavor ensure with bkf and lunch trays, vanilla with dinner trays  Ordered Room service not appropriate so patient can get 3 meals per day   Calorie count to assess need for tube feed support     Future/Additional Recommendations:  PO improvement       EVALUATION OF THE PROGRESS TOWARD GOALS   Diet: Low sat fat , low Na+ <2400 mgm,  2 liter FR + ensure enlive between meals. Passed swallow eval     Intake: per nursing note, patient ate abit of bkf this am. Has Poor appetite.  - Met with patient today. Patient notes limited appetite, ate some bkf and did not order lunch. Obtained patient's late lunch and dinner orders this evening. Encouraged patient to try ensure supplements if low po and appetite. Patient would like to try strawberry flavor ensure.      NEW FINDINGS   Chart reviewed: history of A fib, CAD, COPD, asthma, Raynaud's, CKD, laryngeal squamous cell carcinoma/papillomatosis, CREST syndrome, chronic pain, chronic constipation, hypothyroidism acute on chronic heart failure with preserved systolic function,     H/o laryngeal squamous cell carcinoma, papillomatosis with dysphagia: Initially presented 2015 with benign laryngeal lesion. Progressed to papilloma 2021. Dx with invasive squamous cell carcinoma 4/2024. Now s/p radiation completed 7/2024     Admitted to [facility] with acute hypoxic respiratory failure, hospitalization prolonged due to inability to wean off O2 despite diuresis. Now with improvement in O2 req down to 1L. Overall impression for acute hypoxemic respiratory failur elikely secondary  to volume overload and decompensated heart failure. Plan for IV diuresis     Meds:  Lasix PO 40 daily, levothyroxine     Wt trend:  Admit wt: 46.5 kg (102 lb 8.2 oz) standing wt on   Most recent standing wt: 46 kg (101 lb 6.4 oz) on     Per chart review, patient's daughter reports UBW around 120-130 lbs, however wt here around 102 lbs, patient states she is eating less and has less appetite, previously with recent invasive laryngeal SCC with radiation therapy, also had dysphagia before.      Labs noted:  BUN:42.2, Cr: 1.69, GFR: 31   Lytes normal  B    GI/stool:  Having daily BM 1-2 per day      MALNUTRITION  % Intake: </= 50% for >/= 5 days (severe)  % Weight Loss:  > 7.5% in 3 months (severe)   Subcutaneous Fat Loss: Facial region:  severe, Upper arm:  severer, and Thoracic/intercostal:  severe   Muscle Loss: Temporal:  severe, Thoracic region (clavicle, acromium bone, deltoid, trapezius, pectoral):  severe, Upper arm (bicep, tricep):  severe, Upper leg (quadricep, hamstring):  severe, and Posterior calf:  severe   Fluid Accumulation/Edema: None noted  Malnutrition Diagnosis: Severe malnutrition in the context of chronic illness.     Previous Goals   Patient to consume % of nutritionally adequate meal trays TID, or the equivalent with supplements/snacks.   Evaluation: Not met    Previous Nutrition Diagnosis  Inadequate oral intake related to decreased appetite as evidenced by poor PO PTA, increased needs for underweight BMI, 22% wt loss in 4 months.    Evaluation: Declining    CURRENT NUTRITION DIAGNOSIS  Inadequate oral intake related to decreased appetite as evidenced by poor PO PTA, increased needs for underweight BMI, 22% wt loss in 4 months.      INTERVENTIONS  Implementation  Medical food supplement therapy  Calorie count  RS not appropriate     Goals  Patient to consume % of nutritionally adequate meal trays TID, or the equivalent with  supplements/snacks.    Monitoring/Evaluation  Progress toward goals will be monitored and evaluated per protocol.    Hillary Rincon RD/LD  Unit 7C (4150-2235) and ([id number]),  Monday-Friday: Vocera -> (7C clinical Dietitian),   Weekend/Holiday: Vocera -> (Weekend Clinical Dietitian)

## 2024-12-04 NOTE — PROCEDURES
[facility]  CAPS NOTE  Date of Admission:  11/21/2024  Consult Requested by: Medicine  Reason for Consult:  Evaluation of size of pleural effusion    POC US Guide for Thorcentesis     Narrative  Limited point of care pleural/lung ultrasound to evaluate for thoracentesis.    Thoracentesis Indication:dyspnea    Views/Acquisition: Right  pleural space(s): left lateral decubitus.    Findings/Interpretation: Right pleural effusion visualized. With current positioning, spans 1 rib space.    Ajay Malave MD      Assessment and Plan:  79-year-old woman with ongoing hospital cares for hypoxia, dyspnea, and overall feeling unwell. CAPS consulted to evaluate size of pleural effusion, whether holding Eliquis for procedure would be warranted.     Current pockets are not adequate for bedside thoracentesis given current mobility and positioning limitations (unable to sit upright), pleural fluid spans just one rib space in lateral decubitus position.     Reviewed at bedside with primary team and patient. No plans for upcoming bedside thoracentesis at this point.     Please do not hesitate to re-involve CAPS team for re-evaluation as clinically indicated.     MD MADHAVI Pappas [facility]  Securely message with [address] (more info)  Text page via [facility] Paging/Directory   See signed in provider for up to date coverage information

## 2024-12-04 NOTE — PROGRESS NOTES
[facility]    Medicine Progress Note - Hospitalist Service, GOLD TEAM 8    Date of Admission:  11/21/2024    Assessment & Plan   Asya Coffman is a 79 year old female with history of A fib, CAD, COPD, asthma, Raynaud's, CKD, laryngeal squamous cell carcinoma/papillomatosis, CREST syndrome, chronic pain, chronic constipation, hypothyroidism, anxiety, and depression who was admitted to [facility] with acute hypoxic respiratory failure, hospitalization prolonged due to inability to wean off O2 despite diuresis.     Changes today:  - Worsening hypoxia in the last 48 hours. Now requiring 3 L.   - Bedside POC ultrasound of R pleural effusion done which showed 1 rib space approx 2-3 cm pocket, simple fluid, IVC <2.0 cm and approximately ~ 50% collapse  - Creatinine 1.6 from 1.2  - WBC 12.6 (from 9.2)     Leukocytosis   Acute hypoxic respiratory failure, improving- on 3L NC  Acute on Chronic Heart Failure with Preserved Systolic Function   Pulmonary hypertension with elevated RVSP on TTE 11/22  Fluid overload  Elevated BNP  Hx of asthma- not in exacerbation  Presented with 1 month progressive dyspnea. Endorses FELIZ, PND, orthopnea, swelling in the legs. Found to have hypoxic requiring 2L NC. BNP 4,421 (1,920 4/2024). VBG 7.41/40/46/26. LA, WBC normal. Flu, COVID negative. EKG A fib with RVR (rate 101), chronic A fib as below. CT PE 11/21 no PE, trace interstitial edema with small to moderate b/l pleural effusions. TTE with normal EF however unable to measure diastolic function 2/2 a fib. Repeat CXR 11/23 showing small R pleural effusion and trace L. Pulmonary and cardiology were consulted during admission to determine cause of persistent O2 requirement, ultimately determined to be likely a combination of HFpEF and pulmonary hypertension and had never been initiated on optimal medical mgmt of HFpEF.  Also found to have a small PFO on echocardiogram which could lead to shunting in  times of increased R sided pressures.   On 12/4 she had a bump in creatinine to 1.6 from 1.2 and this is challenging to figure out in the context of elevated BNP. IVC looks to have 50% collapse and is <2 cm on bedside ultrasound without any lower extremity edema. Held diuretics + plan for recheck later today. If still difficult to assess volume status will re-engage cardiology tomorrow. Respiratory viral panel negative. UA pending     Meds  - lasix PO 40 daily (on hold with bump in creatinine)   - jardiance 10 daily  - spironolactone 25 mg daily (held 12/4 with creatinine)   - propranolol 10 BID  - PTA Fluticasone- Vilanterol  - Xopenex/ipratropium q6hr  - 2L fluid restriction  - Daily standing weights, I&Os  - Continuous pulse ox  - CXR 12/3 - appears stable with persistent R pleural effusion   - Sputum culture if able - follow up  - Unasyn 12/3 - current for aspiration pneumonia   - Will ask SLP to re-evaluate - appreciate recs to change diet  - May benefit from R sided thoracentesis depending on progression - will need to hold Eliquis. Today there is not a large enough pocket to do thoracentesis     A fib with RVR- not rate controlled  - On Diltiazem 240 ER BID, max dose  - C/w propranolol 10mg BID, target HR 60-70   - c/w PTA on Apixaban, may need to hold for procedure- thoracentesis   - Maintain K~4 and Mg ~2     Significant weight loss  Severe Protein-Calorie Malnutrition   - Per daughter baseline wt around 120-130 lbs, however wt here around 102 lbs, patient states she is eating less and has less appetite, denies post prandial abd pain, N/V. She states she likes the food but just do not have the appetite to eat more. She has hx of recent invasive laryngeal SCC with radiation therapy, also had dysphagia before  - consulted nutrition  - SLP assistance appreciated- does she need swallow study to be repeated?    Deconditioning  - PT/OT, dispo recommendation is TCU, patient agrees     H/o laryngeal squamous cell  carcinoma, papillomatosis with dysphagia: Initially presented 2015 with benign laryngeal lesion. Progressed to papilloma 2021. Dx with invasive squamous cell carcinoma 4/2024. Now s/p radiation completed 7/2024. CT chest 11/21 ingested content within a mildly patulous thoracic esophagus, aspiration cautions recommended   - SLP consult, recommending reg diet and thin liq   - ENT saw this admission per daughter's request, bedside laryngoscopy done with confirmation of no papillomas noted. They did see some atypical appearing tissue in the larynx and subglottis, none of which are occlusive or likely having any effect on her dyspnea.  - started PPI per their recs to limit laryngeal irritation  - Follow up OP 1/2025 as planned with Dr. Davis     Chronic/stable medical conditions  Pulmonary nodule:   6 mm LLL pulmonary nodule noted on CT  - Will need repeat imaging in 6-12 months     Subacute rib fractures:   Admitted to [facility] 9/2024 with rib fractures. Subacute fractures noted on CT 11/21. Continue supportive cares     H/o CREST syndrome:   2014. Per notes, did have positive FARIDEH, Raynaud's, Calcinosis, GERD and some telangectasia's. Last saw [facility]ology 2017. No acute concerns other than esophageal motility     Hypernatremia: Mild. Improved to 138 today.     L>R BLE edema: US to rule out DVT    Troponin elevation, likely increased demand: Trop 28 (32). EKG chronic a fib. Asymptomatic. Monitor   COPD, asthma: No e/o exacerbation. Continue PTA Symbicort, albuterol, DuoNebs   CKD II: BL Cr 0.9-1.1 and stable. Trend   Chronic pain: Continue PTA gabapentin, Tylenol. Discontinue Ibuprofen given CKD  Chronic constipation: Continue bowel regimen   Hypothyroidism: TSH 1.48 4/2024. Continue PTA synthroid   Anxiety, depression: Continue PTA Sertraline, Propranolol   Raynaud's: Continue diltiazem as above        Diet: Fluid restriction 2000 ML FLUID  Snacks/Supplements Adult: Ensure Enlive; Between Meals  Soft & Bite  "Sized Diet (level 6) Mildly Thick (level 2)    DVT Prophylaxis: DOAC  Miranda Catheter: Not present  Lines: None     Cardiac Monitoring: None  Code Status: Full Code      Clinically Significant Risk Factors          # Hypochloremia: Lowest Cl = 95 mmol/L in last 2 days, will monitor as appropriate      # Hypoalbuminemia: Lowest albumin = 3.2 g/dL at 12/4/2024  6:47 AM, will monitor as appropriate    # Acute Kidney Injury, unspecified: based on a >150% or 0.3 mg/dL increase in last creatinine compared to past 90 day average, will monitor renal function             # Cachexia: Estimated body mass index is 17.21 kg/m  as calculated from the following:    Height as of this encounter: 1.651 m (5' 5\").    Weight as of this encounter: 46.9 kg (103 lb 6.3 oz).   # Severe Malnutrition: based on nutrition assessment      # Financial/Environmental Concerns: none         Social Drivers of Health    Housing Stability: High Risk (11/23/2024)    Housing Stability     Do you have housing? : No     Are you worried about losing your housing?: No          Disposition Plan     Medically Ready for Discharge: Ready Now             Sara Griffin DO  Hospitalist Service, GOLD TEAM 8  [facility]  Securely message with [address] [address] (more info)  Text page via [employer] Paging/Directory   See signed in provider for up to date coverage information  ______________________________________________________________________    Interval History     No acute events but coughing a lot when I evaluated her this morning. Sounds wet with rhonchi, not previously documented as how she felt.     Physical Exam   Vital Signs: Temp: 97.4  F (36.3  C) Temp src: Oral BP: 101/63 Pulse: 75   Resp: 18 SpO2: 93 % O2 Device: Nasal cannula Oxygen Delivery: 2 LPM  Weight: 103 lbs 6.33 oz    Gen: A&Ox4, lying in bed, wakes up but appears unwell, in no distress, on 3 L NC  Cardiac: regular rate and rhythm, soft systolic murmur at " LSB  Lung: Rhonchi noted on posterior R base, anterior chest is clear to auscultation, L lung clear to auscultation  Abd: soft, non-tender, non-distended  Ext: No edema in upper/lower extremities    Medical Decision Making       40 MINUTES SPENT BY ME on the date of service doing chart review, history, exam, documentation & further activities per the note.      Data

## 2024-12-04 NOTE — PLAN OF CARE
"Goal Outcome Evaluation:       A/Ox4, weak, needs full assistance with ADL. Moved to recliner, able to swallow meds with pudding and apple sauce, s/b speech therapist, changed diet to bite size, soft. will require 1:1 assist in feeding. IV abx continued, PIV intact. Continued on O2, 1 lit/nc, no SOB. Denied pain, visited by daughter..  BP 91/64 (BP Location: Left arm)   Pulse 77   Temp 97.6  F (36.4  C) (Oral)   Resp 20   Ht 1.651 m (5' 5\")   Wt 47.6 kg (104 lb 15 oz)   SpO2 96%   BMI 17.46 kg/m                      "

## 2024-12-04 NOTE — PLAN OF CARE
Goal Outcome Evaluation:  Not progressing       Pt hypoactive all day. Titrated oxygen up to 3 L to maintain sats. Pt has a weak and ineffective cough. Suctioning performed twice. Sputum and viral respiratory panel sent to lab. IV unasyn started. Turning every 2 hours and floating heels on pillows. Pt declined all food, drank a small amount of ensure. UA/UC ordered. Pt unable to void at this time. Pursuing order for straight cath for sample.

## 2024-12-04 NOTE — PLAN OF CARE
Goal Outcome Evaluation:  Shift Hours: 1900 - 0700  Assessment/Interventions:  Assessments were at patient's baseline. Pt having soft bp, O2 stable at 3L per NC.     Activity     Fall Risk Score: 95   Bed alarm on? Yes      Mobility: Not OOB    Pain: Denies    Labs/RN Managed Protocols: UA done during shift.      Lines/Drains: LPIV SL    Nutrition: Poor appetite, tried feeding patient but she only ate one apple sauce and few sips of ensure.     Continuing POC

## 2024-12-04 NOTE — PROGRESS NOTES
"CLINICAL SWALLOW EVALUATION     12/04/24 1124   Appointment Info   Signing Clinician's Name / Credentials (SLP) Maggie Pride [facility]   General Information   Onset of Illness/Injury or Date of Surgery 11/21/24   Referring Physician Dr. Sara Griffin   Patient/Family Therapy Goal Statement (SLP) Patient did not state.   Pertinent History of Current Problem Per provider note: \"Asya Coffman is a 79 year old female with history of A fib, CAD, COPD, asthma, Raynaud's, CKD, laryngeal squamous cell carcinoma/papillomatosis, CREST syndrome, chronic pain, chronic constipation, hypothyroidism, anxiety, and depression who was admitted to [facility] with acute hypoxic respiratory failure, hospitalization prolonged due to inability to wean off O2 despite diuresis. ENT saw this admission per daughter's request, bedside laryngoscopy done with confirmation of no papillomas noted. They did see some atypical appearing tissue in the larynx and subglottis, none of which are occlusive or likely having any effect on her dyspnea.\" CXR 12/3/2024: \"Bilateral pleural effusions, right greater than left with  no acute focal consolidation.\" Chest CT 11/21/2024: \" Ingested content within a mildly patulous thoracic esophagus; aspiration precautions recommended.\" Clinical swallow evaluation completed per MD order given worsened respiratory status.   General Observations Patient up in chair and appeared very weak/deconditioned. RN reported patient took pills with puree this AM slowly. Patinet has declined Ensure and other oral intake today.   Type of Evaluation   Type of Evaluation Swallow Evaluation   Oral Motor   Oral Musculature generally intact   Structural Abnormalities none present   Mucosal Quality dry   Dentition (Oral Motor)   Dentition (Oral Motor) some missing teeth   Facial Symmetry (Oral Motor)   Facial Symmetry (Oral Motor) WNL   Lip Function (Oral Motor)   Lip Range of Motion (Oral Motor) WNL   Lip Strength (Oral Motor) WFL   Tongue " "Function (Oral Motor)   Tongue Strength (Oral Motor) strength decreased;bilateral;minimal impairment;moderate impairment   Tongue Coordination/Speed (Oral Motor) reduced rate   Tongue ROM (Oral Motor) WNL   Jaw Function (Oral Motor)   Jaw Function (Oral Motor) WNL   Cough/Swallow/Gag Reflex (Oral Motor)   Volitional Throat Clear/Cough (Oral Motor) reduced strength   Vocal Quality/Secretion Management (Oral Motor)   Vocal Quality (Oral Motor) breathy;hoarse;hypophonic   Secretion Management (Oral Motor) WNL   General Swallowing Observations   Past History of Dysphagia Pt seen for VFSS on 10/8/24: \"Patient's oropharyngeal swallow function is WNL for her age group. Aspiration did not occur during this evaluation. Trace laryngeal penetration occurred when patient was instructed to take consecutive sips of thin liquid by straw. No penetration was observed with single, small sips taken by cup. Oral phase was characterized by good bolus control with all consistencies trialed. Bolus prep and A-P bolus transit were effective and timely. Tongue base retraction was mildly reduced across all consistencies. Pharyngeal phase was characterized by a timely swallow response with all consistencies. Epiglottic inversion was timely and complete. Hyolaryngeal excursion and hyolaryngeal elevation were mildly reduced, but likely WFL (within functional limits) for patient's age group. Pharyngeal constriction was WNL. Questionable esophageal web noted in the cervical esophagus, however, this did not impede bolus transit through the PES and upper esophagus. Patient's overall aspiration risk is low.\" Patient seen earlier this admission 11/22/2024 with recommendation for regular diet and thin liquids.   Respiratory Support nasal cannula  (2L)   Current Diet/Method of Nutritional Intake (General Swallowing Observations, NIS) regular diet;thin liquids (level 0)   Swallowing Evaluation Clinical swallow evaluation   Clinical Swallow Evaluation "   Feeding Assistance frequent cues/help required  (due to weakness)   Clinical Swallow Evaluation Textures Trialed thin liquids;pureed;soft & bite-sized;solid foods;mildly thick liquids   Clinical Swallow Eval: Thin Liquid Texture Trial   Mode of Presentation, Thin Liquids straw;self-fed   Volume of Liquid or Food Presented 4 oz   Oral Phase of Swallow WFL   Pharyngeal Phase of Swallow throat clearing;coughing/choking   Diagnostic Statement Increased coughing as exam progressed.   Clinical Swallow Eval: Mildly Thick Liquids   Mode of Presentation cup;straw;fed by clinician;self-fed  (hand over hand assist)   Volume Presented 2 oz   Oral Phase WFL   Pharyngeal Phase   (no overt aspriation signs)   Clinical Swallow Evaluation: Puree Solid Texture Trial   Mode of Presentation, Puree spoon;self-fed;fed by clinician   Volume of Puree Presented 3 bites applesauce   Oral Phase, Puree WFL   Pharyngeal Phase, Puree   (no overt aspiration signs)   Clinical Swallow Eval: Soft & Bite Sized   Mode of Presentation self-fed;fed by clinician   Volume Presented 6 bites penne pasta with meat sauce   Oral Phase impaired mastication  (mildly extended)   Pharyngeal Phase throat clearing  (x2)   Clinical Swallow Evaluation: Solid Food Texture Trial   Mode of Presentation self-fed   Volume Presented 2 bites alex cracker   Oral Phase impaired mastication  (moderately extended)   Pharyngeal Phase throat clearing  (x1)   Diagnostic Statement Suspect throat clearing due to dry texture.   Esophageal Phase of Swallow   Patient reports or presents with symptoms of esophageal dysphagia No   Esophageal comments Patient reported meats sometimes feel stuck in throat but that this clears with sips of liquid.   Swallowing Recommendations   Diet Consistency Recommendations soft & bite-sized (level 6);mildly thick liquids (level 2)   Supervision Level for Intake 1:1 supervision needed   Mode of Delivery Recommendations bolus size, small;slow rate of  intake   Swallowing Maneuver Recommendations alternate food and liquid intake   Monitoring/Assistance Required (Eating/Swallowing) stop eating activities when fatigue is present;monitor for cough or change in vocal quality with intake   Recommended Feeding/Eating Techniques (Swallow Eval) maintain upright posture during/after eating for 30 minutes;provide assist with feeding;provide 6 smaller meals throughout day;set-up and prepare tray   Medication Administration Recommendations, Swallowing (SLP) One at a time with puree   Instrumental Assessment Recommendations instrumental evaluation not recommended at this time   Comment, Swallowing Recommendations Patient may benefit from GI consult and/or esophagram to furher assess esophageal function.   General Therapy Interventions   Planned Therapy Interventions Dysphagia Treatment   Dysphagia treatment Modified diet education;Instruction of safe swallow strategies   Clinical Impression   Criteria for Skilled Therapeutic Interventions Met (SLP Eval) Yes, treatment indicated   SLP Diagnosis Dysphagia   Risks & Benefits of therapy have been explained evaluation/treatment results reviewed;care plan/treatment goals reviewed;risks/benefits reviewed;current/potential barriers reviewed;participants voiced agreement with care plan;participants included;patient;daughter   Clinical Impression Comments Mild oropharyngeal dysphagia based on clinical swallow evaluation today in setting of generalized weakness/deconditioning and hx of CREST syndrome, laryngeal CA, COPD. Oral mech exam remarkable for xerostomia, slow, weak lingual movement, and dysphonia. Patient assessed with thin liquid, mildly thick liquid, puree, soft/bite size and solid texture. Note consistent throat clearing or coughing with sips of thin liquid today and a few instances of throat clearing with soft/bite size and solid texture. No throat clearing occurred with mildly thick liquid or puree consistency. No other  coughing occurred and vocal quality remained unchanged. Patient needed frequent feeding assist due to weakness. Note slow but functional mastication of soft/solid texture and minimal to no oral residue remained.  Recommend downgrade diet to soft/bite size (IDDSI 6) and mildly thick liquid (IDDSI 0) given 1:1 assist and swallow strategies (fully upright position in chair preferred, small single sips/bites, slow pace, alternate liquids/solids) and reflux precautions (smaller meals more often, remain upright 60 minutes after oral intake, do not lay down 2-3 hours after oral intake, elevate head of bed 6 inches). Please serve pills one at a time. Patient may benefit from GI consult and/or esophagram to further assess esophageal function given prior VFSS 10/2024 findings and chest CT findings 11/2024. Patient follows with ENT clinic as well.   SLP Total Evaluation Time   Eval: oral/pharyngeal swallow function, clinical swallow Minutes (54081) 15   SLP Goals   Therapy Frequency (SLP Eval) 5 times/week   SLP Predicted Duration/Target Date for Goal Attainment 01/21/25   SLP Goals Swallow       Interventions   Interventions Quick Adds Swallowing Dysfunction   Swallowing Intervention               SLP Discharge Planning       SLP Discharge Recommendation Transitional Care Facility   SLP Rationale for DC Rec Below baseline for swallowing.   SLP Brief overview of current status  Recommend downgrade diet to soft/bite size (IDDSI 6) and mildly thick liquid (IDDSI 0) given 1:1 assist and swallow strategies (fully upright position in chair preferred, small single sips/bites, slow pace, alternate liquids/solids) and reflux precautions (smaller meals more often, remain upright 60 minutes after oral intake, do not lay down 2-3 hours after oral intake, elevate head of bed 6 inches). Please serve pills one at a time. Patient may benefit from GI consult and/or esophagram to further assess esophageal function given prior VFSS 10/2024  findings and chest CT findings 11/2024. Patient follows with ENT as well.   SLP Time and Intention   Total Session Time (sum of timed and untimed services) 25

## 2024-12-04 NOTE — PROGRESS NOTES
Care Management Follow Up    Length of Stay (days): 13    Expected Discharge Date: 12/07/2024     Concerns to be Addressed: all concerns addressed in this encounter     Patient plan of care discussed at interdisciplinary rounds: Yes    Anticipated Discharge Disposition: Skilled Nursing Facility vs [facility]  Anticipated Discharge Services: None  Anticipated Discharge DME: None    Patient/family educated on Medicare website which has current facility and service quality ratings: yes  Education Provided on the Discharge Plan:  yes  Patient/Family in Agreement with the Plan:  yes    Referrals Placed by CM/SW:     Pending:  [facility]  [address] [address] [place] [id number] [phone]   Admissions: [phone] (Anali)  [email or web address] (Anali in admissions email) - Preference given to [facility] patients   12/2: Messaged via in [employer]    [facility]  [address]  [facility] [address]  P: [phone]     Declined  [facility]: Bed not available 11/27 & declined again due to financial concerns 12/2  [facility]: Bed not available, cost of medications  [facility]: Bed not available 12/2  [facility]: Cost of medications    Private pay costs discussed: Not applicable    Discussed  Partnership in Safe Discharge Planning  document with patient/family: No     Handoff Completed: No, handoff not indicated or clinically appropriate    Additional Information:  NOLBERTO met with the patient and the patient's daughter Lana at bedside. NOLBERTO reported that the patient is not currently tolerating PT and OT in the same day. NOLBERTO stated they can look into the [facility] with increased services and home health for PT and OT. Lana expressed concern with cost. She stated they are currently paying roughly $10,000/month and likely would not be able to afford something around $15,000/month. Lana stated if that was the case they could reconsider TCU. NOLBERTO  stated they can look into this.     [facility] reviewed and accepted the patient for TCU.    NOLBERTO called Lana and discussed that if cost is a concern is to have the patient eventually return to the [facility], the patient going to TCU may be an option to consider. Lana stated that may be the rote they need to pursue at this time. Lana stated they are agreeable to [facility], however they would prefer somewhere closer to [place] where family can visit more frequently.    Provider informed the SW that the patient is no longer med ready and additional workup is being done.    SW updated [facility] liaison Maggie Loaiza.     Next Steps: SW will continue to follow for discharge needs.     GUADALUPE Farnsworth  [address]   Office: [phone]  [email or web address]  Securely message with [place] (Search Name or 7C Med Surg [id number] )  Text page via [facility] Paging/Directory   See signed in provider for up to date coverage information  Social Work & Care Management Department

## 2024-12-05 ENCOUNTER — APPOINTMENT (OUTPATIENT)
Dept: PHYSICAL THERAPY | Facility: CLINIC | Age: 79
End: 2024-12-05
Payer: COMMERCIAL

## 2024-12-05 VITALS
DIASTOLIC BLOOD PRESSURE: 59 MMHG | BODY MASS INDEX: 17.12 KG/M2 | HEART RATE: 78 BPM | WEIGHT: 102.73 LBS | TEMPERATURE: 98.3 F | HEIGHT: 65 IN | RESPIRATION RATE: 16 BRPM | OXYGEN SATURATION: 96 % | SYSTOLIC BLOOD PRESSURE: 109 MMHG

## 2024-12-05 LAB
ALBUMIN SERPL BCG-MCNC: 3.1 G/DL (ref 3.5–5.2)
ALP SERPL-CCNC: 75 U/L (ref 40–150)
ALT SERPL W P-5'-P-CCNC: <5 U/L (ref 0–50)
ANION GAP SERPL CALCULATED.3IONS-SCNC: 14 MMOL/L (ref 7–15)
AST SERPL W P-5'-P-CCNC: 18 U/L (ref 0–45)
BACTERIA SPT CULT: ABNORMAL
BILIRUB SERPL-MCNC: 0.3 MG/DL
BUN SERPL-MCNC: 45.7 MG/DL (ref 8–23)
CALCIUM SERPL-MCNC: 8.6 MG/DL (ref 8.8–10.4)
CHLORIDE SERPL-SCNC: 98 MMOL/L (ref 98–107)
CREAT SERPL-MCNC: 1.48 MG/DL (ref 0.51–0.95)
EGFRCR SERPLBLD CKD-EPI 2021: 36 ML/MIN/1.73M2
ERYTHROCYTE [DISTWIDTH] IN BLOOD BY AUTOMATED COUNT: 19.6 % (ref 10–15)
GLUCOSE SERPL-MCNC: 95 MG/DL (ref 70–99)
GRAM STAIN RESULT: ABNORMAL
GRAM STAIN RESULT: ABNORMAL
HCO3 SERPL-SCNC: 26 MMOL/L (ref 22–29)
HCT VFR BLD AUTO: 34.8 % (ref 35–47)
HGB BLD-MCNC: 10.5 G/DL (ref 11.7–15.7)
MAGNESIUM SERPL-MCNC: 2.4 MG/DL (ref 1.7–2.3)
MCH RBC QN AUTO: 25.9 PG (ref 26.5–33)
MCHC RBC AUTO-ENTMCNC: 30.2 G/DL (ref 31.5–36.5)
MCV RBC AUTO: 86 FL (ref 78–100)
PHOSPHATE SERPL-MCNC: 4 MG/DL (ref 2.5–4.5)
PLATELET # BLD AUTO: 257 10E3/UL (ref 150–450)
POTASSIUM SERPL-SCNC: 3.9 MMOL/L (ref 3.4–5.3)
PROT SERPL-MCNC: 6.1 G/DL (ref 6.4–8.3)
RBC # BLD AUTO: 4.05 10E6/UL (ref 3.8–5.2)
SODIUM SERPL-SCNC: 138 MMOL/L (ref 135–145)
WBC # BLD AUTO: 9.1 10E3/UL (ref 4–11)

## 2024-12-05 PROCEDURE — 250N000013 HC RX MED GY IP 250 OP 250 PS 637: Performed by: STUDENT IN AN ORGANIZED HEALTH CARE EDUCATION/TRAINING PROGRAM

## 2024-12-05 PROCEDURE — 97535 SELF CARE MNGMENT TRAINING: CPT | Mod: GO

## 2024-12-05 PROCEDURE — 250N000011 HC RX IP 250 OP 636: Performed by: STUDENT IN AN ORGANIZED HEALTH CARE EDUCATION/TRAINING PROGRAM

## 2024-12-05 PROCEDURE — 83735 ASSAY OF MAGNESIUM: CPT | Performed by: STUDENT IN AN ORGANIZED HEALTH CARE EDUCATION/TRAINING PROGRAM

## 2024-12-05 PROCEDURE — 99232 SBSQ HOSP IP/OBS MODERATE 35: CPT | Performed by: STUDENT IN AN ORGANIZED HEALTH CARE EDUCATION/TRAINING PROGRAM

## 2024-12-05 PROCEDURE — 92526 ORAL FUNCTION THERAPY: CPT | Mod: GN

## 2024-12-05 PROCEDURE — 80053 COMPREHEN METABOLIC PANEL: CPT | Performed by: STUDENT IN AN ORGANIZED HEALTH CARE EDUCATION/TRAINING PROGRAM

## 2024-12-05 PROCEDURE — 120N000002 HC R&B MED SURG/OB UMMC

## 2024-12-05 PROCEDURE — 97530 THERAPEUTIC ACTIVITIES: CPT | Mod: GP

## 2024-12-05 PROCEDURE — 97530 THERAPEUTIC ACTIVITIES: CPT | Mod: GO

## 2024-12-05 PROCEDURE — 250N000013 HC RX MED GY IP 250 OP 250 PS 637: Performed by: PHYSICIAN ASSISTANT

## 2024-12-05 PROCEDURE — 85041 AUTOMATED RBC COUNT: CPT | Performed by: STUDENT IN AN ORGANIZED HEALTH CARE EDUCATION/TRAINING PROGRAM

## 2024-12-05 PROCEDURE — 85018 HEMOGLOBIN: CPT | Performed by: STUDENT IN AN ORGANIZED HEALTH CARE EDUCATION/TRAINING PROGRAM

## 2024-12-05 PROCEDURE — 97116 GAIT TRAINING THERAPY: CPT | Mod: GP

## 2024-12-05 PROCEDURE — 36415 COLL VENOUS BLD VENIPUNCTURE: CPT | Performed by: STUDENT IN AN ORGANIZED HEALTH CARE EDUCATION/TRAINING PROGRAM

## 2024-12-05 PROCEDURE — 84100 ASSAY OF PHOSPHORUS: CPT | Performed by: STUDENT IN AN ORGANIZED HEALTH CARE EDUCATION/TRAINING PROGRAM

## 2024-12-05 RX ORDER — AMOXICILLIN AND CLAVULANATE POTASSIUM 500; 125 MG/1; MG/1
1 TABLET, FILM COATED ORAL EVERY 12 HOURS SCHEDULED
Status: DISCONTINUED | OUTPATIENT
Start: 2024-12-05 | End: 2024-12-09

## 2024-12-05 RX ORDER — AMPICILLIN AND SULBACTAM 1; .5 G/1; G/1
1.5 INJECTION, POWDER, FOR SOLUTION INTRAMUSCULAR; INTRAVENOUS EVERY 12 HOURS
Status: DISCONTINUED | OUTPATIENT
Start: 2024-12-05 | End: 2024-12-05

## 2024-12-05 RX ADMIN — ATORVASTATIN CALCIUM 20 MG: 20 TABLET, FILM COATED ORAL at 20:52

## 2024-12-05 RX ADMIN — AMOXICILLIN AND CLAVULANATE POTASSIUM 1 TABLET: 500; 125 TABLET, FILM COATED ORAL at 21:06

## 2024-12-05 RX ADMIN — EMPAGLIFLOZIN 10 MG: 10 TABLET, FILM COATED ORAL at 08:23

## 2024-12-05 RX ADMIN — FLUTICASONE FUROATE AND VILANTEROL TRIFENATATE 1 PUFF: 200; 25 POWDER RESPIRATORY (INHALATION) at 10:25

## 2024-12-05 RX ADMIN — IPRATROPIUM BROMIDE 2 PUFF: 17 AEROSOL, METERED RESPIRATORY (INHALATION) at 10:26

## 2024-12-05 RX ADMIN — IPRATROPIUM BROMIDE 2 PUFF: 17 AEROSOL, METERED RESPIRATORY (INHALATION) at 13:46

## 2024-12-05 RX ADMIN — APIXABAN 5 MG: 5 TABLET, FILM COATED ORAL at 08:03

## 2024-12-05 RX ADMIN — LEVALBUTEROL TARTRATE 2 PUFF: 45 AEROSOL, METERED ORAL at 13:46

## 2024-12-05 RX ADMIN — AMPICILLIN SODIUM AND SULBACTAM SODIUM 1.5 G: 1; .5 INJECTION, POWDER, FOR SOLUTION INTRAMUSCULAR; INTRAVENOUS at 05:50

## 2024-12-05 RX ADMIN — DILTIAZEM HYDROCHLORIDE 240 MG: 240 CAPSULE, EXTENDED RELEASE ORAL at 08:24

## 2024-12-05 RX ADMIN — IPRATROPIUM BROMIDE 2 PUFF: 17 AEROSOL, METERED RESPIRATORY (INHALATION) at 20:52

## 2024-12-05 RX ADMIN — GABAPENTIN 600 MG: 300 CAPSULE ORAL at 20:52

## 2024-12-05 RX ADMIN — PROPRANOLOL HYDROCHLORIDE 10 MG: 10 TABLET ORAL at 20:52

## 2024-12-05 RX ADMIN — IPRATROPIUM BROMIDE 2 PUFF: 17 AEROSOL, METERED RESPIRATORY (INHALATION) at 16:37

## 2024-12-05 RX ADMIN — LEVALBUTEROL TARTRATE 2 PUFF: 45 AEROSOL, METERED ORAL at 02:58

## 2024-12-05 RX ADMIN — LEVALBUTEROL TARTRATE 2 PUFF: 45 AEROSOL, METERED ORAL at 20:53

## 2024-12-05 RX ADMIN — PANTOPRAZOLE SODIUM 40 MG: 40 TABLET, DELAYED RELEASE ORAL at 08:03

## 2024-12-05 RX ADMIN — APIXABAN 5 MG: 5 TABLET, FILM COATED ORAL at 20:52

## 2024-12-05 RX ADMIN — LEVOTHYROXINE SODIUM 88 MCG: 88 TABLET ORAL at 05:50

## 2024-12-05 RX ADMIN — SERTRALINE HYDROCHLORIDE 150 MG: 100 TABLET ORAL at 08:03

## 2024-12-05 RX ADMIN — LEVALBUTEROL TARTRATE 2 PUFF: 45 AEROSOL, METERED ORAL at 10:26

## 2024-12-05 RX ADMIN — FEXOFENADINE HCL 180 MG: 180 TABLET ORAL at 08:03

## 2024-12-05 RX ADMIN — SALINE NASAL SPRAY 1 SPRAY: 1.5 SOLUTION NASAL at 08:10

## 2024-12-05 RX ADMIN — DILTIAZEM HYDROCHLORIDE 240 MG: 240 CAPSULE, EXTENDED RELEASE ORAL at 20:52

## 2024-12-05 ASSESSMENT — ACTIVITIES OF DAILY LIVING (ADL)
ADLS_ACUITY_SCORE: 63
ADLS_ACUITY_SCORE: 58
ADLS_ACUITY_SCORE: 56
ADLS_ACUITY_SCORE: 58
ADLS_ACUITY_SCORE: 60
ADLS_ACUITY_SCORE: 60
ADLS_ACUITY_SCORE: 58
ADLS_ACUITY_SCORE: 60
ADLS_ACUITY_SCORE: 56
ADLS_ACUITY_SCORE: 59
ADLS_ACUITY_SCORE: 63
ADLS_ACUITY_SCORE: 58
ADLS_ACUITY_SCORE: 60
ADLS_ACUITY_SCORE: 56
ADLS_ACUITY_SCORE: 59
ADLS_ACUITY_SCORE: 63

## 2024-12-05 NOTE — PROGRESS NOTES
Care Management Follow Up    Length of Stay (days): 14    Expected Discharge Date: 12/07/2024     Concerns to be Addressed: all concerns addressed in this encounter     Patient plan of care discussed at interdisciplinary rounds: Yes    Anticipated Discharge Disposition: Skilled Nursing Facility  Anticipated Discharge Services: None  Anticipated Discharge DME: None    Patient/family educated on Medicare website which has current facility and service quality ratings: no  Education Provided on the Discharge Plan:    Patient/Family in Agreement with the Plan:      Referrals Placed by CM/SW:      Pending:  [facility]  [address] [address] [place] [address] [phone]   Admissions: [phone] (Anali)  [email or web address] (Anali in admissions email) - Preference given to [facility] patients   12/2: Messaged via [facility]  [address]  [facility] [address]  P: [phone]     Declined  [facility]: Bed not available 11/27 & declined again due to financial concerns 12/2  [facility]: Bed not available, cost of medications  [facility]: Bed not available 12/2  [facility]: Cost of medications    Private pay costs discussed: transportation costs and insurance costs out of pocket expenses    Discussed  Partnership in Safe Discharge Planning  document with patient/family: No     Handoff Completed: No, handoff not indicated or clinically appropriate    Additional Information:  Patient not yet med ready.    NOLBERTO received a voicemail from the patient's daughter Lana. She reported that she called [facility] and spoke with Shraddha in admissions who reported to Lana that they anticipate they will have a TCU bed available Saturday and instructed Lana to have an initial referral sent.    NOLBERTO messaged Shraddha via Epic in basket about this. Shraddha previously reviewed the patient and declined her for TCU due  "to \" financial issues\" SW requested further information on this as they are not aware of any current financial issues that would inhibit the patient going to TCU. NOLBERTO also asked if Shraddha would like NOLBERTO to send an updated referral.     Next Steps: NOLBERTO will continue to follow for discharge needs    Gorge Rao, [facility]  [address]   Office: [phone]  [email or web address]  Securely message with [employer] (Search Name or 7C Med Surg [id number] NOLBERTO)  Text page via [facility] Paging/Directory   See signed in provider for up to date coverage information  Social Work & Care Management Department    "

## 2024-12-05 NOTE — PLAN OF CARE
Goal Outcome Evaluation: 4945-7003      Plan of Care Reviewed With: patient, child    Overall Patient Progress: improving    Outcome Evaluation: A&O x 4.  VSS on 1L NC.  Denies pain.  Tolerates diet with no n/v.  Very poor appetite though.  1:1 for feeding assistance.  Pills whole with applesauce.  Continues on IV abx through PIV.  Purewick in place for incontinence.  Last BM 2 days ago per pt.  No acute changes.  Continue to monitor and follow POC.

## 2024-12-05 NOTE — PLAN OF CARE
Hours of care 7620-4500    Neuro: A&Ox4. Forgetful  Cardiac: VSS.   Respiratory: Sating >92% on 1L NC.  GI/: Adequate urine output via purewick. Last BM 12/2  Diet/appetite: Poor appetite, 1:1 supervision with eating  Activity:  Assist of 1.  Pain: At acceptable level on current regimen.   Skin: No new deficits noted.  LDA's: PIV: TKO with intermittent antibiotics.    Plan: Continue with POC. Notify primary team with changes.      Goal Outcome Evaluation:         Problem: Malnutrition  Goal: Improved Nutritional Intake  Outcome: Not Progressing     Problem: Fall Injury Risk  Goal: Absence of Fall and Fall-Related Injury  Outcome: Progressing  Intervention: Identify and Manage Contributors  Recent Flowsheet Documentation  Taken 12/5/2024 0100 by Joel Pierce RN  Medication Review/Management: medications reviewed  Intervention: Promote Injury-Free Environment  Recent Flowsheet Documentation  Taken 12/5/2024 0100 by Joel Pierce RN  Safety Promotion/Fall Prevention:   activity supervised   assistive device/personal items within reach   clutter free environment maintained   nonskid shoes/slippers when out of bed   safety round/check completed

## 2024-12-05 NOTE — PROGRESS NOTES
[facility]    Medicine Progress Note - Hospitalist Service, GOLD TEAM 8    Date of Admission:  11/21/2024    Assessment & Plan   Asya Coffman is a 79 year old female with history of A fib, CAD, COPD, asthma, Raynaud's, CKD, laryngeal squamous cell carcinoma/papillomatosis, CREST syndrome, chronic pain, chronic constipation, hypothyroidism, anxiety, and depression who was admitted to [facility] with acute hypoxic respiratory failure, hospitalization prolonged due to inability to wean off O2 despite diuresis.     Changes today:  - Improved hypoxia with antibiotics   - Improved WBC  - Improved creatinine     Leukocytosis   Acute hypoxic respiratory failure, improving- on 1 LPM   Acute on Chronic Heart Failure with Preserved Systolic Function   Pulmonary hypertension with elevated RVSP on TTE 11/22  Fluid overload  Elevated BNP  Hx of asthma- not in exacerbation  Presented with 1 month progressive dyspnea. Endorses FELIZ, PND, orthopnea, swelling in the legs. Found to have hypoxic requiring 2L NC. BNP 4,421 (1,920 4/2024). VBG 7.41/40/46/26. LA, WBC normal. Flu, COVID negative. EKG A fib with RVR (rate 101), chronic A fib as below. CT PE 11/21 no PE, trace interstitial edema with small to moderate b/l pleural effusions. TTE with normal EF however unable to measure diastolic function 2/2 a fib. Repeat CXR 11/23 showing small R pleural effusion and trace L. Pulmonary and cardiology were consulted during admission to determine cause of persistent O2 requirement, ultimately determined to be likely a combination of HFpEF and pulmonary hypertension and had never been initiated on optimal medical mgmt of HFpEF.  Also found to have a small PFO on echocardiogram which could lead to shunting in times of increased R sided pressures.   On 12/4 she had a bump in creatinine to 1.6 from 1.2 and this is challenging to figure out in the context of elevated BNP. IVC looks to have 50% collapse  and is <2 cm on bedside ultrasound without any lower extremity edema. Held diuretics + plan for recheck later today. If still difficult to assess volume status will re-engage cardiology tomorrow. Respiratory viral panel negative. UA unremarkable     Meds  - lasix PO 40 daily (on hold with bump in creatinine)   - jardiance 10 daily  - spironolactone 25 mg daily (held 12/4 with creatinine)   - propranolol 10 BID  - PTA Fluticasone- Vilanterol  - Xopenex/ipratropium q6hr  - 2L fluid restriction  - Daily standing weights, I&Os  - Continuous pulse ox  - CXR 12/3 - appears stable with persistent R pleural effusion   - Sputum culture if able - follow up- pending but looks like mixed sherif   - Unasyn 12/3 - current for aspiration pneumonia, transition to Augmentin this evening   - SLP to re-evaluate 12/4 - appreciate recs to change diet to improve aspiration risk.     A fib with RVR- not rate controlled  - On Diltiazem 240 ER BID, max dose  - C/w propranolol 10mg BID, target HR 60-70   - c/w PTA on Apixaban, may need to hold for procedure- thoracentesis   - Maintain K~4 and Mg ~2     Significant weight loss  Severe Protein-Calorie Malnutrition   - Per daughter baseline wt around 120-130 lbs, however wt here around 102 lbs, patient states she is eating less and has less appetite, denies post prandial abd pain, N/V. She states she likes the food but just do not have the appetite to eat more. She has hx of recent invasive laryngeal SCC with radiation therapy, also had dysphagia before  - consulted nutrition  - SLP assistance appreciated- does she need swallow study to be repeated?    Deconditioning  - PT/OT, dispo recommendation is TCU, patient agrees     H/o laryngeal squamous cell carcinoma, papillomatosis with dysphagia: Initially presented 2015 with benign laryngeal lesion. Progressed to papilloma 2021. Dx with invasive squamous cell carcinoma 4/2024. Now s/p radiation completed 7/2024. CT chest 11/21 ingested content  within a mildly patulous thoracic esophagus, aspiration cautions recommended   - SLP consult, recommending reg diet and thin liq   - ENT saw this admission per daughter's request, bedside laryngoscopy done with confirmation of no papillomas noted. They did see some atypical appearing tissue in the larynx and subglottis, none of which are occlusive or likely having any effect on her dyspnea.  - started PPI per their recs to limit laryngeal irritation  - Follow up OP 1/2025 as planned with Dr. Davis     Chronic/stable medical conditions  Pulmonary nodule:   6 mm LLL pulmonary nodule noted on CT  - Will need repeat imaging in 6-12 months     Subacute rib fractures:   Admitted to [facility] 9/2024 with rib fractures. Subacute fractures noted on CT 11/21. Continue supportive cares     H/o CREST syndrome:   2014. Per notes, did have positive FARIDEH, Raynaud's, Calcinosis, GERD and some telangectasia's. Last saw [facility]ology 2017. No acute concerns other than esophageal motility     Hypernatremia: Mild. Improved to 138 today.     L>R BLE edema: US to rule out DVT    Troponin elevation, likely increased demand: Trop 28 (32). EKG chronic a fib. Asymptomatic. Monitor   COPD, asthma: No e/o exacerbation. Continue PTA Symbicort, albuterol, DuoNebs   CKD II: BL Cr 0.9-1.1 and stable. Trend   Chronic pain: Continue PTA gabapentin, Tylenol. Discontinue Ibuprofen given CKD  Chronic constipation: Continue bowel regimen   Hypothyroidism: TSH 1.48 4/2024. Continue PTA synthroid   Anxiety, depression: Continue PTA Sertraline, Propranolol   Raynaud's: Continue diltiazem as above        Diet: Fluid restriction 2000 ML FLUID  Soft & Bite Sized Diet (level 6) Mildly Thick (level 2)  Snacks/Supplements Adult: Magic Cup; With Meals  Room Service  Calorie Counts    DVT Prophylaxis: DOAC  Miranda Catheter: Not present  Lines: None     Cardiac Monitoring: None  Code Status: Full Code      Clinically Significant Risk Factors          #  "Hypochloremia: Lowest Cl = 94 mmol/L in last 2 days, will monitor as appropriate      # Hypoalbuminemia: Lowest albumin = 3.1 g/dL at 12/5/2024  5:39 AM, will monitor as appropriate    # Acute Kidney Injury, unspecified: based on a >150% or 0.3 mg/dL increase in last creatinine compared to past 90 day average, will monitor renal function             # Cachexia: Estimated body mass index is 17.1 kg/m  as calculated from the following:    Height as of this encounter: 1.651 m (5' 5\").    Weight as of this encounter: 46.6 kg (102 lb 11.8 oz).   # Severe Malnutrition: based on nutrition assessment      # Financial/Environmental Concerns: none         Social Drivers of Health    Housing Stability: High Risk (11/23/2024)    Housing Stability     Do you have housing? : No     Are you worried about losing your housing?: No          Disposition Plan     Medically Ready for Discharge: Ready Now             Sara Griffin, DO  Hospitalist Service, GOLD TEAM [address] [place] [address]  Securely message with [facility] [employer] (more info)  Text page via [facility] Paging/Directory   See signed in provider for up to date coverage information  ______________________________________________________________________    Interval History     No acute events but coughing a lot when I evaluated her this morning. Sounds wet with rhonchi, not previously documented as how she felt.     Physical Exam   Vital Signs: Temp: 97.8  F (36.6  C) Temp src: Oral BP: 96/64 Pulse: 61   Resp: 20 SpO2: 95 % O2 Device: Nasal cannula Oxygen Delivery: 1 LPM  Weight: 102 lbs 11.75 oz    Gen: A&Ox4, lying in bed, wakes up but appears unwell, in no distress, on 1 L NC  Cardiac: regular rate and rhythm, soft systolic murmur at LSB  Lung: decreased breath sounds at R axilla , anterior chest is clear to auscultation, L lung clear to auscultation  Abd: soft, non-tender, non-distended  Ext: No edema in upper/lower extremities    Medical Decision " Making       40 MINUTES SPENT BY ME on the date of service doing chart review, history, exam, documentation & further activities per the note.      Data

## 2024-12-05 NOTE — PLAN OF CARE
Goal Outcome Evaluation:      Plan of Care Reviewed With: patient    Overall Patient Progress: improvingOverall Patient Progress: improving    Outcome Evaluation: A&Ox4, VSS on 1L NC. denies pain, no nausea. Pt reports appetite improving, dysphagia diet level 6 with mildly thickend liquids. 1:1 supervision with meals, calorie counts started today. up with assist of 2, walker and gaitbelt. BM today, purewick for incontinence

## 2024-12-06 ENCOUNTER — APPOINTMENT (OUTPATIENT)
Dept: GENERAL RADIOLOGY | Facility: CLINIC | Age: 79
End: 2024-12-06
Attending: STUDENT IN AN ORGANIZED HEALTH CARE EDUCATION/TRAINING PROGRAM
Payer: COMMERCIAL

## 2024-12-06 LAB
ALBUMIN SERPL BCG-MCNC: 3.1 G/DL (ref 3.5–5.2)
ALP SERPL-CCNC: 74 U/L (ref 40–150)
ALT SERPL W P-5'-P-CCNC: 6 U/L (ref 0–50)
ANION GAP SERPL CALCULATED.3IONS-SCNC: 10 MMOL/L (ref 7–15)
AST SERPL W P-5'-P-CCNC: 18 U/L (ref 0–45)
BILIRUB SERPL-MCNC: 0.3 MG/DL
BUN SERPL-MCNC: 41.5 MG/DL (ref 8–23)
CALCIUM SERPL-MCNC: 8.8 MG/DL (ref 8.8–10.4)
CHLORIDE SERPL-SCNC: 99 MMOL/L (ref 98–107)
CREAT SERPL-MCNC: 1.22 MG/DL (ref 0.51–0.95)
EGFRCR SERPLBLD CKD-EPI 2021: 45 ML/MIN/1.73M2
ERYTHROCYTE [DISTWIDTH] IN BLOOD BY AUTOMATED COUNT: 19.5 % (ref 10–15)
GLUCOSE SERPL-MCNC: 90 MG/DL (ref 70–99)
HCO3 SERPL-SCNC: 27 MMOL/L (ref 22–29)
HCT VFR BLD AUTO: 36.7 % (ref 35–47)
HGB BLD-MCNC: 10.8 G/DL (ref 11.7–15.7)
MAGNESIUM SERPL-MCNC: 2.3 MG/DL (ref 1.7–2.3)
MCH RBC QN AUTO: 25.4 PG (ref 26.5–33)
MCHC RBC AUTO-ENTMCNC: 29.4 G/DL (ref 31.5–36.5)
MCV RBC AUTO: 86 FL (ref 78–100)
PHOSPHATE SERPL-MCNC: 2.9 MG/DL (ref 2.5–4.5)
PLATELET # BLD AUTO: 308 10E3/UL (ref 150–450)
POTASSIUM SERPL-SCNC: 3.9 MMOL/L (ref 3.4–5.3)
PROT SERPL-MCNC: 6.1 G/DL (ref 6.4–8.3)
RBC # BLD AUTO: 4.25 10E6/UL (ref 3.8–5.2)
SODIUM SERPL-SCNC: 136 MMOL/L (ref 135–145)
WBC # BLD AUTO: 7.7 10E3/UL (ref 4–11)

## 2024-12-06 PROCEDURE — 250N000013 HC RX MED GY IP 250 OP 250 PS 637: Performed by: PHYSICIAN ASSISTANT

## 2024-12-06 PROCEDURE — 36415 COLL VENOUS BLD VENIPUNCTURE: CPT | Performed by: STUDENT IN AN ORGANIZED HEALTH CARE EDUCATION/TRAINING PROGRAM

## 2024-12-06 PROCEDURE — 83735 ASSAY OF MAGNESIUM: CPT | Performed by: STUDENT IN AN ORGANIZED HEALTH CARE EDUCATION/TRAINING PROGRAM

## 2024-12-06 PROCEDURE — 74230 X-RAY XM SWLNG FUNCJ C+: CPT

## 2024-12-06 PROCEDURE — 74230 X-RAY XM SWLNG FUNCJ C+: CPT | Mod: 26 | Performed by: RADIOLOGY

## 2024-12-06 PROCEDURE — 85014 HEMATOCRIT: CPT | Performed by: STUDENT IN AN ORGANIZED HEALTH CARE EDUCATION/TRAINING PROGRAM

## 2024-12-06 PROCEDURE — 97535 SELF CARE MNGMENT TRAINING: CPT | Mod: GO

## 2024-12-06 PROCEDURE — 250N000013 HC RX MED GY IP 250 OP 250 PS 637: Performed by: STUDENT IN AN ORGANIZED HEALTH CARE EDUCATION/TRAINING PROGRAM

## 2024-12-06 PROCEDURE — 84100 ASSAY OF PHOSPHORUS: CPT | Performed by: STUDENT IN AN ORGANIZED HEALTH CARE EDUCATION/TRAINING PROGRAM

## 2024-12-06 PROCEDURE — 80053 COMPREHEN METABOLIC PANEL: CPT | Performed by: STUDENT IN AN ORGANIZED HEALTH CARE EDUCATION/TRAINING PROGRAM

## 2024-12-06 PROCEDURE — 82435 ASSAY OF BLOOD CHLORIDE: CPT | Performed by: STUDENT IN AN ORGANIZED HEALTH CARE EDUCATION/TRAINING PROGRAM

## 2024-12-06 PROCEDURE — 99232 SBSQ HOSP IP/OBS MODERATE 35: CPT | Performed by: STUDENT IN AN ORGANIZED HEALTH CARE EDUCATION/TRAINING PROGRAM

## 2024-12-06 PROCEDURE — 92611 MOTION FLUOROSCOPY/SWALLOW: CPT | Mod: GN

## 2024-12-06 PROCEDURE — 120N000002 HC R&B MED SURG/OB UMMC

## 2024-12-06 PROCEDURE — 97530 THERAPEUTIC ACTIVITIES: CPT | Mod: GO

## 2024-12-06 PROCEDURE — 92526 ORAL FUNCTION THERAPY: CPT | Mod: GN

## 2024-12-06 RX ORDER — FUROSEMIDE 40 MG/1
40 TABLET ORAL DAILY
Status: DISCONTINUED | OUTPATIENT
Start: 2024-12-06 | End: 2025-01-02

## 2024-12-06 RX ORDER — BARIUM SULFATE 400 MG/ML
SUSPENSION ORAL ONCE
Status: COMPLETED | OUTPATIENT
Start: 2024-12-06 | End: 2024-12-06

## 2024-12-06 RX ADMIN — APIXABAN 5 MG: 5 TABLET, FILM COATED ORAL at 10:54

## 2024-12-06 RX ADMIN — FLUTICASONE FUROATE AND VILANTEROL TRIFENATATE 1 PUFF: 200; 25 POWDER RESPIRATORY (INHALATION) at 10:37

## 2024-12-06 RX ADMIN — SERTRALINE HYDROCHLORIDE 150 MG: 100 TABLET ORAL at 10:59

## 2024-12-06 RX ADMIN — DILTIAZEM HYDROCHLORIDE 240 MG: 240 CAPSULE, EXTENDED RELEASE ORAL at 11:04

## 2024-12-06 RX ADMIN — ATORVASTATIN CALCIUM 20 MG: 20 TABLET, FILM COATED ORAL at 20:10

## 2024-12-06 RX ADMIN — ANTACID TABLETS 1000 MG: 500 TABLET, CHEWABLE ORAL at 21:26

## 2024-12-06 RX ADMIN — POLYETHYLENE GLYCOL 3350 17 G: 17 POWDER, FOR SOLUTION ORAL at 11:00

## 2024-12-06 RX ADMIN — APIXABAN 5 MG: 5 TABLET, FILM COATED ORAL at 20:10

## 2024-12-06 RX ADMIN — IPRATROPIUM BROMIDE 2 PUFF: 17 AEROSOL, METERED RESPIRATORY (INHALATION) at 10:38

## 2024-12-06 RX ADMIN — PANTOPRAZOLE SODIUM 40 MG: 40 TABLET, DELAYED RELEASE ORAL at 10:56

## 2024-12-06 RX ADMIN — DILTIAZEM HYDROCHLORIDE 240 MG: 240 CAPSULE, EXTENDED RELEASE ORAL at 20:07

## 2024-12-06 RX ADMIN — LEVALBUTEROL TARTRATE 2 PUFF: 45 AEROSOL, METERED ORAL at 03:13

## 2024-12-06 RX ADMIN — FEXOFENADINE HCL 180 MG: 180 TABLET ORAL at 11:07

## 2024-12-06 RX ADMIN — IPRATROPIUM BROMIDE 2 PUFF: 17 AEROSOL, METERED RESPIRATORY (INHALATION) at 20:16

## 2024-12-06 RX ADMIN — EMPAGLIFLOZIN 10 MG: 10 TABLET, FILM COATED ORAL at 11:04

## 2024-12-06 RX ADMIN — GABAPENTIN 600 MG: 300 CAPSULE ORAL at 21:22

## 2024-12-06 RX ADMIN — PROPRANOLOL HYDROCHLORIDE 10 MG: 10 TABLET ORAL at 20:08

## 2024-12-06 RX ADMIN — FUROSEMIDE 40 MG: 40 TABLET ORAL at 10:57

## 2024-12-06 RX ADMIN — BARIUM SULFATE 30 ML: 400 SUSPENSION ORAL at 09:10

## 2024-12-06 RX ADMIN — AMOXICILLIN AND CLAVULANATE POTASSIUM 1 TABLET: 500; 125 TABLET, FILM COATED ORAL at 11:07

## 2024-12-06 RX ADMIN — LEVOTHYROXINE SODIUM 88 MCG: 88 TABLET ORAL at 05:54

## 2024-12-06 RX ADMIN — LEVALBUTEROL TARTRATE 2 PUFF: 45 AEROSOL, METERED ORAL at 20:14

## 2024-12-06 RX ADMIN — IPRATROPIUM BROMIDE 2 PUFF: 17 AEROSOL, METERED RESPIRATORY (INHALATION) at 13:51

## 2024-12-06 RX ADMIN — AMOXICILLIN AND CLAVULANATE POTASSIUM 1 TABLET: 500; 125 TABLET, FILM COATED ORAL at 22:37

## 2024-12-06 RX ADMIN — LEVALBUTEROL TARTRATE 2 PUFF: 45 AEROSOL, METERED ORAL at 10:39

## 2024-12-06 RX ADMIN — IPRATROPIUM BROMIDE 2 PUFF: 17 AEROSOL, METERED RESPIRATORY (INHALATION) at 17:36

## 2024-12-06 ASSESSMENT — ACTIVITIES OF DAILY LIVING (ADL)
ADLS_ACUITY_SCORE: 60
ADLS_ACUITY_SCORE: 64
ADLS_ACUITY_SCORE: 60
ADLS_ACUITY_SCORE: 64
ADLS_ACUITY_SCORE: 60
ADLS_ACUITY_SCORE: 64
ADLS_ACUITY_SCORE: 60
ADLS_ACUITY_SCORE: 60
ADLS_ACUITY_SCORE: 64
ADLS_ACUITY_SCORE: 60
ADLS_ACUITY_SCORE: 64
ADLS_ACUITY_SCORE: 60
ADLS_ACUITY_SCORE: 64
ADLS_ACUITY_SCORE: 60
ADLS_ACUITY_SCORE: 60

## 2024-12-06 NOTE — PLAN OF CARE
Goal Outcome Evaluation:    Overall Patient Progress: improving    Outcome Evaluation: 4515-0871: Patient slept well overnight. Q2 turns and purewick in place. Oriented x4. 2L NC overnight. Scheduled inhaler given. 2A w/ GB/walker for transfers. Patient pleasant and cooperative. No c/o pain. Took pills with applesauce. CPOC.

## 2024-12-06 NOTE — PROGRESS NOTES
"Speech-Language Pathology: Video Swallow Study     12/06/24 1000   Appointment Info   Signing Clinician's Name / Credentials (SLP) Sharla Phillips MS, CCC-SLP   General Information   General Observations Alert & cooperative. Pt sitting upright in fluro chair.   Type of Evaluation   Type of Evaluation Swallow Evaluation   General Swallowing Observations   Past History of Dysphagia OP VFSS 10/8/24: \"Patient's oropharyngeal swallow function is WNL for her age group. Aspiration did not occur during this evaluation. Trace laryngeal penetration occurred when patient was instructed to take consecutive sips of thin liquid by straw. No penetration was observed with single, small sips taken by cup. Oral phase was characterized by good bolus control with all consistencies trialed. Bolus prep and A-P bolus transit were effective and timely. Tongue base retraction was mildly reduced across all consistencies. Pharyngeal phase was characterized by a timely swallow response with all consistencies. Epiglottic inversion was timely and complete. Hyolaryngeal excursion and hyolaryngeal elevation were mildly reduced, but likely WFL (within functional limits) for patient's age group. Pharyngeal constriction was WNL. Questionable esophageal web noted in the cervical esophagus, however, this did not impede bolus transit through the PES and upper esophagus. Patient's overall aspiration risk is low.\" Prior to VFSS on 10/8, pt was consuming puree diet and extremely thickened liquids. Patient seen earlier this admission 11/22/2024 with recommendation for regular diet and thin liquids. Most recent CSE completed 12/4 recommended soft & bite size (IDDSI 6) and mildly thickened liquids (IDDSI 2). Pt with inconsistent coughing on thin.   Respiratory Support nasal cannula  (1L)   Current Diet/Method of Nutritional Intake (General Swallowing Observations, NIS) soft and bite-sized (dysphagia advanced) (level 6);mildly thick (nectar-thick) liquids " (level 2)   Swallowing Evaluation Videofluoroscopic swallow study (VFSS)   VFSS Evaluation   Views Taken left lateral   Physical Location of Procedure [facility] [address]   VFSS Textures Trialed thin liquids;mildly thick liquids;pureed;solid foods   VFSS Eval: Thin Liquid Texture Trial   Mode of Presentation, Thin Liquid cup;straw;self-fed;fed by clinician   Order of Presentation 3, 4, 7, 8, 10   Preparatory Phase prolonged bolus preparation   Oral Phase, Thin Liquid impaired AP movement;residue in oral cavity;premature pharyngeal entry   Bolus Location When Swallow Triggered pyriforms   Pharyngeal Phase, Thin Liquid impaired hyolaryngeal excursion;impaired tongue base retraction;residue in vallecula  (reduced pharyngeal stripping wave)   Rosenbek's Penetration Aspiration Scale: Thin Liquid Trial Results 8 - contrast passes glottis, visible subglottic residue remains, absent patient response (aspiration)   Response to Aspiration absent response  (unproductive cued cough)   Strategies and Compensations chin tuck;supraglottic swallow strategy;reduce bolus size;double swallow;other (see comments)  (pt unable to coordinate for chin tuck during evaluation)   Diagnostic Statement Initially transient penetration, but as evaluation progressed, pt with silent aspiration and deep penetration via cup and straw with laryngeal vestibule residuals. Mild vallecular residuals.   VFSS Eval: Mildly Thick Liquids   Mode of Presentation cup;straw;self-fed;fed by clinician   Order of Presentation 1, 2, 12   Preparatory Phase prolonged bolus preparation   Oral Phase impaired AP movement;residue in oral cavity;premature pharyngeal entry   Bolus Location When Swallow Triggered valleculae   Pharyngeal Phase impaired hyolaryngel excursion;impaired tongue base retraction;residue in vallecula;other (see comments)  (reduced pharyngeal stripping wave)   Rosenbek's Penetration Aspiration Scale 2 - contrast enters airway, remains above the  "vocal cords, no residue remains (penetration)   Diagnostic Statement No aspiration. Shallow transient penetration. Mild vallecular residuals.   VFSS Evaluation: Puree Solid Texture Trial   Mode of Presentation, Puree spoon;self-fed   Order of Presentation 5   Preparatory Phase WFL   Oral Phase, Puree impaired AP movement;residue in oral cavity;premature pharyngeal entry   Bolus Location When Swallow Triggered posterior angle of ramus   Pharyngeal Phase, Puree impaired hyolaryngel excursion;impaired tongue base retraction;residue in vallecula;other (see comments)  (reduced pharyngeal stripping wave)   Rosenbek's Penetration Aspiration Scale: Puree Food Trial Results 1 - no aspiration, contrast does not enter airway   Diagnostic Statement No aspiration or penetration. No sigificant pharyngeal residuals.   VFSS Evaluation: Solid Food Texture Trial   Mode of Presentation, Solid spoon;fed by clinician   Order of Presentation 6, 9, 11   Preparatory Phase prolonged bolus preparation   Oral Phase, Solid impaired AP movement;residue in oral cavity;premature pharyngeal entry   Bolus Location When Swallow Triggered valleculae   Pharyngeal Phase, Solid impaired hyolaryngel excursion;impaired tongue base retraction;other (see comments)  (reduced pharyngeal stripping wave)   Rosenbek's Penetration Aspiration Scale: Solid Food Trial Results 1 - no aspiration, contrast does not enter airway   Strategies and Compensations reduce bolus size;liquid wash;double swallow   Diagnostic Statement No aspiration or penetration. Moderate vallecular residuals, which cleared some with cued liquid wash. Pt was unable to generate a double swallow with ample time, despite cues. Pt was not sensate to pharyngeal residuals during evaluation.   Esophageal Phase of Swallow   Esophageal sweep performed during today s vidofluoroscopic exam  No  (d/t positioning limitations)   Esophageal comments VFSS 10/8/2024 per SLP report \"Questionable esophageal web " "noted in the cervical esophagus, however, this did not impede bolus transit through the PES and upper esophagus.\"   Swallowing Recommendations   Diet Consistency Recommendations soft & bite-sized (level 6);mildly thick liquids (level 2)   Supervision Level for Intake 1:1 supervision needed  (feeding assist)   Mode of Delivery Recommendations bolus size, small;slow rate of intake   Postural Recommendations none   Swallowing Maneuver Recommendations alternate food and liquid intake   Monitoring/Assistance Required (Eating/Swallowing) stop eating activities when fatigue is present;monitor for cough or change in vocal quality with intake   Recommended Feeding/Eating Techniques (Swallow Eval) maintain upright posture during/after eating for 30 minutes;minimize distractions during oral intake;provide assist with feeding;provide 6 smaller meals throughout day;set-up and prepare tray   Medication Administration Recommendations, Swallowing (SLP) one at a time in [facility]   Instrumental Assessment Recommendations VFSS (videofluoroscopic swallowing study)  (repeat in 1-2 weeks or as acute illness resolves)   General Therapy Interventions   Planned Therapy Interventions Dysphagia Treatment   Dysphagia treatment Modified diet education;Instruction of safe swallow strategies;Compensatory strategies for swallowing   Clinical Impression   Criteria for Skilled Therapeutic Interventions Met (SLP Eval) Yes, treatment indicated   SLP Diagnosis Acute on chronic oropharyngeal dysphagia   Risks & Benefits of therapy have been explained evaluation/treatment results reviewed;care plan/treatment goals reviewed;risks/benefits reviewed;current/potential barriers reviewed;participants included;patient;participants voiced agreement with care plan   Clinical Impression Comments Video Swallow Study completed per MD orders. Patient's swallowing function appears worsened from previous swallow evaluation as an OP on 10/8, suspect d/t new generalized " weakness, fatigue, and acute illness. Patient initially had shallow transient penetration with thin liquids via cup and straw, but as evaluation progressed and after solid trials, pt with silent aspiration and deep penetration with laryngeal vestibule residuals with thin liquids via cup and straw. Suspect fatigue was impacting swallowing safety. Transient shallow penetration with mildly thickened liquids via cup and straw. Oral phase is prolonged and notable for premature pharyngeal entry, delayed AP transit, and oral residue after the swallow. Tongue base retraction is reduced, which is similar to previous VFSS. Swallow response was initiated at the pyriforms for liquids & vallecula for solids. Epiglottic inversion was slowed, but complete. Mild stasis occurred with liquids and moderate stasis with solids, which mostly cleared with liquid wash. Pt demonstrated difficulty generating a dry extra swallow throughout the evaluation and sometimes was unable to. Additionally, pt was unable to accurately complete a chin tuck. Hyolaryngeal elevation and hyolaryngeal excursion were mildly reduced, which is consistent from previous VFSS. Mastication is prolonged, but functional, and complete. Cricopharyngeus allows the bolus to easily pass. Recommend pt continues soft & bite size diet (IDDSI 6) and mildly thickened liquids (IDDSI 2) with 1:1 supervision and feeding assist. Patient should be sitting fully upright and alert for the duration of the meal & 30+ minutes after, take small bites/sips at a slow rate, and alternate solids/liquids. Meds one at a time in puree. Recommend repeat VFSS in 1-2 weeks or as acute illness resolves prior to liquid advancement d/t silent aspiration. SLP will continue to follow.   SLP Total Evaluation Time   Evaluation, videofluoroscopic eval of swallow function Minutes (75291) 15   SLP Discharge Planning   SLP Plan diet f/u, swallow strategy training (Liquid wash after solids & small bolus size),  repeat VFSS prior to liquid advancement (1-2 weeks from 12/6)   SLP Discharge Recommendation Transitional Care Facility   SLP Rationale for DC Rec Below baseline for swallowing in setting of acute illness and weakness.   SLP Brief overview of current status  Recommend pt continues soft & bite size diet (IDDSI 6) and mildly thick liquid (IDDSI 2) given 1:1 assist and swallow strategies (fully upright position in chair preferred, small single sips/bites, slow pace, alternate liquids/solids) and reflux precautions (smaller meals more often, remain upright 60 minutes after oral intake, do not lay down 2-3 hours after oral intake, elevate head of bed 6 inches). Meds one at a time in [facility]. SLP will continue to follow.      No

## 2024-12-06 NOTE — PROGRESS NOTES
Calorie Count  Intake recorded for: 12/5  Total Kcals: 621 Total Protein: 16g  Kcals from Hospital Food: 621   Protein: 16g  Kcals from Outside Food (average): 0  Protein: 0g  # Meals Ordered from Kitchen: 4 meals  # Meals Recorded: 3 meals (First - 100% 4 oz apple juice, orange juice, peaches, 25% scrambled eggs, home fried potato, pork sausage lorraine with beef gravy)      (Second - 100% peaches, 25% mashed potatoes)      (Third - 25% hamburger lorraine with beef gravy, home fried potato)  # Supplements Recorded: 25% 1 Magic Cup Vanilla, < 20% 1 Magic Cup Butter Pecan

## 2024-12-06 NOTE — PLAN OF CARE
Goal Outcome Evaluation: 7416-7055      Plan of Care Reviewed With: patient    Overall Patient Progress: improving    Outcome Evaluation: A&O x 4.  VSS on 1L NC.  Denies pain.  Tolerates diet with no n/v.  Poor appetite.  25% of dinner.  Amadou counts.  Purewick in place with good ouptut.  Last BM today.  Continues on abx.  Up with A-2 with gait belt and walker.  No acute changes.  Continue to monitor and follow POC.

## 2024-12-07 LAB
ALBUMIN SERPL BCG-MCNC: 3.2 G/DL (ref 3.5–5.2)
ALP SERPL-CCNC: 78 U/L (ref 40–150)
ALT SERPL W P-5'-P-CCNC: 10 U/L (ref 0–50)
ANION GAP SERPL CALCULATED.3IONS-SCNC: 9 MMOL/L (ref 7–15)
AST SERPL W P-5'-P-CCNC: 16 U/L (ref 0–45)
BACTERIA SPT CULT: ABNORMAL
BACTERIA SPT CULT: ABNORMAL
BILIRUB SERPL-MCNC: 0.4 MG/DL
BUN SERPL-MCNC: 33.5 MG/DL (ref 8–23)
CALCIUM SERPL-MCNC: 8.7 MG/DL (ref 8.8–10.4)
CHLORIDE SERPL-SCNC: 97 MMOL/L (ref 98–107)
CREAT SERPL-MCNC: 1.18 MG/DL (ref 0.51–0.95)
EGFRCR SERPLBLD CKD-EPI 2021: 47 ML/MIN/1.73M2
ERYTHROCYTE [DISTWIDTH] IN BLOOD BY AUTOMATED COUNT: 19.3 % (ref 10–15)
GLUCOSE SERPL-MCNC: 95 MG/DL (ref 70–99)
GRAM STAIN RESULT: ABNORMAL
GRAM STAIN RESULT: ABNORMAL
HCO3 SERPL-SCNC: 29 MMOL/L (ref 22–29)
HCT VFR BLD AUTO: 36 % (ref 35–47)
HGB BLD-MCNC: 10.9 G/DL (ref 11.7–15.7)
MAGNESIUM SERPL-MCNC: 2.1 MG/DL (ref 1.7–2.3)
MCH RBC QN AUTO: 25.5 PG (ref 26.5–33)
MCHC RBC AUTO-ENTMCNC: 30.3 G/DL (ref 31.5–36.5)
MCV RBC AUTO: 84 FL (ref 78–100)
PHOSPHATE SERPL-MCNC: 2.8 MG/DL (ref 2.5–4.5)
PLATELET # BLD AUTO: 386 10E3/UL (ref 150–450)
POTASSIUM SERPL-SCNC: 4.2 MMOL/L (ref 3.4–5.3)
PROT SERPL-MCNC: 6.2 G/DL (ref 6.4–8.3)
RBC # BLD AUTO: 4.28 10E6/UL (ref 3.8–5.2)
SODIUM SERPL-SCNC: 135 MMOL/L (ref 135–145)
WBC # BLD AUTO: 8.8 10E3/UL (ref 4–11)

## 2024-12-07 PROCEDURE — 83735 ASSAY OF MAGNESIUM: CPT | Performed by: STUDENT IN AN ORGANIZED HEALTH CARE EDUCATION/TRAINING PROGRAM

## 2024-12-07 PROCEDURE — 250N000013 HC RX MED GY IP 250 OP 250 PS 637: Performed by: STUDENT IN AN ORGANIZED HEALTH CARE EDUCATION/TRAINING PROGRAM

## 2024-12-07 PROCEDURE — 82435 ASSAY OF BLOOD CHLORIDE: CPT | Performed by: STUDENT IN AN ORGANIZED HEALTH CARE EDUCATION/TRAINING PROGRAM

## 2024-12-07 PROCEDURE — 120N000002 HC R&B MED SURG/OB UMMC

## 2024-12-07 PROCEDURE — 99232 SBSQ HOSP IP/OBS MODERATE 35: CPT | Performed by: STUDENT IN AN ORGANIZED HEALTH CARE EDUCATION/TRAINING PROGRAM

## 2024-12-07 PROCEDURE — 250N000013 HC RX MED GY IP 250 OP 250 PS 637: Performed by: PHYSICIAN ASSISTANT

## 2024-12-07 PROCEDURE — 82565 ASSAY OF CREATININE: CPT | Performed by: STUDENT IN AN ORGANIZED HEALTH CARE EDUCATION/TRAINING PROGRAM

## 2024-12-07 PROCEDURE — 84295 ASSAY OF SERUM SODIUM: CPT | Performed by: STUDENT IN AN ORGANIZED HEALTH CARE EDUCATION/TRAINING PROGRAM

## 2024-12-07 PROCEDURE — 85014 HEMATOCRIT: CPT | Performed by: STUDENT IN AN ORGANIZED HEALTH CARE EDUCATION/TRAINING PROGRAM

## 2024-12-07 PROCEDURE — 36415 COLL VENOUS BLD VENIPUNCTURE: CPT | Performed by: STUDENT IN AN ORGANIZED HEALTH CARE EDUCATION/TRAINING PROGRAM

## 2024-12-07 PROCEDURE — 84100 ASSAY OF PHOSPHORUS: CPT | Performed by: STUDENT IN AN ORGANIZED HEALTH CARE EDUCATION/TRAINING PROGRAM

## 2024-12-07 RX ORDER — BUDESONIDE AND FORMOTEROL FUMARATE DIHYDRATE 80; 4.5 UG/1; UG/1
2 AEROSOL RESPIRATORY (INHALATION)
Status: DISCONTINUED | OUTPATIENT
Start: 2024-12-07 | End: 2024-12-09

## 2024-12-07 RX ORDER — INHALER, ASSIST DEVICES
1 SPACER (EA) MISCELLANEOUS ONCE
Status: COMPLETED | OUTPATIENT
Start: 2024-12-07 | End: 2024-12-07

## 2024-12-07 RX ADMIN — DILTIAZEM HYDROCHLORIDE 240 MG: 240 CAPSULE, EXTENDED RELEASE ORAL at 20:42

## 2024-12-07 RX ADMIN — APIXABAN 5 MG: 5 TABLET, FILM COATED ORAL at 09:10

## 2024-12-07 RX ADMIN — LEVALBUTEROL TARTRATE 2 PUFF: 45 AEROSOL, METERED ORAL at 02:19

## 2024-12-07 RX ADMIN — SPIRONOLACTONE 25 MG: 25 TABLET, FILM COATED ORAL at 09:02

## 2024-12-07 RX ADMIN — PANTOPRAZOLE SODIUM 40 MG: 40 TABLET, DELAYED RELEASE ORAL at 09:04

## 2024-12-07 RX ADMIN — BUDESONIDE AND FORMOTEROL FUMARATE DIHYDRATE 2 PUFF: 80; 4.5 AEROSOL RESPIRATORY (INHALATION) at 21:32

## 2024-12-07 RX ADMIN — POLYETHYLENE GLYCOL 3350 17 G: 17 POWDER, FOR SOLUTION ORAL at 09:02

## 2024-12-07 RX ADMIN — AMOXICILLIN AND CLAVULANATE POTASSIUM 1 TABLET: 500; 125 TABLET, FILM COATED ORAL at 09:11

## 2024-12-07 RX ADMIN — EMPAGLIFLOZIN 10 MG: 10 TABLET, FILM COATED ORAL at 09:08

## 2024-12-07 RX ADMIN — LEVALBUTEROL TARTRATE 2 PUFF: 45 AEROSOL, METERED ORAL at 14:01

## 2024-12-07 RX ADMIN — SENNOSIDES AND DOCUSATE SODIUM 1 TABLET: 50; 8.6 TABLET ORAL at 09:10

## 2024-12-07 RX ADMIN — FLUTICASONE FUROATE AND VILANTEROL TRIFENATATE 1 PUFF: 200; 25 POWDER RESPIRATORY (INHALATION) at 09:01

## 2024-12-07 RX ADMIN — ALBUTEROL SULFATE 2 PUFF: 90 AEROSOL, METERED RESPIRATORY (INHALATION) at 21:31

## 2024-12-07 RX ADMIN — LEVALBUTEROL TARTRATE 2 PUFF: 45 AEROSOL, METERED ORAL at 21:30

## 2024-12-07 RX ADMIN — FUROSEMIDE 40 MG: 40 TABLET ORAL at 09:11

## 2024-12-07 RX ADMIN — PROPRANOLOL HYDROCHLORIDE 10 MG: 10 TABLET ORAL at 20:42

## 2024-12-07 RX ADMIN — DILTIAZEM HYDROCHLORIDE 240 MG: 240 CAPSULE, EXTENDED RELEASE ORAL at 09:11

## 2024-12-07 RX ADMIN — AMOXICILLIN AND CLAVULANATE POTASSIUM 1 TABLET: 500; 125 TABLET, FILM COATED ORAL at 21:30

## 2024-12-07 RX ADMIN — SERTRALINE HYDROCHLORIDE 150 MG: 100 TABLET ORAL at 09:10

## 2024-12-07 RX ADMIN — LEVALBUTEROL TARTRATE 2 PUFF: 45 AEROSOL, METERED ORAL at 09:01

## 2024-12-07 RX ADMIN — ATORVASTATIN CALCIUM 20 MG: 20 TABLET, FILM COATED ORAL at 20:42

## 2024-12-07 RX ADMIN — FEXOFENADINE HCL 180 MG: 180 TABLET ORAL at 09:11

## 2024-12-07 RX ADMIN — IPRATROPIUM BROMIDE 2 PUFF: 17 AEROSOL, METERED RESPIRATORY (INHALATION) at 14:01

## 2024-12-07 RX ADMIN — GABAPENTIN 600 MG: 300 CAPSULE ORAL at 21:00

## 2024-12-07 RX ADMIN — LEVOTHYROXINE SODIUM 88 MCG: 88 TABLET ORAL at 06:21

## 2024-12-07 RX ADMIN — IPRATROPIUM BROMIDE 2 PUFF: 17 AEROSOL, METERED RESPIRATORY (INHALATION) at 21:31

## 2024-12-07 RX ADMIN — IPRATROPIUM BROMIDE 2 PUFF: 17 AEROSOL, METERED RESPIRATORY (INHALATION) at 09:00

## 2024-12-07 RX ADMIN — APIXABAN 5 MG: 5 TABLET, FILM COATED ORAL at 20:42

## 2024-12-07 RX ADMIN — IPRATROPIUM BROMIDE 2 PUFF: 17 AEROSOL, METERED RESPIRATORY (INHALATION) at 18:29

## 2024-12-07 ASSESSMENT — ACTIVITIES OF DAILY LIVING (ADL)
ADLS_ACUITY_SCORE: 64
ADLS_ACUITY_SCORE: 60
ADLS_ACUITY_SCORE: 64
ADLS_ACUITY_SCORE: 60
ADLS_ACUITY_SCORE: 64
ADLS_ACUITY_SCORE: 60
ADLS_ACUITY_SCORE: 64
ADLS_ACUITY_SCORE: 64
ADLS_ACUITY_SCORE: 60
ADLS_ACUITY_SCORE: 64
ADLS_ACUITY_SCORE: 60
ADLS_ACUITY_SCORE: 64
ADLS_ACUITY_SCORE: 60
ADLS_ACUITY_SCORE: 60
ADLS_ACUITY_SCORE: 64

## 2024-12-07 NOTE — PLAN OF CARE
"CAres 0700 to 1900    /57 (BP Location: Right arm)   Pulse 83   Temp 97.7  F (36.5  C) (Oral)   Resp 16   Ht 1.651 m (5' 5\")   Wt 49 kg (108 lb 0.4 oz)   SpO2 92%   BMI 17.98 kg/m      Goal Outcome Evaluation:    Plan of Care Reviewed With: patient  Overall Patient Progress: no changeOverall Patient Progress: no change    Outcome Evaluation: Alert & oriented x4, withdrawn, quiet. Can be forgetful. Vitals stable on 1L O2 NC. Denies SOB, pain. Up in chair 2x with 2 assist & lift. Aspiration precautions maintained, up in chair upright for meals with 1:1 supervision. Meds swallowed one at a time in applesauce, alternated with mildly thickened OJ. On calorie counts, fair PO intake. Sacral mepilex on. External cath changed 2x, no BM this shift. Bed alarms on, call light/room phone/smartphone in reach. Video swallow study done today.      "

## 2024-12-07 NOTE — PROGRESS NOTES
[facility]    Medicine Progress Note - Hospitalist Service, GOLD TEAM 8    Date of Admission:  11/21/2024    Assessment & Plan   Asya Coffman is a 79 year old female with history of A fib, CAD, COPD, asthma, Raynaud's, CKD, laryngeal squamous cell carcinoma/papillomatosis, CREST syndrome, chronic pain, chronic constipation, hypothyroidism, anxiety, and depression who was admitted to [facility] with acute hypoxic respiratory failure, hospitalization prolonged due to inability to wean off O2 despite diuresis.     Changes today:  - Improved hypoxia with antibiotics   - Improved WBC  - Improved creatinine     Leukocytosis   Acute hypoxic respiratory failure, improving- on 1 LPM   Acute on Chronic Heart Failure with Preserved Systolic Function   Pulmonary hypertension with elevated RVSP on TTE 11/22  Fluid overload  Elevated BNP  Hx of asthma- not in exacerbation  Presented with 1 month progressive dyspnea. Endorses FELIZ, PND, orthopnea, swelling in the legs. Found to have hypoxic requiring 2L NC. BNP 4,421 (1,920 4/2024). VBG 7.41/40/46/26. LA, WBC normal. Flu, COVID negative. EKG A fib with RVR (rate 101), chronic A fib as below. CT PE 11/21 no PE, trace interstitial edema with small to moderate b/l pleural effusions. TTE with normal EF however unable to measure diastolic function 2/2 a fib. Repeat CXR 11/23 showing small R pleural effusion and trace L. Pulmonary and cardiology were consulted during admission to determine cause of persistent O2 requirement, ultimately determined to be likely a combination of HFpEF and pulmonary hypertension and had never been initiated on optimal medical mgmt of HFpEF.  Also found to have a small PFO on echocardiogram which could lead to shunting in times of increased R sided pressures.   On 12/4 she had a bump in creatinine to 1.6 from 1.2 and this is challenging to figure out in the context of elevated BNP. IVC looks to have 50% collapse  and is <2 cm on bedside ultrasound without any lower extremity edema. Held diuretics + plan for recheck later today. If still difficult to assess volume status will re-engage cardiology tomorrow. Respiratory viral panel negative. UA unremarkable     Meds  - lasix PO 40 daily (on hold with bump in creatinine)   - jardiance 10 daily  - spironolactone 25 mg daily (held 12/4 with creatinine)   - propranolol 10 BID  - PTA Fluticasone- Vilanterol  - Xopenex/ipratropium q6hr  - 2L fluid restriction  - Daily standing weights, I&Os  - Continuous pulse ox  - Sputum culture is showing staph aureus - if were to worsen would broaden to vancomycin until susceptibilities come back   - Augmentin BID x 2   - Had speech and swallow study     A fib with RVR- not rate controlled  - On Diltiazem 240 ER BID, max dose  - C/w propranolol 10mg BID, target HR 60-70   - c/w PTA on Apixaban, may need to hold for procedure- thoracentesis   - Maintain K~4 and Mg ~2     Significant weight loss  Severe Protein-Calorie Malnutrition   - Per daughter baseline wt around 120-130 lbs, however wt here around 102 lbs, patient states she is eating less and has less appetite, denies post prandial abd pain, N/V. She states she likes the food but just do not have the appetite to eat more. She has hx of recent invasive laryngeal SCC with radiation therapy, also had dysphagia before  - consulted nutrition  - Swallow study showed silent aspiration with thin liquids, now on thickened liquid going forward     Deconditioning  - PT/OT, dispo recommendation is TCU, patient agrees     H/o laryngeal squamous cell carcinoma, papillomatosis with dysphagia: Initially presented 2015 with benign laryngeal lesion. Progressed to papilloma 2021. Dx with invasive squamous cell carcinoma 4/2024. Now s/p radiation completed 7/2024. CT chest 11/21 ingested content within a mildly patulous thoracic esophagus, aspiration cautions recommended   - ENT saw this admission per daughter's  request, bedside laryngoscopy done with confirmation of no papillomas noted. They did see some atypical appearing tissue in the larynx and subglottis, none of which are occlusive or likely having any effect on her dyspnea.  - started PPI per their recs to limit laryngeal irritation  - Follow up OP 1/2025 as planned with Dr. Davis     Chronic/stable medical conditions  Pulmonary nodule:   6 mm LLL pulmonary nodule noted on CT  - Will need repeat imaging in 6-12 months     Subacute rib fractures:   Admitted to [facility] 9/2024 with rib fractures. Subacute fractures noted on CT 11/21. Continue supportive cares     H/o CREST syndrome:   2014. Per notes, did have positive FARIDEH, Raynaud's, Calcinosis, GERD and some telangectasia's. Last saw [facility]ology 2017. No acute concerns other than esophageal motility     Hypernatremia: Mild. Improved to 138 today.     L>R BLE edema: US to rule out DVT    Troponin elevation, likely increased demand: Trop 28 (32). EKG chronic a fib. Asymptomatic. Monitor   COPD, asthma: No e/o exacerbation. Continue PTA Symbicort, albuterol, DuoNebs   CKD II: BL Cr 0.9-1.1 and stable. Trend   Chronic pain: Continue PTA gabapentin, Tylenol. Discontinue Ibuprofen given CKD  Chronic constipation: Continue bowel regimen   Hypothyroidism: TSH 1.48 4/2024. Continue PTA synthroid   Anxiety, depression: Continue PTA Sertraline, Propranolol   Raynaud's: Continue diltiazem as above        Diet: Fluid restriction 2000 ML FLUID  Snacks/Supplements Adult: Magic Cup; With Meals  Room Service  Calorie Counts  Soft & Bite Sized Diet (level 6) Mildly Thick (level 2) (1:1 feeding assist)    DVT Prophylaxis: DOAC  Miranda Catheter: Not present  Lines: None     Cardiac Monitoring: None  Code Status: Full Code    Updated daughter Lana today     Clinically Significant Risk Factors          # Hypochloremia: Lowest Cl = 94 mmol/L in last 2 days, will monitor as appropriate      # Hypoalbuminemia: Lowest albumin = 3.1  "g/dL at 12/6/2024  6:17 AM, will monitor as appropriate                # Cachexia: Estimated body mass index is 17.98 kg/m  as calculated from the following:    Height as of this encounter: 1.651 m (5' 5\").    Weight as of this encounter: 49 kg (108 lb 0.4 oz).   # Severe Malnutrition: based on nutrition assessment      # Financial/Environmental Concerns: none         Social Drivers of Health    Housing Stability: High Risk (11/23/2024)    Housing Stability     Do you have housing? : No     Are you worried about losing your housing?: No          Disposition Plan     Medically Ready for Discharge: Ready Now    Sara Griffin, DO  Hospitalist Service, GOLD TEAM 8  [facility]  Securely message with [employer] (more info)  Text page via [employer] Paging/Directory   See signed in provider for up to date coverage information  ______________________________________________________________________    Interval History     No acute events but coughing a lot when I evaluated her this morning. Sounds wet with rhonchi, not previously documented as how she felt.     Physical Exam   Vital Signs: Temp: 97.7  F (36.5  C) Temp src: Oral BP: 105/57 Pulse: 83   Resp: 16 SpO2: 92 % O2 Device: Nasal cannula Oxygen Delivery: 1 LPM  Weight: 108 lbs .41 oz    Gen: A&Ox4, lying in bed, wakes up but appears unwell, in no distress, on 1 L NC  Cardiac: regular rate and rhythm, soft systolic murmur at LSB  Lung: lungs clear to auscultation   Abd: soft, non-tender, non-distended  Ext: No edema in upper/lower extremities    Medical Decision Making       40 MINUTES SPENT BY ME on the date of service doing chart review, history, exam, documentation & further activities per the note.      Data     "

## 2024-12-07 NOTE — PLAN OF CARE
Goal Outcome Evaluation:      Plan of Care Reviewed With: patient    Overall Patient Progress: no changeOverall Patient Progress: no change    Outcome Evaluation: Pt alert and oriented x4 . no complains of pain , complains of heart burn , TUMS given , no complains after . With o2 at 1 L nasal cannula with o2 sat of 90-94 .Meds taken with apple sauce .thickened liquid for water . Purewick with urine output . No BM. continue plan of care.

## 2024-12-07 NOTE — PROGRESS NOTES
Calorie Count  Intake recorded for: 12/6  Total Kcals: 603 Total Protein: 34g  Kcals from Hospital Food: 603   Protein: 34g  Kcals from Outside Food (average):0 Protein: 0g  # Meals Ordered from Kitchen: 3 meals ordered  # Meals Recorded: 3 meals recorded  Meal 1: 100% applesauce, orange juice, 75% scrambled eggs  Meal 2: 50% mashed potatoes w/gravy, 40% SB beef pot roast, 10% sliced carrots  Meal 3: 100% peaches, 90% mac and cheese, 75% 4oz 1% milk, 50% chicken salad  # Supplements Recorded: none recorded

## 2024-12-07 NOTE — PROGRESS NOTES
[facility]    Medicine Progress Note - Hospitalist Service, GOLD TEAM 8    Date of Admission:  11/21/2024    Assessment & Plan   Asya Coffman is a 79 year old female with history of A fib, CAD, COPD, asthma, Raynaud's, CKD, laryngeal squamous cell carcinoma/papillomatosis, CREST syndrome, chronic pain, chronic constipation, hypothyroidism, anxiety, and depression who was admitted to [facility] with acute hypoxic respiratory failure, hospitalization prolonged due to inability to wean off O2 despite diuresis.     Changes today:  - Improved hypoxia with antibiotics   - Improved WBC  - Improved creatinine     Leukocytosis   Acute hypoxic respiratory failure, improving- on 1 LPM   Acute on Chronic Heart Failure with Preserved Systolic Function   Pulmonary hypertension with elevated RVSP on TTE 11/22  Fluid overload  Elevated BNP  Hx of asthma- not in exacerbation  Presented with 1 month progressive dyspnea. Endorses FELIZ, PND, orthopnea, swelling in the legs. Found to have hypoxic requiring 2L NC. BNP 4,421 (1,920 4/2024). VBG 7.41/40/46/26. LA, WBC normal. Flu, COVID negative. EKG A fib with RVR (rate 101), chronic A fib as below. CT PE 11/21 no PE, trace interstitial edema with small to moderate b/l pleural effusions. TTE with normal EF however unable to measure diastolic function 2/2 a fib. Repeat CXR 11/23 showing small R pleural effusion and trace L. Pulmonary and cardiology were consulted during admission to determine cause of persistent O2 requirement, ultimately determined to be likely a combination of HFpEF and pulmonary hypertension and had never been initiated on optimal medical mgmt of HFpEF.  Also found to have a small PFO on echocardiogram which could lead to shunting in times of increased R sided pressures.   On 12/4 she had a bump in creatinine to 1.6 from 1.2 and this is challenging to figure out in the context of elevated BNP. IVC looks to have 50% collapse  and is <2 cm on bedside ultrasound without any lower extremity edema. Held diuretics x 2 days and her creatinine improved. Presumably prerenal. Respiratory viral panel negative. UA unremarkable     Meds  - lasix PO 40 daily, resumed   - jardiance 10 daily  - spironolactone 25 mg daily, resumed   - propranolol 10 BID  - PTA Fluticasone- Vilanterol- cancelled Breo- she does not have a high enough FVC to pull it in. Change to Symbicort with spacer   - Xopenex/ipratropium q6hr  - 2L fluid restriction, restarted furosemide   - Daily standing weights, I&Os  - Continuous pulse ox  - Sputum culture is showing staph aureus - if were to worsen would broaden to vancomycin until susceptibilities come back   - Augmentin BID until 12/10  - Had speech and swallow study     A fib with RVR- not rate controlled  - On Diltiazem 240 ER BID, max dose  - C/w propranolol 10mg BID, target HR 60-70   - c/w PTA on Apixaban, may need to hold for procedure- thoracentesis   - Maintain K~4 and Mg ~2     Significant weight loss  Severe Protein-Calorie Malnutrition   - Per daughter baseline wt around 120-130 lbs, however wt here around 102 lbs, patient states she is eating less and has less appetite, denies post prandial abd pain, N/V. She states she likes the food but just do not have the appetite to eat more. She has hx of recent invasive laryngeal SCC with radiation therapy, also had dysphagia before  - consulted nutrition  - Swallow study showed silent aspiration with thin liquids, now on thickened liquid going forward   - question of esophageal web in cervical esophagus noted on swallow study but did not impede transit so will hold on GI consult unless continues to have issues swallowing pills    Deconditioning  - PT/OT, dispo recommendation is TCU, patient agrees     H/o laryngeal squamous cell carcinoma, papillomatosis with dysphagia: Initially presented 2015 with benign laryngeal lesion. Progressed to papilloma 2021. Dx with invasive  squamous cell carcinoma 4/2024. Now s/p radiation completed 7/2024. CT chest 11/21 ingested content within a mildly patulous thoracic esophagus, aspiration cautions recommended   - ENT saw this admission per daughter's request, bedside laryngoscopy done with confirmation of no papillomas noted. They did see some atypical appearing tissue in the larynx and subglottis, none of which are occlusive or likely having any effect on her dyspnea.  - started PPI per their recs to limit laryngeal irritation  - Follow up OP 1/2025 as planned with Dr. Davis     Chronic/stable medical conditions  Pulmonary nodule:   6 mm LLL pulmonary nodule noted on CT  - Will need repeat imaging in 6-12 months     Subacute rib fractures:   Admitted to [facility] 9/2024 with rib fractures. Subacute fractures noted on CT 11/21. Continue supportive cares     H/o CREST syndrome:   2014. Per notes, did have positive FARIDEH, Raynaud's, Calcinosis, GERD and some telangectasia's. Last saw [facility] rheumatology 2017. No acute concerns other than esophageal motility     Hypernatremia: Mild. Improved to 138 today.     L>R BLE edema: US to rule out DVT    Troponin elevation, likely increased demand: Trop 28 (32). EKG chronic a fib. Asymptomatic. Monitor   COPD, asthma: No e/o exacerbation. Continue PTA Symbicort, albuterol, DuoNebs   CKD II: BL Cr 0.9-1.1 and stable. Trend   Chronic pain: Continue PTA gabapentin, Tylenol. Discontinue Ibuprofen given CKD  Chronic constipation: Continue bowel regimen   Hypothyroidism: TSH 1.48 4/2024. Continue PTA synthroid   Anxiety, depression: Continue PTA Sertraline, Propranolol   Raynaud's: Continue diltiazem as above        Diet: Fluid restriction 2000 ML FLUID  Snacks/Supplements Adult: Magic Cup; With Meals  Room Service  Calorie Counts  Soft & Bite Sized Diet (level 6) Mildly Thick (level 2) (1:1 feeding assist)    DVT Prophylaxis: DOAC  Miranda Catheter: Not present  Lines: None     Cardiac Monitoring: None  Code Status:  "Full Code    Updated daughter Lana today     Clinically Significant Risk Factors               # Hypoalbuminemia: Lowest albumin = 3.1 g/dL at 12/6/2024  6:17 AM, will monitor as appropriate                # Cachexia: Estimated body mass index is 17.98 kg/m  as calculated from the following:    Height as of this encounter: 1.651 m (5' 5\").    Weight as of this encounter: 49 kg (108 lb 0.4 oz).   # Severe Malnutrition: based on nutrition assessment      # Financial/Environmental Concerns: none         Social Drivers of Health    Housing Stability: High Risk (11/23/2024)    Housing Stability     Do you have housing? : No     Are you worried about losing your housing?: No          Disposition Plan     Medically Ready for Discharge: Ready Now    Sara Griffin, DO  Hospitalist Service, GOLD TEAM 8  [facility]  Securely message with [employer] (more info)  Text page via [facility] Paging/Directory   See signed in provider for up to date coverage information  ______________________________________________________________________    Interval History     No acute events. Feels better today. She did have her pills feel like they were getting stuck this morning when she took them with pudding. Took awhile to clear, but eventually started going down     Physical Exam   Vital Signs: Temp: 98.6  F (37  C) Temp src: Oral BP: 119/72 Pulse: 89   Resp: 18 SpO2: 94 % O2 Device: Nasal cannula Oxygen Delivery: 1 LPM  Weight: 108 lbs .41 oz    Gen: A&Ox4, lying in bed, wakes up but appears unwell, in no distress, on 1 L NC  Cardiac: regular rate and rhythm, soft systolic murmur at LSB  Lung: lungs clear to auscultation, with exception of rales posteriorly at R lung base   Abd: soft, non-tender, non-distended  Ext: No edema in upper/lower extremities    Medical Decision Making       40 MINUTES SPENT BY ME on the date of service doing chart review, history, exam, documentation & further activities per " the note.      Data

## 2024-12-08 ENCOUNTER — ANESTHESIA (OUTPATIENT)
Dept: MEDSURG UNIT | Facility: CLINIC | Age: 79
End: 2024-12-08
Payer: COMMERCIAL

## 2024-12-08 ENCOUNTER — ANESTHESIA EVENT (OUTPATIENT)
Dept: MEDSURG UNIT | Facility: CLINIC | Age: 79
End: 2024-12-08
Payer: COMMERCIAL

## 2024-12-08 LAB
ALBUMIN SERPL BCG-MCNC: 3.1 G/DL (ref 3.5–5.2)
ALBUMIN SERPL BCG-MCNC: 3.7 G/DL (ref 3.5–5.2)
ALP SERPL-CCNC: 73 U/L (ref 40–150)
ALP SERPL-CCNC: 87 U/L (ref 40–150)
ALT SERPL W P-5'-P-CCNC: 15 U/L (ref 0–50)
ALT SERPL W P-5'-P-CCNC: 7 U/L (ref 0–50)
ANION GAP SERPL CALCULATED.3IONS-SCNC: 11 MMOL/L (ref 7–15)
ANION GAP SERPL CALCULATED.3IONS-SCNC: 17 MMOL/L (ref 7–15)
APTT PPP: 47 SECONDS (ref 22–38)
AST SERPL W P-5'-P-CCNC: 15 U/L (ref 0–45)
AST SERPL W P-5'-P-CCNC: 17 U/L (ref 0–45)
BASE EXCESS BLDV CALC-SCNC: 9 MMOL/L (ref -3–3)
BILIRUB SERPL-MCNC: 0.3 MG/DL
BILIRUB SERPL-MCNC: 0.4 MG/DL
BUN SERPL-MCNC: 27.4 MG/DL (ref 8–23)
BUN SERPL-MCNC: 31 MG/DL (ref 8–23)
CA-I BLD-MCNC: 4.6 MG/DL (ref 4.4–5.2)
CALCIUM SERPL-MCNC: 9 MG/DL (ref 8.8–10.4)
CALCIUM SERPL-MCNC: 9.8 MG/DL (ref 8.8–10.4)
CHLORIDE SERPL-SCNC: 93 MMOL/L (ref 98–107)
CHLORIDE SERPL-SCNC: 97 MMOL/L (ref 98–107)
CPB POCT: NO
CREAT SERPL-MCNC: 1.15 MG/DL (ref 0.51–0.95)
CREAT SERPL-MCNC: 1.16 MG/DL (ref 0.51–0.95)
EGFRCR SERPLBLD CKD-EPI 2021: 48 ML/MIN/1.73M2
EGFRCR SERPLBLD CKD-EPI 2021: 49 ML/MIN/1.73M2
ERYTHROCYTE [DISTWIDTH] IN BLOOD BY AUTOMATED COUNT: 19.3 % (ref 10–15)
ERYTHROCYTE [DISTWIDTH] IN BLOOD BY AUTOMATED COUNT: 19.6 % (ref 10–15)
GLUCOSE BLD-MCNC: 125 MG/DL (ref 70–99)
GLUCOSE SERPL-MCNC: 123 MG/DL (ref 70–99)
GLUCOSE SERPL-MCNC: 87 MG/DL (ref 70–99)
HCO3 BLDV-SCNC: 33 MMOL/L (ref 21–28)
HCO3 SERPL-SCNC: 26 MMOL/L (ref 22–29)
HCO3 SERPL-SCNC: 27 MMOL/L (ref 22–29)
HCT VFR BLD AUTO: 36.9 % (ref 35–47)
HCT VFR BLD AUTO: 42.9 % (ref 35–47)
HCT VFR BLD CALC: 44 % (ref 35–47)
HGB BLD-MCNC: 11 G/DL (ref 11.7–15.7)
HGB BLD-MCNC: 13.1 G/DL (ref 11.7–15.7)
HGB BLD-MCNC: 15 G/DL (ref 11.7–15.7)
INR PPP: 2.43 (ref 0.85–1.15)
MAGNESIUM SERPL-MCNC: 2.1 MG/DL (ref 1.7–2.3)
MAGNESIUM SERPL-MCNC: 2.3 MG/DL (ref 1.7–2.3)
MCH RBC QN AUTO: 25.4 PG (ref 26.5–33)
MCH RBC QN AUTO: 25.7 PG (ref 26.5–33)
MCHC RBC AUTO-ENTMCNC: 29.8 G/DL (ref 31.5–36.5)
MCHC RBC AUTO-ENTMCNC: 30.5 G/DL (ref 31.5–36.5)
MCV RBC AUTO: 84 FL (ref 78–100)
MCV RBC AUTO: 85 FL (ref 78–100)
NT-PROBNP SERPL-MCNC: 6258 PG/ML (ref 0–1800)
PCO2 BLDV: 45 MM HG (ref 40–50)
PH BLDV: 7.48 [PH] (ref 7.32–7.43)
PHOSPHATE SERPL-MCNC: 3.4 MG/DL (ref 2.5–4.5)
PHOSPHATE SERPL-MCNC: 4.4 MG/DL (ref 2.5–4.5)
PLATELET # BLD AUTO: 368 10E3/UL (ref 150–450)
PLATELET # BLD AUTO: 501 10E3/UL (ref 150–450)
PO2 BLDV: 29 MM HG (ref 25–47)
POTASSIUM BLD-SCNC: 4.5 MMOL/L (ref 3.4–5.3)
POTASSIUM SERPL-SCNC: 4.4 MMOL/L (ref 3.4–5.3)
POTASSIUM SERPL-SCNC: 4.6 MMOL/L (ref 3.4–5.3)
PROT SERPL-MCNC: 6.1 G/DL (ref 6.4–8.3)
PROT SERPL-MCNC: 7.5 G/DL (ref 6.4–8.3)
RBC # BLD AUTO: 4.33 10E6/UL (ref 3.8–5.2)
RBC # BLD AUTO: 5.09 10E6/UL (ref 3.8–5.2)
SAO2 % BLDV: 59 % (ref 70–75)
SODIUM BLD-SCNC: 134 MMOL/L (ref 135–145)
SODIUM SERPL-SCNC: 135 MMOL/L (ref 135–145)
SODIUM SERPL-SCNC: 136 MMOL/L (ref 135–145)
TROPONIN T SERPL HS-MCNC: 31 NG/L
WBC # BLD AUTO: 14.7 10E3/UL (ref 4–11)
WBC # BLD AUTO: 8.2 10E3/UL (ref 4–11)

## 2024-12-08 PROCEDURE — 83735 ASSAY OF MAGNESIUM: CPT | Performed by: PHYSICIAN ASSISTANT

## 2024-12-08 PROCEDURE — 85730 THROMBOPLASTIN TIME PARTIAL: CPT | Performed by: STUDENT IN AN ORGANIZED HEALTH CARE EDUCATION/TRAINING PROGRAM

## 2024-12-08 PROCEDURE — 250N000013 HC RX MED GY IP 250 OP 250 PS 637: Performed by: STUDENT IN AN ORGANIZED HEALTH CARE EDUCATION/TRAINING PROGRAM

## 2024-12-08 PROCEDURE — 36415 COLL VENOUS BLD VENIPUNCTURE: CPT | Performed by: STUDENT IN AN ORGANIZED HEALTH CARE EDUCATION/TRAINING PROGRAM

## 2024-12-08 PROCEDURE — 84100 ASSAY OF PHOSPHORUS: CPT | Performed by: PHYSICIAN ASSISTANT

## 2024-12-08 PROCEDURE — 97116 GAIT TRAINING THERAPY: CPT | Mod: GP

## 2024-12-08 PROCEDURE — 85014 HEMATOCRIT: CPT | Performed by: STUDENT IN AN ORGANIZED HEALTH CARE EDUCATION/TRAINING PROGRAM

## 2024-12-08 PROCEDURE — 84132 ASSAY OF SERUM POTASSIUM: CPT | Performed by: STUDENT IN AN ORGANIZED HEALTH CARE EDUCATION/TRAINING PROGRAM

## 2024-12-08 PROCEDURE — 82247 BILIRUBIN TOTAL: CPT | Performed by: PHYSICIAN ASSISTANT

## 2024-12-08 PROCEDURE — 5A1945Z RESPIRATORY VENTILATION, 24-96 CONSECUTIVE HOURS: ICD-10-PCS | Performed by: ANESTHESIOLOGY

## 2024-12-08 PROCEDURE — 84100 ASSAY OF PHOSPHORUS: CPT | Performed by: STUDENT IN AN ORGANIZED HEALTH CARE EDUCATION/TRAINING PROGRAM

## 2024-12-08 PROCEDURE — 83880 ASSAY OF NATRIURETIC PEPTIDE: CPT | Performed by: PHYSICIAN ASSISTANT

## 2024-12-08 PROCEDURE — 999N000034 HC STATISTIC CODE BLUE ACCESS REQUIRED

## 2024-12-08 PROCEDURE — 84145 PROCALCITONIN (PCT): CPT

## 2024-12-08 PROCEDURE — 250N000009 HC RX 250: Performed by: ANESTHESIOLOGY

## 2024-12-08 PROCEDURE — 92526 ORAL FUNCTION THERAPY: CPT | Mod: GN

## 2024-12-08 PROCEDURE — 99232 SBSQ HOSP IP/OBS MODERATE 35: CPT | Mod: 25 | Performed by: STUDENT IN AN ORGANIZED HEALTH CARE EDUCATION/TRAINING PROGRAM

## 2024-12-08 PROCEDURE — 84450 TRANSFERASE (AST) (SGOT): CPT | Performed by: PHYSICIAN ASSISTANT

## 2024-12-08 PROCEDURE — 82247 BILIRUBIN TOTAL: CPT | Performed by: STUDENT IN AN ORGANIZED HEALTH CARE EDUCATION/TRAINING PROGRAM

## 2024-12-08 PROCEDURE — 97530 THERAPEUTIC ACTIVITIES: CPT | Mod: GP

## 2024-12-08 PROCEDURE — 99291 CRITICAL CARE FIRST HOUR: CPT | Performed by: PHYSICIAN ASSISTANT

## 2024-12-08 PROCEDURE — 999N000248 HC STATISTIC IV INSERT WITH US BY RN

## 2024-12-08 PROCEDURE — 85610 PROTHROMBIN TIME: CPT | Performed by: STUDENT IN AN ORGANIZED HEALTH CARE EDUCATION/TRAINING PROGRAM

## 2024-12-08 PROCEDURE — 94644 CONT INHLJ TX 1ST HOUR: CPT

## 2024-12-08 PROCEDURE — 94645 CONT INHLJ TX EACH ADDL HOUR: CPT

## 2024-12-08 PROCEDURE — 250N000011 HC RX IP 250 OP 636: Performed by: STUDENT IN AN ORGANIZED HEALTH CARE EDUCATION/TRAINING PROGRAM

## 2024-12-08 PROCEDURE — 120N000002 HC R&B MED SURG/OB UMMC

## 2024-12-08 PROCEDURE — 250N000011 HC RX IP 250 OP 636: Performed by: ANESTHESIOLOGY

## 2024-12-08 PROCEDURE — 84484 ASSAY OF TROPONIN QUANT: CPT | Performed by: PHYSICIAN ASSISTANT

## 2024-12-08 PROCEDURE — 370N000003 HC ANESTHESIA WARD SERVICE: Performed by: ANESTHESIOLOGY

## 2024-12-08 PROCEDURE — 999N000157 HC STATISTIC RCP TIME EA 10 MIN

## 2024-12-08 PROCEDURE — 83735 ASSAY OF MAGNESIUM: CPT | Performed by: STUDENT IN AN ORGANIZED HEALTH CARE EDUCATION/TRAINING PROGRAM

## 2024-12-08 PROCEDURE — 999N000285 HC STATISTIC VASC ACCESS LAB DRAW WITH PIV START

## 2024-12-08 PROCEDURE — 84450 TRANSFERASE (AST) (SGOT): CPT | Performed by: STUDENT IN AN ORGANIZED HEALTH CARE EDUCATION/TRAINING PROGRAM

## 2024-12-08 PROCEDURE — 82330 ASSAY OF CALCIUM: CPT

## 2024-12-08 PROCEDURE — 250N000013 HC RX MED GY IP 250 OP 250 PS 637: Performed by: PHYSICIAN ASSISTANT

## 2024-12-08 PROCEDURE — 82310 ASSAY OF CALCIUM: CPT | Performed by: PHYSICIAN ASSISTANT

## 2024-12-08 PROCEDURE — 250N000011 HC RX IP 250 OP 636

## 2024-12-08 PROCEDURE — 85027 COMPLETE CBC AUTOMATED: CPT | Performed by: PHYSICIAN ASSISTANT

## 2024-12-08 PROCEDURE — 84155 ASSAY OF PROTEIN SERUM: CPT | Performed by: PHYSICIAN ASSISTANT

## 2024-12-08 PROCEDURE — 85041 AUTOMATED RBC COUNT: CPT | Performed by: STUDENT IN AN ORGANIZED HEALTH CARE EDUCATION/TRAINING PROGRAM

## 2024-12-08 RX ORDER — ROPIVACAINE IN 0.9% SOD CHL/PF 0.1 %
.01-.125 PLASTIC BAG, INJECTION (ML) EPIDURAL CONTINUOUS
Status: DISCONTINUED | OUTPATIENT
Start: 2024-12-08 | End: 2024-12-09

## 2024-12-08 RX ORDER — PROPOFOL 10 MG/ML
INJECTION, EMULSION INTRAVENOUS
Status: DISCONTINUED | OUTPATIENT
Start: 2024-12-08 | End: 2024-12-08

## 2024-12-08 RX ORDER — MAGNESIUM SULFATE HEPTAHYDRATE 40 MG/ML
2 INJECTION, SOLUTION INTRAVENOUS ONCE
Status: COMPLETED | OUTPATIENT
Start: 2024-12-09 | End: 2024-12-09

## 2024-12-08 RX ADMIN — SPIRONOLACTONE 25 MG: 25 TABLET, FILM COATED ORAL at 08:28

## 2024-12-08 RX ADMIN — FUROSEMIDE 40 MG: 40 TABLET ORAL at 08:28

## 2024-12-08 RX ADMIN — MAGNESIUM SULFATE HEPTAHYDRATE 2 G: 2 INJECTION, SOLUTION INTRAVENOUS at 23:54

## 2024-12-08 RX ADMIN — AMOXICILLIN AND CLAVULANATE POTASSIUM 1 TABLET: 500; 125 TABLET, FILM COATED ORAL at 20:19

## 2024-12-08 RX ADMIN — LEVOTHYROXINE SODIUM 88 MCG: 88 TABLET ORAL at 05:31

## 2024-12-08 RX ADMIN — IPRATROPIUM BROMIDE 2 PUFF: 17 AEROSOL, METERED RESPIRATORY (INHALATION) at 08:33

## 2024-12-08 RX ADMIN — EMPAGLIFLOZIN 10 MG: 10 TABLET, FILM COATED ORAL at 08:33

## 2024-12-08 RX ADMIN — PROPRANOLOL HYDROCHLORIDE 10 MG: 10 TABLET ORAL at 08:28

## 2024-12-08 RX ADMIN — IPRATROPIUM BROMIDE 2 PUFF: 17 AEROSOL, METERED RESPIRATORY (INHALATION) at 17:26

## 2024-12-08 RX ADMIN — BUDESONIDE AND FORMOTEROL FUMARATE DIHYDRATE 2 PUFF: 80; 4.5 AEROSOL RESPIRATORY (INHALATION) at 08:32

## 2024-12-08 RX ADMIN — DILTIAZEM HYDROCHLORIDE 240 MG: 240 CAPSULE, EXTENDED RELEASE ORAL at 08:28

## 2024-12-08 RX ADMIN — AMOXICILLIN AND CLAVULANATE POTASSIUM 1 TABLET: 500; 125 TABLET, FILM COATED ORAL at 12:27

## 2024-12-08 RX ADMIN — POLYETHYLENE GLYCOL 3350 17 G: 17 POWDER, FOR SOLUTION ORAL at 08:28

## 2024-12-08 RX ADMIN — ATORVASTATIN CALCIUM 20 MG: 20 TABLET, FILM COATED ORAL at 20:26

## 2024-12-08 RX ADMIN — LEVALBUTEROL TARTRATE 2 PUFF: 45 AEROSOL, METERED ORAL at 21:34

## 2024-12-08 RX ADMIN — FEXOFENADINE HCL 180 MG: 180 TABLET ORAL at 08:28

## 2024-12-08 RX ADMIN — IPRATROPIUM BROMIDE 2 PUFF: 17 AEROSOL, METERED RESPIRATORY (INHALATION) at 20:37

## 2024-12-08 RX ADMIN — ROCURONIUM BROMIDE 50 MG: 10 INJECTION, SOLUTION INTRAVENOUS at 23:04

## 2024-12-08 RX ADMIN — BUDESONIDE AND FORMOTEROL FUMARATE DIHYDRATE 2 PUFF: 80; 4.5 AEROSOL RESPIRATORY (INHALATION) at 20:28

## 2024-12-08 RX ADMIN — APIXABAN 5 MG: 5 TABLET, FILM COATED ORAL at 08:28

## 2024-12-08 RX ADMIN — APIXABAN 5 MG: 5 TABLET, FILM COATED ORAL at 20:17

## 2024-12-08 RX ADMIN — PANTOPRAZOLE SODIUM 40 MG: 40 TABLET, DELAYED RELEASE ORAL at 08:27

## 2024-12-08 RX ADMIN — LEVALBUTEROL TARTRATE 2 PUFF: 45 AEROSOL, METERED ORAL at 14:41

## 2024-12-08 RX ADMIN — LEVALBUTEROL TARTRATE 2 PUFF: 45 AEROSOL, METERED ORAL at 08:34

## 2024-12-08 RX ADMIN — IPRATROPIUM BROMIDE 2 PUFF: 17 AEROSOL, METERED RESPIRATORY (INHALATION) at 12:27

## 2024-12-08 RX ADMIN — GABAPENTIN 600 MG: 300 CAPSULE ORAL at 21:30

## 2024-12-08 RX ADMIN — DILTIAZEM HYDROCHLORIDE 240 MG: 240 CAPSULE, EXTENDED RELEASE ORAL at 20:24

## 2024-12-08 RX ADMIN — Medication 20 NG/KG/MIN: at 23:38

## 2024-12-08 RX ADMIN — PROPOFOL 50 MG: 10 INJECTION, EMULSION INTRAVENOUS at 23:04

## 2024-12-08 RX ADMIN — SERTRALINE HYDROCHLORIDE 150 MG: 100 TABLET ORAL at 08:28

## 2024-12-08 RX ADMIN — PROPRANOLOL HYDROCHLORIDE 10 MG: 10 TABLET ORAL at 20:20

## 2024-12-08 ASSESSMENT — ACTIVITIES OF DAILY LIVING (ADL)
ADLS_ACUITY_SCORE: 64
ADLS_ACUITY_SCORE: 70
ADLS_ACUITY_SCORE: 70
ADLS_ACUITY_SCORE: 64
ADLS_ACUITY_SCORE: 70
ADLS_ACUITY_SCORE: 64
ADLS_ACUITY_SCORE: 70
ADLS_ACUITY_SCORE: 70
ADLS_ACUITY_SCORE: 64
ADLS_ACUITY_SCORE: 60
ADLS_ACUITY_SCORE: 64
ADLS_ACUITY_SCORE: 70
ADLS_ACUITY_SCORE: 60
ADLS_ACUITY_SCORE: 64
ADLS_ACUITY_SCORE: 70
ADLS_ACUITY_SCORE: 68
ADLS_ACUITY_SCORE: 60
ADLS_ACUITY_SCORE: 70

## 2024-12-08 NOTE — PLAN OF CARE
"Cares 0700 to 1900    /62 (BP Location: Left arm)   Pulse 68   Temp 98.8  F (37.1  C) (Oral)   Resp 16   Ht 1.651 m (5' 5\")   Wt 48.8 kg (107 lb 9.4 oz)   SpO2 90%   BMI 17.90 kg/m      Goal Outcome Evaluation:    Plan of Care Reviewed With: patient  Overall Patient Progress: no changeOverall Patient Progress: no change    Outcome Evaluation: Uses call light appropriately. Supervised eating 1:1, poor/fair intake. Up in chair 1x with 2 assist. Denies pain. Vitals stable on 1L NC.      "

## 2024-12-08 NOTE — PLAN OF CARE
Goal Outcome Evaluation:      Plan of Care Reviewed With: patient    Overall Patient Progress: no changeOverall Patient Progress: no change    Outcome Evaluation: Assumed cares [id number]. A&Ox3. Disoriented to place. VSS on 1L NC. Denies pain. LPIV saline locked. Purewick in place, 1 episode urine incontinence. Repositioned as pt would allow. Meds given in pudding. No acute changes overnight. Continue with POC.

## 2024-12-08 NOTE — PLAN OF CARE
"Cares 0700 to 1500    /80 (BP Location: Right arm)   Pulse 98   Temp 98.3  F (36.8  C) (Oral)   Resp 16   Ht 1.651 m (5' 5\")   Wt 50.8 kg (111 lb 15.9 oz)   SpO2 91%   BMI 18.64 kg/m      Goal Outcome Evaluation:    Plan of Care Reviewed With: patient    Overall Patient Progress: no changeOverall Patient Progress: no change    Outcome Evaluation: Alert, oriented x3. Denies pain, SOB. On 1L NC. Intermittent coughing with meals. Total feed assist, pt unable to lift spoon close to her mouth, has tremors in hands when lifting utensils. Poor appetite. Sat up in chair for lunch, mildly thick liquids given. Up to bedside commode with 2 assist, 1 formed BM. Urinary incontinence, external cath changed. Mepilex applied on coccyx & R hip for blanchable redness. Bed alarms on, call light in reach.      "

## 2024-12-08 NOTE — PROGRESS NOTES
Calorie Count  Intake recorded for: 12/7  Total Kcals: 508 Total Protein: 35g  Kcals from Hospital Food: 508   Protein: 35g  Kcals from Outside Food (average):0 Protein: 0g  # Meals Ordered from Kitchen: 3 meals ordered  # Meals Recorded: 3 meals recorded  Meal 1: 100% mildly thick milk, 10% pureed eggs, applesauce  Meal 2: 75% pears, 25% 4oz grape juice, grilled chicken with mashed potatoes and gravy  Meal 3: 100% peaches, 25% beef pot roast with mashed potatoes and gravy, sliced carrots  # Supplements Recorded: none recorded

## 2024-12-08 NOTE — PROGRESS NOTES
[facility]    Medicine Progress Note - Hospitalist Service, GOLD TEAM 8    Date of Admission:  11/21/2024    Assessment & Plan   Asya Coffman is a 79 year old female with history of A fib, CAD, COPD, asthma, Raynaud's, CKD, laryngeal squamous cell carcinoma/papillomatosis, CREST syndrome, chronic pain, chronic constipation, hypothyroidism, anxiety, and depression who was admitted to [facility] with acute hypoxic respiratory failure, hospitalization prolonged due to inability to wean off O2 despite diuresis.     Changes today:  - Improved hypoxia with antibiotics - back to 1 LPM   - Improved WBC  - Improved creatinine with holding furosemide x 2 days, now restarted and tolerating well     Leukocytosis   Acute hypoxic respiratory failure, improving- on 1 LPM   Acute on Chronic Heart Failure with Preserved Systolic Function   Pulmonary hypertension with elevated RVSP on TTE 11/22  Fluid overload  Elevated BNP  Hx of asthma- not in exacerbation  Presented with 1 month progressive dyspnea. Endorses FELIZ, PND, orthopnea, swelling in the legs. Found to have hypoxic requiring 2L NC. BNP 4,421 (1,920 4/2024). VBG 7.41/40/46/26. LA, WBC normal. Flu, COVID negative. EKG A fib with RVR (rate 101), chronic A fib as below. CT PE 11/21 no PE, trace interstitial edema with small to moderate b/l pleural effusions. TTE with normal EF however unable to measure diastolic function 2/2 a fib. Repeat CXR 11/23 showing small R pleural effusion and trace L. Pulmonary and cardiology were consulted during admission to determine cause of persistent O2 requirement, ultimately determined to be likely a combination of HFpEF and pulmonary hypertension and had never been initiated on optimal medical mgmt of HFpEF.  Also found to have a small PFO on echocardiogram which could lead to shunting in times of increased R sided pressures.   On 12/4 she had a bump in creatinine to 1.6 from 1.2 and this is  challenging to figure out in the context of elevated BNP. IVC looks to have 50% collapse and is <2 cm on bedside ultrasound without any lower extremity edema. Held diuretics x 2 days and her creatinine improved. Presumably prerenal. Respiratory viral panel negative. UA unremarkable     Meds  - lasix PO 40 daily, resumed   - jardiance 10 daily  - spironolactone 25 mg daily, resumed   - propranolol 10 BID  - PTA Fluticasone- Vilanterol- cancelled Breo- she does not have a high enough FVC to pull it in. Change to Symbicort with spacer   - Xopenex/ipratropium q6hr  - Restarted furosemide   - Daily standing weights, I&Os  - Continuous pulse ox  - Sputum culture is showing MSSA, on Augmentin - planned 7 days until 12/10  - Had speech and swallow study - showed ermelinda aspiration of thin liquids     A fib with RVR- not rate controlled  - On Diltiazem 240 ER BID, max dose  - C/w propranolol 10mg BID, target HR 60-70   - c/w PTA on Apixaban, may need to hold for procedure- thoracentesis   - Maintain K~4 and Mg ~2     Significant weight loss  Severe Protein-Calorie Malnutrition   - Per daughter baseline wt around 120-130 lbs, however wt here around 102 lbs, patient states she is eating less and has less appetite, denies post prandial abd pain, N/V. She states she likes the food but just do not have the appetite to eat more. She has hx of recent invasive laryngeal SCC with radiation therapy, also had dysphagia before  - consulted nutrition  - Swallow study showed silent aspiration with thin liquids, now on thickened liquid going forward   - question of esophageal web in cervical esophagus noted on swallow study but did not impede transit so will hold on GI consult unless continues to have issues swallowing pills/food    Deconditioning  - PT/OT, dispo recommendation is TCU, patient agrees     H/o laryngeal squamous cell carcinoma, papillomatosis with dysphagia: Initially presented 2015 with benign laryngeal lesion. Progressed to  papilloma 2021. Dx with invasive squamous cell carcinoma 4/2024. Now s/p radiation completed 7/2024. CT chest 11/21 ingested content within a mildly patulous thoracic esophagus, aspiration cautions recommended   - ENT saw this admission per daughter's request, bedside laryngoscopy done with confirmation of no papillomas noted. They did see some atypical appearing tissue in the larynx and subglottis, none of which are occlusive or likely having any effect on her dyspnea.  - started PPI per their recs to limit laryngeal irritation  - Follow up OP 1/2025 as planned with Dr. Davis     Chronic/stable medical conditions  Pulmonary nodule:   6 mm LLL pulmonary nodule noted on CT  - Will need repeat imaging in 6-12 months     Subacute rib fractures:   Admitted to [facility] 9/2024 with rib fractures. Subacute fractures noted on CT 11/21. Continue supportive cares     H/o CREST syndrome:   2014. Per notes, did have positive FARIDEH, Raynaud's, Calcinosis, GERD and some telangectasia's. Last saw [facility]ology 2017. No acute concerns other than esophageal motility     Hypernatremia: Mild. Improved to 138 today.     L>R BLE edema: US to rule out DVT    Troponin elevation, likely increased demand: Trop 28 (32). EKG chronic a fib. Asymptomatic. Monitor   COPD, asthma: No e/o exacerbation. Continue PTA Symbicort, albuterol, DuoNebs   CKD II: BL Cr 0.9-1.1 and stable. Trend   Chronic pain: Continue PTA gabapentin, Tylenol. Discontinue Ibuprofen given CKD  Chronic constipation: Continue bowel regimen   Hypothyroidism: TSH 1.48 4/2024. Continue PTA synthroid   Anxiety, depression: Continue PTA Sertraline, Propranolol   Raynaud's: Continue diltiazem as above        Diet: Fluid restriction 2000 ML FLUID  Snacks/Supplements Adult: Magic Cup; With Meals  Room Service  Soft & Bite Sized Diet (level 6) Mildly Thick (level 2) (1:1 feeding assist)    DVT Prophylaxis: DOAC  Miranda Catheter: Not present  Lines: None     Cardiac Monitoring:  "None  Code Status: Full Code    Updated daughter Lana today     Clinically Significant Risk Factors          # Hypochloremia: Lowest Cl = 97 mmol/L in last 2 days, will monitor as appropriate      # Hypoalbuminemia: Lowest albumin = 3.1 g/dL at 12/8/2024  5:59 AM, will monitor as appropriate                # Cachexia: Estimated body mass index is 17.9 kg/m  as calculated from the following:    Height as of this encounter: 1.651 m (5' 5\").    Weight as of this encounter: 48.8 kg (107 lb 9.4 oz).   # Severe Malnutrition: based on nutrition assessment      # Financial/Environmental Concerns: none         Social Drivers of Health    Housing Stability: High Risk (11/23/2024)    Housing Stability     Do you have housing? : No     Are you worried about losing your housing?: No          Disposition Plan     Medically Ready for Discharge: Ready Now    Sara Griffin, DO  Hospitalist Service, GOLD TEAM [address] [place] [address]  Securely message with [employer] (more info)  Text page via [employer] Paging/Directory   See signed in provider for up to date coverage information  ______________________________________________________________________    Interval History     NAEO, looks slightly more frail today, but states feeling well     Physical Exam   Vital Signs: Temp: 98.1  F (36.7  C) Temp src: Oral BP: 110/74 Pulse: 65   Resp: 16 SpO2: 91 % O2 Device: None (Room air) Oxygen Delivery: 1 LPM  Weight: 107 lbs 9.35 oz    Gen: A&Ox4, lying in bed, wakes up but appears unwell, in no distress, on 1 L NC  Cardiac: regular rate and rhythm, soft systolic murmur at LSB  Lung: lungs clear to auscultation, with exception of rales posteriorly at R lung base   Abd: soft, non-tender, non-distended  Ext: No edema in upper/lower extremities    Medical Decision Making       40 MINUTES SPENT BY ME on the date of service doing chart review, history, exam, documentation & further activities per the note.      Data     "

## 2024-12-09 ENCOUNTER — RESULTS ONLY (OUTPATIENT)
Facility: CLINIC | Age: 79
End: 2024-12-09

## 2024-12-09 ENCOUNTER — OFFICE VISIT (OUTPATIENT)
Dept: OTOLARYNGOLOGY | Facility: CLINIC | Age: 79
End: 2024-12-09
Payer: COMMERCIAL

## 2024-12-09 ENCOUNTER — APPOINTMENT (OUTPATIENT)
Dept: CT IMAGING | Facility: CLINIC | Age: 79
End: 2024-12-09
Attending: STUDENT IN AN ORGANIZED HEALTH CARE EDUCATION/TRAINING PROGRAM
Payer: COMMERCIAL

## 2024-12-09 DIAGNOSIS — J38.3 VOCAL FOLD LEUKOPLAKIA: Primary | ICD-10-CM

## 2024-12-09 LAB
ALBUMIN SERPL BCG-MCNC: 3 G/DL (ref 3.5–5.2)
ALLEN'S TEST: ABNORMAL
ALP SERPL-CCNC: 70 U/L (ref 40–150)
ALT SERPL W P-5'-P-CCNC: 12 U/L (ref 0–50)
ANION GAP SERPL CALCULATED.3IONS-SCNC: 12 MMOL/L (ref 7–15)
ANION GAP SERPL CALCULATED.3IONS-SCNC: 15 MMOL/L (ref 7–15)
ANION GAP SERPL CALCULATED.3IONS-SCNC: 16 MMOL/L (ref 7–15)
AST SERPL W P-5'-P-CCNC: 16 U/L (ref 0–45)
ATRIAL RATE - MUSE: NORMAL BPM
BASE EXCESS BLDA CALC-SCNC: 3.6 MMOL/L (ref -3–3)
BASE EXCESS BLDA CALC-SCNC: 3.9 MMOL/L (ref -3–3)
BASE EXCESS BLDA CALC-SCNC: 4.1 MMOL/L (ref -3–3)
BASE EXCESS BLDA CALC-SCNC: 4.4 MMOL/L (ref -3–3)
BASE EXCESS BLDA CALC-SCNC: 4.6 MMOL/L (ref -3–3)
BASE EXCESS BLDA CALC-SCNC: 5 MMOL/L (ref -3–3)
BASE EXCESS BLDA CALC-SCNC: 5.2 MMOL/L (ref -3–3)
BILIRUB SERPL-MCNC: 0.3 MG/DL
BUN SERPL-MCNC: 29 MG/DL (ref 8–23)
BUN SERPL-MCNC: 29.5 MG/DL (ref 8–23)
BUN SERPL-MCNC: 30.4 MG/DL (ref 8–23)
CA-I BLD-MCNC: 4.4 MG/DL (ref 4.4–5.2)
CALCIUM SERPL-MCNC: 8.6 MG/DL (ref 8.8–10.4)
CALCIUM SERPL-MCNC: 8.7 MG/DL (ref 8.8–10.4)
CALCIUM SERPL-MCNC: 9.5 MG/DL (ref 8.8–10.4)
CHLORIDE SERPL-SCNC: 94 MMOL/L (ref 98–107)
CHLORIDE SERPL-SCNC: 94 MMOL/L (ref 98–107)
CHLORIDE SERPL-SCNC: 95 MMOL/L (ref 98–107)
COHGB MFR BLD: 96.4 % (ref 95–96)
COHGB MFR BLD: 98.2 % (ref 95–96)
COHGB MFR BLD: 98.3 % (ref 95–96)
COHGB MFR BLD: 98.6 % (ref 95–96)
COHGB MFR BLD: >100 % (ref 95–96)
CREAT SERPL-MCNC: 1.16 MG/DL (ref 0.51–0.95)
CREAT SERPL-MCNC: 1.19 MG/DL (ref 0.51–0.95)
CREAT SERPL-MCNC: 1.3 MG/DL (ref 0.51–0.95)
DIASTOLIC BLOOD PRESSURE - MUSE: NORMAL MMHG
EGFRCR SERPLBLD CKD-EPI 2021: 42 ML/MIN/1.73M2
EGFRCR SERPLBLD CKD-EPI 2021: 47 ML/MIN/1.73M2
EGFRCR SERPLBLD CKD-EPI 2021: 48 ML/MIN/1.73M2
ERYTHROCYTE [DISTWIDTH] IN BLOOD BY AUTOMATED COUNT: 19.2 % (ref 10–15)
GLUCOSE BLDC GLUCOMTR-MCNC: 103 MG/DL (ref 70–99)
GLUCOSE BLDC GLUCOMTR-MCNC: 115 MG/DL (ref 70–99)
GLUCOSE BLDC GLUCOMTR-MCNC: 116 MG/DL (ref 70–99)
GLUCOSE BLDC GLUCOMTR-MCNC: 124 MG/DL (ref 70–99)
GLUCOSE BLDC GLUCOMTR-MCNC: 146 MG/DL (ref 70–99)
GLUCOSE SERPL-MCNC: 116 MG/DL (ref 70–99)
GLUCOSE SERPL-MCNC: 147 MG/DL (ref 70–99)
GLUCOSE SERPL-MCNC: 161 MG/DL (ref 70–99)
HCO3 BLD-SCNC: 26 MMOL/L (ref 21–28)
HCO3 BLD-SCNC: 27 MMOL/L (ref 21–28)
HCO3 BLD-SCNC: 27 MMOL/L (ref 21–28)
HCO3 BLD-SCNC: 28 MMOL/L (ref 21–28)
HCO3 BLD-SCNC: 28 MMOL/L (ref 21–28)
HCO3 BLD-SCNC: 29 MMOL/L (ref 21–28)
HCO3 BLD-SCNC: 29 MMOL/L (ref 21–28)
HCO3 SERPL-SCNC: 23 MMOL/L (ref 22–29)
HCO3 SERPL-SCNC: 25 MMOL/L (ref 22–29)
HCO3 SERPL-SCNC: 26 MMOL/L (ref 22–29)
HCT VFR BLD AUTO: 35 % (ref 35–47)
HGB BLD-MCNC: 10.8 G/DL (ref 11.7–15.7)
HOLD SPECIMEN: NORMAL
INTERPRETATION ECG - MUSE: NORMAL
LACTATE SERPL-SCNC: 0.9 MMOL/L (ref 0.7–2)
MAGNESIUM SERPL-MCNC: 2.9 MG/DL (ref 1.7–2.3)
MCH RBC QN AUTO: 25.8 PG (ref 26.5–33)
MCHC RBC AUTO-ENTMCNC: 30.9 G/DL (ref 31.5–36.5)
MCV RBC AUTO: 84 FL (ref 78–100)
MRSA DNA SPEC QL NAA+PROBE: NEGATIVE
O2/TOTAL GAS SETTING VFR VENT: 100 %
O2/TOTAL GAS SETTING VFR VENT: 30 %
O2/TOTAL GAS SETTING VFR VENT: 40 %
O2/TOTAL GAS SETTING VFR VENT: 50 %
O2/TOTAL GAS SETTING VFR VENT: 70 %
P AXIS - MUSE: NORMAL DEGREES
PCO2 BLD: 33 MM HG (ref 35–45)
PCO2 BLD: 34 MM HG (ref 35–45)
PCO2 BLD: 34 MM HG (ref 35–45)
PCO2 BLD: 35 MM HG (ref 35–45)
PCO2 BLD: 37 MM HG (ref 35–45)
PCO2 BLD: 38 MM HG (ref 35–45)
PCO2 BLD: 38 MM HG (ref 35–45)
PEEP: 5 CM H2O
PEEP: 5 CM H2O
PEEP: 8 CM H2O
PH BLD: 7.48 [PH] (ref 7.35–7.45)
PH BLD: 7.49 [PH] (ref 7.35–7.45)
PH BLD: 7.5 [PH] (ref 7.35–7.45)
PH BLD: 7.51 [PH] (ref 7.35–7.45)
PH BLD: 7.52 [PH] (ref 7.35–7.45)
PHOSPHATE SERPL-MCNC: 4.3 MG/DL (ref 2.5–4.5)
PHOSPHATE SERPL-MCNC: 5 MG/DL (ref 2.5–4.5)
PLATELET # BLD AUTO: 491 10E3/UL (ref 150–450)
PO2 BLD: 103 MM HG (ref 80–105)
PO2 BLD: 154 MM HG (ref 80–105)
PO2 BLD: 242 MM HG (ref 80–105)
PO2 BLD: 374 MM HG (ref 80–105)
PO2 BLD: 82 MM HG (ref 80–105)
PO2 BLD: 89 MM HG (ref 80–105)
PO2 BLD: 95 MM HG (ref 80–105)
POTASSIUM SERPL-SCNC: 4 MMOL/L (ref 3.4–5.3)
POTASSIUM SERPL-SCNC: 4.5 MMOL/L (ref 3.4–5.3)
POTASSIUM SERPL-SCNC: 4.6 MMOL/L (ref 3.4–5.3)
PR INTERVAL - MUSE: NORMAL MS
PROCALCITONIN SERPL IA-MCNC: 0.44 NG/ML
PROT SERPL-MCNC: 6 G/DL (ref 6.4–8.3)
QRS DURATION - MUSE: 76 MS
QRS DURATION - MUSE: 78 MS
QRS DURATION - MUSE: 80 MS
QT - MUSE: 396 MS
QT - MUSE: 402 MS
QT - MUSE: 420 MS
QTC - MUSE: 426 MS
QTC - MUSE: 462 MS
QTC - MUSE: 463 MS
R AXIS - MUSE: -73 DEGREES
R AXIS - MUSE: -83 DEGREES
R AXIS - MUSE: 240 DEGREES
RBC # BLD AUTO: 4.19 10E6/UL (ref 3.8–5.2)
SA TARGET DNA: POSITIVE
SAO2 % BLDA: 95 % (ref 92–100)
SAO2 % BLDA: 96 % (ref 92–100)
SAO2 % BLDA: 97 % (ref 92–100)
SAO2 % BLDA: 97 % (ref 92–100)
SAO2 % BLDA: 98 % (ref 92–100)
SAO2 % BLDA: 99 % (ref 92–100)
SAO2 % BLDA: 99 % (ref 92–100)
SODIUM SERPL-SCNC: 132 MMOL/L (ref 135–145)
SODIUM SERPL-SCNC: 133 MMOL/L (ref 135–145)
SODIUM SERPL-SCNC: 135 MMOL/L (ref 135–145)
SYSTOLIC BLOOD PRESSURE - MUSE: NORMAL MMHG
T AXIS - MUSE: 57 DEGREES
T AXIS - MUSE: 62 DEGREES
T AXIS - MUSE: 63 DEGREES
TROPONIN T SERPL HS-MCNC: 34 NG/L
VENTRICULAR RATE- MUSE: 62 BPM
VENTRICULAR RATE- MUSE: 80 BPM
VENTRICULAR RATE- MUSE: 82 BPM
WBC # BLD AUTO: 16.5 10E3/UL (ref 4–11)

## 2024-12-09 PROCEDURE — 80048 BASIC METABOLIC PNL TOTAL CA: CPT

## 2024-12-09 PROCEDURE — 93005 ELECTROCARDIOGRAM TRACING: CPT

## 2024-12-09 PROCEDURE — 99291 CRITICAL CARE FIRST HOUR: CPT | Mod: 25 | Performed by: SURGERY

## 2024-12-09 PROCEDURE — 84450 TRANSFERASE (AST) (SGOT): CPT | Performed by: STUDENT IN AN ORGANIZED HEALTH CARE EDUCATION/TRAINING PROGRAM

## 2024-12-09 PROCEDURE — 250N000013 HC RX MED GY IP 250 OP 250 PS 637: Performed by: STUDENT IN AN ORGANIZED HEALTH CARE EDUCATION/TRAINING PROGRAM

## 2024-12-09 PROCEDURE — 999N000157 HC STATISTIC RCP TIME EA 10 MIN

## 2024-12-09 PROCEDURE — 87205 SMEAR GRAM STAIN: CPT

## 2024-12-09 PROCEDURE — 93010 ELECTROCARDIOGRAM REPORT: CPT | Mod: GC | Performed by: INTERNAL MEDICINE

## 2024-12-09 PROCEDURE — 3E043XZ INTRODUCTION OF VASOPRESSOR INTO CENTRAL VEIN, PERCUTANEOUS APPROACH: ICD-10-PCS | Performed by: STUDENT IN AN ORGANIZED HEALTH CARE EDUCATION/TRAINING PROGRAM

## 2024-12-09 PROCEDURE — 250N000009 HC RX 250

## 2024-12-09 PROCEDURE — 250N000011 HC RX IP 250 OP 636: Performed by: STUDENT IN AN ORGANIZED HEALTH CARE EDUCATION/TRAINING PROGRAM

## 2024-12-09 PROCEDURE — 258N000003 HC RX IP 258 OP 636

## 2024-12-09 PROCEDURE — 02HV33Z INSERTION OF INFUSION DEVICE INTO SUPERIOR VENA CAVA, PERCUTANEOUS APPROACH: ICD-10-PCS | Performed by: STUDENT IN AN ORGANIZED HEALTH CARE EDUCATION/TRAINING PROGRAM

## 2024-12-09 PROCEDURE — 82805 BLOOD GASES W/O2 SATURATION: CPT | Performed by: STUDENT IN AN ORGANIZED HEALTH CARE EDUCATION/TRAINING PROGRAM

## 2024-12-09 PROCEDURE — 80069 RENAL FUNCTION PANEL: CPT

## 2024-12-09 PROCEDURE — 83735 ASSAY OF MAGNESIUM: CPT | Performed by: STUDENT IN AN ORGANIZED HEALTH CARE EDUCATION/TRAINING PROGRAM

## 2024-12-09 PROCEDURE — 94002 VENT MGMT INPAT INIT DAY: CPT

## 2024-12-09 PROCEDURE — 94645 CONT INHLJ TX EACH ADDL HOUR: CPT

## 2024-12-09 PROCEDURE — 999N000253 HC STATISTIC WEANING TRIALS

## 2024-12-09 PROCEDURE — 36556 INSERT NON-TUNNEL CV CATH: CPT | Mod: GC | Performed by: STUDENT IN AN ORGANIZED HEALTH CARE EDUCATION/TRAINING PROGRAM

## 2024-12-09 PROCEDURE — 87641 MR-STAPH DNA AMP PROBE: CPT

## 2024-12-09 PROCEDURE — 250N000011 HC RX IP 250 OP 636

## 2024-12-09 PROCEDURE — 250N000013 HC RX MED GY IP 250 OP 250 PS 637: Performed by: PHYSICIAN ASSISTANT

## 2024-12-09 PROCEDURE — 83605 ASSAY OF LACTIC ACID: CPT

## 2024-12-09 PROCEDURE — 84100 ASSAY OF PHOSPHORUS: CPT | Performed by: STUDENT IN AN ORGANIZED HEALTH CARE EDUCATION/TRAINING PROGRAM

## 2024-12-09 PROCEDURE — 71250 CT THORAX DX C-: CPT

## 2024-12-09 PROCEDURE — 82330 ASSAY OF CALCIUM: CPT

## 2024-12-09 PROCEDURE — 250N000013 HC RX MED GY IP 250 OP 250 PS 637

## 2024-12-09 PROCEDURE — 84075 ASSAY ALKALINE PHOSPHATASE: CPT | Performed by: STUDENT IN AN ORGANIZED HEALTH CARE EDUCATION/TRAINING PROGRAM

## 2024-12-09 PROCEDURE — 94644 CONT INHLJ TX 1ST HOUR: CPT

## 2024-12-09 PROCEDURE — 36620 INSERTION CATHETER ARTERY: CPT | Mod: GC | Performed by: STUDENT IN AN ORGANIZED HEALTH CARE EDUCATION/TRAINING PROGRAM

## 2024-12-09 PROCEDURE — 82947 ASSAY GLUCOSE BLOOD QUANT: CPT | Performed by: STUDENT IN AN ORGANIZED HEALTH CARE EDUCATION/TRAINING PROGRAM

## 2024-12-09 PROCEDURE — 82805 BLOOD GASES W/O2 SATURATION: CPT

## 2024-12-09 PROCEDURE — 71250 CT THORAX DX C-: CPT | Mod: 26 | Performed by: RADIOLOGY

## 2024-12-09 PROCEDURE — 85048 AUTOMATED LEUKOCYTE COUNT: CPT | Performed by: STUDENT IN AN ORGANIZED HEALTH CARE EDUCATION/TRAINING PROGRAM

## 2024-12-09 PROCEDURE — 84484 ASSAY OF TROPONIN QUANT: CPT | Performed by: PHYSICIAN ASSISTANT

## 2024-12-09 PROCEDURE — 84100 ASSAY OF PHOSPHORUS: CPT

## 2024-12-09 PROCEDURE — 80053 COMPREHEN METABOLIC PANEL: CPT

## 2024-12-09 PROCEDURE — 120N000002 HC R&B MED SURG/OB UMMC

## 2024-12-09 PROCEDURE — 82435 ASSAY OF BLOOD CHLORIDE: CPT | Performed by: STUDENT IN AN ORGANIZED HEALTH CARE EDUCATION/TRAINING PROGRAM

## 2024-12-09 PROCEDURE — 85018 HEMOGLOBIN: CPT | Performed by: STUDENT IN AN ORGANIZED HEALTH CARE EDUCATION/TRAINING PROGRAM

## 2024-12-09 RX ORDER — FUROSEMIDE 10 MG/ML
40 INJECTION INTRAMUSCULAR; INTRAVENOUS ONCE
Status: COMPLETED | OUTPATIENT
Start: 2024-12-09 | End: 2024-12-09

## 2024-12-09 RX ORDER — NALOXONE HYDROCHLORIDE 0.4 MG/ML
0.4 INJECTION, SOLUTION INTRAMUSCULAR; INTRAVENOUS; SUBCUTANEOUS
Status: DISCONTINUED | OUTPATIENT
Start: 2024-12-09 | End: 2024-12-09

## 2024-12-09 RX ORDER — AMPICILLIN AND SULBACTAM 2; 1 G/1; G/1
3 INJECTION, POWDER, FOR SOLUTION INTRAMUSCULAR; INTRAVENOUS EVERY 6 HOURS
Status: DISCONTINUED | OUTPATIENT
Start: 2024-12-09 | End: 2024-12-09

## 2024-12-09 RX ORDER — PROPOFOL 10 MG/ML
5-75 INJECTION, EMULSION INTRAVENOUS CONTINUOUS
Status: DISCONTINUED | OUTPATIENT
Start: 2024-12-09 | End: 2024-12-09

## 2024-12-09 RX ORDER — BUDESONIDE 0.5 MG/2ML
0.5 INHALANT ORAL 2 TIMES DAILY
Status: DISCONTINUED | OUTPATIENT
Start: 2024-12-09 | End: 2025-01-14 | Stop reason: HOSPADM

## 2024-12-09 RX ORDER — NOREPINEPHRINE BITARTRATE 0.06 MG/ML
.01-.6 INJECTION, SOLUTION INTRAVENOUS CONTINUOUS
Status: DISCONTINUED | OUTPATIENT
Start: 2024-12-09 | End: 2024-12-10

## 2024-12-09 RX ORDER — CHLORHEXIDINE GLUCONATE ORAL RINSE 1.2 MG/ML
15 SOLUTION DENTAL EVERY 12 HOURS
Status: DISCONTINUED | OUTPATIENT
Start: 2024-12-09 | End: 2024-12-11

## 2024-12-09 RX ORDER — LEVALBUTEROL INHALATION SOLUTION 0.63 MG/3ML
0.63 SOLUTION RESPIRATORY (INHALATION) 4 TIMES DAILY
Status: DISCONTINUED | OUTPATIENT
Start: 2024-12-09 | End: 2024-12-16

## 2024-12-09 RX ORDER — VANCOMYCIN HYDROCHLORIDE
1250 ONCE
Status: COMPLETED | OUTPATIENT
Start: 2024-12-09 | End: 2024-12-09

## 2024-12-09 RX ORDER — DEXMEDETOMIDINE HYDROCHLORIDE 4 UG/ML
.1-1.2 INJECTION, SOLUTION INTRAVENOUS CONTINUOUS
Status: DISCONTINUED | OUTPATIENT
Start: 2024-12-09 | End: 2024-12-10

## 2024-12-09 RX ORDER — NALOXONE HYDROCHLORIDE 0.4 MG/ML
0.2 INJECTION, SOLUTION INTRAMUSCULAR; INTRAVENOUS; SUBCUTANEOUS
Status: DISCONTINUED | OUTPATIENT
Start: 2024-12-09 | End: 2024-12-09

## 2024-12-09 RX ORDER — FEXOFENADINE HCL 60 MG/1
120 TABLET, FILM COATED ORAL DAILY
Status: DISCONTINUED | OUTPATIENT
Start: 2024-12-10 | End: 2025-01-14 | Stop reason: HOSPADM

## 2024-12-09 RX ADMIN — LEVALBUTEROL HYDROCHLORIDE 0.63 MG: 0.63 SOLUTION RESPIRATORY (INHALATION) at 20:38

## 2024-12-09 RX ADMIN — LEVALBUTEROL HYDROCHLORIDE 0.63 MG: 0.63 SOLUTION RESPIRATORY (INHALATION) at 12:55

## 2024-12-09 RX ADMIN — SODIUM CHLORIDE, POTASSIUM CHLORIDE, SODIUM LACTATE AND CALCIUM CHLORIDE 500 ML: 600; 310; 30; 20 INJECTION, SOLUTION INTRAVENOUS at 01:20

## 2024-12-09 RX ADMIN — PIPERACILLIN SODIUM AND TAZOBACTAM SODIUM 3.38 G: 3; .375 INJECTION, SOLUTION INTRAVENOUS at 01:35

## 2024-12-09 RX ADMIN — IPRATROPIUM BROMIDE 0.5 MG: 0.5 SOLUTION RESPIRATORY (INHALATION) at 09:08

## 2024-12-09 RX ADMIN — PIPERACILLIN SODIUM AND TAZOBACTAM SODIUM 3.38 G: 3; .375 INJECTION, SOLUTION INTRAVENOUS at 20:26

## 2024-12-09 RX ADMIN — LEVOTHYROXINE SODIUM 88 MCG: 88 TABLET ORAL at 05:54

## 2024-12-09 RX ADMIN — FEXOFENADINE HCL 180 MG: 180 TABLET ORAL at 08:14

## 2024-12-09 RX ADMIN — NOREPINEPHRINE BITARTRATE 0.15 MCG/KG/MIN: 0.06 INJECTION, SOLUTION INTRAVENOUS at 01:35

## 2024-12-09 RX ADMIN — POLYETHYLENE GLYCOL 3350 17 G: 17 POWDER, FOR SOLUTION ORAL at 08:13

## 2024-12-09 RX ADMIN — LEVALBUTEROL HYDROCHLORIDE 0.63 MG: 0.63 SOLUTION RESPIRATORY (INHALATION) at 16:21

## 2024-12-09 RX ADMIN — DEXMEDETOMIDINE HYDROCHLORIDE 0.4 MCG/KG/HR: 400 INJECTION INTRAVENOUS at 12:52

## 2024-12-09 RX ADMIN — Medication 25 MCG/HR: at 00:38

## 2024-12-09 RX ADMIN — APIXABAN 5 MG: 5 TABLET, FILM COATED ORAL at 20:08

## 2024-12-09 RX ADMIN — APIXABAN 5 MG: 5 TABLET, FILM COATED ORAL at 08:14

## 2024-12-09 RX ADMIN — SERTRALINE HYDROCHLORIDE 150 MG: 100 TABLET ORAL at 08:13

## 2024-12-09 RX ADMIN — PANTOPRAZOLE SODIUM 40 MG: 40 INJECTION, POWDER, FOR SOLUTION INTRAVENOUS at 08:13

## 2024-12-09 RX ADMIN — PIPERACILLIN SODIUM AND TAZOBACTAM SODIUM 3.38 G: 3; .375 INJECTION, SOLUTION INTRAVENOUS at 14:03

## 2024-12-09 RX ADMIN — PIPERACILLIN SODIUM AND TAZOBACTAM SODIUM 3.38 G: 3; .375 INJECTION, SOLUTION INTRAVENOUS at 08:14

## 2024-12-09 RX ADMIN — IPRATROPIUM BROMIDE 0.5 MG: 0.5 SOLUTION RESPIRATORY (INHALATION) at 20:38

## 2024-12-09 RX ADMIN — GABAPENTIN 600 MG: 300 CAPSULE ORAL at 22:30

## 2024-12-09 RX ADMIN — CHLORHEXIDINE GLUCONATE 15 ML: 1.2 SOLUTION ORAL at 20:08

## 2024-12-09 RX ADMIN — IPRATROPIUM BROMIDE 0.5 MG: 0.5 SOLUTION RESPIRATORY (INHALATION) at 12:55

## 2024-12-09 RX ADMIN — FUROSEMIDE 40 MG: 10 INJECTION, SOLUTION INTRAMUSCULAR; INTRAVENOUS at 12:45

## 2024-12-09 RX ADMIN — FUROSEMIDE 40 MG: 10 INJECTION, SOLUTION INTRAMUSCULAR; INTRAVENOUS at 20:08

## 2024-12-09 RX ADMIN — IPRATROPIUM BROMIDE 0.5 MG: 0.5 SOLUTION RESPIRATORY (INHALATION) at 16:21

## 2024-12-09 RX ADMIN — DEXMEDETOMIDINE HYDROCHLORIDE 0.4 MCG/KG/HR: 400 INJECTION INTRAVENOUS at 03:09

## 2024-12-09 RX ADMIN — Medication 1250 MG: at 01:55

## 2024-12-09 RX ADMIN — CHLORHEXIDINE GLUCONATE 15 ML: 1.2 SOLUTION ORAL at 08:13

## 2024-12-09 RX ADMIN — PROPOFOL 30 MCG/KG/MIN: 10 INJECTION, EMULSION INTRAVENOUS at 00:39

## 2024-12-09 RX ADMIN — LEVALBUTEROL HYDROCHLORIDE 0.63 MG: 0.63 SOLUTION RESPIRATORY (INHALATION) at 09:08

## 2024-12-09 ASSESSMENT — ACTIVITIES OF DAILY LIVING (ADL)
ADLS_ACUITY_SCORE: 72
ADLS_ACUITY_SCORE: 74
ADLS_ACUITY_SCORE: 74
ADLS_ACUITY_SCORE: 72
ADLS_ACUITY_SCORE: 72
ADLS_ACUITY_SCORE: 74
ADLS_ACUITY_SCORE: 72
ADLS_ACUITY_SCORE: 74
ADLS_ACUITY_SCORE: 72
ADLS_ACUITY_SCORE: 74
ADLS_ACUITY_SCORE: 72
ADLS_ACUITY_SCORE: 74
ADLS_ACUITY_SCORE: 72
ADLS_ACUITY_SCORE: 74
ADLS_ACUITY_SCORE: 74
ADLS_ACUITY_SCORE: 72
ADLS_ACUITY_SCORE: 74
ADLS_ACUITY_SCORE: 72

## 2024-12-09 ASSESSMENT — COPD QUESTIONNAIRES: COPD: 1

## 2024-12-09 NOTE — PLAN OF CARE
Admitted/transferred from:   Reason for admission/transfer: worsening oxygenation, intubation and hypotension  2 RN skin assessment: completed by Hemalatha Amaro  Result of skin assessment and interventions/actions: preventative sacral mepi placed  Height, weight, drug calc weight: Done  Patient belongings (see Flowsheet)  MDRO education added to care planYes  ?    Major Shift Events:  FULLER, follows commands, PERRL. Sedation managed w/ dex and fent. Afib 60-90s. MAP>65 w/ levo. CMV 12/360/50%/8. Inhaled veletri @20 ng/kg/min. Bladder scanned for 168 @0600. Reassess in 6hrs. MRSA negative. Respiratory cultures positive for yeast    Plan: wean vent, veletri and pressors as able  For vital signs and complete assessments, please see documentation flowsheets.           Goal Outcome Evaluation: oxygenation      Plan of Care Reviewed With: patient    Overall Patient Progress: decliningOverall Patient Progress: declining

## 2024-12-09 NOTE — CARE PLAN
Speech Language Therapy Discharge Summary    Reason for therapy discharge:    Change in medical status.    Progress towards therapy goal(s). See goals on Care Plan in [facility] electronic health record for goal details.  Goals not met.  Barriers to achieving goals:   Change in medical status, intubated.    Therapy recommendation(s):    Patient would benefit from re-initiating SLP services when medically appropriate.

## 2024-12-09 NOTE — ANESTHESIA PROCEDURE NOTES
Airway       Patient location during procedure: Floor       Procedure Start/Stop Times: 12/8/2024 11:04 PM  Staff -        Anesthesiologist:  Addison Valenzuela MD       Resident/Fellow: Hugo Holder MD       Performed By: resident  Consent for Airway        Urgency: emergent  Report Obtained from Primary Care Team       History regarding most recent potassium obtained: Yes       History regarding presence/absence of renal failure obtained:Yes       History regarding stroke/CVA obtained:Yes       History regarding presence/absence of NM disorder: YesIndications and Patient Condition       Indications for airway management: respiratory insufficiency       Mallampati: Not Assessed     Induction type:RSI       Mask difficulty assessment: 1 - vent by mask    Final Airway Details       Final airway type: endotracheal airway       Successful airway: ETT - single and Oral  Endotracheal Airway Details        ETT size (mm): 7.5       Cuffed: yes       Successful intubation technique: video laryngoscopy       VL Blade Size: MAC D Blade       Grade View of Cords: 1       Adjucts: stylet       Position: Center       Measured from: gums/teeth       Secured at (cm): 22       Bite block used: None    Post intubation assessment        ETT secured, Vent settings by primary/ICU team, Primary/ICU team to review CXR, No apparent complications and Sedation to be ordered by primary/ICU team       Placement verified by: capnometry, equal breath sounds and chest rise        Number of attempts at approach: 1       Number of other approaches attempted: 0       Secured with: commercial tube lao       Ease of procedure: easy       Dentition: Intact and Unchanged    Medication(s) Administered   propofol (DIPRIVAN) injection 10 mg/mL vial - Intravenous   50 mg - 12/8/2024 11:04:00 PM  rocuronium (ZEMURON) 10 mg/mL injection - Intravenous   50 mg - 12/8/2024 11:04:00 PM  Medication Administration Time: 12/8/2024 11:04  PM    Additional Comments       Discussed with the primary team the use of neuromuscular blocker and propofol was ordered and started. A total of 40mcg of Epinephrine, 2 units of Vasopressin and 64mcg of norepinephrine was given during this airway emergency to maintain hemodynamics.

## 2024-12-09 NOTE — PLAN OF CARE
Neuro: RASS -1 Alert. Opens eyes to voice, follows commands, nods yes/no appropriately. PERRLA, FULLER, generally weak.     CV: A-fib 90-100s. MAP >65 with 0.05-0.08 levo. Pulses dopplerable.     PULM: CMV 30/12/360/5, Veletri weaned from 20 to 10 (ABG good), 10 to off (ABG good). Started PS 5/5 at 1600 2 hrs post veletri.  Rate 20-25, volumes 250-325, MVe high 5's.  Small amount of thin/cloudy secretions.    GI: OG to LIS, no output. BM yesterday.     : [facility] 40 mg lasix once, second dose this evening, hoping for 500-1000 net neg. Currently +300.    Skin: Blanchable redness to coccyx. No major skin concerns.    Lines: R 3L internal jugular, piv x2, R radial A-line    Drips: Levo 0.05             Dex likely restart this evening, was 0.2-0.6     Labs:   WBC 16.5 (on zosyn, was being treated for MSSA in sputum with Augmentin, currently on zosyn)  INR 2.43    Family: Daughter Lana, Son Deo, physical copy of health care directive in chart, needs to be scanned.

## 2024-12-09 NOTE — CODE/RAPID RESPONSE
Rapid Response Team Note    Assessment   A rapid response was called on Asya Coffman due to acute hypoxic respiratory failure. Patient noted to have acute change in respiratory status, inability to clear secretions despite suctioning. )2 sats 60-70s prompting CODE BLUE for intubation. Updated family via telephone- Son Deo Coffman.     Plan   - STAT CXR, CBC, CMP, LA, VBG, NT-BNP, Troponin  -  The Internal Medicine primary team was at bedside  -  Disposition: The patient will be transferred to the ICU.  -  Reassessment and plan follow-up will be performed by the accepting ICU team    Nydia Mccoy PA-C  Rapid Response Team ERIC  Securely message with [employer]     Medical Decision Making       60 MINUTES SPENT BY ME on the date of service doing chart review, history, exam, documentation & further activities per the note.      Asya is critically ill with acute hypoxic respiratory failure. These features are alarming and indicate that the patient is at imminent risk of life-threatening organ failure. Acute deterioration is being managed by the following critical interventions: endotracheal intubation    Total Critical Care time spent by me, excluding procedures, was 60 inutes.    Hospital Course   Brief Summary of events leading to rapid response:   RRT called for AHRF    Physical Exam   Vital Signs: Temp: 98.9  F (37.2  C) Temp src: Oral BP: 120/78 Pulse: 78   Resp: 16 SpO2: 90 % O2 Device: (P) Oxymask Oxygen Delivery: (P) 15 LPM  Weight: 111 lbs 15.9 oz        Physical Exam   Constitutional:     HEENT: Cachetic elderly female lying in bed. Copious frothy secretions in mouth. Coughing and choking.   Head: Normocephalic and atraumatic.   Eyes: Conjunctivae are normal. Pupils are equal, round, and reactive to light.  Pharynx has no erythema or exudate, mucous membranes are moist  Neck:   No adenopathy, no bony tenderness  Cardiovascular: Irregular and tachycardic without murmurs or gallops  Pulmonary/Chest:  Respiratory distress on HFNC. Decreased BS bilaterally   GI: Soft with good bowel sounds.  Non-tender, non-distended, with no guarding, no rebound, no peritoneal signs.   Back:  No bony or CVA tenderness   Musculoskeletal:  No edema or clubbing   Skin: Skin is warm and dry. No rash noted.   Neurological: Follows simple commands.   Psychiatric:  Flat          Sepsis Evaluation   The patient is known to have an infection.    Asya Coffman meets SIRS criteria but does NOT have a lactate >2 or other evidence of acute organ damage. These vital signs, lab and physical exam findings constitute a diagnosis of SEPSIS.         Anti-infectives (From now, onward)      Start     Dose/Rate Route Frequency Ordered Stop    12/05/24 2000  amoxicillin-clavulanate (AUGMENTIN) 500-125 MG per tablet 1 tablet         1 tablet Oral EVERY 12 HOURS SCHEDULED 12/05/24 1243            Current antibiotic coverage is appropriate for source of infection.

## 2024-12-09 NOTE — H&P
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 She had been undergoing diuresis but continued to have persistent O2 need. On 12/9 she developed acute increasing in O2 need as well as copious secretions and inability to protect her airway, so was intubated. Immediately after intubation she became acutely hypotensive requiring initiation of norepinephrine infusion. She was transferred to MICU.        Plan: Critical Care   Neuro/ pain/ sedation:  - Wean off propofol and transition to precedex, to help come down on pressor requirements   - Fentanyl infusion   - RASS goal 0  to -1    Anxiety, depression: Continue PTA Sertraline     Pulmonary:  # Acute and chronic hypoxic respiratory failure  # H/o COPD, asthma  Initially admitted to medicine service on 11/21 with 1 month progressive dyspnea. Endorsed FELIZ, PND, orthopnea, swelling in the legs. Found to have hypoxic requiring 2L NC. Workup at the time notable for elevated BNP. Pulmonary and cardiology were consulted during admission to determine cause of persistent O2 requirement, ultimately determined to be likely a combination of HFpEF and pulmonary hypertension and had never been initiated on optimal medical mgmt of HFpEF. Also found to have a small PFO on echocardiogram which could lead to shunting in times of  increased R sided pressures. She was treated for an aspiration pneumonia on 12/3. On 12/3 she also developed an SAMIR so furosemide was held until 12/6. On 12/9 she developed acutely worsening respiratory distress and hypoxia, as well as copious secretions that she was not managing, and change in mental status so was intubated for airway protection and acute hypoxic respiratory failure. This event was most concerning for recurrent aspiration pneumonia vs pneumonitis given patient's known laryngeal dysmotility and aspiration history, vs HAP given prolonged hospital stay vs volume overload, though BNP is down from previous and CXR does not show gross volume overload. No wheezing or elevated pressures, low concern for COPD/ asthma exacerbation.   - FiO2 (%): 100 %, Resp: 14, Vent Mode: CMV/AC, Resp Rate (Set): (S) 12 breaths/min, Tidal Volume (Set, mL): 360 mL, PEEP (cm H2O): 8 cmH2O, Resp Rate (Set): (S) 12 breaths/min, Tidal Volume (Set, mL): 360 mL, PEEP (cm H2O): 8 cmH2O  - Has CT chest wo contrast ordered by previous medicine team for 12/9  - Abx broadened as below  - Has known R pleural effusion, consider thoracentesis if enlarging  - Continue Lasix 40mg po daily  - PTA Symbicort, levalbuterol     H/o CREST syndrome  2014. Per notes, did have positive FARIDEH, Raynaud's, Calcinosis, GERD and some telangectasia's. Last saw [facility]ology 2017. No acute concerns other than esophageal motility   - continue diltiazem as below    Pulmonary nodule:   6 mm LLL pulmonary nodule noted on CT  - Will need repeat imaging in 6-12 months     Cardiovascular:  # Hypotension  # HFpEF  # Pulmonary hypertension  Developed acute hypotension after intubation on 12/9. Suspect related to known pulmonary hypertension with starting positive pressure ventilation vs sedation side effect. BP improved slightly after receiving 500cc LR, but given HFpEF and possible volume overload as etiology for respiratory decompensation, wanted to be  judicious with fluids.   - Norepinephrine to maintain MAP 65  - hold Spironolactone   - Continue empagliflozin    #Chronic Afib   Currently rate controlled. [id number] 3 (age++, HF+). 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 She has endorsed low appetite. She has hx of recent invasive laryngeal SCC with radiation therapy, also had dysphagia before.   - consulted nutrition  - Swallow study showed silent aspiration with thin liquids, was receiving modified diet prior to intubation  - consider GI consult given question of esophageal web in cervical esophagus noted on swallow study   - Diet: Fluid restriction 2000 ML FLUID  NPO per Anesthesia Guidelines for Procedure/Surgery Except for: Meds        Renal/ Fluid Balance:   # Pre-renal SAMIR, resolved  # CKD II  Cr on 12/3 increased to 1.6 from baseline 1-1.1, now normalized with holding diuresis from 12/3-12/6.   - AM BMP    Endocrine:  Hypothyroidism: TSH 1.48 4/2024. Continue PTA synthroid     ID:  # C/f recurrent aspiration pneumonia   Acute hypoxic respiratory failure on 12/9 requiring intubation. Labs notable for increased WBC to 14, procal 0.44. CXR without obvious infiltrate. Had been treated for aspiration pneumonia previously with starting Unasyn on 12/3, transitioned to augmentin 12/5. Previous infectious workup has been notable for MSSA in sputum culture 12/3. Given decompensation, will repeat infectious workup and empirically broaden antibiotics to cover for possible HAP vs aspiration.   -  Repeat MRSA swab  - Repeat sputum culture  - Vancomycin 12/9-  - Zosyn 12/9-    Hematology:  - No acute issues    MSK:   - PT and OT consulted. Appreciate recommendations.     Lines/ tubes/ drains:  Miranda Catheter: Not present  Lines: PRESENT      CVC Triple Lumen Right Internal jugular-Site Assessment: WDL  Arterial Line 12/09/24 Radial-Site Assessment: WDL        Prophylaxis:  - DVT Prophylaxis: DOAC  - PUD Prophylaxis: PPI    Code Status: Full Code      Disposition:  - ICU    The patient's care was discussed with the Attending Physician, Dr. Will .    Clinically Significant Risk Factors         # Hyponatremia: Lowest Na = 133 mmol/L in last 2 days, will monitor as appropriate  # Hypochloremia: Lowest Cl = 93 mmol/L in last 2 days, will monitor as appropriate   # Hypercalcemia: corrected calcium is >10.1, will monitor as appropriate    # Hypoalbuminemia: Lowest albumin = 3.1 g/dL at 12/8/2024  5:59 AM, will monitor as appropriate    # Coagulation Defect: INR = 2.43 (Ref range: 0.85 - 1.15) and/or PTT = 47 Seconds (Ref range: 22 - 38 Seconds), will monitor for bleeding        # Acute Hypoxic Respiratory Failure: Documented O2 saturation < 90%. Continue supplemental oxygen as needed          # Severe Malnutrition: based on nutrition assessment    # Financial/Environmental Concerns: none              Ramila Nolan MD  PGY-3 Internal Medicine- Pediatrics   MICU resident   [facility]  Securely message with [employer] (more info)  Text page via [facility] Paging/Directory     ______________________________________________________________________    Chief Complaint   Acute hypoxic respiratory failure requiring intubation  Concern for aspiration        History of Present Illness   Asya Coffman is a 79 year old female with history of A fib, CAD, COPD, asthma, Raynaud's, CKD, 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 She had been undergoing diuresis but continued to have persistent O2 need. On 12/9 she developed acute increasing in O2 need as well as copious secretions and inability to protect her airway, so was intubated. Immediately after intubation she became acutely hypotensive requiring initiation of norepinephrine infusion. She was transferred to MICU.     Review of Systems    Review of systems not obtained due to patient factors - intubation    Past Medical History    I have reviewed this patient's medical history and updated it with pertinent information if needed.   Past Medical History:   Diagnosis Date    A-fib (H)     Antiplatelet or antithrombotic long-term use     Arrhythmia     COPD (chronic obstructive pulmonary disease) (H)        Past Surgical History   I have reviewed this patient's surgical history and updated it with pertinent information if needed.  Past Surgical History:   Procedure Laterality Date    INJECT STEROID (LOCATION) N/A 6/15/2023    Procedure: Avastin injection;  Surgeon: Nikole Davis MD;  Location: [facility] OR    INJECT STEROID (LOCATION) N/A 9/7/2023    Procedure: Avastin injection;  Surgeon: Nikole Davis MD;  [facility]    INJECT STEROID (LOCATION) N/A 12/14/2023    Procedure: Avastin injection;  Surgeon: Nikole Davis MD;  [facility]    INJECT STEROID (LOCATION) N/A 2/15/2024    Procedure: Avastin injection;  Surgeon: Nikole Davis MD;  [facility]    LASER CO2 LARYNGOSCOPY N/A 9/7/2023    Procedure: Microdirect laryngoscopy with excision/ablation of laryngeal lesions, biopsies, CO2 laser;  Surgeon: Nikole Davis MD;  [facility]    LASER CO2 LARYNGOSCOPY N/A 5/30/2024    Procedure: Microdirect laryngoscopy with  excision/ablation of laryngeal lesions, biopsies, CO2 laser;  Surgeon: Nikole Davis MD;  [facility]    LASER CO2 LARYNGOSCOPY, COMPLEX N/A 5/26/2022    Procedure: Micro direct laryngoscopy with excision/ablation of laryngeal lesions, possible biopsies, possible CO2 laser;  Surgeon: Nikole Davis MD;  [facility]    LASER CO2 LARYNGOSCOPY, COMPLEX N/A 9/15/2022    Procedure: Microdirect laryngoscopy with ablation of laryngeal lesions,biopsies,CO2 laser;  Surgeon: Nikole Davis MD;  [facility]    LASER CO2 LARYNGOSCOPY, COMPLEX N/A 12/1/2022    Procedure: Microdirect laryngoscopy with excision/ablation of laryngeal lesions, biopsies,   CO2 laser;  Surgeon: Nikole Davis MD;  [facility]    LASER CO2 LARYNGOSCOPY, COMPLEX N/A 6/15/2023    Procedure: Microdirect laryngoscopy with ablation of laryngeal lesions, biopsies, CO2 laser;  Surgeon: Nikole Davis MD;  [facility]    LASER CO2 LARYNGOSCOPY, COMPLEX N/A 10/5/2023    Procedure: Microdirect laryngoscopy with excision/ablation of laryngeal lesions, biopsies, CO2 laser;  Surgeon: Nikole Davis MD;  [facility]    LASER CO2 LARYNGOSCOPY, COMPLEX N/A 12/14/2023    Procedure: Microdirect laryngoscopy with excision/ablation of laryngeal lesions, biopsies, CO2 laser;  Surgeon: Nikole Davis MD;  [facility]    LASER CO2 LARYNGOSCOPY, COMPLEX N/A 2/15/2024    Procedure: Microdirect laryngoscopy with excision/ablation of laryngeal lesions, biopsies, CO2 laser;  Surgeon: Nikole Davis MD;  [facility]    LASER CO2 LARYNGOSCOPY, COMPLEX N/A 4/11/2024    Procedure: Microdirect laryngoscopy with excision/ablation of laryngeal lesions, biopsies, CO2 laser, Avastin injections;  Surgeon: Nikole Davis MD;  [facility]    LASER KTP LARYNGOSCOPY N/A 4/3/2023    Procedure: Microdirect laryngoscopy with biopsies, excision/ablation of  lesions, C02 laser,  Avastin injection;  Surgeon: Nikole Davis MD;  [facility]    LASER KTP LARYNGOSCOPY N/A 6/15/2023    Procedure: flexible laser (CO2 or KTP) standby;  Surgeon: Nikole Davis MD;  [facility]    LASER KTP LARYNGOSCOPY FLEXIBLE N/A 8/18/2022    Procedure: Transnasal flexible laryngoscopy with KTP laser and biopsies;  Surgeon: Nikole Davis MD;  Location: [facility] OR    LASER KTP LARYNGOSCOPY FLEXIBLE N/A 10/27/2022    Procedure: Transnasal flexible laryngoscopy with possible KTP laser;  Surgeon: Nikole Davis MD;  Location: [facility] OR    LASER KTP LARYNGOSCOPY FLEXIBLE N/A 1/26/2023    Procedure: Transnasal flexible laryngoscopy with KTP laser,  biopsies, superior laryngeal nerve blocks, Avastin injection;  Surgeon: Nikole Davis MD;  Location: [facility] OR    LASER KTP LARYNGOSCOPY FLEXIBLE N/A 2/15/2023    Procedure: Transnasal flexible laryngoscopy with KTP laser, superior laryngeal nerve blocks, biopsies, avastin injection;  Surgeon: Nikole Davis MD;  Location: [facility] OR    LASER KTP LARYNGOSCOPY FLEXIBLE N/A 2/17/2023    Procedure: Transnasal flexible laryngoscopy with KTP laser, biopsies, superior laryngeal nerve blocks;  Surgeon: Nikole Davis MD;  Location: [facility] OR    LASER KTP LARYNGOSCOPY FLEXIBLE N/A 2/23/2023    Procedure: Transnasal flexible laryngoscopy with KTP laser, superior laryngeal nerve blocks;  Surgeon: Nikole Davis MD;  Location: [facility] OR    LASER KTP LARYNGOSCOPY FLEXIBLE N/A 3/2/2023    Procedure: Transnasal flexible laryngoscopy with KTP laser, superior laryngeal nerve block Bilateral;  Surgeon: Nikole Davis MD;  Location: [facility] OR    LASER KTP LARYNGOSCOPY FLEXIBLE N/A 3/9/2023    Procedure: Transnasal flexible laryngoscopy with KTP laser, biopsies, superior laryngeal nerve blocks;  Surgeon: Nikole Davis MD;  Location: [facility] OR    LASER KTP LARYNGOSCOPY  FLEXIBLE N/A 3/17/2023    Procedure: Transnasal flexible laryngoscopy with KTP laser, superior laryngeal nerve block(s), Avastin injection;  Surgeon: Nikole Davis MD;  Location: [facility] OR    LASER KTP LARYNGOSCOPY FLEXIBLE N/A 3/28/2023    Procedure: Transnasal flexible laryngoscopy with KTP laser, superior laryngeal nerve block(s);  Surgeon: Pankaj Woodard MD;  Location: [facility] OR       Social History   I have reviewed this patient's social history and updated it with pertinent information if needed.  Social History     Tobacco Use    Smoking status: Never    Smokeless tobacco: Never   Substance Use Topics    Alcohol use: Yes     Comment: Occasionally    Drug use: Never       Family History   I have reviewed this patient's family history and updated it with pertinent information if needed.  Family History   Problem Relation Age of Onset    Breast Cancer Mother 75.00    Hereditary Breast and Ovarian Cancer Syndrome No family hx of     Cancer No family hx of     Colon Cancer No family hx of     Endometrial Cancer No family hx of     Ovarian Cancer No family hx of        Prior to Admission Medications   Prior to Admission Medications   Prescriptions Last Dose Informant Patient Reported? Taking?   Calcium Carbonate (CALTRATE 600 PO) 11/21/2024 Morning  Yes Yes   Sig: Take 1 tablet by mouth 2 times daily (with meals).   Lidocaine (LIDOCARE) 4 % Patch 11/21/2024  Yes Yes   Sig: Place 2 patches onto the skin every 24 hours. To R hip, to mid-back  To prevent lidocaine toxicity, patient should be patch free for 12 hrs daily.   Respiratory Therapy Supplies (NEBULIZER) JUWAN  Self Yes No   Sig: Portable nebulizer, disposable neb kit x 4, reuseable neb kit x 1, mask x 1, filters x 1.   Frequency of use: daily;  Medication: albuterol  Length of need: 99 months   acetaminophen (TYLENOL) 325 MG tablet Unknown Self No Yes   Sig: Take 2 tablets (650 mg) by mouth every 4 hours as needed for pain   albuterol  (PROAIR HFA/PROVENTIL HFA/VENTOLIN HFA) 108 (90 Base) MCG/ACT inhaler 2024 Morning Self Yes Yes   Sig: Inhale 2 puffs into the lungs as needed   apixaban ANTICOAGULANT (ELIQUIS) 5 MG tablet 2024 Evening Self No Yes   Sig: Take 1 tablet (5 mg) by mouth 2 times daily   atorvastatin (LIPITOR) 20 MG tablet 2024 Morning Self Yes Yes   Sig: Take 20 mg by mouth every evening   budesonide-formoterol (SYMBICORT) 160-4.5 MCG/ACT Inhaler 2024 Morning Self Yes Yes   Sig: Inhale 2 puffs into the lungs two times daily   diltiazem ER (TIAZAC) 240 MG 24 hr ER beaded capsule 2024 Evening Self Yes Yes   Sig: Take 240 mg by mouth 2 times daily   ferrous sulfate (FE TABS) 325 (65 Fe) MG EC tablet 2024 Morning  Yes Yes   Sig: Take 325 mg by mouth daily.   gabapentin (NEURONTIN) 300 MG capsule 2024 Evening Self No Yes   Si mg p.o. every morning, 300 mg p.o. q. afternoon and 600 mg p.o. nightly.  Taper dose up per instructions given in Radiation Oncology   Patient taking differently: Take 300 mg by mouth at bedtime.   ibuprofen (ADVIL/MOTRIN) 200 MG capsule Unknown Self Yes Yes   Sig: Take 400 mg by mouth every 6 hours as needed for fever   ipratropium - albuterol 0.5 mg/2.5 mg/3 mL (DUONEB) 0.5-2.5 (3) MG/3ML neb solution 2024 Morning Self No Yes   Sig: Take 1 vial (3 mLs) by nebulization every 6 hours as needed for shortness of breath, wheezing or cough   Patient taking differently: Take 1 vial by nebulization 4 times daily.   levothyroxine (SYNTHROID/LEVOTHROID) 88 MCG tablet 2024 Morning Self Yes Yes   Sig: Take 88 mcg by mouth daily   oxyCODONE (OXY-IR) 5 MG capsule   Yes Yes   Sig: Take 2.5 mg by mouth every 4 hours as needed for severe pain. Maximum 10 mg per 24 hours   propranolol (INDERAL) 10 MG tablet 2024 Morning Self No Yes   Sig: Start propanolol 10 mg 1 tab daily and increase by 1 tab weekly until 20 mg twice daily. May stop at lowest effective dose. If  experiencing hypotension or bradycardia, return to previous dose on titration schedule.   Patient taking differently: Take 10 mg by mouth daily.   sertraline (ZOLOFT) 100 MG tablet 11/21/2024 Morning Self Yes Yes   Sig: Take 150 mg by mouth daily.   vitamin D3 (CHOLECALCIFEROL) 1.25 MG (22503 UT) capsule Past Week  Yes Yes   Sig: Take 50,000 Units by mouth every 7 days. On saturdays      Facility-Administered Medications: None     Allergies   Allergies   Allergen Reactions    Methylpyrrolidone Anaphylaxis    Nuts Anaphylaxis     Black walnut    Escitalopram Other (See Comments)     Asthma -a time of exacerbation and may not have been cause may retry    Mirtazapine Other (See Comments)     Asthma a time of exacerbation and may not have been cause may retry    Venlafaxine Other (See Comments)    Adhesive Tape Rash     With holter monitor. Ok with bandaids.    No Clinical Screening - See Comments Rash     Adhesive tape       Physical Exam   Vital Signs: Temp: 99  F (37.2  C) Temp src: Axillary BP: (!) 82/58 Pulse: 75   Resp: 14 SpO2: 91 % O2 Device: Mechanical Ventilator Oxygen Delivery: 15 LPM  Weight: 111 lbs 15.9 oz    General: Intubated, sedated  HEENT: ETT in place  Neuro: Sedated, moving all extremities  CV: Irregular, tachycardic  Pulm: Coarse breath sounds bilaterally, no wheezing  Abd: Soft, nondistended  Extremities: Cool, no edema      Data   I reviewed all medications, new labs and imaging results over the last 24 hours.  Arterial Blood Gases   Recent Labs   Lab 12/09/24  0256 12/09/24  0151   PH 7.50* 7.52*   PCO2 35 34*   PO2 242* 374*   HCO3 28 27       Complete Blood Count   Recent Labs   Lab 12/08/24  2317 12/08/24  2305 12/08/24  0559 12/07/24  0612 12/06/24  0617   WBC 14.7*  --  8.2 8.8 7.7   HGB 13.1 15.0 11.0* 10.9* 10.8*   *  --  368 386 308       Basic Metabolic Panel  Recent Labs   Lab 12/09/24  0148 12/09/24  0146 12/08/24  2317 12/08/24  2305 12/08/24  0559 12/07/24  0612   *  --   136 134* 135 135   POTASSIUM 4.0  --  4.6 4.5 4.4 4.2   CHLORIDE 94*  --  93*  --  97* 97*   CO2 23  --  26  --  27 29   BUN 29.0*  --  27.4*  --  31.0* 33.5*   CR 1.16*  --  1.15*  --  1.16* 1.18*   * 146* 123* 125* 87 95       Liver Function Tests  Recent Labs   Lab 12/08/24  2317 12/08/24  0559 12/07/24  0612 12/06/24  0617   AST 17 15 16 18   ALT 15 7 10 6   ALKPHOS 87 73 78 74   BILITOTAL 0.4 0.3 0.4 0.3   ALBUMIN 3.7 3.1* 3.2* 3.1*   INR 2.43*  --   --   --        Pancreatic Enzymes  No lab results found in last 7 days.    Coagulation Profile  Recent Labs   Lab 12/08/24 2317   INR 2.43*   PTT 47*       IMAGING:  Recent Results (from the past 24 hours)   XR Chest Port 1 View    Impression    RESIDENT PRELIMINARY INTERPRETATION  Impression:   1. Interval intubation with the tip in the mid thoracic trachea.  2. Enteric tube with the tip and sidehole overlying the area of the  stomach.  3. Small bilateral effusions.

## 2024-12-09 NOTE — PROGRESS NOTES
Responded to RRT and Code Blue call. Code team was called due to hypoxia and to intubate pt on the floor. Uppon entering the room patient was on OxyMask 15 LPM with fluctuating O2 sats. Placed pt on HFCN 60L/100%. O2 sats >90%. Pt was intubated due to acute hypoxic respiratory failure. Assisted intubation by anesthesia. ETT 7.5, 22 cm at the teeth. cO2 collar change and bilateral BS noted. Placed pt on transport vent and transferred pt to ICU. Vent settings: CMV mode RR 16, 360 ml, +8, FiO2 100%. Pt is tolerating vent well. Will continue to follow.       Maurilio Dawkins, RT, RT  12/8/2024 11:57 PM

## 2024-12-09 NOTE — PLAN OF CARE
Goal Outcome Evaluation:         Overall Patient Progress: declining  Outcome Evaluation: Alert and oriented x 3. Denies pain. Received on 02 at 1 lpm nasal cannula. Weak cough with copious thick white phlegm. Oral suctioning done hourly. Tolerated pills one at a time with mildly thickened juice. Declined food when offered. Desats to below 90's. Switched to oxymask and 02 sats improved. Noted 02 sats to be in the 80's around 2243, went to check pt . Denies any other symptoms. Increased 02 to 6 lpm with no improvement. RRT called at 2245. Singh Borjas, primary service informed. Code called by RRT for intubation at bedside. Pt transferred to  with all belongings at 2330. Report given to NASREEN Ivan. Family updated by RRT lead Nydia.

## 2024-12-09 NOTE — ANESTHESIA PREPROCEDURE EVALUATION
Anesthesia Pre-Procedure Evaluation    Patient: Asya Coffman   MRN: [id number] : 1945        Procedure : * No procedures listed *          Past Medical History:   Diagnosis Date    A-fib (H)     Antiplatelet or antithrombotic long-term use     Arrhythmia     COPD (chronic obstructive pulmonary disease) (H)       Past Surgical History:   Procedure Laterality Date    INJECT STEROID (LOCATION) N/A 6/15/2023    Procedure: Avastin injection;  Surgeon: Nikole Davis MD;  Location: [facility]    INJECT STEROID (LOCATION) N/A 2023    Procedure: Avastin injection;  Surgeon: Nikole Davis MD;  Location: [facility]    INJECT STEROID (LOCATION) N/A 2023    Procedure: Avastin injection;  Surgeon: Nikole Davis MD;  Location: [facility]    INJECT STEROID (LOCATION) N/A 2/15/2024    Procedure: Avastin injection;  Surgeon: Nikole Davis MD;  Location: [facility]    LASER CO2 LARYNGOSCOPY N/A 2023    Procedure: Microdirect laryngoscopy with excision/ablation of laryngeal lesions, biopsies, CO2 laser;  Surgeon: Nikole Davis MD;  Location: [facility]    LASER CO2 LARYNGOSCOPY N/A 2024    Procedure: Microdirect laryngoscopy with excision/ablation of laryngeal lesions, biopsies, CO2 laser;  Surgeon: Nikole Davis MD;  Location: [facility]    LASER CO2 LARYNGOSCOPY, COMPLEX N/A 2022    Procedure: Micro direct laryngoscopy with excision/ablation of laryngeal lesions, possible biopsies, possible CO2 laser;  Surgeon: Nikole Davis MD;  Location: [facility]    LASER CO2 LARYNGOSCOPY, COMPLEX N/A 9/15/2022    Procedure: Microdirect laryngoscopy with ablation of laryngeal lesions,biopsies,CO2 laser;  Surgeon: Nikole Davis MD;  Location: [facility]    LASER CO2 LARYNGOSCOPY, COMPLEX N/A 2022    Procedure: Microdirect laryngoscopy with excision/ablation of laryngeal lesions, biopsies,   CO2 laser;  Surgeon: Nikole Davis MD;  Location:  [facility]    LASER CO2 LARYNGOSCOPY, COMPLEX N/A 6/15/2023    Procedure: Microdirect laryngoscopy with ablation of laryngeal lesions, biopsies, CO2 laser;  Surgeon: Nikole Davis MD;  Location: [facility]    LASER CO2 LARYNGOSCOPY, COMPLEX N/A 10/5/2023    Procedure: Microdirect laryngoscopy with excision/ablation of laryngeal lesions, biopsies, CO2 laser;  Surgeon: Nikole Davis MD;  Location: [facility]    LASER CO2 LARYNGOSCOPY, COMPLEX N/A 12/14/2023    Procedure: Microdirect laryngoscopy with excision/ablation of laryngeal lesions, biopsies, CO2 laser;  Surgeon: Nikole Davis MD;  Location: [facility]    LASER CO2 LARYNGOSCOPY, COMPLEX N/A 2/15/2024    Procedure: Microdirect laryngoscopy with excision/ablation of laryngeal lesions, biopsies, CO2 laser;  Surgeon: Nikole Davis MD;  Location: [facility]    LASER CO2 LARYNGOSCOPY, COMPLEX N/A 4/11/2024    Procedure: Microdirect laryngoscopy with excision/ablation of laryngeal lesions, biopsies, CO2 laser, Avastin injections;  Surgeon: Nikole Davis MD;  Location: [facility]    LASER KTP LARYNGOSCOPY N/A 4/3/2023    Procedure: Microdirect laryngoscopy with biopsies, excision/ablation of lesions, C02 laser,  Avastin injection;  Surgeon: Nikole Davis MD;  Location: [facility]    LASER KTP LARYNGOSCOPY N/A 6/15/2023    Procedure: flexible laser (CO2 or KTP) standby;  Surgeon: Nikole Davis MD;  Location: [facility]    LASER KTP LARYNGOSCOPY FLEXIBLE N/A 8/18/2022    Procedure: Transnasal flexible laryngoscopy with KTP laser and biopsies;  Surgeon: Nikole Davis MD;  Location: [facility]    LASER KTP LARYNGOSCOPY FLEXIBLE N/A 10/27/2022    Procedure: Transnasal flexible laryngoscopy with possible KTP laser;  Surgeon: Nikole Davis MD;  Location: [facility]    LASER KTP LARYNGOSCOPY FLEXIBLE N/A 1/26/2023    Procedure: Transnasal flexible laryngoscopy with KTP laser,  biopsies, superior laryngeal nerve  blocks, Avastin injection;  Surgeon: Nikole Davis MD;  Location: [facility]    LASER KTP LARYNGOSCOPY FLEXIBLE N/A 2/15/2023    Procedure: Transnasal flexible laryngoscopy with KTP laser, superior laryngeal nerve blocks, biopsies, avastin injection;  Surgeon: Nikole Davis MD;  Location: [facility]    LASER KTP LARYNGOSCOPY FLEXIBLE N/A 2/17/2023    Procedure: Transnasal flexible laryngoscopy with KTP laser, biopsies, superior laryngeal nerve blocks;  Surgeon: Nikole Davis MD;  Location: [facility]    LASER KTP LARYNGOSCOPY FLEXIBLE N/A 2/23/2023    Procedure: Transnasal flexible laryngoscopy with KTP laser, superior laryngeal nerve blocks;  Surgeon: Nikole Davis MD;  Location: [facility]    LASER KTP LARYNGOSCOPY FLEXIBLE N/A 3/2/2023    Procedure: Transnasal flexible laryngoscopy with KTP laser, superior laryngeal nerve block Bilateral;  Surgeon: Nikole Davis MD;  Location: [facility]    LASER KTP LARYNGOSCOPY FLEXIBLE N/A 3/9/2023    Procedure: Transnasal flexible laryngoscopy with KTP laser, biopsies, superior laryngeal nerve blocks;  Surgeon: Nikole Davis MD;  Location: [facility]    LASER KTP LARYNGOSCOPY FLEXIBLE N/A 3/17/2023    Procedure: Transnasal flexible laryngoscopy with KTP laser, superior laryngeal nerve block(s), Avastin injection;  Surgeon: Nikole Davis MD;  Location: [facility]    LASER KTP LARYNGOSCOPY FLEXIBLE N/A 3/28/2023    Procedure: Transnasal flexible laryngoscopy with KTP laser, superior laryngeal nerve block(s);  Surgeon: Pankaj Woodard MD;  Location: [facility]      Allergies   Allergen Reactions    Methylpyrrolidone Anaphylaxis    Nuts Anaphylaxis     Black walnut    Escitalopram Other (See Comments)     Asthma -a time of exacerbation and may not have been cause may retry    Mirtazapine Other (See Comments)     Asthma a time of exacerbation and may not have been cause may retry    Venlafaxine Other (See  Comments)    Adhesive Tape Rash     With holter monitor. Ok with bandaids.    No Clinical Screening - See Comments Rash     Adhesive tape      Social History     Tobacco Use    Smoking status: Never    Smokeless tobacco: Never   Substance Use Topics    Alcohol use: Yes     Comment: Occasionally      Wt Readings from Last 1 Encounters:   12/08/24 50.8 kg (111 lb 15.9 oz)        Anesthesia Evaluation            ROS/MED HX  ENT/Pulmonary:     (+)                          COPD,              Neurologic:       Cardiovascular:     (+)  - -   -  - -   Taking blood thinners                                   METS/Exercise Tolerance:     Hematologic:       Musculoskeletal:       GI/Hepatic:       Renal/Genitourinary:     (+) renal disease, type: CRI,            Endo:     (+)          thyroid problem, Thyroid disease - Other,           Psychiatric/Substance Use:       Infectious Disease:       Malignancy:   (+) Malignancy, History of Other.    Other:            Physical Exam    Airway   unable to assess          Respiratory Devices and Support         Dental    unable to assess        Cardiovascular    unable to assess         Pulmonary    Unable to assess           Other findings: Exam deferred due to emergent need for airway placement    OUTSIDE LABS:  CBC:   Lab Results   Component Value Date    WBC 16.5 (H) 12/09/2024    WBC 14.7 (H) 12/08/2024    HGB 10.8 (L) 12/09/2024    HGB 13.1 12/08/2024    HCT 35.0 12/09/2024    HCT 42.9 12/08/2024     (H) 12/09/2024     (H) 12/08/2024     BMP:   Lab Results   Component Value Date     (L) 12/09/2024     (L) 12/09/2024    POTASSIUM 4.5 12/09/2024    POTASSIUM 4.0 12/09/2024    CHLORIDE 94 (L) 12/09/2024    CHLORIDE 94 (L) 12/09/2024    CO2 26 12/09/2024    CO2 23 12/09/2024    BUN 29.5 (H) 12/09/2024    BUN 29.0 (H) 12/09/2024    CR 1.19 (H) 12/09/2024    CR 1.16 (H) 12/09/2024     (H) 12/09/2024     (H) 12/09/2024     COAGS:   Lab Results  "  Component Value Date    PTT 47 (H) 12/08/2024    INR 2.43 (H) 12/08/2024     POC: No results found for: \"BGM\", \"HCG\", \"HCGS\"  HEPATIC:   Lab Results   Component Value Date    ALBUMIN 3.0 (L) 12/09/2024    PROTTOTAL 6.0 (L) 12/09/2024    ALT 12 12/09/2024    AST 16 12/09/2024    ALKPHOS 70 12/09/2024    BILITOTAL 0.3 12/09/2024     OTHER:   Lab Results   Component Value Date    PH 7.50 (H) 12/09/2024    LACT 0.9 12/09/2024    A1C 6.0 (H) 01/24/2024    ESSIE 8.6 (L) 12/09/2024    PHOS 5.0 (H) 12/09/2024    MAG 2.9 (H) 12/09/2024    TSH 1.48 04/08/2024       Anesthesia Plan    ASA Status:  4, emergent    NPO Status:  ELEVATED Aspiration Risk/Unknown    Anesthesia Type: General.     - Airway: ETT   Induction: RSI.      Techniques and Equipment:     - Airway: Video-Laryngoscope       Consents            Postoperative Care            Comments:    Other Comments: Emergent intubation           Addison Valenzuela MD    I have reviewed the pertinent notes and labs in the chart from the past 30 days and (re)examined the patient.  Any updates or changes from those notes are reflected in this note.     # Hyponatremia: Lowest Na = 132 mmol/L in last 2 days, will monitor as appropriate  # Hypochloremia: Lowest Cl = 93 mmol/L in last 2 days, will monitor as appropriate   # Hypercalcemia: corrected calcium is >10.1, will monitor as appropriate    # Hypoalbuminemia: Lowest albumin = 3 g/dL at 12/9/2024  5:34 AM, will monitor as appropriate  # Coagulation Defect: INR = 2.43 (Ref range: 0.85 - 1.15) and/or PTT = 47 Seconds (Ref range: 22 - 38 Seconds), will monitor for bleeding        # Acute Hypoxic Respiratory Failure: Documented O2 saturation < 90%. Continue supplemental oxygen as needed          # Severe Malnutrition: based on nutrition assessment    # Financial/Environmental Concerns: none        "

## 2024-12-09 NOTE — PROGRESS NOTES
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 She developed acute hypoxia in the setting of increased secretions 12/8 and was intubated.     CHANGES and MAJOR THINGS TODAY:   - Wean epoprostenol as tolerated by SpO2  - Pressure support as tolerated  - IV diuresis for goal -1L/24 hours  - Continue Zosyn  - Miranda for accurate I/Os    PLAN:    Neuro:  # Pain and sedation  - RASS 0 to -1  - Precedex gtt  - Fentanyl gtt    # Anxiety  # Depression  - PTA Sertraline 150mg qd      Pulmonary:  # Acute hypoxic respiratory failure  # Intubated for airway protection  Presented on 11/21 with acute hypoxic respiratory failure; initially suspected to be secondary to HFpEF exacerbation; unresolved with diuresis. Overnight 12/8, developed sudden worsening of oxygenation and increased secretions in the setting of dysphagia/recent laryngeal radiation and was intubated for airway protection. Etiology of acute hypoxic respiratory failure may be recurrent aspiration pneumonitis, developing pneumonia, pulmonary edema 2/2 HFpEF exacerbation; broad differential includes COPD exacerbation. Lower suspicion for asthma exacerbation (no wheezing on exam) or new diagnosis of ILD  in the setting of Crest syndrome (CT chest without diffuse GGO). With increased secretions, oxygen requirements, and new WBC (16.5), will continue empiric antibiotics for hospital-acquired pneumonia. Plan for active diuresis for I/O goal -1L/24 hours. Will wean epoprostenol (CT  chest not concerning for ARDS, can consider alternative strategy for afterload reduction).   - Mechanical ventilation  FiO2 (%): 100 %, Resp: 22, Vent Mode: CMV/AC, Resp Rate (Set): 12 breaths/min, Tidal Volume (Set, mL): 360 mL, PEEP (cm H2O): 5 cmH2O, Resp Rate (Set): 12 breaths/min, Tidal Volume (Set, mL): 360 mL, PEEP (cm H2O): 5 cmH2O  - Epoprostenol, wean  - Pressure support trial as needed  - Can consider starting pulmonary toilet with mucomyst or percussive therapy as needed for increased sputum production    # Hx obstructive lung disease (asthma vs COPD)  # Hx pulmonary hypertension (44 mmHg per echo 11/2024)  PFTs (3/2022) demonstrated severe obstructive lung disease with hyperinflation; on PTA budesonide-formoterol BID and albuterol/duonebs PRN. On transfer to MICU, no wheezing concerning for asthma or COPD exacerbation. Will continue PTA inhaler regimen; can consider oupatient follow-up for obstructive lung disease/repeat PFTs and management of pulmonary hypertension.   - Formulary substitutes for home inhaler regimen   Budesonide neb BID   Ipratropium-levalbuterol neb QID      Cardiovascular:  # Hypotension  # Heart failure with preserved ejection fraction (LVEF 55-60% 11/22/2024)  # Pulmonary hypertension (44 mmHg per echo 11/22/2024)  # Possible small PFO  Echo during current admission (11/22/2024) notable for increased thickness of LV and elevated BNP (peak 9200 > 6200) concerning for heart failure exacerbation s/p diuresis and pulmonary hypertension with estimated pulmonary artery pressure of 45 mmHg. Echo with bubble study (11/25/2024) concerning for possible small PFO. With intubation on 12/8, developed hypotension without evidence of shock (normal lactic acid). Etiology of hypotension may be cardiogenic (e.g. exacerbation of pulmonary hypertension by positive pressure ventilation) vs medication associated (propofol, precedex); do not currently suspect distributive or obstructive hypotension.  -  MAP goal >65  - Norepi gtt, as needed   - Preload dependent in the setting of pulmonary hypertension, gentle diuresis as needed    # Chronic afib  [id number] 3 (age++, HF+).   - PTA apixaban 5mg BID  - PTA diltiazem 240 ER BID - HOLD while needing pressors  - PTA propanolol 10mg BID - HOLD while needing pressors      GI/Nutrition:  # Esophageal dysmotility  # Severe protein-calore malnutrition  # Laryngeal squamous cell carcinoma s/p radiation (4/2024-7/2024)  Per daughter, baseline weight around 120-130 lbs. Admitted 102 lbs. Patient states she is eating less and has less appetite. Has had increased dysphagia in the setting of laryngeal squamous cell carcinoma s/p radiation (4/2024-7/2024) as well as some concern for esophageal web on swallow study (10/8/2024) pending GI input. Some concern for respiratory decompensation due to recurrent aspiration events. Will hold PO diet while intubated; plan for speech consult to assess swallowing function prior to restarting PO diet.   - NPO  - Following extubation, will plan for speech evaluation      Renal/Fluids/Electrolytes:  # SAMIR  # Oliguria  Baseline Cr ~0.9. On transfer, Cr 1.19, concerning for SAMIR in the setting of diuresis. Can consider intravascular volume depletion/ transient rise in the setting of active diuresis   - Monitor BMP      Endocrine:  # Hypothyroidism  Has hx of hypothyroidism s/p thyroidectomy 2/2 cancer.   - PTA levothyroxine 88 mcg qd      ID:  # Possible aspiration pneumonia  # MSSA PNA s/p abx (12/3-12/8)  Acute hypoxic respiratory failure on 12/9 requiring intubation. Labs notable for increased WBC (16.5) and procal (0.44). CT Chest (12/8) with mediastinal/hilar lymphadenopathy and resolving diffuse ground glass opacities most consistent with resolving pneumonia; no signs of progressive or secondary infectious process. Has recently completed antibiotics for MSSA PNA. Will continue broad spectrum antibiotics for one additional day to cover  possible aspiration vs health-care associated pneumonia.  - Workup   Sputum culture (12/9)  Pending  - Antibiotics   S/p Augmentin (12/3-12/5)   S/p Unasyn (12/6-12/8)   S/p Vancomycin (12/9)   Zosyn (12/9 - present)      Hematology:    # Anemia of chronic illness  Baseline Hgb 11-12. Currently near baseline. Can consider anemia of chronic illness.   - Monitor CBC      Musculoskeletal:  # Generalized deconditioning  # Recurrent falls  - PT/OT consults    # CREST Syndrome  Characterized by Raynaud's, Calcinosis, GERD and some telangectasia's. Last saw [facility] 2017. No history of ILD.   - PO pantoprazole 40mg every day      Skin:  No acute concerns.      General Cares/Prophylaxis:    DVT Prophylaxis: DOAC  GI Prophylaxis: PPI  Restraints: None  Family Communication: Updated daughter at bedside.   Code Status: Full    Lines/tubes/drains:  - RIJ  - A line  - OGT/ETT  - Miranda    Disposition:  - Medical ICU     Patient seen and findings/plan discussed with medical ICU staff, Dr. Rivera.    Rama Urbina MD    Clinically Significant Risk Factors         # Hyponatremia: Lowest Na = 132 mmol/L in last 2 days, will monitor as appropriate  # Hypochloremia: Lowest Cl = 93 mmol/L in last 2 days, will monitor as appropriate   # Hypercalcemia: corrected calcium is >10.1, will monitor as appropriate    # Hypoalbuminemia: Lowest albumin = 3 g/dL at 12/9/2024  5:34 AM, will monitor as appropriate    # Coagulation Defect: INR = 2.43 (Ref range: 0.85 - 1.15) and/or PTT = 47 Seconds (Ref range: 22 - 38 Seconds), will monitor for bleeding        # Acute Hypoxic Respiratory Failure: Documented O2 saturation < 90%. Continue supplemental oxygen as needed          # Severe Malnutrition: based on nutrition assessment    # Financial/Environmental Concerns: none          ====================================  INTERVAL HISTORY:   Improved secretions overnight. Alert and able to follow commands. On minimal ventilator  settings.    OBJECTIVE:   1. VITAL SIGNS:   Temp:  [97.1  F (36.2  C)-99  F (37.2  C)] 97.3  F (36.3  C)  Pulse:  [] 78  Resp:  [12-24] 22  BP: ()/() 82/58  MAP:  [62 mmHg-104 mmHg] 67 mmHg  Arterial Line BP: ()/(49-82) 86/54  FiO2 (%):  [100 %] 100 %  SpO2:  [73 %-100 %] 100 %  FiO2 (%): 100 %, Resp: 22, Vent Mode: CMV/AC, Resp Rate (Set): 12 breaths/min, Tidal Volume (Set, mL): 360 mL, PEEP (cm H2O): 5 cmH2O, Resp Rate (Set): 12 breaths/min, Tidal Volume (Set, mL): 360 mL, PEEP (cm H2O): 5 cmH2O  2. INTAKE/ OUTPUT:   I/O last 3 completed shifts:  In: 928.37 [P.O.:150; I.V.:618.37; NG/GT:160]  Out: 1260 [Urine:1260]    3. PHYSICAL EXAMINATION:  GENERAL: Laying in bed, no acute distress  HEENT: Atraumatic, moist mucus membranes, PERRL  RESP: Unlabored breathing on mechanical ventilation, no wheezes  CARDIO: Regular rate and rhythm, extremities cool, no peripheral edema  NEURO: Sedated    4. LABS:   Arterial Blood Gases   Recent Labs   Lab 12/09/24  1401 12/09/24  0929 12/09/24  0457 12/09/24  0256   PH 7.50* 7.49* 7.48* 7.50*   PCO2 37 38 38 35   PO2 103 95 154* 242*   HCO3 29* 29* 28 28     Complete Blood Count   Recent Labs   Lab 12/09/24  0534 12/08/24  2317 12/08/24  2305 12/08/24  0559 12/07/24  0612   WBC 16.5* 14.7*  --  8.2 8.8   HGB 10.8* 13.1 15.0 11.0* 10.9*   * 501*  --  368 386     Basic Metabolic Panel  Recent Labs   Lab 12/09/24  1250 12/09/24  0830 12/09/24  0534 12/09/24  0148 12/09/24  0146 12/08/24  2317 12/08/24  2305 12/08/24  0559   NA  --   --  132* 133*  --  136 134* 135   POTASSIUM  --   --  4.5 4.0  --  4.6 4.5 4.4   CHLORIDE  --   --  94* 94*  --  93*  --  97*   CO2  --   --  26 23  --  26  --  27   BUN  --   --  29.5* 29.0*  --  27.4*  --  31.0*   CR  --   --  1.19* 1.16*  --  1.15*  --  1.16*   * 115* 161* 147*   < > 123* 125* 87    < > = values in this interval not displayed.     Liver Function Tests  Recent Labs   Lab 12/09/24 0534 12/08/24 2317  12/08/24  0559 12/07/24  0612   AST 16 17 15 16   ALT 12 15 7 10   ALKPHOS 70 87 73 78   BILITOTAL 0.3 0.4 0.3 0.4   ALBUMIN 3.0* 3.7 3.1* 3.2*   INR  --  2.43*  --   --      Coagulation Profile  Recent Labs   Lab 12/08/24  2317   INR 2.43*   PTT 47*       5. RADIOLOGY:   Recent Results (from the past 24 hours)   XR Chest Port 1 View    Narrative    Exam: XR CHEST PORT 1 VIEW, 12/9/2024 12:50 AM    Indication: s/p ETT and OGT placement    Comparison: Chest x-ray 12/3/2024    Findings: Frontal radiograph of the chest. Right IJ central venous  catheter with the tip in the mid/distal SVC. Endotracheal tube tip 2.6  cm away from the ritesh. Enteric tube traversing into the abdomen with  the tip overlying the area of the stomach.     Trachea is midline. Cardiac silhouette is unchanged. Lung volumes are  high. Small bilateral effusions. No significant pneumothorax. Mild  perihilar opacities.      Impression    Impression:   1. Interval intubation with the tip in the mid thoracic trachea.  2. Enteric tube with the tip and sidehole overlying the area of the  stomach.  3. Small bilateral effusions.    I have personally reviewed the examination and initial interpretation  and I agree with the findings.    JAMES ANGELES MD         SYSTEM ID:  [id number]   XR Chest Port 1 View    Narrative    Exam: XR CHEST PORT 1 VIEW, 12/9/2024 3:42 AM    Indication: eval for possible right sided pneumothorax    Comparison: Chest x-ray same day 0049    Findings: Frontal radiograph of the chest 0330. Endotracheal tube tip  in the mid thoracic trachea. Enteric tube traversing into the abdomen  with the tip out of the field of view. Right IJ central venous  catheter with the tip in the mid SVC.    Small bilateral effusions. No definite pneumothorax. Unchanged right  more than the left perihilar and basilar opacities.      Impression    Impression: Small bilateral effusions with no definitive pneumothorax.    I have personally reviewed the  examination and initial interpretation  and I agree with the findings.    JAMES ANGELES MD         SYSTEM ID:  [id number]   CT Chest w/o Contrast    Narrative    EXAM: CT chest without intravenous contrast. 12/9/2024 8:46 AM    HISTORY: repeat CT to see if underlying lung disease to explain  hypoxia    TECHNIQUE: Helical acquisition of image data was performed for the  chest without intravenous contrast.    COMPARISON: 11/21/2024 CT chest and same day chest radiograph    FINDINGS:    : Unremarkable.    Lines and tubes: Endotracheal tube tip terminates in the mid to distal  thoracic trachea, approximately 23 mm above the ritesh. Enteric tube  courses through the esophagus and is seen in the stomach with tip not  in full field of view. Right internal jugular central venous catheter  tip terminates in the low SVC.    Airways: Central airways are patent.     Lungs/Pleura: Decreased scattered groundglass opacities throughout the  lungs with decreased interstitial septal thickening and a subtle  decrease in bronchial wall thickening in comparison to prior. Biapical  scarring once again. 6 mm nodule in the subpleural left lower lobe  (series 4, image 166) is once again seen. Multiple scattered sub-6 mm  pulmonary nodules are stable from prior with no new suspicious  pulmonary nodules or masses. Some of these nodules may represent areas  of atelectasis. Small to moderate right and small left pleural  effusion, mildly decreased in comparison to the previous exam. No  pneumothorax.    Thyroid: Thyroid appears unremarkable.    Lymph nodes: No large axillary lymph nodes. Prominent nonenlarged  mediastinal nodes. Evaluation of the james is limited by lack of  intravenous contrast, however, no bulky hilar adenopathy is  appreciated.    Cardiac: Biatrial enlargement. Trace pericardial effusion, possibly  physiologic trace coronary artery calcifications.    Vessels: Thoracic aorta and main pulmonary artery are normal  in  caliber.    Esophagus: The esophagus is patulous with frothy debris.    Upper abdomen: Nonobstructing 5 mm renal calculus (series 3, image  3/19) and 3 mm renal calculus (series 3, image 330; partially  visualized) in the right kidney. Faint hyperdensity medially in the  left kidney unchanged as may represent a small proteinaceous or  hemorrhagic cyst unchanged. No definite hydronephrosis. Indeterminate  large calcifications medial to the spleen (series 3, image 330) CT.      Bones/soft tissue: Subacute compression fracture deformity of the T3  vertebral body. Exaggerated upper thoracic kyphosis. Subacute  minimally to mildly displaced fractures of the right fourth through  10th ribs as well as the right fifth through eighth transverse  processes.        Impression    IMPRESSION:    1. Decreased scattered groundglass opacities throughout the lungs with  decreased interstitial septal thickening and a subtle decrease in  bronchial wall thickening suggestive of resolving  infectious/inflammatory insult. Recommend following to clearing.  2. Stable 6 mm nodule in the subpleural left lower lobe, for which  both flow and high risk patients may be followed up with CT in 6-12  months per Fleischner guidelines. An optional CT for the low-risk  patient category may be considered for follow-up in 18-24 months, but  for the high-risk patient a follow-up after the initial CT is  recommended in 18-24 months.  3. Scattered sub-6 mm pulmonary nodules as described above are stable  from prior, some of which may represent atelectasis and should be  followed up as well on the next CT.  4. Nonobstructing renal calculi in the right kidney. No definite  hydronephrosis.  5. Indeterminate large calcifications in the left upper quadrant  medial to the spleen, recommend correlation with patient's history or  prior imaging if available.  6. Prominent nonenlarged mediastinal lymph nodes are presumably  reactive.  7. Stable appearance of the  patulous esophagus with frothy debris,  recommend continued aspiration precautions.    I have personally reviewed the examination and initial interpretation  and I agree with the findings.    RICARDO TORRE MD         SYSTEM ID:  [id number]

## 2024-12-09 NOTE — PROCEDURES
Arthur Bryant is a 35 year old male presenting to the walk-in clinic today for an evaluation of cough and lingering sinus drainage x3 weeks.         Swabs/Specimens collected during rooming process:    None       PPE worn during room process    Writer: Masked         Patient would like communication of their results via:     [employer]      Cell Phone:   Telephone Information:   Mobile [phone]     Okay to leave a message containing results? Yes        [facility]    Central line    Date/Time: 12/9/2024 5:33 AM    Performed by: Ramila Nolan MD  Authorized by: Young Will MD  Indications: vascular access      UNIVERSAL PROTOCOL   Site Marked: Yes  Prior Images Obtained and Reviewed:  Yes  Required items: Required blood products, implants, devices and special equipment available    Patient identity confirmed:  Arm band and hospital-assigned identification number  Patient was reevaluated immediately before administering moderate or deep sedation or anesthesia  Confirmation Checklist:  Patient's identity using two indicators, relevant allergies, procedure was appropriate and matched the consent or emergent situation and correct equipment/implants were available  Time out: Immediately prior to the procedure a time out was called    Universal Protocol: the Joint Commission Universal Protocol was followed    Preparation: Patient was prepped and draped in usual sterile fashion    ESBL (mL):  2     ANESTHESIA    Anesthesia:  Local infiltration  Local Anesthetic:  Lidocaine 1% with epinephrine  Anesthetic Total (mL):  1      SEDATION  Patient Sedated: Yes    Sedation Type:  Moderate (conscious) sedation  Sedation:  Fentanyl and propofol  Vital signs: Vital signs monitored during sedation      Preparation: skin prepped with 2% chlorhexidine  Skin prep agent dried: skin prep agent completely dried prior to procedure  Sterile barriers: all five maximum sterile barriers used - cap, mask, sterile gown, sterile gloves, and large sterile sheet  Hand hygiene: hand hygiene performed prior to central venous catheter insertion  Patient position: Trendelenburg  Catheter type: triple lumen  Catheter size: 12 Fr  Pre-procedure: landmarks identified  Ultrasound guidance: yes  Sterile ultrasound techniques: sterile gel and sterile probe covers were used  Number of attempts: 1  Successful placement: yes  Post-procedure: line sutured and  dressing applied  Assessment: blood return through all ports, free fluid flow, placement verified by x-ray and no pneumothorax on x-ray      PROCEDURE    Patient Tolerance:  Patient tolerated the procedure well with no immediate complications  Length of time physician/provider present for 1:1 monitoring during sedation: 10    Ramila Nolan MD  PGY-3 Internal Medicine- Pediatrics   MICU resident

## 2024-12-09 NOTE — PROGRESS NOTES
"Care from: 1500 - 1930    Neuro: A&Ox3. Disoriented to place.    Cardiac: VSS.   Respiratory: Sating >90% on 1L O2 via NC.  GI/: 400ml urine output. LBM 12/8.   Diet/appetite: Soft bite sized & milk thick liquids w/ poor diet requiring assistance w/ feeding. Needs encourage w/ oral intake.  Activity:  Assist of 2 w/ Q2 turns.  Pain: At acceptable level on current regimen. No pain reported this care shift.   Skin: R hip & coccyx blanchable redness noted.   LDA's: Left PIV saline locked.   Isolation: N/A  POCT: N/A    /80 (BP Location: Right arm)   Pulse 98   Temp 98.3  F (36.8  C) (Oral)   Resp 16   Ht 1.651 m (5' 5\")   Wt 50.8 kg (111 lb 15.9 oz)   SpO2 92%   BMI 18.64 kg/m       Plan: Continue to monitor Pt status and report changes to primary treatment team. Encourage ambulation/up to chair.  "

## 2024-12-09 NOTE — PROGRESS NOTES
CLINICAL NUTRITION SERVICES - BRIEF NOTE    See RD note 12/04 for full assessment.     Nutrition Prescription    RECOMMENDATIONS FOR MDs/PROVIDERS TO ORDER:  Recommend initiating TF within 24-48 hours of admission to ICU.   Consider placing small bore NGT prior to extubation to keep in place after extubation d/t poor PO prior to intubation.     Recommendations already ordered by Registered Dietitian (RD):  None currently     Future/Additional Recommendations:  Monitor pressor support  If enteral nutrition support becomes plan of care, recommend:   EN access: OGT   Goal TF: Novasource Renal (or equivalent) @ 30 ml/hr (720 ml) + 1 Prosource TF20 provides 1520 kcal (30 kcal/kg), 85 g pro (1.7 g/kg), 132 g CHO, 516 ml free water, and 0 g fiber daily.     Initiate @ 10 ml/hr and advance by 10 ml Q8H pending pt's tolerance.  Do not advance TF rate unless Mg++ > 1.5, K+ is > 3, and phos > 1.9  30 ml Q4H fluid flushes for tube patency. Additional fluids and/or adjustments per MD.    Multivitamin/minerals to help ensure micronutrient needs being met with suspected hypermetabolic demands and potential interruptions to TF infusions.   100 mg Thiamine x 5-7 days to prevent lyte depletion d/t at risk for refeeding syndrome  Due to gastric enteral access: HOB > 30 degrees          New findings:   Pt transferred to ICU overnight, now intubated.     Diet: NPO    Intake/Tolerance: Calorie Counts:   12/7       Total Kcals: 508       Total Protein: 35g   12/6       Total Kcals: 603       Total Protein: 34g   12/5       Total Kcals: 621       Total Protein: 16g     3 day average  kcal/d and 28 g protein/d    GI:  Last BM: 12/08/24  On scheduled bowel med(s)    Weight:  Most Recent Weight: 50.8 kg (111 lb 15.9 oz)  on 12/8/24 via Bed scale  Body mass index is 18.64 kg/m .  Wt up 4.3 kg from admission.    Meds:  Levothyroxine  Protonix  Zosyn  Miralax daily  Drips: norepi @ 0.12 mcg/kg/min; veletri (inhaled); precedex; fentanyl;  "    Labs:   12/08/24 05:59 12/08/24 23:05 12/08/24 23:17 12/09/24 01:48 12/09/24 05:34   Sodium 135  136 133 (L) 132 (L)   Sodium POCT  134 (L)      Potassium 4.4  4.6 4.0 4.5   Potassium POCT  4.5      Chloride 97 (L)  93 (L) 94 (L) 94 (L)   Carbon Dioxide (CO2) 27  26 23 26   Urea Nitrogen 31.0 (H)  27.4 (H) 29.0 (H) 29.5 (H)   Creatinine 1.16 (H)  1.15 (H) 1.16 (H) 1.19 (H)   GFR Estimate 48 (L)  49 (L) 48 (L) 47 (L)   Calcium 9.0  9.8 9.5 8.6 (L)   Anion Gap 11  17 (H) 16 (H) 12   Magnesium 2.1  2.3  2.9 (H)   Phosphorus 3.4  4.4 4.3 5.0 (H)   Albumin 3.1 (L)  3.7  3.0 (L)   Protein Total 6.1 (L)  7.5  6.0 (L)   Alkaline Phosphatase 73  87  70   ALT 7  15  12   AST 15  17  16   Bilirubin Total 0.3  0.4  0.3   Calcium Ionized Whole Blood    4.4    Calcium, Ionized Whole Blood POCT  4.6      Glucose 87  123 (H) 147 (H) 161 (H)   Lactic Acid    0.9        Respiratory:   Intubated     ASSESSED NUTRITION NEEDS  Dosing Weight: 50.8 kg (Actual BW)   Estimated Energy Needs: 1863-9653 kcals/day (25 - 35 kcal/kg)  Justification: Increased needs, Vented, and Critical illness  Estimated Protein Needs:  grams protein/day (1.5 - 2 grams of pro/kg)  Justification: Increased needs and Critical illness  Estimated Fluid Needs: 3665-9433 mL/day (25 - 30 mL/kg)   Justification: Maintenance    Implementation  Collaboration with other providers       RD to follow per protocol    Ally Andrew, MS, RDN, LD  4C MICU RD  Vocera - \"4C Clinical Dietitian\"  Weekend/Holiday RD - \"Weekend Clinical Dietitian\"    "

## 2024-12-09 NOTE — PHARMACY-VANCOMYCIN DOSING SERVICE
"Pharmacy Vancomycin Initial Note  Date of Service 2024  Patient's  1945  79 year old, female    Indication: Healthcare-Associated Pneumonia    Current estimated CrCl = Estimated Creatinine Clearance: 32.3 mL/min (A) (based on SCr of 1.15 mg/dL (H)).    Creatinine for last 3 days  2024:  6:17 AM Creatinine 1.22 mg/dL  2024:  6:12 AM Creatinine 1.18 mg/dL  2024:  5:59 AM Creatinine 1.16 mg/dL; 11:17 PM Creatinine 1.15 mg/dL    Recent Vancomycin Level(s) for last 3 days  No results found for requested labs within last 3 days.      Vancomycin IV Administrations (past 72 hours)        No vancomycin orders with administrations in past 72 hours.                    Nephrotoxins and other renal medications (From now, onward)      Start     Dose/Rate Route Frequency Ordered Stop    24 0200  vancomycin (VANCOCIN) 1,250 mg in 0.9% NaCl 250 mL intermittent infusion         1,250 mg  166.7 mL/hr over 90 Minutes Intravenous ONCE 24 010  norepinephrine (LEVOPHED) 16 mg in  mL infusion MAX CONC CENTRAL LINE         0.01-0.6 mcg/kg/min × 50.8 kg (Dosing Weight)  0.5-28.6 mL/hr  Intravenous CONTINUOUS 24 0100  piperacillin-tazobactam (ZOSYN) intermittent infusion 3.375 g        Note to Pharmacy: For [facility], [facility] and [facility]: For Zosyn-naive patients, use the \"Zosyn initial dose + extended infusion\" order panel.    3.375 g  100 mL/hr over 30 Minutes Intravenous EVERY 6 HOURS 24  vancomycin place lao - receiving intermittent dosing         1 each Intravenous SEE ADMIN INSTRUCTIONS 24  furosemide (LASIX) tablet 40 mg         40 mg Oral DAILY 24 08  empagliflozin (JARDIANCE) tablet 10 mg         10 mg Oral DAILY 24 1526              Contrast Orders - past 72 hours (72h ago, onward)      Start     Dose/Rate Route Frequency Stop    24 0900  barium " sulfate (VARIBAR THIN Liquid) 40 % oral suspension 12 g         30 mL Oral ONCE 12/06/24 0908    12/06/24 0900  barium sulfate (VARIBAR) 40 % pudding/paste 30 mL         30 mL Oral ONCE 12/06/24 0909    12/06/24 0900  barium sulfate 40% (VARIBAR NECTAR) oral suspension          Oral ONCE 12/06/24 0910            Intermittent dosing based on levels (CKD)        Plan:  Start vancomycin  1250 mg IV once now, additional doses to be determined by future levels / renal function.   Vancomycin monitoring method: Trough (Method 2 = manual dose calculation)  Vancomycin therapeutic monitoring goal: 15-20 mg/L  Pharmacy will check vancomycin levels as appropriate in 1-3 Days.    Serum creatinine levels will be ordered daily for the first week of therapy and at least twice weekly for subsequent weeks.      Alexander Rodriguez, [facility]  [id number]

## 2024-12-09 NOTE — PROVIDER NOTIFICATION
24   Call Information   Date of Call 24   Time of Call    Name of person requesting the team Denise Perry   Title of person requesting team RN   RRT Arrival time    Time RRT ended    Reason for call   Type of RRT Adult   Primary reason for call Respiratory   Respiratory O2sat less than 88% for greater than 5 minutes despite O2;Labored breathing;Respiratory pattern change   SBAR   Situation Respiratory distress   Assessment On arrival of RRT, pt was resting in bed with eyes open but  not following commands or communicating in any capacity. Respiratons shallow, irregular, and rapid. When asked to try to cough, actually did cough once but very weak and appeared to choke (possibly a gag response), eventually expelling some frothy white sputum. Very weak gag response when suctioning back of throat. Skin color pale/reddish, warm, somewhat clammy, with cap refill >3sec, however good waveform reading with  pulse ox wrapped around fingertip. No waveform on ear probe. When RRT was called SpO2 was in 80s trending down to 70s; from there trended down to 60s with good waveform. Oxymask was around 10L increased to max 15L. Staff making good attempts to oral suction. Code blue was called within 5 minutes of RRT arrival due to lack of protecting airway/ adequately ventilating. Code blue team arrived, determined appropriate for intubation and was then intubated and tansferred to  CVICU.   Interventions Suction;O2 per N/C or mask;Other (describe)  (additional IV access established; O2 device changed to HFNC while awaiting assessment of anesthesia staff.)   Patient Outcome   Patient Outcome Code blue called  (Code blue called at 2250. Transferred to  CVICU 4232.)   RRT Team   Attending/Primary/Covering Physician Manjinder 8   Date Attending Physician notified 24   Time Attending Physician notified    Physician(s) Nydia Mccoy with RRT; Gold provider(s), and code blue team   Lead RN  Marzena Oh   RN Chuyita Camacho   RT Dilshad Dawkins   Other staff Denise Perry 7C Charge RN; Kristy Paula 7C RN; 7C unit staff.

## 2024-12-09 NOTE — PROCEDURES
[facility]    Arterial line placement    Date/Time: 12/9/2024 6:55 AM    Performed by: Mamta Ramirez MD  Authorized by: Young Will MD      UNIVERSAL PROTOCOL   Site Marked: NA  Prior Images Obtained and Reviewed:  NA  Required items: Required blood products, implants, devices and special equipment available    Patient identity confirmed:  Hospital-assigned identification number  Patient was reevaluated immediately before administering moderate or deep sedation or anesthesia  Confirmation Checklist:  Procedure was appropriate and matched the consent or emergent situation, patient's identity using two indicators, relevant allergies and correct equipment/implants were available  Universal Protocol: the Joint Commission Universal Protocol was followed    Indication:  hemodynamic monitoring  Location:  Right radial       ANESTHESIA    Local Anesthetic:  Lidocaine 1% without epinephrine      PROCEDURE DETAILS  Memo's Test Normal?: Memo's test not abnormal    Seldinger technique: Seldinger technique used    Number of Attempts:  3  Post-procedure:  Line sutured and dressing applied      PROCEDURE  Describe Procedure: R radial arterial line placed for hemodynamic monitoring in undifferentiated shock  Patient Tolerance:  Patient tolerated the procedure well with no immediate complications  Length of time physician/provider present for 1:1 monitoring during sedation: 45      Staff, Dr. Will, present throughout procedure.    Mamta Ramirez MD  ICU Fellow

## 2024-12-10 ENCOUNTER — APPOINTMENT (OUTPATIENT)
Dept: GENERAL RADIOLOGY | Facility: CLINIC | Age: 79
End: 2024-12-10
Payer: COMMERCIAL

## 2024-12-10 LAB
ALBUMIN SERPL BCG-MCNC: 3 G/DL (ref 3.5–5.2)
ALLEN'S TEST: ABNORMAL
ALLEN'S TEST: ABNORMAL
ALP SERPL-CCNC: 69 U/L (ref 40–150)
ALT SERPL W P-5'-P-CCNC: 10 U/L (ref 0–50)
ANION GAP SERPL CALCULATED.3IONS-SCNC: 20 MMOL/L (ref 7–15)
AST SERPL W P-5'-P-CCNC: 14 U/L (ref 0–45)
B-OH-BUTYR SERPL-SCNC: 2.03 MMOL/L
BASE EXCESS BLDA CALC-SCNC: 3.1 MMOL/L (ref -3–3)
BASE EXCESS BLDA CALC-SCNC: 3.9 MMOL/L (ref -3–3)
BILIRUB SERPL-MCNC: 0.3 MG/DL
BUN SERPL-MCNC: 31.3 MG/DL (ref 8–23)
CALCIUM SERPL-MCNC: 8.6 MG/DL (ref 8.8–10.4)
CHLORIDE SERPL-SCNC: 95 MMOL/L (ref 98–107)
COHGB MFR BLD: 98.1 % (ref 95–96)
COHGB MFR BLD: 98.1 % (ref 95–96)
CREAT SERPL-MCNC: 1.48 MG/DL (ref 0.51–0.95)
EGFRCR SERPLBLD CKD-EPI 2021: 36 ML/MIN/1.73M2
ERYTHROCYTE [DISTWIDTH] IN BLOOD BY AUTOMATED COUNT: 19.1 % (ref 10–15)
GLUCOSE BLDC GLUCOMTR-MCNC: 103 MG/DL (ref 70–99)
GLUCOSE BLDC GLUCOMTR-MCNC: 103 MG/DL (ref 70–99)
GLUCOSE BLDC GLUCOMTR-MCNC: 105 MG/DL (ref 70–99)
GLUCOSE SERPL-MCNC: 100 MG/DL (ref 70–99)
HCO3 BLD-SCNC: 26 MMOL/L (ref 21–28)
HCO3 BLD-SCNC: 28 MMOL/L (ref 21–28)
HCO3 SERPL-SCNC: 22 MMOL/L (ref 22–29)
HCT VFR BLD AUTO: 32.9 % (ref 35–47)
HGB BLD-MCNC: 10 G/DL (ref 11.7–15.7)
LACTATE SERPL-SCNC: 1 MMOL/L (ref 0.7–2)
MAGNESIUM SERPL-MCNC: 2.7 MG/DL (ref 1.7–2.3)
MAGNESIUM SERPL-MCNC: 2.7 MG/DL (ref 1.7–2.3)
MCH RBC QN AUTO: 25.1 PG (ref 26.5–33)
MCHC RBC AUTO-ENTMCNC: 30.4 G/DL (ref 31.5–36.5)
MCV RBC AUTO: 83 FL (ref 78–100)
O2/TOTAL GAS SETTING VFR VENT: 27 %
O2/TOTAL GAS SETTING VFR VENT: 30 %
PCO2 BLD: 34 MM HG (ref 35–45)
PCO2 BLD: 37 MM HG (ref 35–45)
PH BLD: 7.48 [PH] (ref 7.35–7.45)
PH BLD: 7.49 [PH] (ref 7.35–7.45)
PHOSPHATE SERPL-MCNC: 6.1 MG/DL (ref 2.5–4.5)
PHOSPHATE SERPL-MCNC: 6.3 MG/DL (ref 2.5–4.5)
PLATELET # BLD AUTO: 453 10E3/UL (ref 150–450)
PO2 BLD: 87 MM HG (ref 80–105)
PO2 BLD: 92 MM HG (ref 80–105)
POTASSIUM SERPL-SCNC: 4.3 MMOL/L (ref 3.4–5.3)
PROT SERPL-MCNC: 6.2 G/DL (ref 6.4–8.3)
RBC # BLD AUTO: 3.98 10E6/UL (ref 3.8–5.2)
SAO2 % BLDA: 96 % (ref 92–100)
SAO2 % BLDA: 96 % (ref 92–100)
SODIUM SERPL-SCNC: 137 MMOL/L (ref 135–145)
WBC # BLD AUTO: 12.3 10E3/UL (ref 4–11)

## 2024-12-10 PROCEDURE — 84100 ASSAY OF PHOSPHORUS: CPT | Performed by: STUDENT IN AN ORGANIZED HEALTH CARE EDUCATION/TRAINING PROGRAM

## 2024-12-10 PROCEDURE — 82805 BLOOD GASES W/O2 SATURATION: CPT

## 2024-12-10 PROCEDURE — 999N000065 XR ABDOMEN PORT 1 VIEW

## 2024-12-10 PROCEDURE — 83735 ASSAY OF MAGNESIUM: CPT

## 2024-12-10 PROCEDURE — 250N000013 HC RX MED GY IP 250 OP 250 PS 637

## 2024-12-10 PROCEDURE — 82805 BLOOD GASES W/O2 SATURATION: CPT | Performed by: STUDENT IN AN ORGANIZED HEALTH CARE EDUCATION/TRAINING PROGRAM

## 2024-12-10 PROCEDURE — 84155 ASSAY OF PROTEIN SERUM: CPT | Performed by: STUDENT IN AN ORGANIZED HEALTH CARE EDUCATION/TRAINING PROGRAM

## 2024-12-10 PROCEDURE — 999N000157 HC STATISTIC RCP TIME EA 10 MIN

## 2024-12-10 PROCEDURE — 80053 COMPREHEN METABOLIC PANEL: CPT | Performed by: STUDENT IN AN ORGANIZED HEALTH CARE EDUCATION/TRAINING PROGRAM

## 2024-12-10 PROCEDURE — 250N000011 HC RX IP 250 OP 636

## 2024-12-10 PROCEDURE — 200N000002 HC R&B ICU UMMC

## 2024-12-10 PROCEDURE — 250N000009 HC RX 250

## 2024-12-10 PROCEDURE — 94640 AIRWAY INHALATION TREATMENT: CPT | Mod: 76

## 2024-12-10 PROCEDURE — 74018 RADEX ABDOMEN 1 VIEW: CPT | Mod: 26 | Performed by: RADIOLOGY

## 2024-12-10 PROCEDURE — 272N000202 HC AEROBIKA WITH MANOMETER

## 2024-12-10 PROCEDURE — 92526 ORAL FUNCTION THERAPY: CPT | Mod: GN | Performed by: SPEECH-LANGUAGE PATHOLOGIST

## 2024-12-10 PROCEDURE — 85041 AUTOMATED RBC COUNT: CPT | Performed by: STUDENT IN AN ORGANIZED HEALTH CARE EDUCATION/TRAINING PROGRAM

## 2024-12-10 PROCEDURE — 83605 ASSAY OF LACTIC ACID: CPT

## 2024-12-10 PROCEDURE — 94003 VENT MGMT INPAT SUBQ DAY: CPT

## 2024-12-10 PROCEDURE — 250N000013 HC RX MED GY IP 250 OP 250 PS 637: Performed by: STUDENT IN AN ORGANIZED HEALTH CARE EDUCATION/TRAINING PROGRAM

## 2024-12-10 PROCEDURE — 44500 INTRO GASTROINTESTINAL TUBE: CPT

## 2024-12-10 PROCEDURE — 94640 AIRWAY INHALATION TREATMENT: CPT

## 2024-12-10 PROCEDURE — 84100 ASSAY OF PHOSPHORUS: CPT | Performed by: SURGERY

## 2024-12-10 PROCEDURE — 250N000013 HC RX MED GY IP 250 OP 250 PS 637: Performed by: PHYSICIAN ASSISTANT

## 2024-12-10 PROCEDURE — 82010 KETONE BODYS QUAN: CPT

## 2024-12-10 PROCEDURE — 999N000253 HC STATISTIC WEANING TRIALS

## 2024-12-10 PROCEDURE — 97530 THERAPEUTIC ACTIVITIES: CPT | Mod: GP

## 2024-12-10 PROCEDURE — 92610 EVALUATE SWALLOWING FUNCTION: CPT | Mod: GN | Performed by: SPEECH-LANGUAGE PATHOLOGIST

## 2024-12-10 PROCEDURE — 250N000013 HC RX MED GY IP 250 OP 250 PS 637: Performed by: SURGERY

## 2024-12-10 PROCEDURE — 83735 ASSAY OF MAGNESIUM: CPT | Performed by: STUDENT IN AN ORGANIZED HEALTH CARE EDUCATION/TRAINING PROGRAM

## 2024-12-10 PROCEDURE — 85014 HEMATOCRIT: CPT | Performed by: STUDENT IN AN ORGANIZED HEALTH CARE EDUCATION/TRAINING PROGRAM

## 2024-12-10 PROCEDURE — 250N000013 HC RX MED GY IP 250 OP 250 PS 637: Performed by: INTERNAL MEDICINE

## 2024-12-10 RX ORDER — THIAMINE HYDROCHLORIDE 100 MG/ML
500 INJECTION, SOLUTION INTRAMUSCULAR; INTRAVENOUS 3 TIMES DAILY
Status: DISPENSED | OUTPATIENT
Start: 2024-12-10 | End: 2024-12-12

## 2024-12-10 RX ORDER — NICOTINE POLACRILEX 4 MG
15-30 LOZENGE BUCCAL
Status: DISCONTINUED | OUTPATIENT
Start: 2024-12-10 | End: 2024-12-10

## 2024-12-10 RX ORDER — AMINO ACIDS/PROTEIN HYDROLYS 11G-40/45
1 LIQUID IN PACKET (ML) ORAL DAILY
Status: DISCONTINUED | OUTPATIENT
Start: 2024-12-10 | End: 2024-12-13

## 2024-12-10 RX ORDER — DILTIAZEM HYDROCHLORIDE 240 MG/1
240 CAPSULE, COATED, EXTENDED RELEASE ORAL 2 TIMES DAILY
Status: DISCONTINUED | OUTPATIENT
Start: 2024-12-10 | End: 2024-12-10

## 2024-12-10 RX ORDER — PHENYLEPHRINE HCL IN 0.9% NACL 50MG/250ML
.1-6 PLASTIC BAG, INJECTION (ML) INTRAVENOUS CONTINUOUS
Status: DISCONTINUED | OUTPATIENT
Start: 2024-12-10 | End: 2024-12-11

## 2024-12-10 RX ORDER — NICOTINE POLACRILEX 4 MG
15-30 LOZENGE BUCCAL
Status: DISCONTINUED | OUTPATIENT
Start: 2024-12-10 | End: 2025-01-14

## 2024-12-10 RX ORDER — METOPROLOL TARTRATE 1 MG/ML
5 INJECTION, SOLUTION INTRAVENOUS EVERY 5 MIN PRN
Status: COMPLETED | OUTPATIENT
Start: 2024-12-10 | End: 2024-12-10

## 2024-12-10 RX ORDER — LIDOCAINE HYDROCHLORIDE 20 MG/ML
5 SOLUTION OROPHARYNGEAL ONCE
Status: COMPLETED | OUTPATIENT
Start: 2024-12-10 | End: 2024-12-10

## 2024-12-10 RX ORDER — DILTIAZEM HYDROCHLORIDE 120 MG/1
120 TABLET, FILM COATED ORAL EVERY 6 HOURS SCHEDULED
Status: DISCONTINUED | OUTPATIENT
Start: 2024-12-10 | End: 2024-12-30

## 2024-12-10 RX ORDER — THERA TABS 400 MCG
1 TAB ORAL DAILY
Status: DISCONTINUED | OUTPATIENT
Start: 2024-12-10 | End: 2025-01-14 | Stop reason: HOSPADM

## 2024-12-10 RX ORDER — DILTIAZEM HCL 90 MG
90 TABLET ORAL EVERY 6 HOURS SCHEDULED
Status: DISCONTINUED | OUTPATIENT
Start: 2024-12-10 | End: 2024-12-10

## 2024-12-10 RX ORDER — DEXTROSE MONOHYDRATE 25 G/50ML
25-50 INJECTION, SOLUTION INTRAVENOUS
Status: DISCONTINUED | OUTPATIENT
Start: 2024-12-10 | End: 2025-01-14

## 2024-12-10 RX ORDER — DILTIAZEM HCL 90 MG
90 TABLET ORAL ONCE
Status: COMPLETED | OUTPATIENT
Start: 2024-12-10 | End: 2024-12-10

## 2024-12-10 RX ORDER — DEXTROSE MONOHYDRATE 100 MG/ML
INJECTION, SOLUTION INTRAVENOUS CONTINUOUS PRN
Status: DISCONTINUED | OUTPATIENT
Start: 2024-12-10 | End: 2024-12-25

## 2024-12-10 RX ORDER — DEXTROSE MONOHYDRATE 25 G/50ML
25-50 INJECTION, SOLUTION INTRAVENOUS
Status: DISCONTINUED | OUTPATIENT
Start: 2024-12-10 | End: 2024-12-10

## 2024-12-10 RX ORDER — DILTIAZEM HCL 60 MG
60 TABLET ORAL EVERY 6 HOURS SCHEDULED
Status: DISCONTINUED | OUTPATIENT
Start: 2024-12-10 | End: 2024-12-10

## 2024-12-10 RX ORDER — DEXTROSE, SODIUM CHLORIDE, SODIUM LACTATE, POTASSIUM CHLORIDE, AND CALCIUM CHLORIDE 5; .6; .31; .03; .02 G/100ML; G/100ML; G/100ML; G/100ML; G/100ML
INJECTION, SOLUTION INTRAVENOUS CONTINUOUS
Status: ACTIVE | OUTPATIENT
Start: 2024-12-10 | End: 2024-12-10

## 2024-12-10 RX ORDER — THIAMINE HYDROCHLORIDE 100 MG/ML
250 INJECTION, SOLUTION INTRAMUSCULAR; INTRAVENOUS DAILY
Status: COMPLETED | OUTPATIENT
Start: 2024-12-13 | End: 2024-12-17

## 2024-12-10 RX ADMIN — IPRATROPIUM BROMIDE 0.5 MG: 0.5 SOLUTION RESPIRATORY (INHALATION) at 19:47

## 2024-12-10 RX ADMIN — GABAPENTIN 600 MG: 300 CAPSULE ORAL at 21:23

## 2024-12-10 RX ADMIN — POLYETHYLENE GLYCOL 3350 17 G: 17 POWDER, FOR SOLUTION ORAL at 07:50

## 2024-12-10 RX ADMIN — DILTIAZEM HYDROCHLORIDE 60 MG: 60 TABLET, FILM COATED ORAL at 07:48

## 2024-12-10 RX ADMIN — BUDESONIDE 0.5 MG: 0.5 INHALANT ORAL at 09:08

## 2024-12-10 RX ADMIN — LEVOTHYROXINE SODIUM 88 MCG: 88 TABLET ORAL at 05:27

## 2024-12-10 RX ADMIN — LIDOCAINE HYDROCHLORIDE 5 ML: 20 SOLUTION ORAL at 14:03

## 2024-12-10 RX ADMIN — PIPERACILLIN SODIUM AND TAZOBACTAM SODIUM 3.38 G: 3; .375 INJECTION, SOLUTION INTRAVENOUS at 07:48

## 2024-12-10 RX ADMIN — APIXABAN 5 MG: 5 TABLET, FILM COATED ORAL at 07:49

## 2024-12-10 RX ADMIN — THIAMINE HYDROCHLORIDE 500 MG: 100 INJECTION, SOLUTION INTRAMUSCULAR; INTRAVENOUS at 20:06

## 2024-12-10 RX ADMIN — LEVALBUTEROL HYDROCHLORIDE 0.63 MG: 0.63 SOLUTION RESPIRATORY (INHALATION) at 19:47

## 2024-12-10 RX ADMIN — PIPERACILLIN SODIUM AND TAZOBACTAM SODIUM 3.38 G: 3; .375 INJECTION, SOLUTION INTRAVENOUS at 01:39

## 2024-12-10 RX ADMIN — DILTIAZEM HYDROCHLORIDE 90 MG: 60 TABLET, FILM COATED ORAL at 14:21

## 2024-12-10 RX ADMIN — METOPROLOL TARTRATE 5 MG: 5 INJECTION INTRAVENOUS at 06:42

## 2024-12-10 RX ADMIN — THIAMINE HYDROCHLORIDE 500 MG: 100 INJECTION, SOLUTION INTRAMUSCULAR; INTRAVENOUS at 14:03

## 2024-12-10 RX ADMIN — APIXABAN 2.5 MG: 2.5 TABLET, FILM COATED ORAL at 20:05

## 2024-12-10 RX ADMIN — METOPROLOL TARTRATE 5 MG: 5 INJECTION INTRAVENOUS at 14:18

## 2024-12-10 RX ADMIN — PANTOPRAZOLE SODIUM 40 MG: 40 INJECTION, POWDER, FOR SOLUTION INTRAVENOUS at 06:53

## 2024-12-10 RX ADMIN — LEVALBUTEROL HYDROCHLORIDE 0.63 MG: 0.63 SOLUTION RESPIRATORY (INHALATION) at 17:21

## 2024-12-10 RX ADMIN — CHLORHEXIDINE GLUCONATE 15 ML: 1.2 SOLUTION ORAL at 07:50

## 2024-12-10 RX ADMIN — DILTIAZEM HYDROCHLORIDE 120 MG: 120 TABLET, FILM COATED ORAL at 18:28

## 2024-12-10 RX ADMIN — SERTRALINE HYDROCHLORIDE 150 MG: 100 TABLET ORAL at 07:50

## 2024-12-10 RX ADMIN — THERA TABS 1 TABLET: TAB at 14:20

## 2024-12-10 RX ADMIN — IPRATROPIUM BROMIDE 0.5 MG: 0.5 SOLUTION RESPIRATORY (INHALATION) at 17:21

## 2024-12-10 RX ADMIN — Medication 60 ML: at 15:23

## 2024-12-10 RX ADMIN — BUDESONIDE 0.5 MG: 0.5 INHALANT ORAL at 19:47

## 2024-12-10 RX ADMIN — FEXOFENADINE HYDROCHLORIDE 120 MG: 60 TABLET ORAL at 07:50

## 2024-12-10 RX ADMIN — METOPROLOL TARTRATE 5 MG: 5 INJECTION INTRAVENOUS at 12:20

## 2024-12-10 RX ADMIN — SODIUM CHLORIDE, SODIUM LACTATE, POTASSIUM CHLORIDE, CALCIUM CHLORIDE AND DEXTROSE MONOHYDRATE: 5; 600; 310; 30; 20 INJECTION, SOLUTION INTRAVENOUS at 12:14

## 2024-12-10 RX ADMIN — IPRATROPIUM BROMIDE 0.5 MG: 0.5 SOLUTION RESPIRATORY (INHALATION) at 09:07

## 2024-12-10 RX ADMIN — LEVALBUTEROL HYDROCHLORIDE 0.63 MG: 0.63 SOLUTION RESPIRATORY (INHALATION) at 09:08

## 2024-12-10 ASSESSMENT — ACTIVITIES OF DAILY LIVING (ADL)
ADLS_ACUITY_SCORE: 72
ADLS_ACUITY_SCORE: 72
ADLS_ACUITY_SCORE: 70
ADLS_ACUITY_SCORE: 66
ADLS_ACUITY_SCORE: 72
ADLS_ACUITY_SCORE: 64
ADLS_ACUITY_SCORE: 72
ADLS_ACUITY_SCORE: 66
ADLS_ACUITY_SCORE: 70
ADLS_ACUITY_SCORE: 72
ADLS_ACUITY_SCORE: 66
ADLS_ACUITY_SCORE: 72
ADLS_ACUITY_SCORE: 72
ADLS_ACUITY_SCORE: 70
ADLS_ACUITY_SCORE: 64
ADLS_ACUITY_SCORE: 64
ADLS_ACUITY_SCORE: 72
ADLS_ACUITY_SCORE: 66
ADLS_ACUITY_SCORE: 64

## 2024-12-10 NOTE — PROGRESS NOTES
[facility], Procedure Note          Extubation:       Asya Coffman  MRN# [id number]   December 10, 2024         Patient extubated at: 0900   Supplemental Oxygen: Via nasal cannula at 2 liters per minute   Cough: The cough is weak   Secretion Mode: Able to clear   Secretion Amount: Small amount, moderately thin and white / yellow in color   Respiratory Exam:: Breath sounds: diminished     Location: all lobes   Skin Exam:: Patient color: natural   Patient Status: Currently appears comfortable   Arterial Blood Gasses: pH Arterial (no units)   Date Value   12/10/2024 7.49 (H)     pO2 Arterial (mm Hg)   Date Value   12/10/2024 87     pCO2 Arterial (mm Hg)   Date Value   12/10/2024 34 (L)     Bicarbonate Arterial (mmol/L)   Date Value   12/10/2024 26            Recorded by Sara Salguero, RT

## 2024-12-10 NOTE — PROGRESS NOTES
"   12/10/24 0995   Appointment Info   Signing Clinician's Name / Credentials (SLP) Bright Moore MA [facility] SLP   General Information   Onset of Illness/Injury or Date of Surgery 11/21/24   Referring Physician Rebecca Hardy MD   Pertinent History of Current Problem Per Provider Notes: \"Asya Coffman is a 79 year old female with PMHx of afib on apixaban, pulmonary hypertension, severe obstructive lung disease in setting of COPD/asthma, laryngeal squamous cell carcinoma (diagnosed 4/2024) s/p radiation (4/2024-7/2024) c/b dysphagia on modified diet, CREST syndrome, hypothyroidism, anxiety and depression who was admitted on 11/21/2024 with hypoxic respiratory failure with suspected new HFpEF requiring diuresis. She developed acute hypoxia on 12/8 secondary to suspected aspiration pneumonitis s/p intubation (12/8-12/10). \"   General Observations Patient upright in chair, fatigued, flat affect.   Pain Assessment   Patient Currently in Pain No   Type of Evaluation   Type of Evaluation Swallow Evaluation   Oral Motor   Oral Musculature generally intact   Structural Abnormalities none present   Mucosal Quality dry;sticky   Dentition (Oral Motor)   Dentition (Oral Motor) natural dentition;some missing teeth   Facial Symmetry (Oral Motor)   Facial Symmetry (Oral Motor) WNL   Lip Function (Oral Motor)   Lip Range of Motion (Oral Motor) WNL   Tongue Function (Oral Motor)   Tongue Coordination/Speed (Oral Motor) reduced rate   Tongue ROM (Oral Motor) WNL   Jaw Function (Oral Motor)   Jaw Function (Oral Motor) WNL   Cough/Swallow/Gag Reflex (Oral Motor)   Soft Palate/Velum (Oral Motor) other (see comments)  (dry secretion)   Volitional Throat Clear/Cough (Oral Motor) reduced strength   Volitional Swallow (Oral Motor) mildly delayed   Vocal Quality/Secretion Management (Oral Motor)   Vocal Quality (Oral Motor) breathy;dysphonic;wet/gurgly   Secretion Management (Oral Motor) oral suctioning required;wet/gurgly vocal quality " "  General Swallowing Observations   Past History of Dysphagia Extensive hx of dysphagia. 12/6 VFSS revealed: \"Patient's swallowing function appears worsened from previous swallow evaluation as an OP on 10/8, suspect d/t new generalized weakness, fatigue, and acute illness. Patient initially had shallow transient penetration with thin liquids via cup and straw, but as evaluation progressed and after solid trials, pt with silent aspiration and deep penetration with laryngeal vestibule residuals with thin liquids via cup and straw. Suspect fatigue was impacting swallowing safety. Transient shallow penetration with mildly thickened liquids via cup and straw. Oral phase is prolonged and notable for premature pharyngeal entry, delayed AP transit, and oral residue after the swallow. Tongue base retraction is reduced, which is similar to previous VFSS. Swallow response was initiated at the pyriforms for liquids & vallecula for solids. Epiglottic inversion was slowed, but complete. Mild stasis occurred with liquids and moderate stasis with solids, which mostly cleared with liquid wash. Pt demonstrated difficulty generating a dry extra swallow throughout the evaluation and sometimes was unable to. Additionally, pt was unable to accurately complete a chin tuck. Hyolaryngeal elevation and hyolaryngeal excursion were mildly reduced, which is consistent from previous VFSS. Mastication is prolonged, but functional, and complete. Cricopharyngeus allows the bolus to easily pass. Recommend pt continues soft & bite size diet (IDDSI 6) and mildly thickened liquids (IDDSI 2) with 1:1 supervision and feeding assist. Patient should be sitting fully upright and alert for the duration of the meal & 30+ minutes after, take small bites/sips at a slow rate, and alternate solids/liquids. Meds one at a time in puree. Recommend repeat VFSS in 1-2 weeks or as acute illness resolves prior to liquid advancement d/t silent aspiration. \"   Respiratory " Support nasal cannula;other (see comments)  (2L)   Current Diet/Method of Nutritional Intake (General Swallowing Observations, NIS) NPO, NG tube   Swallowing Evaluation Clinical swallow evaluation   Clinical Swallow Evaluation   Feeding Assistance frequent cues/help required   Clinical Swallow Evaluation Textures Trialed mildly thick liquids  (Ice chips)   Clinical Swallow Eval: Thin Liquid Texture Trial   Mode of Presentation, Thin Liquids spoon;fed by clinician   Volume of Liquid or Food Presented x3 ice chips   Oral Phase of Swallow WFL   Pharyngeal Phase of Swallow throat clearing;coughing/choking;wet vocal quality after swallow   Diagnostic Statement Coughing and wet vocal quality after swallow with 2 trials.   Clinical Swallow Eval: Mildly Thick Liquids   Mode of Presentation cup;self-fed   Volume Presented x2 cup sips   Oral Phase WFL   Pharyngeal Phase coughing/choking;wet vocal quality after swallow   Diagnostic Statement Coughing following first swallow, increased wet/gurgly vocal quality after swallow of second trial.   Esophageal Phase of Swallow   Patient reports or presents with symptoms of esophageal dysphagia No   Swallowing Recommendations   Diet Consistency Recommendations NPO;other (see comments)  (limited ice chips for comfort after oral cares.)   Supervision Level for Intake 1:1 supervision needed   Recommended Feeding/Eating Techniques (Swallow Eval) provide oral hygiene prior to intake   Medication Administration Recommendations, Swallowing (SLP) Via non-oral methods   Instrumental Assessment Recommendations VFSS (videofluoroscopic swallowing study)   General Therapy Interventions   Planned Therapy Interventions Dysphagia Treatment   Dysphagia treatment Modified diet education;Instruction of safe swallow strategies   Clinical Impression   Criteria for Skilled Therapeutic Interventions Met (SLP Eval) Yes, treatment indicated   SLP Diagnosis Moderate-Severe pharyngeal dysphagia   Risks &  Benefits of therapy have been explained evaluation/treatment results reviewed;care plan/treatment goals reviewed;risks/benefits reviewed;current/potential barriers reviewed;participants voiced agreement with care plan;participants included;patient   Clinical Impression Comments   Clinical swallow evaluation completed per provider orders. Patient presents with moderate-severe pharyngeal dysphagia in setting of generalized weakness, AHRF and recent intubation (2 days). Patient now on 2L O2 via nc. Not wet/gurgly, and breathy vocal quality during evaluation. Weak cough function and reduced lingual lateralization speed. Trials of ice chips and mildly thick liquids provided to patient. Oral phase c/b adequate bolus acceptance and closure, and good oral clearance. Patient demonstrated intermittent/consistent coughing/throat clearing across all trials, notably increased wet/gurgly vocal quality after mildly thick liquid cup sip x1 which patient was unable to fully resolve despite effortful swallow attempts and use of oral suction. Patient is a high risk for aspiration related complications.     Recommend NPO status with alternative methods of nutrition/hydration/medication administration. Allow limited ice chips for comfort only following thorough oral cares. Provide 1:1 supervision with ice chip intake. Patient to be fully upright and alert, using single sips. Recommend completion of VFSS. SLP will continue to follow for dysphagia management.     SLP Total Evaluation Time   Eval: oral/pharyngeal swallow function, clinical swallow Minutes (80451) 16   SLP Discharge Planning   SLP Discharge Recommendation Transitional Care Facility   SLP Rationale for DC Rec Below baseline swallow function requiring NPO status.

## 2024-12-10 NOTE — PLAN OF CARE
Major Shift Events:   -Extubated @ 0900 to 2 L NC.  ~1408, pt experienced sustained HR > 130 and hypotension (MAPs in 50s). Notified team. Orders to give IV metoprolol and admin diltiazem via NG tube (had not given yet d/t lack of OG/NG access).   -D5LR started for starvation ketosis, stopped once TF initiated @ 1600 per MICU team.    Alert and oriented. Pupils round and reactive. On 2 L NC. Afib (80s-130s). TF @ 10 (goal 30) w/ 60 ml q2H FWF. LBM: 12/10 x 8. NPO per SLP. Miranda- adequate UOP. No new skin concerns.     Plan: Increase TF to 20 @ 0000.  For vital signs and complete assessments, please see documentation flowsheets.     Cristy Spear RN on 12/10/2024 at 7:09 PM

## 2024-12-10 NOTE — PLAN OF CARE
Major Shift Events:  FULLER, follows commands. Nods yes/no appropriately. Dex at 0.3 overnight for patient comfort. Afib, rates increasing through the night from 100s to 130s. Metoprolol iv given for HR sustaining >120. Dilt restarted. CMV 30/12/360/5. PS at 6am.   Plan: Wean to extubate. Per MICU team.   For vital signs and complete assessments, please see documentation flowsheets.

## 2024-12-10 NOTE — PROCEDURES
"Small Bowel Feeding Tube Placement Assessment  Reason for Feeding Tube Placement: small bore enteral access, gastric position acceptable  Cortrak Start Time: 1255   Cortrak End Time: 1310  Medicine Delivered During Procedure: 2% viscous lidocaine gel   Placement Successful: yes per Cortrak tracing pending AXR confirmation      Procedure Complications: none  Final Placement Fredrick at exit of nare:  55 cm  Face to Face time with patient: 15 minutes    Bridle Placement:   Reason for bridle placement: securement of nasoenteric feeding tube    Medicine delivered during procedure: lidocaine gel   Procedure: Successful   Location of top of clip on FT: @ 57 cm marker   Condition of nose/skin at time of bridle placement: Unremarkable   Face to Face time with patient: < 5 minutes.    Ally Andrew, MS, RDN, LD  4C MICU RD  Vocera - \"4C Clinical Dietitian\"  Weekend/Holiday RD - \"Weekend Clinical Dietitian\"    "

## 2024-12-10 NOTE — PROGRESS NOTES
CLINICAL NUTRITION SERVICES - BRIEF NOTE    See RD note 12/04 for full assessment.     Nutrition Prescription  RECOMMENDATIONS FOR MDs/PROVIDERS TO ORDER:   Provider to manage IVF with TF initiation.     Recommendations already ordered by Registered Dietitian (RD):  EN access: NGT - pending xray confirmation   Goal TF: Novasource Renal (or equivalent) @ 30 ml/hr (720 ml) + 1 Prosource TF20 provides 1520 kcal (30 kcal/kg), 85 g pro (1.7 g/kg), 132 g CHO, 516 ml free water, and 0 g fiber daily.     Initiate @ 10 ml/hr and advance by 10 ml Q8H pending pt's tolerance.  Do not advance TF rate unless Mg++ > 1.5, K+ is > 3, and phos > 1.9  30 ml Q4H fluid flushes for tube patency. Additional fluids and/or adjustments per MD.    Multivitamin to help ensure micronutrient needs being met with suspected hypermetabolic demands and potential interruptions to TF infusions.   Continue 100 mg Thiamine x 5-7 days to prevent lyte depletion d/t at risk for refeeding syndrome  Due to gastric enteral access: HOB > 30 degrees       Future/Additional Recommendations:  Monitor for xray confirmation of FT. Monitor TF initiation/advancement  Monitor for ability to resume oral diet and PO       New findings:   Pt extubated. D/t difficulty w/ PO and swallowing (esophageal dysmotility), plan for NGT with TF at this time - discussed w/ team who talked w/ patient and family. NGT placed, waiting xray confirmation.     Diet: NPO    Intake/Tolerance: NPO since 12/09. Prior to NPO was on soft & bite size diet (IDDSI 6) and mildly thick liquid (IDDSI 2) per SLP.    GI:  Last BM: 12/10/24  On scheduled bowel med(s)    Weight:  Most Recent Weight: 49.7 kg (109 lb 9.6 oz)    Body mass index is 18.24 kg/m .      Meds:  IVF D5 in LR @ 50 ml/h    Labs:   12/08/24 23:17 12/09/24 01:48 12/09/24 05:34 12/09/24 15:45 12/10/24 03:11   Sodium 136 133 (L) 132 (L) 135 137   Potassium 4.6 4.0 4.5 4.6 4.3   Chloride 93 (L) 94 (L) 94 (L) 95 (L) 95 (L)   Carbon Dioxide  "(CO2) 26 23 26 25 22   Urea Nitrogen 27.4 (H) 29.0 (H) 29.5 (H) 30.4 (H) 31.3 (H)   Creatinine 1.15 (H) 1.16 (H) 1.19 (H) 1.30 (H) 1.48 (H)   GFR Estimate 49 (L) 48 (L) 47 (L) 42 (L) 36 (L)   Calcium 9.8 9.5 8.6 (L) 8.7 (L) 8.6 (L)   Anion Gap 17 (H) 16 (H) 12 15 20 (H)   Magnesium 2.3  2.9 (H)  2.7 (H)   Phosphorus 4.4 4.3 5.0 (H)  6.3 (H)   Albumin 3.7  3.0 (L)  3.0 (L)   Protein Total 7.5  6.0 (L)  6.2 (L)   Alkaline Phosphatase 87  70  69   ALT 15  12  10   AST 17  16  14   Bilirubin Total 0.4  0.3  0.3   Calcium Ionized Whole Blood  4.4      Glucose 123 (H) 147 (H) 161 (H) 116 (H) 100 (H)   Ketone Quantitative     2.03 (HH)   Lactic Acid  0.9   1.0         Implementation  NGT placement  Collaboration with other providers  Enteral Nutrition - Initiate  Feeding tube flush  Multivitamin/mineral supplement therapy       RD to follow per protocol    Ally Andrew, MS, RDN, LD  4C MICU RD  Vocera - \"4C Clinical Dietitian\"  Weekend/Holiday RD - \"Weekend Clinical Dietitian\"  "

## 2024-12-10 NOTE — PROGRESS NOTES
MEDICAL ICU PROGRESS NOTE  12/10/2024      Date of Service (when I saw the patient): 12/10/2024    ASSESSMENT: Asya Coffman is a 79 year old female with PMHx of afib on apixaban, pulmonary hypertension, severe obstructive lung disease in setting of COPD/asthma, laryngeal squamous cell carcinoma (diagnosed 4/2024) s/p radiation (4/2024-7/2024) c/b dysphagia on modified diet, CREST syndrome, hypothyroidism, anxiety and depression who was admitted on 11/21/2024 with hypoxic respiratory failure with suspected new HFpEF requiring diuresis. She developed acute hypoxia on 12/8 secondary to suspected aspiration pneumonitis s/p intubation (12/8-12/10).       CHANGES and MAJOR THINGS TODAY:   - Extubated to nasal canula  - Strict NPO per bedside swallow, pending speech eval  - NGT placement for meds and start tube feeds for starvation ketosis  - Start D5LR at 50cc/hr for starvation ketosis pending initiation of tube feeds  - Monitor electrolytes BID for refeeding syndrome  - Give IV thiamine  - Restart reduced dose diltiazem and full dose propanolol by mouth, uptitrate to PTA regimen as tolerated by blood pressure      PLAN:    Neuro:  # Pain  - Acetaminophen 650mg q4h PRN    # Anxiety  # Depression  - PTA Sertraline 150mg qd      Pulmonary:  # Acute hypoxic respiratory failure, improving  # S/p intubation for airway protection  Presented on 11/21 with acute hypoxic respiratory failure; initially suspected to be secondary to HFpEF exacerbation; unresolved with diuresis. Overnight 12/8, developed sudden worsening of oxygenation and increased secretions in the setting of dysphagia/recent laryngeal radiation and was intubated for airway protection. Etiology of acute hypoxic respiratory failure may be recurrent aspiration pneumonitis, developing pneumonia, pulmonary edema 2/2 HFpEF exacerbation; broad differential includes COPD exacerbation. Lower suspicion for asthma exacerbation (no wheezing on exam) or new diagnosis of  ILD  in the setting of Crest syndrome (CT chest without diffuse GGO). With increased secretions, oxygen requirements, and new WBC (16.5), will continue empiric antibiotics for hospital-acquired pneumonia. Plan for active diuresis for I/O goal -1L/24 hours. Will wean epoprostenol (CT chest not concerning for ARDS, can consider alternative strategy for afterload reduction).   - Extubated to nasal canula  - Oxygen supplementation for SpO2 >92%    # Hx obstructive lung disease (asthma vs COPD)  # Hx pulmonary hypertension (44 mmHg per echo 11/2024)  PFTs (3/2022) demonstrated severe obstructive lung disease with hyperinflation; on PTA budesonide-formoterol BID and albuterol/duonebs PRN. On transfer to MICU, no wheezing concerning for asthma or COPD exacerbation. Will continue PTA inhaler regimen; can consider oupatient follow-up for obstructive lung disease/repeat PFTs and management of pulmonary hypertension.   - Formulary substitutes for home inhaler regimen   Budesonide neb BID   Ipratropium-levalbuterol neb QID      Cardiovascular:  # Hypotension  # Heart failure with preserved ejection fraction (LVEF 55-60% 11/22/2024)  # Pulmonary hypertension (44 mmHg per echo 11/22/2024)  # Possible small PFO  Echo during current admission (11/22/2024) notable for increased thickness of LV and elevated BNP (peak 9200 > 6200) concerning for heart failure exacerbation s/p diuresis and pulmonary hypertension with estimated pulmonary artery pressure of 45 mmHg. Echo with bubble study (11/25/2024) concerning for possible small PFO. With intubation on 12/8, developed hypotension without evidence of shock (normal lactic acid). Etiology of hypotension may be cardiogenic (e.g. exacerbation of pulmonary hypertension by positive pressure ventilation) vs medication associated (propofol, precedex); do not currently suspect distributive or obstructive hypotension.  - MAP goal >65  - Norepi gtt, as needed   - Preload dependent in the setting  of pulmonary hypertension, gentle diuresis as needed    # Chronic afib  [id number] 3 (age++, HF+).   - PTA apixaban 5mg BID  - PTA diltiazem 240 ER BID - HOLD (cannot give ER enterally)   Up-titrate to short-acting Diltiazem 120mg q6H as tolerated by blood pressure  - PTA propanolol 10mg BID - Restart      GI/Nutrition:  # Oral secretions, increased  # Esophageal dysmotility  # Laryngeal squamous cell carcinoma s/p radiation (4/2024-7/2024)  Per daughter, baseline weight around 120-130 lbs. Admitted 102 lbs. Patient states she is eating less and has less appetite. Has had increased dysphagia in the setting of laryngeal squamous cell carcinoma s/p radiation (4/2024-7/2024) as well as some concern for esophageal web on swallow study (10/8/2024) pending GI input. Some concern for respiratory decompensation due to recurrent aspiration events. Will hold PO diet while intubated; plan for speech consult to assess swallowing prior to restarting PO diet.   - Speech consult  Strict NPO pending evaluation  - Can consider glycopyrrolate for increased oral secretions  - Will consider GI consult for management of esophageal web    # Starvation ketosis  # Risk for refeeding syndrome  # Severe protein-calore malnutrition  Per daughter, baseline weight around 120-130 lbs. Admitted 102 lbs. Patient states she is eating less and has less appetite in general as well as having difficulty swallowing. While NPO during intubation, starvation ketosis. Given high risk for aspiration with PO intake, placed NGT and started tube feeds. Will monitor closely for refeeding syndrome.   - Strict NPO pending speech evaluation  - Nutrition consult  NGT placement  Tube feeds  - Monitor refeeding syndrome BID  - Give IV thiamine (500mg TID x2, 250mg every day x5, then 100mg every day)      Renal/Fluids/Electrolytes:  # SAMIR, progressive  # Oliguria  Baseline Cr ~0.9. On transfer, Cr 1.19, concerning for SAMIR in the setting of diuresis. Progressive with  NPO status.  - Monitor BMP  - FWF via NGT      Endocrine:  # Hypothyroidism  Has hx of hypothyroidism s/p thyroidectomy 2/2 cancer.   - PTA levothyroxine 88 mcg qd    # Risk for hyperglycemia  With D5LR for starvation ketosis, will monitor blood sugars and start MSSI.   - MSSI  - Hypogylcemia protocol      ID:  # MSSA PNA s/p abx (12/3-12/8)  Acute hypoxic respiratory failure on 12/9 requiring intubation. Labs notable for increased WBC (16.5) and procal (0.44). CT Chest (12/8) with mediastinal/hilar lymphadenopathy and resolving diffuse ground glass opacities most consistent with resolving pneumonia; no signs of progressive or secondary infectious process. Has recently completed antibiotics for MSSA PNA. Without clear signs of new pneumonia, will hold IV antibiotics and monitor clinical response.    - Workup   MRSA nares  Negative   Sputum culture (12/9)  NGTD  - Antibiotics   S/p Augmentin (12/3-12/5)   S/p Unasyn (12/6-12/8)   S/p Vancomycin (12/9)   S/p Zosyn (12/9 - 12/10)      Hematology:    # Anemia of chronic illness  Baseline Hgb 11-12. Currently near baseline. Can consider anemia of chronic illness.   - Monitor CBC      Musculoskeletal:  # Generalized deconditioning  # Recurrent falls  - PT/OT consults    # CREST Syndrome  Characterized by Raynaud's, Calcinosis, GERD and some telangectasia's. Last saw [facility] 2017. No history of ILD.   - PO pantoprazole 40mg every day      Skin:  No acute concerns.      General Cares/Prophylaxis:    DVT Prophylaxis: DOAC  GI Prophylaxis: PPI  Restraints: None  Family Communication: Updated daughter at bedside.   Code Status: Full    Lines/tubes/drains:  - RIJ  - A line  - OGT/ETT  - Miranda    Disposition:  - Medical ICU     Patient seen and findings/plan discussed with medical ICU staff, Dr. Rivera.    Rama Urbina MD    Clinically Significant Risk Factors         # Hyponatremia: Lowest Na = 132 mmol/L in last 2 days, will monitor as appropriate  #  Hypochloremia: Lowest Cl = 93 mmol/L in last 2 days, will monitor as appropriate   # Hypercalcemia: corrected calcium is >10.1, will monitor as appropriate   # Anion Gap Metabolic Acidosis: Highest Anion Gap = 20 mmol/L in last 2 days, will monitor and treat as appropriate  # Hypoalbuminemia: Lowest albumin = 3 g/dL at 12/10/2024  3:11 AM, will monitor as appropriate    # Coagulation Defect: INR = 2.43 (Ref range: 0.85 - 1.15) and/or PTT = 47 Seconds (Ref range: 22 - 38 Seconds), will monitor for bleeding   # Acute Kidney Injury, unspecified: based on a >150% or 0.3 mg/dL increase in last creatinine compared to past 90 day average, will monitor renal function              # Severe Malnutrition: based on nutrition assessment    # Financial/Environmental Concerns: none          ====================================  INTERVAL HISTORY:   Was pressure supporting well this morning; breathing comfortably on nasal canula after extubation. Having ongoing afib with RVR needing IV metoprolol 5mg q15min PRN; will restart home diltiazem/propanolol. Having trouble swallowing liquid from mouth swab and having copious oral secretions requiring frequent suctioning.     OBJECTIVE:   1. VITAL SIGNS:   Temp:  [97.5  F (36.4  C)-98.2  F (36.8  C)] 98.2  F (36.8  C)  Pulse:  [] 121  Resp:  [12-26] 25  BP: (95)/(60) 95/60  MAP:  [58 mmHg-90 mmHg] 67 mmHg  Arterial Line BP: ()/(49-73) 96/57  FiO2 (%):  [30 %-40 %] 30 %  SpO2:  [96 %-100 %] 99 %  FiO2 (%): 30 %, Resp: 25, Vent Mode: (S) CPAP/PS, Resp Rate (Set): 12 breaths/min, Tidal Volume (Set, mL): 360 mL, PEEP (cm H2O): 5 cmH2O, Pressure Support (cm H2O): 5 cmH2O, Resp Rate (Set): 12 breaths/min, Tidal Volume (Set, mL): 360 mL, PEEP (cm H2O): 5 cmH2O  2. INTAKE/ OUTPUT:   I/O last 3 completed shifts:  In: 600.47 [I.V.:420.47; NG/GT:180]  Out: 1270 [Urine:1270]    3. PHYSICAL EXAMINATION:  GENERAL: Laying in bed, no acute distress  HEENT: Atraumatic, moist mucus membranes,  PERRL  RESP: Unlabored breathing on nasal canula, no wheezes  CARDIO: Regular rate and rhythm, extremities cool, no peripheral edema  NEURO: Alert and oriented, significant generalized weakness    4. LABS:   Arterial Blood Gases   Recent Labs   Lab 12/10/24  1006 12/10/24  0312 12/09/24  2238 12/09/24  1546   PH 7.48* 7.49* 7.51* 7.50*   PCO2 37 34* 33* 34*   PO2 92 87 82 89   HCO3 28 26 26 27     Complete Blood Count   Recent Labs   Lab 12/10/24  0311 12/09/24  0534 12/08/24  2317 12/08/24  2305 12/08/24  0559   WBC 12.3* 16.5* 14.7*  --  8.2   HGB 10.0* 10.8* 13.1 15.0 11.0*   * 491* 501*  --  368     Basic Metabolic Panel  Recent Labs   Lab 12/10/24  0311 12/10/24  0209 12/09/24 2237 12/09/24  1550 12/09/24  1545 12/09/24  0830 12/09/24  0534 12/09/24  0148     --   --   --  135  --  132* 133*   POTASSIUM 4.3  --   --   --  4.6  --  4.5 4.0   CHLORIDE 95*  --   --   --  95*  --  94* 94*   CO2 22  --   --   --  25  --  26 23   BUN 31.3*  --   --   --  30.4*  --  29.5* 29.0*   CR 1.48*  --   --   --  1.30*  --  1.19* 1.16*   * 103* 103* 124* 116*   < > 161* 147*    < > = values in this interval not displayed.     Liver Function Tests  Recent Labs   Lab 12/10/24  0311 12/09/24  0534 12/08/24 2317 12/08/24  0559   AST 14 16 17 15   ALT 10 12 15 7   ALKPHOS 69 70 87 73   BILITOTAL 0.3 0.3 0.4 0.3   ALBUMIN 3.0* 3.0* 3.7 3.1*   INR  --   --  2.43*  --      Coagulation Profile  Recent Labs   Lab 12/08/24  2317   INR 2.43*   PTT 47*       5. RADIOLOGY:   No results found for this or any previous visit (from the past 24 hours).

## 2024-12-11 ENCOUNTER — TELEPHONE (OUTPATIENT)
Dept: OTOLARYNGOLOGY | Facility: CLINIC | Age: 79
End: 2024-12-11
Payer: COMMERCIAL

## 2024-12-11 LAB
ALBUMIN SERPL BCG-MCNC: 3.1 G/DL (ref 3.5–5.2)
ALBUMIN SERPL BCG-MCNC: 3.1 G/DL (ref 3.5–5.2)
ALP SERPL-CCNC: 65 U/L (ref 40–150)
ALT SERPL W P-5'-P-CCNC: 12 U/L (ref 0–50)
ANION GAP SERPL CALCULATED.3IONS-SCNC: 12 MMOL/L (ref 7–15)
ANION GAP SERPL CALCULATED.3IONS-SCNC: 17 MMOL/L (ref 7–15)
AST SERPL W P-5'-P-CCNC: 18 U/L (ref 0–45)
BACTERIA SPT CULT: ABNORMAL
BACTERIA SPT CULT: ABNORMAL
BILIRUB SERPL-MCNC: 0.2 MG/DL
BUN SERPL-MCNC: 40.7 MG/DL (ref 8–23)
BUN SERPL-MCNC: 41.1 MG/DL (ref 8–23)
BUN SERPL-MCNC: 42.5 MG/DL (ref 8–23)
BUN SERPL-MCNC: 43 MG/DL (ref 8–23)
CALCIUM SERPL-MCNC: 8.3 MG/DL (ref 8.8–10.4)
CALCIUM SERPL-MCNC: 8.3 MG/DL (ref 8.8–10.4)
CALCIUM SERPL-MCNC: 8.4 MG/DL (ref 8.8–10.4)
CALCIUM SERPL-MCNC: 8.5 MG/DL (ref 8.8–10.4)
CHLORIDE SERPL-SCNC: 97 MMOL/L (ref 98–107)
CHLORIDE SERPL-SCNC: 97 MMOL/L (ref 98–107)
CHLORIDE SERPL-SCNC: 98 MMOL/L (ref 98–107)
CHLORIDE SERPL-SCNC: 99 MMOL/L (ref 98–107)
CREAT SERPL-MCNC: 1.13 MG/DL (ref 0.51–0.95)
CREAT SERPL-MCNC: 1.26 MG/DL (ref 0.51–0.95)
CREAT SERPL-MCNC: 1.41 MG/DL (ref 0.51–0.95)
CREAT SERPL-MCNC: 1.45 MG/DL (ref 0.51–0.95)
EGFRCR SERPLBLD CKD-EPI 2021: 37 ML/MIN/1.73M2
EGFRCR SERPLBLD CKD-EPI 2021: 38 ML/MIN/1.73M2
EGFRCR SERPLBLD CKD-EPI 2021: 43 ML/MIN/1.73M2
EGFRCR SERPLBLD CKD-EPI 2021: 50 ML/MIN/1.73M2
ERYTHROCYTE [DISTWIDTH] IN BLOOD BY AUTOMATED COUNT: 19 % (ref 10–15)
GLUCOSE BLDC GLUCOMTR-MCNC: 115 MG/DL (ref 70–99)
GLUCOSE BLDC GLUCOMTR-MCNC: 118 MG/DL (ref 70–99)
GLUCOSE BLDC GLUCOMTR-MCNC: 123 MG/DL (ref 70–99)
GLUCOSE BLDC GLUCOMTR-MCNC: 96 MG/DL (ref 70–99)
GLUCOSE SERPL-MCNC: 102 MG/DL (ref 70–99)
GLUCOSE SERPL-MCNC: 108 MG/DL (ref 70–99)
GLUCOSE SERPL-MCNC: 112 MG/DL (ref 70–99)
GLUCOSE SERPL-MCNC: 116 MG/DL (ref 70–99)
GRAM STAIN RESULT: ABNORMAL
GRAM STAIN RESULT: ABNORMAL
HCO3 SERPL-SCNC: 24 MMOL/L (ref 22–29)
HCO3 SERPL-SCNC: 25 MMOL/L (ref 22–29)
HCO3 SERPL-SCNC: 25 MMOL/L (ref 22–29)
HCO3 SERPL-SCNC: 26 MMOL/L (ref 22–29)
HCT VFR BLD AUTO: 31.2 % (ref 35–47)
HGB BLD-MCNC: 9.4 G/DL (ref 11.7–15.7)
MAGNESIUM SERPL-MCNC: 2.3 MG/DL (ref 1.7–2.3)
MAGNESIUM SERPL-MCNC: 2.5 MG/DL (ref 1.7–2.3)
MCH RBC QN AUTO: 25.5 PG (ref 26.5–33)
MCHC RBC AUTO-ENTMCNC: 30.1 G/DL (ref 31.5–36.5)
MCV RBC AUTO: 85 FL (ref 78–100)
PHOSPHATE SERPL-MCNC: 1.8 MG/DL (ref 2.5–4.5)
PHOSPHATE SERPL-MCNC: 2.6 MG/DL (ref 2.5–4.5)
PHOSPHATE SERPL-MCNC: 3.7 MG/DL (ref 2.5–4.5)
PHOSPHATE SERPL-MCNC: 4.4 MG/DL (ref 2.5–4.5)
PLATELET # BLD AUTO: 416 10E3/UL (ref 150–450)
POTASSIUM SERPL-SCNC: 2.8 MMOL/L (ref 3.4–5.3)
POTASSIUM SERPL-SCNC: 3.3 MMOL/L (ref 3.4–5.3)
POTASSIUM SERPL-SCNC: 3.3 MMOL/L (ref 3.4–5.3)
POTASSIUM SERPL-SCNC: 3.5 MMOL/L (ref 3.4–5.3)
POTASSIUM SERPL-SCNC: 3.8 MMOL/L (ref 3.4–5.3)
POTASSIUM SERPL-SCNC: 3.8 MMOL/L (ref 3.4–5.3)
PROT SERPL-MCNC: 6.2 G/DL (ref 6.4–8.3)
RBC # BLD AUTO: 3.68 10E6/UL (ref 3.8–5.2)
SODIUM SERPL-SCNC: 135 MMOL/L (ref 135–145)
SODIUM SERPL-SCNC: 135 MMOL/L (ref 135–145)
SODIUM SERPL-SCNC: 136 MMOL/L (ref 135–145)
SODIUM SERPL-SCNC: 138 MMOL/L (ref 135–145)
WBC # BLD AUTO: 11.5 10E3/UL (ref 4–11)

## 2024-12-11 PROCEDURE — 97116 GAIT TRAINING THERAPY: CPT | Mod: GP

## 2024-12-11 PROCEDURE — 84520 ASSAY OF UREA NITROGEN: CPT

## 2024-12-11 PROCEDURE — 83735 ASSAY OF MAGNESIUM: CPT

## 2024-12-11 PROCEDURE — 94640 AIRWAY INHALATION TREATMENT: CPT | Mod: 76

## 2024-12-11 PROCEDURE — 97535 SELF CARE MNGMENT TRAINING: CPT | Mod: GO

## 2024-12-11 PROCEDURE — 250N000013 HC RX MED GY IP 250 OP 250 PS 637: Performed by: INTERNAL MEDICINE

## 2024-12-11 PROCEDURE — 84132 ASSAY OF SERUM POTASSIUM: CPT

## 2024-12-11 PROCEDURE — 999N000157 HC STATISTIC RCP TIME EA 10 MIN

## 2024-12-11 PROCEDURE — 92526 ORAL FUNCTION THERAPY: CPT | Mod: GN

## 2024-12-11 PROCEDURE — 250N000013 HC RX MED GY IP 250 OP 250 PS 637

## 2024-12-11 PROCEDURE — 94640 AIRWAY INHALATION TREATMENT: CPT

## 2024-12-11 PROCEDURE — 84100 ASSAY OF PHOSPHORUS: CPT

## 2024-12-11 PROCEDURE — 250N000013 HC RX MED GY IP 250 OP 250 PS 637: Performed by: PHYSICIAN ASSISTANT

## 2024-12-11 PROCEDURE — 250N000009 HC RX 250

## 2024-12-11 PROCEDURE — 250N000011 HC RX IP 250 OP 636

## 2024-12-11 PROCEDURE — 82310 ASSAY OF CALCIUM: CPT

## 2024-12-11 PROCEDURE — 82040 ASSAY OF SERUM ALBUMIN: CPT

## 2024-12-11 PROCEDURE — 97530 THERAPEUTIC ACTIVITIES: CPT | Mod: GP

## 2024-12-11 PROCEDURE — 250N000013 HC RX MED GY IP 250 OP 250 PS 637: Performed by: STUDENT IN AN ORGANIZED HEALTH CARE EDUCATION/TRAINING PROGRAM

## 2024-12-11 PROCEDURE — 82435 ASSAY OF BLOOD CHLORIDE: CPT

## 2024-12-11 PROCEDURE — 84295 ASSAY OF SERUM SODIUM: CPT

## 2024-12-11 PROCEDURE — 85027 COMPLETE CBC AUTOMATED: CPT

## 2024-12-11 PROCEDURE — 80053 COMPREHEN METABOLIC PANEL: CPT

## 2024-12-11 PROCEDURE — 80069 RENAL FUNCTION PANEL: CPT

## 2024-12-11 PROCEDURE — 84100 ASSAY OF PHOSPHORUS: CPT | Performed by: SURGERY

## 2024-12-11 PROCEDURE — 99232 SBSQ HOSP IP/OBS MODERATE 35: CPT

## 2024-12-11 PROCEDURE — 250N000013 HC RX MED GY IP 250 OP 250 PS 637: Performed by: SURGERY

## 2024-12-11 PROCEDURE — 82947 ASSAY GLUCOSE BLOOD QUANT: CPT

## 2024-12-11 PROCEDURE — 120N000002 HC R&B MED SURG/OB UMMC

## 2024-12-11 PROCEDURE — 97530 THERAPEUTIC ACTIVITIES: CPT | Mod: GO

## 2024-12-11 RX ORDER — POTASSIUM CHLORIDE 7.45 MG/ML
10 INJECTION INTRAVENOUS
Status: COMPLETED | OUTPATIENT
Start: 2024-12-11 | End: 2024-12-11

## 2024-12-11 RX ORDER — POTASSIUM CHLORIDE 1.5 G/1.58G
20 POWDER, FOR SOLUTION ORAL ONCE
Status: COMPLETED | OUTPATIENT
Start: 2024-12-11 | End: 2024-12-11

## 2024-12-11 RX ADMIN — THERA TABS 1 TABLET: TAB at 08:52

## 2024-12-11 RX ADMIN — DILTIAZEM HYDROCHLORIDE 120 MG: 120 TABLET, FILM COATED ORAL at 23:32

## 2024-12-11 RX ADMIN — POTASSIUM CHLORIDE 10 MEQ: 7.46 INJECTION, SOLUTION INTRAVENOUS at 06:42

## 2024-12-11 RX ADMIN — POTASSIUM & SODIUM PHOSPHATES POWDER PACK 280-160-250 MG 2 PACKET: 280-160-250 PACK at 23:32

## 2024-12-11 RX ADMIN — DILTIAZEM HYDROCHLORIDE 120 MG: 120 TABLET, FILM COATED ORAL at 00:01

## 2024-12-11 RX ADMIN — FEXOFENADINE HYDROCHLORIDE 120 MG: 60 TABLET ORAL at 08:56

## 2024-12-11 RX ADMIN — IPRATROPIUM BROMIDE 0.5 MG: 0.5 SOLUTION RESPIRATORY (INHALATION) at 16:34

## 2024-12-11 RX ADMIN — LEVALBUTEROL HYDROCHLORIDE 0.63 MG: 0.63 SOLUTION RESPIRATORY (INHALATION) at 20:11

## 2024-12-11 RX ADMIN — DILTIAZEM HYDROCHLORIDE 120 MG: 120 TABLET, FILM COATED ORAL at 11:48

## 2024-12-11 RX ADMIN — LEVALBUTEROL HYDROCHLORIDE 0.63 MG: 0.63 SOLUTION RESPIRATORY (INHALATION) at 16:34

## 2024-12-11 RX ADMIN — APIXABAN 2.5 MG: 2.5 TABLET, FILM COATED ORAL at 08:52

## 2024-12-11 RX ADMIN — LEVOTHYROXINE SODIUM 88 MCG: 88 TABLET ORAL at 05:09

## 2024-12-11 RX ADMIN — APIXABAN 5 MG: 5 TABLET, FILM COATED ORAL at 20:03

## 2024-12-11 RX ADMIN — IPRATROPIUM BROMIDE 0.5 MG: 0.5 SOLUTION RESPIRATORY (INHALATION) at 20:11

## 2024-12-11 RX ADMIN — GABAPENTIN 600 MG: 300 CAPSULE ORAL at 21:22

## 2024-12-11 RX ADMIN — POTASSIUM CHLORIDE 10 MEQ: 7.46 INJECTION, SOLUTION INTRAVENOUS at 03:18

## 2024-12-11 RX ADMIN — PANTOPRAZOLE SODIUM 40 MG: 40 INJECTION, POWDER, FOR SOLUTION INTRAVENOUS at 08:51

## 2024-12-11 RX ADMIN — POTASSIUM CHLORIDE 10 MEQ: 7.46 INJECTION, SOLUTION INTRAVENOUS at 05:34

## 2024-12-11 RX ADMIN — LEVALBUTEROL HYDROCHLORIDE 0.63 MG: 0.63 SOLUTION RESPIRATORY (INHALATION) at 13:18

## 2024-12-11 RX ADMIN — DILTIAZEM HYDROCHLORIDE 120 MG: 120 TABLET, FILM COATED ORAL at 18:29

## 2024-12-11 RX ADMIN — POTASSIUM CHLORIDE 10 MEQ: 7.46 INJECTION, SOLUTION INTRAVENOUS at 04:28

## 2024-12-11 RX ADMIN — IPRATROPIUM BROMIDE 0.5 MG: 0.5 SOLUTION RESPIRATORY (INHALATION) at 13:18

## 2024-12-11 RX ADMIN — POTASSIUM & SODIUM PHOSPHATES POWDER PACK 280-160-250 MG 2 PACKET: 280-160-250 PACK at 20:03

## 2024-12-11 RX ADMIN — THIAMINE HYDROCHLORIDE 500 MG: 100 INJECTION, SOLUTION INTRAMUSCULAR; INTRAVENOUS at 15:57

## 2024-12-11 RX ADMIN — Medication 60 ML: at 08:55

## 2024-12-11 RX ADMIN — BUDESONIDE 0.5 MG: 0.5 INHALANT ORAL at 08:44

## 2024-12-11 RX ADMIN — POTASSIUM CHLORIDE 20 MEQ: 1.5 POWDER, FOR SOLUTION ORAL at 16:04

## 2024-12-11 RX ADMIN — DILTIAZEM HYDROCHLORIDE 120 MG: 120 TABLET, FILM COATED ORAL at 05:09

## 2024-12-11 RX ADMIN — SERTRALINE HYDROCHLORIDE 150 MG: 100 TABLET ORAL at 08:52

## 2024-12-11 RX ADMIN — IPRATROPIUM BROMIDE 0.5 MG: 0.5 SOLUTION RESPIRATORY (INHALATION) at 08:44

## 2024-12-11 RX ADMIN — BUDESONIDE 0.5 MG: 0.5 INHALANT ORAL at 20:11

## 2024-12-11 RX ADMIN — CHLORHEXIDINE GLUCONATE 15 ML: 1.2 SOLUTION ORAL at 08:51

## 2024-12-11 ASSESSMENT — ACTIVITIES OF DAILY LIVING (ADL)
ADLS_ACUITY_SCORE: 72
ADLS_ACUITY_SCORE: 70
ADLS_ACUITY_SCORE: 72
ADLS_ACUITY_SCORE: 72
ADLS_ACUITY_SCORE: 70
ADLS_ACUITY_SCORE: 72
ADLS_ACUITY_SCORE: 70
ADLS_ACUITY_SCORE: 72
ADLS_ACUITY_SCORE: 70

## 2024-12-11 NOTE — PROGRESS NOTES
CLINICAL NUTRITION SERVICES - REASSESSMENT NOTE     Nutrition Prescription    RECOMMENDATIONS FOR MDs/PROVIDERS TO ORDER:  None currently     Malnutrition Status:    Severe malnutrition in the context of chronic illness/disease    Recommendations already ordered by Registered Dietitian (RD):  Continue current TF as ordered     Future/Additional Recommendations:  Monitor for diet resumption per SLP recommendations  Monitor stool output for the next 1-2 days. If high stool output continues, consider formula change to see if better tolerated/decreases stool output.   If non-renal formula appropriate: [employer] Peptide 1.5 (or equivalent) @ 45 mL/hr (1080 mL/day) to provide 1661 kcal, 80 g protein, 149 g CHO, 16 g fiber, 83 g fat (40% from MCTs), and 756 mL free water daily    If renal formula needed: [employer] Renal (or equivalent) @ 40 mL/hr (960 mL/day) to provide 1728 kcal, 77 g protein, 163 g CHO, 19 g fiber, and 643 mL free water daily       EVALUATION OF THE PROGRESS TOWARD GOALS   Diet: NPO + TF    Nutrition Support: access: NGT placed 12/10    Formula/schedule/modulars: 12/10 - ___ : Novas[employer] Renal @ 30 ml/hr (720 ml) + 1 Prosource TF20 provides 1520 kcal (30 kcal/kg), 85 g pro (1.7 g/kg), 132 g CHO, 516 ml free water, and 0 g fiber daily.     Free water flushes: 60 mL every 2 hours    Intake/Tolerance: TF advancing.   No nutrition provisions while intubated 12/08-12/10. Prior to intubation PO was poor d/t difficulty w/ swallowing (esophageal dysmotility) and variable appetite per chart review.  Calorie counts prior to intubation:    12/5       Total Kcals: 621       Total Protein: 16g   12/6       Total Kcals: 603       Total Protein: 34g   12/7       Total Kcals: 508       Total Protein: 35g   3 day average PO: 577 kcal/d and 28 g protein/day (45% of low end of kcal goal and 37% of low end of protein goal)    NEW FINDINGS   Pt extubated and had NGT placed with TF initiation 12/10. Pt had multiple bowel  movements overnight.     GI:  Last BM: 12/11/24  stool occurrences: 11x 12/10; none documented 12/09; 1x 12/08; 0x 12/07; 0x 12/06; 3x 12/05; 0 x 12/04; 1x 12/03    Weight:  Most Recent Weight: 48.7 kg (107 lb 5.8 oz)  on 12/11/24 via Bed scale  Body mass index is 17.87 kg/m .  Wt stable from admission.     Meds:  SSI Q4H  Levothyroxine  Multivitamin daily  Protonix  Miralax - not given  Thiamine protocol currently getting 500 mg IV TID     Labs:  On standard replacement protocols for K, Mg, Phos   12/10/24 03:11 12/10/24 15:37 12/11/24 01:57 12/11/24 05:14   Sodium 137  138 135   Potassium 4.3  2.8 (L) 3.5   Chloride 95 (L)  97 (L) 97 (L)   Carbon Dioxide (CO2) 22  24 26   Urea Nitrogen 31.3 (H)  41.1 (H) 40.7 (H)   Creatinine 1.48 (H)  1.45 (H) 1.41 (H)   GFR Estimate 36 (L)  37 (L) 38 (L)   Calcium 8.6 (L)  8.5 (L) 8.3 (L)   Anion Gap 20 (H)  17 (H) 12   Magnesium 2.7 (H) 2.7 (H) 2.5 (H) 2.5 (H)   Phosphorus 6.3 (H) 6.1 (H) 4.4 3.7   Albumin 3.0 (L)  3.1 (L)    Protein Total 6.2 (L)  6.2 (L)    Alkaline Phosphatase 69  65    ALT 10  12    AST 14  18    Bilirubin Total 0.3  0.2    Glucose 100 (H)  102 (H) 108 (H)   Ketone Quantitative 2.03 (HH)          MALNUTRITION  % Intake: </= 50% for >/= 5 days (severe)  % Weight Loss: > 10% in 6 months (severe malnutrition)  Subcutaneous Fat Loss: Facial region:  moderate, Upper arm:  severe, Lower arm:  severe, and Thoracic/intercostal:  severe   Muscle Loss: Temporal:  moderate, Thoracic region (clavicle, acromium bone, deltoid, trapezius, pectoral):  severe, Upper arm (bicep, tricep):  severe, Lower arm  (forearm):  severe, Dorsal hand:  severe, and Posterior calf:  severe  Fluid Accumulation/Edema: None noted  Malnutrition Diagnosis: Severe malnutrition in the context of chronic illness/disease    Previous Goals   Patient to consume % of nutritionally adequate meal trays TID, or the equivalent with supplements/snacks.  Evaluation: Not met    Previous Nutrition  "Diagnosis  Inadequate oral intake related to decreased appetite as evidenced by poor PO PTA, increased needs for underweight BMI, 22% wt loss in 4 months.    Evaluation: No change    CURRENT NUTRITION DIAGNOSIS  Inadequate oral intake related to esophageal dysmotility, recent intubation as evidenced by weight loss, fat and muscle loss, chart review, TF to meet nutrition needs.       INTERVENTIONS  Implementation  Collaboration with other providers  Enteral Nutrition - continue advancing      Goals  Total avg nutritional intake to meet a minimum of 25 kcal/kg and 1.5 g PRO/kg daily (per dosing wt 50.8 kg).    Monitoring/Evaluation  Progress toward goals will be monitored and evaluated per protocol.      Ally Andrew, MS, RDN, LD  4C MICU RD  Vocera - \"4C Clinical Dietitian\"  Weekend/Holiday RD - \"Weekend Clinical Dietitian\"  "

## 2024-12-11 NOTE — PROGRESS NOTES
MEDICAL ICU PROGRESS NOTE  12/11/2024      Date of Service (when I saw the patient): 12/11/2024    ASSESSMENT: Asya Coffman is a 79 year old female with PMHx of afib on apixaban, pulmonary hypertension, severe obstructive lung disease in setting of COPD/asthma, laryngeal squamous cell carcinoma (diagnosed 4/2024) s/p radiation (4/2024-7/2024) c/b dysphagia on modified diet, CREST syndrome, hypothyroidism, anxiety and depression who was admitted on 11/21/2024 with hypoxic respiratory failure with suspected new HFpEF requiring diuresis. She developed acute hypoxia on 12/8 secondary to suspected aspiration pneumonitis s/p intubation (12/8-12/10).     CHANGES and MAJOR THINGS TODAY:   - Floor status  - Speech and Nutrition continues to follow   - Increase physical therapy today  - Continue to encourage incentive spirometer and flutter valve       PLAN:    Neuro:  # Pain  - Acetaminophen 650mg q4h PRN    # Anxiety  # Depression  - PTA Sertraline 150mg qd      Pulmonary:  # Acute hypoxic respiratory failure, improving  Presented on 11/21 with acute hypoxic respiratory failure; initially suspected to be secondary to HFpEF exacerbation; unresolved with diuresis. Overnight 12/8, developed sudden worsening of oxygenation and increased secretions in the setting of dysphagia/recent laryngeal radiation and was intubated for airway protection. Etiology of acute hypoxic respiratory failure may be recurrent aspiration pneumonitis, developing pneumonia, pulmonary edema 2/2 HFpEF exacerbation; broad differential includes COPD exacerbation. Lower suspicion for asthma exacerbation (no wheezing on exam) or new diagnosis of ILD in the setting of Crest syndrome (CT chest without diffuse GGO). Now s/p extubation on 12/10 to nasal cannula  - Oxygen supplementation for SpO2 >92%  - Aspiration precautions; speech following     # Hx obstructive lung disease (asthma vs COPD)  # Hx pulmonary hypertension (44 mmHg per echo 11/2024)  PFTs  (3/2022) demonstrated severe obstructive lung disease with hyperinflation; on PTA budesonide-formoterol BID and albuterol/duonebs PRN. On transfer to MICU, no wheezing concerning for asthma or COPD exacerbation. Will continue PTA inhaler regimen; can consider oupatient follow-up for obstructive lung disease/repeat PFTs and management of pulmonary hypertension.   - Formulary substitutes for home inhaler regimen    Budesonide neb BID   Ipratropium-levalbuterol neb QID       Cardiovascular:  # Heart failure with preserved ejection fraction (LVEF 55-60% 11/22/2024)  # Pulmonary hypertension (44 mmHg per echo 11/22/2024)  # Possible small PFO  Echo during current admission (11/22/2024) notable for increased thickness of LV and elevated BNP (peak 9200 > 6200) concerning for heart failure exacerbation s/p diuresis and pulmonary hypertension with estimated pulmonary artery pressure of 45 mmHg. Echo with bubble study (11/25/2024) concerning for possible small PFO. With intubation on 12/8, developed hypotension without evidence of shock. Etiology of hypotension may be cardiogenic (e.g. exacerbation of pulmonary hypertension by positive pressure ventilation) vs medication associated (propofol, precedex); do not currently suspect distributive or obstructive hypotension.  - MAP goal >65; weaned off pressors since 12/10  - Preload dependent in the setting of pulmonary hypertension, gentle diuresis as needed; will hold today  - 2L FR    # Chronic afib  [id number] 3 (age++, HF+).   - PTA apixaban 5mg BID  - PTA diltiazem 240 ER BID - HOLD (cannot give ER enterally)   Up-titrate to short-acting Diltiazem 120mg q6H as tolerated by blood pressure  - PTA propanolol 10mg BID     GI/Nutrition:  # Oral secretions, increased  # Esophageal dysmotility  # Laryngeal squamous cell carcinoma s/p radiation (4/2024-7/2024)  Per daughter, baseline weight around 120-130 lbs. Admitted 102 lbs. Patient states she is eating less and has less  "appetite. Has had increased dysphagia in the setting of laryngeal squamous cell carcinoma s/p radiation (4/2024-7/2024) as well as some concern for esophageal web on swallow study (10/8/2024) pending GI input. Concern for respiratory decompensation due to recurrent aspiration events.   - Speech consult  Strict NPO pending evaluation  - Can consider glycopyrrolate for increased oral secretions  - Will consider GI consult for management of esophageal web    # Starvation ketosis  # Risk for refeeding syndrome  # Severe protein-calore malnutrition  Per daughter, baseline weight around 120-130 lbs. Admitted 102 lbs. Patient states she is eating less and has less appetite in general as well as having difficulty swallowing. While NPO during intubation, starvation ketosis. Given high risk for aspiration with PO intake, placed NGT and started tube feeds. Will monitor closely for refeeding syndrome.   - Strict NPO pending speech evaluation: Recommend repeat VFSS in 1-2 weeks or as acute illness resolves prior to liquid advancement d/t silent aspiration.\" So consider pursuing additional VFSS on 12/13 or next week. Then consider ongoing discussions with family and patient in regards to necessity or prolonged feeding tube or not.   - Nutrition consult  NGT placement  Tube feeds  - Monitor refeeding syndrome BID  - Give IV thiamine (500mg TID x2, 250mg every day x5, then 100mg every day)      Renal/Fluids/Electrolytes:  # SAMIR, progressive  # Oliguria  Baseline Cr ~0.9. On transfer, Cr 1.19, concerning for SAMIR in the setting of diuresis. Progressive with NPO status.  - Monitor BMP  - FWF via NGT      Endocrine:  # Hypothyroidism  Has hx of hypothyroidism s/p thyroidectomy 2/2 cancer.   - PTA levothyroxine 88 mcg qd    # Risk for hyperglycemia  With D5LR for starvation ketosis, will monitor blood sugars and start MSSI.   - MSSI  - Hypogylcemia protocol      ID:  # MSSA PNA s/p abx (12/3-12/8)  Acute hypoxic respiratory failure on " 12/9 requiring intubation. Labs notable for increased WBC (16.5) and procal (0.44). CT Chest (12/8) with mediastinal/hilar lymphadenopathy and resolving diffuse ground glass opacities most consistent with resolving pneumonia; no signs of progressive or secondary infectious process. Has recently completed antibiotics for MSSA PNA. Without clear signs of new pneumonia, will hold IV antibiotics and monitor clinical response.    - Workup   MRSA nares: Negative   Sputum culture (12/9) with candida, likely colonization  - Antibiotics   S/p Augmentin (12/3-12/5)   S/p Unasyn (12/6-12/8)   S/p Vancomycin (12/9)   S/p Zosyn (12/9 - 12/10)      Hematology:    # Anemia of chronic illness  Baseline Hgb 11-12. Currently near baseline. Can consider anemia of chronic illness.   - Monitor CBC      Musculoskeletal:  # Generalized deconditioning  # Recurrent falls  - PT/OT consults    # CREST Syndrome  Characterized by Raynaud's, Calcinosis, GERD and some telangectasia's. Last saw [facility] rheumatology 2017. No history of ILD.   - PO pantoprazole 40mg every day      Skin:  No acute concerns.      General Cares/Prophylaxis:    DVT Prophylaxis: DOAC  GI Prophylaxis: PPI  Restraints: None  Family Communication: Will call the daughter to update her   Code Status: Full    Lines/tubes/drains:  - RIJ (remove)  - A line (remove)  - OGT/ETT  - Miranda (remove)     Disposition:  - Floor status     Patient seen and findings/plan discussed with medical ICU staff, Dr. Rivera.    Total critical care time includes 40 minutes.     VERONICA Aragon CNP    Clinically Significant Risk Factors        # Hypokalemia: Lowest K = 2.8 mmol/L in last 2 days, will replace as needed   # Hypochloremia: Lowest Cl = 95 mmol/L in last 2 days, will monitor as appropriate     # Anion Gap Metabolic Acidosis: Highest Anion Gap = 20 mmol/L in last 2 days, will monitor and treat as appropriate  # Hypoalbuminemia: Lowest albumin = 3 g/dL at 12/10/2024  3:11 AM, will  "monitor as appropriate                  # Cachexia: Estimated body mass index is 17.87 kg/m  as calculated from the following:    Height as of this encounter: 1.651 m (5' 5\").    Weight as of this encounter: 48.7 kg (107 lb 5.8 oz).   # Severe Malnutrition: based on nutrition assessment    # Financial/Environmental Concerns: none          ====================================  INTERVAL HISTORY:   Patient feels breathing has improved today. Remains hemodynamically stable, on nasal cannula. Will continue to work with speech and therapies.     OBJECTIVE:   1. VITAL SIGNS:   Temp:  [97.8  F (36.6  C)-98.8  F (37.1  C)] 97.8  F (36.6  C)  Pulse:  [] 76  Resp:  [16-28] 21  BP: ()/(58-81) 116/81  MAP:  [51 mmHg-83 mmHg] 76 mmHg  Arterial Line BP: ()/(44-68) 104/59  FiO2 (%):  [30 %] 30 %  SpO2:  [92 %-100 %] 96 %  FiO2 (%): 30 %, Resp: 21, Vent Mode: (S) CPAP/PS, Resp Rate (Set): 12 breaths/min, Tidal Volume (Set, mL): 360 mL, PEEP (cm H2O): 5 cmH2O, Pressure Support (cm H2O): 5 cmH2O, Resp Rate (Set): 12 breaths/min, Tidal Volume (Set, mL): 360 mL, PEEP (cm H2O): 5 cmH2O  2. INTAKE/ OUTPUT:   I/O last 3 completed shifts:  In: 1855.33 [I.V.:745.33; NG/GT:890]  Out: 737 [Urine:737]    3. PHYSICAL EXAMINATION:  GENERAL: Laying in bed, no acute distress  HEENT: Atraumatic, moist mucus membranes, PERRL  RESP: Unlabored breathing on nasal canula, no wheezes  CARDIO: Regular rate and rhythm, extremities cool, no peripheral edema  NEURO: Alert and oriented, significant generalized weakness    4. LABS:   Arterial Blood Gases   Recent Labs   Lab 12/10/24  1006 12/10/24  0312 12/09/24 2238 12/09/24  1546   PH 7.48* 7.49* 7.51* 7.50*   PCO2 37 34* 33* 34*   PO2 92 87 82 89   HCO3 28 26 26 27     Complete Blood Count   Recent Labs   Lab 12/11/24  0514 12/10/24  0311 12/09/24  0534 12/08/24  2317   WBC 11.5* 12.3* 16.5* 14.7*   HGB 9.4* 10.0* 10.8* 13.1    453* 491* 501*     Basic Metabolic Panel  Recent Labs "   Lab 12/11/24  0514 12/11/24  0409 12/11/24  0157 12/11/24  0001 12/10/24  1538 12/10/24  0311 12/09/24  1550 12/09/24  1545     --  138  --   --  137  --  135   POTASSIUM 3.5  --  2.8*  --   --  4.3  --  4.6   CHLORIDE 97*  --  97*  --   --  95*  --  95*   CO2 26  --  24  --   --  22  --  25   BUN 40.7*  --  41.1*  --   --  31.3*  --  30.4*   CR 1.41*  --  1.45*  --   --  1.48*  --  1.30*   * 115* 102* 96   < > 100*   < > 116*    < > = values in this interval not displayed.     Liver Function Tests  Recent Labs   Lab 12/11/24  0157 12/10/24  0311 12/09/24  0534 12/08/24  2317   AST 18 14 16 17   ALT 12 10 12 15   ALKPHOS 65 69 70 87   BILITOTAL 0.2 0.3 0.3 0.4   ALBUMIN 3.1* 3.0* 3.0* 3.7   INR  --   --   --  2.43*     Coagulation Profile  Recent Labs   Lab 12/08/24  2317   INR 2.43*   PTT 47*       5. RADIOLOGY:   Recent Results (from the past 24 hours)   XR Abdomen Port 1 View    Narrative    EXAMINATION: XR ABDOMEN PORT 1 VIEW 12/10/2024 1:50 PM.    COMPARISON: None.    HISTORY: Verify small bowel feeding tube bedside placement    FINDINGS:   Portable AP view of the abdomen. Feeding tube tip projects over the  stomach. Nonobstructive bowel with contrast present throughout the  colon from recent swallow study. Multiple colonic diverticula  demonstrated. No pneumatosis or portal venous gas. No acute osseous  abnormality. Decreased small right and similar small left pleural  effusions with streaky opacities in the included lung bases.        Impression    IMPRESSION:   Feeding tube tip projects over the stomach.    I have personally reviewed the examination and initial interpretation  and I agree with the findings.    PRATIK SINGH MD         SYSTEM ID:  [id number]

## 2024-12-11 NOTE — PLAN OF CARE
Problem: Comorbidity Management  Goal: Blood Pressure in Desired Range  Outcome: Progressing  Intervention: Maintain Blood Pressure Management  Recent Flowsheet Documentation  Taken 12/11/2024 0000 by Mae Dc, RN  Medication Review/Management: medications reviewed  Taken 12/10/2024 2000 by Mae Dc, RN  Medication Review/Management: medications reviewed   Goal Outcome Evaluation:  MAP goal >65. Mark available if needed. Held PM dose of propanolol dt marginal BP's. MAPs overnight maintained 65-70.                     Major Shift Events:  TF initiated during day shift. Frequent loose stooling since, roughly 12 liquid BM's over 24 hours. Concern for electrolyte abnormalities, check CMP at 0200. K resulted at 2.8, replacement protocol ordered. Reached out to MD following- asked about dropping TF rate back down & possibly adding PO potassium as well. No new orders, will recheck full set labs at 0600. Official K recheck ordered for 1000. Due to increase TF rate at 0800 to goal of 30mL/hr, however holding due to electrolyte imbalances. Held PM dose of propanolol dt marginal BP's, gave Dilt to assist with rate control. Coughing with ice chips.     Plan: Need to discuss TF's & possible refeeding syndrome. Increase activity as tolerated.   For vital signs and complete assessments, please see documentation flowsheets.

## 2024-12-11 NOTE — PROGRESS NOTES
[facility]    Medicine Progress Note - Hospitalist Service, GOLD TEAM 11    Date of Admission:  11/21/2024    Assessment & Plan   Asya Coffman is a 79 year old female admitted on 11/21/2024. 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 Initially admitted to hospital medicine service with hypoxic respiratory failure suspected due to HFpEF requiring diuresis. Developed acute hypoxia on 12/8 secondary to suspected aspiration pneumonitis for which she was intubated (12/8-12/10). Now s/p extubation and transferring back to hospital medicine service.      Changes today:  - Floor status  - Speech and Nutrition continues to follow   - Increase physical therapy   - Continue to encourage incentive spirometer and flutter valve   - On 2L and continue to wean as able   - Miranda removed -- monitor for retention     Acute hypoxic respiratory failure - improving  Aspiration pneumonitis  MSSA PNA s/p abx (12/3-12/8)  Obstructive lung disease (COPD vs asthma)  Pulmonary hypertension   Presented on 11/21 with acute hypoxic respiratory failure; initially suspected to be secondary to HFpEF exacerbation; unresolved with diuresis. Underlying obstructive lung disease but no evidence of exacerbation here. Was treated for MSSA pneumonia. Overnight 12/8, developed sudden worsening of oxygenation and increased secretions in the setting of dysphagia/recent laryngeal radiation and was intubated for airway protection. Suspected aspiration pneumonitis as etiology. CT Chest (12/8) with mediastinal/hilar lymphadenopathy and resolving diffuse ground glass opacities most consistent with resolving pneumonia; no signs of  progressive or secondary infectious process. Was briefly on zosyn but this was discontinued as she was treated for full course based on initial pneumonia. She has been extubated and now on 2L. Ongoing upper airway adventitious sounds.   - Pulse oximetry, supplemental O2  - Budesonide neb 0.5 mg BID, ipratropium-levalbuterol neb QID  - Aspiration precautions; speech following   - Pending sputum culture (12/9) 3+ Pseudohyphae   - Flutter valve, incentive spirometer  - Low threshold to resume empiric abx for pnuemonia coverage      HFpEF (LVEF 55-60% 11/22/2024)   Pulmonary hypertension (44 mmHg per echo 11/22/2024)  Possible small PFO  Echo during current admission (11/22/2024) notable for increased thickness of LV and elevated BNP (peak 9200 > 6200) concerning for heart failure exacerbation s/p diuresis and pulmonary hypertension with estimated pulmonary artery pressure of 45 mmHg. Echo with bubble study (11/25/2024) concerning for possible small PFO. With intubation on 12/8, developed hypotension without evidence of shock. Etiology of hypotension may be cardiogenic (e.g. exacerbation of pulmonary hypertension by positive pressure ventilation) vs medication associated (propofol, precedex); do not currently suspect distributive or obstructive hypotension. Weaned off pressors 12/10.   - Preload dependent in the setting of pulmonary hypertension, gentle diuresis as needed (will hold today)   - 2L FR    Chronic afib  [id number] 3 (age++, HF+).   - PTA apixaban 5mg BID  - PTA diltiazem 240 ER BID - HOLD (cannot give ER enterally)               Up-titrate to short-acting Diltiazem 120mg q6H as tolerated by blood pressure  - PTA propanolol 10mg BID      Oral secretions, increased  Moderate-severe pharyngeal dysphagia  Esophageal dysmotility  Laryngeal squamous cell carcinoma s/p radiation (4/2024-7/2024)  Has had increased dysphagia in the setting of laryngeal squamous cell carcinoma s/p radiation (4/2024-7/2024) as well  as some concern for esophageal web on swallow study (10/8/2024) pending GI input. Some concern for respiratory decompensation due to recurrent aspiration events.   - Speech following  - NPO - okay for ice chips for comfort with 1:1 supervision  - Will need VFSS -- SLP will notify when they feel she is ready for this  - Will consider GI consult for management of esophageal web     At risk for refeeding syndrome  Starvation ketosis  Hypokalemia  Severe malnutrition in context of chronic illness/disease  Per daughter, baseline weight around 120-130 lbs. Admitted 102 lbs. Patient states she is eating less and has less appetite in general as well as having difficulty swallowing. While NPO during intubation, developed starvation ketosis. Given high risk for aspiration with PO intake, placed NGT and started tube feeds. Will need close monitoring for refeeding syndrome.   - NPO   - RD following for TF management   - Monitor for refeeding syndrome   - Lyte replacement protocol   - IV thiamine replacement      Oliguric SAMIR  Cr 0.9 on admission. Baseline appears 0.9-1.0. Developed SAMIR initially in setting of diuresis and had been improving. Subsequently increased following transfer to ICU with consideration for prerenal in setting of NPO status. Also with new diarrhea. Cr stable.   - Strict I/Os  - Trend BMP   - FWF via NGT    Diarrhea  Frequent loose stools. Unclear timing of onset so not sure if related to abx or TF initiation. No abdominal pain.  - Monitor   - Hold on infectious testing for now     Hypothyroidism  Hx of thyroidectomy 2/2 cancer   - Continue PTA levothyroxine 88 mcg daily      Anemia of chronic illness  Baseline Hgb 11-12. Currently near baseline. Can consider anemia of chronic illness.   - Monitor CBC     Generalized deconditioning  Recurrent falls  - PT/OT consults     CREST Syndrome  Characterized by Raynaud's, Calcinosis, GERD and some telangectasia's. Last saw [facility] 2017. No history of  "ILD.   - Continue protonix 40 mg daily     Anxiety  Depression  - Continue PTA Sertraline 150 mg daily           Diet: Fluid restriction 2000 ML FLUID  NPO for Medical/Clinical Reasons Except for: No Exceptions  Adult Formula Drip Feeding: Continuous Novasource Renal; Nasogastric tube; Goal Rate: 30; mL/hr; Initiate @ 10 ml/hr and advance by 10 ml Q8H pending pt's tolerance.; Do not advance tube feeding rate unless K+ is = or > 3.0, Mg++ is = or > 1.5, an...    DVT Prophylaxis: DOAC  Miranda Catheter: PRESENT, indication: ICU only: hourly urine output needed for patient care  Lines: PRESENT      CVC Triple Lumen Right Internal jugular-Site Assessment: WDL  Arterial Line 12/09/24 Radial-Site Assessment: WDL      Cardiac Monitoring: None  Code Status: Full Code      Clinically Significant Risk Factors        # Hypokalemia: Lowest K = 2.8 mmol/L in last 2 days, will replace as needed   # Hypochloremia: Lowest Cl = 95 mmol/L in last 2 days, will monitor as appropriate     # Anion Gap Metabolic Acidosis: Highest Anion Gap = 20 mmol/L in last 2 days, will monitor and treat as appropriate  # Hypoalbuminemia: Lowest albumin = 3 g/dL at 12/10/2024  3:11 AM, will monitor as appropriate                # Cachexia: Estimated body mass index is 17.87 kg/m  as calculated from the following:    Height as of this encounter: 1.651 m (5' 5\").    Weight as of this encounter: 48.7 kg (107 lb 5.8 oz).   # Severe Malnutrition: based on nutrition assessment      # Financial/Environmental Concerns: none         Social Drivers of Health    Housing Stability: High Risk (11/23/2024)    Housing Stability     Do you have housing? : No     Are you worried about losing your housing?: No          Disposition Plan     Medically Ready for Discharge: Anticipated in 5+ Days           The patient's care was discussed with the Bedside Nurse and Patient.    Marcia Loaiza PA-C  Hospitalist Service, GOLD TEAM [address] [place] [address] " [facility]  Securely message with [employer] (more info)  Text page via [facility] Paging/Directory   See signed in provider for up to date coverage information  ______________________________________________________________________    Interval History   No acute events overnight. Down to 2L. Breathing feels okay. Having some loose stools. RN not sure when they started (abx or TF related?) No abdomina pain.     Physical Exam   Vital Signs: Temp: 98  F (36.7  C) Temp src: Oral BP: 116/81 Pulse: 108   Resp: 21 SpO2: 96 % O2 Device: Nasal cannula Oxygen Delivery: 2 LPM  Weight: 107 lbs 5.82 oz    General Appearance: Awake. Alert. No acute distress. Frail appearing.   Respiratory: Normal work of breathing on 2L. Lungs with loud upper airway breath sounds. Lower lobes sound clear.   Cardiovascular: Irregular. No obvious murmur.   GI: Nondistended. Normoactive. Soft. Non-tender.   Skin: Warm, dry.     Medical Decision Making       45 MINUTES SPENT BY ME on the date of service doing chart review, history, exam, documentation & further activities per the note.      Data     I have personally reviewed the following data over the past 24 hrs:    11.5 (H)  \   9.4 (L)   / 416     135 98 43.0 (H) /  112 (H); 118 (H)   3.3 (L); 3.3 (L) 25 1.26 (H) \     ALT: 12 AST: 18 AP: 65 TBILI: 0.2   ALB: 3.1 (L) TOT PROTEIN: 6.2 (L) LIPASE: N/A       Imaging results reviewed over the past 24 hrs:   Recent Results (from the past 24 hours)   XR Abdomen Port 1 View    Narrative    EXAMINATION: XR ABDOMEN PORT 1 VIEW 12/10/2024 1:50 PM.    COMPARISON: None.    HISTORY: Verify small bowel feeding tube bedside placement    FINDINGS:   Portable AP view of the abdomen. Feeding tube tip projects over the  stomach. Nonobstructive bowel with contrast present throughout the  colon from recent swallow study. Multiple colonic diverticula  demonstrated. No pneumatosis or portal venous gas. No acute osseous  abnormality. Decreased small right and similar small  left pleural  effusions with streaky opacities in the included lung bases.        Impression    IMPRESSION:   Feeding tube tip projects over the stomach.    I have personally reviewed the examination and initial interpretation  and I agree with the findings.    PRATIK SINGH MD         SYSTEM ID:  [id number]

## 2024-12-11 NOTE — TELEPHONE ENCOUNTER
[facility] Call Center    Phone Message    May a detailed message be left on voicemail: yes     Reason for Call: Other: Daughter is requesting to speak with care team regarding her mother's condition. She is currently in the hospital. Please call to advise. Did confirm phone number. Thanks      Action Taken: Message routed to:  [facility] ENT    Travel Screening: Not Applicable     Date of Service:

## 2024-12-12 VITALS
HEART RATE: 82 BPM | BODY MASS INDEX: 17.89 KG/M2 | RESPIRATION RATE: 23 BRPM | OXYGEN SATURATION: 96 % | TEMPERATURE: 97.7 F | DIASTOLIC BLOOD PRESSURE: 68 MMHG | SYSTOLIC BLOOD PRESSURE: 117 MMHG | WEIGHT: 107.36 LBS | HEIGHT: 65 IN

## 2024-12-12 LAB
ERYTHROCYTE [DISTWIDTH] IN BLOOD BY AUTOMATED COUNT: 19 % (ref 10–15)
HCT VFR BLD AUTO: 33.3 % (ref 35–47)
HGB BLD-MCNC: 9.9 G/DL (ref 11.7–15.7)
MAGNESIUM SERPL-MCNC: 2.4 MG/DL (ref 1.7–2.3)
MCH RBC QN AUTO: 25.4 PG (ref 26.5–33)
MCHC RBC AUTO-ENTMCNC: 29.7 G/DL (ref 31.5–36.5)
MCV RBC AUTO: 85 FL (ref 78–100)
PHOSPHATE SERPL-MCNC: 2.9 MG/DL (ref 2.5–4.5)
PLATELET # BLD AUTO: 462 10E3/UL (ref 150–450)
POTASSIUM SERPL-SCNC: 3.7 MMOL/L (ref 3.4–5.3)
RBC # BLD AUTO: 3.9 10E6/UL (ref 3.8–5.2)
WBC # BLD AUTO: 9.9 10E3/UL (ref 4–11)

## 2024-12-12 PROCEDURE — 97535 SELF CARE MNGMENT TRAINING: CPT | Mod: GO

## 2024-12-12 PROCEDURE — 250N000013 HC RX MED GY IP 250 OP 250 PS 637: Performed by: SURGERY

## 2024-12-12 PROCEDURE — 250N000013 HC RX MED GY IP 250 OP 250 PS 637: Performed by: INTERNAL MEDICINE

## 2024-12-12 PROCEDURE — 250N000013 HC RX MED GY IP 250 OP 250 PS 637

## 2024-12-12 PROCEDURE — 97116 GAIT TRAINING THERAPY: CPT | Mod: GP | Performed by: PHYSICAL THERAPIST

## 2024-12-12 PROCEDURE — 99232 SBSQ HOSP IP/OBS MODERATE 35: CPT | Performed by: STUDENT IN AN ORGANIZED HEALTH CARE EDUCATION/TRAINING PROGRAM

## 2024-12-12 PROCEDURE — 94640 AIRWAY INHALATION TREATMENT: CPT | Mod: 76

## 2024-12-12 PROCEDURE — 250N000009 HC RX 250

## 2024-12-12 PROCEDURE — 85027 COMPLETE CBC AUTOMATED: CPT

## 2024-12-12 PROCEDURE — 94640 AIRWAY INHALATION TREATMENT: CPT

## 2024-12-12 PROCEDURE — 250N000011 HC RX IP 250 OP 636

## 2024-12-12 PROCEDURE — 250N000013 HC RX MED GY IP 250 OP 250 PS 637: Performed by: STUDENT IN AN ORGANIZED HEALTH CARE EDUCATION/TRAINING PROGRAM

## 2024-12-12 PROCEDURE — 84132 ASSAY OF SERUM POTASSIUM: CPT

## 2024-12-12 PROCEDURE — 84100 ASSAY OF PHOSPHORUS: CPT

## 2024-12-12 PROCEDURE — 999N000157 HC STATISTIC RCP TIME EA 10 MIN

## 2024-12-12 PROCEDURE — 120N000002 HC R&B MED SURG/OB UMMC

## 2024-12-12 PROCEDURE — 250N000013 HC RX MED GY IP 250 OP 250 PS 637: Performed by: PHYSICIAN ASSISTANT

## 2024-12-12 PROCEDURE — 83735 ASSAY OF MAGNESIUM: CPT

## 2024-12-12 PROCEDURE — 97530 THERAPEUTIC ACTIVITIES: CPT | Mod: GP | Performed by: PHYSICAL THERAPIST

## 2024-12-12 RX ADMIN — IPRATROPIUM BROMIDE 0.5 MG: 0.5 SOLUTION RESPIRATORY (INHALATION) at 21:20

## 2024-12-12 RX ADMIN — APIXABAN 5 MG: 5 TABLET, FILM COATED ORAL at 19:49

## 2024-12-12 RX ADMIN — BUDESONIDE 0.5 MG: 0.5 INHALANT ORAL at 07:41

## 2024-12-12 RX ADMIN — FEXOFENADINE HYDROCHLORIDE 120 MG: 60 TABLET ORAL at 09:14

## 2024-12-12 RX ADMIN — Medication 60 ML: at 09:15

## 2024-12-12 RX ADMIN — GABAPENTIN 600 MG: 300 CAPSULE ORAL at 22:36

## 2024-12-12 RX ADMIN — BUDESONIDE 0.5 MG: 0.5 INHALANT ORAL at 21:20

## 2024-12-12 RX ADMIN — IPRATROPIUM BROMIDE 0.5 MG: 0.5 SOLUTION RESPIRATORY (INHALATION) at 12:57

## 2024-12-12 RX ADMIN — LEVALBUTEROL HYDROCHLORIDE 0.63 MG: 0.63 SOLUTION RESPIRATORY (INHALATION) at 17:09

## 2024-12-12 RX ADMIN — POTASSIUM & SODIUM PHOSPHATES POWDER PACK 280-160-250 MG 2 PACKET: 280-160-250 PACK at 03:59

## 2024-12-12 RX ADMIN — IPRATROPIUM BROMIDE 0.5 MG: 0.5 SOLUTION RESPIRATORY (INHALATION) at 17:07

## 2024-12-12 RX ADMIN — PROPRANOLOL HYDROCHLORIDE 10 MG: 10 TABLET ORAL at 09:11

## 2024-12-12 RX ADMIN — SERTRALINE HYDROCHLORIDE 150 MG: 100 TABLET ORAL at 09:10

## 2024-12-12 RX ADMIN — THIAMINE HYDROCHLORIDE 500 MG: 100 INJECTION, SOLUTION INTRAMUSCULAR; INTRAVENOUS at 09:12

## 2024-12-12 RX ADMIN — THIAMINE HYDROCHLORIDE 500 MG: 100 INJECTION, SOLUTION INTRAMUSCULAR; INTRAVENOUS at 01:14

## 2024-12-12 RX ADMIN — DILTIAZEM HYDROCHLORIDE 120 MG: 120 TABLET, FILM COATED ORAL at 12:14

## 2024-12-12 RX ADMIN — THERA TABS 1 TABLET: TAB at 09:11

## 2024-12-12 RX ADMIN — LEVALBUTEROL HYDROCHLORIDE 0.63 MG: 0.63 SOLUTION RESPIRATORY (INHALATION) at 07:41

## 2024-12-12 RX ADMIN — LEVOTHYROXINE SODIUM 88 MCG: 88 TABLET ORAL at 06:36

## 2024-12-12 RX ADMIN — PANTOPRAZOLE SODIUM 40 MG: 40 INJECTION, POWDER, FOR SOLUTION INTRAVENOUS at 09:10

## 2024-12-12 RX ADMIN — IPRATROPIUM BROMIDE 0.5 MG: 0.5 SOLUTION RESPIRATORY (INHALATION) at 07:41

## 2024-12-12 RX ADMIN — LEVALBUTEROL HYDROCHLORIDE 0.63 MG: 0.63 SOLUTION RESPIRATORY (INHALATION) at 12:58

## 2024-12-12 RX ADMIN — LEVALBUTEROL HYDROCHLORIDE 0.63 MG: 0.63 SOLUTION RESPIRATORY (INHALATION) at 21:20

## 2024-12-12 RX ADMIN — DILTIAZEM HYDROCHLORIDE 120 MG: 120 TABLET, FILM COATED ORAL at 23:18

## 2024-12-12 RX ADMIN — DILTIAZEM HYDROCHLORIDE 120 MG: 120 TABLET, FILM COATED ORAL at 18:30

## 2024-12-12 RX ADMIN — APIXABAN 5 MG: 5 TABLET, FILM COATED ORAL at 09:11

## 2024-12-12 RX ADMIN — DILTIAZEM HYDROCHLORIDE 120 MG: 120 TABLET, FILM COATED ORAL at 06:36

## 2024-12-12 ASSESSMENT — ACTIVITIES OF DAILY LIVING (ADL)
ADLS_ACUITY_SCORE: 68
ADLS_ACUITY_SCORE: 64
ADLS_ACUITY_SCORE: 64
ADLS_ACUITY_SCORE: 68
ADLS_ACUITY_SCORE: 64
ADLS_ACUITY_SCORE: 64
ADLS_ACUITY_SCORE: 68
ADLS_ACUITY_SCORE: 64
ADLS_ACUITY_SCORE: 68
ADLS_ACUITY_SCORE: 64
ADLS_ACUITY_SCORE: 64
ADLS_ACUITY_SCORE: 68
ADLS_ACUITY_SCORE: 64
ADLS_ACUITY_SCORE: 72
ADLS_ACUITY_SCORE: 68
ADLS_ACUITY_SCORE: 64
ADLS_ACUITY_SCORE: 68
ADLS_ACUITY_SCORE: 64
ADLS_ACUITY_SCORE: 68
ADLS_ACUITY_SCORE: 64
ADLS_ACUITY_SCORE: 64

## 2024-12-12 NOTE — PLAN OF CARE
Updated: 12/11 1800  [employer] [place]    Neuro: Alert & oriented x4. PERRLA, FULLER, generally weak.    CV: A-fib/flutter 60-100s. Q6hr PO dilt restarted 12/10. MAP > 65. Pulses dopplerable. Pull ART.    PULM: 2 L NC, weak but productive cough. Frequent deep oral suctioning, tolerates well.     GI: LBM 12/11, loose stooling slowed. NG bridled @ 55  TF @ 30 w/ FWF 30 ml Q4H. NPO- SLP following, swabs ok,     : Miranda removed - BS for 125ml    Skin: Blanchable redness to coccyx and perineum- barrier cream applied No major skin concerns.    Lines: Right radial art line, PIV x2    Drips:     Labs: WBC 11.5  (on zosyn, was being treated for MSSA in sputum with Augmentin, currently on zosyn). K 3.3.     Family: Daughter Lana, Son Deo, physical copy of health care directive in chart, needs to be scanned.

## 2024-12-12 NOTE — PROGRESS NOTES
A 9 fr sheath was successfully inserted using micropuncture technique with ultrasound guidance into the left axillary vein. Sheath insertion not delayed. [facility]    Medicine Progress Note - Hospitalist Service, GOLD TEAM 8    Date of Admission:  11/21/2024    Assessment & Plan   Asya Coffman is a 79 year old female admitted on 11/21/2024. 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 Initially admitted to hospital medicine service with hypoxic respiratory failure suspected due to HFpEF requiring diuresis. Developed acute hypoxia on 12/8 secondary to suspected aspiration pneumonitis for which she was intubated (12/8-12/10). Now s/p extubation and transferring back to hospital medicine service.      Changes today:  - Cr stable, leukocytosis resolved and remains afebrile on 1-2 L NC  - SLP still recs NPO. NGT in place, getting tube feeds  - resp culture grew candida but leukocytosis improved and back on minimal low flow oxygen so will not treat for now  - Speech and Nutrition continues to follow   - Continues working with physical therapy today   ***     Acute hypoxic respiratory failure - improving  Aspiration pneumonitis  MSSA PNA s/p abx (12/3-12/8)  Obstructive lung disease (COPD vs asthma)  Pulmonary hypertension   Presented on 11/21 with acute hypoxic respiratory failure; initially suspected to be secondary to HFpEF exacerbation; unresolved with diuresis. Underlying obstructive lung disease but no evidence of exacerbation here. Was treated for MSSA pneumonia. Overnight 12/8, developed sudden worsening of oxygenation and increased secretions in the setting of dysphagia/recent laryngeal radiation and was intubated for airway protection. Suspected aspiration pneumonitis as etiology. CT Chest (12/8) with mediastinal/hilar lymphadenopathy and  resolving diffuse ground glass opacities most consistent with resolving pneumonia; no signs of progressive or secondary infectious process. Was briefly on zosyn but this was discontinued as she was treated for full course based on initial pneumonia. She has been extubated and now on 2L. Ongoing upper airway adventitious sounds.   - Pulse oximetry, supplemental O2  - Budesonide neb 0.5 mg BID, ipratropium-levalbuterol neb QID  - Aspiration precautions; speech following   - Pending sputum culture (12/9) 3+ Pseudohyphae   - Flutter valve, incentive spirometer  - Low threshold to resume empiric abx for pnuemonia coverage      HFpEF (LVEF 55-60% 11/22/2024)   Pulmonary hypertension (44 mmHg per echo 11/22/2024)  Possible small PFO  Echo during current admission (11/22/2024) notable for increased thickness of LV and elevated BNP (peak 9200 > 6200) concerning for heart failure exacerbation s/p diuresis and pulmonary hypertension with estimated pulmonary artery pressure of 45 mmHg. Echo with bubble study (11/25/2024) concerning for possible small PFO. With intubation on 12/8, developed hypotension without evidence of shock. Etiology of hypotension may be cardiogenic (e.g. exacerbation of pulmonary hypertension by positive pressure ventilation) vs medication associated (propofol, precedex); do not currently suspect distributive or obstructive hypotension. Weaned off pressors 12/10.   - Preload dependent in the setting of pulmonary hypertension, gentle diuresis as needed (will hold today)   - 2L FR    Chronic afib  [id number] 3 (age++, HF+).   - PTA apixaban 5mg BID  - PTA diltiazem 240 ER BID - HOLD (cannot give ER enterally)               Up-titrate to short-acting Diltiazem 120mg q6H as tolerated by blood pressure  - PTA propanolol 10mg BID      Oral secretions, increased  Moderate-severe pharyngeal dysphagia  Esophageal dysmotility  Laryngeal squamous cell carcinoma s/p radiation (4/2024-7/2024)  Has had increased  dysphagia in the setting of laryngeal squamous cell carcinoma s/p radiation (4/2024-7/2024) as well as some concern for esophageal web on swallow study (10/8/2024) pending GI input. Some concern for respiratory decompensation due to recurrent aspiration events.   - Speech following  - NPO - okay for ice chips for comfort with 1:1 supervision  - Will need VFSS -- SLP will notify when they feel she is ready for this  - Will consider GI consult for management of esophageal web     At risk for refeeding syndrome  Starvation ketosis  Hypokalemia  Severe malnutrition in context of chronic illness/disease  Per daughter, baseline weight around 120-130 lbs. Admitted 102 lbs. Patient states she is eating less and has less appetite in general as well as having difficulty swallowing. While NPO during intubation, developed starvation ketosis. Given high risk for aspiration with PO intake, placed NGT and started tube feeds. Will need close monitoring for refeeding syndrome.   - NPO   - RD following for TF management   - Monitor for refeeding syndrome   - Lyte replacement protocol   - IV thiamine replacement      Oliguric SAMIR  Cr 0.9 on admission. Baseline appears 0.9-1.0. Developed SAMIR initially in setting of diuresis and had been improving. Subsequently increased following transfer to ICU with consideration for prerenal in setting of NPO status. Also with new diarrhea. Cr stable.   - Strict I/Os  - Trend BMP   - FWF via NGT    Diarrhea  Frequent loose stools. Unclear timing of onset so not sure if related to abx or TF initiation. No abdominal pain.  - Monitor   - Hold on infectious testing for now     Hypothyroidism  Hx of thyroidectomy 2/2 cancer   - Continue PTA levothyroxine 88 mcg daily      Anemia of chronic illness  Baseline Hgb 11-12. Currently near baseline. Can consider anemia of chronic illness.   - Monitor CBC     Generalized deconditioning  Recurrent falls  - PT/OT consults     CREST Syndrome  Characterized by  "Raynaud's, Calcinosis, GERD and some telangectasia's. Last saw [facility]ology 2017. No history of ILD.   - Continue protonix 40 mg daily     Anxiety  Depression  - Continue PTA Sertraline 150 mg daily           Diet: Fluid restriction 2000 ML FLUID  NPO for Medical/Clinical Reasons Except for: No Exceptions  Adult Formula Drip Feeding: Continuous Novasource Renal; Nasogastric tube; Goal Rate: 30; mL/hr; Initiate @ 10 ml/hr and advance by 10 ml Q8H pending pt's tolerance.; Do not advance tube feeding rate unless K+ is = or > 3.0, Mg++ is = or > 1.5, an...    DVT Prophylaxis: {DVT PROPHYLAXIS:372968}  Miranda Catheter: Not present  Lines: None     Cardiac Monitoring: None  Code Status: Full Code      Clinically Significant Risk Factors   { TIP  Diagnoses will continue to appear throughout the admission.  :18873}     # Hypokalemia: Lowest K = 2.8 mmol/L in last 2 days, will replace as needed   # Hypochloremia: Lowest Cl = 97 mmol/L in last 2 days, will monitor as appropriate      # Hypoalbuminemia: Lowest albumin = 3 g/dL at 12/10/2024  3:11 AM, will monitor as appropriate                # Cachexia: Estimated body mass index is 17.87 kg/m  as calculated from the following:    Height as of this encounter: 1.651 m (5' 5\").    Weight as of this encounter: 48.7 kg (107 lb 5.8 oz).   # Severe Malnutrition: based on nutrition assessment    # Financial/Environmental Concerns: none         Social Drivers of Health    Housing Stability: High Risk (11/23/2024)    Housing Stability     Do you have housing? : No     Are you worried about losing your housing?: No          Disposition Plan   {TIP  It is advised to update the Medical Readiness for Discharge [MRD] daily, until the patient is 'Ready Now.' Last Documentation- Anticipated in 5+ Days  . Use the SmartList below to update for today:582097}  Medically Ready for Discharge: {TIME; MEDICALLY READY FOR DISCHARGE:16674505}             Jesu Lunsford MD  Hospitalist Service, " "GOLD TEAM 8  [facility]  Securely message with [employer] (more info)  Text page via [employer] Paging/Directory   See signed in provider for up to date coverage information  ______________________________________________________________________    Interval History   {Pertinent overnight events  ;***}    Physical Exam   Vital Signs: Temp: 98  F (36.7  C) Temp src: Oral BP: 106/71 Pulse: 110   Resp: 22 SpO2: 97 % O2 Device: Nasal cannula Oxygen Delivery: 2 LPM  Weight: 107 lbs 5.82 oz    {Recommend personal SmartPhrase or Notewriter for exam (OPTIONAL)   :585195}    Medical Decision Making   { TIP   MDM Calculator    MDM grid (w/ times)    Coding Support Chat  Billing is now based on time OR medical decision making complexity. Medical decision making included in your A&P does NOT need to be re-documented here.    :46626}    {Time  :511421::\"*** MINUTES SPENT BY ME on the date of service doing chart review, history, exam, documentation & further activities per the note.\"}      Data   {INSERT LABS/IMAGING (OPTIONAL)   :757235}  "

## 2024-12-12 NOTE — TELEPHONE ENCOUNTER
Called patient daughter to let her know that it looks like speech and GI are involved who will request an ENT consult as needed within the plan of care. Let patient daughter know that we will see the patient when she is discharged. LVM and provided direct number for call back. Inocencia David RN on 12/12/2024 at 1:38 PM

## 2024-12-12 NOTE — PLAN OF CARE
Major Shift Events:  2 loose BM's overnight   Neuro: Alert & oriented x4. PERRLA, FULLER, generally weak.    CV: A-fib/flutter 60-100s. Q6hr PO dilt restarted 12/10. MAP > 65. Pulses dopplerable.    PULM: 2 L NC, weak but productive cough. Frequent deep oral suctioning, tolerates well.     GI: LBM 12/12, loose stooling slowed. NG bridled @ 55  TF @ 30 w/ FWF 30 ml Q4H. NPO- SLP following, swabs ok,     : Miranda removed - Pure wick in place     Skin: Blanchable redness to coccyx and perineum- barrier cream applied No major skin concerns.    Lines: PIV x2    Drips:     Labs: WBC 9.9  (on zosyn, was being treated for MSSA in sputum with Augmentin, currently on zosyn). K 3.7  Plan: Step down transfer order in place    For vital signs and complete assessments, please see documentation flowsheets.       Goal Outcome Evaluation:      Plan of Care Reviewed With: patient    Overall Patient Progress: no changeOverall Patient Progress: no change    Outcome Evaluation: On 2L NC overnight, 2 loose BM overnight otherwise appears to be tolerating TF, no complaints of pain.

## 2024-12-13 LAB
ANION GAP SERPL CALCULATED.3IONS-SCNC: 10 MMOL/L (ref 7–15)
BUN SERPL-MCNC: 40.3 MG/DL (ref 8–23)
CALCIUM SERPL-MCNC: 8.6 MG/DL (ref 8.8–10.4)
CHLORIDE SERPL-SCNC: 102 MMOL/L (ref 98–107)
CREAT SERPL-MCNC: 0.86 MG/DL (ref 0.51–0.95)
EGFRCR SERPLBLD CKD-EPI 2021: 69 ML/MIN/1.73M2
ERYTHROCYTE [DISTWIDTH] IN BLOOD BY AUTOMATED COUNT: 19.2 % (ref 10–15)
GLUCOSE SERPL-MCNC: 100 MG/DL (ref 70–99)
HCO3 SERPL-SCNC: 26 MMOL/L (ref 22–29)
HCT VFR BLD AUTO: 37.1 % (ref 35–47)
HGB BLD-MCNC: 10.7 G/DL (ref 11.7–15.7)
MAGNESIUM SERPL-MCNC: 2.4 MG/DL (ref 1.7–2.3)
MCH RBC QN AUTO: 24.7 PG (ref 26.5–33)
MCHC RBC AUTO-ENTMCNC: 28.8 G/DL (ref 31.5–36.5)
MCV RBC AUTO: 86 FL (ref 78–100)
PHOSPHATE SERPL-MCNC: 2.3 MG/DL (ref 2.5–4.5)
PLATELET # BLD AUTO: 455 10E3/UL (ref 150–450)
POTASSIUM SERPL-SCNC: 3.6 MMOL/L (ref 3.4–5.3)
RBC # BLD AUTO: 4.34 10E6/UL (ref 3.8–5.2)
SODIUM SERPL-SCNC: 138 MMOL/L (ref 135–145)
WBC # BLD AUTO: 8.9 10E3/UL (ref 4–11)

## 2024-12-13 PROCEDURE — 250N000013 HC RX MED GY IP 250 OP 250 PS 637: Performed by: STUDENT IN AN ORGANIZED HEALTH CARE EDUCATION/TRAINING PROGRAM

## 2024-12-13 PROCEDURE — 80048 BASIC METABOLIC PNL TOTAL CA: CPT | Performed by: STUDENT IN AN ORGANIZED HEALTH CARE EDUCATION/TRAINING PROGRAM

## 2024-12-13 PROCEDURE — 250N000013 HC RX MED GY IP 250 OP 250 PS 637: Performed by: PHYSICIAN ASSISTANT

## 2024-12-13 PROCEDURE — 85014 HEMATOCRIT: CPT | Performed by: STUDENT IN AN ORGANIZED HEALTH CARE EDUCATION/TRAINING PROGRAM

## 2024-12-13 PROCEDURE — 999N000157 HC STATISTIC RCP TIME EA 10 MIN

## 2024-12-13 PROCEDURE — 120N000002 HC R&B MED SURG/OB UMMC

## 2024-12-13 PROCEDURE — 85041 AUTOMATED RBC COUNT: CPT | Performed by: STUDENT IN AN ORGANIZED HEALTH CARE EDUCATION/TRAINING PROGRAM

## 2024-12-13 PROCEDURE — 250N000013 HC RX MED GY IP 250 OP 250 PS 637: Performed by: INTERNAL MEDICINE

## 2024-12-13 PROCEDURE — 92526 ORAL FUNCTION THERAPY: CPT | Mod: GN

## 2024-12-13 PROCEDURE — 86900 BLOOD TYPING SEROLOGIC ABO: CPT | Performed by: HOSPITALIST

## 2024-12-13 PROCEDURE — 250N000009 HC RX 250

## 2024-12-13 PROCEDURE — 94640 AIRWAY INHALATION TREATMENT: CPT | Mod: 76

## 2024-12-13 PROCEDURE — 97530 THERAPEUTIC ACTIVITIES: CPT | Mod: GP | Performed by: PHYSICAL THERAPIST

## 2024-12-13 PROCEDURE — 86850 RBC ANTIBODY SCREEN: CPT | Performed by: HOSPITALIST

## 2024-12-13 PROCEDURE — 84100 ASSAY OF PHOSPHORUS: CPT | Performed by: STUDENT IN AN ORGANIZED HEALTH CARE EDUCATION/TRAINING PROGRAM

## 2024-12-13 PROCEDURE — 250N000013 HC RX MED GY IP 250 OP 250 PS 637: Performed by: SURGERY

## 2024-12-13 PROCEDURE — 250N000011 HC RX IP 250 OP 636

## 2024-12-13 PROCEDURE — 83735 ASSAY OF MAGNESIUM: CPT | Performed by: SURGERY

## 2024-12-13 PROCEDURE — 99232 SBSQ HOSP IP/OBS MODERATE 35: CPT | Performed by: STUDENT IN AN ORGANIZED HEALTH CARE EDUCATION/TRAINING PROGRAM

## 2024-12-13 PROCEDURE — 250N000013 HC RX MED GY IP 250 OP 250 PS 637

## 2024-12-13 PROCEDURE — 94640 AIRWAY INHALATION TREATMENT: CPT

## 2024-12-13 PROCEDURE — 36415 COLL VENOUS BLD VENIPUNCTURE: CPT | Performed by: STUDENT IN AN ORGANIZED HEALTH CARE EDUCATION/TRAINING PROGRAM

## 2024-12-13 PROCEDURE — 97116 GAIT TRAINING THERAPY: CPT | Mod: GP | Performed by: PHYSICAL THERAPIST

## 2024-12-13 RX ADMIN — LEVALBUTEROL HYDROCHLORIDE 0.63 MG: 0.63 SOLUTION RESPIRATORY (INHALATION) at 12:24

## 2024-12-13 RX ADMIN — IPRATROPIUM BROMIDE 0.5 MG: 0.5 SOLUTION RESPIRATORY (INHALATION) at 09:34

## 2024-12-13 RX ADMIN — DILTIAZEM HYDROCHLORIDE 120 MG: 120 TABLET, FILM COATED ORAL at 12:45

## 2024-12-13 RX ADMIN — POTASSIUM & SODIUM PHOSPHATES POWDER PACK 280-160-250 MG 1 PACKET: 280-160-250 PACK at 06:37

## 2024-12-13 RX ADMIN — LEVALBUTEROL HYDROCHLORIDE 0.63 MG: 0.63 SOLUTION RESPIRATORY (INHALATION) at 15:56

## 2024-12-13 RX ADMIN — LEVOTHYROXINE SODIUM 88 MCG: 88 TABLET ORAL at 06:37

## 2024-12-13 RX ADMIN — BUDESONIDE 0.5 MG: 0.5 INHALANT ORAL at 22:08

## 2024-12-13 RX ADMIN — FEXOFENADINE HYDROCHLORIDE 120 MG: 60 TABLET ORAL at 08:44

## 2024-12-13 RX ADMIN — IPRATROPIUM BROMIDE 0.5 MG: 0.5 SOLUTION RESPIRATORY (INHALATION) at 12:24

## 2024-12-13 RX ADMIN — DILTIAZEM HYDROCHLORIDE 120 MG: 120 TABLET, FILM COATED ORAL at 06:37

## 2024-12-13 RX ADMIN — BUDESONIDE 0.5 MG: 0.5 INHALANT ORAL at 09:34

## 2024-12-13 RX ADMIN — FUROSEMIDE 40 MG: 40 TABLET ORAL at 08:43

## 2024-12-13 RX ADMIN — THERA TABS 1 TABLET: TAB at 08:43

## 2024-12-13 RX ADMIN — LEVALBUTEROL HYDROCHLORIDE 0.63 MG: 0.63 SOLUTION RESPIRATORY (INHALATION) at 22:08

## 2024-12-13 RX ADMIN — IPRATROPIUM BROMIDE 0.5 MG: 0.5 SOLUTION RESPIRATORY (INHALATION) at 22:08

## 2024-12-13 RX ADMIN — DILTIAZEM HYDROCHLORIDE 120 MG: 120 TABLET, FILM COATED ORAL at 17:27

## 2024-12-13 RX ADMIN — IPRATROPIUM BROMIDE 0.5 MG: 0.5 SOLUTION RESPIRATORY (INHALATION) at 15:55

## 2024-12-13 RX ADMIN — POTASSIUM & SODIUM PHOSPHATES POWDER PACK 280-160-250 MG 1 PACKET: 280-160-250 PACK at 10:04

## 2024-12-13 RX ADMIN — DILTIAZEM HYDROCHLORIDE 120 MG: 120 TABLET, FILM COATED ORAL at 23:34

## 2024-12-13 RX ADMIN — APIXABAN 5 MG: 5 TABLET, FILM COATED ORAL at 21:29

## 2024-12-13 RX ADMIN — SERTRALINE HYDROCHLORIDE 150 MG: 100 TABLET ORAL at 08:43

## 2024-12-13 RX ADMIN — LEVALBUTEROL HYDROCHLORIDE 0.63 MG: 0.63 SOLUTION RESPIRATORY (INHALATION) at 09:33

## 2024-12-13 RX ADMIN — PANTOPRAZOLE SODIUM 40 MG: 40 INJECTION, POWDER, FOR SOLUTION INTRAVENOUS at 06:37

## 2024-12-13 RX ADMIN — THIAMINE HYDROCHLORIDE 250 MG: 100 INJECTION, SOLUTION INTRAMUSCULAR; INTRAVENOUS at 08:45

## 2024-12-13 RX ADMIN — POTASSIUM & SODIUM PHOSPHATES POWDER PACK 280-160-250 MG 1 PACKET: 280-160-250 PACK at 12:46

## 2024-12-13 RX ADMIN — GABAPENTIN 600 MG: 300 CAPSULE ORAL at 21:29

## 2024-12-13 RX ADMIN — Medication 60 ML: at 08:46

## 2024-12-13 RX ADMIN — APIXABAN 5 MG: 5 TABLET, FILM COATED ORAL at 08:43

## 2024-12-13 ASSESSMENT — ACTIVITIES OF DAILY LIVING (ADL)
ADLS_ACUITY_SCORE: 60
ADLS_ACUITY_SCORE: 64
ADLS_ACUITY_SCORE: 64
ADLS_ACUITY_SCORE: 60
ADLS_ACUITY_SCORE: 64
ADLS_ACUITY_SCORE: 60
ADLS_ACUITY_SCORE: 64
ADLS_ACUITY_SCORE: 60
ADLS_ACUITY_SCORE: 60

## 2024-12-13 NOTE — PROGRESS NOTES
"CLINICAL NUTRITION SERVICES - BRIEF NOTE      Reason for RD note: Transitioning to non-renal TF formula    New Findings/Chart Review:  -Pt currently on renal TF formula; Phos low and K+ WNL  -Pt continues to be NPO per SLP evaluation    Interventions:  -GOAL TF: Inocencia Ruiz Peptide 1.5 (or equivalent) @ 45 mL/hr (1080 mL/day) to provide 1661 kcal (33 kcal/kg), 80 g protein (1.6 g/kg), 149 g CHO, 16 g fiber, 83 g fat (40% from MCTs), and 756 mL free water daily      --Please transition to new TF formula immediately when arrives to pt room and ok to start at new goal rate    Nutrition will follow per protocol or sooner if consulted.    Mary Small MS, RD, LD  Available on [employer] - \"4A Clinical Dietitian\"  Weekend/Holiday RD - \"Weekend Clinical Dietitian\"  "

## 2024-12-13 NOTE — PLAN OF CARE
Shift:  0700- 1930         Events:     Patient is med/surg status, awaiting bed. PT/ OT saw patient today.     Assessment:     Neuro: Pt is alert. O x 4. Moves  x 4. Following commands.     CV: HR is 80s, rhythm is A Fib. BP is stable. Pulses are palpated. MAP >65.     Pulm:  Lungs are clear. Weak cough noted.     : Purwick in place.    GI: Abd is soft. NJ tube in place. Loose stools. TF infusing. FW is 30 every 4 hours.     Skin: Generalized bruising noted. Redness to coccyx.       Drips:    SL    Lines:    PIV x 2  NJ at 55 cm    Shift:    Labs drawn per orders. Hourly rounded and call light in place. Delirium interventions in place. Patient turned every 2-4 hours and PRN. (See flow sheets for additional data). (Labs reviewed). MD informed of all critical labs and patient condition as needed throughout the shift.

## 2024-12-13 NOTE — PLAN OF CARE
Goal Outcome Evaluation:    Major Shift Events: Pt remains in a fib, rate controlled in the 80-90's. Afebrile. BP stable. On 2L NC, weak cough. A&Ox4, up with x2 assist walker/lift intermittently. Voiding adequately, has external catheter in place. Multiple loose stools. TF at goal via NG. Strict NPO.     Plan: Floor when bed ready. Continue to eval swallowing.     For vital signs and complete assessments, please see documentation flowsheets.

## 2024-12-13 NOTE — PLAN OF CARE
Major Shift Events:  No acute changes overnight.   Neuro: Alert & oriented x4. PERRLA, FULLER, generally weak. Flat affect.     CV: A-fib/flutter 60-100s. Q6hr PO dilt. MAP > 65. Pulses dopplerable.    PULM: 2 L NC, weak but productive cough. Tolerates suction.     GI: Loose stools. NG bridled @ 55  TF @ 30 w/ FWF 30 ml Q4H. NPO- SLP following, swabs ok,     : Pure wick in place     Skin: Blanchable redness to coccyx and perineum- barrier cream applied. Some discoloration to R hip.     Lines: PIV x2    Drips:     Labs: K+ 3.6, Mg 2.4 Phos 2.3- replacing   Plan: tx to floor.   For vital signs and complete assessments, please see documentation flowsheets.     Goal Outcome Evaluation:      Plan of Care Reviewed With: patient    Overall Patient Progress: improvingOverall Patient Progress: improving    Outcome Evaluation: 2L overnight, no BM overnight, ok urine output. Tolerating TF and no complaints of pain. Pt appears to be in a better mood overall.

## 2024-12-14 LAB
ABO + RH BLD: NORMAL
BLD GP AB SCN SERPL QL: NEGATIVE
ERYTHROCYTE [DISTWIDTH] IN BLOOD BY AUTOMATED COUNT: 19.7 % (ref 10–15)
HCT VFR BLD AUTO: 35.8 % (ref 35–47)
HGB BLD-MCNC: 10.9 G/DL (ref 11.7–15.7)
MAGNESIUM SERPL-MCNC: 2.3 MG/DL (ref 1.7–2.3)
MCH RBC QN AUTO: 25.5 PG (ref 26.5–33)
MCHC RBC AUTO-ENTMCNC: 30.4 G/DL (ref 31.5–36.5)
MCV RBC AUTO: 84 FL (ref 78–100)
PHOSPHATE SERPL-MCNC: 3.7 MG/DL (ref 2.5–4.5)
PLATELET # BLD AUTO: 458 10E3/UL (ref 150–450)
POTASSIUM SERPL-SCNC: 3.8 MMOL/L (ref 3.4–5.3)
RBC # BLD AUTO: 4.27 10E6/UL (ref 3.8–5.2)
SPECIMEN EXP DATE BLD: NORMAL
WBC # BLD AUTO: 9.3 10E3/UL (ref 4–11)

## 2024-12-14 PROCEDURE — 99233 SBSQ HOSP IP/OBS HIGH 50: CPT | Performed by: STUDENT IN AN ORGANIZED HEALTH CARE EDUCATION/TRAINING PROGRAM

## 2024-12-14 PROCEDURE — 85048 AUTOMATED LEUKOCYTE COUNT: CPT | Performed by: STUDENT IN AN ORGANIZED HEALTH CARE EDUCATION/TRAINING PROGRAM

## 2024-12-14 PROCEDURE — 84132 ASSAY OF SERUM POTASSIUM: CPT | Performed by: STUDENT IN AN ORGANIZED HEALTH CARE EDUCATION/TRAINING PROGRAM

## 2024-12-14 PROCEDURE — 94640 AIRWAY INHALATION TREATMENT: CPT

## 2024-12-14 PROCEDURE — 0BH17EZ INSERTION OF ENDOTRACHEAL AIRWAY INTO TRACHEA, VIA NATURAL OR ARTIFICIAL OPENING: ICD-10-PCS | Performed by: OTOLARYNGOLOGY

## 2024-12-14 PROCEDURE — 250N000009 HC RX 250

## 2024-12-14 PROCEDURE — 250N000013 HC RX MED GY IP 250 OP 250 PS 637: Performed by: PHYSICIAN ASSISTANT

## 2024-12-14 PROCEDURE — 94640 AIRWAY INHALATION TREATMENT: CPT | Mod: 76

## 2024-12-14 PROCEDURE — 84100 ASSAY OF PHOSPHORUS: CPT | Performed by: STUDENT IN AN ORGANIZED HEALTH CARE EDUCATION/TRAINING PROGRAM

## 2024-12-14 PROCEDURE — 85014 HEMATOCRIT: CPT | Performed by: STUDENT IN AN ORGANIZED HEALTH CARE EDUCATION/TRAINING PROGRAM

## 2024-12-14 PROCEDURE — 83735 ASSAY OF MAGNESIUM: CPT | Performed by: STUDENT IN AN ORGANIZED HEALTH CARE EDUCATION/TRAINING PROGRAM

## 2024-12-14 PROCEDURE — 250N000013 HC RX MED GY IP 250 OP 250 PS 637: Performed by: INTERNAL MEDICINE

## 2024-12-14 PROCEDURE — 120N000002 HC R&B MED SURG/OB UMMC

## 2024-12-14 PROCEDURE — 250N000013 HC RX MED GY IP 250 OP 250 PS 637: Performed by: STUDENT IN AN ORGANIZED HEALTH CARE EDUCATION/TRAINING PROGRAM

## 2024-12-14 PROCEDURE — 250N000013 HC RX MED GY IP 250 OP 250 PS 637: Performed by: SURGERY

## 2024-12-14 PROCEDURE — 250N000013 HC RX MED GY IP 250 OP 250 PS 637

## 2024-12-14 PROCEDURE — 36415 COLL VENOUS BLD VENIPUNCTURE: CPT | Performed by: STUDENT IN AN ORGANIZED HEALTH CARE EDUCATION/TRAINING PROGRAM

## 2024-12-14 PROCEDURE — 250N000011 HC RX IP 250 OP 636

## 2024-12-14 PROCEDURE — 999N000157 HC STATISTIC RCP TIME EA 10 MIN

## 2024-12-14 RX ADMIN — APIXABAN 5 MG: 5 TABLET, FILM COATED ORAL at 09:18

## 2024-12-14 RX ADMIN — FUROSEMIDE 40 MG: 40 TABLET ORAL at 09:14

## 2024-12-14 RX ADMIN — IPRATROPIUM BROMIDE 0.5 MG: 0.5 SOLUTION RESPIRATORY (INHALATION) at 22:12

## 2024-12-14 RX ADMIN — DILTIAZEM HYDROCHLORIDE 120 MG: 120 TABLET, FILM COATED ORAL at 23:11

## 2024-12-14 RX ADMIN — GABAPENTIN 600 MG: 300 CAPSULE ORAL at 21:24

## 2024-12-14 RX ADMIN — PANTOPRAZOLE SODIUM 40 MG: 40 INJECTION, POWDER, FOR SOLUTION INTRAVENOUS at 09:14

## 2024-12-14 RX ADMIN — LEVALBUTEROL HYDROCHLORIDE 0.63 MG: 0.63 SOLUTION RESPIRATORY (INHALATION) at 22:12

## 2024-12-14 RX ADMIN — LEVALBUTEROL HYDROCHLORIDE 0.63 MG: 0.63 SOLUTION RESPIRATORY (INHALATION) at 16:56

## 2024-12-14 RX ADMIN — LEVOTHYROXINE SODIUM 88 MCG: 88 TABLET ORAL at 06:00

## 2024-12-14 RX ADMIN — IPRATROPIUM BROMIDE 0.5 MG: 0.5 SOLUTION RESPIRATORY (INHALATION) at 12:57

## 2024-12-14 RX ADMIN — DILTIAZEM HYDROCHLORIDE 120 MG: 120 TABLET, FILM COATED ORAL at 18:08

## 2024-12-14 RX ADMIN — LEVALBUTEROL HYDROCHLORIDE 0.63 MG: 0.63 SOLUTION RESPIRATORY (INHALATION) at 12:57

## 2024-12-14 RX ADMIN — BUDESONIDE 0.5 MG: 0.5 INHALANT ORAL at 12:58

## 2024-12-14 RX ADMIN — THERA TABS 1 TABLET: TAB at 09:14

## 2024-12-14 RX ADMIN — DILTIAZEM HYDROCHLORIDE 120 MG: 120 TABLET, FILM COATED ORAL at 06:00

## 2024-12-14 RX ADMIN — SERTRALINE HYDROCHLORIDE 150 MG: 100 TABLET ORAL at 09:18

## 2024-12-14 RX ADMIN — BUDESONIDE 0.5 MG: 0.5 INHALANT ORAL at 22:12

## 2024-12-14 RX ADMIN — APIXABAN 5 MG: 5 TABLET, FILM COATED ORAL at 20:12

## 2024-12-14 RX ADMIN — FEXOFENADINE HYDROCHLORIDE 120 MG: 60 TABLET ORAL at 09:19

## 2024-12-14 RX ADMIN — IPRATROPIUM BROMIDE 0.5 MG: 0.5 SOLUTION RESPIRATORY (INHALATION) at 16:54

## 2024-12-14 RX ADMIN — DILTIAZEM HYDROCHLORIDE 120 MG: 120 TABLET, FILM COATED ORAL at 12:27

## 2024-12-14 RX ADMIN — THIAMINE HYDROCHLORIDE 250 MG: 100 INJECTION, SOLUTION INTRAMUSCULAR; INTRAVENOUS at 12:27

## 2024-12-14 ASSESSMENT — ACTIVITIES OF DAILY LIVING (ADL)
ADLS_ACUITY_SCORE: 64
ADLS_ACUITY_SCORE: 60
ADLS_ACUITY_SCORE: 64

## 2024-12-14 NOTE — PROGRESS NOTES
[facility]    Medicine Progress Note - Hospitalist Service, GOLD TEAM 8    Date of Admission:  11/21/2024    Assessment & Plan   Asya Coffman is a 79 year old female admitted on 11/21/2024. 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 Initially admitted to hospital medicine service with hypoxic respiratory failure suspected due to HFpEF requiring diuresis. Developed acute hypoxia on 12/8 secondary to suspected aspiration pneumonitis for which she was intubated (12/8-12/10). Now s/p extubation and transferring back to hospital medicine service.    Changes today:  - Cr normalized. Restarted home lasix  - leukocytosis resolved  - NPO on TF  - Speech and Nutrition continues to follow      Acute hypoxic respiratory failure - improving  Aspiration pneumonitis  MSSA PNA s/p abx (12/3-12/8)  Obstructive lung disease (COPD vs asthma)  Pulmonary hypertension   Presented on 11/21 with acute hypoxic respiratory failure; initially suspected to be secondary to HFpEF exacerbation; unresolved with diuresis. Underlying obstructive lung disease but no evidence of exacerbation here. Was treated for MSSA pneumonia. Overnight 12/8, developed sudden worsening of oxygenation and increased secretions in the setting of dysphagia/recent laryngeal radiation and was intubated for airway protection. Suspected aspiration pneumonitis as etiology. CT Chest (12/8) with mediastinal/hilar lymphadenopathy and resolving diffuse ground glass opacities most consistent with resolving pneumonia; no signs of progressive or secondary infectious process. Was briefly on zosyn but this was discontinued as she was treated for full course  based on initial pneumonia. She has been extubated and now on 2L. Ongoing upper airway adventitious sounds.   - Pulse oximetry, supplemental O2  - Budesonide neb 0.5 mg BID, ipratropium-levalbuterol neb QID  - Aspiration precautions; speech following   - Sputum culture (12/9) grew candida but leukocytosis improved and back on minimal low flow oxygen so will not treat for now  - Flutter valve, incentive spirometer  - Low threshold to resume empiric abx for pnuemonia coverage      HFpEF (LVEF 55-60% 11/22/2024)   Pulmonary hypertension (44 mmHg per echo 11/22/2024)  Possible small PFO  Echo during current admission (11/22/2024) notable for increased thickness of LV and elevated BNP (peak 9200 > 6200) concerning for heart failure exacerbation s/p diuresis and pulmonary hypertension with estimated pulmonary artery pressure of 45 mmHg. Echo with bubble study (11/25/2024) concerning for possible small PFO. With intubation on 12/8, developed hypotension without evidence of shock. Etiology of hypotension may be cardiogenic (e.g. exacerbation of pulmonary hypertension by positive pressure ventilation) vs medication associated (propofol, precedex); do not currently suspect distributive or obstructive hypotension. Weaned off pressors 12/10.   - Preload dependent in the setting of pulmonary hypertension, gentle diuresis as needed (will hold today)   - 2L FR    Chronic afib  [id number] 3 (age++, HF+).   - PTA apixaban 5mg BID  - PTA diltiazem 240 ER BID - HOLD (cannot give ER enterally)               Up-titrate to short-acting Diltiazem 120mg q6H as tolerated by blood pressure  - PTA propanolol 10mg BID      Oral secretions, increased  Moderate-severe pharyngeal dysphagia  Esophageal dysmotility  Laryngeal squamous cell carcinoma s/p radiation (4/2024-7/2024)  Has had increased dysphagia in the setting of laryngeal squamous cell carcinoma s/p radiation (4/2024-7/2024) as well as some concern for esophageal web on swallow study  (10/8/2024) pending GI input. Some concern for respiratory decompensation due to recurrent aspiration events.   - Speech following  - NPO - okay for ice chips for comfort with 1:1 supervision  - Will need VFSS -- SLP will notify when they feel she is ready for this  - Will consider GI consult for management of esophageal web     At risk for refeeding syndrome  Starvation ketosis  Hypokalemia  Severe malnutrition in context of chronic illness/disease  Per daughter, baseline weight around 120-130 lbs. Admitted 102 lbs. Patient states she is eating less and has less appetite in general as well as having difficulty swallowing. While NPO during intubation, developed starvation ketosis. Given high risk for aspiration with PO intake, placed NGT and started tube feeds. Will need close monitoring for refeeding syndrome.   - NPO   - RD following for TF management   - Monitor for refeeding syndrome   - Lyte replacement protocol   - IV thiamine replacement      Oliguric SAMIR  Cr 0.9 on admission. Baseline appears 0.9-1.0. Developed SAMIR initially in setting of diuresis and had been improving. Subsequently increased following transfer to ICU with consideration for prerenal in setting of NPO status. Also with new diarrhea. Cr stable.   - Strict I/Os  - Trend BMP   - FWF via NGT    Diarrhea  Frequent loose stools. Unclear timing of onset so not sure if related to abx or TF initiation. No abdominal pain.  - Monitor   - Hold on infectious testing for now     Hypothyroidism  Hx of thyroidectomy 2/2 cancer   - Continue PTA levothyroxine 88 mcg daily      Anemia of chronic illness  Baseline Hgb 11-12. Currently near baseline. Can consider anemia of chronic illness.   - Monitor CBC     Generalized deconditioning  Recurrent falls  - PT/OT consults     CREST Syndrome  Characterized by Raynaud's, Calcinosis, GERD and some telangectasia's. Last saw [facility] 2017. No history of ILD.   - Continue protonix 40 mg daily  "    Anxiety  Depression  - Continue PTA Sertraline 150 mg daily           Diet: Fluid restriction 2000 ML FLUID  NPO for Medical/Clinical Reasons Except for: No Exceptions  Adult Formula Drip Feeding: Continuous [employer] Peptide 1.5; Nasogastric tube; Goal Rate: 45; mL/hr; 12/13: please transition to new TF formula immediately when arrives to pt room and ok to start at new goal rate; Do not advance tube feeding rat...    DVT Prophylaxis: DOAC  Miranda Catheter: Not present  Lines: None     Cardiac Monitoring: None  Code Status: Full Code      Clinically Significant Risk Factors               # Hypoalbuminemia: Lowest albumin = 3 g/dL at 12/10/2024  3:11 AM, will monitor as appropriate                # Cachexia: Estimated body mass index is 17.98 kg/m  as calculated from the following:    Height as of this encounter: 1.651 m (5' 5\").    Weight as of this encounter: 49 kg (108 lb 0.4 oz).   # Severe Malnutrition: based on nutrition assessment    # Financial/Environmental Concerns: none         Social Drivers of Health    Housing Stability: High Risk (11/23/2024)    Housing Stability     Do you have housing? : No     Are you worried about losing your housing?: No          Disposition Plan     Medically Ready for Discharge: Anticipated in 5+ Days             Jesu Lunsford MD  Hospitalist Service, GOLD TEAM 8  [facility]  Securely message with [employer] (more info)  Text page via [facility] Paging/Directory   See signed in provider for up to date coverage information  ______________________________________________________________________    Interval History   NAEO    Physical Exam   Vital Signs: Temp: 98  F (36.7  C) Temp src: Oral BP: 118/66 Pulse: 87   Resp: 22 SpO2: 94 % O2 Device: Nasal cannula Oxygen Delivery: 1 LPM  Weight: 108 lbs .41 oz    General Appearance:  Awake. Alert. No acute distress. Frail appearing.   Respiratory: Normal work of breathing on 2L. Lungs with loud " upper airway breath sounds. Lower lobes sound clear.   Cardiovascular: Irregular. No obvious murmur.   GI: Nondistended. Normoactive. Soft. Non-tender.   Skin: Warm, dry.     Medical Decision Making       35 MINUTES SPENT BY ME on the date of service doing chart review, history, exam, documentation & further activities per the note.      Data

## 2024-12-14 NOTE — PLAN OF CARE
Major Shift Events:    Neuro: Alert and oriented x4, PERRLA, FULLER, generalized weakness, flat affect, Afebrile    CV: Afib/Aflutter 60-100s. Rate increased closer to when next Diltiazem is due (120s-150s) comes right back down after next dose of Diltiazem is given. MAP>65     Pulmo: RA-2L NC, weak productive cough. Raynaud's per patient really hard to get steady pulse ox for 02 saturation     : Pure wick. Incontinent     GI: NPO needs VSS for SLP following. Swabs for mouth okay. TG @ 45     Lines: R+L PIV X2     Labs:         Plan: Floor orders, Transfer to 7 when a bed becomes available  For vital signs and complete assessments, please see documentation flowsheets.

## 2024-12-15 LAB
ANION GAP SERPL CALCULATED.3IONS-SCNC: 11 MMOL/L (ref 7–15)
BUN SERPL-MCNC: 40.4 MG/DL (ref 8–23)
CALCIUM SERPL-MCNC: 9 MG/DL (ref 8.8–10.4)
CHLORIDE SERPL-SCNC: 100 MMOL/L (ref 98–107)
CREAT SERPL-MCNC: 0.93 MG/DL (ref 0.51–0.95)
EGFRCR SERPLBLD CKD-EPI 2021: 63 ML/MIN/1.73M2
ERYTHROCYTE [DISTWIDTH] IN BLOOD BY AUTOMATED COUNT: 19.2 % (ref 10–15)
GLUCOSE BLDC GLUCOMTR-MCNC: 103 MG/DL (ref 70–99)
GLUCOSE SERPL-MCNC: 99 MG/DL (ref 70–99)
HCO3 SERPL-SCNC: 29 MMOL/L (ref 22–29)
HCT VFR BLD AUTO: 33.7 % (ref 35–47)
HGB BLD-MCNC: 9.9 G/DL (ref 11.7–15.7)
MAGNESIUM SERPL-MCNC: 2.2 MG/DL (ref 1.7–2.3)
MCH RBC QN AUTO: 25.1 PG (ref 26.5–33)
MCHC RBC AUTO-ENTMCNC: 29.4 G/DL (ref 31.5–36.5)
MCV RBC AUTO: 85 FL (ref 78–100)
PHOSPHATE SERPL-MCNC: 3.4 MG/DL (ref 2.5–4.5)
PLATELET # BLD AUTO: 522 10E3/UL (ref 150–450)
POTASSIUM SERPL-SCNC: 4.6 MMOL/L (ref 3.4–5.3)
POTASSIUM SERPL-SCNC: 4.7 MMOL/L (ref 3.4–5.3)
RBC # BLD AUTO: 3.95 10E6/UL (ref 3.8–5.2)
SODIUM SERPL-SCNC: 140 MMOL/L (ref 135–145)
WBC # BLD AUTO: 10.2 10E3/UL (ref 4–11)

## 2024-12-15 PROCEDURE — 250N000011 HC RX IP 250 OP 636: Performed by: STUDENT IN AN ORGANIZED HEALTH CARE EDUCATION/TRAINING PROGRAM

## 2024-12-15 PROCEDURE — 250N000013 HC RX MED GY IP 250 OP 250 PS 637: Performed by: STUDENT IN AN ORGANIZED HEALTH CARE EDUCATION/TRAINING PROGRAM

## 2024-12-15 PROCEDURE — 120N000002 HC R&B MED SURG/OB UMMC

## 2024-12-15 PROCEDURE — 80048 BASIC METABOLIC PNL TOTAL CA: CPT | Performed by: STUDENT IN AN ORGANIZED HEALTH CARE EDUCATION/TRAINING PROGRAM

## 2024-12-15 PROCEDURE — 36415 COLL VENOUS BLD VENIPUNCTURE: CPT | Performed by: STUDENT IN AN ORGANIZED HEALTH CARE EDUCATION/TRAINING PROGRAM

## 2024-12-15 PROCEDURE — 999N000157 HC STATISTIC RCP TIME EA 10 MIN

## 2024-12-15 PROCEDURE — 84132 ASSAY OF SERUM POTASSIUM: CPT | Performed by: STUDENT IN AN ORGANIZED HEALTH CARE EDUCATION/TRAINING PROGRAM

## 2024-12-15 PROCEDURE — 250N000009 HC RX 250

## 2024-12-15 PROCEDURE — 250N000013 HC RX MED GY IP 250 OP 250 PS 637: Performed by: SURGERY

## 2024-12-15 PROCEDURE — 250N000011 HC RX IP 250 OP 636

## 2024-12-15 PROCEDURE — 250N000013 HC RX MED GY IP 250 OP 250 PS 637

## 2024-12-15 PROCEDURE — 99223 1ST HOSP IP/OBS HIGH 75: CPT | Mod: GC | Performed by: INTERNAL MEDICINE

## 2024-12-15 PROCEDURE — 83735 ASSAY OF MAGNESIUM: CPT | Performed by: STUDENT IN AN ORGANIZED HEALTH CARE EDUCATION/TRAINING PROGRAM

## 2024-12-15 PROCEDURE — 94640 AIRWAY INHALATION TREATMENT: CPT | Mod: 76

## 2024-12-15 PROCEDURE — 85018 HEMOGLOBIN: CPT | Performed by: STUDENT IN AN ORGANIZED HEALTH CARE EDUCATION/TRAINING PROGRAM

## 2024-12-15 PROCEDURE — 84100 ASSAY OF PHOSPHORUS: CPT | Performed by: STUDENT IN AN ORGANIZED HEALTH CARE EDUCATION/TRAINING PROGRAM

## 2024-12-15 PROCEDURE — 92526 ORAL FUNCTION THERAPY: CPT | Mod: GN

## 2024-12-15 PROCEDURE — 85041 AUTOMATED RBC COUNT: CPT | Performed by: STUDENT IN AN ORGANIZED HEALTH CARE EDUCATION/TRAINING PROGRAM

## 2024-12-15 PROCEDURE — 99233 SBSQ HOSP IP/OBS HIGH 50: CPT | Performed by: STUDENT IN AN ORGANIZED HEALTH CARE EDUCATION/TRAINING PROGRAM

## 2024-12-15 PROCEDURE — 94640 AIRWAY INHALATION TREATMENT: CPT

## 2024-12-15 PROCEDURE — 250N000013 HC RX MED GY IP 250 OP 250 PS 637: Performed by: INTERNAL MEDICINE

## 2024-12-15 PROCEDURE — 250N000013 HC RX MED GY IP 250 OP 250 PS 637: Performed by: PHYSICIAN ASSISTANT

## 2024-12-15 RX ORDER — SODIUM CHLORIDE FOR INHALATION 3 %
3 VIAL, NEBULIZER (ML) INHALATION 2 TIMES DAILY
Status: DISCONTINUED | OUTPATIENT
Start: 2024-12-15 | End: 2024-12-28

## 2024-12-15 RX ADMIN — LEVALBUTEROL HYDROCHLORIDE 0.63 MG: 0.63 SOLUTION RESPIRATORY (INHALATION) at 08:36

## 2024-12-15 RX ADMIN — SERTRALINE HYDROCHLORIDE 150 MG: 100 TABLET ORAL at 08:06

## 2024-12-15 RX ADMIN — DILTIAZEM HYDROCHLORIDE 120 MG: 120 TABLET, FILM COATED ORAL at 18:32

## 2024-12-15 RX ADMIN — LEVALBUTEROL HYDROCHLORIDE 0.63 MG: 0.63 SOLUTION RESPIRATORY (INHALATION) at 12:42

## 2024-12-15 RX ADMIN — IPRATROPIUM BROMIDE 0.5 MG: 0.5 SOLUTION RESPIRATORY (INHALATION) at 21:04

## 2024-12-15 RX ADMIN — FEXOFENADINE HYDROCHLORIDE 120 MG: 60 TABLET ORAL at 08:06

## 2024-12-15 RX ADMIN — PANTOPRAZOLE SODIUM 40 MG: 40 INJECTION, POWDER, FOR SOLUTION INTRAVENOUS at 08:06

## 2024-12-15 RX ADMIN — THERA TABS 1 TABLET: TAB at 08:06

## 2024-12-15 RX ADMIN — FUROSEMIDE 40 MG: 40 TABLET ORAL at 08:06

## 2024-12-15 RX ADMIN — IPRATROPIUM BROMIDE 0.5 MG: 0.5 SOLUTION RESPIRATORY (INHALATION) at 12:42

## 2024-12-15 RX ADMIN — APIXABAN 5 MG: 5 TABLET, FILM COATED ORAL at 08:06

## 2024-12-15 RX ADMIN — THIAMINE HYDROCHLORIDE 250 MG: 100 INJECTION, SOLUTION INTRAMUSCULAR; INTRAVENOUS at 11:13

## 2024-12-15 RX ADMIN — SODIUM CHLORIDE SOLN NEBU 3% 3 ML: 3 NEBU SOLN at 21:04

## 2024-12-15 RX ADMIN — GABAPENTIN 600 MG: 300 CAPSULE ORAL at 22:03

## 2024-12-15 RX ADMIN — IPRATROPIUM BROMIDE 0.5 MG: 0.5 SOLUTION RESPIRATORY (INHALATION) at 08:36

## 2024-12-15 RX ADMIN — DILTIAZEM HYDROCHLORIDE 120 MG: 120 TABLET, FILM COATED ORAL at 05:51

## 2024-12-15 RX ADMIN — ACETAMINOPHEN 650 MG: 325 TABLET, FILM COATED ORAL at 11:24

## 2024-12-15 RX ADMIN — LEVALBUTEROL HYDROCHLORIDE 0.63 MG: 0.63 SOLUTION RESPIRATORY (INHALATION) at 16:34

## 2024-12-15 RX ADMIN — LEVALBUTEROL HYDROCHLORIDE 0.63 MG: 0.63 SOLUTION RESPIRATORY (INHALATION) at 21:04

## 2024-12-15 RX ADMIN — LEVOTHYROXINE SODIUM 88 MCG: 88 TABLET ORAL at 05:51

## 2024-12-15 RX ADMIN — APIXABAN 5 MG: 5 TABLET, FILM COATED ORAL at 19:49

## 2024-12-15 RX ADMIN — BUDESONIDE 0.5 MG: 0.5 INHALANT ORAL at 08:36

## 2024-12-15 RX ADMIN — DILTIAZEM HYDROCHLORIDE 120 MG: 120 TABLET, FILM COATED ORAL at 11:14

## 2024-12-15 RX ADMIN — BUDESONIDE 0.5 MG: 0.5 INHALANT ORAL at 21:04

## 2024-12-15 RX ADMIN — ONDANSETRON 4 MG: 2 INJECTION INTRAMUSCULAR; INTRAVENOUS at 11:13

## 2024-12-15 ASSESSMENT — ACTIVITIES OF DAILY LIVING (ADL)
ADLS_ACUITY_SCORE: 64
ADLS_ACUITY_SCORE: 68
ADLS_ACUITY_SCORE: 64
ADLS_ACUITY_SCORE: 68
ADLS_ACUITY_SCORE: 64
ADLS_ACUITY_SCORE: 68
ADLS_ACUITY_SCORE: 64

## 2024-12-15 NOTE — PLAN OF CARE
Goal Outcome Evaluation:      Plan of Care Reviewed With: patient    Overall Patient Progress: improvingOverall Patient Progress: improving    Outcome Evaluation: Pt had a good night, denied pain/discomfort. Turn/reposition every 2 hours. oral suction with minimal secretion. Incontinent of bladder. O2 sat range between %. Continued on TF at goal rate 45 ml/hr and tolerating well.  2000 ml fluid restriction.

## 2024-12-15 NOTE — CONSULTS
GASTROENTEROLOGY CONSULTATION      Date of Admission:  11/21/2024           Reason for Consultation:   We were asked  to evaluate this patient with ? Esophageal web           ASSESSMENT AND RECOMMENDATIONS:   Assessment:  79 year old female with a history of Afib on apixaban, CAD, pulmonary hypertension, severe obstructive lung disease in setting of COPD/asthma, laryngeal squamous cell carcinoma (diagnosed 4/2024) s/p radiation (4/2024-7/2024) c/b dysphagia on modified diet, CREST syndrome, hypothyroidism, anxiety and depression who is admitted for aspiration pneumonia.    # Aspiration pneumonia  # Acute hypoxic respiratory failure  # Oropharyngeal dysphagia    Patient has significant pooling of secretions in her mouth.  She has videofluoroscopic study showing silent aspiration.  On videofluoroscopic study done on 10/8/24, she had a questionable esophageal web.  However this was not seen on the latest swallow study.  We reviewed the swallow study ourselves and did not see any esophageal web     Recommendations:  -No evidence of esophageal web on our review of the swallow study.  We can agree with radiology tomorrow.  -No plans for endoscopy intervention as of now  -Rest of the management as per primary team    Gastroenterology outpatient follow up recommendations: TBD    Thank you for involving us in this patient's care. Please do not hesitate to contact the GI service with any questions or concerns.     Pt care plan discussed with Dr. Angulo, GI staff physician.    aLnce Jarrell MD    Attending Attestation:  I saw the patient with Dr. Jarrell and agree with the findings and the plan of care as documented in the fellow's note. In summary, the patient is an 79 year old female with a history of Afib on apixaban, CAD, pulmonary hypertension, severe obstructive lung disease in setting of COPD/asthma, laryngeal squamous cell carcinoma (diagnosed 4/2024) s/p radiation (4/2024-7/2024) c/b dysphagia on modified diet, CREST  syndrome, hypothyroidism, anxiety and depression who is admitted for aspiration pneumonia.. We have been consulted to assess the patient's oropharyngeal dysphagia with possible esophageal dysphagia secondary to possible esophageal web.     As noted above the patient had a finding of possible esophageal web in the cervical esophagus on a prior modified barium. The most recent modified barium did not show any web. I personally reviewed the most recent barium study and did not visualize a web. There was effortless spillage of contents into the esophagus and pooling noted in the vallecula. There was no obvious backflow of contrast or inability to pass through the esophagus in the visualized portion of the study. We will discuss with radiology tomorrow to confirm our findings. Regardless, even if there were a small non obstructing esophageal web, this would not be the culprit of the patient's issues. Aspiration of oral secretions will continue to occur despite tube feeding in significant oropharyngeal dysphagia related to the patient's underlying comorbidity.    80 minutes were spent on the date of the encounter performing chart review, reviewing of outside and inside available records, review of test results as well as interpretation of tests, the patient visit, documentation, and discussion with other provider(s) and/or discussion with family.     Juvencio Angulo DO   of Medicine  Director, Esophageal Disorders Program  Director of Endoscopy, [facility]  Division of Gastroenterology, Hepatology, and Nutrition  [facility]    -------------------------------------------------------------------------------------------------------------------           History of Present Illness:   Asya Coffman is a 79 year old female with a history of Afib on apixaban, CAD, pulmonary hypertension, severe obstructive lung disease in setting of COPD/asthma, laryngeal squamous  cell carcinoma (diagnosed 4/2024) s/p radiation (4/2024-7/2024) c/b dysphagia on modified diet, CREST syndrome, hypothyroidism, anxiety and depression.      She was initially admitted to hospital medicine service with hypoxic respiratory failure suspected due to HFpEF requiring diuresis. Developed acute hypoxia on 12/8 secondary to suspected aspiration pneumonitis for which she was intubated (12/8-12/10). Now s/p extubation and now back on floors on 4L.     Gi service was consulted for ?esophageal web on video fluoro study.              Data:   Key relevant labs:     Key relevant imaging:           Previous Endoscopy:   None         Medications:     Current Facility-Administered Medications   Medication Dose Route Frequency Provider Last Rate Last Admin    acetaminophen (TYLENOL) tablet 650 mg  650 mg Oral Q4H PRN Jennifer Anne PA-C   650 mg at 12/15/24 1124    albuterol (PROVENTIL HFA/VENTOLIN HFA) inhaler  2 puff Inhalation Q6H PRN Jennifer Anne PA-C   2 puff at 12/07/24 2131    apixaban ANTICOAGULANT (ELIQUIS) tablet 5 mg  5 mg Oral BID Brady Rivera MD   5 mg at 12/15/24 0806    [Held by provider] atorvastatin (LIPITOR) tablet 20 mg  20 mg Oral QPM Jennifer Anne PA-C   20 mg at 12/08/24 2026    budesonide (PULMICORT) neb solution 0.5 mg  0.5 mg Nebulization BID Rama Urbina MD   0.5 mg at 12/15/24 0836    calcium carbonate (TUMS) chewable tablet 1,000 mg  1,000 mg Oral 4x Daily PRN Jennifer Anne PA-C   1,000 mg at 12/06/24 2126    dextrose 10% infusion   Intravenous Continuous PRN Rama Urbina MD        glucose gel 15-30 g  15-30 g Oral Q15 Min PRN Rebecca Hardy MD        Or    dextrose 50 % injection 25-50 mL  25-50 mL Intravenous Q15 Min PRN Rebecca Hardy MD        Or    glucagon injection 1 mg  1 mg Subcutaneous Q15 Min PRN Rebecca Hardy MD        diltiazem (CARDIZEM) tablet 120 mg  120 mg Oral Q6H Rama Tabor MD   120 mg at 12/15/24  1114    [Held by provider] empagliflozin (JARDIANCE) tablet 10 mg  10 mg Oral Daily Jesu Lunsford MD   10 mg at 12/08/24 0833    fexofenadine (ALLEGRA) tablet 120 mg  120 mg Oral or Feeding Tube Daily Ángel El MD   120 mg at 12/15/24 0806    furosemide (LASIX) tablet 40 mg  40 mg Oral Daily Jesu Lunsford MD   40 mg at 12/15/24 0806    gabapentin (NEURONTIN) capsule 600 mg  600 mg Oral At Bedtime Juanjose Peres MD   600 mg at 12/14/24 2124    ipratropium (ATROVENT) 0.02 % neb solution 0.5 mg  0.5 mg Nebulization 4x daily Rama Urbina MD   0.5 mg at 12/15/24 1242    And    levalbuterol (XOPENEX) neb solution 0.63 mg  0.63 mg Nebulization 4x Daily Rama Urbina MD   0.63 mg at 12/15/24 1242    levothyroxine (SYNTHROID/LEVOTHROID) tablet 88 mcg  88 mcg Oral Daily Jennifer Anne PA-C   88 mcg at 12/15/24 0551    lidocaine (LMX4) cream   Topical Q1H PRN Jennifer Anne PA-C        lidocaine 1 % 0.1-1 mL  0.1-1 mL Other Q1H PRN Jennifer Anne PA-C        multivitamin, therapeutic (THERA-VIT) tablet 1 tablet  1 tablet Oral or Feeding Tube Daily Rama Urbina MD   1 tablet at 12/15/24 0806    ondansetron (ZOFRAN) injection 4 mg  4 mg Intravenous Q6H PRN Jesu Lunsford MD   4 mg at 12/15/24 1113    pantoprazole (PROTONIX) 2 mg/mL suspension 40 mg  40 mg Per Feeding Tube QAM Ramila Awan MD        Or    pantoprazole (PROTONIX) IV push injection 40 mg  40 mg Intravenous QAM Ramila Awan MD   40 mg at 12/15/24 0806    Patient is already receiving anticoagulation with heparin, enoxaparin (LOVENOX), warfarin (COUMADIN)  or other anticoagulant medication   Does not apply Continuous PRN Jennifer Anne PA-C        polyethylene glycol (MIRALAX) Packet 17 g  17 g Oral Daily Jennifer Anne PA-C   17 g at 12/10/24 0750    senna-docusate (SENOKOT-S/PERICOLACE) 8.6-50 MG per tablet 1 tablet  1 tablet Oral BID PRN Jennifer Anne PA-C   1  "tablet at 12/07/24 0910    Or    senna-docusate (SENOKOT-S/PERICOLACE) 8.6-50 MG per tablet 2 tablet  2 tablet Oral BID PRN Jennifer Anne PA-C        sertraline (ZOLOFT) tablet 150 mg  150 mg Oral Daily Jennifer Anne PA-C   150 mg at 12/15/24 0806    sodium chloride (NEBUSAL) 3 % neb solution 3 mL  3 mL Nebulization BID Kevin López MD        sodium chloride (OCEAN) 0.65 % nasal spray 1 spray  1 spray Both Nostrils 4x Daily Kevin López MD   1 spray at 12/15/24 1114    sodium chloride (PF) 0.9% PF flush 3 mL  3 mL Intracatheter Q8H Jennifer Anne PA-C   3 mL at 12/15/24 1524    sodium chloride (PF) 0.9% PF flush 3 mL  3 mL Intracatheter q1 min prn Jennifer Anne PA-C   3 mL at 12/07/24 2100    [Held by provider] spironolactone (ALDACTONE) tablet 25 mg  25 mg Oral Daily Sara Griffin DO   25 mg at 12/08/24 0828    thiamine (B-1) injection 250 mg  250 mg Intravenous Daily Rama Urbina MD   250 mg at 12/15/24 1113    Followed by    [START ON 12/18/2024] thiamine (B-1) tablet 100 mg  100 mg Oral Daily Rama Urbina MD                 Physical Exam:   /84 (BP Location: Left arm)   Pulse 100   Temp 98.6  F (37  C) (Oral)   Resp 19   Ht 1.651 m (5' 5\")   Wt 50.2 kg (110 lb 10.7 oz)   SpO2 98%   BMI 18.42 kg/m    Wt:   Wt Readings from Last 2 Encounters:   12/15/24 50.2 kg (110 lb 10.7 oz)   10/14/24 52.2 kg (115 lb)      General Appearance:  Awake. Alert. No acute distress. Frail appearing.   Respiratory: Normal work of breathing on 2L. Lungs with loud upper airway breath sounds. Lower lobes sound clear.   Cardiovascular: Irregular. No obvious murmur.   GI: Nondistended. Normoactive. Soft. Non-tender.   Skin: Warm, dry.          Past Medical History:   Reviewed and edited as appropriate  Past Medical History:   Diagnosis Date    A-fib (H)     Antiplatelet or antithrombotic long-term use     Arrhythmia     COPD (chronic obstructive pulmonary disease) (H)  "            Past Surgical History:   Reviewed and edited as appropriate   Past Surgical History:   Procedure Laterality Date    INJECT STEROID (LOCATION) N/A 6/15/2023    Procedure: Avastin injection;  Surgeon: Nikole Davis MD;  Location: [facility]    INJECT STEROID (LOCATION) N/A 9/7/2023    Procedure: Avastin injection;  Surgeon: Nikole Davis MD;  Location: [facility]    INJECT STEROID (LOCATION) N/A 12/14/2023    Procedure: Avastin injection;  Surgeon: Nikole Davis MD;  Location: [facility]    INJECT STEROID (LOCATION) N/A 2/15/2024    Procedure: Avastin injection;  Surgeon: Nikole Davis MD;  Location: [facility]    LASER CO2 LARYNGOSCOPY N/A 9/7/2023    Procedure: Microdirect laryngoscopy with excision/ablation of laryngeal lesions, biopsies, CO2 laser;  Surgeon: Nikole Davis MD;  Location: [facility]    LASER CO2 LARYNGOSCOPY N/A 5/30/2024    Procedure: Microdirect laryngoscopy with excision/ablation of laryngeal lesions, biopsies, CO2 laser;  Surgeon: Nikole Davis MD;  Location: [facility]    LASER CO2 LARYNGOSCOPY, COMPLEX N/A 5/26/2022    Procedure: Micro direct laryngoscopy with excision/ablation of laryngeal lesions, possible biopsies, possible CO2 laser;  Surgeon: Nikole Davis MD;  Location: [facility]    LASER CO2 LARYNGOSCOPY, COMPLEX N/A 9/15/2022    Procedure: Microdirect laryngoscopy with ablation of laryngeal lesions,biopsies,CO2 laser;  Surgeon: Nikole Davis MD;  Location: [facility]    LASER CO2 LARYNGOSCOPY, COMPLEX N/A 12/1/2022    Procedure: Microdirect laryngoscopy with excision/ablation of laryngeal lesions, biopsies,   CO2 laser;  Surgeon: Nikole Davis MD;  Location: [facility]    LASER CO2 LARYNGOSCOPY, COMPLEX N/A 6/15/2023    Procedure: Microdirect laryngoscopy with ablation of laryngeal lesions, biopsies, CO2 laser;  Surgeon: Nikole Davis MD;  [facility]    LASER CO2 LARYNGOSCOPY, COMPLEX N/A  10/5/2023    Procedure: Microdirect laryngoscopy with excision/ablation of laryngeal lesions, biopsies, CO2 laser;  Surgeon: Nikole Davis MD;  [facility]    LASER CO2 LARYNGOSCOPY, COMPLEX N/A 12/14/2023    Procedure: Microdirect laryngoscopy with excision/ablation of laryngeal lesions, biopsies, CO2 laser;  Surgeon: Nikole Davis MD;  [facility]    LASER CO2 LARYNGOSCOPY, COMPLEX N/A 2/15/2024    Procedure: Microdirect laryngoscopy with excision/ablation of laryngeal lesions, biopsies, CO2 laser;  Surgeon: Nikole Davis MD;  [facility]    LASER CO2 LARYNGOSCOPY, COMPLEX N/A 4/11/2024    Procedure: Microdirect laryngoscopy with excision/ablation of laryngeal lesions, biopsies, CO2 laser, Avastin injections;  Surgeon: Nikole Davis MD;  [facility]    LASER KTP LARYNGOSCOPY N/A 4/3/2023    Procedure: Microdirect laryngoscopy with biopsies, excision/ablation of lesions, C02 laser,  Avastin injection;  Surgeon: Nikole Davis MD;  [facility]    LASER KTP LARYNGOSCOPY N/A 6/15/2023    Procedure: flexible laser (CO2 or KTP) standby;  Surgeon: Nikole Davis MD;  Location: [facility]    LASER KTP LARYNGOSCOPY FLEXIBLE N/A 8/18/2022    Procedure: Transnasal flexible laryngoscopy with KTP laser and biopsies;  Surgeon: Nikole Davis MD;  Location: [facility]    LASER KTP LARYNGOSCOPY FLEXIBLE N/A 10/27/2022    Procedure: Transnasal flexible laryngoscopy with possible KTP laser;  Surgeon: Nikole Davis MD;  Location: [facility]    LASER KTP LARYNGOSCOPY FLEXIBLE N/A 1/26/2023    Procedure: Transnasal flexible laryngoscopy with KTP laser,  biopsies, superior laryngeal nerve blocks, Avastin injection;  Surgeon: Nikole Davis MD;  Location: [facility]    LASER KTP LARYNGOSCOPY FLEXIBLE N/A 2/15/2023    Procedure: Transnasal flexible laryngoscopy with KTP laser, superior laryngeal nerve blocks, biopsies, avastin  injection;  Surgeon: Nikole Davis MD;  Location: [facility]    LASER KTP LARYNGOSCOPY FLEXIBLE N/A 2/17/2023    Procedure: Transnasal flexible laryngoscopy with KTP laser, biopsies, superior laryngeal nerve blocks;  Surgeon: Nikole Davis MD;  Location: [facility]    LASER KTP LARYNGOSCOPY FLEXIBLE N/A 2/23/2023    Procedure: Transnasal flexible laryngoscopy with KTP laser, superior laryngeal nerve blocks;  Surgeon: Nikole Davis MD;  Location: [facility] OR    LASER KTP LARYNGOSCOPY FLEXIBLE N/A 3/2/2023    Procedure: Transnasal flexible laryngoscopy with KTP laser, superior laryngeal nerve block Bilateral;  Surgeon: Nikole Davis MD;  Location: [facility] OR    LASER KTP LARYNGOSCOPY FLEXIBLE N/A 3/9/2023    Procedure: Transnasal flexible laryngoscopy with KTP laser, biopsies, superior laryngeal nerve blocks;  Surgeon: Nikole Davis MD;  Location: [facility]    LASER KTP LARYNGOSCOPY FLEXIBLE N/A 3/17/2023    Procedure: Transnasal flexible laryngoscopy with KTP laser, superior laryngeal nerve block(s), Avastin injection;  Surgeon: Nikole Davis MD;  Location: [facility] OR    LASER KTP LARYNGOSCOPY FLEXIBLE N/A 3/28/2023    Procedure: Transnasal flexible laryngoscopy with KTP laser, superior laryngeal nerve block(s);  Surgeon: Pankaj Woodard MD;  Location: [facility] OR            Social History:   Alcohol use:   Smoking history:   Living situation:          Family History:   Reviewed and edited as appropriate  Family History   Problem Relation Age of Onset    Breast Cancer Mother 75.00    Hereditary Breast and Ovarian Cancer Syndrome No family hx of     Cancer No family hx of     Colon Cancer No family hx of     Endometrial Cancer No family hx of     Ovarian Cancer No family hx of       No known history of colorectal cancer, liver disease, or inflammatory bowel disease.         Allergies:   Reviewed and edited as appropriate     Allergies   Allergen Reactions     Methylpyrrolidone Anaphylaxis    Nuts Anaphylaxis     Black walnut    Escitalopram Other (See Comments)     Asthma -a time of exacerbation and may not have been cause may retry    Mirtazapine Other (See Comments)     Asthma a time of exacerbation and may not have been cause may retry    Venlafaxine Other (See Comments)    Adhesive Tape Rash     With holter monitor. Ok with bandaids.    No Clinical Screening - See Comments Rash     Adhesive tape              Review of Systems:     A complete 10 point review of systems was performed and is negative except as noted in the HPI

## 2024-12-15 NOTE — PROGRESS NOTES
[facility]    Medicine Progress Note - Hospitalist Service, GOLD TEAM 8    Date of Admission:  11/21/2024    Assessment & Plan   Asya Coffman is a 79 year old female admitted on 11/21/2024. 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 Initially admitted to hospital medicine service with hypoxic respiratory failure suspected due to HFpEF requiring diuresis. Developed acute hypoxia on 12/8 secondary to suspected aspiration pneumonitis for which she was intubated (12/8-12/10). Now s/p extubation and transferring back to hospital medicine service.    Changes today:  - Speech and Nutrition continues to follow   - NPO on TF  - still dealing with significant secretions that cause tachypnea and respiratory distress until patient is deep suctioned-->consulting ENT for bedside laryngoscopy to see if h/o papillomas and radiation related to the dysphagia  - discussed with daughter Lana on the phone at length today     Oral secretions, increased  Moderate-severe pharyngeal dysphagia  Esophageal dysmotility  Laryngeal squamous cell carcinoma s/p radiation (4/2024-7/2024)  Has had increased dysphagia in the setting of laryngeal squamous cell carcinoma s/p radiation (4/2024-7/2024) as well as some concern for esophageal web on swallow study (10/8/2024) pending GI input. Some concern for respiratory decompensation due to recurrent aspiration events.   - Speech following  - NPO - okay for ice chips for comfort with 1:1 supervision  - Will need VFSS -- SLP will notify when they feel she is ready for this  - consulting ENT for bedside laryngoscopy to see if h/o papillomas and radiation related to the  dysphagia  - Tomorrow will consider GI consult for management of esophageal web    Acute hypoxic respiratory failure - improving  Aspiration pneumonitis  MSSA PNA s/p abx (12/3-12/8)  Obstructive lung disease (COPD vs asthma)  Pulmonary hypertension   Presented on 11/21 with acute hypoxic respiratory failure; initially suspected to be secondary to HFpEF exacerbation; unresolved with diuresis. Underlying obstructive lung disease but no evidence of exacerbation here. Was treated for MSSA pneumonia. Overnight 12/8, developed sudden worsening of oxygenation and increased secretions in the setting of dysphagia/recent laryngeal radiation and was intubated for airway protection. Suspected aspiration pneumonitis as etiology. CT Chest (12/8) with mediastinal/hilar lymphadenopathy and resolving diffuse ground glass opacities most consistent with resolving pneumonia; no signs of progressive or secondary infectious process. Was briefly on zosyn but this was discontinued as she was treated for full course based on initial pneumonia. She has been extubated and now on 2L. Ongoing upper airway adventitious sounds.   - Pulse oximetry, supplemental O2  - Budesonide neb 0.5 mg BID, ipratropium-levalbuterol neb QID  - Aspiration precautions; speech following   - Sputum culture (12/9) grew candida but leukocytosis improved and back on minimal low flow oxygen so will not treat for now  - Flutter valve, incentive spirometer  - Low threshold to resume empiric abx for pnuemonia coverage      HFpEF (LVEF 55-60% 11/22/2024)   Pulmonary hypertension (44 mmHg per echo 11/22/2024)  Possible small PFO  Echo during current admission (11/22/2024) notable for increased thickness of LV and elevated BNP (peak 9200 > 6200) concerning for heart failure exacerbation s/p diuresis and pulmonary hypertension with estimated pulmonary artery pressure of 45 mmHg. Echo with bubble study (11/25/2024) concerning for possible small PFO. With intubation on 12/8,  developed hypotension without evidence of shock. Etiology of hypotension may be cardiogenic (e.g. exacerbation of pulmonary hypertension by positive pressure ventilation) vs medication associated (propofol, precedex); do not currently suspect distributive or obstructive hypotension. Weaned off pressors 12/10.   - Preload dependent in the setting of pulmonary hypertension, gentle diuresis as needed (will hold today)   - 2L FR    Chronic afib  [id number] 3 (age++, HF+).   - PTA apixaban 5mg BID  - PTA diltiazem 240 ER BID - HOLD (cannot give ER enterally)               Up-titrate to short-acting Diltiazem 120mg q6H as tolerated by blood pressure  - PTA propanolol 10mg BID      At risk for refeeding syndrome  Starvation ketosis  Hypokalemia  Severe malnutrition in context of chronic illness/disease  Per daughter, baseline weight around 120-130 lbs. Admitted 102 lbs. Patient states she is eating less and has less appetite in general as well as having difficulty swallowing. While NPO during intubation, developed starvation ketosis. Given high risk for aspiration with PO intake, placed NGT and started tube feeds. Will need close monitoring for refeeding syndrome.   - NPO   - RD following for TF management   - Monitor for refeeding syndrome   - Lyte replacement protocol   - IV thiamine replacement      Oliguric SAMIR  Cr 0.9 on admission. Baseline appears 0.9-1.0. Developed SAMIR initially in setting of diuresis and had been improving. Subsequently increased following transfer to ICU with consideration for prerenal in setting of NPO status. Also with new diarrhea. Cr stable.   - Strict I/Os  - Trend BMP   - FWF via NGT    Diarrhea  Frequent loose stools. Unclear timing of onset so not sure if related to abx or TF initiation. No abdominal pain.  - Monitor   - Hold on infectious testing for now     Hypothyroidism  Hx of thyroidectomy 2/2 cancer   - Continue PTA levothyroxine 88 mcg daily      Anemia of chronic illness  Baseline  "Hgb 11-12. Currently near baseline. Can consider anemia of chronic illness.   - Monitor CBC     Generalized deconditioning  Recurrent falls  - PT/OT consults     CREST Syndrome  Characterized by Raynaud's, Calcinosis, GERD and some telangectasia's. Last saw [facility]ology 2017. No history of ILD.   - Continue protonix 40 mg daily     Anxiety  Depression  - Continue PTA Sertraline 150 mg daily           Diet: Fluid restriction 2000 ML FLUID  NPO for Medical/Clinical Reasons Except for: No Exceptions  Adult Formula Drip Feeding: Continuous [employer] Peptide 1.5; Nasogastric tube; Goal Rate: 45; mL/hr; 12/13: please transition to new TF formula immediately when arrives to pt room and ok to start at new goal rate; Do not advance tube feeding rat...    DVT Prophylaxis: DOAC  Miranda Catheter: Not present  Lines: None     Cardiac Monitoring: None  Code Status: Full Code      Clinically Significant Risk Factors               # Hypoalbuminemia: Lowest albumin = 3 g/dL at 12/10/2024  3:11 AM, will monitor as appropriate                # Cachexia: Estimated body mass index is 17.98 kg/m  as calculated from the following:    Height as of this encounter: 1.651 m (5' 5\").    Weight as of this encounter: 49 kg (108 lb 0.4 oz).   # Severe Malnutrition: based on nutrition assessment    # Financial/Environmental Concerns: none         Social Drivers of Health    Housing Stability: High Risk (11/23/2024)    Housing Stability     Do you have housing? : No     Are you worried about losing your housing?: No          Disposition Plan     Medically Ready for Discharge: Anticipated in 5+ Days             Jesu Lunsford MD  Hospitalist Service, GOLD TEAM [address] [place] [address]  Securely message with [employer] (more info)  Text page via [facility] Paging/Directory   See signed in provider for up to date coverage information  ______________________________________________________________________    Interval " History   Deep suction x1 for secretions causing tachypnea but no desats or other VS changes.     Physical Exam   Vital Signs: Temp: 98  F (36.7  C) Temp src: Axillary BP: 106/72 Pulse: 90   Resp: 18 SpO2: 100 % O2 Device: Oxymask Oxygen Delivery: 3 LPM  Weight: 108 lbs .41 oz    General Appearance:  Awake. Alert. No acute distress. Frail appearing.   Respiratory: Normal work of breathing on 2L. Lungs with loud upper airway breath sounds. Lower lobes sound clear.   Cardiovascular: Irregular. No obvious murmur.   GI: Nondistended. Normoactive. Soft. Non-tender.   Skin: Warm, dry.     Medical Decision Making             Data

## 2024-12-15 NOTE — PLAN OF CARE
"Assumed cares [id number]     /84 (BP Location: Left arm)   Pulse 100   Temp 98.6  F (37  C) (Oral)   Resp 19   Ht 1.651 m (5' 5\")   Wt 50.2 kg (110 lb 10.7 oz)   SpO2 98%   BMI 18.42 kg/m       Pain: Patient has mild abdominal discomfort. PRN tylenol given.   Neuro: A&Ox4.    Respiratory: Lungs course bilaterally. Intermittent need for oral suctioning. On 2L oxy mask.   Cardiac/Neurovascular: HR an pulse irregular. No numbness/tingling reported.   GI/: Incontinent of bowel and bladder. Smear of a BM today.   Nutrition: NPO with TF @45ml/hr.   Activity: Bed bound. Turned q2h's.   Skin: Sacral Mepi & Mepi on Hips in place. (Preventative)  Lines: 2 PIV's (SL). NJ tube w/ TF running.   Events this shift: Pt needed intermittent oral suctioning for some thick mucous. Incontinent of urine and stool. Repositioned every 2 hours throughout day. Electrolytes stable, all rechecks in the AM. Tylenol and zofran given for some abdominal discomfort and nausea.     Plan: Continue with plan of care.     Goal Outcome Evaluation:      Plan of Care Reviewed With: patient    Overall Patient Progress: no changeOverall Patient Progress: no change    Outcome Evaluation: Continue with plan of care.      "

## 2024-12-15 NOTE — PLAN OF CARE
Goal Outcome Evaluation:       Admission/Transfer from:   2 RN skin assessment completed. Yes  Significant findings include: sacral mepilex for blanchable redness, scattered bruises  WOC Nurse Consult Ordered? No

## 2024-12-15 NOTE — PLAN OF CARE
Goal Outcome Evaluation:       Transfer from  this shift. A&O flat affect. VSS on 2L NC. Continuous pulse ox on, challenge to get consistent readings, many pulse ox methods in use. Oral suction, copious thick saliva. Denies pain, SOB. Sacral mepilex on for protection. Turn Q 2. TF at NJ tolerating at goal. Able to make needs known.

## 2024-12-15 NOTE — PLAN OF CARE
Major Shift Events:  pt is alert and oriented, forgetful at times. A fib/a flutter HR 's, MAP >65, on 2L NC, weak cough, dry mouth and nose, oral cares 2hr. Loose stools. TF via NG @ goal rate of 45 ml/hr with standard flushes, NPO, speech following, swabs for comfort. Blanchable redness to coccyx and perineum, barrier cream applied. Small open area to the coccyx. Scattered bruising throughout.  Plan: continue to monitor and notify MD with changes.  For vital signs and complete assessments, please see documentation flowsheets.

## 2024-12-15 NOTE — PROGRESS NOTES
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 Initially admitted to hospital medicine service with hypoxic respiratory failure suspected due to HFpEF requiring diuresis. Developed acute hypoxia on 12/8 secondary to suspected aspiration pneumonitis for which she was intubated (12/8-12/10). Now s/p extubation. ENT paged re: acute on chronic exacerbation of dysphagia and failed bedside swallow study with SLP.    Patient had been previously seen as new consult by our team 11/30 for hypoxia/SOB and seen to have open airway with mobile cords bilaterally. There were atypical appearing areas within the larynx and subglottis for which a course of PPI and inhaled steroids was started for. This scope exam had occurred prior to most recent intubation since patient was noted to have progression of dysphagia.    I presented to bedside for evaluation and attempted to perform scope. Unfortunately there was significant crusting within the nares bilaterally which prevented passage of scope. Scope was subsequently passed through oral cavity. Mucous membranes were noted to be significantly dry. The scope was advanced past the base of tongue. The vallecula appeared normal. The epiglottis was sharp. Bilateral AE folds, arytenoids, false vocal folds were normal. The larynx was clear. No masses or lesions visualized along the edges of the cords bilaterally. There was some pooling of secretions within the hypopharynx. Unfortunately patient did not participate in attempted evaluation of cord mobility and due to discomfort/gagging, patient removed the scope and declined further evaluation.    Plan:  -recommend blow by for humidification given dry oral mucosa, d/w bedside nursing  -start scheduled nasal saline sprays for significant nasal crusting bilaterally  -ENT will  return to re-attempt scope 12/16 through cayla Tesfaye MD  ENT PGY-2

## 2024-12-16 LAB
ANION GAP SERPL CALCULATED.3IONS-SCNC: 13 MMOL/L (ref 7–15)
BUN SERPL-MCNC: 43.1 MG/DL (ref 8–23)
CALCIUM SERPL-MCNC: 9.3 MG/DL (ref 8.8–10.4)
CHLORIDE SERPL-SCNC: 102 MMOL/L (ref 98–107)
CREAT SERPL-MCNC: 0.99 MG/DL (ref 0.51–0.95)
EGFRCR SERPLBLD CKD-EPI 2021: 58 ML/MIN/1.73M2
ERYTHROCYTE [DISTWIDTH] IN BLOOD BY AUTOMATED COUNT: 19.3 % (ref 10–15)
GLUCOSE SERPL-MCNC: 102 MG/DL (ref 70–99)
HCO3 SERPL-SCNC: 27 MMOL/L (ref 22–29)
HCT VFR BLD AUTO: 33.9 % (ref 35–47)
HGB BLD-MCNC: 10 G/DL (ref 11.7–15.7)
MAGNESIUM SERPL-MCNC: 2.3 MG/DL (ref 1.7–2.3)
MCH RBC QN AUTO: 25.3 PG (ref 26.5–33)
MCHC RBC AUTO-ENTMCNC: 29.5 G/DL (ref 31.5–36.5)
MCV RBC AUTO: 86 FL (ref 78–100)
PHOSPHATE SERPL-MCNC: 4.4 MG/DL (ref 2.5–4.5)
PLATELET # BLD AUTO: 489 10E3/UL (ref 150–450)
POTASSIUM SERPL-SCNC: 5.2 MMOL/L (ref 3.4–5.3)
RBC # BLD AUTO: 3.95 10E6/UL (ref 3.8–5.2)
SODIUM SERPL-SCNC: 142 MMOL/L (ref 135–145)
WBC # BLD AUTO: 8.7 10E3/UL (ref 4–11)

## 2024-12-16 PROCEDURE — 250N000009 HC RX 250: Performed by: INTERNAL MEDICINE

## 2024-12-16 PROCEDURE — 94640 AIRWAY INHALATION TREATMENT: CPT

## 2024-12-16 PROCEDURE — 94640 AIRWAY INHALATION TREATMENT: CPT | Mod: 76

## 2024-12-16 PROCEDURE — 250N000013 HC RX MED GY IP 250 OP 250 PS 637: Performed by: INTERNAL MEDICINE

## 2024-12-16 PROCEDURE — 97110 THERAPEUTIC EXERCISES: CPT | Mod: GP

## 2024-12-16 PROCEDURE — 82374 ASSAY BLOOD CARBON DIOXIDE: CPT | Performed by: STUDENT IN AN ORGANIZED HEALTH CARE EDUCATION/TRAINING PROGRAM

## 2024-12-16 PROCEDURE — 250N000011 HC RX IP 250 OP 636

## 2024-12-16 PROCEDURE — 250N000013 HC RX MED GY IP 250 OP 250 PS 637: Performed by: PHYSICIAN ASSISTANT

## 2024-12-16 PROCEDURE — 250N000013 HC RX MED GY IP 250 OP 250 PS 637

## 2024-12-16 PROCEDURE — 250N000009 HC RX 250

## 2024-12-16 PROCEDURE — 0CJS8ZZ INSPECTION OF LARYNX, VIA NATURAL OR ARTIFICIAL OPENING ENDOSCOPIC: ICD-10-PCS | Performed by: PHYSICIAN ASSISTANT

## 2024-12-16 PROCEDURE — 31575 DIAGNOSTIC LARYNGOSCOPY: CPT | Performed by: PHYSICIAN ASSISTANT

## 2024-12-16 PROCEDURE — 250N000013 HC RX MED GY IP 250 OP 250 PS 637: Performed by: STUDENT IN AN ORGANIZED HEALTH CARE EDUCATION/TRAINING PROGRAM

## 2024-12-16 PROCEDURE — 99231 SBSQ HOSP IP/OBS SF/LOW 25: CPT | Mod: 25 | Performed by: PHYSICIAN ASSISTANT

## 2024-12-16 PROCEDURE — 120N000002 HC R&B MED SURG/OB UMMC

## 2024-12-16 PROCEDURE — 36415 COLL VENOUS BLD VENIPUNCTURE: CPT | Performed by: STUDENT IN AN ORGANIZED HEALTH CARE EDUCATION/TRAINING PROGRAM

## 2024-12-16 PROCEDURE — 0CJY8ZZ INSPECTION OF MOUTH AND THROAT, VIA NATURAL OR ARTIFICIAL OPENING ENDOSCOPIC: ICD-10-PCS | Performed by: PHYSICIAN ASSISTANT

## 2024-12-16 PROCEDURE — 97530 THERAPEUTIC ACTIVITIES: CPT | Mod: GP

## 2024-12-16 PROCEDURE — 85014 HEMATOCRIT: CPT | Performed by: STUDENT IN AN ORGANIZED HEALTH CARE EDUCATION/TRAINING PROGRAM

## 2024-12-16 PROCEDURE — 999N000157 HC STATISTIC RCP TIME EA 10 MIN

## 2024-12-16 PROCEDURE — 97116 GAIT TRAINING THERAPY: CPT | Mod: GP

## 2024-12-16 PROCEDURE — 80048 BASIC METABOLIC PNL TOTAL CA: CPT | Performed by: STUDENT IN AN ORGANIZED HEALTH CARE EDUCATION/TRAINING PROGRAM

## 2024-12-16 PROCEDURE — 82310 ASSAY OF CALCIUM: CPT | Performed by: STUDENT IN AN ORGANIZED HEALTH CARE EDUCATION/TRAINING PROGRAM

## 2024-12-16 PROCEDURE — 83735 ASSAY OF MAGNESIUM: CPT | Performed by: STUDENT IN AN ORGANIZED HEALTH CARE EDUCATION/TRAINING PROGRAM

## 2024-12-16 PROCEDURE — 92526 ORAL FUNCTION THERAPY: CPT | Mod: GN | Performed by: REHABILITATION PRACTITIONER

## 2024-12-16 PROCEDURE — 250N000013 HC RX MED GY IP 250 OP 250 PS 637: Performed by: SURGERY

## 2024-12-16 PROCEDURE — 99233 SBSQ HOSP IP/OBS HIGH 50: CPT | Performed by: STUDENT IN AN ORGANIZED HEALTH CARE EDUCATION/TRAINING PROGRAM

## 2024-12-16 PROCEDURE — 84100 ASSAY OF PHOSPHORUS: CPT | Performed by: STUDENT IN AN ORGANIZED HEALTH CARE EDUCATION/TRAINING PROGRAM

## 2024-12-16 RX ORDER — LEVALBUTEROL INHALATION SOLUTION 0.63 MG/3ML
0.63 SOLUTION RESPIRATORY (INHALATION)
Status: DISCONTINUED | OUTPATIENT
Start: 2024-12-17 | End: 2024-12-24

## 2024-12-16 RX ORDER — LEVALBUTEROL INHALATION SOLUTION 0.63 MG/3ML
0.63 SOLUTION RESPIRATORY (INHALATION) 4 TIMES DAILY
Status: DISCONTINUED | OUTPATIENT
Start: 2024-12-16 | End: 2024-12-16

## 2024-12-16 RX ADMIN — THIAMINE HYDROCHLORIDE 250 MG: 100 INJECTION, SOLUTION INTRAMUSCULAR; INTRAVENOUS at 12:41

## 2024-12-16 RX ADMIN — THERA TABS 1 TABLET: TAB at 08:37

## 2024-12-16 RX ADMIN — FUROSEMIDE 40 MG: 40 TABLET ORAL at 08:37

## 2024-12-16 RX ADMIN — ATORVASTATIN CALCIUM 20 MG: 20 TABLET, FILM COATED ORAL at 19:54

## 2024-12-16 RX ADMIN — BUDESONIDE 0.5 MG: 0.5 INHALANT ORAL at 08:50

## 2024-12-16 RX ADMIN — DILTIAZEM HYDROCHLORIDE 120 MG: 120 TABLET, FILM COATED ORAL at 12:43

## 2024-12-16 RX ADMIN — LEVALBUTEROL HYDROCHLORIDE 0.63 MG: 0.63 SOLUTION RESPIRATORY (INHALATION) at 08:49

## 2024-12-16 RX ADMIN — APIXABAN 5 MG: 5 TABLET, FILM COATED ORAL at 08:37

## 2024-12-16 RX ADMIN — PANTOPRAZOLE SODIUM 40 MG: 40 INJECTION, POWDER, FOR SOLUTION INTRAVENOUS at 08:37

## 2024-12-16 RX ADMIN — IPRATROPIUM BROMIDE 0.5 MG: 0.5 SOLUTION RESPIRATORY (INHALATION) at 08:50

## 2024-12-16 RX ADMIN — GABAPENTIN 600 MG: 300 CAPSULE ORAL at 22:16

## 2024-12-16 RX ADMIN — DILTIAZEM HYDROCHLORIDE 120 MG: 120 TABLET, FILM COATED ORAL at 23:37

## 2024-12-16 RX ADMIN — BUDESONIDE 0.5 MG: 0.5 INHALANT ORAL at 20:44

## 2024-12-16 RX ADMIN — APIXABAN 5 MG: 5 TABLET, FILM COATED ORAL at 19:54

## 2024-12-16 RX ADMIN — DILTIAZEM HYDROCHLORIDE 120 MG: 120 TABLET, FILM COATED ORAL at 00:33

## 2024-12-16 RX ADMIN — SERTRALINE HYDROCHLORIDE 150 MG: 100 TABLET ORAL at 08:37

## 2024-12-16 RX ADMIN — ACETAMINOPHEN 650 MG: 325 TABLET, FILM COATED ORAL at 06:38

## 2024-12-16 RX ADMIN — LEVALBUTEROL HYDROCHLORIDE 0.63 MG: 0.63 SOLUTION RESPIRATORY (INHALATION) at 20:44

## 2024-12-16 RX ADMIN — FEXOFENADINE HYDROCHLORIDE 120 MG: 60 TABLET ORAL at 08:36

## 2024-12-16 RX ADMIN — LEVOTHYROXINE SODIUM 88 MCG: 88 TABLET ORAL at 05:54

## 2024-12-16 RX ADMIN — DILTIAZEM HYDROCHLORIDE 120 MG: 120 TABLET, FILM COATED ORAL at 17:50

## 2024-12-16 RX ADMIN — SODIUM CHLORIDE SOLN NEBU 3% 3 ML: 3 NEBU SOLN at 20:48

## 2024-12-16 RX ADMIN — DILTIAZEM HYDROCHLORIDE 120 MG: 120 TABLET, FILM COATED ORAL at 05:54

## 2024-12-16 RX ADMIN — SODIUM CHLORIDE SOLN NEBU 3% 3 ML: 3 NEBU SOLN at 08:58

## 2024-12-16 RX ADMIN — IPRATROPIUM BROMIDE 0.5 MG: 0.5 SOLUTION RESPIRATORY (INHALATION) at 20:44

## 2024-12-16 ASSESSMENT — ACTIVITIES OF DAILY LIVING (ADL)
ADLS_ACUITY_SCORE: 64
ADLS_ACUITY_SCORE: 64
ADLS_ACUITY_SCORE: 68
ADLS_ACUITY_SCORE: 68
ADLS_ACUITY_SCORE: 64
ADLS_ACUITY_SCORE: 68
ADLS_ACUITY_SCORE: 64
ADLS_ACUITY_SCORE: 68
ADLS_ACUITY_SCORE: 64

## 2024-12-16 NOTE — PLAN OF CARE
Goal Outcome Evaluation:      Plan of Care Reviewed With: patient    Overall Patient Progress: no changeOverall Patient Progress: no change    1900-0730   Status:   VS: Afebrile. Normotensive. Preston/Tachy (non sustained) <50 >130 w/ HR continual variable. MD made aware. Satting >93% on 2 L NC   Neuros: A&O x4, CMS intact   Cardiac: Normotensive. Bradcy/Tachy (non sustained) <50 >130 w/ HR continual variable. MD made aware   Respiratory: LS coarse/diminished. Satting >93% on 2 L NC. Denies shortness of breath at rest. Weak, productive cough w/ yaunker at bedside   GI/: Incontinent of bowel/bladder   Diet/Nausea: NPO w/ TFs @ 45 mL/hr. 30 mL FWF   Skin: Blanchable redness on coccyx + R hip. Primapores in place  LDA: Bilateral PIVs SL, NJ w/ continuous TFs @ 45 mL/hr. 30 mL FWF q4hrs   Labs: Reviewed   Pain: Denies pain   Activity: Assist x2 w/ repositioning, repositioned q2hrs w/ pillows  Plan: Continue POC

## 2024-12-16 NOTE — PROGRESS NOTES
Care Management Follow Up    Length of Stay (days): 25  Expected Discharge Date: 12/20/2024  Concerns to be Addressed: all concerns addressed in this encounter     Patient plan of care discussed at interdisciplinary rounds: Yes    Anticipated Discharge Disposition: TCU vs. [facility] with increased services.   Anticipated Discharge Services: None  Anticipated Discharge DME: None    Patient/family educated on Medicare website which has current facility and service quality ratings: yes  Education Provided on the Discharge Plan:  yes  Patient/Family in Agreement with the Plan:  yes    Referrals Placed by CM/SW:  Post Acute Facilities    Pending:  [facility]  [address] [address]  [place] [address] [phone]   Admissions: [phone] (Anali)  [email or web address] (Anali in admissions email) - Preference given to [facility] patients   12/2: Messaged via in [employer]     [facility]  [address] [facility] [address]  P: [phone]  Per 12/5 note- clinically accepted.     Declined  [facility]: Bed not available 11/27 & declined again due to financial concerns 12/2  [facility]: Bed not available, cost of medications  [facility]: Bed not available 12/2  [facility]: Cost of medications    Private pay costs discussed: Not applicable    Discussed  Partnership in Safe Discharge Planning  document with patient/family: No     Handoff Completed: No, handoff not indicated or clinically appropriate - to be completed at discharge.    Additional Information:  Per chart review, PT/OT/SLP disposition recs are for TCU.  Per chart review,  SW note on 12/5 indicated that TCU referrals were sent and discussed with family as well as the possibility of increasing services at pt's [facility].  Pt had been accepted to [facility] but family had preference of pt being placed closer to family in [place].      SW reached out to [facility] Provider, Dr. Lunsford,  regarding patient's medical readiness.  Per Dr. Lunsford, SLP is reevaluating patient regarding NPO status to determine if dysphagia is going to improve. It is not known at this time when patient may be med ready for discharge. SW will hold on discussion of placement with family at this time.     Next Steps: Social work will continue to follow for safe discharge plan.     __________________________     KAT Locke, JACOB  , [facility]  [address]   Desk Phone: [phone]   Securely message with [employer] (Search Name or  Med Surg [phone] )  Text page via [facility] Paging/Directory   See signed in provider for up to date coverage information  Social Work & Care Management Department  ___________________________________

## 2024-12-16 NOTE — CONSULTS
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 Initially admitted to hospital medicine service with hypoxic respiratory failure suspected due to HFpEF requiring diuresis. Developed acute hypoxia on 12/8 secondary to suspected aspiration pneumonitis for which she was intubated (12/8-12/10). Now s/p extubation. ENT paged re: acute on chronic exacerbation of dysphagia and failed bedside swallow study with SLP.     Patient had been previously seen as a new consult by our team 11/30 for hypoxia/SOB and seen to have open airway with mobile cords bilaterally. There were atypical appearing areas within the larynx and subglottis for which a course of PPI and inhaled steroids was started for. This scope exam had occurred prior to most recent intubation since patient was noted to have progression of dysphagia. Laryngoscopy was attempted 12/15, however scope was unable to be passed through the nose due to significant nasal crusting.     Patient reports she has been tolerating sips of water. Occasionally has coughing with swallowing. No pain with swallowing. States her voice seems pretty good to her and at baseline.        Past Medical History:   Diagnosis Date    A-fib (H)     Antiplatelet or antithrombotic long-term use     Arrhythmia     COPD (chronic obstructive pulmonary disease) (H)        Past Surgical History:   Procedure Laterality Date    INJECT STEROID (LOCATION) N/A 6/15/2023    Procedure: Avastin injection;  Surgeon: Nikole Davis MD;  Location: [facility]    INJECT STEROID (LOCATION) N/A 9/7/2023    Procedure: Avastin injection;  Surgeon: Nikole Davis MD;  Location: [facility]    INJECT STEROID (LOCATION) N/A 12/14/2023    Procedure: Avastin injection;  Surgeon: Nikole Davis  MD;  Location: [facility]    INJECT STEROID (LOCATION) N/A 2/15/2024    Procedure: Avastin injection;  Surgeon: Nikole Davis MD;  Location: [facility]    LASER CO2 LARYNGOSCOPY N/A 9/7/2023    Procedure: Microdirect laryngoscopy with excision/ablation of laryngeal lesions, biopsies, CO2 laser;  Surgeon: Nikole Davis MD;  [facility]    LASER CO2 LARYNGOSCOPY N/A 5/30/2024    Procedure: Microdirect laryngoscopy with excision/ablation of laryngeal lesions, biopsies, CO2 laser;  Surgeon: Nikole Davis MD;  Location: [facility]    LASER CO2 LARYNGOSCOPY, COMPLEX N/A 5/26/2022    Procedure: Micro direct laryngoscopy with excision/ablation of laryngeal lesions, possible biopsies, possible CO2 laser;  Surgeon: Nikole Davis MD;  [facility]    LASER CO2 LARYNGOSCOPY, COMPLEX N/A 9/15/2022    Procedure: Microdirect laryngoscopy with ablation of laryngeal lesions,biopsies,CO2 laser;  Surgeon: Nikole Davis MD;  [facility]    LASER CO2 LARYNGOSCOPY, COMPLEX N/A 12/1/2022    Procedure: Microdirect laryngoscopy with excision/ablation of laryngeal lesions, biopsies,   CO2 laser;  Surgeon: Nikole Davis MD;  [facility]    LASER CO2 LARYNGOSCOPY, COMPLEX N/A 6/15/2023    Procedure: Microdirect laryngoscopy with ablation of laryngeal lesions, biopsies, CO2 laser;  Surgeon: Nikole Davis MD;  [facility]    LASER CO2 LARYNGOSCOPY, COMPLEX N/A 10/5/2023    Procedure: Microdirect laryngoscopy with excision/ablation of laryngeal lesions, biopsies, CO2 laser;  Surgeon: Nikole Davis MD;  [facility]    LASER CO2 LARYNGOSCOPY, COMPLEX N/A 12/14/2023    Procedure: Microdirect laryngoscopy with excision/ablation of laryngeal lesions, biopsies, CO2 laser;  Surgeon: Nikole Davis MD;  [facility]    LASER CO2 LARYNGOSCOPY, COMPLEX N/A 2/15/2024    Procedure: Microdirect laryngoscopy with excision/ablation of laryngeal  lesions, biopsies, CO2 laser;  Surgeon: Nikole Davis MD;  [facility]    LASER CO2 LARYNGOSCOPY, COMPLEX N/A 4/11/2024    Procedure: Microdirect laryngoscopy with excision/ablation of laryngeal lesions, biopsies, CO2 laser, Avastin injections;  Surgeon: Nikole Davis MD;  [facility]    LASER KTP LARYNGOSCOPY N/A 4/3/2023    Procedure: Microdirect laryngoscopy with biopsies, excision/ablation of lesions, C02 laser,  Avastin injection;  Surgeon: Nikole Davis MD;  [facility]    LASER KTP LARYNGOSCOPY N/A 6/15/2023    Procedure: flexible laser (CO2 or KTP) standby;  Surgeon: Nikole Davis MD;  [facility]    LASER KTP LARYNGOSCOPY FLEXIBLE N/A 8/18/2022    Procedure: Transnasal flexible laryngoscopy with KTP laser and biopsies;  Surgeon: Nikole Davis MD;  Location: [facility]    LASER KTP LARYNGOSCOPY FLEXIBLE N/A 10/27/2022    Procedure: Transnasal flexible laryngoscopy with possible KTP laser;  Surgeon: Nikole Davis MD;  Location: [facility]    LASER KTP LARYNGOSCOPY FLEXIBLE N/A 1/26/2023    Procedure: Transnasal flexible laryngoscopy with KTP laser,  biopsies, superior laryngeal nerve blocks, Avastin injection;  Surgeon: Nikole Davis MD;  Location: [facility]    LASER KTP LARYNGOSCOPY FLEXIBLE N/A 2/15/2023    Procedure: Transnasal flexible laryngoscopy with KTP laser, superior laryngeal nerve blocks, biopsies, avastin injection;  Surgeon: Nikole Davis MD;  Location: [facility]    LASER KTP LARYNGOSCOPY FLEXIBLE N/A 2/17/2023    Procedure: Transnasal flexible laryngoscopy with KTP laser, biopsies, superior laryngeal nerve blocks;  Surgeon: Nikole Davis MD;  Location: [facility]    LASER KTP LARYNGOSCOPY FLEXIBLE N/A 2/23/2023    Procedure: Transnasal flexible laryngoscopy with KTP laser, superior laryngeal nerve blocks;  Surgeon: Nikole Davis MD;  Location: [facility]    LASER KTP  LARYNGOSCOPY FLEXIBLE N/A 3/2/2023    Procedure: Transnasal flexible laryngoscopy with KTP laser, superior laryngeal nerve block Bilateral;  Surgeon: Nikole Davis MD;  Location: [facility]    LASER KTP LARYNGOSCOPY FLEXIBLE N/A 3/9/2023    Procedure: Transnasal flexible laryngoscopy with KTP laser, biopsies, superior laryngeal nerve blocks;  Surgeon: Nikole Davis MD;  Location: [facility]    LASER KTP LARYNGOSCOPY FLEXIBLE N/A 3/17/2023    Procedure: Transnasal flexible laryngoscopy with KTP laser, superior laryngeal nerve block(s), Avastin injection;  Surgeon: Nikole Davis MD;  Location: [facility]    LASER KTP LARYNGOSCOPY FLEXIBLE N/A 3/28/2023    Procedure: Transnasal flexible laryngoscopy with KTP laser, superior laryngeal nerve block(s);  Surgeon: Pankaj Woodard MD;  Location: [facility] OR       No current outpatient medications on file.          Allergies   Allergen Reactions    Methylpyrrolidone Anaphylaxis    Nuts Anaphylaxis     Black walnut    Escitalopram Other (See Comments)     Asthma -a time of exacerbation and may not have been cause may retry    Mirtazapine Other (See Comments)     Asthma a time of exacerbation and may not have been cause may retry    Venlafaxine Other (See Comments)    Adhesive Tape Rash     With holter monitor. Ok with bandaids.    No Clinical Screening - See Comments Rash     Adhesive tape       Social History     Socioeconomic History    Marital status:      Spouse name: Not on file    Number of children: Not on file    Years of education: Not on file    Highest education level: Not on file   Occupational History    Not on file   Tobacco Use    Smoking status: Never    Smokeless tobacco: Never   Substance and Sexual Activity    Alcohol use: Yes     Comment: Occasionally    Drug use: Never    Sexual activity: Not on file   Other Topics Concern    Not on file   Social History Narrative    Not on file     Social Drivers of Health      Financial Resource Strain: Low Risk  (11/23/2024)    Financial Resource Strain     Within the past 12 months, have you or your family members you live with been unable to get utilities (heat, electricity) when it was really needed?: No   Food Insecurity: Low Risk  (11/23/2024)    Food Insecurity     Within the past 12 months, did you worry that your food would run out before you got money to buy more?: No     Within the past 12 months, did the food you bought just not last and you didn t have money to get more?: No   Transportation Needs: Low Risk  (11/23/2024)    Transportation Needs     Within the past 12 months, has lack of transportation kept you from medical appointments, getting your medicines, non-medical meetings or appointments, work, or from getting things that you need?: No   Physical Activity: Not on file   Stress: Not on file   Social Connections: Socially Integrated (12/1/2023)    Received [facility]    Social Connections     Do you often feel lonely or isolated from those around you?: 0   Interpersonal Safety: Low Risk  (11/23/2024)    Interpersonal Safety     Do you feel physically and emotionally safe where you currently live?: Yes     Within the past 12 months, have you been hit, slapped, kicked or otherwise physically hurt by someone?: No     Within the past 12 months, have you been humiliated or emotionally abused in other ways by your partner or ex-partner?: No   Housing Stability: High Risk (11/23/2024)    Housing Stability     Do you have housing? : No     Are you worried about losing your housing?: No       Family History   Problem Relation Age of Onset    Breast Cancer Mother 75.00    Hereditary Breast and Ovarian Cancer Syndrome No family hx of     Cancer No family hx of     Colon Cancer No family hx of     Endometrial Cancer No family hx of     Ovarian Cancer No family hx of        ROS: 12 point review of systems is negative unless noted in  "HPI.    PHYSICAL EXAM:  General: laying in bed, no acute distress  /74 (BP Location: Left arm)   Pulse 120   Temp 97.7  F (36.5  C) (Oral)   Resp 16   Ht 1.651 m (5' 5\")   Wt 50.2 kg (110 lb 10.7 oz)   SpO2 96%   BMI 18.42 kg/m    HEAD: normocephalic, atraumatic  Face: symmetrical, no swelling, edema, or erythema.   Eyes: EOMI, clear sclera  Ears: no otorrhea  Nose: no anterior drainage, intact and midline septum without perforation or hematoma, NG tube in right nasal passage  Mouth: moist, tongue midline and symmetric  Oropharynx:uvula midline, no oropharyngeal erythema  Neck: no LAD, trachea midline  Neuro: cranial nerves 2-12 grossly intact  Respiratory: breathing non-labored on RA, no stridor  Voice: soft, but not breathy  Skin: no rashes or skin lesions of the face/neck  Psych: pleasant affect  Cardio: extremities warm and well perfused     FIBEROPTIC ENDOSCOPY:  Due to dysphagia, fiberoptic laryngoscopy was indicated. After obtaining verbal consent, the nose was topically anesthetized. The fiberoptic laryngoscope was passed under endoscopic vision through the right nasal passage. The turbinates were normal. The inferior and middle meati were clear without purulence, masses, or polyps. The nasopharynx was clear. The eustachian tubes were clear. The soft palate appeared normal with good mobility. The epiglottis was sharp, and the visualized portion of the vallecula was clear. The larynx was clear with mobile cords. Globally weak movement b/l, able to approximate cords with max effort and with cough. The arytenoids were mildly boggy appearing, but not hooding over the larynx and there was no pooling in the hypopharynx.    ROUTINE IP LABS (Last four results)  BMP  Recent Labs   Lab 12/16/24  0524 12/15/24  0630 12/15/24  0235 12/14/24  0500 12/13/24  0404 12/12/24  0407 12/11/24  1825    140  --   --  138  --  136   POTASSIUM 5.2 4.7  4.6  --  3.8 3.6   < > 3.8  3.8   CHLORIDE 102 100  --   -- "  102  --  99   ESSIE 9.3 9.0  --   --  8.6*  --  8.4*   CO2 27 29  --   --  26  --  25   BUN 43.1* 40.4*  --   --  40.3*  --  42.5*   CR 0.99* 0.93  --   --  0.86  --  1.13*   * 99 103*  --  100*  --  116*    < > = values in this interval not displayed.     CBC  Recent Labs   Lab 12/16/24  0524 12/15/24  0630 12/14/24  0500 12/13/24  0404   WBC 8.7 10.2 9.3 8.9   RBC 3.95 3.95 4.27 4.34   HGB 10.0* 9.9* 10.9* 10.7*   HCT 33.9* 33.7* 35.8 37.1   MCV 86 85 84 86   MCH 25.3* 25.1* 25.5* 24.7*   MCHC 29.5* 29.4* 30.4* 28.8*   RDW 19.3* 19.2* 19.7* 19.2*   * 522* 458* 455*     INRNo lab results found in last 7 days.    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 Initially admitted to hospital medicine service with hypoxic respiratory failure suspected due to HFpEF requiring diuresis. Developed acute hypoxia on 12/8 secondary to suspected aspiration pneumonitis for which she was intubated (12/8-12/10). Now s/p extubation. ENT paged re: acute on chronic exacerbation of dysphagia and failed bedside swallow study with SLP.     - no anatomic explanation for dysphagia on laryngoscopy. Exam is limited to the larynx, unable to assess esophagus. She does appear globally weak, which is likely contributing to her dysphagia. B/l vocal cords are mobile on laryngoscopy and she can approximate the cords with max effort and during cough. No acute ENT intervention indicated at this time  - already receiving PPI for reflux prophylaxis  - agree with SLP for vocal strengthening and swallow exercises  - remainder of care per primary team      Clinically Significant Risk Factors               # Hypoalbuminemia: Lowest albumin = 3 g/dL at 12/10/2024  3:11 AM, will monitor as appropriate                 # Severe Malnutrition: based on nutrition assessment    #  Financial/Environmental Concerns: none           -- Patient and above plan to be discussed with Dr. Davis.    [place] [facility]  Otolaryngology-Head & Neck Surgery  Please page ENT with questions by dialing [phone] and entering job [phone] when prompted.

## 2024-12-16 NOTE — PROGRESS NOTES
Brief GI Chart Review Note    79 year old female with a history of Afib on apixaban, laryngeal squamous cell carcinoma (diagnosed 4/2024) s/p radiation (4/2024-7/2024) c/b dysphagia on modified diet, CREST syndrome, who is admitted for aspiration pneumonia. Patient has significant pooling of secretions in her mouth.  She has videofluoroscopic study showing silent aspiration.  On videofluoroscopic study done on 10/8/24, she had a questionable esophageal web.  However this was not seen on the latest swallow study.  We reviewed the swallow study ourselves and did not see any esophageal web and we have reviewed this with radiology who also do not see any structural cause of aspiration.  No plans for endoscopy intervention.  The inpatient GI team will sign off.    Discussed with Dr. Angulo.   Kinsey Gonzales (Abhijit Phelps), , MS  Gastroenterology Fellow  [facility]  Text Page or [place]

## 2024-12-17 LAB
ANION GAP SERPL CALCULATED.3IONS-SCNC: 9 MMOL/L (ref 7–15)
BASE EXCESS BLDV CALC-SCNC: 11.3 MMOL/L (ref -3–3)
BUN SERPL-MCNC: 46.2 MG/DL (ref 8–23)
CALCIUM SERPL-MCNC: 8.9 MG/DL (ref 8.8–10.4)
CHLORIDE SERPL-SCNC: 99 MMOL/L (ref 98–107)
CK SERPL-CCNC: 19 U/L (ref 26–192)
CREAT SERPL-MCNC: 1.06 MG/DL (ref 0.51–0.95)
EGFRCR SERPLBLD CKD-EPI 2021: 54 ML/MIN/1.73M2
ERYTHROCYTE [DISTWIDTH] IN BLOOD BY AUTOMATED COUNT: 19 % (ref 10–15)
GLUCOSE SERPL-MCNC: 113 MG/DL (ref 70–99)
HCO3 BLDV-SCNC: 36 MMOL/L (ref 21–28)
HCO3 SERPL-SCNC: 33 MMOL/L (ref 22–29)
HCT VFR BLD AUTO: 33.5 % (ref 35–47)
HGB BLD-MCNC: 9.8 G/DL (ref 11.7–15.7)
HOLD SPECIMEN: NORMAL
MAGNESIUM SERPL-MCNC: 2.3 MG/DL (ref 1.7–2.3)
MCH RBC QN AUTO: 24.9 PG (ref 26.5–33)
MCHC RBC AUTO-ENTMCNC: 29.3 G/DL (ref 31.5–36.5)
MCV RBC AUTO: 85 FL (ref 78–100)
O2/TOTAL GAS SETTING VFR VENT: 2 %
OXYHGB MFR BLDV: 85 % (ref 70–75)
PCO2 BLDV: 47 MM HG (ref 40–50)
PH BLDV: 7.5 [PH] (ref 7.32–7.43)
PHOSPHATE SERPL-MCNC: 4.1 MG/DL (ref 2.5–4.5)
PLATELET # BLD AUTO: 529 10E3/UL (ref 150–450)
PO2 BLDV: 51 MM HG (ref 25–47)
POTASSIUM SERPL-SCNC: 4.6 MMOL/L (ref 3.4–5.3)
RBC # BLD AUTO: 3.93 10E6/UL (ref 3.8–5.2)
SAO2 % BLDV: 86.3 % (ref 70–75)
SODIUM SERPL-SCNC: 141 MMOL/L (ref 135–145)
T4 FREE SERPL-MCNC: 0.6 NG/DL (ref 0.9–1.7)
TSH SERPL DL<=0.005 MIU/L-ACNC: 6.59 UIU/ML (ref 0.3–4.2)
WBC # BLD AUTO: 10.2 10E3/UL (ref 4–11)

## 2024-12-17 PROCEDURE — 94640 AIRWAY INHALATION TREATMENT: CPT

## 2024-12-17 PROCEDURE — 250N000009 HC RX 250

## 2024-12-17 PROCEDURE — 999N000157 HC STATISTIC RCP TIME EA 10 MIN

## 2024-12-17 PROCEDURE — 85014 HEMATOCRIT: CPT | Performed by: STUDENT IN AN ORGANIZED HEALTH CARE EDUCATION/TRAINING PROGRAM

## 2024-12-17 PROCEDURE — 83735 ASSAY OF MAGNESIUM: CPT | Performed by: STUDENT IN AN ORGANIZED HEALTH CARE EDUCATION/TRAINING PROGRAM

## 2024-12-17 PROCEDURE — 84100 ASSAY OF PHOSPHORUS: CPT | Performed by: STUDENT IN AN ORGANIZED HEALTH CARE EDUCATION/TRAINING PROGRAM

## 2024-12-17 PROCEDURE — 36415 COLL VENOUS BLD VENIPUNCTURE: CPT | Performed by: PHYSICIAN ASSISTANT

## 2024-12-17 PROCEDURE — 250N000013 HC RX MED GY IP 250 OP 250 PS 637: Performed by: STUDENT IN AN ORGANIZED HEALTH CARE EDUCATION/TRAINING PROGRAM

## 2024-12-17 PROCEDURE — 250N000011 HC RX IP 250 OP 636

## 2024-12-17 PROCEDURE — 250N000009 HC RX 250: Performed by: INTERNAL MEDICINE

## 2024-12-17 PROCEDURE — 36415 COLL VENOUS BLD VENIPUNCTURE: CPT | Performed by: STUDENT IN AN ORGANIZED HEALTH CARE EDUCATION/TRAINING PROGRAM

## 2024-12-17 PROCEDURE — 82374 ASSAY BLOOD CARBON DIOXIDE: CPT | Performed by: STUDENT IN AN ORGANIZED HEALTH CARE EDUCATION/TRAINING PROGRAM

## 2024-12-17 PROCEDURE — 92526 ORAL FUNCTION THERAPY: CPT | Mod: GN

## 2024-12-17 PROCEDURE — 82805 BLOOD GASES W/O2 SATURATION: CPT | Performed by: PHYSICIAN ASSISTANT

## 2024-12-17 PROCEDURE — 80048 BASIC METABOLIC PNL TOTAL CA: CPT | Performed by: STUDENT IN AN ORGANIZED HEALTH CARE EDUCATION/TRAINING PROGRAM

## 2024-12-17 PROCEDURE — 250N000013 HC RX MED GY IP 250 OP 250 PS 637: Performed by: PHYSICIAN ASSISTANT

## 2024-12-17 PROCEDURE — 82435 ASSAY OF BLOOD CHLORIDE: CPT | Performed by: STUDENT IN AN ORGANIZED HEALTH CARE EDUCATION/TRAINING PROGRAM

## 2024-12-17 PROCEDURE — 84443 ASSAY THYROID STIM HORMONE: CPT | Performed by: HOSPITALIST

## 2024-12-17 PROCEDURE — 250N000013 HC RX MED GY IP 250 OP 250 PS 637: Performed by: HOSPITALIST

## 2024-12-17 PROCEDURE — 97535 SELF CARE MNGMENT TRAINING: CPT | Mod: GP

## 2024-12-17 PROCEDURE — 250N000013 HC RX MED GY IP 250 OP 250 PS 637: Performed by: INTERNAL MEDICINE

## 2024-12-17 PROCEDURE — 97530 THERAPEUTIC ACTIVITIES: CPT | Mod: GP

## 2024-12-17 PROCEDURE — 94640 AIRWAY INHALATION TREATMENT: CPT | Mod: 76

## 2024-12-17 PROCEDURE — 99207 PR NO BILLABLE SERVICE THIS VISIT: CPT | Performed by: PHYSICIAN ASSISTANT

## 2024-12-17 PROCEDURE — 85041 AUTOMATED RBC COUNT: CPT | Performed by: STUDENT IN AN ORGANIZED HEALTH CARE EDUCATION/TRAINING PROGRAM

## 2024-12-17 PROCEDURE — 82550 ASSAY OF CK (CPK): CPT | Performed by: HOSPITALIST

## 2024-12-17 PROCEDURE — 120N000002 HC R&B MED SURG/OB UMMC

## 2024-12-17 PROCEDURE — 84439 ASSAY OF FREE THYROXINE: CPT | Performed by: HOSPITALIST

## 2024-12-17 PROCEDURE — 250N000013 HC RX MED GY IP 250 OP 250 PS 637: Performed by: SURGERY

## 2024-12-17 PROCEDURE — 97530 THERAPEUTIC ACTIVITIES: CPT | Mod: GO

## 2024-12-17 PROCEDURE — 82310 ASSAY OF CALCIUM: CPT | Performed by: STUDENT IN AN ORGANIZED HEALTH CARE EDUCATION/TRAINING PROGRAM

## 2024-12-17 PROCEDURE — 250N000013 HC RX MED GY IP 250 OP 250 PS 637

## 2024-12-17 PROCEDURE — 99232 SBSQ HOSP IP/OBS MODERATE 35: CPT | Performed by: HOSPITALIST

## 2024-12-17 RX ORDER — ACETYLCYSTEINE 200 MG/ML
2 SOLUTION ORAL; RESPIRATORY (INHALATION) EVERY 4 HOURS PRN
Status: DISCONTINUED | OUTPATIENT
Start: 2024-12-17 | End: 2024-12-28

## 2024-12-17 RX ORDER — FERROUS SULFATE 325(65) MG
325 TABLET ORAL DAILY
Status: DISCONTINUED | OUTPATIENT
Start: 2024-12-17 | End: 2025-01-13

## 2024-12-17 RX ORDER — CHOLECALCIFEROL (VITAMIN D3) 1250 MCG
50000 CAPSULE ORAL
Status: DISCONTINUED | OUTPATIENT
Start: 2024-12-17 | End: 2025-01-14 | Stop reason: HOSPADM

## 2024-12-17 RX ADMIN — SODIUM CHLORIDE SOLN NEBU 3% 3 ML: 3 NEBU SOLN at 19:54

## 2024-12-17 RX ADMIN — BUDESONIDE 0.5 MG: 0.5 INHALANT ORAL at 19:54

## 2024-12-17 RX ADMIN — BUDESONIDE 0.5 MG: 0.5 INHALANT ORAL at 09:06

## 2024-12-17 RX ADMIN — LEVALBUTEROL HYDROCHLORIDE 0.63 MG: 0.63 SOLUTION RESPIRATORY (INHALATION) at 09:06

## 2024-12-17 RX ADMIN — IPRATROPIUM BROMIDE 0.5 MG: 0.5 SOLUTION RESPIRATORY (INHALATION) at 19:53

## 2024-12-17 RX ADMIN — GABAPENTIN 600 MG: 300 CAPSULE ORAL at 22:04

## 2024-12-17 RX ADMIN — LEVOTHYROXINE SODIUM 88 MCG: 88 TABLET ORAL at 05:24

## 2024-12-17 RX ADMIN — FEXOFENADINE HYDROCHLORIDE 120 MG: 60 TABLET ORAL at 08:04

## 2024-12-17 RX ADMIN — IPRATROPIUM BROMIDE 0.5 MG: 0.5 SOLUTION RESPIRATORY (INHALATION) at 09:06

## 2024-12-17 RX ADMIN — SODIUM CHLORIDE SOLN NEBU 3% 3 ML: 3 NEBU SOLN at 09:06

## 2024-12-17 RX ADMIN — OFLOXACIN 50000 UNITS: 300 TABLET, COATED ORAL at 12:09

## 2024-12-17 RX ADMIN — APIXABAN 5 MG: 5 TABLET, FILM COATED ORAL at 19:54

## 2024-12-17 RX ADMIN — THIAMINE HYDROCHLORIDE 250 MG: 100 INJECTION, SOLUTION INTRAMUSCULAR; INTRAVENOUS at 12:09

## 2024-12-17 RX ADMIN — DILTIAZEM HYDROCHLORIDE 120 MG: 120 TABLET, FILM COATED ORAL at 23:26

## 2024-12-17 RX ADMIN — SPIRONOLACTONE 25 MG: 25 TABLET, FILM COATED ORAL at 08:04

## 2024-12-17 RX ADMIN — FUROSEMIDE 40 MG: 40 TABLET ORAL at 08:04

## 2024-12-17 RX ADMIN — DILTIAZEM HYDROCHLORIDE 120 MG: 120 TABLET, FILM COATED ORAL at 05:24

## 2024-12-17 RX ADMIN — DILTIAZEM HYDROCHLORIDE 120 MG: 120 TABLET, FILM COATED ORAL at 12:09

## 2024-12-17 RX ADMIN — ATORVASTATIN CALCIUM 20 MG: 20 TABLET, FILM COATED ORAL at 19:53

## 2024-12-17 RX ADMIN — APIXABAN 5 MG: 5 TABLET, FILM COATED ORAL at 08:04

## 2024-12-17 RX ADMIN — LEVALBUTEROL HYDROCHLORIDE 0.63 MG: 0.63 SOLUTION RESPIRATORY (INHALATION) at 19:53

## 2024-12-17 RX ADMIN — SERTRALINE HYDROCHLORIDE 150 MG: 100 TABLET ORAL at 08:04

## 2024-12-17 RX ADMIN — THERA TABS 1 TABLET: TAB at 08:04

## 2024-12-17 RX ADMIN — POLYETHYLENE GLYCOL 3350 17 G: 17 POWDER, FOR SOLUTION ORAL at 08:04

## 2024-12-17 RX ADMIN — PANTOPRAZOLE SODIUM 40 MG: 40 INJECTION, POWDER, FOR SOLUTION INTRAVENOUS at 08:04

## 2024-12-17 RX ADMIN — DILTIAZEM HYDROCHLORIDE 120 MG: 120 TABLET, FILM COATED ORAL at 17:27

## 2024-12-17 ASSESSMENT — ACTIVITIES OF DAILY LIVING (ADL)
ADLS_ACUITY_SCORE: 64
ADLS_ACUITY_SCORE: 73
ADLS_ACUITY_SCORE: 73
ADLS_ACUITY_SCORE: 64
ADLS_ACUITY_SCORE: 64
ADLS_ACUITY_SCORE: 72
ADLS_ACUITY_SCORE: 77
ADLS_ACUITY_SCORE: 68
ADLS_ACUITY_SCORE: 64
ADLS_ACUITY_SCORE: 77
ADLS_ACUITY_SCORE: 68
ADLS_ACUITY_SCORE: 64
ADLS_ACUITY_SCORE: 64
ADLS_ACUITY_SCORE: 73
ADLS_ACUITY_SCORE: 64
ADLS_ACUITY_SCORE: 68
ADLS_ACUITY_SCORE: 73
ADLS_ACUITY_SCORE: 73
ADLS_ACUITY_SCORE: 64

## 2024-12-17 NOTE — PROGRESS NOTES
CLINICAL NUTRITION SERVICES - REASSESSMENT NOTE     Nutrition Prescription    RECOMMENDATIONS FOR MDs/PROVIDERS TO ORDER:  Continue with TF support     Malnutrition Status:    Severe malnutrition in the context of chronic illness.     Recommendations already ordered by Registered Dietitian (RD):  No changes to TF support     Future/Additional Recommendations:  TF tolerance, adequacy     EVALUATION OF THE PROGRESS TOWARD GOALS   Diet:   Changed to NPO per SLP evaluation 12/13    Tube feed:   Access: NG tube placed 12/10  Dosing wt: 51 kg actual wt     Formula: Switched to datango Peptide 1.5 (or equivalent) on 12/13 ( previously on Novasource renal), [employer] peptide 1.5, is   infusing at goal 45 mL/hr (1080 mL/day)      Provision: 1080 ml, 1620  kcal (33 kcal/kg), 80 g protein (1.6 g/kg), 149 g CHO, 16 g fiber, 83 g fat (40% from MCTs), and 756 mL free water daily      Intake:   Average TF over the past 5 days -> 700 ml daily -> 64% estimated goal volume met.      NEW FINDINGS   Chart reviewed: Admitted on 11/21/2024.   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   - Initially admitted with hypoxic respiratory failure suspected due to HFpEF requiring diuresis. Developed acute hypoxia on 12/8 secondary to suspected aspiration pneumonitis for which she was intubated (12/8-12/10). Now s/p extubation.        Meds:  Lasix   Vitamin D3, thiamine   Synthorid  Theravit  Protonix      Wt trend:  Admit wt: 46.5 kg (102 lb 8.2 oz) standing wt on 11/23 admit   Most recent standing wt: 47.1 kg (103 lb 13.4 oz) bed scale on 12/17/24  Using dosing wt of 51 kg for TF support   BMI: 17.13 kg /m2 currently     Per chart review, patient's daughter reports UBW around 120-130 lbs, however wt here  around 102 lbs, patient states she is eating less and has less appetite, previously with recent invasive laryngeal SCC with radiation therapy, also had dysphagia before.      Labs noted:  BUN: 46.2 (H), Cr: 1.06, GFR: 54  Lytes normal:   B, A1C: 6.0 24  Ketones quantitative: 2.03 on 12/10 -> started on TF       GI/stool:  Having daily BM 3-7 BM over the past week -> appears to be trending down       MALNUTRITION  % Intake: On TF support  % Weight Loss:  > 10% in 6 months (severe malnutrition)   Subcutaneous Fat Loss: Facial region:  moderate, Upper arm:  severe, Lower arm:  severe, and Thoracic/intercostal:  severe   Muscle Loss: Temporal:moderate, Thoracic region (clavicle, acromium bone, deltoid, trapezius, pectoral):  severe, Upper arm (bicep, tricep):  severe, Upper leg (quadricep, hamstring):  severe, and Posterior calf:  severe   Fluid Accumulation/Edema: None noted  Malnutrition Diagnosis: Severe malnutrition in the context of chronic illness.     Total avg nutritional intake to meet a minimum of 25 kcal/kg and 1.5 g PRO/kg daily (per dosing wt 50.8 kg).   Evaluation: Not met per I/O's     Previous Nutrition Diagnosis  Inadequate oral intake related to esophageal dysmotility, recent intubation as evidenced by weight loss, fat and muscle loss, chart review, TF to meet nutrition needs.      Evaluation: No change     CURRENT NUTRITION DIAGNOSIS  Inadequate oral intake related to esophageal dysmotility, recent intubation as evidenced by weight loss, fat and muscle loss, chart review, TF to meet nutrition needs.       INTERVENTIONS  Implementation  No changes to TF support     Goals  Total avg nutritional intake to meet a minimum of 30 kcal/kg and 1.5 g PRO/kg daily (per dosing wt 51 kg).     Monitoring/Evaluation  Progress toward goals will be monitored and evaluated per protocol.      Hillary Rincon RD/LD  Unit [facility] ([id number]) and ([id number]),  Monday-Friday: Vocera -> (7C clinical Dietitian),    Weekend/Holiday: Vocera -> (Weekend Clinical Dietitian)

## 2024-12-17 NOTE — PROGRESS NOTES
[facility]    Medicine Progress Note - Hospitalist Service, GOLD TEAM 8    Date of Admission:  11/21/2024    Assessment & Plan   Asya Coffman is a 79 year old female admitted on 11/21/2024. 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 Initially admitted to hospital medicine service with hypoxic respiratory failure suspected due to HFpEF requiring diuresis. Developed acute hypoxia on 12/8 secondary to suspected aspiration pneumonitis for which she was intubated (12/8-12/10). Now s/p extubation and transferring back to hospital medicine service.    Changes today:  - cont NPO on TF via NJ  - still with significant secretions; intermittently has episodes of pooling secretions causing respiratory distress and tachypnea without VS changes, resolves with deep suction.   - ENT re-evaluated today with bedside laryngoscopy for dysphagia. No anatomic explanations for dysphagia seen. Normal vocal cord function and control seen including ability to approximate cords during cough. Signed off.  - GI reviewed imaging with radiology and definitively stated there is no evidence of esophageal web. No concern for GI etiology of dysphagia, signed off.     Oral secretions, increased  Moderate-severe pharyngeal dysphagia  Esophageal dysmotility  Laryngeal squamous cell carcinoma s/p radiation (4/2024-7/2024)  Has had increased dysphagia in the setting of laryngeal squamous cell carcinoma s/p radiation (4/2024-7/2024) as well as some concern for esophageal web on swallow study (10/8/2024) pending GI input. Some concern for respiratory decompensation due to recurrent aspiration events.   - Speech following  - NPO -  okay for ice chips for comfort with 1:1 supervision  - Will need VFSS -- SLP will notify when they feel she is ready for this  - consulting ENT for bedside laryngoscopy to see if h/o papillomas and radiation related to the dysphagia  - Tomorrow will consider GI consult for management of esophageal web    Acute hypoxic respiratory failure - improving  Aspiration pneumonitis  MSSA PNA s/p abx (12/3-12/8)  Obstructive lung disease (COPD vs asthma)  Pulmonary hypertension   Presented on 11/21 with acute hypoxic respiratory failure; initially suspected to be secondary to HFpEF exacerbation; unresolved with diuresis. Underlying obstructive lung disease but no evidence of exacerbation here. Was treated for MSSA pneumonia. Overnight 12/8, developed sudden worsening of oxygenation and increased secretions in the setting of dysphagia/recent laryngeal radiation and was intubated for airway protection. Suspected aspiration pneumonitis as etiology. CT Chest (12/8) with mediastinal/hilar lymphadenopathy and resolving diffuse ground glass opacities most consistent with resolving pneumonia; no signs of progressive or secondary infectious process. Was briefly on zosyn but this was discontinued as she was treated for full course based on initial pneumonia. She has been extubated and now on 2L. Ongoing upper airway adventitious sounds.   - Pulse oximetry, supplemental O2  - Budesonide neb 0.5 mg BID, ipratropium-levalbuterol neb QID  - Aspiration precautions; speech following   - Sputum culture (12/9) grew candida but leukocytosis improved and back on minimal low flow oxygen so will not treat for now  - Flutter valve, incentive spirometer  - Low threshold to resume empiric abx for pnuemonia coverage      HFpEF (LVEF 55-60% 11/22/2024)   Pulmonary hypertension (44 mmHg per echo 11/22/2024)  Possible small PFO  Echo during current admission (11/22/2024) notable for increased thickness of LV and elevated BNP (peak 9200 > 6200) concerning  for heart failure exacerbation s/p diuresis and pulmonary hypertension with estimated pulmonary artery pressure of 45 mmHg. Echo with bubble study (11/25/2024) concerning for possible small PFO. With intubation on 12/8, developed hypotension without evidence of shock. Etiology of hypotension may be cardiogenic (e.g. exacerbation of pulmonary hypertension by positive pressure ventilation) vs medication associated (propofol, precedex); do not currently suspect distributive or obstructive hypotension. Weaned off pressors 12/10.   - Preload dependent in the setting of pulmonary hypertension, gentle diuresis as needed (will hold today)   - 2L FR    Chronic afib  [id number] 3 (age++, HF+).   - PTA apixaban 5mg BID  - PTA diltiazem 240 ER BID - HOLD (cannot give ER enterally)               Up-titrate to short-acting Diltiazem 120mg q6H as tolerated by blood pressure  - PTA propanolol 10mg BID      At risk for refeeding syndrome  Starvation ketosis  Hypokalemia  Severe malnutrition in context of chronic illness/disease  Per daughter, baseline weight around 120-130 lbs. Admitted 102 lbs. Patient states she is eating less and has less appetite in general as well as having difficulty swallowing. While NPO during intubation, developed starvation ketosis. Given high risk for aspiration with PO intake, placed NGT and started tube feeds. Will need close monitoring for refeeding syndrome.   - NPO   - RD following for TF management   - Monitor for refeeding syndrome   - Lyte replacement protocol   - IV thiamine replacement      Oliguric SAMIR  Cr 0.9 on admission. Baseline appears 0.9-1.0. Developed SAMIR initially in setting of diuresis and had been improving. Subsequently increased following transfer to ICU with consideration for prerenal in setting of NPO status. Also with new diarrhea. Cr stable.   - Strict I/Os  - Trend BMP   - FWF via NGT    Diarrhea  Frequent loose stools. Unclear timing of onset so not sure if related to abx or  "TF initiation. No abdominal pain.  - Monitor   - Hold on infectious testing for now     Hypothyroidism  Hx of thyroidectomy 2/2 cancer   - Continue PTA levothyroxine 88 mcg daily      Anemia of chronic illness  Baseline Hgb 11-12. Currently near baseline. Can consider anemia of chronic illness.   - Monitor CBC     Generalized deconditioning  Recurrent falls  - PT/OT consults     CREST Syndrome  Characterized by Raynaud's, Calcinosis, GERD and some telangectasia's. Last saw [facility]ology 2017. No history of ILD.   - Continue protonix 40 mg daily     Anxiety  Depression  - Continue PTA Sertraline 150 mg daily           Diet: Fluid restriction 2000 ML FLUID  NPO for Medical/Clinical Reasons Except for: No Exceptions  Adult Formula Drip Feeding: Continuous [employer] Peptide 1.5; Nasogastric tube; Goal Rate: 45; mL/hr; 12/13: please transition to new TF formula immediately when arrives to pt room and ok to start at new goal rate; Do not advance tube feeding rat...    DVT Prophylaxis: DOAC  Miranda Catheter: Not present  Lines: None     Cardiac Monitoring: None  Code Status: Full Code      Clinically Significant Risk Factors               # Hypoalbuminemia: Lowest albumin = 3 g/dL at 12/10/2024  3:11 AM, will monitor as appropriate                # Cachexia: Estimated body mass index is 17.13 kg/m  as calculated from the following:    Height as of this encounter: 1.651 m (5' 5\").    Weight as of this encounter: 46.7 kg (102 lb 15.3 oz).   # Severe Malnutrition: based on nutrition assessment    # Financial/Environmental Concerns: none         Social Drivers of Health    Housing Stability: High Risk (11/23/2024)    Housing Stability     Do you have housing? : No     Are you worried about losing your housing?: No          Disposition Plan     Medically Ready for Discharge: Anticipated in 5+ Days             Jesu Lunsford MD  Hospitalist Service, GOLD TEAM 8  [facility]" [place]  Securely message with [employer] (more info)  Text page via [employer] Paging/Directory   See signed in provider for up to date coverage information  ______________________________________________________________________    Interval History   NAEO    Physical Exam   Vital Signs: Temp: 97.8  F (36.6  C) Temp src: Oral BP: 135/60 Pulse: 72   Resp: 18 SpO2: 95 % O2 Device: Nasal cannula Oxygen Delivery: 2.5 LPM  Weight: 102 lbs 15.28 oz    General Appearance:  Awake. Alert. No acute distress. Frail appearing.   Respiratory: Normal work of breathing on 2L. Lungs with loud upper airway breath sounds. Lower lobes sound clear.   Cardiovascular: Irregular. No obvious murmur.   GI: Nondistended. Normoactive. Soft. Non-tender.   Skin: Warm, dry.     Medical Decision Making             Data

## 2024-12-17 NOTE — PLAN OF CARE
Goal Outcome Evaluation:      Plan of Care Reviewed With: patient    Overall Patient Progress: no changeOverall Patient Progress: no change    Outcome Evaluation: [id number]. No events. A&OX4, int. forgetful. Denies pain. VSS on 2L nc, HR continues to be irregular int. reading, 30s-140s. Tf infusing at goal rate and tolerating. Repos q2h.  PO suctioning prn and oral cares performed. Bed alarm on.

## 2024-12-17 NOTE — PROGRESS NOTES
[facility]    Medicine Progress Note - Hospitalist Service, GOLD TEAM 8    Date of Admission:  11/21/2024    Assessment & Plan   Asya Coffman is a 79 year old female admitted on 11/21/2024. 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 Initially admitted to hospital medicine service with hypoxic respiratory failure suspected due to HFpEF requiring diuresis. Developed acute hypoxia on 12/8 secondary to suspected aspiration pneumonitis for which she was intubated (12/8-12/10). Now s/p extubation and transferring back to hospital medicine service.    Changes today:  - cont NPO on TF via NJ  - continue to need intermittent suctioning   - GI reviewed imaging with radiology and definitively stated there is no evidence of esophageal web. No concern for GI etiology of dysphagia, signed off.     Oral secretions, increased  Moderate-severe pharyngeal dysphagia  Esophageal dysmotility  Laryngeal squamous cell carcinoma s/p radiation (4/2024-7/2024)  Has had increased dysphagia in the setting of laryngeal squamous cell carcinoma s/p radiation (4/2024-7/2024) as well as some concern for esophageal web on swallow study (10/8/2024) pending GI input. Some concern for respiratory decompensation due to recurrent aspiration events.   - Speech following  - NPO - okay for ice chips for comfort with 1:1 supervision  - Will need VFSS -- SLP will notify when they feel she is ready for this  - consulting ENT for bedside laryngoscopy to see if h/o papillomas and radiation related to the dysphagia. ENT re-evaluated 12/16/24 with bedside laryngoscopy for dysphagia. No anatomic explanations for dysphagia seen. Normal  vocal cord function and control seen including ability to approximate cords during cough. Signed off.  - Per GI, no clear signs of esophageal webs.   - clinical picture consistent with significant muscle weakness contributing to oropharyngeal dysphagia, poor vocal pressure, poor cough effort. Unclear if her underlying Rheumatological disease contributing. Will check thyroid panel, CK, aldolase. Will consider neurology consult  - unclear if candidate for PEG placement    Acute hypoxic respiratory failure - improving  Aspiration pneumonitis  MSSA PNA s/p abx (12/3-12/8)  Obstructive lung disease (COPD vs asthma)  Pulmonary hypertension   Presented on 11/21 with acute hypoxic respiratory failure; initially suspected to be secondary to HFpEF exacerbation; unresolved with diuresis. Underlying obstructive lung disease but no evidence of exacerbation here. Was treated for MSSA pneumonia. Overnight 12/8, developed sudden worsening of oxygenation and increased secretions in the setting of dysphagia/recent laryngeal radiation and was intubated for airway protection. Suspected aspiration pneumonitis as etiology. CT Chest (12/8) with mediastinal/hilar lymphadenopathy and resolving diffuse ground glass opacities most consistent with resolving pneumonia; no signs of progressive or secondary infectious process. Was briefly on zosyn but this was discontinued as she was treated for full course based on initial pneumonia. She has been extubated and now on 2L. Ongoing upper airway adventitious sounds.   - Pulse oximetry, supplemental O2  - Budesonide neb 0.5 mg BID, ipratropium-levalbuterol neb QID  - Aspiration precautions; speech following   - Sputum culture (12/9) grew candida but leukocytosis improved and back on minimal low flow oxygen so will not treat for now  - Flutter valve, incentive spirometer  - Low threshold to resume empiric abx for pnuemonia coverage      HFpEF (LVEF 55-60% 11/22/2024)   Pulmonary hypertension (44 mmHg  per echo 11/22/2024)  Possible small PFO  Echo during current admission (11/22/2024) notable for increased thickness of LV and elevated BNP (peak 9200 > 6200) concerning for heart failure exacerbation s/p diuresis and pulmonary hypertension with estimated pulmonary artery pressure of 45 mmHg. Echo with bubble study (11/25/2024) concerning for possible small PFO. With intubation on 12/8, developed hypotension without evidence of shock. Etiology of hypotension may be cardiogenic (e.g. exacerbation of pulmonary hypertension by positive pressure ventilation) vs medication associated (propofol, precedex); do not currently suspect distributive or obstructive hypotension. Weaned off pressors 12/10.   - Preload dependent in the setting of pulmonary hypertension, gentle diuresis as needed (will hold today)   - 2L FR    Chronic afib  [id number] 3 (age++, HF+).   - PTA apixaban 5mg BID  - PTA diltiazem 240 ER BID - HOLD (cannot give ER enterally)               Up-titrate to short-acting Diltiazem 120mg q6H as tolerated by blood pressure  - PTA propanolol 10mg BID      At risk for refeeding syndrome  Starvation ketosis  Hypokalemia  Severe malnutrition in context of chronic illness/disease  Per daughter, baseline weight around 120-130 lbs. Admitted 102 lbs. Patient states she is eating less and has less appetite in general as well as having difficulty swallowing. While NPO during intubation, developed starvation ketosis. Given high risk for aspiration with PO intake, placed NGT and started tube feeds. Will need close monitoring for refeeding syndrome.   - NPO   - RD following for TF management   - Monitor for refeeding syndrome   - Lyte replacement protocol   - IV thiamine replacement      Oliguric SAMIR  Cr 0.9 on admission. Baseline appears 0.9-1.0. Developed SAMIR initially in setting of diuresis and had been improving. Subsequently increased following transfer to ICU with consideration for prerenal in setting of NPO status.  "Also with new diarrhea. Cr stable.   - Strict I/Os  - Trend BMP   - FWF via NGT    Diarrhea  Frequent loose stools. Unclear timing of onset so not sure if related to abx or TF initiation. No abdominal pain.  - Monitor   - Hold on infectious testing for now     Hypothyroidism  Hx of thyroidectomy 2/2 cancer   - Continue PTA levothyroxine 88 mcg daily      Anemia of chronic illness  Baseline Hgb 11-12. Currently near baseline. Can consider anemia of chronic illness.   - Monitor CBC     Generalized deconditioning  Recurrent falls  - PT/OT consults     CREST Syndrome  Characterized by Raynaud's, Calcinosis, GERD and some telangectasia's. Last saw [facility] 2017. No history of ILD.   - Continue protonix 40 mg daily     Anxiety  Depression  - Continue PTA Sertraline 150 mg daily           Diet: Fluid restriction 2000 ML FLUID  NPO for Medical/Clinical Reasons Except for: No Exceptions  Adult Formula Drip Feeding: Continuous [employer] Peptide 1.5; Nasogastric tube; Goal Rate: 45; mL/hr; 12/13: please transition to new TF formula immediately when arrives to pt room and ok to start at new goal rate; Do not advance tube feeding rat...    DVT Prophylaxis: DOAC  Miranda Catheter: Not present  Lines: None     Cardiac Monitoring: None  Code Status: Full Code      Clinically Significant Risk Factors               # Hypoalbuminemia: Lowest albumin = 3 g/dL at 12/10/2024  3:11 AM, will monitor as appropriate                # Cachexia: Estimated body mass index is 17.28 kg/m  as calculated from the following:    Height as of this encounter: 1.651 m (5' 5\").    Weight as of this encounter: 47.1 kg (103 lb 13.4 oz).   # Severe Malnutrition: based on nutrition assessment      # Financial/Environmental Concerns: none         Social Drivers of Health    Housing Stability: High Risk (11/23/2024)    Housing Stability     Do you have housing? : No     Are you worried about losing your housing?: No          Disposition Plan "     Medically Ready for Discharge: Anticipated in 5+ Days             Eliseo Redeer MD  Hospitalist Service, GOLD TEAM 8  [facility]  Securely message with [employer] (more info)  Text page via [employer] Paging/Directory   See signed in provider for up to date coverage information  ______________________________________________________________________    Interval History     Continued cough, requiring suctioning, poor cough effort. Denies shortness of breath, chest pain    Physical Exam   Vital Signs: Temp: 97.9  F (36.6  C) Temp src: Oral BP: 99/81 Pulse: 87   Resp: 16 SpO2: 98 % O2 Device: Nasal cannula Oxygen Delivery: 2.5 LPM  Weight: 103 lbs 13.39 oz    General Appearance:  Awake. Alert. No acute distress. Frail appearing.   Respiratory: Normal work of breathing on 2L. Lungs with loud upper airway breath sounds. Lower lobes sound clear. Poor cough effort.   Cardiovascular: Irregular. No obvious murmur.   GI: Nondistended. Normoactive. Soft. Non-tender.   Skin: Warm, dry.   Neuro: alert, oriented x3. Proximal muscle strength - 4/5    Medical Decision Making             Data

## 2024-12-17 NOTE — PLAN OF CARE
"Blood pressure 135/60, pulse 72, temperature 97.8  F (36.6  C), temperature source Oral, resp. rate 18, height 1.651 m (5' 5\"), weight 46.7 kg (102 lb 15.3 oz), SpO2 95%. Via 2L NC      Assumed care at 07:00      Patient Alert and oriented ,intermittent forgetful. With hx of  A fib HR irregular  93 -124 , primary team aware. Sat 94% via 2L . Patient denies pain, and  chest pain. Received scheduled medications via NJ , and flushed with water as ordered. Continue on TF at goal. Tolerating well.  Oral care done q2hr and oral suction x 4 . Patient repositioned q2hr and as tolerated.   Inc of urine X 3 inct care done .  Patient Daughter at bedside . Call light within reach bed alarm on.  Continue with POC and update MD with change     Goal Outcome Evaluation:      Plan of Care Reviewed With: patient, family    Overall Patient Progress: no change.           "

## 2024-12-18 LAB
ANION GAP SERPL CALCULATED.3IONS-SCNC: 13 MMOL/L (ref 7–15)
BUN SERPL-MCNC: 49.5 MG/DL (ref 8–23)
CALCIUM SERPL-MCNC: 9.3 MG/DL (ref 8.8–10.4)
CHLORIDE SERPL-SCNC: 99 MMOL/L (ref 98–107)
CREAT SERPL-MCNC: 1.09 MG/DL (ref 0.51–0.95)
EGFRCR SERPLBLD CKD-EPI 2021: 52 ML/MIN/1.73M2
ERYTHROCYTE [DISTWIDTH] IN BLOOD BY AUTOMATED COUNT: 19 % (ref 10–15)
GLUCOSE SERPL-MCNC: 114 MG/DL (ref 70–99)
HCO3 SERPL-SCNC: 29 MMOL/L (ref 22–29)
HCT VFR BLD AUTO: 34.8 % (ref 35–47)
HGB BLD-MCNC: 10.3 G/DL (ref 11.7–15.7)
MAGNESIUM SERPL-MCNC: 2.3 MG/DL (ref 1.7–2.3)
MCH RBC QN AUTO: 24.8 PG (ref 26.5–33)
MCHC RBC AUTO-ENTMCNC: 29.6 G/DL (ref 31.5–36.5)
MCV RBC AUTO: 84 FL (ref 78–100)
PHOSPHATE SERPL-MCNC: 3.9 MG/DL (ref 2.5–4.5)
PLATELET # BLD AUTO: 524 10E3/UL (ref 150–450)
POTASSIUM SERPL-SCNC: 4.6 MMOL/L (ref 3.4–5.3)
RBC # BLD AUTO: 4.16 10E6/UL (ref 3.8–5.2)
SODIUM SERPL-SCNC: 141 MMOL/L (ref 135–145)
THYROGLOB AB SERPL IA-ACNC: <20 IU/ML
THYROPEROXIDASE AB SERPL-ACNC: <10 IU/ML
WBC # BLD AUTO: 8 10E3/UL (ref 4–11)

## 2024-12-18 PROCEDURE — 250N000013 HC RX MED GY IP 250 OP 250 PS 637: Performed by: STUDENT IN AN ORGANIZED HEALTH CARE EDUCATION/TRAINING PROGRAM

## 2024-12-18 PROCEDURE — 84165 PROTEIN E-PHORESIS SERUM: CPT | Mod: 26 | Performed by: PATHOLOGY

## 2024-12-18 PROCEDURE — 250N000013 HC RX MED GY IP 250 OP 250 PS 637: Performed by: SURGERY

## 2024-12-18 PROCEDURE — 36415 COLL VENOUS BLD VENIPUNCTURE: CPT | Performed by: STUDENT IN AN ORGANIZED HEALTH CARE EDUCATION/TRAINING PROGRAM

## 2024-12-18 PROCEDURE — 250N000009 HC RX 250: Performed by: INTERNAL MEDICINE

## 2024-12-18 PROCEDURE — 97530 THERAPEUTIC ACTIVITIES: CPT | Mod: GP

## 2024-12-18 PROCEDURE — 84155 ASSAY OF PROTEIN SERUM: CPT | Performed by: HOSPITALIST

## 2024-12-18 PROCEDURE — 85018 HEMOGLOBIN: CPT | Performed by: STUDENT IN AN ORGANIZED HEALTH CARE EDUCATION/TRAINING PROGRAM

## 2024-12-18 PROCEDURE — 86041 ACETYLCHOLN RCPTR BNDNG ANTB: CPT | Performed by: PATHOLOGY

## 2024-12-18 PROCEDURE — 250N000013 HC RX MED GY IP 250 OP 250 PS 637: Performed by: PHYSICIAN ASSISTANT

## 2024-12-18 PROCEDURE — 80048 BASIC METABOLIC PNL TOTAL CA: CPT | Performed by: STUDENT IN AN ORGANIZED HEALTH CARE EDUCATION/TRAINING PROGRAM

## 2024-12-18 PROCEDURE — 99232 SBSQ HOSP IP/OBS MODERATE 35: CPT | Performed by: HOSPITALIST

## 2024-12-18 PROCEDURE — 82085 ASSAY OF ALDOLASE: CPT | Performed by: HOSPITALIST

## 2024-12-18 PROCEDURE — 999N000157 HC STATISTIC RCP TIME EA 10 MIN

## 2024-12-18 PROCEDURE — 86376 MICROSOMAL ANTIBODY EACH: CPT | Performed by: HOSPITALIST

## 2024-12-18 PROCEDURE — 83735 ASSAY OF MAGNESIUM: CPT | Performed by: HOSPITALIST

## 2024-12-18 PROCEDURE — 250N000009 HC RX 250

## 2024-12-18 PROCEDURE — 36415 COLL VENOUS BLD VENIPUNCTURE: CPT | Performed by: HOSPITALIST

## 2024-12-18 PROCEDURE — 97535 SELF CARE MNGMENT TRAINING: CPT | Mod: GO

## 2024-12-18 PROCEDURE — 120N000002 HC R&B MED SURG/OB UMMC

## 2024-12-18 PROCEDURE — 250N000013 HC RX MED GY IP 250 OP 250 PS 637

## 2024-12-18 PROCEDURE — 82306 VITAMIN D 25 HYDROXY: CPT | Performed by: HOSPITALIST

## 2024-12-18 PROCEDURE — 99222 1ST HOSP IP/OBS MODERATE 55: CPT | Mod: GC | Performed by: INTERNAL MEDICINE

## 2024-12-18 PROCEDURE — 94640 AIRWAY INHALATION TREATMENT: CPT | Mod: 76

## 2024-12-18 PROCEDURE — 250N000013 HC RX MED GY IP 250 OP 250 PS 637: Performed by: INTERNAL MEDICINE

## 2024-12-18 PROCEDURE — 84165 PROTEIN E-PHORESIS SERUM: CPT | Mod: TC | Performed by: PATHOLOGY

## 2024-12-18 PROCEDURE — 86800 THYROGLOBULIN ANTIBODY: CPT | Performed by: HOSPITALIST

## 2024-12-18 PROCEDURE — 250N000009 HC RX 250: Performed by: PHYSICIAN ASSISTANT

## 2024-12-18 PROCEDURE — 97530 THERAPEUTIC ACTIVITIES: CPT | Mod: GO

## 2024-12-18 PROCEDURE — 85048 AUTOMATED LEUKOCYTE COUNT: CPT | Performed by: STUDENT IN AN ORGANIZED HEALTH CARE EDUCATION/TRAINING PROGRAM

## 2024-12-18 PROCEDURE — 86255 FLUORESCENT ANTIBODY SCREEN: CPT | Performed by: PATHOLOGY

## 2024-12-18 PROCEDURE — 250N000013 HC RX MED GY IP 250 OP 250 PS 637: Performed by: HOSPITALIST

## 2024-12-18 PROCEDURE — 84100 ASSAY OF PHOSPHORUS: CPT | Performed by: HOSPITALIST

## 2024-12-18 PROCEDURE — 94640 AIRWAY INHALATION TREATMENT: CPT

## 2024-12-18 RX ADMIN — APIXABAN 5 MG: 5 TABLET, FILM COATED ORAL at 19:47

## 2024-12-18 RX ADMIN — THERA TABS 1 TABLET: TAB at 08:10

## 2024-12-18 RX ADMIN — ATORVASTATIN CALCIUM 20 MG: 20 TABLET, FILM COATED ORAL at 19:47

## 2024-12-18 RX ADMIN — FEXOFENADINE HYDROCHLORIDE 120 MG: 60 TABLET ORAL at 08:10

## 2024-12-18 RX ADMIN — SPIRONOLACTONE 25 MG: 25 TABLET, FILM COATED ORAL at 08:10

## 2024-12-18 RX ADMIN — GABAPENTIN 600 MG: 300 CAPSULE ORAL at 21:43

## 2024-12-18 RX ADMIN — LEVALBUTEROL HYDROCHLORIDE 0.63 MG: 0.63 SOLUTION RESPIRATORY (INHALATION) at 08:13

## 2024-12-18 RX ADMIN — BUDESONIDE 0.5 MG: 0.5 INHALANT ORAL at 08:14

## 2024-12-18 RX ADMIN — PANTOPRAZOLE SODIUM 40 MG: 40 INJECTION, POWDER, FOR SOLUTION INTRAVENOUS at 08:10

## 2024-12-18 RX ADMIN — FUROSEMIDE 40 MG: 40 TABLET ORAL at 08:10

## 2024-12-18 RX ADMIN — APIXABAN 5 MG: 5 TABLET, FILM COATED ORAL at 08:10

## 2024-12-18 RX ADMIN — ACETYLCYSTEINE 2 ML: 200 SOLUTION ORAL; RESPIRATORY (INHALATION) at 00:33

## 2024-12-18 RX ADMIN — SERTRALINE HYDROCHLORIDE 150 MG: 100 TABLET ORAL at 08:10

## 2024-12-18 RX ADMIN — BUDESONIDE 0.5 MG: 0.5 INHALANT ORAL at 21:26

## 2024-12-18 RX ADMIN — SODIUM CHLORIDE SOLN NEBU 3% 3 ML: 3 NEBU SOLN at 21:26

## 2024-12-18 RX ADMIN — POLYETHYLENE GLYCOL 3350 17 G: 17 POWDER, FOR SOLUTION ORAL at 08:10

## 2024-12-18 RX ADMIN — DILTIAZEM HYDROCHLORIDE 120 MG: 120 TABLET, FILM COATED ORAL at 17:26

## 2024-12-18 RX ADMIN — SODIUM CHLORIDE SOLN NEBU 3% 3 ML: 3 NEBU SOLN at 08:13

## 2024-12-18 RX ADMIN — IPRATROPIUM BROMIDE 0.5 MG: 0.5 SOLUTION RESPIRATORY (INHALATION) at 08:14

## 2024-12-18 RX ADMIN — DILTIAZEM HYDROCHLORIDE 120 MG: 120 TABLET, FILM COATED ORAL at 12:06

## 2024-12-18 RX ADMIN — IPRATROPIUM BROMIDE 0.5 MG: 0.5 SOLUTION RESPIRATORY (INHALATION) at 21:26

## 2024-12-18 RX ADMIN — DILTIAZEM HYDROCHLORIDE 120 MG: 120 TABLET, FILM COATED ORAL at 05:53

## 2024-12-18 RX ADMIN — LEVALBUTEROL HYDROCHLORIDE 0.63 MG: 0.63 SOLUTION RESPIRATORY (INHALATION) at 21:26

## 2024-12-18 RX ADMIN — LEVOTHYROXINE SODIUM 88 MCG: 88 TABLET ORAL at 05:53

## 2024-12-18 RX ADMIN — THIAMINE HCL TAB 100 MG 100 MG: 100 TAB at 08:10

## 2024-12-18 RX ADMIN — ALBUTEROL SULFATE 2 PUFF: 90 AEROSOL, METERED RESPIRATORY (INHALATION) at 00:31

## 2024-12-18 ASSESSMENT — ACTIVITIES OF DAILY LIVING (ADL)
ADLS_ACUITY_SCORE: 73
ADLS_ACUITY_SCORE: 73
ADLS_ACUITY_SCORE: 64
ADLS_ACUITY_SCORE: 72
ADLS_ACUITY_SCORE: 72
ADLS_ACUITY_SCORE: 63
ADLS_ACUITY_SCORE: 73
ADLS_ACUITY_SCORE: 72
ADLS_ACUITY_SCORE: 73
ADLS_ACUITY_SCORE: 63
ADLS_ACUITY_SCORE: 72
ADLS_ACUITY_SCORE: 68
ADLS_ACUITY_SCORE: 72
ADLS_ACUITY_SCORE: 63
ADLS_ACUITY_SCORE: 72
ADLS_ACUITY_SCORE: 73
ADLS_ACUITY_SCORE: 63
ADLS_ACUITY_SCORE: 68
ADLS_ACUITY_SCORE: 63
ADLS_ACUITY_SCORE: 73

## 2024-12-18 NOTE — CONSULTS
Endocrinology Consult     Asya Coffman MRN:[id number] YOB: 1945  Date of Admission:11/21/2024   Primary care provider: Janna Calderón     Reason for visit: Acute on chronic respiratory failure with hypoxia (H)   Reason for Endocrine consult: Significant muscle weakness, raised TSH and low FT4       Assessment/Plan :     #Hypothyroidism due to thyroid surgery    Asya Coffman is a 79 year old female admitted on 11/21/2024. 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 Initially admitted to hospital medicine service with hypoxic respiratory failure suspected due to HFpEF requiring diuresis. Developed acute hypoxia on 12/8 secondary to suspected aspiration pneumonitis for which she was intubated (12/8-12/10). Now s/p extubation and transferring back to hospital medicine service   She has history of hypothyroidism due to thyroidectomy for cancer per chart review and on 88 mcg levothyroxine for many years. Her TSH 6.59 and free T4 is 0.6. Last TSH was 1.48 in April 2024  She has been on TF and minerals which may cause malabsorption of levothyroxine.  Primary team should involve pharmacist to take levothyroxine to prevent malabsorption. However, we expect higher TSH with this low level of free T4. We will check cortisol and ACTH for tomorrow to exclude pituitary insufficiency. We plan to increase levothyroxine dose to 100 mcg daily after confirmed normal cortisol tomorrow.    #T3 grade 2 vertebra fragility fracture, osteoporosis  Based on CT chest imaging performed in 12/9/2024 there is a subacute compression fracture deformity of T3 vertebra body. She mentioned she did not have a recent trauma. She does not have pain.  We do not want give treatment in hospital settings because of her condition. She is on calcium and vitamin D replacement; Recommend outpatient endo follow up    Plan  Check cortisol and ACTH in tomorrow morning  Please involve pharmacist to prevent malabsorption of levothyroxine  Check vitamin D level  Make sure she is taking at least 1200 mg elemental calcium daily. Since she is on PPI, calcium citrate would be a better option  Please hold TF an hour before and after levothyroxine intake  Minerals including calcium, multivitamin, iron should be given at least 4 hours apart from levothyroxine intake      Daniel Edwards  Endocrine Fellow  Pager#[phone]  On Vocera      Case was discussed and reviewed with attending Dr. Sanju Mazariegos    ___________________________________________________________________________________________________________________    Chief complaint:  Asya is a 79 year old female seen in consultation at the request of Eliseo Dewey       HISTORY OF PRESENT ILLNESS  Asya Coffman is a 79 year old female admitted on 11/21/2024. 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 Initially admitted to hospital medicine service with hypoxic respiratory failure suspected due to HFpEF requiring diuresis. Developed acute hypoxia on 12/8 secondary to suspected aspiration pneumonitis for which she was intubated (12/8-12/10). Now s/p extubation and transferring back to hospital medicine service   She has history of hypothyroidism due to thyroidectomy for cancer . Papillary thyroid cancer s/p thyroidectomy in 1965. Surgery w/ Dr. Davis. No thyroglobulin level in epic   PTA levothyroxine 88 mcg daily. Her TSH 6.59 and free T4  is 0.6. Last TSH was 1.48 in April 2024    #T3 grade 2 vertebra fragility fracture, osteoporosis  Based on CT chest imaging performed in 12/9/2024 there is a subacute compression fracture deformity of T3 vertebra body, which is consistent with fragility fracture and osteoporosis. She mentioned she did not have a recent trauma. She does not have pain.    Most Recent 3 CBC's:  Recent Labs   Lab Test 12/18/24  0530 12/17/24 0433 12/16/24 0524   WBC 8.0 10.2 8.7   HGB 10.3* 9.8* 10.0*   MCV 84 85 86   * 529* 489*      Most Recent 3 BMP's:  Recent Labs   Lab Test 12/18/24  0530 12/17/24 0433 12/16/24 0524    141 142   POTASSIUM 4.6 4.6 5.2   CHLORIDE 99 99 102   CO2 29 33* 27   BUN 49.5* 46.2* 43.1*   CR 1.09* 1.06* 0.99*   ANIONGAP 13 9 13   ESSIE 9.3 8.9 9.3   * 113* 102*     Most Recent 2 LFT's:  Recent Labs   Lab Test 12/11/24  0157 12/10/24  0311   AST 18 14   ALT 12 10   ALKPHOS 65 69   BILITOTAL 0.2 0.3     Most Recent Cholesterol Panel:No lab results found.  Most Recent TSH, T4 and A1c Labs:  Recent Labs   Lab Test 12/17/24  0433 04/08/24  1358 01/24/24  1050   TSH 6.59*   < >  --    T4 0.60*  --   --    A1C  --   --  6.0*    < > = values in this interval not displayed.     Most Recent 6 glucoses:  Recent Labs   Lab Test 12/18/24  0530 12/17/24  0433 12/16/24  0524 12/15/24  0630 12/15/24  0235 12/13/24  0404   * 113* 102* 99 103* 100*       Relevant imaging is as follows: (as read by me as it pertains to endocrine relevant organs)          Past Medical History  Past Medical History:   Diagnosis Date    A-fib (H)     Antiplatelet or antithrombotic long-term use     Arrhythmia     COPD (chronic obstructive pulmonary disease) (H)        Medications  No current outpatient medications on file.       Allergies  Allergies   Allergen Reactions    Methylpyrrolidone Anaphylaxis    Nuts Anaphylaxis     Black walnut    Escitalopram Other (See Comments)     Asthma -a time of exacerbation and may  "not have been cause may retry    Mirtazapine Other (See Comments)     Asthma a time of exacerbation and may not have been cause may retry    Venlafaxine Other (See Comments)    Adhesive Tape Rash     With holter monitor. Ok with bandaids.    No Clinical Screening - See Comments Rash     Adhesive tape       Family History  family history includes Breast Cancer (age of onset: 75.00) in her mother.    Social History  Social History     Tobacco Use    Smoking status: Never    Smokeless tobacco: Never   Substance Use Topics    Alcohol use: Yes     Comment: Occasionally    Drug use: Never       Physical Exam  /69 (BP Location: Left arm)   Pulse 87   Temp 97.7  F (36.5  C) (Oral)   Resp 24   Ht 1.651 m (5' 5\")   Wt 47.1 kg (103 lb 13.4 oz)   SpO2 97%   BMI 17.28 kg/m    Body mass index is 17.28 kg/m .  Physical Exam   General: Awake. Alert. No acute distress. Frail appearing.   GENERAL :  In no apparent distress  SKIN: Normal color, normal temperature, texture.     EYES:  No scleral icterus,  No proptosis, conjunctival redness, stare, retraction  RESP: Lungs expanding without difficulties.   CARDIAC: Regular rate and rhythm  NEURO:alert, oriented x3.  EXTREMITIES: No clubbing, cyanosis or edema.      "

## 2024-12-18 NOTE — CODE/RAPID RESPONSE
12/17/24 2100   Call Information   Date of Call 12/17/24   Time of Call 2102   Name of person requesting the team Estefani LAW   Title of person requesting team RN   RRT Arrival time 2107   Time RRT ended 2120   Reason for call   Type of RRT Adult   Primary reason for call Respiratory   Respiratory O2sat less than 88% for greater than 5 minutes despite O2   Was patient transferred from the ED, ICU, or PACU within last 24 hours prior to RRT call? No   SBAR   Situation Sudden increase in oxygen need with difficulty improving sats past 80%   Background Per provider note: history significant for Afib on apixaban, CAD, pulmonary hypertension, severe obstructive lung disease in setting of COPD/asthma, laryngeal squamous cell carcinoma (diagnosed 4/2024) s/p radiation (4/2024-7/2024) c/b dysphagia on modified diet, CREST syndrome, hypothyroidism, anxiety and depression. Initially admitted to hospital medicine service with hypoxic respiratory failure suspected due to HFpEF requiring diuresis. Developed acute hypoxia on 12/8 secondary to suspected aspiration pneumonitis for which she was intubated (12/8-12/10).   Notable History/Conditions Cancer;Cardiac;COPD   Assessment A&O, denies new pain, breathing comfortably. Lungs coarse with rattles on the right, diminished in the bases. Sats high 90's upon arrival to RRT and able to quickly wean down oxygen to 2L.   Interventions O2 per N/C or mask;Suction;Meds   Patient Outcome   Patient Outcome Stabilized on unit   RRT Team   Attending/Primary/Covering Physician Gold 8   Date Attending Physician notified 12/17/24   Physician(s) Nydia Mccoy PA-C   Lead RN Juan LAW   RT Maureen RAMOS   Post RRT Intervention Assessment   Post RRT Assessment Stable/Improved   Date Follow Up Done 12/18/24   Time Follow Up Done 0015   Comments resting comfortably. Maintaining sats with 2L NC

## 2024-12-18 NOTE — PLAN OF CARE
"Blood pressure 99/81, pulse 87, temperature 97.9  F (36.6  C), temperature source Oral, resp. rate 16, height 1.651 m (5' 5\"), weight 47.1 kg (103 lb 13.4 oz), SpO2 95%. Via 2L NC       Patient A & O X 4 , intermittent forgetful. Irregular HR . NJ  patent TF running at 45 ml/hr received meds via NJ and flushed with water before and after . Pure wick in place with adequate urine output, Inct care done coccyx red barrier cream applied . Repositioned q2hr and as tolerated , oral care and suctioned x 3. Call light  within reach bed alarm on. Continue with POC and update MD with change.          Goal Outcome Evaluation:    Plan of Care Reviewed With: patient  Overall Patient Progress: no change.    "

## 2024-12-18 NOTE — PLAN OF CARE
Goal Outcome Evaluation:      Plan of Care Reviewed With: patient    Overall Patient Progress: no changeOverall Patient Progress: no change    Outcome Evaluation: [id number]. Rapid called at beginning of shift for increased o2 needs and satting in 70s. Able to wean back to 2L nc. Weak productive cough cont., po suctioning q1-2h prn and oral cares performed. PRN neb given for secretions. Tf infusing at goal 45ml/hr and tolerating. 2 smear inc BMs, primofit in place. VSS, irregular HR. A&OX4, denies pain. Repos q2-3h as tolerated.

## 2024-12-18 NOTE — PLAN OF CARE
"Blood pressure 114/82, pulse 86, temperature 97.5  F (36.4  C), temperature source Oral, resp. rate 16, height 1.651 m (5' 5\"), weight 47.2 kg (104 lb 0.9 oz), SpO2 96%. Via 2L NC       Patient A & O X 4, denies pain VSS exp irregular HR , received scheduled medications via NJ tube and tolerated well. Tube feeding at goal. Incontinence of bladder and bowel . Inct care done barrier cream applied . Repositioned as tolerated .  Oral care and oral suctions done  as needed , call light within reach , bed alarm . Continue monitor closely and update MD with change .     MRI check list sent to MRI.       Goal Outcome Evaluation:      Plan of Care Reviewed With: patient    Overall Patient Progress: no change.       "

## 2024-12-18 NOTE — CODE/RAPID RESPONSE
Rapid Response Team Note    Assessment   A rapid response was called on Asya Coffman due to acute hypoxic respiratory failure. Patient noted to have acute drop in O2 sats to the 70s- asymptomatic and improved with increase in BL O2 use of 2L --> 15L FM and changing O2 probe. Patient with improvement in O2 to 96% and able to wean O2 to 7L. Patient recently intubated for AHRF  and extubate don 12/10/24. Patient with coarse BS and weak cough. HR 70s, BP stable. No complaints of fever, chills, HA, abdominal pain, Dysuria, CP, SOB.     Plan   -  STAT CXR  - Add mucomyst neb PRN if unable to clear secretions  - Continue Xopenex nebs  - Check VBG  -  The Internal Medicine primary team was able to be reached and they are in agreement with the above plan.  -  Disposition: The patient will remain on the current unit. We will continue to monitor this patient closely.  -  Reassessment and plan follow-up will be performed by the primary team    Nydia Mccoy PA-C  Rapid Response Team ERIC  Securely message with [employer]     Medical Decision Making       35 MINUTES SPENT BY ME on the date of service doing chart review, history, exam, documentation & further activities per the note.          Hospital Course   Brief Summary of events leading to rapid response:   RRT called for acute, intermittent increase in O2 needs    Physical Exam   Vital Signs: Temp: 97.9  F (36.6  C) Temp src: Oral BP: 123/80 Pulse: 72   Resp: 16 SpO2: 97 % O2 Device: Oxymask Oxygen Delivery: 3 LPM  Weight: 103 lbs 13.39 oz      Exam:     Physical Exam   Constitutional: Chronically ill-appearing female sitting up in bed,. Conversant and NAD     HEENT:   Head: Normocephalic and atraumatic.   Eyes: Conjunctivae are normal. Pupils are equal, round, and reactive to light.  Pharynx has no erythema or exudate, mucous membranes are moist  Neck:   No adenopathy, no bony tenderness  Cardiovascular: Irregular and rhythm without murmurs or  gallops  Pulmonary/Chest: Coarse BS bilaterally;  with no wheezes or retractions. No respiratory distress on FM.  GI: Soft with good bowel sounds.  Non-tender, non-distended, with no guarding, no rebound, no peritoneal signs.   Back:  No bony or CVA tenderness   Musculoskeletal:  No edema or clubbing   Skin: Skin is warm and dry. No rash noted to exposed skin areas.   Neurological: Flat           Sepsis Evaluation   The patient is not known to have an infection.    NO EVIDENCE OF SEPSIS at this time.  Vital sign, physical exam, and lab findings are due to possible dysfunctional O2 probe, possible related to secrestions.

## 2024-12-18 NOTE — PROGRESS NOTES
Rapid response called at 2110 for increased o2 needs. Satting 70s with good wave form on 2L nc w humidification, increased o2 to 15L w no effect. Changed o2 finger and ear probes, and switched to oxymask. Pt denied sob or any resp distress. Peds o2 sensor gave better reading, able to wean back to 2.5L nc w humidification. Cxr, vbg, prn neb ordered.

## 2024-12-19 VITALS
SYSTOLIC BLOOD PRESSURE: 115 MMHG | DIASTOLIC BLOOD PRESSURE: 99 MMHG | HEIGHT: 65 IN | HEART RATE: 146 BPM | TEMPERATURE: 98.5 F | BODY MASS INDEX: 17.59 KG/M2 | RESPIRATION RATE: 18 BRPM | OXYGEN SATURATION: 93 % | WEIGHT: 105.6 LBS

## 2024-12-19 LAB
ACTH PLAS-MCNC: 13 PG/ML
ALBUMIN SERPL BCG-MCNC: 3.3 G/DL (ref 3.5–5.2)
ALDOLASE SERPL-CCNC: 3.3 U/L
AMMONIA PLAS-SCNC: 20 UMOL/L (ref 11–51)
ANION GAP SERPL CALCULATED.3IONS-SCNC: 13 MMOL/L (ref 7–15)
BUN SERPL-MCNC: 50 MG/DL (ref 8–23)
CALCIUM SERPL-MCNC: 9.1 MG/DL (ref 8.8–10.4)
CHLORIDE SERPL-SCNC: 98 MMOL/L (ref 98–107)
CHOLEST SERPL-MCNC: 139 MG/DL
CORTIS SERPL-MCNC: 13.2 UG/DL
CREAT SERPL-MCNC: 1.01 MG/DL (ref 0.51–0.95)
EGFRCR SERPLBLD CKD-EPI 2021: 57 ML/MIN/1.73M2
ERYTHROCYTE [DISTWIDTH] IN BLOOD BY AUTOMATED COUNT: 19 % (ref 10–15)
EST. AVERAGE GLUCOSE BLD GHB EST-MCNC: 120 MG/DL
FOLATE SERPL-MCNC: 24.1 NG/ML (ref 4.6–34.8)
GLUCOSE SERPL-MCNC: 118 MG/DL (ref 70–99)
HBA1C MFR BLD: 5.8 %
HCO3 SERPL-SCNC: 27 MMOL/L (ref 22–29)
HCT VFR BLD AUTO: 34.8 % (ref 35–47)
HDLC SERPL-MCNC: 38 MG/DL
HGB BLD-MCNC: 10.6 G/DL (ref 11.7–15.7)
LDLC SERPL CALC-MCNC: 81 MG/DL
MAGNESIUM SERPL-MCNC: 2.3 MG/DL (ref 1.7–2.3)
MCH RBC QN AUTO: 25.5 PG (ref 26.5–33)
MCHC RBC AUTO-ENTMCNC: 30.5 G/DL (ref 31.5–36.5)
MCV RBC AUTO: 84 FL (ref 78–100)
NONHDLC SERPL-MCNC: 101 MG/DL
PHOSPHATE SERPL-MCNC: 4 MG/DL (ref 2.5–4.5)
PLATELET # BLD AUTO: 502 10E3/UL (ref 150–450)
POTASSIUM SERPL-SCNC: 4.6 MMOL/L (ref 3.4–5.3)
RBC # BLD AUTO: 4.15 10E6/UL (ref 3.8–5.2)
SODIUM SERPL-SCNC: 138 MMOL/L (ref 135–145)
TRIGL SERPL-MCNC: 100 MG/DL
VIT B12 SERPL-MCNC: 505 PG/ML (ref 232–1245)
WBC # BLD AUTO: 9.6 10E3/UL (ref 4–11)

## 2024-12-19 PROCEDURE — 82024 ASSAY OF ACTH: CPT

## 2024-12-19 PROCEDURE — 92526 ORAL FUNCTION THERAPY: CPT | Mod: GN

## 2024-12-19 PROCEDURE — 250N000013 HC RX MED GY IP 250 OP 250 PS 637: Performed by: INTERNAL MEDICINE

## 2024-12-19 PROCEDURE — 999N000157 HC STATISTIC RCP TIME EA 10 MIN

## 2024-12-19 PROCEDURE — 97530 THERAPEUTIC ACTIVITIES: CPT | Mod: GP | Performed by: REHABILITATION PRACTITIONER

## 2024-12-19 PROCEDURE — 84425 ASSAY OF VITAMIN B-1: CPT

## 2024-12-19 PROCEDURE — 82465 ASSAY BLD/SERUM CHOLESTEROL: CPT

## 2024-12-19 PROCEDURE — 85014 HEMATOCRIT: CPT | Performed by: STUDENT IN AN ORGANIZED HEALTH CARE EDUCATION/TRAINING PROGRAM

## 2024-12-19 PROCEDURE — 99232 SBSQ HOSP IP/OBS MODERATE 35: CPT | Performed by: HOSPITALIST

## 2024-12-19 PROCEDURE — 82140 ASSAY OF AMMONIA: CPT

## 2024-12-19 PROCEDURE — 82746 ASSAY OF FOLIC ACID SERUM: CPT

## 2024-12-19 PROCEDURE — 250N000009 HC RX 250

## 2024-12-19 PROCEDURE — 250N000013 HC RX MED GY IP 250 OP 250 PS 637: Performed by: STUDENT IN AN ORGANIZED HEALTH CARE EDUCATION/TRAINING PROGRAM

## 2024-12-19 PROCEDURE — 120N000002 HC R&B MED SURG/OB UMMC

## 2024-12-19 PROCEDURE — 82607 VITAMIN B-12: CPT

## 2024-12-19 PROCEDURE — 94640 AIRWAY INHALATION TREATMENT: CPT | Mod: 76

## 2024-12-19 PROCEDURE — 83718 ASSAY OF LIPOPROTEIN: CPT

## 2024-12-19 PROCEDURE — 250N000013 HC RX MED GY IP 250 OP 250 PS 637: Performed by: SURGERY

## 2024-12-19 PROCEDURE — 80069 RENAL FUNCTION PANEL: CPT

## 2024-12-19 PROCEDURE — 83735 ASSAY OF MAGNESIUM: CPT | Performed by: HOSPITALIST

## 2024-12-19 PROCEDURE — 97110 THERAPEUTIC EXERCISES: CPT | Mod: GP | Performed by: REHABILITATION PRACTITIONER

## 2024-12-19 PROCEDURE — 250N000013 HC RX MED GY IP 250 OP 250 PS 637

## 2024-12-19 PROCEDURE — 250N000013 HC RX MED GY IP 250 OP 250 PS 637: Performed by: HOSPITALIST

## 2024-12-19 PROCEDURE — 36415 COLL VENOUS BLD VENIPUNCTURE: CPT

## 2024-12-19 PROCEDURE — 94640 AIRWAY INHALATION TREATMENT: CPT

## 2024-12-19 PROCEDURE — 97530 THERAPEUTIC ACTIVITIES: CPT | Mod: GO

## 2024-12-19 PROCEDURE — 83036 HEMOGLOBIN GLYCOSYLATED A1C: CPT

## 2024-12-19 PROCEDURE — 97535 SELF CARE MNGMENT TRAINING: CPT | Mod: GO

## 2024-12-19 PROCEDURE — 82533 TOTAL CORTISOL: CPT

## 2024-12-19 PROCEDURE — 250N000013 HC RX MED GY IP 250 OP 250 PS 637: Performed by: PHYSICIAN ASSISTANT

## 2024-12-19 PROCEDURE — 250N000009 HC RX 250: Performed by: INTERNAL MEDICINE

## 2024-12-19 RX ORDER — ATORVASTATIN CALCIUM 40 MG/1
40 TABLET, FILM COATED ORAL EVERY EVENING
Status: DISCONTINUED | OUTPATIENT
Start: 2024-12-19 | End: 2024-12-30

## 2024-12-19 RX ADMIN — SERTRALINE HYDROCHLORIDE 150 MG: 100 TABLET ORAL at 08:08

## 2024-12-19 RX ADMIN — FUROSEMIDE 40 MG: 40 TABLET ORAL at 08:08

## 2024-12-19 RX ADMIN — IPRATROPIUM BROMIDE 0.5 MG: 0.5 SOLUTION RESPIRATORY (INHALATION) at 21:00

## 2024-12-19 RX ADMIN — FEXOFENADINE HYDROCHLORIDE 120 MG: 60 TABLET ORAL at 08:08

## 2024-12-19 RX ADMIN — IPRATROPIUM BROMIDE 0.5 MG: 0.5 SOLUTION RESPIRATORY (INHALATION) at 08:58

## 2024-12-19 RX ADMIN — ATORVASTATIN CALCIUM 40 MG: 40 TABLET, FILM COATED ORAL at 20:07

## 2024-12-19 RX ADMIN — LEVALBUTEROL HYDROCHLORIDE 0.63 MG: 0.63 SOLUTION RESPIRATORY (INHALATION) at 21:00

## 2024-12-19 RX ADMIN — POLYETHYLENE GLYCOL 3350 17 G: 17 POWDER, FOR SOLUTION ORAL at 08:08

## 2024-12-19 RX ADMIN — DILTIAZEM HYDROCHLORIDE 120 MG: 120 TABLET, FILM COATED ORAL at 11:47

## 2024-12-19 RX ADMIN — LEVOTHYROXINE SODIUM 88 MCG: 88 TABLET ORAL at 05:28

## 2024-12-19 RX ADMIN — DILTIAZEM HYDROCHLORIDE 120 MG: 120 TABLET, FILM COATED ORAL at 05:28

## 2024-12-19 RX ADMIN — SODIUM CHLORIDE SOLN NEBU 3% 3 ML: 3 NEBU SOLN at 08:58

## 2024-12-19 RX ADMIN — APIXABAN 5 MG: 5 TABLET, FILM COATED ORAL at 20:06

## 2024-12-19 RX ADMIN — DILTIAZEM HYDROCHLORIDE 120 MG: 120 TABLET, FILM COATED ORAL at 00:17

## 2024-12-19 RX ADMIN — DILTIAZEM HYDROCHLORIDE 120 MG: 120 TABLET, FILM COATED ORAL at 18:07

## 2024-12-19 RX ADMIN — THIAMINE HCL TAB 100 MG 100 MG: 100 TAB at 08:08

## 2024-12-19 RX ADMIN — SPIRONOLACTONE 25 MG: 25 TABLET, FILM COATED ORAL at 08:08

## 2024-12-19 RX ADMIN — BUDESONIDE 0.5 MG: 0.5 INHALANT ORAL at 21:00

## 2024-12-19 RX ADMIN — PANTOPRAZOLE SODIUM 40 MG: 40 INJECTION, POWDER, FOR SOLUTION INTRAVENOUS at 08:08

## 2024-12-19 RX ADMIN — THERA TABS 1 TABLET: TAB at 08:08

## 2024-12-19 RX ADMIN — SODIUM CHLORIDE SOLN NEBU 3% 3 ML: 3 NEBU SOLN at 21:00

## 2024-12-19 RX ADMIN — BUDESONIDE 0.5 MG: 0.5 INHALANT ORAL at 08:58

## 2024-12-19 RX ADMIN — LEVALBUTEROL HYDROCHLORIDE 0.63 MG: 0.63 SOLUTION RESPIRATORY (INHALATION) at 08:58

## 2024-12-19 RX ADMIN — GABAPENTIN 600 MG: 300 CAPSULE ORAL at 22:16

## 2024-12-19 RX ADMIN — APIXABAN 5 MG: 5 TABLET, FILM COATED ORAL at 08:08

## 2024-12-19 ASSESSMENT — ACTIVITIES OF DAILY LIVING (ADL)
ADLS_ACUITY_SCORE: 72

## 2024-12-19 NOTE — CONSULTS
[facility]  General Neurology Consult Note    Patient Name:  Asya Coffman  MRN:  [id number]    :  1945  Date of Service:  2024  Primary care provider:  Janna Calderón  Date of Admission: 2024      Consulted by: Eliseo Reeder  Consulted for: delirium vs mild cognitive impairment     Chief Complaint:  Chief Complaint   Patient presents with    Shortness of Breath        History of Present Illness:  Asya Coffman is a 79 year old  female with a hx of Afib on apixaban, CAD, pulmonary hypertension, severe obstructive lung disease in setting of COPD/asthma, laryngeal squamous cell carcinoma (diagnosed 2024) s/p radiation (2024-2024) c/b dysphagia on modified diet, CREST syndrome, hypothyroidism, anxiety and depression admitted on 24 to hospital medicine service with hypoxic respiratory failure suspected due to HFpEF requiring diuresis. Developed acute hypoxia on  secondary to suspected aspiration pneumonitis for which she was intubated (-12/10). Now s/p extubation and transferring back to hospital medicine service. Neurology consulted for ongoing encephalopathy evaluation.     Patient seen at bedside and limited history obtained given ongoing encephalopathy. Per chart review significant muscle weakness contributing to oropharyngeal dysphagia, poor vocal pressure, poor cough effort. Unclear if her underlying Rheumatological disease contributing. CK, aldolase negative. Unclear if hypothyroidism is the cause but can be contributing.  MRI Brain () finding as below and unclear if it can explain dysphagia and found to have chronic left parietal and thalamic lacunar infarct otherwise no infection or acute pathology.     Hospitalization complicated by moderate to sever pharyngeal dysphagia, acute hypoxic respiratory failure 2/2 aspiration pneumonitis, MSSA pneumonia, HFpEF, severe malnutrition in the context of critical  illness, SAMIR, diarrhea, and generalized deconditioning.     ROS: See HPI, 10 point review of systems otherwise negative.    Past Medical History:  Past Medical History:   Diagnosis Date    A-fib (H)     Antiplatelet or antithrombotic long-term use     Arrhythmia     COPD (chronic obstructive pulmonary disease) (H)        Past Surgical History:  Past Surgical History:   Procedure Laterality Date    INJECT STEROID (LOCATION) N/A 6/15/2023    Procedure: Avastin injection;  Surgeon: Nikole Davis MD;  Location: [facility]    INJECT STEROID (LOCATION) N/A 9/7/2023    Procedure: Avastin injection;  Surgeon: Nikole Davis MD;  Location: [facility]    INJECT STEROID (LOCATION) N/A 12/14/2023    Procedure: Avastin injection;  Surgeon: Nikole Davis MD;  Location: [facility]    INJECT STEROID (LOCATION) N/A 2/15/2024    Procedure: Avastin injection;  Surgeon: Nikole Davis MD;  [facility]    LASER CO2 LARYNGOSCOPY N/A 9/7/2023    Procedure: Microdirect laryngoscopy with excision/ablation of laryngeal lesions, biopsies, CO2 laser;  Surgeon: Nikole Davis MD;  [facility]    LASER CO2 LARYNGOSCOPY N/A 5/30/2024    Procedure: Microdirect laryngoscopy with excision/ablation of laryngeal lesions, biopsies, CO2 laser;  Surgeon: Nikole Davis MD;  [facility]    LASER CO2 LARYNGOSCOPY, COMPLEX N/A 5/26/2022    Procedure: Micro direct laryngoscopy with excision/ablation of laryngeal lesions, possible biopsies, possible CO2 laser;  Surgeon: Nikole Davis MD;  [facility]    LASER CO2 LARYNGOSCOPY, COMPLEX N/A 9/15/2022    Procedure: Microdirect laryngoscopy with ablation of laryngeal lesions,biopsies,CO2 laser;  Surgeon: Nikole Davis MD;  [facility]    LASER CO2 LARYNGOSCOPY, COMPLEX N/A 12/1/2022    Procedure: Microdirect laryngoscopy with excision/ablation of laryngeal lesions, biopsies,   CO2 laser;  Surgeon: Nikole Davis  MD [place];  Location: [facility]    LASER CO2 LARYNGOSCOPY, COMPLEX N/A 6/15/2023    Procedure: Microdirect laryngoscopy with ablation of laryngeal lesions, biopsies, CO2 laser;  Surgeon: Nikole Davis MD;  [facility]    LASER CO2 LARYNGOSCOPY, COMPLEX N/A 10/5/2023    Procedure: Microdirect laryngoscopy with excision/ablation of laryngeal lesions, biopsies, CO2 laser;  Surgeon: Nikole Davis MD;  [facility]    LASER CO2 LARYNGOSCOPY, COMPLEX N/A 12/14/2023    Procedure: Microdirect laryngoscopy with excision/ablation of laryngeal lesions, biopsies, CO2 laser;  Surgeon: Nikole Davis MD;  [facility]    LASER CO2 LARYNGOSCOPY, COMPLEX N/A 2/15/2024    Procedure: Microdirect laryngoscopy with excision/ablation of laryngeal lesions, biopsies, CO2 laser;  Surgeon: Nikole Davis MD;  [facility]    LASER CO2 LARYNGOSCOPY, COMPLEX N/A 4/11/2024    Procedure: Microdirect laryngoscopy with excision/ablation of laryngeal lesions, biopsies, CO2 laser, Avastin injections;  Surgeon: Nikole Davis MD;  [facility]    LASER KTP LARYNGOSCOPY N/A 4/3/2023    Procedure: Microdirect laryngoscopy with biopsies, excision/ablation of lesions, C02 laser,  Avastin injection;  Surgeon: Nikole Davis MD;  [facility]    LASER KTP LARYNGOSCOPY N/A 6/15/2023    Procedure: flexible laser (CO2 or KTP) standby;  Surgeon: Nikole Davis MD;  [facility]    LASER KTP LARYNGOSCOPY FLEXIBLE N/A 8/18/2022    Procedure: Transnasal flexible laryngoscopy with KTP laser and biopsies;  Surgeon: Nikole Davis MD;  Location: [facility]    LASER KTP LARYNGOSCOPY FLEXIBLE N/A 10/27/2022    Procedure: Transnasal flexible laryngoscopy with possible KTP laser;  Surgeon: Nikole Davis MD;  Location: [facility]    LASER KTP LARYNGOSCOPY FLEXIBLE N/A 1/26/2023    Procedure: Transnasal flexible laryngoscopy with KTP laser,  biopsies,  superior laryngeal nerve blocks, Avastin injection;  Surgeon: Nikole Davis MD;  Location: [facility]    LASER KTP LARYNGOSCOPY FLEXIBLE N/A 2/15/2023    Procedure: Transnasal flexible laryngoscopy with KTP laser, superior laryngeal nerve blocks, biopsies, avastin injection;  Surgeon: Nikole Davis MD;  Location: [facility]    LASER KTP LARYNGOSCOPY FLEXIBLE N/A 2/17/2023    Procedure: Transnasal flexible laryngoscopy with KTP laser, biopsies, superior laryngeal nerve blocks;  Surgeon: Nikole Davis MD;  Location: [facility]    LASER KTP LARYNGOSCOPY FLEXIBLE N/A 2/23/2023    Procedure: Transnasal flexible laryngoscopy with KTP laser, superior laryngeal nerve blocks;  Surgeon: Nikole Davis MD;  Location: [facility]    LASER KTP LARYNGOSCOPY FLEXIBLE N/A 3/2/2023    Procedure: Transnasal flexible laryngoscopy with KTP laser, superior laryngeal nerve block Bilateral;  Surgeon: Nikole Davis MD;  Location: [facility]    LASER KTP LARYNGOSCOPY FLEXIBLE N/A 3/9/2023    Procedure: Transnasal flexible laryngoscopy with KTP laser, biopsies, superior laryngeal nerve blocks;  Surgeon: Nikole Davis MD;  Location: [facility]    LASER KTP LARYNGOSCOPY FLEXIBLE N/A 3/17/2023    Procedure: Transnasal flexible laryngoscopy with KTP laser, superior laryngeal nerve block(s), Avastin injection;  Surgeon: Nikole Davis MD;  Location: [facility]    LASER KTP LARYNGOSCOPY FLEXIBLE N/A 3/28/2023    Procedure: Transnasal flexible laryngoscopy with KTP laser, superior laryngeal nerve block(s);  Surgeon: Pankaj Woodard MD;  Location: [facility] OR       Family History:  Family History   Problem Relation Age of Onset    Breast Cancer Mother 75.00    Hereditary Breast and Ovarian Cancer Syndrome No family hx of     Cancer No family hx of     Colon Cancer No family hx of     Endometrial Cancer No family hx of     Ovarian Cancer No family hx of        Social History:  Social  History     Tobacco Use    Smoking status: Never    Smokeless tobacco: Never   Substance Use Topics    Alcohol use: Yes     Comment: Occasionally       Allergies:  Allergies   Allergen Reactions    Methylpyrrolidone Anaphylaxis    Nuts Anaphylaxis     Black walnut    Escitalopram Other (See Comments)     Asthma -a time of exacerbation and may not have been cause may retry    Mirtazapine Other (See Comments)     Asthma a time of exacerbation and may not have been cause may retry    Venlafaxine Other (See Comments)    Adhesive Tape Rash     With holter monitor. Ok with bandaids.    No Clinical Screening - See Comments Rash     Adhesive tape       Medications:  Current Facility-Administered Medications   Medication Dose Route Frequency Provider Last Rate Last Admin    acetaminophen (TYLENOL) tablet 650 mg  650 mg Oral Q4H PRN Jennifer Anne PA-C   650 mg at 12/16/24 0638    acetylcysteine (MUCOMYST) 20 % nebulizer solution 2 mL  2 mL Nebulization Q4H PRN Nydia Mccoy PA-C   2 mL at 12/18/24 0033    albuterol (PROVENTIL HFA/VENTOLIN HFA) inhaler  2 puff Inhalation Q6H PRN Eliseo Reeder MD   2 puff at 12/18/24 0031    apixaban ANTICOAGULANT (ELIQUIS) tablet 5 mg  5 mg Oral BID Brady Rivera MD   5 mg at 12/19/24 0808    atorvastatin (LIPITOR) tablet 20 mg  20 mg Oral QPM Jesu Lunsford MD   20 mg at 12/18/24 1947    budesonide (PULMICORT) neb solution 0.5 mg  0.5 mg Nebulization BID Rama Urbina MD   0.5 mg at 12/19/24 0858    calcium carbonate (TUMS) chewable tablet 1,000 mg  1,000 mg Oral 4x Daily PRN Eliseo Reeder MD   1,000 mg at 12/06/24 2126    cholecalciferol (VITAMIN D3) capsule 50,000 Units  50,000 Units Oral Q7 Days Eliseo Reeder MD   50,000 Units at 12/17/24 1209    dextrose 10% infusion   Intravenous Continuous PRN Rama Urbina MD        glucose gel 15-30 g  15-30 g Oral Q15 Min PRN Rebecca Hardy MD        Or    dextrose 50 % injection 25-50 mL  25-50 mL  Intravenous Q15 Min PRN Rebecca Hardy MD        Or    glucagon injection 1 mg  1 mg Subcutaneous Q15 Min PRN Rebecca Hardy MD        diltiazem (CARDIZEM) tablet 120 mg  120 mg Oral Q6H YESSI Rama Urbina MD   120 mg at 12/19/24 1147    [Held by provider] empagliflozin (JARDIANCE) tablet 10 mg  10 mg Oral Daily Jesu Lunsford MD   10 mg at 12/08/24 0833    [Held by provider] ferrous sulfate (FEROSUL) tablet 325 mg  325 mg Oral Daily Eliseo Reeder MD        fexofenadine (ALLEGRA) tablet 120 mg  120 mg Oral or Feeding Tube Daily Ángel El MD   120 mg at 12/19/24 0808    furosemide (LASIX) tablet 40 mg  40 mg Oral Daily Jesu Lunsford MD   40 mg at 12/19/24 0808    gabapentin (NEURONTIN) capsule 600 mg  600 mg Oral At Bedtime Eliseo Reeder MD   600 mg at 12/18/24 2143    ipratropium (ATROVENT) 0.02 % neb solution 0.5 mg  0.5 mg Nebulization 2 times daily Lance Jarrell MD   0.5 mg at 12/19/24 0858    And    levalbuterol (XOPENEX) neb solution 0.63 mg  0.63 mg Nebulization 2 times daily Lance Jarrell MD   0.63 mg at 12/19/24 0858    levothyroxine (SYNTHROID/LEVOTHROID) tablet 88 mcg  88 mcg Oral Daily Jennifer Anne PA-C   88 mcg at 12/19/24 0528    lidocaine (LMX4) cream   Topical Q1H PRN Eliseo Reeder MD        lidocaine 1 % 0.1-1 mL  0.1-1 mL Other Q1H PRN Eliseo Reeder MD        multivitamin, therapeutic (THERA-VIT) tablet 1 tablet  1 tablet Oral or Feeding Tube Daily Rama Urbina MD   1 tablet at 12/19/24 0808    ondansetron (ZOFRAN) injection 4 mg  4 mg Intravenous Q6H PRN Jesu Lunsford MD   4 mg at 12/15/24 1113    pantoprazole (PROTONIX) 2 mg/mL suspension 40 mg  40 mg Per Feeding Tube QAM Ramila Awan MD        Or    pantoprazole (PROTONIX) IV push injection 40 mg  40 mg Intravenous QAM Ramila Awan MD   40 mg at 12/19/24 0808    Patient is already receiving anticoagulation with heparin, enoxaparin (LOVENOX), warfarin (COUMADIN)  or other  "anticoagulant medication   Does not apply Continuous PRN Eliseo Reeder MD        polyethylene glycol (MIRALAX) Packet 17 g  17 g Oral Daily Jennifer Anne PA-C   17 g at 12/19/24 0808    senna-docusate (SENOKOT-S/PERICOLACE) 8.6-50 MG per tablet 1 tablet  1 tablet Oral BID PRN Eliseo Reeder MD   1 tablet at 12/07/24 0910    Or    senna-docusate (SENOKOT-S/PERICOLACE) 8.6-50 MG per tablet 2 tablet  2 tablet Oral BID PRN Eliseo Reeder MD        sertraline (ZOLOFT) tablet 150 mg  150 mg Oral Daily Jennifer Anne PA-C   150 mg at 12/19/24 0808    sodium chloride (NEBUSAL) 3 % neb solution 3 mL  3 mL Nebulization BID Kevin López MD   3 mL at 12/19/24 0858    sodium chloride (OCEAN) 0.65 % nasal spray 1 spray  1 spray Both Nostrils 4x Daily Kevin López MD   1 spray at 12/19/24 1147    sodium chloride (PF) 0.9% PF flush 3 mL  3 mL Intracatheter Q8H Eliseo Reeder MD   3 mL at 12/19/24 0809    sodium chloride (PF) 0.9% PF flush 3 mL  3 mL Intracatheter q1 min prn Eliseo Reeder MD   3 mL at 12/07/24 2100    spironolactone (ALDACTONE) tablet 25 mg  25 mg Oral Daily Jesu Lunsford MD   25 mg at 12/19/24 0808    thiamine (B-1) tablet 100 mg  100 mg Oral Daily Rama Urbina MD   100 mg at 12/19/24 0808       Physical Exam:  Vitals: Patient Vitals for the past 8 hrs:   BP Temp Temp src Pulse Resp SpO2   12/19/24 0553 104/74 97.5  F (36.4  C) Axillary 95 16 96 %       Vitals: /74 (BP Location: Left arm)   Pulse 95   Temp 97.5  F (36.4  C) (Axillary)   Resp 16   Ht 1.651 m (5' 5\")   Wt 47.2 kg (104 lb 0.9 oz)   SpO2 96%   BMI 17.32 kg/m      Physical Exam:  Constitutional: Alert. Lying in bed comfortably. No acute distress.   Head: Atraumatic, normocephalic.  ENT: Moist mucous membranes. No sinus drainage.  Cardiovascular: Appears warm, well-perfused, and non-toxic.  Respiratory: No increased work of breathing, no accessory muscle use.   Gastrointestinal: Does " not appear distended.  Musculoskeletal: Moving all four limbs spontaneously. Grossly intact range of motion.   Skin: No rashes or lesions appreciated on visualized skin.        Neurologic:     Mental Status: alert, oriented to person, age, and year but not situation. 3/3 naming objects, unable to calculate 5 quarters in $1.25. unable to say the days of the week backwards from Sunday to Monday. Able to follow simple commands but not complex. Requires repeated stimulation to answer questions and follow commands. Mild dysarthria.    Cranial Nerves: pupils equal, round, and reactive to light and accommodation, EOMI without nystagmus, eyes move conjugately, no saccadic movement, no skew. Blinks to threat bilaterally. Smile and eyebrow raise equal, blinking symmetric, no weakness. Lashes are buried on eye squeeze. Nasolabial folds symmetric. Facial sensation (V1-3) equal. Tongue midline. Shoulder shrug symmetric, hearing intact to conversation. Mild dysarthria     Motor: no atrophy or fasciculations observed. No pronator or sensory drift. Antigravity x4 extremities without drift but difficult to obtain full strength testing given mentation      Reflexes: normoreflexic and symmetric at the biceps/brachioradialis/patellar/achilles. No spread, no crossed adductors. Toes are downgoing. 3-4 beats of clonus in bilateral foot     Sensory: intact to light touch in all four extremities.      Coordination: FNF & HTS with slight ataxia x4 extremities     Gait: deferred    Labs/Imaging:  Results for orders placed or performed during the hospital encounter of 11/21/24 (from the past 24 hours)   MR Brain w/o Contrast    Narrative    EXAM: MR BRAIN W/O CONTRAST  12/18/2024 7:20 PM     HISTORY: Persistent oropharyngeal dysphagia rule out CVA       COMPARISON: CT 12/14/2023    TECHNIQUE: Multiplanar, multisequence MR imaging of the head without  intravenous contrast    FINDINGS:  No mass effect, midline shift, or intracranial hemorrhage.  The  ventricles are proportionate to the cerebral sulci. Confluent T2  hyperintensities within the periventricular and subcortical white  matter. Punctate focus of susceptibility artifact within the right  basal ganglia. Multiple moderate generalized cerebral volume loss. No  abnormal focal diffusion restriction. Chronic infarcts of the left  thalamus and left parietal white matter. Normal intravascular flow  voids.    Normal skull marrow signal. Clear paranasal sinuses, mastoid air  cells. Nonfocal pituitary gland, sella, skull base and upper cervical  spinal structures on sagittal images. Bilateral pseudophakia      Impression    IMPRESSION:  1. No acute intracranial pathology.  2. Chronic infarcts of the left thalamus and left parietal white  matter. Advanced leukoaraiosis    I have personally reviewed the examination and initial interpretation  and I agree with the findings.    DIPAK DIAZ MD         SYSTEM ID:  [id number]   CBC with platelets   Result Value Ref Range    WBC Count 9.6 4.0 - 11.0 10e3/uL    RBC Count 4.15 3.80 - 5.20 10e6/uL    Hemoglobin 10.6 (L) 11.7 - 15.7 g/dL    Hematocrit 34.8 (L) 35.0 - 47.0 %    MCV 84 78 - 100 fL    MCH 25.5 (L) 26.5 - 33.0 pg    MCHC 30.5 (L) 31.5 - 36.5 g/dL    RDW 19.0 (H) 10.0 - 15.0 %    Platelet Count 502 (H) 150 - 450 10e3/uL   Basic metabolic panel   Result Value Ref Range    Sodium 138 135 - 145 mmol/L    Potassium 4.6 3.4 - 5.3 mmol/L    Chloride 98 98 - 107 mmol/L    Carbon Dioxide (CO2) 27 22 - 29 mmol/L    Anion Gap 13 7 - 15 mmol/L    Urea Nitrogen 50.0 (H) 8.0 - 23.0 mg/dL    Creatinine 1.01 (H) 0.51 - 0.95 mg/dL    GFR Estimate 57 (L) >60 mL/min/1.73m2    Calcium 9.1 8.8 - 10.4 mg/dL    Glucose 118 (H) 70 - 99 mg/dL   Magnesium   Result Value Ref Range    Magnesium 2.3 1.7 - 2.3 mg/dL   Phosphorus   Result Value Ref Range    Phosphorus 4.0 2.5 - 4.5 mg/dL   Cortisol   Result Value Ref Range    Cortisol 13.2   ug/dL   Adrenal corticotropin   Result Value  Ref Range    Adrenal Corticotropin 13 <47 pg/mL   Albumin level   Result Value Ref Range    Albumin 3.3 (L) 3.5 - 5.2 g/dL       Assessment and Recommendations:  Asya Coffman is a 79 year old  female with a hx of Afib on apixaban, CAD, pulmonary hypertension, severe obstructive lung disease in setting of COPD/asthma, laryngeal squamous cell carcinoma (diagnosed 4/2024) s/p radiation (4/2024-7/2024) c/b dysphagia on modified diet, CREST syndrome, hypothyroidism, anxiety and depression admitted on 11/21/24 to hospital medicine service with hypoxic respiratory failure suspected due to HFpEF requiring diuresis. Developed acute hypoxia on 12/8 secondary to suspected aspiration pneumonitis for which she was intubated (12/8-12/10). Now s/p extubation and transferring back to hospital medicine service. Neurology consulted for ongoing encephalopathy evaluation.     Patient seen at bedside and limited history obtained given ongoing encephalopathy. CK, aldolase negative. Unclear if hypothyroidism is the cause but can be contributing.  MRI Brain (12/19) finding as below and unclear if it can explain dysphagia and found to have chronic left parietal and thalamic lacunar infarct otherwise no infection or acute pathology. Hospitalization complicated by moderate to sever pharyngeal dysphagia, acute hypoxic respiratory failure 2/2 aspiration pneumonitis, MSSA pneumonia, HFpEF, severe malnutrition in the context of critical illness, SAMIR, diarrhea, and generalized deconditioning. Exam notable for ongoing encephalopathy not oriented to situation, impaired concentration intermittently following simple commands but otherwise non focal. Less likely seizure given exam and lack of history for seizures. MRI brain without CNS infection or acute pathology. Most likely etiology of ongoing encephalopathy appears toxic metabolic at this time given hospitalization course. Recommend toxic metabolic workup with B12, folate, thiamine, and  ammonia. Recommend further stroke workup with with lipid panel and A1c with goal LDL < 70 and A1c < 7.0. Further workup pending repeat exam tomorrow.     Recs:  -toxic metabolic workup with B12, folate, thiamine, and ammonia.   -Recommend further stroke workup with with lipid panel and A1c with goal   -LDL goal < 70   -A1c goal < 7.0  -delirium precautions  -limit CNS acting agents such as benzodiazepine, opioids, and neurotoxic etc  -correct and replace any electrolyte & infectious abnormalities per primary team  -Further workup pending repeat exam in the AM and pending current labs      Patient seen and discussed with attending neurologist Dr. Michelet Schilling MD  PGY3 Neurology

## 2024-12-19 NOTE — PLAN OF CARE
Goal Outcome Evaluation:      Plan of Care Reviewed With: patient    Overall Patient Progress: no changeOverall Patient Progress: no change    Outcome Evaluation: [id number]. Tele reading Afib. Earlier in the evening HR sustaining in the 140s, pt asymptomatic. HR back to 90-low 100s after receiving scheduled diltiazem. A&Ox4, flat. Denies pain. VSS on 2L nc w humidification. PO suctioning q1-2h and oral cares performed. Weak, productive cough. Tf infusing at goal rate and tolerating. Inc b/b, 3 smear BMs. Repos q2h. Continue poc.

## 2024-12-19 NOTE — PROGRESS NOTES
[facility]    Medicine Progress Note - Hospitalist Service, GOLD TEAM 8    Date of Admission:  11/21/2024    Assessment & Plan   Asya Coffman is a 79 year old female admitted on 11/21/2024. 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 Initially admitted to hospital medicine service with hypoxic respiratory failure suspected due to HFpEF requiring diuresis. Developed acute hypoxia on 12/8 secondary to suspected aspiration pneumonitis for which she was intubated (12/8-12/10). Now s/p extubation and transferring back to hospital medicine service.    Changes today:  - new finding of chronic lacunar stroke   - continued oropharyngeal dysphagia     Oral secretions, increased  Moderate-severe pharyngeal dysphagia  Esophageal dysmotility  Laryngeal squamous cell carcinoma s/p radiation (4/2024-7/2024)  Has had increased dysphagia in the setting of laryngeal squamous cell carcinoma s/p radiation (4/2024-7/2024) as well as some concern for esophageal web on swallow study (10/8/2024) pending GI input. Some concern for respiratory decompensation due to recurrent aspiration events.   - Speech following  - NPO - okay for ice chips for comfort with 1:1 supervision  - Will need VFSS -- SLP will notify when they feel she is ready for this  - consulting ENT for bedside laryngoscopy to see if h/o papillomas and radiation related to the dysphagia. ENT re-evaluated 12/16/24 with bedside laryngoscopy for dysphagia. No anatomic explanations for dysphagia seen. Normal vocal cord function and control seen including ability to approximate cords during cough. Signed off.  - Per GI, no clear signs of esophageal webs.    - clinical picture consistent with significant muscle weakness contributing to oropharyngeal dysphagia, poor vocal pressure, poor cough effort. Unclear if her underlying Rheumatological disease contributing. CK, aldolase negative. Unclear if hypothyroidism is the cause but can be contributing.  MRI Brain (12/19) finding as below and unclear if it can explain dysphagia. Will consider neurology consult. Hoping to help family with a prognosis and then, eligibility for PEG placement. Will also check SPEP, Copper levels    Acute hypoxic respiratory failure - improving  Aspiration pneumonitis  MSSA PNA s/p abx (12/3-12/8)  Obstructive lung disease (COPD vs asthma)  Pulmonary hypertension   Presented on 11/21 with acute hypoxic respiratory failure; initially suspected to be secondary to HFpEF exacerbation; unresolved with diuresis. Underlying obstructive lung disease but no evidence of exacerbation here. Was treated for MSSA pneumonia. Overnight 12/8, developed sudden worsening of oxygenation and increased secretions in the setting of dysphagia/recent laryngeal radiation and was intubated for airway protection. Suspected aspiration pneumonitis as etiology. CT Chest (12/8) with mediastinal/hilar lymphadenopathy and resolving diffuse ground glass opacities most consistent with resolving pneumonia; no signs of progressive or secondary infectious process. Was briefly on zosyn but this was discontinued as she was treated for full course based on initial pneumonia. She has been extubated and now on 2L. Ongoing upper airway adventitious sounds.   - Pulse oximetry, supplemental O2  - Budesonide neb 0.5 mg BID, ipratropium-levalbuterol neb QID  - Aspiration precautions; speech following   - Sputum culture (12/9) grew candida but leukocytosis improved and back on minimal low flow oxygen so will not treat for now  - Flutter valve, incentive spirometer  - Low threshold to resume empiric abx for pnuemonia coverage     Chronic Left  parietal, left thalamic lacunar strokes  - will increase atorvastatin to high intensity (20-> 40 mg). On DOAC  - follow up Neurorecs    HFpEF (LVEF 55-60% 11/22/2024)   Pulmonary hypertension (44 mmHg per echo 11/22/2024)  Possible small PFO  Echo during current admission (11/22/2024) notable for increased thickness of LV and elevated BNP (peak 9200 > 6200) concerning for heart failure exacerbation s/p diuresis and pulmonary hypertension with estimated pulmonary artery pressure of 45 mmHg. Echo with bubble study (11/25/2024) concerning for possible small PFO. With intubation on 12/8, developed hypotension without evidence of shock. Etiology of hypotension may be cardiogenic (e.g. exacerbation of pulmonary hypertension by positive pressure ventilation) vs medication associated (propofol, precedex); do not currently suspect distributive or obstructive hypotension. Weaned off pressors 12/10.   - Preload dependent in the setting of pulmonary hypertension, gentle diuresis as needed (will hold today)   - 2L FR    Hypothyroidism  Hx of thyroidectomy 2/2 cancer   - Continue PTA levothyroxine 88 mcg daily   - consulted Endocrine service as above and increased dose to 100 mcg daily. Appreciate recommendations    Chronic afib  [id number] 3 (age++, HF+).   - PTA apixaban 5mg BID  - PTA diltiazem 240 ER BID - HOLD (cannot give ER enterally)               Up-titrate to short-acting Diltiazem 120mg q6H as tolerated by blood pressure  - PTA propanolol 10mg BID      At risk for refeeding syndrome  Starvation ketosis  Hypokalemia  Severe malnutrition in context of chronic illness/disease  Per daughter, baseline weight around 120-130 lbs. Admitted 102 lbs. Patient states she is eating less and has less appetite in general as well as having difficulty swallowing. While NPO during intubation, developed starvation ketosis. Given high risk for aspiration with PO intake, placed NGT and started tube feeds. Will need close monitoring for  "refeeding syndrome.   - NPO   - RD following for TF management   - Monitor for refeeding syndrome   - Lyte replacement protocol   - IV thiamine replacement      Oliguric SAMIR  Cr 0.9 on admission. Baseline appears 0.9-1.0. Developed SAMIR initially in setting of diuresis and had been improving. Subsequently increased following transfer to ICU with consideration for prerenal in setting of NPO status. Also with new diarrhea. Cr stable.   - Strict I/Os  - Trend BMP   - FWF via NGT    Diarrhea  Frequent loose stools. Unclear timing of onset so not sure if related to abx or TF initiation. No abdominal pain.  - Monitor   - Hold on infectious testing for now      Anemia of chronic illness  Baseline Hgb 11-12. Currently near baseline. Can consider anemia of chronic illness.   - Monitor CBC     Generalized deconditioning  Recurrent falls  - PT/OT consults     CREST Syndrome  Characterized by Raynaud's, Calcinosis, GERD and some telangectasia's. Last saw [facility] 2017. No history of ILD.   - Continue protonix 40 mg daily     Anxiety  Depression  - Continue PTA Sertraline 150 mg daily           Diet: Fluid restriction 2000 ML FLUID  NPO for Medical/Clinical Reasons Except for: No Exceptions  Adult Formula Drip Feeding: Continuous [employer] Peptide 1.5; Nasogastric tube; Goal Rate: 45; mL/hr; 12/13: please transition to new TF formula immediately when arrives to pt room and ok to start at new goal rate; Do not advance tube feeding rat...    DVT Prophylaxis: DOAC  Miranda Catheter: Not present  Lines: None     Cardiac Monitoring: ACTIVE order. Indication: Tachyarrhythmias, acute (48 hours)  Code Status: Full Code      Clinically Significant Risk Factors               # Hypoalbuminemia: Lowest albumin = 3 g/dL at 12/10/2024  3:11 AM, will monitor as appropriate                # Cachexia: Estimated body mass index is 17.32 kg/m  as calculated from the following:    Height as of this encounter: 1.651 m (5' 5\").    Weight as " of this encounter: 47.2 kg (104 lb 0.9 oz).   # Severe Malnutrition: based on nutrition assessment      # Financial/Environmental Concerns: none         Social Drivers of Health    Housing Stability: High Risk (11/23/2024)    Housing Stability     Do you have housing? : No     Are you worried about losing your housing?: No          Disposition Plan     Medically Ready for Discharge: Anticipated in 5+ Days             Eliseo Reeder MD  Hospitalist Service, GOLD TEAM 8  [facility]  Securely message with [employer] (more info)  Text page via [employer] Paging/Directory   See signed in provider for up to date coverage information  ______________________________________________________________________    Interval History     No new complaints. Still using her suction catheter a lot with continued secretions    Physical Exam   Vital Signs: Temp: 97.5  F (36.4  C) Temp src: Axillary BP: 104/74 Pulse: 95   Resp: 16 SpO2: 96 % O2 Device: Nasal cannula with humidification Oxygen Delivery: 2 LPM  Weight: 104 lbs .91 oz    General Appearance:  Awake. Alert. No acute distress. Frail appearing.   Respiratory: Normal work of breathing on 2L. Lungs with loud upper airway breath sounds. Lower lobes sound clear. Poor cough effort.   Cardiovascular: Irregular. No obvious murmur.   GI: Nondistended. Normoactive. Soft. Non-tender.   Skin: Warm, dry.   Neuro: alert, oriented x3. Proximal muscle strength - 4/5    Medical Decision Making             Data

## 2024-12-19 NOTE — PROGRESS NOTES
Care Management Follow Up    Length of Stay (days): 28  Expected Discharge Date: 12/23/2024  Concerns to be Addressed: all concerns addressed in this encounter     Patient plan of care discussed at interdisciplinary rounds: Yes  Anticipated Discharge Disposition: Skilled Nursing Facility  Anticipated Discharge Services: None  Anticipated Discharge DME: None  Patient/family educated on Medicare website which has current facility and service quality ratings: no  Education Provided on the Discharge Plan: yes   Patient/Family in Agreement with the Plan:  yes  Referrals Placed by CM/SW: Post-acute facilities    Pending:    [facility] (Ph: [phone], Admissions: [phone], F: [phone])  12/2: Messaged via in [facility] P: [phone]  12/5: Clinically accepted.     Declined    [facility] Bed not available 11/27 & declined again due to financial concerns 12/2  [facility]: Bed not available, cost of medications  [facility]: Bed not available 12/2  [facility]: Cost of medications    Private pay costs discussed: Not applicable  Discussed  Partnership in Safe Discharge Planning  document with patient/family: Yes   Handoff Completed: No, handoff not indicated or clinically appropriate  Additional Information: Per Gold Rajesh Provider, pt is not medically ready for discharge. PT/OT & SLP disposition recommendations are for TCU. Per chart review, SW note on 12/5 indicated that TCU referrals were sent and discussed with family as well as the possibility of increasing services at pt's [facility]. Pt had been accepted to  TCU but family had preference of pt being placed closer to family in [place]. Care management team can make additional TCU referrals closer to [place] once pt is closer to med ready. Per Partnership in Safe Discharge Planning policy, once pt is medically ready & has one safe discharge plan, pt will be discharged.  "    Next Steps: Care management team will follow for TCU placement.    KAT Taylor, MADHU  Clinical , [facility]-7C  Desk Phone: [phone]   Schedule: Wednesday, Thursday, Fridays (for Mondays & Tuesdays, contact job share partner Emelina Alexis)  Securely message with [employer] (Search Name or 7C Med Surg [phone] SW)  Text page via [facility] Paging/Directory   See signed in provider for up to date coverage information  Social Work & Care Management Department    Weekend And After Hours Care Management Contacts:  After Hours Social Work Coverage [phone] daily: Searchable in Vocera \"Adult SW After Hours On Call\"   Weekend Coverage [phone]: Searchable in Vocera \"7C Med Surg SW\" or \"7C Med Surg RNCC\"   "

## 2024-12-19 NOTE — PROGRESS NOTES
[facility]    Medicine Progress Note - Hospitalist Service, GOLD TEAM 8    Date of Admission:  11/21/2024    Assessment & Plan   Asya Coffman is a 79 year old female admitted on 11/21/2024. 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 Initially admitted to hospital medicine service with hypoxic respiratory failure suspected due to HFpEF requiring diuresis. Developed acute hypoxia on 12/8 secondary to suspected aspiration pneumonitis for which she was intubated (12/8-12/10). Now s/p extubation and transferring back to hospital medicine service.    Changes today:  - episode of desaturation, aspiration  - consulting Endocrine service for thyroid medication adjustment  - checking MRI brain     Oral secretions, increased  Moderate-severe pharyngeal dysphagia  Esophageal dysmotility  Laryngeal squamous cell carcinoma s/p radiation (4/2024-7/2024)  Has had increased dysphagia in the setting of laryngeal squamous cell carcinoma s/p radiation (4/2024-7/2024) as well as some concern for esophageal web on swallow study (10/8/2024) pending GI input. Some concern for respiratory decompensation due to recurrent aspiration events.   - Speech following  - NPO - okay for ice chips for comfort with 1:1 supervision  - Will need VFSS -- SLP will notify when they feel she is ready for this  - consulting ENT for bedside laryngoscopy to see if h/o papillomas and radiation related to the dysphagia. ENT re-evaluated 12/16/24 with bedside laryngoscopy for dysphagia. No anatomic explanations for dysphagia seen. Normal vocal cord function and control seen including ability to approximate cords during cough. Signed  off.  - Per GI, no clear signs of esophageal webs.   - clinical picture consistent with significant muscle weakness contributing to oropharyngeal dysphagia, poor vocal pressure, poor cough effort. Unclear if her underlying Rheumatological disease contributing. CK, aldolase negative. Unclear if hypothyroidism is the cause but can be contributing. Checking MRI Brain to rule out intracranial process to explain the dysphagia. Will consider neurology consult  - unclear if candidate for PEG placement    Acute hypoxic respiratory failure - improving  Aspiration pneumonitis  MSSA PNA s/p abx (12/3-12/8)  Obstructive lung disease (COPD vs asthma)  Pulmonary hypertension   Presented on 11/21 with acute hypoxic respiratory failure; initially suspected to be secondary to HFpEF exacerbation; unresolved with diuresis. Underlying obstructive lung disease but no evidence of exacerbation here. Was treated for MSSA pneumonia. Overnight 12/8, developed sudden worsening of oxygenation and increased secretions in the setting of dysphagia/recent laryngeal radiation and was intubated for airway protection. Suspected aspiration pneumonitis as etiology. CT Chest (12/8) with mediastinal/hilar lymphadenopathy and resolving diffuse ground glass opacities most consistent with resolving pneumonia; no signs of progressive or secondary infectious process. Was briefly on zosyn but this was discontinued as she was treated for full course based on initial pneumonia. She has been extubated and now on 2L. Ongoing upper airway adventitious sounds.   - Pulse oximetry, supplemental O2  - Budesonide neb 0.5 mg BID, ipratropium-levalbuterol neb QID  - Aspiration precautions; speech following   - Sputum culture (12/9) grew candida but leukocytosis improved and back on minimal low flow oxygen so will not treat for now  - Flutter valve, incentive spirometer  - Low threshold to resume empiric abx for pnuemonia coverage      HFpEF (LVEF 55-60% 11/22/2024)    Pulmonary hypertension (44 mmHg per echo 11/22/2024)  Possible small PFO  Echo during current admission (11/22/2024) notable for increased thickness of LV and elevated BNP (peak 9200 > 6200) concerning for heart failure exacerbation s/p diuresis and pulmonary hypertension with estimated pulmonary artery pressure of 45 mmHg. Echo with bubble study (11/25/2024) concerning for possible small PFO. With intubation on 12/8, developed hypotension without evidence of shock. Etiology of hypotension may be cardiogenic (e.g. exacerbation of pulmonary hypertension by positive pressure ventilation) vs medication associated (propofol, precedex); do not currently suspect distributive or obstructive hypotension. Weaned off pressors 12/10.   - Preload dependent in the setting of pulmonary hypertension, gentle diuresis as needed (will hold today)   - 2L FR    Chronic afib  [id number] 3 (age++, HF+).   - PTA apixaban 5mg BID  - PTA diltiazem 240 ER BID - HOLD (cannot give ER enterally)               Up-titrate to short-acting Diltiazem 120mg q6H as tolerated by blood pressure  - PTA propanolol 10mg BID      At risk for refeeding syndrome  Starvation ketosis  Hypokalemia  Severe malnutrition in context of chronic illness/disease  Per daughter, baseline weight around 120-130 lbs. Admitted 102 lbs. Patient states she is eating less and has less appetite in general as well as having difficulty swallowing. While NPO during intubation, developed starvation ketosis. Given high risk for aspiration with PO intake, placed NGT and started tube feeds. Will need close monitoring for refeeding syndrome.   - NPO   - RD following for TF management   - Monitor for refeeding syndrome   - Lyte replacement protocol   - IV thiamine replacement      Oliguric SAMIR  Cr 0.9 on admission. Baseline appears 0.9-1.0. Developed SAMIR initially in setting of diuresis and had been improving. Subsequently increased following transfer to ICU with consideration for  "prerenal in setting of NPO status. Also with new diarrhea. Cr stable.   - Strict I/Os  - Trend BMP   - FWF via NGT    Diarrhea  Frequent loose stools. Unclear timing of onset so not sure if related to abx or TF initiation. No abdominal pain.  - Monitor   - Hold on infectious testing for now     Hypothyroidism  Hx of thyroidectomy 2/2 cancer   - Continue PTA levothyroxine 88 mcg daily      Anemia of chronic illness  Baseline Hgb 11-12. Currently near baseline. Can consider anemia of chronic illness.   - Monitor CBC     Generalized deconditioning  Recurrent falls  - PT/OT consults     CREST Syndrome  Characterized by Raynaud's, Calcinosis, GERD and some telangectasia's. Last saw [facility] rheumatology 2017. No history of ILD.   - Continue protonix 40 mg daily     Anxiety  Depression  - Continue PTA Sertraline 150 mg daily           Diet: Fluid restriction 2000 ML FLUID  NPO for Medical/Clinical Reasons Except for: No Exceptions  Adult Formula Drip Feeding: Continuous [employer] Peptide 1.5; Nasogastric tube; Goal Rate: 45; mL/hr; 12/13: please transition to new TF formula immediately when arrives to pt room and ok to start at new goal rate; Do not advance tube feeding rat...    DVT Prophylaxis: DOAC  Miranda Catheter: Not present  Lines: None     Cardiac Monitoring: None  Code Status: Full Code      Clinically Significant Risk Factors               # Hypoalbuminemia: Lowest albumin = 3 g/dL at 12/10/2024  3:11 AM, will monitor as appropriate                # Cachexia: Estimated body mass index is 17.32 kg/m  as calculated from the following:    Height as of this encounter: 1.651 m (5' 5\").    Weight as of this encounter: 47.2 kg (104 lb 0.9 oz).   # Severe Malnutrition: based on nutrition assessment      # Financial/Environmental Concerns: none         Social Drivers of Health    Housing Stability: High Risk (11/23/2024)    Housing Stability     Do you have housing? : No     Are you worried about losing your housing?: No "          Disposition Plan     Medically Ready for Discharge: Anticipated in 5+ Days             Eliseo Reeder MD  Hospitalist Service, GOLD TEAM 8  M [facility]  Securely message with [employer] (more info)  Text page via [employer] Paging/Directory   See signed in provider for up to date coverage information  ______________________________________________________________________    Interval History     Episode of oxygen desaturation and aspiration the previous evening. No active complaints currently    Physical Exam   Vital Signs: Temp: 97.5  F (36.4  C) Temp src: Oral BP: 114/82 Pulse: (!) 121   Resp: 18 SpO2: 96 % O2 Device: Nasal cannula Oxygen Delivery: 2.5 LPM  Weight: 104 lbs .91 oz    General Appearance:  Awake. Alert. No acute distress. Frail appearing.   Respiratory: Normal work of breathing on 2L. Lungs with loud upper airway breath sounds. Lower lobes sound clear. Poor cough effort.   Cardiovascular: Irregular. No obvious murmur.   GI: Nondistended. Normoactive. Soft. Non-tender.   Skin: Warm, dry.   Neuro: alert, oriented x3. Proximal muscle strength - 4/5    Medical Decision Making             Data

## 2024-12-19 NOTE — PLAN OF CARE
Goal Outcome Evaluation:      Plan of Care Reviewed With: patient    Overall Patient Progress: no changeOverall Patient Progress: no change    Outcome Evaluation: [id number]: Pt O2 staying in mid-upper 90's on 2.5L NC. TF at 45ml/r. 1x smear BM. Primafit not on as it has not been working for her. A&O, denies pain. New orders for tele but pt is currently in MRI, will need to go one when she returns. Coccyx and R hip mepi CDI.Daughter Lana coming around 2000 to visit and would like an update.

## 2024-12-19 NOTE — PROGRESS NOTES
Chart reviewed. We continue to follow Hannah.     # Primary hypothyroidism    She recalls a (presumably total) thyroidectomy in the 1960s to treat thyroid cancer. She is not sure of the type of thyroid cancer but she never received radioactive iodine therapy. She never had a thyroid cancer recurrence. 2011: Tg TM 0.1 ng/mL (most recent Tg TM assay) with undetectable Tg Ab. 12/18/2024: Tg Ab undetectable, TPO Ab undetectable. She has been on levothyroxine 88 mcg once daily as a chronic/stable dose and TSH as an outpatient was most recently 1.48 in 4/2024.     In the hospital her TSH was found to be mildly elevated with a mildly low free T4. We suspect this is due to reduced absorption of levothyroxine because of continuous enteral feeds and administration of LT4 in close proximity to minerals. Note 8 AM serum cortisol returned normal.     Increase levothyroxine to 100 mcg once daily, 7 days/week. Will plan to repeat a TSH in ~4-6 weeks.   The primary team can work with pharmacy to optimize the timing of levothyroxine to maximize absorption. Ideally levothyroxine is taken on an empty stomach, so ideally would pause her enteral feeds 1 hour before LT4 is taken and resume her enteral feeds no sooner than 30 minutes after LT4 is given.  Separate any divalent cations (e.g., calcium) at least 4 hours apart from levothyroxine.    # Osteoporosis    CT chest 12/9/2024: T3 vertebral compression fracture that per radiology is subacute. Hannah does not recall any trauma that would have accounted for this.    She recalls remotely taking alendronate.    25-OH vitamin D level is pending. Recommendations when the level results.    Primary team can work with pharmacy and the dietician to ensure she is receiving ~1200 mg of calcium per day.    She would benefit from an outpatient DEXA and an Endocrine consult.

## 2024-12-19 NOTE — PROGRESS NOTES
G8 provider Claude Albright MD paged at [phone] regarding pt's HR. Jumped to 170s for a few seconds and has been sustaining in the 140s for about 15 min. Giving the scheduled diltiazem now.

## 2024-12-19 NOTE — PLAN OF CARE
"Assumed cares [id number]     /66 (BP Location: Right arm, Patient Position: Supine)   Pulse 113   Temp 98.1  F (36.7  C) (Oral)   Resp 16   Ht 1.651 m (5' 5\")   Wt 47.9 kg (105 lb 9.6 oz)   SpO2 94%   BMI 17.57 kg/m       Pain: Patient denied pain.   Neuro: A&Ox4.    Respiratory: Lungs course bilaterally. Intermittent need for oral suctioning. On 2L oxy mask.   Cardiac/Neurovascular: HR an pulse irregular. No numbness/tingling reported. Tele: A-fib  GI/: Incontinent of bowel and bladder. 2 small BM's today.   Nutrition: NPO with TF @45ml/hr.   Activity: Bed bound. Turned q2h's.   Skin: Sacral Mepi & Mepi on Right hip (Preventative)  Lines: 2 PIV's (SL). NJ tube w/ TF running.   Events this shift: Pt needed intermittent oral suctioning for some thick mucous. Incontinent of urine and stool. Repositioned every 2 hours throughout day. Electrolytes stable, all rechecks in the AM. Neuro consulted today for continued dysphagia. Pt was able to work with PT/OT and sit in the chair today.      Plan: Continue with plan of care.     Goal Outcome Evaluation:      Plan of Care Reviewed With: patient    Overall Patient Progress: no changeOverall Patient Progress: no change    Outcome Evaluation: Continue with plan of care.      "

## 2024-12-20 LAB
ALBUMIN SERPL ELPH-MCNC: 3.1 G/DL (ref 3.7–5.1)
ALPHA1 GLOB SERPL ELPH-MCNC: 0.6 G/DL (ref 0.2–0.4)
ALPHA2 GLOB SERPL ELPH-MCNC: 1 G/DL (ref 0.5–0.9)
ANION GAP SERPL CALCULATED.3IONS-SCNC: 13 MMOL/L (ref 7–15)
B-GLOBULIN SERPL ELPH-MCNC: 0.9 G/DL (ref 0.6–1)
BUN SERPL-MCNC: 55.4 MG/DL (ref 8–23)
CALCIUM SERPL-MCNC: 9.2 MG/DL (ref 8.8–10.4)
CHLORIDE SERPL-SCNC: 98 MMOL/L (ref 98–107)
CHOLEST SERPL-MCNC: 139 MG/DL
CREAT SERPL-MCNC: 1.1 MG/DL (ref 0.51–0.95)
DEPRECATED CALCIDIOL+CALCIFEROL SERPL-MC: 47 UG/L (ref 20–75)
EGFRCR SERPLBLD CKD-EPI 2021: 51 ML/MIN/1.73M2
ERYTHROCYTE [DISTWIDTH] IN BLOOD BY AUTOMATED COUNT: 18.8 % (ref 10–15)
GAMMA GLOB SERPL ELPH-MCNC: 0.7 G/DL (ref 0.7–1.6)
GLUCOSE SERPL-MCNC: 109 MG/DL (ref 70–99)
HCO3 SERPL-SCNC: 29 MMOL/L (ref 22–29)
HCT VFR BLD AUTO: 35.7 % (ref 35–47)
HDLC SERPL-MCNC: 41 MG/DL
HGB BLD-MCNC: 10.5 G/DL (ref 11.7–15.7)
LDLC SERPL CALC-MCNC: 78 MG/DL
LOCATION OF TASK: ABNORMAL
M PROTEIN SERPL ELPH-MCNC: 0 G/DL
MAGNESIUM SERPL-MCNC: 2.5 MG/DL (ref 1.7–2.3)
MCH RBC QN AUTO: 24.8 PG (ref 26.5–33)
MCHC RBC AUTO-ENTMCNC: 29.4 G/DL (ref 31.5–36.5)
MCV RBC AUTO: 84 FL (ref 78–100)
NONHDLC SERPL-MCNC: 98 MG/DL
PERFORMING LABORATORY: NORMAL
PHOSPHATE SERPL-MCNC: 4.1 MG/DL (ref 2.5–4.5)
PLATELET # BLD AUTO: 495 10E3/UL (ref 150–450)
POTASSIUM SERPL-SCNC: 5 MMOL/L (ref 3.4–5.3)
PROT PATTERN SERPL ELPH-IMP: ABNORMAL
RBC # BLD AUTO: 4.24 10E6/UL (ref 3.8–5.2)
SODIUM SERPL-SCNC: 140 MMOL/L (ref 135–145)
SPECIMEN STATUS: NORMAL
TEST NAME: NORMAL
TOTAL PROTEIN SERUM FOR ELP: 6.3 G/DL (ref 6.4–8.3)
TRIGL SERPL-MCNC: 99 MG/DL
VITAMIN D2 SERPL-MCNC: 11 UG/L
VITAMIN D3 SERPL-MCNC: 36 UG/L
WBC # BLD AUTO: 9 10E3/UL (ref 4–11)

## 2024-12-20 PROCEDURE — 97110 THERAPEUTIC EXERCISES: CPT | Mod: GP | Performed by: PHYSICAL THERAPIST

## 2024-12-20 PROCEDURE — 94640 AIRWAY INHALATION TREATMENT: CPT

## 2024-12-20 PROCEDURE — 99222 1ST HOSP IP/OBS MODERATE 55: CPT | Mod: GC | Performed by: PSYCHIATRY & NEUROLOGY

## 2024-12-20 PROCEDURE — 97530 THERAPEUTIC ACTIVITIES: CPT | Mod: GO

## 2024-12-20 PROCEDURE — 82310 ASSAY OF CALCIUM: CPT | Performed by: STUDENT IN AN ORGANIZED HEALTH CARE EDUCATION/TRAINING PROGRAM

## 2024-12-20 PROCEDURE — 97116 GAIT TRAINING THERAPY: CPT | Mod: GP | Performed by: PHYSICAL THERAPIST

## 2024-12-20 PROCEDURE — 80048 BASIC METABOLIC PNL TOTAL CA: CPT | Performed by: STUDENT IN AN ORGANIZED HEALTH CARE EDUCATION/TRAINING PROGRAM

## 2024-12-20 PROCEDURE — 250N000013 HC RX MED GY IP 250 OP 250 PS 637: Performed by: HOSPITALIST

## 2024-12-20 PROCEDURE — 250N000009 HC RX 250: Performed by: INTERNAL MEDICINE

## 2024-12-20 PROCEDURE — 36415 COLL VENOUS BLD VENIPUNCTURE: CPT | Performed by: HOSPITALIST

## 2024-12-20 PROCEDURE — 84100 ASSAY OF PHOSPHORUS: CPT | Performed by: HOSPITALIST

## 2024-12-20 PROCEDURE — 99231 SBSQ HOSP IP/OBS SF/LOW 25: CPT | Mod: GC | Performed by: INTERNAL MEDICINE

## 2024-12-20 PROCEDURE — 250N000013 HC RX MED GY IP 250 OP 250 PS 637: Performed by: INTERNAL MEDICINE

## 2024-12-20 PROCEDURE — 250N000009 HC RX 250

## 2024-12-20 PROCEDURE — 250N000013 HC RX MED GY IP 250 OP 250 PS 637: Performed by: STUDENT IN AN ORGANIZED HEALTH CARE EDUCATION/TRAINING PROGRAM

## 2024-12-20 PROCEDURE — 250N000009 HC RX 250: Performed by: HOSPITALIST

## 2024-12-20 PROCEDURE — 99232 SBSQ HOSP IP/OBS MODERATE 35: CPT | Performed by: HOSPITALIST

## 2024-12-20 PROCEDURE — 999N000157 HC STATISTIC RCP TIME EA 10 MIN

## 2024-12-20 PROCEDURE — 250N000013 HC RX MED GY IP 250 OP 250 PS 637: Performed by: SURGERY

## 2024-12-20 PROCEDURE — 83735 ASSAY OF MAGNESIUM: CPT | Performed by: HOSPITALIST

## 2024-12-20 PROCEDURE — 82374 ASSAY BLOOD CARBON DIOXIDE: CPT | Performed by: STUDENT IN AN ORGANIZED HEALTH CARE EDUCATION/TRAINING PROGRAM

## 2024-12-20 PROCEDURE — 250N000013 HC RX MED GY IP 250 OP 250 PS 637: Performed by: PHYSICIAN ASSISTANT

## 2024-12-20 PROCEDURE — 97535 SELF CARE MNGMENT TRAINING: CPT | Mod: GO

## 2024-12-20 PROCEDURE — 82465 ASSAY BLD/SERUM CHOLESTEROL: CPT

## 2024-12-20 PROCEDURE — 250N000013 HC RX MED GY IP 250 OP 250 PS 637

## 2024-12-20 PROCEDURE — 97530 THERAPEUTIC ACTIVITIES: CPT | Mod: GP | Performed by: PHYSICAL THERAPIST

## 2024-12-20 PROCEDURE — 120N000002 HC R&B MED SURG/OB UMMC

## 2024-12-20 PROCEDURE — 85027 COMPLETE CBC AUTOMATED: CPT | Performed by: STUDENT IN AN ORGANIZED HEALTH CARE EDUCATION/TRAINING PROGRAM

## 2024-12-20 PROCEDURE — 94640 AIRWAY INHALATION TREATMENT: CPT | Mod: 76

## 2024-12-20 PROCEDURE — 82525 ASSAY OF COPPER: CPT | Performed by: HOSPITALIST

## 2024-12-20 RX ORDER — LEVOTHYROXINE SODIUM 100 UG/1
100 TABLET ORAL DAILY
Status: DISCONTINUED | OUTPATIENT
Start: 2024-12-21 | End: 2024-12-30

## 2024-12-20 RX ADMIN — ORAL VEHICLES - SUSP 12.5 MG: SUSPENSION at 13:47

## 2024-12-20 RX ADMIN — FEXOFENADINE HYDROCHLORIDE 120 MG: 60 TABLET ORAL at 09:03

## 2024-12-20 RX ADMIN — THIAMINE HCL TAB 100 MG 100 MG: 100 TAB at 09:03

## 2024-12-20 RX ADMIN — DILTIAZEM HYDROCHLORIDE 120 MG: 120 TABLET, FILM COATED ORAL at 11:29

## 2024-12-20 RX ADMIN — PANTOPRAZOLE SODIUM 40 MG: 40 INJECTION, POWDER, FOR SOLUTION INTRAVENOUS at 09:03

## 2024-12-20 RX ADMIN — DILTIAZEM HYDROCHLORIDE 120 MG: 120 TABLET, FILM COATED ORAL at 17:42

## 2024-12-20 RX ADMIN — SODIUM CHLORIDE SOLN NEBU 3% 3 ML: 3 NEBU SOLN at 20:32

## 2024-12-20 RX ADMIN — APIXABAN 5 MG: 5 TABLET, FILM COATED ORAL at 19:50

## 2024-12-20 RX ADMIN — LEVALBUTEROL HYDROCHLORIDE 0.63 MG: 0.63 SOLUTION RESPIRATORY (INHALATION) at 09:12

## 2024-12-20 RX ADMIN — LEVALBUTEROL HYDROCHLORIDE 0.63 MG: 0.63 SOLUTION RESPIRATORY (INHALATION) at 20:32

## 2024-12-20 RX ADMIN — IPRATROPIUM BROMIDE 0.5 MG: 0.5 SOLUTION RESPIRATORY (INHALATION) at 20:32

## 2024-12-20 RX ADMIN — SPIRONOLACTONE 25 MG: 25 TABLET, FILM COATED ORAL at 09:25

## 2024-12-20 RX ADMIN — ORAL VEHICLES - SUSP 12.5 MG: SUSPENSION at 19:50

## 2024-12-20 RX ADMIN — THERA TABS 1 TABLET: TAB at 09:03

## 2024-12-20 RX ADMIN — ATORVASTATIN CALCIUM 40 MG: 40 TABLET, FILM COATED ORAL at 19:50

## 2024-12-20 RX ADMIN — FUROSEMIDE 40 MG: 40 TABLET ORAL at 09:03

## 2024-12-20 RX ADMIN — LEVOTHYROXINE SODIUM 88 MCG: 88 TABLET ORAL at 06:05

## 2024-12-20 RX ADMIN — SODIUM CHLORIDE SOLN NEBU 3% 3 ML: 3 NEBU SOLN at 09:12

## 2024-12-20 RX ADMIN — APIXABAN 5 MG: 5 TABLET, FILM COATED ORAL at 09:04

## 2024-12-20 RX ADMIN — DILTIAZEM HYDROCHLORIDE 120 MG: 120 TABLET, FILM COATED ORAL at 00:20

## 2024-12-20 RX ADMIN — IPRATROPIUM BROMIDE 0.5 MG: 0.5 SOLUTION RESPIRATORY (INHALATION) at 09:12

## 2024-12-20 RX ADMIN — BUDESONIDE 0.5 MG: 0.5 INHALANT ORAL at 20:32

## 2024-12-20 RX ADMIN — BUDESONIDE 0.5 MG: 0.5 INHALANT ORAL at 09:12

## 2024-12-20 RX ADMIN — DILTIAZEM HYDROCHLORIDE 120 MG: 120 TABLET, FILM COATED ORAL at 06:05

## 2024-12-20 RX ADMIN — SERTRALINE HYDROCHLORIDE 150 MG: 100 TABLET ORAL at 09:03

## 2024-12-20 RX ADMIN — GABAPENTIN 600 MG: 300 CAPSULE ORAL at 21:33

## 2024-12-20 ASSESSMENT — ACTIVITIES OF DAILY LIVING (ADL)
ADLS_ACUITY_SCORE: 72

## 2024-12-20 NOTE — PROGRESS NOTES
Chart reviewed. We continue to follow Hannah.     # Primary hypothyroidism    She recalls a (presumably total) thyroidectomy in the 1960s to treat thyroid cancer. She is not sure of the type of thyroid cancer but she never received radioactive iodine therapy. She never had a thyroid cancer recurrence. 2011: Tg TM 0.1 ng/mL (most recent Tg TM assay) with undetectable Tg Ab. 12/18/2024: Tg Ab undetectable, TPO Ab undetectable. She has been on levothyroxine 88 mcg once daily as a chronic/stable dose and TSH as an outpatient was most recently 1.48 in 4/2024.     In the hospital her TSH was found to be mildly elevated with a mildly low free T4. We suspect this is due to reduced absorption of levothyroxine because of continuous enteral feeds and administration of LT4 in close proximity to minerals. Note 8 AM serum cortisol returned normal.     Increase levothyroxine to 100 mcg once daily, 7 days/week. Will plan to repeat a TSH in ~4-6 weeks.   The primary team can work with pharmacy to optimize the timing of levothyroxine to maximize absorption. Ideally levothyroxine is taken on an empty stomach, so ideally would pause her enteral feeds 1 hour before LT4 is taken and resume her enteral feeds no sooner than 30 minutes after LT4 is given.  Separate any divalent cations (e.g., calcium) at least 4 hours apart from levothyroxine.    # Osteoporosis    CT chest 12/9/2024: T3 vertebral compression fracture that per radiology is subacute. Hannah does not recall any trauma that would have accounted for this.    She recalls remotely taking alendronate.    25-OH vitamin D level is 47. She is currently on 50.000 units/week cholecalciferol     Continue PTA cholecalciferol dose 1000 units  Primary team can work with pharmacy and the dietician to ensure she is receiving ~1200 mg of calcium per day.  She would benefit from an outpatient DEXA and an Endocrine consult.

## 2024-12-20 NOTE — PROGRESS NOTES
[facility]    Medicine Progress Note - Hospitalist Service, GOLD TEAM 8    Date of Admission:  11/21/2024    Assessment & Plan   Asya Coffman is a 79 year old female admitted on 11/21/2024. 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 Initially admitted to hospital medicine service with hypoxic respiratory failure suspected due to HFpEF requiring diuresis. Developed acute hypoxia on 12/8 secondary to suspected aspiration pneumonitis for which she was intubated (12/8-12/10). Now s/p extubation and transferring back to hospital medicine service.    Changes today:  - continued oropharyngeal dysphagia  - afib with RVR     Aspiration risk  severe oropharyngeal dysphagia  Esophageal dysmotility  Laryngeal squamous cell carcinoma s/p radiation (4/2024-7/2024)  Has had increased dysphagia in the setting of laryngeal squamous cell carcinoma s/p radiation (4/2024-7/2024) as well as some concern for esophageal web on swallow study (10/8/2024) pending GI input. Some concern for respiratory decompensation due to recurrent aspiration events.   - Speech following  - NPO - okay for ice chips for comfort with 1:1 supervision  - Will need VFSS -- SLP will notify when they feel she is ready for this  - consulting ENT for bedside laryngoscopy to see if h/o papillomas and radiation related to the dysphagia. ENT re-evaluated 12/16/24 with bedside laryngoscopy for dysphagia. No anatomic explanations for dysphagia seen. Normal vocal cord function and control seen including ability to approximate cords during cough. Signed off.  - Per GI, no clear signs of esophageal webs.   - clinical picture consistent with  significant muscle weakness contributing to oropharyngeal dysphagia, poor vocal pressure, poor cough effort. Unclear if her underlying Rheumatological disease contributing. CK, aldolase negative. Unclear if hypothyroidism is the cause but can be contributing. Unclear if prior radiation treatment could be contributing MRI Brain (12/19) finding as below and unclear if it can explain dysphagia. Consulted Neurology who were concerned about encephalopathy and check B12, ammonia, B1 levels. Will also check SPEP, Copper levels working up for oropharyngeal dysphagia  - patient agreeable for PEG placement.  - SLP following    Acute hypoxic respiratory failure - improving  Aspiration pneumonitis  MSSA PNA s/p abx (12/3-12/8)  Obstructive lung disease (COPD vs asthma)  Pulmonary hypertension   Presented on 11/21 with acute hypoxic respiratory failure; initially suspected to be secondary to HFpEF exacerbation; unresolved with diuresis. Underlying obstructive lung disease but no evidence of exacerbation here. Was treated for MSSA pneumonia. Overnight 12/8, developed sudden worsening of oxygenation and increased secretions in the setting of dysphagia/recent laryngeal radiation and was intubated for airway protection. Suspected aspiration pneumonitis as etiology. CT Chest (12/8) with mediastinal/hilar lymphadenopathy and resolving diffuse ground glass opacities most consistent with resolving pneumonia; no signs of progressive or secondary infectious process. Was briefly on zosyn but this was discontinued as she was treated for full course based on initial pneumonia. She has been extubated and now on 2L. Ongoing upper airway adventitious sounds.   - Pulse oximetry, supplemental O2  - Budesonide neb 0.5 mg BID, ipratropium-levalbuterol neb QID  - Aspiration precautions; speech following   - Sputum culture (12/9) grew candida but leukocytosis improved and back on minimal low flow oxygen so will not treat for now  - Flutter valve,  incentive spirometer  - Low threshold to resume empiric abx for pnuemonia coverage     Chronic Left parietal, left thalamic lacunar strokes  - will increase atorvastatin to high intensity (20-> 40 mg). On DOAC  - follow up Neurorecs  - A1C shows prediabetes,     Hypothyroidism  Hx of thyroidectomy 2/2 cancer   - Continue PTA levothyroxine 88 mcg daily   - consulted Endocrine service as above and increased dose to 100 mcg daily. Appreciate recommendations    Chronic afib  Afib with RVR  [id number] 3 (age++, HF+).   - PTA apixaban 5mg BID  - PTA diltiazem 240 ER BID - HOLD (cannot give ER enterally)               Up-titrate to short-acting Diltiazem 120mg q6H as tolerated by blood pressure  - PTA propanolol 10mg BID which has been held and will start metoprolol     HFpEF (LVEF 55-60% 11/22/2024)   Pulmonary hypertension (44 mmHg per echo 11/22/2024)  Possible small PFO  Echo during current admission (11/22/2024) notable for increased thickness of LV and elevated BNP (peak 9200 > 6200) concerning for heart failure exacerbation s/p diuresis and pulmonary hypertension with estimated pulmonary artery pressure of 45 mmHg. Echo with bubble study (11/25/2024) concerning for possible small PFO. With intubation on 12/8, developed hypotension without evidence of shock. Etiology of hypotension may be cardiogenic (e.g. exacerbation of pulmonary hypertension by positive pressure ventilation) vs medication associated (propofol, precedex); do not currently suspect distributive or obstructive hypotension. Weaned off pressors 12/10.   - Preload dependent in the setting of pulmonary hypertension, gentle diuresis as needed (will hold today)   - 2L FR    At risk for refeeding syndrome  Starvation ketosis  Hypokalemia  Severe malnutrition in context of chronic illness/disease  Per daughter, baseline weight around 120-130 lbs. Admitted 102 lbs. Patient states she is eating less and has less appetite in general as well as having difficulty  swallowing. While NPO during intubation, developed starvation ketosis. Given high risk for aspiration with PO intake, placed NGT and started tube feeds. Will need close monitoring for refeeding syndrome.   - NPO   - RD following for TF management   - Monitor for refeeding syndrome   - Lyte replacement protocol   - IV thiamine replacement      Oliguric SAMIR  Cr 0.9 on admission. Baseline appears 0.9-1.0. Developed SAMIR initially in setting of diuresis and had been improving. Subsequently increased following transfer to ICU with consideration for prerenal in setting of NPO status. Also with new diarrhea. Cr stable.   - Strict I/Os  - Trend BMP   - FWF via NGT    Diarrhea  Frequent loose stools. Unclear timing of onset so not sure if related to abx or TF initiation. No abdominal pain.  - Monitor   - Hold on infectious testing for now      Anemia of chronic illness  Baseline Hgb 11-12. Currently near baseline. Can consider anemia of chronic illness.   - Monitor CBC     Generalized deconditioning  Recurrent falls  - PT/OT consults     CREST Syndrome  Characterized by Raynaud's, Calcinosis, GERD and some telangectasia's. Last saw [facility] 2017. No history of ILD.   - Continue protonix 40 mg daily     Anxiety  Depression  - Continue PTA Sertraline 150 mg daily           Diet: Fluid restriction 2000 ML FLUID  NPO for Medical/Clinical Reasons Except for: No Exceptions  Adult Formula Drip Feeding: Continuous [employer] Peptide 1.5; Nasogastric tube; Goal Rate: 45; mL/hr; 12/13: please transition to new TF formula immediately when arrives to pt room and ok to start at new goal rate; Do not advance tube feeding rat...    DVT Prophylaxis: DOAC  Miranda Catheter: Not present  Lines: None     Cardiac Monitoring: ACTIVE order. Indication: Tachyarrhythmias, acute (48 hours)  Code Status: Full Code      Clinically Significant Risk Factors               # Hypoalbuminemia: Lowest albumin = 3 g/dL at 12/10/2024  3:11 AM, will  "monitor as appropriate                # Cachexia: Estimated body mass index is 17.57 kg/m  as calculated from the following:    Height as of this encounter: 1.651 m (5' 5\").    Weight as of this encounter: 47.9 kg (105 lb 9.6 oz).   # Severe Malnutrition: based on nutrition assessment      # Financial/Environmental Concerns: none         Social Drivers of Health    Housing Stability: High Risk (11/23/2024)    Housing Stability     Do you have housing? : No     Are you worried about losing your housing?: No          Disposition Plan     Medically Ready for Discharge: Anticipated in 5+ Days             Eliseo Reeder MD  Hospitalist Service, GOLD TEAM 8  [facility]  Securely message with [facility] (more info)  Text page via [employer] Paging/Directory   See signed in provider for up to date coverage information  ______________________________________________________________________    Interval History     No episodes of chocking and no improvement in her weakness, speech. Denies chest pain, palpitations/. Patient is okay to proceed with PEG placement    Physical Exam   Vital Signs: Temp: 97.6  F (36.4  C) Temp src: Oral BP: 113/88 Pulse: 110   Resp: 18 SpO2: 93 % O2 Device: Nasal cannula with humidification Oxygen Delivery: 2 LPM  Weight: 105 lbs 9.61 oz    General Appearance:  Awake. Alert. No acute distress. Frail appearing.   Respiratory: Normal work of breathing on 2L. Lungs with loud upper airway breath sounds. Lower lobes sound clear. Poor cough effort.   Cardiovascular: Irregular. Tachycardic. No obvious murmur.   GI: Nondistended. Normoactive. Soft. Non-tender.   Skin: Warm, dry.   Neuro: alert, oriented x3. Proximal muscle strength - 4/5    Medical Decision Making             Data     "

## 2024-12-20 NOTE — PROGRESS NOTES
Care Management Follow Up     Length of Stay (days): 29  Expected Discharge Date: 12/23/2024  Concerns to be Addressed: all concerns addressed in this encounter     Patient plan of care discussed at interdisciplinary rounds: Yes  Anticipated Discharge Disposition: Skilled Nursing Facility  Anticipated Discharge Services: None  Anticipated Discharge DME: None  Patient/family educated on Medicare website which has current facility and service quality ratings: no  Education Provided on the Discharge Plan: yes   Patient/Family in Agreement with the Plan:  yes  Referrals Placed by CM/SW: Post-acute facilities     Pending:      [facility] P: [phone]  12/5: Clinically accepted.     Declined:     [facility]: Bed not available 11/27 & declined again due to financial concerns 12/2  [facility]: Bed not available, cost of medications  [facility]: Bed not available 12/2  [facility]: Cost of medications  [facility] - Out of insurance network.     Private pay costs discussed: Not applicable  Discussed  Partnership in Safe Discharge Planning  document with patient/family: Yes   Handoff Completed: No, handoff not indicated or clinically appropriate  Additional Information: Per Manjinder Mena Provider, pt anticipated to be med ready early next week after PEG tube placement. PT/OT & SLP disposition recommendations are for TCU. Per chart review, SW note on 12/5 indicated that TCU referrals were sent and discussed with family as well as the possibility of increasing services at pt's [facility]. Pt had been accepted to [facility] but family had preference of pt being placed closer to family in [place]. Care management team can make additional TCU referrals closer to [place] once pt is closer to med ready. Per Partnership in Safe Discharge Planning policy, once pt is medically ready & has one safe discharge plan, pt will be discharged. Writer delegated task of obtaining additional TCU  "choices from the pt/family to CHW Mony.     Next Steps: Care management team will follow for TCU placement.     KAT Taylor, JUANITOSW  Clinical , [facility]-  Desk Phone: [phone]   Schedule: Wednesday, Thursday, Fridays (for Mondays & Tuesdays, contact job share partner Emelina Alexis)  Securely message with [employer] (Search Name or 7C Med Surg [phone] SW)  Text page via [facility] Paging/Directory   See signed in provider for up to date coverage information  Social Work & Care Management Department     Weekend And After Hours Care Management Contacts:  After Hours Social Work Coverage [phone] daily: Searchable in Vocera \"Adult SW After Hours On Call\"   Weekend Coverage [phone]: Searchable in Vocera \"7C Med Surg SW\" or \"7C Med Surg RNCC\"         "

## 2024-12-20 NOTE — PLAN OF CARE
Goal Outcome Evaluation:      Plan of Care Reviewed With: patient    Overall Patient Progress: no change    Outcome Evaluation: Pt A&O. VSS except for tachycardia, HR fluctuated between 120s-160s, HR improved after diltiazem and metoprolol given. On 2L O2 NC. Pivot to chair with A2+GB+W. Denies pain. TF infusing at goal, 45 mL/hr through NJ. Meds given through NJ. Strict NPO. Oral swabs done, ok to swab with flavored water or tea per team. Purewick in place with adequate output. Had 1 small incontinent BM. Does not always allow repositioning. Plan is to discharge to TCU when med ready and placement established.

## 2024-12-20 NOTE — PLAN OF CARE
Goal Outcome Evaluation:      Plan of Care Reviewed With: patient    Overall Patient Progress: no changeOverall Patient Progress: no change    Outcome Evaluation: Writer assumed care of pt from [id number]. A/Ox4, 2 LPM NC, VSS other than some tachycardia. No complaints of pain or nausea. 3 episodes of urinary and bowel incontience this shift, pt changed and linen change 1x. Required suctioning a few times throughout the shift. Pt had no acute events overnight. Please continue with POC.

## 2024-12-21 LAB
ANION GAP SERPL CALCULATED.3IONS-SCNC: 13 MMOL/L (ref 7–15)
BUN SERPL-MCNC: 62.4 MG/DL (ref 8–23)
CALCIUM SERPL-MCNC: 8.9 MG/DL (ref 8.8–10.4)
CHLORIDE SERPL-SCNC: 98 MMOL/L (ref 98–107)
CREAT SERPL-MCNC: 1.14 MG/DL (ref 0.51–0.95)
EGFRCR SERPLBLD CKD-EPI 2021: 49 ML/MIN/1.73M2
ERYTHROCYTE [DISTWIDTH] IN BLOOD BY AUTOMATED COUNT: 18.9 % (ref 10–15)
GLUCOSE SERPL-MCNC: 108 MG/DL (ref 70–99)
HCO3 SERPL-SCNC: 28 MMOL/L (ref 22–29)
HCT VFR BLD AUTO: 34.8 % (ref 35–47)
HGB BLD-MCNC: 10.3 G/DL (ref 11.7–15.7)
MAGNESIUM SERPL-MCNC: 2.6 MG/DL (ref 1.7–2.3)
MCH RBC QN AUTO: 24.8 PG (ref 26.5–33)
MCHC RBC AUTO-ENTMCNC: 29.6 G/DL (ref 31.5–36.5)
MCV RBC AUTO: 84 FL (ref 78–100)
PHOSPHATE SERPL-MCNC: 4.6 MG/DL (ref 2.5–4.5)
PLATELET # BLD AUTO: 467 10E3/UL (ref 150–450)
POTASSIUM SERPL-SCNC: 4.5 MMOL/L (ref 3.4–5.3)
RBC # BLD AUTO: 4.15 10E6/UL (ref 3.8–5.2)
SODIUM SERPL-SCNC: 139 MMOL/L (ref 135–145)
VIT B1 PYROPHOSHATE BLD-SCNC: 342 NMOL/L
WBC # BLD AUTO: 8.8 10E3/UL (ref 4–11)

## 2024-12-21 PROCEDURE — 250N000009 HC RX 250

## 2024-12-21 PROCEDURE — 250N000013 HC RX MED GY IP 250 OP 250 PS 637

## 2024-12-21 PROCEDURE — 250N000013 HC RX MED GY IP 250 OP 250 PS 637: Performed by: HOSPITALIST

## 2024-12-21 PROCEDURE — 250N000013 HC RX MED GY IP 250 OP 250 PS 637: Performed by: INTERNAL MEDICINE

## 2024-12-21 PROCEDURE — 250N000013 HC RX MED GY IP 250 OP 250 PS 637: Performed by: PHYSICIAN ASSISTANT

## 2024-12-21 PROCEDURE — 99232 SBSQ HOSP IP/OBS MODERATE 35: CPT | Performed by: HOSPITALIST

## 2024-12-21 PROCEDURE — 83735 ASSAY OF MAGNESIUM: CPT | Performed by: HOSPITALIST

## 2024-12-21 PROCEDURE — 80048 BASIC METABOLIC PNL TOTAL CA: CPT | Performed by: STUDENT IN AN ORGANIZED HEALTH CARE EDUCATION/TRAINING PROGRAM

## 2024-12-21 PROCEDURE — 258N000003 HC RX IP 258 OP 636: Performed by: HOSPITALIST

## 2024-12-21 PROCEDURE — 36415 COLL VENOUS BLD VENIPUNCTURE: CPT | Performed by: STUDENT IN AN ORGANIZED HEALTH CARE EDUCATION/TRAINING PROGRAM

## 2024-12-21 PROCEDURE — 120N000002 HC R&B MED SURG/OB UMMC

## 2024-12-21 PROCEDURE — 999N000157 HC STATISTIC RCP TIME EA 10 MIN

## 2024-12-21 PROCEDURE — 94640 AIRWAY INHALATION TREATMENT: CPT

## 2024-12-21 PROCEDURE — 85027 COMPLETE CBC AUTOMATED: CPT | Performed by: STUDENT IN AN ORGANIZED HEALTH CARE EDUCATION/TRAINING PROGRAM

## 2024-12-21 PROCEDURE — 84520 ASSAY OF UREA NITROGEN: CPT | Performed by: STUDENT IN AN ORGANIZED HEALTH CARE EDUCATION/TRAINING PROGRAM

## 2024-12-21 PROCEDURE — 94640 AIRWAY INHALATION TREATMENT: CPT | Mod: 76

## 2024-12-21 PROCEDURE — 250N000013 HC RX MED GY IP 250 OP 250 PS 637: Performed by: STUDENT IN AN ORGANIZED HEALTH CARE EDUCATION/TRAINING PROGRAM

## 2024-12-21 PROCEDURE — 250N000009 HC RX 250: Performed by: INTERNAL MEDICINE

## 2024-12-21 PROCEDURE — 250N000013 HC RX MED GY IP 250 OP 250 PS 637: Performed by: SURGERY

## 2024-12-21 PROCEDURE — 82435 ASSAY OF BLOOD CHLORIDE: CPT | Performed by: STUDENT IN AN ORGANIZED HEALTH CARE EDUCATION/TRAINING PROGRAM

## 2024-12-21 PROCEDURE — 250N000009 HC RX 250: Performed by: HOSPITALIST

## 2024-12-21 PROCEDURE — 84100 ASSAY OF PHOSPHORUS: CPT | Performed by: HOSPITALIST

## 2024-12-21 RX ORDER — SALIVA STIMULANT COMB. NO.3
1 SPRAY, NON-AEROSOL (ML) MUCOUS MEMBRANE 4 TIMES DAILY
Status: DISCONTINUED | OUTPATIENT
Start: 2024-12-21 | End: 2025-01-14 | Stop reason: HOSPADM

## 2024-12-21 RX ORDER — SODIUM CHLORIDE, SODIUM LACTATE, POTASSIUM CHLORIDE, CALCIUM CHLORIDE 600; 310; 30; 20 MG/100ML; MG/100ML; MG/100ML; MG/100ML
INJECTION, SOLUTION INTRAVENOUS CONTINUOUS
Status: ACTIVE | OUTPATIENT
Start: 2024-12-21 | End: 2024-12-21

## 2024-12-21 RX ADMIN — SODIUM CHLORIDE SOLN NEBU 3% 3 ML: 3 NEBU SOLN at 08:36

## 2024-12-21 RX ADMIN — THERA TABS 1 TABLET: TAB at 08:17

## 2024-12-21 RX ADMIN — LEVALBUTEROL HYDROCHLORIDE 0.63 MG: 0.63 SOLUTION RESPIRATORY (INHALATION) at 21:14

## 2024-12-21 RX ADMIN — PANTOPRAZOLE SODIUM 40 MG: 40 INJECTION, POWDER, FOR SOLUTION INTRAVENOUS at 08:17

## 2024-12-21 RX ADMIN — DILTIAZEM HYDROCHLORIDE 120 MG: 120 TABLET, FILM COATED ORAL at 18:12

## 2024-12-21 RX ADMIN — DILTIAZEM HYDROCHLORIDE 120 MG: 120 TABLET, FILM COATED ORAL at 00:43

## 2024-12-21 RX ADMIN — FUROSEMIDE 40 MG: 40 TABLET ORAL at 08:18

## 2024-12-21 RX ADMIN — Medication 1 SPRAY: at 16:57

## 2024-12-21 RX ADMIN — DILTIAZEM HYDROCHLORIDE 120 MG: 120 TABLET, FILM COATED ORAL at 11:55

## 2024-12-21 RX ADMIN — APIXABAN 5 MG: 5 TABLET, FILM COATED ORAL at 08:18

## 2024-12-21 RX ADMIN — THIAMINE HCL TAB 100 MG 100 MG: 100 TAB at 08:17

## 2024-12-21 RX ADMIN — ORAL VEHICLES - SUSP 12.5 MG: SUSPENSION at 19:47

## 2024-12-21 RX ADMIN — SERTRALINE HYDROCHLORIDE 150 MG: 100 TABLET ORAL at 08:17

## 2024-12-21 RX ADMIN — APIXABAN 5 MG: 5 TABLET, FILM COATED ORAL at 19:47

## 2024-12-21 RX ADMIN — BUDESONIDE 0.5 MG: 0.5 INHALANT ORAL at 08:36

## 2024-12-21 RX ADMIN — SODIUM CHLORIDE, POTASSIUM CHLORIDE, SODIUM LACTATE AND CALCIUM CHLORIDE: 600; 310; 30; 20 INJECTION, SOLUTION INTRAVENOUS at 18:13

## 2024-12-21 RX ADMIN — ATORVASTATIN CALCIUM 40 MG: 40 TABLET, FILM COATED ORAL at 19:47

## 2024-12-21 RX ADMIN — Medication 1 SPRAY: at 10:43

## 2024-12-21 RX ADMIN — LEVOTHYROXINE SODIUM 100 MCG: 100 TABLET ORAL at 05:48

## 2024-12-21 RX ADMIN — ORAL VEHICLES - SUSP 12.5 MG: SUSPENSION at 08:32

## 2024-12-21 RX ADMIN — FEXOFENADINE HYDROCHLORIDE 120 MG: 60 TABLET ORAL at 08:17

## 2024-12-21 RX ADMIN — GABAPENTIN 600 MG: 300 CAPSULE ORAL at 21:34

## 2024-12-21 RX ADMIN — SODIUM CHLORIDE SOLN NEBU 3% 3 ML: 3 NEBU SOLN at 21:14

## 2024-12-21 RX ADMIN — SODIUM CHLORIDE, POTASSIUM CHLORIDE, SODIUM LACTATE AND CALCIUM CHLORIDE: 600; 310; 30; 20 INJECTION, SOLUTION INTRAVENOUS at 09:20

## 2024-12-21 RX ADMIN — IPRATROPIUM BROMIDE 0.5 MG: 0.5 SOLUTION RESPIRATORY (INHALATION) at 08:36

## 2024-12-21 RX ADMIN — IPRATROPIUM BROMIDE 0.5 MG: 0.5 SOLUTION RESPIRATORY (INHALATION) at 21:14

## 2024-12-21 RX ADMIN — BUDESONIDE 0.5 MG: 0.5 INHALANT ORAL at 21:14

## 2024-12-21 RX ADMIN — LEVALBUTEROL HYDROCHLORIDE 0.63 MG: 0.63 SOLUTION RESPIRATORY (INHALATION) at 08:36

## 2024-12-21 RX ADMIN — DILTIAZEM HYDROCHLORIDE 120 MG: 120 TABLET, FILM COATED ORAL at 05:48

## 2024-12-21 RX ADMIN — Medication 1 SPRAY: at 19:42

## 2024-12-21 RX ADMIN — SPIRONOLACTONE 25 MG: 25 TABLET, FILM COATED ORAL at 08:17

## 2024-12-21 ASSESSMENT — ACTIVITIES OF DAILY LIVING (ADL)
ADLS_ACUITY_SCORE: 72

## 2024-12-21 NOTE — PLAN OF CARE
Goal Outcome Evaluation:      Plan of Care Reviewed With: patient    Overall Patient Progress: no change    Outcome Evaluation: Pt A&O, flat affect. On 2L O2 NC. Intermittent tachycardia. Pivot w/ A2+GB+W to chair. Denies pain. TF infusing at 45mL/hr through NJ. Meds given through tube. Strict NPO, oral swabs done. LR started at 100 mL/hr. Purewick in place with adequate output. Had 1 small BM this shift. Repositioned when pt allowed. Spine MRI ordered. Will cont to monitor.

## 2024-12-21 NOTE — PLAN OF CARE
"  Shift: 1900-0730     D: See flowsheets for full assessment & vitals    PRNs: None  Lines: PIV's SL.   N/J Infusing TF @ goal of 45mL/hr.   RN managed Labs: Phos, mag and potassium recheck this AM.      A/R: No significant changes on this shift. Pt alert and oriented but forgetful and voice hoarse at times. NPO, oral cares completed at bedtime. Incontinent of stool x2, purewick in place but leaking frequently. Incontinent cares completed with q2 turns. Denies pain. Stable on 2L NC.       P: Continue to follow POC.      Goal Outcome Evaluation:    Plan of Care Reviewed With: patient    Overall Patient Progress: no change    AM Vitals: /60 (BP Location: Left arm, Patient Position: Semi-Reyes's)   Pulse 104   Temp 97.6  F (36.4  C) (Oral)   Resp 16   Ht 1.651 m (5' 5\")   Wt 45.7 kg (100 lb 12 oz)   SpO2 96%   BMI 16.77 kg/m        "

## 2024-12-21 NOTE — PROGRESS NOTES
[facility]    Medicine Progress Note - Hospitalist Service, GOLD TEAM 8    Date of Admission:  11/21/2024    Assessment & Plan   Asya Coffman is a 79 year old female admitted on 11/21/2024. 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 Initially admitted to hospital medicine service with hypoxic respiratory failure suspected due to HFpEF requiring diuresis. Developed acute hypoxia on 12/8 secondary to suspected aspiration pneumonitis for which she was intubated (12/8-12/10). Now s/p extubation and transferring back to hospital medicine service.    Changes today:  - continued oropharyngeal dysphagia  - no recurrence of afib with rvr     Aspiration risk  severe oropharyngeal dysphagia  Esophageal dysmotility  Laryngeal squamous cell carcinoma s/p radiation (4/2024-7/2024)  Has had increased dysphagia in the setting of laryngeal squamous cell carcinoma s/p radiation (4/2024-7/2024) as well as some concern for esophageal web on swallow study (10/8/2024) pending GI input. Some concern for respiratory decompensation due to recurrent aspiration events. However, family reports that over the past 3-4 weeks has declined dramatically from being quite mobile, active to being barely active, with reduced speech and aspirations  - Speech following  - NPO - okay for ice chips for comfort with 1:1 supervision  - Will need VFSS -- SLP will notify when they feel she is ready for this  - consulting ENT for bedside laryngoscopy to see if h/o papillomas and radiation related to the dysphagia. ENT re-evaluated 12/16/24 with bedside laryngoscopy for dysphagia. No anatomic explanations for dysphagia seen. Normal vocal  cord function and control seen including ability to approximate cords during cough. Signed off.  - Per GI, no clear signs of esophageal webs.   - clinical picture consistent with significant muscle weakness contributing to oropharyngeal dysphagia, poor vocal pressure, poor cough effort. Unclear if her underlying Rheumatological disease contributing. CK, aldolase negative. Unclear if hypothyroidism is the cause but can be contributing. Unclear if prior radiation treatment could be contributing MRI Brain (12/19) finding as below and unclear if it can explain dysphagia. Consulted Neurology who were concerned about encephalopathy and check B12, ammonia, B1 levels. SPEP with no M spike; Copper levels working up for oropharyngeal dysphagia. Unclear role of prior neck radiation   - patient agreeable for PEG placement and will consult GI the following week  - SLP following  - will consult Rheumatology to evaluate rheumatological process to explain her oropharyngeal dysphagia    Acute hypoxic respiratory failure - improving  Aspiration pneumonitis  MSSA PNA s/p abx (12/3-12/8)  Obstructive lung disease (COPD vs asthma)  Pulmonary hypertension   Presented on 11/21 with acute hypoxic respiratory failure; initially suspected to be secondary to HFpEF exacerbation; unresolved with diuresis. Underlying obstructive lung disease but no evidence of exacerbation here. Was treated for MSSA pneumonia. Overnight 12/8, developed sudden worsening of oxygenation and increased secretions in the setting of dysphagia/recent laryngeal radiation and was intubated for airway protection. Suspected aspiration pneumonitis as etiology. CT Chest (12/8) with mediastinal/hilar lymphadenopathy and resolving diffuse ground glass opacities most consistent with resolving pneumonia; no signs of progressive or secondary infectious process. Was briefly on zosyn but this was discontinued as she was treated for full course based on initial pneumonia. She has  been extubated and now on 2L. Ongoing upper airway adventitious sounds.   - Pulse oximetry, supplemental O2  - Budesonide neb 0.5 mg BID, ipratropium-levalbuterol neb QID  - Aspiration precautions; speech following   - Sputum culture (12/9) grew candida but leukocytosis improved and back on minimal low flow oxygen so will not treat for now  - Flutter valve, incentive spirometer  - Low threshold to resume empiric abx for pnuemonia coverage     Chronic Left parietal, left thalamic lacunar strokes  - will increase atorvastatin to high intensity (20-> 40 mg). On DOAC  - follow up Neurorecs  - A1C shows prediabetes,     Hypothyroidism  Hx of thyroidectomy 2/2 cancer   - Continue PTA levothyroxine 88 mcg daily   - consulted Endocrine service as above and increased dose to 100 mcg daily. Appreciate recommendations    Chronic afib  Afib with RVR  [id number] 3 (age++, HF+).   - PTA apixaban 5mg BID  - PTA diltiazem 240 ER BID - HOLD (cannot give ER enterally)               Up-titrate to short-acting Diltiazem 120mg q6H as tolerated by blood pressure  - PTA propanolol 10mg BID which has been held and will start metoprolol  - administering IVF today     HFpEF (LVEF 55-60% 11/22/2024)   Pulmonary hypertension (44 mmHg per echo 11/22/2024)  Possible small PFO  Echo during current admission (11/22/2024) notable for increased thickness of LV and elevated BNP (peak 9200 > 6200) concerning for heart failure exacerbation s/p diuresis and pulmonary hypertension with estimated pulmonary artery pressure of 45 mmHg. Echo with bubble study (11/25/2024) concerning for possible small PFO. With intubation on 12/8, developed hypotension without evidence of shock. Etiology of hypotension may be cardiogenic (e.g. exacerbation of pulmonary hypertension by positive pressure ventilation) vs medication associated (propofol, precedex); do not currently suspect distributive or obstructive hypotension. Weaned off pressors 12/10.   - Preload dependent  in the setting of pulmonary hypertension, gentle diuresis as needed (will hold today)   - 2L FR    At risk for refeeding syndrome  Starvation ketosis  Hypokalemia  Severe malnutrition in context of chronic illness/disease  Per daughter, baseline weight around 120-130 lbs. Admitted 102 lbs. Patient states she is eating less and has less appetite in general as well as having difficulty swallowing. While NPO during intubation, developed starvation ketosis. Given high risk for aspiration with PO intake, placed NGT and started tube feeds. Will need close monitoring for refeeding syndrome.   - NPO   - RD following for TF management   - Monitor for refeeding syndrome   - Lyte replacement protocol   - IV thiamine replacement      Oliguric SAMIR  Cr 0.9 on admission. Baseline appears 0.9-1.0. Developed SAMIR initially in setting of diuresis and had been improving. Subsequently increased following transfer to ICU with consideration for prerenal in setting of NPO status. Also with new diarrhea. Cr stable.   - Strict I/Os  - Trend BMP   - FWF via NGT    Diarrhea  Frequent loose stools. Unclear timing of onset so not sure if related to abx or TF initiation. No abdominal pain.  - Monitor   - Hold on infectious testing for now      Anemia of chronic illness  Baseline Hgb 11-12. Currently near baseline. Can consider anemia of chronic illness.   - Monitor CBC     Generalized deconditioning  Recurrent falls  - PT/OT consults     CREST Syndrome  Characterized by Raynaud's, Calcinosis, GERD and some telangectasia's. Last saw [facility] 2017. No history of ILD.   - Continue protonix 40 mg daily     Anxiety  Depression  - Continue PTA Sertraline 150 mg daily           Diet: Fluid restriction 2000 ML FLUID  NPO for Medical/Clinical Reasons Except for: No Exceptions  Adult Formula Drip Feeding: Continuous iPosition Peptide 1.5; Nasogastric tube; Goal Rate: 45; mL/hr; 12/13: please transition to new TF formula immediately when arrives  "to pt room and ok to start at new goal rate; Do not advance tube feeding rat...    DVT Prophylaxis: DOAC  Miranda Catheter: Not present  Lines: None     Cardiac Monitoring: ACTIVE order. Indication: Tachyarrhythmias, acute (48 hours)  Code Status: Full Code      Clinically Significant Risk Factors               # Hypoalbuminemia: Lowest albumin = 3 g/dL at 12/10/2024  3:11 AM, will monitor as appropriate                # Cachexia: Estimated body mass index is 16.77 kg/m  as calculated from the following:    Height as of this encounter: 1.651 m (5' 5\").    Weight as of this encounter: 45.7 kg (100 lb 12 oz).   # Severe Malnutrition: based on nutrition assessment      # Financial/Environmental Concerns: none         Social Drivers of Health    Housing Stability: High Risk (11/23/2024)    Housing Stability     Do you have housing? : No     Are you worried about losing your housing?: No          Disposition Plan     Medically Ready for Discharge: Anticipated in 5+ Days             Eliseo Reeder MD  Hospitalist Service, GOLD TEAM 8  [facility]  Securely message with [employer] (more info)  Text page via [facility] Paging/Directory   See signed in provider for up to date coverage information  ______________________________________________________________________    Interval History     Continued oxygen needs, poor cough effort, hoarseness.     Physical Exam   Vital Signs: Temp: 97.7  F (36.5  C) Temp src: Oral BP: 101/70 Pulse: 85   Resp: 16 SpO2: 91 % O2 Device: Nasal cannula Oxygen Delivery: 2 LPM  Weight: 100 lbs 12 oz    General Appearance:  Awake. Alert. No acute distress. Frail appearing.   Respiratory: Normal work of breathing on 2L. Lungs with loud upper airway breath sounds. Lower lobes sound clear. Poor cough effort.   Cardiovascular: Irregular. Tachycardic. No obvious murmur.   GI: Nondistended. Normoactive. Soft. Non-tender.   Skin: Warm, dry.   Neuro: alert, oriented x3. " Proximal muscle strength - 4/5    Medical Decision Making             Data

## 2024-12-22 LAB
ANION GAP SERPL CALCULATED.3IONS-SCNC: 14 MMOL/L (ref 7–15)
BUN SERPL-MCNC: 59.4 MG/DL (ref 8–23)
CALCIUM SERPL-MCNC: 8.8 MG/DL (ref 8.8–10.4)
CHLORIDE SERPL-SCNC: 99 MMOL/L (ref 98–107)
COPPER SERPL-MCNC: 121.8 UG/DL
CREAT SERPL-MCNC: 0.99 MG/DL (ref 0.51–0.95)
EGFRCR SERPLBLD CKD-EPI 2021: 58 ML/MIN/1.73M2
ERYTHROCYTE [DISTWIDTH] IN BLOOD BY AUTOMATED COUNT: 18.8 % (ref 10–15)
GLUCOSE SERPL-MCNC: 102 MG/DL (ref 70–99)
HCO3 SERPL-SCNC: 26 MMOL/L (ref 22–29)
HCT VFR BLD AUTO: 33.9 % (ref 35–47)
HGB BLD-MCNC: 10.3 G/DL (ref 11.7–15.7)
MAGNESIUM SERPL-MCNC: 2.5 MG/DL (ref 1.7–2.3)
MCH RBC QN AUTO: 25.3 PG (ref 26.5–33)
MCHC RBC AUTO-ENTMCNC: 30.4 G/DL (ref 31.5–36.5)
MCV RBC AUTO: 83 FL (ref 78–100)
PHOSPHATE SERPL-MCNC: 3.6 MG/DL (ref 2.5–4.5)
PLATELET # BLD AUTO: 401 10E3/UL (ref 150–450)
POTASSIUM SERPL-SCNC: 4.6 MMOL/L (ref 3.4–5.3)
RBC # BLD AUTO: 4.07 10E6/UL (ref 3.8–5.2)
SODIUM SERPL-SCNC: 139 MMOL/L (ref 135–145)
WBC # BLD AUTO: 8.7 10E3/UL (ref 4–11)

## 2024-12-22 PROCEDURE — 82310 ASSAY OF CALCIUM: CPT | Performed by: STUDENT IN AN ORGANIZED HEALTH CARE EDUCATION/TRAINING PROGRAM

## 2024-12-22 PROCEDURE — 85018 HEMOGLOBIN: CPT | Performed by: STUDENT IN AN ORGANIZED HEALTH CARE EDUCATION/TRAINING PROGRAM

## 2024-12-22 PROCEDURE — 99222 1ST HOSP IP/OBS MODERATE 55: CPT | Mod: GC | Performed by: INTERNAL MEDICINE

## 2024-12-22 PROCEDURE — 250N000009 HC RX 250

## 2024-12-22 PROCEDURE — 250N000009 HC RX 250: Performed by: HOSPITALIST

## 2024-12-22 PROCEDURE — 80048 BASIC METABOLIC PNL TOTAL CA: CPT | Performed by: STUDENT IN AN ORGANIZED HEALTH CARE EDUCATION/TRAINING PROGRAM

## 2024-12-22 PROCEDURE — 120N000002 HC R&B MED SURG/OB UMMC

## 2024-12-22 PROCEDURE — 250N000013 HC RX MED GY IP 250 OP 250 PS 637: Performed by: STUDENT IN AN ORGANIZED HEALTH CARE EDUCATION/TRAINING PROGRAM

## 2024-12-22 PROCEDURE — 99232 SBSQ HOSP IP/OBS MODERATE 35: CPT | Performed by: HOSPITALIST

## 2024-12-22 PROCEDURE — 36415 COLL VENOUS BLD VENIPUNCTURE: CPT | Performed by: STUDENT IN AN ORGANIZED HEALTH CARE EDUCATION/TRAINING PROGRAM

## 2024-12-22 PROCEDURE — 250N000013 HC RX MED GY IP 250 OP 250 PS 637: Performed by: HOSPITALIST

## 2024-12-22 PROCEDURE — 250N000013 HC RX MED GY IP 250 OP 250 PS 637: Performed by: INTERNAL MEDICINE

## 2024-12-22 PROCEDURE — 85048 AUTOMATED LEUKOCYTE COUNT: CPT | Performed by: STUDENT IN AN ORGANIZED HEALTH CARE EDUCATION/TRAINING PROGRAM

## 2024-12-22 PROCEDURE — 82306 VITAMIN D 25 HYDROXY: CPT | Performed by: HOSPITALIST

## 2024-12-22 PROCEDURE — 94640 AIRWAY INHALATION TREATMENT: CPT

## 2024-12-22 PROCEDURE — 83735 ASSAY OF MAGNESIUM: CPT | Performed by: HOSPITALIST

## 2024-12-22 PROCEDURE — 84100 ASSAY OF PHOSPHORUS: CPT | Performed by: HOSPITALIST

## 2024-12-22 PROCEDURE — 92526 ORAL FUNCTION THERAPY: CPT | Mod: GN

## 2024-12-22 PROCEDURE — 250N000013 HC RX MED GY IP 250 OP 250 PS 637: Performed by: PHYSICIAN ASSISTANT

## 2024-12-22 PROCEDURE — 250N000013 HC RX MED GY IP 250 OP 250 PS 637

## 2024-12-22 PROCEDURE — 258N000003 HC RX IP 258 OP 636: Performed by: HOSPITALIST

## 2024-12-22 PROCEDURE — 999N000157 HC STATISTIC RCP TIME EA 10 MIN

## 2024-12-22 PROCEDURE — 250N000013 HC RX MED GY IP 250 OP 250 PS 637: Performed by: SURGERY

## 2024-12-22 PROCEDURE — 250N000009 HC RX 250: Performed by: INTERNAL MEDICINE

## 2024-12-22 PROCEDURE — 82374 ASSAY BLOOD CARBON DIOXIDE: CPT | Performed by: STUDENT IN AN ORGANIZED HEALTH CARE EDUCATION/TRAINING PROGRAM

## 2024-12-22 PROCEDURE — 94640 AIRWAY INHALATION TREATMENT: CPT | Mod: 76

## 2024-12-22 RX ORDER — SODIUM CHLORIDE, SODIUM LACTATE, POTASSIUM CHLORIDE, CALCIUM CHLORIDE 600; 310; 30; 20 MG/100ML; MG/100ML; MG/100ML; MG/100ML
INJECTION, SOLUTION INTRAVENOUS CONTINUOUS
Status: ACTIVE | OUTPATIENT
Start: 2024-12-22 | End: 2024-12-22

## 2024-12-22 RX ADMIN — GABAPENTIN 600 MG: 300 CAPSULE ORAL at 22:00

## 2024-12-22 RX ADMIN — DILTIAZEM HYDROCHLORIDE 120 MG: 120 TABLET, FILM COATED ORAL at 00:08

## 2024-12-22 RX ADMIN — LEVALBUTEROL HYDROCHLORIDE 0.63 MG: 0.63 SOLUTION RESPIRATORY (INHALATION) at 09:14

## 2024-12-22 RX ADMIN — LEVOTHYROXINE SODIUM 100 MCG: 100 TABLET ORAL at 05:44

## 2024-12-22 RX ADMIN — BUDESONIDE 0.5 MG: 0.5 INHALANT ORAL at 09:14

## 2024-12-22 RX ADMIN — FEXOFENADINE HYDROCHLORIDE 120 MG: 60 TABLET ORAL at 07:50

## 2024-12-22 RX ADMIN — BUDESONIDE 0.5 MG: 0.5 INHALANT ORAL at 21:24

## 2024-12-22 RX ADMIN — THERA TABS 1 TABLET: TAB at 07:51

## 2024-12-22 RX ADMIN — SODIUM CHLORIDE, POTASSIUM CHLORIDE, SODIUM LACTATE AND CALCIUM CHLORIDE: 600; 310; 30; 20 INJECTION, SOLUTION INTRAVENOUS at 09:00

## 2024-12-22 RX ADMIN — THIAMINE HCL TAB 100 MG 100 MG: 100 TAB at 07:50

## 2024-12-22 RX ADMIN — ORAL VEHICLES - SUSP 12.5 MG: SUSPENSION at 20:29

## 2024-12-22 RX ADMIN — Medication 1 SPRAY: at 15:24

## 2024-12-22 RX ADMIN — ATORVASTATIN CALCIUM 40 MG: 40 TABLET, FILM COATED ORAL at 20:29

## 2024-12-22 RX ADMIN — APIXABAN 5 MG: 5 TABLET, FILM COATED ORAL at 07:51

## 2024-12-22 RX ADMIN — SODIUM CHLORIDE SOLN NEBU 3% 3 ML: 3 NEBU SOLN at 21:24

## 2024-12-22 RX ADMIN — IPRATROPIUM BROMIDE 0.5 MG: 0.5 SOLUTION RESPIRATORY (INHALATION) at 09:14

## 2024-12-22 RX ADMIN — APIXABAN 5 MG: 5 TABLET, FILM COATED ORAL at 20:29

## 2024-12-22 RX ADMIN — IPRATROPIUM BROMIDE 0.5 MG: 0.5 SOLUTION RESPIRATORY (INHALATION) at 21:24

## 2024-12-22 RX ADMIN — SODIUM CHLORIDE, POTASSIUM CHLORIDE, SODIUM LACTATE AND CALCIUM CHLORIDE: 600; 310; 30; 20 INJECTION, SOLUTION INTRAVENOUS at 16:53

## 2024-12-22 RX ADMIN — SPIRONOLACTONE 25 MG: 25 TABLET, FILM COATED ORAL at 07:50

## 2024-12-22 RX ADMIN — FUROSEMIDE 40 MG: 40 TABLET ORAL at 07:50

## 2024-12-22 RX ADMIN — DILTIAZEM HYDROCHLORIDE 120 MG: 120 TABLET, FILM COATED ORAL at 17:33

## 2024-12-22 RX ADMIN — PANTOPRAZOLE SODIUM 40 MG: 40 INJECTION, POWDER, FOR SOLUTION INTRAVENOUS at 07:50

## 2024-12-22 RX ADMIN — SERTRALINE HYDROCHLORIDE 150 MG: 100 TABLET ORAL at 07:50

## 2024-12-22 RX ADMIN — LEVALBUTEROL HYDROCHLORIDE 0.63 MG: 0.63 SOLUTION RESPIRATORY (INHALATION) at 21:24

## 2024-12-22 RX ADMIN — Medication 1 SPRAY: at 20:29

## 2024-12-22 RX ADMIN — DILTIAZEM HYDROCHLORIDE 120 MG: 120 TABLET, FILM COATED ORAL at 11:54

## 2024-12-22 RX ADMIN — Medication 1 SPRAY: at 07:50

## 2024-12-22 RX ADMIN — Medication 1 SPRAY: at 11:54

## 2024-12-22 RX ADMIN — DILTIAZEM HYDROCHLORIDE 120 MG: 120 TABLET, FILM COATED ORAL at 05:44

## 2024-12-22 RX ADMIN — SODIUM CHLORIDE SOLN NEBU 3% 3 ML: 3 NEBU SOLN at 09:14

## 2024-12-22 ASSESSMENT — ACTIVITIES OF DAILY LIVING (ADL)
ADLS_ACUITY_SCORE: 72

## 2024-12-22 NOTE — PLAN OF CARE
Goal Outcome Evaluation:      Plan of Care Reviewed With: patient, child    Overall Patient Progress: no change    Outcome Evaluation: Pt A&O. On 2L O2 NC. Intermittently tachycardic, HR 80s-130s. Attempted to get pt out of bed today but pt slumped forward sitting at the edge of bed and was unable to stand up. Denies pain. TF infusing at 45 mL/hr through NJ. Meds given through tube. Strict NPO. Oral swabs done. LR started at 100 mL/hr. Purewick in place with good output. Had a couple of smear BMs. Repo q2h. Plan for PEG placement next week.

## 2024-12-22 NOTE — PLAN OF CARE
"Shift: 1900-0730     D: See flowsheets for full assessment & vitals    PRNs: None  Lines: PIV's SL.   N/J Infusing TF @ goal of 45mL/hr.   RN managed Labs: Phos, mag and potassium recheck this AM.      A/R: No significant changes on this shift. Pt alert and oriented but forgetful and voice hoarse at times. NPO, oral cares completed. Intermittent, productive cough. Pt having increased secretions and required suctioning x2. MD ([facility]) notified so team could be aware. Remained stable on 2L NC. Incontinent of stool x1, purewick in place with good output. Repositioned q2hrs. Denies pain.     P: Consults in place. Imaging of spine to still be completed.      Goal Outcome Evaluation:     Plan of Care Reviewed With: patient     Overall Patient Progress: no change    Vitals: /67 (BP Location: Left arm)   Pulse 100   Temp 97.5  F (36.4  C) (Oral)   Resp 16   Ht 1.651 m (5' 5\")   Wt 45.7 kg (100 lb 12 oz)   SpO2 93%   BMI 16.77 kg/m      "

## 2024-12-22 NOTE — CONSULTS
Rheumatology Consult Note-Fellow    Asya Coffman MRN# [id number]   Age: 79 year old YOB: 1945     Date of Admission: 11/21/2024    Reason for consult: could CREST be contributing to dysphagia     Assessment & Plan:     ASSESSMENT:  Asya Coffman is a 79 year old with PMH of afib on DOAC, Pulm HTN, COPD/asthma, laryngeal squamous cell carcinoma s/p radiation 4/2024-7/2024 with subsequent dysphagia, CREST who was admitted on 11/21 for hypoxic resp failure due to acute on chronic HFpEF, c/b by acute hypoxia on 12/8 2/2 aspiration pneumonitis for which she was intubated 12/8 - 12/10. Rheumatology consulted in the context of ongoing dysphagia.     This is a patient who began having dysphagia in July 2024 s/p radiation for laryngeal cancer. She confirms today that she did not have trouble with dysphagia prior to the radiation. She has been evaluated by SLP beginning in October and repeatedly during this admission and deemed to have oropharyngeal dysphagia and has required an NG feeding tube since she was intubated roughly two weeks ago. ENT evaluated with laryngoscopy without anatomical explanation of dysphagia.  GI consulted without evidence of esophageal web.  Neurology undergoing workup for possible encephalopathic explanation for dysphagia.    From a rheumatology perspective, this patient does have a history of CREST syndrome with anticentromere, Raynaud's, telangiectasias, previous mild calcinosis, very mild sclerodactyly and evidence of mild patulous esophagus on CT chest.  She was only treated with calcium channel blockers in the past and has not seen rheumatology since 2017.  Although it is possible that the patulous esophagus certainly could contribute to picture of aspiration, would not expect limited systemic sclerosis to contribute to oral pharyngeal dysphagia.  Does not appear that she has an overlap syndrome with myositis or lupus.  CK and aldolase were normal.  In patients with  esophagus involvement with systemic sclerosis we recommend PPI and aspiration precautions, which the patient appropriately has currently.  She has had an appropriate workup with speech therapy and multiple subspecialists and is being considered for PEG tube placement.      Problem list:  # Oropharyngeal dysphagia   # Patulous esophagus   # CREST syndrome  # laryngeal squamous cell carcinoma s/p radiation  # COPD  # Pulmonary HTN    PLAN:  -No role for immunosuppression from a rheumatology perspective  -Agree with aggressive use of PPI -would recommend long-term PPI use  -Agree with oral hygiene and artificial saliva given the patient's significant xerostomia  -Agree with maximizing nutritional input and consideration of PEG tube placement.  PEG tube does not decrease risk of aspiration in systemic sclerosis patients, recommend continued aspiration precautions and speech training     Rheumatology will sign off at this time. Patient should follow up with rheumatology non urgently as an outpatient.     Case seen and discussed with Dr. Iyer who agrees with above assessment and plan.     Thank you Eliseo Reeder MD for this consult. Please contact us if there are any questions.     James Mccurdy  Rheumatology Fellow    I saw this patient with the Rheumatology Fellow. I agree with the findings and recommendations. Chronic limited scleroderma (aka CREST) is not likely the cause of dysphagia. Appreciate evaluations by ENT, GI, Neurology, and Speech Pathology that have revealed mild patulous esophagus, but no other anatomic or physiologic abnormalities that would be related to autoimmunity. Agree with aggressive measures to maximize nutrition, including consideration of PEG, as well as PPI and optimizing oral hygiene. No immunomodulatory medications are warranted.      Germán Iyer M.D.  Staff Rheumatologist, [facility]  Pager [phone]        Alvin disclaimer: This documentation was completed with the aid of  dictation software.  Please note that there may be some inconsistencies due to software errors.  Errors are corrected in real time, however if there is a remaining error, please do not hesitate to reach out for clarification.     HPI     Asya Coffman is a 79 year old with PMH of afib on DOAC, Pulm HTN, COPD/asthma, laryngeal squamous cell carcinoma s/p radiation 4/2024-7/2024 with subsequent dysphagia, CREST who was admitted on 11/21 for hypoxic resp failure due to acute on chronic HFpEF, c/b by acute hypoxia on 12/8 2/2 aspiration pneumonitis for which she was intubated 12/8 - 12/10. Rheumatology consulted for continued oropharyngeal dysphagia.     Upon chart review, patient noted to have 2 painful dysphagia episodes in 2017 in rheumatology note (also the last time she saw rheumatology), but otherwise denied dysphagia in the chart until July 2024 after completion of radiation for her throat cancer. She confirms today that she did not have trouble with dysphagia prior to the radiation. She has been evaluated by SLP beginning in October and repeatedly during this admission and deemed to have oropharyngeal dysphagia and has required an NG feeding tube since she was intubated roughly two weeks ago. ENT evaluated with laryngoscopy without anatomical explanation of dysphagia.  GI consulted without evidence of esophageal web.  Neurology undergoing workup for possible encephalopathic explanation for dysphagia.    CT chest in November and December 2024 show mild patulous esophagus with ingested content within.      Today the patient does not have many complaints.  It is difficult to understand her speech at times.  She does know that she was diagnosed with crest syndrome, though denies being on medications.  Per chart review she was last seen by rheumatology in 2017 and was on calcium channel blockers for her Raynaud's.  She was on no immunosuppression for her mild sclerodactyly or calcinosis.  Does not appear that she  has had issues with ILD or kidney involvement.  She denies any new focal weakness.  She has not been overly bothered by any recent calcinosis or complications of Raynaud's.    Serology  Positive: FARIDEH 640 centromere pattern, Anti centromere 1:640  Negative/Normal: SSA,SSB, SCL70, ANCAs, complements, ESR, CRP     Treatment History  - CCB for raynaud's    HISTORY REVIEW:  Past Medical History:   Diagnosis Date    A-fib (H)     Antiplatelet or antithrombotic long-term use     Arrhythmia     COPD (chronic obstructive pulmonary disease) (H)      Past Surgical History:   Procedure Laterality Date    INJECT STEROID (LOCATION) N/A 6/15/2023    Procedure: Avastin injection;  Surgeon: Nikole Davis MD;  Location: [facility]    INJECT STEROID (LOCATION) N/A 9/7/2023    Procedure: Avastin injection;  Surgeon: Nikole Davis MD;  Location: [facility]    INJECT STEROID (LOCATION) N/A 12/14/2023    Procedure: Avastin injection;  Surgeon: Nikole Davis MD;  Location: [facility]    INJECT STEROID (LOCATION) N/A 2/15/2024    Procedure: Avastin injection;  Surgeon: Nikole Davis MD;  Location: [facility]    LASER CO2 LARYNGOSCOPY N/A 9/7/2023    Procedure: Microdirect laryngoscopy with excision/ablation of laryngeal lesions, biopsies, CO2 laser;  Surgeon: Nikole Davis MD;  Location: [facility]    LASER CO2 LARYNGOSCOPY N/A 5/30/2024    Procedure: Microdirect laryngoscopy with excision/ablation of laryngeal lesions, biopsies, CO2 laser;  Surgeon: Nikole Davis MD;  Location: [facility]    LASER CO2 LARYNGOSCOPY, COMPLEX N/A 5/26/2022    Procedure: Micro direct laryngoscopy with excision/ablation of laryngeal lesions, possible biopsies, possible CO2 laser;  Surgeon: Nikole Davis MD;  [facility]    LASER CO2 LARYNGOSCOPY, COMPLEX N/A 9/15/2022    Procedure: Microdirect laryngoscopy with ablation of laryngeal lesions,biopsies,CO2 laser;  Surgeon: Nikole Davis MD;   [facility]    LASER CO2 LARYNGOSCOPY, COMPLEX N/A 12/1/2022    Procedure: Microdirect laryngoscopy with excision/ablation of laryngeal lesions, biopsies,   CO2 laser;  Surgeon: Nikole Davis MD;  [facility]    LASER CO2 LARYNGOSCOPY, COMPLEX N/A 6/15/2023    Procedure: Microdirect laryngoscopy with ablation of laryngeal lesions, biopsies, CO2 laser;  Surgeon: Nikole Davis MD;  [facility]    LASER CO2 LARYNGOSCOPY, COMPLEX N/A 10/5/2023    Procedure: Microdirect laryngoscopy with excision/ablation of laryngeal lesions, biopsies, CO2 laser;  Surgeon: Nikole Davis MD;  [facility]    LASER CO2 LARYNGOSCOPY, COMPLEX N/A 12/14/2023    Procedure: Microdirect laryngoscopy with excision/ablation of laryngeal lesions, biopsies, CO2 laser;  Surgeon: Nikole Davis MD;  [facility]    LASER CO2 LARYNGOSCOPY, COMPLEX N/A 2/15/2024    Procedure: Microdirect laryngoscopy with excision/ablation of laryngeal lesions, biopsies, CO2 laser;  Surgeon: Nikole Davis MD;  [facility]    LASER CO2 LARYNGOSCOPY, COMPLEX N/A 4/11/2024    Procedure: Microdirect laryngoscopy with excision/ablation of laryngeal lesions, biopsies, CO2 laser, Avastin injections;  Surgeon: Nikole Davis MD;  [facility]    LASER KTP LARYNGOSCOPY N/A 4/3/2023    Procedure: Microdirect laryngoscopy with biopsies, excision/ablation of lesions, C02 laser,  Avastin injection;  Surgeon: Nikole Davis MD;  [facility]    LASER KTP LARYNGOSCOPY N/A 6/15/2023    Procedure: flexible laser (CO2 or KTP) standby;  Surgeon: Nikole Davis MD;  [facility]: [facility]    LASER KTP LARYNGOSCOPY FLEXIBLE N/A 8/18/2022    Procedure: Transnasal flexible laryngoscopy with KTP laser and biopsies;  Surgeon: Nikole Davis MD;  Location: [facility]    LASER KTP LARYNGOSCOPY FLEXIBLE N/A 10/27/2022    Procedure: Transnasal flexible laryngoscopy with possible  KTP laser;  Surgeon: Nikole Davis MD;  Location: [facility]    LASER KTP LARYNGOSCOPY FLEXIBLE N/A 1/26/2023    Procedure: Transnasal flexible laryngoscopy with KTP laser,  biopsies, superior laryngeal nerve blocks, Avastin injection;  Surgeon: Nikole Davis MD;  Location: [facility]    LASER KTP LARYNGOSCOPY FLEXIBLE N/A 2/15/2023    Procedure: Transnasal flexible laryngoscopy with KTP laser, superior laryngeal nerve blocks, biopsies, avastin injection;  Surgeon: Nikole Davis MD;  Location: [facility]    LASER KTP LARYNGOSCOPY FLEXIBLE N/A 2/17/2023    Procedure: Transnasal flexible laryngoscopy with KTP laser, biopsies, superior laryngeal nerve blocks;  Surgeon: Nikole Davis MD;  Location: [facility]    LASER KTP LARYNGOSCOPY FLEXIBLE N/A 2/23/2023    Procedure: Transnasal flexible laryngoscopy with KTP laser, superior laryngeal nerve blocks;  Surgeon: Nikole Davis MD;  Location: [facility]    LASER KTP LARYNGOSCOPY FLEXIBLE N/A 3/2/2023    Procedure: Transnasal flexible laryngoscopy with KTP laser, superior laryngeal nerve block Bilateral;  Surgeon: Nikole Davis MD;  Location: [facility]    LASER KTP LARYNGOSCOPY FLEXIBLE N/A 3/9/2023    Procedure: Transnasal flexible laryngoscopy with KTP laser, biopsies, superior laryngeal nerve blocks;  Surgeon: Nikole Davis MD;  Location: [facility]    LASER KTP LARYNGOSCOPY FLEXIBLE N/A 3/17/2023    Procedure: Transnasal flexible laryngoscopy with KTP laser, superior laryngeal nerve block(s), Avastin injection;  Surgeon: Nikole Davis MD;  Location: [facility]    LASER KTP LARYNGOSCOPY FLEXIBLE N/A 3/28/2023    Procedure: Transnasal flexible laryngoscopy with KTP laser, superior laryngeal nerve block(s);  Surgeon: Pankaj Woodard MD;  Location: [facility] OR     Family History   Problem Relation Age of Onset    Breast Cancer Mother 75.00    Hereditary Breast and Ovarian Cancer Syndrome No  family hx of     Cancer No family hx of     Colon Cancer No family hx of     Endometrial Cancer No family hx of     Ovarian Cancer No family hx of      Social History     Socioeconomic History    Marital status:      Spouse name: Not on file    Number of children: Not on file    Years of education: Not on file    Highest education level: Not on file   Occupational History    Not on file   Tobacco Use    Smoking status: Never    Smokeless tobacco: Never   Substance and Sexual Activity    Alcohol use: Yes     Comment: Occasionally    Drug use: Never    Sexual activity: Not on file   Other Topics Concern    Not on file   Social History Narrative    Not on file     Social Drivers of Health     Financial Resource Strain: Low Risk  (11/23/2024)    Financial Resource Strain     Within the past 12 months, have you or your family members you live with been unable to get utilities (heat, electricity) when it was really needed?: No   Food Insecurity: Low Risk  (11/23/2024)    Food Insecurity     Within the past 12 months, did you worry that your food would run out before you got money to buy more?: No     Within the past 12 months, did the food you bought just not last and you didn t have money to get more?: No   Transportation Needs: Low Risk  (11/23/2024)    Transportation Needs     Within the past 12 months, has lack of transportation kept you from medical appointments, getting your medicines, non-medical meetings or appointments, work, or from getting things that you need?: No   Physical Activity: Not on file   Stress: Not on file   Social Connections: Socially Integrated (12/1/2023)    Received [facility]    Social Connections     Do you often feel lonely or isolated from those around you?: 0   Interpersonal Safety: Low Risk  (11/23/2024)    Interpersonal Safety     Do you feel physically and emotionally safe where you currently live?: Yes     Within the past 12 months, have  "you been hit, slapped, kicked or otherwise physically hurt by someone?: No     Within the past 12 months, have you been humiliated or emotionally abused in other ways by your partner or ex-partner?: No   Housing Stability: High Risk (11/23/2024)    Housing Stability     Do you have housing? : No     Are you worried about losing your housing?: No     Patient Active Problem List   Diagnosis    Laryngeal mass    Fungal infection    Recurrent respiratory papillomatosis    Vocal cord leukoplakia    CREST syndrome (H)    Atrial fibrillation, unspecified type (H)    Papillary carcinoma of thyroid (H)    Stage 3a chronic kidney disease (H)    Closed nondisplaced fracture of seventh cervical vertebra, unspecified fracture morphology, sequela    Shortness of breath    Dysphonia    Atrial fibrillation with RVR (H)    Fall    Acute on chronic respiratory failure with hypoxia (H)     Allergies   Allergen Reactions    Methylpyrrolidone Anaphylaxis    Nuts Anaphylaxis     Black walnut    Escitalopram Other (See Comments)     Asthma -a time of exacerbation and may not have been cause may retry    Mirtazapine Other (See Comments)     Asthma a time of exacerbation and may not have been cause may retry    Venlafaxine Other (See Comments)    Adhesive Tape Rash     With holter monitor. Ok with bandaids.    No Clinical Screening - See Comments Rash     Adhesive tape             Objective     PHYSICAL EXAM  BP 93/62   Pulse 88   Temp 97.5  F (36.4  C) (Oral)   Resp 16   Ht 1.651 m (5' 5\")   Wt 45.7 kg (100 lb 12 oz)   SpO2 93%   BMI 16.77 kg/m    Wt Readings from Last 4 Encounters:   12/21/24 45.7 kg (100 lb 12 oz)   10/14/24 52.2 kg (115 lb)   09/16/24 52.2 kg (115 lb)   08/12/24 52.2 kg (115 lb)     Constitutional: Cachectic appearing, lying comfortably in bed  Eyes: nl EOM, PERRLA, vision, conjunctiva, sclera  ENT: Dry mucous membranes, poor dentition  Resp: lungs clear to auscultation, nl to palpation  CV: RRR, no murmurs, rubs " "or gallops, no edema  GI: no ABD mass or tenderness  MS: All TMJ, neck, shoulder, elbow, wrist, MCP/PIP/DIP,, knee, ankle, and foot MTP/IP joints were examined and  found normal.. No active synovitis or deformity. Full ROM.  Normal  strength  Skin: Few telangiectasias on the face.  Mild sclerodactyly distal to DIPs bilaterally.  No evidence of calcinosis, pitting of fingers  Neuro: 4/5 strength all 4 extremities    DATA:    CBC:  Recent Labs   Lab Test 12/22/24  0537 12/21/24  0531 12/20/24  0530   WBC 8.7 8.8 9.0   RBC 4.07 4.15 4.24   HGB 10.3* 10.3* 10.5*   HCT 33.9* 34.8* 35.7   MCV 83 84 84   MCH 25.3* 24.8* 24.8*   MCHC 30.4* 29.6* 29.4*   RDW 18.8* 18.9* 18.8*    467* 495*       BMP:  Recent Labs   Lab Test 12/22/24  0537 12/21/24  0531 12/20/24  0530    139 140   POTASSIUM 4.6 4.5 5.0   CHLORIDE 99 98 98   CO2 26 28 29   ANIONGAP 14 13 13   * 108* 109*   BUN 59.4* 62.4* 55.4*   CR 0.99* 1.14* 1.10*   GFRESTIMATED 58* 49* 51*   ESSIE 8.8 8.9 9.2       LFT:  Recent Labs   Lab Test 12/19/24  0756 12/11/24  1145 12/11/24  0157 12/10/24  0311 12/09/24  0534   PROTTOTAL  --   --  6.2* 6.2* 6.0*   ALBUMIN 3.3* 3.1* 3.1* 3.0* 3.0*   BILITOTAL  --   --  0.2 0.3 0.3   ALKPHOS  --   --  65 69 70   AST  --   --  18 14 16   ALT  --   --  12 10 12       No results found for: \"CKTOTAL\"  TSH   Date Value Ref Range Status   12/17/2024 6.59 (H) 0.30 - 4.20 uIU/mL Final     No results found for: \"URIC\"    Inflammatory markers  No results found for: \"CRP\"  No results found for: \"SED\"  No results found for: \"SHRADDHA\"    UA RESULTS:  Recent Labs   Lab Test 12/03/24  2209   COLOR Light Yellow   APPEARANCE Clear   URINEGLC Negative   URINEBILI Negative   URINEKETONE Negative   SG 1.016   UBLD Negative   URINEPH 5.0   PROTEIN 10*   NITRITE Negative   LEUKEST Negative   RBCU 1   WBCU 1         Autoimmunity labs:     No results found for: \"RHF\"  No results found for: \"CCPIGG\"  No results found for: \"ANCA\"  No results " "found for: \"L1TMQYH\"  No results found for: \"U9NNMSS\"  No results found for: \"TABATHA\"  No results found for: \"DNA\"  No results found for: \"RNPIGG\", \"SMIGG\", \"SSAIGG\", \"SSBIGG\", \"SCLIGG\"      "

## 2024-12-22 NOTE — PROGRESS NOTES
[facility]    Medicine Progress Note - Hospitalist Service, GOLD TEAM 8    Date of Admission:  11/21/2024    Assessment & Plan   Asya Coffman is a 79 year old female admitted on 11/21/2024. 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 Initially admitted to hospital medicine service with hypoxic respiratory failure suspected due to HFpEF requiring diuresis. Developed acute hypoxia on 12/8 secondary to suspected aspiration pneumonitis for which she was intubated (12/8-12/10). Now s/p extubation and transferring back to hospital medicine service.    Changes today:  - continued oropharyngeal dysphagia  - recurrence of afib with RVR, hypotension     Aspiration risk  Acute severe oropharyngeal dysphagia, proximal muscle weakness  Esophageal dysmotility  Laryngeal squamous cell carcinoma s/p radiation (4/2024-7/2024)  Has had increased dysphagia in the setting of laryngeal squamous cell carcinoma s/p radiation (4/2024-7/2024) as well as some concern for esophageal web on swallow study (10/8/2024) pending GI input. Some concern for respiratory decompensation due to recurrent aspiration events. However, family reports that over the past 3-4 weeks has declined dramatically from being quite mobile, active to being barely active, with reduced speech and aspirations  - Speech following  - NPO - okay for ice chips for comfort with 1:1 supervision  - Will need VFSS -- SLP will notify when they feel she is ready for this  - consulting ENT for bedside laryngoscopy to see if h/o papillomas and radiation related to the dysphagia. ENT re-evaluated 12/16/24 with bedside laryngoscopy for dysphagia. No anatomic  explanations for dysphagia seen. Normal vocal cord function and control seen including ability to approximate cords during cough. Signed off.  - Per GI, no clear signs of esophageal webs.   - clinical picture consistent with significant muscle weakness contributing to oropharyngeal dysphagia, poor vocal pressure, poor cough effort. Unclear if her underlying Rheumatological disease contributing. CK, aldolase negative. Unclear if hypothyroidism is the cause but can be contributing. Unclear if prior radiation treatment could be contributing MRI Brain (12/19) finding as below and unclear if it can explain dysphagia. Consulted Neurology who were concerned about encephalopathy and checked B12, ammonia, B1 levels and WNL. SPEP with no M spike; Copper levels WNL working up for oropharyngeal dysphagia. Unclear role of prior neck radiation. Will check myasthenia panel. Checking cervical and thoracic MRI   - patient agreeable for PEG placement and will consult GI the following week  - SLP following  - will consult Rheumatology to evaluate rheumatological process to explain her oropharyngeal dysphagia    Acute hypoxic respiratory failure - improving  Aspiration pneumonitis  MSSA PNA s/p abx (12/3-12/8)  Obstructive lung disease (COPD vs asthma)  Pulmonary hypertension   Presented on 11/21 with acute hypoxic respiratory failure; initially suspected to be secondary to HFpEF exacerbation; unresolved with diuresis. Underlying obstructive lung disease but no evidence of exacerbation here. Was treated for MSSA pneumonia. Overnight 12/8, developed sudden worsening of oxygenation and increased secretions in the setting of dysphagia/recent laryngeal radiation and was intubated for airway protection. Suspected aspiration pneumonitis as etiology. CT Chest (12/8) with mediastinal/hilar lymphadenopathy and resolving diffuse ground glass opacities most consistent with resolving pneumonia; no signs of progressive or secondary infectious  process. Was briefly on zosyn but this was discontinued as she was treated for full course based on initial pneumonia. She has been extubated and now on 2L. Ongoing upper airway adventitious sounds.   - Pulse oximetry, supplemental O2  - Budesonide neb 0.5 mg BID, ipratropium-levalbuterol neb QID  - Aspiration precautions; speech following   - Sputum culture (12/9) grew candida but leukocytosis improved and back on minimal low flow oxygen so will not treat for now  - Flutter valve, incentive spirometer  - Low threshold to resume empiric abx for pnuemonia coverage     Chronic afib  Afib with RVR  [id number] 3 (age++, HF+).   - PTA apixaban 5mg BID  - PTA diltiazem 240 ER BID - HOLD (cannot give ER enterally)               Up-titrate to short-acting Diltiazem 120mg q6H as tolerated by blood pressure  - PTA propanolol 10mg BID which has been held and will start metoprolol  -s/p IVF with improvement but recurrence. Suspect continued hypovolemia and administering IVF    Chronic Left parietal, left thalamic lacunar strokes  - will increase atorvastatin to high intensity (20-> 40 mg). On DOAC  - follow up Neurorecs  - A1C shows prediabetes,     Hypothyroidism  Hx of thyroidectomy 2/2 cancer   - Continue PTA levothyroxine 88 mcg daily   - consulted Endocrine service as above and increased dose to 100 mcg daily. Appreciate recommendations       HFpEF (LVEF 55-60% 11/22/2024)   Pulmonary hypertension (44 mmHg per echo 11/22/2024)  Possible small PFO  Echo during current admission (11/22/2024) notable for increased thickness of LV and elevated BNP (peak 9200 > 6200) concerning for heart failure exacerbation s/p diuresis and pulmonary hypertension with estimated pulmonary artery pressure of 45 mmHg. Echo with bubble study (11/25/2024) concerning for possible small PFO. With intubation on 12/8, developed hypotension without evidence of shock. Etiology of hypotension may be cardiogenic (e.g. exacerbation of pulmonary  hypertension by positive pressure ventilation) vs medication associated (propofol, precedex); do not currently suspect distributive or obstructive hypotension. Weaned off pressors 12/10.   - Preload dependent in the setting of pulmonary hypertension, gentle diuresis as needed (will hold today)   - 2L FR    At risk for refeeding syndrome  Starvation ketosis  Hypokalemia  Severe malnutrition in context of chronic illness/disease  Per daughter, baseline weight around 120-130 lbs. Admitted 102 lbs. Patient states she is eating less and has less appetite in general as well as having difficulty swallowing. While NPO during intubation, developed starvation ketosis. Given high risk for aspiration with PO intake, placed NGT and started tube feeds. Will need close monitoring for refeeding syndrome.   - NPO   - RD following for TF management   - Monitor for refeeding syndrome   - Lyte replacement protocol   - IV thiamine replacement      Oliguric SAMIR  Cr 0.9 on admission. Baseline appears 0.9-1.0. Developed SAMIR initially in setting of diuresis and had been improving. Subsequently increased following transfer to ICU with consideration for prerenal in setting of NPO status. Also with new diarrhea. Cr stable.   - Strict I/Os  - Trend BMP   - FWF via NGT    Diarrhea  Frequent loose stools. Unclear timing of onset so not sure if related to abx or TF initiation. No abdominal pain.  - Monitor   - Hold on infectious testing for now      Anemia of chronic illness  Baseline Hgb 11-12. Currently near baseline. Can consider anemia of chronic illness.   - Monitor CBC     Generalized deconditioning  Recurrent falls  - PT/OT consults     CREST Syndrome  Characterized by Raynaud's, Calcinosis, GERD and some telangectasia's. Last saw [facility] 2017. No history of ILD.   - Continue protonix 40 mg daily     Anxiety  Depression  - Continue PTA Sertraline 150 mg daily           Diet: Fluid restriction 2000 ML FLUID  NPO for  "Medical/Clinical Reasons Except for: No Exceptions  Adult Formula Drip Feeding: Continuous [employer] Peptide 1.5; Nasogastric tube; Goal Rate: 45; mL/hr; 12/13: please transition to new TF formula immediately when arrives to pt room and ok to start at new goal rate; Do not advance tube feeding rat...    DVT Prophylaxis: DOAC  Miranda Catheter: Not present  Lines: None     Cardiac Monitoring: ACTIVE order. Indication: Tachyarrhythmias, acute (48 hours)  Code Status: Full Code      Clinically Significant Risk Factors               # Hypoalbuminemia: Lowest albumin = 3 g/dL at 12/10/2024  3:11 AM, will monitor as appropriate                # Cachexia: Estimated body mass index is 16.77 kg/m  as calculated from the following:    Height as of this encounter: 1.651 m (5' 5\").    Weight as of this encounter: 45.7 kg (100 lb 12 oz).   # Severe Malnutrition: based on nutrition assessment      # Financial/Environmental Concerns: none         Social Drivers of Health    Housing Stability: High Risk (11/23/2024)    Housing Stability     Do you have housing? : No     Are you worried about losing your housing?: No          Disposition Plan     Medically Ready for Discharge: Anticipated in 5+ Days             Eliseo Reeder MD  Hospitalist Service, GOLD TEAM [address] [place] [address]  Securely message with [employer] (more info)  Text page via [employer] Paging/Directory   See signed in provider for up to date coverage information  ______________________________________________________________________    Interval History     Continued oxygen needs, poor cough effort, hoarseness. Denies chest pain, shortness of breath, palpitations    Physical Exam   Vital Signs: Temp: 97.5  F (36.4  C) Temp src: Oral BP: 93/62 Pulse: 88   Resp: 20 SpO2: 93 % O2 Device: Nasal cannula with humidification Oxygen Delivery: 2 LPM  Weight: 100 lbs 12 oz    General Appearance:  Awake. Alert. No acute distress. Frail appearing. "   Respiratory: Normal work of breathing on 2L. Lungs with loud upper airway breath sounds. Lower lobes sound clear. Poor cough effort.   Cardiovascular: Irregular. Tachycardic. No obvious murmur.   GI: Nondistended. Normoactive. Soft. Non-tender.   Skin: Warm, dry.   Neuro: alert, oriented x3. Proximal muscle strength - 4/5    Medical Decision Making             Data

## 2024-12-23 LAB
ANION GAP SERPL CALCULATED.3IONS-SCNC: 11 MMOL/L (ref 7–15)
BUN SERPL-MCNC: 53.1 MG/DL (ref 8–23)
CALCIUM SERPL-MCNC: 9 MG/DL (ref 8.8–10.4)
CHLORIDE SERPL-SCNC: 99 MMOL/L (ref 98–107)
CREAT SERPL-MCNC: 0.88 MG/DL (ref 0.51–0.95)
EGFRCR SERPLBLD CKD-EPI 2021: 67 ML/MIN/1.73M2
GLUCOSE SERPL-MCNC: 97 MG/DL (ref 70–99)
HCO3 SERPL-SCNC: 27 MMOL/L (ref 22–29)
HOLD SPECIMEN: NORMAL
MAGNESIUM SERPL-MCNC: 2.3 MG/DL (ref 1.7–2.3)
PHOSPHATE SERPL-MCNC: 3.8 MG/DL (ref 2.5–4.5)
POTASSIUM SERPL-SCNC: 4.6 MMOL/L (ref 3.4–5.3)
SCANNED LAB RESULT: NORMAL
SODIUM SERPL-SCNC: 137 MMOL/L (ref 135–145)
TEST NAME: NORMAL

## 2024-12-23 PROCEDURE — 250N000013 HC RX MED GY IP 250 OP 250 PS 637: Performed by: STUDENT IN AN ORGANIZED HEALTH CARE EDUCATION/TRAINING PROGRAM

## 2024-12-23 PROCEDURE — 80048 BASIC METABOLIC PNL TOTAL CA: CPT | Performed by: STUDENT IN AN ORGANIZED HEALTH CARE EDUCATION/TRAINING PROGRAM

## 2024-12-23 PROCEDURE — 82310 ASSAY OF CALCIUM: CPT | Performed by: STUDENT IN AN ORGANIZED HEALTH CARE EDUCATION/TRAINING PROGRAM

## 2024-12-23 PROCEDURE — 83735 ASSAY OF MAGNESIUM: CPT | Performed by: HOSPITALIST

## 2024-12-23 PROCEDURE — 250N000009 HC RX 250

## 2024-12-23 PROCEDURE — 97530 THERAPEUTIC ACTIVITIES: CPT | Mod: GP

## 2024-12-23 PROCEDURE — 97535 SELF CARE MNGMENT TRAINING: CPT | Mod: GO | Performed by: OCCUPATIONAL THERAPIST

## 2024-12-23 PROCEDURE — 250N000013 HC RX MED GY IP 250 OP 250 PS 637: Performed by: SURGERY

## 2024-12-23 PROCEDURE — 94640 AIRWAY INHALATION TREATMENT: CPT

## 2024-12-23 PROCEDURE — 99232 SBSQ HOSP IP/OBS MODERATE 35: CPT | Performed by: HOSPITALIST

## 2024-12-23 PROCEDURE — 36415 COLL VENOUS BLD VENIPUNCTURE: CPT | Performed by: STUDENT IN AN ORGANIZED HEALTH CARE EDUCATION/TRAINING PROGRAM

## 2024-12-23 PROCEDURE — 250N000013 HC RX MED GY IP 250 OP 250 PS 637: Performed by: HOSPITALIST

## 2024-12-23 PROCEDURE — 250N000009 HC RX 250: Performed by: INTERNAL MEDICINE

## 2024-12-23 PROCEDURE — 94640 AIRWAY INHALATION TREATMENT: CPT | Mod: 76

## 2024-12-23 PROCEDURE — 84100 ASSAY OF PHOSPHORUS: CPT | Performed by: HOSPITALIST

## 2024-12-23 PROCEDURE — 120N000002 HC R&B MED SURG/OB UMMC

## 2024-12-23 PROCEDURE — 250N000013 HC RX MED GY IP 250 OP 250 PS 637: Performed by: PHYSICIAN ASSISTANT

## 2024-12-23 PROCEDURE — 82374 ASSAY BLOOD CARBON DIOXIDE: CPT | Performed by: STUDENT IN AN ORGANIZED HEALTH CARE EDUCATION/TRAINING PROGRAM

## 2024-12-23 PROCEDURE — 86618 LYME DISEASE ANTIBODY: CPT | Performed by: HOSPITALIST

## 2024-12-23 PROCEDURE — 250N000009 HC RX 250: Performed by: HOSPITALIST

## 2024-12-23 PROCEDURE — 250N000013 HC RX MED GY IP 250 OP 250 PS 637: Performed by: INTERNAL MEDICINE

## 2024-12-23 PROCEDURE — 999N000157 HC STATISTIC RCP TIME EA 10 MIN

## 2024-12-23 PROCEDURE — 250N000013 HC RX MED GY IP 250 OP 250 PS 637

## 2024-12-23 RX ADMIN — DILTIAZEM HYDROCHLORIDE 120 MG: 120 TABLET, FILM COATED ORAL at 13:57

## 2024-12-23 RX ADMIN — BUDESONIDE 0.5 MG: 0.5 INHALANT ORAL at 21:13

## 2024-12-23 RX ADMIN — APIXABAN 5 MG: 5 TABLET, FILM COATED ORAL at 08:03

## 2024-12-23 RX ADMIN — Medication 1 SPRAY: at 08:03

## 2024-12-23 RX ADMIN — POLYETHYLENE GLYCOL 3350 17 G: 17 POWDER, FOR SOLUTION ORAL at 08:03

## 2024-12-23 RX ADMIN — THERA TABS 1 TABLET: TAB at 08:03

## 2024-12-23 RX ADMIN — DILTIAZEM HYDROCHLORIDE 120 MG: 120 TABLET, FILM COATED ORAL at 06:36

## 2024-12-23 RX ADMIN — LEVOTHYROXINE SODIUM 100 MCG: 100 TABLET ORAL at 06:36

## 2024-12-23 RX ADMIN — FUROSEMIDE 40 MG: 40 TABLET ORAL at 08:04

## 2024-12-23 RX ADMIN — ORAL VEHICLES - SUSP 12.5 MG: SUSPENSION at 19:40

## 2024-12-23 RX ADMIN — FEXOFENADINE HYDROCHLORIDE 120 MG: 60 TABLET ORAL at 08:03

## 2024-12-23 RX ADMIN — APIXABAN 5 MG: 5 TABLET, FILM COATED ORAL at 19:39

## 2024-12-23 RX ADMIN — BUDESONIDE 0.5 MG: 0.5 INHALANT ORAL at 08:37

## 2024-12-23 RX ADMIN — Medication 1 SPRAY: at 19:40

## 2024-12-23 RX ADMIN — DILTIAZEM HYDROCHLORIDE 120 MG: 120 TABLET, FILM COATED ORAL at 00:32

## 2024-12-23 RX ADMIN — LEVALBUTEROL HYDROCHLORIDE 0.63 MG: 0.63 SOLUTION RESPIRATORY (INHALATION) at 21:11

## 2024-12-23 RX ADMIN — SERTRALINE HYDROCHLORIDE 150 MG: 100 TABLET ORAL at 08:04

## 2024-12-23 RX ADMIN — GABAPENTIN 600 MG: 300 CAPSULE ORAL at 22:00

## 2024-12-23 RX ADMIN — ORAL VEHICLES - SUSP 12.5 MG: SUSPENSION at 08:03

## 2024-12-23 RX ADMIN — PANTOPRAZOLE SODIUM 40 MG: 40 INJECTION, POWDER, FOR SOLUTION INTRAVENOUS at 08:03

## 2024-12-23 RX ADMIN — SPIRONOLACTONE 25 MG: 25 TABLET, FILM COATED ORAL at 08:04

## 2024-12-23 RX ADMIN — ATORVASTATIN CALCIUM 40 MG: 40 TABLET, FILM COATED ORAL at 19:40

## 2024-12-23 RX ADMIN — SODIUM CHLORIDE SOLN NEBU 3% 3 ML: 3 NEBU SOLN at 08:37

## 2024-12-23 RX ADMIN — SODIUM CHLORIDE SOLN NEBU 3% 3 ML: 3 NEBU SOLN at 21:13

## 2024-12-23 RX ADMIN — IPRATROPIUM BROMIDE 0.5 MG: 0.5 SOLUTION RESPIRATORY (INHALATION) at 21:11

## 2024-12-23 RX ADMIN — Medication 1 SPRAY: at 16:03

## 2024-12-23 RX ADMIN — Medication 1 SPRAY: at 13:58

## 2024-12-23 RX ADMIN — THIAMINE HCL TAB 100 MG 100 MG: 100 TAB at 08:04

## 2024-12-23 RX ADMIN — LEVALBUTEROL HYDROCHLORIDE 0.63 MG: 0.63 SOLUTION RESPIRATORY (INHALATION) at 08:37

## 2024-12-23 RX ADMIN — IPRATROPIUM BROMIDE 0.5 MG: 0.5 SOLUTION RESPIRATORY (INHALATION) at 08:37

## 2024-12-23 RX ADMIN — DILTIAZEM HYDROCHLORIDE 120 MG: 120 TABLET, FILM COATED ORAL at 19:39

## 2024-12-23 ASSESSMENT — ACTIVITIES OF DAILY LIVING (ADL)
ADLS_ACUITY_SCORE: 72
ADLS_ACUITY_SCORE: 68
ADLS_ACUITY_SCORE: 72
ADLS_ACUITY_SCORE: 68
ADLS_ACUITY_SCORE: 72
ADLS_ACUITY_SCORE: 68

## 2024-12-23 NOTE — PROGRESS NOTES
[facility]    Medicine Progress Note - Hospitalist Service, GOLD TEAM 8    Date of Admission:  11/21/2024    Assessment & Plan   Asya Coffman is a 79 year old female admitted on 11/21/2024. 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 Initially admitted to hospital medicine service with hypoxic respiratory failure suspected due to HFpEF requiring diuresis. Developed acute hypoxia on 12/8 secondary to suspected aspiration pneumonitis for which she was intubated (12/8-12/10). Now s/p extubation and transferring back to hospital medicine service.    Updates today:  IR for PEGJ placement on 12/27. Holding eliquis from 12/15 evening dose    Changes today:  - continued oropharyngeal dysphagia and oxygen needs  - no recurrence of afib with RVR, hypotension     Aspiration risk  Acute severe oropharyngeal dysphagia, proximal muscle weakness  Esophageal dysmotility  Laryngeal squamous cell carcinoma s/p radiation (4/2024-7/2024)  Has had increased dysphagia in the setting of laryngeal squamous cell carcinoma s/p radiation (4/2024-7/2024) as well as some concern for esophageal web on swallow study (10/8/2024) pending GI input. Some concern for respiratory decompensation due to recurrent aspiration events. However, family reports that over the past 3-4 weeks has declined dramatically from being quite mobile, active to being barely active, with reduced speech and aspirations  - Speech following  - NPO - okay for ice chips for comfort with 1:1 supervision  - Will need VFSS -- SLP will notify when they feel she is ready for this  - consulting ENT for bedside laryngoscopy to see if h/o papillomas and  radiation related to the dysphagia. ENT re-evaluated 12/16/24 with bedside laryngoscopy for dysphagia. No anatomic explanations for dysphagia seen. Normal vocal cord function and control seen including ability to approximate cords during cough. Signed off.  - Per GI, no clear signs of esophageal webs.   - clinical picture consistent with significant muscle weakness contributing to oropharyngeal dysphagia, poor vocal pressure, poor cough effort. Unclear if her underlying Rheumatological disease contributing. CK, aldolase negative. Unclear if hypothyroidism is the cause but can be contributing. Unclear if prior radiation treatment could be contributing MRI Brain (12/19) finding as below and unclear if it can explain dysphagia. Consulted Neurology who were concerned about encephalopathy and checked B12, ammonia, B1 levels and WNL. SPEP with no M spike; Copper levels WNL working up for oropharyngeal dysphagia. Unclear role of prior neck radiation. consulted Rheumatology and ruled out rheumatological process to explain her oropharyngeal dysphagia. Will check myasthenia panel. Checking cervical and thoracic MRI   - patient agreeable for PEG placement and will consulted IR  - SLP following    Acute hypoxic respiratory failure - improving  Aspiration pneumonitis  MSSA PNA s/p abx (12/3-12/8)  Obstructive lung disease (COPD vs asthma)  Pulmonary hypertension   Presented on 11/21 with acute hypoxic respiratory failure; initially suspected to be secondary to HFpEF exacerbation; unresolved with diuresis. Underlying obstructive lung disease but no evidence of exacerbation here. Was treated for MSSA pneumonia. Overnight 12/8, developed sudden worsening of oxygenation and increased secretions in the setting of dysphagia/recent laryngeal radiation and was intubated for airway protection. Suspected aspiration pneumonitis as etiology. CT Chest (12/8) with mediastinal/hilar lymphadenopathy and resolving diffuse ground glass opacities  most consistent with resolving pneumonia; no signs of progressive or secondary infectious process. Was briefly on zosyn but this was discontinued as she was treated for full course based on initial pneumonia. She has been extubated and now on 2L. Ongoing upper airway adventitious sounds.   - Pulse oximetry, supplemental O2  - Budesonide neb 0.5 mg BID, ipratropium-levalbuterol neb QID  - Aspiration precautions; speech following   - Sputum culture (12/9) grew candida but leukocytosis improved and back on minimal low flow oxygen so will not treat for now  - Flutter valve, incentive spirometer  - Low threshold to resume empiric abx for pnuemonia coverage     Chronic afib  Afib with RVR  [id number] 3 (age++, HF+).   - PTA apixaban 5mg BID  - PTA diltiazem 240 ER BID - HOLD (cannot give ER enterally)               Up-titrate to short-acting Diltiazem 120mg q6H as tolerated by blood pressure  - PTA propanolol 10mg BID which has been held and will start metoprolol  -s/p IVF with improvement but recurrence. Suspect continued hypovolemia and administering IVF    Chronic Left parietal, left thalamic lacunar strokes  - will increase atorvastatin to high intensity (20-> 40 mg). On DOAC  - follow up Neurorecs  - A1C shows prediabetes,     Hypothyroidism  Hx of thyroidectomy 2/2 cancer   - Continue PTA levothyroxine 88 mcg daily   - consulted Endocrine service as above and increased dose to 100 mcg daily. Appreciate recommendations       HFpEF (LVEF 55-60% 11/22/2024)   Pulmonary hypertension (44 mmHg per echo 11/22/2024)  Possible small PFO  Echo during current admission (11/22/2024) notable for increased thickness of LV and elevated BNP (peak 9200 > 6200) concerning for heart failure exacerbation s/p diuresis and pulmonary hypertension with estimated pulmonary artery pressure of 45 mmHg. Echo with bubble study (11/25/2024) concerning for possible small PFO. With intubation on 12/8, developed hypotension without evidence of  shock. Etiology of hypotension may be cardiogenic (e.g. exacerbation of pulmonary hypertension by positive pressure ventilation) vs medication associated (propofol, precedex); do not currently suspect distributive or obstructive hypotension. Weaned off pressors 12/10.   - Preload dependent in the setting of pulmonary hypertension, gentle diuresis as needed (will hold today)   - 2L FR    At risk for refeeding syndrome  Starvation ketosis  Hypokalemia  Severe malnutrition in context of chronic illness/disease  Per daughter, baseline weight around 120-130 lbs. Admitted 102 lbs. Patient states she is eating less and has less appetite in general as well as having difficulty swallowing. While NPO during intubation, developed starvation ketosis. Given high risk for aspiration with PO intake, placed NGT and started tube feeds. Will need close monitoring for refeeding syndrome.   - NPO   - RD following for TF management   - Monitor for refeeding syndrome   - Lyte replacement protocol   - IV thiamine replacement      Oliguric SAMIR  Cr 0.9 on admission. Baseline appears 0.9-1.0. Developed SAMIR initially in setting of diuresis and had been improving. Subsequently increased following transfer to ICU with consideration for prerenal in setting of NPO status. Also with new diarrhea. Cr stable.   - Strict I/Os  - Trend BMP   - FWF via NGT    Diarrhea  Frequent loose stools. Unclear timing of onset so not sure if related to abx or TF initiation. No abdominal pain.  - Monitor   - Hold on infectious testing for now      Anemia of chronic illness  Baseline Hgb 11-12. Currently near baseline. Can consider anemia of chronic illness.   - Monitor CBC     Generalized deconditioning  Recurrent falls  - PT/OT consults     CREST Syndrome  Characterized by Raynaud's, Calcinosis, GERD and some telangectasia's. Last saw [facility] 2017. No history of ILD.   - Continue protonix 40 mg daily     Anxiety  Depression  - Continue PTA Sertraline  150 mg daily           Diet: Fluid restriction 2000 ML FLUID  NPO for Medical/Clinical Reasons Except for: No Exceptions  Adult Formula Drip Feeding: Continuous [employer] Peptide 1.5; Nasogastric tube; Goal Rate: 45; mL/hr; 12/13: please transition to new TF formula immediately when arrives to pt room and ok to start at new goal rate; Do not advance tube feeding rat...    DVT Prophylaxis: DOAC  Miranda Catheter: Not present  Lines: None     Cardiac Monitoring: ACTIVE order. Indication: Tachyarrhythmias, acute (48 hours)  Code Status: Full Code      Clinically Significant Risk Factors               # Hypoalbuminemia: Lowest albumin = 3 g/dL at 12/10/2024  3:11 AM, will monitor as appropriate                 # Severe Malnutrition: based on nutrition assessment      # Financial/Environmental Concerns: none         Social Drivers of Health    Housing Stability: High Risk (11/23/2024)    Housing Stability     Do you have housing? : No     Are you worried about losing your housing?: No          Disposition Plan     Medically Ready for Discharge: Anticipated in 5+ Days             Eliseo Reeder MD  Hospitalist Service, GOLD TEAM [address] [place] [address]  Securely message with [employer] (more info)  Text page via [facility] Paging/Directory   See signed in provider for up to date coverage information  ______________________________________________________________________    Interval History     Continued oxygen needs, poor cough effort, hoarseness. No recurrence of afib with RVR. Denies chest pain, shortness of breath, palpitations    Physical Exam   Vital Signs: Temp: 97.8  F (36.6  C) Temp src: Oral BP: 128/84 Pulse: 83   Resp: 20 SpO2: 95 % O2 Device: Nasal cannula with humidification Oxygen Delivery: 2 LPM  Weight: 104 lbs 15.02 oz    General Appearance:  Awake. Alert. No acute distress. Frail appearing.   Respiratory: Normal work of breathing on 2L. Lungs with loud upper airway breath  sounds. Lower lobes sound clear. Poor cough effort.   Cardiovascular: Irregular. Tachycardic. No obvious murmur.   GI: Nondistended. Normoactive. Soft. Non-tender.   Skin: Warm, dry.   Neuro: alert, oriented x3. Proximal muscle strength - 4/5    Medical Decision Making             Data

## 2024-12-23 NOTE — PROGRESS NOTES
CLINICAL NUTRITION SERVICES - REASSESSMENT NOTE     Nutrition Prescription    RECOMMENDATIONS FOR MDs/PROVIDERS TO ORDER:  No recommendations/changes at this time    Malnutrition Status:    Severe malnutrition in the context of chronic illness.     Recommendations already ordered by Registered Dietitian (RD):  No changes, continue TF as ordered     Future/Additional Recommendations:  TF tolerance, GI symptoms, adequate infusion, labs, weight trend      EVALUATION OF THE PROGRESS TOWARD GOALS   Diet: NPO  Nutrition Support: Inocencia Ruiz peptide 1.5 45 mL/hr  Access: NG tube placed 12/10 --> plan for PEG next week   Dosing wt: 51 kg (actual wt)  Fluid restriction 2,000 mL  Intake: Average 7 day intake 849 mL/d providing 1274 kcal (25 kcal/kg), 100% minimum kcal estimated needs and 63 g pro, (1.2 g/kg) 83% minimum protein needs      NEW FINDINGS   Met w/ pt at bedside. Pt denied GI symptoms. Pt asked when the TF would be started and writer explained that she already is getting what she needs via TF that is currently running.     MEDS  Meds reviewed  Vitamin D3  Lasix  Synthroid  MVM  Protonix  Miralax  Thiamine     GI  LBM 12/26 x 2    LABS  Labs reviewed  BUN 53.1 (H)  All other nutrition-related labs WNL    WEIGHT  12/22/24 1229 47.6 kg (104 lb 15 oz) Bed scale   12/21/24 1014 45.7 kg (100 lb 12 oz) Bed scale   12/20/24 0952 45.7 kg (100 lb 12 oz) Bed scale   12/19/24 1633 47.9 kg (105 lb 9.6 oz) Bed scale   12/18/24 1359 47.2 kg (104 lb 0.9 oz) Bed scale   12/17/24 1255 47.1 kg (103 lb 13.4 oz) Bed scale   12/16/24 0941 46.7 kg (102 lb 15.3 oz) Bed scale   12/15/24 0929 50.2 kg (110 lb 10.7 oz) Bed scale   12/15/24 0900 48.2 kg (106 lb 4.2 oz) Bed scale   12/13/24 0400 49 kg (108 lb 0.4 oz) Bed scale   12/11/24 0400 48.7 kg (107 lb 5.8 oz) Bed scale   12/10/24 0445 49.7 kg (109 lb 9.6 oz) --   12/08/24 1324 50.8 kg (111 lb 15.9 oz) Bed scale   12/07/24 1612 48.8 kg (107 lb 9.4 oz) Bed scale   12/06/24 1442 49 kg (108  lb 0.4 oz) Bed scale   12/05/24 0804 46.6 kg (102 lb 11.8 oz) --   12/04/24 1333 47.6 kg (104 lb 15 oz) Bed scale   12/03/24 0915 46.9 kg (103 lb 6.3 oz) Bed scale   12/02/24 1000 46 kg (101 lb 6.4 oz) Standing scale   12/01/24 0905 45.7 kg (100 lb 12 oz) Bed scale   11/30/24 0936 48.5 kg (106 lb 14.8 oz) Bed scale   11/29/24 2218 48.3 kg (106 lb 7.7 oz) Bed scale   11/28/24 1437 46.3 kg (102 lb 1.2 oz) Bed scale   11/27/24 0919 44.7 kg (98 lb 8.7 oz) Bed scale   11/26/24 1013 42.8 kg (94 lb 5.7 oz) Bed scale   11/25/24 1059 46.5 kg (102 lb 8.2 oz) Bed scale   11/24/24 1640 46.7 kg (103 lb) Standing scale   11/23/24 1100 46.5 kg (102 lb 8.2 oz) Standing scale   Pt is wt stable but per mariaelena review,  patient's daughter reports UBW around 120-130 lbs, however wt here around 102 lbs, patient states she is eating less and has less appetite, previously with recent invasive laryngeal SCC with radiation therapy, also had dysphagia before.     SKIN  No pressure injuries, WOC not following     MALNUTRITION  % Intake: Decreased intake does not meet criteria -TF   % Weight Loss:  > 10% in 6 months (severe malnutrition)   Subcutaneous Fat Loss: Facial region:  moderate, Upper arm:  severe, Lower arm:  severe, and Thoracic/intercostal:  severe  Muscle Loss: Global: severe   Fluid Accumulation/Edema: Mild  Malnutrition Diagnosis: Severe malnutrition in the context of chronic illness     Previous Goals   Total avg nutritional intake to meet a minimum of 30 kcal/kg and 1.5 g PRO/kg daily (per dosing wt 51 kg).   Evaluation: Not met    Previous Nutrition Diagnosis  Inadequate oral intake related to esophageal dysmotility, recent intubation as evidenced by weight loss, fat and muscle loss, chart review, TF to meet nutrition needs.     Evaluation: No change    CURRENT NUTRITION DIAGNOSIS  Inadequate oral intake related to esophageal dysmotility, recent intubation as evidenced by weight loss, fat and muscle loss, chart review, TF to  meet nutrition needs.       INTERVENTIONS  Implementation  Enteral Nutrition - continue as ordered     Goals  Total avg nutritional intake to meet a minimum of 30 kcal/kg and 1.5 g PRO/kg daily (per dosing wt 51 kg).     Monitoring/Evaluation  Progress toward goals will be monitored and evaluated per protocol.    Rebecca Camacho PhD, RD, LD  Clinical Dietitian  Available by name via [employer]

## 2024-12-23 NOTE — PLAN OF CARE
"  Shift: 1900-0730     D: See flowsheets for full assessment & vitals    PRNs: None  Lines: PIV's SL.   N/J Infusing TF @ goal of 45mL/hr.   RN managed Labs: Phos, and mag recheck this AM.      A/R: No significant changes on this shift. Pt alert and oriented, voice hoarse at times. NPO, oral cares completed. Intermittent, weak, productive cough. Suctioned x4 and tolerated well. Remained stable on 2L NC. Incontinent of stool x2, purewick in place with good output. Repositioned q2hrs. Denies pain.      P: Imaging of spine to still be completed.     Goal Outcome Evaluation:    Plan of Care Reviewed With: patient    Overall Patient Progress: no changeOverall Patient Progress: no change    Vitals: /84 (BP Location: Left arm, Patient Position: Semi-Reyes's)   Pulse 118   Temp 97.8  F (36.6  C) (Oral)   Resp 20   Ht 1.651 m (5' 5\")   Wt 47.6 kg (104 lb 15 oz)   SpO2 97%   BMI 17.46 kg/m            "

## 2024-12-23 NOTE — PLAN OF CARE
"[facility] cares 5021-0110     /84 (BP Location: Left arm, Patient Position: Semi-Reyes's)   Pulse 83   Temp 97.8  F (36.6  C) (Oral)   Resp 20   Ht 1.651 m (5' 5\")   Wt 47.6 kg (104 lb 15 oz)   SpO2 95%   BMI 17.46 kg/m       Pain: Patient denied pain.   Neuro: A&Ox4.    Respiratory: Lungs course bilaterally. Intermittent need for oral suctioning. On 2L NC.  Cardiac/Neurovascular: HR an pulse irregular. No numbness/tingling reported. Tele: A-fib  GI/: Incontinent of bowel and bladder. 2 small BM's today.   Nutrition: NPO with TF @45ml/hr.   Activity: Bed bound. Turned q2h's.   Skin: Sacral Mepi & Mepi on Right hip (Preventative)  Lines: 2 PIV's (SL). NJ tube w/ TF running.   Events this shift: Pt needed intermittent oral suctioning for some thick mucous. Incontinent of urine and stool. Repositioned every 2 hours throughout day. Pt was able to work with PT/OT and sit in the chair today. MRI of spine today. IR consult for PEG placement.      Plan: Continue with plan of care.     Goal Outcome Evaluation:      Plan of Care Reviewed With: patient    Overall Patient Progress: no changeOverall Patient Progress: no change    Outcome Evaluation: Continue with plan of care.      "

## 2024-12-23 NOTE — CONSULTS
"    Interventional Radiology  [facility] Consult Service Note  12/23/24   2:53 PM    Consult Requested: \"PEG placement\"    Recommendations/Plan:    Patient is on IR schedule 12/27 for a G tube placement.   Labs WNL for procedure.  Orders entered for procedure, NPO status, oral contrast and pre procedure IV antibiotics.   Medications to be held include: apixaban x6 doses  Consent will be done prior to procedure.     Please contact the IR charge RN at [phone] for estimated time of procedure.     Case and imaging discussed with IR attending, Dr. Soto.  Recommendations were reviewed with Dr. 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 Initially admitted to hospital medicine service with hypoxic respiratory failure suspected due to HFpEF requiring diuresis. Developed acute hypoxia on 12/8 secondary to suspected aspiration pneumonitis for which she was intubated (12/8-12/10). Now s/p extubation and transferring back to hospital medicine service. Pt required placement of NGT for severe malnutrition in the context of chronic illness and has reportedly tolerated those feedings. IR is consulted for \"PEGJ\". IR recommends percutaneous G tube placement given pt has tolerated gastric feeding. There will be ongoing aspiration concerns whether pt is fed in stomach or jejunum. GJ tubes require significantly more maintenance and have more management complications requiring intervention as compared to G tubes.    IR has no cross sectional imaging of the stomach that shows percutaneous window. If there is no safe window after contrast administration and stomach " "insufflation, procedure will be aborted. Any G tube placed by IR must remain in place x6 weeks even if no longer need.    Pertinent Imaging Reviewed:         Expected date of discharge:  TBD    Vitals:   /70 (BP Location: Left arm)   Pulse 118   Temp 97.6  F (36.4  C) (Oral)   Resp 16   Ht 1.651 m (5' 5\")   Wt 47.6 kg (104 lb 15 oz)   SpO2 96%   BMI 17.46 kg/m      Pertinent Labs:   Lab Results   Component Value Date    WBC 8.7 12/22/2024    WBC 8.8 12/21/2024    WBC 9.0 12/20/2024     Lab Results   Component Value Date    HGB 10.3 12/22/2024    HGB 10.3 12/21/2024    HGB 10.5 12/20/2024     Lab Results   Component Value Date     12/22/2024     12/21/2024     12/20/2024     Lab Results   Component Value Date    INR 2.43 (H) 12/08/2024    PTT 47 (H) 12/08/2024     Lab Results   Component Value Date    POTASSIUM 4.6 12/23/2024    POTASSIUM 4.5 12/08/2024        COVID-19 Antibody Results, Testing for Immunity           No data to display              COVID-19 PCR Results          4/2/2023    21:48 4/8/2024    15:50 11/21/2024    18:24   COVID-19 PCR Results   SARS CoV2 PCR Negative  Negative  Negative        VERONICA Bryan CNP  Interventional Radiology  Pager: [phone]    "

## 2024-12-24 LAB
ANION GAP SERPL CALCULATED.3IONS-SCNC: 12 MMOL/L (ref 7–15)
APTT PPP: 40 SECONDS (ref 22–38)
B BURGDOR IGG+IGM SER QL: 0.05
BUN SERPL-MCNC: 57.1 MG/DL (ref 8–23)
CALCIUM SERPL-MCNC: 8.8 MG/DL (ref 8.8–10.4)
CHLORIDE SERPL-SCNC: 97 MMOL/L (ref 98–107)
CREAT SERPL-MCNC: 0.92 MG/DL (ref 0.51–0.95)
EGFRCR SERPLBLD CKD-EPI 2021: 63 ML/MIN/1.73M2
GLUCOSE SERPL-MCNC: 121 MG/DL (ref 70–99)
HCO3 SERPL-SCNC: 27 MMOL/L (ref 22–29)
HOLD SPECIMEN: NORMAL
INR PPP: 1.54 (ref 0.85–1.15)
PHOSPHATE SERPL-MCNC: 3.9 MG/DL (ref 2.5–4.5)
POTASSIUM SERPL-SCNC: 4.3 MMOL/L (ref 3.4–5.3)
SODIUM SERPL-SCNC: 136 MMOL/L (ref 135–145)

## 2024-12-24 PROCEDURE — 250N000013 HC RX MED GY IP 250 OP 250 PS 637

## 2024-12-24 PROCEDURE — 250N000013 HC RX MED GY IP 250 OP 250 PS 637: Performed by: STUDENT IN AN ORGANIZED HEALTH CARE EDUCATION/TRAINING PROGRAM

## 2024-12-24 PROCEDURE — 92526 ORAL FUNCTION THERAPY: CPT | Mod: GN

## 2024-12-24 PROCEDURE — 999N000157 HC STATISTIC RCP TIME EA 10 MIN

## 2024-12-24 PROCEDURE — 84100 ASSAY OF PHOSPHORUS: CPT | Performed by: STUDENT IN AN ORGANIZED HEALTH CARE EDUCATION/TRAINING PROGRAM

## 2024-12-24 PROCEDURE — 250N000009 HC RX 250

## 2024-12-24 PROCEDURE — 250N000013 HC RX MED GY IP 250 OP 250 PS 637: Performed by: INTERNAL MEDICINE

## 2024-12-24 PROCEDURE — 120N000002 HC R&B MED SURG/OB UMMC

## 2024-12-24 PROCEDURE — 97535 SELF CARE MNGMENT TRAINING: CPT | Mod: GO | Performed by: OCCUPATIONAL THERAPIST

## 2024-12-24 PROCEDURE — 86043 ACETYLCHOLN RCPTR MODLG ANTB: CPT | Performed by: HOSPITALIST

## 2024-12-24 PROCEDURE — 86042 ACETYLCHOLN RCPTR BLCKG ANTB: CPT | Performed by: HOSPITALIST

## 2024-12-24 PROCEDURE — 99233 SBSQ HOSP IP/OBS HIGH 50: CPT

## 2024-12-24 PROCEDURE — 94640 AIRWAY INHALATION TREATMENT: CPT | Mod: 76

## 2024-12-24 PROCEDURE — 80048 BASIC METABOLIC PNL TOTAL CA: CPT | Performed by: STUDENT IN AN ORGANIZED HEALTH CARE EDUCATION/TRAINING PROGRAM

## 2024-12-24 PROCEDURE — 250N000013 HC RX MED GY IP 250 OP 250 PS 637: Performed by: HOSPITALIST

## 2024-12-24 PROCEDURE — 36415 COLL VENOUS BLD VENIPUNCTURE: CPT | Performed by: HOSPITALIST

## 2024-12-24 PROCEDURE — 97530 THERAPEUTIC ACTIVITIES: CPT | Mod: GO | Performed by: OCCUPATIONAL THERAPIST

## 2024-12-24 PROCEDURE — 250N000009 HC RX 250: Performed by: HOSPITALIST

## 2024-12-24 PROCEDURE — 85610 PROTHROMBIN TIME: CPT | Performed by: HOSPITALIST

## 2024-12-24 PROCEDURE — 85730 THROMBOPLASTIN TIME PARTIAL: CPT | Performed by: HOSPITALIST

## 2024-12-24 PROCEDURE — 250N000013 HC RX MED GY IP 250 OP 250 PS 637: Performed by: PHYSICIAN ASSISTANT

## 2024-12-24 PROCEDURE — 250N000013 HC RX MED GY IP 250 OP 250 PS 637: Performed by: SURGERY

## 2024-12-24 PROCEDURE — 82310 ASSAY OF CALCIUM: CPT | Performed by: STUDENT IN AN ORGANIZED HEALTH CARE EDUCATION/TRAINING PROGRAM

## 2024-12-24 PROCEDURE — 250N000009 HC RX 250: Performed by: INTERNAL MEDICINE

## 2024-12-24 PROCEDURE — 94640 AIRWAY INHALATION TREATMENT: CPT

## 2024-12-24 RX ORDER — LEVALBUTEROL INHALATION SOLUTION 0.63 MG/3ML
0.63 SOLUTION RESPIRATORY (INHALATION)
Status: DISCONTINUED | OUTPATIENT
Start: 2024-12-24 | End: 2024-12-27

## 2024-12-24 RX ADMIN — DILTIAZEM HYDROCHLORIDE 120 MG: 120 TABLET, FILM COATED ORAL at 06:13

## 2024-12-24 RX ADMIN — SODIUM CHLORIDE SOLN NEBU 3% 3 ML: 3 NEBU SOLN at 08:29

## 2024-12-24 RX ADMIN — GABAPENTIN 600 MG: 300 CAPSULE ORAL at 22:17

## 2024-12-24 RX ADMIN — BUDESONIDE 0.5 MG: 0.5 INHALANT ORAL at 21:15

## 2024-12-24 RX ADMIN — DILTIAZEM HYDROCHLORIDE 120 MG: 120 TABLET, FILM COATED ORAL at 01:47

## 2024-12-24 RX ADMIN — FUROSEMIDE 40 MG: 40 TABLET ORAL at 08:10

## 2024-12-24 RX ADMIN — FEXOFENADINE HYDROCHLORIDE 120 MG: 60 TABLET ORAL at 08:09

## 2024-12-24 RX ADMIN — DILTIAZEM HYDROCHLORIDE 120 MG: 120 TABLET, FILM COATED ORAL at 17:46

## 2024-12-24 RX ADMIN — THIAMINE HCL TAB 100 MG 100 MG: 100 TAB at 08:10

## 2024-12-24 RX ADMIN — ATORVASTATIN CALCIUM 40 MG: 40 TABLET, FILM COATED ORAL at 20:01

## 2024-12-24 RX ADMIN — BUDESONIDE 0.5 MG: 0.5 INHALANT ORAL at 08:29

## 2024-12-24 RX ADMIN — SODIUM CHLORIDE SOLN NEBU 3% 3 ML: 3 NEBU SOLN at 21:18

## 2024-12-24 RX ADMIN — THERA TABS 1 TABLET: TAB at 08:09

## 2024-12-24 RX ADMIN — POLYETHYLENE GLYCOL 3350 17 G: 17 POWDER, FOR SOLUTION ORAL at 20:01

## 2024-12-24 RX ADMIN — IPRATROPIUM BROMIDE 0.5 MG: 0.5 SOLUTION RESPIRATORY (INHALATION) at 08:29

## 2024-12-24 RX ADMIN — PANTOPRAZOLE SODIUM 40 MG: 40 INJECTION, POWDER, FOR SOLUTION INTRAVENOUS at 08:09

## 2024-12-24 RX ADMIN — Medication 1 SPRAY: at 12:00

## 2024-12-24 RX ADMIN — ORAL VEHICLES - SUSP 12.5 MG: SUSPENSION at 20:01

## 2024-12-24 RX ADMIN — OFLOXACIN 50000 UNITS: 300 TABLET, COATED ORAL at 09:55

## 2024-12-24 RX ADMIN — DILTIAZEM HYDROCHLORIDE 120 MG: 120 TABLET, FILM COATED ORAL at 12:00

## 2024-12-24 RX ADMIN — Medication 1 SPRAY: at 16:30

## 2024-12-24 RX ADMIN — APIXABAN 5 MG: 5 TABLET, FILM COATED ORAL at 08:10

## 2024-12-24 RX ADMIN — IPRATROPIUM BROMIDE 0.5 MG: 0.5 SOLUTION RESPIRATORY (INHALATION) at 21:15

## 2024-12-24 RX ADMIN — LEVOTHYROXINE SODIUM 100 MCG: 100 TABLET ORAL at 06:13

## 2024-12-24 RX ADMIN — SERTRALINE HYDROCHLORIDE 150 MG: 100 TABLET ORAL at 08:09

## 2024-12-24 RX ADMIN — LEVALBUTEROL HYDROCHLORIDE 0.63 MG: 0.63 SOLUTION RESPIRATORY (INHALATION) at 21:15

## 2024-12-24 RX ADMIN — SPIRONOLACTONE 25 MG: 25 TABLET, FILM COATED ORAL at 08:10

## 2024-12-24 RX ADMIN — Medication 1 SPRAY: at 08:10

## 2024-12-24 RX ADMIN — ORAL VEHICLES - SUSP 12.5 MG: SUSPENSION at 09:54

## 2024-12-24 RX ADMIN — Medication 1 SPRAY: at 20:02

## 2024-12-24 ASSESSMENT — ACTIVITIES OF DAILY LIVING (ADL)
ADLS_ACUITY_SCORE: 72

## 2024-12-24 NOTE — PLAN OF CARE
Goal Outcome Evaluation:      Plan of Care Reviewed With: patient    Overall Patient Progress: no changeOverall Patient Progress: no change    Outcome Evaluation: Pt needing lots of help with oral suction this shift for large amounts of thick mucous. Tolerating TF well. VSS on 3L O2. Repo's Q2 hours. 1x incont stool. Purewick replaced and working well. Pt granddaughters visited today and she wanted to sit up at the edge of the bed. Was able to maintain position only a few minutes however. Patient is to have a PEG placed on 12/27.

## 2024-12-24 NOTE — PLAN OF CARE
"Goal Outcome Evaluation:      Plan of Care Reviewed With: patient    Overall Patient Progress: no changeOverall Patient Progress: no change    Outcome Evaluation: 2300 - 0700 Pt turned q2, last one done at 05:30. VSS on 1-3L NC. Pt weaned from 3L to 1L ON but pt desated below 88% on RA.  Pt incont of small BM ON. Purewick in place, worked well ON for urine collection. Purewick changed at 01:30am. NG with TF infusing at GR of 45mL/hr. FWF of 30mL q4 (equates to a total of 180mL per day of FWF). Tele showing A fib. Spine MRI from 12/24 showed stable degenerative changes and no acute pathology. POC is for PEG placement via IR on 12/27.    Writer went into pt room at 6am after NST told writer pt stated she \"feels like she is dying.\" When asked of above statement, pt verbalized that she feels like she is going to die, and does not want to continue cares at the hospital. Writer asked pt if it would be worthwhile having a goals of care convo with the team.   "

## 2024-12-24 NOTE — PROGRESS NOTES
[facility]    Medicine Progress Note - Hospitalist Service, GOLD TEAM 8    Date of Admission:  11/21/2024    Assessment & Plan   Asya Coffman is a 79 year old female admitted on 11/21/2024. 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 Initially admitted to hospital medicine service with hypoxic respiratory failure suspected due to HFpEF requiring diuresis. Developed acute hypoxia on 12/8 secondary to suspected aspiration pneumonitis for which she was intubated (12/8-12/10). Now s/p extubation and transferring back to hospital medicine service.    Updates today:  -Discussed with IR suggested to place G-tube instead of GJ tube  -Code status changed to DNR/DNI  -Increase ipratropium/levalbuterol nebs to 4 times daily     Aspiration risk  Acute severe oropharyngeal dysphagia, proximal muscle weakness  Esophageal dysmotility  Laryngeal squamous cell carcinoma s/p radiation (4/2024-7/2024)  Has had increased dysphagia in the setting of laryngeal squamous cell carcinoma s/p radiation (4/2024-7/2024).  Some concern for respiratory decompensation due to recurrent aspiration events. However, family reports that over the past 3-4 weeks has declined dramatically from being quite mobile, active to being barely active, with reduced speech and aspirations. ENT evaluated 12/16/24 with bedside laryngoscopy for dysphagia.  Treated by GI and there is no concern of esophageal web.  No anatomic explanations for dysphagia seen. Normal vocal cord function and control seen including ability to approximate cords during cough. Clinical picture consistent with significant muscle weakness contributing to oropharyngeal dysphagia, poor vocal  pressure, poor cough effort likely due to radiation.  No evidence of active rheumatologic condition contributing per rheumatology evaluation. CK, aldolase negative. Unclear if hypothyroidism is the cause but can be contributing.  Cervical thoracic MRI unremarkable  - SLP following  - NPO - okay for ice chips for comfort with 1:1 supervision  - Will need VFSS -- SLP will notify when they feel she is ready for this  - patient agreeable for PEG placement and will consulted IR ( hold apixaban on 12/25 for placement on 12/27)      Acute hypoxic respiratory failure - improving  Aspiration pneumonitis  MSSA PNA s/p abx (12/3-12/8)  Obstructive lung disease (COPD vs asthma)  Pulmonary hypertension   Presented on 11/21 with acute hypoxic respiratory failure; initially suspected to be secondary to HFpEF exacerbation; unresolved with diuresis. Underlying obstructive lung disease but no evidence of exacerbation here. Was treated for MSSA pneumonia. Overnight 12/8, developed sudden worsening of oxygenation and increased secretions in the setting of dysphagia/recent laryngeal radiation and was intubated for airway protection. Suspected aspiration pneumonitis as etiology. CT Chest (12/8) with mediastinal/hilar lymphadenopathy and resolving diffuse ground glass opacities most consistent with resolving pneumonia; no signs of progressive or secondary infectious process. Was briefly on zosyn but this was discontinued as she was treated for full course based on initial pneumonia. She has been extubated and now on 1-3L.   - Pulse oximetry, supplemental O2  - Budesonide neb 0.5 mg BID,  - ipratropium-levalbuterol neb QID  - Aspiration precautions; speech following   - Sputum culture (12/9) grew candida but leukocytosis improved and back on minimal low flow oxygen so will not treat for now  - Flutter valve, incentive spirometer  - Low threshold to resume empiric abx for pnuemonia coverage     Chronic afib  Afib with RVR  [id number]  (age++, HF+).   - PTA apixaban 5mg BID  - PTA diltiazem 240 ER BID - HOLD (cannot give ER enterally)               Up-titrate to short-acting Diltiazem 120mg q6H as tolerated by blood pressure  - PTA propanolol 10mg BID which has been held and will start metoprolol      Chronic Left parietal, left thalamic lacunar strokes  - will increase atorvastatin to high intensity (20-> 40 mg). On DOAC  - follow up Neurorecs  - A1C shows prediabetes,     Hypothyroidism  Hx of thyroidectomy 2/2 cancer   - Continue PTA levothyroxine 88 mcg daily   - consulted Endocrine service as above and increased dose to 100 mcg daily. Appreciate recommendations       HFpEF (LVEF 55-60% 11/22/2024)   Pulmonary hypertension (44 mmHg per echo 11/22/2024)  Possible small PFO  Echo during current admission (11/22/2024) notable for increased thickness of LV and elevated BNP (peak 9200 > 6200) concerning for heart failure exacerbation s/p diuresis and pulmonary hypertension with estimated pulmonary artery pressure of 45 mmHg. Echo with bubble study (11/25/2024) concerning for possible small PFO. With intubation on 12/8, developed hypotension without evidence of shock. Etiology of hypotension may be cardiogenic (e.g. exacerbation of pulmonary hypertension by positive pressure ventilation) vs medication associated (propofol, precedex); do not currently suspect distributive or obstructive hypotension. Weaned off pressors 12/10.   - Preload dependent in the setting of pulmonary hypertension, gentle diuresis as needed       At risk for refeeding syndrome  Starvation ketosis  Hypokalemia  Severe malnutrition in context of chronic illness/disease  Per daughter, baseline weight around 120-130 lbs. Admitted 102 lbs. Patient states she is eating less and has less appetite in general as well as having difficulty swallowing. While NPO during intubation, developed starvation ketosis. Given high risk for aspiration with PO intake, placed NGT and started tube  feeds. Will need close monitoring for refeeding syndrome.   - NPO   - RD following for TF management   - Monitor for refeeding syndrome   - Lyte replacement protocol   - IV thiamine replacement      Oliguric SAMIR  Cr 0.9 on admission. Baseline appears 0.9-1.0. Developed SAMIR initially in setting of diuresis and had been improving. Subsequently increased following transfer to ICU with consideration for prerenal in setting of NPO status. Also with new diarrhea. Cr stable.   - Strict I/Os  - Trend BMP   - FWF via NGT    Diarrhea  Frequent loose stools. Unclear timing of onset so not sure if related to abx or TF initiation. No abdominal pain.  - Monitor   - Hold on infectious testing for now      Anemia of chronic illness  Baseline Hgb 11-12. Currently near baseline. Can consider anemia of chronic illness.   - Monitor CBC     Generalized deconditioning  Recurrent falls  - PT/OT consults     CREST Syndrome  Characterized by Raynaud's, Calcinosis, GERD and some telangectasia's. Last saw [facility] 2017. No history of ILD.    - Continue protonix 40 mg daily     Anxiety  Depression  - Continue PTA Sertraline 150 mg daily           Diet: Fluid restriction 2000 ML FLUID  NPO for Medical/Clinical Reasons Except for: No Exceptions  Adult Formula Drip Feeding: Continuous Inoc[employer] Farms Peptide 1.5; Nasogastric tube; Goal Rate: 45; mL/hr; 12/13: please transition to new TF formula immediately when arrives to pt room and ok to start at new goal rate; Do not advance tube feeding rat...    DVT Prophylaxis: DOAC  Miranda Catheter: Not present  Lines: None     Cardiac Monitoring: ACTIVE order. Indication: Tachyarrhythmias, acute (48 hours)  Code Status: Full Code      Clinically Significant Risk Factors          # Hypochloremia: Lowest Cl = 97 mmol/L in last 2 days, will monitor as appropriate      # Hypoalbuminemia: Lowest albumin = 3 g/dL at 12/10/2024  3:11 AM, will monitor as appropriate  # Coagulation Defect: INR = 1.54 (Ref  range: 0.85 - 1.15) and/or PTT = 40 Seconds (Ref range: 22 - 38 Seconds), will monitor for bleeding        # Acute Hypoxic Respiratory Failure: Documented O2 saturation < 90%. Continue supplemental oxygen as needed          # Severe Malnutrition: based on nutrition assessment      # Financial/Environmental Concerns: none         Social Drivers of Health    Housing Stability: High Risk (11/23/2024)    Housing Stability     Do you have housing? : No     Are you worried about losing your housing?: No          Disposition Plan     Medically Ready for Discharge: Anticipated in 5+ Days             Roverto Dyson MD  Hospitalist Service, GOLD TEAM [address] [place] [address]  Securely message with [employer] (more info)  Text page via [facility] Paging/Directory   See signed in provider for up to date coverage information  ______________________________________________________________________    Interval History     Patient expressed to the nurse that she does not want to continue cares.  She feels like she is dying.  We talked about her goals of care.  She want to continue current care but she does not want to be intubated or resuscitated.    Physical Exam   Vital Signs: Temp: 97.9  F (36.6  C) Temp src: Oral BP: 117/73 Pulse: 111   Resp: 16 SpO2: 93 % O2 Device: Nasal cannula Oxygen Delivery: 1 LPM  Weight: 104 lbs 15.02 oz    General Appearance:  Awake. Alert. No acute distress. Frail appearing.   Respiratory: Normal work of breathing on 2L. Lungs with loud upper airway breath sounds. Lower lobes sound clear. Poor cough effort.   Cardiovascular: Irregular. Tachycardic. No obvious murmur.   GI: Nondistended. Normoactive. Soft. Non-tender.   Skin: Warm, dry.   Neuro: alert, oriented x3. Proximal muscle strength - 4/5    Medical Decision Making          55 MINUTES SPENT BY ME on the date of service doing chart review, history, exam, documentation & further activities per the note.      Data

## 2024-12-25 LAB
ACHR BIND AB SER-SCNC: 0 NMOL/L
ANION GAP SERPL CALCULATED.3IONS-SCNC: 12 MMOL/L (ref 7–15)
BUN SERPL-MCNC: 60.5 MG/DL (ref 8–23)
CALCIUM SERPL-MCNC: 8.9 MG/DL (ref 8.8–10.4)
CHLORIDE SERPL-SCNC: 98 MMOL/L (ref 98–107)
CREAT SERPL-MCNC: 1.08 MG/DL (ref 0.51–0.95)
DEPRECATED CALCIDIOL+CALCIFEROL SERPL-MC: 50 UG/L (ref 20–75)
EGFRCR SERPLBLD CKD-EPI 2021: 52 ML/MIN/1.73M2
ERYTHROCYTE [DISTWIDTH] IN BLOOD BY AUTOMATED COUNT: 19 % (ref 10–15)
GLUCOSE SERPL-MCNC: 101 MG/DL (ref 70–99)
HCO3 SERPL-SCNC: 27 MMOL/L (ref 22–29)
HCT VFR BLD AUTO: 34.3 % (ref 35–47)
HGB BLD-MCNC: 10.7 G/DL (ref 11.7–15.7)
MCH RBC QN AUTO: 25.6 PG (ref 26.5–33)
MCHC RBC AUTO-ENTMCNC: 31.2 G/DL (ref 31.5–36.5)
MCV RBC AUTO: 82 FL (ref 78–100)
PHOSPHATE SERPL-MCNC: 4.4 MG/DL (ref 2.5–4.5)
PLATELET # BLD AUTO: 370 10E3/UL (ref 150–450)
POTASSIUM SERPL-SCNC: 4.9 MMOL/L (ref 3.4–5.3)
RBC # BLD AUTO: 4.18 10E6/UL (ref 3.8–5.2)
SODIUM SERPL-SCNC: 137 MMOL/L (ref 135–145)
VITAMIN D2 SERPL-MCNC: 10 UG/L
VITAMIN D3 SERPL-MCNC: 40 UG/L
WBC # BLD AUTO: 8.9 10E3/UL (ref 4–11)

## 2024-12-25 PROCEDURE — 94640 AIRWAY INHALATION TREATMENT: CPT | Mod: 76

## 2024-12-25 PROCEDURE — 84100 ASSAY OF PHOSPHORUS: CPT

## 2024-12-25 PROCEDURE — 250N000013 HC RX MED GY IP 250 OP 250 PS 637: Performed by: STUDENT IN AN ORGANIZED HEALTH CARE EDUCATION/TRAINING PROGRAM

## 2024-12-25 PROCEDURE — 250N000013 HC RX MED GY IP 250 OP 250 PS 637: Performed by: HOSPITALIST

## 2024-12-25 PROCEDURE — 250N000013 HC RX MED GY IP 250 OP 250 PS 637: Performed by: PHYSICIAN ASSISTANT

## 2024-12-25 PROCEDURE — 250N000013 HC RX MED GY IP 250 OP 250 PS 637

## 2024-12-25 PROCEDURE — 250N000009 HC RX 250

## 2024-12-25 PROCEDURE — 250N000009 HC RX 250: Performed by: HOSPITALIST

## 2024-12-25 PROCEDURE — 120N000002 HC R&B MED SURG/OB UMMC

## 2024-12-25 PROCEDURE — 82435 ASSAY OF BLOOD CHLORIDE: CPT | Performed by: STUDENT IN AN ORGANIZED HEALTH CARE EDUCATION/TRAINING PROGRAM

## 2024-12-25 PROCEDURE — 999N000157 HC STATISTIC RCP TIME EA 10 MIN

## 2024-12-25 PROCEDURE — 36415 COLL VENOUS BLD VENIPUNCTURE: CPT | Performed by: STUDENT IN AN ORGANIZED HEALTH CARE EDUCATION/TRAINING PROGRAM

## 2024-12-25 PROCEDURE — 250N000013 HC RX MED GY IP 250 OP 250 PS 637: Performed by: INTERNAL MEDICINE

## 2024-12-25 PROCEDURE — 80048 BASIC METABOLIC PNL TOTAL CA: CPT | Performed by: STUDENT IN AN ORGANIZED HEALTH CARE EDUCATION/TRAINING PROGRAM

## 2024-12-25 PROCEDURE — 85014 HEMATOCRIT: CPT

## 2024-12-25 PROCEDURE — 99232 SBSQ HOSP IP/OBS MODERATE 35: CPT

## 2024-12-25 PROCEDURE — 85041 AUTOMATED RBC COUNT: CPT

## 2024-12-25 PROCEDURE — 82374 ASSAY BLOOD CARBON DIOXIDE: CPT | Performed by: STUDENT IN AN ORGANIZED HEALTH CARE EDUCATION/TRAINING PROGRAM

## 2024-12-25 PROCEDURE — 94640 AIRWAY INHALATION TREATMENT: CPT

## 2024-12-25 RX ADMIN — LEVOTHYROXINE SODIUM 100 MCG: 100 TABLET ORAL at 06:29

## 2024-12-25 RX ADMIN — IPRATROPIUM BROMIDE 0.5 MG: 0.5 SOLUTION RESPIRATORY (INHALATION) at 20:16

## 2024-12-25 RX ADMIN — Medication 1 SPRAY: at 21:10

## 2024-12-25 RX ADMIN — THERA TABS 1 TABLET: TAB at 09:03

## 2024-12-25 RX ADMIN — GABAPENTIN 600 MG: 300 CAPSULE ORAL at 21:11

## 2024-12-25 RX ADMIN — DILTIAZEM HYDROCHLORIDE 120 MG: 120 TABLET, FILM COATED ORAL at 18:15

## 2024-12-25 RX ADMIN — SERTRALINE HYDROCHLORIDE 150 MG: 100 TABLET ORAL at 09:03

## 2024-12-25 RX ADMIN — SODIUM CHLORIDE SOLN NEBU 3% 3 ML: 3 NEBU SOLN at 20:12

## 2024-12-25 RX ADMIN — ORAL VEHICLES - SUSP 25 MG: SUSPENSION at 21:14

## 2024-12-25 RX ADMIN — FEXOFENADINE HYDROCHLORIDE 120 MG: 60 TABLET ORAL at 09:03

## 2024-12-25 RX ADMIN — SPIRONOLACTONE 25 MG: 25 TABLET, FILM COATED ORAL at 09:03

## 2024-12-25 RX ADMIN — DILTIAZEM HYDROCHLORIDE 120 MG: 120 TABLET, FILM COATED ORAL at 00:07

## 2024-12-25 RX ADMIN — PANTOPRAZOLE SODIUM 40 MG: 40 INJECTION, POWDER, FOR SOLUTION INTRAVENOUS at 09:01

## 2024-12-25 RX ADMIN — IPRATROPIUM BROMIDE 0.5 MG: 0.5 SOLUTION RESPIRATORY (INHALATION) at 13:59

## 2024-12-25 RX ADMIN — SODIUM CHLORIDE SOLN NEBU 3% 3 ML: 3 NEBU SOLN at 08:40

## 2024-12-25 RX ADMIN — LEVALBUTEROL HYDROCHLORIDE 0.63 MG: 0.63 SOLUTION RESPIRATORY (INHALATION) at 08:39

## 2024-12-25 RX ADMIN — DILTIAZEM HYDROCHLORIDE 120 MG: 120 TABLET, FILM COATED ORAL at 06:30

## 2024-12-25 RX ADMIN — FUROSEMIDE 40 MG: 40 TABLET ORAL at 09:03

## 2024-12-25 RX ADMIN — THIAMINE HCL TAB 100 MG 100 MG: 100 TAB at 09:03

## 2024-12-25 RX ADMIN — ORAL VEHICLES - SUSP 12.5 MG: SUSPENSION at 09:01

## 2024-12-25 RX ADMIN — BUDESONIDE 0.5 MG: 0.5 INHALANT ORAL at 08:39

## 2024-12-25 RX ADMIN — IPRATROPIUM BROMIDE 0.5 MG: 0.5 SOLUTION RESPIRATORY (INHALATION) at 08:40

## 2024-12-25 RX ADMIN — ATORVASTATIN CALCIUM 40 MG: 40 TABLET, FILM COATED ORAL at 21:11

## 2024-12-25 RX ADMIN — DILTIAZEM HYDROCHLORIDE 120 MG: 120 TABLET, FILM COATED ORAL at 12:17

## 2024-12-25 ASSESSMENT — ACTIVITIES OF DAILY LIVING (ADL)
ADLS_ACUITY_SCORE: 72
ADLS_ACUITY_SCORE: 68
ADLS_ACUITY_SCORE: 72

## 2024-12-25 NOTE — PLAN OF CARE
Goal Outcome Evaluation:    VSS on 3 liters, denies pain, repositioned in bed q 2 hours, tube feeding at goal rate, NPO, pt incontinent of bowel and bladder, primafit on but leaks often, pt moved closer to desk due to pt weakness and inability to have strong cough to clear secretions needing frequent oral suctioning of thick mucous secretions

## 2024-12-25 NOTE — PROGRESS NOTES
[facility]    Medicine Progress Note - Hospitalist Service, GOLD TEAM 8    Date of Admission:  11/21/2024    Assessment & Plan   Asya Coffman is a 79 year old female admitted on 11/21/2024. 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 Initially admitted to hospital medicine service with hypoxic respiratory failure suspected due to HFpEF requiring diuresis. Developed acute hypoxia on 12/8 secondary to suspected aspiration pneumonitis for which she was intubated (12/8-12/10). Now s/p extubation and transferring back to hospital medicine service.  Continues to have episodes of aspiration.  Now on n.p.o. and tube feeding.  He has not been    Updates today:    -Health paschology consult  -CXR without significant change from previous but has atelectasis  -Family report episodes any snoring at home.    -Will check VBG tomorrow in the a.m.  -Increase dose of metoprolol     Aspiration risk  Acute severe oropharyngeal dysphagia, proximal muscle weakness  Esophageal dysmotility  Concern for radiation-induced dysphagia  Laryngeal squamous cell carcinoma s/p radiation (4/2024-7/2024)    Has had increased dysphagia in the setting of laryngeal squamous cell carcinoma s/p radiation (4/2024-7/2024). Family reports that over the past 3-4 weeks has declined dramatically from being quite mobile, active to being barely active, with reduced speech and recurrent aspirations. Some concern for respiratory decompensation due to recurrent aspiration events. ENT evaluated 12/16/24 with bedside laryngoscopy for dysphagia.  Normal vocal cord function and control seen including ability to approximate cords during cough. No anatomic explanations for  dysphagia seen.Evaluated by GI and there is no concern of esophageal web.   Clinical picture consistent with significant muscle weakness contributing to oropharyngeal dysphagia, poor vocal pressure, poor cough effort likely due to radiation.  No evidence of active rheumatologic condition contributing per rheumatology evaluation. CK, aldolase negative. Unclear if hypothyroidism is the cause but can be contributing.  Cervical thoracic MI without significant findings.   - SLP following  - NPO - okay for ice chips for comfort with 1:1 supervision  - Will need VFSS   - SLP will notify when they feel she is ready for this  - patient agreeable for PEG placement and and IR is consulted ( hold apixaban on 12/25 for placement on 12/27)      Acute hypoxic respiratory failure - improving  Aspiration pneumonitis  MSSA PNA s/p abx (12/3-12/8)  Obstructive lung disease (COPD vs asthma)  Pulmonary hypertension   Presented on 11/21 with acute hypoxic respiratory failure; initially suspected to be secondary to HFpEF exacerbation; unresolved with diuresis. Underlying obstructive lung disease but no evidence of exacerbation here. Was treated for MSSA pneumonia. Overnight 12/8, developed sudden worsening of oxygenation and increased secretions in the setting of dysphagia/recent laryngeal radiation and was intubated for airway protection. Suspected aspiration pneumonitis as etiology. CT Chest (12/8) with mediastinal/hilar lymphadenopathy and resolving diffuse ground glass opacities most consistent with resolving pneumonia; no signs of progressive or secondary infectious process. Was briefly on zosyn but this was discontinued as she was treated for full course based on initial pneumonia. She has been extubated and now on 1-3L.   - Pulse oximetry, supplemental O2  - Budesonide neb 0.5 mg BID,  - ipratropium-levalbuterol neb QID  - Aspiration precautions; speech following   - Flutter valve, incentive spirometer  - Low threshold to resume  empiric abx for pnuemonia coverage     Anxiety  Depression  History of depression and anxiety.  She feels hopeless and has sad mood during this hospitalization.  Will continue PTA sertraline.  Will also consult health psychology.  - Continue PTA Sertraline 150 mg daily     Chronic afib  Afib with RVR  [id number] 3 (age++, HF+).   - PTA apixaban 5mg BID  - PTA diltiazem 240 ER BID - HOLD (cannot give ER enterally)               Up-titrate to short-acting Diltiazem 120mg q6H as tolerated by blood pressure  -His metoprolol to tartrate to 25 mg twice daily      Chronic Left parietal, left thalamic lacunar strokes  - will increase atorvastatin to high intensity (20-> 40 mg). On DOAC  - follow up Neurorecs  - A1C shows prediabetes,     Hypothyroidism  Hx of thyroidectomy 2/2 cancer   - Continue PTA levothyroxine 88 mcg daily   - consulted Endocrine service as above and increased dose to 100 mcg daily. Appreciate recommendations       HFpEF (LVEF 55-60% 11/22/2024)   Pulmonary hypertension (44 mmHg per echo 11/22/2024)  Possible small PFO  Echo during current admission (11/22/2024) notable for increased thickness of LV and elevated BNP (peak 9200 > 6200) concerning for heart failure exacerbation s/p diuresis and pulmonary hypertension with estimated pulmonary artery pressure of 45 mmHg. Echo with bubble study (11/25/2024) concerning for possible small PFO. With intubation on 12/8, developed hypotension without evidence of shock. Etiology of hypotension may be cardiogenic (e.g. exacerbation of pulmonary hypertension by positive pressure ventilation) vs medication associated (propofol, precedex); do not currently suspect distributive or obstructive hypotension. Weaned off pressors 12/10.   - Preload dependent in the setting of pulmonary hypertension, gentle diuresis as needed       At risk for refeeding syndrome  Starvation ketosis  Hypokalemia  Severe malnutrition in context of chronic illness/disease  Per daughter,  baseline weight around 120-130 lbs. Admitted 102 lbs. Patient states she is eating less and has less appetite in general as well as having difficulty swallowing. While NPO during intubation, developed starvation ketosis. Given high risk for aspiration with PO intake, placed NGT and started tube feeds. Will need close monitoring for refeeding syndrome.   - NPO   - RD following for TF management   - Monitor for refeeding syndrome   - Lyte replacement protocol   - IV thiamine replacement      Oliguric SAMIR  Cr 0.9 on admission. Baseline appears 0.9-1.0. Developed SAMIR initially in setting of diuresis and had been improving. Subsequently increased following transfer to ICU with consideration for prerenal in setting of NPO status. Also with new diarrhea. Cr stable.   - Strict I/Os  - Trend BMP   - FWF via NGT    History of apnea  Family reports this apneic episodes after she underwent radiation therapy.  There is a possibility that she is having obstructive sleep apnea.  Will check VBG.  If there is evidence of gas in the morning we will put her on CPAP at night.    Diarrhea  Frequent loose stools. Unclear timing of onset so not sure if related to abx or TF initiation. No abdominal pain.  - Monitor   - Hold on infectious testing for now      Anemia of chronic illness  Baseline Hgb 11-12. Currently near baseline. Can consider anemia of chronic illness.   - Monitor CBC     Generalized deconditioning  Recurrent falls  - PT/OT consults     CREST Syndrome  Characterized by Raynaud's, Calcinosis, GERD and some telangectasia's. Last saw [facility] 2017. No history of ILD.    - Continue protonix 40 mg daily             Diet: Fluid restriction 2000 ML FLUID  NPO for Medical/Clinical Reasons Except for: No Exceptions  Adult Formula Drip Feeding: Continuous [employer] Peptide 1.5; Nasogastric tube; Goal Rate: 45; mL/hr; 12/13: please transition to new TF formula immediately when arrives to pt room and ok to start at new goal  rate; Do not advance tube feeding rat...    DVT Prophylaxis: DOAC  Miranda Catheter: Not present  Lines: None     Cardiac Monitoring: ACTIVE order. Indication: Tachyarrhythmias, acute (48 hours)  Code Status: No CPR- Do NOT Intubate      Clinically Significant Risk Factors          # Hypochloremia: Lowest Cl = 97 mmol/L in last 2 days, will monitor as appropriate      # Hypoalbuminemia: Lowest albumin = 3 g/dL at 12/10/2024  3:11 AM, will monitor as appropriate  # Coagulation Defect: INR = 1.54 (Ref range: 0.85 - 1.15) and/or PTT = 40 Seconds (Ref range: 22 - 38 Seconds), will monitor for bleeding                # Severe Malnutrition: based on nutrition assessment      # Financial/Environmental Concerns: none         Social Drivers of Health    Housing Stability: High Risk (11/23/2024)    Housing Stability     Do you have housing? : No     Are you worried about losing your housing?: No          Disposition Plan     Medically Ready for Discharge: Anticipated in 5+ Days             Roverto Dyson MD  Hospitalist Service, GOLD TEAM 8  [facility]  Securely message with [employer] (more info)  Text page via [facility] Paging/Directory   See signed in provider for up to date coverage information  ______________________________________________________________________    Interval History     No acute events overnight.  She feels tired.  She noted to struggle to clear oral secretions.    Physical Exam   Vital Signs: Temp: 97.8  F (36.6  C) Temp src: Oral BP: 126/89 Pulse: 119   Resp: 18 SpO2: 94 % O2 Device: Nasal cannula Oxygen Delivery: 3 LPM  Weight: 104 lbs 15.02 oz    General Appearance:  Awake. Alert. No acute distress. Frail appearing.   Respiratory: Normal work of breathing on 2L. Lungs with loud upper airway breath sounds. Lower lobes sound clear. Poor cough effort.   Cardiovascular: Irregular. Tachycardic. No obvious murmur.   GI: Nondistended. Normoactive. Soft. Non-tender.   Skin:  Warm, dry.   Neuro: alert, oriented x3. Proximal muscle strength - 4/5    Medical Decision Making          55 MINUTES SPENT BY ME on the date of service doing chart review, history, exam, documentation & further activities per the note.      Data

## 2024-12-25 NOTE — PLAN OF CARE
"Blood pressure 117/64, pulse 66, temperature 97.9  F (36.6  C), temperature source Oral, resp. rate 16, height 1.651 m (5' 5\"), weight 47.6 kg (104 lb 15 oz), SpO2 93%.Via 3L NC     Assumed care at 07:00    Patient A & O X 4, forgetful at time .  Code status changed to DNR/DNI  Lung sound Course. Patient desate on 1L increased to 3L NC . Update gold 8 team and MD aware, Pt  Remain NPO frequent oral suction and oral care .  NJ patent . Tube feeding at Goal tolerated well. Purewick in place , Incontinence of bladder and bowel  , inct care done barrier cream applied,  Repositioned q2hr and as tolerated.   Call light within reach, bed alarm on. Continue monitor closely and update MD with change.    Goal Outcome Evaluation:      Plan of Care Reviewed With: patient    Overall Patient Progress: no change           "

## 2024-12-25 NOTE — PLAN OF CARE
"Shift: 1900-0730     D: See flowsheets for full assessment & vitals    PRNs: None  Lines: PIV's SL.   N/J Infusing TF @ goal of 45mL/hr.   RN managed Labs: Phos recheck this AM.      A/R: Pt alert and oriented, but flat affect and hypoactive. Cooperative with cares. Voice hoarse at times. NPO, oral cares completed several times overnight. Frequent weak, productive cough. Suctioned x4, with thick/yellow secretions and tolerated well. Requiring 3L NC, to remain >94 percent. Incontinent of stool x4, purewick in place. Repositioned q2hrs. Denies pain.      P: Per MD note, PEG placement 12/27    Goal Outcome Evaluation:      Plan of Care Reviewed With: patient    Overall Patient Progress: no changeOverall Patient Progress: no change    /89   Pulse 119   Temp 97.8  F (36.6  C) (Oral)   Resp 18   Ht 1.651 m (5' 5\")   Wt 47.6 kg (104 lb 15 oz)   SpO2 94%   BMI 17.46 kg/m      "

## 2024-12-26 VITALS
BODY MASS INDEX: 17.48 KG/M2 | OXYGEN SATURATION: 93 % | SYSTOLIC BLOOD PRESSURE: 120 MMHG | HEIGHT: 65 IN | TEMPERATURE: 98.4 F | RESPIRATION RATE: 16 BRPM | DIASTOLIC BLOOD PRESSURE: 76 MMHG | WEIGHT: 104.94 LBS | HEART RATE: 55 BPM

## 2024-12-26 LAB
ACHR BLOCK AB/ACHR TOTAL SFR SER: 3 %
ACHR MOD AB/ACHR TOTAL SFR SER: 0 %
ANION GAP SERPL CALCULATED.3IONS-SCNC: 15 MMOL/L (ref 7–15)
BASE EXCESS BLDV CALC-SCNC: 7.8 MMOL/L (ref -3–3)
BUN SERPL-MCNC: 66.3 MG/DL (ref 8–23)
CALCIUM SERPL-MCNC: 9.1 MG/DL (ref 8.8–10.4)
CHLORIDE SERPL-SCNC: 99 MMOL/L (ref 98–107)
CREAT SERPL-MCNC: 1.07 MG/DL (ref 0.51–0.95)
EGFRCR SERPLBLD CKD-EPI 2021: 53 ML/MIN/1.73M2
ERYTHROCYTE [DISTWIDTH] IN BLOOD BY AUTOMATED COUNT: 18.9 % (ref 10–15)
FERRITIN SERPL-MCNC: 120 NG/ML (ref 11–328)
GLUCOSE SERPL-MCNC: 130 MG/DL (ref 70–99)
HCO3 BLDV-SCNC: 33 MMOL/L (ref 21–28)
HCO3 SERPL-SCNC: 26 MMOL/L (ref 22–29)
HCT VFR BLD AUTO: 36.3 % (ref 35–47)
HGB BLD-MCNC: 11.3 G/DL (ref 11.7–15.7)
IRON BINDING CAPACITY (ROCHE): 340 UG/DL (ref 240–430)
IRON SATN MFR SERPL: 7 % (ref 15–46)
IRON SERPL-MCNC: 24 UG/DL (ref 37–145)
MCH RBC QN AUTO: 25.4 PG (ref 26.5–33)
MCHC RBC AUTO-ENTMCNC: 31.1 G/DL (ref 31.5–36.5)
MCV RBC AUTO: 82 FL (ref 78–100)
O2/TOTAL GAS SETTING VFR VENT: 1 %
OXYHGB MFR BLDV: 78 % (ref 70–75)
PCO2 BLDV: 46 MM HG (ref 40–50)
PH BLDV: 7.46 [PH] (ref 7.32–7.43)
PLATELET # BLD AUTO: 372 10E3/UL (ref 150–450)
PO2 BLDV: 46 MM HG (ref 25–47)
POTASSIUM SERPL-SCNC: 4.8 MMOL/L (ref 3.4–5.3)
RBC # BLD AUTO: 4.45 10E6/UL (ref 3.8–5.2)
SAO2 % BLDV: 79.1 % (ref 70–75)
SODIUM SERPL-SCNC: 140 MMOL/L (ref 135–145)
STRIA MUS IGG SER QL IF: NORMAL
WBC # BLD AUTO: 10.3 10E3/UL (ref 4–11)

## 2024-12-26 PROCEDURE — 258N000003 HC RX IP 258 OP 636

## 2024-12-26 PROCEDURE — 120N000002 HC R&B MED SURG/OB UMMC

## 2024-12-26 PROCEDURE — 80048 BASIC METABOLIC PNL TOTAL CA: CPT | Performed by: STUDENT IN AN ORGANIZED HEALTH CARE EDUCATION/TRAINING PROGRAM

## 2024-12-26 PROCEDURE — 250N000009 HC RX 250

## 2024-12-26 PROCEDURE — 999N000157 HC STATISTIC RCP TIME EA 10 MIN

## 2024-12-26 PROCEDURE — 250N000013 HC RX MED GY IP 250 OP 250 PS 637: Performed by: INTERNAL MEDICINE

## 2024-12-26 PROCEDURE — 92526 ORAL FUNCTION THERAPY: CPT | Mod: GN

## 2024-12-26 PROCEDURE — 85027 COMPLETE CBC AUTOMATED: CPT

## 2024-12-26 PROCEDURE — 80051 ELECTROLYTE PANEL: CPT | Performed by: STUDENT IN AN ORGANIZED HEALTH CARE EDUCATION/TRAINING PROGRAM

## 2024-12-26 PROCEDURE — 250N000013 HC RX MED GY IP 250 OP 250 PS 637: Performed by: STUDENT IN AN ORGANIZED HEALTH CARE EDUCATION/TRAINING PROGRAM

## 2024-12-26 PROCEDURE — 250N000013 HC RX MED GY IP 250 OP 250 PS 637: Performed by: HOSPITALIST

## 2024-12-26 PROCEDURE — 97535 SELF CARE MNGMENT TRAINING: CPT | Mod: GO

## 2024-12-26 PROCEDURE — 250N000013 HC RX MED GY IP 250 OP 250 PS 637

## 2024-12-26 PROCEDURE — 82374 ASSAY BLOOD CARBON DIOXIDE: CPT | Performed by: STUDENT IN AN ORGANIZED HEALTH CARE EDUCATION/TRAINING PROGRAM

## 2024-12-26 PROCEDURE — 82728 ASSAY OF FERRITIN: CPT

## 2024-12-26 PROCEDURE — 250N000011 HC RX IP 250 OP 636

## 2024-12-26 PROCEDURE — 82310 ASSAY OF CALCIUM: CPT | Performed by: STUDENT IN AN ORGANIZED HEALTH CARE EDUCATION/TRAINING PROGRAM

## 2024-12-26 PROCEDURE — 36415 COLL VENOUS BLD VENIPUNCTURE: CPT | Performed by: STUDENT IN AN ORGANIZED HEALTH CARE EDUCATION/TRAINING PROGRAM

## 2024-12-26 PROCEDURE — 99233 SBSQ HOSP IP/OBS HIGH 50: CPT

## 2024-12-26 PROCEDURE — 94640 AIRWAY INHALATION TREATMENT: CPT

## 2024-12-26 PROCEDURE — 94640 AIRWAY INHALATION TREATMENT: CPT | Mod: 76

## 2024-12-26 PROCEDURE — 97530 THERAPEUTIC ACTIVITIES: CPT | Mod: GO

## 2024-12-26 PROCEDURE — 83550 IRON BINDING TEST: CPT

## 2024-12-26 PROCEDURE — 82805 BLOOD GASES W/O2 SATURATION: CPT

## 2024-12-26 PROCEDURE — 99207 PR NO BILLABLE SERVICE THIS VISIT: CPT | Performed by: STUDENT IN AN ORGANIZED HEALTH CARE EDUCATION/TRAINING PROGRAM

## 2024-12-26 PROCEDURE — 250N000013 HC RX MED GY IP 250 OP 250 PS 637: Performed by: PHYSICIAN ASSISTANT

## 2024-12-26 RX ORDER — CEFAZOLIN SODIUM 2 G/100ML
2 INJECTION, SOLUTION INTRAVENOUS
Status: DISCONTINUED | OUTPATIENT
Start: 2024-12-26 | End: 2024-12-30

## 2024-12-26 RX ORDER — METHYLPREDNISOLONE SODIUM SUCCINATE 40 MG/ML
40 INJECTION INTRAMUSCULAR; INTRAVENOUS
Status: DISCONTINUED | OUTPATIENT
Start: 2024-12-26 | End: 2025-01-01

## 2024-12-26 RX ORDER — NALOXONE HYDROCHLORIDE 0.4 MG/ML
0.4 INJECTION, SOLUTION INTRAMUSCULAR; INTRAVENOUS; SUBCUTANEOUS
Status: DISCONTINUED | OUTPATIENT
Start: 2024-12-26 | End: 2025-01-14 | Stop reason: HOSPADM

## 2024-12-26 RX ORDER — DIPHENHYDRAMINE HYDROCHLORIDE 50 MG/ML
50 INJECTION INTRAMUSCULAR; INTRAVENOUS
Status: DISCONTINUED | OUTPATIENT
Start: 2024-12-26 | End: 2025-01-01

## 2024-12-26 RX ORDER — ALBUTEROL SULFATE 0.83 MG/ML
2.5 SOLUTION RESPIRATORY (INHALATION)
Status: DISCONTINUED | OUTPATIENT
Start: 2024-12-26 | End: 2025-01-01

## 2024-12-26 RX ORDER — MEPERIDINE HYDROCHLORIDE 25 MG/ML
25 INJECTION INTRAMUSCULAR; INTRAVENOUS; SUBCUTANEOUS
Status: DISCONTINUED | OUTPATIENT
Start: 2024-12-26 | End: 2025-01-01

## 2024-12-26 RX ORDER — ALBUTEROL SULFATE 90 UG/1
1-2 INHALANT RESPIRATORY (INHALATION)
Status: DISCONTINUED | OUTPATIENT
Start: 2024-12-26 | End: 2025-01-01

## 2024-12-26 RX ORDER — NALOXONE HYDROCHLORIDE 0.4 MG/ML
0.2 INJECTION, SOLUTION INTRAMUSCULAR; INTRAVENOUS; SUBCUTANEOUS
Status: DISCONTINUED | OUTPATIENT
Start: 2024-12-26 | End: 2025-01-14 | Stop reason: HOSPADM

## 2024-12-26 RX ORDER — DIPHENHYDRAMINE HYDROCHLORIDE 50 MG/ML
25 INJECTION INTRAMUSCULAR; INTRAVENOUS
Status: DISCONTINUED | OUTPATIENT
Start: 2024-12-26 | End: 2025-01-01

## 2024-12-26 RX ORDER — SERTRALINE HYDROCHLORIDE 100 MG/1
200 TABLET, FILM COATED ORAL DAILY
Status: DISCONTINUED | OUTPATIENT
Start: 2024-12-27 | End: 2024-12-30

## 2024-12-26 RX ADMIN — LEVALBUTEROL HYDROCHLORIDE 0.63 MG: 0.63 SOLUTION RESPIRATORY (INHALATION) at 19:37

## 2024-12-26 RX ADMIN — SERTRALINE HYDROCHLORIDE 150 MG: 100 TABLET ORAL at 08:10

## 2024-12-26 RX ADMIN — BUDESONIDE 0.5 MG: 0.5 INHALANT ORAL at 08:51

## 2024-12-26 RX ADMIN — FEXOFENADINE HYDROCHLORIDE 120 MG: 60 TABLET ORAL at 08:10

## 2024-12-26 RX ADMIN — THIAMINE HCL TAB 100 MG 100 MG: 100 TAB at 08:10

## 2024-12-26 RX ADMIN — SPIRONOLACTONE 25 MG: 25 TABLET, FILM COATED ORAL at 08:10

## 2024-12-26 RX ADMIN — Medication 1 SPRAY: at 11:15

## 2024-12-26 RX ADMIN — DILTIAZEM HYDROCHLORIDE 120 MG: 120 TABLET, FILM COATED ORAL at 00:28

## 2024-12-26 RX ADMIN — THERA TABS 1 TABLET: TAB at 08:10

## 2024-12-26 RX ADMIN — GABAPENTIN 600 MG: 300 CAPSULE ORAL at 21:04

## 2024-12-26 RX ADMIN — SODIUM CHLORIDE SOLN NEBU 3% 3 ML: 3 NEBU SOLN at 19:37

## 2024-12-26 RX ADMIN — SODIUM CHLORIDE SOLN NEBU 3% 3 ML: 3 NEBU SOLN at 08:51

## 2024-12-26 RX ADMIN — BUDESONIDE 0.5 MG: 0.5 INHALANT ORAL at 19:37

## 2024-12-26 RX ADMIN — PANTOPRAZOLE SODIUM 40 MG: 40 INJECTION, POWDER, FOR SOLUTION INTRAVENOUS at 08:09

## 2024-12-26 RX ADMIN — DILTIAZEM HYDROCHLORIDE 120 MG: 120 TABLET, FILM COATED ORAL at 17:58

## 2024-12-26 RX ADMIN — DILTIAZEM HYDROCHLORIDE 120 MG: 120 TABLET, FILM COATED ORAL at 11:14

## 2024-12-26 RX ADMIN — IRON SUCROSE 300 MG: 20 INJECTION, SOLUTION INTRAVENOUS at 11:15

## 2024-12-26 RX ADMIN — POLYETHYLENE GLYCOL 3350 17 G: 17 POWDER, FOR SOLUTION ORAL at 20:35

## 2024-12-26 RX ADMIN — DILTIAZEM HYDROCHLORIDE 120 MG: 120 TABLET, FILM COATED ORAL at 06:41

## 2024-12-26 RX ADMIN — LEVALBUTEROL HYDROCHLORIDE 0.63 MG: 0.63 SOLUTION RESPIRATORY (INHALATION) at 08:51

## 2024-12-26 RX ADMIN — FUROSEMIDE 40 MG: 40 TABLET ORAL at 08:10

## 2024-12-26 RX ADMIN — Medication 1 SPRAY: at 08:10

## 2024-12-26 RX ADMIN — ORAL VEHICLES - SUSP 25 MG: SUSPENSION at 08:10

## 2024-12-26 RX ADMIN — IPRATROPIUM BROMIDE 0.5 MG: 0.5 SOLUTION RESPIRATORY (INHALATION) at 08:51

## 2024-12-26 RX ADMIN — ATORVASTATIN CALCIUM 40 MG: 40 TABLET, FILM COATED ORAL at 20:32

## 2024-12-26 RX ADMIN — IPRATROPIUM BROMIDE 0.5 MG: 0.5 SOLUTION RESPIRATORY (INHALATION) at 19:37

## 2024-12-26 RX ADMIN — ORAL VEHICLES - SUSP 25 MG: SUSPENSION at 20:32

## 2024-12-26 RX ADMIN — IPRATROPIUM BROMIDE 0.5 MG: 0.5 SOLUTION RESPIRATORY (INHALATION) at 14:11

## 2024-12-26 RX ADMIN — LEVOTHYROXINE SODIUM 100 MCG: 100 TABLET ORAL at 06:41

## 2024-12-26 RX ADMIN — IPRATROPIUM BROMIDE 0.5 MG: 0.5 SOLUTION RESPIRATORY (INHALATION) at 02:35

## 2024-12-26 RX ADMIN — Medication 1 SPRAY: at 20:36

## 2024-12-26 ASSESSMENT — ACTIVITIES OF DAILY LIVING (ADL)
ADLS_ACUITY_SCORE: 72
ADLS_ACUITY_SCORE: 68
ADLS_ACUITY_SCORE: 72
ADLS_ACUITY_SCORE: 68
ADLS_ACUITY_SCORE: 72
ADLS_ACUITY_SCORE: 68
ADLS_ACUITY_SCORE: 72

## 2024-12-26 NOTE — PROGRESS NOTES
[facility]    Medicine Progress Note - Hospitalist Service, GOLD TEAM 8    Date of Admission:  11/21/2024    Assessment & Plan   Asya Coffman is a 79 year old female admitted on 11/21/2024. 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 Initially admitted to hospital medicine service with hypoxic respiratory failure suspected due to HFpEF requiring diuresis. Developed acute hypoxia on 12/8 secondary to suspected aspiration pneumonitis for which she was intubated (12/8-12/10). Now s/p extubation and transferring back to hospital medicine service.  Continues to have episodes of aspiration.  Now on n.p.o. and tube feeding.  He has not been    Updates today:  -Hypoxia slightly better today  -IV iron  - consulted for emotional support    Addendum  Acute hypoxia needing up to 15 L which  for 5 minutes. Content which is similar to the  tube feed formula suctioned out from her respiratory tract.  Abdominal x-ray and chest x-ray ordered.  Communicated with IR to place GJ tube instead of G-tube. Feeding held until GJ tube placement in the am       Aspiration risk  Acute severe oropharyngeal dysphagia, proximal muscle weakness  Esophageal dysmotility  Concern for radiation-induced dysphagia  Laryngeal squamous cell carcinoma s/p radiation (4/2024-7/2024)    Has had increased dysphagia in the setting of laryngeal squamous cell carcinoma s/p radiation (4/2024-7/2024). Family reports that over the past 3-4 weeks has declined dramatically from being quite mobile, active to being barely active, with reduced speech and recurrent aspirations. Some concern for respiratory decompensation due to recurrent aspiration events. ENT  evaluated 12/16/24 with bedside laryngoscopy for dysphagia.  Normal vocal cord function and control seen including ability to approximate cords during cough. No anatomic explanations for dysphagia seen.Evaluated by GI and there is no concern of esophageal web.   Clinical picture consistent with significant muscle weakness contributing to oropharyngeal dysphagia, poor vocal pressure, poor cough effort likely due to radiation.  No evidence of active rheumatologic condition contributing per rheumatology evaluation. CK, aldolase negative. Unclear if hypothyroidism is the cause but can be contributing.  Cervical thoracic MI without significant findings.   - SLP following  - NPO - okay for ice chips for comfort with 1:1 supervision  - Will need VFSS   - SLP will notify when they feel she is ready for this  - patient agreeable for PEG placement and and IR is consulted ( hold apixaban on 12/25 for placement on 12/27)      Acute hypoxic respiratory failure - improving  Aspiration pneumonitis  MSSA PNA s/p abx (12/3-12/8)  Obstructive lung disease (COPD vs asthma)  Pulmonary hypertension   Presented on 11/21 with acute hypoxic respiratory failure; initially suspected to be secondary to HFpEF exacerbation; unresolved with diuresis. Underlying obstructive lung disease but no evidence of exacerbation here. Was treated for MSSA pneumonia. Overnight 12/8, developed sudden worsening of oxygenation and increased secretions in the setting of dysphagia/recent laryngeal radiation and was intubated for airway protection. Suspected aspiration pneumonitis as etiology. CT Chest (12/8) with mediastinal/hilar lymphadenopathy and resolving diffuse ground glass opacities most consistent with resolving pneumonia; no signs of progressive or secondary infectious process. Was briefly on zosyn but this was discontinued as she was treated for full course based on initial pneumonia. She has been extubated and now on 1-3L.   - Pulse oximetry,  supplemental O2  - Budesonide neb 0.5 mg BID,  - ipratropium-levalbuterol neb QID  - albuteraol Q4H PRN  - Aspiration precautions; speech following   - Flutter valve, incentive spirometer  - Low threshold to resume empiric abx for pnuemonia coverage     Anxiety  Depression  History of depression and anxiety.  She feels hopeless and has sad mood during this hospitalization.  Will continue PTA sertraline.  Will also consult health psychology.  - Continue PTA Sertraline 150 mg daily     Chronic afib  Afib with RVR  [id number] 3 (age++, HF+).   - PTA apixaban 5mg BID  - Diltiazem 120mg q6H as tolerated by blood pressure  -His metoprolol to tartrate to 25 mg twice daily      Chronic Left parietal, left thalamic lacunar strokes  - will increase atorvastatin to high intensity (20-> 40 mg). On DOAC  - follow up Neurorecs  - A1C shows prediabetes,     Hypothyroidism  Hx of thyroidectomy 2/2 cancer   - Continue PTA levothyroxine 88 mcg daily   - consulted Endocrine service as above and increased dose to 100 mcg daily. Appreciate recommendations       HFpEF (LVEF 55-60% 11/22/2024)   Pulmonary hypertension (44 mmHg per echo 11/22/2024)  Possible small PFO  Echo during current admission (11/22/2024) notable for increased thickness of LV and elevated BNP (peak 9200 > 6200) concerning for heart failure exacerbation s/p diuresis and pulmonary hypertension with estimated pulmonary artery pressure of 45 mmHg. Echo with bubble study (11/25/2024) concerning for possible small PFO. With intubation on 12/8, developed hypotension without evidence of shock. Etiology of hypotension may be cardiogenic (e.g. exacerbation of pulmonary hypertension by positive pressure ventilation) vs medication associated (propofol, precedex); do not currently suspect distributive or obstructive hypotension. Weaned off pressors 12/10.   - Preload dependent in the setting of pulmonary hypertension, gentle diuresis as needed       At risk for refeeding  syndrome  Starvation ketosis  Hypokalemia  Severe malnutrition in context of chronic illness/disease  Per daughter, baseline weight around 120-130 lbs. Admitted 102 lbs. Patient states she is eating less and has less appetite in general as well as having difficulty swallowing. While NPO during intubation, developed starvation ketosis. Given high risk for aspiration with PO intake, placed NGT and started tube feeds. Will need close monitoring for refeeding syndrome.   - NPO   - RD following for TF management   - Monitor for refeeding syndrome   - Lyte replacement protocol   - IV thiamine replacement      Oliguric SAMIR  Cr 0.9 on admission. Baseline appears 0.9-1.0. Developed SAMIR initially in setting of diuresis and had been improving. Subsequently increased following transfer to ICU with consideration for prerenal in setting of NPO status. Also with new diarrhea. Cr stable.   - Strict I/Os  - Trend BMP   - FWF via NGT    History of apnea  Family reports this apneic episodes after she underwent radiation therapy.  There is a possibility that she is having obstructive sleep apnea.  Will check VBG.  If there is evidence of gas in the morning we will put her on CPAP at night.    Diarrhea, resolved  Frequent loose stools. Unclear timing of onset so not sure if related to abx or TF initiation. No abdominal pain.  - Monitor   - Hold on infectious testing for now    Spiritual health  Pt family requested that pt sees    - consulted for emotional support      Anemia of chronic illness  Iron deficiency anemia  Baseline Hgb 11-12. Currently near baseline. Can consider anemia of chronic illness. Not able to swallow PTA oral iron  - Monitor CBC  - IV iron infusion     Generalized deconditioning  Recurrent falls  - PT/OT consults     CREST Syndrome  Characterized by Raynaud's, Calcinosis, GERD and some telangectasia's. Last saw [facility] 2017. No history of ILD.    - Continue protonix 40 mg daily              Diet: Fluid restriction 2000 ML FLUID  NPO for Medical/Clinical Reasons Except for: No Exceptions  Adult Formula Drip Feeding: Continuous [employer] Peptide 1.5; Nasogastric tube; Goal Rate: 45; mL/hr; 12/13: please transition to new TF formula immediately when arrives to pt room and ok to start at new goal rate; Do not advance tube feeding rat...    DVT Prophylaxis: DOAC  Miranda Catheter: Not present  Lines: None     Cardiac Monitoring: ACTIVE order. Indication: Tachyarrhythmias, acute (48 hours)  Code Status: No CPR- Do NOT Intubate      Clinically Significant Risk Factors               # Hypoalbuminemia: Lowest albumin = 3 g/dL at 12/10/2024  3:11 AM, will monitor as appropriate  # Coagulation Defect: INR = 1.54 (Ref range: 0.85 - 1.15) and/or PTT = 40 Seconds (Ref range: 22 - 38 Seconds), will monitor for bleeding                # Severe Malnutrition: based on nutrition assessment      # Financial/Environmental Concerns: none         Social Drivers of Health    Housing Stability: High Risk (11/23/2024)    Housing Stability     Do you have housing? : No     Are you worried about losing your housing?: No          Disposition Plan     Medically Ready for Discharge: Anticipated in 5+ Days             Roverto Dyson MD  Hospitalist Service, GOLD TEAM 8  [facility]  Securely message with [employer] (more info)  Text page via [employer] Paging/Directory   See signed in provider for up to date coverage information  ______________________________________________________________________    Interval History     Has episodes of A-fib with RVR.  Put back on telemetry.  Diltiazem was also given.  Appears weak.  No acute concerns at this time.    Physical Exam   Vital Signs: Temp: 98.1  F (36.7  C) Temp src: Axillary BP: 100/71 Pulse: 78   Resp: 16 SpO2: 98 % O2 Device: Nasal cannula Oxygen Delivery: 1 LPM  Weight: 104 lbs 15.02 oz    General Appearance:  Awake. Alert. No acute distress.  Frail appearing.   Respiratory: Normal work of breathing on 2L. Lungs with loud upper airway breath sounds. Lower lobes sound clear. Poor cough effort.   Cardiovascular: Irregular. Tachycardic. No obvious murmur.   GI: Nondistended. Normoactive. Soft. Non-tender.   Skin: Warm, dry.   Neuro: alert, oriented x3. Proximal muscle strength - 4/5    Medical Decision Making          55 MINUTES SPENT BY ME on the date of service doing chart review, history, exam, documentation & further activities per the note.      Data

## 2024-12-26 NOTE — PLAN OF CARE
-150s in afib, this is prior to diltiazem dose, updated crosscover and HR came down to 110-130 about 30-45 minutes after diltiazme dose given, tele reordered

## 2024-12-26 NOTE — CONSULTS
"SPIRITUAL HEALTH SERVICES   [facility] [address] [facility]     Summary: Saw pt Asya BAÑUELOS Krissyulises per consult order/request for spiritual support. Pt is Quaker.    Hannah was receiving respiratory care at time of visit. A time of prayer was welcomed.  Hannah made the sign of the cross and moved her lips to the Lords Prayer.  She then stated \"I don't want to do this anymore\".  Per pt request, relayed message to unit RN.     Plan: [facility] will follow as requested during admission.      Drake Ernst MA  Associate Staff   [facility]     [facility] remains available 24/7 for emergent requests/referrals, either by having the on-call  paged   or by entering an ASAP/STAT consult in Epic (this will also page the on-call ).   Routine Epic consults receive an initial response within 24 hours.    "

## 2024-12-26 NOTE — CODE/RAPID RESPONSE
Rapid Response Team Note    Assessment   A rapid response was called on Asya Coffman due to acute hypoxic respiratory failure.  Patient currently admitted with hypoxic respiratory failure, aspiration and concern for aspiration.  As below, patient became acutely hypoxic requiring up to 15 LPM via oxime mask but did quickly improve back to 1 LPM.  During rapid RN suctioned what appeared to be tube feed material from patient's oropharynx.  Concern for aspiration present.  Concern for gastroparesis with reflux of tube feed materials versus malpositioned NG tube.      Plan   - Will check CXR and AXR to determine positioning of NGT and evaluate for suspected aspiration  - Primary team contacted and was able to respond to bedside  - Currently planning for PEG tube placement tomorrow  - Stop NGT feeding.  Will await PEG tube placement before resumption      -  The Internal Medicine primary team was at bedside  -  Disposition: The patient will remain on the current unit. We will continue to monitor this patient closely.  -  Reassessment and plan follow-up will be performed by the primary team    Pop Barrera PA-C  Rapid Response Team ERIC  Securely message with [employer]     Medical Decision Making       15 MINUTES SPENT BY ME on the date of service doing chart review, history, exam, documentation & further activities per the note.          Hospital Course   Brief Summary of events leading to rapid response:   Patient desatted to the 60s for approximately 5 minutes.  Was placed on oxime mask at 15 LPM with quick improvement back to 1 L    During rapid response patient noted to have thick, congested cough.  RN assisted with suctioning and suctioned out material that resembled tube feeds.    Physical Exam   Vital Signs: Temp: 98.2  F (36.8  C) Temp src: Oral BP: 113/87 Pulse: 111   Resp: 16 SpO2: 98 % O2 Device: Oxi Plus Oxygen Delivery: 2 LPM  Weight: 104 lbs 15.02 oz      Exam:   General Appearance: Awake, alert. Wearing  Oxymask.   Respiratory: Congested cough. Breath sounds are distant but no appreciable crackles, wheezes, rhoni, or stridor    GI: NGT feedings.       Significant Results and Procedures   Pending AXR and CXR

## 2024-12-26 NOTE — PROGRESS NOTES
Care Management Follow Up    Length of Stay (days): 35    Expected Discharge Date: 12/30/2024     Concerns to be Addressed: all concerns addressed in this encounter     Patient plan of care discussed at interdisciplinary rounds: Yes    Anticipated Discharge Disposition: Skilled Nursing Facility  Anticipated Discharge Services: None  Anticipated Discharge DME: None    Patient/family educated on Medicare website which has current facility and service quality ratings: yes  Education Provided on the Discharge Plan: Yes  Patient/Family in Agreement with the Plan: yes    Referrals Placed by CM/SW: Post Acute Facilities  Private pay costs discussed: Not applicable    Discussed  Partnership in Safe Discharge Planning  document with patient/family: Yes    Additional Information:  CHW met with patient and daughter at bedside and obtained additional TCU choices. These referrals will be sent when patient is closer to being med ready.     1) [facility]- #1 choice but already declined previously due to financial issues and bed not being available   2) [facility]  3) [facility]  4) [facility]  5) [facility]  Inpatient [facility] [address]  [phone]

## 2024-12-26 NOTE — PLAN OF CARE
Goal Outcome Evaluation:      Plan of Care Reviewed With: patient    Overall Patient Progress: no changeOverall Patient Progress: no change    Pt A&Ox4, on 1L NC and O2 sats stable, afib rhythm on tele. L + R PIV SL. Denies nausea/pain. Strict NPO, TF @45 goal rate with 30 FWF. Frequent suctioning and and oral cares for productive cough, tolerating well. Repositioning as tolerated. Incontinent of bowel and bladder. 1 BM overnight, purewick in place and replaced x1. Pt able to use call light.

## 2024-12-26 NOTE — PLAN OF CARE
"Assumed cares [id number]     /87   Pulse 111   Temp 98.2  F (36.8  C) (Oral)   Resp 16   Ht 1.651 m (5' 5\")   Wt 47.6 kg (104 lb 15 oz)   SpO2 98%   BMI 17.46 kg/m       Pain: Patient denied pain.   Neuro: A&Ox4.    Respiratory: Lungs course bilaterally. Intermittent need for oral suctioning. On 1L oxymask  Cardiac/Neurovascular: HR an pulse irregular. No numbness/tingling reported. Tele: A-fib  GI/: Incontinent of bowel and bladder. 2 small BM's today.   Nutrition: NPO. Tube feeds being held right now.   Activity: Bed bound. Turned q2h's.   Skin: Sacral Mepi & Mepi on Right hip (Preventative)  Lines: 2 PIV's (SL). NJ tube.   Events this shift: Pt needed intermittent oral suctioning for some thick mucous. Incontinent of urine and stool. Repositioned every 2 hours throughout day. NPO at midnight for PEG placement tomorrow. Rapid called this afternoon for decline in respiratory status. Pt destat'd into the 70's for >5min. Increased to 15L oxymask for 5 minutes, pt was able to improve and weaned back down. Chest and abdominal xray's taken. IV iron given today.      Plan: Continue with plan of care    Goal Outcome Evaluation:      Plan of Care Reviewed With: patient    Overall Patient Progress: no changeOverall Patient Progress: no change    Outcome Evaluation: Continue with plan of care.      "

## 2024-12-27 LAB
ALLEN'S TEST: YES
ANION GAP SERPL CALCULATED.3IONS-SCNC: 15 MMOL/L (ref 7–15)
BASE EXCESS BLDA CALC-SCNC: 6.1 MMOL/L (ref -3–3)
BASE EXCESS BLDV CALC-SCNC: 6.3 MMOL/L (ref -3–3)
BUN SERPL-MCNC: 62.6 MG/DL (ref 8–23)
CALCIUM SERPL-MCNC: 9 MG/DL (ref 8.8–10.4)
CHLORIDE SERPL-SCNC: 96 MMOL/L (ref 98–107)
CREAT SERPL-MCNC: 1.08 MG/DL (ref 0.51–0.95)
CYSTATIN C (ROCHE): 2.4 MG/L (ref 0.6–1)
EGFRCR SERPLBLD CKD-EPI 2021: 52 ML/MIN/1.73M2
ERYTHROCYTE [DISTWIDTH] IN BLOOD BY AUTOMATED COUNT: 18.9 % (ref 10–15)
GFR/BSA.PRED SERPLBLD CYS-BASED-ARV: 21 ML/MIN/1.73M2
GLUCOSE BLDC GLUCOMTR-MCNC: 117 MG/DL (ref 70–99)
GLUCOSE BLDC GLUCOMTR-MCNC: 85 MG/DL (ref 70–99)
GLUCOSE SERPL-MCNC: 88 MG/DL (ref 70–99)
HCO3 BLD-SCNC: 30 MMOL/L (ref 21–28)
HCO3 BLDV-SCNC: 32 MMOL/L (ref 21–28)
HCO3 SERPL-SCNC: 25 MMOL/L (ref 22–29)
HCT VFR BLD AUTO: 36.1 % (ref 35–47)
HGB BLD-MCNC: 10.7 G/DL (ref 11.7–15.7)
HOLD SPECIMEN: NORMAL
LACTATE SERPL-SCNC: 1.3 MMOL/L (ref 0.7–2)
MCH RBC QN AUTO: 24.9 PG (ref 26.5–33)
MCHC RBC AUTO-ENTMCNC: 29.6 G/DL (ref 31.5–36.5)
MCV RBC AUTO: 84 FL (ref 78–100)
O2/TOTAL GAS SETTING VFR VENT: 6 %
O2/TOTAL GAS SETTING VFR VENT: 80 %
OXYHGB MFR BLDA: 90 % (ref 92–100)
OXYHGB MFR BLDV: 60 % (ref 70–75)
PCO2 BLD: 41 MM HG (ref 35–45)
PCO2 BLDV: 49 MM HG (ref 40–50)
PH BLD: 7.48 [PH] (ref 7.35–7.45)
PH BLDV: 7.42 [PH] (ref 7.32–7.43)
PHOSPHATE SERPL-MCNC: 4.9 MG/DL (ref 2.5–4.5)
PLATELET # BLD AUTO: 344 10E3/UL (ref 150–450)
PO2 BLD: 62 MM HG (ref 80–105)
PO2 BLDV: 36 MM HG (ref 25–47)
POTASSIUM SERPL-SCNC: 5.1 MMOL/L (ref 3.4–5.3)
POTASSIUM SERPL-SCNC: 5.7 MMOL/L (ref 3.4–5.3)
RBC # BLD AUTO: 4.3 10E6/UL (ref 3.8–5.2)
SAO2 % BLDA: 91.6 % (ref 95–96)
SAO2 % BLDV: 61.4 % (ref 70–75)
SODIUM SERPL-SCNC: 136 MMOL/L (ref 135–145)
WBC # BLD AUTO: 9 10E3/UL (ref 4–11)

## 2024-12-27 PROCEDURE — 99207 PR NO BILLABLE SERVICE THIS VISIT: CPT

## 2024-12-27 PROCEDURE — 82435 ASSAY OF BLOOD CHLORIDE: CPT | Performed by: STUDENT IN AN ORGANIZED HEALTH CARE EDUCATION/TRAINING PROGRAM

## 2024-12-27 PROCEDURE — 82805 BLOOD GASES W/O2 SATURATION: CPT

## 2024-12-27 PROCEDURE — 84520 ASSAY OF UREA NITROGEN: CPT | Performed by: STUDENT IN AN ORGANIZED HEALTH CARE EDUCATION/TRAINING PROGRAM

## 2024-12-27 PROCEDURE — 250N000013 HC RX MED GY IP 250 OP 250 PS 637: Performed by: STUDENT IN AN ORGANIZED HEALTH CARE EDUCATION/TRAINING PROGRAM

## 2024-12-27 PROCEDURE — 94640 AIRWAY INHALATION TREATMENT: CPT

## 2024-12-27 PROCEDURE — 83605 ASSAY OF LACTIC ACID: CPT

## 2024-12-27 PROCEDURE — 250N000013 HC RX MED GY IP 250 OP 250 PS 637: Performed by: HOSPITALIST

## 2024-12-27 PROCEDURE — 250N000013 HC RX MED GY IP 250 OP 250 PS 637

## 2024-12-27 PROCEDURE — 80048 BASIC METABOLIC PNL TOTAL CA: CPT | Performed by: STUDENT IN AN ORGANIZED HEALTH CARE EDUCATION/TRAINING PROGRAM

## 2024-12-27 PROCEDURE — 85048 AUTOMATED LEUKOCYTE COUNT: CPT

## 2024-12-27 PROCEDURE — 120N000002 HC R&B MED SURG/OB UMMC

## 2024-12-27 PROCEDURE — 36415 COLL VENOUS BLD VENIPUNCTURE: CPT | Performed by: STUDENT IN AN ORGANIZED HEALTH CARE EDUCATION/TRAINING PROGRAM

## 2024-12-27 PROCEDURE — 99291 CRITICAL CARE FIRST HOUR: CPT | Performed by: PHYSICIAN ASSISTANT

## 2024-12-27 PROCEDURE — 87040 BLOOD CULTURE FOR BACTERIA: CPT

## 2024-12-27 PROCEDURE — 250N000011 HC RX IP 250 OP 636

## 2024-12-27 PROCEDURE — 250N000009 HC RX 250

## 2024-12-27 PROCEDURE — 84132 ASSAY OF SERUM POTASSIUM: CPT

## 2024-12-27 PROCEDURE — 36415 COLL VENOUS BLD VENIPUNCTURE: CPT

## 2024-12-27 PROCEDURE — 999N000248 HC STATISTIC IV INSERT WITH US BY RN

## 2024-12-27 PROCEDURE — 93010 ELECTROCARDIOGRAM REPORT: CPT | Performed by: INTERNAL MEDICINE

## 2024-12-27 PROCEDURE — 84100 ASSAY OF PHOSPHORUS: CPT

## 2024-12-27 PROCEDURE — 258N000003 HC RX IP 258 OP 636

## 2024-12-27 PROCEDURE — 999N000157 HC STATISTIC RCP TIME EA 10 MIN

## 2024-12-27 PROCEDURE — 94640 AIRWAY INHALATION TREATMENT: CPT | Mod: 76

## 2024-12-27 PROCEDURE — 82610 CYSTATIN C: CPT

## 2024-12-27 PROCEDURE — 250N000013 HC RX MED GY IP 250 OP 250 PS 637: Performed by: INTERNAL MEDICINE

## 2024-12-27 PROCEDURE — 85018 HEMOGLOBIN: CPT

## 2024-12-27 PROCEDURE — 999N000156 HC STATISTIC RCP CONSULT EA 30 MIN

## 2024-12-27 RX ORDER — LEVALBUTEROL INHALATION SOLUTION 0.63 MG/3ML
0.63 SOLUTION RESPIRATORY (INHALATION)
Status: DISCONTINUED | OUTPATIENT
Start: 2024-12-28 | End: 2024-12-28

## 2024-12-27 RX ORDER — DEXTROSE MONOHYDRATE AND SODIUM CHLORIDE 5; .9 G/100ML; G/100ML
INJECTION, SOLUTION INTRAVENOUS CONTINUOUS
Status: DISCONTINUED | OUTPATIENT
Start: 2024-12-27 | End: 2024-12-30

## 2024-12-27 RX ORDER — IOPAMIDOL 755 MG/ML
48 INJECTION, SOLUTION INTRAVASCULAR ONCE
Status: COMPLETED | OUTPATIENT
Start: 2024-12-27 | End: 2024-12-27

## 2024-12-27 RX ADMIN — PIPERACILLIN SODIUM AND TAZOBACTAM SODIUM 3.38 G: 3; .375 INJECTION, SOLUTION INTRAVENOUS at 21:53

## 2024-12-27 RX ADMIN — FUROSEMIDE 40 MG: 40 TABLET ORAL at 08:04

## 2024-12-27 RX ADMIN — SODIUM CHLORIDE SOLN NEBU 3% 3 ML: 3 NEBU SOLN at 20:58

## 2024-12-27 RX ADMIN — Medication 1 SPRAY: at 21:54

## 2024-12-27 RX ADMIN — ORAL VEHICLES - SUSP 25 MG: SUSPENSION at 08:09

## 2024-12-27 RX ADMIN — IPRATROPIUM BROMIDE 0.5 MG: 0.5 SOLUTION RESPIRATORY (INHALATION) at 13:50

## 2024-12-27 RX ADMIN — FEXOFENADINE HYDROCHLORIDE 120 MG: 60 TABLET ORAL at 08:04

## 2024-12-27 RX ADMIN — PIPERACILLIN SODIUM AND TAZOBACTAM SODIUM 3.38 G: 3; .375 INJECTION, SOLUTION INTRAVENOUS at 18:14

## 2024-12-27 RX ADMIN — GABAPENTIN 600 MG: 300 CAPSULE ORAL at 21:53

## 2024-12-27 RX ADMIN — DILTIAZEM HYDROCHLORIDE 120 MG: 120 TABLET, FILM COATED ORAL at 00:37

## 2024-12-27 RX ADMIN — DILTIAZEM HYDROCHLORIDE 120 MG: 120 TABLET, FILM COATED ORAL at 18:57

## 2024-12-27 RX ADMIN — ATORVASTATIN CALCIUM 40 MG: 40 TABLET, FILM COATED ORAL at 21:43

## 2024-12-27 RX ADMIN — DILTIAZEM HYDROCHLORIDE 120 MG: 120 TABLET, FILM COATED ORAL at 23:43

## 2024-12-27 RX ADMIN — ORAL VEHICLES - SUSP 25 MG: SUSPENSION at 21:50

## 2024-12-27 RX ADMIN — DILTIAZEM HYDROCHLORIDE 120 MG: 120 TABLET, FILM COATED ORAL at 06:03

## 2024-12-27 RX ADMIN — BUDESONIDE 0.5 MG: 0.5 INHALANT ORAL at 20:57

## 2024-12-27 RX ADMIN — IOPAMIDOL 48 ML: 755 INJECTION, SOLUTION INTRAVENOUS at 12:54

## 2024-12-27 RX ADMIN — BUDESONIDE 0.5 MG: 0.5 INHALANT ORAL at 08:32

## 2024-12-27 RX ADMIN — IPRATROPIUM BROMIDE 0.5 MG: 0.5 SOLUTION RESPIRATORY (INHALATION) at 20:58

## 2024-12-27 RX ADMIN — IPRATROPIUM BROMIDE 0.5 MG: 0.5 SOLUTION RESPIRATORY (INHALATION) at 08:32

## 2024-12-27 RX ADMIN — DEXTROSE AND SODIUM CHLORIDE: 5; 900 INJECTION, SOLUTION INTRAVENOUS at 11:12

## 2024-12-27 RX ADMIN — LEVALBUTEROL HYDROCHLORIDE 0.63 MG: 0.63 SOLUTION RESPIRATORY (INHALATION) at 20:58

## 2024-12-27 RX ADMIN — SODIUM CHLORIDE SOLN NEBU 3% 3 ML: 3 NEBU SOLN at 13:50

## 2024-12-27 RX ADMIN — Medication 1 SPRAY: at 17:08

## 2024-12-27 RX ADMIN — DEXTROSE AND SODIUM CHLORIDE: 5; 900 INJECTION, SOLUTION INTRAVENOUS at 23:43

## 2024-12-27 RX ADMIN — SPIRONOLACTONE 25 MG: 25 TABLET, FILM COATED ORAL at 08:04

## 2024-12-27 RX ADMIN — LEVALBUTEROL HYDROCHLORIDE 0.63 MG: 0.63 SOLUTION RESPIRATORY (INHALATION) at 08:32

## 2024-12-27 RX ADMIN — SERTRALINE HYDROCHLORIDE 200 MG: 100 TABLET ORAL at 08:04

## 2024-12-27 RX ADMIN — PANTOPRAZOLE SODIUM 40 MG: 40 INJECTION, POWDER, FOR SOLUTION INTRAVENOUS at 08:05

## 2024-12-27 RX ADMIN — Medication 1 SPRAY: at 08:04

## 2024-12-27 RX ADMIN — THIAMINE HCL TAB 100 MG 100 MG: 100 TAB at 08:04

## 2024-12-27 ASSESSMENT — ACTIVITIES OF DAILY LIVING (ADL)
ADLS_ACUITY_SCORE: 72

## 2024-12-27 NOTE — PROGRESS NOTES
[facility]    Medicine Progress Note - Hospitalist Service, GOLD TEAM 8    Date of Admission:  11/21/2024    Assessment & Plan   Asya Coffman is a 79 year old female admitted on 11/21/2024. 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 Initially admitted to hospital medicine service with hypoxic respiratory failure suspected due to HFpEF requiring diuresis. Developed acute hypoxia on 12/8 secondary to suspected aspiration pneumonitis for which she was intubated (12/8-12/10). Now s/p extubation and transferring back to hospital medicine service.  Continues to have episodes of aspiration with worsening of hypoxia.  Now on n.p.o. and tube feeding held.    Updates today:  -Hypoxia got worse   -Chest x-ray with without significant change  -CT PE  -GJ tube placement if possible    Acute hypoxic respiratory failure, worsening   Recurrent Aspiration pneumonitis  MSSA PNA s/p abx (12/3-12/8)  Obstructive lung disease (COPD vs asthma)  Pulmonary hypertension   Presented on 11/21 with acute hypoxic respiratory failure; initially suspected to be secondary to HFpEF exacerbation; unresolved with diuresis. Underlying obstructive lung disease but no evidence of exacerbation here. Was treated for MSSA pneumonia. Overnight 12/8, developed sudden worsening of oxygenation and increased secretions in the setting of dysphagia/recent laryngeal radiation and was intubated for airway protection. Suspected aspiration pneumonitis as etiology. CT Chest (12/8) with mediastinal/hilar lymphadenopathy and resolving diffuse ground glass opacities most consistent with resolving pneumonia; no signs of progressive or secondary infectious process. Was  briefly on zosyn but this was discontinued as she was treated for full course based on initial pneumonia. She has been extubated and now on 1-3L.  Had episodes of hypoxia on 12/26 with evidence of the feeding tube content aspirated from respiratory tract subsequently hypoxia improved.  Tube feed also held.  On 12/27 had another episode of hypoxia needing high flow nasal oxygen.  Suspect another episode of aspiration.  X-ray without significant change.  EKG  A-fib with RVR.  CT PE pending ( given held apixaban)  - High flow nasal oxygen  - Budesonide neb 0.5 mg BID,  - ipratropium-levalbuterol neb QID  - albuteraol Q4H PRN  - Aspiration precautions; speech following   - Flutter valve, incentive spirometer  - CT PE  -Continue holding G-tube feeds        Aspiration risk  Acute severe oropharyngeal dysphagia, proximal muscle weakness  Esophageal dysmotility  Concern for radiation-induced dysphagia  Laryngeal squamous cell carcinoma s/p radiation (4/2024-7/2024)  Concern for gastroparesis    Has had increased dysphagia in the setting of laryngeal squamous cell carcinoma s/p radiation (4/2024-7/2024). Family reports that over the past 3-4 weeks has declined dramatically from being quite mobile, active to being barely active, with reduced speech and recurrent aspirations. Some concern for respiratory decompensation due to recurrent aspiration events. ENT evaluated 12/16/24 with bedside laryngoscopy for dysphagia.  Normal vocal cord function and control seen including ability to approximate cords during cough. No anatomic explanations for dysphagia seen.Evaluated by GI and there is no concern of esophageal web.   Clinical picture consistent with significant muscle weakness contributing to oropharyngeal dysphagia, poor vocal pressure, poor cough effort likely due to radiation.  No evidence of active rheumatologic condition contributing per rheumatology evaluation. CK, aldolase negative. Unclear if hypothyroidism is the cause  but can be contributing.  Cervical thoracic MI without significant findings.   - SLP following  - NPO - okay for ice chips for comfort with 1:1 supervision  - Will need VFSS   - SLP will notify when they feel she is ready for this  - patient agreeable for PEG placement and and IR is consulted ( hold apixaban on 12/25 for placement on 12/27)        Anxiety  Depression  History of depression and anxiety.  She feels hopeless and has sad mood during this hospitalization.  Will continue PTA sertraline.  Will also consult health psychology.  - Continue PTA Sertraline 150 mg daily     Chronic afib  Afib with RVR  [id number] 3 (age++, HF+).   - PTA apixaban 5mg BID  - Diltiazem 120mg q6H as tolerated by blood pressure  -His metoprolol to tartrate to 25 mg twice daily      Chronic Left parietal, left thalamic lacunar strokes  - will increase atorvastatin to high intensity (20-> 40 mg). On DOAC  - follow up Neurorecs  - A1C shows prediabetes,     Hypothyroidism  Hx of thyroidectomy 2/2 cancer   - Continue PTA levothyroxine 88 mcg daily   - consulted Endocrine service as above and increased dose to 100 mcg daily. Appreciate recommendations       HFpEF (LVEF 55-60% 11/22/2024)   Pulmonary hypertension (44 mmHg per echo 11/22/2024)  Possible small PFO  Echo during current admission (11/22/2024) notable for increased thickness of LV and elevated BNP (peak 9200 > 6200) concerning for heart failure exacerbation s/p diuresis and pulmonary hypertension with estimated pulmonary artery pressure of 45 mmHg. Echo with bubble study (11/25/2024) concerning for possible small PFO. With intubation on 12/8, developed hypotension without evidence of shock. Etiology of hypotension may be cardiogenic (e.g. exacerbation of pulmonary hypertension by positive pressure ventilation) vs medication associated (propofol, precedex); do not currently suspect distributive or obstructive hypotension. Weaned off pressors 12/10.   - Preload dependent in the  setting of pulmonary hypertension, gentle diuresis as needed       At risk for refeeding syndrome  Starvation ketosis  Hypokalemia  Severe malnutrition in context of chronic illness/disease  Per daughter, baseline weight around 120-130 lbs. Admitted 102 lbs. Patient states she is eating less and has less appetite in general as well as having difficulty swallowing. While NPO during intubation, developed starvation ketosis. Given high risk for aspiration with PO intake, placed NGT and started tube feeds. Will need close monitoring for refeeding syndrome.   - NPO   - RD following for TF management  -Hold tube feeding and put on maintenance fluid  - Monitor for refeeding syndrome   - Lyte replacement protocol   - IV thiamine replacement      Oliguric SAMIR  Mild hyperkalemia  Cr 0.9 on admission. Baseline appears 0.9-1.0. Developed SAMIR initially in setting of diuresis and had been improving. Subsequently increased following transfer to ICU with consideration for prerenal in setting of NPO status. Also with new diarrhea.slightly higher  - Strict I/Os  - Trend BMP   - FWF via NGT  - Cytstatin C    History of apnea  Family report  apneic episodes after she underwent radiation therapy.  There is a possibility that she is having obstructive sleep apnea. VBG without evidence of hypercarbia.  If there is evidence of gas in the morning we will put her on CPAP at night.    Diarrhea, resolved  Frequent loose stools. Unclear timing of onset so not sure if related to abx or TF initiation. No abdominal pain.  - Monitor   - Hold on infectious testing for now    Spiritual health  Pt family requested that pt sees    - consulted for emotional support      Anemia of chronic illness  Iron deficiency anemia  Baseline Hgb 11-12. Currently near baseline. Can consider anemia of chronic illness. Not able to swallow PTA oral iron  - Monitor CBC  - IV iron infusion     Generalized deconditioning  Recurrent falls  - PT/OT consults      CREST Syndrome  Characterized by Raynaud's, Calcinosis, GERD and some telangectasia's. Last saw [facility] rheumatology 2017. No history of ILD.    - Continue protonix 40 mg daily       Family update  Daughter Lana updated on the phone.        Diet: Fluid restriction 2000 ML FLUID  NPO per Anesthesia Guidelines for Procedure/Surgery Except for: Meds    DVT Prophylaxis: DOAC  Miranda Catheter: Not present  Lines: None     Cardiac Monitoring: ACTIVE order. Indication: Tachyarrhythmias, acute (48 hours)  Code Status: No CPR- Do NOT Intubate      Clinically Significant Risk Factors        # Hyperkalemia: Highest K = 5.7 mmol/L in last 2 days, will monitor as appropriate   # Hypochloremia: Lowest Cl = 96 mmol/L in last 2 days, will monitor as appropriate      # Hypoalbuminemia: Lowest albumin = 3 g/dL at 12/10/2024  3:11 AM, will monitor as appropriate         # Acute Hypoxic Respiratory Failure: Documented O2 saturation < 90%. Continue supplemental oxygen as needed          # Severe Malnutrition: based on nutrition assessment      # Financial/Environmental Concerns: none         Social Drivers of Health    Housing Stability: High Risk (11/23/2024)    Housing Stability     Do you have housing? : No     Are you worried about losing your housing?: No          Disposition Plan     Medically Ready for Discharge: Anticipated in 5+ Days             Roverto Dyson MD  Hospitalist Service, [address]  Securely message with [employer] (more info)  Text page via [facility] Paging/Directory   See signed in provider for up to date coverage information  ______________________________________________________________________    Interval History   Episodes of aspiration with hypoxia.  Which improved with suction.  Had another episode of hypoxia this a.m.  Chest x-ray without  change but evidence of hypoxia on ABG.    Physical Exam   Vital Signs: Temp: 99.1  F (37.3  C) Temp src: Oral BP:  112/78 Pulse: (!) 134   Resp: 18 SpO2: 96 % O2 Device: High Flow Nasal Cannula (HFNC) Oxygen Delivery: 25 LPM  Weight: 104 lbs 15.02 oz    General Appearance:  Awake. Alert. No acute distress. Frail appearing.   Respiratory: Normal work of breathing on 2L. Lungs with loud upper airway breath sounds. Lower lobes sound clear. Poor cough effort.   Cardiovascular: Irregular. Tachycardic. No obvious murmur.   GI: Nondistended. Normoactive. Soft. Non-tender.   Skin: Warm, dry.   Neuro: Lethargic.  Very slow speaking.  O 3x    Medical Decision Making          55 MINUTES SPENT BY ME on the date of service doing chart review, history, exam, documentation & further activities per the note.      Data

## 2024-12-27 NOTE — CODE/RAPID RESPONSE
RT called to bedside for hypoxia 2/2 aspiration event. Pt was on 10L oxymask, Sat 88-90%, BS clear on R. Diminished on L. Placed pt on HFNC 80% 25L, Sat improved, no SOB, ABG and XRAY pending. RT will continue to monitor pt.    Alea Todd, RT

## 2024-12-27 NOTE — PROVIDER NOTIFICATION
12/26/24 1400   Call Information   Date of Call 12/26/24   Time of Call 1437   Name of person requesting the team Karen   Title of person requesting team RN   RRT Arrival time 1440   Time RRT ended 1457   Reason for call   Type of RRT Adult   Primary reason for call Respiratory   Respiratory O2sat less than 88% for greater than 5 minutes despite O2   Was patient transferred from the ED, ICU, or PACU within last 24 hours prior to RRT call? 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 Initially admitted to hospital medicine service with hypoxic respiratory failure suspected due to HFpEF requiring diuresis. Developed acute hypoxia on 12/8 secondary to suspected aspiration pneumonitis for which she was intubated (12/8-12/10).   Notable History/Conditions Cancer;Cardiac;COPD   Assessment Lethargic, O2 saturations 80s on 15 LPM upon arrival, pt asleep, awakens to voice, cough sounds coarse.   Interventions O2 per N/C or mask;Suction   Patient Outcome   Patient Outcome Stabilized on unit   RRT Team   Attending/Primary/Covering Physician Gold 8   Date Attending Physician notified 12/26/24   Time Attending Physician notified 1437   Physician(s) Pop Vásquez RN Nikole Porras   Post RRT Intervention Assessment   Post RRT Assessment Stable/Improved   Date Follow Up Done 12/26/24   Time Follow Up Done 6204

## 2024-12-27 NOTE — PROGRESS NOTES
Brief IR Note:  Patient with tachycardia 120-140s with soft blood pressures ~90s/70s. She also had a rapid response called earlier today for hypoxia. We cannot safely sedate her which her clinical status. We will plan on adding her on on Monday.     -Plan was discussed with Dr. Dyson.   -Oral contrast will be reordered, to be administered 8pm 12/29.  -Please continue apixaban hold  -NPO at 0001 on 12/30/2024    Caity Lovett DO  Interventional Radiology  PGY-6  [phone]

## 2024-12-27 NOTE — PROVIDER NOTIFICATION
Patient need more oxygen . Deep oral suction done. Sat dropped to 77 increased Oxygen to 6L  oxy Mask . Roverto Cuevas  paged and come to room to assess patient RRT Paged . Patient needed high level of care .

## 2024-12-27 NOTE — PROVIDER NOTIFICATION
12/27/24 1000   Call Information   Date of Call 12/27/24   Time of Call 1029   Name of person requesting the team Hermes   Title of person requesting team RN   RRT Arrival time 1030   Time RRT ended 1049   Reason for call   Type of RRT Adult   Primary reason for call Respiratory   Respiratory O2sat less than 88% for greater than 5 minutes despite O2   Was patient transferred from the ED, ICU, or PACU within last 24 hours prior to RRT call? No   SBAR   Situation pt destaurated. bedside RN nowhere to be found on arrival.   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 Initially admitted to hospital medicine service with hypoxic respiratory failure suspected due to HFpEF requiring diuresis. Developed acute hypoxia on 12/8 secondary to suspected aspiration pneumonitis for which she was intubated (12/8-12/10).   Notable History/Conditions Cancer;Cardiac;COPD   Assessment pt lethargic. sats 91% on 12L oxymask. pt witched to HFNC 80%, 25L. pt sating well in low to mid 90s.   Interventions O2 per N/C or mask;CXR;Labs;Suction  (VBG, chest xray, CT PE scan, IMC orders placed.)   Patient Outcome   Patient Outcome Stabilized on unit  ([facility] orders already in place)   RRT Team   Attending/Primary/Covering Physician Med Gold 8   Date Attending Physician notified 12/27/24   Time Attending Physician notified 1030   Physician(s) Sky Card PA-C   Lead RN Kapil Israel   NASREEN Todd   Post RRT Intervention Assessment   Post RRT Assessment Stable/Improved   Date Follow Up Done 12/27/24   Time Follow Up Done 1330   Comments Weaned to 8L oxymask, completed CT PE

## 2024-12-27 NOTE — PROGRESS NOTES
IR MISC NOTE:    12/27/24      Dr. Chopra received a page last night noting that patient was not tolerating G-tube feeds. Request was to transition to GJ tube placement instead of G placement given patient has had aspiration events with the tube feeds in the g portion    Patty Chang PA-C

## 2024-12-27 NOTE — CODE/RAPID RESPONSE
Rapid Response Team Note    Assessment   A rapid response was called on Asya Coffman due to  worsening acute hypoxic respiratory failure . This presentation is likely due to known pulmonary HTN, obstructive lung disease, afib with RVR.and likely ongoing aspiration in pt with severe oropharyngeal dysphagia.    Plan   -  Stat CXR and VBG. If unrevealing and worsening hypoxia persists, will obtain CT PE. Switch pt to HFNC. Continue to hold TFs.  -  The Internal Medicine primary team was at bedside  -  Disposition: The patient will be transferred to [facility].  -  Reassessment and plan follow-up will be performed by the primary team    Bright Card PA-C  Rapid Response Team ERIC  Securely message with [employer]     Medical Decision Making       30 MINUTES SPENT BY ME on the date of service doing chart review, history, exam, documentation & further activities per the note.        Hospital Course   Brief Summary of events leading to rapid response:   Pt's supplemental O2 need abruptly increased from ~ 5 lpm up to 15 lpm on oxymask    Physical Exam   Vital Signs: Temp: 99.1  F (37.3  C) Temp src: Oral BP: (!) 132/91 Pulse: 118   Resp: 18 SpO2: 91 % O2 Device: Oxymask Oxygen Delivery: 5 LPM  Weight: 104 lbs 15.02 oz    Exam:   GEN: In NAD  LUNGS: Diminished R sided lung sounds with basilar crackles.   CV: RRR  NEURO: AAOx3; CNs grossly intact; No acute focal deficits noted.      Significant Results and Procedures   N/A    The patient is not known to have an infection. No e/o sepsis.

## 2024-12-27 NOTE — PROGRESS NOTES
Care Management Follow Up    Length of Stay (days): 36    Expected Discharge Date: 12/30/2024     Concerns to be Addressed: all concerns addressed in this encounter     Patient plan of care discussed at interdisciplinary rounds: Yes    Anticipated Discharge Disposition: Skilled Nursing Facility     Anticipated Discharge Services: None  Anticipated Discharge DME: None    Patient/family educated on Medicare website which has current facility and service quality ratings: yes  Education Provided on the Discharge Plan: Yes  Patient/Family in Agreement with the Plan: yes    Referrals Placed by CM/SW:     Pending:      [facility] P: [phone]  12/5: Clinically accepted.     Declined:     [facility]: Bed not available 11/27 & declined again due to financial concerns 12/2  [facility]: Bed not available, cost of medications  [facility]: Bed not available 12/2  [facility]: Cost of medications  [facility] - Out of insurance network.    Private pay costs discussed: Not applicable    Discussed  Partnership in Safe Discharge Planning  document with patient/family: Yes     Handoff Completed: No, handoff not indicated or clinically appropriate    Additional Information:  CM team following for discharge planning. PT/OT currently recommending TCU. Pt is not medically ready at this time and awaiting PEG tube placement. Per chart review, pt has been accepted to [facility] but pt and family have preference for pt to be closer to [place]. CM team will plan to send additional TCU referrals closer to [place] area once pt gets closer to being medically ready. Additional TCU choices obtained by CHW (Mony) yesterday and listed below. These referrals will be sent once pt gets closer to being medically ready.     1) [facility]- #1 choice but already declined previously due to financial issues and bed not being available   2) [facility]  3) [facility]  [place]  4) [facility]  5) [facility]     Next Steps: CM team will continue to follow for safe discharge planning.     [facility]   [facility]   Available via [employer]  Covering [address] Ro[address] , ph: [phone]

## 2024-12-27 NOTE — PLAN OF CARE
Goal Outcome Evaluation:    Assumed care from [id number]. Pt is assist x2, on 2L oxymask, alert and oriented x4. PIV in each forearm, saline locked. Insertion of GJ tube to be completed today - pt has been NPO overnight (12/2). Omnipaque administered last night for procedure today. Maintained use of Yankauer, able to remove thick secretions. Pt incontinent of urine, tolerated repositioning/tania-care well Q2-3 hours overnight - last reposition at 0615. Pt had large BM overnight. On telemetry, pt in a-fib. Continue with plan of care.     Dior Lord RN

## 2024-12-28 LAB
ALBUMIN UR-MCNC: NEGATIVE MG/DL
ANION GAP SERPL CALCULATED.3IONS-SCNC: 13 MMOL/L (ref 7–15)
APPEARANCE UR: CLEAR
BILIRUB UR QL STRIP: NEGATIVE
BUN SERPL-MCNC: 49.8 MG/DL (ref 8–23)
CALCIUM SERPL-MCNC: 8.4 MG/DL (ref 8.8–10.4)
CHLORIDE SERPL-SCNC: 102 MMOL/L (ref 98–107)
COLOR UR AUTO: ABNORMAL
CREAT SERPL-MCNC: 1.1 MG/DL (ref 0.51–0.95)
CREAT UR-MCNC: 33.7 MG/DL
EGFRCR SERPLBLD CKD-EPI 2021: 51 ML/MIN/1.73M2
ERYTHROCYTE [DISTWIDTH] IN BLOOD BY AUTOMATED COUNT: 19 % (ref 10–15)
FRACT EXCRET NA UR+SERPL-RTO: 2.1 %
GLUCOSE BLDC GLUCOMTR-MCNC: 152 MG/DL (ref 70–99)
GLUCOSE BLDC GLUCOMTR-MCNC: 153 MG/DL (ref 70–99)
GLUCOSE SERPL-MCNC: 145 MG/DL (ref 70–99)
GLUCOSE UR STRIP-MCNC: NEGATIVE MG/DL
HCO3 SERPL-SCNC: 25 MMOL/L (ref 22–29)
HCT VFR BLD AUTO: 32.3 % (ref 35–47)
HGB BLD-MCNC: 9.8 G/DL (ref 11.7–15.7)
HGB UR QL STRIP: NEGATIVE
KETONES UR STRIP-MCNC: NEGATIVE MG/DL
LEUKOCYTE ESTERASE UR QL STRIP: ABNORMAL
MCH RBC QN AUTO: 25.4 PG (ref 26.5–33)
MCHC RBC AUTO-ENTMCNC: 30.3 G/DL (ref 31.5–36.5)
MCV RBC AUTO: 84 FL (ref 78–100)
NITRATE UR QL: NEGATIVE
PH UR STRIP: 5.5 [PH] (ref 5–7)
PHOSPHATE SERPL-MCNC: 4.1 MG/DL (ref 2.5–4.5)
PLATELET # BLD AUTO: 321 10E3/UL (ref 150–450)
POTASSIUM SERPL-SCNC: 3.9 MMOL/L (ref 3.4–5.3)
RBC # BLD AUTO: 3.86 10E6/UL (ref 3.8–5.2)
RBC URINE: <1 /HPF
SODIUM SERPL-SCNC: 140 MMOL/L (ref 135–145)
SODIUM UR-SCNC: 92 MMOL/L
SP GR UR STRIP: 1.02 (ref 1–1.03)
TRANSITIONAL EPI: <1 /HPF
UROBILINOGEN UR STRIP-MCNC: NORMAL MG/DL
UUN UR-MCNC: 616 MG/DL (ref 801–1666)
WBC # BLD AUTO: 7.7 10E3/UL (ref 4–11)
WBC URINE: 4 /HPF

## 2024-12-28 PROCEDURE — 250N000013 HC RX MED GY IP 250 OP 250 PS 637: Performed by: PHYSICIAN ASSISTANT

## 2024-12-28 PROCEDURE — 84300 ASSAY OF URINE SODIUM: CPT

## 2024-12-28 PROCEDURE — 99207 PR NO BILLABLE SERVICE THIS VISIT: CPT | Performed by: NURSE PRACTITIONER

## 2024-12-28 PROCEDURE — 84540 ASSAY OF URINE/UREA-N: CPT

## 2024-12-28 PROCEDURE — 36415 COLL VENOUS BLD VENIPUNCTURE: CPT | Performed by: STUDENT IN AN ORGANIZED HEALTH CARE EDUCATION/TRAINING PROGRAM

## 2024-12-28 PROCEDURE — 250N000013 HC RX MED GY IP 250 OP 250 PS 637

## 2024-12-28 PROCEDURE — 250N000009 HC RX 250

## 2024-12-28 PROCEDURE — 94640 AIRWAY INHALATION TREATMENT: CPT | Mod: 76

## 2024-12-28 PROCEDURE — 999N000157 HC STATISTIC RCP TIME EA 10 MIN

## 2024-12-28 PROCEDURE — 250N000013 HC RX MED GY IP 250 OP 250 PS 637: Performed by: HOSPITALIST

## 2024-12-28 PROCEDURE — 82435 ASSAY OF BLOOD CHLORIDE: CPT | Performed by: STUDENT IN AN ORGANIZED HEALTH CARE EDUCATION/TRAINING PROGRAM

## 2024-12-28 PROCEDURE — 250N000013 HC RX MED GY IP 250 OP 250 PS 637: Performed by: STUDENT IN AN ORGANIZED HEALTH CARE EDUCATION/TRAINING PROGRAM

## 2024-12-28 PROCEDURE — 99233 SBSQ HOSP IP/OBS HIGH 50: CPT

## 2024-12-28 PROCEDURE — 250N000009 HC RX 250: Performed by: STUDENT IN AN ORGANIZED HEALTH CARE EDUCATION/TRAINING PROGRAM

## 2024-12-28 PROCEDURE — 120N000003 HC R&B IMCU UMMC

## 2024-12-28 PROCEDURE — 85027 COMPLETE CBC AUTOMATED: CPT

## 2024-12-28 PROCEDURE — 258N000003 HC RX IP 258 OP 636

## 2024-12-28 PROCEDURE — 84100 ASSAY OF PHOSPHORUS: CPT

## 2024-12-28 PROCEDURE — 250N000011 HC RX IP 250 OP 636

## 2024-12-28 PROCEDURE — 999N000128 HC STATISTIC PERIPHERAL IV START W/O US GUIDANCE

## 2024-12-28 PROCEDURE — 81001 URINALYSIS AUTO W/SCOPE: CPT

## 2024-12-28 PROCEDURE — 80048 BASIC METABOLIC PNL TOTAL CA: CPT | Performed by: STUDENT IN AN ORGANIZED HEALTH CARE EDUCATION/TRAINING PROGRAM

## 2024-12-28 PROCEDURE — 250N000013 HC RX MED GY IP 250 OP 250 PS 637: Performed by: INTERNAL MEDICINE

## 2024-12-28 PROCEDURE — 82374 ASSAY BLOOD CARBON DIOXIDE: CPT | Performed by: STUDENT IN AN ORGANIZED HEALTH CARE EDUCATION/TRAINING PROGRAM

## 2024-12-28 RX ORDER — LEVALBUTEROL INHALATION SOLUTION 0.63 MG/3ML
0.63 SOLUTION RESPIRATORY (INHALATION)
Status: DISCONTINUED | OUTPATIENT
Start: 2024-12-28 | End: 2024-12-28

## 2024-12-28 RX ORDER — AMPICILLIN AND SULBACTAM 2; 1 G/1; G/1
3 INJECTION, POWDER, FOR SOLUTION INTRAMUSCULAR; INTRAVENOUS EVERY 6 HOURS
Status: DISCONTINUED | OUTPATIENT
Start: 2024-12-28 | End: 2025-01-01

## 2024-12-28 RX ORDER — ACETYLCYSTEINE 200 MG/ML
2 SOLUTION ORAL; RESPIRATORY (INHALATION) EVERY 4 HOURS
Status: DISCONTINUED | OUTPATIENT
Start: 2024-12-28 | End: 2024-12-30

## 2024-12-28 RX ORDER — ACETYLCYSTEINE 200 MG/ML
2 SOLUTION ORAL; RESPIRATORY (INHALATION) EVERY 4 HOURS
Status: DISCONTINUED | OUTPATIENT
Start: 2024-12-28 | End: 2024-12-28

## 2024-12-28 RX ORDER — LEVALBUTEROL INHALATION SOLUTION 0.63 MG/3ML
0.63 SOLUTION RESPIRATORY (INHALATION)
Status: DISCONTINUED | OUTPATIENT
Start: 2024-12-28 | End: 2024-12-30

## 2024-12-28 RX ADMIN — ACETYLCYSTEINE 2 ML: 200 SOLUTION ORAL; RESPIRATORY (INHALATION) at 01:30

## 2024-12-28 RX ADMIN — ACETAMINOPHEN 650 MG: 325 TABLET, FILM COATED ORAL at 20:54

## 2024-12-28 RX ADMIN — Medication 1 SPRAY: at 17:38

## 2024-12-28 RX ADMIN — SPIRONOLACTONE 25 MG: 25 TABLET, FILM COATED ORAL at 08:08

## 2024-12-28 RX ADMIN — PIPERACILLIN SODIUM AND TAZOBACTAM SODIUM 3.38 G: 3; .375 INJECTION, SOLUTION INTRAVENOUS at 03:53

## 2024-12-28 RX ADMIN — ACETYLCYSTEINE 2 ML: 200 SOLUTION ORAL; RESPIRATORY (INHALATION) at 04:13

## 2024-12-28 RX ADMIN — LEVALBUTEROL HYDROCHLORIDE 0.63 MG: 0.63 SOLUTION RESPIRATORY (INHALATION) at 16:05

## 2024-12-28 RX ADMIN — LEVALBUTEROL HYDROCHLORIDE 0.63 MG: 0.63 SOLUTION RESPIRATORY (INHALATION) at 19:58

## 2024-12-28 RX ADMIN — BUDESONIDE 0.5 MG: 0.5 INHALANT ORAL at 08:12

## 2024-12-28 RX ADMIN — DILTIAZEM HYDROCHLORIDE 120 MG: 120 TABLET, FILM COATED ORAL at 05:46

## 2024-12-28 RX ADMIN — ORAL VEHICLES - SUSP 25 MG: SUSPENSION at 08:08

## 2024-12-28 RX ADMIN — IPRATROPIUM BROMIDE 0.5 MG: 0.5 SOLUTION RESPIRATORY (INHALATION) at 12:05

## 2024-12-28 RX ADMIN — Medication 1 SPRAY: at 21:08

## 2024-12-28 RX ADMIN — PANTOPRAZOLE SODIUM 40 MG: 40 INJECTION, POWDER, FOR SOLUTION INTRAVENOUS at 08:08

## 2024-12-28 RX ADMIN — DEXTROSE AND SODIUM CHLORIDE: 5; 900 INJECTION, SOLUTION INTRAVENOUS at 07:31

## 2024-12-28 RX ADMIN — AMPICILLIN SODIUM AND SULBACTAM SODIUM 3 G: 2; 1 INJECTION, POWDER, FOR SOLUTION INTRAMUSCULAR; INTRAVENOUS at 17:38

## 2024-12-28 RX ADMIN — IPRATROPIUM BROMIDE 0.5 MG: 0.5 SOLUTION RESPIRATORY (INHALATION) at 19:58

## 2024-12-28 RX ADMIN — AMPICILLIN SODIUM AND SULBACTAM SODIUM 3 G: 2; 1 INJECTION, POWDER, FOR SOLUTION INTRAMUSCULAR; INTRAVENOUS at 12:25

## 2024-12-28 RX ADMIN — ACETYLCYSTEINE 2 ML: 200 SOLUTION ORAL; RESPIRATORY (INHALATION) at 12:04

## 2024-12-28 RX ADMIN — DILTIAZEM HYDROCHLORIDE 120 MG: 120 TABLET, FILM COATED ORAL at 23:38

## 2024-12-28 RX ADMIN — LEVOTHYROXINE SODIUM 100 MCG: 100 TABLET ORAL at 05:44

## 2024-12-28 RX ADMIN — SERTRALINE HYDROCHLORIDE 200 MG: 100 TABLET ORAL at 08:07

## 2024-12-28 RX ADMIN — IPRATROPIUM BROMIDE 0.5 MG: 0.5 SOLUTION RESPIRATORY (INHALATION) at 04:13

## 2024-12-28 RX ADMIN — DEXTROSE AND SODIUM CHLORIDE: 5; 900 INJECTION, SOLUTION INTRAVENOUS at 17:41

## 2024-12-28 RX ADMIN — SODIUM CHLORIDE, POTASSIUM CHLORIDE, SODIUM LACTATE AND CALCIUM CHLORIDE 500 ML: 600; 310; 30; 20 INJECTION, SOLUTION INTRAVENOUS at 09:58

## 2024-12-28 RX ADMIN — DILTIAZEM HYDROCHLORIDE 120 MG: 120 TABLET, FILM COATED ORAL at 18:39

## 2024-12-28 RX ADMIN — Medication 1 SPRAY: at 08:07

## 2024-12-28 RX ADMIN — IPRATROPIUM BROMIDE 0.5 MG: 0.5 SOLUTION RESPIRATORY (INHALATION) at 08:12

## 2024-12-28 RX ADMIN — THERA TABS 1 TABLET: TAB at 08:08

## 2024-12-28 RX ADMIN — ACETYLCYSTEINE 2 ML: 200 SOLUTION ORAL; RESPIRATORY (INHALATION) at 19:59

## 2024-12-28 RX ADMIN — IPRATROPIUM BROMIDE 0.5 MG: 0.5 SOLUTION RESPIRATORY (INHALATION) at 16:05

## 2024-12-28 RX ADMIN — GABAPENTIN 600 MG: 300 CAPSULE ORAL at 22:56

## 2024-12-28 RX ADMIN — Medication 1 SPRAY: at 12:26

## 2024-12-28 RX ADMIN — THIAMINE HCL TAB 100 MG 100 MG: 100 TAB at 08:08

## 2024-12-28 RX ADMIN — ORAL VEHICLES - SUSP 25 MG: SUSPENSION at 22:55

## 2024-12-28 RX ADMIN — LEVALBUTEROL HYDROCHLORIDE 0.63 MG: 0.63 SOLUTION RESPIRATORY (INHALATION) at 01:26

## 2024-12-28 RX ADMIN — ACETYLCYSTEINE 2 ML: 200 SOLUTION ORAL; RESPIRATORY (INHALATION) at 08:12

## 2024-12-28 RX ADMIN — DILTIAZEM HYDROCHLORIDE 120 MG: 120 TABLET, FILM COATED ORAL at 12:26

## 2024-12-28 RX ADMIN — AMPICILLIN SODIUM AND SULBACTAM SODIUM 3 G: 2; 1 INJECTION, POWDER, FOR SOLUTION INTRAMUSCULAR; INTRAVENOUS at 23:38

## 2024-12-28 RX ADMIN — IPRATROPIUM BROMIDE 0.5 MG: 0.5 SOLUTION RESPIRATORY (INHALATION) at 01:29

## 2024-12-28 RX ADMIN — LEVALBUTEROL HYDROCHLORIDE 0.63 MG: 0.63 SOLUTION RESPIRATORY (INHALATION) at 08:12

## 2024-12-28 RX ADMIN — ATORVASTATIN CALCIUM 40 MG: 40 TABLET, FILM COATED ORAL at 20:54

## 2024-12-28 RX ADMIN — ACETYLCYSTEINE 2 ML: 200 SOLUTION ORAL; RESPIRATORY (INHALATION) at 16:05

## 2024-12-28 RX ADMIN — FEXOFENADINE HYDROCHLORIDE 120 MG: 60 TABLET ORAL at 08:07

## 2024-12-28 RX ADMIN — LEVALBUTEROL HYDROCHLORIDE 0.63 MG: 0.63 SOLUTION RESPIRATORY (INHALATION) at 12:05

## 2024-12-28 RX ADMIN — LEVALBUTEROL HYDROCHLORIDE 0.63 MG: 0.63 SOLUTION RESPIRATORY (INHALATION) at 04:13

## 2024-12-28 RX ADMIN — BUDESONIDE 0.5 MG: 0.5 INHALANT ORAL at 19:58

## 2024-12-28 RX ADMIN — FUROSEMIDE 40 MG: 40 TABLET ORAL at 08:08

## 2024-12-28 ASSESSMENT — ACTIVITIES OF DAILY LIVING (ADL)
ADLS_ACUITY_SCORE: 72

## 2024-12-28 NOTE — PROGRESS NOTES
An RRT was called for the patient due to acute hypoxemia. The patient was found on Oxymask at 15 L/min. Auscultation revealed coarse breath sounds, the patient demonstrated weak and ineffective coughs. The patient was placed on HFNC. An NTS was performed, which provided with copious, thick and creamy brown secretions. The MD ordered nebulizers to be done Q4hr.     RT recommends to keep the patient on the HFNC for humidity. RT spoke with RN to encourage the patient to continue to practice PEP therapy and frequent coughing.    RT will continue to assess the patient, and administer nebulizers as scheduled.    César Harley RRT., LRT.

## 2024-12-28 NOTE — CODE/RAPID RESPONSE
"Rapid Response Team Note    Assessment   A rapid response was called on Asya Coffman due to acute hypoxic respiratory failure. Sats deceasing into the 70s and patient is having difficulty clearing secretions due to a weak cough and thick secretions.    Plan with Dr. Andrews at the bedside    Plan   - Change PRN nebs to scheduled for now  - Change from mask back to HFNC for improved humidification  - NT suctioned by RT- copious thick secretions suctioned and patient states that it was helpful    -  Disposition: The patient will remain on 7C   -  Reassessment and plan follow-up will be performed by the primary team    VERONICA Phillips CNP  Rapid Response Team ERIC  Securely message with [employer]     Medical Decision Making     25 MINUTES SPENT BY ME on the date of service doing chart review, history, exam, documentation & further activities per the note.          Hospital Course   Brief Summary of events leading to rapid response:   History of aspiration and worsening respiratory failure    Physical Exam   Vital Signs: Temp: 100.1  F (37.8  C) Temp src: Oral BP: 95/61 Pulse: 103   Resp: 16 SpO2: 100 % O2 Device: High Flow Nasal Cannula (HFNC) Oxygen Delivery: 11 LPM  Weight: 104 lbs 15.02 oz      Exam:   Respiratory: Regular rate, bronchial/tracheal rhonchi, improved with deep suction  Cardiovascular: Tachy, regular    Significant Results and Procedures   Lab Results   Component Value Date    WBC 9.0 12/27/2024     Lab Results   Component Value Date    RBC 4.30 12/27/2024     Lab Results   Component Value Date    HGB 10.7 12/27/2024     Lab Results   Component Value Date    HCT 36.1 12/27/2024     No components found for: \"MCT\"  Lab Results   Component Value Date    MCV 84 12/27/2024     Lab Results   Component Value Date    MCH 24.9 12/27/2024     Lab Results   Component Value Date    MCHC 29.6 12/27/2024     Lab Results   Component Value Date    RDW 18.9 12/27/2024     Lab Results   Component Value " Date     12/27/2024         T

## 2024-12-28 NOTE — PROGRESS NOTES
Brief cross cover note:     Paged by RN regarding decreasing O2 sat's and they called RRT as well. Pt promptly seen at bedside w/ RT and rapid team. Pt audibly course with rattling breathing that could be heard across the room, per chart review pt struggling with secretions and had similar episode earlier. Pt is on BID pulmonary toilet w/ PRN Q4H mucomyst however the PRN's have not been given for days. Adjusted the pulmonary toilet regimen to Q4H continuous w/ mucomyst + ipratropium/xopenex starting now. Oral suction failed, secretions too thick. RT got deep suction with great result. O2 sat's recovered on HFNC, pulse ox waveform good after troubleshooting. Continue cares and wean O2 as able w/ frequent deep suctioning as needed.     Brain Ibrahim, DO 12/28/24 at 1:16 AM

## 2024-12-28 NOTE — PLAN OF CARE
"  Status: DNR-DNI    VS: BP 95/61   Pulse 103   Temp 100.1  F (37.8  C) (Oral)   Resp 16   Ht 1.651 m (5' 5\")   Wt 47.6 kg (104 lb 15 oz)   SpO2 100%   BMI 17.46 kg/m      Neuros: A&OX4  Cardiac: Tachy  Respiratory: HFNC for humidity   GI/: Incontinent of both bm and urine   Diet/Nausea: pt denies nausea, NPO   Skin: WNL    LDA: NG tube to L nares. PIV infusing D5 NS @ 125 ml   Pain: Denies pain   Activity: assist of 2, repositioning q2h  New changes this shift:  Rapid called as 02 desats even with continuous suctioning and 02 at 11 lpm /oxymask. Dr. Ashley seen pt   Plan: continues plan of care          Goal Outcome Evaluation:      Plan of Care Reviewed With: patient    Overall Patient Progress: no changeOverall Patient Progress: no change           "

## 2024-12-28 NOTE — PLAN OF CARE
"Blood pressure (!) 118/102, pulse (!) 135, temperature 98.7  F (37.1  C), temperature source Axillary, resp. rate 18, height 1.651 m (5' 5\"), weight 47.6 kg (104 lb 15 oz), SpO2 100% 6L . Via Oxy mask.      Patient A & O X 4 with intermittent forgetful. Patient at beginning of shift was de-sat to 70- 80%  requiring frequent oral suctioning .   Required high flow Oxygen , Primary team paged and  RRT Paged. MD order chest Xray, ABG , chest CT. Blood Culture, IVF IV abx . Patient PEG placement is pending until Monday per IR report. Oxygen now on  6L via Oxy-mask . Patient inct of bladder and bowel . Inct care done and barrier cream applied .repositioned  as tolerated . NJ in Intact remain NPO, . Patient Trieger sepsis , Lactic acid ordered . Recheck 1.3.  Continue with POC and update MD with change .         Goal Outcome Evaluation:      Plan of Care Reviewed With: patient  Overall Patient Progress: no change  Peg placement pending until Monday . Patient Daughter aware .       "

## 2024-12-28 NOTE — PROGRESS NOTES
[facility]    Medicine Progress Note - Hospitalist Service, GOLD TEAM 8    Date of Admission:  11/21/2024    Assessment & Plan   Asya Coffman is a 79 year old female admitted on 11/21/2024. 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 Initially admitted to hospital medicine service with hypoxic respiratory failure suspected due to HFpEF requiring diuresis. Developed acute hypoxia on 12/8 secondary to suspected aspiration pneumonitis for which she was intubated (12/8-12/10). Now s/p extubation and transferring back to hospital medicine service.  Continues to have episodes of aspiration with worsening of hypoxia.  Now on n.p.o. and tube feeding held.    Updates today:  - FeUrea  -Sodium chloride bolus  -Hold furosemide   -hold Aldactone  -Change Zosyn to Unasyn  -Palliative care consult      Acute hypoxic respiratory failure, worsening   Recurrent Aspiration pneumonitis with aspiration pneumonia  MSSA PNA s/p abx (12/3-12/8)  Obstructive lung disease (COPD vs asthma)  Pulmonary hypertension   Presented on 11/21 with acute hypoxic respiratory failure; initially suspected to be secondary to HFpEF exacerbation; unresolved with diuresis. Underlying obstructive lung disease but no evidence of exacerbation here. Was treated for MSSA pneumonia. Overnight 12/8, developed sudden worsening of oxygenation and increased secretions in the setting of dysphagia/recent laryngeal radiation and was intubated for airway protection. Suspected aspiration pneumonitis as etiology. CT Chest (12/8) with mediastinal/hilar lymphadenopathy and resolving diffuse ground glass opacities most consistent with resolving pneumonia; no signs of progressive  or secondary infectious process. Was briefly on zosyn but this was discontinued as she was treated for full course based on initial pneumonia. She has been extubated and now on 1-3L.  Had episodes of hypoxia on 12/26 with evidence of the feeding tube content aspirated from respiratory tract subsequently hypoxia improved.  Tube feed also held.  On 12/27 had another episode of hypoxia needing high flow nasal oxygen.  Suspect another episode of aspiration.  X-ray without significant change.  EKG  A-fib with RVR.  CT 12/27 showed increasing basilar predominant bronchial wall thickening and diffuse groundglass attenuation suspicious of ongoing infectious process.  As such patient was started on Zosyn 12/27.  Will change it to Unasyn given SAMIR and no previous history isolated Pseudomonas  - High flow nasal oxygen  - Budesonide neb 0.5 mg BID,  - ipratropium-levalbuterol neb QID  - albuteraol Q4H PRN  - Mucomyst  - Aspiration precautions; n.p.o.  - Flutter valve, incentive spirometery  - CT PE  - Zosyn 12/28-12/28 then -Unasyn 12/28-  - Continue holding G-tube feeds  -Palliative care consult        Aspiration risk  Acute severe oropharyngeal dysphagia, proximal muscle weakness  Esophageal dysmotility  Concern for radiation-induced dysphagia  Laryngeal squamous cell carcinoma s/p radiation (4/2024-7/2024)  Concern for gastroparesis    Has had increased dysphagia in the setting of laryngeal squamous cell carcinoma s/p radiation (4/2024-7/2024). Family reports that over the past few months has declined dramatically from being quite mobile, active to being barely active, with 4 falls in 3 months progressively reduced speech and recurrent aspirations. Some concern for respiratory decompensation due to recurrent aspiration events. ENT evaluated 12/16/24 with bedside laryngoscopy for dysphagia.  Normal vocal cord function and control seen including ability to approximate cords during cough. No anatomic explanations for dysphagia  seen.Evaluated by GI and there is no concern of esophageal web.   Clinical picture consistent with significant muscle weakness contributing to oropharyngeal dysphagia, poor vocal pressure, poor cough effort likely due to radiation.  No evidence of active rheumatologic condition contributing per rheumatology evaluation. CK, aldolase negative. Unclear if hypothyroidism is the cause but can be contributing.  Cervical thoracic MRI without significant findings.   - SLP following  - NPO - okay for ice chips for comfort with 1:1 supervision  - Will need VFSS   - SLP will notify when they feel she is ready for this  - patient agreeable for PEG placement and and IR is consulted ( hold apixaban on 12/25 for placement on 12/30)        Anxiety  Depression  History of depression and anxiety.  She feels hopeless and has sad mood during this hospitalization.  Will continue PTA sertraline.  Will also consult health psychology.  -Increase PTA Sertraline 200 mg daily     Chronic afib  Afib with RVR  [id number] 3 (age++, HF+).   - PTA apixaban 5mg BID  - Diltiazem 120mg q6H as tolerated by blood pressure  - Mmetoprolol to tartrate to 25 mg twice daily      Chronic Left parietal, left thalamic lacunar strokes  - will increase atorvastatin to high intensity (20-> 40 mg). On DOAC  - follow up Neurorecs  - A1C shows prediabetes,     Hypothyroidism  Hx of thyroidectomy 2/2 cancer   - Continue PTA levothyroxine 88 mcg daily   - consulted Endocrine service as above and increased dose to 100 mcg daily. Appreciate recommendations       HFpEF (LVEF 55-60% 11/22/2024)   Pulmonary hypertension (44 mmHg per echo 11/22/2024)  Possible small PFO  Echo during current admission (11/22/2024) notable for increased thickness of LV and elevated BNP (peak 9200 > 6200) concerning for heart failure exacerbation s/p diuresis and pulmonary hypertension with estimated pulmonary artery pressure of 45 mmHg. Echo with bubble study (11/25/2024) concerning for  possible small PFO. With intubation on 12/8, developed hypotension without evidence of shock. Etiology of hypotension may be cardiogenic (e.g. exacerbation of pulmonary hypertension by positive pressure ventilation) vs medication associated (propofol, precedex); do not currently suspect distributive or obstructive hypotension. Weaned off pressors 12/10.   - Preload dependent in the setting of pulmonary hypertension, gentle diuresis as needed       At risk for refeeding syndrome  Starvation ketosis  Hypokalemia  Severe malnutrition in context of chronic illness/disease  Per daughter, baseline weight around 120-130 lbs. Admitted 102 lbs. Patient states she is eating less and has less appetite in general as well as having difficulty swallowing. While NPO during intubation, developed starvation ketosis. Given high risk for aspiration with PO intake, placed NGT and started tube feeds. Will need close monitoring for refeeding syndrome.   - NPO   - RD following for TF management  -Hold tube feeding and put on maintenance fluid  - Monitor for refeeding syndrome   - Lyte replacement protocol   - IV thiamine replacement      Oliguric SAMIR  Mild hyperkalemia  Cr 0.9 on admission. Baseline appears 0.9-1.0. Developed SAMIR initially in setting of diuresis and had been improving. Subsequently increased following transfer to ICU with consideration for prerenal in setting of NPO status.  Again significant increase in creatinine with low Cystatin C FEUrea above 40%.  Concern for possibility of ATN though UA is unremarkable  - Bolus of normal saline  - Strict I/Os  - Trend BMP   - FWF via NGT  - Cytstatin C  - FeUrea, U/A  -Consider nephrology consult  - Hold lasix utnil feurea checked  - Hold spiranolactone    History of apnea  Family report  apneic episodes after she underwent radiation therapy.  There is a possibility that she is having obstructive sleep apnea. VBG without evidence of hypercarbia.  If there is evidence of gas in the  morning we will put her on CPAP at night.    Diarrhea, resolved  Frequent loose stools. Unclear timing of onset so not sure if related to abx or TF initiation. No abdominal pain.  - Monitor   - Hold on infectious testing for now    Spiritual health  Pt family requested that pt sees    - consulted for emotional support      Anemia of chronic illness  Iron deficiency anemia  Baseline Hgb 11-12. Currently near baseline. Can consider anemia of chronic illness. Not able to swallow PTA oral iron  - Monitor CBC  - IV iron infusion     Generalized deconditioning  Recurrent falls  - PT/OT consults     CREST Syndrome  Characterized by Raynaud's, Calcinosis, GERD and some telangectasia's. Last saw [facility] 2017. No history of ILD.    - Continue protonix 40 mg daily       Family update  Daughter Lana updated on the phone.        Diet: Fluid restriction 2000 ML FLUID  NPO per Anesthesia Guidelines for Procedure/Surgery Except for: Meds    DVT Prophylaxis: DOAC  Miranda Catheter: Not present  Lines: None     Cardiac Monitoring: ACTIVE order. Indication: Tachyarrhythmias, acute (48 hours)  Code Status: No CPR- Do NOT Intubate      Clinically Significant Risk Factors        # Hyperkalemia: Highest K = 5.7 mmol/L in last 2 days, will monitor as appropriate   # Hypochloremia: Lowest Cl = 96 mmol/L in last 2 days, will monitor as appropriate      # Hypoalbuminemia: Lowest albumin = 3 g/dL at 12/10/2024  3:11 AM, will monitor as appropriate         # Acute Hypoxic Respiratory Failure: Documented O2 saturation < 90%. Continue supplemental oxygen as needed          # Severe Malnutrition: based on nutrition assessment      # Financial/Environmental Concerns: none         Social Drivers of Health    Housing Stability: High Risk (11/23/2024)    Housing Stability     Do you have housing? : No     Are you worried about losing your housing?: No          Disposition Plan     Medically Ready for Discharge: Anticipated  in 5+ Days             Roverto Dyson MD  Hospitalist Service, GOLD TEAM 8  [facility]  Securely message with [employer] (more info)  Text page via [employer] Paging/Directory   See signed in provider for up to date coverage information  ______________________________________________________________________    Interval History   Another episode  hypoxic event likely secondary to aspiration and RRT was called.  Patient was put on high flow nasal oxygen.  Oral suction with thick, creamy brown and copious secretions    Physical Exam   Vital Signs: Temp: 98.9  F (37.2  C) Temp src: Oral BP: (!) 110/90 Pulse: 102   Resp: 18 SpO2: 99 % O2 Device: High Flow Nasal Cannula (HFNC) Oxygen Delivery: 30 LPM  Weight: 104 lbs 15.02 oz    General Appearance:  Awake. Alert. No acute distress. Frail appearing.   Respiratory: Normal work of breathing on 2L. Lungs with loud upper airway breath sounds. Lower lobes sound clear. Poor cough effort.   Cardiovascular: Irregular. Tachycardic. No obvious murmur.   GI: Nondistended. Normoactive. Soft. Non-tender.   Skin: Warm, dry.   Neuro: Lethargic.  Very slow speaking.  O 3x    Medical Decision Making          55 MINUTES SPENT BY ME on the date of service doing chart review, history, exam, documentation & further activities per the note.      Data

## 2024-12-28 NOTE — PROVIDER NOTIFICATION
12/28/24 0050   Call Information   Date of Call 12/28/24   Time of Call 0048   Name of person requesting the team Jocelin   Title of person requesting team RN   RRT Arrival time 0050   Time RRT ended 0115   Reason for call   Type of RRT Adult   Primary reason for call Respiratory   Respiratory O2sat less than 88% for greater than 5 minutes despite O2   Was patient transferred from the ED, ICU, or PACU within last 24 hours prior to RRT call? No   SBAR   Situation Pt SpO2 decreased to 69-80s on 6 lpm oxymask.   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 Initially admitted to hospital medicine service with hypoxic respiratory failure suspected due to HFpEF requiring diuresis. Developed acute hypoxia on 12/8 secondary to suspected aspiration pneumonitis for which she was intubated (12/8-12/10).   Notable History/Conditions Cancer;Cardiac;COPD   Assessment Pt lethargic in bed with SpO2 reading in the 70s upon arrival. Audible gurgling sputum in upper airway, pt struggling to cough up. RT placed pt on HFNC and SpO2 improved.   Interventions Suction;O2 per N/C or mask;Neb treatment  (changed nebs from PRN to scheduled, NT sxn for large amount of thick, creamy sputum. Pulse ox moved from forehead to finger with better pleth.)   Patient Outcome   Patient Outcome Stabilized on unit   RRT Team   Attending/Primary/Covering Physician Gold 8   Date Attending Physician notified 12/28/24   Time Attending Physician notified 0050   Physician(s) Nydia Acuña RRT APRN; Jovi Ibrahim DO   Lead RN Katie Monreal K-JOY   RT César Cherri   Post RRT Intervention Assessment   Post RRT Assessment Stable/Improved   Date Follow Up Done 12/28/24   Time  Follow Up Done 0345   Comments RT decreased HFNC to 30%/30 lpm. SpO2 100%. Pt sleeping, but arouses easily. Loose cough.

## 2024-12-29 LAB
ANION GAP SERPL CALCULATED.3IONS-SCNC: 12 MMOL/L (ref 7–15)
BUN SERPL-MCNC: 28.5 MG/DL (ref 8–23)
CA-I BLD-MCNC: 4.2 MG/DL (ref 4.4–5.2)
CALCIUM SERPL-MCNC: 7.7 MG/DL (ref 8.8–10.4)
CHLORIDE SERPL-SCNC: 110 MMOL/L (ref 98–107)
CREAT SERPL-MCNC: 0.84 MG/DL (ref 0.51–0.95)
EGFRCR SERPLBLD CKD-EPI 2021: 71 ML/MIN/1.73M2
ERYTHROCYTE [DISTWIDTH] IN BLOOD BY AUTOMATED COUNT: 19 % (ref 10–15)
GLUCOSE BLDC GLUCOMTR-MCNC: 118 MG/DL (ref 70–99)
GLUCOSE SERPL-MCNC: 125 MG/DL (ref 70–99)
HCO3 SERPL-SCNC: 22 MMOL/L (ref 22–29)
HCT VFR BLD AUTO: 31.9 % (ref 35–47)
HGB BLD-MCNC: 9.3 G/DL (ref 11.7–15.7)
MAGNESIUM SERPL-MCNC: 1.9 MG/DL (ref 1.7–2.3)
MCH RBC QN AUTO: 24.9 PG (ref 26.5–33)
MCHC RBC AUTO-ENTMCNC: 29.2 G/DL (ref 31.5–36.5)
MCV RBC AUTO: 86 FL (ref 78–100)
PHOSPHATE SERPL-MCNC: 2.6 MG/DL (ref 2.5–4.5)
PLATELET # BLD AUTO: 295 10E3/UL (ref 150–450)
POTASSIUM SERPL-SCNC: 3.6 MMOL/L (ref 3.4–5.3)
RBC # BLD AUTO: 3.73 10E6/UL (ref 3.8–5.2)
SODIUM SERPL-SCNC: 144 MMOL/L (ref 135–145)
WBC # BLD AUTO: 7.4 10E3/UL (ref 4–11)

## 2024-12-29 PROCEDURE — 94640 AIRWAY INHALATION TREATMENT: CPT | Mod: 76

## 2024-12-29 PROCEDURE — 250N000011 HC RX IP 250 OP 636: Performed by: STUDENT IN AN ORGANIZED HEALTH CARE EDUCATION/TRAINING PROGRAM

## 2024-12-29 PROCEDURE — 93005 ELECTROCARDIOGRAM TRACING: CPT

## 2024-12-29 PROCEDURE — 85041 AUTOMATED RBC COUNT: CPT

## 2024-12-29 PROCEDURE — 250N000009 HC RX 250

## 2024-12-29 PROCEDURE — 250N000011 HC RX IP 250 OP 636

## 2024-12-29 PROCEDURE — 999N000215 HC STATISTIC HFNC ADULT NON-CPAP

## 2024-12-29 PROCEDURE — 250N000013 HC RX MED GY IP 250 OP 250 PS 637

## 2024-12-29 PROCEDURE — 80048 BASIC METABOLIC PNL TOTAL CA: CPT | Performed by: STUDENT IN AN ORGANIZED HEALTH CARE EDUCATION/TRAINING PROGRAM

## 2024-12-29 PROCEDURE — 82435 ASSAY OF BLOOD CHLORIDE: CPT | Performed by: STUDENT IN AN ORGANIZED HEALTH CARE EDUCATION/TRAINING PROGRAM

## 2024-12-29 PROCEDURE — 120N000003 HC R&B IMCU UMMC

## 2024-12-29 PROCEDURE — 36415 COLL VENOUS BLD VENIPUNCTURE: CPT

## 2024-12-29 PROCEDURE — 258N000003 HC RX IP 258 OP 636

## 2024-12-29 PROCEDURE — 250N000013 HC RX MED GY IP 250 OP 250 PS 637: Performed by: HOSPITALIST

## 2024-12-29 PROCEDURE — 84100 ASSAY OF PHOSPHORUS: CPT

## 2024-12-29 PROCEDURE — 250N000009 HC RX 250: Performed by: STUDENT IN AN ORGANIZED HEALTH CARE EDUCATION/TRAINING PROGRAM

## 2024-12-29 PROCEDURE — 94640 AIRWAY INHALATION TREATMENT: CPT

## 2024-12-29 PROCEDURE — 85018 HEMOGLOBIN: CPT

## 2024-12-29 PROCEDURE — 250N000013 HC RX MED GY IP 250 OP 250 PS 637: Performed by: INTERNAL MEDICINE

## 2024-12-29 PROCEDURE — 99233 SBSQ HOSP IP/OBS HIGH 50: CPT

## 2024-12-29 PROCEDURE — 83735 ASSAY OF MAGNESIUM: CPT

## 2024-12-29 PROCEDURE — 99207 PR NO BILLABLE SERVICE THIS VISIT: CPT | Performed by: PHYSICIAN ASSISTANT

## 2024-12-29 PROCEDURE — 82374 ASSAY BLOOD CARBON DIOXIDE: CPT | Performed by: STUDENT IN AN ORGANIZED HEALTH CARE EDUCATION/TRAINING PROGRAM

## 2024-12-29 PROCEDURE — 999N000157 HC STATISTIC RCP TIME EA 10 MIN

## 2024-12-29 PROCEDURE — 93010 ELECTROCARDIOGRAM REPORT: CPT | Performed by: INTERNAL MEDICINE

## 2024-12-29 PROCEDURE — 82330 ASSAY OF CALCIUM: CPT

## 2024-12-29 PROCEDURE — 99223 1ST HOSP IP/OBS HIGH 75: CPT | Performed by: FAMILY MEDICINE

## 2024-12-29 RX ORDER — METOPROLOL TARTRATE 1 MG/ML
5 INJECTION, SOLUTION INTRAVENOUS EVERY 5 MIN PRN
Status: DISCONTINUED | OUTPATIENT
Start: 2024-12-29 | End: 2025-01-14 | Stop reason: HOSPADM

## 2024-12-29 RX ORDER — MAGNESIUM SULFATE 1 G/100ML
1 INJECTION INTRAVENOUS ONCE
Status: COMPLETED | OUTPATIENT
Start: 2024-12-29 | End: 2024-12-29

## 2024-12-29 RX ORDER — POTASSIUM CHLORIDE 7.45 MG/ML
10 INJECTION INTRAVENOUS
Status: COMPLETED | OUTPATIENT
Start: 2024-12-29 | End: 2024-12-29

## 2024-12-29 RX ORDER — POTASSIUM CHLORIDE 29.8 MG/ML
20 INJECTION INTRAVENOUS ONCE
Status: DISCONTINUED | OUTPATIENT
Start: 2024-12-29 | End: 2024-12-29

## 2024-12-29 RX ADMIN — AMPICILLIN SODIUM AND SULBACTAM SODIUM 3 G: 2; 1 INJECTION, POWDER, FOR SOLUTION INTRAMUSCULAR; INTRAVENOUS at 16:43

## 2024-12-29 RX ADMIN — ORAL VEHICLES - SUSP 25 MG: SUSPENSION at 10:28

## 2024-12-29 RX ADMIN — LEVALBUTEROL HYDROCHLORIDE 0.63 MG: 0.63 SOLUTION RESPIRATORY (INHALATION) at 20:08

## 2024-12-29 RX ADMIN — IPRATROPIUM BROMIDE 0.5 MG: 0.5 SOLUTION RESPIRATORY (INHALATION) at 20:08

## 2024-12-29 RX ADMIN — AMPICILLIN SODIUM AND SULBACTAM SODIUM 3 G: 2; 1 INJECTION, POWDER, FOR SOLUTION INTRAMUSCULAR; INTRAVENOUS at 11:56

## 2024-12-29 RX ADMIN — ACETYLCYSTEINE 2 ML: 200 SOLUTION ORAL; RESPIRATORY (INHALATION) at 04:43

## 2024-12-29 RX ADMIN — IPRATROPIUM BROMIDE 0.5 MG: 0.5 SOLUTION RESPIRATORY (INHALATION) at 00:23

## 2024-12-29 RX ADMIN — IPRATROPIUM BROMIDE 0.5 MG: 0.5 SOLUTION RESPIRATORY (INHALATION) at 04:42

## 2024-12-29 RX ADMIN — POTASSIUM CHLORIDE 10 MEQ: 7.46 INJECTION, SOLUTION INTRAVENOUS at 12:19

## 2024-12-29 RX ADMIN — FEXOFENADINE HYDROCHLORIDE 120 MG: 60 TABLET ORAL at 07:52

## 2024-12-29 RX ADMIN — Medication 1 SPRAY: at 07:45

## 2024-12-29 RX ADMIN — BUDESONIDE 0.5 MG: 0.5 INHALANT ORAL at 08:05

## 2024-12-29 RX ADMIN — DEXTROSE AND SODIUM CHLORIDE: 5; 900 INJECTION, SOLUTION INTRAVENOUS at 02:29

## 2024-12-29 RX ADMIN — ORAL VEHICLES - SUSP 25 MG: SUSPENSION at 20:29

## 2024-12-29 RX ADMIN — ONDANSETRON 4 MG: 2 INJECTION INTRAMUSCULAR; INTRAVENOUS at 08:26

## 2024-12-29 RX ADMIN — POTASSIUM CHLORIDE 10 MEQ: 7.46 INJECTION, SOLUTION INTRAVENOUS at 10:40

## 2024-12-29 RX ADMIN — SERTRALINE HYDROCHLORIDE 200 MG: 100 TABLET ORAL at 07:51

## 2024-12-29 RX ADMIN — THERA TABS 1 TABLET: TAB at 07:51

## 2024-12-29 RX ADMIN — LEVALBUTEROL HYDROCHLORIDE 0.63 MG: 0.63 SOLUTION RESPIRATORY (INHALATION) at 23:52

## 2024-12-29 RX ADMIN — ACETYLCYSTEINE 2 ML: 200 SOLUTION ORAL; RESPIRATORY (INHALATION) at 11:59

## 2024-12-29 RX ADMIN — BUDESONIDE 0.5 MG: 0.5 INHALANT ORAL at 20:08

## 2024-12-29 RX ADMIN — POTASSIUM CHLORIDE 10 MEQ: 7.46 INJECTION, SOLUTION INTRAVENOUS at 13:37

## 2024-12-29 RX ADMIN — LEVALBUTEROL HYDROCHLORIDE 0.63 MG: 0.63 SOLUTION RESPIRATORY (INHALATION) at 04:42

## 2024-12-29 RX ADMIN — LEVALBUTEROL HYDROCHLORIDE 0.63 MG: 0.63 SOLUTION RESPIRATORY (INHALATION) at 16:50

## 2024-12-29 RX ADMIN — AMPICILLIN SODIUM AND SULBACTAM SODIUM 3 G: 2; 1 INJECTION, POWDER, FOR SOLUTION INTRAMUSCULAR; INTRAVENOUS at 05:39

## 2024-12-29 RX ADMIN — Medication 1 SPRAY: at 20:29

## 2024-12-29 RX ADMIN — IPRATROPIUM BROMIDE 0.5 MG: 0.5 SOLUTION RESPIRATORY (INHALATION) at 23:52

## 2024-12-29 RX ADMIN — DILTIAZEM HYDROCHLORIDE 120 MG: 120 TABLET, FILM COATED ORAL at 05:39

## 2024-12-29 RX ADMIN — ACETYLCYSTEINE 2 ML: 200 SOLUTION ORAL; RESPIRATORY (INHALATION) at 16:50

## 2024-12-29 RX ADMIN — ACETYLCYSTEINE 2 ML: 200 SOLUTION ORAL; RESPIRATORY (INHALATION) at 23:52

## 2024-12-29 RX ADMIN — AMPICILLIN SODIUM AND SULBACTAM SODIUM 3 G: 2; 1 INJECTION, POWDER, FOR SOLUTION INTRAMUSCULAR; INTRAVENOUS at 23:19

## 2024-12-29 RX ADMIN — LEVOTHYROXINE SODIUM 100 MCG: 100 TABLET ORAL at 05:39

## 2024-12-29 RX ADMIN — THIAMINE HCL TAB 100 MG 100 MG: 100 TAB at 07:52

## 2024-12-29 RX ADMIN — LEVALBUTEROL HYDROCHLORIDE 0.63 MG: 0.63 SOLUTION RESPIRATORY (INHALATION) at 08:05

## 2024-12-29 RX ADMIN — GABAPENTIN 600 MG: 300 CAPSULE ORAL at 23:19

## 2024-12-29 RX ADMIN — LEVALBUTEROL HYDROCHLORIDE 0.63 MG: 0.63 SOLUTION RESPIRATORY (INHALATION) at 00:23

## 2024-12-29 RX ADMIN — IPRATROPIUM BROMIDE 0.5 MG: 0.5 SOLUTION RESPIRATORY (INHALATION) at 12:04

## 2024-12-29 RX ADMIN — ACETYLCYSTEINE 2 ML: 200 SOLUTION ORAL; RESPIRATORY (INHALATION) at 20:09

## 2024-12-29 RX ADMIN — ATORVASTATIN CALCIUM 40 MG: 40 TABLET, FILM COATED ORAL at 21:11

## 2024-12-29 RX ADMIN — IPRATROPIUM BROMIDE 0.5 MG: 0.5 SOLUTION RESPIRATORY (INHALATION) at 16:49

## 2024-12-29 RX ADMIN — PANTOPRAZOLE SODIUM 40 MG: 40 INJECTION, POWDER, FOR SOLUTION INTRAVENOUS at 07:52

## 2024-12-29 RX ADMIN — ACETYLCYSTEINE 2 ML: 200 SOLUTION ORAL; RESPIRATORY (INHALATION) at 00:23

## 2024-12-29 RX ADMIN — ACETYLCYSTEINE 2 ML: 200 SOLUTION ORAL; RESPIRATORY (INHALATION) at 08:05

## 2024-12-29 RX ADMIN — LEVALBUTEROL HYDROCHLORIDE 0.63 MG: 0.63 SOLUTION RESPIRATORY (INHALATION) at 11:59

## 2024-12-29 RX ADMIN — MAGNESIUM SULFATE HEPTAHYDRATE 1 G: 1 INJECTION, SOLUTION INTRAVENOUS at 12:55

## 2024-12-29 RX ADMIN — IRON SUCROSE 300 MG: 20 INJECTION, SOLUTION INTRAVENOUS at 13:42

## 2024-12-29 RX ADMIN — POTASSIUM CHLORIDE 10 MEQ: 7.46 INJECTION, SOLUTION INTRAVENOUS at 09:20

## 2024-12-29 RX ADMIN — Medication 1 SPRAY: at 16:45

## 2024-12-29 RX ADMIN — IPRATROPIUM BROMIDE 0.5 MG: 0.5 SOLUTION RESPIRATORY (INHALATION) at 08:05

## 2024-12-29 ASSESSMENT — ACTIVITIES OF DAILY LIVING (ADL)
ADLS_ACUITY_SCORE: 72
ADLS_ACUITY_SCORE: 76

## 2024-12-29 NOTE — PLAN OF CARE
Shift Events: At approximately 7:45 patient reported having chest pain that she described as achy and deep, vitally stable, no diaphoresis, and no nausea at that time; obtained EKG and notified provider. At approximately 8:18 patient's HR went to 32, became pale, asked to be sat up, and proceeded to vomit. Patient appeared to aspirate portion of emesis and desatted to mid 80's, RRT called and provider notified. RT raised HFNC to 90% FiO2 with 60 LPM. Patient coughed up thick secretions, oral suctioning used to remove. After episode of emesis, patient reported chest pain resolved.     A:   Neuro: A&Ox4. Intermittently forgetful. Uses call light appropriately. Patient has flat affect and withdrawn. Cooperative with cares. Patient denied headache, dizziness, and lightheadedness.  Cardiac/Tele: VSS except episode of HR in low 30's and episode of HR in 140's to 170's, provider notified of all episodes. A fib rhythm. MAPs > 65. Patient reported chest pain in morning (see above) that resolved after emesis. Afebrile.  Respiratory: Sats > 95% with HFNC. Able to wean down to 50% FiO2, 40 LPM. Provider asked that an attempt be made to have patient be titrated to nasal cannula. Patient able to be titrated to 6 L oxymask. Patient reported no dyspnea.   GI/: Incontinent of urine and stool. Uses purewick. Adequate urine output. 2  loose, large BM during shift. Patient had 1 episode of emesis when HR went to 32, PRN zofran given. Patient denied nausea remainder of shift.   Diet/Appetite: NPO. TF held. IV 5% dextrose in 0.9% normal saline solution running at ordered rate of 125 mL/hr.  Skin: Blanchable redness of sacrum, mepilex applied. Perineum blanchable redness, barrier cream applied after each episode of incontinence. Blanchable redness on heels bilaterally, mepilex in place and heels elevated on pillows.   LDAs: R PIV running 5% dextrose in 0.9 % normal saline and L PIV running TKO.   Activity: Patient did not leave bed  during shift due to respiratory status. Q2 repositoning.   Pain: Patient reported 4/10 chest pain (see above), resolved after emesis episode.   Electrolytes: Potassium and magnesium replaced      P: Continue to monitor and notify team with changes. Reviewed plan of care with patient    Shift: 07:00 to 19:30    Goal Outcome Evaluation:      Plan of Care Reviewed With: patient    Overall Patient Progress: declining    Outcome Evaluation: Patient's HR went to 32, proceeded to have episode of emesis. Patient appeared to have aspirated emesis, desatted, RRT called. Patient's HFNC increased to 90% FiO2, 45 LPM. Patient coughed up thick secretions, oral suctioning used to remove.

## 2024-12-29 NOTE — PROGRESS NOTES
[facility]    Medicine Progress Note - Hospitalist Service, GOLD TEAM 8    Date of Admission:  11/21/2024    Assessment & Plan   Asya Coffman is a 79 year old female admitted on 11/21/2024. 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 Initially admitted to hospital medicine service with hypoxic respiratory failure suspected due to HFpEF requiring diuresis. Developed acute hypoxia on 12/8 secondary to suspected aspiration pneumonitis for which she was intubated (12/8-12/10). Now s/p extubation and transferring back to hospital medicine service.  Continues to have episodes of aspiration with worsening of hypoxia.  Now on n.p.o. and tube feeding held.    Updates today:  -Had an episode of bradycardia.  -Continues to be hypoxic on high flow nasal oxygen  -Palliative care consulted      Acute hypoxic respiratory failure, worsening   Recurrent Aspiration pneumonitis with aspiration pneumonia  MSSA PNA s/p abx (12/3-12/8)  Obstructive lung disease (COPD vs asthma)  Pulmonary hypertension     Presented on 11/21 with acute hypoxic respiratory failure; initially suspected to be secondary to HFpEF exacerbation; unresolved with diuresis. Underlying obstructive lung disease but no evidence of exacerbation here. Was treated for MSSA pneumonia. Overnight 12/8, developed sudden worsening of oxygenation and increased secretions in the setting of dysphagia/recent laryngeal radiation and was intubated for airway protection. Suspected aspiration pneumonitis as etiology. CT Chest (12/8) with mediastinal/hilar lymphadenopathy and resolving diffuse ground glass opacities most consistent with resolving pneumonia; no signs of progressive or  secondary infectious process. Was briefly on zosyn but this was discontinued as she was treated for full course based on initial pneumonia. She has been extubated.  Multiple episodes of worsening of hypoxia with evidence of aspiration and intolerance to tube feeding.  CT 12/27 showed increasing basilar predominant bronchial wall thickening and diffuse groundglass attenuation suspicious of ongoing infectious process.  Started on Zosyn 12/27 subsequently changed to Unasyn.  To have  excessive oral secretion and episodes of aspiration needing frequent frequent suctioning  - High flow nasal oxygen  - Budesonide neb 0.5 mg BID,  - ipratropium-levalbuterol neb QID  - albuteraol Q4H PRN  - Mucomyst  - Aspiration precautions; n.p.o.  - Flutter valve, incentive spirometery  - CT PE  - Zosyn 12/28-12/28 then -Unasyn 12/28-  - Continue holding G-tube feeds  - Palliative care consult, recs appreciated  -PEG tube placement is still within patient's goals of care      Failure to thrive  Aspiration risk  Acute severe oropharyngeal dysphagia  Esophageal dysmotility  Concern for radiation-induced dysphagia  Laryngeal squamous cell carcinoma s/p radiation (4/2024-7/2024)  Concern for gastroparesis    Has had increased dysphagia in the setting of laryngeal squamous cell carcinoma s/p radiation (4/2024-7/2024). Family reports that over the past few months has declined dramatically from being quite mobile, active to being barely active, with 4 falls in 3 months progressively reduced speech and recurrent aspirations.  ENT evaluated 12/16/24 with bedside laryngoscopy for dysphagia.  Normal vocal cord function and control seen including ability to approximate cords during cough. No anatomic explanations for dysphagia seen.Evaluated by GI and there is no concern of esophageal web.   Clinical picture consistent with significant muscle weakness contributing to oropharyngeal dysphagia, poor vocal pressure, poor cough effort likely due to  radiation.  No evidence of active rheumatologic condition contributing per rheumatology evaluation. CK, aldolase negative.  Myasthenia panel negative.  Cortisol within normal limit.  Unclear if hypothyroidism is the cause but can be contributing.  Doses adjusted as below. Cervical thoracic MRI without significant findings.   - SLP following  - NPO   - Will need VFSS   - SLP will notify when they feel she is ready for this  - patient agreeable for PEG placement and and IR is consulted ( hold apixaban on 12/25 for placement on 12/30)          Anxiety  Depression  History of depression and anxiety.  She feels hopeless and has sad mood during this hospitalization.  Will continue PTA sertraline.  Will also consult health psychology.  -Increased PTA Sertraline 200 mg daily     Chronic afib  Afib with RVR    - [id number] 3 (age++, HF+).   - PTA apixaban 5mg BID (held while awaiting for GJ tube placement)  - hold Diltiazem 120mg q6H as tolerated by blood pressure give bradycardia and soft BP  - Mmetoprolol to tartrate to 25 mg twice daily  -K above 4 and Mg above 2      Chronic Left parietal, left thalamic lacunar strokes  - will increase atorvastatin to high intensity (20-> 40 mg). On DOAC  - follow up Neurorecs  - A1C shows prediabetes,     Hypothyroidism  Hx of thyroidectomy 2/2 cancer   - Continue PTA levothyroxine 88 mcg daily   - consulted Endocrine service as above and increased dose to 100 mcg daily. Appreciate recommendations       HFpEF (LVEF 55-60% 11/22/2024)   Pulmonary hypertension (44 mmHg per echo 11/22/2024)  Possible small PFO  Echo during current admission (11/22/2024) notable for increased thickness of LV and elevated BNP (peak 9200 > 6200) concerning for heart failure exacerbation s/p diuresis and pulmonary hypertension with estimated pulmonary artery pressure of 45 mmHg. Echo with bubble study (11/25/2024) concerning for possible small PFO. With intubation on 12/8, developed hypotension without  evidence of shock. Etiology of hypotension may be cardiogenic (e.g. exacerbation of pulmonary hypertension by positive pressure ventilation) vs medication associated (propofol, precedex); do not currently suspect distributive or obstructive hypotension. Weaned off pressors 12/10.   - Preload dependent in the setting of pulmonary hypertension, gentle diuresis as needed       At risk for refeeding syndrome  Starvation ketosis  Hypokalemia  Severe malnutrition in context of chronic illness/disease  Per daughter, baseline weight around 120-130 lbs. Admitted 102 lbs. Patient states she is eating less and has less appetite in general as well as having difficulty swallowing. While NPO during intubation, developed starvation ketosis. Given high risk for aspiration with PO intake, placed NGT and started tube feeds. Will need close monitoring for refeeding syndrome.   - NPO   - RD following for TF management  -Hold tube feeding and put on maintenance fluid  - Monitor for refeeding syndrome   - Lyte replacement protocol   - IV thiamine replacement   -On maintenance fluid while  tube feeding is  held.     Oliguric SAMIR, improving  Mild hyperkalemia  Cr 0.9 on admission. Baseline appears 0.9-1.0. Developed SAMIR initially in setting of diuresis and had been improving. Subsequently increased following transfer to ICU with consideration for prerenal in setting of NPO status.  Again significant increase in creatinine with low Cystatin C FEUrea above 40%.  Concern for possibility of ATN though UA is unremarkable  - Strict I/Os  - Trend BMP   - FWF via NGT  - Cytstatin C  -Holding Lasix  - Hold spiranolactone    History of apnea  Family report  apneic episodes after she underwent radiation therapy.  There is a possibility that she is having obstructive sleep apnea. VBG without evidence of hypercarbia.  If there is evidence of gas in the morning we will put her on CPAP at night.    Diarrhea, resolved  Frequent loose stools. Unclear  timing of onset so not sure if related to abx or TF initiation. No abdominal pain.  - Monitor   - Hold on infectious testing for now    Spiritual health  Pt family requested that pt sees    - consulted for emotional support      Anemia of chronic illness  Iron deficiency anemia  Baseline Hgb 11-12. Currently near baseline. Can consider anemia of chronic illness. Not able to swallow PTA oral iron  - Monitor CBC  - IV iron infusion     Generalized deconditioning  Recurrent falls  - PT/OT consults     CREST Syndrome  Characterized by Raynaud's, Calcinosis, GERD and some telangectasia's. Last saw [facility] 2017. No history of ILD.    - Continue protonix 40 mg daily       Family update  Daughter Lana updated on the phone.        Diet: Fluid restriction 2000 ML FLUID  NPO per Anesthesia Guidelines for Procedure/Surgery Except for: Meds    DVT Prophylaxis: DOAC  Miranda Catheter: Not present  Lines: None     Cardiac Monitoring: ACTIVE order. Indication: Tachyarrhythmias, acute (48 hours)  Code Status: No CPR- Do NOT Intubate      Clinically Significant Risk Factors          # Hyperchloremia: Highest Cl = 110 mmol/L in last 2 days, will monitor as appropriate      # Hypocalcemia: Lowest iCa = 4.2 mg/dL in last 2 days, will monitor and replace as appropriate     # Hypoalbuminemia: Lowest albumin = 3 g/dL at 12/10/2024  3:11 AM, will monitor as appropriate                 # Severe Malnutrition: based on nutrition assessment      # Financial/Environmental Concerns: none         Social Drivers of Health    Housing Stability: High Risk (11/23/2024)    Housing Stability     Do you have housing? : No     Are you worried about losing your housing?: No          Disposition Plan     Medically Ready for Discharge: Anticipated in 5+ Days             Roverto Dyson MD  Hospitalist Service, GOLD TEAM [address] [place] [address]  Securely message with [employer]porter (more info)  Text page via  [facility] Paging/Directory   See signed in provider for up to date coverage information  ______________________________________________________________________    Interval History   Episodes of bradycardia with hypotension which spontaneously resolved.  Dose of diltiazem held.  History of episodes of aspiration with excessive reaching the bowels.    Physical Exam   Vital Signs: Temp: 97.4  F (36.3  C) Temp src: Axillary BP: 113/73 Pulse: 80   Resp: 18 SpO2: 97 % O2 Device: High Flow Nasal Cannula (HFNC) Oxygen Delivery: 40 LPM  Weight: 112 lbs 10.48 oz    General Appearance:  Awake. Alert. No acute distress. Frail appearing.   Respiratory: On high flow nasal oxygen.  Crackles and rhonchi.    Cardiovascular: Irregular. Tachycardic. No obvious murmur.   GI: Nondistended. Normoactive. Soft. Non-tender.   Skin: Warm, dry.   Neuro: Lethargic.  Very slow speaking.  O 3x    Medical Decision Making          55 MINUTES SPENT BY ME on the date of service doing chart review, history, exam, documentation & further activities per the note.      Data

## 2024-12-29 NOTE — CONSULTS
Palliative Care Consultation Note  [facility]      Patient: Asya Coffman  Date of Admission:  11/21/2024    Requesting Clinician / Team: Dr. Dyson  Reason for consult: Goals of care       Recommendations & Counseling     GOALS OF CARE:   Life-prolonging with limits   Hannah wants to live longer if she can get stronger; she is open to a PEG tube and other interventions as long as the medical team members recommending them think their benefits outweigh the burdens.  She affirmed wanting to be considered DNAR/DNI.  We did converse about how much she has endured in her life medically from even in her younger years until her XRT this spring/summer for laryngeal cancer.  She feels confident she will know when she wants to change her goals of care to a comfort focus if things don't improve or there are more setbacks.  She was encouraged to discuss this topic more with her two adult children so they can be ready to act as health care agents if/when she can't.    ADVANCE CARE PLANNING:  No health care directive on file. Per system policy, Surrogate Decision-makers for Patients With Diminished Decision-making Capacity offers guidance on possible decision-makers. Daughter Lana has been identified as a surrogate decision maker.   There is no POLST form on file, defer to patient and/or next of kin for decisions   Code status: No CPR- Do NOT Intubate    MEDICAL MANAGEMENT:   We are not actively managing symptoms at this time.  Given Hannah's profound weakness sparkle in her bulbar region would a neuro consult be helpful or perhaps testing for myasthenia gravis?  At daughters request I asked for a medication overview to ensure there are no medications contributing to Hannah's weakness.    PSYCHOSOCIAL/SPIRITUAL SUPPORT:  Family two adult children: Lana and Deo and 6 grandchildren  [place] community: [employer]   I have asked  assigned to palliative care team see Hannah.    Palliative  Care will continue to follow Hannah mainly for spiritual and emotional support at this time. And if she has further medical setbacks we will be available for GOC conversations or FCC. Thank you for the consult and allowing us to aid in the care of Asya Coffman.    These recommendations have been discussed with Dr. Dyson.    TTS: I spent a total of 75 minutes  on the date of service reviewing the medical record, consulting with the medical providers and assessing the patient, coordinating care, performing other activities listed above and completing documentation.    Jose Rafael Guevara MD  [facility], Palliative Care  Securely message with the Vocera Web Console (learn more here) or  Text page via [facility] Paging/Directory         Assessment      Asya Coffman is a 79 year old female admitted on 11/21/2024. 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 Initially admitted to hospital medicine service with hypoxic respiratory failure suspected due to HFpEF requiring diuresis. Developed acute hypoxia on 12/8 secondary to suspected aspiration pneumonitis for which she was intubated (12/8-12/10). Now s/p extubation and transferred back to hospital medicine service. Continues to have episodes of aspiration with worsening of hypoxia with RRT called this morning. Now on n.p.o. and tube feeding held. Plan was to have PEG placed but that was held due to bradycardia.    Today, the patient was seen for:  GOC/emotional support    History of Present Illness   Met with Hannah and her daughter Lana.   I introduced our role as an extra layer of support and how we help patients and families dealing with serious, potentially life-limiting illnesses. I explained the composition of the  palliative care team.  Palliative care helps patients and families navigate their care while focusing on the whole person; providing emotional, social and spiritual support  Palliative care often assists with symptom management, information sharing about what to expect from the illness, available treatment options and what effect those options may have on the disease course, and provide effective communication and caring support.    Prognosis, Goals, & Planning:   Functional Status just prior to this current hospitalization:  ECOG3 (Capable of only limited self-care; needs help with ADLs; in bed/chair >50% of waking hours)    Prognosis, Goals, and/or Advance Care Planning:  Discussed what continuing restorative/life-prolonging care entails, including continued (re)admissions to the hospital, continuing with preventative and primary care, seeing disease/organ specific specialty consultations for medical treatments in hopes to prolong life for as long as possible.      Code Status was addressed today:   Yes, We discussed potential risks and rationale of attempting cardiac resuscitation, intubation, and mechanical ventilation.  We also discussed probability of survival as well as quality of life implications.  Based on this discussion, patient or surrogate response/decision: Hannah affirmed her decision to be considered DNAR/DNI      Patient's decision making preferences: with input from medical clinicians and loved ones        Patient has decision-making capacity today for complex decisions: Intact          Coping, Meaning, & Spirituality:   Mood, coping, and/or meaning in the context of serious illness were addressed today: Yes Hannah is growing discouraged with her prolonged hospitalization and increasing debility.  She has enjoyed her life until this fall and wishes for more time if it can include some independence and more strength.    Social:   Hannah was born and raised outside [place], [place], [place] [email or web address] [place] as the 3rd of ten children.  She attended college for 3 semesters and studied home ec.  She was  for 38 years until her   in .  She has two children and six grandchildren.  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 She enjoys flower gardening. She has been very involved with her local [employer] and has rec'd the sacrament of  healing.      Medications:  Reviewed this patient's medication profile and medications from this hospitalization. Notable medications:Atrovent, Unasyn; levothyroxine; thiamine     [place] Board of Pharmacy Data Base Reviewed: Yes:   reviewed - controlled substances reflected in medication list.    ROS:  Comprehensive ROS is reviewed and is negative except as here & per HPI:     Physical Exam   Vital Signs with Ranges  Temp:  [96.9  F (36.1  C)-97.6  F (36.4  C)] 97.4  F (36.3  C)  Pulse:  [] 80  Resp:  [18-20] 18  BP: ()/() 113/73  FiO2 (%):  [35 %-90 %] 50 %  SpO2:  [93 %-100 %] 97 %  Wt Readings from Last 10 Encounters:   24 51.1 kg (112 lb 10.5 oz)   10/14/24 52.2 kg (115 lb)   24 52.2 kg (115 lb)   24 52.2 kg (115 lb)   24 55 kg (121 lb 4.8 oz)   24 54.9 kg (121 lb)   24 54.4 kg (120 lb)   24 55.3 kg (122 lb)   24 54 kg (119 lb)   24 53.5 kg (118 lb)     112 lbs 10.48 oz    PHYSICAL EXAM:  GENERAL APPEARANCE: frail older woman who struggled to life her head off the pillows,  alert and no distress; neatly groomed; HFNC supplemental O2 in place; loose sounding, productive cough  EYES: Eyes grossly normal to inspection, PERRLA, conjunctivae and sclerae without injection or discharge, EOM intact   RESP:  some increased work of breathing; speaks in very brief sentences;   MS: diffuse sarcopenia  SKIN: No suspicious lesions or rashes, hydration status appears adequate with normal skin turgor   PSYCH:  Alert and oriented x3; speech- coherent, normal rate and soft volume; able to articulate logical thoughts, able to abstract reason, no tangential thoughts, no hallucinations or delusions, mentation appears normal, Mood is euthymic. Affect is appropriate for this mood state and bright. Thought content is free of suicidal ideation, hallucinations, and delusions.  Eye contact is good during conversation.       Data reviewed:  Results for orders placed or performed during the hospital encounter of 11/21/24 (from the past 24 hours)   Glucose by meter   Result Value Ref Range    GLUCOSE BY METER POCT 153 (H) 70 - 99 mg/dL   Glucose by meter   Result Value Ref Range    GLUCOSE BY METER POCT 152 (H) 70 - 99 mg/dL   Basic metabolic panel   Result Value Ref Range    Sodium 144 135 - 145 mmol/L    Potassium 3.6 3.4 - 5.3 mmol/L    Chloride 110 (H) 98 - 107 mmol/L    Carbon Dioxide (CO2) 22 22 - 29 mmol/L    Anion Gap 12 7 - 15 mmol/L    Urea Nitrogen 28.5 (H) 8.0 - 23.0 mg/dL    Creatinine 0.84 0.51 - 0.95 mg/dL    GFR Estimate 71 >60 mL/min/1.73m2    Calcium 7.7 (L) 8.8 - 10.4 mg/dL    Glucose 125 (H) 70 - 99 mg/dL   CBC with platelets   Result Value Ref Range    WBC Count 7.4 4.0 - 11.0 10e3/uL    RBC Count 3.73 (L) 3.80 - 5.20 10e6/uL    Hemoglobin 9.3 (L) 11.7 - 15.7 g/dL    Hematocrit 31.9 (L) 35.0 - 47.0 %    MCV 86 78 - 100 fL    MCH 24.9 (L) 26.5 - 33.0 pg    MCHC 29.2 (L) 31.5 - 36.5 g/dL    RDW 19.0 (H) 10.0 - 15.0 %    Platelet Count 295 150 - 450 10e3/uL   Phosphorus   Result Value Ref Range    Phosphorus 2.6 2.5 - 4.5 mg/dL   Magnesium   Result Value Ref Range    Magnesium 1.9 1.7 - 2.3 mg/dL   EKG 12-lead, complete   Result Value Ref Range    Systolic Blood Pressure  mmHg    Diastolic Blood Pressure  mmHg    Ventricular Rate 63 BPM    Atrial Rate  BPM    WY Interval  ms    QRS Duration 84 ms     ms    QTc 409 ms    P Axis  degrees    R AXIS -40 degrees    T Axis 24 degrees    Interpretation ECG       *REJI  from respiratory equipment and underlying muscle tremor precludes a quality image*  Atrial fibrillation  Left axis deviation  Low voltage QRS  Anteroseptal infarct (cited on or before 01-Dec-2022)  Abnormal ECG  When compared with ECG of 27-Dec-2024 11:05, (unconfirmed)  Vent. rate has decreased by  57 bpm  Questionable change in initial forces of Septal leads  Inverted T waves have replaced nonspecific T wave abnormality in Anterior leads     Glucose by meter   Result Value Ref Range    GLUCOSE BY METER POCT 118 (H) 70 - 99 mg/dL   Ionized Calcium   Result Value Ref Range    Calcium Ionized Whole Blood 4.2 (L) 4.4 - 5.2 mg/dL

## 2024-12-29 NOTE — PLAN OF CARE
"Blood pressure 125/71, pulse 92, temperature 96.9  F (36.1  C), temperature source Oral, resp. rate 20, height 1.651 m (5' 5\"), weight 47.6 kg (104 lb 15 oz), SpO2 100%. Via High flow       Patient A & OX 4 intermittent forgetful. Denies pain .  Patient required High flow Oxygen 80% until 14:19 weaned down to 40%. Did not tolerated needed to be increased to 60% Fio2 .Required  frequent deep oral suctioned used 14 FR kit .Oral care done . Scheduled medications given via NJ tube . Flushed with water , remain NPO,   IVF at 125 ml/hr ,  and received  LR 500cc Bolus  over 2hr per MD order . Repositioned q2hr and as tolerated . Coccyx reddened , Inct Bladder/ Bowel ,barrier cream applied . Purewick on , urine sample sent . Right hip Mep on.  Call light within reach, bed alarm on. Continue carry on as order Put in and update MD with change.      Plan : Patient has order to be  to  Higher level of care report given to 6B RN      Goal Outcome Evaluation:    Plan of Care Reviewed With: patient    Overall Patient Progress: no change.           "

## 2024-12-29 NOTE — PROGRESS NOTES
Transfer  Transferred from:   Via:bed  Reason for transfer: Pt appropriate for 6B- worsened patient condition  Family: Aware of transfer  Belongings: Received with pt  Chart: Received with pt  Medications: Meds received from old unit with pt  Code Status verified on armband: yes  2 RN Skin Assessment Completed By: Cristina Maher RN, Guille Sexton RN  Med rec completed: yes  Suction/Ambu bag/Flowmeter at bedside: yes    Report received from: Cristina Jones RN  Pt status:   Pt. Was SOB after transfer and was placed on hi-flow NC at80% FiO2, 30L. Diminished lung sounds and coarse crackles throughout noted, no chest pain or other pain reported. Scheduled medications given, 1 quick run of a fib RVR at 2025. Back in a fib now, baseline per pt. Provider aware. Redness blanchable on heels and sacrum, Q2 repo. Thick secretions, pt having trouble clearing them.

## 2024-12-29 NOTE — CODE/RAPID RESPONSE
Rapid Response Team Note    Assessment   A rapid response was called on Asya Coffman due to new onset arrhythmia and worsening acute hypoxic respiratory failure . This presentation was likely due to vasovagal episode followed by nausea and vomiting of which pt had recurrent aspiration.    Plan   -  RT and RN at bedside and pt stabilized after suctioning, neb tx and increasing HFNC to 60L 90%. Confirmed again with patient she desires DNR/DNI. Palliative care consult ordered by primary team yesterday to discuss GOC.  -  The Internal Medicine primary team was at bedside  -  Disposition: The patient will remain on the current unit. We will continue to monitor this patient closely.  -  Reassessment and plan follow-up will be performed by the primary team    Bright Card PA-C  Rapid Response Team ERIC  Securely message with SMATOOS     Medical Decision Making       15 MINUTES SPENT BY ME on the date of service doing chart review, history, exam, documentation & further activities per the note.      Hospital Course   Brief Summary of events leading to rapid response:   Pt had episode of marked bradycardia and vomiting of which she likely aspirated necessitating increase in HFNC settings.     Physical Exam   Vital Signs: Temp: 97.5  F (36.4  C) Temp src: Axillary BP: 96/71 Pulse: 69   Resp: 18 SpO2: 97 % O2 Device: High Flow Nasal Cannula (HFNC) Oxygen Delivery: 45 LPM  Weight: 112 lbs 10.48 oz    Exam:   GEN: In NAD  LUNGS: Course breath sounds throughout    The patient is known to have an infection.    No evidence of sepsis.

## 2024-12-29 NOTE — PROGRESS NOTES
Transfer  Transferred to: 6B  Via:bed and flyer RN  Reason for transfer:Pt no longer appropriate for 7C- worsened patient condition, increasing oxygen requirements  Family: Aware of transfer  Belongings: Packed and sent with pt  Chart: Delivered with pt to next unit  Medications: Meds sent to new unit with pt  Report given to: 6B RN by previous RN  Pt status: oxymask at 10 L, VSS

## 2024-12-29 NOTE — PROGRESS NOTES
Responded to Rapid Response call. RRT was called due to change in cardiorespiratory status. Patient was on HFNC, 60 LPM 90%, RR 24/min, SpO2 85%. Respiratory interventions included suctioning, monitoring, nebulizer treatment. Patient was stabilized and remained on unit.    RT at bedside for regular schedule respiratory treatments. Patient had reported chest pain earlier on the shift and bedside RN had called for stat EKG. EKG was performed and about 5 minutes later patient went bradycardic to about 32bpm, became pale and diaphoretic, then began retching and vomiting. RN quickly oral suctioned where a moderate amount of yellow creamy vomit was removed. RT and bedside RN both witnessed aspiration of vomit during this episode. Patient then began desating and HFNC was increased to 60L 90% (previously on 40L 80%). About 2 minutes after episode, patient regained a flush coloring in their face, heart rate returned to the 60s. Oxygen saturation did not improve but remained low 90%s on 60L 90%. Rapid was called, team discussed patients DNR/DNI status with patient, she wants to keep as is.     Will continue to monitor.    RICCO VALERIO, RT, RT  12/29/2024 8:46 AM   2

## 2024-12-29 NOTE — PLAN OF CARE
Goal Outcome Evaluation:      Plan of Care Reviewed With: patient    Overall Patient Progress: improvingOverall Patient Progress: improving    Outcome Evaluation: 1 short run of afib+RVR right after transfer from , oxymask 5L+hiflow NC at 80%FiO2 30L until 0400, then RT changed to 45L 80%FiO2. After nasal suctioning and oral suctioning while coughing, patient breathing better and resting. AOx4. No pain.    Neuro: A&Ox4.   Cardiac: Afib, rate from 60-80. 1 run of Afib RVR at 2015. HR drops into the upper 40's/50's after taking diltiazem.  Respiratory: Sating 100 on hi flow NC at 80%FiO2, 45L. Pt sats kept dropping to low 80s with any titration down. Pt has very thick secretions that are hard to clear, she is able to cough but not strong enough to cough up effectively.  GI/: Minimal urine output. BM X1, soft. Incontinent of bowel and urine.  Diet/appetite: NPO, TF held.  Activity:  Assist of 2, not oob.  Pain: Denies pain.  Skin: No new deficits noted. Redness blanchable on coccyx, heels.  LDA's: RPIV, infusing D5 125mL/hr. LPIV, infusing TKO (antibiotics Q6h). Purewick. NG, clamped.    Plan: Continue with POC. Notify primary team with changes.

## 2024-12-29 NOTE — PHARMACY-CONSULT NOTE
Pharmacy was consulted to review medications for medications that can contribute to weakness.     After chart review the following medications may be of note:     - Atorvastatin - dose increased earlier this month from 20 mg > 40 mg in setting of lacunar strokes. Possible that increased statin dose may be contributing to muscle weakness/myalgia. LFTs WNL on most recent check 12/11, CK WNL 12/17. Could trial temporary hold or switch to another high intensity statin (rosuvastatin 20 mg).    - Sertraline - home medication, causes fatigue in ~12% of patients, though is one of the least sedating SSRIs and more commonly causes insomnia.      - Metoprolol - started inpatient for AF, can contribute to fatigue (incidence reported in up to 10% of patients). Home propranolol also causes fatigue with similar incidence. Given that she has been on this medication for the past year unlikely for it to be contributing to new symptoms.    - Gabapentin - home medication for chronic pain, 20% incidence of fatigue. Consider holding if patient becomes acutely lethargic or altered.       Please contact pharmacy with further questions.     Kallie Lantigua, AnabellD, BCPS

## 2024-12-30 LAB
ANION GAP SERPL CALCULATED.3IONS-SCNC: 9 MMOL/L (ref 7–15)
ATRIAL RATE - MUSE: NORMAL BPM
ATRIAL RATE - MUSE: NORMAL BPM
BUN SERPL-MCNC: 16 MG/DL (ref 8–23)
C DIFF TOX B STL QL: NEGATIVE
CALCIUM SERPL-MCNC: 7.6 MG/DL (ref 8.8–10.4)
CHLORIDE SERPL-SCNC: 111 MMOL/L (ref 98–107)
CREAT SERPL-MCNC: 0.68 MG/DL (ref 0.51–0.95)
DIASTOLIC BLOOD PRESSURE - MUSE: NORMAL MMHG
DIASTOLIC BLOOD PRESSURE - MUSE: NORMAL MMHG
EGFRCR SERPLBLD CKD-EPI 2021: 89 ML/MIN/1.73M2
ERYTHROCYTE [DISTWIDTH] IN BLOOD BY AUTOMATED COUNT: 19 % (ref 10–15)
GLUCOSE SERPL-MCNC: 108 MG/DL (ref 70–99)
HCO3 SERPL-SCNC: 22 MMOL/L (ref 22–29)
HCT VFR BLD AUTO: 32.4 % (ref 35–47)
HGB BLD-MCNC: 9 G/DL (ref 11.7–15.7)
INTERPRETATION ECG - MUSE: NORMAL
INTERPRETATION ECG - MUSE: NORMAL
MAGNESIUM SERPL-MCNC: 2 MG/DL (ref 1.7–2.3)
MCH RBC QN AUTO: 24.7 PG (ref 26.5–33)
MCHC RBC AUTO-ENTMCNC: 27.8 G/DL (ref 31.5–36.5)
MCV RBC AUTO: 89 FL (ref 78–100)
P AXIS - MUSE: NORMAL DEGREES
P AXIS - MUSE: NORMAL DEGREES
PHOSPHATE SERPL-MCNC: 1.6 MG/DL (ref 2.5–4.5)
PHOSPHATE SERPL-MCNC: 2.8 MG/DL (ref 2.5–4.5)
PLATELET # BLD AUTO: 336 10E3/UL (ref 150–450)
POTASSIUM SERPL-SCNC: 3.5 MMOL/L (ref 3.4–5.3)
PR INTERVAL - MUSE: NORMAL MS
PR INTERVAL - MUSE: NORMAL MS
QRS DURATION - MUSE: 76 MS
QRS DURATION - MUSE: 84 MS
QT - MUSE: 296 MS
QT - MUSE: 400 MS
QTC - MUSE: 409 MS
QTC - MUSE: 418 MS
R AXIS - MUSE: -40 DEGREES
R AXIS - MUSE: -80 DEGREES
RBC # BLD AUTO: 3.64 10E6/UL (ref 3.8–5.2)
SODIUM SERPL-SCNC: 142 MMOL/L (ref 135–145)
SYSTOLIC BLOOD PRESSURE - MUSE: NORMAL MMHG
SYSTOLIC BLOOD PRESSURE - MUSE: NORMAL MMHG
T AXIS - MUSE: 1 DEGREES
T AXIS - MUSE: 24 DEGREES
VENTRICULAR RATE- MUSE: 120 BPM
VENTRICULAR RATE- MUSE: 63 BPM
WBC # BLD AUTO: 8.3 10E3/UL (ref 4–11)

## 2024-12-30 PROCEDURE — 999N000157 HC STATISTIC RCP TIME EA 10 MIN

## 2024-12-30 PROCEDURE — 36415 COLL VENOUS BLD VENIPUNCTURE: CPT

## 2024-12-30 PROCEDURE — 83735 ASSAY OF MAGNESIUM: CPT | Performed by: HOSPITALIST

## 2024-12-30 PROCEDURE — 84100 ASSAY OF PHOSPHORUS: CPT

## 2024-12-30 PROCEDURE — 120N000003 HC R&B IMCU UMMC

## 2024-12-30 PROCEDURE — 250N000013 HC RX MED GY IP 250 OP 250 PS 637

## 2024-12-30 PROCEDURE — 82374 ASSAY BLOOD CARBON DIOXIDE: CPT | Performed by: HOSPITALIST

## 2024-12-30 PROCEDURE — 250N000013 HC RX MED GY IP 250 OP 250 PS 637: Performed by: INTERNAL MEDICINE

## 2024-12-30 PROCEDURE — 80048 BASIC METABOLIC PNL TOTAL CA: CPT | Performed by: HOSPITALIST

## 2024-12-30 PROCEDURE — 250N000009 HC RX 250

## 2024-12-30 PROCEDURE — 94640 AIRWAY INHALATION TREATMENT: CPT

## 2024-12-30 PROCEDURE — 258N000003 HC RX IP 258 OP 636

## 2024-12-30 PROCEDURE — 82310 ASSAY OF CALCIUM: CPT | Performed by: HOSPITALIST

## 2024-12-30 PROCEDURE — 250N000011 HC RX IP 250 OP 636: Performed by: STUDENT IN AN ORGANIZED HEALTH CARE EDUCATION/TRAINING PROGRAM

## 2024-12-30 PROCEDURE — 99223 1ST HOSP IP/OBS HIGH 75: CPT | Performed by: PHYSICIAN ASSISTANT

## 2024-12-30 PROCEDURE — 36415 COLL VENOUS BLD VENIPUNCTURE: CPT | Performed by: HOSPITALIST

## 2024-12-30 PROCEDURE — 85027 COMPLETE CBC AUTOMATED: CPT

## 2024-12-30 PROCEDURE — 250N000011 HC RX IP 250 OP 636

## 2024-12-30 PROCEDURE — 92526 ORAL FUNCTION THERAPY: CPT | Mod: GN | Performed by: SPEECH-LANGUAGE PATHOLOGIST

## 2024-12-30 PROCEDURE — 94640 AIRWAY INHALATION TREATMENT: CPT | Mod: 76

## 2024-12-30 PROCEDURE — 99232 SBSQ HOSP IP/OBS MODERATE 35: CPT

## 2024-12-30 PROCEDURE — 87070 CULTURE OTHR SPECIMN AEROBIC: CPT

## 2024-12-30 PROCEDURE — 250N000013 HC RX MED GY IP 250 OP 250 PS 637: Performed by: HOSPITALIST

## 2024-12-30 PROCEDURE — 87493 C DIFF AMPLIFIED PROBE: CPT | Performed by: HOSPITALIST

## 2024-12-30 RX ORDER — LEVALBUTEROL INHALATION SOLUTION 0.63 MG/3ML
0.63 SOLUTION RESPIRATORY (INHALATION) 4 TIMES DAILY
Status: DISCONTINUED | OUTPATIENT
Start: 2024-12-30 | End: 2025-01-14 | Stop reason: HOSPADM

## 2024-12-30 RX ORDER — LIDOCAINE HYDROCHLORIDE 20 MG/ML
5 SOLUTION OROPHARYNGEAL ONCE
Status: COMPLETED | OUTPATIENT
Start: 2024-12-30 | End: 2024-12-30

## 2024-12-30 RX ORDER — DILTIAZEM HCL 60 MG
60 TABLET ORAL ONCE
Status: COMPLETED | OUTPATIENT
Start: 2024-12-30 | End: 2024-12-30

## 2024-12-30 RX ORDER — DILTIAZEM HCL 60 MG
60 TABLET ORAL EVERY 6 HOURS SCHEDULED
Status: DISCONTINUED | OUTPATIENT
Start: 2024-12-30 | End: 2025-01-01

## 2024-12-30 RX ORDER — LEVOTHYROXINE SODIUM 100 UG/1
100 TABLET ORAL DAILY
Status: DISCONTINUED | OUTPATIENT
Start: 2024-12-31 | End: 2025-01-01

## 2024-12-30 RX ORDER — GABAPENTIN 300 MG/1
600 CAPSULE ORAL AT BEDTIME
Status: DISCONTINUED | OUTPATIENT
Start: 2024-12-30 | End: 2024-12-30

## 2024-12-30 RX ORDER — DILTIAZEM HCL 60 MG
60 TABLET ORAL EVERY 6 HOURS SCHEDULED
Status: DISCONTINUED | OUTPATIENT
Start: 2024-12-30 | End: 2024-12-30

## 2024-12-30 RX ORDER — LOPERAMIDE HYDROCHLORIDE 2 MG/1
2 CAPSULE ORAL 4 TIMES DAILY PRN
Status: DISCONTINUED | OUTPATIENT
Start: 2024-12-30 | End: 2024-12-30

## 2024-12-30 RX ORDER — METOCLOPRAMIDE HYDROCHLORIDE 5 MG/ML
5 INJECTION INTRAMUSCULAR; INTRAVENOUS
Status: ACTIVE | OUTPATIENT
Start: 2024-12-30 | End: 2024-12-31

## 2024-12-30 RX ORDER — SERTRALINE HYDROCHLORIDE 100 MG/1
200 TABLET, FILM COATED ORAL DAILY
Status: DISCONTINUED | OUTPATIENT
Start: 2024-12-31 | End: 2025-01-01

## 2024-12-30 RX ORDER — CALCIUM GLUCONATE 94 MG/ML
1 INJECTION, SOLUTION INTRAVENOUS ONCE
Status: DISCONTINUED | OUTPATIENT
Start: 2024-12-30 | End: 2024-12-30

## 2024-12-30 RX ORDER — LEVALBUTEROL INHALATION SOLUTION 0.63 MG/3ML
0.63 SOLUTION RESPIRATORY (INHALATION)
Status: DISCONTINUED | OUTPATIENT
Start: 2024-12-30 | End: 2024-12-30

## 2024-12-30 RX ORDER — CALCIUM GLUCONATE 20 MG/ML
1 INJECTION, SOLUTION INTRAVENOUS ONCE
Status: COMPLETED | OUTPATIENT
Start: 2024-12-30 | End: 2024-12-30

## 2024-12-30 RX ORDER — GABAPENTIN 250 MG/5ML
600 SOLUTION ORAL AT BEDTIME
Status: DISCONTINUED | OUTPATIENT
Start: 2024-12-30 | End: 2025-01-14 | Stop reason: HOSPADM

## 2024-12-30 RX ORDER — LOPERAMIDE HCL 1 MG/7.5ML
2 SOLUTION ORAL 4 TIMES DAILY PRN
Status: DISCONTINUED | OUTPATIENT
Start: 2024-12-30 | End: 2025-01-14 | Stop reason: HOSPADM

## 2024-12-30 RX ORDER — POTASSIUM CHLORIDE 1.5 G/1.58G
20 POWDER, FOR SOLUTION ORAL ONCE
Status: COMPLETED | OUTPATIENT
Start: 2024-12-30 | End: 2024-12-30

## 2024-12-30 RX ORDER — ACETYLCYSTEINE 200 MG/ML
2 SOLUTION ORAL; RESPIRATORY (INHALATION) 4 TIMES DAILY
Status: DISCONTINUED | OUTPATIENT
Start: 2024-12-30 | End: 2025-01-14 | Stop reason: HOSPADM

## 2024-12-30 RX ORDER — ACETAMINOPHEN 325 MG/1
650 TABLET ORAL EVERY 4 HOURS PRN
Status: DISCONTINUED | OUTPATIENT
Start: 2024-12-30 | End: 2025-01-01

## 2024-12-30 RX ORDER — ATORVASTATIN CALCIUM 40 MG/1
40 TABLET, FILM COATED ORAL EVERY EVENING
Status: DISCONTINUED | OUTPATIENT
Start: 2024-12-30 | End: 2025-01-01

## 2024-12-30 RX ADMIN — ACETYLCYSTEINE 2 ML: 200 SOLUTION ORAL; RESPIRATORY (INHALATION) at 15:39

## 2024-12-30 RX ADMIN — Medication 1 SPRAY: at 16:20

## 2024-12-30 RX ADMIN — DILTIAZEM HYDROCHLORIDE 60 MG: 60 TABLET, FILM COATED ORAL at 23:08

## 2024-12-30 RX ADMIN — AMPICILLIN SODIUM AND SULBACTAM SODIUM 3 G: 2; 1 INJECTION, POWDER, FOR SOLUTION INTRAMUSCULAR; INTRAVENOUS at 11:49

## 2024-12-30 RX ADMIN — Medication 1 SPRAY: at 11:50

## 2024-12-30 RX ADMIN — ACETYLCYSTEINE 2 ML: 200 SOLUTION ORAL; RESPIRATORY (INHALATION) at 12:26

## 2024-12-30 RX ADMIN — ATORVASTATIN CALCIUM 40 MG: 40 TABLET, FILM COATED ORAL at 20:14

## 2024-12-30 RX ADMIN — IPRATROPIUM BROMIDE 0.5 MG: 0.5 SOLUTION RESPIRATORY (INHALATION) at 08:57

## 2024-12-30 RX ADMIN — DEXTROSE AND SODIUM CHLORIDE: 5; 900 INJECTION, SOLUTION INTRAVENOUS at 11:43

## 2024-12-30 RX ADMIN — ORAL VEHICLES - SUSP 25 MG: SUSPENSION at 07:58

## 2024-12-30 RX ADMIN — Medication 1 SPRAY: at 08:07

## 2024-12-30 RX ADMIN — LEVALBUTEROL HYDROCHLORIDE 0.63 MG: 0.63 SOLUTION RESPIRATORY (INHALATION) at 08:57

## 2024-12-30 RX ADMIN — POTASSIUM & SODIUM PHOSPHATES POWDER PACK 280-160-250 MG 2 PACKET: 280-160-250 PACK at 08:01

## 2024-12-30 RX ADMIN — DILTIAZEM HYDROCHLORIDE 60 MG: 60 TABLET, FILM COATED ORAL at 13:26

## 2024-12-30 RX ADMIN — LEVALBUTEROL HYDROCHLORIDE 0.63 MG: 0.63 SOLUTION RESPIRATORY (INHALATION) at 12:26

## 2024-12-30 RX ADMIN — DILTIAZEM HYDROCHLORIDE 60 MG: 60 TABLET, FILM COATED ORAL at 09:46

## 2024-12-30 RX ADMIN — THIAMINE HCL TAB 100 MG 100 MG: 100 TAB at 08:07

## 2024-12-30 RX ADMIN — IPRATROPIUM BROMIDE 0.5 MG: 0.5 SOLUTION RESPIRATORY (INHALATION) at 12:26

## 2024-12-30 RX ADMIN — IPRATROPIUM BROMIDE 0.5 MG: 0.5 SOLUTION RESPIRATORY (INHALATION) at 15:40

## 2024-12-30 RX ADMIN — APIXABAN 5 MG: 5 TABLET, FILM COATED ORAL at 20:14

## 2024-12-30 RX ADMIN — FEXOFENADINE HYDROCHLORIDE 120 MG: 60 TABLET ORAL at 08:04

## 2024-12-30 RX ADMIN — SERTRALINE HYDROCHLORIDE 200 MG: 100 TABLET ORAL at 08:06

## 2024-12-30 RX ADMIN — LOPERAMIDE HCL 2 MG: 1 SOLUTION ORAL at 13:56

## 2024-12-30 RX ADMIN — PANTOPRAZOLE SODIUM 40 MG: 40 INJECTION, POWDER, FOR SOLUTION INTRAVENOUS at 08:01

## 2024-12-30 RX ADMIN — POTASSIUM & SODIUM PHOSPHATES POWDER PACK 280-160-250 MG 2 PACKET: 280-160-250 PACK at 05:59

## 2024-12-30 RX ADMIN — POTASSIUM CHLORIDE 20 MEQ: 1.5 POWDER, FOR SOLUTION ORAL at 08:40

## 2024-12-30 RX ADMIN — ACETYLCYSTEINE 2 ML: 200 SOLUTION ORAL; RESPIRATORY (INHALATION) at 20:29

## 2024-12-30 RX ADMIN — AMPICILLIN SODIUM AND SULBACTAM SODIUM 3 G: 2; 1 INJECTION, POWDER, FOR SOLUTION INTRAMUSCULAR; INTRAVENOUS at 06:02

## 2024-12-30 RX ADMIN — ACETYLCYSTEINE 2 ML: 200 SOLUTION ORAL; RESPIRATORY (INHALATION) at 08:56

## 2024-12-30 RX ADMIN — ONDANSETRON 4 MG: 2 INJECTION INTRAMUSCULAR; INTRAVENOUS at 08:46

## 2024-12-30 RX ADMIN — LEVALBUTEROL HYDROCHLORIDE 0.63 MG: 0.63 SOLUTION RESPIRATORY (INHALATION) at 15:40

## 2024-12-30 RX ADMIN — IPRATROPIUM BROMIDE 0.5 MG: 0.5 SOLUTION RESPIRATORY (INHALATION) at 20:28

## 2024-12-30 RX ADMIN — BUDESONIDE 0.5 MG: 0.5 INHALANT ORAL at 20:28

## 2024-12-30 RX ADMIN — DILTIAZEM HYDROCHLORIDE 60 MG: 60 TABLET, FILM COATED ORAL at 18:28

## 2024-12-30 RX ADMIN — ONDANSETRON 4 MG: 2 INJECTION INTRAMUSCULAR; INTRAVENOUS at 20:06

## 2024-12-30 RX ADMIN — LEVALBUTEROL HYDROCHLORIDE 0.63 MG: 0.63 SOLUTION RESPIRATORY (INHALATION) at 20:28

## 2024-12-30 RX ADMIN — POTASSIUM & SODIUM PHOSPHATES POWDER PACK 280-160-250 MG 2 PACKET: 280-160-250 PACK at 11:50

## 2024-12-30 RX ADMIN — GABAPENTIN 600 MG: 250 SOLUTION ORAL at 21:54

## 2024-12-30 RX ADMIN — Medication 1 SPRAY: at 20:14

## 2024-12-30 RX ADMIN — AMPICILLIN SODIUM AND SULBACTAM SODIUM 3 G: 2; 1 INJECTION, POWDER, FOR SOLUTION INTRAMUSCULAR; INTRAVENOUS at 23:09

## 2024-12-30 RX ADMIN — THERA TABS 1 TABLET: TAB at 08:05

## 2024-12-30 RX ADMIN — BUDESONIDE 0.5 MG: 0.5 INHALANT ORAL at 09:05

## 2024-12-30 RX ADMIN — CALCIUM GLUCONATE 1 G: 20 INJECTION, SOLUTION INTRAVENOUS at 13:00

## 2024-12-30 RX ADMIN — DEXTROSE AND SODIUM CHLORIDE: 5; 900 INJECTION, SOLUTION INTRAVENOUS at 03:22

## 2024-12-30 RX ADMIN — ORAL VEHICLES - SUSP 25 MG: SUSPENSION at 20:13

## 2024-12-30 RX ADMIN — LEVOTHYROXINE SODIUM 100 MCG: 100 TABLET ORAL at 06:01

## 2024-12-30 RX ADMIN — AMPICILLIN SODIUM AND SULBACTAM SODIUM 3 G: 2; 1 INJECTION, POWDER, FOR SOLUTION INTRAMUSCULAR; INTRAVENOUS at 17:04

## 2024-12-30 ASSESSMENT — ACTIVITIES OF DAILY LIVING (ADL)
ADLS_ACUITY_SCORE: 72
ADLS_ACUITY_SCORE: 64
ADLS_ACUITY_SCORE: 64
ADLS_ACUITY_SCORE: 72
ADLS_ACUITY_SCORE: 76
ADLS_ACUITY_SCORE: 72
ADLS_ACUITY_SCORE: 76
ADLS_ACUITY_SCORE: 72
ADLS_ACUITY_SCORE: 64
ADLS_ACUITY_SCORE: 64
ADLS_ACUITY_SCORE: 72
ADLS_ACUITY_SCORE: 72
ADLS_ACUITY_SCORE: 64
ADLS_ACUITY_SCORE: 72
ADLS_ACUITY_SCORE: 76
ADLS_ACUITY_SCORE: 72
ADLS_ACUITY_SCORE: 72

## 2024-12-30 NOTE — PROVIDER NOTIFICATION
12/29/24 8651   RCAT Assessment   Reason for Assessment COPD   Pulmonary Status 0   Surgical Status 0   Chest X-ray 0   Respiratory Pattern 0   Mental Status 0   Breath Sounds 2   Cough Effectiveness 1   Level of Activity 0   O2 Required for SpO2>=92% 2   Acuity Level (points) 5   Acuity Level  5   Re-eval Interval Guideline Every 7 days if 2 consecutive evals unchanged   Re-evaluation Date 01/05/25   Clinical Indications/Symptoms   Aerosol Therapy RCAT protocol   Aerosol Therapy Plan   RT Treatment Nebulizer   Aerosol Treatment Frequency Acuity Level 3: QID/PRN @noc-Mod wheezing/Hx asthma/secretion removal   Beta-Adrenergic Agonists Albuterol solution (premix) 2.5mg/3mL neb Max 12 doses/24h     Patient on 4L oxymask BS clear/dim bilaterally, changing to QID nebs

## 2024-12-30 NOTE — CONSULTS
GASTROENTEROLOGY CONSULTATION      Date of Admission:  11/21/2024  Reason for Admission: Respiratory failure  Date of Consult  12/30/2024   Requesting Physician:  Roverto Dyson MD             ASSESSMENT AND RECOMMENDATIONS:     79 year old female with a history of Afib on apixiban (last dose 12/24), CADF, pulmonary HTN, COPD, laryngeal SCC s/p radiation c/b dysphagia on a modified diet, CREST syndrome who was admitted to [facility] 11/21 for hypoxic respiratory failure due to HFpEF requiring diuresis. Developed acute hypoxia on 12/8 secondary to suspected aspiration pneumonitis for which she was intubated (12/8-12/10). Now s/p extubation but continues to have episodes of aspiration with worsening of hypoxia. Patient not tolerating NGT feeds per reports due to nausea and vomiting    #. Severe malnutrition in the context of chronic illness  #. Moderate-Severe pharyngeal dysphagia   #. Aspiration pneumonia with recurrent hypoxia  #. Laryngeal cancer s/p radiation c/b dysphagia  #. Vomiting with gastric tube feeds  IR initially consulted and deferred due to current respiratory status and need for patient to have a managed airway since IR requires a patient to be able to lay flat for stomach insufflation (risk of aspiration). GI AE now consulted for placement of PEG-J tube.     The patient has a history of mod-severe oropharyngeal dysphagia and pooling of secretions on her last swallow study (see SLP eval 12/10). A percutaneous feeding tube will not change her risk of aspiration if she still is not able to manage those secretions. We would recommend a repeat SLP evaluation. Per notes she continues to have excessive oral secretions and requires frequent bedside suctioning via yankauer.    If the patient passes that eval and is managing her secretions better, an endoscopic placement of gastrojejunostomy tube is done under general anesthesia. There is concern that her frailty and prolonged hospitalization would make it  extremely difficult to wean off the ventilator after her procedure (or at least that she would remain intubated for a short time afterwards.)    Primary team plans to attempt to advance current NG tube to post pyloric to see if she will tolerate jejunal tube feeds. Agree with this if there is component of gastroparesis but again, this would not prevent recurrent episodes of aspiration related to oropharyngeal dysphagia     Recommendations:  -- Repeat SLP evaluation  -- Remain NPO  -- RD following for enteral nutrition support  -- Discussed with primary hospitalist, Dr. Dyson, via PO-MO messaging    Overall time spent on the date of this encounter preparing to see the patient (including chart review of available notes, clinical status events, imaging and labs); examining the patient, obtaining history from the patient; coordinating and/or ordering medications, tests and/or procedures; communicating with other health care professionals; and documenting the above clinical information in the electronic medical record was 75 minutes.    Thank you for involving us in this patient's care. Please do not hesitate to contact the GI service with any questions or concerns.     Pt seen and care plan discussed with Dr. Rossi, GI staff physician.    Cristy Boateng PA-C, [facility]  Advanced Endoscopy/Pancreaticobiliary GI Service  [facility]  Vocera   =======================================================================           Reason for Consultation:   Request for: PEG/PEG-J           History of Present Illness:   Patient seen and examined at 1320. History is obtained from the patient but she is very weak and unable to talk at length. Addiitonal information obtained form chart review.    Indication: nutrition  Recent imaging of the abdomen: no dedicated imaging of the abdomen but CT chest reviewed from 12/27  Current route of nutrition: NGT    Sepsis: currently being treated recurrent  aspiration pneumonia with frequent desaturations, recently intubated 12/8-12/10  Abdominal wall infection: no  Previous PEG-J: no  BMI: 19.59                  Review of Systems:     A complete review of systems was performed and is negative except as noted in the HPI        Past Surgical History:   Procedure Laterality Date    INJECT STEROID (LOCATION) N/A 6/15/2023    Procedure: Avastin injection;  Surgeon: Nikole Davis MD;  Location: [facility]    INJECT STEROID (LOCATION) N/A 9/7/2023    Procedure: Avastin injection;  Surgeon: Nikole Davis MD;  Location: [facility]    INJECT STEROID (LOCATION) N/A 12/14/2023    Procedure: Avastin injection;  Surgeon: Nikole Davis MD;  Location: [facility]    INJECT STEROID (LOCATION) N/A 2/15/2024    Procedure: Avastin injection;  Surgeon: Nikole Davis MD;  Location: [facility]    LASER CO2 LARYNGOSCOPY N/A 9/7/2023    Procedure: Microdirect laryngoscopy with excision/ablation of laryngeal lesions, biopsies, CO2 laser;  Surgeon: Nikole Davis MD;  Location: [facility]    LASER CO2 LARYNGOSCOPY N/A 5/30/2024    Procedure: Microdirect laryngoscopy with excision/ablation of laryngeal lesions, biopsies, CO2 laser;  Surgeon: Nikole Davis MD;  Location: [facility]    LASER CO2 LARYNGOSCOPY, COMPLEX N/A 5/26/2022    Procedure: Micro direct laryngoscopy with excision/ablation of laryngeal lesions, possible biopsies, possible CO2 laser;  Surgeon: Nikole Davis MD;  [facility]    LASER CO2 LARYNGOSCOPY, COMPLEX N/A 9/15/2022    Procedure: Microdirect laryngoscopy with ablation of laryngeal lesions,biopsies,CO2 laser;  Surgeon: Nikole Davis MD;  [facility]    LASER CO2 LARYNGOSCOPY, COMPLEX N/A 12/1/2022    Procedure: Microdirect laryngoscopy with excision/ablation of laryngeal lesions, biopsies,   CO2 laser;  Surgeon: Nikole Davis MD;  [facility]    LASER CO2 LARYNGOSCOPY, COMPLEX N/A  6/15/2023    Procedure: Microdirect laryngoscopy with ablation of laryngeal lesions, biopsies, CO2 laser;  Surgeon: Nikole Davis MD;  [facility]    LASER CO2 LARYNGOSCOPY, COMPLEX N/A 10/5/2023    Procedure: Microdirect laryngoscopy with excision/ablation of laryngeal lesions, biopsies, CO2 laser;  Surgeon: Nikole Davis MD;  [facility]    LASER CO2 LARYNGOSCOPY, COMPLEX N/A 12/14/2023    Procedure: Microdirect laryngoscopy with excision/ablation of laryngeal lesions, biopsies, CO2 laser;  Surgeon: Nikole Davis MD;  [facility]    LASER CO2 LARYNGOSCOPY, COMPLEX N/A 2/15/2024    Procedure: Microdirect laryngoscopy with excision/ablation of laryngeal lesions, biopsies, CO2 laser;  Surgeon: Nikole Davis MD;  [facility]    LASER CO2 LARYNGOSCOPY, COMPLEX N/A 4/11/2024    Procedure: Microdirect laryngoscopy with excision/ablation of laryngeal lesions, biopsies, CO2 laser, Avastin injections;  Surgeon: Nikole Davis MD;  [facility]    LASER KTP LARYNGOSCOPY N/A 4/3/2023    Procedure: Microdirect laryngoscopy with biopsies, excision/ablation of lesions, C02 laser,  Avastin injection;  Surgeon: Nikole Davis MD;  [facility]    LASER KTP LARYNGOSCOPY N/A 6/15/2023    Procedure: flexible laser (CO2 or KTP) standby;  Surgeon: Nikole Davis MD;  [facility]    LASER KTP LARYNGOSCOPY FLEXIBLE N/A 8/18/2022    Procedure: Transnasal flexible laryngoscopy with KTP laser and biopsies;  Surgeon: Nikole Davis MD;  Location: [facility]    LASER KTP LARYNGOSCOPY FLEXIBLE N/A 10/27/2022    Procedure: Transnasal flexible laryngoscopy with possible KTP laser;  Surgeon: Nikole Davis MD;  Location: [facility] OR    LASER KTP LARYNGOSCOPY FLEXIBLE N/A 1/26/2023    Procedure: Transnasal flexible laryngoscopy with KTP laser,  biopsies, superior laryngeal nerve blocks, Avastin injection;  Surgeon: Ryan  Nikole Alberts MD;  Location: [facility] OR    LASER KTP LARYNGOSCOPY FLEXIBLE N/A 2/15/2023    Procedure: Transnasal flexible laryngoscopy with KTP laser, superior laryngeal nerve blocks, biopsies, avastin injection;  Surgeon: Nikole Davis MD;  Location: [facility] OR    LASER KTP LARYNGOSCOPY FLEXIBLE N/A 2/17/2023    Procedure: Transnasal flexible laryngoscopy with KTP laser, biopsies, superior laryngeal nerve blocks;  Surgeon: Nikole Davis MD;  Location: [facility] OR    LASER KTP LARYNGOSCOPY FLEXIBLE N/A 2/23/2023    Procedure: Transnasal flexible laryngoscopy with KTP laser, superior laryngeal nerve blocks;  Surgeon: Nikole Davis MD;  Location: [facility] OR    LASER KTP LARYNGOSCOPY FLEXIBLE N/A 3/2/2023    Procedure: Transnasal flexible laryngoscopy with KTP laser, superior laryngeal nerve block Bilateral;  Surgeon: Nikole Davis MD;  Location: [facility] OR    LASER KTP LARYNGOSCOPY FLEXIBLE N/A 3/9/2023    Procedure: Transnasal flexible laryngoscopy with KTP laser, biopsies, superior laryngeal nerve blocks;  Surgeon: Nikole Davis MD;  Location: [facility] OR    LASER KTP LARYNGOSCOPY FLEXIBLE N/A 3/17/2023    Procedure: Transnasal flexible laryngoscopy with KTP laser, superior laryngeal nerve block(s), Avastin injection;  Surgeon: Nikole Davis MD;  Location: [facility] OR    LASER KTP LARYNGOSCOPY FLEXIBLE N/A 3/28/2023    Procedure: Transnasal flexible laryngoscopy with KTP laser, superior laryngeal nerve block(s);  Surgeon: Pankaj Woodard MD;  Location: [facility] OR              Physical Exam:   Temp: 96.9  F (36.1  C) Temp src: Axillary BP: 111/71 Pulse: 101   Resp: 18 SpO2: 100 % O2 Device: Nasal cannula Oxygen Delivery: 2 LPM  Wt:   Wt Readings from Last 2 Encounters:   12/30/24 53.4 kg (117 lb 11.6 oz)   10/14/24 52.2 kg (115 lb)        General: Chronically ill, frail, elderly female sitting in exam bed in NAD.  Answers appropriately. Hoarse voice vs weak  "vocal effort   Oropharynx is clear, moist and w/o exudate or lesions.  Abdomen: Soft, non-tender, non-distended.  BS +.  No rebound or peritoneal signs           Data:   Labs and imaging below were independently reviewed and interpreted    LAB WORK:    BMP  Recent Labs   Lab 12/30/24  0319 12/29/24  0823 12/29/24  0619 12/28/24  1752 12/28/24  1706 12/28/24  0612 12/27/24  1018 12/27/24  0949 12/27/24  0613     --  144  --   --  140  --   --  136   POTASSIUM 3.5  --  3.6  --   --  3.9  --  5.1 5.7*   CHLORIDE 111*  --  110*  --   --  102  --   --  96*   ESSIE 7.6*  --  7.7*  --   --  8.4*  --   --  9.0   CO2 22  --  22  --   --  25  --   --  25   BUN 16.0  --  28.5*  --   --  49.8*  --   --  62.6*   CR 0.68  --  0.84  --   --  1.10*  --   --  1.08*   * 118* 125* 152*   < > 145*   < >  --  88    < > = values in this interval not displayed.     CBC  Recent Labs   Lab 12/30/24  0319 12/29/24  0619 12/28/24  0612 12/27/24  0613   WBC 8.3 7.4 7.7 9.0   RBC 3.64* 3.73* 3.86 4.30   HGB 9.0* 9.3* 9.8* 10.7*   HCT 32.4* 31.9* 32.3* 36.1   MCV 89 86 84 84   MCH 24.7* 24.9* 25.4* 24.9*   MCHC 27.8* 29.2* 30.3* 29.6*   RDW 19.0* 19.0* 19.0* 18.9*    295 321 344     INR  Recent Labs   Lab 12/24/24  0706   INR 1.54*     AlbuminNo lab results found in last 7 days.    Invalid input(s): \"PREALBUMIN\"       IMAGING:  EXAMINATION: CTA pulmonary angiogram, 12/27/2024 1:16 PM      COMPARISON: None.     HISTORY:     TECHNIQUE: Volumetric helical acquisition of CT images of the chest  from the lung apices to the kidneys were acquired after the  administration of 80 mL of Isovue-370 IV contrast. Post-processed  multiplanar and/or MIP reformations were obtained, archived to PACS  and used in interpretation of this study.      FINDINGS:       Contrast bolus is: excellent.  Exam is negative for acute pulmonary  embolism.      The largest right ventricle transaxial diameter is (measured from  endocardium to endocardium): 3.7 " cm   The largest left ventricle transaxial diameter is (measured from  endocardium to endocardium): 3.8 cm  RV/LV ratio is: <1.0 cm (if ratio greater than 1.1 then sign is  suspicious for right heart strain)  Reflux of contrast into the IVC? yes  Paradoxical bowing of the interventricular septum to the left? no     Findings tubes: Partially included feeding tube.     Lungs:  No pneumothorax. Biapical scarring. Bronchial wall thickening  predominantly in the lower lobes, increased from 12/9/2024..  Posterobasilar consolidative opacities in the lower lobes. Increasing  groundglass density with mosaic attenuation without significant  interlobular septal thickening. Trace persistent right-sided pleural  effusion, decreased from prior. Trace left-sided pleural effusion.  Unchanged subpleural 6 mm left lower lobe nodule (7/146). No new or  enlarging pulmonary nodules.     Mediastinum:  Included thyroid is unremarkable. Heart size is enlarged, similar,  including biatrial enlargement.. No pericardial effusion. Coronary  artery calcifications. Normal caliber of the ascending aorta and main  pulmonary artery. Aberrant right subclavian artery with  retroesophageal course. Similar mediastinal and right hilar  lymphadenopathy, for example a 1.4 cm precarinal lymph node (7/102),  unchanged from 11/21/2024 and likely reactive.     Upper abdomen: Atherosclerotic calcifications of the abdominal aorta.  Nonobstructing right nephrolithiasis.     Bones/Soft Tissues: Degenerative changes in the visualized spine,  including unchanged compression deformities in the T1 and T3 vertebra.  Unchanged kyphosis of the thoracic spine. Right-sided multilevel rib  and transverse process fracture deformities are unchanged. Chronic  sternal fracture deformity. No acute osseous abnormalities or  suspicious bony lesions.                                                                         IMPRESSION:   1. No pulmonary embolism.  2. Increasing  basilar predominant bronchial wall thickening and  diffuse groundglass attenuation without significant interlobular  septal thickening and decreasing pleural effusions, suspicious for  ongoing infectious/inflammatory process.   3. Consolidative/atelectatic opacities in the right greater than left  posterior lower lobes, likely attributable to compressive atelectasis.  4. Contrast reflux into the IVC with relatively poor opacification of  the left heart, suggestive of right heart dysfunction. May consider  further evaluation with echo if clinically appropriate.  5. Stable mediastinal lymphadenopathy, favor reactive.

## 2024-12-30 NOTE — PHARMACY-CONSULT NOTE
Pharmacy Tube Feeding Consult    Medication reviewed for administration by feeding tube and for potential food/drug interactions.    Recommendation: Recommend changing the following medications to a liquid dosage form: gabapentin (dose for you).  Levothyroxine: It is recommended that when TF expected to continue > 7 days that tube feedings be held 1 hour pre and post dose and to monitor thyroid function weekly. Expected that TF will continue > 7 days in this patient. Will plan to hold TF 1 hour before and after administration when patient is stable/tolerating TF per RD.     Pharmacy will continue to follow as new medications are ordered.     Kallie Lantigua, AnabellD, BCPS

## 2024-12-30 NOTE — PROGRESS NOTES
Small Bowel Feeding Tube Advancement Assessment      Reason for Feeding Tube Placement: advancement of feeding tube from gastric to post-pyloric  Cortrak Start Time: 1600   Cortrak End Time: 1620  Medicine Delivered During Procedure: none  Placement Successful: pending xray    Procedure Complications: none  Final Placement Fredrick at exit of nare 84 cm  Face to Face time with patient: 25      Feeding tube secured with preexisting nasal bridle

## 2024-12-30 NOTE — PLAN OF CARE
Goal Outcome Evaluation:      Plan of Care Reviewed With: patient    Overall Patient Progress: improvingOverall Patient Progress: improving    Outcome Evaluation: Pt on Oxymask 4L overnight, very loose stools x5 before midnight. HR in 130-155 range all night, elevated from prior baseline of 60-80. Diltiazem held per provider.    Neuro: A&Ox4. Quiet, forgetful but generally seems to be improving a bit.  Cardiac: Afib hr 130-155 all shift. VSS otherwise.   Respiratory: Sating 98% on Oxymask 4L.  GI/: Adequate urine output. BM X6, loose and runny. Cdiff test ordered, but pt has had no BM since the order was placed.  Diet/appetite: NPO.  Activity:  Assist of 2, not oob.  Pain: At acceptable level on current regimen.   Skin: No new deficits noted.  LDA's: NG, clamped. RPIV, infusing D5 125mL/hr. Providers notified and aware that pt's current D5 rate puts them 1000mL over their fluid restriction of 2L/day every day. LPIV, TKO for abx Q6h. Purewick ext cath.    Plan: Continue with POC. Notify primary team with changes.

## 2024-12-30 NOTE — PROGRESS NOTES
"CLINICAL NUTRITION SERVICES - REASSESSMENT NOTE     Nutrition Prescription    RECOMMENDATIONS FOR MDs/PROVIDERS TO ORDER:  Given PEGJ delay today, discussed rec to have NGT repositioned to post-pyloric position by RN flier with goal to restart TFs.   >> If unable to obtain post-pyloric EN access, consider trial of gastric feedings utilizing HOB precautions and a continuous TF infusion. Could consider utilizing Reglan to speed gastric motility if appropriate.   >> If enteral nutrition trials fail, would recommend initiating peripheral parenteral nutrition as bridge to safe GJ placement.     GI team now following for PEGJ due to IR not being able to safely perform procedure. Concern that with moderate to severe pharyngeal dysphagia and significant pooling of secretions, post-pyloric feedings may not reduce aspiration risk. SLP to re-evaluate patient's swallow today.     Continue to monitor pt GOC, as pt expressed a desire \"just to eat\" with writer at bedside today. Please ensure enteral nutrition support/EN access continue to be within pt GOC vs allowing PO for pleasure knowing aspiration risk.     Malnutrition Status:    Severe malnutrition in the context of chronic disease     Recommendations already ordered by Registered Dietitian (RD):  Once FT repo'd to post-pyloric position, initiate TFs as below (modify to concentrated EN formula to reduce total goal/volume):     Dosing weight: 47 kg (admit wt)   EN access: NDT vs NJT is goal (FT still needs to be repo'd today)  Regimen: TwoCal HN at 35 mL/hr (840 mL/day) to provide 1680 kcals (36 kcals/kg/day), 71 g PRO (1.5 g/kg/day), 588 mL H2O, 184 g CHO and 4 g Fiber daily.   Flushes: 30 mL Q4hrs for FT patency    Titration: Once FT repositioned and verified in appropriate position by XR imaging (duodenal vs jejunal), initiate feeds at 15 mL/hr and advance rate by 10 mL Q8hrs as tolerated until reaching goal rate of 35 mL/hr.   - Do not advance TF rate unless Mg++ >1.5, K+ " is >3, and phos >1.9    Future/Additional Recommendations:  If peripheral PN becomes POC as bridge to FT (pending ability to tolerate TFs vs maintain desired EN access):   -Recommend only using Peripheral PN for short-term nutrition support, ~1-2 week duration  -If anticipated that patient will need PN support for >2 weeks, consider central line placement.   Dosing weight: 47 kg    Access: peripheral line   Initial parameters (per day)  ClinimixE 4.25% AA, 5% dextrose concentration (peripheral line)    Volume: Rate of 83 mL/hr (1992 mL/day)  Dextrose: 100 g (amount per Clinimix at 83 mL/hr)  AA: 85 g (amount per Clinimix at 83 mL/hr)  Lipids: 250 mL 20%, 7 days per week     Dextrose titration:   None needed with peripheral Clinimix     Additives: Standard Infuvite and Trace minerals + 100 mg B1/Thiamine x5 days      - PPN will provide: 1992 mL volume, 100g Dex daily (340 kcal, GIR 1.5 mg/kg/min), 85 g AA daily (1.8 gm/kg/day, 340 kcal), and 250 ml 20% IV lipids 7-days per week (M-Sat) for total provision of 1180 Kcals (25 Kcal/kg/day), 43% Kcals from fat/lipids.      EVALUATION OF THE PROGRESS TOWARD GOALS   Diet: NPO    Nutrition Support: (TFs on hold since 12/25)   -Dosing weight: 50.8 kg actual BW    -EN access via NGT (feeds held. NG clamped); currently planing for PEGJ but IR unable to perform procedure. Consult for FT repo to post-pyloric position placed by MD today.      -Regimen:   12/10-12/13: Novasource Renal at 30 ml/hr (720 ml) + 1 Prosource TF20 provides 1520 kcal (30 kcal/kg), 85 g pro (1.7 g/kg), 132 g CHO, 516 ml free water, and 0 g fiber daily.   12/13-12/25: [employer] Peptide 1.5 (or equivalent) at 45 mL/hr (1080 mL/day) to provide 1661 kcal (33 kcal/kg), 80 g protein (1.6 g/kg), 149 g CHO, 16 g fiber, 83 g fat (40% from MCTs), and 756 mL free water daily    -FWF 30 mL Q4hrs (180 mL/day)    EN Intake - Average 7-day TF intake: 315 mL formula, 473 Kcals (9 Kcal/kg), and 23 g pro (0.5 g/kg). This is  "meeting 37% of low-end est Kcal needs, and 30% of low-end est protein needs.     NEW FINDINGS   General:  GOC discussion 12/29 - Life-prolonging GOC with limits; open to PEG/J and other interventions as long as benefit outweighs burdens.   Pt was planned for IR G/J placement today; however, per IR note this AM:   \"Pt on IR schedule for GJ tube placement 12/30. Procedure was pushed from last week given respiratory status and rapid responses. Pt has had a rapid response called every day for respiratory status and arrhythmia. She has had recurrent nausea and vomiting. IR staff- Dr. Kumari, Dr. Jeter and Dr. Cabezas reviewed case and have determined we cannot safely offer GJ tube placement without a managed airway. IR must be able to lay flat and IR must be able to insufflate the stomach without risk of aspiration. Pt has been changed to DNR/DNI after recent palliative care visit. Recommend re-discussion of goals of care and consideration for consultation with surgery vs GI for procedure with anesthesia.\"    ASSESSED NUTRITION NEEDS (updated, pt no longer in ICU)   Dosing Weight: 47 kg (Admit wt)   Estimated Energy Needs: 3298-1430 kcals/day (30 - 35 kcal/kg)  Justification: Repletion  Estimated Protein Needs: 55-70+ grams protein/day (1.2 - 1.5+ grams of pro/kg)  Justification: Lean body mass preservation   Estimated Fluid Needs: 1 mL/Kcal  Justification: Maintenance    Weights:  Weight trends overall stable since admit vs upward trend for past 2-measures (possibly attributed to different bed scale w/ pt moving/transferring to different hospital floor vs fluid status); continue to monitor trends as available   Per prior RD note: Patient's daughter reports UBW around 120-130 lbs, however wt here around 102 lbs, patient states she is eating less and has less appetite, previously with recent invasive laryngeal SCC with radiation therapy, also had dysphagia before.   Weights for present admission:   Date/Time Weight " Weight Method   12/30/24 0631 53.4 kg (117 lb 11.6 oz) Bed scale   12/29/24 0337 51.1 kg (112 lb 10.5 oz) Bed scale   12/22/24 1229 47.6 kg (104 lb 15 oz) Bed scale   12/21/24 1014 45.7 kg (100 lb 12 oz) Bed scale   12/20/24 0952 45.7 kg (100 lb 12 oz) Bed scale   12/19/24 1633 47.9 kg (105 lb 9.6 oz) Bed scale   12/18/24 1359 47.2 kg (104 lb 0.9 oz) Bed scale   12/17/24 1255 47.1 kg (103 lb 13.4 oz) Bed scale   12/16/24 0941 46.7 kg (102 lb 15.3 oz) Bed scale   12/15/24 0929 50.2 kg (110 lb 10.7 oz) Bed scale   12/15/24 0900 48.2 kg (106 lb 4.2 oz) Bed scale   12/13/24 0400 49 kg (108 lb 0.4 oz) Bed scale   12/11/24 0400 48.7 kg (107 lb 5.8 oz) Bed scale   12/10/24 0445 49.7 kg (109 lb 9.6 oz) --   12/08/24 1324 50.8 kg (111 lb 15.9 oz) Bed scale   12/07/24 1612 48.8 kg (107 lb 9.4 oz) Bed scale   12/06/24 1442 49 kg (108 lb 0.4 oz) Bed scale   12/05/24 0804 46.6 kg (102 lb 11.8 oz) --   12/04/24 1333 47.6 kg (104 lb 15 oz) Bed scale   12/03/24 0915 46.9 kg (103 lb 6.3 oz) Bed scale   12/02/24 1000 46 kg (101 lb 6.4 oz) Standing scale   12/01/24 0905 45.7 kg (100 lb 12 oz) Bed scale   11/30/24 0936 48.5 kg (106 lb 14.8 oz) Bed scale   11/29/24 2218 48.3 kg (106 lb 7.7 oz) Bed scale   11/28/24 1437 46.3 kg (102 lb 1.2 oz) Bed scale   11/27/24 0919 44.7 kg (98 lb 8.7 oz) Bed scale   11/26/24 1013 42.8 kg (94 lb 5.7 oz) Bed scale   11/25/24 1059 46.5 kg (102 lb 8.2 oz) Bed scale   11/24/24 1640 46.7 kg (103 lb) Standing scale   11/23/24 1100 46.5 kg (102 lb 8.2 oz) Standing scale       LABS: Reviewed  PO4 1.6 (LOW); standard phos replacement protocols in place. K+ and Mg both WNL.   Electrolytes  Potassium (mmol/L)   Date Value   12/30/2024 3.5   12/29/2024 3.6   12/28/2024 3.9     Potassium POCT (mmol/L)   Date Value   12/08/2024 4.5   04/02/2023 3.7     Phosphorus (mg/dL)   Date Value   12/30/2024 1.6 (L)   12/29/2024 2.6   12/28/2024 4.1   12/27/2024 4.9 (H)   12/25/2024 4.4    Blood Glucose  Glucose (mg/dL)   Date  Value   12/30/2024 108 (H)   12/29/2024 125 (H)   12/28/2024 145 (H)   12/27/2024 88     GLUCOSE BY METER POCT (mg/dL)   Date Value   12/29/2024 118 (H)   12/28/2024 152 (H)   12/28/2024 153 (H)   12/27/2024 117 (H)   12/27/2024 85     Hemoglobin A1C (%)   Date Value   12/19/2024 5.8 (H)   01/24/2024 6.0 (H)    Inflammatory Markers  WBC Count (10e3/uL)   Date Value   12/30/2024 8.3   12/29/2024 7.4   12/28/2024 7.7     Albumin (g/dL)   Date Value   12/19/2024 3.3 (L)   12/11/2024 3.1 (L)   12/11/2024 3.1 (L)      Magnesium (mg/dL)   Date Value   12/30/2024 2.0   12/29/2024 1.9   12/23/2024 2.3     Sodium (mmol/L)   Date Value   12/30/2024 142   12/29/2024 144   12/28/2024 140    Renal  Urea Nitrogen (mg/dL)   Date Value   12/30/2024 16.0   12/29/2024 28.5 (H)   12/28/2024 49.8 (H)     Creatinine (mg/dL)   Date Value   12/30/2024 0.68   12/29/2024 0.84   12/28/2024 1.10 (H)     Additional  Triglycerides (mg/dL)   Date Value   12/20/2024 99   12/19/2024 100     Ketones Urine (mg/dL)   Date Value   12/28/2024 Negative        GI:  Last BM: yesterday x6 measures per I/O   Trending 0-5 unmeasured stool occurences per day, based on I/O review  GI concerns: Testing for C-diff per chart     Pertinent Medications  Unasyn  Jardiance (held)  Ferosul (held)  Lasix (held)  Synthroid  Thera-Vit  Neutraphos  Thiamine 100 mg daily   IVF - D5W in 1/2 NS at 125 mL/hr (150 gm dextrose, 510 Kcals/day)   Bowel meds: PRN only      SKIN:  WOCN not following pt; no new deficits noted per chart     MALNUTRITION  % Intake: </= 50% for >/= 5 days (severe)  % Weight Loss: > 10% in 6 months (severe malnutrition)  - T> 10% in 6 months (severe malnutrition) PTA  Subcutaneous Fat Loss: Severe global  Muscle Loss: Severe global  Fluid Accumulation/Edema: None noted  Malnutrition Diagnosis: Severe malnutrition in the context of chronic disease     Previous Goals   Total avg nutritional intake to meet a minimum of 30 kcal/kg and 1.5 g PRO/kg daily  "(per dosing wt 51 kg).   Evaluation: Not met    Previous Nutrition Diagnosis  Inadequate oral intake related to esophageal dysmotility, recent intubation as evidenced by weight loss, fat and muscle loss, chart review, TF to meet nutrition needs.     Evaluation: Declining    CURRENT NUTRITION DIAGNOSIS  Inadequate protein-energy intake related to inadequate EN infusion as evidenced by TFs held/off since 12/25 due to concern for aspiration, N/V and awaiting post-pyloric EN access       INTERVENTIONS  Implementation  Collaboration with other providers - Primary team MD, GI ERIC, bedside RN, SLP  Enteral Nutrition - Initiate once FT repo'd per MD   Feeding tube flush - \" \"     Goals  Total avg nutritional intake to meet a minimum of 30 kcal/kg and 1.2 g PRO/kg daily (per updated dosing wt 47 kg).    Monitoring/Evaluation  Progress toward goals will be monitored and evaluated per protocol.    Scar Butt, MS, RDN, LD, CNSC  Available by Preclick or phone   Vocera: M-F (7:00-3:30)  6B Clinical Dietitian   Weekend/Holiday (7:00-3:30) - Weekend Clinical Dietitian   6B RD desk: [phone]       **Clinical Dietitians are no longer available by pager.    "

## 2024-12-30 NOTE — PROGRESS NOTES
[facility]    Medicine Progress Note - Hospitalist Service, GOLD TEAM 8    Date of Admission:  11/21/2024    Assessment & Plan   Asya Coffman is a 79 year old female admitted on 11/21/2024. 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 Initially admitted to hospital medicine service with hypoxic respiratory failure suspected due to HFpEF requiring diuresis. Developed acute hypoxia on 12/8 secondary to suspected aspiration pneumonitis for which she was intubated (12/8-12/10). Now s/p extubation and transferring back to hospital medicine service.  Continues to have episodes of aspiration with worsening of hypoxia.  Has been on tube feeding.  Not tolerating G-tube feedings due to multiple episodes of aspiration with subsequent development of aspiration pneumonia.    Updates today:  -GJ tube placement procedure by RENAE lilly  -GI consulted for GJ tube placement but feared that she would not have been ventilation if she is intubated  -NG tube to be advanced to postpyloric, and restart tube feeding.  -touch base with SPL for more speech/swallow therapies  -resume diltiazem at lower dose    Acute hypoxic respiratory failure  Recurrent Aspiration pneumonitis with aspiration pneumonia  MSSA PNA s/p abx (12/3-12/8)  Obstructive lung disease (COPD vs asthma)  Pulmonary hypertension   Presented on 11/21 with acute hypoxic respiratory failure; initially suspected to be secondary to HFpEF exacerbation; unresolved with diuresis. Underlying obstructive lung disease but no evidence of exacerbation here. Was treated for MSSA pneumonia. Overnight 12/8, developed sudden worsening of oxygenation and increased secretions in the setting of dysphagia/recent  laryngeal radiation and was intubated. Suspected aspiration pneumonitis as etiology. Was briefly on zosyn but this was discontinued.  She was extubated on 12/10.  Multiple episodes of worsening of hypoxia with evidence of aspiration and intolerance to tube feeding.  CT 12/27 showed increasing basilar predominant bronchial wall thickening and diffuse groundglass attenuation suspicious of ongoing infectious process.  Started on Zosyn 12/27 subsequently changed to Unasyn.  Continues to have have  excessive oral secretion and episodes of aspiration needing frequent suctioning  -Continue oxygen titration as needed  - Budesonide neb 0.5 mg BID,  - ipratropium-levalbuterol neb QID  - albuteraol Q4H PRN  - Mucomyst  - Aspiration precautions; n.p.o.  - Flutter valve, incentive spirometery  - CT PE  - Zosyn 12/28-12/28 then -Unasyn 12/28-  - Dysphagia management as below.  - Palliative care consult, recs appreciated      Failure to thrive  Aspiration risk  Acute severe oropharyngeal dysphagia  Esophageal dysmotility  Concern for radiation-induced dysphagia  Laryngeal squamous cell carcinoma s/p radiation (4/2024-7/2024)  Concern for gastroparesis    Has had increased dysphagia in the setting of laryngeal squamous cell carcinoma s/p radiation (4/2024-7/2024). Family reports that over the past few months has declined dramatically from being quite mobile, active to being barely active, with 4 falls in 3 months progressively reduced speech and recurrent aspirations.  ENT evaluated 12/16/24 with bedside laryngoscopy for dysphagia.  Normal vocal cord function and control seen including ability to approximate cords during cough. No anatomic explanations for dysphagia seen. Evaluated by GI and there is no concern of esophageal web.   Clinical picture consistent with significant muscle weakness contributing to oropharyngeal dysphagia, poor vocal pressure, poor cough effort likely due to radiation.  No evidence of active rheumatologic  condition contributing per rheumatology evaluation. CK, aldolase negative.  Myasthenia panel negative.  Cortisol within normal limit.  Unclear if hypothyroidism is the cause but can be contributing.  Doses adjusted as below. Cervical thoracic MRI without significant findings.   - SLP following  - NPO   - will need VFSS  this week  -Postpyloric feeding tube  placement  -GJ placement is within goals of care of patient   -IR consulted for G-tube placement but did not feel safe to place with IR   -GI also consulted and did not feel that it is safe at this point  -Continue speech therapy and see swallowing and secretion containment improves    At risk for refeeding syndrome  Severe malnutrition in context of chronic illness/disease  Per daughter, baseline weight around 120-130 lbs. Admitted 102 lbs. Patient states she is eating less and has less appetite in general as well as having difficulty swallowing. While NPO during intubation, developed starvation ketosis. Given high risk for aspiration with PO intake, placed post pyloric  and started tube feeds. Will need close monitoring for refeeding syndrome.   - NPO   - RD following for TF management  - Monitor for refeeding syndrome   - Lyte replacement protocol   - IV thiamine replacement     Chronic afib  Afib with RVR  [id number] 3 (age++, HF+)     Has episodes of A-fib with RVR.  Has also episodes of bradycardia with  PTA dose of diltiazem and metoprolol  - PTA apixaban 5mg BID  - Diltiazem 60mg q6H as tolerated by blood pressure give bradycardia and soft BP  - Metoprolol to tartrate to 25 mg twice daily  - K above 4 and Mg above 2      Anxiety  Depression  History of depression and anxiety.  She feels hopeless and has sad mood during this hospitalization.  Will continue PTA sertraline.  Will also consult health psychology.  -Increased PTA Sertraline 200 mg daily       Chronic Left parietal, left thalamic lacunar strokes  - will increase atorvastatin to high intensity  (20-> 40 mg). On DOAC  - follow up Neurorecs  - A1C shows prediabetes,     Hypothyroidism  Hx of thyroidectomy 2/2 cancer   - Continue PTA levothyroxine 88 mcg daily   - consulted Endocrine service as above and increased dose to 100 mcg daily. Appreciate recommendations       HFpEF (LVEF 55-60% 11/22/2024)   Pulmonary hypertension (44 mmHg per echo 11/22/2024)  Possible small PFO  Echo during current admission (11/22/2024) notable for increased thickness of LV and elevated BNP (peak 9200 > 6200) concerning for heart failure exacerbation s/p diuresis and pulmonary hypertension with estimated pulmonary artery pressure of 45 mmHg. Echo with bubble study (11/25/2024) concerning for possible small PFO. With intubation on 12/8, developed hypotension without evidence of shock. Etiology of hypotension may be cardiogenic (e.g. exacerbation of pulmonary hypertension by positive pressure ventilation) vs medication associated (propofol, precedex); Weaned off pressors 12/10.   - Preload dependent in the setting of pulmonary hypertension, gentle diuresis as needed            Oliguric SAMIR, improving  Mild hyperkalemia  Cr 0.9 on admission. Baseline appears 0.9-1.0. Developed SAMIR initially in setting of diuresis and had been improving. Subsequently increased following transfer to ICU with consideration for prerenal in setting of NPO status.  Again significant increase in creatinine with low Cystatin C FEUrea above 40%.  Concern for possibility of ATN though UA is unremarkable  - Strict I/Os  - Trend BMP   - FWF via NGT  - Cytstatin C  -Holding Lasix  - Hold spiranolactone    History of apnea  Family report  apneic episodes after she underwent radiation therapy.  There is a possibility that she is having obstructive sleep apnea. VBG without evidence of hypercarbia.  If there is evidence of gas in the morning we will put her on CPAP at night.    Diarrhea  Frequent loose stools. Unclear timing of onset so not sure if related to abx or  TF initiation. No abdominal pain. C diff on 12/30 neg  - imodium prn    Spiritual health  Pt family requested that pt sees    - consulted for emotional support      Anemia of chronic illness  Iron deficiency anemia  Baseline Hgb 11-12. Currently near baseline. Can consider anemia of chronic illness. Not able to swallow PTA oral iron  - Monitor CBC  - s/p one dose of  IV iron     Generalized deconditioning  Recurrent falls  - PT/OT consults     CREST Syndrome  Characterized by Raynaud's, Calcinosis, GERD and some telangectasia's. Last saw [facility] 2017. No history of ILD.    - Continue protonix 40 mg daily       Family update  Daughter Lana updated on the phone.        Diet:      DVT Prophylaxis: DOAC  Miranda Catheter: Not present  Lines: None     Cardiac Monitoring: ACTIVE order. Indication: Tachyarrhythmias, acute (48 hours)  Code Status: No CPR- Do NOT Intubate      Clinically Significant Risk Factors          # Hyperchloremia: Highest Cl = 111 mmol/L in last 2 days, will monitor as appropriate      # Hypocalcemia: Lowest iCa = 4.2 mg/dL in last 2 days, will monitor and replace as appropriate     # Hypoalbuminemia: Lowest albumin = 3 g/dL at 12/10/2024  3:11 AM, will monitor as appropriate                 # Severe Malnutrition: based on nutrition assessment      # Financial/Environmental Concerns: none         Social Drivers of Health    Housing Stability: High Risk (11/23/2024)    Housing Stability     Do you have housing? : No     Are you worried about losing your housing?: No          Disposition Plan     Medically Ready for Discharge: Anticipated in 5+ Days             Roverto Dyson MD  Hospitalist Service, [address]  Securely message with [employer] (more info)  Text page via [employer] Paging/Directory   See signed in provider for up to date coverage  information  ______________________________________________________________________    Interval History   No acute events overnight.  Still awaiting a lot of secretions in the oral cavity.  Patient continues to feel tired but energy level has slightly improved.    Physical Exam   Vital Signs: Temp: 96.9  F (36.1  C) Temp src: Axillary BP: 111/71 Pulse: 101   Resp: 18 SpO2: 100 % O2 Device: Nasal cannula Oxygen Delivery: 2 LPM  Weight: 117 lbs 11.61 oz    General Appearance:  Awake. Alert. No acute distress. Frail appearing.   Respiratory: On nasal cannula crackles and rhonchi bilaterally  Cardiovascular: Irregular. Tachycardic. No obvious murmur.   GI: Nondistended. Normoactive. Soft. Non-tender.   Skin: Warm, dry.   Neuro: Oriented O 3x    Medical Decision Making          55 MINUTES SPENT BY ME on the date of service doing chart review, history, exam, documentation & further activities per the note.      Data

## 2024-12-30 NOTE — PROGRESS NOTES
IR follow-up note.    Pt on IR schedule for GJ tube placement 12/30. Procedure was pushed from last week given respiratory status and rapid responses. Pt has had a rapid response called every day for respiratory status and arrhythmia. She has had recurrent nausea and vomiting.     IR staff- Dr. Kumari, Dr. Jeter and Dr. Cabezas reviewed case and have determined we cannot safely offer GJ tube placement without a managed airway. IR must be able to lay flat and IR must be able to insufflate the stomach without risk of aspiration. Pt has been changed to DNR/DNI after recent palliative care visit. Recommend re-discussion of goals of care and consideration for consultation with surgery vs GI for procedure with anesthesia.    Vocera message with Dr. Dyson.     Lilia Herring, SWATI, APRN  Interventional Radiology   IR on-call pager: [phone]

## 2024-12-31 LAB
ANION GAP SERPL CALCULATED.3IONS-SCNC: 10 MMOL/L (ref 7–15)
BUN SERPL-MCNC: 11.4 MG/DL (ref 8–23)
CALCIUM SERPL-MCNC: 7.8 MG/DL (ref 8.8–10.4)
CHLORIDE SERPL-SCNC: 113 MMOL/L (ref 98–107)
CREAT SERPL-MCNC: 0.74 MG/DL (ref 0.51–0.95)
EGFRCR SERPLBLD CKD-EPI 2021: 82 ML/MIN/1.73M2
ERYTHROCYTE [DISTWIDTH] IN BLOOD BY AUTOMATED COUNT: 19.7 % (ref 10–15)
GLUCOSE SERPL-MCNC: 100 MG/DL (ref 70–99)
HCO3 SERPL-SCNC: 22 MMOL/L (ref 22–29)
HCT VFR BLD AUTO: 34.5 % (ref 35–47)
HGB BLD-MCNC: 9.8 G/DL (ref 11.7–15.7)
MAGNESIUM SERPL-MCNC: 1.9 MG/DL (ref 1.7–2.3)
MCH RBC QN AUTO: 25.1 PG (ref 26.5–33)
MCHC RBC AUTO-ENTMCNC: 28.4 G/DL (ref 31.5–36.5)
MCV RBC AUTO: 89 FL (ref 78–100)
PHOSPHATE SERPL-MCNC: 1.9 MG/DL (ref 2.5–4.5)
PLATELET # BLD AUTO: 335 10E3/UL (ref 150–450)
POTASSIUM SERPL-SCNC: 3.9 MMOL/L (ref 3.4–5.3)
RBC # BLD AUTO: 3.9 10E6/UL (ref 3.8–5.2)
SODIUM SERPL-SCNC: 145 MMOL/L (ref 135–145)
WBC # BLD AUTO: 8.6 10E3/UL (ref 4–11)

## 2024-12-31 PROCEDURE — 85041 AUTOMATED RBC COUNT: CPT

## 2024-12-31 PROCEDURE — 250N000011 HC RX IP 250 OP 636: Performed by: STUDENT IN AN ORGANIZED HEALTH CARE EDUCATION/TRAINING PROGRAM

## 2024-12-31 PROCEDURE — 250N000009 HC RX 250

## 2024-12-31 PROCEDURE — 999N000127 HC STATISTIC PERIPHERAL IV START W US GUIDANCE

## 2024-12-31 PROCEDURE — 84100 ASSAY OF PHOSPHORUS: CPT

## 2024-12-31 PROCEDURE — 80048 BASIC METABOLIC PNL TOTAL CA: CPT | Performed by: STUDENT IN AN ORGANIZED HEALTH CARE EDUCATION/TRAINING PROGRAM

## 2024-12-31 PROCEDURE — 250N000013 HC RX MED GY IP 250 OP 250 PS 637: Performed by: STUDENT IN AN ORGANIZED HEALTH CARE EDUCATION/TRAINING PROGRAM

## 2024-12-31 PROCEDURE — 250N000013 HC RX MED GY IP 250 OP 250 PS 637

## 2024-12-31 PROCEDURE — 97535 SELF CARE MNGMENT TRAINING: CPT | Mod: GO | Performed by: OCCUPATIONAL THERAPIST

## 2024-12-31 PROCEDURE — 250N000013 HC RX MED GY IP 250 OP 250 PS 637: Performed by: HOSPITALIST

## 2024-12-31 PROCEDURE — 99232 SBSQ HOSP IP/OBS MODERATE 35: CPT | Performed by: STUDENT IN AN ORGANIZED HEALTH CARE EDUCATION/TRAINING PROGRAM

## 2024-12-31 PROCEDURE — 83735 ASSAY OF MAGNESIUM: CPT

## 2024-12-31 PROCEDURE — 250N000013 HC RX MED GY IP 250 OP 250 PS 637: Performed by: INTERNAL MEDICINE

## 2024-12-31 PROCEDURE — 999N000157 HC STATISTIC RCP TIME EA 10 MIN

## 2024-12-31 PROCEDURE — 36415 COLL VENOUS BLD VENIPUNCTURE: CPT | Performed by: STUDENT IN AN ORGANIZED HEALTH CARE EDUCATION/TRAINING PROGRAM

## 2024-12-31 PROCEDURE — 94640 AIRWAY INHALATION TREATMENT: CPT

## 2024-12-31 PROCEDURE — 92526 ORAL FUNCTION THERAPY: CPT | Mod: GN | Performed by: SPEECH-LANGUAGE PATHOLOGIST

## 2024-12-31 PROCEDURE — 250N000011 HC RX IP 250 OP 636

## 2024-12-31 PROCEDURE — 90832 PSYTX W PT 30 MINUTES: CPT | Performed by: STUDENT IN AN ORGANIZED HEALTH CARE EDUCATION/TRAINING PROGRAM

## 2024-12-31 PROCEDURE — 85018 HEMOGLOBIN: CPT

## 2024-12-31 PROCEDURE — 94640 AIRWAY INHALATION TREATMENT: CPT | Mod: 76

## 2024-12-31 PROCEDURE — 120N000003 HC R&B IMCU UMMC

## 2024-12-31 RX ORDER — PROCHLORPERAZINE 25 MG
12.5 SUPPOSITORY, RECTAL RECTAL EVERY 12 HOURS PRN
Status: DISCONTINUED | OUTPATIENT
Start: 2024-12-31 | End: 2025-01-14 | Stop reason: HOSPADM

## 2024-12-31 RX ORDER — PROCHLORPERAZINE MALEATE 5 MG/1
5 TABLET ORAL EVERY 6 HOURS PRN
Status: DISCONTINUED | OUTPATIENT
Start: 2024-12-31 | End: 2025-01-14 | Stop reason: HOSPADM

## 2024-12-31 RX ADMIN — LEVOTHYROXINE SODIUM 100 MCG: 100 TABLET ORAL at 05:41

## 2024-12-31 RX ADMIN — PROCHLORPERAZINE EDISYLATE 5 MG: 5 INJECTION INTRAMUSCULAR; INTRAVENOUS at 12:14

## 2024-12-31 RX ADMIN — IPRATROPIUM BROMIDE 0.5 MG: 0.5 SOLUTION RESPIRATORY (INHALATION) at 09:27

## 2024-12-31 RX ADMIN — ACETYLCYSTEINE 2 ML: 200 SOLUTION ORAL; RESPIRATORY (INHALATION) at 15:48

## 2024-12-31 RX ADMIN — AMPICILLIN SODIUM AND SULBACTAM SODIUM 3 G: 2; 1 INJECTION, POWDER, FOR SOLUTION INTRAMUSCULAR; INTRAVENOUS at 23:27

## 2024-12-31 RX ADMIN — LOPERAMIDE HCL 2 MG: 1 SOLUTION ORAL at 12:54

## 2024-12-31 RX ADMIN — BUDESONIDE 0.5 MG: 0.5 INHALANT ORAL at 20:22

## 2024-12-31 RX ADMIN — LEVALBUTEROL HYDROCHLORIDE 0.63 MG: 0.63 SOLUTION RESPIRATORY (INHALATION) at 15:48

## 2024-12-31 RX ADMIN — LEVALBUTEROL HYDROCHLORIDE 0.63 MG: 0.63 SOLUTION RESPIRATORY (INHALATION) at 09:27

## 2024-12-31 RX ADMIN — AMPICILLIN SODIUM AND SULBACTAM SODIUM 3 G: 2; 1 INJECTION, POWDER, FOR SOLUTION INTRAMUSCULAR; INTRAVENOUS at 16:30

## 2024-12-31 RX ADMIN — ACETYLCYSTEINE 2 ML: 200 SOLUTION ORAL; RESPIRATORY (INHALATION) at 09:27

## 2024-12-31 RX ADMIN — AMPICILLIN SODIUM AND SULBACTAM SODIUM 3 G: 2; 1 INJECTION, POWDER, FOR SOLUTION INTRAMUSCULAR; INTRAVENOUS at 10:31

## 2024-12-31 RX ADMIN — POTASSIUM & SODIUM PHOSPHATES POWDER PACK 280-160-250 MG 2 PACKET: 280-160-250 PACK at 11:31

## 2024-12-31 RX ADMIN — IPRATROPIUM BROMIDE 0.5 MG: 0.5 SOLUTION RESPIRATORY (INHALATION) at 12:57

## 2024-12-31 RX ADMIN — BUDESONIDE 0.5 MG: 0.5 INHALANT ORAL at 09:27

## 2024-12-31 RX ADMIN — OFLOXACIN 50000 UNITS: 300 TABLET, COATED ORAL at 10:24

## 2024-12-31 RX ADMIN — THIAMINE HCL TAB 100 MG 100 MG: 100 TAB at 08:01

## 2024-12-31 RX ADMIN — ORAL VEHICLES - SUSP 25 MG: SUSPENSION at 07:59

## 2024-12-31 RX ADMIN — POTASSIUM & SODIUM PHOSPHATES POWDER PACK 280-160-250 MG 2 PACKET: 280-160-250 PACK at 18:27

## 2024-12-31 RX ADMIN — DILTIAZEM HYDROCHLORIDE 60 MG: 60 TABLET, FILM COATED ORAL at 05:41

## 2024-12-31 RX ADMIN — IPRATROPIUM BROMIDE 0.5 MG: 0.5 SOLUTION RESPIRATORY (INHALATION) at 20:22

## 2024-12-31 RX ADMIN — DILTIAZEM HYDROCHLORIDE 60 MG: 60 TABLET, FILM COATED ORAL at 23:27

## 2024-12-31 RX ADMIN — DILTIAZEM HYDROCHLORIDE 60 MG: 60 TABLET, FILM COATED ORAL at 18:27

## 2024-12-31 RX ADMIN — ACETYLCYSTEINE 2 ML: 200 SOLUTION ORAL; RESPIRATORY (INHALATION) at 12:57

## 2024-12-31 RX ADMIN — IPRATROPIUM BROMIDE 0.5 MG: 0.5 SOLUTION RESPIRATORY (INHALATION) at 15:48

## 2024-12-31 RX ADMIN — PANTOPRAZOLE SODIUM 40 MG: 40 INJECTION, POWDER, FOR SOLUTION INTRAVENOUS at 08:18

## 2024-12-31 RX ADMIN — ONDANSETRON 4 MG: 2 INJECTION INTRAMUSCULAR; INTRAVENOUS at 07:51

## 2024-12-31 RX ADMIN — SERTRALINE HYDROCHLORIDE 200 MG: 100 TABLET ORAL at 07:59

## 2024-12-31 RX ADMIN — LOPERAMIDE HCL 2 MG: 1 SOLUTION ORAL at 07:59

## 2024-12-31 RX ADMIN — APIXABAN 5 MG: 5 TABLET, FILM COATED ORAL at 08:01

## 2024-12-31 RX ADMIN — ORAL VEHICLES - SUSP 25 MG: SUSPENSION at 20:41

## 2024-12-31 RX ADMIN — ATORVASTATIN CALCIUM 40 MG: 40 TABLET, FILM COATED ORAL at 20:41

## 2024-12-31 RX ADMIN — LEVALBUTEROL HYDROCHLORIDE 0.63 MG: 0.63 SOLUTION RESPIRATORY (INHALATION) at 12:57

## 2024-12-31 RX ADMIN — ACETYLCYSTEINE 2 ML: 200 SOLUTION ORAL; RESPIRATORY (INHALATION) at 20:22

## 2024-12-31 RX ADMIN — POTASSIUM & SODIUM PHOSPHATES POWDER PACK 280-160-250 MG 2 PACKET: 280-160-250 PACK at 15:09

## 2024-12-31 RX ADMIN — AMPICILLIN SODIUM AND SULBACTAM SODIUM 3 G: 2; 1 INJECTION, POWDER, FOR SOLUTION INTRAMUSCULAR; INTRAVENOUS at 05:40

## 2024-12-31 RX ADMIN — FEXOFENADINE HYDROCHLORIDE 120 MG: 60 TABLET ORAL at 07:58

## 2024-12-31 RX ADMIN — DILTIAZEM HYDROCHLORIDE 60 MG: 60 TABLET, FILM COATED ORAL at 11:18

## 2024-12-31 RX ADMIN — APIXABAN 5 MG: 5 TABLET, FILM COATED ORAL at 20:41

## 2024-12-31 RX ADMIN — LEVALBUTEROL HYDROCHLORIDE 0.63 MG: 0.63 SOLUTION RESPIRATORY (INHALATION) at 20:22

## 2024-12-31 RX ADMIN — THERA TABS 1 TABLET: TAB at 08:01

## 2024-12-31 ASSESSMENT — ACTIVITIES OF DAILY LIVING (ADL)
ADLS_ACUITY_SCORE: 68
ADLS_ACUITY_SCORE: 62
ADLS_ACUITY_SCORE: 68
ADLS_ACUITY_SCORE: 64
ADLS_ACUITY_SCORE: 64
ADLS_ACUITY_SCORE: 68
ADLS_ACUITY_SCORE: 68
ADLS_ACUITY_SCORE: 62
ADLS_ACUITY_SCORE: 64
ADLS_ACUITY_SCORE: 68
ADLS_ACUITY_SCORE: 68
ADLS_ACUITY_SCORE: 64
ADLS_ACUITY_SCORE: 64
ADLS_ACUITY_SCORE: 68
ADLS_ACUITY_SCORE: 68
ADLS_ACUITY_SCORE: 64
ADLS_ACUITY_SCORE: 62

## 2024-12-31 NOTE — PLAN OF CARE
A:   Neuro: A&Ox4. Uses call light appropriately. Patient had flat affect, was withdrawn. Initially refused morning labs but then was agreeable to have labs drawn. Patient denied headache, dizziness, and lightheadedness.  Cardiac/Tele: VSS except variable HR with lowest being in 30's and highest in 150's, provider notified. Patient denied nausea or lightheadedness during episodes of bradycardia. A fib rhythm. MAPs > 65. Patient denied chest pain and palpitations. Afebrile.   Respiratory: Sats > 93% on 1-2 L nasal cannula. Patient had weak cough, somewhat productive, completed oral suctioning 2x during shift to clear thick secretions.   GI/: Incontinent of urine and stool. Purewick in place, adequate urine output. Two loose bowel movements, PRN imodium given. Patient reported nausea in morning, PRN zofran and compazine given, resolved nausea.   Diet/Appetite: NPO, speech therapist. TF advanced to goal rate of 35 mL/hr, patient tolerated well. Free water flush running at 30 mL Q4 hours.  Skin: Blanchable redness on perineum. Scattered bruising.   LDAs: 1 PIV infusing TKO. NG, bridled, continuous tube feeds, landmark of 84 cm unchanged.   Activity: A2 with walker and gait belt. Patient up in chair.  Pain: Patient denied pain throughout shift.   Electrolytes: Phosphorus replaced.       P: Continue to monitor and notify team with changes. Reviewed plan of care with patient    Shift: 07:00 to 19:30    Goal Outcome Evaluation:      Plan of Care Reviewed With: patient    Overall Patient Progress: no change    Outcome Evaluation: NC 1 - 2 L, patient tolerated well. A fib, team aware.

## 2024-12-31 NOTE — PLAN OF CARE
"Goal Outcome Evaluation:      Plan of Care Reviewed With: patient    Overall Patient Progress: improvingOverall Patient Progress: improving    Outcome Evaluation: NC 1-2L overnight, Afib RVR HR 90s-120s. 2 loose BMs this shift. pt refused AM labs. pt complained about not being able to eat or drink, she stated \"I am dying I should be able to do what I want\"  TF increased to 25ml/hr at 0200, pt repeatedly asking for food and water, becoming upset when I said all we could do was swabs. Refused AM labs.     Neuro: A&Ox4. Withdrawn,   Cardiac: Afib with RVR HR 90s-130s      Respiratory: Sating >92% at 1-2 lpm nasal cannula.  GI/: Incontinent of urine. Loose BM x2  Diet/appetite: NPO, TF increased to 25 at 0200, increase to 35 at 10. Gave IV zofran x1 for nausea  Activity:  Turned q2 hrs.  Lift assist with activity.  Pain: At acceptable level on current regimen.   Skin: No new deficits noted.  LDA's:  NJ bridled to continuous tube feeds.  Right PIV.     "

## 2024-12-31 NOTE — PROGRESS NOTES
GASTROENTEROLOGY PROGRESS NOTE    Date of Admission: 11/21/2024  Reason for Admission: Respiratory failure    ASSESSMENT:  79 year old female with a history of Afib on apixiban (last dose 12/24), CADF, pulmonary HTN, COPD, laryngeal SCC s/p radiation c/b dysphagia on a modified diet, CREST syndrome who was admitted to [facility] 11/21 for hypoxic respiratory failure due to HFpEF requiring diuresis. Developed acute hypoxia on 12/8 secondary to suspected aspiration pneumonitis for which she was intubated (12/8-12/10). Now s/p extubation but continues to have episodes of aspiration with worsening of hypoxia. Patient not tolerating NGT feeds per reports due to nausea and vomiting     #. Severe malnutrition in the context of chronic illness  #. Moderate-Severe pharyngeal dysphagia   #. Aspiration pneumonia with recurrent hypoxia  #. Laryngeal cancer s/p radiation c/b dysphagia  #. Vomiting with gastric tube feeds  IR initially consulted and deferred due to current respiratory status and need for patient to have a managed airway since IR requires a patient to be able to lay flat for stomach insufflation (risk of aspiration). GI AE now consulted for placement of PEG-J tube.      The patient has a history of mod-severe oropharyngeal dysphagia and pooling of secretions on her last swallow study (see SLP eval 12/10). A percutaneous feeding tube will not change her risk of aspiration if she still is not able to manage those secretions. We would recommend a repeat SLP evaluation. Per notes she has had excessive oral secretions and requires frequent bedside suctioning via yankauer. SLP eval tentatively planned for Thursday.      If the patient passes that eval and is managing her secretions better, an endoscopic placement of gastrojejunostomy tube is done under general anesthesia. There is concern that her frailty and prolonged hospitalization would make it extremely difficult to wean off the ventilator after her procedure (or at  "least that she would remain intubated for a short time afterwards.)     Per chart review, primary team advanced NG tube to NJ which per chart review patient has been tolerating better and has had improvement in her respiratory status with transition to nasal cannula today.     Recommendations:  -- Repeat SLP evaluation, GI to follow up after this  -- Remain NPO  -- RD following for enteral nutrition support    This note was a product of chart review, patient was not physically seen on this date. No charge visit.     The patient was discussed and plan agreed upon with GI staff, Dr Rossi.    Lyndsay Vasquez PA-C  Advanced Endoscopy/Pancreaticobiliary GI Service  [facility]  Vocera   ______________________________________________________      Interval events since last evaluated: Nursing notes and 24hr events reviewed. NAEON, vitals stable. Tolerating tube feeds via NJ. Improving respiratory status, transitioned to NC.     Objective:  Blood pressure (!) 127/95, pulse 118, temperature 97.4  F (36.3  C), temperature source Axillary, resp. rate 20, height 1.651 m (5' 5\"), weight 54 kg (119 lb 0.8 oz), SpO2 96%.    PROCEDURES:  None recently with GI.     LABS:  BMP  Recent Labs   Lab 12/31/24  0901 12/30/24 0319 12/29/24  0823 12/29/24  0619 12/28/24  1706 12/28/24  0612    142  --  144  --  140   POTASSIUM 3.9 3.5  --  3.6  --  3.9   CHLORIDE 113* 111*  --  110*  --  102   ESSIE 7.8* 7.6*  --  7.7*  --  8.4*   CO2 22 22  --  22  --  25   BUN 11.4 16.0  --  28.5*  --  49.8*   CR 0.74 0.68  --  0.84  --  1.10*   * 108* 118* 125*   < > 145*    < > = values in this interval not displayed.     CBC  Recent Labs   Lab 12/31/24  0901 12/30/24  0319 12/29/24  0619 12/28/24  0612   WBC 8.6 8.3 7.4 7.7   RBC 3.90 3.64* 3.73* 3.86   HGB 9.8* 9.0* 9.3* 9.8*   HCT 34.5* 32.4* 31.9* 32.3*   MCV 89 89 86 84   MCH 25.1* 24.7* 24.9* 25.4*   MCHC 28.4* 27.8* 29.2* 30.3*   RDW 19.7* 19.0* 19.0* " 19.0*    336 295 321     INRNo lab results found in last 7 days.  LFTsNo lab results found in last 7 days.   PANCNo lab results found in last 7 days.      IMAGING:  ------------------------------------------------------------------  EXAMINATION: CTA pulmonary angiogram, 12/27/2024 1:16 PM                                                                    IMPRESSION:   1. No pulmonary embolism.  2. Increasing basilar predominant bronchial wall thickening and  diffuse groundglass attenuation without significant interlobular  septal thickening and decreasing pleural effusions, suspicious for  ongoing infectious/inflammatory process.   3. Consolidative/atelectatic opacities in the right greater than left  posterior lower lobes, likely attributable to compressive atelectasis.  4. Contrast reflux into the IVC with relatively poor opacification of  the left heart, suggestive of right heart dysfunction. May consider  further evaluation with echo if clinically appropriate.  5. Stable mediastinal lymphadenopathy, favor reactive.     ------------------------------------------------------------------

## 2024-12-31 NOTE — PROGRESS NOTES
[facility]    Medicine Progress Note - Hospitalist Service, GOLD TEAM 8    Date of Admission:  11/21/2024    Assessment & Plan   Asya Coffman is a 79 year old female admitted on 11/21/2024. 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 Initially admitted to hospital medicine service with hypoxic respiratory failure suspected due to HFpEF requiring diuresis. Developed acute hypoxia on 12/8 secondary to suspected aspiration pneumonitis for which she was intubated (12/8-12/10). Now s/p extubation and transferring back to hospital medicine service. Ongoing evaluation related to aspiration, currently tolerated continuous tube feeds via NJ, with improving respiratory status.     Updates today:  -Tolerating goal feeds with antiemetics (compazine, ondansetron).  -Overall improving respiratory function, transitioned to nasal cannula.   -Planning for VFSS likely on Thursday.    Acute hypoxic respiratory failure  Recurrent Aspiration pneumonitis with aspiration pneumonia  MSSA PNA s/p abx (12/3-12/8)  Obstructive lung disease (COPD vs asthma)  Pulmonary hypertension   Presented on 11/21 with acute hypoxic respiratory failure; initially suspected to be secondary to HFpEF exacerbation; unresolved with diuresis. Underlying obstructive lung disease but no evidence of exacerbation here. Was treated for MSSA pneumonia. Overnight 12/8, developed sudden worsening of oxygenation and increased secretions in the setting of dysphagia/recent laryngeal radiation and was intubated. Suspected aspiration pneumonitis as etiology. Was briefly on zosyn but this was discontinued.  She was extubated on 12/10.  Multiple episodes of worsening of hypoxia with evidence of  aspiration and intolerance to tube feeding.  CT 12/27 showed increasing basilar predominant bronchial wall thickening and diffuse groundglass attenuation suspicious of ongoing infectious process. Continues to have have  excessive oral secretion and episodes of aspiration needing frequent suctioning.  -Antibiotics: Zosyn 12/28-12/28 then Unasyn 12/28-. Plan for a 7 day course.  -Respiratory support:  -Continue oxygen titration as needed  -Budesonide neb 0.5 mg BID,  -ipratropium-levalbuterol neb QID  -albuteraol Q4H PRN  -Mucomyst  -Aspiration precautions; n.p.o.  -Flutter valve, incentive spirometery  - Dysphagia management as below.  - Palliative care consult, recs appreciated    Failure to thrive  Aspiration risk  Acute severe oropharyngeal dysphagia  Esophageal dysmotility  Concern for radiation-induced dysphagia  Laryngeal squamous cell carcinoma s/p radiation (4/2024-7/2024)  Concern for gastroparesis  Has had increased dysphagia in the setting of laryngeal squamous cell carcinoma s/p radiation (4/2024-7/2024). Family reports that over the past few months has declined dramatically from being quite mobile, active to being barely active, with 4 falls in 3 months progressively reduced speech and recurrent aspirations. ENT evaluated 12/16/24 with bedside laryngoscopy for dysphagia. Normal vocal cord function and control seen including ability to approximate cords during cough. No anatomic explanations for dysphagia seen. Evaluated by GI and there is no concern of esophageal web. Clinical picture consistent with significant muscle weakness contributing to oropharyngeal dysphagia, poor vocal pressure, poor cough effort likely due to radiation. No evidence of active rheumatologic condition contributing per rheumatology evaluation. CK, aldolase negative. Myasthenia panel negative.  Cortisol within normal limit.  Unclear if hypothyroidism is the cause but can be contributing. Doses adjusted as below. Cervical thoracic  MRI without significant findings.   - SLP following  - NPO for now, continue TFs via NJ   - VFSS  this week, likely on Thursday  -GJ placement is within goals of care of patient. However:  IR consulted for G-tube placement but did not feel safe to place with IR 12/30  GI also consulted and did not feel that it is safe on evaluation 12/30; some concern for secretion management leading to aspirations.  -Continue speech therapy and see swallowing and secretion containment improves    At risk for refeeding syndrome  Severe malnutrition in context of chronic illness/disease  Per daughter, baseline weight around 120-130 lbs. Admitted 102 lbs. Patient states she is eating less and has less appetite in general as well as having difficulty swallowing. While NPO during intubation, developed starvation ketosis. Given high risk for aspiration with PO intake, placed post pyloric  and started tube feeds. Will need close monitoring for refeeding syndrome.   - NPO   - RD following for TF management  - Monitor for refeeding syndrome   - Lyte replacement protocol   - IV thiamine replacement     Chronic afib  Afib with RVR  [id number] 3 (age++, HF+)     Has episodes of A-fib with RVR. Has also episodes of bradycardia with  PTA dose of diltiazem and metoprolol  - PTA apixaban 5mg BID  - Diltiazem 60mg q6H as tolerated by blood pressure give bradycardia and soft BP  - Metoprolol to tartrate to 25 mg twice daily  - Goal K above 4 and Mg above 2    Anxiety  Depression  History of depression and anxiety.  She feels hopeless and has sad mood during this hospitalization.  Will continue PTA sertraline.  Will also consult health psychology.  -Increased PTA Sertraline 200 mg daily     Chronic Left parietal, left thalamic lacunar strokes  - will increase atorvastatin to high intensity (20-> 40 mg). On DOAC  - follow up Neurorecs  - A1C shows prediabetes,     Hypothyroidism  Hx of thyroidectomy 2/2 cancer   - Continue PTA levothyroxine 88 mcg  daily   - consulted Endocrine service as above and increased dose to 100 mcg daily. Appreciate recommendations    HFpEF (LVEF 55-60% 11/22/2024)   Pulmonary hypertension (44 mmHg per echo 11/22/2024)  Possible small PFO  Echo during current admission (11/22/2024) notable for increased thickness of LV and elevated BNP (peak 9200 > 6200) concerning for heart failure exacerbation s/p diuresis and pulmonary hypertension with estimated pulmonary artery pressure of 45 mmHg. Echo with bubble study (11/25/2024) concerning for possible small PFO. With intubation on 12/8, developed hypotension without evidence of shock. Etiology of hypotension may be cardiogenic (e.g. exacerbation of pulmonary hypertension by positive pressure ventilation) vs medication associated (propofol, precedex); Weaned off pressors 12/10.   - Preload dependent in the setting of pulmonary hypertension, gentle diuresis as needed     Oliguric SAMIR, improving  Mild hyperkalemia  Cr 0.9 on admission. Baseline appears 0.9-1.0. Developed SAMIR initially in setting of diuresis and had been improving. Subsequently increased following transfer to ICU with consideration for prerenal in setting of NPO status.  Again significant increase in creatinine with low Cystatin C FEUrea above 40%.  Concern for possibility of ATN though UA is unremarkable  - Strict I/Os  - Trend BMP   - FWF via NGT  - Holding Lasix  - Hold spiranolactone    History of apnea  Family report  apneic episodes after she underwent radiation therapy.  There is a possibility that she is having obstructive sleep apnea. VBG without evidence of hypercarbia.  If there is evidence of gas in the morning we will put her on CPAP at night.    Diarrhea  Frequent loose stools. Unclear timing of onset so not sure if related to abx or TF initiation. No abdominal pain. C diff on 12/30 neg  - imodium prn    Spiritual health  Pt family requested that pt sees    - consulted for emotional support       Anemia of chronic illness  Iron deficiency anemia  Baseline Hgb 11-12. Currently near baseline. Can consider anemia of chronic illness. Not able to swallow PTA oral iron  - Monitor CBC  - s/p one dose of  IV iron     Generalized deconditioning  Recurrent falls  - PT/OT consults     CREST Syndrome  Characterized by Raynaud's, Calcinosis, GERD and some telangectasia's. Last saw [facility]ology 2017. No history of ILD.    - Continue protonix 40 mg daily     Family update  Attempted to call to update daughter Lana on 12/31; unable to reach by phone.          Diet: Adult Formula Drip Feeding: Continuous TwoCal HN; Nasoduodenal tube; Goal Rate: 35 mL/hr (goal rate); mL/hr    DVT Prophylaxis: DOAC  Miranda Catheter: Not present  Lines: None     Cardiac Monitoring: ACTIVE order. Indication: Tachyarrhythmias, acute (48 hours)  Code Status: No CPR- Do NOT Intubate      Clinically Significant Risk Factors          # Hyperchloremia: Highest Cl = 113 mmol/L in last 2 days, will monitor as appropriate          # Hypoalbuminemia: Lowest albumin = 3 g/dL at 12/10/2024  3:11 AM, will monitor as appropriate         # Acute Hypoxic Respiratory Failure: Documented O2 saturation < 90%. Continue supplemental oxygen as needed          # Severe Malnutrition: based on nutrition assessment    # Financial/Environmental Concerns: none         Social Drivers of Health    Housing Stability: High Risk (11/23/2024)    Housing Stability     Do you have housing? : No     Are you worried about losing your housing?: No          Disposition Plan     Medically Ready for Discharge: Anticipated in 2-4 Days         Kristian Beasley MD  Hospitalist Service, [address]  Securely message with [employer] (more info)  Text page via [facility] Paging/Directory   See signed in provider for up to date coverage information  ______________________________________________________________________    Interval  History   Improving respiratory status and tolerating GJ tube feeds. Notes that she really wants to eat, and we discussed plans for VFSS.     Physical Exam   Vital Signs: Temp: 97.4  F (36.3  C) Temp src: Axillary BP: (!) 127/95 Pulse: 118   Resp: 20 SpO2: 96 % O2 Device: Nasal cannula Oxygen Delivery: 1.5 LPM  Weight: 119 lbs .77 oz    General Appearance:  Awake. Alert. No acute distress. Frail appearing.   Respiratory: On nasal cannula crackles and rhonchi bilaterally  Cardiovascular: Irregular. Tachycardic. No obvious murmur.   GI: Nondistended. Normoactive. Soft. Non-tender.   Skin: Warm, dry.   Neuro: Oriented O 3x    Medical Decision Making       40 MINUTES SPENT BY ME on the date of service doing chart review, history, exam, documentation & further activities per the note.      Data     I have personally reviewed the following data over the past 24 hrs:    8.6  \   9.8 (L)   / 335     145 113 (H) 11.4 /  100 (H)   3.9 22 0.74 \       Imaging results reviewed over the past 24 hrs:   Recent Results (from the past 24 hours)   XR Abdomen Port 1 View    Narrative    EXAMINATION: XR ABDOMEN PORT 1 VIEW 12/30/2024 4:50 PM     COMPARISON: 12/26/2024    HISTORY: Verify small bowel feeding tube bedside placement    TECHNIQUE: Frontal view of the abdomen.    FINDINGS: Enteric tube with tip in the proximal duodenum. No  abnormally dilated loops of bowel. No pneumatosis or portal venous  gas. No definite pneumoperitoneum. Increased opacities of the left  lower lung fields.      Impression    IMPRESSION: Feeding tube tip projects over the proximal duodenum.    I have personally reviewed the examination and initial interpretation  and I agree with the findings.    RICARDO TORRE MD         SYSTEM ID:  [id number]

## 2024-12-31 NOTE — CONSULTS
"  Health Psychology                                                                                                                          Tracy Peck, Ph.D., L.P [phone]  Lana Walls, Ph.D., L.P. [phone]  Amanda Roth, Ph.D, L..P. [phone]  Tatiana Siddiqui, Ph.D., L.P. [phone]  Lucien Barajas, Ph.D., A.B.P.P., L.P. [phone]         Arely Rudolph, Ph.D., L.P. [phone]       Shraddha Ordaz, Ph.D., A.B.P.P., LP [phone]           [facility], 3rd Floor  [address] [place] [address]      Inpatient Health Psychology Consultation    Date of Service:  12/31/24    BACKGROUND:  Per EMR: \"Asya Coffman is a 79 year old female admitted on 11/21/2024. 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 Initially admitted to hospital medicine service with hypoxic respiratory failure suspected due to HFpEF requiring diuresis. Developed acute hypoxia on 12/8 secondary to suspected aspiration pneumonitis for which she was intubated (12/8-12/10). Now s/p extubation and transferring back to hospital medicine service. Ongoing evaluation related to aspiration, currently tolerated continuous tube feeds via NJ, with improving respiratory status.\"    Health psychology consulted to support Hannah in coping with emotional impacts of prolonged admission.     SUBJECTIVE:  Brief, supportive visit with Hannah today. She shared experiences with admission, how she maintains hope in light of prolonged admission. Hannah shared updates about health, how she has been coping. Provided psychoeducation about psychological factors that impact recovery from acute illness/injury. Discussed risk and protective factors. Discussed how to " engage in practices of distraction to cope with distress, pain, physical discomfort. Discussed progressive muscle relaxation which she said she was familiar with. This writer highlighted comments Hannah made indicating motivation to engage in recovery.     Hannah asked for water during our visit. Her board notes that she can only have ice chips with supervision. Contacted nursing staff who will give her some. We discussed swallow exercises written on board and encouraged Hannah to continue practicing. She was able to demonstrate them and describe them during our visit.     Hannah was engaged in the visit.       OBJECTIVE:  Hannah was lying back in her bed. Reported fair mood, affect flat. Thought processes grossly logical and linear. Insight and judgment fair. Speech quiet. Denied suicidal ideation.        ASSESSMENT:  Hannah has a history of symptoms of anxiety and depression managed with medication. Mood has fluctuated during current admission, currently experiencing fair mood, feeling hopeful about recovery. At one point she felt more hopeless, at risk for increased depressive symptoms as admission continues. She was open to discussion additional coping strategies and cognitive reframing to assist in coping.       DIAGNOSIS:  Unspecified depressive disorder  Unspecified anxiety disorder      PLAN:  Plan for health psychology to follow this patient approximately once per week for the duration of their hospitalization.     Please feel free to call in urgent concerns arise prior to the next follow-up session.     Amanda Roth, PhD,   Clinical Health Psychologist  Phone: [phone]  Pager: [phone]      Time in: 3:30  Time out: 3:50

## 2024-12-31 NOTE — PLAN OF CARE
Neuro: A&Ox4.   Cardiac: Afib with RVR HR 90s-130s, denies chest pain, Cardizem restarted.   Respiratory: Sating >92% at 2-3 lpm nasal cannula.  GI/: Incontinent of urine, bladder scan 1800 with  203 ml, will continue to monitor.  BM X 3, loose stools, negative for c.diff.  PRN Loperamide given, effective.  Diet/appetite: NPO, started on trickle feeds at 1800, no nausea and vomiting noted.  Activity:  Turned q2 hrs.  Lift assist with activity.  Pain: At acceptable level on current regimen.   Skin: No new deficits noted.  LDA's:  NJ bridled to continuous tube feeds.  Right PIV.    Plan: Continue with POC. Notify primary team with changes.     Goal Outcome Evaluation:      Plan of Care Reviewed With: patient    Overall Patient Progress: improvingOverall Patient Progress: improving    Outcome Evaluation: Patient on nasal cannula at 2-3lpm sating >92%.  Continues to be on afib with RVR, provider aware.  Restarted Cardizem, HR and BP monitored, denies chest pain.  HR ranges from 90-130s.  Continues to have loose stools x 3 this shift, c. diff negative, PRN Loperamide given, effective.  Sputum sample sent with results pending for review by provider.  NG/NJ advance per order and started on trickle tube feeds, no nausea and vomiting. Phos and K replaced, recheck AM draw.  Tentative for video swallow tomorrow.

## 2025-01-01 LAB
ANION GAP SERPL CALCULATED.3IONS-SCNC: 11 MMOL/L (ref 7–15)
BACTERIA BLD CULT: NO GROWTH
BACTERIA BLD CULT: NO GROWTH
BACTERIA SPT CULT: ABNORMAL
BUN SERPL-MCNC: 13.3 MG/DL (ref 8–23)
CALCIUM SERPL-MCNC: 8 MG/DL (ref 8.8–10.4)
CHLORIDE SERPL-SCNC: 110 MMOL/L (ref 98–107)
CREAT SERPL-MCNC: 0.64 MG/DL (ref 0.51–0.95)
EGFRCR SERPLBLD CKD-EPI 2021: 90 ML/MIN/1.73M2
ERYTHROCYTE [DISTWIDTH] IN BLOOD BY AUTOMATED COUNT: 19.9 % (ref 10–15)
GLUCOSE SERPL-MCNC: 113 MG/DL (ref 70–99)
GRAM STAIN RESULT: ABNORMAL
GRAM STAIN RESULT: ABNORMAL
HCO3 SERPL-SCNC: 21 MMOL/L (ref 22–29)
HCT VFR BLD AUTO: 32 % (ref 35–47)
HGB BLD-MCNC: 9.8 G/DL (ref 11.7–15.7)
MAGNESIUM SERPL-MCNC: 1.8 MG/DL (ref 1.7–2.3)
MCH RBC QN AUTO: 25.9 PG (ref 26.5–33)
MCHC RBC AUTO-ENTMCNC: 30.6 G/DL (ref 31.5–36.5)
MCV RBC AUTO: 85 FL (ref 78–100)
PHOSPHATE SERPL-MCNC: 2.5 MG/DL (ref 2.5–4.5)
PLATELET # BLD AUTO: 332 10E3/UL (ref 150–450)
POTASSIUM SERPL-SCNC: 4.1 MMOL/L (ref 3.4–5.3)
POTASSIUM SERPL-SCNC: 4.5 MMOL/L (ref 3.4–5.3)
RBC # BLD AUTO: 3.78 10E6/UL (ref 3.8–5.2)
SODIUM SERPL-SCNC: 142 MMOL/L (ref 135–145)
WBC # BLD AUTO: 9.8 10E3/UL (ref 4–11)

## 2025-01-01 PROCEDURE — 83735 ASSAY OF MAGNESIUM: CPT

## 2025-01-01 PROCEDURE — 84100 ASSAY OF PHOSPHORUS: CPT | Performed by: STUDENT IN AN ORGANIZED HEALTH CARE EDUCATION/TRAINING PROGRAM

## 2025-01-01 PROCEDURE — 250N000013 HC RX MED GY IP 250 OP 250 PS 637

## 2025-01-01 PROCEDURE — 250N000013 HC RX MED GY IP 250 OP 250 PS 637: Performed by: STUDENT IN AN ORGANIZED HEALTH CARE EDUCATION/TRAINING PROGRAM

## 2025-01-01 PROCEDURE — 250N000009 HC RX 250

## 2025-01-01 PROCEDURE — 36415 COLL VENOUS BLD VENIPUNCTURE: CPT | Performed by: STUDENT IN AN ORGANIZED HEALTH CARE EDUCATION/TRAINING PROGRAM

## 2025-01-01 PROCEDURE — 250N000013 HC RX MED GY IP 250 OP 250 PS 637: Performed by: INTERNAL MEDICINE

## 2025-01-01 PROCEDURE — 120N000003 HC R&B IMCU UMMC

## 2025-01-01 PROCEDURE — 36415 COLL VENOUS BLD VENIPUNCTURE: CPT

## 2025-01-01 PROCEDURE — 82565 ASSAY OF CREATININE: CPT | Performed by: STUDENT IN AN ORGANIZED HEALTH CARE EDUCATION/TRAINING PROGRAM

## 2025-01-01 PROCEDURE — 250N000011 HC RX IP 250 OP 636: Performed by: STUDENT IN AN ORGANIZED HEALTH CARE EDUCATION/TRAINING PROGRAM

## 2025-01-01 PROCEDURE — 82310 ASSAY OF CALCIUM: CPT | Performed by: STUDENT IN AN ORGANIZED HEALTH CARE EDUCATION/TRAINING PROGRAM

## 2025-01-01 PROCEDURE — 99232 SBSQ HOSP IP/OBS MODERATE 35: CPT | Performed by: STUDENT IN AN ORGANIZED HEALTH CARE EDUCATION/TRAINING PROGRAM

## 2025-01-01 PROCEDURE — 84132 ASSAY OF SERUM POTASSIUM: CPT | Performed by: STUDENT IN AN ORGANIZED HEALTH CARE EDUCATION/TRAINING PROGRAM

## 2025-01-01 PROCEDURE — 85014 HEMATOCRIT: CPT

## 2025-01-01 PROCEDURE — 258N000003 HC RX IP 258 OP 636

## 2025-01-01 PROCEDURE — 250N000011 HC RX IP 250 OP 636

## 2025-01-01 PROCEDURE — 94640 AIRWAY INHALATION TREATMENT: CPT

## 2025-01-01 PROCEDURE — 80048 BASIC METABOLIC PNL TOTAL CA: CPT | Performed by: STUDENT IN AN ORGANIZED HEALTH CARE EDUCATION/TRAINING PROGRAM

## 2025-01-01 PROCEDURE — 250N000009 HC RX 250: Performed by: STUDENT IN AN ORGANIZED HEALTH CARE EDUCATION/TRAINING PROGRAM

## 2025-01-01 PROCEDURE — 999N000157 HC STATISTIC RCP TIME EA 10 MIN

## 2025-01-01 PROCEDURE — 94640 AIRWAY INHALATION TREATMENT: CPT | Mod: 76

## 2025-01-01 PROCEDURE — 250N000013 HC RX MED GY IP 250 OP 250 PS 637: Performed by: HOSPITALIST

## 2025-01-01 RX ORDER — AMPICILLIN AND SULBACTAM 2; 1 G/1; G/1
3 INJECTION, POWDER, FOR SOLUTION INTRAMUSCULAR; INTRAVENOUS EVERY 6 HOURS
Status: DISCONTINUED | OUTPATIENT
Start: 2025-01-01 | End: 2025-01-02

## 2025-01-01 RX ORDER — LEVOTHYROXINE SODIUM 50 UG/1
100 TABLET ORAL
Status: DISCONTINUED | OUTPATIENT
Start: 2025-01-02 | End: 2025-01-14 | Stop reason: HOSPADM

## 2025-01-01 RX ORDER — DILTIAZEM HCL 60 MG
60 TABLET ORAL EVERY 6 HOURS SCHEDULED
Status: DISCONTINUED | OUTPATIENT
Start: 2025-01-01 | End: 2025-01-08

## 2025-01-01 RX ORDER — HYDROXYZINE HYDROCHLORIDE 10 MG/1
10 TABLET, FILM COATED ORAL
Status: DISCONTINUED | OUTPATIENT
Start: 2025-01-01 | End: 2025-01-01

## 2025-01-01 RX ORDER — ATORVASTATIN CALCIUM 40 MG/1
40 TABLET, FILM COATED ORAL EVERY EVENING
Status: DISCONTINUED | OUTPATIENT
Start: 2025-01-01 | End: 2025-01-14 | Stop reason: HOSPADM

## 2025-01-01 RX ORDER — ACETAMINOPHEN 325 MG/1
650 TABLET ORAL EVERY 4 HOURS PRN
Status: DISCONTINUED | OUTPATIENT
Start: 2025-01-01 | End: 2025-01-14 | Stop reason: HOSPADM

## 2025-01-01 RX ORDER — HYDROXYZINE HYDROCHLORIDE 10 MG/1
10 TABLET, FILM COATED ORAL
Status: DISCONTINUED | OUTPATIENT
Start: 2025-01-01 | End: 2025-01-14 | Stop reason: HOSPADM

## 2025-01-01 RX ADMIN — Medication 1 SPRAY: at 16:23

## 2025-01-01 RX ADMIN — PANTOPRAZOLE SODIUM 40 MG: 40 INJECTION, POWDER, FOR SOLUTION INTRAVENOUS at 09:27

## 2025-01-01 RX ADMIN — DILTIAZEM HYDROCHLORIDE 60 MG: 60 TABLET, FILM COATED ORAL at 11:52

## 2025-01-01 RX ADMIN — IPRATROPIUM BROMIDE 0.5 MG: 0.5 SOLUTION RESPIRATORY (INHALATION) at 21:15

## 2025-01-01 RX ADMIN — LEVALBUTEROL HYDROCHLORIDE 0.63 MG: 0.63 SOLUTION RESPIRATORY (INHALATION) at 08:45

## 2025-01-01 RX ADMIN — THIAMINE HCL TAB 100 MG 100 MG: 100 TAB at 09:25

## 2025-01-01 RX ADMIN — AMPICILLIN SODIUM AND SULBACTAM SODIUM 3 G: 2; 1 INJECTION, POWDER, FOR SOLUTION INTRAMUSCULAR; INTRAVENOUS at 11:08

## 2025-01-01 RX ADMIN — AMPICILLIN SODIUM AND SULBACTAM SODIUM 3 G: 2; 1 INJECTION, POWDER, FOR SOLUTION INTRAMUSCULAR; INTRAVENOUS at 17:49

## 2025-01-01 RX ADMIN — DILTIAZEM HYDROCHLORIDE 60 MG: 60 TABLET, FILM COATED ORAL at 23:35

## 2025-01-01 RX ADMIN — BUDESONIDE 0.5 MG: 0.5 INHALANT ORAL at 08:45

## 2025-01-01 RX ADMIN — Medication 1 SPRAY: at 19:51

## 2025-01-01 RX ADMIN — ONDANSETRON 4 MG: 2 INJECTION INTRAMUSCULAR; INTRAVENOUS at 05:55

## 2025-01-01 RX ADMIN — GABAPENTIN 600 MG: 250 SOLUTION ORAL at 21:07

## 2025-01-01 RX ADMIN — ACETYLCYSTEINE 2 ML: 200 SOLUTION ORAL; RESPIRATORY (INHALATION) at 17:03

## 2025-01-01 RX ADMIN — ACETYLCYSTEINE 2 ML: 200 SOLUTION ORAL; RESPIRATORY (INHALATION) at 21:15

## 2025-01-01 RX ADMIN — LEVALBUTEROL HYDROCHLORIDE 0.63 MG: 0.63 SOLUTION RESPIRATORY (INHALATION) at 17:03

## 2025-01-01 RX ADMIN — IRON SUCROSE 300 MG: 20 INJECTION, SOLUTION INTRAVENOUS at 11:52

## 2025-01-01 RX ADMIN — APIXABAN 5 MG: 5 TABLET, FILM COATED ORAL at 09:25

## 2025-01-01 RX ADMIN — FEXOFENADINE HYDROCHLORIDE 120 MG: 60 TABLET ORAL at 09:25

## 2025-01-01 RX ADMIN — Medication 5 MG: at 19:49

## 2025-01-01 RX ADMIN — BUDESONIDE 0.5 MG: 0.5 INHALANT ORAL at 21:15

## 2025-01-01 RX ADMIN — SERTRALINE HYDROCHLORIDE 200 MG: 100 TABLET ORAL at 09:25

## 2025-01-01 RX ADMIN — HYDROXYZINE HYDROCHLORIDE 10 MG: 10 TABLET ORAL at 05:00

## 2025-01-01 RX ADMIN — APIXABAN 5 MG: 5 TABLET, FILM COATED ORAL at 19:49

## 2025-01-01 RX ADMIN — AMPICILLIN SODIUM AND SULBACTAM SODIUM 3 G: 2; 1 INJECTION, POWDER, FOR SOLUTION INTRAMUSCULAR; INTRAVENOUS at 23:35

## 2025-01-01 RX ADMIN — IPRATROPIUM BROMIDE 0.5 MG: 0.5 SOLUTION RESPIRATORY (INHALATION) at 13:04

## 2025-01-01 RX ADMIN — ACETYLCYSTEINE 2 ML: 200 SOLUTION ORAL; RESPIRATORY (INHALATION) at 08:45

## 2025-01-01 RX ADMIN — LEVALBUTEROL HYDROCHLORIDE 0.63 MG: 0.63 SOLUTION RESPIRATORY (INHALATION) at 21:15

## 2025-01-01 RX ADMIN — LEVALBUTEROL HYDROCHLORIDE 0.63 MG: 0.63 SOLUTION RESPIRATORY (INHALATION) at 13:04

## 2025-01-01 RX ADMIN — Medication 1 SPRAY: at 09:48

## 2025-01-01 RX ADMIN — ATORVASTATIN CALCIUM 40 MG: 40 TABLET, FILM COATED ORAL at 19:49

## 2025-01-01 RX ADMIN — Medication 1 SPRAY: at 11:52

## 2025-01-01 RX ADMIN — THERA TABS 1 TABLET: TAB at 09:26

## 2025-01-01 RX ADMIN — ACETYLCYSTEINE 2 ML: 200 SOLUTION ORAL; RESPIRATORY (INHALATION) at 13:04

## 2025-01-01 RX ADMIN — DILTIAZEM HYDROCHLORIDE 60 MG: 60 TABLET, FILM COATED ORAL at 18:16

## 2025-01-01 RX ADMIN — LEVOTHYROXINE SODIUM 100 MCG: 100 TABLET ORAL at 05:56

## 2025-01-01 RX ADMIN — IPRATROPIUM BROMIDE 0.5 MG: 0.5 SOLUTION RESPIRATORY (INHALATION) at 17:03

## 2025-01-01 RX ADMIN — ORAL VEHICLES - SUSP 25 MG: SUSPENSION at 09:26

## 2025-01-01 RX ADMIN — IPRATROPIUM BROMIDE 0.5 MG: 0.5 SOLUTION RESPIRATORY (INHALATION) at 08:45

## 2025-01-01 RX ADMIN — DILTIAZEM HYDROCHLORIDE 60 MG: 60 TABLET, FILM COATED ORAL at 05:56

## 2025-01-01 RX ADMIN — AMPICILLIN SODIUM AND SULBACTAM SODIUM 3 G: 2; 1 INJECTION, POWDER, FOR SOLUTION INTRAMUSCULAR; INTRAVENOUS at 05:00

## 2025-01-01 RX ADMIN — ORAL VEHICLES - SUSP 25 MG: SUSPENSION at 19:49

## 2025-01-01 ASSESSMENT — ACTIVITIES OF DAILY LIVING (ADL)
ADLS_ACUITY_SCORE: 68
ADLS_ACUITY_SCORE: 64
ADLS_ACUITY_SCORE: 64
ADLS_ACUITY_SCORE: 68
ADLS_ACUITY_SCORE: 64
ADLS_ACUITY_SCORE: 68
ADLS_ACUITY_SCORE: 68
ADLS_ACUITY_SCORE: 64
ADLS_ACUITY_SCORE: 64
ADLS_ACUITY_SCORE: 68

## 2025-01-01 NOTE — PLAN OF CARE
Goal Outcome Evaluation:      Plan of Care Reviewed With: patient    Overall Patient Progress: no changeOverall Patient Progress: no change    Outcome Evaluation: Maintaining sats on 3L via oxymask, wants ice chips all the time, coughing noted with it. Had episodes of intermittent confusion, Provider aware.      Neuro: A&Ox4, flat affect, withdrawn. Able to make needs known.  At 4AM, pt had an episode of confusion, illogical and wants to get out of bed.   Manjinder 8 Dr. Farrar informed. Atarax X1 given.   Cardiac: AfibHR 100-120's, otherwise other VSS.   Respiratory: Sating >92% on 3L via oxymask. Weak cough, oral secretions suctioned multiple times t/o shift.  GI/: Adequate urine output via purewick, leaking in between, unmeasured urine per chux.  BMx1, loose.   Diet/appetite: NPO, Tolerating TF at 35 ml/hr per goal. FWF 30 ml q4 hr. Can have ice chips with   1:1 RN supervision. Pt coughing in between. Planned for swallow study this week.   Activity:  Assist of X2 up to chair in AM, repositioning and weight shifting this shift, pt refused turning to sides, feels dizzy.   Pain: Denies.   Skin: Generalized bruising, Blanchable redness sacrum/coccyx, perineum.  LDA's: NJ to right nostril, bridled. R PIV, infusing TKO for antibiotics.      Plan: Continue with POC. Notify primary team with changes.

## 2025-01-01 NOTE — PROGRESS NOTES
Care Management Follow Up    Length of Stay (days): 41    Expected Discharge Date: 01/02/2025     Concerns to be Addressed: discharge planning     Anticipated Discharge Disposition: Skilled Nursing Facility /TCU     Anticipated Discharge Services: None  Anticipated Discharge DME: None    Patient/family educated on Medicare website which has current facility and service quality ratings: yes  Education Provided on the Discharge Plan: Yes  Patient/Family in Agreement with the Plan: yes    Referrals Placed by CM/SW: yes, additional referrals need to be placed.    Handoff Completed: No, handoff not indicated or clinically appropriate    Additional Information:    Additional TCU choices obtained by CHJOEY (Mony) 12/26 and listed below. These referrals should be sent once pt gets closer discharge and is medically ready.      1) [facility]- #1 choice but already declined previously due to financial issues and bed not being available   2) [facility]  3) [facility]  4) [facility]  5) [facility]     Next Steps: Sent TCU referrals once pt gets closer to discharge and is med ready.    Cleo Pierce, MSW, LGSW  Coverage   [facility]  [email or web address]

## 2025-01-01 NOTE — PROGRESS NOTES
[facility]    Medicine Progress Note - Hospitalist Service, GOLD TEAM 8    Date of Admission:  11/21/2024    Assessment & Plan   Asya Coffman is a 79 year old female admitted on 11/21/2024. 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 Initially admitted to hospital medicine service with hypoxic respiratory failure suspected due to HFpEF requiring diuresis. Developed acute hypoxia on 12/8 secondary to suspected aspiration pneumonitis for which she was intubated (12/8-12/10). Now s/p extubation and transferring back to hospital medicine service. Ongoing evaluation related to aspiration, currently tolerated continuous tube feeds via NJ, with improving respiratory status.     Updates today:  -Poor sleep overnight. Melatonin prn added  -Tolerating tube feeds at goal with NJ  -Planning for VFSS likely on tomorrow  -Daughter Laan would like to consider options of intensive swallow rehabilitation locations.    Acute hypoxic respiratory failure  Recurrent Aspiration pneumonitis with aspiration pneumonia  MSSA PNA s/p abx (12/3-12/8)  Obstructive lung disease (COPD vs asthma)  Pulmonary hypertension   Presented on 11/21 with acute hypoxic respiratory failure; initially suspected to be secondary to HFpEF exacerbation; unresolved with diuresis. Underlying obstructive lung disease but no evidence of exacerbation here. Was treated for MSSA pneumonia. Overnight 12/8, developed sudden worsening of oxygenation and increased secretions in the setting of dysphagia/recent laryngeal radiation and was intubated. Suspected aspiration pneumonitis as etiology. Was briefly on zosyn but this was discontinued.  She was extubated on 12/10.  Multiple episodes of  worsening of hypoxia with evidence of aspiration and intolerance to tube feeding.  CT 12/27 showed increasing basilar predominant bronchial wall thickening and diffuse groundglass attenuation suspicious of ongoing infectious process. Continues to have have  excessive oral secretion and episodes of aspiration needing frequent suctioning.  -Antibiotics: Zosyn 12/28-12/28 then Unasyn 12/28- . Plan for a 7 day course ending on 1/2/25.  -Respiratory support:  -Continue oxygen titration as needed  -Budesonide neb 0.5 mg BID,  -ipratropium-levalbuterol neb QID  -albuteraol Q4H PRN  -Mucomyst  -Aspiration precautions; n.p.o.  -Flutter valve, incentive spirometery  - Dysphagia management as below.  - Palliative care consult, recs appreciated    Failure to thrive  Aspiration risk  Acute severe oropharyngeal dysphagia  Esophageal dysmotility  Concern for radiation-induced dysphagia  Laryngeal squamous cell carcinoma s/p radiation (4/2024-7/2024)  Concern for gastroparesis  Has had increased dysphagia in the setting of laryngeal squamous cell carcinoma s/p radiation (4/2024-7/2024). Family reports that over the past few months has declined dramatically from being quite mobile, active to being barely active, with 4 falls in 3 months progressively reduced speech and recurrent aspirations. ENT evaluated 12/16/24 with bedside laryngoscopy for dysphagia. Normal vocal cord function and control seen including ability to approximate cords during cough. No anatomic explanations for dysphagia seen. Evaluated by GI and there is no concern of esophageal web. Clinical picture consistent with significant muscle weakness contributing to oropharyngeal dysphagia, poor vocal pressure, poor cough effort likely due to radiation. No evidence of active rheumatologic condition contributing per rheumatology evaluation. CK, aldolase negative. Myasthenia panel negative.  Cortisol within normal limit.  Unclear if hypothyroidism is the cause but can be  contributing. Doses adjusted as below. Cervical thoracic MRI without significant findings.   - SLP following  - NPO for now, continue TFs via NJ   - VFSS  this week, likely on Thursday  -GJ placement is within goals of care of patient. However:  IR consulted for G-tube placement but did not feel safe to place with IR 12/30  GI also consulted and did not feel that it is safe on evaluation 12/30; some concern for secretion management leading to aspirations.  -Continue speech therapy and see swallowing and secretion containment improves    At risk for refeeding syndrome  Severe malnutrition in context of chronic illness/disease  Per daughter, baseline weight around 120-130 lbs. Admitted 102 lbs. Patient states she is eating less and has less appetite in general as well as having difficulty swallowing. While NPO during intubation, developed starvation ketosis. Given high risk for aspiration with PO intake, placed post pyloric  and started tube feeds. Will need close monitoring for refeeding syndrome.   - NPO   - RD following for TF management  - Monitor for refeeding syndrome   - Lyte replacement protocol   - IV thiamine replacement     Chronic afib  Afib with RVR  [id number] 3 (age++, HF+)  Has episodes of A-fib with RVR. Has also episodes of bradycardia with  PTA dose of diltiazem and metoprolol  - PTA apixaban 5mg BID  - Diltiazem 60mg q6H as tolerated by blood pressure give bradycardia and soft BP  - Metoprolol to tartrate to 25 mg twice daily  - Goal K above 4 and Mg above 2    Anxiety  Depression  History of depression and anxiety.  She feels hopeless and has sad mood during this hospitalization.  Will continue PTA sertraline.  Will also consult health psychology.  -Increased PTA Sertraline 200 mg daily     Chronic Left parietal, left thalamic lacunar strokes  - will increase atorvastatin to high intensity (20-> 40 mg). On DOAC  - A1C shows prediabetes     Hypothyroidism  Hx of thyroidectomy 2/2 cancer   -  Continue PTA levothyroxine 88 mcg daily   - consulted Endocrine service as above and increased dose to 100 mcg daily. Appreciate recommendations    HFpEF (LVEF 55-60% 11/22/2024)   Pulmonary hypertension (44 mmHg per echo 11/22/2024)  Possible small PFO  Echo during current admission (11/22/2024) notable for increased thickness of LV and elevated BNP (peak 9200 > 6200) concerning for heart failure exacerbation s/p diuresis and pulmonary hypertension with estimated pulmonary artery pressure of 45 mmHg. Echo with bubble study (11/25/2024) concerning for possible small PFO. With intubation on 12/8, developed hypotension without evidence of shock. Etiology of hypotension may be cardiogenic (e.g. exacerbation of pulmonary hypertension by positive pressure ventilation) vs medication associated (propofol, precedex); Weaned off pressors 12/10.   - Preload dependent in the setting of pulmonary hypertension, gentle diuresis as needed     Oliguric SAMIR, improving  Mild hyperkalemia  Cr 0.9 on admission. Baseline appears 0.9-1.0. Developed SAMIR initially in setting of diuresis and had been improving. Subsequently increased following transfer to ICU with consideration for prerenal in setting of NPO status.  Again significant increase in creatinine with low Cystatin C FEUrea above 40%.  Concern for possibility of ATN though UA is unremarkable  - Strict I/Os  - Trend BMP   - FWF via NGT  - Holding Lasix  - Hold spiranolactone    History of apnea  Family report  apneic episodes after she underwent radiation therapy.  There is a possibility that she is having obstructive sleep apnea. VBG without evidence of hypercarbia.  If there is evidence of gas in the morning we will put her on CPAP at night.    Diarrhea  Frequent loose stools. Unclear timing of onset so not sure if related to abx or TF initiation. No abdominal pain. C diff on 12/30 neg  - imodium prn    Spiritual health  Pt family requested that pt sees    -  consulted for emotional support      Anemia of chronic illness  Iron deficiency anemia  Baseline Hgb 11-12. Currently near baseline. Can consider anemia of chronic illness. Not able to swallow PTA oral iron  - Monitor CBC  - s/p one dose of  IV iron     Generalized deconditioning  Recurrent falls  - PT/OT consults     CREST Syndrome  Characterized by Raynaud's, Calcinosis, GERD and some telangectasia's. Last saw [facility] 2017. No history of ILD.    - Continue protonix 40 mg daily     Family update  Discussed with daughter Lana on 1/1 at bedside.          Diet: Adult Formula Drip Feeding: Continuous TwoCal HN; Nasoduodenal tube; Goal Rate: 35 mL/hr (goal rate); mL/hr    DVT Prophylaxis: DOAC  Miranda Catheter: Not present  Lines: None     Cardiac Monitoring: ACTIVE order. Indication: Bradycardias (48 hours)  Code Status: No CPR- Do NOT Intubate      Clinically Significant Risk Factors          # Hyperchloremia: Highest Cl = 113 mmol/L in last 2 days, will monitor as appropriate          # Hypoalbuminemia: Lowest albumin = 3 g/dL at 12/10/2024  3:11 AM, will monitor as appropriate                 # Severe Malnutrition: based on nutrition assessment    # Financial/Environmental Concerns: none         Social Drivers of Health    Housing Stability: High Risk (11/23/2024)    Housing Stability     Do you have housing? : No     Are you worried about losing your housing?: No          Disposition Plan     Medically Ready for Discharge: Anticipated in 2-4 Days         Kristian Beasley MD  Hospitalist Service, GOLD TEAM [address] [place] [address]  Securely message with [employer] (more info)  Text page via [employer] Paging/Directory   See signed in provider for up to date coverage information  ______________________________________________________________________    Interval History   Poor sleep overnight, is not feeling very well. Tolerating feeds but still feels nauseated from  them.    Physical Exam   Vital Signs: Temp: 97.3  F (36.3  C) Temp src: Axillary BP: 130/88 Pulse: 116   Resp: 20 SpO2: 100 % O2 Device: Oxymask Oxygen Delivery: 3 LPM  Weight: 119 lbs 14.88 oz  General Appearance:  Awake. Alert. No acute distress. Frail appearing.   Respiratory: On nasal cannula crackles and rhonchi bilaterally  Cardiovascular: Irregular. Tachycardic. No obvious murmur.   GI: Nondistended. Normoactive. Soft. Non-tender.   Skin: Warm, dry.       Medical Decision Making       40 MINUTES SPENT BY ME on the date of service doing chart review, history, exam, documentation & further activities per the note.      Data   ------------------------- PAST 24 HR DATA REVIEWED -----------------------------------------------    I have personally reviewed the following data over the past 24 hrs:    9.8  \   9.8 (L)   / 332     142 110 (H) 13.3 /  113 (H)   4.1 21 (L) 0.64 \       Imaging results reviewed over the past 24 hrs:   No results found for this or any previous visit (from the past 24 hours).

## 2025-01-02 VITALS
DIASTOLIC BLOOD PRESSURE: 73 MMHG | TEMPERATURE: 97.5 F | RESPIRATION RATE: 21 BRPM | OXYGEN SATURATION: 100 % | SYSTOLIC BLOOD PRESSURE: 107 MMHG | HEIGHT: 65 IN | BODY MASS INDEX: 20.39 KG/M2 | WEIGHT: 122.36 LBS | HEART RATE: 101 BPM

## 2025-01-02 LAB
ANION GAP SERPL CALCULATED.3IONS-SCNC: 8 MMOL/L (ref 7–15)
BUN SERPL-MCNC: 13.5 MG/DL (ref 8–23)
CALCIUM SERPL-MCNC: 8.2 MG/DL (ref 8.8–10.4)
CHLORIDE SERPL-SCNC: 108 MMOL/L (ref 98–107)
CREAT SERPL-MCNC: 0.63 MG/DL (ref 0.51–0.95)
EGFRCR SERPLBLD CKD-EPI 2021: 90 ML/MIN/1.73M2
ERYTHROCYTE [DISTWIDTH] IN BLOOD BY AUTOMATED COUNT: 20.3 % (ref 10–15)
GLUCOSE SERPL-MCNC: 102 MG/DL (ref 70–99)
HCO3 SERPL-SCNC: 22 MMOL/L (ref 22–29)
HCT VFR BLD AUTO: 33.3 % (ref 35–47)
HGB BLD-MCNC: 9.7 G/DL (ref 11.7–15.7)
MCH RBC QN AUTO: 25.1 PG (ref 26.5–33)
MCHC RBC AUTO-ENTMCNC: 29.1 G/DL (ref 31.5–36.5)
MCV RBC AUTO: 86 FL (ref 78–100)
PHOSPHATE SERPL-MCNC: 1.6 MG/DL (ref 2.5–4.5)
PLATELET # BLD AUTO: 312 10E3/UL (ref 150–450)
POTASSIUM SERPL-SCNC: 4.2 MMOL/L (ref 3.4–5.3)
RBC # BLD AUTO: 3.87 10E6/UL (ref 3.8–5.2)
SODIUM SERPL-SCNC: 138 MMOL/L (ref 135–145)
WBC # BLD AUTO: 10.4 10E3/UL (ref 4–11)

## 2025-01-02 PROCEDURE — 250N000013 HC RX MED GY IP 250 OP 250 PS 637: Performed by: STUDENT IN AN ORGANIZED HEALTH CARE EDUCATION/TRAINING PROGRAM

## 2025-01-02 PROCEDURE — 250N000009 HC RX 250: Performed by: STUDENT IN AN ORGANIZED HEALTH CARE EDUCATION/TRAINING PROGRAM

## 2025-01-02 PROCEDURE — 250N000009 HC RX 250

## 2025-01-02 PROCEDURE — 250N000011 HC RX IP 250 OP 636: Performed by: STUDENT IN AN ORGANIZED HEALTH CARE EDUCATION/TRAINING PROGRAM

## 2025-01-02 PROCEDURE — 120N000003 HC R&B IMCU UMMC

## 2025-01-02 PROCEDURE — 250N000013 HC RX MED GY IP 250 OP 250 PS 637

## 2025-01-02 PROCEDURE — 36415 COLL VENOUS BLD VENIPUNCTURE: CPT | Performed by: STUDENT IN AN ORGANIZED HEALTH CARE EDUCATION/TRAINING PROGRAM

## 2025-01-02 PROCEDURE — 99233 SBSQ HOSP IP/OBS HIGH 50: CPT | Performed by: PHYSICIAN ASSISTANT

## 2025-01-02 PROCEDURE — 80048 BASIC METABOLIC PNL TOTAL CA: CPT | Performed by: STUDENT IN AN ORGANIZED HEALTH CARE EDUCATION/TRAINING PROGRAM

## 2025-01-02 PROCEDURE — 99233 SBSQ HOSP IP/OBS HIGH 50: CPT | Performed by: STUDENT IN AN ORGANIZED HEALTH CARE EDUCATION/TRAINING PROGRAM

## 2025-01-02 PROCEDURE — 250N000013 HC RX MED GY IP 250 OP 250 PS 637: Performed by: INTERNAL MEDICINE

## 2025-01-02 PROCEDURE — 82565 ASSAY OF CREATININE: CPT | Performed by: STUDENT IN AN ORGANIZED HEALTH CARE EDUCATION/TRAINING PROGRAM

## 2025-01-02 PROCEDURE — 999N000157 HC STATISTIC RCP TIME EA 10 MIN

## 2025-01-02 PROCEDURE — 94640 AIRWAY INHALATION TREATMENT: CPT

## 2025-01-02 PROCEDURE — 85027 COMPLETE CBC AUTOMATED: CPT

## 2025-01-02 PROCEDURE — 92526 ORAL FUNCTION THERAPY: CPT | Mod: GN

## 2025-01-02 PROCEDURE — 84100 ASSAY OF PHOSPHORUS: CPT | Performed by: STUDENT IN AN ORGANIZED HEALTH CARE EDUCATION/TRAINING PROGRAM

## 2025-01-02 PROCEDURE — 94640 AIRWAY INHALATION TREATMENT: CPT | Mod: 76

## 2025-01-02 RX ORDER — LEVOFLOXACIN 5 MG/ML
500 INJECTION, SOLUTION INTRAVENOUS EVERY 24 HOURS
Status: DISCONTINUED | OUTPATIENT
Start: 2025-01-02 | End: 2025-01-04

## 2025-01-02 RX ORDER — FUROSEMIDE 20 MG/1
20 TABLET ORAL DAILY
Status: DISCONTINUED | OUTPATIENT
Start: 2025-01-03 | End: 2025-01-02

## 2025-01-02 RX ORDER — ALBUTEROL SULFATE 0.83 MG/ML
2.5 SOLUTION RESPIRATORY (INHALATION)
Status: DISCONTINUED | OUTPATIENT
Start: 2025-01-02 | End: 2025-01-14 | Stop reason: HOSPADM

## 2025-01-02 RX ORDER — FUROSEMIDE 20 MG/1
20 TABLET ORAL DAILY
Status: DISCONTINUED | OUTPATIENT
Start: 2025-01-03 | End: 2025-01-14 | Stop reason: HOSPADM

## 2025-01-02 RX ADMIN — ACETYLCYSTEINE 2 ML: 200 SOLUTION ORAL; RESPIRATORY (INHALATION) at 13:20

## 2025-01-02 RX ADMIN — DILTIAZEM HYDROCHLORIDE 60 MG: 60 TABLET, FILM COATED ORAL at 23:49

## 2025-01-02 RX ADMIN — IPRATROPIUM BROMIDE 0.5 MG: 0.5 SOLUTION RESPIRATORY (INHALATION) at 13:21

## 2025-01-02 RX ADMIN — LEVOFLOXACIN 500 MG: 5 INJECTION, SOLUTION INTRAVENOUS at 13:30

## 2025-01-02 RX ADMIN — IPRATROPIUM BROMIDE 0.5 MG: 0.5 SOLUTION RESPIRATORY (INHALATION) at 16:08

## 2025-01-02 RX ADMIN — FEXOFENADINE HYDROCHLORIDE 120 MG: 60 TABLET ORAL at 09:36

## 2025-01-02 RX ADMIN — THERA TABS 1 TABLET: TAB at 09:35

## 2025-01-02 RX ADMIN — ACETYLCYSTEINE 2 ML: 200 SOLUTION ORAL; RESPIRATORY (INHALATION) at 07:27

## 2025-01-02 RX ADMIN — DILTIAZEM HYDROCHLORIDE 60 MG: 60 TABLET, FILM COATED ORAL at 05:26

## 2025-01-02 RX ADMIN — LEVALBUTEROL HYDROCHLORIDE 0.63 MG: 0.63 SOLUTION RESPIRATORY (INHALATION) at 07:29

## 2025-01-02 RX ADMIN — IPRATROPIUM BROMIDE 0.5 MG: 0.5 SOLUTION RESPIRATORY (INHALATION) at 19:38

## 2025-01-02 RX ADMIN — LEVOTHYROXINE SODIUM 100 MCG: 0.05 TABLET ORAL at 09:36

## 2025-01-02 RX ADMIN — AMPICILLIN SODIUM AND SULBACTAM SODIUM 3 G: 2; 1 INJECTION, POWDER, FOR SOLUTION INTRAMUSCULAR; INTRAVENOUS at 05:26

## 2025-01-02 RX ADMIN — BUDESONIDE 0.5 MG: 0.5 INHALANT ORAL at 07:29

## 2025-01-02 RX ADMIN — GABAPENTIN 600 MG: 250 SOLUTION ORAL at 22:33

## 2025-01-02 RX ADMIN — ORAL VEHICLES - SUSP 25 MG: SUSPENSION at 20:15

## 2025-01-02 RX ADMIN — Medication 1 SPRAY: at 20:15

## 2025-01-02 RX ADMIN — DILTIAZEM HYDROCHLORIDE 60 MG: 60 TABLET, FILM COATED ORAL at 20:15

## 2025-01-02 RX ADMIN — SERTRALINE HYDROCHLORIDE 200 MG: 100 TABLET ORAL at 09:35

## 2025-01-02 RX ADMIN — LEVALBUTEROL HYDROCHLORIDE 0.63 MG: 0.63 SOLUTION RESPIRATORY (INHALATION) at 16:08

## 2025-01-02 RX ADMIN — DILTIAZEM HYDROCHLORIDE 60 MG: 60 TABLET, FILM COATED ORAL at 11:48

## 2025-01-02 RX ADMIN — BUDESONIDE 0.5 MG: 0.5 INHALANT ORAL at 19:38

## 2025-01-02 RX ADMIN — APIXABAN 5 MG: 5 TABLET, FILM COATED ORAL at 09:36

## 2025-01-02 RX ADMIN — Medication 40 MG: at 09:36

## 2025-01-02 RX ADMIN — ORAL VEHICLES - SUSP 25 MG: SUSPENSION at 09:43

## 2025-01-02 RX ADMIN — LEVALBUTEROL HYDROCHLORIDE 0.63 MG: 0.63 SOLUTION RESPIRATORY (INHALATION) at 13:21

## 2025-01-02 RX ADMIN — ATORVASTATIN CALCIUM 40 MG: 40 TABLET, FILM COATED ORAL at 20:14

## 2025-01-02 RX ADMIN — LEVALBUTEROL HYDROCHLORIDE 0.63 MG: 0.63 SOLUTION RESPIRATORY (INHALATION) at 19:37

## 2025-01-02 RX ADMIN — APIXABAN 5 MG: 5 TABLET, FILM COATED ORAL at 20:15

## 2025-01-02 RX ADMIN — ACETYLCYSTEINE 2 ML: 200 SOLUTION ORAL; RESPIRATORY (INHALATION) at 16:08

## 2025-01-02 RX ADMIN — IPRATROPIUM BROMIDE 0.5 MG: 0.5 SOLUTION RESPIRATORY (INHALATION) at 07:27

## 2025-01-02 RX ADMIN — THIAMINE HCL TAB 100 MG 100 MG: 100 TAB at 09:35

## 2025-01-02 ASSESSMENT — ACTIVITIES OF DAILY LIVING (ADL)
ADLS_ACUITY_SCORE: 68
ADLS_ACUITY_SCORE: 63
ADLS_ACUITY_SCORE: 68
ADLS_ACUITY_SCORE: 63
ADLS_ACUITY_SCORE: 68
ADLS_ACUITY_SCORE: 63
ADLS_ACUITY_SCORE: 63
ADLS_ACUITY_SCORE: 68
ADLS_ACUITY_SCORE: 63

## 2025-01-02 NOTE — CONSULTS
SPIRITUAL HEALTH SERVICES Consult Note - Palliative  [facility] (*** [facility]) ***     Summary: ***     Plan: Chaplains *** while *** is admitted.     Inocencia Reynaga M.Div., [facility]     To reach Spiritual Health, securely message with the Vocera Web Console or enter an ASAP/STAT consult in [employer], which will also page the on-call .

## 2025-01-02 NOTE — PLAN OF CARE
Goal Outcome Evaluation:      Plan of Care Reviewed With: patient    Overall Patient Progress: no changeOverall Patient Progress: no change    Outcome Evaluation: Maintain sats on 3 L N/C.  Continue to note coughing with ice chips.  Encourge swallowing exercises and use of IS and acapella.  A/Ox4, but low mood-withdrawn and quiet.  Melatonin started for bedtime.    Neuro: A&Ox4. Low mood-pt expressed feeling frustrated. Follows commands. Denied sleeping overnight-melatonin 5 mg added at bedtime.  Cardiac: A.fib 100-30s.  Scheduled Metoprolol and Cardizem. VSS.  Declines chest pain.  Respiratory: Sating above 90% on 3 L N/C.  RT following.  Oral suctioning needed several times t/o shift.  Frequent coughing noted. Lung sounds coarse t/o.  Encourage use of IS and acapella  GI/: Adequate urine output via external cath. Incont BM X1  Diet/appetite: Tolerating TFs at 35 ml/hr. Free water flush 30 ml Q 4 hrs.  Declined N/V  Activity:  Assist of 2x with turning and repositioning in bed.  Patient complains of dizziness with turning.  Up to chair 1x during shift via lift assist.   Pain: At acceptable level on current regimen. Decline pain.  Skin: No new deficits noted.  Preventive mepliex in place to coccxyx.  Coccyx/sacrum redden.  LDA's: Right PIV with blood return noted.  IV Unasyn Q 6 hrs    Plan: Continue with POC. Notify primary team with changes.

## 2025-01-02 NOTE — PLAN OF CARE
Goal Outcome Evaluation:      Overall Patient Progress: no changeOverall Patient Progress: no change    Outcome Evaluation: pt still having some shortness of breath on activity and in between cares, weak cough with copious amounts of secretions. Still in Afib.      Neuro: A&Ox4, intermittent confusion, flat affect, withdrawn. Able to make needs known.  Cardiac: Afib -130's, otherwise other VSS.   Respiratory: Sating >92% on 3L via nasal cannula. Pt complaints of shortness of breath, congestion. weak cough, suctioned orally, Coarse lung  sounds. Gold 8 night cross cover Dr. Farrar pagebrea and ordered for PRN nebs.   GI/: Adequate urine output via purewick, No BM  this shift.   Diet/appetite: NPO, Tolerating TF at 35 ml/hr per goal. FWF 30 ml q4 hr. Can have ice chips with   1:1 RN supervision. Frequent coughing noted.   Activity:  Assist of X2, turning and repositioning in bed, complains  of dizziness with turning.   Pain: Denies.   Skin: Generalized bruising, Blanchable redness sacrum/coccyx, perineum with preventive mepilex, RUE, slight swelling from previous PIV.   LDA's: NJ to right nostril, bridled. L PIV, saline lock.         Plan: Continue with POC. Notify primary team with changes  Scheduled for swallow study in AM.

## 2025-01-02 NOTE — PROGRESS NOTES
[facility]    Medicine Progress Note - Hospitalist Service, GOLD TEAM 8    Date of Admission:  11/21/2024    Assessment & Plan   Asya Coffman is a 79 year old female admitted on 11/21/2024. 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 Initially admitted to hospital medicine service with hypoxic respiratory failure suspected due to HFpEF requiring diuresis. Developed acute hypoxia on 12/8 secondary to suspected aspiration pneumonitis for which she was intubated (12/8-12/10). Now s/p extubation and transferring back to hospital medicine service. Ongoing evaluation related to aspiration, currently tolerated continuous tube feeds via NJ, with improving respiratory status.     Updates today:  -Increased oral secretions and requiring increased respiratory support, unable to go to AVSS this morning. Continuing to follow for next opportunity.  -Discussed whether there are options for intensive swallow rehabilitation with the care coordination team.  -Klebsiella resistant to pip-tazo on sputum culture, with current respiratory changes will plan to transition to course of levofloxacin.  -Restart furosemide @ 20mg daily (prior dose 40mg daily). Restart jardiance 10mg daily. Continue to hold spironolactone as potassium stable.    Acute hypoxic respiratory failure  Recurrent Aspiration pneumonitis with aspiration pneumonia  MSSA PNA s/p abx (12/3-12/8)  Obstructive lung disease (COPD vs asthma)  Pulmonary hypertension   Presented on 11/21 with acute hypoxic respiratory failure; initially suspected to be secondary to HFpEF exacerbation; unresolved with diuresis. Underlying obstructive lung disease but no evidence of exacerbation here. Was treated  for MSSA pneumonia. Overnight 12/8, developed sudden worsening of oxygenation and increased secretions in the setting of dysphagia/recent laryngeal radiation and was intubated. Suspected aspiration pneumonitis as etiology. Was briefly on zosyn but this was discontinued.  She was extubated on 12/10.  Multiple episodes of worsening of hypoxia with evidence of aspiration and intolerance to tube feeding.  CT 12/27 showed increasing basilar predominant bronchial wall thickening and diffuse groundglass attenuation suspicious of ongoing infectious process. Continues to have have  excessive oral secretion and episodes of aspiration needing frequent suctioning.  -Antibiotics: Zosyn 12/28-12/28 then Unasyn 12/28-1/2. Transitioned onto levofloxacin on 1/2/25 related to microbiology from sputum smear returning with resistant klebsiella. Plan for a 5 day course ending on 1/6/25.  -Respiratory support:  -Continue oxygen titration as needed  -Budesonide neb 0.5 mg BID,  -ipratropium-levalbuterol neb QID  -albuteraol Q4H PRN  -Mucomyst  -Aspiration precautions; n.p.o.  -Flutter valve, incentive spirometery  - Dysphagia management as below.  - Palliative care consult, recs appreciated    Failure to thrive  Aspiration risk  Acute severe oropharyngeal dysphagia  Esophageal dysmotility  Concern for radiation-induced dysphagia  Laryngeal squamous cell carcinoma s/p radiation (4/2024-7/2024)  Concern for gastroparesis  Has had increased dysphagia in the setting of laryngeal squamous cell carcinoma s/p radiation (4/2024-7/2024). Family reports that over the past few months has declined dramatically from being quite mobile, active to being barely active, with 4 falls in 3 months progressively reduced speech and recurrent aspirations. ENT evaluated 12/16/24 with bedside laryngoscopy for dysphagia. Normal vocal cord function and control seen including ability to approximate cords during cough. No anatomic explanations for dysphagia seen.  Evaluated by GI and there is no concern of esophageal web. Clinical picture consistent with significant muscle weakness contributing to oropharyngeal dysphagia, poor vocal pressure, poor cough effort likely due to radiation. No evidence of active rheumatologic condition contributing per rheumatology evaluation. CK, aldolase negative. Myasthenia panel negative.  Cortisol within normal limit.  Unclear if hypothyroidism is the cause but can be contributing. Doses adjusted as below. Cervical thoracic MRI without significant findings.   - SLP following  - NPO for now, continue TFs via NJ   - VFSS as soon as able  -GJ placement is within goals of care of patient. However:  IR consulted for G-tube placement but did not feel safe to place with IR 12/30  GI also consulted and did not feel that it is safe on evaluation 12/30; some concern for secretion management leading to aspirations.   -Continue speech therapy and see swallowing and secretion containment improves    At risk for refeeding syndrome  Severe malnutrition in context of chronic illness/disease  Per daughter, baseline weight around 120-130 lbs. Admitted 102 lbs. Patient states she is eating less and has less appetite in general as well as having difficulty swallowing. While NPO during intubation, developed starvation ketosis. Given high risk for aspiration with PO intake, placed post pyloric  and started tube feeds. Will need close monitoring for refeeding syndrome.   - NPO   - RD following for TF management  - Monitor for refeeding syndrome   - Lyte replacement protocol   - Thiamine replacement     Chronic afib  Afib with RVR  [id number] 3 (age++, HF+)  Has episodes of A-fib with RVR. Has also episodes of bradycardia with  PTA dose of diltiazem and metoprolol  - PTA apixaban 5mg BID  - Diltiazem 60mg q6H as tolerated by blood pressure give bradycardia and soft BP  - Metoprolol to tartrate to 25 mg twice daily  - Goal K above 4 and Mg above  2    Anxiety  Depression  History of depression and anxiety.  She feels hopeless and has sad mood during this hospitalization.  Will continue PTA sertraline.  Will also consult health psychology.  -Increased PTA Sertraline 200 mg daily     Chronic Left parietal, left thalamic lacunar strokes  - will increase atorvastatin to high intensity (20-> 40 mg). On DOAC  - A1C shows prediabetes     Hypothyroidism  Hx of thyroidectomy 2/2 cancer   - Continue PTA levothyroxine 88 mcg daily   - Consulted Endocrine service as above and increased dose to 100 mcg daily. Appreciate recommendations    HFpEF (LVEF 55-60% 11/22/2024)   Pulmonary hypertension (44 mmHg per echo 11/22/2024)  Possible small PFO  Echo during current admission (11/22/2024) notable for increased thickness of LV and elevated BNP (peak 9200 > 6200) concerning for heart failure exacerbation s/p diuresis and pulmonary hypertension with estimated pulmonary artery pressure of 45 mmHg. Echo with bubble study (11/25/2024) concerning for possible small PFO. With intubation on 12/8, developed hypotension without evidence of shock. Etiology of hypotension may be cardiogenic (e.g. exacerbation of pulmonary hypertension by positive pressure ventilation) vs medication associated (propofol, precedex); Weaned off pressors 12/10.   - Preload dependent in the setting of pulmonary hypertension, gentle diuresis as needed   - Will restart lasix today with 20mg oral daily (40mg recommended in cardiology consult 11/27 previously)  - Restart empagliflozin 10mg daily 1/2/25  - Continue to hold spironolactone for now, can consider if needed based on potassium.    Oliguric SAMIR, improving  Mild hyperkalemia  Cr 0.9 on admission. Baseline appears 0.9-1.0. Developed SAMIR initially in setting of diuresis and had been improving. Subsequently increased following transfer to ICU with consideration for prerenal in setting of NPO status.  Again significant increase in creatinine with low  Cystatin C FEUrea above 40%.  Concern for possibility of ATN though UA is unremarkable. Overall renal cunftion appears to be improving.  - Strict I/Os  - Trend BMP   - FWF via NGT  - Will restart lasix 1/2/25 with 20mg daily oral; can increase to 40mg if tolerating.  - Continue to hold spironolactone for now, can consider if needed based on potassium.    History of apnea  Family report  apneic episodes after she underwent radiation therapy.  There is a possibility that she is having obstructive sleep apnea    Diarrhea  Frequent loose stools. Unclear timing of onset so not sure if related to abx or TF initiation. No abdominal pain. C diff on 12/30 neg  - imodium prn    Spiritual health  Pt family requested that pt sees    - consulted for emotional support      Anemia of chronic illness  Iron deficiency anemia  Baseline Hgb 11-12. Currently near baseline. Can consider anemia of chronic illness. Not able to swallow PTA oral iron  - Monitor CBC  - s/p one dose of  IV iron     Generalized deconditioning  Recurrent falls  - PT/OT consults     CREST Syndrome  Characterized by Raynaud's, Calcinosis, GERD and some telangectasia's. Last saw [facility] 2017. No history of ILD.    - Continue protonix 40 mg daily     Family update  Discussed with daughter Lana on 1/1 at bedside.          Diet: Adult Formula Drip Feeding: Continuous TwoCal HN; Nasoduodenal tube; Goal Rate: 35 mL/hr (goal rate); mL/hr    DVT Prophylaxis: DOAC  Miranda Catheter: Not present  Lines: None     Cardiac Monitoring: ACTIVE order. Indication: Bradycardias (48 hours)  Code Status: No CPR- Do NOT Intubate      Clinically Significant Risk Factors          # Hyperchloremia: Highest Cl = 110 mmol/L in last 2 days, will monitor as appropriate          # Hypoalbuminemia: Lowest albumin = 3 g/dL at 12/10/2024  3:11 AM, will monitor as appropriate                 # Severe Malnutrition: based on nutrition assessment    #  Financial/Environmental Concerns: none         Social Drivers of Health    Housing Stability: High Risk (11/23/2024)    Housing Stability     Do you have housing? : No     Are you worried about losing your housing?: No          Disposition Plan     Medically Ready for Discharge: Anticipated in 2-4 Days             Kristian Beasley MD  Hospitalist Service, GOLD TEAM 8  [facility]  Securely message with [employer] (more info)  Text page via [employer] Paging/Directory   See signed in provider for up to date coverage information  ______________________________________________________________________    Interval History   Tired this morning, some difficulty with oral secretions and requiring more frequent suctioning. Unable to get AVSS    Physical Exam   Vital Signs: Temp: (!) 96.7  F (35.9  C) Temp src: Axillary BP: 131/89 Pulse: (!) 121   Resp: 20 SpO2: 97 % O2 Device: Oxymask Oxygen Delivery: 15 LPM  Weight: 122 lbs 5.68 oz    General Appearance:  Awake. Alert. No acute distress. Frail appearing.   Respiratory: On nasal cannula crackles and rhonchi bilaterally  Cardiovascular: Irregular. Tachycardic. No obvious murmur.   GI: Nondistended. Normoactive. Soft. Non-tender.   Skin: Warm, dry.     Medical Decision Making       40 MINUTES SPENT BY ME on the date of service doing chart review, history, exam, documentation & further activities per the note.      Data   ------------------------- PAST 24 HR DATA REVIEWED -----------------------------------------------    I have personally reviewed the following data over the past 24 hrs:    10.4  \   9.7 (L)   / 312     138 108 (H) 13.5 /  102 (H)   4.2 22 0.63 \       Imaging results reviewed over the past 24 hrs:   No results found for this or any previous visit (from the past 24 hours).

## 2025-01-02 NOTE — PROGRESS NOTES
GASTROENTEROLOGY PROGRESS NOTE    Date of Admission: 11/21/2024  Reason for Admission: respiratory failure      ASSESSMENT:  79 year old female with a history of Afib on apixiban (last dose 12/24), CADF, pulmonary HTN, COPD, laryngeal SCC s/p radiation c/b dysphagia on a modified diet, CREST syndrome who was admitted to [facility] 11/21 for hypoxic respiratory failure due to HFpEF requiring diuresis. Developed acute hypoxia on 12/8 secondary to suspected aspiration pneumonitis for which she was intubated (12/8-12/10). Now s/p extubation but continues to have episodes of aspiration with worsening of hypoxia. GI AE consulted for consideration of PEG-J placement under general anesthesia.     #. Severe malnutrition in the context of chronic illness  #. Moderate-Severe pharyngeal dysphagia   #. Aspiration pneumonia with recurrent hypoxia  #. Laryngeal cancer s/p radiation c/b dysphagia  #. Vomiting with gastric tube feeds  IR initially consulted and deferred due to current respiratory status and need for patient to have a managed airway since IR requires a patient to be able to lay flat for stomach insufflation (risk of aspiration). GI AE now consulted for placement of PEG-J tube.      The patient has a history of mod-severe oropharyngeal dysphagia and pooling of secretions on her last swallow study (see SLP eval 12/10). A percutaneous feeding tube will not change her risk of aspiration if she still is not able to manage those secretions. Repeat SLP evaluation was planned today 1/2 however was cancelled due to continued excessive oral sections. She is tolerating her post pyloric tube feeds better and currently at goal.      Endoscopic placement of gastrojejunostomy (PEG-J) tube is done under general anesthesia. The patient is currently requiring 7-15LPM via oxymask and there remains concern that her frailty, recurrent aspiration events/hypoxia and prolonged hospitalization would make it extremely difficult to wean off the  "ventilator after her procedure. Would recommend ongoing goals of care conversations with the patient her family.       RECOMMENDATIONS:  -- Continue post pyloric tube feeds  -- Strict NPO  -- No urgent plans for endoscopic PEG-J placement  -- Continued goals of care conversations. The patient remains very high risk for aspiration      Discussed with primary team Dr. Guzman via [employer] messaging    The inpatient advanced endoscopy/pancreaticobiliary service will not actively follow the patient at this time. Please call or re-consult with questions or clinical status changes. Thank you for allowing us to participate in the care of this patient.  .    The patient was discussed and plan agreed upon with GI staff, Dr Hendrickson.        Overall time spent on the date of this encounter preparing to see the patient (including chart review of available notes, clinical status events, imaging and labs); obtaining history; examining the patient, coordinating and/or ordering medications, tests and/or procedures; communicating with other health care professionals; and documenting the above clinical information in the electronic medical record was  50  minutes.      Cristy Boateng PA-C, [facility]  Advanced Endoscopy/Pancreaticobiliary GI Service  [facility]  Vocera   ______________________________________________________      Interval events since last evaluated: Nursing notes and 24hr events reviewed. Worsening tachycardia this morning and now on 15LPM via oxymask. SLP cancelled VFSS due to clinical status    Patient seen and examined at 1115. Bedside RN and SLP at bedside. She continues to have difficulty managing her secretions.     Objective:  Blood pressure (!) 134/93, pulse 114, temperature 97.7  F (36.5  C), temperature source Axillary, resp. rate 20, height 1.651 m (5' 5\"), weight 55.5 kg (122 lb 5.7 oz), SpO2 96%.  Gen: Lying in bed. Appears uncomfortable  Chest: oxymask on, shallow " breathing  Msk: no gross deformity  Skin:  no jaundice      LABS:  BMP  Recent Labs   Lab 01/02/25 0527 01/01/25  0940 01/01/25  0021 12/31/24  0901 12/30/24 0319    142  --  145 142   POTASSIUM 4.2 4.1 4.5 3.9 3.5   CHLORIDE 108* 110*  --  113* 111*   ESSIE 8.2* 8.0*  --  7.8* 7.6*   CO2 22 21*  --  22 22   BUN 13.5 13.3  --  11.4 16.0   CR 0.63 0.64  --  0.74 0.68   * 113*  --  100* 108*     CBC  Recent Labs   Lab 01/02/25 0527 01/01/25 0940 12/31/24  0901 12/30/24 0319   WBC 10.4 9.8 8.6 8.3   RBC 3.87 3.78* 3.90 3.64*   HGB 9.7* 9.8* 9.8* 9.0*   HCT 33.3* 32.0* 34.5* 32.4*   MCV 86 85 89 89   MCH 25.1* 25.9* 25.1* 24.7*   MCHC 29.1* 30.6* 28.4* 27.8*   RDW 20.3* 19.9* 19.7* 19.0*    332 335 336     INRNo lab results found in last 7 days.  LFTsNo lab results found in last 7 days.   PANCNo lab results found in last 7 days.      IMAGING:  (Personally reviewed)  ------------------------------------------------------------------  EXAMINATION: XR ABDOMEN PORT 1 VIEW 12/30/2024 4:50 PM      COMPARISON: 12/26/2024     HISTORY: Verify small bowel feeding tube bedside placement     TECHNIQUE: Frontal view of the abdomen.     FINDINGS: Enteric tube with tip in the proximal duodenum. No  abnormally dilated loops of bowel. No pneumatosis or portal venous  gas. No definite pneumoperitoneum. Increased opacities of the left  lower lung fields.                                                                      IMPRESSION: Feeding tube tip projects over the proximal duodenum.  ------------------------------------------------------------------

## 2025-01-03 LAB
ANION GAP SERPL CALCULATED.3IONS-SCNC: 14 MMOL/L (ref 7–15)
BACTERIA SPT CULT: ABNORMAL
BUN SERPL-MCNC: 13.3 MG/DL (ref 8–23)
CALCIUM SERPL-MCNC: 8.4 MG/DL (ref 8.8–10.4)
CHLORIDE SERPL-SCNC: 101 MMOL/L (ref 98–107)
CREAT SERPL-MCNC: 0.6 MG/DL (ref 0.51–0.95)
EGFRCR SERPLBLD CKD-EPI 2021: >90 ML/MIN/1.73M2
ERYTHROCYTE [DISTWIDTH] IN BLOOD BY AUTOMATED COUNT: 20.8 % (ref 10–15)
GLUCOSE SERPL-MCNC: 90 MG/DL (ref 70–99)
GRAM STAIN RESULT: ABNORMAL
GRAM STAIN RESULT: ABNORMAL
HCO3 SERPL-SCNC: 21 MMOL/L (ref 22–29)
HCT VFR BLD AUTO: 37 % (ref 35–47)
HGB BLD-MCNC: 11.5 G/DL (ref 11.7–15.7)
MAGNESIUM SERPL-MCNC: 1.8 MG/DL (ref 1.7–2.3)
MCH RBC QN AUTO: 26 PG (ref 26.5–33)
MCHC RBC AUTO-ENTMCNC: 31.1 G/DL (ref 31.5–36.5)
MCV RBC AUTO: 84 FL (ref 78–100)
PHOSPHATE SERPL-MCNC: 3.3 MG/DL (ref 2.5–4.5)
PLATELET # BLD AUTO: 292 10E3/UL (ref 150–450)
POTASSIUM SERPL-SCNC: 5.1 MMOL/L (ref 3.4–5.3)
RBC # BLD AUTO: 4.42 10E6/UL (ref 3.8–5.2)
SODIUM SERPL-SCNC: 136 MMOL/L (ref 135–145)
WBC # BLD AUTO: 13 10E3/UL (ref 4–11)

## 2025-01-03 PROCEDURE — 250N000009 HC RX 250: Performed by: HOSPITALIST

## 2025-01-03 PROCEDURE — 250N000009 HC RX 250: Performed by: PHYSICIAN ASSISTANT

## 2025-01-03 PROCEDURE — 97530 THERAPEUTIC ACTIVITIES: CPT | Mod: GO

## 2025-01-03 PROCEDURE — 250N000013 HC RX MED GY IP 250 OP 250 PS 637: Performed by: STUDENT IN AN ORGANIZED HEALTH CARE EDUCATION/TRAINING PROGRAM

## 2025-01-03 PROCEDURE — 250N000009 HC RX 250

## 2025-01-03 PROCEDURE — 84100 ASSAY OF PHOSPHORUS: CPT | Performed by: HOSPITALIST

## 2025-01-03 PROCEDURE — 94640 AIRWAY INHALATION TREATMENT: CPT | Mod: 76

## 2025-01-03 PROCEDURE — 80048 BASIC METABOLIC PNL TOTAL CA: CPT | Performed by: STUDENT IN AN ORGANIZED HEALTH CARE EDUCATION/TRAINING PROGRAM

## 2025-01-03 PROCEDURE — 250N000013 HC RX MED GY IP 250 OP 250 PS 637

## 2025-01-03 PROCEDURE — 999N000157 HC STATISTIC RCP TIME EA 10 MIN

## 2025-01-03 PROCEDURE — 97535 SELF CARE MNGMENT TRAINING: CPT | Mod: GO

## 2025-01-03 PROCEDURE — 83735 ASSAY OF MAGNESIUM: CPT | Performed by: PHYSICIAN ASSISTANT

## 2025-01-03 PROCEDURE — 250N000011 HC RX IP 250 OP 636: Performed by: STUDENT IN AN ORGANIZED HEALTH CARE EDUCATION/TRAINING PROGRAM

## 2025-01-03 PROCEDURE — 99232 SBSQ HOSP IP/OBS MODERATE 35: CPT | Performed by: STUDENT IN AN ORGANIZED HEALTH CARE EDUCATION/TRAINING PROGRAM

## 2025-01-03 PROCEDURE — 250N000009 HC RX 250: Performed by: STUDENT IN AN ORGANIZED HEALTH CARE EDUCATION/TRAINING PROGRAM

## 2025-01-03 PROCEDURE — 250N000011 HC RX IP 250 OP 636: Performed by: PHYSICIAN ASSISTANT

## 2025-01-03 PROCEDURE — 36415 COLL VENOUS BLD VENIPUNCTURE: CPT | Performed by: STUDENT IN AN ORGANIZED HEALTH CARE EDUCATION/TRAINING PROGRAM

## 2025-01-03 PROCEDURE — 85018 HEMOGLOBIN: CPT

## 2025-01-03 PROCEDURE — 36415 COLL VENOUS BLD VENIPUNCTURE: CPT | Performed by: PHYSICIAN ASSISTANT

## 2025-01-03 PROCEDURE — 258N000003 HC RX IP 258 OP 636: Performed by: HOSPITALIST

## 2025-01-03 PROCEDURE — 250N000013 HC RX MED GY IP 250 OP 250 PS 637: Performed by: INTERNAL MEDICINE

## 2025-01-03 PROCEDURE — 94640 AIRWAY INHALATION TREATMENT: CPT

## 2025-01-03 PROCEDURE — 120N000003 HC R&B IMCU UMMC

## 2025-01-03 RX ORDER — METOPROLOL TARTRATE 1 MG/ML
5 INJECTION, SOLUTION INTRAVENOUS ONCE
Status: COMPLETED | OUTPATIENT
Start: 2025-01-03 | End: 2025-01-03

## 2025-01-03 RX ORDER — MAGNESIUM SULFATE HEPTAHYDRATE 40 MG/ML
2 INJECTION, SOLUTION INTRAVENOUS ONCE
Status: COMPLETED | OUTPATIENT
Start: 2025-01-03 | End: 2025-01-03

## 2025-01-03 RX ORDER — POTASSIUM PHOS IN 0.9 % NACL 15MMOL/250
15 PLASTIC BAG, INJECTION (ML) INTRAVENOUS ONCE
Status: COMPLETED | OUTPATIENT
Start: 2025-01-03 | End: 2025-01-03

## 2025-01-03 RX ADMIN — DILTIAZEM HYDROCHLORIDE 60 MG: 60 TABLET, FILM COATED ORAL at 12:58

## 2025-01-03 RX ADMIN — BUDESONIDE 0.5 MG: 0.5 INHALANT ORAL at 19:31

## 2025-01-03 RX ADMIN — FUROSEMIDE 20 MG: 20 TABLET ORAL at 08:41

## 2025-01-03 RX ADMIN — LEVOTHYROXINE SODIUM 100 MCG: 0.05 TABLET ORAL at 08:36

## 2025-01-03 RX ADMIN — ATORVASTATIN CALCIUM 40 MG: 40 TABLET, FILM COATED ORAL at 21:09

## 2025-01-03 RX ADMIN — ACETYLCYSTEINE 2 ML: 200 SOLUTION ORAL; RESPIRATORY (INHALATION) at 16:18

## 2025-01-03 RX ADMIN — SERTRALINE HYDROCHLORIDE 200 MG: 100 TABLET ORAL at 08:41

## 2025-01-03 RX ADMIN — HYDROXYZINE HYDROCHLORIDE 10 MG: 10 TABLET ORAL at 20:05

## 2025-01-03 RX ADMIN — ORAL VEHICLES - SUSP 25 MG: SUSPENSION at 08:42

## 2025-01-03 RX ADMIN — MAGNESIUM SULFATE HEPTAHYDRATE 2 G: 2 INJECTION, SOLUTION INTRAVENOUS at 21:20

## 2025-01-03 RX ADMIN — LEVALBUTEROL HYDROCHLORIDE 0.63 MG: 0.63 SOLUTION RESPIRATORY (INHALATION) at 12:18

## 2025-01-03 RX ADMIN — THIAMINE HCL TAB 100 MG 100 MG: 100 TAB at 08:41

## 2025-01-03 RX ADMIN — THERA TABS 1 TABLET: TAB at 08:39

## 2025-01-03 RX ADMIN — BUDESONIDE 0.5 MG: 0.5 INHALANT ORAL at 08:10

## 2025-01-03 RX ADMIN — APIXABAN 5 MG: 5 TABLET, FILM COATED ORAL at 21:09

## 2025-01-03 RX ADMIN — ACETYLCYSTEINE 2 ML: 200 SOLUTION ORAL; RESPIRATORY (INHALATION) at 19:31

## 2025-01-03 RX ADMIN — METOPROLOL TARTRATE 5 MG: 1 INJECTION, SOLUTION INTRAVENOUS at 16:03

## 2025-01-03 RX ADMIN — IPRATROPIUM BROMIDE 0.5 MG: 0.5 SOLUTION RESPIRATORY (INHALATION) at 16:18

## 2025-01-03 RX ADMIN — Medication 40 MG: at 08:42

## 2025-01-03 RX ADMIN — LEVOFLOXACIN 500 MG: 5 INJECTION, SOLUTION INTRAVENOUS at 12:49

## 2025-01-03 RX ADMIN — LEVALBUTEROL HYDROCHLORIDE 0.63 MG: 0.63 SOLUTION RESPIRATORY (INHALATION) at 08:09

## 2025-01-03 RX ADMIN — IPRATROPIUM BROMIDE 0.5 MG: 0.5 SOLUTION RESPIRATORY (INHALATION) at 19:31

## 2025-01-03 RX ADMIN — IPRATROPIUM BROMIDE 0.5 MG: 0.5 SOLUTION RESPIRATORY (INHALATION) at 08:09

## 2025-01-03 RX ADMIN — EMPAGLIFLOZIN 10 MG: 10 TABLET, FILM COATED ORAL at 08:40

## 2025-01-03 RX ADMIN — APIXABAN 5 MG: 5 TABLET, FILM COATED ORAL at 08:42

## 2025-01-03 RX ADMIN — DILTIAZEM HYDROCHLORIDE 60 MG: 60 TABLET, FILM COATED ORAL at 21:09

## 2025-01-03 RX ADMIN — ACETYLCYSTEINE 2 ML: 200 SOLUTION ORAL; RESPIRATORY (INHALATION) at 12:18

## 2025-01-03 RX ADMIN — IPRATROPIUM BROMIDE 0.5 MG: 0.5 SOLUTION RESPIRATORY (INHALATION) at 12:18

## 2025-01-03 RX ADMIN — METOPROLOL TARTRATE 5 MG: 1 INJECTION, SOLUTION INTRAVENOUS at 22:51

## 2025-01-03 RX ADMIN — LEVALBUTEROL HYDROCHLORIDE 0.63 MG: 0.63 SOLUTION RESPIRATORY (INHALATION) at 16:18

## 2025-01-03 RX ADMIN — Medication 1 SPRAY: at 21:09

## 2025-01-03 RX ADMIN — ORAL VEHICLES - SUSP 25 MG: SUSPENSION at 21:09

## 2025-01-03 RX ADMIN — ACETYLCYSTEINE 2 ML: 200 SOLUTION ORAL; RESPIRATORY (INHALATION) at 08:09

## 2025-01-03 RX ADMIN — GABAPENTIN 600 MG: 250 SOLUTION ORAL at 21:09

## 2025-01-03 RX ADMIN — POTASSIUM PHOSPHATE, MONOBASIC POTASSIUM PHOSPHATE, DIBASIC 15 MMOL: 224; 236 INJECTION, SOLUTION, CONCENTRATE INTRAVENOUS at 05:24

## 2025-01-03 RX ADMIN — DILTIAZEM HYDROCHLORIDE 60 MG: 60 TABLET, FILM COATED ORAL at 05:30

## 2025-01-03 RX ADMIN — FEXOFENADINE HYDROCHLORIDE 120 MG: 60 TABLET ORAL at 08:40

## 2025-01-03 RX ADMIN — LEVALBUTEROL HYDROCHLORIDE 0.63 MG: 0.63 SOLUTION RESPIRATORY (INHALATION) at 19:31

## 2025-01-03 ASSESSMENT — ACTIVITIES OF DAILY LIVING (ADL)
ADLS_ACUITY_SCORE: 74
ADLS_ACUITY_SCORE: 63
ADLS_ACUITY_SCORE: 74
ADLS_ACUITY_SCORE: 74
ADLS_ACUITY_SCORE: 63
ADLS_ACUITY_SCORE: 74
ADLS_ACUITY_SCORE: 63
ADLS_ACUITY_SCORE: 74
ADLS_ACUITY_SCORE: 63
ADLS_ACUITY_SCORE: 74
ADLS_ACUITY_SCORE: 63
ADLS_ACUITY_SCORE: 74
ADLS_ACUITY_SCORE: 63
ADLS_ACUITY_SCORE: 63
ADLS_ACUITY_SCORE: 74

## 2025-01-03 NOTE — PROGRESS NOTES
[facility]    Medicine Progress Note - Hospitalist Service, GOLD TEAM 8    Date of Admission:  11/21/2024    Assessment & Plan   Asya Coffman is a 79 year old female admitted on 11/21/2024. 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 Initially admitted to hospital medicine service with hypoxic respiratory failure suspected due to HFpEF requiring diuresis. Developed acute hypoxia on 12/8 secondary to suspected aspiration pneumonitis for which she was intubated (12/8-12/10). Now s/p extubation and transferring back to hospital medicine service. Ongoing evaluation related to aspiration, currently tolerated continuous tube feeds via NJ, with improving respiratory status.     Updates today:  -Attempting to re-coordinate AVSS  -Afib with HRs in 130s, trial 5mg IV metop (afternoon)    Acute hypoxic respiratory failure  Recurrent Aspiration pneumonitis with aspiration pneumonia  MSSA PNA s/p abx (12/3-12/8)  Obstructive lung disease (COPD vs asthma)  Pulmonary hypertension   Presented on 11/21 with acute hypoxic respiratory failure; initially suspected to be secondary to HFpEF exacerbation; unresolved with diuresis. Underlying obstructive lung disease but no evidence of exacerbation here. Was treated for MSSA pneumonia. Overnight 12/8, developed sudden worsening of oxygenation and increased secretions in the setting of dysphagia/recent laryngeal radiation and was intubated. Suspected aspiration pneumonitis as etiology. Was briefly on zosyn but this was discontinued.  She was extubated on 12/10.  Multiple episodes of worsening of hypoxia with evidence of aspiration and intolerance to tube feeding.  CT 12/27 showed increasing basilar  predominant bronchial wall thickening and diffuse groundglass attenuation suspicious of ongoing infectious process. Continues to have have  excessive oral secretion and episodes of aspiration needing frequent suctioning.  -Antibiotics: Zosyn 12/28-12/28 then Unasyn 12/28-1/2. Transitioned onto levofloxacin on 1/2/25 related to microbiology from sputum smear returning with resistant klebsiella. Plan for a 5 day course ending on 1/6/25.  -Respiratory support:  -Continue oxygen titration as needed  -Budesonide neb 0.5 mg BID,  -ipratropium-levalbuterol neb QID  -albuteraol Q4H PRN  -Mucomyst  -Aspiration precautions; n.p.o.  -Flutter valve, incentive spirometery  - Dysphagia management as below.  - Palliative care consult, recs appreciated    Failure to thrive  Aspiration risk  Acute severe oropharyngeal dysphagia  Esophageal dysmotility  Concern for radiation-induced dysphagia  Laryngeal squamous cell carcinoma s/p radiation (4/2024-7/2024)  Concern for gastroparesis  Has had increased dysphagia in the setting of laryngeal squamous cell carcinoma s/p radiation (4/2024-7/2024). Family reports that over the past few months has declined dramatically from being quite mobile, active to being barely active, with 4 falls in 3 months progressively reduced speech and recurrent aspirations. ENT evaluated 12/16/24 with bedside laryngoscopy for dysphagia. Normal vocal cord function and control seen including ability to approximate cords during cough. No anatomic explanations for dysphagia seen. Evaluated by GI and there is no concern of esophageal web. Clinical picture consistent with significant muscle weakness contributing to oropharyngeal dysphagia, poor vocal pressure, poor cough effort likely due to radiation. No evidence of active rheumatologic condition contributing per rheumatology evaluation. CK, aldolase negative. Myasthenia panel negative.  Cortisol within normal limit.  Unclear if hypothyroidism is the cause but can  be contributing. Doses adjusted as below. Cervical thoracic MRI without significant findings.   - SLP following  - NPO for now, continue TFs via NJ   - VFSS as soon as able  -GJ placement is within goals of care of patient. However:  IR consulted for G-tube placement but did not feel safe to place with IR 12/30  GI also consulted and did not feel that it is safe on evaluation 12/30; some concern for secretion management leading to aspirations.   -Continue speech therapy and see swallowing and secretion containment improves    At risk for refeeding syndrome  Severe malnutrition in context of chronic illness/disease  Per daughter, baseline weight around 120-130 lbs. Admitted 102 lbs. Patient states she is eating less and has less appetite in general as well as having difficulty swallowing. While NPO during intubation, developed starvation ketosis. Given high risk for aspiration with PO intake, placed post pyloric  and started tube feeds. Will need close monitoring for refeeding syndrome.   - NPO   - RD following for TF management  - Monitor for refeeding syndrome   - Lyte replacement protocol   - Thiamine replacement     Chronic afib  Afib with RVR  [id number] 3 (age++, HF+)  Has episodes of A-fib with RVR. Has also episodes of bradycardia with  PTA dose of diltiazem and metoprolol  - PTA apixaban 5mg BID  - Diltiazem 60mg q6H as tolerated by blood pressure give bradycardia and soft BP  - Metoprolol to tartrate to 25 mg twice daily  - Goal K above 4 and Mg above 2    Anxiety  Depression  History of depression and anxiety.  She feels hopeless and has sad mood during this hospitalization.  Will continue PTA sertraline.  Will also consult health psychology.  -Increased PTA Sertraline 200 mg daily     Chronic Left parietal, left thalamic lacunar strokes  - will increase atorvastatin to high intensity (20-> 40 mg). On DOAC  - A1C shows prediabetes     Hypothyroidism  Hx of thyroidectomy 2/2 cancer   - Continue PTA  levothyroxine 88 mcg daily   - Consulted Endocrine service as above and increased dose to 100 mcg daily. Appreciate recommendations    HFpEF (LVEF 55-60% 11/22/2024)   Pulmonary hypertension (44 mmHg per echo 11/22/2024)  Possible small PFO  Echo during current admission (11/22/2024) notable for increased thickness of LV and elevated BNP (peak 9200 > 6200) concerning for heart failure exacerbation s/p diuresis and pulmonary hypertension with estimated pulmonary artery pressure of 45 mmHg. Echo with bubble study (11/25/2024) concerning for possible small PFO. With intubation on 12/8, developed hypotension without evidence of shock. Etiology of hypotension may be cardiogenic (e.g. exacerbation of pulmonary hypertension by positive pressure ventilation) vs medication associated (propofol, precedex); Weaned off pressors 12/10.   - Preload dependent in the setting of pulmonary hypertension, gentle diuresis as needed   - Will restart lasix today with 20mg oral daily (40mg recommended in cardiology consult 11/27 previously)  - Restart empagliflozin 10mg daily 1/2/25  - Continue to hold spironolactone for now, can consider if needed based on potassium.    Oliguric SAMIR, improving  Mild hyperkalemia  Cr 0.9 on admission. Baseline appears 0.9-1.0. Developed SAMIR initially in setting of diuresis and had been improving. Subsequently increased following transfer to ICU with consideration for prerenal in setting of NPO status.  Again significant increase in creatinine with low Cystatin C FEUrea above 40%.  Concern for possibility of ATN though UA is unremarkable. Overall renal cunftion appears to be improving.  - Strict I/Os  - Trend BMP   - FWF via NGT  - Will restart lasix 1/2/25 with 20mg daily oral; can increase to 40mg if tolerating.  - Continue to hold spironolactone for now, can consider if needed based on potassium.    History of apnea  Family report  apneic episodes after she underwent radiation therapy.  There is a  possibility that she is having obstructive sleep apnea    Diarrhea  Frequent loose stools. Unclear timing of onset so not sure if related to abx or TF initiation. No abdominal pain. C diff on 12/30 neg  - imodium prn    Spiritual health  Pt family requested that pt sees    - consulted for emotional support      Anemia of chronic illness  Iron deficiency anemia  Baseline Hgb 11-12. Currently near baseline. Can consider anemia of chronic illness. Not able to swallow PTA oral iron  - Monitor CBC  - s/p one dose of  IV iron     Generalized deconditioning  Recurrent falls  - PT/OT consults     CREST Syndrome  Characterized by Raynaud's, Calcinosis, GERD and some telangectasia's. Last saw [facility] 2017. No history of ILD.    - Continue protonix 40 mg daily     Family update  Discussed with daughter Lana by phone on 1/3/25          Diet: Adult Formula Drip Feeding: Continuous TwoCal HN; Nasoduodenal tube; Goal Rate: 35 mL/hr (goal rate); mL/hr    DVT Prophylaxis: DOAC  Miranda Catheter: Not present  Lines: None     Cardiac Monitoring: ACTIVE order. Indication: Bradycardias (48 hours)  Code Status: No CPR- Do NOT Intubate      Clinically Significant Risk Factors          # Hyperchloremia: Highest Cl = 110 mmol/L in last 2 days, will monitor as appropriate          # Hypoalbuminemia: Lowest albumin = 3 g/dL at 12/10/2024  3:11 AM, will monitor as appropriate                 # Severe Malnutrition: based on nutrition assessment    # Financial/Environmental Concerns: none         Social Drivers of Health    Housing Stability: High Risk (11/23/2024)    Housing Stability     Do you have housing? : No     Are you worried about losing your housing?: No          Disposition Plan     Medically Ready for Discharge: Anticipated in 5+ Days         Kristian Beasley MD  Hospitalist Service, GOLD TEAM [address] [place] [address]  Securely message with [place] (more info)  Text  page via [facility] Paging/Directory   See signed in provider for up to date coverage information  ______________________________________________________________________    Interval History   Tired, would like to get AVSS. Tolerating feeds. Some need for deeper suction. Called and updated daughter Lana    Physical Exam   Vital Signs: Temp: 97.3  F (36.3  C) Temp src: Axillary BP: (!) 111/90 Pulse: 95   Resp: 22 SpO2: 97 % O2 Device: Oxymask Oxygen Delivery: 3 LPM  Weight: 116 lbs 13.5 oz    General Appearance:  Awake. Alert. No acute distress. Frail appearing.   Respiratory: On nasal cannula crackles and rhonchi bilaterally  Cardiovascular: Irregular. Tachycardic. No obvious murmur.   GI: Nondistended. Normoactive. Soft. Non-tender.   Skin: Warm, dry.        Medical Decision Making       45 MINUTES SPENT BY ME on the date of service doing chart review, history, exam, documentation & further activities per the note.      Data   ------------------------- PAST 24 HR DATA REVIEWED -----------------------------------------------    I have personally reviewed the following data over the past 24 hrs:    13.0 (H)  \   11.5 (L)   / 292     136 101 13.3 /  90   5.1 21 (L) 0.60 \       Imaging results reviewed over the past 24 hrs:   No results found for this or any previous visit (from the past 24 hours).

## 2025-01-03 NOTE — PLAN OF CARE
Goal Outcome Evaluation:      Neuro: A&Ox4. Very flat.   Cardiac: Afib then Afib/flutter HR 90s-110s  in the afternoon 130s-140s up to 150  Respiratory: Sating >93% at 4 L oxymask  GI/: Incontinent of urine. Purewick in place. One BM    Diet/appetite: NPO,TF @ 35ml/hr  Activity:  Turned q2 hrs.  Lift assist with activity.up to chair   Pain: At acceptable level on current regimen.   Skin: No new deficits noted.  LDA's:  NJ bridled to continuous tube feeds.  L PIV         Plan: one dose of 5mg IV metoprolol given without any improvement in heart rate. Gold 8 aware of increased heart rate and new afib/flutter. Frequent oral suctioning. Encourage patient to help participate in oral cares.

## 2025-01-03 NOTE — PLAN OF CARE
Goal Outcome Evaluation:  Neuro: A&Ox4. Uses call light appropriately.   Cardiac: Afib -120's. Up to 140-150's with activity or coughing incidents.    Respiratory: Fine crackles/coarse lung sounds. Sating >92% on 4L via oxymask. Required up to 15L during coughing incidents. Frequent oral suction up to multiple times per hour.   GI/: Adequate urine occurrences. One small BM today.   Diet/appetite: NPO, tolerating TF continuously.   Activity: Assist of  2, use lift device up to chair  Pain: Denies any pain throughout shift.   Skin:   -Scattered bruising throughout   -Redness on sacrum area     LDA's:  L PIV SL   NJ TF @ 35mL free water flushes 30 mLq4h    Shift Events: Video swallow cancelled today due to copious oral secretions.     Plan: Continue with POC. Notify primary team with changes.     Outcome Evaluation: Patient having continous copious amount of secretions, SOB intermittently with cares and activity. Switched to oxymask at 5L and still in afib.

## 2025-01-03 NOTE — PROGRESS NOTES
CLINICAL NUTRITION SERVICES - BRIEF NOTE     Nutrition Prescription     RECOMMENDATIONS FOR MDs/PROVIDERS TO ORDER:  With inability to complete video swallow eval / advance oral diet, consider need for long-term enteral access (GJ-tube placement)     Recommendations already ordered by Registered Dietitian (RD):  Continue to meet full estimated nutrition needs via TF, as ordered:   TwoCal HN at 35 mL/hr (840 mL/day) to provide 1680 kcals (36 kcals/kg/day), 71 g PRO (1.5 g/kg/day), 588 mL H2O, 184 g CHO and 4 g Fiber daily.      Future/Additional Recommendations:  Continue to monitor nutrition-related findings and follow pt per protocol    For last full RD assessment, see note dated 12/30/24    NEW FINDINGS   Video swallow assessment scheduled for yesterday 1/2, canceled/not completed d/t copiuous oral secretions w/ difficulty clearing on her own   Full nutrition needs via NDT to continue. Likely will need to consider long-term enteral access. Earlier in admission had plan for PEGJ placement but procedure canceled d/t pt instability.     INTERVENTIONS  Implementation  Collaboration with other providers  Enteral Nutrition - continue at goal, consider long-term enteral access       Monitoring/Evaluation  Will continue to monitor and evaluate per protocol.    Nona Goel, MPH, RDN, LD  6B + 1A Obs RD [place] [6B or 1A Obs Clinical Dietitian]   Weekend/Holiday: Vocera - Weekend Clinical Dietitian

## 2025-01-03 NOTE — PLAN OF CARE
Goal Outcome Evaluation:      Plan of Care Reviewed With: patient    Overall Patient Progress: no changeOverall Patient Progress: no change    Outcome Evaluation: weaned to 3L oxymask, still requiring frequent oral suctioning, no complaints of dizziness or SOB with cares. Still Afib     Neuro: A&Ox4. Withdrawn  Cardiac: Afib with RVR HR 90s-110s    Respiratory: Sating >92% at 3-5 L oxymask  GI/: Incontinent of urine. No purewick per pt request. Small smear this shift  Diet/appetite: NPO,TF @ 35ml/hr  Activity:  Turned q2 hrs.  Lift assist with activity.  Pain: At acceptable level on current regimen.   Skin: No new deficits noted.  LDA's:  NJ bridled to continuous tube feeds.  L PIV    Replaced phos. Pt refused AM labs

## 2025-01-04 LAB
ANION GAP SERPL CALCULATED.3IONS-SCNC: 10 MMOL/L (ref 7–15)
BUN SERPL-MCNC: 14.6 MG/DL (ref 8–23)
CALCIUM SERPL-MCNC: 8.7 MG/DL (ref 8.8–10.4)
CHLORIDE SERPL-SCNC: 102 MMOL/L (ref 98–107)
CREAT SERPL-MCNC: 0.62 MG/DL (ref 0.51–0.95)
EGFRCR SERPLBLD CKD-EPI 2021: >90 ML/MIN/1.73M2
GLUCOSE SERPL-MCNC: 85 MG/DL (ref 70–99)
HCO3 SERPL-SCNC: 26 MMOL/L (ref 22–29)
HOLD SPECIMEN: NORMAL
PHOSPHATE SERPL-MCNC: 2.1 MG/DL (ref 2.5–4.5)
POTASSIUM SERPL-SCNC: 4.5 MMOL/L (ref 3.4–5.3)
SODIUM SERPL-SCNC: 138 MMOL/L (ref 135–145)

## 2025-01-04 PROCEDURE — 36415 COLL VENOUS BLD VENIPUNCTURE: CPT | Performed by: STUDENT IN AN ORGANIZED HEALTH CARE EDUCATION/TRAINING PROGRAM

## 2025-01-04 PROCEDURE — 80048 BASIC METABOLIC PNL TOTAL CA: CPT | Performed by: STUDENT IN AN ORGANIZED HEALTH CARE EDUCATION/TRAINING PROGRAM

## 2025-01-04 PROCEDURE — 250N000013 HC RX MED GY IP 250 OP 250 PS 637

## 2025-01-04 PROCEDURE — 94640 AIRWAY INHALATION TREATMENT: CPT | Mod: 76

## 2025-01-04 PROCEDURE — 250N000009 HC RX 250

## 2025-01-04 PROCEDURE — 82374 ASSAY BLOOD CARBON DIOXIDE: CPT | Performed by: STUDENT IN AN ORGANIZED HEALTH CARE EDUCATION/TRAINING PROGRAM

## 2025-01-04 PROCEDURE — 99232 SBSQ HOSP IP/OBS MODERATE 35: CPT | Performed by: STUDENT IN AN ORGANIZED HEALTH CARE EDUCATION/TRAINING PROGRAM

## 2025-01-04 PROCEDURE — 250N000011 HC RX IP 250 OP 636: Performed by: STUDENT IN AN ORGANIZED HEALTH CARE EDUCATION/TRAINING PROGRAM

## 2025-01-04 PROCEDURE — 84100 ASSAY OF PHOSPHORUS: CPT | Performed by: STUDENT IN AN ORGANIZED HEALTH CARE EDUCATION/TRAINING PROGRAM

## 2025-01-04 PROCEDURE — 250N000013 HC RX MED GY IP 250 OP 250 PS 637: Performed by: STUDENT IN AN ORGANIZED HEALTH CARE EDUCATION/TRAINING PROGRAM

## 2025-01-04 PROCEDURE — 120N000003 HC R&B IMCU UMMC

## 2025-01-04 PROCEDURE — 84132 ASSAY OF SERUM POTASSIUM: CPT | Performed by: STUDENT IN AN ORGANIZED HEALTH CARE EDUCATION/TRAINING PROGRAM

## 2025-01-04 PROCEDURE — 250N000009 HC RX 250: Performed by: STUDENT IN AN ORGANIZED HEALTH CARE EDUCATION/TRAINING PROGRAM

## 2025-01-04 PROCEDURE — 250N000013 HC RX MED GY IP 250 OP 250 PS 637: Performed by: INTERNAL MEDICINE

## 2025-01-04 PROCEDURE — 999N000157 HC STATISTIC RCP TIME EA 10 MIN

## 2025-01-04 RX ORDER — LEVOFLOXACIN 5 MG/ML
500 INJECTION, SOLUTION INTRAVENOUS EVERY 24 HOURS
Status: DISCONTINUED | OUTPATIENT
Start: 2025-01-04 | End: 2025-01-06

## 2025-01-04 RX ORDER — LEVOFLOXACIN 5 MG/ML
500 INJECTION, SOLUTION INTRAVENOUS EVERY 24 HOURS
Status: DISCONTINUED | OUTPATIENT
Start: 2025-01-05 | End: 2025-01-04

## 2025-01-04 RX ADMIN — IPRATROPIUM BROMIDE 0.5 MG: 0.5 SOLUTION RESPIRATORY (INHALATION) at 16:06

## 2025-01-04 RX ADMIN — DILTIAZEM HYDROCHLORIDE 60 MG: 60 TABLET, FILM COATED ORAL at 19:53

## 2025-01-04 RX ADMIN — POTASSIUM & SODIUM PHOSPHATES POWDER PACK 280-160-250 MG 1 PACKET: 280-160-250 PACK at 16:38

## 2025-01-04 RX ADMIN — Medication 1 SPRAY: at 09:07

## 2025-01-04 RX ADMIN — ORAL VEHICLES - SUSP 25 MG: SUSPENSION at 09:00

## 2025-01-04 RX ADMIN — Medication 1 SPRAY: at 12:45

## 2025-01-04 RX ADMIN — ORAL VEHICLES - SUSP 50 MG: SUSPENSION at 19:53

## 2025-01-04 RX ADMIN — LEVALBUTEROL HYDROCHLORIDE 0.63 MG: 0.63 SOLUTION RESPIRATORY (INHALATION) at 12:42

## 2025-01-04 RX ADMIN — ACETYLCYSTEINE 2 ML: 200 SOLUTION ORAL; RESPIRATORY (INHALATION) at 07:48

## 2025-01-04 RX ADMIN — POTASSIUM & SODIUM PHOSPHATES POWDER PACK 280-160-250 MG 1 PACKET: 280-160-250 PACK at 09:03

## 2025-01-04 RX ADMIN — IPRATROPIUM BROMIDE 0.5 MG: 0.5 SOLUTION RESPIRATORY (INHALATION) at 07:48

## 2025-01-04 RX ADMIN — LEVALBUTEROL HYDROCHLORIDE 0.63 MG: 0.63 SOLUTION RESPIRATORY (INHALATION) at 16:06

## 2025-01-04 RX ADMIN — EMPAGLIFLOZIN 10 MG: 10 TABLET, FILM COATED ORAL at 09:03

## 2025-01-04 RX ADMIN — APIXABAN 5 MG: 5 TABLET, FILM COATED ORAL at 09:04

## 2025-01-04 RX ADMIN — LEVOTHYROXINE SODIUM 100 MCG: 0.05 TABLET ORAL at 09:04

## 2025-01-04 RX ADMIN — ATORVASTATIN CALCIUM 40 MG: 40 TABLET, FILM COATED ORAL at 19:53

## 2025-01-04 RX ADMIN — FEXOFENADINE HYDROCHLORIDE 120 MG: 60 TABLET ORAL at 09:03

## 2025-01-04 RX ADMIN — LEVALBUTEROL HYDROCHLORIDE 0.63 MG: 0.63 SOLUTION RESPIRATORY (INHALATION) at 07:48

## 2025-01-04 RX ADMIN — DILTIAZEM HYDROCHLORIDE 60 MG: 60 TABLET, FILM COATED ORAL at 12:44

## 2025-01-04 RX ADMIN — BUDESONIDE 0.5 MG: 0.5 INHALANT ORAL at 20:16

## 2025-01-04 RX ADMIN — ACETYLCYSTEINE 2 ML: 200 SOLUTION ORAL; RESPIRATORY (INHALATION) at 16:06

## 2025-01-04 RX ADMIN — GABAPENTIN 600 MG: 250 SOLUTION ORAL at 21:51

## 2025-01-04 RX ADMIN — SERTRALINE HYDROCHLORIDE 200 MG: 100 TABLET ORAL at 09:03

## 2025-01-04 RX ADMIN — LEVOFLOXACIN 500 MG: 5 INJECTION, SOLUTION INTRAVENOUS at 13:16

## 2025-01-04 RX ADMIN — ACETYLCYSTEINE 2 ML: 200 SOLUTION ORAL; RESPIRATORY (INHALATION) at 20:15

## 2025-01-04 RX ADMIN — ACETYLCYSTEINE 2 ML: 200 SOLUTION ORAL; RESPIRATORY (INHALATION) at 12:41

## 2025-01-04 RX ADMIN — DILTIAZEM HYDROCHLORIDE 60 MG: 60 TABLET, FILM COATED ORAL at 05:59

## 2025-01-04 RX ADMIN — DILTIAZEM HYDROCHLORIDE 60 MG: 60 TABLET, FILM COATED ORAL at 00:43

## 2025-01-04 RX ADMIN — BUDESONIDE 0.5 MG: 0.5 INHALANT ORAL at 07:48

## 2025-01-04 RX ADMIN — APIXABAN 5 MG: 5 TABLET, FILM COATED ORAL at 19:53

## 2025-01-04 RX ADMIN — POTASSIUM & SODIUM PHOSPHATES POWDER PACK 280-160-250 MG 1 PACKET: 280-160-250 PACK at 12:44

## 2025-01-04 RX ADMIN — Medication 40 MG: at 09:00

## 2025-01-04 RX ADMIN — FUROSEMIDE 20 MG: 20 TABLET ORAL at 09:04

## 2025-01-04 RX ADMIN — IPRATROPIUM BROMIDE 0.5 MG: 0.5 SOLUTION RESPIRATORY (INHALATION) at 20:16

## 2025-01-04 RX ADMIN — IPRATROPIUM BROMIDE 0.5 MG: 0.5 SOLUTION RESPIRATORY (INHALATION) at 12:42

## 2025-01-04 RX ADMIN — ACETAMINOPHEN 650 MG: 325 TABLET, FILM COATED ORAL at 18:22

## 2025-01-04 RX ADMIN — THERA TABS 1 TABLET: TAB at 09:04

## 2025-01-04 RX ADMIN — LEVALBUTEROL HYDROCHLORIDE 0.63 MG: 0.63 SOLUTION RESPIRATORY (INHALATION) at 20:16

## 2025-01-04 RX ADMIN — THIAMINE HCL TAB 100 MG 100 MG: 100 TAB at 09:06

## 2025-01-04 ASSESSMENT — ACTIVITIES OF DAILY LIVING (ADL)
ADLS_ACUITY_SCORE: 74
ADLS_ACUITY_SCORE: 74
ADLS_ACUITY_SCORE: 70
ADLS_ACUITY_SCORE: 74
ADLS_ACUITY_SCORE: 70
ADLS_ACUITY_SCORE: 74
ADLS_ACUITY_SCORE: 74
ADLS_ACUITY_SCORE: 70
ADLS_ACUITY_SCORE: 74
ADLS_ACUITY_SCORE: 70
ADLS_ACUITY_SCORE: 74
ADLS_ACUITY_SCORE: 70

## 2025-01-04 NOTE — PROGRESS NOTES
[facility]    Medicine Progress Note - Hospitalist Service, GOLD TEAM 8    Date of Admission:  11/21/2024    Assessment & Plan   Asya Coffman is a 79 year old female admitted on 11/21/2024. 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 Initially admitted to hospital medicine service with hypoxic respiratory failure suspected due to HFpEF requiring diuresis. Developed acute hypoxia on 12/8 secondary to suspected aspiration pneumonitis for which she was intubated (12/8-12/10). Now s/p extubation and transferred back to hospital medicine service. Ongoing evaluation related to aspiration, currently tolerated continuous tube feeds via NJ, with improving respiratory status.     Updates today:  -Attempting to re-coordinate AVSS  -Increase oral metoprolol dosing to improve rate control (25mg BID -> 50mg BID)    Acute hypoxic respiratory failure  Recurrent Aspiration pneumonitis with aspiration pneumonia  MSSA PNA s/p abx (12/3-12/8)  Obstructive lung disease (COPD vs asthma)  Pulmonary hypertension   Presented on 11/21 with acute hypoxic respiratory failure; initially suspected to be secondary to HFpEF exacerbation; unresolved with diuresis. Underlying obstructive lung disease but no evidence of exacerbation here. Was treated for MSSA pneumonia. Overnight 12/8, developed sudden worsening of oxygenation and increased secretions in the setting of dysphagia/recent laryngeal radiation and was intubated. Suspected aspiration pneumonitis as etiology. Was briefly on zosyn but this was discontinued.  She was extubated on 12/10.  Multiple episodes of worsening of hypoxia with evidence of aspiration and intolerance to tube feeding.  CT 12/27 showed  increasing basilar predominant bronchial wall thickening and diffuse groundglass attenuation suspicious of ongoing infectious process. Continues to have have  excessive oral secretion and episodes of aspiration needing frequent suctioning.  -Antibiotics: Zosyn 12/28-12/28 then Unasyn 12/28-1/2. Transitioned onto levofloxacin on 1/2/25 related to microbiology from sputum smear returning with resistant klebsiella. Plan for a 5 day course ending on 1/6/25.  -Respiratory support:  -Continue oxygen titration as needed  -Budesonide neb 0.5 mg BID,  -ipratropium-levalbuterol neb QID  -albuteraol Q4H PRN  -Mucomyst  -Aspiration precautions; n.p.o.  -Flutter valve, incentive spirometery  - Dysphagia management as below.  - Palliative care consult, recs appreciated    Failure to thrive  Aspiration risk  Acute severe oropharyngeal dysphagia  Esophageal dysmotility  Concern for radiation-induced dysphagia  Laryngeal squamous cell carcinoma s/p radiation (4/2024-7/2024)  Concern for gastroparesis  Has had increased dysphagia in the setting of laryngeal squamous cell carcinoma s/p radiation (4/2024-7/2024). Family reports that over the past few months has declined dramatically from being quite mobile, active to being barely active, with 4 falls in 3 months progressively reduced speech and recurrent aspirations. ENT evaluated 12/16/24 with bedside laryngoscopy for dysphagia. Normal vocal cord function and control seen including ability to approximate cords during cough. No anatomic explanations for dysphagia seen. Evaluated by GI and there is no concern of esophageal web. Clinical picture consistent with significant muscle weakness contributing to oropharyngeal dysphagia, poor vocal pressure, poor cough effort likely due to radiation. No evidence of active rheumatologic condition contributing per rheumatology evaluation. CK, aldolase negative. Myasthenia panel negative.  Cortisol within normal limit.  Unclear if hypothyroidism is  the cause but can be contributing. Doses adjusted as below. Cervical thoracic MRI without significant findings.   - SLP following  - NPO for now, continue TFs via NJ   - VFSS as soon as able  - GJ placement is within goals of care of patient. However:  IR consulted for G-tube placement but did not feel safe to place with IR 12/30  GI also consulted and did not feel that it is safe on evaluation 12/30; some concern for secretion management leading to aspirations.   -Continue speech therapy and see swallowing and secretion containment improves    At risk for refeeding syndrome  Severe malnutrition in context of chronic illness/disease  Per daughter, baseline weight around 120-130 lbs. Admitted 102 lbs. Patient states she is eating less and has less appetite in general as well as having difficulty swallowing. While NPO during intubation, developed starvation ketosis. Given high risk for aspiration with PO intake, placed post pyloric  and started tube feeds. Will need close monitoring for refeeding syndrome.   - NPO   - RD following for TF management  - Monitor for refeeding syndrome   - Lyte replacement protocol   - Thiamine replacement     Chronic afib  Afib with RVR  [id number] (age++, HF+)  Has episodes of A-fib with RVR. Has also episodes of bradycardia with  PTA dose of diltiazem and metoprolol  - PTA apixaban 5mg BID  - Diltiazem 60mg q6H as tolerated by blood pressure give bradycardia and soft BP  - Metoprolol to tartrate to 25 mg twice daily  - Goal K above 4 and Mg above 2    Anxiety  Depression  History of depression and anxiety.  She feels hopeless and has sad mood during this hospitalization.  Will continue PTA sertraline.  Will also consult health psychology.  -Increased PTA Sertraline 200 mg daily     Chronic Left parietal, left thalamic lacunar strokes  - will increase atorvastatin to high intensity (20-> 40 mg). On DOAC  - A1C shows prediabetes     Hypothyroidism  Hx of thyroidectomy 2/2 cancer   -  Continue PTA levothyroxine 88 mcg daily   - Consulted Endocrine service as above and increased dose to 100 mcg daily. Appreciate recommendations    HFpEF (LVEF 55-60% 11/22/2024)   Pulmonary hypertension (44 mmHg per echo 11/22/2024)  Possible small PFO  Echo during current admission (11/22/2024) notable for increased thickness of LV and elevated BNP (peak 9200 > 6200) concerning for heart failure exacerbation s/p diuresis and pulmonary hypertension with estimated pulmonary artery pressure of 45 mmHg. Echo with bubble study (11/25/2024) concerning for possible small PFO. With intubation on 12/8, developed hypotension without evidence of shock. Etiology of hypotension may be cardiogenic (e.g. exacerbation of pulmonary hypertension by positive pressure ventilation) vs medication associated (propofol, precedex); Weaned off pressors 12/10.   - Preload dependent in the setting of pulmonary hypertension, gentle diuresis as needed   - Restart lasix 1/3 with 20mg oral daily (40mg recommended in cardiology consult 11/27 previously)  - Restart empagliflozin 10mg daily 1/2/25  - Continue to hold spironolactone for now, can consider if needed based on potassium.    Oliguric SAMIR, improving  Mild hyperkalemia  Cr 0.9 on admission. Baseline appears 0.9-1.0. Developed SAMIR initially in setting of diuresis and had been improving. Subsequently increased following transfer to ICU with consideration for prerenal in setting of NPO status.  Again significant increase in creatinine with low Cystatin C FEUrea above 40%.  Concern for possibility of ATN though UA is unremarkable. Overall renal cunftion appears to be improving.  - Strict I/Os  - Trend BMP   - FWF via NGT  - Will restart lasix 1/2/25 with 20mg daily oral; can increase to 40mg if tolerating.  - Continue to hold spironolactone for now, can consider if needed based on potassium.    History of apnea  Family report  apneic episodes after she underwent radiation therapy.  There is a  possibility that she is having obstructive sleep apnea    Diarrhea  Frequent loose stools. Unclear timing of onset so not sure if related to abx or TF initiation. No abdominal pain. C diff on 12/30 neg  - imodium prn    Spiritual health  Pt family requested that pt sees    - consulted for emotional support      Anemia of chronic illness  Iron deficiency anemia  Baseline Hgb 11-12. Currently near baseline. Can consider anemia of chronic illness. Not able to swallow PTA oral iron  - Monitor CBC  - s/p one dose of  IV iron     Generalized deconditioning  Recurrent falls  - PT/OT consults     CREST Syndrome  Characterized by Raynaud's, Calcinosis, GERD and some telangectasia's. Last saw [facility] 2017. No history of ILD.    - Continue protonix 40 mg daily     Family update  Last discussed with daughter Lana by phone on 1/3/25          Diet: Adult Formula Drip Feeding: Continuous TwoCal HN; Nasoduodenal tube; Goal Rate: 35 mL/hr (goal rate); mL/hr    DVT Prophylaxis: DOAC  Miranda Catheter: Not present  Lines: None     Cardiac Monitoring: ACTIVE order. Indication: Bradycardias (48 hours)  Code Status: No CPR- Do NOT Intubate      Clinically Significant Risk Factors               # Hypoalbuminemia: Lowest albumin = 3 g/dL at 12/10/2024  3:11 AM, will monitor as appropriate                 # Severe Malnutrition: based on nutrition assessment    # Financial/Environmental Concerns: none         Social Drivers of Health    Housing Stability: High Risk (11/23/2024)    Housing Stability     Do you have housing? : No     Are you worried about losing your housing?: No          Disposition Plan     Medically Ready for Discharge: Anticipated in 5+ Days         Kristian Beasley MD  Hospitalist Service, [address]  Securely message with [employer] (more info)  Text page via [employer] Paging/Directory   See signed in provider for up to date coverage  information  ______________________________________________________________________    Interval History   No adverse events, given metoprolol IV 10mg total for heart rate control. She remains tired this morning and appears withdrawn but responds to questions.    Physical Exam   Vital Signs: Temp: 97.7  F (36.5  C) Temp src: Axillary BP: 121/78 Pulse: 107   Resp: 20 SpO2: 94 % O2 Device: Oxymask Oxygen Delivery: 3 LPM  Weight: 119 lbs 7.83 oz    General Appearance:  Awake. Alert. No acute distress. Frail appearing.   Respiratory: On nasal cannula crackles and rhonchi bilaterally  Cardiovascular: Irregular. Tachycardic. No obvious murmur.   GI: Nondistended. Normoactive. Soft. Non-tender.   Skin: Warm, dry.     Medical Decision Making       35 MINUTES SPENT BY ME on the date of service doing chart review, history, exam, documentation & further activities per the note.      Data   ------------------------- PAST 24 HR DATA REVIEWED -----------------------------------------------    I have personally reviewed the following data over the past 24 hrs:    N/A  \   N/A   / N/A     138 102 14.6 /  85   4.5 26 0.62 \       Imaging results reviewed over the past 24 hrs:   No results found for this or any previous visit (from the past 24 hours).

## 2025-01-04 NOTE — PLAN OF CARE
Neuro: A&Ox3, intermittently unwilling to answer questions, flat affect/withdrawn.   Cardiac: Afib RVR in 100s-150s. Gave 1 extra dose metoprolol IV. SBP in 110s-130s.   Respiratory: Sating >92% on 2L per oxymask. Requiring assistance w/ suctioning & clearing secretions, oral suctioned x5 overnight.   GI/: Adequate urine output via purewick. No BM overnight.   Diet/appetite: Tolerating tube feeds at 35mL/hr via NG. Denies nausea.   Activity:  Lift assist & Q2H turns, intermittently refuses repositioning.  Pain: Denies pain.   Skin: No new deficits noted. Blanchable redness to sacrum/coccyx.   LDA's: LPIV saline locked.     Plan: Continue with POC. Notify primary team with changes. Swallow study this week.       Goal Outcome Evaluation:                 Outcome Evaluation: 1L per oxymask. Requiring assitance with suctioning, Afib typically in 120s, occ up to 150s throughout shift.

## 2025-01-05 LAB
ANION GAP SERPL CALCULATED.3IONS-SCNC: 10 MMOL/L (ref 7–15)
BASOPHILS # BLD AUTO: 0 10E3/UL (ref 0–0.2)
BASOPHILS NFR BLD AUTO: 0 %
BUN SERPL-MCNC: 17.2 MG/DL (ref 8–23)
CALCIUM SERPL-MCNC: 8.3 MG/DL (ref 8.8–10.4)
CHLORIDE SERPL-SCNC: 100 MMOL/L (ref 98–107)
CREAT SERPL-MCNC: 0.79 MG/DL (ref 0.51–0.95)
EGFRCR SERPLBLD CKD-EPI 2021: 76 ML/MIN/1.73M2
EOSINOPHIL # BLD AUTO: 0.1 10E3/UL (ref 0–0.7)
EOSINOPHIL NFR BLD AUTO: 1 %
ERYTHROCYTE [DISTWIDTH] IN BLOOD BY AUTOMATED COUNT: 22.3 % (ref 10–15)
GLUCOSE SERPL-MCNC: 101 MG/DL (ref 70–99)
HCO3 SERPL-SCNC: 25 MMOL/L (ref 22–29)
HCT VFR BLD AUTO: 36.6 % (ref 35–47)
HGB BLD-MCNC: 10.9 G/DL (ref 11.7–15.7)
IMM GRANULOCYTES # BLD: 0.2 10E3/UL
IMM GRANULOCYTES NFR BLD: 2 %
LYMPHOCYTES # BLD AUTO: 1 10E3/UL (ref 0.8–5.3)
LYMPHOCYTES NFR BLD AUTO: 9 %
MCH RBC QN AUTO: 25.9 PG (ref 26.5–33)
MCHC RBC AUTO-ENTMCNC: 29.8 G/DL (ref 31.5–36.5)
MCV RBC AUTO: 87 FL (ref 78–100)
MONOCYTES # BLD AUTO: 0.8 10E3/UL (ref 0–1.3)
MONOCYTES NFR BLD AUTO: 7 %
NEUTROPHILS # BLD AUTO: 9.5 10E3/UL (ref 1.6–8.3)
NEUTROPHILS NFR BLD AUTO: 82 %
NRBC # BLD AUTO: 0 10E3/UL
NRBC BLD AUTO-RTO: 0 /100
PHOSPHATE SERPL-MCNC: 2.6 MG/DL (ref 2.5–4.5)
PLATELET # BLD AUTO: 234 10E3/UL (ref 150–450)
POTASSIUM SERPL-SCNC: 4.6 MMOL/L (ref 3.4–5.3)
RBC # BLD AUTO: 4.21 10E6/UL (ref 3.8–5.2)
SODIUM SERPL-SCNC: 135 MMOL/L (ref 135–145)
WBC # BLD AUTO: 11.6 10E3/UL (ref 4–11)

## 2025-01-05 PROCEDURE — 94640 AIRWAY INHALATION TREATMENT: CPT

## 2025-01-05 PROCEDURE — 999N000157 HC STATISTIC RCP TIME EA 10 MIN

## 2025-01-05 PROCEDURE — 80048 BASIC METABOLIC PNL TOTAL CA: CPT | Performed by: STUDENT IN AN ORGANIZED HEALTH CARE EDUCATION/TRAINING PROGRAM

## 2025-01-05 PROCEDURE — 85048 AUTOMATED LEUKOCYTE COUNT: CPT | Performed by: STUDENT IN AN ORGANIZED HEALTH CARE EDUCATION/TRAINING PROGRAM

## 2025-01-05 PROCEDURE — 250N000013 HC RX MED GY IP 250 OP 250 PS 637: Performed by: STUDENT IN AN ORGANIZED HEALTH CARE EDUCATION/TRAINING PROGRAM

## 2025-01-05 PROCEDURE — 250N000009 HC RX 250

## 2025-01-05 PROCEDURE — 250N000009 HC RX 250: Performed by: STUDENT IN AN ORGANIZED HEALTH CARE EDUCATION/TRAINING PROGRAM

## 2025-01-05 PROCEDURE — 85014 HEMATOCRIT: CPT | Performed by: STUDENT IN AN ORGANIZED HEALTH CARE EDUCATION/TRAINING PROGRAM

## 2025-01-05 PROCEDURE — 94640 AIRWAY INHALATION TREATMENT: CPT | Mod: 76

## 2025-01-05 PROCEDURE — 99232 SBSQ HOSP IP/OBS MODERATE 35: CPT | Performed by: STUDENT IN AN ORGANIZED HEALTH CARE EDUCATION/TRAINING PROGRAM

## 2025-01-05 PROCEDURE — 82565 ASSAY OF CREATININE: CPT | Performed by: STUDENT IN AN ORGANIZED HEALTH CARE EDUCATION/TRAINING PROGRAM

## 2025-01-05 PROCEDURE — 250N000013 HC RX MED GY IP 250 OP 250 PS 637

## 2025-01-05 PROCEDURE — 84100 ASSAY OF PHOSPHORUS: CPT | Performed by: STUDENT IN AN ORGANIZED HEALTH CARE EDUCATION/TRAINING PROGRAM

## 2025-01-05 PROCEDURE — 36415 COLL VENOUS BLD VENIPUNCTURE: CPT | Performed by: STUDENT IN AN ORGANIZED HEALTH CARE EDUCATION/TRAINING PROGRAM

## 2025-01-05 PROCEDURE — 85004 AUTOMATED DIFF WBC COUNT: CPT | Performed by: STUDENT IN AN ORGANIZED HEALTH CARE EDUCATION/TRAINING PROGRAM

## 2025-01-05 PROCEDURE — 250N000013 HC RX MED GY IP 250 OP 250 PS 637: Performed by: INTERNAL MEDICINE

## 2025-01-05 PROCEDURE — 250N000011 HC RX IP 250 OP 636: Performed by: STUDENT IN AN ORGANIZED HEALTH CARE EDUCATION/TRAINING PROGRAM

## 2025-01-05 PROCEDURE — 82374 ASSAY BLOOD CARBON DIOXIDE: CPT | Performed by: STUDENT IN AN ORGANIZED HEALTH CARE EDUCATION/TRAINING PROGRAM

## 2025-01-05 PROCEDURE — 120N000003 HC R&B IMCU UMMC

## 2025-01-05 PROCEDURE — 82310 ASSAY OF CALCIUM: CPT | Performed by: STUDENT IN AN ORGANIZED HEALTH CARE EDUCATION/TRAINING PROGRAM

## 2025-01-05 RX ORDER — METOPROLOL TARTRATE 1 MG/ML
5 INJECTION, SOLUTION INTRAVENOUS ONCE
Status: COMPLETED | OUTPATIENT
Start: 2025-01-05 | End: 2025-01-05

## 2025-01-05 RX ADMIN — SERTRALINE HYDROCHLORIDE 200 MG: 100 TABLET ORAL at 09:03

## 2025-01-05 RX ADMIN — FUROSEMIDE 20 MG: 20 TABLET ORAL at 09:03

## 2025-01-05 RX ADMIN — IPRATROPIUM BROMIDE 0.5 MG: 0.5 SOLUTION RESPIRATORY (INHALATION) at 20:19

## 2025-01-05 RX ADMIN — ATORVASTATIN CALCIUM 40 MG: 40 TABLET, FILM COATED ORAL at 20:01

## 2025-01-05 RX ADMIN — THIAMINE HCL TAB 100 MG 100 MG: 100 TAB at 09:04

## 2025-01-05 RX ADMIN — DILTIAZEM HYDROCHLORIDE 60 MG: 60 TABLET, FILM COATED ORAL at 00:16

## 2025-01-05 RX ADMIN — IPRATROPIUM BROMIDE 0.5 MG: 0.5 SOLUTION RESPIRATORY (INHALATION) at 16:17

## 2025-01-05 RX ADMIN — BUDESONIDE 0.5 MG: 0.5 INHALANT ORAL at 08:40

## 2025-01-05 RX ADMIN — ACETYLCYSTEINE 2 ML: 200 SOLUTION ORAL; RESPIRATORY (INHALATION) at 20:19

## 2025-01-05 RX ADMIN — LEVOFLOXACIN 500 MG: 5 INJECTION, SOLUTION INTRAVENOUS at 15:07

## 2025-01-05 RX ADMIN — Medication 40 MG: at 09:02

## 2025-01-05 RX ADMIN — ACETYLCYSTEINE 2 ML: 200 SOLUTION ORAL; RESPIRATORY (INHALATION) at 12:35

## 2025-01-05 RX ADMIN — LEVALBUTEROL HYDROCHLORIDE 0.63 MG: 0.63 SOLUTION RESPIRATORY (INHALATION) at 12:35

## 2025-01-05 RX ADMIN — DILTIAZEM HYDROCHLORIDE 60 MG: 60 TABLET, FILM COATED ORAL at 06:00

## 2025-01-05 RX ADMIN — LEVOTHYROXINE SODIUM 100 MCG: 0.05 TABLET ORAL at 09:03

## 2025-01-05 RX ADMIN — ORAL VEHICLES - SUSP 50 MG: SUSPENSION at 20:01

## 2025-01-05 RX ADMIN — GABAPENTIN 600 MG: 250 SOLUTION ORAL at 21:46

## 2025-01-05 RX ADMIN — APIXABAN 5 MG: 5 TABLET, FILM COATED ORAL at 20:01

## 2025-01-05 RX ADMIN — LEVALBUTEROL HYDROCHLORIDE 0.63 MG: 0.63 SOLUTION RESPIRATORY (INHALATION) at 16:17

## 2025-01-05 RX ADMIN — ACETYLCYSTEINE 2 ML: 200 SOLUTION ORAL; RESPIRATORY (INHALATION) at 16:18

## 2025-01-05 RX ADMIN — APIXABAN 5 MG: 5 TABLET, FILM COATED ORAL at 09:03

## 2025-01-05 RX ADMIN — LEVALBUTEROL HYDROCHLORIDE 0.63 MG: 0.63 SOLUTION RESPIRATORY (INHALATION) at 20:19

## 2025-01-05 RX ADMIN — Medication 1 SPRAY: at 09:03

## 2025-01-05 RX ADMIN — DILTIAZEM HYDROCHLORIDE 60 MG: 60 TABLET, FILM COATED ORAL at 12:34

## 2025-01-05 RX ADMIN — IPRATROPIUM BROMIDE 0.5 MG: 0.5 SOLUTION RESPIRATORY (INHALATION) at 08:40

## 2025-01-05 RX ADMIN — ACETAMINOPHEN 650 MG: 325 TABLET, FILM COATED ORAL at 15:22

## 2025-01-05 RX ADMIN — METOPROLOL TARTRATE 5 MG: 5 INJECTION INTRAVENOUS at 09:30

## 2025-01-05 RX ADMIN — ORAL VEHICLES - SUSP 50 MG: SUSPENSION at 09:08

## 2025-01-05 RX ADMIN — DILTIAZEM HYDROCHLORIDE 60 MG: 60 TABLET, FILM COATED ORAL at 20:01

## 2025-01-05 RX ADMIN — LEVALBUTEROL HYDROCHLORIDE 0.63 MG: 0.63 SOLUTION RESPIRATORY (INHALATION) at 08:40

## 2025-01-05 RX ADMIN — EMPAGLIFLOZIN 10 MG: 10 TABLET, FILM COATED ORAL at 09:03

## 2025-01-05 RX ADMIN — THERA TABS 1 TABLET: TAB at 09:03

## 2025-01-05 RX ADMIN — IPRATROPIUM BROMIDE 0.5 MG: 0.5 SOLUTION RESPIRATORY (INHALATION) at 12:35

## 2025-01-05 RX ADMIN — ACETYLCYSTEINE 2 ML: 200 SOLUTION ORAL; RESPIRATORY (INHALATION) at 08:40

## 2025-01-05 RX ADMIN — FEXOFENADINE HYDROCHLORIDE 120 MG: 60 TABLET ORAL at 09:03

## 2025-01-05 RX ADMIN — BUDESONIDE 0.5 MG: 0.5 INHALANT ORAL at 20:19

## 2025-01-05 ASSESSMENT — ACTIVITIES OF DAILY LIVING (ADL)
ADLS_ACUITY_SCORE: 72
ADLS_ACUITY_SCORE: 74
ADLS_ACUITY_SCORE: 74
ADLS_ACUITY_SCORE: 72
ADLS_ACUITY_SCORE: 74
ADLS_ACUITY_SCORE: 76
ADLS_ACUITY_SCORE: 74
ADLS_ACUITY_SCORE: 76
ADLS_ACUITY_SCORE: 74
ADLS_ACUITY_SCORE: 72
ADLS_ACUITY_SCORE: 76
ADLS_ACUITY_SCORE: 76
ADLS_ACUITY_SCORE: 74
ADLS_ACUITY_SCORE: 74
ADLS_ACUITY_SCORE: 76
ADLS_ACUITY_SCORE: 74

## 2025-01-05 NOTE — PLAN OF CARE
Neuro: A&Ox4. Forgetful  Cardiac: Afib, -120s. Intermittently jumping to 150s without sustaining. Metoprolol available PRN for HR sustaining >140. Increased metoprolol oral dose.  Respiratory: Sating >93% on 2.5L nc. Requiring frequent oral suctioning with yanker and 14Fr catheter.  GI/: Adequate urine output, incontinent of urine and stool. Purewick in place but leaking.   Diet/appetite: NPO, TF via NJ at 35mL/hr with 30mL FWF Q 4 hours. Meds crushed and via NJ  Activity:  Lift assist, turn/repo Q 2 hours. Up to recliner for several hours.  Pain: At acceptable level on current regimen. C/o R toe pain that was relieved by tylenol  Skin: No new deficits noted. Redness blanchable on bottom, mepilex applied and barrier cream  LDA's: L PIV- SL. NJ    Plan: Swallow eval Monday, then potentially VSS if appropriate. Notify primary team with changes.      Goal Outcome Evaluation:           Overall Patient Progress: no changeOverall Patient Progress: no change    Outcome Evaluation: 2.5L via nasal cannula, c/o new R toe pain, tylenol given. Afib throughout shift 100-120s occasionally up to 150 w/o sustaining

## 2025-01-05 NOTE — PROGRESS NOTES
[facility]    Medicine Progress Note - Hospitalist Service, GOLD TEAM 8    Date of Admission:  11/21/2024    Assessment & Plan   Asya Coffman is a 79 year old female admitted on 11/21/2024. 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 Initially admitted to hospital medicine service with hypoxic respiratory failure suspected due to HFpEF requiring diuresis. Developed acute hypoxia on 12/8 secondary to suspected aspiration pneumonitis for which she was intubated (12/8-12/10). Now s/p extubation and transferred back to hospital medicine service. Ongoing evaluation related to aspiration, currently tolerated continuous tube feeds via NJ, with improving respiratory status.     Updates today:  -Plan for SLP reassessment for AVSS on Monday/Tuesday  -Increased oral metoprolol yesterday, persistent AF with RVR, will give IV metop today and continue to assess for increasing oral rate control. Improvement to HR .    Acute hypoxic respiratory failure  Recurrent Aspiration pneumonitis with aspiration pneumonia  MSSA PNA s/p abx (12/3-12/8)  Obstructive lung disease (COPD vs asthma)  Pulmonary hypertension   Presented on 11/21 with acute hypoxic respiratory failure; initially suspected to be secondary to HFpEF exacerbation; unresolved with diuresis. Underlying obstructive lung disease but no evidence of exacerbation here. Was treated for MSSA pneumonia. Overnight 12/8, developed sudden worsening of oxygenation and increased secretions in the setting of dysphagia/recent laryngeal radiation and was intubated. Suspected aspiration pneumonitis as etiology. Was briefly on zosyn but this was discontinued.  She was extubated on 12/10.  Multiple  episodes of worsening of hypoxia with evidence of aspiration and intolerance to tube feeding.  CT 12/27 showed increasing basilar predominant bronchial wall thickening and diffuse groundglass attenuation suspicious of ongoing infectious process. Continues to have have  excessive oral secretion and episodes of aspiration needing frequent suctioning.  -Antibiotics: Zosyn 12/28-12/28 then Unasyn 12/28-1/2/25. Transitioned onto levofloxacin on 1/2/25 related to microbiology from sputum smear returning with resistant klebsiella. Plan for a 5 day course ending on 1/6/25.  -Respiratory support:  -Continue oxygen titration as needed  -Budesonide neb 0.5 mg BID,  -ipratropium-levalbuterol neb QID  -albuteraol Q4H PRN  -Mucomyst  -Aspiration precautions; n.p.o.  -Flutter valve, incentive spirometery  - Dysphagia management as below.  - Palliative care consult, recs appreciated    Failure to thrive  Aspiration risk  Acute severe oropharyngeal dysphagia  Esophageal dysmotility  Concern for radiation-induced dysphagia  Laryngeal squamous cell carcinoma s/p radiation (4/2024-7/2024)  Concern for gastroparesis  Has had increased dysphagia in the setting of laryngeal squamous cell carcinoma s/p radiation (4/2024-7/2024). Family reports that over the past few months has declined dramatically from being quite mobile, active to being barely active, with 4 falls in 3 months progressively reduced speech and recurrent aspirations. ENT evaluated 12/16/24 with bedside laryngoscopy for dysphagia. Normal vocal cord function and control seen including ability to approximate cords during cough. No anatomic explanations for dysphagia seen. Evaluated by GI and there is no concern of esophageal web. Clinical picture consistent with significant muscle weakness contributing to oropharyngeal dysphagia, poor vocal pressure, poor cough effort likely due to radiation. No evidence of active rheumatologic condition contributing per rheumatology  evaluation. CK, aldolase negative. Myasthenia panel negative.  Cortisol within normal limit.  Unclear if hypothyroidism is the cause but can be contributing. Doses adjusted as below. Cervical thoracic MRI without significant findings.   - SLP following  - NPO for now, continue TFs via NJ   - VFSS as soon as able  - GJ placement is within goals of care of patient. However:  IR consulted for G-tube placement but did not feel safe to place with IR 12/30  GI also consulted and did not feel that it is safe on evaluation 12/30; some concern for secretion management leading to aspirations.   -Continue speech therapy and see swallowing and secretion containment improves    At risk for refeeding syndrome  Severe malnutrition in context of chronic illness/disease  Per daughter, baseline weight around 120-130 lbs. Admitted 102 lbs. Patient states she is eating less and has less appetite in general as well as having difficulty swallowing. While NPO during intubation, developed starvation ketosis. Given high risk for aspiration with PO intake, placed post pyloric  and started tube feeds. Will need close monitoring for refeeding syndrome.   - NPO   - RD following for TF management  - Monitor for refeeding syndrome   - Lyte replacement protocol   - Thiamine replacement     Chronic afib  Afib with RVR  [id number] (age++, HF+)  Has episodes of A-fib with RVR. Has also episodes of bradycardia with  PTA dose of diltiazem and metoprolol  - PTA apixaban 5mg BID  - Diltiazem 60mg q6H as tolerated by blood pressure give bradycardia and soft BP  - Metoprolol to tartrate to 25 mg twice daily  - Goal K above 4 and Mg above 2    Anxiety  Depression  History of depression and anxiety.  She feels hopeless and has sad mood during this hospitalization.  Will continue PTA sertraline.  Will also consult health psychology.  -Increased PTA Sertraline 200 mg daily     Chronic Left parietal, left thalamic lacunar strokes  - will increase  atorvastatin to high intensity (20-> 40 mg). On DOAC  - A1C shows prediabetes     Hypothyroidism  Hx of thyroidectomy 2/2 cancer   - Continue PTA levothyroxine 88 mcg daily   - Consulted Endocrine service as above and increased dose to 100 mcg daily. Appreciate recommendations    HFpEF (LVEF 55-60% 11/22/2024)   Pulmonary hypertension (44 mmHg per echo 11/22/2024)  Possible small PFO  Echo during current admission (11/22/2024) notable for increased thickness of LV and elevated BNP (peak 9200 > 6200) concerning for heart failure exacerbation s/p diuresis and pulmonary hypertension with estimated pulmonary artery pressure of 45 mmHg. Echo with bubble study (11/25/2024) concerning for possible small PFO. With intubation on 12/8, developed hypotension without evidence of shock. Etiology of hypotension may be cardiogenic (e.g. exacerbation of pulmonary hypertension by positive pressure ventilation) vs medication associated (propofol, precedex); Weaned off pressors 12/10.   - Preload dependent in the setting of pulmonary hypertension, gentle diuresis as needed   - Restart lasix 1/3 with 20mg oral daily (40mg recommended in cardiology consult 11/27 previously)  - Restart empagliflozin 10mg daily 1/2/25  - Continue to hold spironolactone for now, can consider if needed based on potassium.    Oliguric SAMIR, improving  Mild hyperkalemia  Cr 0.9 on admission. Baseline appears 0.9-1.0. Developed SAMIR initially in setting of diuresis and had been improving. Subsequently increased following transfer to ICU with consideration for prerenal in setting of NPO status.  Again significant increase in creatinine with low Cystatin C FEUrea above 40%.  Concern for possibility of ATN though UA is unremarkable. Overall renal cunftion appears to be improving.  - Strict I/Os  - Trend BMP   - FWF via NGT  - Will restart lasix 1/2/25 with 20mg daily oral; can increase to 40mg if tolerating.  - Continue to hold spironolactone for now, can consider  if needed based on potassium.    History of apnea  Family report  apneic episodes after she underwent radiation therapy.  There is a possibility that she is having obstructive sleep apnea    Diarrhea  Frequent loose stools. Unclear timing of onset so not sure if related to abx or TF initiation. No abdominal pain. C diff on 12/30 neg  - imodium prn    Spiritual health  Pt family requested that pt sees    - consulted for emotional support      Anemia of chronic illness  Iron deficiency anemia  Baseline Hgb 11-12. Currently near baseline. Can consider anemia of chronic illness. Not able to swallow PTA oral iron  - Monitor CBC  - s/p one dose of  IV iron     Generalized deconditioning  Recurrent falls  - PT/OT consults     CREST Syndrome  Characterized by Raynaud's, Calcinosis, GERD and some telangectasia's. Last saw [facility] 2017. No history of ILD.    - Continue protonix 40 mg daily     Family update  Last discussed with daughter Lana by phone on 1/5/25          Diet: Adult Formula Drip Feeding: Continuous TwoCal HN; Nasoduodenal tube; Goal Rate: 35 mL/hr (goal rate); mL/hr    DVT Prophylaxis: DOAC  Miranda Catheter: Not present  Lines: None     Cardiac Monitoring: ACTIVE order. Indication: Tachyarrhythmias, acute (48 hours)  Code Status: No CPR- Do NOT Intubate      Clinically Significant Risk Factors               # Hypoalbuminemia: Lowest albumin = 3 g/dL at 12/10/2024  3:11 AM, will monitor as appropriate                 # Severe Malnutrition: based on nutrition assessment    # Financial/Environmental Concerns: none         Social Drivers of Health    Housing Stability: High Risk (11/23/2024)    Housing Stability     Do you have housing? : No     Are you worried about losing your housing?: No          Disposition Plan     Medically Ready for Discharge: Anticipated in 2-4 Days         Kristian Beasley MD  Hospitalist Service, GOLD TEAM 8  [facility]  [facility]  Securely message with [employer] (more info)  Text page via [facility] Paging/Directory   See signed in provider for up to date coverage information  ______________________________________________________________________    Interval History   No adverse events. Feels tired but overall a little better today. Discussed plans for speech/swallow to re-evaluate in coming couple of days.     Physical Exam   Vital Signs: Temp: 97.6  F (36.4  C) Temp src: Axillary BP: 102/74 Pulse: (!) 128   Resp: 22 SpO2: 94 % O2 Device: Nasal cannula Oxygen Delivery: 2 LPM  Weight: 119 lbs 7.83 oz    General Appearance:  Awake. Alert. No acute distress. Frail appearing.   Respiratory: On nasal cannula crackles and rhonchi bilaterally  Cardiovascular: Irregular. Tachycardic. No obvious murmur.   GI: Nondistended. Normoactive. Soft. Non-tender.   Skin: Warm, dry.     Medical Decision Making             Data

## 2025-01-05 NOTE — PLAN OF CARE
Neuro: A&Ox4. Flat, withdrawn  Cardiac: Afib, HR 80s-120s, occasionally up to 150 nonsustained. BP stable.   Respiratory: Sating >92% on 2.5L per NC. Oral suction once overnight.   GI/: Adequate urine output via purewick. No BM overnight.   Diet/appetite: Tolerating tube feeds at 35mL/hr via NJ. Denies nausea. NPO.   Activity:  Lift assist, not OOB. Repositioning Q2H.  Pain: At acceptable level on current regimen. Denies pain  Skin: No new deficits noted.  LDA's: LPIV saline locked.     Plan: Continue with POC. Notify primary team with changes.Swallow study mon or Tuesday.      Goal Outcome Evaluation:      Plan of Care Reviewed With: patient    Overall Patient Progress: improvingOverall Patient Progress: improving    Outcome Evaluation: 2.5L via nasal cannula, HR 80s-120s, occasionally up to 150 nonsustained. AFib throughout shift. Lung sounds improving.

## 2025-01-06 LAB
ANION GAP SERPL CALCULATED.3IONS-SCNC: 10 MMOL/L (ref 7–15)
BUN SERPL-MCNC: 19.4 MG/DL (ref 8–23)
CALCIUM SERPL-MCNC: 8.6 MG/DL (ref 8.8–10.4)
CHLORIDE SERPL-SCNC: 98 MMOL/L (ref 98–107)
CREAT SERPL-MCNC: 0.85 MG/DL (ref 0.51–0.95)
EGFRCR SERPLBLD CKD-EPI 2021: 70 ML/MIN/1.73M2
ERYTHROCYTE [DISTWIDTH] IN BLOOD BY AUTOMATED COUNT: 21.9 % (ref 10–15)
GLUCOSE BLDC GLUCOMTR-MCNC: 91 MG/DL (ref 70–99)
GLUCOSE SERPL-MCNC: 111 MG/DL (ref 70–99)
HCO3 SERPL-SCNC: 26 MMOL/L (ref 22–29)
HCT VFR BLD AUTO: 32.5 % (ref 35–47)
HGB BLD-MCNC: 9.7 G/DL (ref 11.7–15.7)
MCH RBC QN AUTO: 25.6 PG (ref 26.5–33)
MCHC RBC AUTO-ENTMCNC: 29.8 G/DL (ref 31.5–36.5)
MCV RBC AUTO: 86 FL (ref 78–100)
PHOSPHATE SERPL-MCNC: 2.5 MG/DL (ref 2.5–4.5)
PLATELET # BLD AUTO: 221 10E3/UL (ref 150–450)
POTASSIUM SERPL-SCNC: 4.4 MMOL/L (ref 3.4–5.3)
RBC # BLD AUTO: 3.79 10E6/UL (ref 3.8–5.2)
SODIUM SERPL-SCNC: 134 MMOL/L (ref 135–145)
WBC # BLD AUTO: 7.7 10E3/UL (ref 4–11)

## 2025-01-06 PROCEDURE — 84295 ASSAY OF SERUM SODIUM: CPT | Performed by: STUDENT IN AN ORGANIZED HEALTH CARE EDUCATION/TRAINING PROGRAM

## 2025-01-06 PROCEDURE — 94640 AIRWAY INHALATION TREATMENT: CPT | Mod: 76

## 2025-01-06 PROCEDURE — 36415 COLL VENOUS BLD VENIPUNCTURE: CPT | Performed by: STUDENT IN AN ORGANIZED HEALTH CARE EDUCATION/TRAINING PROGRAM

## 2025-01-06 PROCEDURE — 250N000009 HC RX 250

## 2025-01-06 PROCEDURE — 999N000157 HC STATISTIC RCP TIME EA 10 MIN

## 2025-01-06 PROCEDURE — 250N000013 HC RX MED GY IP 250 OP 250 PS 637

## 2025-01-06 PROCEDURE — 92526 ORAL FUNCTION THERAPY: CPT | Mod: GN

## 2025-01-06 PROCEDURE — 97535 SELF CARE MNGMENT TRAINING: CPT | Mod: GO | Performed by: OCCUPATIONAL THERAPIST

## 2025-01-06 PROCEDURE — 97110 THERAPEUTIC EXERCISES: CPT | Mod: GP

## 2025-01-06 PROCEDURE — 82374 ASSAY BLOOD CARBON DIOXIDE: CPT | Performed by: STUDENT IN AN ORGANIZED HEALTH CARE EDUCATION/TRAINING PROGRAM

## 2025-01-06 PROCEDURE — 94640 AIRWAY INHALATION TREATMENT: CPT

## 2025-01-06 PROCEDURE — 80051 ELECTROLYTE PANEL: CPT | Performed by: STUDENT IN AN ORGANIZED HEALTH CARE EDUCATION/TRAINING PROGRAM

## 2025-01-06 PROCEDURE — 120N000003 HC R&B IMCU UMMC

## 2025-01-06 PROCEDURE — 250N000013 HC RX MED GY IP 250 OP 250 PS 637: Performed by: INTERNAL MEDICINE

## 2025-01-06 PROCEDURE — 97530 THERAPEUTIC ACTIVITIES: CPT | Mod: GP

## 2025-01-06 PROCEDURE — 99232 SBSQ HOSP IP/OBS MODERATE 35: CPT | Performed by: STUDENT IN AN ORGANIZED HEALTH CARE EDUCATION/TRAINING PROGRAM

## 2025-01-06 PROCEDURE — 250N000009 HC RX 250: Performed by: STUDENT IN AN ORGANIZED HEALTH CARE EDUCATION/TRAINING PROGRAM

## 2025-01-06 PROCEDURE — 85027 COMPLETE CBC AUTOMATED: CPT | Performed by: STUDENT IN AN ORGANIZED HEALTH CARE EDUCATION/TRAINING PROGRAM

## 2025-01-06 PROCEDURE — 250N000013 HC RX MED GY IP 250 OP 250 PS 637: Performed by: STUDENT IN AN ORGANIZED HEALTH CARE EDUCATION/TRAINING PROGRAM

## 2025-01-06 PROCEDURE — 250N000013 HC RX MED GY IP 250 OP 250 PS 637: Performed by: HOSPITALIST

## 2025-01-06 PROCEDURE — 80048 BASIC METABOLIC PNL TOTAL CA: CPT | Performed by: STUDENT IN AN ORGANIZED HEALTH CARE EDUCATION/TRAINING PROGRAM

## 2025-01-06 PROCEDURE — 84100 ASSAY OF PHOSPHORUS: CPT | Performed by: STUDENT IN AN ORGANIZED HEALTH CARE EDUCATION/TRAINING PROGRAM

## 2025-01-06 PROCEDURE — 250N000011 HC RX IP 250 OP 636: Performed by: STUDENT IN AN ORGANIZED HEALTH CARE EDUCATION/TRAINING PROGRAM

## 2025-01-06 RX ORDER — LEVOFLOXACIN 5 MG/ML
250 INJECTION, SOLUTION INTRAVENOUS EVERY 24 HOURS
Status: COMPLETED | OUTPATIENT
Start: 2025-01-06 | End: 2025-01-06

## 2025-01-06 RX ADMIN — LEVALBUTEROL HYDROCHLORIDE 0.63 MG: 0.63 SOLUTION RESPIRATORY (INHALATION) at 17:24

## 2025-01-06 RX ADMIN — APIXABAN 5 MG: 5 TABLET, FILM COATED ORAL at 20:23

## 2025-01-06 RX ADMIN — LEVALBUTEROL HYDROCHLORIDE 0.63 MG: 0.63 SOLUTION RESPIRATORY (INHALATION) at 21:29

## 2025-01-06 RX ADMIN — LEVALBUTEROL HYDROCHLORIDE 0.63 MG: 0.63 SOLUTION RESPIRATORY (INHALATION) at 07:58

## 2025-01-06 RX ADMIN — Medication 1 SPRAY: at 17:50

## 2025-01-06 RX ADMIN — ACETYLCYSTEINE 2 ML: 200 SOLUTION ORAL; RESPIRATORY (INHALATION) at 12:18

## 2025-01-06 RX ADMIN — DILTIAZEM HYDROCHLORIDE 60 MG: 60 TABLET, FILM COATED ORAL at 12:25

## 2025-01-06 RX ADMIN — SERTRALINE HYDROCHLORIDE 200 MG: 100 TABLET ORAL at 09:24

## 2025-01-06 RX ADMIN — ACETYLCYSTEINE 2 ML: 200 SOLUTION ORAL; RESPIRATORY (INHALATION) at 17:24

## 2025-01-06 RX ADMIN — THIAMINE HCL TAB 100 MG 100 MG: 100 TAB at 09:27

## 2025-01-06 RX ADMIN — LEVALBUTEROL HYDROCHLORIDE 0.63 MG: 0.63 SOLUTION RESPIRATORY (INHALATION) at 12:18

## 2025-01-06 RX ADMIN — Medication 1 SPRAY: at 12:25

## 2025-01-06 RX ADMIN — FEXOFENADINE HYDROCHLORIDE 120 MG: 60 TABLET ORAL at 09:24

## 2025-01-06 RX ADMIN — GABAPENTIN 600 MG: 250 SOLUTION ORAL at 22:35

## 2025-01-06 RX ADMIN — IPRATROPIUM BROMIDE 0.5 MG: 0.5 SOLUTION RESPIRATORY (INHALATION) at 17:24

## 2025-01-06 RX ADMIN — LEVOFLOXACIN 250 MG: 5 INJECTION, SOLUTION INTRAVENOUS at 14:30

## 2025-01-06 RX ADMIN — IPRATROPIUM BROMIDE 0.5 MG: 0.5 SOLUTION RESPIRATORY (INHALATION) at 21:29

## 2025-01-06 RX ADMIN — ATORVASTATIN CALCIUM 40 MG: 40 TABLET, FILM COATED ORAL at 20:23

## 2025-01-06 RX ADMIN — ACETYLCYSTEINE 2 ML: 200 SOLUTION ORAL; RESPIRATORY (INHALATION) at 21:29

## 2025-01-06 RX ADMIN — EMPAGLIFLOZIN 10 MG: 10 TABLET, FILM COATED ORAL at 09:24

## 2025-01-06 RX ADMIN — ORAL VEHICLES - SUSP 50 MG: SUSPENSION at 09:25

## 2025-01-06 RX ADMIN — DILTIAZEM HYDROCHLORIDE 60 MG: 60 TABLET, FILM COATED ORAL at 00:53

## 2025-01-06 RX ADMIN — Medication 1 SPRAY: at 20:27

## 2025-01-06 RX ADMIN — IPRATROPIUM BROMIDE 0.5 MG: 0.5 SOLUTION RESPIRATORY (INHALATION) at 12:18

## 2025-01-06 RX ADMIN — FUROSEMIDE 20 MG: 20 TABLET ORAL at 09:25

## 2025-01-06 RX ADMIN — ORAL VEHICLES - SUSP 50 MG: SUSPENSION at 20:23

## 2025-01-06 RX ADMIN — APIXABAN 5 MG: 5 TABLET, FILM COATED ORAL at 09:25

## 2025-01-06 RX ADMIN — Medication 1 SPRAY: at 09:27

## 2025-01-06 RX ADMIN — Medication 40 MG: at 09:24

## 2025-01-06 RX ADMIN — DILTIAZEM HYDROCHLORIDE 60 MG: 60 TABLET, FILM COATED ORAL at 06:11

## 2025-01-06 RX ADMIN — ACETYLCYSTEINE 2 ML: 200 SOLUTION ORAL; RESPIRATORY (INHALATION) at 07:58

## 2025-01-06 RX ADMIN — IPRATROPIUM BROMIDE 0.5 MG: 0.5 SOLUTION RESPIRATORY (INHALATION) at 07:58

## 2025-01-06 RX ADMIN — BUDESONIDE 0.5 MG: 0.5 INHALANT ORAL at 21:29

## 2025-01-06 RX ADMIN — LEVOTHYROXINE SODIUM 100 MCG: 0.05 TABLET ORAL at 09:25

## 2025-01-06 RX ADMIN — THERA TABS 1 TABLET: TAB at 09:27

## 2025-01-06 RX ADMIN — DILTIAZEM HYDROCHLORIDE 60 MG: 60 TABLET, FILM COATED ORAL at 20:23

## 2025-01-06 ASSESSMENT — ACTIVITIES OF DAILY LIVING (ADL)
ADLS_ACUITY_SCORE: 72
ADLS_ACUITY_SCORE: 66
ADLS_ACUITY_SCORE: 72
ADLS_ACUITY_SCORE: 66
ADLS_ACUITY_SCORE: 72
ADLS_ACUITY_SCORE: 66
ADLS_ACUITY_SCORE: 66
ADLS_ACUITY_SCORE: 72
ADLS_ACUITY_SCORE: 66
ADLS_ACUITY_SCORE: 72
ADLS_ACUITY_SCORE: 66
ADLS_ACUITY_SCORE: 72

## 2025-01-06 NOTE — PROGRESS NOTES
CLINICAL NUTRITION SERVICES - REASSESSMENT NOTE     Nutrition Prescription    RECOMMENDATIONS FOR MDs/PROVIDERS TO ORDER:  Monitoring POC/ability to safely obtain long-term EN access (PEGJ)    Malnutrition Status:    Severe malnutrition in the context of chronic disease     Recommendations already ordered by Registered Dietitian (RD):  Increase TF rate to 38 mL/hr w/ plan to begin holding TFs 1-hr before/after Synthroid     Enteral Nutrition Regimen:  Dosing weight: 47 kg   EN access: NDT  Regimen: Two Amadou HN (or equivalent) at rate of 38 mL/hr x 22 hours (held 1 hr before/after Synthroid) to provide: 836 mL formula, 1672 Kcals (36 Kcal/kg), 70 gm protein (1.5 gm/kg), 183 gm CHO, 4 gm fiber, and 585 mL free water daily   Flushes: 30 mL Q4hrs     Future/Additional Recommendations:  Monitor nutrition-related findings and follow pt per protocol     EVALUATION OF THE PROGRESS TOWARD GOALS   Diet: NPO    Nutrition Support:   -Dosing weight: 47 kg (admit wt)   -EN access via NDT  -Regimen:   12/10-13: Novasource Renal at 30 ml/hr (720 ml) + 1 Prosource TF20 provides 1520 kcal (30 kcal/kg), 85 g pro (1.7 g/kg), 132 g CHO, 516 ml free water, and 0 g fiber daily.   12/13-12/25: [employer] Peptide 1.5 (or equivalent) at 45 mL/hr (1080 mL/day) to provide 1661 kcal (33 kcal/kg), 80 g protein (1.6 g/kg), 149 g CHO, 16 g fiber, 83 g fat (40% from MCTs), and 756 mL free water daily      12/30-current: TwoCal HN at 35 mL/hr (840 mL/day) to provide 1680 kcals (36 kcals/kg/day), 71 g PRO (1.5 g/kg/day), 588 mL H2O, 184 g CHO and 4 g Fiber daily.      -FWF 30 mL Q4hrs (180 mL/day) + 588 mL from TF for total free water provision of 768 mL/day    EN Intake - Average 7-day TF intake: 655 mL formula,  1310 Kcals (28 Kcal/kg), and 55 g pro (1.2 g/kg). This is meeting 93% of low-end est Kcal needs, and 100% of low-end est protein needs.      NEW FINDINGS   General:  Tolerating enteral feeds with post-pyloric feeding tube, at goal  rate.  Difficulty determining safe plan for long-term post-pyloric FT placement (GOC support GJ placement but determined not safe to place with IR 12/30, and GI Team did not feel placement safe 12/30).   SLP following; remains NPO with difficulty managing her own secretions.   Pt tolerating TFs well without concern. Will begin holding TFs around Synthroid starting tomorrow.     Weights:  Weight trends variable throughout admission; most measures obtained via bed-scale. Admit weight 46.5 kg (102 lb 8.2 oz) and current weight 54.2 kg (119 lb 7.8 oz).     LABS: Reviewed  Electrolytes  Potassium (mmol/L)   Date Value   01/06/2025 4.4   01/05/2025 4.6   01/04/2025 4.5     Potassium POCT (mmol/L)   Date Value   12/08/2024 4.5   04/02/2023 3.7     Phosphorus (mg/dL)   Date Value   01/06/2025 2.5   01/05/2025 2.6   01/04/2025 2.1 (L)   01/03/2025 3.3   01/02/2025 1.6 (L)    Blood Glucose  Glucose (mg/dL)   Date Value   01/06/2025 111 (H)   01/05/2025 101 (H)   01/04/2025 85   01/03/2025 90   01/02/2025 102 (H)     GLUCOSE BY METER POCT (mg/dL)   Date Value   12/29/2024 118 (H)   12/28/2024 152 (H)   12/28/2024 153 (H)   12/27/2024 117 (H)   12/27/2024 85     Hemoglobin A1C (%)   Date Value   12/19/2024 5.8 (H)   01/24/2024 6.0 (H)    Inflammatory Markers  WBC Count (10e3/uL)   Date Value   01/06/2025 7.7   01/05/2025 11.6 (H)   01/03/2025 13.0 (H)     Albumin (g/dL)   Date Value   12/19/2024 3.3 (L)   12/11/2024 3.1 (L)   12/11/2024 3.1 (L)      Magnesium (mg/dL)   Date Value   01/03/2025 1.8   01/01/2025 1.8   12/31/2024 1.9     Sodium (mmol/L)   Date Value   01/06/2025 134 (L)   01/05/2025 135   01/04/2025 138    Renal  Urea Nitrogen (mg/dL)   Date Value   01/06/2025 19.4   01/05/2025 17.2   01/04/2025 14.6     Creatinine (mg/dL)   Date Value   01/06/2025 0.85   01/05/2025 0.79   01/04/2025 0.62     Additional  Triglycerides (mg/dL)   Date Value   12/20/2024 99   12/19/2024 100     Ketones Urine (mg/dL)   Date Value    12/28/2024 Negative        GI:  Last BM: today  Trending 1-4 unmeasured stool occurences per day, based on I/O review  GI concerns: Need for safe long-term GJ placement     Pertinent Medications  Vit D3 50,000 units weekly  Sai Oliva (held)  Lasix  Synthroid (not currently holding w/ TFs but will consider now that TFs at goal)  Thera-Vit daily   Thiamine 100 mg daily   Bowel meds: PRN Senokot    SKIN:  WOCN not following pt    MALNUTRITION  % Intake: No decreased intake noted  % Weight Loss: > 10% in 6 months (severe malnutrition)  - > 10% in 6 months (severe malnutrition) PTA   Subcutaneous Fat Loss: Severe global continues  Muscle Loss: Severe global continues  Fluid Accumulation/Edema: Does not meet criteria  Malnutrition Diagnosis: Severe malnutrition in the context of chronic illness    Previous Goals   Total avg nutritional intake to meet a minimum of 30 kcal/kg and 1.2 g PRO/kg daily (per updated dosing wt 47 kg).  Evaluation: Progressing    Previous Nutrition Diagnosis  Inadequate protein-energy intake related to inadequate EN infusion as evidenced by TFs held/off since 12/25 due to concern for aspiration, N/V and awaiting post-pyloric EN access     Evaluation: Improving    CURRENT NUTRITION DIAGNOSIS  Inadequate oral intake related to dysphagia as evidenced by NPO per SLP, TFs as primary nutrition source      INTERVENTIONS  Implementation  Collaboration with other providers - bedside RN, PharmD   Enteral Nutrition - Rate increase, begin holding TFs 1 hr before/after Synthroid    Goals  Total avg nutritional intake to meet a minimum of 30 kcal/kg and 1.2 g PRO/kg daily (per dosing wt 47 kg).    Monitoring/Evaluation  Progress toward goals will be monitored and evaluated per protocol.    Scar Butt, MS, RDN, LD, CNSC  Available by [facility] or phone   Vocmacey: M-F (7:00-3:30)  6B Clinical Dietitian   Weekend/Holiday (7:00-3:30) - Weekend Clinical Dietitian   6B RD desk: [phone]        **Clinical Dietitians are no longer available by pager.

## 2025-01-06 NOTE — PROGRESS NOTES
[facility]    Medicine Progress Note - Hospitalist Service, GOLD TEAM 8    Date of Admission:  11/21/2024    Assessment & Plan   Asya Coffman is a 79 year old female admitted on 11/21/2024. 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 Initially admitted to hospital medicine service with hypoxic respiratory failure suspected due to HFpEF requiring diuresis. Developed acute hypoxia on 12/8 secondary to suspected aspiration pneumonitis for which she was intubated (12/8-12/10). Now s/p extubation and transferred back to hospital medicine service. Ongoing evaluation related to aspiration, currently tolerated continuous tube feeds via NJ, with improving respiratory status.     Updates today:  -Plan for SLP reassessment for VFSS on Monday/Tuesday  -Increased oral metoprolol yesterday, persistent AF with RVR, will give IV metop today and continue to assess for increasing oral rate control. Improvement to HR 80-100bmp.  -Completed levothyroxine course for aspiration PNA today.    Acute hypoxic respiratory failure  Recurrent Aspiration pneumonitis with aspiration pneumonia  MSSA PNA s/p abx (12/3-12/8)  Obstructive lung disease (COPD vs asthma)  Pulmonary hypertension   Presented on 11/21 with acute hypoxic respiratory failure; initially suspected to be secondary to HFpEF exacerbation; unresolved with diuresis. Underlying obstructive lung disease but no evidence of exacerbation here. Was treated for MSSA pneumonia. Overnight 12/8, developed sudden worsening of oxygenation and increased secretions in the setting of dysphagia/recent laryngeal radiation and was intubated. Suspected aspiration pneumonitis as etiology. Was briefly on zosyn but this  was discontinued.  She was extubated on 12/10.  Multiple episodes of worsening of hypoxia with evidence of aspiration and intolerance to tube feeding.  CT 12/27 showed increasing basilar predominant bronchial wall thickening and diffuse groundglass attenuation suspicious of ongoing infectious process. Continues to have have excessive oral secretion and episodes of aspiration needing frequent suctioning, though intermittently improves.  -Antibiotics: Zosyn 12/28-12/28 then Unasyn 12/28-1/2/25. Transitioned onto levofloxacin on 1/2/25 related to microbiology from sputum smear returning with resistant klebsiella. Plan for a 5 day course ending on 1/6/25.  -Respiratory support:  -Continue oxygen titration as needed  -Budesonide neb 0.5 mg BID,  -Ipratropium-levalbuterol neb QID  -albuteraol Q4H PRN  -Mucomyst  -Aspiration precautions; n.p.o.  -Flutter valve, incentive spirometery  - Dysphagia management as below.  - Palliative care consult, recs appreciated    Failure to thrive  Aspiration risk  Acute severe oropharyngeal dysphagia  Esophageal dysmotility  Concern for radiation-induced dysphagia  Laryngeal squamous cell carcinoma s/p radiation (4/2024-7/2024)  Concern for gastroparesis  Has had increased dysphagia in the setting of laryngeal squamous cell carcinoma s/p radiation (4/2024-7/2024). Family reports that over the past few months has declined dramatically from being quite mobile, active to being barely active, with 4 falls in 3 months progressively reduced speech and recurrent aspirations. ENT evaluated 12/16/24 with bedside laryngoscopy for dysphagia. Normal vocal cord function and control seen including ability to approximate cords during cough. No anatomic explanations for dysphagia seen. Evaluated by GI and there is no concern of esophageal web. Clinical picture consistent with significant muscle weakness contributing to oropharyngeal dysphagia, poor vocal pressure, poor cough effort likely due to  radiation. No evidence of active rheumatologic condition contributing per rheumatology evaluation. CK, aldolase negative. Myasthenia panel negative.  Cortisol within normal limit.  Unclear if hypothyroidism is the cause but can be contributing. Doses adjusted as below. Cervical thoracic MRI without significant findings.   - SLP following  - NPO for now, continue TFs via NJ   - VFSS as soon as able  - GJ placement is within goals of care of patient. However:  IR consulted for G-tube placement but did not feel safe to place with IR 12/30  GI also consulted and did not feel that it is safe on evaluation 12/30; some concern for secretion management leading to aspirations.   -Continue speech therapy and see swallowing and secretion containment improves    At risk for refeeding syndrome  Severe malnutrition in context of chronic illness/disease  Per daughter, baseline weight around 120-130 lbs. Admitted 102 lbs. Patient states she is eating less and has less appetite in general as well as having difficulty swallowing. While NPO during intubation, developed starvation ketosis. Given high risk for aspiration with PO intake, placed post pyloric  and started tube feeds. Will need close monitoring for refeeding syndrome.   - NPO   - RD following for TF management  - Monitor for refeeding syndrome   - Lyte replacement protocol   - Thiamine replacement     Chronic afib  Afib with RVR  [id number] 3 (age++, HF+)  Has episodes of A-fib with RVR. Has also episodes of bradycardia with  PTA dose of diltiazem and metoprolol  - PTA apixaban 5mg BID  - Diltiazem 60mg q6H as tolerated by blood pressure give bradycardia and soft BP  - Metoprolol to tartrate to 25 mg twice daily  - Goal K above 4 and Mg above 2    Anxiety  Depression  History of depression and anxiety.  She feels hopeless and has sad mood during this hospitalization.  Will continue PTA sertraline.  Will also consult health psychology.  -Increased PTA Sertraline 200 mg  daily     Chronic Left parietal, left thalamic lacunar strokes  - will increase atorvastatin to high intensity (20-> 40 mg). On DOAC  - A1C shows prediabetes     Hypothyroidism  Hx of thyroidectomy 2/2 cancer   - Continue PTA levothyroxine 88 mcg daily   - Consulted Endocrine service as above and increased dose to 100 mcg daily. Appreciate recommendations    HFpEF (LVEF 55-60% 11/22/2024)   Pulmonary hypertension (44 mmHg per echo 11/22/2024)  Possible small PFO  Echo during current admission (11/22/2024) notable for increased thickness of LV and elevated BNP (peak 9200 > 6200) concerning for heart failure exacerbation s/p diuresis and pulmonary hypertension with estimated pulmonary artery pressure of 45 mmHg. Echo with bubble study (11/25/2024) concerning for possible small PFO. With intubation on 12/8, developed hypotension without evidence of shock. Etiology of hypotension may be cardiogenic (e.g. exacerbation of pulmonary hypertension by positive pressure ventilation) vs medication associated (propofol, precedex); Weaned off pressors 12/10.   - Preload dependent in the setting of pulmonary hypertension, gentle diuresis as needed   - Restart lasix 1/3 with 20mg oral daily (40mg recommended in cardiology consult 11/27 previously)  - Restart empagliflozin 10mg daily 1/2/25  - Continue to hold spironolactone for now, can consider if needed based on potassium.    Oliguric SAMIR, improving  Mild hyperkalemia  Cr 0.9 on admission. Baseline appears 0.9-1.0. Developed SAMIR initially in setting of diuresis and had been improving. Subsequently increased following transfer to ICU with consideration for prerenal in setting of NPO status.  Again significant increase in creatinine with low Cystatin C FEUrea above 40%.  Concern for possibility of ATN though UA is unremarkable. Overall renal cunftion appears to be improving.  - Strict I/Os  - Trend BMP   - FWF via NGT  - Will restart lasix 1/2/25 with 20mg daily oral; can increase  to 40mg if tolerating.  - Continue to hold spironolactone for now, can consider if needed based on potassium.    History of apnea  Family report  apneic episodes after she underwent radiation therapy.  There is a possibility that she is having obstructive sleep apnea    Diarrhea  Frequent loose stools. Unclear timing of onset so not sure if related to abx or TF initiation. No abdominal pain. C diff on 12/30 neg  - imodium prn    Spiritual health  Pt family requested that pt sees    - consulted for emotional support      Anemia of chronic illness  Iron deficiency anemia  Baseline Hgb 11-12. Currently near baseline. Can consider anemia of chronic illness. Not able to swallow PTA oral iron  - Monitor CBC  - s/p one dose of  IV iron     Generalized deconditioning  Recurrent falls  - PT/OT consults     CREST Syndrome  Characterized by Raynaud's, Calcinosis, GERD and some telangectasia's. Last saw [facility] 2017. No history of ILD.    - Continue protonix 40 mg daily     Family update  Last discussed with daughter Lana by phone on 1/5/25          Diet: Adult Formula Drip Feeding: Continuous TwoCal HN; Nasoduodenal tube; Goal Rate: 35 mL/hr (goal rate); mL/hr    DVT Prophylaxis: DOAC  Miranda Catheter: Not present  Lines: None     Cardiac Monitoring: ACTIVE order. Indication: Tachyarrhythmias, acute (48 hours)  Code Status: No CPR- Do NOT Intubate      Clinically Significant Risk Factors         # Hyponatremia: Lowest Na = 134 mmol/L in last 2 days, will monitor as appropriate       # Hypoalbuminemia: Lowest albumin = 3 g/dL at 12/10/2024  3:11 AM, will monitor as appropriate                 # Severe Malnutrition: based on nutrition assessment    # Financial/Environmental Concerns: none         Social Drivers of Health    Housing Stability: High Risk (11/23/2024)    Housing Stability     Do you have housing? : No     Are you worried about losing your housing?: No          Disposition Plan      Medically Ready for Discharge: Anticipated in 2-4 Days         Kristian Beasley MD  Hospitalist Service, GOLD TEAM 8  [facility]  Securely message with [employer] (more info)  Text page via [employer] Paging/Directory   See signed in provider for up to date coverage information  ______________________________________________________________________    Interval History   Feeling okay today, rested better overnight. Working toward SLP reassessment on Monday or Tuesday.    Physical Exam   Vital Signs: Temp: 97.5  F (36.4  C) Temp src: Oral BP: 106/70 Pulse: 97   Resp: 20 SpO2: 93 % O2 Device: Nasal cannula Oxygen Delivery: 2 LPM  Weight: 119 lbs 7.83 oz    General Appearance:  Awake. Alert. No acute distress. Frail appearing.   Respiratory: On nasal cannula crackles and rhonchi bilaterally  Cardiovascular: Irregular. Tachycardic. No obvious murmur.   GI: Nondistended. Normoactive. Soft. Non-tender.   Skin: Warm, dry.     Medical Decision Making       40 MINUTES SPENT BY ME on the date of service doing chart review, history, exam, documentation & further activities per the note.      Data   ------------------------- PAST 24 HR DATA REVIEWED -----------------------------------------------    I have personally reviewed the following data over the past 24 hrs:    7.7  \   9.7 (L)   / 221     134 (L) 98 19.4 /  91   4.4 26 0.85 \       Imaging results reviewed over the past 24 hrs:   No results found for this or any previous visit (from the past 24 hours).

## 2025-01-06 NOTE — PROGRESS NOTES
[facility], [place]   Palliative Care Daily Progress Note        Palliative Care was consulted for decisional support and goals of care. At this time goals are clear and there is minimal symptom burden, so we will sign off. Please feel free to reconsult us if we can be helpful in the future. Thank you for the opportunity to be involved in the care of this patient.    VERONICA Rosas, CNS  Palliative Care Consult Team

## 2025-01-06 NOTE — PROGRESS NOTES
Care Management Follow Up    Length of Stay (days): 46    Expected Discharge Date: 01/09/2025     Concerns to be Addressed: discharge planning  Patient plan of care discussed at interdisciplinary rounds: Yes    Anticipated Discharge Disposition: Skilled Nursing Facility  Anticipated Discharge Services: None  Anticipated Discharge DME: None    Patient/family educated on Medicare website which has current facility and service quality ratings: yes  Education Provided on the Discharge Plan: Yes  Patient/Family in Agreement with the Plan: yes    Referrals Placed by CM/SW: Post Acute Facilities  Private pay costs discussed: Not applicable    Discussed  Partnership in Safe Discharge Planning  document with patient/family: No     Handoff Completed: No, handoff not indicated or clinically appropriate    Additional Information:  Per update in rounds, pt will have a swallow study either today or tomorrow. Pending SLP re-eval, if dysphagia is not going to improve, long term nutrition plan and GOC conversation will need to be had.      PT/OT are currently recommending TCU. On SW note on 12/20, pt's TCU preferences show as followed:    -[facility]  - TCU    Next Steps: SW to re-send TCU referrals once pt is closer to being med ready for discharge. SW to follow for updates.    KAT Loyola, LGSW  6B   Ph: [phone]  Vocera: Rajiv Lawson or [facility]B [facility] SW

## 2025-01-06 NOTE — PLAN OF CARE
Neuro: A&Ox4. Flat, withdrawn.   Cardiac: Afib, 80s-120s. BP soft MAPs>65. .   Respiratory: Sating 92% on 2L per NC. Suctioned x2 overnight.  GI/: Adequate urine output via purewick. Small soft BM X2  Diet/appetite: Tolerating TF at 35mL/hr via NJ. No c/o nausea.   Activity:  Lift assist, Q2 turns. Not OOB.  Pain: Denies.   Skin: No new deficits noted. Blanchable redness to sacrum/coccyx.   LDA's: LPIV saline locked.     Plan: Continue with POC. Notify primary team with changes. Advancing tube feeds or placing PEG/J? Swallow study today or tomorrow.       Goal Outcome Evaluation:      Plan of Care Reviewed With: patient    Overall Patient Progress: improvingOverall Patient Progress: improving    Outcome Evaluation: 2L via NC, HR 80-120s. Afib. Oral suctioned x2.

## 2025-01-06 NOTE — SIGNIFICANT EVENT
SPIRITUAL HEALTH SERVICES Significant Event  [facility] [facility]  [facility] 6b    Pt anointed by Father Chloé Tucker   Pager [phone]

## 2025-01-06 NOTE — PLAN OF CARE
Neuro: A&Ox4.   Cardiac: Afib, rates . 1x scheduled dose of IV metoprolol 5mg. PRN metoprolol for sustained afib >140. BP , MAP 70-80s.   Respiratory: Sating >93% on 1-3L nc. Denies SOB. Frequent oral cares with oral suctioning using 14fr catheter.   GI/: Adequate urine output via purewick. 1x small bm- incontinent of urine and stool  Diet/appetite: NPO, tf via NJ @ 35mL/hr with 30mL FWF Q 4 hours. Ice chips okay with speech or RN  Activity:  Assist of 2 to turn/repo in bed. Lift assist, OOB up in recliner for several hours today  Pain: At acceptable level on current regimen. Tylenol given x1 for neck pain  Skin: No new deficits noted.  LDA's: L PIV, NJ.    Plan: Continue with POC. Notify primary team with changes.

## 2025-01-07 LAB
ANION GAP SERPL CALCULATED.3IONS-SCNC: 9 MMOL/L (ref 7–15)
BUN SERPL-MCNC: 22.1 MG/DL (ref 8–23)
CALCIUM SERPL-MCNC: 8.8 MG/DL (ref 8.8–10.4)
CHLORIDE SERPL-SCNC: 100 MMOL/L (ref 98–107)
CREAT SERPL-MCNC: 0.83 MG/DL (ref 0.51–0.95)
EGFRCR SERPLBLD CKD-EPI 2021: 72 ML/MIN/1.73M2
ERYTHROCYTE [DISTWIDTH] IN BLOOD BY AUTOMATED COUNT: 22.4 % (ref 10–15)
GLUCOSE SERPL-MCNC: 101 MG/DL (ref 70–99)
HCO3 SERPL-SCNC: 26 MMOL/L (ref 22–29)
HCT VFR BLD AUTO: 34.7 % (ref 35–47)
HGB BLD-MCNC: 10.8 G/DL (ref 11.7–15.7)
MCH RBC QN AUTO: 26.5 PG (ref 26.5–33)
MCHC RBC AUTO-ENTMCNC: 31.1 G/DL (ref 31.5–36.5)
MCV RBC AUTO: 85 FL (ref 78–100)
PHOSPHATE SERPL-MCNC: 2.7 MG/DL (ref 2.5–4.5)
PLATELET # BLD AUTO: 212 10E3/UL (ref 150–450)
POTASSIUM SERPL-SCNC: 4.3 MMOL/L (ref 3.4–5.3)
RBC # BLD AUTO: 4.07 10E6/UL (ref 3.8–5.2)
SODIUM SERPL-SCNC: 135 MMOL/L (ref 135–145)
WBC # BLD AUTO: 8.3 10E3/UL (ref 4–11)

## 2025-01-07 PROCEDURE — 250N000013 HC RX MED GY IP 250 OP 250 PS 637

## 2025-01-07 PROCEDURE — 85041 AUTOMATED RBC COUNT: CPT | Performed by: STUDENT IN AN ORGANIZED HEALTH CARE EDUCATION/TRAINING PROGRAM

## 2025-01-07 PROCEDURE — 97530 THERAPEUTIC ACTIVITIES: CPT | Mod: GP

## 2025-01-07 PROCEDURE — 97116 GAIT TRAINING THERAPY: CPT | Mod: GP

## 2025-01-07 PROCEDURE — 250N000009 HC RX 250

## 2025-01-07 PROCEDURE — 250N000009 HC RX 250: Performed by: STUDENT IN AN ORGANIZED HEALTH CARE EDUCATION/TRAINING PROGRAM

## 2025-01-07 PROCEDURE — 36415 COLL VENOUS BLD VENIPUNCTURE: CPT | Performed by: STUDENT IN AN ORGANIZED HEALTH CARE EDUCATION/TRAINING PROGRAM

## 2025-01-07 PROCEDURE — 120N000005 HC R&B MS OVERFLOW UMMC

## 2025-01-07 PROCEDURE — 99232 SBSQ HOSP IP/OBS MODERATE 35: CPT | Performed by: STUDENT IN AN ORGANIZED HEALTH CARE EDUCATION/TRAINING PROGRAM

## 2025-01-07 PROCEDURE — 84100 ASSAY OF PHOSPHORUS: CPT | Performed by: STUDENT IN AN ORGANIZED HEALTH CARE EDUCATION/TRAINING PROGRAM

## 2025-01-07 PROCEDURE — 85014 HEMATOCRIT: CPT | Performed by: STUDENT IN AN ORGANIZED HEALTH CARE EDUCATION/TRAINING PROGRAM

## 2025-01-07 PROCEDURE — 94640 AIRWAY INHALATION TREATMENT: CPT

## 2025-01-07 PROCEDURE — 80048 BASIC METABOLIC PNL TOTAL CA: CPT | Performed by: STUDENT IN AN ORGANIZED HEALTH CARE EDUCATION/TRAINING PROGRAM

## 2025-01-07 PROCEDURE — 250N000013 HC RX MED GY IP 250 OP 250 PS 637: Performed by: INTERNAL MEDICINE

## 2025-01-07 PROCEDURE — 94640 AIRWAY INHALATION TREATMENT: CPT | Mod: 76

## 2025-01-07 PROCEDURE — 999N000157 HC STATISTIC RCP TIME EA 10 MIN

## 2025-01-07 PROCEDURE — 250N000013 HC RX MED GY IP 250 OP 250 PS 637: Performed by: STUDENT IN AN ORGANIZED HEALTH CARE EDUCATION/TRAINING PROGRAM

## 2025-01-07 PROCEDURE — 120N000003 HC R&B IMCU UMMC

## 2025-01-07 PROCEDURE — 250N000013 HC RX MED GY IP 250 OP 250 PS 637: Performed by: HOSPITALIST

## 2025-01-07 RX ADMIN — DILTIAZEM HYDROCHLORIDE 60 MG: 60 TABLET, FILM COATED ORAL at 05:10

## 2025-01-07 RX ADMIN — IPRATROPIUM BROMIDE 0.5 MG: 0.5 SOLUTION RESPIRATORY (INHALATION) at 20:44

## 2025-01-07 RX ADMIN — IPRATROPIUM BROMIDE 0.5 MG: 0.5 SOLUTION RESPIRATORY (INHALATION) at 12:01

## 2025-01-07 RX ADMIN — GABAPENTIN 600 MG: 250 SOLUTION ORAL at 22:07

## 2025-01-07 RX ADMIN — THIAMINE HCL TAB 100 MG 100 MG: 100 TAB at 08:22

## 2025-01-07 RX ADMIN — IPRATROPIUM BROMIDE 0.5 MG: 0.5 SOLUTION RESPIRATORY (INHALATION) at 16:00

## 2025-01-07 RX ADMIN — ACETYLCYSTEINE 2 ML: 200 SOLUTION ORAL; RESPIRATORY (INHALATION) at 16:00

## 2025-01-07 RX ADMIN — ORAL VEHICLES - SUSP 50 MG: SUSPENSION at 20:12

## 2025-01-07 RX ADMIN — THERA TABS 1 TABLET: TAB at 08:21

## 2025-01-07 RX ADMIN — APIXABAN 5 MG: 5 TABLET, FILM COATED ORAL at 08:21

## 2025-01-07 RX ADMIN — LEVALBUTEROL HYDROCHLORIDE 0.63 MG: 0.63 SOLUTION RESPIRATORY (INHALATION) at 12:01

## 2025-01-07 RX ADMIN — BUDESONIDE 0.5 MG: 0.5 INHALANT ORAL at 07:46

## 2025-01-07 RX ADMIN — LEVALBUTEROL HYDROCHLORIDE 0.63 MG: 0.63 SOLUTION RESPIRATORY (INHALATION) at 20:44

## 2025-01-07 RX ADMIN — LEVALBUTEROL HYDROCHLORIDE 0.63 MG: 0.63 SOLUTION RESPIRATORY (INHALATION) at 16:00

## 2025-01-07 RX ADMIN — DILTIAZEM HYDROCHLORIDE 60 MG: 60 TABLET, FILM COATED ORAL at 13:22

## 2025-01-07 RX ADMIN — ACETYLCYSTEINE 2 ML: 200 SOLUTION ORAL; RESPIRATORY (INHALATION) at 07:46

## 2025-01-07 RX ADMIN — BUDESONIDE 0.5 MG: 0.5 INHALANT ORAL at 20:43

## 2025-01-07 RX ADMIN — LEVALBUTEROL HYDROCHLORIDE 0.63 MG: 0.63 SOLUTION RESPIRATORY (INHALATION) at 07:46

## 2025-01-07 RX ADMIN — EMPAGLIFLOZIN 10 MG: 10 TABLET, FILM COATED ORAL at 08:22

## 2025-01-07 RX ADMIN — Medication 40 MG: at 08:21

## 2025-01-07 RX ADMIN — DILTIAZEM HYDROCHLORIDE 60 MG: 60 TABLET, FILM COATED ORAL at 20:12

## 2025-01-07 RX ADMIN — Medication 1 SPRAY: at 17:37

## 2025-01-07 RX ADMIN — LEVOTHYROXINE SODIUM 100 MCG: 0.05 TABLET ORAL at 08:21

## 2025-01-07 RX ADMIN — ATORVASTATIN CALCIUM 40 MG: 40 TABLET, FILM COATED ORAL at 20:12

## 2025-01-07 RX ADMIN — SERTRALINE HYDROCHLORIDE 200 MG: 100 TABLET ORAL at 08:21

## 2025-01-07 RX ADMIN — ACETYLCYSTEINE 2 ML: 200 SOLUTION ORAL; RESPIRATORY (INHALATION) at 20:44

## 2025-01-07 RX ADMIN — ACETYLCYSTEINE 2 ML: 200 SOLUTION ORAL; RESPIRATORY (INHALATION) at 12:01

## 2025-01-07 RX ADMIN — IPRATROPIUM BROMIDE 0.5 MG: 0.5 SOLUTION RESPIRATORY (INHALATION) at 07:46

## 2025-01-07 RX ADMIN — FUROSEMIDE 20 MG: 20 TABLET ORAL at 08:21

## 2025-01-07 RX ADMIN — ORAL VEHICLES - SUSP 50 MG: SUSPENSION at 08:21

## 2025-01-07 RX ADMIN — DILTIAZEM HYDROCHLORIDE 60 MG: 60 TABLET, FILM COATED ORAL at 00:06

## 2025-01-07 RX ADMIN — FEXOFENADINE HYDROCHLORIDE 120 MG: 60 TABLET ORAL at 08:21

## 2025-01-07 RX ADMIN — Medication 1 SPRAY: at 13:28

## 2025-01-07 RX ADMIN — APIXABAN 5 MG: 5 TABLET, FILM COATED ORAL at 20:12

## 2025-01-07 RX ADMIN — OFLOXACIN 50000 UNITS: 300 TABLET, COATED ORAL at 13:22

## 2025-01-07 RX ADMIN — Medication 1 SPRAY: at 08:22

## 2025-01-07 ASSESSMENT — ACTIVITIES OF DAILY LIVING (ADL)
ADLS_ACUITY_SCORE: 61
ADLS_ACUITY_SCORE: 66
ADLS_ACUITY_SCORE: 61
ADLS_ACUITY_SCORE: 66
ADLS_ACUITY_SCORE: 61
ADLS_ACUITY_SCORE: 66
ADLS_ACUITY_SCORE: 61
ADLS_ACUITY_SCORE: 66
ADLS_ACUITY_SCORE: 61
ADLS_ACUITY_SCORE: 66
ADLS_ACUITY_SCORE: 61
ADLS_ACUITY_SCORE: 66
ADLS_ACUITY_SCORE: 61
ADLS_ACUITY_SCORE: 66
ADLS_ACUITY_SCORE: 61
ADLS_ACUITY_SCORE: 61
ADLS_ACUITY_SCORE: 66

## 2025-01-07 NOTE — PLAN OF CARE
Neuro: A&Ox4. Reported anxiety briefly, improved.   Cardiac: Continued Afib/Aflutter. VSS. Provider notified of HR > 130s, improved after scheduled diltiazem and metoprolol.    Respiratory: Sating >95% on 1L NC, pt complained of dryness so added humidification. Suctioned moderate oral secretions.  GI/: Adequate urine output via purewick. BM x1.  Diet/appetite: NPO, TF running 38 mLs/hr q4h 30 mL FWF.   Activity:  Lift assist, not OOB on shift. Repositioned self throughout night with occasional assistance.  Pain: Denies pain.   Skin: No new deficits noted.  LDA's: L PIV - SL. Purewick    Plan: Continue with POC. Notify primary team with changes.      Goal Outcome Evaluation:      Plan of Care Reviewed With: patient    Overall Patient Progress: no changeOverall Patient Progress: no change    Outcome Evaluation: VSS, O2 > 95% on 1L NC w/ humidification.    Megan Schoenbauer, RN    Problem: Gas Exchange Impaired  Goal: Optimal Gas Exchange  Intervention: Optimize Oxygenation and Ventilation  Recent Flowsheet Documentation  Taken 1/7/2025 0006 by Schoenbauer, Megan, RN  Airway/Ventilation Management: airway patency maintained  Head of Bed (HOB) Positioning: HOB at 30-45 degrees  Taken 1/6/2025 2235 by Schoenbauer, Megan, RN  Airway/Ventilation Management: humidification applied  Taken 1/6/2025 2020 by Schoenbauer, Megan, RN  Airway/Ventilation Management: airway patency maintained  Head of Bed (HOB) Positioning: HOB at 30-45 degrees     Problem: Skin Injury Risk Increased  Goal: Skin Health and Integrity  Intervention: Optimize Skin Protection  Recent Flowsheet Documentation  Taken 1/7/2025 0006 by Schoenbauer, Megan, RN  Pressure Reduction Techniques: heels elevated off bed  Skin Protection: adhesive use limited  Activity Management: activity adjusted per tolerance  Head of Bed (HOB) Positioning: HOB at 30-45 degrees  Taken 1/6/2025 2020 by Schoenbauer, Megan, RN  Pressure Reduction Techniques: heels elevated off  bed  Skin Protection: adhesive use limited  Activity Management: activity adjusted per tolerance  Head of Bed (HOB) Positioning: HOB at 30-45 degrees

## 2025-01-07 NOTE — PROGRESS NOTES
SPIRITUAL HEALTH SERVICES  SPIRITUAL ASSESSMENT Progress Note  [facility]B     REFERRAL SOURCE: Recommendation from Charge Nurse    I visited with patient Hannah Coffman in her hospital room on 1/7/2025. She talked about her children and her job before [employer]. Hannah's Worship brenda is very important to her, and she asked about the  that visited with her earlier this week. I facilitated a prayer at bedside and provided the Worship prayer booklet.     PLAN: I will mention Hannah to the  and will follow up as able.     Dana Coyle MDiv  Chaplain Resident    Spiritual Health Services is available 24/7 for emergent requests and consults, either by paging the on-call  or by entering an ASAP/STAT consult in [employer], which will also page the on-call .

## 2025-01-07 NOTE — PLAN OF CARE
"/78 (BP Location: Right arm)   Pulse 109   Temp 98  F (36.7  C) (Oral)   Resp 20   Ht 1.651 m (5' 5\")   Wt 54.2 kg (119 lb 7.8 oz)   SpO2 98%   BMI 19.88 kg/m      Neuro: A&Ox4. Flat, withdrawn.   Cardiac: Afib/Aflutter. VSS.    Respiratory: Sating >97% on 1L NC. Titrated from 2L. Dypsnea on exertion.   GI/: Adequate urine output via purewick. BM x1.   Diet/appetite: NPO. Ice chips ok after oral cares. TF @ 38ml/h + q4 30ml FWF via NG.   Activity:  Lift for transfers, up to chair.   Pain: At acceptable level on current regimen.   Skin: No new deficits noted.  LDA's: L PIV, saline locked.     Plan: Continue with POC. Notify primary team with changes. Encourage OOB & IS use. Video swallow study 1/7 or 1/8.     Goal Outcome Evaluation:      Plan of Care Reviewed With: patient, child    Overall Patient Progress: improvingOverall Patient Progress: improving    Outcome Evaluation: VSS. HR 90-120s, still in Afib/Aflutter. Up in chair for approx an hour -- worked with OT/PT.      "

## 2025-01-08 LAB
ANION GAP SERPL CALCULATED.3IONS-SCNC: 11 MMOL/L (ref 7–15)
BUN SERPL-MCNC: 22.9 MG/DL (ref 8–23)
CALCIUM SERPL-MCNC: 8.9 MG/DL (ref 8.8–10.4)
CHLORIDE SERPL-SCNC: 100 MMOL/L (ref 98–107)
CREAT SERPL-MCNC: 0.83 MG/DL (ref 0.51–0.95)
EGFRCR SERPLBLD CKD-EPI 2021: 72 ML/MIN/1.73M2
ERYTHROCYTE [DISTWIDTH] IN BLOOD BY AUTOMATED COUNT: 22.7 % (ref 10–15)
GLUCOSE SERPL-MCNC: 101 MG/DL (ref 70–99)
HCO3 SERPL-SCNC: 26 MMOL/L (ref 22–29)
HCT VFR BLD AUTO: 36.1 % (ref 35–47)
HGB BLD-MCNC: 11.3 G/DL (ref 11.7–15.7)
MCH RBC QN AUTO: 26.7 PG (ref 26.5–33)
MCHC RBC AUTO-ENTMCNC: 31.3 G/DL (ref 31.5–36.5)
MCV RBC AUTO: 85 FL (ref 78–100)
PHOSPHATE SERPL-MCNC: 2.2 MG/DL (ref 2.5–4.5)
PLATELET # BLD AUTO: 193 10E3/UL (ref 150–450)
POTASSIUM SERPL-SCNC: 4.6 MMOL/L (ref 3.4–5.3)
RBC # BLD AUTO: 4.24 10E6/UL (ref 3.8–5.2)
SODIUM SERPL-SCNC: 137 MMOL/L (ref 135–145)
WBC # BLD AUTO: 8.6 10E3/UL (ref 4–11)

## 2025-01-08 PROCEDURE — 84100 ASSAY OF PHOSPHORUS: CPT | Performed by: STUDENT IN AN ORGANIZED HEALTH CARE EDUCATION/TRAINING PROGRAM

## 2025-01-08 PROCEDURE — 250N000013 HC RX MED GY IP 250 OP 250 PS 637: Performed by: INTERNAL MEDICINE

## 2025-01-08 PROCEDURE — 92526 ORAL FUNCTION THERAPY: CPT | Mod: GN

## 2025-01-08 PROCEDURE — 97110 THERAPEUTIC EXERCISES: CPT | Mod: GP

## 2025-01-08 PROCEDURE — 36415 COLL VENOUS BLD VENIPUNCTURE: CPT | Performed by: STUDENT IN AN ORGANIZED HEALTH CARE EDUCATION/TRAINING PROGRAM

## 2025-01-08 PROCEDURE — 85027 COMPLETE CBC AUTOMATED: CPT | Performed by: STUDENT IN AN ORGANIZED HEALTH CARE EDUCATION/TRAINING PROGRAM

## 2025-01-08 PROCEDURE — 99233 SBSQ HOSP IP/OBS HIGH 50: CPT | Performed by: STUDENT IN AN ORGANIZED HEALTH CARE EDUCATION/TRAINING PROGRAM

## 2025-01-08 PROCEDURE — 250N000013 HC RX MED GY IP 250 OP 250 PS 637: Performed by: STUDENT IN AN ORGANIZED HEALTH CARE EDUCATION/TRAINING PROGRAM

## 2025-01-08 PROCEDURE — 250N000009 HC RX 250

## 2025-01-08 PROCEDURE — 80048 BASIC METABOLIC PNL TOTAL CA: CPT | Performed by: STUDENT IN AN ORGANIZED HEALTH CARE EDUCATION/TRAINING PROGRAM

## 2025-01-08 PROCEDURE — 94640 AIRWAY INHALATION TREATMENT: CPT | Mod: 76

## 2025-01-08 PROCEDURE — 250N000013 HC RX MED GY IP 250 OP 250 PS 637

## 2025-01-08 PROCEDURE — 94640 AIRWAY INHALATION TREATMENT: CPT

## 2025-01-08 PROCEDURE — 120N000003 HC R&B IMCU UMMC

## 2025-01-08 PROCEDURE — 250N000009 HC RX 250: Performed by: STUDENT IN AN ORGANIZED HEALTH CARE EDUCATION/TRAINING PROGRAM

## 2025-01-08 PROCEDURE — 97530 THERAPEUTIC ACTIVITIES: CPT | Mod: GP

## 2025-01-08 PROCEDURE — 999N000157 HC STATISTIC RCP TIME EA 10 MIN

## 2025-01-08 RX ORDER — DILTIAZEM HCL 90 MG
90 TABLET ORAL EVERY 6 HOURS SCHEDULED
Status: DISCONTINUED | OUTPATIENT
Start: 2025-01-08 | End: 2025-01-09

## 2025-01-08 RX ADMIN — SERTRALINE HYDROCHLORIDE 200 MG: 100 TABLET ORAL at 08:29

## 2025-01-08 RX ADMIN — EMPAGLIFLOZIN 10 MG: 10 TABLET, FILM COATED ORAL at 08:30

## 2025-01-08 RX ADMIN — FEXOFENADINE HYDROCHLORIDE 120 MG: 60 TABLET ORAL at 08:29

## 2025-01-08 RX ADMIN — ORAL VEHICLES - SUSP 75 MG: SUSPENSION at 20:37

## 2025-01-08 RX ADMIN — LEVALBUTEROL HYDROCHLORIDE 0.63 MG: 0.63 SOLUTION RESPIRATORY (INHALATION) at 16:35

## 2025-01-08 RX ADMIN — Medication 40 MG: at 08:24

## 2025-01-08 RX ADMIN — ACETYLCYSTEINE 2 ML: 200 SOLUTION ORAL; RESPIRATORY (INHALATION) at 16:36

## 2025-01-08 RX ADMIN — LEVOTHYROXINE SODIUM 100 MCG: 0.05 TABLET ORAL at 08:30

## 2025-01-08 RX ADMIN — Medication 1 SPRAY: at 17:39

## 2025-01-08 RX ADMIN — DILTIAZEM HYDROCHLORIDE 90 MG: 90 TABLET, FILM COATED ORAL at 20:34

## 2025-01-08 RX ADMIN — DILTIAZEM HYDROCHLORIDE 90 MG: 90 TABLET, FILM COATED ORAL at 14:16

## 2025-01-08 RX ADMIN — ACETYLCYSTEINE 2 ML: 200 SOLUTION ORAL; RESPIRATORY (INHALATION) at 08:34

## 2025-01-08 RX ADMIN — FUROSEMIDE 20 MG: 20 TABLET ORAL at 08:30

## 2025-01-08 RX ADMIN — DILTIAZEM HYDROCHLORIDE 60 MG: 60 TABLET, FILM COATED ORAL at 06:39

## 2025-01-08 RX ADMIN — POTASSIUM & SODIUM PHOSPHATES POWDER PACK 280-160-250 MG 1 PACKET: 280-160-250 PACK at 11:59

## 2025-01-08 RX ADMIN — Medication 1 SPRAY: at 12:00

## 2025-01-08 RX ADMIN — ORAL VEHICLES - SUSP 75 MG: SUSPENSION at 11:59

## 2025-01-08 RX ADMIN — GABAPENTIN 600 MG: 250 SOLUTION ORAL at 23:02

## 2025-01-08 RX ADMIN — APIXABAN 5 MG: 5 TABLET, FILM COATED ORAL at 08:30

## 2025-01-08 RX ADMIN — Medication 1 SPRAY: at 20:37

## 2025-01-08 RX ADMIN — DILTIAZEM HYDROCHLORIDE 60 MG: 60 TABLET, FILM COATED ORAL at 00:37

## 2025-01-08 RX ADMIN — BUDESONIDE 0.5 MG: 0.5 INHALANT ORAL at 08:34

## 2025-01-08 RX ADMIN — IPRATROPIUM BROMIDE 0.5 MG: 0.5 SOLUTION RESPIRATORY (INHALATION) at 08:34

## 2025-01-08 RX ADMIN — POTASSIUM & SODIUM PHOSPHATES POWDER PACK 280-160-250 MG 1 PACKET: 280-160-250 PACK at 08:29

## 2025-01-08 RX ADMIN — THERA TABS 1 TABLET: TAB at 08:29

## 2025-01-08 RX ADMIN — THIAMINE HCL TAB 100 MG 100 MG: 100 TAB at 08:30

## 2025-01-08 RX ADMIN — POTASSIUM & SODIUM PHOSPHATES POWDER PACK 280-160-250 MG 1 PACKET: 280-160-250 PACK at 17:42

## 2025-01-08 RX ADMIN — ATORVASTATIN CALCIUM 40 MG: 40 TABLET, FILM COATED ORAL at 20:34

## 2025-01-08 RX ADMIN — IPRATROPIUM BROMIDE 0.5 MG: 0.5 SOLUTION RESPIRATORY (INHALATION) at 16:35

## 2025-01-08 RX ADMIN — APIXABAN 5 MG: 5 TABLET, FILM COATED ORAL at 20:34

## 2025-01-08 RX ADMIN — LEVALBUTEROL HYDROCHLORIDE 0.63 MG: 0.63 SOLUTION RESPIRATORY (INHALATION) at 08:34

## 2025-01-08 RX ADMIN — Medication 1 SPRAY: at 08:30

## 2025-01-08 ASSESSMENT — ACTIVITIES OF DAILY LIVING (ADL)
ADLS_ACUITY_SCORE: 68
ADLS_ACUITY_SCORE: 61
ADLS_ACUITY_SCORE: 68

## 2025-01-08 NOTE — PLAN OF CARE
Neuro: A&Ox4.   Cardiac: Continued Afib/Aflutter. VSS.   Respiratory: Sating >95% on 1L NC w/ humidification. Occasionally suctioned oral secretions.  GI/: Adequate urine output via purewick. BM X1  Diet/appetite: NPO, TF running 38 mLs/hr q4h 30 mL FWF.   Activity:  Lift assist, not OOB on shift. Repositioned q2h.   Pain: Denies pain.  Skin: Increased sacral redness/rash noted, passed on to days to request WOC consult.   LDA's: L PIV - SL. Purewick.    Plan: Video swallow study today. Continue with POC. Notify primary team with changes.      Goal Outcome Evaluation:      Plan of Care Reviewed With: patient    Overall Patient Progress: no changeOverall Patient Progress: no change    Outcome Evaluation: VSS, O2 >95% on 1L NC w/ humidification. Increased sacral redness, repositioning q2h.    Megan Schoenbauer, RN    Problem: Adult Inpatient Plan of Care  Goal: Absence of Hospital-Acquired Illness or Injury  Intervention: Prevent Skin Injury  Recent Flowsheet Documentation  Taken 1/8/2025 0036 by Schoenbauer, Megan, RN  Body Position:   turned   left   heels elevated  Skin Protection: adhesive use limited  Taken 1/7/2025 2012 by Schoenbauer, Megan, RN  Body Position:   weight shifting   turned   right   heels elevated  Skin Protection: adhesive use limited     Problem: Fall Injury Risk  Goal: Absence of Fall and Fall-Related Injury  Outcome: Progressing  Intervention: Identify and Manage Contributors  Recent Flowsheet Documentation  Taken 1/8/2025 0036 by Schoenbauer, Megan, RN  Medication Review/Management:   medications reviewed   high-risk medications identified  Taken 1/7/2025 2012 by Schoenbauer, Megan, RN  Medication Review/Management:   medications reviewed   high-risk medications identified  Intervention: Promote Injury-Free Environment  Recent Flowsheet Documentation  Taken 1/8/2025 0036 by Schoenbauer, Megan, RN  Safety Promotion/Fall Prevention:   activity supervised   clutter free environment maintained    increased rounding and observation   increase visualization of patient   lighting adjusted   nonskid shoes/slippers when out of bed   patient and family education   room near nurse's station   room organization consistent   safety round/check completed   supervised activity  Taken 1/7/2025 2012 by Schoenbauer, Megan, RN  Safety Promotion/Fall Prevention:   activity supervised   clutter free environment maintained   increased rounding and observation   increase visualization of patient   lighting adjusted   nonskid shoes/slippers when out of bed   patient and family education   room near nurse's station   room organization consistent   safety round/check completed   supervised activity

## 2025-01-08 NOTE — PLAN OF CARE
Care Provided 07:00 - 19:00    Neuro: A&Ox4.   Cardiac: Afib with HR 80s- 110s occasionally up to 120s. Other  VSS.   Respiratory: Sating >94%  on 1L NC  GI/: Adequate urine output via purewick. BM X2  Diet/appetite: NPO with continuous TF@ 60 ml/hr  with 30 ml fwf q4h.   Activity:  Assist of 2 for turns, up to chair today with lift. Turned and repod q2h.  Pain: At acceptable level on current regimen. Pt denied any pain.   Skin: No new deficits noted.  LDA's: L PIV: SL.     Plan: Continue with POC. Notify primary team with changes.

## 2025-01-08 NOTE — CONSULTS
"    Interventional Radiology Consult Service Note    IR consulted for possible G tube placement (procedure was previously cancelled due to respiratory status).    This is a 79 year old female with a PMH of Afib (on apixaban), CAD, pulmonary hypertension, severe obstructive lung disease in setting of COPD/asthma, laryngeal squamous cell carcinoma (diagnosed 4/2024) s/p radiation (4/2024-7/2024) c/b dysphagia, CREST syndrome, hypothyroidism, anxiety and depression. Initially admitted to hospital medicine service with hypoxic respiratory failure suspected due to HFpEF requiring diuresis. Developed acute hypoxia on 12/8 secondary to suspected aspiration pneumonitis for which she was intubated (12/8-12/10). Now s/p extubation and transferred back to hospital medicine service. Ongoing evaluation related to aspiration, currently tolerated continuous tube feeds via NJ, with improving respiratory status. IR was originally consulted earlier in this admission for G tube placement. The procedure was ultimately cancelled due to multiple rapid responses, aspiration events and inability to manage secretions. IR is again consulted for G tube placement due to reports pt has been more stable with no RRs in the past few days. Of note, pt has NJ tube in place and is tolerating tube feeds.     Case and imaging was reviewed with Dr. Jeter from IR. IR recommendations have not changed. Pt is high risk for IV conscious sedation and IR G tube placement given how we place our tubes (requires gastric insufflation). Recommend discussion with GI vs surgery for consideration of placement in a manner that does not require gastric insufflation and in which anesthesia is present.     Recommendations were reviewed with Dr. Bee.    Vitals:   /70 (BP Location: Right arm)   Pulse 107   Temp (!) 96.5  F (35.8  C) (Axillary)   Resp 20   Ht 1.651 m (5' 5\")   Wt 53 kg (116 lb 13.5 oz)   SpO2 92%   BMI 19.44 kg/m      Pertinent Labs: "     Lab Results   Component Value Date    WBC 8.6 01/08/2025    WBC 8.3 01/07/2025    WBC 7.7 01/06/2025       Lab Results   Component Value Date    HGB 11.3 01/08/2025    HGB 10.8 01/07/2025    HGB 9.7 01/06/2025       Lab Results   Component Value Date     01/08/2025     01/07/2025     01/06/2025       Lab Results   Component Value Date    INR 1.54 (H) 12/24/2024    PTT 40 (H) 12/24/2024       Lab Results   Component Value Date    POTASSIUM 4.6 01/08/2025    POTASSIUM 4.5 12/08/2024        VERONICA Bryan CNP  Interventional Radiology   Pager: [phone]

## 2025-01-08 NOTE — PROGRESS NOTES
[facility]    Medicine Progress Note - Hospitalist Service, GOLD TEAM 8    Date of Admission:  11/21/2024    Assessment & Plan   Asya Coffman is a 79 year old female admitted on 11/21/2024. 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 Initially admitted to hospital medicine service with hypoxic respiratory failure suspected due to HFpEF requiring diuresis. Developed acute hypoxia on 12/8 secondary to suspected aspiration pneumonitis for which she was intubated (12/8-12/10). Now s/p extubation and transferred back to hospital medicine service. Ongoing evaluation related to aspiration, currently tolerated continuous tube feeds via NJ, with improving respiratory status.     Updates today:  -Plan for SLP reassessment for VFSS tomorrow per speech therapy    Acute hypoxic respiratory failure  Recurrent Aspiration pneumonitis with aspiration pneumonia  MSSA PNA s/p abx (12/3-12/8)  Obstructive lung disease (COPD vs asthma)  Pulmonary hypertension   Presented on 11/21 with acute hypoxic respiratory failure; initially suspected to be secondary to HFpEF exacerbation; unresolved with diuresis. Underlying obstructive lung disease but no evidence of exacerbation here. Was treated for MSSA pneumonia. Overnight 12/8, developed sudden worsening of oxygenation and increased secretions in the setting of dysphagia/recent laryngeal radiation and was intubated. Suspected aspiration pneumonitis as etiology. Was briefly on zosyn but this was discontinued.  She was extubated on 12/10.  Multiple episodes of worsening of hypoxia with evidence of aspiration and intolerance to tube feeding.  CT 12/27 showed increasing basilar predominant bronchial wall thickening  and diffuse groundglass attenuation suspicious of ongoing infectious process. Continues to have have excessive oral secretion and episodes of aspiration needing frequent suctioning, though intermittently improves.  -Antibiotics: Zosyn 12/28-12/28 then Unasyn 12/28-1/2/25. Transitioned onto levofloxacin on 1/2/25 related to microbiology from sputum smear returning with resistant klebsiella. Plan for a 5 day course ending on 1/6/25.  -Respiratory support:  -Continue oxygen titration as needed  -Budesonide neb 0.5 mg BID,  -Ipratropium-levalbuterol neb QID  -albuteraol Q4H PRN  -Mucomyst  -Aspiration precautions; n.p.o.  -Flutter valve, incentive spirometery  - Dysphagia management as below.  - Palliative care consult, recs appreciated    Failure to thrive  Aspiration risk  Acute severe oropharyngeal dysphagia  Esophageal dysmotility  Concern for radiation-induced dysphagia  Laryngeal squamous cell carcinoma s/p radiation (4/2024-7/2024)  Concern for gastroparesis  Has had increased dysphagia in the setting of laryngeal squamous cell carcinoma s/p radiation (4/2024-7/2024). Family reports that over the past few months has declined dramatically from being quite mobile, active to being barely active, with 4 falls in 3 months progressively reduced speech and recurrent aspirations. ENT evaluated 12/16/24 with bedside laryngoscopy for dysphagia. Normal vocal cord function and control seen including ability to approximate cords during cough. No anatomic explanations for dysphagia seen. Evaluated by GI and there is no concern of esophageal web. Clinical picture consistent with significant muscle weakness contributing to oropharyngeal dysphagia, poor vocal pressure, poor cough effort likely due to radiation. No evidence of active rheumatologic condition contributing per rheumatology evaluation. CK, aldolase negative. Myasthenia panel negative.  Cortisol within normal limit.  Unclear if hypothyroidism is the cause but can be  contributing. Doses adjusted as below. Cervical thoracic MRI without significant findings.   - SLP following  - NPO for now, continue TFs via NJ   - VFSS as soon as able  - GJ placement is within goals of care of patient. However:  IR consulted for G-tube placement but did not feel safe to place with IR 12/30  GI also consulted and did not feel that it is safe on evaluation 12/30; some concern for secretion management leading to aspirations.   -Continue speech therapy and see swallowing and secretion containment improves    At risk for refeeding syndrome  Severe malnutrition in context of chronic illness/disease  Per daughter, baseline weight around 120-130 lbs. Admitted 102 lbs. Patient states she is eating less and has less appetite in general as well as having difficulty swallowing. While NPO during intubation, developed starvation ketosis. Given high risk for aspiration with PO intake, placed post pyloric  and started tube feeds. Will need close monitoring for refeeding syndrome.   - NPO   - RD following for TF management  - Monitor for refeeding syndrome   - Lyte replacement protocol   - Thiamine replacement     Chronic afib  Afib with RVR  [id number] 3 (age++, HF+)  Has episodes of A-fib with RVR. Has also episodes of bradycardia with  PTA dose of diltiazem and metoprolol  - PTA apixaban 5mg BID  - Diltiazem 60mg q6H as tolerated by blood pressure give bradycardia and soft BP  - Metoprolol to tartrate to 25 mg twice daily  - Goal K above 4 and Mg above 2    Anxiety  Depression  History of depression and anxiety.  She feels hopeless and has sad mood during this hospitalization.  Will continue PTA sertraline.  Will also consult health psychology.  -Increased PTA Sertraline 200 mg daily     Chronic Left parietal, left thalamic lacunar strokes  - will increase atorvastatin to high intensity (20-> 40 mg). On DOAC  - A1C shows prediabetes     Hypothyroidism  Hx of thyroidectomy 2/2 cancer   - Continue PTA  levothyroxine 88 mcg daily   - Consulted Endocrine service as above and increased dose to 100 mcg daily. Appreciate recommendations    HFpEF (LVEF 55-60% 11/22/2024)   Pulmonary hypertension (44 mmHg per echo 11/22/2024)  Possible small PFO  Echo during current admission (11/22/2024) notable for increased thickness of LV and elevated BNP (peak 9200 > 6200) concerning for heart failure exacerbation s/p diuresis and pulmonary hypertension with estimated pulmonary artery pressure of 45 mmHg. Echo with bubble study (11/25/2024) concerning for possible small PFO. With intubation on 12/8, developed hypotension without evidence of shock. Etiology of hypotension may be cardiogenic (e.g. exacerbation of pulmonary hypertension by positive pressure ventilation) vs medication associated (propofol, precedex); Weaned off pressors 12/10.   - Preload dependent in the setting of pulmonary hypertension, gentle diuresis as needed   - Restart lasix 1/3 with 20mg oral daily (40mg recommended in cardiology consult 11/27 previously)  - Restart empagliflozin 10mg daily 1/2/25  - Continue to hold spironolactone for now, can consider if needed based on potassium.    Oliguric SAMIR, improving  Mild hyperkalemia  Cr 0.9 on admission. Baseline appears 0.9-1.0. Developed SAMIR initially in setting of diuresis and had been improving. Subsequently increased following transfer to ICU with consideration for prerenal in setting of NPO status.  Again significant increase in creatinine with low Cystatin C FEUrea above 40%.  Concern for possibility of ATN though UA is unremarkable. Overall renal cunftion appears to be improving.  - Strict I/Os  - Trend BMP   - FWF via NGT  - Will restart lasix 1/2/25 with 20mg daily oral; can increase to 40mg if tolerating.  - Continue to hold spironolactone for now, can consider if needed based on potassium.    History of apnea  Family report  apneic episodes after she underwent radiation therapy.  There is a possibility  that she is having obstructive sleep apnea    Diarrhea  Frequent loose stools. Unclear timing of onset so not sure if related to abx or TF initiation. No abdominal pain. C diff on 12/30 neg  - imodium prn    Spiritual health  Pt family requested that pt sees    - consulted for emotional support      Anemia of chronic illness  Iron deficiency anemia  Baseline Hgb 11-12. Currently near baseline. Can consider anemia of chronic illness. Not able to swallow PTA oral iron  - Monitor CBC  - s/p one dose of  IV iron     Generalized deconditioning  Recurrent falls  - PT/OT consults     CREST Syndrome  Characterized by Raynaud's, Calcinosis, GERD and some telangectasia's. Last saw [facility] 2017. No history of ILD.    - Continue protonix 40 mg daily     Family update  Last discussed with daughter Lana by phone on 1/5/25          Diet: Adult Formula Drip Feeding: Continuous TwoCal HN; Nasoduodenal tube; Goal Rate: 38 mL/hr x 22 hours (begin holding 1 hr before/after Synthroid starting 1/7); mL/hr    DVT Prophylaxis: DOAC  Miranda Catheter: Not present  Lines: None     Cardiac Monitoring: ACTIVE order. Indication: Tachyarrhythmias, acute (48 hours)  Code Status: No CPR- Do NOT Intubate      Clinically Significant Risk Factors         # Hyponatremia: Lowest Na = 134 mmol/L in last 2 days, will monitor as appropriate       # Hypoalbuminemia: Lowest albumin = 3 g/dL at 12/10/2024  3:11 AM, will monitor as appropriate                 # Severe Malnutrition: based on nutrition assessment    # Financial/Environmental Concerns: none         Social Drivers of Health    Housing Stability: High Risk (11/23/2024)    Housing Stability     Do you have housing? : No     Are you worried about losing your housing?: No          Disposition Plan     Medically Ready for Discharge: Anticipated in 2-4 Days         Hiram Bee MD  Hospitalist Service, GOLD TEAM [address] [place] [address]  Center  Securely message with [employer] (more info)  Text page via [employer] Paging/Directory   See signed in provider for up to date coverage information  ______________________________________________________________________    Interval History   Feeling okay today, rested better overnight. Working toward SLP reassessment tomorrow.    Physical Exam   Vital Signs: Temp: 98  F (36.7  C) Temp src: Axillary BP: 110/72 Pulse: (!) 125   Resp: 19 SpO2: 97 % O2 Device: Nasal cannula Oxygen Delivery: 1 LPM  Weight: 116 lbs 6.45 oz    General Appearance:  Awake. Alert. No acute distress. Frail appearing.   Respiratory: On nasal cannula crackles and rhonchi bilaterally  Cardiovascular: Irregular. Tachycardic. No obvious murmur.   GI: Nondistended. Normoactive. Soft. Non-tender.   Skin: Warm, dry.     Medical Decision Making       40 MINUTES SPENT BY ME on the date of service doing chart review, history, exam, documentation & further activities per the note.      Data   ------------------------- PAST 24 HR DATA REVIEWED -----------------------------------------------    I have personally reviewed the following data over the past 24 hrs:    8.3  \   10.8 (L)   / 212     135 100 22.1 /  101 (H)   4.3 26 0.83 \       Imaging results reviewed over the past 24 hrs:   No results found for this or any previous visit (from the past 24 hours).

## 2025-01-09 VITALS
TEMPERATURE: 97.8 F | WEIGHT: 108.03 LBS | HEART RATE: 65 BPM | DIASTOLIC BLOOD PRESSURE: 67 MMHG | BODY MASS INDEX: 18 KG/M2 | OXYGEN SATURATION: 96 % | HEIGHT: 65 IN | RESPIRATION RATE: 20 BRPM | SYSTOLIC BLOOD PRESSURE: 89 MMHG

## 2025-01-09 LAB
ANION GAP SERPL CALCULATED.3IONS-SCNC: 12 MMOL/L (ref 7–15)
BUN SERPL-MCNC: 25.5 MG/DL (ref 8–23)
CALCIUM SERPL-MCNC: 9.1 MG/DL (ref 8.8–10.4)
CHLORIDE SERPL-SCNC: 98 MMOL/L (ref 98–107)
CREAT SERPL-MCNC: 0.82 MG/DL (ref 0.51–0.95)
EGFRCR SERPLBLD CKD-EPI 2021: 73 ML/MIN/1.73M2
ERYTHROCYTE [DISTWIDTH] IN BLOOD BY AUTOMATED COUNT: 23.3 % (ref 10–15)
GLUCOSE BLDC GLUCOMTR-MCNC: 113 MG/DL (ref 70–99)
GLUCOSE BLDC GLUCOMTR-MCNC: 85 MG/DL (ref 70–99)
GLUCOSE SERPL-MCNC: 86 MG/DL (ref 70–99)
HCO3 SERPL-SCNC: 27 MMOL/L (ref 22–29)
HCT VFR BLD AUTO: 37.8 % (ref 35–47)
HGB BLD-MCNC: 11.2 G/DL (ref 11.7–15.7)
MCH RBC QN AUTO: 26.1 PG (ref 26.5–33)
MCHC RBC AUTO-ENTMCNC: 29.6 G/DL (ref 31.5–36.5)
MCV RBC AUTO: 88 FL (ref 78–100)
PHOSPHATE SERPL-MCNC: 3.1 MG/DL (ref 2.5–4.5)
PLATELET # BLD AUTO: 207 10E3/UL (ref 150–450)
POTASSIUM SERPL-SCNC: 4.4 MMOL/L (ref 3.4–5.3)
RBC # BLD AUTO: 4.29 10E6/UL (ref 3.8–5.2)
SODIUM SERPL-SCNC: 137 MMOL/L (ref 135–145)
WBC # BLD AUTO: 9.6 10E3/UL (ref 4–11)

## 2025-01-09 PROCEDURE — 250N000013 HC RX MED GY IP 250 OP 250 PS 637: Performed by: INTERNAL MEDICINE

## 2025-01-09 PROCEDURE — 999N000157 HC STATISTIC RCP TIME EA 10 MIN

## 2025-01-09 PROCEDURE — 85027 COMPLETE CBC AUTOMATED: CPT | Performed by: STUDENT IN AN ORGANIZED HEALTH CARE EDUCATION/TRAINING PROGRAM

## 2025-01-09 PROCEDURE — 99233 SBSQ HOSP IP/OBS HIGH 50: CPT | Performed by: STUDENT IN AN ORGANIZED HEALTH CARE EDUCATION/TRAINING PROGRAM

## 2025-01-09 PROCEDURE — 250N000013 HC RX MED GY IP 250 OP 250 PS 637: Performed by: STUDENT IN AN ORGANIZED HEALTH CARE EDUCATION/TRAINING PROGRAM

## 2025-01-09 PROCEDURE — 97535 SELF CARE MNGMENT TRAINING: CPT | Mod: GO

## 2025-01-09 PROCEDURE — 120N000003 HC R&B IMCU UMMC

## 2025-01-09 PROCEDURE — 97530 THERAPEUTIC ACTIVITIES: CPT | Mod: GO

## 2025-01-09 PROCEDURE — 94640 AIRWAY INHALATION TREATMENT: CPT | Mod: 76

## 2025-01-09 PROCEDURE — 80048 BASIC METABOLIC PNL TOTAL CA: CPT | Performed by: STUDENT IN AN ORGANIZED HEALTH CARE EDUCATION/TRAINING PROGRAM

## 2025-01-09 PROCEDURE — 250N000009 HC RX 250

## 2025-01-09 PROCEDURE — 84100 ASSAY OF PHOSPHORUS: CPT | Performed by: STUDENT IN AN ORGANIZED HEALTH CARE EDUCATION/TRAINING PROGRAM

## 2025-01-09 PROCEDURE — 250N000009 HC RX 250: Performed by: STUDENT IN AN ORGANIZED HEALTH CARE EDUCATION/TRAINING PROGRAM

## 2025-01-09 PROCEDURE — 250N000013 HC RX MED GY IP 250 OP 250 PS 637

## 2025-01-09 PROCEDURE — 99207 PR SC NO CHARGE VISIT/PATIENT NOT SEEN: CPT | Performed by: PHYSICIAN ASSISTANT

## 2025-01-09 PROCEDURE — 36415 COLL VENOUS BLD VENIPUNCTURE: CPT | Performed by: STUDENT IN AN ORGANIZED HEALTH CARE EDUCATION/TRAINING PROGRAM

## 2025-01-09 PROCEDURE — 94640 AIRWAY INHALATION TREATMENT: CPT

## 2025-01-09 RX ORDER — DILTIAZEM HCL 60 MG
120 TABLET ORAL EVERY 6 HOURS SCHEDULED
Status: DISCONTINUED | OUTPATIENT
Start: 2025-01-09 | End: 2025-01-09

## 2025-01-09 RX ORDER — DILTIAZEM HCL 60 MG
60 TABLET ORAL EVERY 6 HOURS SCHEDULED
Status: DISCONTINUED | OUTPATIENT
Start: 2025-01-10 | End: 2025-01-14 | Stop reason: HOSPADM

## 2025-01-09 RX ADMIN — LEVOTHYROXINE SODIUM 100 MCG: 0.05 TABLET ORAL at 08:39

## 2025-01-09 RX ADMIN — IPRATROPIUM BROMIDE 0.5 MG: 0.5 SOLUTION RESPIRATORY (INHALATION) at 12:36

## 2025-01-09 RX ADMIN — Medication 40 MG: at 08:39

## 2025-01-09 RX ADMIN — ORAL VEHICLES - SUSP 75 MG: SUSPENSION at 08:39

## 2025-01-09 RX ADMIN — DILTIAZEM HYDROCHLORIDE 120 MG: 60 TABLET, FILM COATED ORAL at 21:18

## 2025-01-09 RX ADMIN — DILTIAZEM HYDROCHLORIDE 90 MG: 90 TABLET, FILM COATED ORAL at 01:46

## 2025-01-09 RX ADMIN — FUROSEMIDE 20 MG: 20 TABLET ORAL at 08:39

## 2025-01-09 RX ADMIN — LEVALBUTEROL HYDROCHLORIDE 0.63 MG: 0.63 SOLUTION RESPIRATORY (INHALATION) at 12:36

## 2025-01-09 RX ADMIN — GABAPENTIN 600 MG: 250 SOLUTION ORAL at 21:19

## 2025-01-09 RX ADMIN — ACETYLCYSTEINE 2 ML: 200 SOLUTION ORAL; RESPIRATORY (INHALATION) at 20:36

## 2025-01-09 RX ADMIN — Medication 1 SPRAY: at 21:28

## 2025-01-09 RX ADMIN — IPRATROPIUM BROMIDE 0.5 MG: 0.5 SOLUTION RESPIRATORY (INHALATION) at 20:36

## 2025-01-09 RX ADMIN — APIXABAN 5 MG: 5 TABLET, FILM COATED ORAL at 08:39

## 2025-01-09 RX ADMIN — IPRATROPIUM BROMIDE 0.5 MG: 0.5 SOLUTION RESPIRATORY (INHALATION) at 08:30

## 2025-01-09 RX ADMIN — APIXABAN 5 MG: 5 TABLET, FILM COATED ORAL at 21:18

## 2025-01-09 RX ADMIN — ATORVASTATIN CALCIUM 40 MG: 40 TABLET, FILM COATED ORAL at 21:18

## 2025-01-09 RX ADMIN — BUDESONIDE 0.5 MG: 0.5 INHALANT ORAL at 08:30

## 2025-01-09 RX ADMIN — LEVALBUTEROL HYDROCHLORIDE 0.63 MG: 0.63 SOLUTION RESPIRATORY (INHALATION) at 20:36

## 2025-01-09 RX ADMIN — Medication 1 SPRAY: at 10:27

## 2025-01-09 RX ADMIN — DILTIAZEM HYDROCHLORIDE 90 MG: 90 TABLET, FILM COATED ORAL at 08:38

## 2025-01-09 RX ADMIN — EMPAGLIFLOZIN 10 MG: 10 TABLET, FILM COATED ORAL at 08:38

## 2025-01-09 RX ADMIN — FEXOFENADINE HYDROCHLORIDE 120 MG: 60 TABLET ORAL at 08:38

## 2025-01-09 RX ADMIN — ORAL VEHICLES - SUSP 75 MG: SUSPENSION at 21:19

## 2025-01-09 RX ADMIN — THIAMINE HCL TAB 100 MG 100 MG: 100 TAB at 08:39

## 2025-01-09 RX ADMIN — SERTRALINE HYDROCHLORIDE 200 MG: 100 TABLET ORAL at 08:39

## 2025-01-09 RX ADMIN — Medication 1 SPRAY: at 15:37

## 2025-01-09 RX ADMIN — THERA TABS 1 TABLET: TAB at 08:38

## 2025-01-09 RX ADMIN — ACETYLCYSTEINE 2 ML: 200 SOLUTION ORAL; RESPIRATORY (INHALATION) at 15:55

## 2025-01-09 RX ADMIN — LEVALBUTEROL HYDROCHLORIDE 0.63 MG: 0.63 SOLUTION RESPIRATORY (INHALATION) at 15:55

## 2025-01-09 RX ADMIN — IPRATROPIUM BROMIDE 0.5 MG: 0.5 SOLUTION RESPIRATORY (INHALATION) at 15:55

## 2025-01-09 RX ADMIN — LEVALBUTEROL HYDROCHLORIDE 0.63 MG: 0.63 SOLUTION RESPIRATORY (INHALATION) at 08:30

## 2025-01-09 RX ADMIN — ACETYLCYSTEINE 2 ML: 200 SOLUTION ORAL; RESPIRATORY (INHALATION) at 08:30

## 2025-01-09 RX ADMIN — BUDESONIDE 0.5 MG: 0.5 INHALANT ORAL at 20:36

## 2025-01-09 RX ADMIN — ACETYLCYSTEINE 2 ML: 200 SOLUTION ORAL; RESPIRATORY (INHALATION) at 12:36

## 2025-01-09 RX ADMIN — DILTIAZEM HYDROCHLORIDE 120 MG: 60 TABLET, FILM COATED ORAL at 14:19

## 2025-01-09 ASSESSMENT — ACTIVITIES OF DAILY LIVING (ADL)
ADLS_ACUITY_SCORE: 68

## 2025-01-09 NOTE — SIGNIFICANT EVENT
SPIRITUAL HEALTH SERVICES Significant Event  [employer] [facility] [employer]  [facility] 6b    Pt anointed by Father Chloé Tucker   Pager [phone]

## 2025-01-09 NOTE — PLAN OF CARE
Goal Outcome Evaluation:      Plan of Care Reviewed With: patient    Overall Patient Progress: no changeOverall Patient Progress: no change       Neuro: A&Ox4. Flat and off to time. Encouraged music or tv; declined  Cardiac: Afib/Aflutter, rates 110-130s did give increased dose of metoprolol and increased diltizem per MD that did bring HR down. 90-110s   Respiratory: Sating mid 90's on 2l Nc, passed to next shift to try to wean to 1L NC  GI/: Adequate urine output, large incontinent urine output, bm x 1 large soft. Brief kept off to air out dry red, painful perineum and labial area. Cleaned with water after bm due burning with wiping and this did help alleviate the pain.   Diet/appetite: Tolerating npo due not handling her own secretions, keeps neck very high, difficulty tucking chin, denies neck pain from radiation.    Activity:  Assist of 2-3 today with PT pivot to chair, difficult per PT safer to still use the lift. Sat in chair for two hours and lifted back to bed. Tolerated well but was incontinent of large uop.   Pain: At acceptable level on current regimen. Denies generalized or neck pain.  Skin: Pederineum very reddened and painful with stooling today, plan was to keep puriwick out to help heal skin, night shift updated.   LDA's:piv in left arm saline locked.   Plan: Encourage self oral suctioning or call for staff assist, need more this late afternoon, thin oral secretions that she is not tucking chin well to swallow. Wanting ice but does not handle this well, using the smallest ice chip after mouth suctioning. Mouth cares q 2 hours if not more for secretions. Turned q 2 and up to chair for 2 hours. Continue with POC. Notify primary team with changes.

## 2025-01-09 NOTE — PLAN OF CARE
Hours of Care: [id number]    .Neuro: A&Ox4. Flat affect, calls appropriately and able to make needs known  Cardiac: A-fib/Aflutter (90's-120's). VSS.   Respiratory: Sating >93% on 1.5L nasal cannula with humidification, weak cough with frequent oral suctioning of thin secretions.  GI/: Voiding spontaneously, incontinent of urine. Pt refused use of pure wick due to painful reddened perineum area, barrier cream applied to help alleviate discomfort. No BM overnight. LBM 1/8/24, denies abdominal discomfort, passing flatus  Diet/appetite: Tolerating TF @ 38mL/hr FWF 30mL q4hrs, NPO diet. Denies N/V.  Activity:  Assist of 2 with lift  Pain: Denies   Skin: No new deficits noted, see pcs  LDA's: L PIV SL     Plan: Continue with POC. Notify primary team with changes.     Goal Outcome Evaluation:  Plan of Care Reviewed With: patient  Overall Patient Progress: no changeOverall Patient Progress: no change  Outcome Evaluation: VSS, O@ >93% on nasal cannula 1.5L, weak cough. frequent suctioning of thin oral secretions.

## 2025-01-09 NOTE — PROGRESS NOTES
[facility]    Medicine Progress Note - Hospitalist Service, GOLD TEAM 8    Date of Admission:  11/21/2024    Assessment & Plan   Asya Coffman is a 79 year old female admitted on 11/21/2024. 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 Initially admitted to hospital medicine service with hypoxic respiratory failure suspected due to HFpEF requiring diuresis. Developed acute hypoxia on 12/8 secondary to suspected aspiration pneumonitis for which she was intubated (12/8-12/10). Now s/p extubation and transferred back to hospital medicine service. Ongoing evaluation related to aspiration, currently tolerated continuous tube feeds via NJ, with improving respiratory status.     Updates today:  - Reconsulted palliative as patient was not able to explain her goals regarding code status reversal for PEG placement  - Uptitrated diltiazem to 120 q6hr , HR not at goal  - Patient has required secretion suctioning once/twice since am    GOC conversation  - Patient was previously being followed by palliative, her code status was changed to DNR/DNI, patient currently gets nutrition through NJT, and had plan for VFSS which has been postponed many times due to her increased secretions, PEG placement was discussed previously however was deferred due to her unstable respiratory status and transfer to ICU. Patient has been more stable hemodynamically for the past week.  - Reconsulted IR for PEG placement 1/8, deferred to GI or gen surg since patient is high risk for IV sedation and needs gastric insufflation which puts her at increased risk for aspiration  - Reconsulted GI for PEG   - Pt needs to have reversal of her code status during procedure  - She  needs to be intubated and is high risk for post procedure extubation, needs anesthesia consult   - Needs repeat A/P CT with cross sectional images   - Eliquis needs to be held 48 hrs    Acute severe oropharyngeal dysphagia  Laryngeal squamous cell carcinoma s/p radiation (4/2024-7/2024)  Failure to thrive  High risk for aspiration   Esophageal dysmotility  Concern for radiation-induced dysphagia  Concern for gastroparesis  Has had increased dysphagia in the setting of laryngeal squamous cell carcinoma s/p radiation (4/2024-7/2024). Family reports that over the past few months has declined dramatically from being quite mobile, active to being barely active, with 4 falls in 3 months progressively reduced speech and recurrent aspirations. ENT evaluated 12/16/24 with bedside laryngoscopy for dysphagia. Normal vocal cord function and control seen including ability to approximate cords during cough. No anatomic explanations for dysphagia seen. Evaluated by GI and there is no concern of esophageal web. Clinical picture consistent with significant muscle weakness contributing to oropharyngeal dysphagia, poor vocal pressure, poor cough effort likely due to radiation. No evidence of active rheumatologic condition contributing per rheumatology evaluation. CK, aldolase negative. Myasthenia panel negative.  Cortisol within normal limit.  Unclear if hypothyroidism is the cause but can be contributing. Doses adjusted as below. Cervical thoracic MRI without significant findings.   - SLP following  - NPO for now, continue TFs via NJ   - GJ placement is within goals of care of patient. However:  IR consulted for G-tube placement however procedure cancelled after her recurrent RRTs and unstable resp status, reconsulted IR 1/8 but deferred to GI or gen surg under general anesthesia  GI also consulted and did not feel that it is safe on evaluation 12/30; reconsulted 1/8, needs to reverse code status during procedure, needs anesthesia  consult if agrees with PEG due to need for intubation     -Continue speech therapy and see swallowing and secretion containment improves  - Patient still with heavy load of secretions per speech therapy, has been a barrier for VFSS, speech will continue seeing her x3/week, re-eval for VFSS early next week    Chronic afib- not rate controlled  Afib with RVR  [id number] 3 (age++, HF+)  Has episodes of A-fib with RVR. Has also episodes of bradycardia with  PTA dose of diltiazem and metoprolol  - PTA apixaban 5mg BID  - Pt was previously on Dilt  BID (max dose) , she has some low Bps while in ICU and she was tapered down to Dlit 60 q6hr, will titrate her up to 120 mg q6hr   - Metoprolol tartrate 75 mg twice daily  - Goal K above 4 and Mg above 2    Acute hypoxic respiratory failure  Recurrent Aspiration pneumonitis with aspiration pneumonia  MSSA PNA s/p abx (12/3-12/8)  Obstructive lung disease (COPD vs asthma)  Pulmonary hypertension   Presented on 11/21 with acute hypoxic respiratory failure; initially suspected to be secondary to HFpEF exacerbation; unresolved with diuresis. Underlying obstructive lung disease but no evidence of exacerbation here. Was treated for MSSA pneumonia. Overnight 12/8, developed sudden worsening of oxygenation and increased secretions in the setting of dysphagia/recent laryngeal radiation and was intubated. Suspected aspiration pneumonitis as etiology. Was briefly on zosyn but this was discontinued.  She was extubated on 12/10.  Multiple episodes of worsening of hypoxia with evidence of aspiration and intolerance to tube feeding.  CT 12/27 showed increasing basilar predominant bronchial wall thickening and diffuse groundglass attenuation suspicious of ongoing infectious process. Continues to have have excessive oral secretion and episodes of aspiration needing frequent suctioning, though intermittently improves.  -Antibiotics: Zosyn 12/28-12/28 then Unasyn 12/28-1/2/25. Transitioned  onto levofloxacin on 1/2/25 related to microbiology from sputum smear returning with resistant klebsiella. Plan for a 5 day course ending on 1/6/25.  -Respiratory support:  -Continue oxygen titration as needed  -Budesonide neb 0.5 mg BID,  -Ipratropium-levalbuterol neb QID  -albuteraol Q4H PRN  -Mucomyst  -Aspiration precautions; n.p.o.  -Flutter valve, incentive spirometery    Family update  Last discussed with daughter Lana by phone on 1/8/25    Chronic/stable medical conditions  Anxiety  Depression  History of depression and anxiety.  She feels hopeless and has sad mood during this hospitalization.  Will continue PTA sertraline.  Will also consult health psychology.  -Increased PTA Sertraline 200 mg daily     At risk for refeeding syndrome  Severe malnutrition in context of chronic illness/disease  Per daughter, baseline weight around 120-130 lbs. Admitted 102 lbs. Patient states she is eating less and has less appetite in general as well as having difficulty swallowing. While NPO during intubation, developed starvation ketosis. Given high risk for aspiration with PO intake, placed post pyloric  and started tube feeds. Will need close monitoring for refeeding syndrome.   - NPO   - RD following for TF management  - Monitor for refeeding syndrome   - Lyte replacement protocol   - Thiamine replacement     Chronic Left parietal, left thalamic lacunar strokes  - will increase atorvastatin to high intensity (20-> 40 mg). On DOAC  - A1C shows prediabetes     Hypothyroidism  Hx of thyroidectomy 2/2 cancer   - Continue PTA levothyroxine 88 mcg daily   - Consulted Endocrine service as above and increased dose to 100 mcg daily. Appreciate recommendations    HFpEF (LVEF 55-60% 11/22/2024)   Pulmonary hypertension (44 mmHg per echo 11/22/2024)  Possible small PFO  Echo during current admission (11/22/2024) notable for increased thickness of LV and elevated BNP (peak 9200 > 6200) concerning for heart failure exacerbation s/p  diuresis and pulmonary hypertension with estimated pulmonary artery pressure of 45 mmHg. Echo with bubble study (11/25/2024) concerning for possible small PFO. With intubation on 12/8, developed hypotension without evidence of shock. Etiology of hypotension may be cardiogenic (e.g. exacerbation of pulmonary hypertension by positive pressure ventilation) vs medication associated (propofol, precedex); Weaned off pressors 12/10.   - Preload dependent in the setting of pulmonary hypertension, gentle diuresis as needed   - Restart lasix 1/3 with 20mg oral daily (40mg recommended in cardiology consult 11/27 previously)  - Restart empagliflozin 10mg daily 1/2/25  - Continue to hold spironolactone for now, can consider if needed based on potassium.    Oliguric SAMIR, improving  Mild hyperkalemia  Cr 0.9 on admission. Baseline appears 0.9-1.0. Developed SAMIR initially in setting of diuresis and had been improving. Subsequently increased following transfer to ICU with consideration for prerenal in setting of NPO status.  Again significant increase in creatinine with low Cystatin C FEUrea above 40%.  Concern for possibility of ATN though UA is unremarkable. Overall renal cunftion appears to be improving.  - Strict I/Os  - Trend BMP   - FWF via NGT  - Will restart lasix 1/2/25 with 20mg daily oral; can increase to 40mg if tolerating.  - Continue to hold spironolactone for now, can consider if needed based on potassium.    History of apnea  Family report  apneic episodes after she underwent radiation therapy.  There is a possibility that she is having obstructive sleep apnea    Diarrhea  Frequent loose stools. Unclear timing of onset so not sure if related to abx or TF initiation. No abdominal pain. C diff on 12/30 neg  - imodium prn    Spiritual health  Pt family requested that pt sees    - consulted for emotional support      Anemia of chronic illness  Iron deficiency anemia  Baseline Hgb 11-12. Currently near  "baseline. Can consider anemia of chronic illness. Not able to swallow PTA oral iron  - Monitor CBC  - s/p one dose of  IV iron     Generalized deconditioning  Recurrent falls  - PT/OT consults     CREST Syndrome  Characterized by Raynaud's, Calcinosis, GERD and some telangectasia's. Last saw [facility] rheumatology 2017. No history of ILD.    - Continue protonix 40 mg daily             Diet: Adult Formula Drip Feeding: Continuous TwoCal HN; Nasoduodenal tube; Goal Rate: 38 mL/hr x 22 hours per day (Hold TFs for 1 hr before/after Synthroid dose); mL/hr  NPO for Medical/Clinical Reasons Except for: Ice Chips, Other; Specify: exception of 1-2 ice chips after thorough oral cares    DVT Prophylaxis: DOAC  Miranda Catheter: Not present  Lines: None     Cardiac Monitoring: ACTIVE order. Indication: Tachyarrhythmias, acute (48 hours)  Code Status: No CPR- Do NOT Intubate      Clinically Significant Risk Factors               # Hypoalbuminemia: Lowest albumin = 3 g/dL at 12/10/2024  3:11 AM, will monitor as appropriate                 # Severe Malnutrition: based on nutrition assessment    # Financial/Environmental Concerns: none         Social Drivers of Health    Housing Stability: High Risk (11/23/2024)    Housing Stability     Do you have housing? : No     Are you worried about losing your housing?: No          Disposition Plan     Medically Ready for Discharge: Anticipated in 2-4 Days             Hiram Bee MD  Hospitalist Service, GOLD TEAM [address] [place] [address]  Securely message with [employer] (more info)  Text page via [employer] Paging/Directory   See signed in provider for up to date coverage information  ______________________________________________________________________    Interval History   No major event overnight, discussed need for code status reversal with patient, patient expressed \"I want to live\" however was not able to further explore her thoughts with me and " decide on her code status reversal.     Physical Exam   Vital Signs: Temp: 97.2  F (36.2  C) Temp src: Axillary BP: 104/69 Pulse: 112   Resp: 19 SpO2: 95 % O2 Device: Nasal cannula with humidification Oxygen Delivery: 1.5 LPM  Weight: 108 lbs .41 oz    Gen: A&Ox4, lying comfortably on bed, harsh and gurgling sound when talking  Lung: non-labored  Abd: soft, non-tender, non-distended  Ext: No edema in upper/lower extremities      Medical Decision Making       50 MINUTES SPENT BY ME on the date of service doing chart review, history, exam, documentation & further activities per the note.      Data     I have personally reviewed the following data over the past 24 hrs:    9.6  \   11.2 (L)   / 207     137 98 25.5 (H) /  85   4.4 27 0.82 \       Imaging results reviewed over the past 24 hrs:   No results found for this or any previous visit (from the past 24 hours).

## 2025-01-09 NOTE — PROGRESS NOTES
[facility]    Medicine Progress Note - Hospitalist Service, GOLD TEAM 8    Date of Admission:  11/21/2024    Assessment & Plan   Asya Coffman is a 79 year old female admitted on 11/21/2024. 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 Initially admitted to hospital medicine service with hypoxic respiratory failure suspected due to HFpEF requiring diuresis. Developed acute hypoxia on 12/8 secondary to suspected aspiration pneumonitis for which she was intubated (12/8-12/10). Now s/p extubation and transferred back to hospital medicine service. Ongoing evaluation related to aspiration, currently tolerated continuous tube feeds via NJ, with improving respiratory status.     Updates today:  - Unable to have VFSS done today due to heavy load of secretions  - Discussed PEG placement, patient in agreement, however per previous conversations, patient has expressed keeping her DNR/DNI status during procedure  - Discussed with IR, due to her resp status and risk of aspiration deferred to GI / gen surg  - Discussed with GI, needs to reverse code status during procedure, needs anesthesia consult prior to intubation  - Uptitrated diltiazem to 90 q6hr , HR not at goal  - Will consider scopolamine / glycopyrrolate for oral secretions however SE profile not desirable (increased HR)        Acute hypoxic respiratory failure  Recurrent Aspiration pneumonitis with aspiration pneumonia  MSSA PNA s/p abx (12/3-12/8)  Obstructive lung disease (COPD vs asthma)  Pulmonary hypertension   Presented on 11/21 with acute hypoxic respiratory failure; initially suspected to be secondary to HFpEF exacerbation; unresolved with diuresis. Underlying obstructive lung  disease but no evidence of exacerbation here. Was treated for MSSA pneumonia. Overnight 12/8, developed sudden worsening of oxygenation and increased secretions in the setting of dysphagia/recent laryngeal radiation and was intubated. Suspected aspiration pneumonitis as etiology. Was briefly on zosyn but this was discontinued.  She was extubated on 12/10.  Multiple episodes of worsening of hypoxia with evidence of aspiration and intolerance to tube feeding.  CT 12/27 showed increasing basilar predominant bronchial wall thickening and diffuse groundglass attenuation suspicious of ongoing infectious process. Continues to have have excessive oral secretion and episodes of aspiration needing frequent suctioning, though intermittently improves.  -Antibiotics: Zosyn 12/28-12/28 then Unasyn 12/28-1/2/25. Transitioned onto levofloxacin on 1/2/25 related to microbiology from sputum smear returning with resistant klebsiella. Plan for a 5 day course ending on 1/6/25.  -Respiratory support:  -Continue oxygen titration as needed  -Budesonide neb 0.5 mg BID,  -Ipratropium-levalbuterol neb QID  -albuteraol Q4H PRN  -Mucomyst  -Aspiration precautions; n.p.o.  -Flutter valve, incentive spirometery  - Dysphagia management as below.  - Palliative care consult, recs appreciated    Failure to thrive  Aspiration risk  Acute severe oropharyngeal dysphagia  Esophageal dysmotility  Concern for radiation-induced dysphagia  Laryngeal squamous cell carcinoma s/p radiation (4/2024-7/2024)  Concern for gastroparesis  Has had increased dysphagia in the setting of laryngeal squamous cell carcinoma s/p radiation (4/2024-7/2024). Family reports that over the past few months has declined dramatically from being quite mobile, active to being barely active, with 4 falls in 3 months progressively reduced speech and recurrent aspirations. ENT evaluated 12/16/24 with bedside laryngoscopy for dysphagia. Normal vocal cord function and control seen including  ability to approximate cords during cough. No anatomic explanations for dysphagia seen. Evaluated by GI and there is no concern of esophageal web. Clinical picture consistent with significant muscle weakness contributing to oropharyngeal dysphagia, poor vocal pressure, poor cough effort likely due to radiation. No evidence of active rheumatologic condition contributing per rheumatology evaluation. CK, aldolase negative. Myasthenia panel negative.  Cortisol within normal limit.  Unclear if hypothyroidism is the cause but can be contributing. Doses adjusted as below. Cervical thoracic MRI without significant findings.   - SLP following  - NPO for now, continue TFs via NJ   - GJ placement is within goals of care of patient. However:  IR consulted for G-tube placement however procedure cancelled after her recurrent RRTs and unstable resp status, reconsulted IR 1/8 but deferred to GI or gen surg under general anesthesia  GI also consulted and did not feel that it is safe on evaluation 12/30; reconsulted 1/8, needs to reverse code status during procedure, needs anesthesia consult if agrees with PEG due to need for intubation     -Continue speech therapy and see swallowing and secretion containment improves  - Patient still with heavy load of secretions per speech therapy, has been a barrier for VFSS, speech will continue seeing her x3/week, re-eval for VFSS early next week    Chronic afib- not rate controlled  Afib with RVR  [id number] 3 (age++, HF+)  Has episodes of A-fib with RVR. Has also episodes of bradycardia with  PTA dose of diltiazem and metoprolol  - PTA apixaban 5mg BID  - Pt was previously on Dilt  BID (max dose) , she has some low Bps while in ICU and she was tapered down to Dlit 60 q6hr, will titrate her up to 90 mg q6hr   - Metoprolol to tartrate to 25 mg twice daily  - Goal K above 4 and Mg above 2    Family update  Last discussed with daughter Lana by phone on 1/8/25    Chronic/stable medical  conditions  Anxiety  Depression  History of depression and anxiety.  She feels hopeless and has sad mood during this hospitalization.  Will continue PTA sertraline.  Will also consult health psychology.  -Increased PTA Sertraline 200 mg daily     At risk for refeeding syndrome  Severe malnutrition in context of chronic illness/disease  Per daughter, baseline weight around 120-130 lbs. Admitted 102 lbs. Patient states she is eating less and has less appetite in general as well as having difficulty swallowing. While NPO during intubation, developed starvation ketosis. Given high risk for aspiration with PO intake, placed post pyloric  and started tube feeds. Will need close monitoring for refeeding syndrome.   - NPO   - RD following for TF management  - Monitor for refeeding syndrome   - Lyte replacement protocol   - Thiamine replacement     Chronic Left parietal, left thalamic lacunar strokes  - will increase atorvastatin to high intensity (20-> 40 mg). On DOAC  - A1C shows prediabetes     Hypothyroidism  Hx of thyroidectomy 2/2 cancer   - Continue PTA levothyroxine 88 mcg daily   - Consulted Endocrine service as above and increased dose to 100 mcg daily. Appreciate recommendations    HFpEF (LVEF 55-60% 11/22/2024)   Pulmonary hypertension (44 mmHg per echo 11/22/2024)  Possible small PFO  Echo during current admission (11/22/2024) notable for increased thickness of LV and elevated BNP (peak 9200 > 6200) concerning for heart failure exacerbation s/p diuresis and pulmonary hypertension with estimated pulmonary artery pressure of 45 mmHg. Echo with bubble study (11/25/2024) concerning for possible small PFO. With intubation on 12/8, developed hypotension without evidence of shock. Etiology of hypotension may be cardiogenic (e.g. exacerbation of pulmonary hypertension by positive pressure ventilation) vs medication associated (propofol, precedex); Weaned off pressors 12/10.   - Preload dependent in the setting of  pulmonary hypertension, gentle diuresis as needed   - Restart lasix 1/3 with 20mg oral daily (40mg recommended in cardiology consult 11/27 previously)  - Restart empagliflozin 10mg daily 1/2/25  - Continue to hold spironolactone for now, can consider if needed based on potassium.    Oliguric SAMIR, improving  Mild hyperkalemia  Cr 0.9 on admission. Baseline appears 0.9-1.0. Developed SAMIR initially in setting of diuresis and had been improving. Subsequently increased following transfer to ICU with consideration for prerenal in setting of NPO status.  Again significant increase in creatinine with low Cystatin C FEUrea above 40%.  Concern for possibility of ATN though UA is unremarkable. Overall renal cunftion appears to be improving.  - Strict I/Os  - Trend BMP   - FWF via NGT  - Will restart lasix 1/2/25 with 20mg daily oral; can increase to 40mg if tolerating.  - Continue to hold spironolactone for now, can consider if needed based on potassium.    History of apnea  Family report  apneic episodes after she underwent radiation therapy.  There is a possibility that she is having obstructive sleep apnea    Diarrhea  Frequent loose stools. Unclear timing of onset so not sure if related to abx or TF initiation. No abdominal pain. C diff on 12/30 neg  - imodium prn    Spiritual health  Pt family requested that pt sees    - consulted for emotional support      Anemia of chronic illness  Iron deficiency anemia  Baseline Hgb 11-12. Currently near baseline. Can consider anemia of chronic illness. Not able to swallow PTA oral iron  - Monitor CBC  - s/p one dose of  IV iron     Generalized deconditioning  Recurrent falls  - PT/OT consults     CREST Syndrome  Characterized by Raynaud's, Calcinosis, GERD and some telangectasia's. Last saw [facility] 2017. No history of ILD.    - Continue protonix 40 mg daily             Diet: Adult Formula Drip Feeding: Continuous TwoCal HN; Nasoduodenal tube; Goal Rate: 38  mL/hr x 22 hours per day (Hold TFs for 1 hr before/after Synthroid dose); mL/hr  NPO for Medical/Clinical Reasons Except for: Ice Chips, Other; Specify: exception of 1-2 ice chips after thorough oral cares    DVT Prophylaxis: DOAC  Miranda Catheter: Not present  Lines: None     Cardiac Monitoring: ACTIVE order. Indication: Tachyarrhythmias, acute (48 hours)  Code Status: No CPR- Do NOT Intubate      Clinically Significant Risk Factors               # Hypoalbuminemia: Lowest albumin = 3 g/dL at 12/10/2024  3:11 AM, will monitor as appropriate                 # Severe Malnutrition: based on nutrition assessment    # Financial/Environmental Concerns: none         Social Drivers of Health    Housing Stability: High Risk (11/23/2024)    Housing Stability     Do you have housing? : No     Are you worried about losing your housing?: No          Disposition Plan     Medically Ready for Discharge: Anticipated in 2-4 Days             Hiram Bee MD  Hospitalist Service, GOLD TEAM 8  [facility]  Securely message with [employer] (more info)  Text page via [employer] Paging/Directory   See signed in provider for up to date coverage information  ______________________________________________________________________    Interval History   No major event overnight, patient still with large amount of secretions making speech team unable to do VFSS, patient states she needs suction 5-6 times /day. Discussed gastric feeding tube and patient open to the idea, talked with daughter and she is in agreement with PEG tube however states patients wants to keep her DNR/DNI status during procedure.     Physical Exam   Vital Signs: Temp: (!) 96.4  F (35.8  C) Temp src: Axillary BP: 101/79 Pulse: 93   Resp: 19 SpO2: 91 % O2 Device: Nasal cannula Oxygen Delivery: 2 LPM  Weight: 116 lbs 13.5 oz    Gen: A&Ox4, lying comfortably on bed, harsh and gurgling sound when talking  Lung: clear on ausculation  b/l, no wheezing/crackle  Heart: no rub/murmur/gallop  Abd: soft, non-tender, non-distended  Ext: No edema in upper/lower extremities      Medical Decision Making       60 MINUTES SPENT BY ME on the date of service doing chart review, history, exam, documentation & further activities per the note.      Data     I have personally reviewed the following data over the past 24 hrs:    8.6  \   11.3 (L)   / 193     137 100 22.9 /  101 (H)   4.6 26 0.83 \       Imaging results reviewed over the past 24 hrs:   No results found for this or any previous visit (from the past 24 hours).

## 2025-01-09 NOTE — PROGRESS NOTES
1/9/2025 Gastroenterology Brief Note      Asya Coffman is a 79 year old female who GI AE was previously consulted for consideration of PEG-J placement. Please see prior notes from early January for complete details of consult.     GI AE called today by patient's hospitalist Dr. Bee requesting input regarding PEG-J placement. IR re-consulted earlier today, they declined due to need for gastric insufflation under IV conscious sedation (rather than anesthesia) - See Lilia Herring excellent consult note from 1/8.     Patient's condition was discussed with her hospitalist. The patient continues to utilize oral suctioning for inability to clear oral secretions. This has not allowed SLP to complete VFSS. Patient will remain NPO. NJT in place and patient tolerated post pyloric tube feeds. Barrier to discharge to TCU due to nasal tube so requesting percutaneous tube. Explained that our team requires general anesthesia which requires endotracheal intubation for mechanical ventilation to place a PEG-J.     The patient is currently DNR/DNI. Per primary team's discussion with the patient and her daughter, she does not want to reverse here code status for a procedure therefore we are not able to offer a PEG-J placement.    If the patient does choose to temporarily reverse her DNI status for a procedure it should be communicated that the patient would be high risk for remaining intubated after the procedure. If they are willing to proceed with those risks, would obtain a CT scan of her abdomen (non-contrast OK) given no recent dedicated cross sectional imaging of her abdomen to assess for adequate percutaneous window. Additionally, DOAC (currently on Apixaban) would need to be held for 48 hours prior to placement.      The patient was not seen today. This note is a product of chart review and discussion with medicine team Dr. Bee. The inpatient advanced endoscopy/pancreaticobiliary service will not  follow this patient. Please call or re-consult with questions or clinical status changes. Thank you for allowing us to participate in the care of this patient.      Above in collaboration/discussed with Dr. Brant Paz, GI attending    Cristy Boateng PA-C, [facility]  Advanced Endoscopy/Pancreaticobiliary GI Service  [facility]  [place]

## 2025-01-10 LAB
ANION GAP SERPL CALCULATED.3IONS-SCNC: 11 MMOL/L (ref 7–15)
BUN SERPL-MCNC: 28 MG/DL (ref 8–23)
CALCIUM SERPL-MCNC: 8.7 MG/DL (ref 8.8–10.4)
CHLORIDE SERPL-SCNC: 98 MMOL/L (ref 98–107)
CREAT SERPL-MCNC: 0.82 MG/DL (ref 0.51–0.95)
EGFRCR SERPLBLD CKD-EPI 2021: 73 ML/MIN/1.73M2
ERYTHROCYTE [DISTWIDTH] IN BLOOD BY AUTOMATED COUNT: 23.4 % (ref 10–15)
GLUCOSE BLDC GLUCOMTR-MCNC: 110 MG/DL (ref 70–99)
GLUCOSE SERPL-MCNC: 90 MG/DL (ref 70–99)
HCO3 SERPL-SCNC: 27 MMOL/L (ref 22–29)
HCT VFR BLD AUTO: 35.2 % (ref 35–47)
HGB BLD-MCNC: 10.9 G/DL (ref 11.7–15.7)
LACTATE SERPL-SCNC: 1.5 MMOL/L (ref 0.7–2)
MCH RBC QN AUTO: 26.5 PG (ref 26.5–33)
MCHC RBC AUTO-ENTMCNC: 31 G/DL (ref 31.5–36.5)
MCV RBC AUTO: 86 FL (ref 78–100)
PLATELET # BLD AUTO: 194 10E3/UL (ref 150–450)
POTASSIUM SERPL-SCNC: 4.3 MMOL/L (ref 3.4–5.3)
RBC # BLD AUTO: 4.11 10E6/UL (ref 3.8–5.2)
SODIUM SERPL-SCNC: 136 MMOL/L (ref 135–145)
WBC # BLD AUTO: 6.5 10E3/UL (ref 4–11)

## 2025-01-10 PROCEDURE — 85041 AUTOMATED RBC COUNT: CPT | Performed by: STUDENT IN AN ORGANIZED HEALTH CARE EDUCATION/TRAINING PROGRAM

## 2025-01-10 PROCEDURE — 85014 HEMATOCRIT: CPT | Performed by: STUDENT IN AN ORGANIZED HEALTH CARE EDUCATION/TRAINING PROGRAM

## 2025-01-10 PROCEDURE — 250N000013 HC RX MED GY IP 250 OP 250 PS 637: Performed by: STUDENT IN AN ORGANIZED HEALTH CARE EDUCATION/TRAINING PROGRAM

## 2025-01-10 PROCEDURE — 250N000011 HC RX IP 250 OP 636: Performed by: STUDENT IN AN ORGANIZED HEALTH CARE EDUCATION/TRAINING PROGRAM

## 2025-01-10 PROCEDURE — 250N000013 HC RX MED GY IP 250 OP 250 PS 637

## 2025-01-10 PROCEDURE — 250N000013 HC RX MED GY IP 250 OP 250 PS 637: Performed by: INTERNAL MEDICINE

## 2025-01-10 PROCEDURE — 92526 ORAL FUNCTION THERAPY: CPT | Mod: GN

## 2025-01-10 PROCEDURE — 99233 SBSQ HOSP IP/OBS HIGH 50: CPT | Performed by: STUDENT IN AN ORGANIZED HEALTH CARE EDUCATION/TRAINING PROGRAM

## 2025-01-10 PROCEDURE — 82374 ASSAY BLOOD CARBON DIOXIDE: CPT | Performed by: STUDENT IN AN ORGANIZED HEALTH CARE EDUCATION/TRAINING PROGRAM

## 2025-01-10 PROCEDURE — 120N000003 HC R&B IMCU UMMC

## 2025-01-10 PROCEDURE — 250N000009 HC RX 250

## 2025-01-10 PROCEDURE — 82435 ASSAY OF BLOOD CHLORIDE: CPT | Performed by: STUDENT IN AN ORGANIZED HEALTH CARE EDUCATION/TRAINING PROGRAM

## 2025-01-10 PROCEDURE — 83605 ASSAY OF LACTIC ACID: CPT | Performed by: STUDENT IN AN ORGANIZED HEALTH CARE EDUCATION/TRAINING PROGRAM

## 2025-01-10 PROCEDURE — 94640 AIRWAY INHALATION TREATMENT: CPT

## 2025-01-10 PROCEDURE — 36415 COLL VENOUS BLD VENIPUNCTURE: CPT | Performed by: STUDENT IN AN ORGANIZED HEALTH CARE EDUCATION/TRAINING PROGRAM

## 2025-01-10 PROCEDURE — 94640 AIRWAY INHALATION TREATMENT: CPT | Mod: 76

## 2025-01-10 PROCEDURE — 999N000157 HC STATISTIC RCP TIME EA 10 MIN

## 2025-01-10 PROCEDURE — 99233 SBSQ HOSP IP/OBS HIGH 50: CPT

## 2025-01-10 PROCEDURE — 99418 PROLNG IP/OBS E/M EA 15 MIN: CPT

## 2025-01-10 PROCEDURE — 250N000009 HC RX 250: Performed by: STUDENT IN AN ORGANIZED HEALTH CARE EDUCATION/TRAINING PROGRAM

## 2025-01-10 RX ADMIN — GABAPENTIN 600 MG: 250 SOLUTION ORAL at 21:05

## 2025-01-10 RX ADMIN — LEVALBUTEROL HYDROCHLORIDE 0.63 MG: 0.63 SOLUTION RESPIRATORY (INHALATION) at 08:35

## 2025-01-10 RX ADMIN — ATORVASTATIN CALCIUM 40 MG: 40 TABLET, FILM COATED ORAL at 20:51

## 2025-01-10 RX ADMIN — SERTRALINE HYDROCHLORIDE 200 MG: 100 TABLET ORAL at 09:29

## 2025-01-10 RX ADMIN — BUDESONIDE 0.5 MG: 0.5 INHALANT ORAL at 19:41

## 2025-01-10 RX ADMIN — Medication 40 MG: at 09:56

## 2025-01-10 RX ADMIN — IPRATROPIUM BROMIDE 0.5 MG: 0.5 SOLUTION RESPIRATORY (INHALATION) at 16:41

## 2025-01-10 RX ADMIN — DILTIAZEM HYDROCHLORIDE 60 MG: 60 TABLET, FILM COATED ORAL at 09:29

## 2025-01-10 RX ADMIN — EMPAGLIFLOZIN 10 MG: 10 TABLET, FILM COATED ORAL at 09:29

## 2025-01-10 RX ADMIN — THIAMINE HCL TAB 100 MG 100 MG: 100 TAB at 09:29

## 2025-01-10 RX ADMIN — Medication 1 SPRAY: at 09:30

## 2025-01-10 RX ADMIN — ORAL VEHICLES - SUSP 75 MG: SUSPENSION at 09:56

## 2025-01-10 RX ADMIN — APIXABAN 5 MG: 5 TABLET, FILM COATED ORAL at 09:29

## 2025-01-10 RX ADMIN — THERA TABS 1 TABLET: TAB at 09:29

## 2025-01-10 RX ADMIN — ACETYLCYSTEINE 2 ML: 200 SOLUTION ORAL; RESPIRATORY (INHALATION) at 12:02

## 2025-01-10 RX ADMIN — Medication 1 SPRAY: at 11:10

## 2025-01-10 RX ADMIN — FEXOFENADINE HYDROCHLORIDE 120 MG: 60 TABLET ORAL at 09:29

## 2025-01-10 RX ADMIN — Medication 1 SPRAY: at 20:51

## 2025-01-10 RX ADMIN — LEVALBUTEROL HYDROCHLORIDE 0.63 MG: 0.63 SOLUTION RESPIRATORY (INHALATION) at 19:41

## 2025-01-10 RX ADMIN — FUROSEMIDE 20 MG: 20 TABLET ORAL at 09:29

## 2025-01-10 RX ADMIN — IPRATROPIUM BROMIDE 0.5 MG: 0.5 SOLUTION RESPIRATORY (INHALATION) at 08:35

## 2025-01-10 RX ADMIN — BUDESONIDE 0.5 MG: 0.5 INHALANT ORAL at 08:35

## 2025-01-10 RX ADMIN — ACETYLCYSTEINE 2 ML: 200 SOLUTION ORAL; RESPIRATORY (INHALATION) at 19:41

## 2025-01-10 RX ADMIN — IPRATROPIUM BROMIDE 0.5 MG: 0.5 SOLUTION RESPIRATORY (INHALATION) at 19:41

## 2025-01-10 RX ADMIN — IPRATROPIUM BROMIDE 0.5 MG: 0.5 SOLUTION RESPIRATORY (INHALATION) at 12:02

## 2025-01-10 RX ADMIN — ACETYLCYSTEINE 2 ML: 200 SOLUTION ORAL; RESPIRATORY (INHALATION) at 08:35

## 2025-01-10 RX ADMIN — DILTIAZEM HYDROCHLORIDE 60 MG: 60 TABLET, FILM COATED ORAL at 21:06

## 2025-01-10 RX ADMIN — LEVALBUTEROL HYDROCHLORIDE 0.63 MG: 0.63 SOLUTION RESPIRATORY (INHALATION) at 16:41

## 2025-01-10 RX ADMIN — LEVOTHYROXINE SODIUM 100 MCG: 0.05 TABLET ORAL at 09:29

## 2025-01-10 RX ADMIN — DILTIAZEM HYDROCHLORIDE 60 MG: 60 TABLET, FILM COATED ORAL at 15:41

## 2025-01-10 RX ADMIN — DILTIAZEM HYDROCHLORIDE 60 MG: 60 TABLET, FILM COATED ORAL at 04:18

## 2025-01-10 RX ADMIN — LEVALBUTEROL HYDROCHLORIDE 0.63 MG: 0.63 SOLUTION RESPIRATORY (INHALATION) at 12:02

## 2025-01-10 RX ADMIN — ACETYLCYSTEINE 2 ML: 200 SOLUTION ORAL; RESPIRATORY (INHALATION) at 16:41

## 2025-01-10 RX ADMIN — ONDANSETRON 4 MG: 2 INJECTION INTRAMUSCULAR; INTRAVENOUS at 19:28

## 2025-01-10 RX ADMIN — ORAL VEHICLES - SUSP 75 MG: SUSPENSION at 20:51

## 2025-01-10 ASSESSMENT — ACTIVITIES OF DAILY LIVING (ADL)
ADLS_ACUITY_SCORE: 68

## 2025-01-10 NOTE — CONSULTS
"  PALLIATIVE CARE PROGRESS NOTE  [facility]     Patient Name: Asya Coffman  Date of Admission: 11/21/2024   Today the patient was seen for: Re-consulted for goals of care discussion      Recommendations & Counseling       GOALS OF CARE:   Life-prolonging with limits   Met with Hannah at bedside. Concern for wax and waning of mentation. At this time, its unclear if Hannah has capacity for complex medical decision making.  Attempted calling Lana Torres (daughter) and was unable to get a hold via phone at [phone].    Hannah affirms DNR/DNI. Expressed wishes to \"live longer\" and would like to pursue feeding tube. Per previous discussion with other providers, Hannah expressed wishes against code status reversal during procedure for feeding tube placement.  Today, patient states she would like to live longer but does not wish to be kept alive on machines but agreed code status reversed during her procedure.     Addendum:   Called Lana (daughter, [facility]) again this afternoon and spoke with her over the phone at [phone].  Lana confirmed Hannah's decisions to reverse code status during procedure for PEG tube placement. Family expressed frustration as Hannah's wishes to revers her code status to Full Code during procedure was discussed with previous medical provider. Attempted to join phone conversation with Dr. Bee and Hannah's daughter over the phone for brief GOC and was unsuccessful.    Spoke with Dr. Bee again and confirmed she spoke with Hannah's daughter and family agreed to pursue PEG tube placement and to reverse code status during the procedure. Dr. Bee confirmed Hannah is scheduled to have PEG tube placed on Monday.     ADVANCE CARE PLANNING:  No health care directive on file. Per system policy, Surrogate Decision-makers for Patients With Diminished Decision-making Capacity offers guidance on possible decision-makers. Daughter Lana has been " identified as a surrogate decision maker.   There is no POLST form on file, defer to patient and/or next of kin for decisions   Code status: No CPR- Do NOT Intubate    MEDICAL MANAGEMENT:   We are not actively managing symptoms at this time.    PSYCHOSOCIAL/SPIRITUAL:  Family: Lana Torres (Daughter), Deo Coffman (Son), total of 6 grandchildren including Ivanna Torres (Grandchild), Teddy Falk (Grandchild)    [place] community: [place]   Spiritual: Patient was seen by Fr. Chloé Fletcher on 1/09.     Palliative Care will continue to follow. Thank you for the consult and allowing us to aid in the care of Asya Coffman.    These recommendations have been discussed with primary team, nursing, patient and family.    VERONICA Rivas CNP  [facility], Palliative Care  Securely message with the Voc[employer] Web Console (learn more here) or  Text page via [facility] Paging/Directory        Assessment          Asya Coffman is a 79 year old female admitted on 11/21/2024. 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 Initially admitted to hospital medicine service with hypoxic respiratory failure suspected due to HFpEF requiring diuresis. Developed acute hypoxia on 12/8 secondary to suspected aspiration pneumonitis for which she was intubated (12/8-12/10). Now s/p extubation and transferred back to hospital medicine service. Ongoing evaluation related to aspiration, currently tolerated continuous tube feeds via NJ, with improving respiratory status. Now having conversation re: PEG placement due to being NPO and high risk for aspiration.     Today, the patient was seen for   #Goals of care discussion   #Patient and family support      Interval History:     Multidisciplinary collaboration:  Chart  reviewed. No major events overnight. Spoke with Dr. Bee this afternoon. Would recommend family care conference prior to PEG tube placement concerning for Hannah's wax and weaning of mentation. Family not present during my visit, and have attempted to reach out to patient's daughter several times today.     Notable medications:  Reviewed     Patient/family narrative  Hannah is very pleasant 79 yrs old female, alert and awake during my visit. She is able to carry out full conversations and recalls meeting with Palliative in the past.     Review of Systems:     Besides above, ROS was reviewed and is unremarkable        Physical Exam:   Temp:  [97  F (36.1  C)-97.8  F (36.6  C)] 97  F (36.1  C)  Pulse:  [] 105  Resp:  [17-20] 17  BP: ()/(66-75) 110/75  SpO2:  [95 %-97 %] 95 %  108 lbs .41 oz    Physical Exam  GENERAL: Alert, awake, sitting up in bed. No acute distress     SKIN: Warm and dry   LUNGS: Non-labored breathing, on oxygen via NC  ABDOMINAL: BS (+), soft, non distended, non tender  MUSKL: no gross joint deformities   EXTREMITIES: No edema or cyanosis, pulses 2+ and symmetrical  NEUROLOGIC: A&O x 4  PSYCH: Calm       Data Reviewed:     CMP  Recent Labs   Lab 01/10/25  0453 01/09/25  1541 01/09/25  1241 01/09/25  0949 01/08/25  0530 01/04/25  0554 01/03/25  2046     --   --  137 137   < >  --    POTASSIUM 4.3  --   --  4.4 4.6   < >  --    CHLORIDE 98  --   --  98 100   < >  --    CO2 27  --   --  27 26   < >  --    ANIONGAP 11  --   --  12 11   < >  --    GLC 90 113*   < > 86 101*   < >  --    BUN 28.0*  --   --  25.5* 22.9   < >  --    CR 0.82  --   --  0.82 0.83   < >  --    GFRESTIMATED 73  --   --  73 72   < >  --    ESSIE 8.7*  --   --  9.1 8.9   < >  --    MAG  --   --   --   --   --   --  1.8   PHOS  --   --   --  3.1 2.2*   < >  --     < > = values in this interval not displayed.     CBC  Recent Labs   Lab 01/10/25  0453 01/09/25  0949   WBC 6.5 9.6   RBC 4.11 4.29   HGB 10.9*  11.2*   HCT 35.2 37.8   MCV 86 88   MCH 26.5 26.1*   MCHC 31.0* 29.6*   RDW 23.4* 23.3*    207     Narrative & Impression   EXAMINATION: XR ABDOMEN PORT 1 VIEW 12/30/2024 4:50 PM      COMPARISON: 12/26/2024     HISTORY: Verify small bowel feeding tube bedside placement     TECHNIQUE: Frontal view of the abdomen.     FINDINGS: Enteric tube with tip in the proximal duodenum. No  abnormally dilated loops of bowel. No pneumatosis or portal venous  gas. No definite pneumoperitoneum. Increased opacities of the left  lower lung fields.                                                                      IMPRESSION: Feeding tube tip projects over the proximal duodenum.     I have personally reviewed the examination and initial interpretation  and I agree with the findings.     RICARDO TORRE MD      Medical Decision Making       MANAGEMENT DISCUSSED with the following over the past 24 hours: Medicine, nursing, patient and family    NOTE(S)/MEDICAL RECORDS REVIEWED over the past 24 hours: Medicine, GI, IR, and nursing    SUPPLEMENTAL HISTORY, in addition to the patient's history, over the past 24 hours obtained from:   - Lana (daughter, HCA)  70 MINUTES SPENT BY ME on the date of service doing chart review, history, exam, documentation & further activities per the note.

## 2025-01-10 NOTE — PLAN OF CARE
Problem: Adult Inpatient Plan of Care  Goal: Plan of Care Review  Description: The Plan of Care Review/Shift note should be completed every shift.  The Outcome Evaluation is a brief statement about your assessment that the patient is improving, declining, or no change.  This information will be displayed automatically on your shift  note.  Outcome: Progressing   Goal Outcome Evaluation:    Plan of care reviewed with- patient   Overall Patient progress- No change  Outcome evaluation- stating >90% on 1.5L NC. Frequent weak cough and suctioning with nebs from RT.       Neuro: A&Ox4. Flat affect. Is able to use call light and make needs known.   Cardiac: A-fib/Aflutter 90-120s. Tele order discontinued this afternoon.   Respiratory: Sating >90% on 1.5L NC w/ humidification. Suction provided after nebs by RT.   GI/: Incontinent of bowel and bladder. BM x2. Reddened buttocks- barrier cream applied.   Diet/appetite: TF- 38mL/hr w/ 30mL Q4 FWF. NPO. Ice chips allowed with supervision.   Activity:  Ax2 with lift.   Pain: Denies.  Skin: No new deficits noted.   LDA's: L PIV SL.      Plan: Increased dilt to 120 Q6. Continue with POC. Notify primary team with changes.     Celia Seaman RN on 1/9/2025 at 6:58 PM

## 2025-01-10 NOTE — PLAN OF CARE
Hours of Care: 1625-0983    Neuro: A&Ox4. Flat affect. Calls appropriately and makes needs known.   Cardiac: Aflutter/Afib, HR 80-90's. BP soft, 88/65 & 89/67 at midnight vitals, provider notified. Provider decreased following dose of diltiazem d/t low BP.  Respiratory: Sating >90% on 1.5-2L NC on RA.  GI/: Incontinent of bladder and bowel. Large urine output x2. No BM during shift.   Diet/appetite: NPO except ice chips with 1:1 supervision after oral cares. TF running at 38mL/hr w/ 30mL FWF Q4H.   Activity:  Assist of 2. Not out of bed during shift.   Pain: At acceptable level on current regimen. Denies during shift.   Skin: No new deficits noted. Redness in tania-area.   LDA's: L PIV SL.     Plan: Continue to monitor and follow POC. Notify primary team with changes.    Goal Outcome Evaluation:  Problem: Gas Exchange Impaired  Goal: Optimal Gas Exchange  Outcome: Progressing  Intervention: Optimize Oxygenation and Ventilation  Recent Flowsheet Documentation  Taken 1/10/2025 0415 by Aura Leung, RN  Head of Bed (HOB) Positioning: HOB at 30 degrees

## 2025-01-10 NOTE — PROGRESS NOTES
Brief GI Note    Paged by primary team that patient is willing to reverse her DNR/DNI status for PEG placement.     Plan  - Plan for PEG placement on Monday 1/13, added on  - Please hold Eliquis over the weekend  - NPO at MN (1/12-1/13)  - Please discontinue GI Luminal Consult, patient being followed by GI Panc/Bili service     Plan discussed with attending, Dr Jackson Lassiter MD  Gastroenterology Fellow

## 2025-01-10 NOTE — INTERIM SUMMARY
Miguel Cover    Paged earlier in the evening regarding patients blood pressure dropping into the 80's systolic. Otherwise asymptomatic. It appears she was having low blood pressures in the ICU which had improved and now the primary team was working on rate control by increasing diltiazem back to home dose. Reduced BP could be associated with this so I decreased her dilt back to 60 mg Q6H. Blood pressure has improved somewhat.    - reduced dilt back to 60 mg Q6 uncertain if that was cause     Germán Stephen, DO on 1/10/2025 at 4:25 AM

## 2025-01-10 NOTE — PROGRESS NOTES
[facility]    Medicine Progress Note - Hospitalist Service, GOLD TEAM 8    Date of Admission:  11/21/2024    Assessment & Plan   Asya Coffman is a 79 year old female admitted on 11/21/2024. 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 Initially admitted to hospital medicine service with hypoxic respiratory failure suspected due to HFpEF requiring diuresis. Developed acute hypoxia on 12/8 secondary to suspected aspiration pneumonitis for which she was intubated (12/8-12/10). Now s/p extubation and transferred back to hospital medicine service. Ongoing evaluation related to aspiration, currently tolerated continuous tube feeds via NJ, with improving respiratory status. Now having conversation re: PEG placement due to being NPO and high risk for aspiration.     Updates today:  - Patient discussed agreement of her code status reversal during PEG placement, will confirm this with her daughter Lana as well  - Repeat A/P CT per GI request to review cross sectional views  - Secretions are improved per RT and patient  - Dilt was decreased down to 60 q6hr due to low BP overnight  - Discussed the plan with Lana for 10-15 min over the phone    Acute severe oropharyngeal dysphagia  Laryngeal squamous cell carcinoma s/p radiation (4/2024-7/2024)  Failure to thrive  High risk for aspiration   Esophageal dysmotility  Concern for radiation-induced dysphagia  Concern for gastroparesis  Has had increased dysphagia in the setting of laryngeal squamous cell carcinoma s/p radiation (4/2024-7/2024). Family reports that over the past few months has declined dramatically from being quite mobile, active to being barely active, with 4 falls in 3 months  progressively reduced speech and recurrent aspirations. ENT evaluated 12/16/24 with bedside laryngoscopy for dysphagia. Normal vocal cord function and control seen including ability to approximate cords during cough. No anatomic explanations for dysphagia seen. Evaluated by GI and there is no concern of esophageal web. Clinical picture consistent with significant muscle weakness contributing to oropharyngeal dysphagia, poor vocal pressure, poor cough effort likely due to radiation. No evidence of active rheumatologic condition contributing per rheumatology evaluation. CK, aldolase negative. Myasthenia panel negative.  Cortisol within normal limit.  Unclear if hypothyroidism is the cause but can be contributing. Doses adjusted as below. Cervical thoracic MRI without significant findings.   - SLP following  - NPO for now, continue TFs via NJ   - GJ placement is within goals of care of patient. However:  IR consulted for G-tube placement however procedure cancelled after her recurrent RRTs and unstable resp status, reconsulted IR 1/8 but deferred to GI or gen surg under general anesthesia  GI also consulted and did not feel that it is safe on evaluation 12/30; reconsulted 1/8, needs to reverse code status during procedure, needs anesthesia consult if agrees with PEG due to need for intubation     -Continue speech therapy and see swallowing and secretion containment improves  - Patient still with heavy load of secretions per speech therapy, has been a barrier for VFSS, speech will continue seeing her x3/week, re-eval for VFSS early next week    GOC conversation  - Patient was previously being followed by palliative, her code status was changed to DNR/DNI, patient currently gets nutrition through NJT, and had plan for VFSS which has been postponed many times due to her increased secretions, PEG placement was discussed previously however was deferred due to her unstable respiratory status and transfer to ICU. Patient has  been more stable hemodynamically for the past week.  - Reconsulted IR for PEG placement 1/8, deferred to GI or gen surg since patient is high risk for IV sedation and needs gastric insufflation which puts her at increased risk for aspiration  - Reconsulted GI for PEG   - Pt needs to have reversal of her code status during procedure  - She needs to be intubated and is high risk for post procedure extubation, needs anesthesia consult   - Needs repeat A/P CT with cross sectional images   - Eliquis needs to be held 48 hrs      Chronic afib- rate controlled  Afib with RVR  [id number] 3 (age++, HF+)  Has episodes of A-fib with RVR. Has also episodes of bradycardia with  PTA dose of diltiazem and metoprolol  - PTA apixaban 5mg BID  - Pt was previously on Dilt  BID (max dose) , she has some low Bps while in ICU and she was tapered down to Dlit 60 q6hr, had low BP with 120 q6hr, will keep at 60 q6hr for now  - Metoprolol tartrate 75 mg twice daily  - Goal K above 4 and Mg above 2    Acute hypoxic respiratory failure- on 2L NC  Recurrent Aspiration pneumonitis with aspiration pneumonia  MSSA PNA s/p abx (12/3-12/8)  Obstructive lung disease (COPD vs asthma)  Pulmonary hypertension   Presented on 11/21 with acute hypoxic respiratory failure; initially suspected to be secondary to HFpEF exacerbation; unresolved with diuresis. Underlying obstructive lung disease but no evidence of exacerbation here. Was treated for MSSA pneumonia. Overnight 12/8, developed sudden worsening of oxygenation and increased secretions in the setting of dysphagia/recent laryngeal radiation and was intubated. Suspected aspiration pneumonitis as etiology. Was briefly on zosyn but this was discontinued.  She was extubated on 12/10.  Multiple episodes of worsening of hypoxia with evidence of aspiration and intolerance to tube feeding.  CT 12/27 showed increasing basilar predominant bronchial wall thickening and diffuse groundglass attenuation  suspicious of ongoing infectious process. Continues to have have excessive oral secretion and episodes of aspiration needing frequent suctioning, though intermittently improves.  -Antibiotics: Zosyn 12/28-12/28 then Unasyn 12/28-1/2/25. Transitioned onto levofloxacin on 1/2/25 related to microbiology from sputum smear returning with resistant klebsiella. Plan for a 5 day course ending on 1/6/25.  -Respiratory support:  -Continue oxygen titration as needed  -Budesonide neb 0.5 mg BID,  -Ipratropium-levalbuterol neb QID  -albuteraol Q4H PRN  -Mucomyst  -Aspiration precautions; n.p.o.  -Flutter valve, incentive spirometery    Family update  Last discussed with daughter Lana by phone on 1/8/25    Chronic/stable medical conditions  Anxiety  Depression  History of depression and anxiety.  She feels hopeless and has sad mood during this hospitalization.  Will continue PTA sertraline.  Will also consult health psychology.  -Increased PTA Sertraline 200 mg daily     At risk for refeeding syndrome  Severe malnutrition in context of chronic illness/disease  Per daughter, baseline weight around 120-130 lbs. Admitted 102 lbs. Patient states she is eating less and has less appetite in general as well as having difficulty swallowing. While NPO during intubation, developed starvation ketosis. Given high risk for aspiration with PO intake, placed post pyloric  and started tube feeds. Will need close monitoring for refeeding syndrome.   - NPO   - RD following for TF management  - Monitor for refeeding syndrome   - Lyte replacement protocol   - Thiamine replacement     Chronic Left parietal, left thalamic lacunar strokes  - will increase atorvastatin to high intensity (20-> 40 mg). On DOAC  - A1C shows prediabetes     Hypothyroidism  Hx of thyroidectomy 2/2 cancer   - Continue PTA levothyroxine 88 mcg daily   - Consulted Endocrine service as above and increased dose to 100 mcg daily. Appreciate recommendations    HFpEF (LVEF 55-60%  11/22/2024)   Pulmonary hypertension (44 mmHg per echo 11/22/2024)  Possible small PFO  Echo during current admission (11/22/2024) notable for increased thickness of LV and elevated BNP (peak 9200 > 6200) concerning for heart failure exacerbation s/p diuresis and pulmonary hypertension with estimated pulmonary artery pressure of 45 mmHg. Echo with bubble study (11/25/2024) concerning for possible small PFO. With intubation on 12/8, developed hypotension without evidence of shock. Etiology of hypotension may be cardiogenic (e.g. exacerbation of pulmonary hypertension by positive pressure ventilation) vs medication associated (propofol, precedex); Weaned off pressors 12/10.   - Preload dependent in the setting of pulmonary hypertension, gentle diuresis as needed   - Restart lasix 1/3 with 20mg oral daily (40mg recommended in cardiology consult 11/27 previously)  - Restart empagliflozin 10mg daily 1/2/25  - Continue to hold spironolactone for now, can consider if needed based on potassium.    Oliguric SAMIR, improving  Mild hyperkalemia  Cr 0.9 on admission. Baseline appears 0.9-1.0. Developed SAMIR initially in setting of diuresis and had been improving. Subsequently increased following transfer to ICU with consideration for prerenal in setting of NPO status.  Again significant increase in creatinine with low Cystatin C FEUrea above 40%.  Concern for possibility of ATN though UA is unremarkable. Overall renal cunftion appears to be improving.  - Strict I/Os  - Trend BMP   - FWF via NGT  - Will restart lasix 1/2/25 with 20mg daily oral; can increase to 40mg if tolerating.  - Continue to hold spironolactone for now, can consider if needed based on potassium.    History of apnea  Family report  apneic episodes after she underwent radiation therapy.  There is a possibility that she is having obstructive sleep apnea    Diarrhea  Frequent loose stools. Unclear timing of onset so not sure if related to abx or TF initiation. No  abdominal pain. C diff on 12/30 neg  - imodium prn    Spiritual health  Pt family requested that pt sees    - consulted for emotional support      Anemia of chronic illness  Iron deficiency anemia  Baseline Hgb 11-12. Currently near baseline. Can consider anemia of chronic illness. Not able to swallow PTA oral iron  - Monitor CBC  - s/p one dose of  IV iron     Generalized deconditioning  Recurrent falls  - PT/OT consults     CREST Syndrome  Characterized by Raynaud's, Calcinosis, GERD and some telangectasia's. Last saw [facility] 2017. No history of ILD.    - Continue protonix 40 mg daily             Diet: Adult Formula Drip Feeding: Continuous TwoCal HN; Nasoduodenal tube; Goal Rate: 38 mL/hr x 22 hours per day (Hold TFs for 1 hr before/after Synthroid dose); mL/hr  NPO for Medical/Clinical Reasons Except for: Ice Chips, Other; Specify: exception of 1-2 ice chips after thorough oral cares    DVT Prophylaxis: DOAC  Miranda Catheter: Not present  Lines: None     Cardiac Monitoring: ACTIVE order. Indication: Tachyarrhythmias, acute (48 hours)  Code Status: No CPR- Do NOT Intubate      Clinically Significant Risk Factors               # Hypoalbuminemia: Lowest albumin = 3 g/dL at 12/10/2024  3:11 AM, will monitor as appropriate                 # Severe Malnutrition: based on nutrition assessment    # Financial/Environmental Concerns: none         Social Drivers of Health    Housing Stability: High Risk (11/23/2024)    Housing Stability     Do you have housing? : No     Are you worried about losing your housing?: No          Disposition Plan     Medically Ready for Discharge: Anticipated in 2-4 Days             Hiram Bee MD  Hospitalist Service, GOLD TEAM [address] [place] [address]  Securely message with [employer] (more info)  Text page via [employer] Paging/Directory   See signed in provider for up to date coverage  information  ______________________________________________________________________    Interval History   No major event overnight, discussed her declining cognitive function with daughter, Lana mentions patient used to be more active, driving her car, using her phone, but she has been much less motivated since admission, Lana has noticed she can not use her phone, also not motivated to perform her swallow exercises.    Physical Exam   Vital Signs: Temp: 97  F (36.1  C) Temp src: Axillary BP: 110/75 Pulse: 65   Resp: 18 SpO2: 97 % O2 Device: Nasal cannula Oxygen Delivery: 2 LPM  Weight: 108 lbs .41 oz    Gen: A&Ox4, lying comfortably on bed, harsh and gurgling sound when talking  Lung: non-labored  Abd: soft, non-tender, non-distended  Ext: No edema in upper/lower extremities      Medical Decision Making       60 MINUTES SPENT BY ME on the date of service doing chart review, history, exam, documentation & further activities per the note.      Data     I have personally reviewed the following data over the past 24 hrs:    6.5  \   10.9 (L)   / 194     136 98 28.0 (H) /  110 (H)   4.3 27 0.82 \       Imaging results reviewed over the past 24 hrs:   No results found for this or any previous visit (from the past 24 hours).

## 2025-01-11 LAB
ANION GAP SERPL CALCULATED.3IONS-SCNC: 8 MMOL/L (ref 7–15)
BUN SERPL-MCNC: 28.4 MG/DL (ref 8–23)
CALCIUM SERPL-MCNC: 8.8 MG/DL (ref 8.8–10.4)
CHLORIDE SERPL-SCNC: 99 MMOL/L (ref 98–107)
CREAT SERPL-MCNC: 0.79 MG/DL (ref 0.51–0.95)
EGFRCR SERPLBLD CKD-EPI 2021: 76 ML/MIN/1.73M2
ERYTHROCYTE [DISTWIDTH] IN BLOOD BY AUTOMATED COUNT: 23.9 % (ref 10–15)
GLUCOSE SERPL-MCNC: 81 MG/DL (ref 70–99)
HCO3 SERPL-SCNC: 29 MMOL/L (ref 22–29)
HCT VFR BLD AUTO: 36.2 % (ref 35–47)
HGB BLD-MCNC: 10.7 G/DL (ref 11.7–15.7)
MCH RBC QN AUTO: 26.1 PG (ref 26.5–33)
MCHC RBC AUTO-ENTMCNC: 29.6 G/DL (ref 31.5–36.5)
MCV RBC AUTO: 88 FL (ref 78–100)
PLATELET # BLD AUTO: 175 10E3/UL (ref 150–450)
POTASSIUM SERPL-SCNC: 5.4 MMOL/L (ref 3.4–5.3)
RBC # BLD AUTO: 4.1 10E6/UL (ref 3.8–5.2)
SODIUM SERPL-SCNC: 136 MMOL/L (ref 135–145)
WBC # BLD AUTO: 8.8 10E3/UL (ref 4–11)

## 2025-01-11 PROCEDURE — 250N000009 HC RX 250

## 2025-01-11 PROCEDURE — 120N000003 HC R&B IMCU UMMC

## 2025-01-11 PROCEDURE — 250N000009 HC RX 250: Performed by: STUDENT IN AN ORGANIZED HEALTH CARE EDUCATION/TRAINING PROGRAM

## 2025-01-11 PROCEDURE — 250N000013 HC RX MED GY IP 250 OP 250 PS 637

## 2025-01-11 PROCEDURE — 94640 AIRWAY INHALATION TREATMENT: CPT

## 2025-01-11 PROCEDURE — 85041 AUTOMATED RBC COUNT: CPT | Performed by: STUDENT IN AN ORGANIZED HEALTH CARE EDUCATION/TRAINING PROGRAM

## 2025-01-11 PROCEDURE — 82374 ASSAY BLOOD CARBON DIOXIDE: CPT | Performed by: STUDENT IN AN ORGANIZED HEALTH CARE EDUCATION/TRAINING PROGRAM

## 2025-01-11 PROCEDURE — 80048 BASIC METABOLIC PNL TOTAL CA: CPT | Performed by: STUDENT IN AN ORGANIZED HEALTH CARE EDUCATION/TRAINING PROGRAM

## 2025-01-11 PROCEDURE — 36415 COLL VENOUS BLD VENIPUNCTURE: CPT | Performed by: STUDENT IN AN ORGANIZED HEALTH CARE EDUCATION/TRAINING PROGRAM

## 2025-01-11 PROCEDURE — 250N000013 HC RX MED GY IP 250 OP 250 PS 637: Performed by: STUDENT IN AN ORGANIZED HEALTH CARE EDUCATION/TRAINING PROGRAM

## 2025-01-11 PROCEDURE — 999N000157 HC STATISTIC RCP TIME EA 10 MIN

## 2025-01-11 PROCEDURE — 84132 ASSAY OF SERUM POTASSIUM: CPT | Performed by: STUDENT IN AN ORGANIZED HEALTH CARE EDUCATION/TRAINING PROGRAM

## 2025-01-11 PROCEDURE — 94640 AIRWAY INHALATION TREATMENT: CPT | Mod: 76

## 2025-01-11 PROCEDURE — 99232 SBSQ HOSP IP/OBS MODERATE 35: CPT | Performed by: STUDENT IN AN ORGANIZED HEALTH CARE EDUCATION/TRAINING PROGRAM

## 2025-01-11 PROCEDURE — 250N000013 HC RX MED GY IP 250 OP 250 PS 637: Performed by: INTERNAL MEDICINE

## 2025-01-11 PROCEDURE — 85027 COMPLETE CBC AUTOMATED: CPT | Performed by: STUDENT IN AN ORGANIZED HEALTH CARE EDUCATION/TRAINING PROGRAM

## 2025-01-11 RX ADMIN — SERTRALINE HYDROCHLORIDE 200 MG: 100 TABLET ORAL at 09:13

## 2025-01-11 RX ADMIN — EMPAGLIFLOZIN 10 MG: 10 TABLET, FILM COATED ORAL at 09:13

## 2025-01-11 RX ADMIN — THERA TABS 1 TABLET: TAB at 09:14

## 2025-01-11 RX ADMIN — ORAL VEHICLES - SUSP 75 MG: SUSPENSION at 19:50

## 2025-01-11 RX ADMIN — LEVALBUTEROL HYDROCHLORIDE 0.63 MG: 0.63 SOLUTION RESPIRATORY (INHALATION) at 07:59

## 2025-01-11 RX ADMIN — LEVALBUTEROL HYDROCHLORIDE 0.63 MG: 0.63 SOLUTION RESPIRATORY (INHALATION) at 16:28

## 2025-01-11 RX ADMIN — IPRATROPIUM BROMIDE 0.5 MG: 0.5 SOLUTION RESPIRATORY (INHALATION) at 07:59

## 2025-01-11 RX ADMIN — Medication 1 SPRAY: at 15:48

## 2025-01-11 RX ADMIN — DILTIAZEM HYDROCHLORIDE 60 MG: 60 TABLET, FILM COATED ORAL at 21:46

## 2025-01-11 RX ADMIN — DILTIAZEM HYDROCHLORIDE 60 MG: 60 TABLET, FILM COATED ORAL at 15:48

## 2025-01-11 RX ADMIN — SODIUM ZIRCONIUM CYCLOSILICATE 5 G: 5 POWDER, FOR SUSPENSION ORAL at 16:52

## 2025-01-11 RX ADMIN — LEVOTHYROXINE SODIUM 100 MCG: 0.05 TABLET ORAL at 09:13

## 2025-01-11 RX ADMIN — IPRATROPIUM BROMIDE 0.5 MG: 0.5 SOLUTION RESPIRATORY (INHALATION) at 16:28

## 2025-01-11 RX ADMIN — BUDESONIDE 0.5 MG: 0.5 INHALANT ORAL at 07:58

## 2025-01-11 RX ADMIN — FUROSEMIDE 20 MG: 20 TABLET ORAL at 09:13

## 2025-01-11 RX ADMIN — DILTIAZEM HYDROCHLORIDE 60 MG: 60 TABLET, FILM COATED ORAL at 04:28

## 2025-01-11 RX ADMIN — LEVALBUTEROL HYDROCHLORIDE 0.63 MG: 0.63 SOLUTION RESPIRATORY (INHALATION) at 13:00

## 2025-01-11 RX ADMIN — Medication 40 MG: at 09:12

## 2025-01-11 RX ADMIN — GABAPENTIN 600 MG: 250 SOLUTION ORAL at 21:47

## 2025-01-11 RX ADMIN — Medication 1 SPRAY: at 09:13

## 2025-01-11 RX ADMIN — Medication 1 SPRAY: at 19:50

## 2025-01-11 RX ADMIN — ATORVASTATIN CALCIUM 40 MG: 40 TABLET, FILM COATED ORAL at 19:50

## 2025-01-11 RX ADMIN — ORAL VEHICLES - SUSP 75 MG: SUSPENSION at 09:12

## 2025-01-11 RX ADMIN — BUDESONIDE 0.5 MG: 0.5 INHALANT ORAL at 19:48

## 2025-01-11 RX ADMIN — IPRATROPIUM BROMIDE 0.5 MG: 0.5 SOLUTION RESPIRATORY (INHALATION) at 19:48

## 2025-01-11 RX ADMIN — IPRATROPIUM BROMIDE 0.5 MG: 0.5 SOLUTION RESPIRATORY (INHALATION) at 13:00

## 2025-01-11 RX ADMIN — ACETYLCYSTEINE 2 ML: 200 SOLUTION ORAL; RESPIRATORY (INHALATION) at 16:28

## 2025-01-11 RX ADMIN — ACETYLCYSTEINE 2 ML: 200 SOLUTION ORAL; RESPIRATORY (INHALATION) at 13:00

## 2025-01-11 RX ADMIN — FEXOFENADINE HYDROCHLORIDE 120 MG: 60 TABLET ORAL at 09:12

## 2025-01-11 RX ADMIN — THIAMINE HCL TAB 100 MG 100 MG: 100 TAB at 09:13

## 2025-01-11 RX ADMIN — LEVALBUTEROL HYDROCHLORIDE 0.63 MG: 0.63 SOLUTION RESPIRATORY (INHALATION) at 19:48

## 2025-01-11 RX ADMIN — ACETYLCYSTEINE 2 ML: 200 SOLUTION ORAL; RESPIRATORY (INHALATION) at 19:48

## 2025-01-11 RX ADMIN — DILTIAZEM HYDROCHLORIDE 60 MG: 60 TABLET, FILM COATED ORAL at 09:13

## 2025-01-11 RX ADMIN — ACETYLCYSTEINE 2 ML: 200 SOLUTION ORAL; RESPIRATORY (INHALATION) at 07:59

## 2025-01-11 ASSESSMENT — ACTIVITIES OF DAILY LIVING (ADL)
ADLS_ACUITY_SCORE: 68

## 2025-01-11 NOTE — PLAN OF CARE
Neuro: Patient alert and oriented x4. Flat affect.  Cardiac: A-fib/Aflutter 80's-100's. BP's stable throughout the shift. Afebrile.   Respiratory: Sating >92% on 3L NC. Oral suctioning q 2-3 hours with 14F suction catheter.   GI/: Incontinent of bowel and bladder. BM x1. Reddened buttocks- barrier cream applied. Mepi in place.   Diet/appetite: TF- 38mL/hr w/ 30mL Q4 FWF via NJ. NPO. Ice chips allowed with supervision.   Activity: Ax2 with lift.   Pain: Denies.  Skin: No new deficits noted.   LDA's: L PIV SL   Plan: Plans for PEG placement in OR on Monday.     Goal Outcome Evaluation:    Plan of Care Reviewed With: patient  Overall Patient Progress: no change  Outcome Evaluation: CT completed, plans for PEG placement on Monday

## 2025-01-11 NOTE — PLAN OF CARE
Neuro: A&Ox4.   Cardiac: Afib - -130s. SBP >90s. MAP>65.    Respiratory: Sating >92% on 2-3L/NC. Oral suctioning q2-3 hrs.   GI/: Incontinent of bladder/bowel. BM X1, large amount.   Diet/appetite: Tolerating TF at 38mL/hr. FWF 30mL q4 via NJ Tube.  Activity:  Assist of 2 for repositioning. Lift assist  Pain: At acceptable level on current regimen.   Skin: No new deficits noted.   LDA's: L PIV x1, SL.     Plan: Continue with POC. Notify primary team with changes.     Goal Outcome Evaluation:      Plan of Care Reviewed With: patient    Overall Patient Progress: no changeOverall Patient Progress: no change    Outcome Evaluation: For PEG tube placement on Monday. Saturating >92% on 2L NC. Still with AFib Rate up to 130s. SBP>90. MAP>65.

## 2025-01-11 NOTE — PROGRESS NOTES
[facility]    Medicine Progress Note - Hospitalist Service, GOLD TEAM 8    Date of Admission:  11/21/2024    Assessment & Plan   Asya Coffman is a 79 year old female admitted on 11/21/2024. 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 Initially admitted to hospital medicine service with hypoxic respiratory failure suspected due to HFpEF requiring diuresis. Developed acute hypoxia on 12/8 secondary to suspected aspiration pneumonitis for which she was intubated (12/8-12/10). Now s/p extubation and transferred back to hospital medicine service. Ongoing evaluation related to aspiration, currently tolerated continuous tube feeds via NJ, with improving respiratory status. Now having conversation re: PEG placement due to being NPO and high risk for aspiration.     Updates today:  - Plan for PEG placement on Monday  - Secretions are improved per RT and patient  - Patient is trending down on her Bps and up on her HR, has hx of uncontrolled afib, recent challenge to have a balance to control her HR and BP, stable clinically, will CTM  - Eliquis held 1/10 for PEG    Acute severe oropharyngeal dysphagia  Laryngeal squamous cell carcinoma s/p radiation (4/2024-7/2024)  Failure to thrive  High risk for aspiration   Esophageal dysmotility  Concern for radiation-induced dysphagia  Concern for gastroparesis  Has had increased dysphagia in the setting of laryngeal squamous cell carcinoma s/p radiation (4/2024-7/2024). Family reports that over the past few months has declined dramatically from being quite mobile, active to being barely active, with 4 falls in 3 months progressively reduced speech and recurrent aspirations. ENT evaluated 12/16/24 with  bedside laryngoscopy for dysphagia. Normal vocal cord function and control seen including ability to approximate cords during cough. No anatomic explanations for dysphagia seen. Evaluated by GI and there is no concern of esophageal web. Clinical picture consistent with significant muscle weakness contributing to oropharyngeal dysphagia, poor vocal pressure, poor cough effort likely due to radiation. No evidence of active rheumatologic condition contributing per rheumatology evaluation. CK, aldolase negative. Myasthenia panel negative.  Cortisol within normal limit.  Unclear if hypothyroidism is the cause but can be contributing. Doses adjusted as below. Cervical thoracic MRI without significant findings.   - SLP following  - NPO for now, continue TFs via NJ   - GJ placement is within goals of care of patient. However:  IR consulted for G-tube placement however procedure cancelled after her recurrent RRTs and unstable resp status, reconsulted IR 1/8 but deferred to GI or gen surg under general anesthesia  GI also consulted and did not feel that it is safe on evaluation 12/30; reconsulted 1/8, plan for PEG placement on Monday 1/13    -Continue speech therapy and see swallowing and secretion containment improves  - Patient still with heavy load of secretions per speech therapy, has been a barrier for VFSS, speech will continue seeing her x3/week  GOC conversation  - Patient was previously being followed by palliative, her code status was changed to DNR/DNI, patient currently gets nutrition through NJT, and had plan for VFSS which has been postponed many times due to her increased secretions, PEG placement was discussed previously however was deferred due to her unstable respiratory status and transfer to ICU. Patient has been more stable hemodynamically for the past week.  - Reconsulted IR for PEG placement 1/8, deferred to GI or gen surg since patient is high risk for IV sedation and needs gastric insufflation which  puts her at increased risk for aspiration  - Reconsulted GI for PEG   - Will be full code for PEG , but is DNR DNI   - She needs to be intubated and is high risk for post procedure extubation, family aware   - Eliquis held 1/10 for PEG      Chronic afib  Afib with RVR  [id number] 3 (age++, HF+)  Has episodes of A-fib with RVR. Has also episodes of bradycardia with  PTA dose of diltiazem and metoprolol  - PTA apixaban 5mg BID  - Pt was previously on Dilt  BID (max dose) , she has some low Bps while in ICU and she was tapered down to Dlit 60 q6hr, had low BP with 120 q6hr, will keep at 60 q6hr for now and CTM  - Metoprolol tartrate 75 mg twice daily  - Goal K above 4 and Mg above 2    Acute hypoxic respiratory failure- on 2L NC  Recurrent Aspiration pneumonitis with aspiration pneumonia  MSSA PNA s/p abx (12/3-12/8)  Obstructive lung disease (COPD vs asthma)  Pulmonary hypertension   Presented on 11/21 with acute hypoxic respiratory failure; initially suspected to be secondary to HFpEF exacerbation; unresolved with diuresis. Underlying obstructive lung disease but no evidence of exacerbation here. Was treated for MSSA pneumonia. Overnight 12/8, developed sudden worsening of oxygenation and increased secretions in the setting of dysphagia/recent laryngeal radiation and was intubated. Suspected aspiration pneumonitis as etiology. Was briefly on zosyn but this was discontinued.  She was extubated on 12/10.  Multiple episodes of worsening of hypoxia with evidence of aspiration and intolerance to tube feeding.  CT 12/27 showed increasing basilar predominant bronchial wall thickening and diffuse groundglass attenuation suspicious of ongoing infectious process. Continues to have have excessive oral secretion and episodes of aspiration needing frequent suctioning, though intermittently improves.  -Antibiotics: Zosyn 12/28-12/28 then Unasyn 12/28-1/2/25. Transitioned onto levofloxacin on 1/2/25 related to microbiology  from sputum smear returning with resistant klebsiella. Plan for a 5 day course ending on 1/6/25.  -Respiratory support:  -Continue oxygen titration as needed  -Budesonide neb 0.5 mg BID,  -Ipratropium-levalbuterol neb QID  -albuteraol Q4H PRN  -Mucomyst  -Aspiration precautions; n.p.o.  -Flutter valve, incentive spirometery    Family update  Last discussed with daughter Lana by phone on 1/10/25    Chronic/stable medical conditions  Anxiety  Depression  History of depression and anxiety.  She feels hopeless and has sad mood during this hospitalization.  Will continue PTA sertraline. health psychology consulted  -Increased PTA Sertraline 200 mg daily     Chronic Left parietal, left thalamic lacunar strokes  -  atorvastatin to high intensity (20-> 40 mg). On DOAC  - A1C shows prediabetes     Hypothyroidism  Hx of thyroidectomy 2/2 cancer   - Continue PTA levothyroxine 88 mcg daily   - Consulted Endocrine service as above and increased dose to 100 mcg daily. Appreciate recommendations    HFpEF (LVEF 55-60% 11/22/2024)   Pulmonary hypertension (44 mmHg per echo 11/22/2024)  Possible small PFO  Echo during current admission (11/22/2024) notable for increased thickness of LV and elevated BNP (peak 9200 > 6200) concerning for heart failure exacerbation s/p diuresis and pulmonary hypertension with estimated pulmonary artery pressure of 45 mmHg. Echo with bubble study (11/25/2024) concerning for possible small PFO. With intubation on 12/8, developed hypotension without evidence of shock. Etiology of hypotension may be cardiogenic (e.g. exacerbation of pulmonary hypertension by positive pressure ventilation) vs medication associated (propofol, precedex); Weaned off pressors 12/10.   - Preload dependent in the setting of pulmonary hypertension, gentle diuresis as needed   - Restart lasix 1/3 with 20mg oral daily (40mg recommended in cardiology consult 11/27 previously)  - Restart empagliflozin 10mg daily 1/2/25  - Continue to  hold spironolactone for now, can consider if needed based on potassium.    Oliguric SAMIR, resolved  Mild hyperkalemia  Cr 0.9 on admission. Baseline appears 0.9-1.0. Developed SAMIR initially in setting of diuresis and had been improving. Subsequently increased following transfer to ICU with consideration for prerenal in setting of NPO status.  Again significant increase in creatinine with low Cystatin C FEUrea above 40%.  Concern for possibility of ATN though UA is unremarkable. Overall renal cunftion appears to be improving.  - Strict I/Os  - Trend BMP   - FWF via NGT  - Will restart lasix 1/2/25 with 20mg daily oral; can increase to 40mg if tolerating.  - Continue to hold spironolactone for now, can consider if needed based on potassium.      Anemia of chronic illness  Iron deficiency anemia  Baseline Hgb 11-12. Currently near baseline. Can consider anemia of chronic illness. Not able to swallow PTA oral iron  - Monitor CBC  - s/p one dose of  IV iron     Generalized deconditioning  Recurrent falls  - PT/OT consults, has been inpatient since 11/20 and had recs for TCU on admission     CREST Syndrome  Characterized by Raynaud's, Calcinosis, GERD and some telangectasia's. Last saw [facility] rheumatology 2017. No history of ILD.    - Continue protonix 40 mg daily             Diet: Adult Formula Drip Feeding: Continuous TwoCal HN; Nasoduodenal tube; Goal Rate: 38 mL/hr x 22 hours per day (Hold TFs for 1 hr before/after Synthroid dose); mL/hr  NPO for Medical/Clinical Reasons Except for: Ice Chips, Other; Specify: exception of 1-2 ice chips after thorough oral cares    DVT Prophylaxis: DOAC  Miranda Catheter: Not present  Lines: None     Cardiac Monitoring: ACTIVE order. Indication: Tachyarrhythmias, acute (48 hours)  Code Status: No CPR- Do NOT Intubate      Clinically Significant Risk Factors        # Hyperkalemia: Highest K = 5.4 mmol/L in last 2 days, will monitor as appropriate        # Hypoalbuminemia: Lowest albumin =  3 g/dL at 12/10/2024  3:11 AM, will monitor as appropriate                 # Severe Malnutrition: based on nutrition assessment    # Financial/Environmental Concerns: none         Social Drivers of Health    Housing Stability: High Risk (11/23/2024)    Housing Stability     Do you have housing? : No     Are you worried about losing your housing?: No          Disposition Plan     Medically Ready for Discharge: Anticipated in 2-4 Days             Hiram Bee MD  Hospitalist Service, GOLD TEAM 8  [facility]  Securely message with [employer] (more info)  Text page via [email or web address] Paging/Directory   See signed in provider for up to date coverage information  ______________________________________________________________________    Interval History   No major event overnight, no complaint with her secretions today, hasn't used the suction since morning, discussed plan for PEG on Monday. No other major concern.     Physical Exam   Vital Signs: Temp: 97.6  F (36.4  C) Temp src: Oral BP: 97/64 Pulse: 117   Resp: 20 SpO2: 100 % O2 Device: Nasal cannula Oxygen Delivery: 2 LPM  Weight: 109 lbs 5.57 oz    Gen: Cachectic, A&Ox4, lying comfortably on bed, harsh and gurgling sound when talking, on 2L NC  Lung: non-labored  Abd: soft, non-tender, non-distended  Ext: No edema in upper/lower extremities      Medical Decision Making       35 MINUTES SPENT BY ME on the date of service doing chart review, history, exam, documentation & further activities per the note.      Data     I have personally reviewed the following data over the past 24 hrs:    8.8  \   10.7 (L)   / 175     136 99 28.4 (H) /  81   5.4 (H) 29 0.79 \     Procal: N/A CRP: N/A Lactic Acid: N/A         Imaging results reviewed over the past 24 hrs:   Recent Results (from the past 24 hours)   CT Abdomen Pelvis w/o Contrast    Narrative    EXAMINATION: CT ABDOMEN PELVIS W/O CONTRAST, 1/10/2025 6:15 PM    INDICATION: eval  for PEG placement    COMPARISON STUDY: CT chest 12/27/2024    TECHNIQUE: CT scan of the chest, abdomen and pelvis was performed on  multidetector CT scanner using volumetric acquisition technique and  images were reconstructed in multiple planes with variable thickness  and reviewed on dedicated workstations.     CONTRAST: None    CT scan radiation dose is optimized to minimum requisite dose using  automated dose modulation techniques.    FINDINGS:  Devices: Feeding tube tip in the distal duodenum.    Lower chest: Cardiomegaly. Coronary artery calcifications. Trace  pericardial effusion. Large bilateral pleural effusions with  compressive atelectasis of the lower lobes.    Liver: No mass. Mild intrahepatic biliary ductal dilation.    Biliary System: Cholecystectomy. Mild extrahepatic biliary ductal  dilation is favored to represent postcholecystectomy reservoir effect.      Pancreas: No mass or pancreatic ductal dilation.    Adrenal glands: No mass or nodules    Spleen: Normal.    Kidneys: Right sided nephrolithiasis, measuring up to 5 mm in the  inferior pole. Left-sided renal calcifications are favored vascular.  Partially exophytic structure off the inferior right renal pole, with  nonsimple attenuating internal contents. Prominent extrarenal pelvis  on the right. No suspicious mass, obstructing calculus or  hydronephrosis.    Gastrointestinal tract: Normal caliber small bowel. Extensive colonic  diverticulosis without evidence of acute diverticulitis. The stomach  is nondistended, but courses anteriorly in the abdomen and appears in  close proximity to the anterior abdominal wall.    Mesentery/peritoneum/retroperitoneum: No mass. No free fluid or air.    Lymph nodes: Limited evaluation on this noncontrast enhanced  examination. Scattered atherosclerotic calcifications of the abdominal  aorta without aneurysmal dilation.    Vasculature: Patent major abdominal vasculature.    Pelvis: Urinary bladder is normal.  Uterus and adnexa within normal  limits.    Osseous structures: No aggressive or acute osseous lesion.      Soft tissues: Mild body wall anasarca, particularly about the hips.   Subcutaneous fluid collection along the right lateral proximal thigh  measuring 2.1 x 4.0 x 6.0 cm (2/74, 3/55).      Impression    IMPRESSION:   1. No acute findings in the abdomen or pelvis.  2. Bilateral moderate sized pleural effusions with compressive  atelectasis of the lower lungs.  3. Trace pericardial effusion.   4. Focal fluid collection in the subcutaneous tissues of the lateral  aspect of the right proximal thigh, compatible with a hematoma.  5. Extensive colonic diverticulosis without evidence of acute  diverticulitis.    I have personally reviewed the examination and initial interpretation  and I agree with the findings.    ELSA GONZALEZ DO         SYSTEM ID:  [id number]

## 2025-01-12 LAB
ANION GAP SERPL CALCULATED.3IONS-SCNC: 10 MMOL/L (ref 7–15)
BUN SERPL-MCNC: 30 MG/DL (ref 8–23)
CALCIUM SERPL-MCNC: 8.9 MG/DL (ref 8.8–10.4)
CHLORIDE SERPL-SCNC: 97 MMOL/L (ref 98–107)
CREAT SERPL-MCNC: 0.73 MG/DL (ref 0.51–0.95)
EGFRCR SERPLBLD CKD-EPI 2021: 84 ML/MIN/1.73M2
ERYTHROCYTE [DISTWIDTH] IN BLOOD BY AUTOMATED COUNT: 24.1 % (ref 10–15)
GLUCOSE BLDC GLUCOMTR-MCNC: 112 MG/DL (ref 70–99)
GLUCOSE SERPL-MCNC: 107 MG/DL (ref 70–99)
HCO3 SERPL-SCNC: 28 MMOL/L (ref 22–29)
HCT VFR BLD AUTO: 36.4 % (ref 35–47)
HGB BLD-MCNC: 11.1 G/DL (ref 11.7–15.7)
MCH RBC QN AUTO: 26.8 PG (ref 26.5–33)
MCHC RBC AUTO-ENTMCNC: 30.5 G/DL (ref 31.5–36.5)
MCV RBC AUTO: 88 FL (ref 78–100)
PLATELET # BLD AUTO: 215 10E3/UL (ref 150–450)
POTASSIUM SERPL-SCNC: 4.4 MMOL/L (ref 3.4–5.3)
RBC # BLD AUTO: 4.14 10E6/UL (ref 3.8–5.2)
SODIUM SERPL-SCNC: 135 MMOL/L (ref 135–145)
WBC # BLD AUTO: 6 10E3/UL (ref 4–11)

## 2025-01-12 PROCEDURE — G0463 HOSPITAL OUTPT CLINIC VISIT: HCPCS

## 2025-01-12 PROCEDURE — 36415 COLL VENOUS BLD VENIPUNCTURE: CPT | Performed by: STUDENT IN AN ORGANIZED HEALTH CARE EDUCATION/TRAINING PROGRAM

## 2025-01-12 PROCEDURE — 250N000009 HC RX 250

## 2025-01-12 PROCEDURE — 94640 AIRWAY INHALATION TREATMENT: CPT | Mod: 76

## 2025-01-12 PROCEDURE — 999N000157 HC STATISTIC RCP TIME EA 10 MIN

## 2025-01-12 PROCEDURE — 999N000248 HC STATISTIC IV INSERT WITH US BY RN

## 2025-01-12 PROCEDURE — 120N000003 HC R&B IMCU UMMC

## 2025-01-12 PROCEDURE — 250N000013 HC RX MED GY IP 250 OP 250 PS 637

## 2025-01-12 PROCEDURE — 250N000013 HC RX MED GY IP 250 OP 250 PS 637: Performed by: STUDENT IN AN ORGANIZED HEALTH CARE EDUCATION/TRAINING PROGRAM

## 2025-01-12 PROCEDURE — 80048 BASIC METABOLIC PNL TOTAL CA: CPT | Performed by: STUDENT IN AN ORGANIZED HEALTH CARE EDUCATION/TRAINING PROGRAM

## 2025-01-12 PROCEDURE — 250N000009 HC RX 250: Performed by: STUDENT IN AN ORGANIZED HEALTH CARE EDUCATION/TRAINING PROGRAM

## 2025-01-12 PROCEDURE — 85027 COMPLETE CBC AUTOMATED: CPT | Performed by: STUDENT IN AN ORGANIZED HEALTH CARE EDUCATION/TRAINING PROGRAM

## 2025-01-12 PROCEDURE — 99232 SBSQ HOSP IP/OBS MODERATE 35: CPT | Performed by: STUDENT IN AN ORGANIZED HEALTH CARE EDUCATION/TRAINING PROGRAM

## 2025-01-12 PROCEDURE — 250N000013 HC RX MED GY IP 250 OP 250 PS 637: Performed by: INTERNAL MEDICINE

## 2025-01-12 PROCEDURE — 94640 AIRWAY INHALATION TREATMENT: CPT

## 2025-01-12 RX ORDER — DEXTROSE MONOHYDRATE 50 MG/ML
INJECTION, SOLUTION INTRAVENOUS CONTINUOUS
Status: DISCONTINUED | OUTPATIENT
Start: 2025-01-13 | End: 2025-01-14

## 2025-01-12 RX ORDER — SCOPOLAMINE 1 MG/3D
1 PATCH, EXTENDED RELEASE TRANSDERMAL
Status: DISCONTINUED | OUTPATIENT
Start: 2025-01-12 | End: 2025-01-14 | Stop reason: HOSPADM

## 2025-01-12 RX ADMIN — ATORVASTATIN CALCIUM 40 MG: 40 TABLET, FILM COATED ORAL at 19:32

## 2025-01-12 RX ADMIN — ORAL VEHICLES - SUSP 75 MG: SUSPENSION at 08:46

## 2025-01-12 RX ADMIN — IPRATROPIUM BROMIDE 0.5 MG: 0.5 SOLUTION RESPIRATORY (INHALATION) at 12:28

## 2025-01-12 RX ADMIN — SCOPOLAMINE 1 PATCH: 1.5 PATCH, EXTENDED RELEASE TRANSDERMAL at 11:53

## 2025-01-12 RX ADMIN — THIAMINE HCL TAB 100 MG 100 MG: 100 TAB at 08:45

## 2025-01-12 RX ADMIN — ORAL VEHICLES - SUSP 75 MG: SUSPENSION at 19:32

## 2025-01-12 RX ADMIN — LEVALBUTEROL HYDROCHLORIDE 0.63 MG: 0.63 SOLUTION RESPIRATORY (INHALATION) at 20:00

## 2025-01-12 RX ADMIN — IPRATROPIUM BROMIDE 0.5 MG: 0.5 SOLUTION RESPIRATORY (INHALATION) at 16:46

## 2025-01-12 RX ADMIN — LEVOTHYROXINE SODIUM 100 MCG: 0.05 TABLET ORAL at 08:45

## 2025-01-12 RX ADMIN — LEVALBUTEROL HYDROCHLORIDE 0.63 MG: 0.63 SOLUTION RESPIRATORY (INHALATION) at 16:46

## 2025-01-12 RX ADMIN — ACETYLCYSTEINE 2 ML: 200 SOLUTION ORAL; RESPIRATORY (INHALATION) at 16:46

## 2025-01-12 RX ADMIN — BUDESONIDE 0.5 MG: 0.5 INHALANT ORAL at 08:50

## 2025-01-12 RX ADMIN — ACETYLCYSTEINE 2 ML: 200 SOLUTION ORAL; RESPIRATORY (INHALATION) at 20:01

## 2025-01-12 RX ADMIN — Medication 1 SPRAY: at 16:04

## 2025-01-12 RX ADMIN — ACETYLCYSTEINE 2 ML: 200 SOLUTION ORAL; RESPIRATORY (INHALATION) at 12:28

## 2025-01-12 RX ADMIN — FEXOFENADINE HYDROCHLORIDE 120 MG: 60 TABLET ORAL at 08:45

## 2025-01-12 RX ADMIN — LEVALBUTEROL HYDROCHLORIDE 0.63 MG: 0.63 SOLUTION RESPIRATORY (INHALATION) at 12:28

## 2025-01-12 RX ADMIN — DILTIAZEM HYDROCHLORIDE 60 MG: 60 TABLET, FILM COATED ORAL at 03:22

## 2025-01-12 RX ADMIN — IPRATROPIUM BROMIDE 0.5 MG: 0.5 SOLUTION RESPIRATORY (INHALATION) at 08:50

## 2025-01-12 RX ADMIN — THERA TABS 1 TABLET: TAB at 08:45

## 2025-01-12 RX ADMIN — Medication 1 SPRAY: at 19:32

## 2025-01-12 RX ADMIN — DILTIAZEM HYDROCHLORIDE 60 MG: 60 TABLET, FILM COATED ORAL at 16:04

## 2025-01-12 RX ADMIN — LEVALBUTEROL HYDROCHLORIDE 0.63 MG: 0.63 SOLUTION RESPIRATORY (INHALATION) at 08:50

## 2025-01-12 RX ADMIN — DILTIAZEM HYDROCHLORIDE 60 MG: 60 TABLET, FILM COATED ORAL at 08:45

## 2025-01-12 RX ADMIN — IPRATROPIUM BROMIDE 0.5 MG: 0.5 SOLUTION RESPIRATORY (INHALATION) at 20:01

## 2025-01-12 RX ADMIN — Medication 40 MG: at 08:46

## 2025-01-12 RX ADMIN — FUROSEMIDE 20 MG: 20 TABLET ORAL at 08:45

## 2025-01-12 RX ADMIN — EMPAGLIFLOZIN 10 MG: 10 TABLET, FILM COATED ORAL at 08:45

## 2025-01-12 RX ADMIN — BUDESONIDE 0.5 MG: 0.5 INHALANT ORAL at 20:00

## 2025-01-12 RX ADMIN — ACETYLCYSTEINE 2 ML: 200 SOLUTION ORAL; RESPIRATORY (INHALATION) at 08:50

## 2025-01-12 RX ADMIN — DILTIAZEM HYDROCHLORIDE 60 MG: 60 TABLET, FILM COATED ORAL at 21:21

## 2025-01-12 RX ADMIN — Medication 1 SPRAY: at 08:45

## 2025-01-12 RX ADMIN — GABAPENTIN 600 MG: 250 SOLUTION ORAL at 21:21

## 2025-01-12 RX ADMIN — SERTRALINE HYDROCHLORIDE 200 MG: 100 TABLET ORAL at 08:45

## 2025-01-12 ASSESSMENT — ACTIVITIES OF DAILY LIVING (ADL)
ADLS_ACUITY_SCORE: 64
ADLS_ACUITY_SCORE: 68
ADLS_ACUITY_SCORE: 64
ADLS_ACUITY_SCORE: 64
ADLS_ACUITY_SCORE: 68
ADLS_ACUITY_SCORE: 64
ADLS_ACUITY_SCORE: 72
ADLS_ACUITY_SCORE: 64
ADLS_ACUITY_SCORE: 68
ADLS_ACUITY_SCORE: 68
ADLS_ACUITY_SCORE: 64
ADLS_ACUITY_SCORE: 68
ADLS_ACUITY_SCORE: 64

## 2025-01-12 NOTE — PLAN OF CARE
Neuro: Patient alert and oriented x4. Flat affect.  Cardiac: A-fib/Aflutter 80's-100's. BP's 90's-100's systolic throughout the shift. Afebrile.   Respiratory: Sating >92% on 2L NC. Oral suctioning q 3-4 hours with 14F suction catheter.   GI/: Incontinent of bowel and bladder. BM x1. Reddened buttocks- barrier cream applied. Mepi in place.   Diet/appetite: TF - 38mL/hr w/ 30mL Q4 FWF via NJ. NPO. Ice chips allowed with supervision.   Activity: Ax2 with lift. Up to chair.  Pain: Denies.   Skin: No new deficits noted.   LDA's: L PIV, EWELINA.   Plan: Plans for PEG placement in OR on Monday.   Lab: Lokelma given for potassium of 5.4.     Goal Outcome Evaluation:  Plan of Care Reviewed With: patient  Overall Patient Progress: no change  Outcome Evaluation: Waiting for Peg placement on Monday.

## 2025-01-12 NOTE — PROGRESS NOTES
[facility]    Medicine Progress Note - Hospitalist Service, GOLD TEAM 8    Date of Admission:  11/21/2024    Assessment & Plan   Asya Coffman is a 79 year old female admitted on 11/21/2024. 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 Initially admitted to hospital medicine service with hypoxic respiratory failure suspected due to HFpEF requiring diuresis. Developed acute hypoxia on 12/8 secondary to suspected aspiration pneumonitis for which she was intubated (12/8-12/10). Now s/p extubation and transferred back to hospital medicine service. Ongoing evaluation related to aspiration, currently tolerated continuous tube feeds via NJ, with improving respiratory status. Plan for PEG placement due to being NPO and high risk for aspiration.     Updates today:  - Hold on TF at midnight  - Plan for PEG placement on Monday  - Still with large amount of secretions , started scopolamine today  - Eliquis held 1/10 for PEG  - Plan for another eval and VFSS by speech early next week  - WOC consulted for sacral wound    Acute severe oropharyngeal dysphagia  Laryngeal squamous cell carcinoma s/p radiation (4/2024-7/2024)  Failure to thrive  High risk for aspiration   Esophageal dysmotility  Concern for radiation-induced dysphagia  Concern for gastroparesis  Has had increased dysphagia in the setting of laryngeal squamous cell carcinoma s/p radiation (4/2024-7/2024). Family reports that over the past few months has declined dramatically from being quite mobile, active to being barely active, with 4 falls in 3 months progressively reduced speech and recurrent aspirations. ENT evaluated 12/16/24 with bedside laryngoscopy for dysphagia. Normal vocal cord  function and control seen including ability to approximate cords during cough. No anatomic explanations for dysphagia seen. Evaluated by GI and there is no concern of esophageal web. Clinical picture consistent with significant muscle weakness contributing to oropharyngeal dysphagia, poor vocal pressure, poor cough effort likely due to radiation. No evidence of active rheumatologic condition contributing per rheumatology evaluation. CK, aldolase negative. Myasthenia panel negative.  Cortisol within normal limit.  Unclear if hypothyroidism is the cause but can be contributing. Doses adjusted as below. Cervical thoracic MRI without significant findings.   - SLP following  - NPO for now, continue TFs via NJ   - GJ placement is within goals of care of patient. However:  IR consulted for G-tube placement however procedure cancelled after her recurrent RRTs and unstable resp status, reconsulted IR 1/8 but deferred to GI or gen surg under general anesthesia  GI also consulted and did not feel that it is safe on evaluation 12/30; reconsulted 1/8, plan for PEG placement on Monday 1/13    -Continue speech therapy and see swallowing and secretion containment improves  - Patient still with heavy load of secretions per speech therapy, has been a barrier for VFSS, speech will continue seeing her x3/week, plan for another eval and VFSS early next week  GOC conversation  - Patient was previously being followed by palliative, her code status was changed to DNR/DNI, patient currently gets nutrition through NJT, and had plan for VFSS which has been postponed many times due to her increased secretions, PEG placement was discussed previously however was deferred due to her unstable respiratory status and transfer to ICU. Patient has been more stable hemodynamically for the past week.  - Reconsulted IR for PEG placement 1/8, deferred to GI or gen surg since patient is high risk for IV sedation and needs gastric insufflation which puts  her at increased risk for aspiration  - Reconsulted GI for PEG   - Will be full code for PEG , but is DNR DNI   - She needs to be intubated and is high risk for post procedure extubation, family aware   - Eliquis held 1/10 for PEG      Chronic afib  Afib with RVR  [id number] 3 (age++, HF+)  Has episodes of A-fib with RVR. Has also episodes of bradycardia with  PTA dose of diltiazem and metoprolol  - PTA apixaban 5mg BID  - Pt was previously on Dilt  BID (max dose) , she has some low Bps while in ICU and she was tapered down to Dlit 60 q6hr, had low BP with 120 q6hr, will keep at 60 q6hr for now and CTM  - Metoprolol tartrate 75 mg twice daily  - Goal K above 4 and Mg above 2    Acute hypoxic respiratory failure- on 2L NC  Recurrent Aspiration pneumonitis with aspiration pneumonia  MSSA PNA s/p abx (12/3-12/8)  Obstructive lung disease (COPD vs asthma)  Pulmonary hypertension   Presented on 11/21 with acute hypoxic respiratory failure; initially suspected to be secondary to HFpEF exacerbation; unresolved with diuresis. Underlying obstructive lung disease but no evidence of exacerbation here. Was treated for MSSA pneumonia. Overnight 12/8, developed sudden worsening of oxygenation and increased secretions in the setting of dysphagia/recent laryngeal radiation and was intubated. Suspected aspiration pneumonitis as etiology. Was briefly on zosyn but this was discontinued.  She was extubated on 12/10.  Multiple episodes of worsening of hypoxia with evidence of aspiration and intolerance to tube feeding.  CT 12/27 showed increasing basilar predominant bronchial wall thickening and diffuse groundglass attenuation suspicious of ongoing infectious process. Continues to have have excessive oral secretion and episodes of aspiration needing frequent suctioning, though intermittently improves.  -Antibiotics: Zosyn 12/28-12/28 then Unasyn 12/28-1/2/25. Transitioned onto levofloxacin on 1/2/25 related to microbiology from  sputum smear returning with resistant klebsiella. Plan for a 5 day course ending on 1/6/25.  -Respiratory support:  -Continue oxygen titration as needed  -Budesonide neb 0.5 mg BID,  -Ipratropium-levalbuterol neb QID  -albuteraol Q4H PRN  -Mucomyst  -Aspiration precautions; n.p.o.  -Flutter valve, incentive spirometery    Family update  Last discussed with daughter Lana by phone on 1/10/25    Chronic/stable medical conditions  Anxiety  Depression  History of depression and anxiety.  She feels hopeless and has sad mood during this hospitalization.  Will continue PTA sertraline. health psychology consulted  -Increased PTA Sertraline 200 mg daily     Chronic Left parietal, left thalamic lacunar strokes  -  atorvastatin to high intensity (20-> 40 mg). On DOAC  - A1C shows prediabetes     Hypothyroidism  Hx of thyroidectomy 2/2 cancer   - Continue PTA levothyroxine 88 mcg daily   - Consulted Endocrine service as above and increased dose to 100 mcg daily. Appreciate recommendations    HFpEF (LVEF 55-60% 11/22/2024)   Pulmonary hypertension (44 mmHg per echo 11/22/2024)  Possible small PFO  Echo during current admission (11/22/2024) notable for increased thickness of LV and elevated BNP (peak 9200 > 6200) concerning for heart failure exacerbation s/p diuresis and pulmonary hypertension with estimated pulmonary artery pressure of 45 mmHg. Echo with bubble study (11/25/2024) concerning for possible small PFO. With intubation on 12/8, developed hypotension without evidence of shock. Etiology of hypotension may be cardiogenic (e.g. exacerbation of pulmonary hypertension by positive pressure ventilation) vs medication associated (propofol, precedex); Weaned off pressors 12/10.   - Preload dependent in the setting of pulmonary hypertension, gentle diuresis as needed   - Restart lasix 1/3 with 20mg oral daily (40mg recommended in cardiology consult 11/27 previously)  - Restart empagliflozin 10mg daily 1/2/25  - Continue to hold  spironolactone for now, can consider if needed based on potassium.    Oliguric SAMIR, resolved  Mild hyperkalemia  Cr 0.9 on admission. Baseline appears 0.9-1.0. Developed SAMIR initially in setting of diuresis and had been improving. Subsequently increased following transfer to ICU with consideration for prerenal in setting of NPO status.  Again significant increase in creatinine with low Cystatin C FEUrea above 40%.  Concern for possibility of ATN though UA is unremarkable. Overall renal cunftion appears to be improving.  - Strict I/Os  - Trend BMP   - FWF via NGT  - Will restart lasix 1/2/25 with 20mg daily oral; can increase to 40mg if tolerating.  - Continue to hold spironolactone for now, can consider if needed based on potassium.      Anemia of chronic illness  Iron deficiency anemia  Baseline Hgb 11-12. Currently near baseline. Can consider anemia of chronic illness. Not able to swallow PTA oral iron  - Monitor CBC  - s/p one dose of  IV iron     Generalized deconditioning  Recurrent falls  - PT/OT consults, has been inpatient since 11/20 and had recs for TCU on admission     CREST Syndrome  Characterized by Raynaud's, Calcinosis, GERD and some telangectasia's. Last saw [facility] 2017. No history of ILD.    - Continue protonix 40 mg daily             Diet: Adult Formula Drip Feeding: Continuous TwoCal HN; Nasoduodenal tube; Goal Rate: 38 mL/hr x 22 hours per day (Hold TFs for 1 hr before/after Synthroid dose); mL/hr  NPO for Medical/Clinical Reasons Except for: Ice Chips, Other; Specify: exception of 1-2 ice chips after thorough oral cares    DVT Prophylaxis: DOAC  Miranda Catheter: Not present  Lines: None     Cardiac Monitoring: ACTIVE order. Indication: Tachyarrhythmias, acute (48 hours)  Code Status: No CPR- Do NOT Intubate      Clinically Significant Risk Factors        # Hyperkalemia: Highest K = 5.4 mmol/L in last 2 days, will monitor as appropriate   # Hypochloremia: Lowest Cl = 97 mmol/L in last  2 days, will monitor as appropriate      # Hypoalbuminemia: Lowest albumin = 3 g/dL at 12/10/2024  3:11 AM, will monitor as appropriate                 # Severe Malnutrition: based on nutrition assessment    # Financial/Environmental Concerns: none         Social Drivers of Health    Housing Stability: High Risk (11/23/2024)    Housing Stability     Do you have housing? : No     Are you worried about losing your housing?: No          Disposition Plan     Medically Ready for Discharge: Anticipated in 2-4 Days             Hiram Bee MD  Hospitalist Service, GOLD TEAM [address] [place] [address]  Securely message with [employer] (more info)  Text page via [employer] Paging/Directory   See signed in provider for up to date coverage information  ______________________________________________________________________    Interval History   No major event overnight, patient has concerns if she will be able to have oral diet moving forward, discussed that she will need eval with speech next week.     Physical Exam   Vital Signs: Temp: 97.4  F (36.3  C) Temp src: Oral BP: 108/86 Pulse: 78   Resp: 21 SpO2: 94 % O2 Device: Nasal cannula Oxygen Delivery: 2.5 LPM  Weight: 108 lbs 10.99 oz    Gen: Cachectic, A&Ox4, lying comfortably on bed, harsh and gurgling sound when talking, on 2L NC  Lung: non-labored  Abd: soft, non-tender, non-distended  Ext: No edema in upper/lower extremities      Medical Decision Making       35 MINUTES SPENT BY ME on the date of service doing chart review, history, exam, documentation & further activities per the note.      Data     I have personally reviewed the following data over the past 24 hrs:    6.0  \   11.1 (L)   / 215     135 97 (L) 30.0 (H) /  107 (H)   4.4 28 0.73 \       Imaging results reviewed over the past 24 hrs:   No results found for this or any previous visit (from the past 24 hours).

## 2025-01-12 NOTE — PLAN OF CARE
Neuro: A&Ox4. Flat Affect.  Cardiac: Afib, rate 80-120s. SBP>90. MAPs>65. Afebrile  Respiratory: Sating >92% on 2L NC, Oral suctioning q3-4hrs.   GI/: Incontinent of bowel and bladder. BM X2, moderate amount.   Diet/appetite: Tolerating TF at 38mL/hr, FWF at 30mL/q4 via NJ, NPO.  Activity:  Assist of 2 for repositioning. Lift assist.  Pain: At acceptable level on current regimen.   Skin: No new deficits noted.   LDA's: L PIV, SL. NJ    Plan: Continue with POC. Notify primary team with changes.     Goal Outcome Evaluation:      Plan of Care Reviewed With: patient    Overall Patient Progress: no changeOverall Patient Progress: no change    Outcome Evaluation: Plan for PEG tube place on Monday. Saturating >92% on 2L NC. AFib w/ Rate 100-120s. SBP>90. MAPs>65

## 2025-01-13 LAB
ANION GAP SERPL CALCULATED.3IONS-SCNC: 12 MMOL/L (ref 7–15)
BUN SERPL-MCNC: 28 MG/DL (ref 8–23)
CALCIUM SERPL-MCNC: 9 MG/DL (ref 8.8–10.4)
CHLORIDE SERPL-SCNC: 95 MMOL/L (ref 98–107)
CREAT SERPL-MCNC: 0.71 MG/DL (ref 0.51–0.95)
EGFRCR SERPLBLD CKD-EPI 2021: 87 ML/MIN/1.73M2
ERYTHROCYTE [DISTWIDTH] IN BLOOD BY AUTOMATED COUNT: 24.1 % (ref 10–15)
GLUCOSE BLDC GLUCOMTR-MCNC: 106 MG/DL (ref 70–99)
GLUCOSE BLDC GLUCOMTR-MCNC: 127 MG/DL (ref 70–99)
GLUCOSE BLDC GLUCOMTR-MCNC: 145 MG/DL (ref 70–99)
GLUCOSE BLDC GLUCOMTR-MCNC: 148 MG/DL (ref 70–99)
GLUCOSE BLDC GLUCOMTR-MCNC: 154 MG/DL (ref 70–99)
GLUCOSE BLDC GLUCOMTR-MCNC: 171 MG/DL (ref 70–99)
GLUCOSE BLDC GLUCOMTR-MCNC: 45 MG/DL (ref 70–99)
GLUCOSE BLDC GLUCOMTR-MCNC: 66 MG/DL (ref 70–99)
GLUCOSE BLDC GLUCOMTR-MCNC: 72 MG/DL (ref 70–99)
GLUCOSE BLDC GLUCOMTR-MCNC: 93 MG/DL (ref 70–99)
GLUCOSE BLDC GLUCOMTR-MCNC: 94 MG/DL (ref 70–99)
GLUCOSE BLDC GLUCOMTR-MCNC: 96 MG/DL (ref 70–99)
GLUCOSE SERPL-MCNC: 88 MG/DL (ref 70–99)
HCO3 SERPL-SCNC: 28 MMOL/L (ref 22–29)
HCT VFR BLD AUTO: 37.9 % (ref 35–47)
HGB BLD-MCNC: 11.6 G/DL (ref 11.7–15.7)
HOLD SPECIMEN: NORMAL
INR PPP: 1.1 (ref 0.85–1.15)
LACTATE SERPL-SCNC: 2 MMOL/L (ref 0.7–2)
MCH RBC QN AUTO: 26.9 PG (ref 26.5–33)
MCHC RBC AUTO-ENTMCNC: 30.6 G/DL (ref 31.5–36.5)
MCV RBC AUTO: 88 FL (ref 78–100)
PLATELET # BLD AUTO: 221 10E3/UL (ref 150–450)
POTASSIUM SERPL-SCNC: 4.6 MMOL/L (ref 3.4–5.3)
POTASSIUM SERPL-SCNC: 4.6 MMOL/L (ref 3.4–5.3)
RBC # BLD AUTO: 4.31 10E6/UL (ref 3.8–5.2)
SODIUM SERPL-SCNC: 135 MMOL/L (ref 135–145)
WBC # BLD AUTO: 5.5 10E3/UL (ref 4–11)

## 2025-01-13 PROCEDURE — 85014 HEMATOCRIT: CPT | Performed by: STUDENT IN AN ORGANIZED HEALTH CARE EDUCATION/TRAINING PROGRAM

## 2025-01-13 PROCEDURE — 250N000013 HC RX MED GY IP 250 OP 250 PS 637: Performed by: STUDENT IN AN ORGANIZED HEALTH CARE EDUCATION/TRAINING PROGRAM

## 2025-01-13 PROCEDURE — 36415 COLL VENOUS BLD VENIPUNCTURE: CPT | Performed by: STUDENT IN AN ORGANIZED HEALTH CARE EDUCATION/TRAINING PROGRAM

## 2025-01-13 PROCEDURE — 360N000082 HC SURGERY LEVEL 2 W/ FLUORO, PER MIN: Performed by: INTERNAL MEDICINE

## 2025-01-13 PROCEDURE — 99207 PR SC NO CHARGE VISIT/PATIENT NOT SEEN: CPT | Performed by: DIETITIAN, REGISTERED

## 2025-01-13 PROCEDURE — 83605 ASSAY OF LACTIC ACID: CPT | Performed by: STUDENT IN AN ORGANIZED HEALTH CARE EDUCATION/TRAINING PROGRAM

## 2025-01-13 PROCEDURE — 370N000017 HC ANESTHESIA TECHNICAL FEE, PER MIN: Performed by: INTERNAL MEDICINE

## 2025-01-13 PROCEDURE — 250N000024 HC ISOFLURANE, PER MIN: Performed by: INTERNAL MEDICINE

## 2025-01-13 PROCEDURE — 258N000001 HC RX 258: Performed by: STUDENT IN AN ORGANIZED HEALTH CARE EDUCATION/TRAINING PROGRAM

## 2025-01-13 PROCEDURE — 250N000013 HC RX MED GY IP 250 OP 250 PS 637

## 2025-01-13 PROCEDURE — 97530 THERAPEUTIC ACTIVITIES: CPT | Mod: GP

## 2025-01-13 PROCEDURE — 36415 COLL VENOUS BLD VENIPUNCTURE: CPT | Performed by: DIETITIAN, REGISTERED

## 2025-01-13 PROCEDURE — 250N000011 HC RX IP 250 OP 636

## 2025-01-13 PROCEDURE — 97110 THERAPEUTIC EXERCISES: CPT | Mod: GP

## 2025-01-13 PROCEDURE — 94640 AIRWAY INHALATION TREATMENT: CPT | Mod: 76

## 2025-01-13 PROCEDURE — 84132 ASSAY OF SERUM POTASSIUM: CPT | Performed by: STUDENT IN AN ORGANIZED HEALTH CARE EDUCATION/TRAINING PROGRAM

## 2025-01-13 PROCEDURE — 250N000009 HC RX 250

## 2025-01-13 PROCEDURE — 250N000009 HC RX 250: Performed by: INTERNAL MEDICINE

## 2025-01-13 PROCEDURE — 250N000009 HC RX 250: Performed by: STUDENT IN AN ORGANIZED HEALTH CARE EDUCATION/TRAINING PROGRAM

## 2025-01-13 PROCEDURE — 272N000001 HC OR GENERAL SUPPLY STERILE: Performed by: INTERNAL MEDICINE

## 2025-01-13 PROCEDURE — 258N000003 HC RX IP 258 OP 636

## 2025-01-13 PROCEDURE — 80048 BASIC METABOLIC PNL TOTAL CA: CPT | Performed by: STUDENT IN AN ORGANIZED HEALTH CARE EDUCATION/TRAINING PROGRAM

## 2025-01-13 PROCEDURE — 120N000005 HC R&B MS OVERFLOW UMMC

## 2025-01-13 PROCEDURE — 999N000157 HC STATISTIC RCP TIME EA 10 MIN

## 2025-01-13 PROCEDURE — 272N000002 HC OR SUPPLY OTHER OPNP: Performed by: INTERNAL MEDICINE

## 2025-01-13 PROCEDURE — 94640 AIRWAY INHALATION TREATMENT: CPT

## 2025-01-13 PROCEDURE — 250N000011 HC RX IP 250 OP 636: Performed by: STUDENT IN AN ORGANIZED HEALTH CARE EDUCATION/TRAINING PROGRAM

## 2025-01-13 PROCEDURE — 99232 SBSQ HOSP IP/OBS MODERATE 35: CPT | Performed by: STUDENT IN AN ORGANIZED HEALTH CARE EDUCATION/TRAINING PROGRAM

## 2025-01-13 PROCEDURE — 92526 ORAL FUNCTION THERAPY: CPT | Mod: GN | Performed by: REHABILITATION PRACTITIONER

## 2025-01-13 PROCEDURE — 85610 PROTHROMBIN TIME: CPT | Performed by: DIETITIAN, REGISTERED

## 2025-01-13 PROCEDURE — 0DH63UZ INSERTION OF FEEDING DEVICE INTO STOMACH, PERCUTANEOUS APPROACH: ICD-10-PCS | Performed by: INTERNAL MEDICINE

## 2025-01-13 PROCEDURE — 999N000141 HC STATISTIC PRE-PROCEDURE NURSING ASSESSMENT: Performed by: INTERNAL MEDICINE

## 2025-01-13 PROCEDURE — 250N000011 HC RX IP 250 OP 636: Performed by: INTERNAL MEDICINE

## 2025-01-13 PROCEDURE — 250N000013 HC RX MED GY IP 250 OP 250 PS 637: Performed by: INTERNAL MEDICINE

## 2025-01-13 PROCEDURE — 710N000011 HC RECOVERY PHASE 1, LEVEL 3, PER MIN: Performed by: INTERNAL MEDICINE

## 2025-01-13 RX ORDER — FENTANYL CITRATE 50 UG/ML
50 INJECTION, SOLUTION INTRAMUSCULAR; INTRAVENOUS EVERY 5 MIN PRN
Status: DISCONTINUED | OUTPATIENT
Start: 2025-01-13 | End: 2025-01-13 | Stop reason: HOSPADM

## 2025-01-13 RX ORDER — DEXTROSE MONOHYDRATE 25 G/50ML
25 INJECTION, SOLUTION INTRAVENOUS ONCE
Status: COMPLETED | OUTPATIENT
Start: 2025-01-13 | End: 2025-01-13

## 2025-01-13 RX ORDER — SODIUM CHLORIDE, SODIUM LACTATE, POTASSIUM CHLORIDE, CALCIUM CHLORIDE 600; 310; 30; 20 MG/100ML; MG/100ML; MG/100ML; MG/100ML
INJECTION, SOLUTION INTRAVENOUS CONTINUOUS
Status: DISCONTINUED | OUTPATIENT
Start: 2025-01-13 | End: 2025-01-13 | Stop reason: HOSPADM

## 2025-01-13 RX ORDER — ONDANSETRON 2 MG/ML
4 INJECTION INTRAMUSCULAR; INTRAVENOUS EVERY 30 MIN PRN
Status: DISCONTINUED | OUTPATIENT
Start: 2025-01-13 | End: 2025-01-13 | Stop reason: HOSPADM

## 2025-01-13 RX ORDER — DEXAMETHASONE SODIUM PHOSPHATE 4 MG/ML
4 INJECTION, SOLUTION INTRA-ARTICULAR; INTRALESIONAL; INTRAMUSCULAR; INTRAVENOUS; SOFT TISSUE
Status: DISCONTINUED | OUTPATIENT
Start: 2025-01-13 | End: 2025-01-13 | Stop reason: HOSPADM

## 2025-01-13 RX ORDER — HYDROMORPHONE HYDROCHLORIDE 1 MG/ML
0.3 INJECTION, SOLUTION INTRAMUSCULAR; INTRAVENOUS; SUBCUTANEOUS ONCE
Status: COMPLETED | OUTPATIENT
Start: 2025-01-13 | End: 2025-01-13

## 2025-01-13 RX ORDER — FENTANYL CITRATE 50 UG/ML
25 INJECTION, SOLUTION INTRAMUSCULAR; INTRAVENOUS EVERY 5 MIN PRN
Status: DISCONTINUED | OUTPATIENT
Start: 2025-01-13 | End: 2025-01-13 | Stop reason: HOSPADM

## 2025-01-13 RX ORDER — HYDROMORPHONE HCL IN WATER/PF 6 MG/30 ML
0.4 PATIENT CONTROLLED ANALGESIA SYRINGE INTRAVENOUS EVERY 5 MIN PRN
Status: DISCONTINUED | OUTPATIENT
Start: 2025-01-13 | End: 2025-01-13 | Stop reason: HOSPADM

## 2025-01-13 RX ORDER — DEXTROSE MONOHYDRATE AND SODIUM CHLORIDE 5; .9 G/100ML; G/100ML
INJECTION, SOLUTION INTRAVENOUS CONTINUOUS
Status: DISCONTINUED | OUTPATIENT
Start: 2025-01-13 | End: 2025-01-13

## 2025-01-13 RX ORDER — ONDANSETRON 4 MG/1
4 TABLET, ORALLY DISINTEGRATING ORAL EVERY 30 MIN PRN
Status: DISCONTINUED | OUTPATIENT
Start: 2025-01-13 | End: 2025-01-13 | Stop reason: HOSPADM

## 2025-01-13 RX ORDER — LIDOCAINE 40 MG/G
CREAM TOPICAL
Status: DISCONTINUED | OUTPATIENT
Start: 2025-01-13 | End: 2025-01-13 | Stop reason: HOSPADM

## 2025-01-13 RX ORDER — HYDROMORPHONE HCL IN WATER/PF 6 MG/30 ML
0.2 PATIENT CONTROLLED ANALGESIA SYRINGE INTRAVENOUS EVERY 5 MIN PRN
Status: DISCONTINUED | OUTPATIENT
Start: 2025-01-13 | End: 2025-01-13 | Stop reason: HOSPADM

## 2025-01-13 RX ORDER — NALOXONE HYDROCHLORIDE 0.4 MG/ML
0.1 INJECTION, SOLUTION INTRAMUSCULAR; INTRAVENOUS; SUBCUTANEOUS
Status: DISCONTINUED | OUTPATIENT
Start: 2025-01-13 | End: 2025-01-13 | Stop reason: HOSPADM

## 2025-01-13 RX ADMIN — ORAL VEHICLES - SUSP 75 MG: SUSPENSION at 08:59

## 2025-01-13 RX ADMIN — IPRATROPIUM BROMIDE 0.5 MG: 0.5 SOLUTION RESPIRATORY (INHALATION) at 12:51

## 2025-01-13 RX ADMIN — SERTRALINE HYDROCHLORIDE 200 MG: 100 TABLET ORAL at 09:00

## 2025-01-13 RX ADMIN — EMPAGLIFLOZIN 10 MG: 10 TABLET, FILM COATED ORAL at 08:59

## 2025-01-13 RX ADMIN — Medication 40 MG: at 09:00

## 2025-01-13 RX ADMIN — DEXTROSE 50 % IN WATER (D50W) INTRAVENOUS SYRINGE 25 ML: at 19:11

## 2025-01-13 RX ADMIN — DILTIAZEM HYDROCHLORIDE 60 MG: 60 TABLET, FILM COATED ORAL at 08:59

## 2025-01-13 RX ADMIN — HYDROMORPHONE HYDROCHLORIDE 0.3 MG: 1 INJECTION, SOLUTION INTRAMUSCULAR; INTRAVENOUS; SUBCUTANEOUS at 23:41

## 2025-01-13 RX ADMIN — IPRATROPIUM BROMIDE 0.5 MG: 0.5 SOLUTION RESPIRATORY (INHALATION) at 07:40

## 2025-01-13 RX ADMIN — THERA TABS 1 TABLET: TAB at 08:59

## 2025-01-13 RX ADMIN — DEXTROSE MONOHYDRATE: 50 INJECTION, SOLUTION INTRAVENOUS at 00:07

## 2025-01-13 RX ADMIN — LEVALBUTEROL HYDROCHLORIDE 0.63 MG: 0.63 SOLUTION RESPIRATORY (INHALATION) at 07:39

## 2025-01-13 RX ADMIN — Medication 1 SPRAY: at 12:27

## 2025-01-13 RX ADMIN — Medication 1 SPRAY: at 15:21

## 2025-01-13 RX ADMIN — LEVOTHYROXINE SODIUM 100 MCG: 0.05 TABLET ORAL at 08:59

## 2025-01-13 RX ADMIN — DEXTROSE MONOHYDRATE: 50 INJECTION, SOLUTION INTRAVENOUS at 12:26

## 2025-01-13 RX ADMIN — DILTIAZEM HYDROCHLORIDE 60 MG: 60 TABLET, FILM COATED ORAL at 04:01

## 2025-01-13 RX ADMIN — FUROSEMIDE 20 MG: 20 TABLET ORAL at 08:59

## 2025-01-13 RX ADMIN — FEXOFENADINE HYDROCHLORIDE 120 MG: 60 TABLET ORAL at 09:00

## 2025-01-13 RX ADMIN — ACETYLCYSTEINE 2 ML: 200 SOLUTION ORAL; RESPIRATORY (INHALATION) at 07:39

## 2025-01-13 RX ADMIN — BUDESONIDE 0.5 MG: 0.5 INHALANT ORAL at 07:40

## 2025-01-13 RX ADMIN — FENTANYL CITRATE 25 MCG: 50 INJECTION, SOLUTION INTRAMUSCULAR; INTRAVENOUS at 20:22

## 2025-01-13 RX ADMIN — THIAMINE HCL TAB 100 MG 100 MG: 100 TAB at 08:59

## 2025-01-13 RX ADMIN — ACETYLCYSTEINE 2 ML: 200 SOLUTION ORAL; RESPIRATORY (INHALATION) at 12:51

## 2025-01-13 RX ADMIN — LEVALBUTEROL HYDROCHLORIDE 0.63 MG: 0.63 SOLUTION RESPIRATORY (INHALATION) at 12:51

## 2025-01-13 ASSESSMENT — ACTIVITIES OF DAILY LIVING (ADL)
ADLS_ACUITY_SCORE: 72
ADLS_ACUITY_SCORE: 68
ADLS_ACUITY_SCORE: 68
ADLS_ACUITY_SCORE: 66
ADLS_ACUITY_SCORE: 64
ADLS_ACUITY_SCORE: 72
ADLS_ACUITY_SCORE: 68
ADLS_ACUITY_SCORE: 64
ADLS_ACUITY_SCORE: 66
ADLS_ACUITY_SCORE: 68
ADLS_ACUITY_SCORE: 68
ADLS_ACUITY_SCORE: 72
ADLS_ACUITY_SCORE: 68
ADLS_ACUITY_SCORE: 72
ADLS_ACUITY_SCORE: 68
ADLS_ACUITY_SCORE: 66
ADLS_ACUITY_SCORE: 68
ADLS_ACUITY_SCORE: 68

## 2025-01-13 NOTE — PROGRESS NOTES
[facility]    Medicine Progress Note - Hospitalist Service, GOLD TEAM 8    Date of Admission:  11/21/2024    Assessment & Plan   Asya Coffman is a 79 year old female admitted on 11/21/2024. 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 Initially admitted to hospital medicine service with hypoxic respiratory failure suspected due to HFpEF requiring diuresis. Developed acute hypoxia on 12/8 secondary to suspected aspiration pneumonitis for which she was intubated (12/8-12/10). Now s/p extubation and transferred back to hospital medicine service. Ongoing evaluation related to aspiration, currently tolerated continuous tube feeds via NJ, with improving respiratory status. Plan for PEG placement due to being NPO and high risk for aspiration.     Updates today:  - Plan for PEG placement later this afternoon  - Still with large amount of secretions, started scopolamine yesterday  - Needs to have Eliquis resumed after PEG  - Plan for another eval and VFSS by speech early this week  - HR more stable in 70-80 range  - Reconsulted SW/CM for dispo now patient is clinically more stable    Acute severe oropharyngeal dysphagia  Laryngeal squamous cell carcinoma s/p radiation (4/2024-7/2024)  Failure to thrive  High risk for aspiration   Esophageal dysmotility  Concern for radiation-induced dysphagia  Concern for gastroparesis  Has had increased dysphagia in the setting of laryngeal squamous cell carcinoma s/p radiation (4/2024-7/2024). Family reports that over the past few months has declined dramatically from being quite mobile, active to being barely active, with 4 falls in 3 months progressively reduced speech and recurrent aspirations. ENT  evaluated 12/16/24 with bedside laryngoscopy for dysphagia. Normal vocal cord function and control seen including ability to approximate cords during cough. No anatomic explanations for dysphagia seen. Evaluated by GI and there is no concern of esophageal web. Clinical picture consistent with significant muscle weakness contributing to oropharyngeal dysphagia, poor vocal pressure, poor cough effort likely due to radiation. No evidence of active rheumatologic condition contributing per rheumatology evaluation. CK, aldolase negative. Myasthenia panel negative.  Cortisol within normal limit.  Unclear if hypothyroidism is the cause but can be contributing. Doses adjusted as below. Cervical thoracic MRI without significant findings.   - SLP following  - NPO for now, continue TFs via NJ   - GJ placement is within goals of care of patient. However:  IR consulted for G-tube placement however procedure cancelled after her recurrent RRTs and unstable resp status, reconsulted IR 1/8 but deferred to GI or gen surg under general anesthesia  GI also consulted and did not feel that it is safe on evaluation 12/30; reconsulted 1/8, plan for PEG placement on Monday 1/13    -Continue speech therapy and see swallowing and secretion containment improves  - Patient still with heavy load of secretions per speech therapy, has been a barrier for VFSS, speech will continue seeing her x3/week, plan for another eval and VFSS early this week    GOC conversation  - Patient was previously being followed by palliative, her code status was changed to DNR/DNI, patient currently gets nutrition through NJT, and had plan for VFSS which has been postponed many times due to her increased secretions, PEG placement was discussed previously however was deferred due to her unstable respiratory status and transfer to ICU. Patient has been more stable hemodynamically for the past week.  - Reconsulted IR for PEG placement 1/8, deferred to GI or gen surg since  patient is high risk for IV sedation and needs gastric insufflation which puts her at increased risk for aspiration  - Reconsulted GI for PEG   - Will be full code for PEG , but is DNR DNI   - She needs to be intubated and is high risk for post procedure extubation, family aware         Chronic afib  Afib with RVR  [id number] 3 (age++, HF+)  Has episodes of A-fib with RVR. Has also episodes of bradycardia with  PTA dose of diltiazem and metoprolol  - PTA apixaban 5mg BID  - Pt was previously on Dilt  BID (max dose) , she has some low Bps while in ICU and she was tapered down to Dlit 60 q6hr, had low BP with 120 q6hr, will keep at 60 q6hr for now and CTM  - Metoprolol tartrate 75 mg twice daily  - Goal K above 4 and Mg above 2    Acute hypoxic respiratory failure- on 2L NC  Recurrent Aspiration pneumonitis with aspiration pneumonia  MSSA PNA s/p abx (12/3-12/8)  Obstructive lung disease (COPD vs asthma)  Pulmonary hypertension   Presented on 11/21 with acute hypoxic respiratory failure; initially suspected to be secondary to HFpEF exacerbation; unresolved with diuresis. Underlying obstructive lung disease but no evidence of exacerbation here. Was treated for MSSA pneumonia. Overnight 12/8, developed sudden worsening of oxygenation and increased secretions in the setting of dysphagia/recent laryngeal radiation and was intubated. Suspected aspiration pneumonitis as etiology. Was briefly on zosyn but this was discontinued.  She was extubated on 12/10.  Multiple episodes of worsening of hypoxia with evidence of aspiration and intolerance to tube feeding.  CT 12/27 showed increasing basilar predominant bronchial wall thickening and diffuse groundglass attenuation suspicious of ongoing infectious process. Continues to have have excessive oral secretion and episodes of aspiration needing frequent suctioning, though intermittently improves.  -Antibiotics: Zosyn 12/28-12/28 then Unasyn 12/28-1/2/25. Transitioned onto  levofloxacin on 1/2/25 related to microbiology from sputum smear returning with resistant klebsiella. Plan for a 5 day course ending on 1/6/25.  -Respiratory support:  -Continue oxygen titration as needed  -Budesonide neb 0.5 mg BID,  -Ipratropium-levalbuterol neb QID  -albuteraol Q4H PRN  -Mucomyst  -Aspiration precautions; n.p.o.  -Flutter valve, incentive spirometery    Family update  Last discussed with daughter Lana by phone on 1/10/25    Chronic/stable medical conditions  Anxiety  Depression  History of depression and anxiety.  She feels hopeless and has sad mood during this hospitalization.  Will continue PTA sertraline. health psychology consulted  -Increased PTA Sertraline 200 mg daily     Chronic Left parietal, left thalamic lacunar strokes  -  atorvastatin to high intensity (20-> 40 mg). On DOAC  - A1C shows prediabetes     Hypothyroidism  Hx of thyroidectomy 2/2 cancer   - Continue PTA levothyroxine 88 mcg daily   - Consulted Endocrine service as above and increased dose to 100 mcg daily. Appreciate recommendations    HFpEF (LVEF 55-60% 11/22/2024)   Pulmonary hypertension (44 mmHg per echo 11/22/2024)  Possible small PFO  Echo during current admission (11/22/2024) notable for increased thickness of LV and elevated BNP (peak 9200 > 6200) concerning for heart failure exacerbation s/p diuresis and pulmonary hypertension with estimated pulmonary artery pressure of 45 mmHg. Echo with bubble study (11/25/2024) concerning for possible small PFO. With intubation on 12/8, developed hypotension without evidence of shock. Etiology of hypotension may be cardiogenic (e.g. exacerbation of pulmonary hypertension by positive pressure ventilation) vs medication associated (propofol, precedex); Weaned off pressors 12/10.   - Preload dependent in the setting of pulmonary hypertension, gentle diuresis as needed   - Restart lasix 1/3 with 20mg oral daily (40mg recommended in cardiology consult 11/27 previously)  - Restart  empagliflozin 10mg daily 1/2/25  - Continue to hold spironolactone for now, can consider if needed based on potassium.    Oliguric SAMIR, resolved  Mild hyperkalemia  Cr 0.9 on admission. Baseline appears 0.9-1.0. Developed SAMIR initially in setting of diuresis and had been improving. Subsequently increased following transfer to ICU with consideration for prerenal in setting of NPO status.  Again significant increase in creatinine with low Cystatin C FEUrea above 40%.  Concern for possibility of ATN though UA is unremarkable. Overall renal cunftion appears to be improving.  - Strict I/Os  - Trend BMP   - FWF via NGT  - Will restart lasix 1/2/25 with 20mg daily oral; can increase to 40mg if tolerating.  - Continue to hold spironolactone for now, can consider if needed based on potassium.      Anemia of chronic illness  Iron deficiency anemia  Baseline Hgb 11-12. Currently near baseline. Can consider anemia of chronic illness. Not able to swallow PTA oral iron  - Monitor CBC  - s/p one dose of  IV iron     Generalized deconditioning  Recurrent falls  - PT/OT consults, has been inpatient since 11/20 and had recs for TCU on admission     CREST Syndrome  Characterized by Raynaud's, Calcinosis, GERD and some telangectasia's. Last saw [facility] rheumatology 2017. No history of ILD.    - Continue protonix 40 mg daily             Diet: NPO for Medical/Clinical Reasons Except for: Ice Chips, Other; Specify: exception of 1-2 ice chips after thorough oral cares  Adult Formula Drip Feeding: Continuous TwoCal HN; Gastrostomy; Goal Rate: 38 mL/hr x 22 hours per day (Hold TFs for 1 hr before/after Synthroid dose); mL/hr; Once ok to resume feedings s/p G-tube placement, initiate at rate of 20 mL/hr and advance...    DVT Prophylaxis: DOAC  [place] Catheter: Not present  Lines: None     Cardiac Monitoring: ACTIVE order. Indication: Tachyarrhythmias, acute (48 hours)  Code Status: No CPR- Do NOT Intubate      Clinically Significant Risk  Factors          # Hypochloremia: Lowest Cl = 95 mmol/L in last 2 days, will monitor as appropriate      # Hypoalbuminemia: Lowest albumin = 3 g/dL at 12/10/2024  3:11 AM, will monitor as appropriate                 # Severe Malnutrition: based on nutrition assessment    # Financial/Environmental Concerns: none         Social Drivers of Health    Housing Stability: High Risk (11/23/2024)    Housing Stability     Do you have housing? : No     Are you worried about losing your housing?: No          Disposition Plan     Medically Ready for Discharge: Anticipated in 2-4 Days             Hiram Bee MD  Hospitalist Service, GOLD TEAM 8  [facility]  Securely message with [employer] (more info)  Text page via [employer] Paging/Directory   See signed in provider for up to date coverage information  ______________________________________________________________________    Interval History   No major event overnight, plan for PEG today, no other major complaint/concern today.     Physical Exam   Vital Signs: Temp: 97.6  F (36.4  C) Temp src: Axillary BP: 98/77 Pulse: 96   Resp: (!) 35 SpO2: 100 % O2 Device: Nasal cannula with humidification Oxygen Delivery: 1 LPM  Weight: 107 lbs 5.82 oz    Gen: Cachectic, A&Ox4, lying comfortably on bed, harsh and gurgling sound when talking, on 2L NC  Lung: non-labored  Abd: soft, non-tender, non-distended  Ext: No edema in upper/lower extremities      Medical Decision Making       40 MINUTES SPENT BY ME on the date of service doing chart review, history, exam, documentation & further activities per the note.      Data     I have personally reviewed the following data over the past 24 hrs:    5.5  \   11.6 (L)   / 221     135 95 (L) 28.0 (H) /  94   4.6 28 0.71 \     INR:  1.10 PTT:  N/A   D-dimer:  N/A Fibrinogen:  N/A       Imaging results reviewed over the past 24 hrs:   No results found for this or any previous visit (from the past 24  hours).

## 2025-01-13 NOTE — CONSULTS
[facility] Nurse Inpatient Assessment     Consulted for: Sacral     Patient History (according to provider note(s):      Asya Coffman is a 79 year old female admitted on 11/21/2024. 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 Initially admitted to hospital medicine service with hypoxic respiratory failure suspected due to HFpEF requiring diuresis. Developed acute hypoxia on 12/8 secondary to suspected aspiration pneumonitis for which she was intubated (12/8-12/10). Now s/p extubation and transferred back to hospital medicine service. Ongoing evaluation related to aspiration, currently tolerated continuous tube feeds via NJ, with improving respiratory status. Plan for PEG placement due to being NPO and high risk for aspiration.     Assessment:      Areas visualized during today's visit: Sacrum/coccyx    Wound location: perirectal area, Sacrum and buttock    Last photo: 1/12/25  Wound due to: Incontinence Associated Dermatitis (IAD) and Fungal  Wound history/plan of care: incontinent of stool, and fungal rash  Wound base: 100 % Epidermis and Dermis,      Palpation of the wound bed: normal      Drainage: scant     Description of drainage: serous     Measurements (length x width x depth, in cm):not measured see picture     Tunneling: N/A     Undermining: N/A  Periwound skin: Rash      Color: pink      Temperature: normal   Odor: none  Pain: mild, burning  Pain interventions prior to dressing change: no significant pain present   Treatment goal: Heal  and Protection  STATUS: initial assessment  Supplies ordered: supplies stored on unit and special order needed      Treatment Plan:     sacrum wound(s): BID and PRN with each episode of  incontinence  Buttocks BID and as needed.   Cleanse the area with Jannette cleanse and protect, very gently with soft cloth.  Apply antifungal powder or paste when available   Apply thin layer of Barrier paste (ex: Critic aid) on top of it.  With repeat application, do not scrub the paste, only remove soiled paste and reapply.  If complete removal of paste is necessary use baby oil/mineral oil (#[id number]) and soft wash cloth.  Ensure pt has chair cushion (#[id number]) while sitting up in the chair.  Use only one blue pad in between mattress and pt. No brief in bed.         Orders: Written    RECOMMEND PRIMARY TEAM ORDER: Jannette antifungal to sacrum  Education provided: plan of care  Discussed plan of care with: Patient  WOC nurse follow-up plan: weekly  Notify WOC if wound(s) deteriorate.  Nursing to notify the Provider(s) and re-consult the WOC Nurse if new skin concern.    DATA:     Current support surface: Standard  Standard gel mattress (Isoflex)  Containment of urine/stool: Incontinence Protocol  BMI: Body mass index is 18.09 kg/m .   Active diet order: Orders Placed This Encounter      NPO for Medical/Clinical Reasons Except for: Ice Chips, Other; Specify: exception of 1-2 ice chips after thorough oral cares     Output: I/O last 3 completed shifts:  In: 1158 [NG/GT:360]  Out: -      Labs:   Recent Labs   Lab 01/12/25  0532   HGB 11.1*   WBC 6.0     Pressure injury risk assessment:   Sensory Perception: 3-->slightly limited  Moisture: 3-->occasionally moist  Activity: 2-->chairfast  Mobility: 2-->very limited  Nutrition: 3-->adequate  Friction and Shear: 2-->potential problem  Homero Score: 15    Cristina Jean RN CWOCN  Please contact through [facility] group: [facility] Nurse [place]  Dept. Office Number: [phone]

## 2025-01-13 NOTE — PLAN OF CARE
Neuro: A&Ox4. Flat Affect.   Cardiac: Afib- 100-115. SBP>95, MAP>65. Afebrile   Respiratory: Sating >93% on 1L/NC. Oral suctioning q4.   GI/: Incontinent of bowel and bladder. BM X1.   Diet/appetite: NPO post MN for PEG insertion in AM. R NJ.   Activity:  Assist of 2 for repositioning.  Pain: At acceptable level on current regimen.   Skin: No new deficits noted. Wound care protocol on perirectal/sacral  LDA's: L PIV, on IV infusion.     Plan: Continue with POC. Notify primary team with changes.   Goal Outcome Evaluation:      Plan of Care Reviewed With: patient    Overall Patient Progress: improvingOverall Patient Progress: improving    Outcome Evaluation: ON 1L/NC. Held TF at MN for PEG insertion.

## 2025-01-13 NOTE — PROGRESS NOTES
CLINICAL NUTRITION SERVICES - REASSESSMENT NOTE     Nutrition Prescription    RECOMMENDATIONS FOR MDs/PROVIDERS TO ORDER:   If pt shows any signs if intolerance such as nausea s/p PEG placement, consider short-term initiation of Reglan to speed GI motility until TFs back at goal and stable     Malnutrition Status:    Severe malnutrition in the context of chronic disease     Recommendations already ordered by Registered Dietitian (RD):  Once ok to restart feeds following PEG placement, initiate at rate of 20 mL/hr and advance by 10 mL Q4hrs as tolerated until reaching goal of 38 mL/hr. See goal TF regimen below.     Enteral Nutrition Regimen:  Dosing weight: 47 kg   EN access: G-tube (maintain HOB 30 degrees)    Regimen: Two Amadou HN (or equivalent) at rate of 38 mL/hr x 22 hours (held 1 hr before/after Synthroid) to provide: 836 mL formula, 1672 Kcals (36 Kcal/kg), 70 gm protein (1.5 gm/kg), 183 gm CHO, 4 gm fiber, and 585 mL free water daily   Flushes: 30 mL Q4hrs     Future/Additional Recommendations:  Monitor nutrition-related findings and follow pt per protocol     EVALUATION OF THE PROGRESS TOWARD GOALS   Diet: NPO    Nutrition Support:   -Dosing weight: 47 kg (admit wt)   -EN access via NDT  -Current TF Regimen: Two Amadou HN (or equivalent) at rate of 38 mL/hr x 22 hours (held 1 hr before/after Synthroid) to provide: 836 mL formula, 1672 Kcals (36 Kcal/kg), 70 gm protein (1.5 gm/kg), 183 gm CHO, 4 gm fiber, and 585 mL free water daily    -FWF 30 mL Q4hrs (180 mL/day) + 585 mL from TF for total free water provision of 765 mL/day    EN Intake - Average 7-day TF intake: 822 mL formula,  1644 Kcals (35 Kcal/kg), and 69 g pro (1.5 g/kg). This is meeting 100% of est Kcal needs, and 100% of est protein needs.     NEW FINDINGS   General:  Currently fed with a post-pyloric FT however at risk for aspiration from oral secretions therefore benefit of post pyloric tube vs gastric tube in question (has been an ongoing  discussion/evaluation). Now plan for PEG only, no PEGJ per GI provider. Per discussion with GI team ERIC, if there is a concern for aspiration that is suspect to not be related to patient's own secretions, FT may be converted to a GJ later. Will have PEG placed later this afternoon.     Weights:  Admission weight 46.5 kg (102 lb 8.2 oz); current weight 48.7 kg above this. Majority of weight measures this admit taken via bed scale so daily variance has occurred; continue to monitor trends for accuracy.     LABS: Reviewed  Electrolytes  Potassium (mmol/L)   Date Value   01/13/2025 4.6   01/13/2025 4.6   01/12/2025 4.4     Potassium POCT (mmol/L)   Date Value   12/08/2024 4.5   04/02/2023 3.7     Phosphorus (mg/dL)   Date Value   01/09/2025 3.1   01/08/2025 2.2 (L)   01/07/2025 2.7   01/06/2025 2.5   01/05/2025 2.6    Blood Glucose  Glucose (mg/dL)   Date Value   01/13/2025 88   01/12/2025 107 (H)   01/11/2025 81     GLUCOSE BY METER POCT (mg/dL)   Date Value   01/13/2025 96   01/13/2025 93   01/13/2025 106 (H)   01/12/2025 112 (H)   01/10/2025 110 (H)     Hemoglobin A1C (%)   Date Value   12/19/2024 5.8 (H)   01/24/2024 6.0 (H)    Inflammatory Markers  WBC Count (10e3/uL)   Date Value   01/13/2025 5.5   01/12/2025 6.0   01/11/2025 8.8     Albumin (g/dL)   Date Value   12/19/2024 3.3 (L)   12/11/2024 3.1 (L)   12/11/2024 3.1 (L)      Magnesium (mg/dL)   Date Value   01/03/2025 1.8   01/01/2025 1.8   12/31/2024 1.9     Sodium (mmol/L)   Date Value   01/13/2025 135   01/12/2025 135   01/11/2025 136    Renal  Urea Nitrogen (mg/dL)   Date Value   01/13/2025 28.0 (H)   01/12/2025 30.0 (H)   01/11/2025 28.4 (H)     Creatinine (mg/dL)   Date Value   01/13/2025 0.71   01/12/2025 0.73   01/11/2025 0.79     Additional  Triglycerides (mg/dL)   Date Value   12/20/2024 99   12/19/2024 100     Ketones Urine (mg/dL)   Date Value   12/28/2024 Negative        GI:  Last BM: 1/12  Trending 1-3 unmeasured stool occurences per day, based on  I/O review  GI concerns: Need for long-term EN access      Pertinent Medications  Vitamin D3 - 50,000 units once weekly  Jardiance daily   Lasix daily   Synthroid daily   Thera-Vit daily   B1/Thiamine 100 mg daily   D5W started as pt NPO for PEGJ  Bowel meds: PRN     SKIN:  Most recent WOCN note 1/12/25 - see for detail if needed  Perirectal area, sacrum, buttocks - incontinence associated dermatitis and fungal - status initial assessment     MALNUTRITION  % Intake: No decreased intake noted - TF meeting needs based on I/O eval  % Weight Loss: > 10% in 6 months (severe malnutrition) PTA  Subcutaneous Fat Loss: Severe global continues  Muscle Loss: Severe global continues  Fluid Accumulation/Edema: Does not meet criteria (trace)  Malnutrition Diagnosis: Severe malnutrition in the context of chronic illness     Previous Goals   Total avg nutritional intake to meet a minimum of 30 kcal/kg and 1.2 g PRO/kg daily (per dosing wt 47 kg).   Evaluation: Met    Previous Nutrition Diagnosis  Inadequate oral intake related to dysphagia as evidenced by NPO per SLP, TFs as primary nutrition source    Evaluation: No change    CURRENT NUTRITION DIAGNOSIS  Inadequate oral intake related to dysphagia as evidenced by NPO with enteral feeds as the primary nutrition source, plan for G-tube.       INTERVENTIONS  Implementation  Collaboration with other providers - Bedside RN, GI ERIC  Enteral Nutrition - Updated TF orders with initiation/adv instructions once G-tube placed     Goals  Total avg nutritional intake to meet a minimum of 30 kcal/kg and 1.2 g PRO/kg daily (per dosing wt 47 kg).    Monitoring/Evaluation  Progress toward goals will be monitored and evaluated per protocol.    Scar Butt, MS, RDN, LD, CNSC  Available by [place] or phone   Vocmacey: M-F (7:00-3:30)  6B Clinical Dietitian   Weekend/Holiday (7:00-3:30) - Weekend Clinical Dietitian   6B RD desk: [phone]       **Clinical Dietitians are no longer available by  pager.

## 2025-01-13 NOTE — PLAN OF CARE
Neuro: Patient alert and oriented x4. Flat affect.  Cardiac: A-fib/Aflutter 80's-120's. BP's stable throughout the shift. Afebrile.   Respiratory: Sating >92% on 3L NC. Oral suctioning q 3 times today. Scopolamine patch placed.   GI/: Incontinent of bowel and bladder. BM x2. Reddened buttocks- barrier cream applied. Mepi in place.   Diet/appetite: TF- 38mL/hr w/ 30mL Q4 FWF via NJ. NPO. Ice chips allowed with supervision.   Activity: Ax2 with lift.   Pain: Denies.  Skin: No new deficits noted. WOC consult placed for moisture damage to sacrum.   LDA's: L PIV SL   Plan: Plans for PEG placement in OR tomorrow. NPO at midnight, start D5 at 75ml/hr. Check BS q 4 hours.     Goal Outcome Evaluation:  Plan of Care Reviewed With: patient  Overall Patient Progress: improvingOverall Patient Progress: improving  Outcome Evaluation: Plans for PEG in OR tomorrow

## 2025-01-13 NOTE — PROGRESS NOTES
"Gastroenterology Brief Note    Chart reviewed, did not examine patient.    Labs: INR 1.54 (12/24) - no recent recheck    Meds: Apixaban last received 1/10    Nutrition/Enteral access: NPO (due to severe oropharyngeal dysphagia), TF via NDT held since midnight.   *No clear indication at this time for post-pyloric FT at this time given without any persistent N/V requiring gastric decompression and currently suspect aspiration more related to oropharyngeal dysphagia from secretions.  Plan to initially proceed with PEG and follow for ongoing frequency and suspected etiology of aspiration pneumonia for need to consider conversation to PEG-J at later date only if with increased suspicion for reflux of TF as etiology for recurrent episodes.    Imaging: CT AP (1/10) - reviewed and appears to have window to proceed with PEG.    /78 (BP Location: Right arm)   Pulse 92   Temp 97.6  F (36.4  C) (Axillary)   Resp 17   Ht 1.651 m (5' 5\")   Wt 48.7 kg (107 lb 5.8 oz)   SpO2 98%   BMI 17.87 kg/m       Now able to wean down to 1 L NC.    RECOMMENDATIONS:  -INR recheck stat (ordered for you) to evaluate approp to proceed with procedure and/or need to correct due to severe malnutrition hx.  -Plan to proceed with PEG today (1/13) if INR <1.8 but time change to ~15:00.    -Continue to hold Apixaban.  -Continue NPO status, holding of TF.    Discussed above with primary medicine team (Dr. Bee)    Above in collaboration/discussed with Dr. Paz, GI attending    Liana Raymond PA-C, RD, [facility]  Inpatient Gastroenterology Service  [facility]  Pager: *[phone] (M-F, 7:30-16:00) or VOCERA       "

## 2025-01-14 VITALS
OXYGEN SATURATION: 96 % | RESPIRATION RATE: 7 BRPM | HEART RATE: 104 BPM | DIASTOLIC BLOOD PRESSURE: 94 MMHG | TEMPERATURE: 97.5 F | BODY MASS INDEX: 17.89 KG/M2 | WEIGHT: 107.36 LBS | SYSTOLIC BLOOD PRESSURE: 139 MMHG | HEIGHT: 65 IN

## 2025-01-14 LAB
ABO + RH BLD: NORMAL
ALBUMIN SERPL BCG-MCNC: 3.2 G/DL (ref 3.5–5.2)
ALBUMIN UR-MCNC: 200 MG/DL
ALLEN'S TEST: ABNORMAL
ALLEN'S TEST: NO
ALLEN'S TEST: YES
ALLEN'S TEST: YES
ALP SERPL-CCNC: 99 U/L (ref 40–150)
ALT SERPL W P-5'-P-CCNC: 11 U/L (ref 0–50)
ANION GAP SERPL CALCULATED.3IONS-SCNC: 14 MMOL/L (ref 7–15)
ANION GAP SERPL CALCULATED.3IONS-SCNC: 14 MMOL/L (ref 7–15)
APPEARANCE UR: CLEAR
AST SERPL W P-5'-P-CCNC: 30 U/L (ref 0–45)
ATRIAL RATE - MUSE: 92 BPM
BASE EXCESS BLDA CALC-SCNC: -1.3 MMOL/L (ref -3–3)
BASE EXCESS BLDA CALC-SCNC: -2 MMOL/L (ref -3–3)
BASE EXCESS BLDA CALC-SCNC: -2.6 MMOL/L (ref -3–3)
BASE EXCESS BLDA CALC-SCNC: -2.8 MMOL/L (ref -3–3)
BASE EXCESS BLDA CALC-SCNC: 3.1 MMOL/L (ref -3–3)
BASE EXCESS BLDA CALC-SCNC: 4.5 MMOL/L (ref -3–3)
BASE EXCESS BLDA CALC-SCNC: 4.9 MMOL/L (ref -3–3)
BASE EXCESS BLDV CALC-SCNC: 6.1 MMOL/L (ref -3–3)
BILIRUB SERPL-MCNC: 0.5 MG/DL
BILIRUB UR QL STRIP: NEGATIVE
BLD GP AB SCN SERPL QL: NEGATIVE
BUN SERPL-MCNC: 23.3 MG/DL (ref 8–23)
BUN SERPL-MCNC: 25.5 MG/DL (ref 8–23)
CA-I BLD-MCNC: 4.7 MG/DL (ref 4.4–5.2)
CA-I BLD-MCNC: 4.8 MG/DL (ref 4.4–5.2)
CALCIUM SERPL-MCNC: 8.7 MG/DL (ref 8.8–10.4)
CALCIUM SERPL-MCNC: 9.2 MG/DL (ref 8.8–10.4)
CHLORIDE SERPL-SCNC: 93 MMOL/L (ref 98–107)
CHLORIDE SERPL-SCNC: 94 MMOL/L (ref 98–107)
CK SERPL-CCNC: 42 U/L (ref 26–192)
COLOR UR AUTO: YELLOW
CPB POCT: NO
CREAT SERPL-MCNC: 0.85 MG/DL (ref 0.51–0.95)
CREAT SERPL-MCNC: 1.02 MG/DL (ref 0.51–0.95)
DIASTOLIC BLOOD PRESSURE - MUSE: NORMAL MMHG
EGFRCR SERPLBLD CKD-EPI 2021: 56 ML/MIN/1.73M2
EGFRCR SERPLBLD CKD-EPI 2021: 70 ML/MIN/1.73M2
ERYTHROCYTE [DISTWIDTH] IN BLOOD BY AUTOMATED COUNT: 23.9 % (ref 10–15)
ERYTHROCYTE [DISTWIDTH] IN BLOOD BY AUTOMATED COUNT: 24.1 % (ref 10–15)
ERYTHROCYTE [DISTWIDTH] IN BLOOD BY AUTOMATED COUNT: 24.2 % (ref 10–15)
GLUCOSE BLD-MCNC: 184 MG/DL (ref 70–99)
GLUCOSE BLDC GLUCOMTR-MCNC: 100 MG/DL (ref 70–99)
GLUCOSE BLDC GLUCOMTR-MCNC: 118 MG/DL (ref 70–99)
GLUCOSE BLDC GLUCOMTR-MCNC: 145 MG/DL (ref 70–99)
GLUCOSE BLDC GLUCOMTR-MCNC: 233 MG/DL (ref 70–99)
GLUCOSE BLDC GLUCOMTR-MCNC: 292 MG/DL (ref 70–99)
GLUCOSE SERPL-MCNC: 120 MG/DL (ref 70–99)
GLUCOSE SERPL-MCNC: 320 MG/DL (ref 70–99)
GLUCOSE UR STRIP-MCNC: 300 MG/DL
HCO3 BLD-SCNC: 25 MMOL/L (ref 21–28)
HCO3 BLD-SCNC: 25 MMOL/L (ref 21–28)
HCO3 BLD-SCNC: 26 MMOL/L (ref 21–28)
HCO3 BLD-SCNC: 28 MMOL/L (ref 21–28)
HCO3 BLD-SCNC: 29 MMOL/L (ref 21–28)
HCO3 BLD-SCNC: 30 MMOL/L (ref 21–28)
HCO3 BLDA-SCNC: 25 MMOL/L (ref 21–28)
HCO3 BLDV-SCNC: 33 MMOL/L (ref 21–28)
HCO3 SERPL-SCNC: 22 MMOL/L (ref 22–29)
HCO3 SERPL-SCNC: 25 MMOL/L (ref 22–29)
HCT VFR BLD AUTO: 36.9 % (ref 35–47)
HCT VFR BLD AUTO: 38.1 % (ref 35–47)
HCT VFR BLD AUTO: 38.4 % (ref 35–47)
HCT VFR BLD CALC: 38 % (ref 35–47)
HGB BLD-MCNC: 11 G/DL (ref 11.7–15.7)
HGB BLD-MCNC: 11 G/DL (ref 11.7–15.7)
HGB BLD-MCNC: 11.5 G/DL (ref 11.7–15.7)
HGB BLD-MCNC: 12.9 G/DL (ref 11.7–15.7)
HGB UR QL STRIP: NEGATIVE
HYALINE CASTS: 1 /LPF
INR PPP: 1.15 (ref 0.85–1.15)
INTERPRETATION ECG - MUSE: NORMAL
KETONES UR STRIP-MCNC: NEGATIVE MG/DL
LACTATE SERPL-SCNC: 2.7 MMOL/L (ref 0.7–2)
LACTATE SERPL-SCNC: 4.6 MMOL/L (ref 0.7–2)
LEUKOCYTE ESTERASE UR QL STRIP: NEGATIVE
LVEF ECHO: NORMAL
MAGNESIUM SERPL-MCNC: 1.9 MG/DL (ref 1.7–2.3)
MAGNESIUM SERPL-MCNC: 3.3 MG/DL (ref 1.7–2.3)
MCH RBC QN AUTO: 26.1 PG (ref 26.5–33)
MCH RBC QN AUTO: 26.2 PG (ref 26.5–33)
MCH RBC QN AUTO: 27.2 PG (ref 26.5–33)
MCHC RBC AUTO-ENTMCNC: 28.6 G/DL (ref 31.5–36.5)
MCHC RBC AUTO-ENTMCNC: 28.9 G/DL (ref 31.5–36.5)
MCHC RBC AUTO-ENTMCNC: 31.2 G/DL (ref 31.5–36.5)
MCV RBC AUTO: 87 FL (ref 78–100)
MCV RBC AUTO: 91 FL (ref 78–100)
MCV RBC AUTO: 91 FL (ref 78–100)
MRSA DNA SPEC QL NAA+PROBE: NEGATIVE
MUCOUS THREADS #/AREA URNS LPF: PRESENT /LPF
NITRATE UR QL: NEGATIVE
NT-PROBNP SERPL-MCNC: 5596 PG/ML (ref 0–1800)
O2/TOTAL GAS SETTING VFR VENT: 100 %
O2/TOTAL GAS SETTING VFR VENT: 60 %
OXYHGB MFR BLDA: 69 % (ref 92–100)
OXYHGB MFR BLDA: 77 % (ref 92–100)
OXYHGB MFR BLDA: 84 % (ref 92–100)
OXYHGB MFR BLDA: 93 % (ref 92–100)
OXYHGB MFR BLDA: 95 % (ref 92–100)
OXYHGB MFR BLDA: 98 % (ref 92–100)
OXYHGB MFR BLDV: 34 % (ref 70–75)
P AXIS - MUSE: NORMAL DEGREES
PCO2 BLD: 42 MM HG (ref 35–45)
PCO2 BLD: 45 MM HG (ref 35–45)
PCO2 BLD: 45 MM HG (ref 35–45)
PCO2 BLD: 48 MM HG (ref 35–45)
PCO2 BLD: 58 MM HG (ref 35–45)
PCO2 BLD: 59 MM HG (ref 35–45)
PCO2 BLDA: 49 MM HG (ref 35–45)
PCO2 BLDV: 61 MM HG (ref 40–50)
PEEP: 10 CM H2O
PEEP: 10 CM H2O
PEEP: 14 CM H2O
PEEP: 5 CM H2O
PH BLD: 7.25 [PH] (ref 7.35–7.45)
PH BLD: 7.25 [PH] (ref 7.35–7.45)
PH BLD: 7.33 [PH] (ref 7.35–7.45)
PH BLD: 7.41 [PH] (ref 7.35–7.45)
PH BLD: 7.43 [PH] (ref 7.35–7.45)
PH BLD: 7.45 [PH] (ref 7.35–7.45)
PH BLDA: 7.31 [PH] (ref 7.35–7.45)
PH BLDV: 7.35 [PH] (ref 7.32–7.43)
PH UR STRIP: 7.5 [PH] (ref 5–7)
PHOSPHATE SERPL-MCNC: 4 MG/DL (ref 2.5–4.5)
PHOSPHATE SERPL-MCNC: 5.9 MG/DL (ref 2.5–4.5)
PLATELET # BLD AUTO: 235 10E3/UL (ref 150–450)
PLATELET # BLD AUTO: 239 10E3/UL (ref 150–450)
PLATELET # BLD AUTO: 258 10E3/UL (ref 150–450)
PO2 BLD: 102 MM HG (ref 80–105)
PO2 BLD: 225 MM HG (ref 80–105)
PO2 BLD: 45 MM HG (ref 80–105)
PO2 BLD: 46 MM HG (ref 80–105)
PO2 BLD: 50 MM HG (ref 80–105)
PO2 BLD: 75 MM HG (ref 80–105)
PO2 BLDA: 33 MM HG (ref 80–105)
PO2 BLDV: 24 MM HG (ref 25–47)
POTASSIUM BLD-SCNC: 4.5 MMOL/L (ref 3.4–5.3)
POTASSIUM SERPL-SCNC: 4.4 MMOL/L (ref 3.4–5.3)
POTASSIUM SERPL-SCNC: 4.7 MMOL/L (ref 3.4–5.3)
PR INTERVAL - MUSE: NORMAL MS
PROCALCITONIN SERPL IA-MCNC: 0.16 NG/ML
PROT SERPL-MCNC: 6.1 G/DL (ref 6.4–8.3)
QRS DURATION - MUSE: 66 MS
QT - MUSE: 276 MS
QTC - MUSE: 451 MS
R AXIS - MUSE: 236 DEGREES
RBC # BLD AUTO: 4.2 10E6/UL (ref 3.8–5.2)
RBC # BLD AUTO: 4.21 10E6/UL (ref 3.8–5.2)
RBC # BLD AUTO: 4.23 10E6/UL (ref 3.8–5.2)
RBC URINE: 4 /HPF
SA TARGET DNA: POSITIVE
SAO2 % BLDA: 58 % (ref 92–100)
SAO2 % BLDA: 70.8 % (ref 95–96)
SAO2 % BLDA: 78.6 % (ref 95–96)
SAO2 % BLDA: 86 % (ref 95–96)
SAO2 % BLDA: 94.7 % (ref 95–96)
SAO2 % BLDA: 97.5 % (ref 95–96)
SAO2 % BLDA: 99.8 % (ref 95–96)
SAO2 % BLDV: 34.2 % (ref 70–75)
SODIUM BLD-SCNC: 129 MMOL/L (ref 135–145)
SODIUM SERPL-SCNC: 130 MMOL/L (ref 135–145)
SODIUM SERPL-SCNC: 132 MMOL/L (ref 135–145)
SP GR UR STRIP: 1.01 (ref 1–1.03)
SPECIMEN EXP DATE BLD: NORMAL
SYSTOLIC BLOOD PRESSURE - MUSE: NORMAL MMHG
T AXIS - MUSE: 71 DEGREES
TROPONIN T SERPL HS-MCNC: 34 NG/L
TROPONIN T SERPL HS-MCNC: 37 NG/L
TROPONIN T SERPL HS-MCNC: 59 NG/L
TROPONIN T SERPL HS-MCNC: 75 NG/L
UPPER GI ENDOSCOPY: NORMAL
UROBILINOGEN UR STRIP-MCNC: NORMAL MG/DL
VENTRICULAR RATE- MUSE: 161 BPM
WBC # BLD AUTO: 10.1 10E3/UL (ref 4–11)
WBC # BLD AUTO: 10.3 10E3/UL (ref 4–11)
WBC # BLD AUTO: 12.4 10E3/UL (ref 4–11)
WBC URINE: 3 /HPF

## 2025-01-14 PROCEDURE — 94640 AIRWAY INHALATION TREATMENT: CPT | Mod: 76

## 2025-01-14 PROCEDURE — 250N000013 HC RX MED GY IP 250 OP 250 PS 637: Performed by: INTERNAL MEDICINE

## 2025-01-14 PROCEDURE — 250N000009 HC RX 250: Performed by: INTERNAL MEDICINE

## 2025-01-14 PROCEDURE — 250N000011 HC RX IP 250 OP 636: Performed by: NURSE PRACTITIONER

## 2025-01-14 PROCEDURE — 82805 BLOOD GASES W/O2 SATURATION: CPT

## 2025-01-14 PROCEDURE — 250N000009 HC RX 250

## 2025-01-14 PROCEDURE — 99292 CRITICAL CARE ADDL 30 MIN: CPT | Mod: FS | Performed by: INTERNAL MEDICINE

## 2025-01-14 PROCEDURE — 36415 COLL VENOUS BLD VENIPUNCTURE: CPT | Performed by: PHYSICIAN ASSISTANT

## 2025-01-14 PROCEDURE — 99291 CRITICAL CARE FIRST HOUR: CPT | Performed by: PHYSICIAN ASSISTANT

## 2025-01-14 PROCEDURE — 82805 BLOOD GASES W/O2 SATURATION: CPT | Performed by: STUDENT IN AN ORGANIZED HEALTH CARE EDUCATION/TRAINING PROGRAM

## 2025-01-14 PROCEDURE — 85027 COMPLETE CBC AUTOMATED: CPT | Performed by: INTERNAL MEDICINE

## 2025-01-14 PROCEDURE — 83880 ASSAY OF NATRIURETIC PEPTIDE: CPT | Performed by: PHYSICIAN ASSISTANT

## 2025-01-14 PROCEDURE — 258N000003 HC RX IP 258 OP 636: Performed by: STUDENT IN AN ORGANIZED HEALTH CARE EDUCATION/TRAINING PROGRAM

## 2025-01-14 PROCEDURE — 82550 ASSAY OF CK (CPK): CPT

## 2025-01-14 PROCEDURE — 94660 CPAP INITIATION&MGMT: CPT

## 2025-01-14 PROCEDURE — 250N000011 HC RX IP 250 OP 636: Performed by: INTERNAL MEDICINE

## 2025-01-14 PROCEDURE — 250N000013 HC RX MED GY IP 250 OP 250 PS 637: Performed by: STUDENT IN AN ORGANIZED HEALTH CARE EDUCATION/TRAINING PROGRAM

## 2025-01-14 PROCEDURE — 84100 ASSAY OF PHOSPHORUS: CPT

## 2025-01-14 PROCEDURE — 999N000127 HC STATISTIC PERIPHERAL IV START W US GUIDANCE

## 2025-01-14 PROCEDURE — 250N000013 HC RX MED GY IP 250 OP 250 PS 637

## 2025-01-14 PROCEDURE — 250N000011 HC RX IP 250 OP 636: Performed by: STUDENT IN AN ORGANIZED HEALTH CARE EDUCATION/TRAINING PROGRAM

## 2025-01-14 PROCEDURE — 87641 MR-STAPH DNA AMP PROBE: CPT

## 2025-01-14 PROCEDURE — 99233 SBSQ HOSP IP/OBS HIGH 50: CPT

## 2025-01-14 PROCEDURE — 36620 INSERTION CATHETER ARTERY: CPT | Mod: GC | Performed by: INTERNAL MEDICINE

## 2025-01-14 PROCEDURE — 258N000003 HC RX IP 258 OP 636: Performed by: INTERNAL MEDICINE

## 2025-01-14 PROCEDURE — 5A1935Z RESPIRATORY VENTILATION, LESS THAN 24 CONSECUTIVE HOURS: ICD-10-PCS | Performed by: INTERNAL MEDICINE

## 2025-01-14 PROCEDURE — 86900 BLOOD TYPING SEROLOGIC ABO: CPT

## 2025-01-14 PROCEDURE — 83735 ASSAY OF MAGNESIUM: CPT | Performed by: PHYSICIAN ASSISTANT

## 2025-01-14 PROCEDURE — 83605 ASSAY OF LACTIC ACID: CPT | Performed by: PHYSICIAN ASSISTANT

## 2025-01-14 PROCEDURE — 82805 BLOOD GASES W/O2 SATURATION: CPT | Performed by: PHYSICIAN ASSISTANT

## 2025-01-14 PROCEDURE — 86850 RBC ANTIBODY SCREEN: CPT

## 2025-01-14 PROCEDURE — 84484 ASSAY OF TROPONIN QUANT: CPT | Performed by: PHYSICIAN ASSISTANT

## 2025-01-14 PROCEDURE — 80053 COMPREHEN METABOLIC PANEL: CPT | Performed by: INTERNAL MEDICINE

## 2025-01-14 PROCEDURE — 99233 SBSQ HOSP IP/OBS HIGH 50: CPT | Performed by: DIETITIAN, REGISTERED

## 2025-01-14 PROCEDURE — 36600 WITHDRAWAL OF ARTERIAL BLOOD: CPT

## 2025-01-14 PROCEDURE — 82330 ASSAY OF CALCIUM: CPT

## 2025-01-14 PROCEDURE — 93010 ELECTROCARDIOGRAM REPORT: CPT | Performed by: INTERNAL MEDICINE

## 2025-01-14 PROCEDURE — 250N000009 HC RX 250: Performed by: STUDENT IN AN ORGANIZED HEALTH CARE EDUCATION/TRAINING PROGRAM

## 2025-01-14 PROCEDURE — 99291 CRITICAL CARE FIRST HOUR: CPT | Mod: FS | Performed by: INTERNAL MEDICINE

## 2025-01-14 PROCEDURE — 85610 PROTHROMBIN TIME: CPT

## 2025-01-14 PROCEDURE — 83605 ASSAY OF LACTIC ACID: CPT

## 2025-01-14 PROCEDURE — 82374 ASSAY BLOOD CARBON DIOXIDE: CPT

## 2025-01-14 PROCEDURE — 999N000215 HC STATISTIC HFNC ADULT NON-CPAP

## 2025-01-14 PROCEDURE — 80048 BASIC METABOLIC PNL TOTAL CA: CPT | Performed by: INTERNAL MEDICINE

## 2025-01-14 PROCEDURE — 94640 AIRWAY INHALATION TREATMENT: CPT

## 2025-01-14 PROCEDURE — 31500 INSERT EMERGENCY AIRWAY: CPT | Mod: GC | Performed by: INTERNAL MEDICINE

## 2025-01-14 PROCEDURE — 84100 ASSAY OF PHOSPHORUS: CPT | Performed by: PHYSICIAN ASSISTANT

## 2025-01-14 PROCEDURE — 999N000157 HC STATISTIC RCP TIME EA 10 MIN

## 2025-01-14 PROCEDURE — 83735 ASSAY OF MAGNESIUM: CPT

## 2025-01-14 PROCEDURE — 302A3H1 TRANSFUSION OF NONAUTOLOGOUS WHOLE BLOOD INTO BONE MARROW, PERCUTANEOUS APPROACH: ICD-10-PCS | Performed by: INTERNAL MEDICINE

## 2025-01-14 PROCEDURE — 250N000011 HC RX IP 250 OP 636

## 2025-01-14 PROCEDURE — 93005 ELECTROCARDIOGRAM TRACING: CPT

## 2025-01-14 PROCEDURE — 84484 ASSAY OF TROPONIN QUANT: CPT

## 2025-01-14 PROCEDURE — 94002 VENT MGMT INPAT INIT DAY: CPT

## 2025-01-14 PROCEDURE — 80069 RENAL FUNCTION PANEL: CPT | Performed by: PHYSICIAN ASSISTANT

## 2025-01-14 PROCEDURE — 85014 HEMATOCRIT: CPT | Performed by: PHYSICIAN ASSISTANT

## 2025-01-14 PROCEDURE — 999N000185 HC STATISTIC TRANSPORT TIME EA 15 MIN

## 2025-01-14 PROCEDURE — 84145 PROCALCITONIN (PCT): CPT

## 2025-01-14 PROCEDURE — 06HN33Z INSERTION OF INFUSION DEVICE INTO LEFT FEMORAL VEIN, PERCUTANEOUS APPROACH: ICD-10-PCS | Performed by: INTERNAL MEDICINE

## 2025-01-14 PROCEDURE — 36556 INSERT NON-TUNNEL CV CATH: CPT | Mod: GC | Performed by: INTERNAL MEDICINE

## 2025-01-14 PROCEDURE — 87640 STAPH A DNA AMP PROBE: CPT

## 2025-01-14 PROCEDURE — 84155 ASSAY OF PROTEIN SERUM: CPT

## 2025-01-14 PROCEDURE — 82803 BLOOD GASES ANY COMBINATION: CPT

## 2025-01-14 PROCEDURE — 81003 URINALYSIS AUTO W/O SCOPE: CPT

## 2025-01-14 PROCEDURE — 36415 COLL VENOUS BLD VENIPUNCTURE: CPT | Performed by: INTERNAL MEDICINE

## 2025-01-14 PROCEDURE — 99207 PR NO BILLABLE SERVICE THIS VISIT: CPT | Performed by: NURSE PRACTITIONER

## 2025-01-14 PROCEDURE — 99207 PR NO BILLABLE SERVICE THIS VISIT: CPT | Performed by: STUDENT IN AN ORGANIZED HEALTH CARE EDUCATION/TRAINING PROGRAM

## 2025-01-14 PROCEDURE — 85027 COMPLETE CBC AUTOMATED: CPT

## 2025-01-14 RX ORDER — ROPIVACAINE IN 0.9% SOD CHL/PF 0.1 %
.01-.125 PLASTIC BAG, INJECTION (ML) EPIDURAL CONTINUOUS
Status: DISCONTINUED | OUTPATIENT
Start: 2025-01-14 | End: 2025-01-14

## 2025-01-14 RX ORDER — ONDANSETRON 4 MG/1
4 TABLET, ORALLY DISINTEGRATING ORAL EVERY 6 HOURS PRN
Status: DISCONTINUED | OUTPATIENT
Start: 2025-01-14 | End: 2025-01-14 | Stop reason: HOSPADM

## 2025-01-14 RX ORDER — HYDROCORTISONE SODIUM SUCCINATE 100 MG/2ML
50 INJECTION INTRAMUSCULAR; INTRAVENOUS EVERY 6 HOURS
Status: DISCONTINUED | OUTPATIENT
Start: 2025-01-14 | End: 2025-01-14 | Stop reason: HOSPADM

## 2025-01-14 RX ORDER — ATROPINE SULFATE 10 MG/ML
1 SOLUTION/ DROPS OPHTHALMIC
Status: DISCONTINUED | OUTPATIENT
Start: 2025-01-14 | End: 2025-01-14 | Stop reason: HOSPADM

## 2025-01-14 RX ORDER — PIPERACILLIN SODIUM, TAZOBACTAM SODIUM 4; .5 G/20ML; G/20ML
4.5 INJECTION, POWDER, LYOPHILIZED, FOR SOLUTION INTRAVENOUS EVERY 6 HOURS
Status: DISCONTINUED | OUTPATIENT
Start: 2025-01-14 | End: 2025-01-14

## 2025-01-14 RX ORDER — ETOMIDATE 2 MG/ML
15 INJECTION INTRAVENOUS ONCE
Status: COMPLETED | OUTPATIENT
Start: 2025-01-14 | End: 2025-01-14

## 2025-01-14 RX ORDER — CARBOXYMETHYLCELLULOSE SODIUM 5 MG/ML
1 SOLUTION/ DROPS OPHTHALMIC EVERY 8 HOURS PRN
Status: DISCONTINUED | OUTPATIENT
Start: 2025-01-14 | End: 2025-01-14 | Stop reason: HOSPADM

## 2025-01-14 RX ORDER — DEXTROSE MONOHYDRATE 25 G/50ML
25-50 INJECTION, SOLUTION INTRAVENOUS
Status: DISCONTINUED | OUTPATIENT
Start: 2025-01-14 | End: 2025-01-14 | Stop reason: HOSPADM

## 2025-01-14 RX ORDER — EPINEPHRINE IN 0.9 % SOD CHLOR 5 MG/250ML
.01-.3 PLASTIC BAG, INJECTION (ML) INTRAVENOUS CONTINUOUS
Status: DISCONTINUED | OUTPATIENT
Start: 2025-01-14 | End: 2025-01-14 | Stop reason: HOSPADM

## 2025-01-14 RX ORDER — DEXMEDETOMIDINE HYDROCHLORIDE 4 UG/ML
.1-1.2 INJECTION, SOLUTION INTRAVENOUS CONTINUOUS
Status: DISCONTINUED | OUTPATIENT
Start: 2025-01-14 | End: 2025-01-14 | Stop reason: HOSPADM

## 2025-01-14 RX ORDER — NOREPINEPHRINE BITARTRATE 0.06 MG/ML
INJECTION, SOLUTION INTRAVENOUS
Status: DISCONTINUED
Start: 2025-01-14 | End: 2025-01-14 | Stop reason: HOSPADM

## 2025-01-14 RX ORDER — NOREPINEPHRINE BITARTRATE 0.06 MG/ML
.01-.6 INJECTION, SOLUTION INTRAVENOUS CONTINUOUS
Status: DISCONTINUED | OUTPATIENT
Start: 2025-01-14 | End: 2025-01-14 | Stop reason: HOSPADM

## 2025-01-14 RX ORDER — ONDANSETRON 2 MG/ML
4 INJECTION INTRAMUSCULAR; INTRAVENOUS EVERY 6 HOURS PRN
Status: DISCONTINUED | OUTPATIENT
Start: 2025-01-14 | End: 2025-01-14 | Stop reason: HOSPADM

## 2025-01-14 RX ORDER — NICOTINE POLACRILEX 4 MG
15-30 LOZENGE BUCCAL
Status: DISCONTINUED | OUTPATIENT
Start: 2025-01-14 | End: 2025-01-14 | Stop reason: HOSPADM

## 2025-01-14 RX ORDER — GLYCOPYRROLATE 0.2 MG/ML
0.2 INJECTION, SOLUTION INTRAMUSCULAR; INTRAVENOUS ONCE
Status: COMPLETED | OUTPATIENT
Start: 2025-01-14 | End: 2025-01-14

## 2025-01-14 RX ORDER — MAGNESIUM SULFATE HEPTAHYDRATE 40 MG/ML
2 INJECTION, SOLUTION INTRAVENOUS ONCE
OUTPATIENT
Start: 2025-01-14 | End: 2025-01-14

## 2025-01-14 RX ORDER — GLYCOPYRROLATE 0.2 MG/ML
0.1 INJECTION, SOLUTION INTRAMUSCULAR; INTRAVENOUS EVERY 4 HOURS PRN
Status: DISCONTINUED | OUTPATIENT
Start: 2025-01-14 | End: 2025-01-14 | Stop reason: HOSPADM

## 2025-01-14 RX ORDER — CHLORHEXIDINE GLUCONATE ORAL RINSE 1.2 MG/ML
15 SOLUTION DENTAL EVERY 12 HOURS
Status: DISCONTINUED | OUTPATIENT
Start: 2025-01-14 | End: 2025-01-14 | Stop reason: HOSPADM

## 2025-01-14 RX ORDER — EPINEPHRINE 0.1 MG/ML
INJECTION INTRAVENOUS
Status: COMPLETED
Start: 2025-01-14 | End: 2025-01-14

## 2025-01-14 RX ORDER — LIDOCAINE 40 MG/G
CREAM TOPICAL
Status: DISCONTINUED | OUTPATIENT
Start: 2025-01-14 | End: 2025-01-14 | Stop reason: HOSPADM

## 2025-01-14 RX ORDER — METOPROLOL TARTRATE 1 MG/ML
2.5 INJECTION, SOLUTION INTRAVENOUS EVERY 5 MIN PRN
Status: DISCONTINUED | OUTPATIENT
Start: 2025-01-14 | End: 2025-01-14 | Stop reason: HOSPADM

## 2025-01-14 RX ORDER — MEROPENEM 1 G/1
1 INJECTION, POWDER, FOR SOLUTION INTRAVENOUS EVERY 8 HOURS
Status: DISCONTINUED | OUTPATIENT
Start: 2025-01-14 | End: 2025-01-14 | Stop reason: HOSPADM

## 2025-01-14 RX ADMIN — MIDAZOLAM 2 MG: 1 INJECTION INTRAMUSCULAR; INTRAVENOUS at 13:56

## 2025-01-14 RX ADMIN — Medication 200 MCG: at 13:49

## 2025-01-14 RX ADMIN — LEVALBUTEROL HYDROCHLORIDE 0.63 MG: 0.63 SOLUTION RESPIRATORY (INHALATION) at 00:19

## 2025-01-14 RX ADMIN — BUDESONIDE 0.5 MG: 0.5 INHALANT ORAL at 06:11

## 2025-01-14 RX ADMIN — DEXMEDETOMIDINE HYDROCHLORIDE 0.2 MCG/KG/HR: 400 INJECTION INTRAVENOUS at 09:15

## 2025-01-14 RX ADMIN — Medication 175 MCG/HR: at 13:38

## 2025-01-14 RX ADMIN — SODIUM CHLORIDE 500 ML: 9 INJECTION, SOLUTION INTRAVENOUS at 03:14

## 2025-01-14 RX ADMIN — LEVOTHYROXINE SODIUM 100 MCG: 0.05 TABLET ORAL at 12:37

## 2025-01-14 RX ADMIN — ACETAMINOPHEN 650 MG: 325 TABLET, FILM COATED ORAL at 07:44

## 2025-01-14 RX ADMIN — ONDANSETRON 4 MG: 2 INJECTION INTRAMUSCULAR; INTRAVENOUS at 07:43

## 2025-01-14 RX ADMIN — ACETYLCYSTEINE 2 ML: 200 SOLUTION ORAL; RESPIRATORY (INHALATION) at 00:19

## 2025-01-14 RX ADMIN — NOREPINEPHRINE BITARTRATE 0.3 MCG/KG/MIN: 0.06 INJECTION, SOLUTION INTRAVENOUS at 10:30

## 2025-01-14 RX ADMIN — Medication 50 MCG: at 14:00

## 2025-01-14 RX ADMIN — IPRATROPIUM BROMIDE 0.5 MG: 0.5 SOLUTION RESPIRATORY (INHALATION) at 06:11

## 2025-01-14 RX ADMIN — Medication 50 MCG/HR: at 09:28

## 2025-01-14 RX ADMIN — SODIUM CHLORIDE 500 ML: 9 INJECTION, SOLUTION INTRAVENOUS at 02:18

## 2025-01-14 RX ADMIN — GLYCOPYRROLATE 0.2 MG: 0.2 INJECTION INTRAMUSCULAR; INTRAVENOUS at 13:34

## 2025-01-14 RX ADMIN — Medication 0.03 MCG/KG/MIN: at 08:47

## 2025-01-14 RX ADMIN — CHLORHEXIDINE GLUCONATE 15 ML: 1.2 SOLUTION ORAL at 12:24

## 2025-01-14 RX ADMIN — METOPROLOL TARTRATE 2.5 MG: 5 INJECTION INTRAVENOUS at 00:20

## 2025-01-14 RX ADMIN — MIDAZOLAM 2 MG: 1 INJECTION INTRAMUSCULAR; INTRAVENOUS at 13:48

## 2025-01-14 RX ADMIN — ETOMIDATE 15 MG: 2 INJECTION INTRAVENOUS at 09:04

## 2025-01-14 RX ADMIN — MEROPENEM 1 G: 1 INJECTION, POWDER, FOR SOLUTION INTRAVENOUS at 11:01

## 2025-01-14 RX ADMIN — Medication 100 MG: at 09:05

## 2025-01-14 RX ADMIN — DILTIAZEM HYDROCHLORIDE 60 MG: 60 TABLET, FILM COATED ORAL at 03:48

## 2025-01-14 RX ADMIN — EPINEPHRINE 0.1 MG: 0.1 INJECTION INTRAVENOUS at 11:29

## 2025-01-14 RX ADMIN — EPINEPHRINE 0.01 MCG/KG/MIN: 1 INJECTION INTRAMUSCULAR; INTRAVENOUS; SUBCUTANEOUS at 11:42

## 2025-01-14 RX ADMIN — ORAL VEHICLES - SUSP 75 MG: SUSPENSION at 04:12

## 2025-01-14 RX ADMIN — LEVALBUTEROL HYDROCHLORIDE 0.63 MG: 0.63 SOLUTION RESPIRATORY (INHALATION) at 06:12

## 2025-01-14 RX ADMIN — HYDROCORTISONE SODIUM SUCCINATE 50 MG: 100 INJECTION, POWDER, FOR SOLUTION INTRAMUSCULAR; INTRAVENOUS at 10:59

## 2025-01-14 RX ADMIN — Medication 40 MG: at 12:38

## 2025-01-14 RX ADMIN — THIAMINE HCL TAB 100 MG 100 MG: 100 TAB at 12:29

## 2025-01-14 ASSESSMENT — ACTIVITIES OF DAILY LIVING (ADL)
ADLS_ACUITY_SCORE: 66
ADLS_ACUITY_SCORE: 64
ADLS_ACUITY_SCORE: 64
ADLS_ACUITY_SCORE: 68
ADLS_ACUITY_SCORE: 66
ADLS_ACUITY_SCORE: 64
ADLS_ACUITY_SCORE: 66
ADLS_ACUITY_SCORE: 64
ADLS_ACUITY_SCORE: 66
ADLS_ACUITY_SCORE: 66
ADLS_ACUITY_SCORE: 64
ADLS_ACUITY_SCORE: 66
ADLS_ACUITY_SCORE: 64
ADLS_ACUITY_SCORE: 66
ADLS_ACUITY_SCORE: 66
ADLS_ACUITY_SCORE: 64
ADLS_ACUITY_SCORE: 64

## 2025-01-14 NOTE — OR NURSING
Pt turned, repositioned, pillows under each hip to relieve pressure from coccyx.    Unable to assess due to medical condition

## 2025-01-14 NOTE — CODE/RAPID RESPONSE
Responded to Rapid Response call. RRT was called due to increased O2 needs with desaturation. Patient was on Oxyplus Mask @ 15 LPM, RR 20/min, SpO2 88-91%. Respiratory interventions included HFNC 100%, 50L,  NT and oral suctioning with small white thick secretions, CXR, ABG (Result below)  Recent Labs   Lab 01/14/25  0011 01/14/25  0000   PH  --  7.43   PCO2  --  45   PO2  --  75*   HCO3  --  30*   O2PER 60 100     Xop/Muco nebulizer treatmentgiven. Patient was stabilized and remained on unit.    RT to follow    Ming Smith, FLORES  1/14/2025 1:50 AM     Susan

## 2025-01-14 NOTE — H&P
[facility]    ICU History and Physical    Primary Team: MICU 2  Reason for Critical Care Admission: acute hypoxic respiratory failure  Admitting Physician: Dr. Azar  Date of Admission:  11/21/2024    Assessment: Critical Care   Asya Coffman is a 79 year old female admitted on 11/21/2024. 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 Initially admitted to hospital medicine service with hypoxic respiratory failure suspected due to HFpEF requiring diuresis. Developed acute hypoxia on 12/8 secondary to suspected aspiration pneumonitis for which she was intubated (12/8-12/10). Now s/p extubation and transferred back to hospital medicine service. Ongoing evaluation related to aspiration, currently tolerated continuous tube feeds via NJ, with improving respiratory status. Plan for PEG placement due to being NPO and high risk for aspiration.     Transferred to ICU 1/14 for acute on chronic hypoxic respiratory failure requiring intubation. Prior to intubation spoke with pts daughter and son regarding code status recently being changed. Per family pt okay for intubation but does not want compressions if heart were to stop during intubation. Cassidy intubation pt w labile hemodynamics and hypoxia requiring levophed, vasopressin, push dose jane and epi, arterial and cvc lines placed.        Plan: Critical Care   Neuro/ pain/ sedation:  #Sedation for mechanical ventilation  - Monitor neurological status. Notify provider for any acute changes in exam  - Precedex gtt  - Fentanyl gtt  - Rass goal -1 to -2    Pulmonary:  #Acute on chronic hypoxic respiratory failure  #Hospital aquired pneumonia vs aspiration  #Acute hypoxic respiratory failure- on 2L  NC  #Recurrent Aspiration pneumonitis with aspiration pneumonia  #MSSA PNA s/p abx (12/3-12/8)  #Obstructive lung disease (COPD vs asthma)  #Pulmonary hypertension   Decompensated night of 1/13-1/14 after peg placement, with increasing oxygen needs resulting with intubation am of 1/14. Early am CXR w increased bilat pleural effusions, opacities.  - CXR s/p intubation w repositioning of tube  - ABG  - ABX below    FiO2 (%): 100 %, Resp: 22, Vent Mode: CMV/AC, Resp Rate (Set): 20 breaths/min, Tidal Volume (Set, mL): 300 mL, PEEP (cm H2O): 10 cmH2O, Resp Rate (Set): 20 breaths/min, Tidal Volume (Set, mL): 300 mL, PEEP (cm H2O): 10 cmH2O     Cardiovascular:  #Shock, most likely distributive   #Chronic afib  #Afib with RVR  - Monitor hemodynamic status.  - Norepinephrine and vasopressin to keep map greater than 65  - Stress dose steroids  - Echo  - Lactic    GI/Nutrition:  #Acute severe oropharyngeal dysphagia  #Laryngeal squamous cell carcinoma s/p radiation (4/2024-7/2024)  #Failure to thrive  #High risk for aspiration   #Esophageal dysmotility  #Concern for radiation-induced dysphagia  #Concern for gastroparesis        - Diet: Adult Formula Drip Feeding: Continuous TwoCal HN; Gastrostomy; Goal Rate: 38 mL/hr x 22 hours per day (Hold TFs for 1 hr before/after Synthroid dose); mL/hr; Once ok to resume feedings s/p G-tube placement, initiate at rate of 20 mL/hr and advance...  NPO for Medical/Clinical Reasons Except for: Meds    - Nutrition consulted. Appreciate recommendations.   - NPO    Renal/ Fluid Balance:   - Will monitor intake and output  - ICU electrolyte replacement protocol  - merritt for strict intake and output    Endocrine:  - No active issues    ID:  #Sepsis  # Pneumonia, ? Aspiration vs hospital acquired     - Started broad abx meropenum   - check procal, crp, mrsa, blood, urine and sputum cx      Hematology:  - cbc on arrival    MSK:   - PT and OT consulted. Appreciate recommendations.     Lines/ tubes/  drains:  Miranda Catheter: Not present  Lines: None       Prophylaxis:  - DVT Prophylaxis: g-tube placed 1/13/24  - PUD Prophylaxis: ppi    Code Status: No CPR- Pre-arrest intubation OK      Disposition:  - ICU    The patient's care was discussed with the Attending Physician, Dr. Azar .  Critical care time exclusive of procedures: 65 minutes    Clinically Significant Risk Factors         # Hyponatremia: Lowest Na = 129 mmol/L in last 2 days, will monitor as appropriate  # Hypochloremia: Lowest Cl = 93 mmol/L in last 2 days, will monitor as appropriate      # Hypoalbuminemia: Lowest albumin = 3 g/dL at 12/10/2024  3:11 AM, will monitor as appropriate         # Acute Hypoxic Respiratory Failure: Documented O2 saturation < 90%. Continue supplemental oxygen as needed  # Acute Hypoxic Respiratory Failure: Documented O2 saturation < 90%. Continue supplemental oxygen as needed          # Severe Malnutrition: based on nutrition assessment    # Financial/Environmental Concerns: none        Code Status: No CPR- Pre-arrest intubation OK       VERONICA Cadet [facility]  Securely message with [employer] (more info)  Text page via [facility] Paging/Directory     ______________________________________________________________________    Chief Complaint   Acute hypoxic respiratory failure    History is obtained from the electronic health record    History of Present Illness   Asya Coffman is a 79 year old female admitted on 11/21/2024. 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 Initially admitted to hospital medicine service with hypoxic respiratory failure suspected due to HFpEF requiring diuresis. Developed acute hypoxia on 12/8 secondary to  suspected aspiration pneumonitis for which she was intubated (12/8-12/10). Now s/p extubation and transferred back to hospital medicine service. Ongoing evaluation related to aspiration, currently tolerated continuous tube feeds via NJ, with improving respiratory status. Plan for PEG placement due to being NPO and high risk for aspiration.     Transferred to ICU 1/14 for acute on chronic hypoxic respiratory failure requiring intubation. Prior to intubation spoke with pts daughter and son regarding code status recently being changed. Per family pt okay for intubation but does not want compressions if heart were to stop during intubation. Cassidy intubation pt w labile hemodynamics and hypoxia requiring levophed, vasopressin, push dose jane and epi, lines placed.     Review of Systems    The 10 point Review of Systems is negative other than noted in the HPI or here.     Past Medical History    I have reviewed this patient's medical history and updated it with pertinent information if needed.   Past Medical History:   Diagnosis Date    A-fib (H)     Antiplatelet or antithrombotic long-term use     Arrhythmia     COPD (chronic obstructive pulmonary disease) (H)        Past Surgical History   I have reviewed this patient's surgical history and updated it with pertinent information if needed.  Past Surgical History:   Procedure Laterality Date    INJECT STEROID (LOCATION) N/A 6/15/2023    Procedure: Avastin injection;  Surgeon: Nikole Davis MD;  Location: [facility]    INJECT STEROID (LOCATION) N/A 9/7/2023    Procedure: Avastin injection;  Surgeon: Nikole Davis MD;  [facility]: [facility]    INJECT STEROID (LOCATION) N/A 12/14/2023    Procedure: Avastin injection;  Surgeon: Nikole Davis MD;  [facility]: [facility]    INJECT STEROID (LOCATION) N/A 2/15/2024    Procedure: Avastin injection;  Surgeon: Nikole Davis MD;  [facility]    LASER CO2 LARYNGOSCOPY N/A 9/7/2023    Procedure:  Microdirect laryngoscopy with excision/ablation of laryngeal lesions, biopsies, CO2 laser;  Surgeon: Nikole Davis MD;  [facility]    LASER CO2 LARYNGOSCOPY N/A 5/30/2024    Procedure: Microdirect laryngoscopy with excision/ablation of laryngeal lesions, biopsies, CO2 laser;  Surgeon: Nikole Davis MD;  [facility]: [facility]    LASER CO2 LARYNGOSCOPY, COMPLEX N/A 5/26/2022    Procedure: Micro direct laryngoscopy with excision/ablation of laryngeal lesions, possible biopsies, possible CO2 laser;  Surgeon: Nikole Davis MD;  [facility]    LASER CO2 LARYNGOSCOPY, COMPLEX N/A 9/15/2022    Procedure: Microdirect laryngoscopy with ablation of laryngeal lesions,biopsies,CO2 laser;  Surgeon: Nikole Davis MD;  [facility]    LASER CO2 LARYNGOSCOPY, COMPLEX N/A 12/1/2022    Procedure: Microdirect laryngoscopy with excision/ablation of laryngeal lesions, biopsies,   CO2 laser;  Surgeon: Nikole Davis MD;  [facility]    LASER CO2 LARYNGOSCOPY, COMPLEX N/A 6/15/2023    Procedure: Microdirect laryngoscopy with ablation of laryngeal lesions, biopsies, CO2 laser;  Surgeon: Nikole Davis MD;  [facility]    LASER CO2 LARYNGOSCOPY, COMPLEX N/A 10/5/2023    Procedure: Microdirect laryngoscopy with excision/ablation of laryngeal lesions, biopsies, CO2 laser;  Surgeon: Nikole Davis MD;  [facility]    LASER CO2 LARYNGOSCOPY, COMPLEX N/A 12/14/2023    Procedure: Microdirect laryngoscopy with excision/ablation of laryngeal lesions, biopsies, CO2 laser;  Surgeon: Nikole Davis MD;  [facility]    LASER CO2 LARYNGOSCOPY, COMPLEX N/A 2/15/2024    Procedure: Microdirect laryngoscopy with excision/ablation of laryngeal lesions, biopsies, CO2 laser;  Surgeon: Nikole Davis MD;  [facility]    LASER CO2 LARYNGOSCOPY, COMPLEX N/A 4/11/2024    Procedure: Microdirect laryngoscopy with excision/ablation of  laryngeal lesions, biopsies, CO2 laser, Avastin injections;  Surgeon: Nikole Davis MD;  [facility]    LASER KTP LARYNGOSCOPY N/A 4/3/2023    Procedure: Microdirect laryngoscopy with biopsies, excision/ablation of lesions, C02 laser,  Avastin injection;  Surgeon: Nikole Davis MD;  [facility]    LASER KTP LARYNGOSCOPY N/A 6/15/2023    Procedure: flexible laser (CO2 or KTP) standby;  Surgeon: Nikole Davis MD;  [facility]    LASER KTP LARYNGOSCOPY FLEXIBLE N/A 8/18/2022    Procedure: Transnasal flexible laryngoscopy with KTP laser and biopsies;  Surgeon: Nikole Davis MD;  Location: [facility]    LASER KTP LARYNGOSCOPY FLEXIBLE N/A 10/27/2022    Procedure: Transnasal flexible laryngoscopy with possible KTP laser;  Surgeon: Nikole Davis MD;  Location: [facility]    LASER KTP LARYNGOSCOPY FLEXIBLE N/A 1/26/2023    Procedure: Transnasal flexible laryngoscopy with KTP laser,  biopsies, superior laryngeal nerve blocks, Avastin injection;  Surgeon: Nikole Davis MD;  Location: [facility] OR    LASER KTP LARYNGOSCOPY FLEXIBLE N/A 2/15/2023    Procedure: Transnasal flexible laryngoscopy with KTP laser, superior laryngeal nerve blocks, biopsies, avastin injection;  Surgeon: Nikole Davis MD;  Location: [facility] OR    LASER KTP LARYNGOSCOPY FLEXIBLE N/A 2/17/2023    Procedure: Transnasal flexible laryngoscopy with KTP laser, biopsies, superior laryngeal nerve blocks;  Surgeon: Nikole Davis MD;  Location: [facility]    LASER KTP LARYNGOSCOPY FLEXIBLE N/A 2/23/2023    Procedure: Transnasal flexible laryngoscopy with KTP laser, superior laryngeal nerve blocks;  Surgeon: Nikole Davis MD;  Location: [facility]    LASER KTP LARYNGOSCOPY FLEXIBLE N/A 3/2/2023    Procedure: Transnasal flexible laryngoscopy with KTP laser, superior laryngeal nerve block Bilateral;  Surgeon: Nikole Davis MD;  Location: [facility] OR    LASER  KTP LARYNGOSCOPY FLEXIBLE N/A 3/9/2023    Procedure: Transnasal flexible laryngoscopy with KTP laser, biopsies, superior laryngeal nerve blocks;  Surgeon: Nikole Davis MD;  Location: [facility] OR    LASER KTP LARYNGOSCOPY FLEXIBLE N/A 3/17/2023    Procedure: Transnasal flexible laryngoscopy with KTP laser, superior laryngeal nerve block(s), Avastin injection;  Surgeon: Nikole Davis MD;  Location: [facility] OR    LASER KTP LARYNGOSCOPY FLEXIBLE N/A 3/28/2023    Procedure: Transnasal flexible laryngoscopy with KTP laser, superior laryngeal nerve block(s);  Surgeon: Pankaj Woodard MD;  Location: [facility] OR       Social History   I have reviewed this patient's social history and updated it with pertinent information if needed.  Social History     Tobacco Use    Smoking status: Never    Smokeless tobacco: Never   Substance Use Topics    Alcohol use: Yes     Comment: Occasionally    Drug use: Never       Family History   I have reviewed this patient's family history and updated it with pertinent information if needed.  Family History   Problem Relation Age of Onset    Breast Cancer Mother 75.00    Hereditary Breast and Ovarian Cancer Syndrome No family hx of     Cancer No family hx of     Colon Cancer No family hx of     Endometrial Cancer No family hx of     Ovarian Cancer No family hx of        Prior to Admission Medications   Prior to Admission Medications   Prescriptions Last Dose Informant Patient Reported? Taking?   Calcium Carbonate (CALTRATE 600 PO) 11/21/2024 Morning  Yes Yes   Sig: Take 1 tablet by mouth 2 times daily (with meals).   Lidocaine (LIDOCARE) 4 % Patch 11/21/2024  Yes Yes   Sig: Place 2 patches onto the skin every 24 hours. To R hip, to mid-back  To prevent lidocaine toxicity, patient should be patch free for 12 hrs daily.   Respiratory Therapy Supplies (NEBULIZER) JUWAN  Self Yes No   Sig: Portable nebulizer, disposable neb kit x 4, reuseable neb kit x 1, mask x 1, filters x  1.   Frequency of use: daily;  Medication: albuterol  Length of need: 99 months   acetaminophen (TYLENOL) 325 MG tablet Unknown Self No Yes   Sig: Take 2 tablets (650 mg) by mouth every 4 hours as needed for pain   albuterol (PROAIR HFA/PROVENTIL HFA/VENTOLIN HFA) 108 (90 Base) MCG/ACT inhaler 2024 Morning Self Yes Yes   Sig: Inhale 2 puffs into the lungs as needed   apixaban ANTICOAGULANT (ELIQUIS) 5 MG tablet 2024 Evening Self No Yes   Sig: Take 1 tablet (5 mg) by mouth 2 times daily   atorvastatin (LIPITOR) 20 MG tablet 2024 Morning Self Yes Yes   Sig: Take 20 mg by mouth every evening   budesonide-formoterol (SYMBICORT) 160-4.5 MCG/ACT Inhaler 2024 Morning Self Yes Yes   Sig: Inhale 2 puffs into the lungs two times daily   diltiazem ER (TIAZAC) 240 MG 24 hr ER beaded capsule 2024 Evening Self Yes Yes   Sig: Take 240 mg by mouth 2 times daily   ferrous sulfate (FE TABS) 325 (65 Fe) MG EC tablet 2024 Morning  Yes Yes   Sig: Take 325 mg by mouth daily.   gabapentin (NEURONTIN) 300 MG capsule 2024 Evening Self No Yes   Si mg p.o. every morning, 300 mg p.o. q. afternoon and 600 mg p.o. nightly.  Taper dose up per instructions given in Radiation Oncology   Patient taking differently: Take 300 mg by mouth at bedtime.   ibuprofen (ADVIL/MOTRIN) 200 MG capsule Unknown Self Yes Yes   Sig: Take 400 mg by mouth every 6 hours as needed for fever   ipratropium - albuterol 0.5 mg/2.5 mg/3 mL (DUONEB) 0.5-2.5 (3) MG/3ML neb solution 2024 Morning Self No Yes   Sig: Take 1 vial (3 mLs) by nebulization every 6 hours as needed for shortness of breath, wheezing or cough   Patient taking differently: Take 1 vial by nebulization 4 times daily.   levothyroxine (SYNTHROID/LEVOTHROID) 88 MCG tablet 2024 Morning Self Yes Yes   Sig: Take 88 mcg by mouth daily   oxyCODONE (OXY-IR) 5 MG capsule   Yes Yes   Sig: Take 2.5 mg by mouth every 4 hours as needed for severe pain. Maximum 10  mg per 24 hours   propranolol (INDERAL) 10 MG tablet 11/21/2024 Morning Self No Yes   Sig: Start propanolol 10 mg 1 tab daily and increase by 1 tab weekly until 20 mg twice daily. May stop at lowest effective dose. If experiencing hypotension or bradycardia, return to previous dose on titration schedule.   Patient taking differently: Take 10 mg by mouth daily.   sertraline (ZOLOFT) 100 MG tablet 11/21/2024 Morning Self Yes Yes   Sig: Take 150 mg by mouth daily.   vitamin D3 (CHOLECALCIFEROL) 1.25 MG (45148 UT) capsule Past Week  Yes Yes   Sig: Take 50,000 Units by mouth every 7 days. On saturdays      Facility-Administered Medications: None     Allergies   Allergies   Allergen Reactions    Methylpyrrolidone Anaphylaxis    Nuts Anaphylaxis     Black walnut    Escitalopram Other (See Comments)     Asthma -a time of exacerbation and may not have been cause may retry    Mirtazapine Other (See Comments)     Asthma a time of exacerbation and may not have been cause may retry    Venlafaxine Other (See Comments)    Adhesive Tape Rash     With holter monitor. Ok with bandaids.    No Clinical Screening - See Comments Rash     Adhesive tape       Physical Exam   Vital Signs: Temp: 97.5  F (36.4  C) Temp src: Axillary BP: (!) 139/94 Pulse: (!) 169   Resp: 22 SpO2: 96 % O2 Device: BiPAP/CPAP (14/10) Oxygen Delivery: 50 LPM  Weight: 107 lbs 5.82 oz    Physical Exam  Vitals and nursing note reviewed.   Constitutional:       General: She is in acute distress.      Appearance: She is ill-appearing and toxic-appearing.      Interventions: Face mask in place.   Cardiovascular:      Rate and Rhythm: Tachycardia present. Rhythm irregular.      Pulses:           Radial pulses are 1+ on the right side and 1+ on the left side.        Dorsalis pedis pulses are 1+ on the right side and 1+ on the left side.      Heart sounds: Normal heart sounds.   Pulmonary:      Effort: Tachypnea and accessory muscle usage present.      Breath sounds:  Examination of the right-upper field reveals rhonchi. Examination of the left-upper field reveals rhonchi. Examination of the right-middle field reveals rhonchi. Examination of the left-middle field reveals rhonchi. Examination of the right-lower field reveals rhonchi. Examination of the left-lower field reveals rhonchi. Rhonchi present.   Musculoskeletal:      Right lower leg: No edema.      Left lower leg: No edema.   Skin:     General: Skin is cool.      Capillary Refill: Capillary refill takes 2 to 3 seconds.         Data   I reviewed all medications, new labs and imaging results over the last 24 hours.  Arterial Blood Gases   Recent Labs   Lab 01/14/25  0944 01/14/25  0650 01/14/25  0615 01/14/25  0000   PH 7.31* 7.41 7.45 7.43   PCO2 49* 45 42 45   PO2 33* 45* 50* 75*   HCO3 25 28 29* 30*       Complete Blood Count   Recent Labs   Lab 01/14/25  0944 01/14/25  0600 01/14/25  0011 01/13/25  0437 01/12/25  0532   WBC  --  10.1 10.3 5.5 6.0   HGB 12.9 11.5* 11.0* 11.6* 11.1*   PLT  --  235 258 221 215       Basic Metabolic Panel  Recent Labs   Lab 01/14/25  0944 01/14/25  0853 01/14/25  0748 01/14/25  0600 01/13/25  0733 01/13/25  0556 01/13/25  0437 01/12/25  2103 01/12/25  0532 01/11/25  0609   *  --   --  132*  --  135  --   --  135 136   POTASSIUM 4.5  --   --  4.4  --  4.6 4.6  --  4.4 5.4*   CHLORIDE  --   --   --  93*  --  95*  --   --  97* 99   CO2  --   --   --  25  --  28  --   --  28 29   BUN  --   --   --  23.3*  --  28.0*  --   --  30.0* 28.4*   CR  --   --   --  0.85  --  0.71  --   --  0.73 0.79   * 100* 118* 120*   < > 88  --    < > 107* 81    < > = values in this interval not displayed.       Liver Function Tests  Recent Labs   Lab 01/13/25  1047   INR 1.10       Pancreatic Enzymes  No lab results found in last 7 days.    Coagulation Profile  Recent Labs   Lab 01/13/25  1047   INR 1.10       IMAGING:  Recent Results (from the past 24 hours)   XR Surgery EN L/T 5 Min Fluoro w Stills     Narrative    This exam was marked as non-reportable because it will not be read by a   radiologist or a [place] non-radiologist provider.         XR Chest Port 1 View    Narrative    Exam: XR CHEST PORT 1 VIEW, 1/14/2025 12:25 AM    Indication: hypoxia    Comparison: 12/27/2024    Findings:   Portable, frontal, upright views of the chest. Trachea is midline.  Cardiomediastinal silhouette is obscured due to pleural pulmonary  pathology. No pneumothorax. Costophrenic angles are obscured  bilaterally. Dense retrocardiac opacities with silhouetting of the  left hemidiaphragm. Patchy perihilar and hazy bibasilar opacities,  increased from 12/27/2024. Stable right-sided rib fracture  deformities.      Impression    Impression:   Increased bilateral pleural effusions with increasing perihilar and  bibasilar opacities with new silhouetting of the left hemidiaphragm,  favored atelectasis/edema. Superimposed infection cannot be excluded.  Continued follow-up to resolution is recommended    I have personally reviewed the examination and initial interpretation  and I agree with the findings.    PRATIK MURO MD         SYSTEM ID:  [id number]   XR Abdomen Port 1 View    Narrative    Exam: XR ABDOMEN PORT 1 VIEW, 1/14/2025 12:25 AM    Indication: s/p j tube w/ abdominal pain    Comparison: None    Findings:   Portable, frontal, supine view of the abdomen. Percutaneous  gastrostomy tube projects over the left lower quadrant. Bowel gas  pattern is nonobstructive. Minimal formed stool within the cecum. No  pneumatosis or portal venous gas.     Chest findings are dictated in a separate report.      Impression    Impression:   1. Percutaneous jejunostomy tube balloon projects over the left lower  quadrant.  2. Nonobstructive bowel gas pattern.     I have personally reviewed the examination and initial interpretation  and I agree with the findings.    PRATIK MURO MD         SYSTEM ID:  [id number]

## 2025-01-14 NOTE — PROGRESS NOTES
PALLIATIVE CARE PROGRESS NOTE  [facility]     Patient Name: Asya Coffman  Date of Admission: 11/21/2024   Today the patient was seen for: Ongoing goals of care discussion,      Recommendations & Counseling       GOALS OF CARE:   Plan of care- Transitioning to comfort-focused measures when family is ready. Compassionate extubation later this afternoon, appreciate primary team for management.   Medical summary: Unfortunately, Hannah clinically declined and was transferred to ICU 1/14 for acute on chronic hypoxic respiratory failure requiring intubation. Prior to intubation, Hannah expressed wished for intubation and NO CPR. Cassidy intubation pt w labile hemodynamics and hypoxia requiring levophed, vasopressin, push dose jane and epi, lines placed.  Joined visited Hannah's family including Lana (daughter, YORDY) with palliative care  (Inocencia) and VERONICA Brito CNP (primary team) at bedside in the ICU. Following GOC discussion with Hannah's family; decisions made to transition Hannah to comfort measures only. Family are appropriately grieving, appreciate 's support.  Lana expressed worries regarding symptom management at end-of-life and we discussed the process of transitioning to comfort cares and withdrawal of life prolonging interventions. Education provided on transition to comfort-focused goals of care would be including discontinuation of IV fluids, cardiac monitoring, labs, tube feeding, TPN and other interventions that do not directly promote comfort. Anticipatory guidance was given regarding feeding, hunger, fluids at end of life. We discussed utilization of medications to ease air hunger, agitation and restlessness at the time that ventilator is compassionately withdrawn.  At this time, we anticipate time could be shortly when Hannah transitions to comfort care and withdrawal of life-prolonging interventions. We discussed likely end-of-life would be  "in the hospital in matter of hours to days.   Palliative care will continue to follow peripherally for ongoing patient and family support. Please don't hesitate to reach out to our services.     ADVANCE CARE PLANNING:  No health care directive on file. Per system policy, Surrogate Decision-makers for Patients With Diminished Decision-making Capacity offers guidance on possible decision-makers. Daughter Lana has been identified as a surrogate decision maker.   There is no POLST form on file, defer to patient and/or next of kin for decisions   Code status: No CPR- Do NOT Intubate    MEDICAL MANAGEMENT:   Primary team initiated comfort care orders for when family is ready to transition to comfort cares and withdrawal of life prolonging interventions   Recommend initiating comfort care orders through the order set \"ICU Care for Patient at End of Life\".   Patient care- initiate all patient care orders, nursing preparation orders and RT orders     PSYCHOSOCIAL/SPIRITUAL:  Family: Lana Torres (Daughter), Deo Coffman (Son), total of 6 grandchildren including Ivanna Torres (Grandchild), Teddy Falk (Grandchild)    Yasmin community: [employer]   Spiritual: Patient was seen by Fr. Chloé Fletcher on 1/09.  Palliative care  Inocencia following today    Palliative Care will continue to follow. Thank you for the consult and allowing us to aid in the care of Asya Coffman.    These recommendations have been discussed with primary team, nursing, patient and family.    VERONICA Rivas CNP  [facility], Palliative Care  Securely message with the [employer] Web Console (learn more here) or  Text page via [facility] Paging/Directory        Assessment          Asya Coffman is a 79 year old female admitted on 11/21/2024. 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 Initially admitted to hospital medicine service with hypoxic respiratory failure suspected due to HFpEF requiring diuresis. Developed acute hypoxia on 12/8 secondary to suspected aspiration pneumonitis for which she was intubated (12/8-12/10). Now s/p extubation and transferred back to hospital medicine service. Ongoing evaluation related to aspiration, currently tolerated continuous tube feeds via NJ, with improving respiratory status. Now having conversation re: PEG placement due to being NPO and high risk for aspiration.     Today, the patient was seen for   #Goals of care discussion   #Patient and family support      Interval History:     Multidisciplinary collaboration:  Chart reviewed. Patient clinically declined requiring transfer to ICU and intubated. Please see above for further details.     Notable medications:  Reviewed     Patient/family narrative  Patient is intubated and sedated. See above discussion with patient's family    Review of Systems:     Besides above, ROS was reviewed and is unremarkable        Physical Exam:   Temp:  [94.4  F (34.7  C)-97.7  F (36.5  C)] 97.5  F (36.4  C)  Pulse:  [100-169] 110  Resp:  [12-28] 22  BP: ()/(45-98) 139/94  MAP:  [81 mmHg-119 mmHg] 119 mmHg  Arterial Line BP: (116-157)/() 157/100  FiO2 (%):  [50 %-100 %] 100 %  SpO2:  [78 %-99 %] 86 %  107 lbs 5.82 oz    Physical Exam  Patient is intubated and sedated. She appears comfortable. Family at bedside.       Data Reviewed:     CMP  Recent Labs   Lab 01/14/25  1136 01/14/25  1042 01/14/25  1039 01/14/25  0944 01/14/25  0748 01/14/25  0600 01/14/25  0430 01/14/25  0011   NA  --  130*  --  129*  --  132*  --   --    POTASSIUM  --  4.7  --  4.5  --  4.4  --   --    CHLORIDE  --  94*  --   --   --  93*  --   --    CO2  --  22  --   --   --  25  --   --    ANIONGAP  --  14  --   --   --  14  --   --    * 320*   < > 184*   < > 120*   < >   --    BUN  --  25.5*  --   --   --  23.3*  --   --    CR  --  1.02*  --   --   --  0.85  --   --    GFRESTIMATED  --  56*  --   --   --  70  --   --    ESSIE  --  8.7*  --   --   --  9.2  --   --    MAG  --  3.3*  --   --   --   --   --  1.9   PHOS  --  5.9*  --   --   --   --   --  4.0   PROTTOTAL  --  6.1*  --   --   --   --   --   --    ALBUMIN  --  3.2*  --   --   --   --   --   --    BILITOTAL  --  0.5  --   --   --   --   --   --    ALKPHOS  --  99  --   --   --   --   --   --    AST  --  30  --   --   --   --   --   --    ALT  --  11  --   --   --   --   --   --     < > = values in this interval not displayed.     CBC  Recent Labs   Lab 01/14/25  1042 01/14/25  0944 01/14/25  0600   WBC 12.4*  --  10.1   RBC 4.20  --  4.23   HGB 11.0* 12.9 11.5*   HCT 38.4 38 36.9   MCV 91  --  87   MCH 26.2*  --  27.2   MCHC 28.6*  --  31.2*   RDW 24.2*  --  23.9*     --  235     Narrative & Impression   EXAMINATION: XR ABDOMEN PORT 1 VIEW 12/30/2024 4:50 PM      COMPARISON: 12/26/2024     HISTORY: Verify small bowel feeding tube bedside placement     TECHNIQUE: Frontal view of the abdomen.     FINDINGS: Enteric tube with tip in the proximal duodenum. No  abnormally dilated loops of bowel. No pneumatosis or portal venous  gas. No definite pneumoperitoneum. Increased opacities of the left  lower lung fields.                                                                      IMPRESSION: Feeding tube tip projects over the proximal duodenum.     I have personally reviewed the examination and initial interpretation  and I agree with the findings.     RICARDO TORRE MD      Medical Decision Making       MANAGEMENT DISCUSSED with the following over the past 24 hours: Medicine, nursing, patient and family    NOTE(S)/MEDICAL RECORDS REVIEWED over the past 24 hours: Medicine, GI, IR, and nursing    SUPPLEMENTAL HISTORY, in addition to the patient's history, over the past 24 hours obtained from:   - Lana (daughter,  HCA)  70 MINUTES SPENT BY ME on the date of service doing chart review, history, exam, documentation & further activities per the note.

## 2025-01-14 NOTE — PROGRESS NOTES
Responded to rapid response. Was determined at that time that the patient would be sent to ICU for intubation. Transported from  to  on bipap with flyer RN, transport, and RT with no issues.     1/14/2025  Sara Salguero, RT

## 2025-01-14 NOTE — CODE/RAPID RESPONSE
Rapid Response Team Note    Assessment   A rapid response was called on Asya Coffman due to acute hypoxic respiratory failure. This presentation is likely due to possible aspiration event vs other. Patient returned to unit form PACU following PEG  insertion and gastropexy;noted to have escalating O2 needs. Patient BL O2 needs 1-3 L via NC, now up to 100 FiO2 on HFNC with Sats 85-88. Coarse breath sounds, though does not appear in acute distress. -170s, BP soft at 90s-100, MAPS 66-72. Patient reports that she is experiencing abdominal pain at site of PG placement. NO CP, sob, noted. RET at bedside for NT suctioning w/o significant change in O2 sats.     Plan   - STAT CXR, AXR, EKG  - LA 2.0  - ABG, CBC, NTBNP  - Consider abx for aspiration PNA  - CT chest if stable for transport   - CT abdomen if with change in clinical status or concerning findings on AXR  - Patient signed out to Night RRT provider at bedside who will continue to follow  -  The Internal Medicine primary team was able to be reached and they are in agreement with the above plan.  -  Disposition: The patient will remain on the current unit. We will continue to monitor this patient closely.  -  Reassessment and plan follow-up will be performed by the primary team    Nydia Mccoy PA-C  Rapid Response Team ERIC  Securely message with [employer]     Medical Decision Making       75 MINUTES SPENT BY ME on the date of service doing chart review, history, exam, documentation & further activities per the note.      Asya is critically ill with acute hypoxic respiratory failure. These features are alarming and indicate that the patient is at imminent risk of life-threatening organ failure. Acute deterioration is being managed by the following critical interventions: -------------------------------  PULM  ----------------------------------------, oxygen by HFNC, and inhaled bronchodilators, possible BiPAP.     Total Critical Care time spent by  me, excluding procedures, was 75 minutes.    Hospital Course   Brief Summary of events leading to rapid response:   RRT called for AHRF     Physical Exam   Vital Signs: Temp: 97.4  F (36.3  C) Temp src: Axillary BP: 96/61 Pulse: 118   Resp: 17 SpO2: 94 % O2 Device: Oxymask Oxygen Delivery: 4 LPM  Weight: 107 lbs 5.82 oz      Exam:     Physical Exam   Constitutional: Chronically ill-appearing female sitting up in bed. Does not appear in distress.    HEENT:   Head: Normocephalic and atraumatic.   Eyes: Conjunctivae are normal. Pupils are equal, round, and reactive to light.  Pharynx has no erythema or exudate, mucous membranes are moist  Neck:   No adenopathy, no bony tenderness  Cardiovascular: Tachycardia without murmurs or gallops  Pulmonary/Chest: Coarse BS bilaterally. No significant in crease WOB noted on HFNC  GI: Soft with good bowel sounds.  TTP about Gtube site. non-distended, with no guarding, no rebound, no peritoneal signs.   Back:  No bony or CVA tenderness   Musculoskeletal:  No edema or clubbing   Skin: Skin is warm and dry. No rash noted.   Neurological: Alert and oriented to person, place, and time. Nonfocal exam  Psychiatric:  Normal mood and affect.        The patient is not known to have an infection. LA 2.0

## 2025-01-14 NOTE — PROGRESS NOTES
Pt arrived from PACU around 10pm, VSS upon arrival. Pt started to desat around 2330 requiring 13-15L oxymask, compared to 4-5L oxymask. A rapid response was called. Pt HR started increasing around the same time, (from 100's-130's to 130's-160's). Provider was also notified of SPO2 and increased HR (Víctor). Rapid team came bedside, pt was put onto HFNC 100% FiO2 50LPM. Labs ordered, EKG, CXR, abdominal XR.    Pt was able to be weaned down from 100%, mainly stayed around 80% or 90% FiO2. Until around 6am, pt started desating. Oral catheter suctioning performed without much output. Another RRT was called. Pt was maxed out on HFNC w/ oxymask on as well. Pt was then put on 100% FiO2 BiPAP and still unable to keep sats above 88%. Provider came bedside, ABG was drawn. Provider did call family to fill them in on events that occurred overnight. Pt was having some pain, tylenol provided w/ some relief. Pt started feeling nauseous, zofran was provided and TF stopped. Drain bag coming off of TF was unclamped and allowed to drain. Both of those helped pt's nausea some.      Oncoming shift was provided report.  ----------------------------------------------------------  Provider notifications: Throughout shift.   Provider notified: Junior Renee  23:23 - Pt here from PACU on 4L oxymask, now requiring 13L. This is a big change, calling a rapid now.   Provider: Ok looking through chart, I'll be there as well    0106 Writer: Pt HR still 140's-160's, now on HFNC at 80%. Do you want to turn her IV fluids back up to 100, BP's are soft?   0107 Writer: 's-170's. She received 2.5ml of IV metoprolol, but it didn't seem to do much. Current BP is 89/62.   0113 Provider Ok. Coming to her room now  0131 Writer: Last 2 BP's were 79/57 & 79/61. HR still 150's-170's  Provider: 1min, I'll be there.     Provider came bedside to assess pt and stated they did not want to provide the evening missed dose of oral(TF) of metoprolol and diltiazem  since BP's have been so soft. Provider placed an order for a 500mL bolus.     0249 Writer: Pt HR hanging out in the 140's-170's. Over 1/2 of the bolus has run. Recent BP was 87/61    Provider ordered another 500mL bolus. And added Duane Whitney to conversation, and this provider took over pt at around 3:40am    Provider spoke with writer on the phone. Provider said to give pt missed oral (via TF) diltiazem and metoprolol.    0259 Writer: The dilt was given 20 mins ago and pharmacy just approved the metoprolol suspension, but now the last few BP's show SBP <90, (3:45, 4:00, 4:05)  0411 Writer: I just ran another BP and it's 96/56    Provider: You can give it.    0515 Writer: I restarted her TF, she still has the D5% running at 25mL/hr. Would you like that stopped since her TF are running? Hr BG at 4:30am was 145.  0517 Writer: her most recent BP's have been 78/65, 75/55, 84/57, 82/53. The last 3 were a couple minutes apart each.  Provider: how is the HR?  Writer A little bit better, 120's-150's. Doesn't seem to be going into the 160's or 170's anymore.   Provider: Let's monitor BP and keep MAPs above 65.     0559 Writer: Called a rapid  Provider: Dr Renee is on his way, I'm dealing with a critical pt also, so Dr Renee is coming to help.

## 2025-01-14 NOTE — PROGRESS NOTES
"SPIRITUAL HEALTH SERVICES Follow Up Note (Palliative)  [facility]     Summary: Multiple visits late morning/early afternoon with family of patient Asya \"Hannah\" Krissyer, some at bedside, including conversation with daughter Lana, MICU, and Palliative. Family present include children, grandchild, nieces and nephews.     Children are in agreement that Hannah would wish to to transition to comfort measures only; family are gathered at bedside, and Hannah's  is en route.     They are grieving, and daughter Lana is worried about symptom management at end of life. Medical providers discussed symptom management with her, and I offered spiritual and grief support.     Plan: Chaplains will follow for spiritual support while Hannah is admitted.     Inocencia Reynaga M.Div., [facility]     To reach Spiritual Health, securely message with the [employer] Web Console or enter an ASAP/STAT consult in [employer], which will also page the on-call .    "

## 2025-01-14 NOTE — PROGRESS NOTES
GASTROENTEROLOGY PROGRESS NOTE  *SIGN OFF NOTE*    Date of Admission: 11/21/2024  Reason for Admission: Respiratory Failure    ASSESSMENT:  79 year old female with a history of Afib on apixiban (last dose 12/24), CADF, pulmonary HTN, COPD, laryngeal SCC s/p radiation c/b dysphagia on a modified diet, CREST syndrome who was admitted to [facility] 11/21 for hypoxic respiratory failure due to HFpEF requiring diuresis. Developed acute hypoxia on 12/8 secondary to suspected aspiration pneumonitis for which she was intubated (12/8-12/10). Now s/p extubation but continues to have episodes of aspiration with worsening of hypoxia. GI AE consulted for consideration of PEG-J placement under general anesthesia.    #. Laryngeal cancer s/p radiation c/b dysphagia  #. Moderate-Severe pharyngeal dysphagia   #. Severe malnutrition in the context of chronic illness  #. Aspiration pneumonia with recurrent hypoxia  IR initially consulted and deferred due to current respiratory status and need for patient to have a managed airway since IR requires a patient to be able to lay flat for stomach insufflation (risk of aspiration). GI AE now consulted for placement of PEG-J tube.      The patient has a history of mod-severe oropharyngeal dysphagia and pooling of secretions on her last swallow study (see SLP eval 12/10). A percutaneous feeding tube will not change her risk of aspiration if she still is not able to manage those secretions and recommend ongoing goals of care conversations with the patient her family that aspiration pneumonia likely to persist. Pt/family made decision to temporarily reverse DNR/DNI status to proceed with percutaneous FT under GA with discussion prior to procedure that given recurrent aspiration events/hypoxia and prolonged hospitalization, increased risk that proceeding with PEG may result in inability to wean off ventilator after her procedure.    Discussed previous possible sx of N/V but given improved  pneumonia/O2 needs and no further N/V and not requiring gastric decompression currently, decision made to proceeded with initially PEG which was successful and no complications encountered on 1/13.  Post procedure was able to be extubated but then had first RRT called for hypoxia around midnight.  No emesis noted.  TF started around 2 AM at 10 ml/hr (which was after initial RRT) but then stopped after second RRT called. Also noted to go into Afib with RVR (Hr 150s after missed dose of metoprolol and diltiazem).  Now requiring HFNC -->Bipap.  Started Zosyn for suspected aspiration pneumonia.  Per MICU discussion with family via phone, plan to temporarily reverse DNI status (uphold no CPR/DNR) with ongoing GOC conversations pending course.    RECOMMENDATIONS:  -MICU to have ongoing GOC conversations with family.  -Keep Gtube to gravity at this time (provided gravity drainage bag to MICU nurse).    -Continue to hold TF.  Re-evaluate approp to begin pending resp course and Gtube outputs.  -If increased gastric outputs, consider prokinetic medications (e.g., Reglan) prior to considering any Gtube clamp trials, TF trials.  -No anticoagulation for 72 hours post operatively.  -Order to flush PEG port with 30 ml q 4 hrs to maintain patency.  -Order to flush feeding tube with minimum of 30 ml H2O before and after medications.  -Analgesia/antiemetics per primary team (expect pain to peak POD2-3)  -T-tags (white buttons) should be removed 2 weeks following procedure (~1/27) by lifting the button and cutting the underlying blue suture. Often times the buttons fall off prior to the 2 week carolyn.    Discussed with MICU team (NP Nany De Jesus) and bedside MICU RN. No family currently at bedside.     The patient was discussed and plan agreed upon with GI staff, Dr. Paz    GI Follow up (we will arrange):  None warranted.    The inpatient gastroenterology service will sign off at this time. Please do not hesitate to page the GI  "service with any questions or concerns.  Thank you for allowing us to participate in the care of this patient.     Overall time spent on the date of this encounter preparing to see the patient (including chart review of available notes, clinical status events, imaging and labs); obtaining interim history/subjective data; ordering and/or coordinating medications, tests or procedures; ordering medications, tests or procedures; communicating with other health care professionals; and documenting the above clinical information in the electronic medical record was 50 minutes.     Liana Raymond PA-C, RD, [facility]  Inpatient Gastroenterology Service  [facility]  Pager: *[phone]  Text Page     _______________________________________________________________      Subjective: Nursing notes and 24hr events reviewed. RRT x3 called overnight for hypoxia requiring increasing HFNC/Bipap and Afib with RVR (HR 150s). Transfer to MICU for likely temporary reversal of DNI status for intubation per pt/family request.    Patient seen and examined at 8:50 (unable to interview nor complete full exam as preparing for intubation).  Examined again at 12:00 (post intubation) - patient minimally responsive on sedation with increasing pressor support (now on max norepi and vaso gtt) and pending MICU conversations with family about GOC.    ROS:   4 pt ROS negative unless noted in subjective.     Objective:  Blood pressure (!) 70/51, pulse (!) 121, temperature 97.7  F (36.5  C), temperature source Axillary, resp. rate 23, height 1.651 m (5' 5\"), weight 48.7 kg (107 lb 5.8 oz), SpO2 (!) 87%.    Gen: Critically ill female. Lying in ICU bed.   HEENT: NCAT. Conjunctiva clear. Sclera anicteric.  CV: RR, tachycardia (HR 160s) on monitor  Resp: now intubated on vent, compliant with vent  Abd: Soft, ND, non-tender, no peritoneal sx (although limited exam due to sedation).  PEG site C/D/I, external bumper at 4 cm marker, T-tags x1 " (of 2), TF off.  Msk: no gross deformity  Skin:  no jaundice  Neuro: sedated    PROCEDURES:  1/13/2025: EGD for PEG placement with Dr. Paz  Findings:       The Z-line was regular.        The examined esophagus was normal.        The entire examined stomach was normal. The patient was placed in the        supine position for PEG placement. The stomach was insufflated to appose        gastric and abdominal walls. A site was located in the body of the        stomach with excellent transillumination, manual external pressure and        fluoroscopy for placement. The abdominal wall was marked and prepped in        a sterile manner. The area was anesthetized with 3 mL of 1% lidocaine.        The trocar needle was introduced through the abdominal wall and into the        stomach under both fluoroscopic and direct endoscopic view. A snare was        introduced through the endoscope and opened in the gastric lumen. The        guide wire was passed through the trocar and into the open snare. The        snare was closed around the guide wire. The endoscope and snare were        removed, pulling the wire out through the mouth. A skin incision was        made at the site of needle insertion. The externally removable 24 Fr        Avanos RADHA gastrostomy tube was lubricated. The G-tube was tied to the        guide wire and pulled through the mouth and into the stomach. The trocar        needle was removed, and the gastrostomy tube was pulled out from the        stomach through the skin. The external bumper was attached to the        gastrostomy tube, and the tube was cut to remove the guide wire. The        final position of the gastrostomy tube was confirmed by relook        endoscopy, and skin marking noted to be 4 cm at the external bumper.        Gastropexy was performed using two T-fasteners. The final tension and        compression of the abdominal wall by the PEG tube and external bumper        were checked and revealed  that the bumper was loose and not touching the        skin. The feeding tube was connected to draining bag, and the tube site        cleaned and dressed.        The examined duodenum was normal.                                                                                    Impression:    - EGD with successful PEG tube placement (24F Avanos,                          bumper at 4 cm) and T tag (x2) gastropexy     LABS:  BMP  Recent Labs   Lab 01/14/25  0748 01/14/25  0600 01/14/25  0430 01/13/25  2208 01/13/25  0733 01/13/25  0556 01/13/25  0437 01/12/25  2103 01/12/25  0532 01/11/25  0609   NA  --  132*  --   --   --  135  --   --  135 136   POTASSIUM  --  4.4  --   --   --  4.6 4.6  --  4.4 5.4*   CHLORIDE  --  93*  --   --   --  95*  --   --  97* 99   ESSIE  --  9.2  --   --   --  9.0  --   --  8.9 8.8   CO2  --  25  --   --   --  28  --   --  28 29   BUN  --  23.3*  --   --   --  28.0*  --   --  30.0* 28.4*   CR  --  0.85  --   --   --  0.71  --   --  0.73 0.79   * 120* 145* 127*   < > 88  --    < > 107* 81    < > = values in this interval not displayed.     CBC  Recent Labs   Lab 01/14/25  0600 01/14/25  0011 01/13/25  0437 01/12/25  0532   WBC 10.1 10.3 5.5 6.0   RBC 4.23 4.21 4.31 4.14   HGB 11.5* 11.0* 11.6* 11.1*   HCT 36.9 38.1 37.9 36.4   MCV 87 91 88 88   MCH 27.2 26.1* 26.9 26.8   MCHC 31.2* 28.9* 30.6* 30.5*   RDW 23.9* 24.1* 24.1* 24.1*    258 221 215     INR  Recent Labs   Lab 01/13/25  1047   INR 1.10     LFTsNo lab results found in last 7 days.   PANCNo lab results found in last 7 days.      IMAGING:  (Personally reviewed)    1/10/2025: CT ABDOMEN PELVIS W/O CONTRAST   IMPRESSION:   1. No acute findings in the abdomen or pelvis.  2. Bilateral moderate sized pleural effusions with compressive  atelectasis of the lower lungs.  3. Trace pericardial effusion.   4. Focal fluid collection in the subcutaneous tissues of the lateral  aspect of the right proximal thigh, compatible with a  hematoma.  5. Extensive colonic diverticulosis without evidence of acute  diverticulitis.

## 2025-01-14 NOTE — PROGRESS NOTES
Rapid response follow up note  - took over care due to shift change  - Ongoing issues:  Respiratory desaturation  - Pulse oxymetry reads 85-88 but patient appears calm without distress; she reports no Shortness of breath. Not sure if inaccurate reading (attempted multiple sites). Otherwise likely due to ongoing secretions/aspiratoins events got worse after procedure, cxr atelectasis vs congestion/increased pleural effusion. We gave mucomyst with Xopenex in addition to the earlier suctioning efforts. Overall patient relatively stable from respiratory perspective. Oxygen saturation later improved to mid 90s on 75%HFNC, will slowly wean down.    2. Afib w RVR  HR around 150s. Missed a dose of metoprolol and several doses of Dilt. Reports no chest pain. EKG shows Afb w RVR, We gave 2.5mg IV metoprolol, small dose due to concern about her BP (current BP is acceptable, MAP of 70). Will repeat IV metorprolol if BP stays stable, otherwise will give the missed doses of Metoprolol and Dilt  Labs so far: ABG: Ph of 7.43, Pco2 of 45, Po2 of 75; VBG ph of 7.35, Pco2 of 61, PO2 of 33 both showing mild hypoxia without acidosis (? Co2 retention of VBG but not on ABG)  - WBC 10.3, Hgb 11, mag 1.9. phos 4.0  - BNP is elevated at 5.596, was 9,241 on 12/4    Communicated with RN to give missed oral doses. I am Notified primary team was on way to see patient     Chance Epperson PA-C on 1/14/2025 at 1:45 AM    Another rapid called at 6am due to respiratory worsening status, now requiring BiPAP. Clinically patient continue to appear without distress despite oxygen saturating reading 75-95 intermittently. Not clear if getting accurate measurement. Also not clear if worsening is related to volume she has gotten overnight. Primary overnight MD also came to the bedside. Will get ABG for oxygen analysis. ABG did come back Pox of 50 & 45. Patient is DNI. Primary is discussing with family for course of action, in setting of likely need for  intubation but DNI status.   - Afib is improved to 110s  - Continue nebs and pulm toilet.   - Not clear if she benefits from antibacterial coverage, deferred decision to primary     Chance Epperson PA-C on 1/14/2025 at 7:52 AM

## 2025-01-14 NOTE — PROGRESS NOTES
Transfer  Transferred to:   Via: bed  Reason for transfer: Pt no longer appropriate for 6B- worsened patient condition  Family: Aware of transfer and with patient   Belongings: Packed and sent with pt  Chart: Delivered with pt to next unit   Medications: Meds sent to new unit with pt  Report given to: Saira DE SOUZA RN    Pt status: Rapid response called for intubation. Patient alert. Code status changed to DNR, okay for pre arrest intubation. 's-140s in afib. BP's 70's/50's. On 100% bipap. Complains of nausea, g tube to gravity. D5 infusing at 25ml/hr. Denying pain. Transferred to  for intubation. Family at bedside and aware of plan.

## 2025-01-14 NOTE — PROGRESS NOTES
[facility]  Procedure Note           Intubation:       Asya Coffman  MRN# [id number]   9:00 AM, January 14, 2025 Indication:            Patient intubated at: 9:00 AM, January 14, 2025   Patient and family informed of: Why intubation was required   Informed consent: Obtained   Cervical spine: Was not stabilized during the procedure   Sedative medication: Was administered during the procedure   Technique used: Direct Laryngoscopy   Endotracheal tube size: 8.0 cm with cuff   Number of attempts: 1   Placement confirmed by: Auscultation of bilateral breath sounds  Visualization of bilateral chest wall rise  End-tidal CO2 monitor  Chest X-ray   ET tube repositioning: Was performed   Tube secured at: 20 cm      This procedure was performed without difficulty and she tolerated the procedure well with no complications.      Recorded by Brissa Gandhi RT

## 2025-01-14 NOTE — PROCEDURES
[facility]    Arterial line placement    Date/Time: 1/14/2025 10:00 AM    Performed by: Martina Arana MD  Authorized by: Ina Azar MD      UNIVERSAL PROTOCOL   Site Marked: Yes  Prior Images Obtained and Reviewed:  Yes  Required items: Required blood products, implants, devices and special equipment available    Patient identity confirmed:  Hospital-assigned identification number, provided demographic data and arm band  Patient was reevaluated immediately before administering moderate or deep sedation or anesthesia  Confirmation Checklist:  Patient's identity using two indicators, relevant allergies, procedure was appropriate and matched the consent or emergent situation and correct equipment/implants were available  Time out: Immediately prior to the procedure a time out was called    Universal Protocol: the Joint Commission Universal Protocol was followed    Preparation: Patient was prepped and draped in usual sterile fashion    ESBL (mL):  5  Indication:  multiple ABGs respiratory failure hemodynamic monitoring  Location:  Left femoral      SEDATION  Patient Sedated: Yes (intubated, sedated on versed, fentany;l)    Vital signs: Vital signs monitored during sedation      PROCEDURE DETAILS    Needle Gauge:  20  Seldinger technique: Seldinger technique used    Number of Attempts:  1  Post-procedure:  Line sutured and dressing applied      PROCEDURE  Describe Procedure: Patient was hypoxemic with hypotension with tachycardia with weak pulses.   Patient Tolerance:  Patient tolerated the procedure well with no immediate complications  Length of time physician/provider present for 1:1 monitoring during sedation: 20

## 2025-01-14 NOTE — DISCHARGE SUMMARY
[facility]    Death Summary  [facility] Intensive Care    Date of Admission:  11/21/2024  Date of Death:         1/14/2025  Provider Completing Death Summary: VERONICA Cadet CNP    Discharge Diagnoses   Patient Active Problem List   Diagnosis    Laryngeal mass    Fungal infection    Recurrent respiratory papillomatosis    Vocal cord leukoplakia    CREST syndrome (H)    Atrial fibrillation, unspecified type (H)    Papillary carcinoma of thyroid (H)    Stage 3a chronic kidney disease (H)    Closed nondisplaced fracture of seventh cervical vertebra, unspecified fracture morphology, sequela    Shortness of breath    Dysphonia    Atrial fibrillation with RVR (H)    Fall    Acute on chronic respiratory failure with hypoxia (H)       History of Present Illness   Asya Coffman is a 79 year old female admitted on 11/21/2024. 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 Initially admitted to hospital medicine service with hypoxic respiratory failure suspected due to HFpEF requiring diuresis. Developed acute hypoxia on 12/8 secondary to suspected aspiration pneumonitis for which she was intubated (12/8-12/10). Extubated and transferred back to hospital medicine service. Ongoing evaluation related to aspiration, currently tolerated continuous tube feeds via NJ, with improving respiratory status. PEG placement 1/13/24 as pt NPO and high risk for aspiration.      Transferred to ICU 1/14 for acute on chronic hypoxic respiratory failure requiring intubation. Prior to intubation spoke with pts daughter and son regarding code status recently being changed. Per family pt okay for intubation but does not want compressions if heart were to stop  during intubation. Cassidy intubation pt w labile hemodynamics and hypoxia requiring levophed, vasopressin, push dose jane and epi, arterial and cvc lines placed. Echo was done which showed clots in right and left atrium. She continued to be very unstable hemodynamically requiring pressors x 2 maxed out along with push dose epi to maintain maps greater than 65. Eventually after continued conversations pt was changed to comfort care status, time of death 1412.    Cause of death: acute on chronic hypoxic respiratory failure, distributive shock     Nany De Jesus, APRN CNP    Pending Results   Unresulted Labs Ordered in the Past 30 Days of this Admission       No orders found from 10/22/2024 to 11/22/2024.            Primary Care Physician   Janna Calderón    Consultations This Hospital Stay   NURSING TO CONSULT FOR VASCULAR ACCESS CARE IP CONSULT  MEDICATION HISTORY IP PHARMACY CONSULT  MEDICATION HISTORY IP PHARMACY CONSULT  PHYSICAL THERAPY ADULT IP CONSULT  OCCUPATIONAL THERAPY ADULT IP CONSULT  SPEECH LANGUAGE PATH ADULT IP CONSULT  CARE MANAGEMENT / SOCIAL WORK IP CONSULT  NUTRITION SERVICES ADULT IP CONSULT  CARE MANAGEMENT / SOCIAL WORK IP CONSULT  SOCIAL WORK IP CONSULT  PULMONARY GENERAL ADULT IP CONSULT  NEUROPSYCHOLOGY IP CONSULT  CARDIOLOGY GENERAL ADULT IP CONSULT  NURSING TO CONSULT FOR VASCULAR ACCESS CARE IP CONSULT  ENT IP CONSULT  NURSING TO CONSULT FOR VASCULAR ACCESS CARE IP CONSULT  SPEECH LANGUAGE PATH ADULT IP CONSULT  NURSING TO CONSULT FOR VASCULAR ACCESS CARE IP CONSULT  PHARMACY TO DOSE VANCO  NUTRITION SERVICES ADULT IP CONSULT  SPEECH LANGUAGE PATH ADULT IP CONSULT  PHARMACY IP CONSULT  CARE MANAGEMENT / SOCIAL WORK IP CONSULT  ENT IP CONSULT  GI LUMINAL ADULT IP CONSULT  ENDOCRINE NON-DIABETES ADULT IP CONSULT  NEUROLOGY GENERAL ADULT IP CONSULT  RHEUMATOLOGY IP CONSULT  GI PANCREATICOBILIARY ADULT IP CONSULT  INTERVENTIONAL RADIOLOGY ADULT/PEDS IP CONSULT  PSYCHOLOGY ADULT IP  CONSULT  SPIRITUAL HEALTH SERVICES IP CONSULT  NURSING TO CONSULT FOR VASCULAR ACCESS CARE IP CONSULT  PALLIATIVE CARE ADULT IP CONSULT  PHARMACY IP CONSULT  NUTRITION SERVICES ADULT IP CONSULT  GI LUMINAL ADULT IP CONSULT  SPEECH LANGUAGE PATH ADULT IP CONSULT  PHARMACY IP CONSULT  NURSING TO CONSULT FOR VASCULAR ACCESS CARE IP CONSULT  SPIRITUAL HEALTH SERVICES IP CONSULT  NURSING TO CONSULT FOR VASCULAR ACCESS CARE IP CONSULT  RESPIRATORY CARE IP CONSULT  INTERVENTIONAL RADIOLOGY ADULT/PEDS IP CONSULT  PALLIATIVE CARE ADULT IP CONSULT  GI LUMINAL ADULT IP CONSULT  NURSING TO CONSULT FOR VASCULAR ACCESS CARE IP CONSULT  WOUND OSTOMY CONTINENCE NURSE  IP CONSULT  CARE MANAGEMENT / SOCIAL WORK IP CONSULT  PHYSICAL THERAPY ADULT IP CONSULT  OCCUPATIONAL THERAPY ADULT IP CONSULT  SPIRITUAL HEALTH SERVICES IP CONSULT    Time Spent on this Encounter   I, VERONIAC Cadet CNP, personally saw the patient today and spent greater than 30 minutes discharging this patient.

## 2025-01-14 NOTE — PROGRESS NOTES
Brief Progress Note    Patient was brought from floor to MICU due to severe hypoxemia on the floor on BiPAP w/ PaO2 50s on FiO2 100% on BiPAP. Upon arrival, pt was weak, able to communicate with the team stating that she would like to proceed with intubation. She is aware due to her severe COPD, chronic pHTN, and current severe hypoxemia, she is high risk of undergoing cardiac arrest tania-intubation. She states if tania-intubation occurs, she would like the team to respect her wish of DNR.     Pt was started on NE infusion prior to intubation for hemodynamic support. Intubation was performed by me with Dr. Azar's supervision without complication (induction: etomidate 15mg, rocuronium 100mg). After intubation, pt developed hypotension (from cuff) with poor waveform of SpO2 tracing. She was given 0.5mg epinephrine followed then went into VT to ~190s and converted to Afib RVR after amiodarone IVP 300mg. Throughout this time, she had pulses, although much slower than her ECG rate. Vasopressin was initiated after maximization of NE. Due to lack of adequate vascular access, I inserted IO in L tibia for temporary vascular access, then performed an emergent arterial line and 3-lumen CVL in the L femoral region for hemodynamic monitoring, infusion access, and lab draw.    Family was updated about patient's critical condition. Dr Azar was present during resuscitation and supervised all the procedures described above.    MARY Arana MD PhD  PGY7 Fellow-In-Training  Critical Care Medicine (Cardiology)  Pager: [phone]  For patient care, please contact via [employer] #: Martina Arana

## 2025-01-14 NOTE — PROCEDURES
[facility]    Insert needle, intraosseous infusion    Date/Time: 1/14/2025 8:50 AM    Performed by: Martina Arana MD  Authorized by: Ina Azar MD      UNIVERSAL PROTOCOL   Site Marked: Yes  Prior Images Obtained and Reviewed:  Yes  Required items: Required blood products, implants, devices and special equipment available    Patient identity confirmed:  Arm band, provided demographic data and hospital-assigned identification number  Patient was reevaluated immediately before administering moderate or deep sedation or anesthesia  Confirmation Checklist:  Patient's identity using two indicators, relevant allergies, procedure was appropriate and matched the consent or emergent situation and correct equipment/implants were available  Time out: Immediately prior to the procedure a time out was called    Universal Protocol: the Joint Commission Universal Protocol was followed    Preparation: Patient was prepped and draped in usual sterile fashion    ESBL (mL):  0    Indications: clinical deterioration, hemodynamic instability, medication administration and rapid vascular access  Insertion site: left proximal tibia      SEDATION  : intubated, sedated on versed, fentaynl.    Site preparation: chlorhexidine  Insertion device: drill device  Insertion: needle was inserted through the bony cortex  Number of attempts: 1  Confirmation method: easy infusion of fluids and aspiration of blood/marrow  Secured with: transparent dressing and protective shield      PROCEDURE    Patient Tolerance:  Patient tolerated the procedure well with no immediate complications  Length of time physician/provider present for 1:1 monitoring during sedation: 5

## 2025-01-14 NOTE — CODE/RAPID RESPONSE
Rapid Response Team Note    Assessment   A rapid response was called on Asya Coffman due to acute hypoxic respiratory failure. This presentation is likely due to possible aspiration pneumonia following EGD for PEG tube placement and history of dysphagia due to laryngeal cancer.    Plan   -  Patient and family agreeable to intubation, but do not want CPR; code status updated in chart   -  Currently with sats in mid 80s on 100% BIPAP   -  Start Meropenem IV for possible aspiration pneumonia given hx ESBL  -  Hold off on diuresis given hypotension   -  The Internal Medicine primary team was at bedside  -  Disposition: The patient will be transferred to the ICU.  -  Reassessment and plan follow-up will be performed by the accepting ICU team    Danya Newberry, CNP  Rapid Response Team ERIC  Securely message with [email or web address]     Medical Decision Making       25 MINUTES SPENT BY ME on the date of service doing chart review, history, exam, documentation & further activities per the note.      Asya is critically ill with acute hypoxic respiratory failure. These features are alarming and indicate that the patient is at imminent risk of life-threatening organ failure. Acute deterioration is being managed by the following critical interventions: transfer to the ICU for endotracheal intubation        Hospital Course   Brief Summary of events leading to rapid response:   RRT called for worsening hypoxic respiratory failure with sats in 80s despite maximal use of BIPAP.  Three RRTs called overnight for acute hypoxic respiratory failure, see notes in charting for details. Had escalating O2 needs following PEG insertion from 1-3L NC to 100 FiO2 on HFNC.  Second RRT called for worsening sats however was in afib with RVR.  Third rapid called for continued respiratory decompensation at which point she was started on BIPAP.  Given continued deterioration, family came in and decision was made to trial intubation.         Physical Exam    Vital Signs: Temp: 97.7  F (36.5  C) Temp src: Axillary BP: (!) 70/51 Pulse: (!) 121   Resp: 23 SpO2: (!) 87 % O2 Device: BiPAP/CPAP Oxygen Delivery: 50 LPM  Weight: 107 lbs 5.82 oz      Exam:   - Increased WOB on 100% BIPAP       Significant Results and Procedures       The patient is not known to have an infection however empiric abx are being started for presumed aspiration pneumonia as above.

## 2025-01-14 NOTE — CONSULTS
"SPIRITUAL HEALTH SERVICES - Consult Note (Palliative)  [facility] ([place] 4C  Referral Source/Reason for Visit: family support  Patient Name: Asya \"Hannah\" Augustus    Summary and Recommendations -  Hannah's family are understandably concerned about her condition, and they appreciate that she was able to add her voice to discussion of intubation, so they are sure what she wants; they believe she would not wish to be kept alive without hope of a meaningful recovery.  Hannah has robust support from children, siblings, and community, including [employer] community; I contacted Hannah's  at family's request.  I offered prayer for Hannah, which she has requested in the past.     Plan: Chaplains will follow for spiritual support while Hannah is admitted.     Inocencia Reynaga M.Div., [facility]     To reach Spiritual Health, securely message with the Vocera Web Console or enter an ASAP/STAT consult in [employer], which will also page the on-call .     Assessment     Patient/Family Understanding of Illness and Goals of Care - Family shared stories of the health crises Hannah has suffered, the extensive treatment she has undergone, and the way she has always previously pushed through to return to the life she enjoys. As above, they are grateful that Hannah was able to add her own voice to plan of care discussion today; they noted that she has been intubated before, always in the short term.       Distress and Loss - Family are sad and worried, very supportive of Hannah and one another. They are grateful that she got to see some extended family in the last few weeks, and all of her siblings over the summer. Family believe that Hannah is at peace with dying if she dies.      Strengths, Coping, and Resources - Family spoke extensively about Hannah's personal resilience in the face of many health challenges. She has always found a way to continue enjoying her life and her work in the community. Her service to other people and [employer] " brenda are very important to her, as is family. Hannah is a  with two children, six grandchildren (16-28 years old), and a large group of siblings.      Meaning, Beliefs, and Spirituality - Taoism brenda and participation in sacraments is important to Hannah. I called her Protestant, [employer], [place], and spoke with their pastoral coordinator about a  visit later today.

## 2025-01-14 NOTE — BRIEF OP NOTE
[facility]    Brief Operative Note    Pre-operative diagnosis: Acute on chronic respiratory failure with hypoxia (H) [J96.21]  Post-operative diagnosis Same as pre-operative diagnosis    Procedure: ESOPHAGOGASTRODUODENOSCOPY, WITH PEG TUBE INSERTION AND GASTROPEXY, N/A - Abdomen    Surgeon: Surgeons and Role:     * Brant Paz MD - Primary     * Miranda Aparicio MD - Fellow - Assisting  Anesthesia: General   Estimated Blood Loss: None    Drains: None  Specimens: * No specimens in log *    Findings:     - EGD with successful PEG tube placement (24F Avanos, bumper at 4 cm) and gastropexy     Recommendations:   - Observe patient in PACU prior transferring to medical floor   - Keep G-tube on gravity drain for 4 hours (or overnight)  - Okay to use G-tube after 4 hours .  - No anticoagulation or NSAIDs for 72 hours post operatively.  - Order to flush PEG with 30 ml q 4 hrs to maintain patency.  - Order to flush feeding tube with minimum of 30 ml H2O before and after medications.  - Additional H2O (hydration) flushes per RD.  - T-tags (white buttons) should be removed 10-14 days following procedure by lifting the button and cutting the underlying blue suture. Often times the buttons fall off prior to the 2 week carolyn.  - Analgesia/antiemetics per primary team   - PEG site care and TF educaton per bedside RN.  - Consult care coordinator to arrange home PEG and TF supplies.    Complications: None.  Implants: * No implants in log *

## 2025-01-14 NOTE — PROGRESS NOTES
Hospital Medicine  I updated the patient's daughter(s) on the patient's status.  They will be here within ~ 30 minutes.  Deo Renee MD

## 2025-01-14 NOTE — PLAN OF CARE
Hours of Care: [id number]    Neuro: A&Ox4.   Cardiac: Afib uncontrolled, after arriving from PACU pt's HR was 110's-130's, however an hour later pt's HR had increased to 130's-150's and pt had increased O2 needs, rapid was called. BP's soft during shift, provider notified, See shift summary/provider notifications note for further details.   Respiratory: Pt had multiple desat episodes requiring more and more O2. End of shift pt was on 100% Bipap and still unable to keep O2 above 88%. Lung sounds very coase. Performed oral catheter suctioning a few times throughout shift with moderate amount of secretions. See shift summary/provider notifications note for further details.   GI/: Incontinent of bladder and bowel. No BM during shift.   Diet/appetite: NPO. New PEG placed 1/13, TF had been started at 2AM, but were turned off during second RRT d/t pt having nausea and was on Bipap.   Activity: Not OOB during shift.   Pain: 1x dose of IV dilaudid provided for abd pain w/ relief. PRN dose of tylenol provided for abd pain w/ some relief.   Skin: No new deficits noted. Sacral wound/moisture damage covered with mepi.   LDA's: L PIV x2, R PIV SL, PEG tube.     Plan: Continue to monitor respiratory status, BP's and HR. Notify primary team with changes.    Goal Outcome Evaluation:    Plan of Care Reviewed With: patient  Overall Patient Progress: decliningOverall Patient Progress: declining  Outcome Evaluation: Pt increased needs of O2, ended up on 100% Bipap by end of shift and not able to keep sats above 88%.  Problem: Gas Exchange Impaired  Goal: Optimal Gas Exchange  Outcome: Not Progressing

## 2025-01-14 NOTE — OR NURSING
Dr Hirsch aware of BS 46.  1/2 amp of D50 given along  with increasing dextrose infusion from 75 to 150.  Repeat 's.  LS very coarse throughout.  Encouraged pt to DB and cough and  use of IS.  Pt  very weak with weak cough  present.  Suctioned  thick secretions.  Dr Hirsch aware of AFib  with RVR  at times 120's.  No orders for heart  rate  control at  this time.

## 2025-01-14 NOTE — PROCEDURES
[facility]    Central line    Date/Time: 1/14/2025 10:30 AM    Performed by: Martina Arana MD  Authorized by: Ina Azar MD  Indications: vascular access      UNIVERSAL PROTOCOL   Site Marked: Yes  Prior Images Obtained and Reviewed:  Yes  Required items: Required blood products, implants, devices and special equipment available    Patient identity confirmed:  Arm band, provided demographic data and hospital-assigned identification number  Patient was reevaluated immediately before administering moderate or deep sedation or anesthesia  Confirmation Checklist:  Patient's identity using two indicators, relevant allergies, procedure was appropriate and matched the consent or emergent situation and correct equipment/implants were available  Time out: Immediately prior to the procedure a time out was called    Universal Protocol: the Joint Commission Universal Protocol was followed    Preparation: Patient was prepped and draped in usual sterile fashion    ESBL (mL):  5    SEDATION  : intubated, sedated with fentanyl, versed.      Preparation: skin prepped with ChloraPrep  Skin prep agent dried: skin prep agent completely dried prior to procedure  Sterile barriers: all five maximum sterile barriers used - cap, mask, sterile gown, sterile gloves, and large sterile sheet  Hand hygiene: hand hygiene performed prior to central venous catheter insertion  Site selection rationale: Pt is tania-code on 2 pressors with labile blood pressure; unreliable SpO2 w/ poor waveforms  Patient position: flat  Catheter type: triple lumen  Catheter size: 7 Fr  Pre-procedure: landmarks identified  Ultrasound guidance: yes  Sterile ultrasound techniques: sterile gel and sterile probe covers were used  Number of attempts: 1  Successful placement: yes  Post-procedure: line sutured and dressing applied  Assessment: blood return through all ports      PROCEDURE    Patient Tolerance:  Patient  tolerated the procedure well with no immediate complications  Length of time physician/provider present for 1:1 monitoring during sedation: 30

## 2025-01-14 NOTE — PROVIDER NOTIFICATION
"   01/13/25 2328   Call Information   Date of Call 01/14/25   Time of Call 2323   Name of person requesting the team Aura Leung   Title of person requesting team RN   RRT Arrival time 2328   Time RRT ended 0058   Reason for call   Type of RRT Adult   Primary reason for call Respiratory   Respiratory O2sat less than 88% for greater than 5 minutes despite O2;Rate greater than 24;Labored breathing   Was patient transferred from the ED, ICU, or PACU within last 24 hours prior to RRT call? Yes   SBAR   Situation Respiratory distress   Background Admitted 54 days ago. Per recent provider note \"Asya Coffman is a 79 year old female admitted on 11/21/2024. 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 Initially admitted to hospital medicine service with hypoxic respiratory failure suspected due to HFpEF requiring diuresis. Developed acute hypoxia on 12/8 secondary to suspected aspiration pneumonitis for which she was intubated (12/8-12/10). Now s/p extubation and transferred back to hospital medicine service. Ongoing evaluation related to aspiration, currently tolerated continuous tube feeds via NJ, with improving respiratory status. Plan for PEG placement due to being NPO and high risk for aspiration. \"   Notable History/Conditions Cancer;Cardiac;Recent surgery   Assessment Respiratory distress: RR elevated 28, poor respiratory effort, with audible coarseness, weak cough unable to expectorate sputum, and desatting to 80s on 15L Oxymask. Denies feeling short of breath. HFNC initiated immediately; requiring 100% FiO2 to keep SpO2 >90%. NT suctioned by RT with some small white sputum; process was complicated by some gagging/nausea that resolved by not further suctioning. Pulmonary " toileting encouraged. Due to wet lung sounds, MIVF of D5% 100mL/hr was turned down to 25mL/hr.  Labs ordered including ABG, lactic acid, hemoglobin, and electrolytes. CXR done. C/o 10/10 abdominal pain: given prn IV pain med, bladder scan done (225mL), AXR done because of sudden worsening of pain. HR elevated Afib with PVCs: EKG and lytes have been ordered.   Interventions CXR;ECG;Labs;Meds;Neb treatment;O2 per N/C or mask;Suction   Patient Outcome   Patient Outcome Stabilized on unit   RRT Team   Attending/Primary/Covering Physician Gold 8   Date Attending Physician notified 01/13/25   Time Attending Physician notified 2330   Physician(s) Nydia CAMERON followed by Chance CAMERON at midnight shift change.   Lead RN Marzena Leung   RT Ming Smith   Post RRT Intervention Assessment   Post RRT Assessment Stable/Improved   Date Follow Up Done 01/14/25   Time Follow Up Done 0230   Comments RN starting fluid bolus.

## 2025-01-14 NOTE — PROGRESS NOTES
[facility]    Medicine Progress Note - Hospitalist Service, GOLD TEAM 8    Date of Admission:  11/21/2024    Assessment & Plan   Asya Coffman is a 79 year old female admitted on 11/21/2024. 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 Initially admitted to hospital medicine service with hypoxic respiratory failure suspected due to HFpEF requiring diuresis. Developed acute hypoxia on 12/8 secondary to suspected aspiration pneumonitis for which she was intubated (12/8-12/10). Now s/p extubation and transferred back to hospital medicine service. Ongoing evaluation related to aspiration, currently tolerated continuous tube feeds via NJ, with improving respiratory status. S/p PEG placement 1/13, rapidly decompensated overnight requiring transfer to MICU for intubation this morning.    Today:  Overnight, had multiple RRTs called after returning from PEG placement. Was initially in RVR and treated with IV beta blockers and fluids. Later developed progressive respiratory failure, ultimately requiring continuous BiPAP at high settings with 100%FiO2, and despite this had PaO2 values in the 40s. Additionally, she developed borderline hypotension with MAPs in the 60s. Patient's adult children Lana and Deo were present this morning. After a thoughtful discussion with their mother, they decided together to reverse her DNI status and requested to proceed with intubation (they requested that she remain DNR). Called RRT and discussed with MICU team who accepted patient for transfer with plan to proceed with intubation once she arrives on the unit.    Acute severe oropharyngeal dysphagia  Laryngeal squamous cell carcinoma s/p radiation  (4/2024-7/2024)  Failure to thrive  High risk for aspiration   Esophageal dysmotility  Concern for radiation-induced dysphagia  Concern for gastroparesis  Has had increased dysphagia in the setting of laryngeal squamous cell carcinoma s/p radiation (4/2024-7/2024). Family reports that over the past few months has declined dramatically from being quite mobile, active to being barely active, with 4 falls in 3 months progressively reduced speech and recurrent aspirations. ENT evaluated 12/16/24 with bedside laryngoscopy for dysphagia. Normal vocal cord function and control seen including ability to approximate cords during cough. No anatomic explanations for dysphagia seen. Evaluated by GI and there is no concern of esophageal web. Clinical picture consistent with significant muscle weakness contributing to oropharyngeal dysphagia, poor vocal pressure, poor cough effort likely due to radiation. No evidence of active rheumatologic condition contributing per rheumatology evaluation. CK, aldolase negative. Myasthenia panel negative.  Cortisol within normal limit.  Unclear if hypothyroidism is the cause but can be contributing. Doses adjusted as below. Cervical thoracic MRI without significant findings.   - SLP following  - NPO for now, continue TFs via NJ   - PEG placed 1/13  -Continue speech therapy and see swallowing and secretion containment improves  - Patient still with heavy load of secretions per speech therapy, has been a barrier for VFSS, speech will continue seeing her x3/week, plan for another eval and VFSS early this week    GOC conversations  - Patient was previously being followed by palliative, her code status was changed to DNR/DNI, patient currently gets nutrition through NJT, and had plan for VFSS which has been postponed many times due to her increased secretions, PEG placement was discussed previously however was deferred due to her unstable respiratory status and transfer to ICU. Patient has been more  stable hemodynamically for the past week.  - Reconsulted IR for PEG placement 1/8, deferred to GI or gen surg since patient is high risk for IV sedation and needs gastric insufflation which puts her at increased risk for aspiration  - Reconsulted GI for PEG   - Full code for PEG , but was otherwise DNR/DNI (this changed today, see above).  - She needs to be intubated and is high risk for post procedure extubation, family aware    Chronic afib  Afib with RVR  [id number] 3 (age++, HF+)  Has episodes of A-fib with RVR. Has also episodes of bradycardia with  PTA dose of diltiazem and metoprolol  - PTA apixaban 5mg BID  - Pt was previously on Dilt  BID (max dose) , she has some low Bps while in ICU and she was tapered down to Dlit 60 q6hr, had low BP with 120 q6hr, will keep at 60 q6hr for now and CTM  - Metoprolol tartrate 75 mg twice daily  - Goal K above 4 and Mg above 2    Acute hypoxic respiratory failure- on 2L NC  Recurrent Aspiration pneumonitis with aspiration pneumonia  MSSA PNA s/p abx (12/3-12/8)  Obstructive lung disease (COPD vs asthma)  Pulmonary hypertension   Presented on 11/21 with acute hypoxic respiratory failure; initially suspected to be secondary to HFpEF exacerbation; unresolved with diuresis. Underlying obstructive lung disease but no evidence of exacerbation here. Was treated for MSSA pneumonia. Overnight 12/8, developed sudden worsening of oxygenation and increased secretions in the setting of dysphagia/recent laryngeal radiation and was intubated. Suspected aspiration pneumonitis as etiology. Was briefly on zosyn but this was discontinued.  She was extubated on 12/10.  Multiple episodes of worsening of hypoxia with evidence of aspiration and intolerance to tube feeding.  CT 12/27 showed increasing basilar predominant bronchial wall thickening and diffuse groundglass attenuation suspicious of ongoing infectious process. Continues to have have excessive oral secretion and episodes of  aspiration needing frequent suctioning, though intermittently improves.  -Antibiotics: Zosyn 12/28-12/28 then Unasyn 12/28-1/2/25. Transitioned onto levofloxacin on 1/2/25 related to microbiology from sputum smear returning with resistant klebsiella. Plan for a 5 day course ending on 1/6/25.  -Respiratory support:  -Rapid escalation overnight, transferred to MICU for intubation  -Budesonide neb 0.5 mg BID,  -Ipratropium-levalbuterol neb QID  -albuteraol Q4H PRN  -Mucomyst  -Aspiration precautions; n.p.o.  -Flutter valve, incentive spirometery    Family update  Family updated at bedside at time of RRT this morning.    Chronic/stable medical conditions  Anxiety  Depression  History of depression and anxiety.  She feels hopeless and has sad mood during this hospitalization.  Will continue PTA sertraline. health psychology consulted  -Increased PTA Sertraline 200 mg daily     Chronic Left parietal, left thalamic lacunar strokes  -  atorvastatin to high intensity (20-> 40 mg). On DOAC  - A1C shows prediabetes     Hypothyroidism  Hx of thyroidectomy 2/2 cancer   - Continue PTA levothyroxine 88 mcg daily   - Consulted Endocrine service as above and increased dose to 100 mcg daily. Appreciate recommendations    HFpEF (LVEF 55-60% 11/22/2024)   Pulmonary hypertension (44 mmHg per echo 11/22/2024)  Possible small PFO  Echo during current admission (11/22/2024) notable for increased thickness of LV and elevated BNP (peak 9200 > 6200) concerning for heart failure exacerbation s/p diuresis and pulmonary hypertension with estimated pulmonary artery pressure of 45 mmHg. Echo with bubble study (11/25/2024) concerning for possible small PFO. With intubation on 12/8, developed hypotension without evidence of shock. Etiology of hypotension may be cardiogenic (e.g. exacerbation of pulmonary hypertension by positive pressure ventilation) vs medication associated (propofol, precedex); Weaned off pressors 12/10.   - Preload dependent in  the setting of pulmonary hypertension, gentle diuresis as needed   - Restart lasix 1/3 with 20mg oral daily (40mg recommended in cardiology consult 11/27 previously)  - Restart empagliflozin 10mg daily 1/2/25  - Continue to hold spironolactone for now, can consider if needed based on potassium.    Oliguric SAMIR, resolved  Mild hyperkalemia  Cr 0.9 on admission. Baseline appears 0.9-1.0. Developed SAMIR initially in setting of diuresis and had been improving. Subsequently increased following transfer to ICU with consideration for prerenal in setting of NPO status.  Again significant increase in creatinine with low Cystatin C FEUrea above 40%.  Concern for possibility of ATN though UA is unremarkable. Overall renal cunftion appears to be improving.  - Strict I/Os  - Trend BMP   - FWF via NGT  - Will restart lasix 1/2/25 with 20mg daily oral; can increase to 40mg if tolerating.  - Continue to hold spironolactone for now, can consider if needed based on potassium.      Anemia of chronic illness  Iron deficiency anemia  Baseline Hgb 11-12. Currently near baseline. Can consider anemia of chronic illness. Not able to swallow PTA oral iron  - Monitor CBC  - s/p one dose of  IV iron     Generalized deconditioning  Recurrent falls  - PT/OT consults, has been inpatient since 11/20 and had recs for TCU on admission     CREST Syndrome  Characterized by Raynaud's, Calcinosis, GERD and some telangectasia's. Last saw [facility] rheumatology 2017. No history of ILD.    - Continue protonix 40 mg daily             Diet: Adult Formula Drip Feeding: Continuous TwoCal HN; Gastrostomy; Goal Rate: 38 mL/hr x 22 hours per day (Hold TFs for 1 hr before/after Synthroid dose); mL/hr; Once ok to resume feedings s/p G-tube placement, initiate at rate of 20 mL/hr and advance...    DVT Prophylaxis: DOAC  Miranda Catheter: Not present  Lines: None     Cardiac Monitoring: ACTIVE order. Indication: Procedural area  Code Status: No CPR- Ok with intubation  (changed 1/14, see above)    Clinically Significant Risk Factors          # Hypochloremia: Lowest Cl = 95 mmol/L in last 2 days, will monitor as appropriate      # Hypoalbuminemia: Lowest albumin = 3 g/dL at 12/10/2024  3:11 AM, will monitor as appropriate                 # Severe Malnutrition: based on nutrition assessment    # Financial/Environmental Concerns: none         Social Drivers of Health    Housing Stability: High Risk (11/23/2024)    Housing Stability     Do you have housing? : No     Are you worried about losing your housing?: No          Disposition Plan        Ronni Hinkle MD  Hospitalist Service, GOLD TEAM 8  [facility]  Securely message with [employer] (more info)  Text page via [facility] Paging/Directory   See signed in provider for up to date coverage information  ______________________________________________________________________    Interval History   Overnight had multiple RRTs called for tachycardia (RVR) following PEG placement, then again for worsening hypoxic respiratory failure around 0600. Went to bedside early this morning, see notes above for further details.    Physical Exam   Vital Signs: Temp: 97.2  F (36.2  C) Temp src: Oral BP: 108/75 Pulse: (!) 127   Resp: 17 SpO2: 91 % O2 Device: Oxymask Oxygen Delivery: 5 LPM  Weight: 107 lbs 5.82 oz    Gen: Cachectic, appears uncomfortable  Lung: breathing somewhat labored on BIPAP, lungs CTAB in anterior fields  Ext: No edema in upper/lower extremities  Neuro: moving extremities appropriately, responding to questions appropriately    Medical Decision Making           Data     I have personally reviewed the following data over the past 24 hrs:    12.4 (H)  \   11.0 (L)   / 239     130 (L) 94 (L) 25.5 (H) /  233 (H)   4.7 22 1.02 (H) \     ALT: 11 AST: 30 AP: 99 TBILI: 0.5   ALB: 3.2 (L) TOT PROTEIN: 6.1 (L) LIPASE: N/A     Trop: 75 (H) BNP: 5,596 (H)     Procal: 0.16 CRP: N/A Lactic Acid: 4.6 (HH)        INR:  1.15 PTT:  N/A   D-dimer:  N/A Fibrinogen:  N/A

## 2025-01-14 NOTE — PLAN OF CARE
"Blood pressure 109/77, pulse 114, temperature 97.5  F (36.4  C), temperature source Oral, resp. rate 24, height 1.651 m (5' 5\"), weight 48.7 kg (107 lb 5.8 oz), SpO2 96%.  Pt VSS, on 1-2L NC.  HR A. Fib rates 's.  A&O x 4.  Denies pain or nausea.  NJ in place but clamped.  Oral cares done q 3-4, minimal secretions noted today.  Up to chair with katelyn steady, back to bed with lift.  T/R q 2 hours.  Incontinent of urine and stool, loose at times.  BG q 4 hour, stable. Mepilex to sacrum, provider paged for antifungal powder, not ordered at this time.  D5@75cc/hr while NPO.  Currently in OR for PEG tube placement.                          "

## 2025-01-14 NOTE — DEATH PRONOUNCEMENT
MD DEATH PRONOUNCEMENT    Called to pronounce Asya Coffman dead.    Physical Exam: Unresponsive to noxious stimuli, Spontaneous respirations absent, Breath sounds absent, Carotid pulse absent, Heart sounds absent, Pupillary light reflex absent, and Corneal blink reflex absent    Patient was pronounced dead at 14:12 PM, 2025.    No data filed       Active Problems:    Acute on chronic respiratory failure with hypoxia (H)       Infectious disease present?: NO    Communicable disease present? (examples: HIV, chicken pox, TB, Ebola, CJD) :  NO    Multi-drug resistant organism present? (example: MRSA): NO    Please consider an autopsy if any of the following exist:  NO Unexpected or unexplained death during or following any dental, medical, or surgical diagnostic treatment procedures.   NO Death of mother at or up to seven days after delivery.     NO All  and pediatric deaths.     NO Death where the cause is sufficiently obscure to delay completion of the death certificate.   NO Deaths in which autopsy would confirm a suspected illness/condition that would affect surviving family members or recipients of transplanted organs.     The following deaths must be reported to the 's Office:  NO A death that may be due entirely or in part to any factors other than natural disease (recent surgery, recent trauma, suspected abuse/neglect).   NO A death that may be an accident, suicide, or homicide.     NO Any sudden, unexpected death in which there is no prior history of significant heart disease or any other condition associated with sudden death.   NO A death under suspicious, unusual, or unexpected circumstances.    NO Any death which is apparently due to natural causes but in which the  does not have a personal physician familiar with the patient s medical history, social, or environmental situation or the circumstances of the terminal event.   NO Any death apparently due to Sudden Infant  Death Syndrome.     NO Deaths that occur during, in association with, or as consequences of a diagnostic, therapeutic, or anesthetic procedure.   NO Any death in which a fracture of a major bone has occurred within the past (6) six months.   NO A death of persons note seen by their physician within 120 days of demise.     NO Any death in which the  was an inmate of a public institution or was in the custody of Law Enforcement personnel.   NO  All unexpected deaths of children   NO Solid organ donors   NO Unidentified bodies   NO Deaths of persons whose bodies are to be cremated or otherwise disposed of so that the bodies will later be unavailable for examination;   NO Deaths unattended by a physician outside of a licensed healthcare facility or licensed residential hospice program   NO Deaths occurring within 24 hours of arrival to a health care facility if death is unexpected.    NO Deaths associated with the decedent s employment.   NO Deaths attributed to acts of terrorism.   NO Any death in which there is uncertainty as to whether it is a medical examiner s care should be discussed with the medical investigator.        Body disposition: Organ donation was discussed with family member:  Son and Daughter.  Not an organ donor.

## 2025-01-14 NOTE — PROGRESS NOTES
Patient arrival to  from 6B @ 0847. Upon arrival, Pt hypotensive, and in resp distress on BiPAP 100% Fio2. Pressors started and patient intubated @ 0905. Emergent interventions performed for patient condition; See emergency documentation for 0912- 0943 charting (primary team @ bedside). Patient blood pressure required levo, vaso, and push doses of epi and jane for management. A-line and central line placed. Echo performed. Patient remained in critical condition- continued to require high doses of pressors and resp support.     Family made the decision to withdraw cares this afternoon. Patient's  was able to come to bedside and perform anointing of the sick and prayer with pt and family. After anointing of the sick, compassionate extubation performed.     Patient passed peacefully with loved ones at bedside.

## 2025-01-14 NOTE — PROVIDER NOTIFICATION
01/14/25 0815   Call Information   Date of Call 01/14/25   Time of Call 0803   Name of person requesting the team Elin   Title of person requesting team RN   RRT Arrival time 0805   Time RRT ended 0845   Reason for call   Type of RRT Adult   Primary reason for call Respiratory   Respiratory O2sat less than 88% for greater than 5 minutes despite O2;Labored breathing   Was patient transferred from the ED, ICU, or PACU within last 24 hours prior to RRT call? Yes   SBAR   Situation O2 desaturation   Background per provider note: 79 year old female with history of A fib, CAD, COPD, asthma, Raynaud's, CKD, laryngeal squamous cell carcinoma/papillomatosis with esophageal dysmotility, CREST syndrome, chronic pain, chronic constipation, hypothyroidism, anxiety, and depression who was initially admitted to [facility] on 11/21/24 with progressive dyspnea and hypoxia thought to be secondary to volume overload and new HFpEF. She had been undergoing diuresis but continued to have persistent O2 need. On 12/9 she developed acute increasing in O2 need as well as copious secretions and inability to protect her airway, so was intubated. Immediately after intubation she became acutely hypotensive requiring initiation of norepinephrine infusion. She was transferred to MICU.   Notable History/Conditions Cancer;Cardiac;Recent surgery   Assessment More drowsy, increase in WOB, MAP > 65 with SBP <90. biPAP 100%.   Interventions Other (describe)  (transfer to ICU, for intubation)   Patient Outcome   Patient Outcome Transferred to  (MICU, 4C)   RRT Team   Attending/Primary/Covering Physician Gold 8   Date Attending Physician notified 01/14/25   Time Attending Physician notified 0805   Physician(s) JHONATAN Chavarria   Lead RN NASREEN Valdovinos RN, Rn   RT Sara, RT

## 2025-01-15 LAB
ATRIAL RATE - MUSE: NORMAL BPM
DIASTOLIC BLOOD PRESSURE - MUSE: NORMAL MMHG
INTERPRETATION ECG - MUSE: NORMAL
P AXIS - MUSE: NORMAL DEGREES
PR INTERVAL - MUSE: NORMAL MS
QRS DURATION - MUSE: 106 MS
QT - MUSE: 346 MS
QTC - MUSE: 491 MS
R AXIS - MUSE: -73 DEGREES
SYSTOLIC BLOOD PRESSURE - MUSE: NORMAL MMHG
T AXIS - MUSE: 103 DEGREES
VENTRICULAR RATE- MUSE: 121 BPM

## 2025-07-07 NOTE — OR NURSING
Author spoke on the phone with patient's granddaughter Ivanna. Went over discharge instructions and restrictions, Ivanna had no questions. She is going to leave now to come  the patient; author will call her at 1500 to make sure she is here prior to discharge.     Felipe Ramirez RN on 10/5/2023 at 2:34 PM     hide

## (undated) DEVICE — SPONGE COTTONOID 2X1/2" 80-1406

## (undated) DEVICE — Device

## (undated) DEVICE — NDL BUTTERFLY 25GA .75" 367298

## (undated) DEVICE — SPONGE COTTONOID 1/4X1/4" 80-1399

## (undated) DEVICE — DRSG GAUZE 4X4" TRAY 6939

## (undated) DEVICE — SOL NACL 0.9% IRRIG 1000ML BOTTLE 2F7124

## (undated) DEVICE — TUBING SMOKE EVAC .635CMX3M SEA3705

## (undated) DEVICE — ENDO TOOTH QUICK GUARD CONFORMING PROTECTION

## (undated) DEVICE — TUBE ENDOTRACHEAL 8.0MMX34CM LASER CUFFED FLEXIBLE TG0050-S

## (undated) DEVICE — BASIN SET SINGLE STERILE 13752-624

## (undated) DEVICE — SPECIMEN CONTAINER 5OZ STERILE 2600SA

## (undated) DEVICE — SPONGE COTTONOID 1/2X1 1/2" 1-STRING 80-1404

## (undated) DEVICE — NDL 15GA 1.5" 8881200029

## (undated) DEVICE — SUCTION TIP YANKAUER W/O VENT K86

## (undated) DEVICE — NDL 25GA 1.5" 305127

## (undated) DEVICE — GLOVE BIOGEL PI ULTRATOUCH G SZ 6.5 42165

## (undated) DEVICE — SYR 03ML LL W/O NDL 309657

## (undated) DEVICE — ATOMIZER DEVICE LARYNGO-TRACHEAL MUCOSA MADGIC MAD600

## (undated) DEVICE — PACK ENT ENDOSCOPY UMMC

## (undated) DEVICE — TRAY EPIDURAL NDL TUOHY PERIFIX 18GA 3.5" W/CATH 332200

## (undated) DEVICE — SYR 10ML LL W/O NDL

## (undated) DEVICE — GLOVE PROTEXIS POWDER FREE SMT 6.5  2D72PT65X

## (undated) DEVICE — SPONGE COTTONOID 1/2X1/2" 80-1400

## (undated) DEVICE — TUBING SUCTION 10'X3/16" N510

## (undated) DEVICE — DRAPE SHEET MED 44X70" 9355

## (undated) DEVICE — SYR PISTON URETHRAL 60ML 68000

## (undated) DEVICE — SYR 10ML SLIP TIP W/O NDL 303134

## (undated) DEVICE — TAPE DURAPORE 1"X1.5YD SILK 1538S-1

## (undated) DEVICE — LINEN TOWEL PACK X5 5464

## (undated) DEVICE — GOWN SM/MED DISP 9505

## (undated) DEVICE — PREP CHLORAPREP 26ML TINTED HI-LITE ORANGE 930815

## (undated) DEVICE — SOL WATER IRRIG 500ML BOTTLE 2F7113

## (undated) DEVICE — DRSG TELFA 3X8" 1238

## (undated) DEVICE — 400U BARE LASER FIBER

## (undated) DEVICE — ANTIFOG SOLUTION W/FOAM PAD 31142527

## (undated) DEVICE — JELLY LUBRICATING SURGILUBE 2OZ TUBE

## (undated) DEVICE — INTRODUCER KIT GASTROSTOMY MIC G 22FR 98433

## (undated) DEVICE — ENDO SHEATH CHANNELED 30CM SLIDE-ON ENT-4/5000 335601

## (undated) DEVICE — SOL WATER IRRIG 1000ML BOTTLE 2F7114

## (undated) DEVICE — SUCTION MANIFOLD NEPTUNE 2 SYS 1 PORT 702-025-000

## (undated) DEVICE — SUCTION MANIFOLD NEPTUNE 2 SYS 4 PORT 0702-020-000

## (undated) DEVICE — DRSG EYE PAD STERILE 1.63X2.63" NON21600

## (undated) DEVICE — ESU GROUND PAD ADULT W/CORD E7507

## (undated) DEVICE — PACK ENDOSCOPY GI CUSTOM UMMC

## (undated) DEVICE — SPONGE COTTONOID NEURO 1/2"X1/2" 30-054

## (undated) DEVICE — ESU CORD BIPOLAR GREEN 10-4000

## (undated) DEVICE — LASER FIBER 400 BAREFLAT DBLF-40

## (undated) DEVICE — ENDO TUBING CO2 SMARTCAP STERILE DISP 100145CO2EXT

## (undated) DEVICE — WIPE INSTRUMENT MEROCEL 400200

## (undated) DEVICE — ENDO FORCEP ENDOJAW BIOPSY 2.0MMX155CM FB-221K

## (undated) DEVICE — STRAP UNIVERSAL POSITIONING 2-PIECE 4X47X76" 91-287

## (undated) DEVICE — BITE BLOCK ENDO W/DENTAL RIM 54FR SBT-114-100

## (undated) DEVICE — NDL COUNTER 20CT 31142493

## (undated) DEVICE — DRAPE C-ARM W/STRAPS 42X72" 07-CA104

## (undated) DEVICE — BLADE SKIMMER LARYN 2.9X22CM  1882923HRE

## (undated) DEVICE — KIT ENDO FIRST STEP DISINFECTANT 200ML W/POUCH EP-4

## (undated) DEVICE — SPONGE COTTONOID 1/2X1/2" 20-04S

## (undated) RX ORDER — FENTANYL CITRATE-0.9 % NACL/PF 10 MCG/ML
PLASTIC BAG, INJECTION (ML) INTRAVENOUS
Status: DISPENSED
Start: 2023-09-07

## (undated) RX ORDER — AMPICILLIN AND SULBACTAM 2; 1 G/1; G/1
INJECTION, POWDER, FOR SOLUTION INTRAMUSCULAR; INTRAVENOUS
Status: DISPENSED
Start: 2023-12-14

## (undated) RX ORDER — BEVACIZUMAB 1.25MG/.05
SYRINGE (ML) INTRAOCULAR
Status: DISPENSED
Start: 2023-03-17

## (undated) RX ORDER — ESMOLOL HYDROCHLORIDE 10 MG/ML
INJECTION INTRAVENOUS
Status: DISPENSED
Start: 2023-09-07

## (undated) RX ORDER — FENTANYL CITRATE 50 UG/ML
INJECTION, SOLUTION INTRAMUSCULAR; INTRAVENOUS
Status: DISPENSED
Start: 2025-01-13

## (undated) RX ORDER — FENTANYL CITRATE-0.9 % NACL/PF 10 MCG/ML
PLASTIC BAG, INJECTION (ML) INTRAVENOUS
Status: DISPENSED
Start: 2024-05-30

## (undated) RX ORDER — FENTANYL CITRATE-0.9 % NACL/PF 10 MCG/ML
PLASTIC BAG, INJECTION (ML) INTRAVENOUS
Status: DISPENSED
Start: 2024-02-15

## (undated) RX ORDER — PROPOFOL 10 MG/ML
INJECTION, EMULSION INTRAVENOUS
Status: DISPENSED
Start: 2023-04-03

## (undated) RX ORDER — AMPICILLIN AND SULBACTAM 2; 1 G/1; G/1
INJECTION, POWDER, FOR SOLUTION INTRAMUSCULAR; INTRAVENOUS
Status: DISPENSED
Start: 2022-12-01

## (undated) RX ORDER — ESMOLOL HYDROCHLORIDE 10 MG/ML
INJECTION INTRAVENOUS
Status: DISPENSED
Start: 2023-12-14

## (undated) RX ORDER — EPINEPHRINE 0.1 MG/ML
INJECTION INTRAVENOUS
Status: DISPENSED
Start: 2024-05-30

## (undated) RX ORDER — PROPOFOL 10 MG/ML
INJECTION, EMULSION INTRAVENOUS
Status: DISPENSED
Start: 2024-04-11

## (undated) RX ORDER — LIDOCAINE HYDROCHLORIDE AND EPINEPHRINE 10; 10 MG/ML; UG/ML
INJECTION, SOLUTION INFILTRATION; PERINEURAL
Status: DISPENSED
Start: 2023-02-15

## (undated) RX ORDER — FENTANYL CITRATE-0.9 % NACL/PF 10 MCG/ML
PLASTIC BAG, INJECTION (ML) INTRAVENOUS
Status: DISPENSED
Start: 2022-12-01

## (undated) RX ORDER — SODIUM CHLORIDE, SODIUM LACTATE, POTASSIUM CHLORIDE, CALCIUM CHLORIDE 600; 310; 30; 20 MG/100ML; MG/100ML; MG/100ML; MG/100ML
INJECTION, SOLUTION INTRAVENOUS
Status: DISPENSED
Start: 2023-04-03

## (undated) RX ORDER — FENTANYL CITRATE 50 UG/ML
INJECTION, SOLUTION INTRAMUSCULAR; INTRAVENOUS
Status: DISPENSED
Start: 2024-02-15

## (undated) RX ORDER — METOPROLOL TARTRATE 1 MG/ML
INJECTION, SOLUTION INTRAVENOUS
Status: DISPENSED
Start: 2024-02-15

## (undated) RX ORDER — ONDANSETRON 2 MG/ML
INJECTION INTRAMUSCULAR; INTRAVENOUS
Status: DISPENSED
Start: 2022-12-01

## (undated) RX ORDER — LIDOCAINE HYDROCHLORIDE 40 MG/ML
INJECTION, SOLUTION RETROBULBAR
Status: DISPENSED
Start: 2022-12-01

## (undated) RX ORDER — DEXAMETHASONE SODIUM PHOSPHATE 10 MG/ML
INJECTION, SOLUTION INTRAMUSCULAR; INTRAVENOUS
Status: DISPENSED
Start: 2024-02-15

## (undated) RX ORDER — LIDOCAINE HYDROCHLORIDE AND EPINEPHRINE 10; 10 MG/ML; UG/ML
INJECTION, SOLUTION INFILTRATION; PERINEURAL
Status: DISPENSED
Start: 2023-03-09

## (undated) RX ORDER — LIDOCAINE HYDROCHLORIDE AND EPINEPHRINE 10; 10 MG/ML; UG/ML
INJECTION, SOLUTION INFILTRATION; PERINEURAL
Status: DISPENSED
Start: 2023-02-23

## (undated) RX ORDER — LIDOCAINE HYDROCHLORIDE 20 MG/ML
INJECTION, SOLUTION EPIDURAL; INFILTRATION; INTRACAUDAL; PERINEURAL
Status: DISPENSED
Start: 2022-05-26

## (undated) RX ORDER — ONDANSETRON 2 MG/ML
INJECTION INTRAMUSCULAR; INTRAVENOUS
Status: DISPENSED
Start: 2024-05-30

## (undated) RX ORDER — AMPICILLIN AND SULBACTAM 2; 1 G/1; G/1
INJECTION, POWDER, FOR SOLUTION INTRAMUSCULAR; INTRAVENOUS
Status: DISPENSED
Start: 2023-09-07

## (undated) RX ORDER — FENTANYL CITRATE 50 UG/ML
INJECTION, SOLUTION INTRAMUSCULAR; INTRAVENOUS
Status: DISPENSED
Start: 2022-09-15

## (undated) RX ORDER — DEXTROSE MONOHYDRATE 25 G/50ML
INJECTION, SOLUTION INTRAVENOUS
Status: DISPENSED
Start: 2025-01-13

## (undated) RX ORDER — FENTANYL CITRATE-0.9 % NACL/PF 10 MCG/ML
PLASTIC BAG, INJECTION (ML) INTRAVENOUS
Status: DISPENSED
Start: 2022-05-26

## (undated) RX ORDER — AMPICILLIN AND SULBACTAM 2; 1 G/1; G/1
INJECTION, POWDER, FOR SOLUTION INTRAMUSCULAR; INTRAVENOUS
Status: DISPENSED
Start: 2023-06-15

## (undated) RX ORDER — METOPROLOL TARTRATE 1 MG/ML
INJECTION, SOLUTION INTRAVENOUS
Status: DISPENSED
Start: 2023-04-03

## (undated) RX ORDER — ONDANSETRON 2 MG/ML
INJECTION INTRAMUSCULAR; INTRAVENOUS
Status: DISPENSED
Start: 2024-02-15

## (undated) RX ORDER — TRIAMCINOLONE ACETONIDE 40 MG/ML
INJECTION, SUSPENSION INTRA-ARTICULAR; INTRAMUSCULAR
Status: DISPENSED
Start: 2023-09-07

## (undated) RX ORDER — FENTANYL CITRATE 50 UG/ML
INJECTION, SOLUTION INTRAMUSCULAR; INTRAVENOUS
Status: DISPENSED
Start: 2023-04-03

## (undated) RX ORDER — ESMOLOL HYDROCHLORIDE 10 MG/ML
INJECTION INTRAVENOUS
Status: DISPENSED
Start: 2022-09-15

## (undated) RX ORDER — LABETALOL HYDROCHLORIDE 5 MG/ML
INJECTION, SOLUTION INTRAVENOUS
Status: DISPENSED
Start: 2023-04-03

## (undated) RX ORDER — LIDOCAINE HYDROCHLORIDE 20 MG/ML
INJECTION, SOLUTION EPIDURAL; INFILTRATION; INTRACAUDAL; PERINEURAL
Status: DISPENSED
Start: 2022-10-27

## (undated) RX ORDER — FENTANYL CITRATE 50 UG/ML
INJECTION, SOLUTION INTRAMUSCULAR; INTRAVENOUS
Status: DISPENSED
Start: 2022-12-01

## (undated) RX ORDER — AMPICILLIN AND SULBACTAM 2; 1 G/1; G/1
INJECTION, POWDER, FOR SOLUTION INTRAMUSCULAR; INTRAVENOUS
Status: DISPENSED
Start: 2022-05-26

## (undated) RX ORDER — LIDOCAINE HYDROCHLORIDE AND EPINEPHRINE 10; 10 MG/ML; UG/ML
INJECTION, SOLUTION INFILTRATION; PERINEURAL
Status: DISPENSED
Start: 2023-01-26

## (undated) RX ORDER — FENTANYL CITRATE 50 UG/ML
INJECTION, SOLUTION INTRAMUSCULAR; INTRAVENOUS
Status: DISPENSED
Start: 2023-06-15

## (undated) RX ORDER — PROPOFOL 10 MG/ML
INJECTION, EMULSION INTRAVENOUS
Status: DISPENSED
Start: 2024-05-30

## (undated) RX ORDER — PROPOFOL 10 MG/ML
INJECTION, EMULSION INTRAVENOUS
Status: DISPENSED
Start: 2022-12-01

## (undated) RX ORDER — TRIAMCINOLONE ACETONIDE 40 MG/ML
INJECTION, SUSPENSION INTRA-ARTICULAR; INTRAMUSCULAR
Status: DISPENSED
Start: 2023-12-14

## (undated) RX ORDER — PROPOFOL 10 MG/ML
INJECTION, EMULSION INTRAVENOUS
Status: DISPENSED
Start: 2023-09-07

## (undated) RX ORDER — PROPOFOL 10 MG/ML
INJECTION, EMULSION INTRAVENOUS
Status: DISPENSED
Start: 2024-02-15

## (undated) RX ORDER — FENTANYL CITRATE 50 UG/ML
INJECTION, SOLUTION INTRAMUSCULAR; INTRAVENOUS
Status: DISPENSED
Start: 2024-05-30

## (undated) RX ORDER — FENTANYL CITRATE 50 UG/ML
INJECTION, SOLUTION INTRAMUSCULAR; INTRAVENOUS
Status: DISPENSED
Start: 2023-12-14

## (undated) RX ORDER — DEXAMETHASONE SODIUM PHOSPHATE 4 MG/ML
INJECTION, SOLUTION INTRA-ARTICULAR; INTRALESIONAL; INTRAMUSCULAR; INTRAVENOUS; SOFT TISSUE
Status: DISPENSED
Start: 2022-12-01

## (undated) RX ORDER — HYDROMORPHONE HYDROCHLORIDE 1 MG/ML
INJECTION, SOLUTION INTRAMUSCULAR; INTRAVENOUS; SUBCUTANEOUS
Status: DISPENSED
Start: 2023-04-03

## (undated) RX ORDER — ESMOLOL HYDROCHLORIDE 10 MG/ML
INJECTION INTRAVENOUS
Status: DISPENSED
Start: 2023-04-03

## (undated) RX ORDER — GLYCOPYRROLATE 0.2 MG/ML
INJECTION, SOLUTION INTRAMUSCULAR; INTRAVENOUS
Status: DISPENSED
Start: 2023-04-03

## (undated) RX ORDER — OXYMETAZOLINE HYDROCHLORIDE 0.05 G/100ML
SPRAY NASAL
Status: DISPENSED
Start: 2022-05-26

## (undated) RX ORDER — AMPICILLIN AND SULBACTAM 2; 1 G/1; G/1
INJECTION, POWDER, FOR SOLUTION INTRAMUSCULAR; INTRAVENOUS
Status: DISPENSED
Start: 2023-10-05

## (undated) RX ORDER — AMPICILLIN AND SULBACTAM 2; 1 G/1; G/1
INJECTION, POWDER, FOR SOLUTION INTRAMUSCULAR; INTRAVENOUS
Status: DISPENSED
Start: 2022-09-15

## (undated) RX ORDER — DEXAMETHASONE SODIUM PHOSPHATE 4 MG/ML
INJECTION, SOLUTION INTRA-ARTICULAR; INTRALESIONAL; INTRAMUSCULAR; INTRAVENOUS; SOFT TISSUE
Status: DISPENSED
Start: 2024-05-30

## (undated) RX ORDER — PROPOFOL 10 MG/ML
INJECTION, EMULSION INTRAVENOUS
Status: DISPENSED
Start: 2022-09-15

## (undated) RX ORDER — LIDOCAINE HYDROCHLORIDE AND EPINEPHRINE 10; 10 MG/ML; UG/ML
INJECTION, SOLUTION INFILTRATION; PERINEURAL
Status: DISPENSED
Start: 2023-03-02

## (undated) RX ORDER — ONDANSETRON 2 MG/ML
INJECTION INTRAMUSCULAR; INTRAVENOUS
Status: DISPENSED
Start: 2022-05-26

## (undated) RX ORDER — ESMOLOL HYDROCHLORIDE 10 MG/ML
INJECTION INTRAVENOUS
Status: DISPENSED
Start: 2022-12-01

## (undated) RX ORDER — ONDANSETRON 2 MG/ML
INJECTION INTRAMUSCULAR; INTRAVENOUS
Status: DISPENSED
Start: 2023-09-07

## (undated) RX ORDER — BUPIVACAINE HYDROCHLORIDE 5 MG/ML
INJECTION, SOLUTION EPIDURAL; INTRACAUDAL
Status: DISPENSED
Start: 2023-03-09

## (undated) RX ORDER — FENTANYL CITRATE 50 UG/ML
INJECTION, SOLUTION INTRAMUSCULAR; INTRAVENOUS
Status: DISPENSED
Start: 2024-04-11

## (undated) RX ORDER — LABETALOL HYDROCHLORIDE 5 MG/ML
INJECTION, SOLUTION INTRAVENOUS
Status: DISPENSED
Start: 2023-12-14

## (undated) RX ORDER — DEXAMETHASONE SODIUM PHOSPHATE 4 MG/ML
INJECTION, SOLUTION INTRA-ARTICULAR; INTRALESIONAL; INTRAMUSCULAR; INTRAVENOUS; SOFT TISSUE
Status: DISPENSED
Start: 2024-02-15

## (undated) RX ORDER — GLYCOPYRROLATE 0.2 MG/ML
INJECTION, SOLUTION INTRAMUSCULAR; INTRAVENOUS
Status: DISPENSED
Start: 2024-05-30

## (undated) RX ORDER — AMPICILLIN AND SULBACTAM 2; 1 G/1; G/1
INJECTION, POWDER, FOR SOLUTION INTRAMUSCULAR; INTRAVENOUS
Status: DISPENSED
Start: 2024-05-30

## (undated) RX ORDER — EPINEPHRINE 0.1 MG/ML
INJECTION INTRAVENOUS
Status: DISPENSED
Start: 2024-04-11

## (undated) RX ORDER — DEXAMETHASONE SODIUM PHOSPHATE 4 MG/ML
INJECTION, SOLUTION INTRA-ARTICULAR; INTRALESIONAL; INTRAMUSCULAR; INTRAVENOUS; SOFT TISSUE
Status: DISPENSED
Start: 2023-04-03

## (undated) RX ORDER — EPINEPHRINE IN SOD CHLOR,ISO 1 MG/10 ML
SYRINGE (ML) INTRAVENOUS
Status: DISPENSED
Start: 2023-04-03

## (undated) RX ORDER — DEXTROSE MONOHYDRATE 50 MG/ML
INJECTION, SOLUTION INTRAVENOUS
Status: DISPENSED
Start: 2025-01-13

## (undated) RX ORDER — FENTANYL CITRATE-0.9 % NACL/PF 10 MCG/ML
PLASTIC BAG, INJECTION (ML) INTRAVENOUS
Status: DISPENSED
Start: 2023-04-03

## (undated) RX ORDER — IPRATROPIUM BROMIDE AND ALBUTEROL SULFATE 2.5; .5 MG/3ML; MG/3ML
SOLUTION RESPIRATORY (INHALATION)
Status: DISPENSED
Start: 2023-12-14

## (undated) RX ORDER — ACETAMINOPHEN 325 MG/1
TABLET ORAL
Status: DISPENSED
Start: 2024-02-16

## (undated) RX ORDER — ONDANSETRON 2 MG/ML
INJECTION INTRAMUSCULAR; INTRAVENOUS
Status: DISPENSED
Start: 2023-04-03

## (undated) RX ORDER — FENTANYL CITRATE 50 UG/ML
INJECTION, SOLUTION INTRAMUSCULAR; INTRAVENOUS
Status: DISPENSED
Start: 2022-05-26

## (undated) RX ORDER — EPHEDRINE SULFATE 50 MG/ML
INJECTION, SOLUTION INTRAMUSCULAR; INTRAVENOUS; SUBCUTANEOUS
Status: DISPENSED
Start: 2022-05-26

## (undated) RX ORDER — FENTANYL CITRATE 50 UG/ML
INJECTION, SOLUTION INTRAMUSCULAR; INTRAVENOUS
Status: DISPENSED
Start: 2023-10-05

## (undated) RX ORDER — LIDOCAINE HYDROCHLORIDE AND EPINEPHRINE 10; 10 MG/ML; UG/ML
INJECTION, SOLUTION INFILTRATION; PERINEURAL
Status: DISPENSED
Start: 2023-03-28

## (undated) RX ORDER — EPINEPHRINE IN SOD CHLOR,ISO 1 MG/10 ML
SYRINGE (ML) INTRAVENOUS
Status: DISPENSED
Start: 2022-05-26

## (undated) RX ORDER — ALBUTEROL SULFATE 90 UG/1
AEROSOL, METERED RESPIRATORY (INHALATION)
Status: DISPENSED
Start: 2023-04-03

## (undated) RX ORDER — LIDOCAINE HYDROCHLORIDE 10 MG/ML
INJECTION, SOLUTION EPIDURAL; INFILTRATION; INTRACAUDAL; PERINEURAL
Status: DISPENSED
Start: 2023-01-26

## (undated) RX ORDER — DEXAMETHASONE SODIUM PHOSPHATE 4 MG/ML
INJECTION, SOLUTION INTRA-ARTICULAR; INTRALESIONAL; INTRAMUSCULAR; INTRAVENOUS; SOFT TISSUE
Status: DISPENSED
Start: 2022-05-26

## (undated) RX ORDER — IPRATROPIUM BROMIDE AND ALBUTEROL SULFATE 2.5; .5 MG/3ML; MG/3ML
SOLUTION RESPIRATORY (INHALATION)
Status: DISPENSED
Start: 2022-05-26

## (undated) RX ORDER — EPINEPHRINE 0.1 MG/ML
INJECTION INTRAVENOUS
Status: DISPENSED
Start: 2024-02-15

## (undated) RX ORDER — AMPICILLIN AND SULBACTAM 2; 1 G/1; G/1
INJECTION, POWDER, FOR SOLUTION INTRAMUSCULAR; INTRAVENOUS
Status: DISPENSED
Start: 2024-04-11

## (undated) RX ORDER — ESMOLOL HYDROCHLORIDE 10 MG/ML
INJECTION INTRAVENOUS
Status: DISPENSED
Start: 2023-10-05

## (undated) RX ORDER — AMOXICILLIN 250 MG
CAPSULE ORAL
Status: DISPENSED
Start: 2024-02-16

## (undated) RX ORDER — ACETAMINOPHEN 325 MG/1
TABLET ORAL
Status: DISPENSED
Start: 2023-03-02

## (undated) RX ORDER — AMPICILLIN AND SULBACTAM 2; 1 G/1; G/1
INJECTION, POWDER, FOR SOLUTION INTRAMUSCULAR; INTRAVENOUS
Status: DISPENSED
Start: 2024-02-15

## (undated) RX ORDER — FENTANYL CITRATE 50 UG/ML
INJECTION, SOLUTION INTRAMUSCULAR; INTRAVENOUS
Status: DISPENSED
Start: 2023-09-07

## (undated) RX ORDER — LIDOCAINE HYDROCHLORIDE 20 MG/ML
INJECTION, SOLUTION EPIDURAL; INFILTRATION; INTRACAUDAL; PERINEURAL
Status: DISPENSED
Start: 2022-09-15

## (undated) RX ORDER — DEXAMETHASONE SODIUM PHOSPHATE 4 MG/ML
INJECTION, SOLUTION INTRA-ARTICULAR; INTRALESIONAL; INTRAMUSCULAR; INTRAVENOUS; SOFT TISSUE
Status: DISPENSED
Start: 2023-09-07

## (undated) RX ORDER — DEXAMETHASONE SODIUM PHOSPHATE 10 MG/ML
INJECTION INTRAMUSCULAR; INTRAVENOUS
Status: DISPENSED
Start: 2022-09-15

## (undated) RX ORDER — LIDOCAINE HYDROCHLORIDE AND EPINEPHRINE 10; 10 MG/ML; UG/ML
INJECTION, SOLUTION INFILTRATION; PERINEURAL
Status: DISPENSED
Start: 2023-03-17